# Patient Record
Sex: MALE | Race: WHITE | URBAN - METROPOLITAN AREA
[De-identification: names, ages, dates, MRNs, and addresses within clinical notes are randomized per-mention and may not be internally consistent; named-entity substitution may affect disease eponyms.]

---

## 2023-10-24 PROBLEM — C79.89: Status: ACTIVE | Noted: 2023-10-24

## 2023-10-24 PROBLEM — C80.1: Status: ACTIVE | Noted: 2023-10-24

## 2023-10-24 PROBLEM — E21.3 HYPERPARATHYROIDISM (HCC): Status: ACTIVE | Noted: 2023-10-24

## 2023-10-26 ENCOUNTER — DOCUMENTATION (OUTPATIENT)
Dept: HEMATOLOGY ONCOLOGY | Facility: CLINIC | Age: 68
End: 2023-10-26

## 2023-10-26 NOTE — PROGRESS NOTES
Late entry: In-basket message received from Dr. Shayne Tam to add patient to the head and neck [address] [place] [address] Loop on 11/13/2023. Chart reviewed and prep completed. done

## 2023-10-26 NOTE — PROGRESS NOTES
Chart reviewed in preparation of Multidisciplinary Head and Neck Tumor Conference presentation by  Dr. Cuca Appiah on 11/13/23. Patient presented with left cheek lesion noticed in 12/2022. He was evaluated by Oral Surgery and underwent biopsy on 2/2023 with pathology showing lichen planus. The lesion never fully healed so he underwent another biopsy at [facility] on 10/6/23 with pathology positive for squamous cell carcinoma.

## 2023-10-26 NOTE — PROGRESS NOTES
STEFFI Méndez  Patient called this morning and and decided to get a second opinion elsewhere. He can be taken off tumor board.

## 2023-11-07 ENCOUNTER — APPOINTMENT (OUTPATIENT)
Dept: CT IMAGING | Facility: CLINIC | Age: 68
End: 2023-11-07

## 2023-11-13 ENCOUNTER — LAB REQUISITION (OUTPATIENT)
Dept: LAB | Facility: HOSPITAL | Age: 68
End: 2023-11-13
Payer: MEDICARE

## 2023-11-13 DIAGNOSIS — C79.89 SECONDARY MALIGNANT NEOPLASM OF OTHER SPECIFIED SITES (HCC): ICD-10-CM

## 2023-11-13 PROCEDURE — 88321 CONSLTJ&REPRT SLD PREP ELSWR: CPT | Performed by: PATHOLOGY

## 2023-11-14 ENCOUNTER — APPOINTMENT (OUTPATIENT)
Dept: OTOLARYNGOLOGY | Facility: CLINIC | Age: 68
End: 2023-11-14
Payer: MEDICARE

## 2023-11-14 PROBLEM — Z00.00 ENCOUNTER FOR PREVENTIVE HEALTH EXAMINATION: Status: ACTIVE | Noted: 2023-11-14

## 2023-11-14 PROCEDURE — 99203 OFFICE O/P NEW LOW 30 MIN: CPT | Mod: 95

## 2023-11-28 ENCOUNTER — TRANSCRIPTION ENCOUNTER (OUTPATIENT)
Age: 68
End: 2023-11-28

## 2023-11-28 RX ORDER — IBUPROFEN 600 MG/1
600 TABLET, FILM COATED ORAL 3 TIMES DAILY
Qty: 30 | Refills: 0 | Status: ACTIVE | COMMUNITY
Start: 2023-11-28 | End: 1900-01-01

## 2023-12-03 ENCOUNTER — INPATIENT (INPATIENT)
Facility: HOSPITAL | Age: 68
LOS: 17 days | Discharge: HOME HEALTH | DRG: 11 | End: 2023-12-21
Attending: DENTIST | Admitting: DENTIST
Payer: MEDICARE

## 2023-12-03 VITALS
SYSTOLIC BLOOD PRESSURE: 146 MMHG | RESPIRATION RATE: 18 BRPM | OXYGEN SATURATION: 98 % | WEIGHT: 184.97 LBS | HEART RATE: 62 BPM | TEMPERATURE: 97 F | HEIGHT: 70 IN | DIASTOLIC BLOOD PRESSURE: 88 MMHG

## 2023-12-03 DIAGNOSIS — C06.0 MALIGNANT NEOPLASM OF CHEEK MUCOSA: ICD-10-CM

## 2023-12-03 DIAGNOSIS — D62 ACUTE POSTHEMORRHAGIC ANEMIA: ICD-10-CM

## 2023-12-03 DIAGNOSIS — E11.9 TYPE 2 DIABETES MELLITUS WITHOUT COMPLICATIONS: ICD-10-CM

## 2023-12-03 DIAGNOSIS — J69.0 PNEUMONITIS DUE TO INHALATION OF FOOD AND VOMIT: ICD-10-CM

## 2023-12-03 DIAGNOSIS — C77.0 SECONDARY AND UNSPECIFIED MALIGNANT NEOPLASM OF LYMPH NODES OF HEAD, FACE AND NECK: ICD-10-CM

## 2023-12-03 DIAGNOSIS — K94.23 GASTROSTOMY MALFUNCTION: ICD-10-CM

## 2023-12-03 DIAGNOSIS — Z87.891 PERSONAL HISTORY OF NICOTINE DEPENDENCE: ICD-10-CM

## 2023-12-03 DIAGNOSIS — Z95.5 PRESENCE OF CORONARY ANGIOPLASTY IMPLANT AND GRAFT: ICD-10-CM

## 2023-12-03 DIAGNOSIS — I25.2 OLD MYOCARDIAL INFARCTION: ICD-10-CM

## 2023-12-03 DIAGNOSIS — Z87.442 PERSONAL HISTORY OF URINARY CALCULI: ICD-10-CM

## 2023-12-03 DIAGNOSIS — E44.1 MILD PROTEIN-CALORIE MALNUTRITION: ICD-10-CM
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
## 2023-12-03 DIAGNOSIS — I25.10 ATHEROSCLEROTIC HEART DISEASE OF NATIVE CORONARY ARTERY WITHOUT ANGINA PECTORIS: ICD-10-CM
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
## 2023-12-03 DIAGNOSIS — J93.9 PNEUMOTHORAX, UNSPECIFIED: ICD-10-CM

## 2023-12-03 DIAGNOSIS — K42.9 UMBILICAL HERNIA WITHOUT OBSTRUCTION OR GANGRENE: ICD-10-CM

## 2023-12-03 DIAGNOSIS — R00.1 BRADYCARDIA, UNSPECIFIED: ICD-10-CM

## 2023-12-03 DIAGNOSIS — R50.81 FEVER PRESENTING WITH CONDITIONS CLASSIFIED ELSEWHERE: ICD-10-CM

## 2023-12-03 DIAGNOSIS — T85.698A OTHER MECHANICAL COMPLICATION OF OTHER SPECIFIED INTERNAL PROSTHETIC DEVICES, IMPLANTS AND GRAFTS, INITIAL ENCOUNTER: ICD-10-CM

## 2023-12-03 DIAGNOSIS — L40.9 PSORIASIS, UNSPECIFIED: ICD-10-CM

## 2023-12-03 LAB
ALBUMIN SERPL ELPH-MCNC: 3.9 G/DL — SIGNIFICANT CHANGE UP (ref 3.3–5)
ALBUMIN SERPL ELPH-MCNC: 3.9 G/DL — SIGNIFICANT CHANGE UP (ref 3.3–5)
ALP SERPL-CCNC: 41 U/L — SIGNIFICANT CHANGE UP (ref 40–120)
ALP SERPL-CCNC: 41 U/L — SIGNIFICANT CHANGE UP (ref 40–120)
ALT FLD-CCNC: 19 U/L — SIGNIFICANT CHANGE UP (ref 10–45)
ALT FLD-CCNC: 19 U/L — SIGNIFICANT CHANGE UP (ref 10–45)
ANION GAP SERPL CALC-SCNC: 10 MMOL/L — SIGNIFICANT CHANGE UP (ref 5–17)
ANION GAP SERPL CALC-SCNC: 10 MMOL/L — SIGNIFICANT CHANGE UP (ref 5–17)
APTT BLD: 28.1 SEC — SIGNIFICANT CHANGE UP (ref 24.5–35.6)
APTT BLD: 28.1 SEC — SIGNIFICANT CHANGE UP (ref 24.5–35.6)
AST SERPL-CCNC: 18 U/L — SIGNIFICANT CHANGE UP (ref 10–40)
AST SERPL-CCNC: 18 U/L — SIGNIFICANT CHANGE UP (ref 10–40)
BASOPHILS # BLD AUTO: 0.04 K/UL — SIGNIFICANT CHANGE UP (ref 0–0.2)
BASOPHILS # BLD AUTO: 0.04 K/UL — SIGNIFICANT CHANGE UP (ref 0–0.2)
BASOPHILS NFR BLD AUTO: 0.9 % — SIGNIFICANT CHANGE UP (ref 0–2)
BASOPHILS NFR BLD AUTO: 0.9 % — SIGNIFICANT CHANGE UP (ref 0–2)
BILIRUB SERPL-MCNC: 0.6 MG/DL — SIGNIFICANT CHANGE UP (ref 0.2–1.2)
BILIRUB SERPL-MCNC: 0.6 MG/DL — SIGNIFICANT CHANGE UP (ref 0.2–1.2)
BLD GP AB SCN SERPL QL: NEGATIVE — SIGNIFICANT CHANGE UP
BLD GP AB SCN SERPL QL: NEGATIVE — SIGNIFICANT CHANGE UP
BUN SERPL-MCNC: 19 MG/DL — SIGNIFICANT CHANGE UP (ref 7–23)
BUN SERPL-MCNC: 19 MG/DL — SIGNIFICANT CHANGE UP (ref 7–23)
CALCIUM SERPL-MCNC: 9.8 MG/DL — SIGNIFICANT CHANGE UP (ref 8.4–10.5)
CALCIUM SERPL-MCNC: 9.8 MG/DL — SIGNIFICANT CHANGE UP (ref 8.4–10.5)
CHLORIDE SERPL-SCNC: 104 MMOL/L — SIGNIFICANT CHANGE UP (ref 96–108)
CHLORIDE SERPL-SCNC: 104 MMOL/L — SIGNIFICANT CHANGE UP (ref 96–108)
CO2 SERPL-SCNC: 28 MMOL/L — SIGNIFICANT CHANGE UP (ref 22–31)
CO2 SERPL-SCNC: 28 MMOL/L — SIGNIFICANT CHANGE UP (ref 22–31)
CREAT SERPL-MCNC: 0.93 MG/DL — SIGNIFICANT CHANGE UP (ref 0.5–1.3)
CREAT SERPL-MCNC: 0.93 MG/DL — SIGNIFICANT CHANGE UP (ref 0.5–1.3)
EGFR: 90 ML/MIN/1.73M2 — SIGNIFICANT CHANGE UP
EGFR: 90 ML/MIN/1.73M2 — SIGNIFICANT CHANGE UP
EOSINOPHIL # BLD AUTO: 0.08 K/UL — SIGNIFICANT CHANGE UP (ref 0–0.5)
EOSINOPHIL # BLD AUTO: 0.08 K/UL — SIGNIFICANT CHANGE UP (ref 0–0.5)
EOSINOPHIL NFR BLD AUTO: 1.7 % — SIGNIFICANT CHANGE UP (ref 0–6)
EOSINOPHIL NFR BLD AUTO: 1.7 % — SIGNIFICANT CHANGE UP (ref 0–6)
GLUCOSE BLDC GLUCOMTR-MCNC: 157 MG/DL — HIGH (ref 70–99)
GLUCOSE BLDC GLUCOMTR-MCNC: 157 MG/DL — HIGH (ref 70–99)
GLUCOSE SERPL-MCNC: 102 MG/DL — HIGH (ref 70–99)
GLUCOSE SERPL-MCNC: 102 MG/DL — HIGH (ref 70–99)
HCT VFR BLD CALC: 47.1 % — SIGNIFICANT CHANGE UP (ref 39–50)
HCT VFR BLD CALC: 47.1 % — SIGNIFICANT CHANGE UP (ref 39–50)
HGB BLD-MCNC: 15.5 G/DL — SIGNIFICANT CHANGE UP (ref 13–17)
HGB BLD-MCNC: 15.5 G/DL — SIGNIFICANT CHANGE UP (ref 13–17)
IMM GRANULOCYTES NFR BLD AUTO: 0 % — SIGNIFICANT CHANGE UP (ref 0–0.9)
IMM GRANULOCYTES NFR BLD AUTO: 0 % — SIGNIFICANT CHANGE UP (ref 0–0.9)
INR BLD: 1.02 — SIGNIFICANT CHANGE UP (ref 0.85–1.18)
INR BLD: 1.02 — SIGNIFICANT CHANGE UP (ref 0.85–1.18)
LYMPHOCYTES # BLD AUTO: 1.6 K/UL — SIGNIFICANT CHANGE UP (ref 1–3.3)
LYMPHOCYTES # BLD AUTO: 1.6 K/UL — SIGNIFICANT CHANGE UP (ref 1–3.3)
LYMPHOCYTES # BLD AUTO: 34.3 % — SIGNIFICANT CHANGE UP (ref 13–44)
LYMPHOCYTES # BLD AUTO: 34.3 % — SIGNIFICANT CHANGE UP (ref 13–44)
MCHC RBC-ENTMCNC: 27.4 PG — SIGNIFICANT CHANGE UP (ref 27–34)
MCHC RBC-ENTMCNC: 27.4 PG — SIGNIFICANT CHANGE UP (ref 27–34)
MCHC RBC-ENTMCNC: 32.9 GM/DL — SIGNIFICANT CHANGE UP (ref 32–36)
MCHC RBC-ENTMCNC: 32.9 GM/DL — SIGNIFICANT CHANGE UP (ref 32–36)
MCV RBC AUTO: 83.2 FL — SIGNIFICANT CHANGE UP (ref 80–100)
MCV RBC AUTO: 83.2 FL — SIGNIFICANT CHANGE UP (ref 80–100)
MONOCYTES # BLD AUTO: 0.61 K/UL — SIGNIFICANT CHANGE UP (ref 0–0.9)
MONOCYTES # BLD AUTO: 0.61 K/UL — SIGNIFICANT CHANGE UP (ref 0–0.9)
MONOCYTES NFR BLD AUTO: 13.1 % — SIGNIFICANT CHANGE UP (ref 2–14)
MONOCYTES NFR BLD AUTO: 13.1 % — SIGNIFICANT CHANGE UP (ref 2–14)
NEUTROPHILS # BLD AUTO: 2.34 K/UL — SIGNIFICANT CHANGE UP (ref 1.8–7.4)
NEUTROPHILS # BLD AUTO: 2.34 K/UL — SIGNIFICANT CHANGE UP (ref 1.8–7.4)
NEUTROPHILS NFR BLD AUTO: 50 % — SIGNIFICANT CHANGE UP (ref 43–77)
NEUTROPHILS NFR BLD AUTO: 50 % — SIGNIFICANT CHANGE UP (ref 43–77)
NRBC # BLD: 0 /100 WBCS — SIGNIFICANT CHANGE UP (ref 0–0)
NRBC # BLD: 0 /100 WBCS — SIGNIFICANT CHANGE UP (ref 0–0)
PLATELET # BLD AUTO: 164 K/UL — SIGNIFICANT CHANGE UP (ref 150–400)
PLATELET # BLD AUTO: 164 K/UL — SIGNIFICANT CHANGE UP (ref 150–400)
POTASSIUM SERPL-MCNC: 4 MMOL/L — SIGNIFICANT CHANGE UP (ref 3.5–5.3)
POTASSIUM SERPL-MCNC: 4 MMOL/L — SIGNIFICANT CHANGE UP (ref 3.5–5.3)
POTASSIUM SERPL-SCNC: 4 MMOL/L — SIGNIFICANT CHANGE UP (ref 3.5–5.3)
POTASSIUM SERPL-SCNC: 4 MMOL/L — SIGNIFICANT CHANGE UP (ref 3.5–5.3)
PROT SERPL-MCNC: 6.9 G/DL — SIGNIFICANT CHANGE UP (ref 6–8.3)
PROT SERPL-MCNC: 6.9 G/DL — SIGNIFICANT CHANGE UP (ref 6–8.3)
PROTHROM AB SERPL-ACNC: 11.6 SEC — SIGNIFICANT CHANGE UP (ref 9.5–13)
PROTHROM AB SERPL-ACNC: 11.6 SEC — SIGNIFICANT CHANGE UP (ref 9.5–13)
RBC # BLD: 5.66 M/UL — SIGNIFICANT CHANGE UP (ref 4.2–5.8)
RBC # BLD: 5.66 M/UL — SIGNIFICANT CHANGE UP (ref 4.2–5.8)
RBC # FLD: 13.3 % — SIGNIFICANT CHANGE UP (ref 10.3–14.5)
RBC # FLD: 13.3 % — SIGNIFICANT CHANGE UP (ref 10.3–14.5)
RH IG SCN BLD-IMP: POSITIVE — SIGNIFICANT CHANGE UP
SODIUM SERPL-SCNC: 142 MMOL/L — SIGNIFICANT CHANGE UP (ref 135–145)
SODIUM SERPL-SCNC: 142 MMOL/L — SIGNIFICANT CHANGE UP (ref 135–145)
WBC # BLD: 4.67 K/UL — SIGNIFICANT CHANGE UP (ref 3.8–10.5)
WBC # BLD: 4.67 K/UL — SIGNIFICANT CHANGE UP (ref 3.8–10.5)
WBC # FLD AUTO: 4.67 K/UL — SIGNIFICANT CHANGE UP (ref 3.8–10.5)
WBC # FLD AUTO: 4.67 K/UL — SIGNIFICANT CHANGE UP (ref 3.8–10.5)

## 2023-12-03 PROCEDURE — 99222 1ST HOSP IP/OBS MODERATE 55: CPT

## 2023-12-03 PROCEDURE — 71045 X-RAY EXAM CHEST 1 VIEW: CPT | Mod: 26

## 2023-12-03 PROCEDURE — 99285 EMERGENCY DEPT VISIT HI MDM: CPT

## 2023-12-03 PROCEDURE — 93010 ELECTROCARDIOGRAM REPORT: CPT

## 2023-12-03 RX ORDER — DEXTROSE 50 % IN WATER 50 %
15 SYRINGE (ML) INTRAVENOUS ONCE
Refills: 0 | Status: DISCONTINUED | OUTPATIENT
Start: 2023-12-03 | End: 2023-12-07

## 2023-12-03 RX ORDER — OXYCODONE HYDROCHLORIDE 5 MG/1
5 TABLET ORAL EVERY 6 HOURS
Refills: 0 | Status: DISCONTINUED | OUTPATIENT
Start: 2023-12-03 | End: 2023-12-03

## 2023-12-03 RX ORDER — DEXTROSE 50 % IN WATER 50 %
25 SYRINGE (ML) INTRAVENOUS ONCE
Refills: 0 | Status: DISCONTINUED | OUTPATIENT
Start: 2023-12-03 | End: 2023-12-07

## 2023-12-03 RX ORDER — GLUCAGON INJECTION, SOLUTION 0.5 MG/.1ML
1 INJECTION, SOLUTION SUBCUTANEOUS ONCE
Refills: 0 | Status: DISCONTINUED | OUTPATIENT
Start: 2023-12-03 | End: 2023-12-07

## 2023-12-03 RX ORDER — ENOXAPARIN SODIUM 100 MG/ML
40 INJECTION SUBCUTANEOUS EVERY 24 HOURS
Refills: 0 | Status: DISCONTINUED | OUTPATIENT
Start: 2023-12-03 | End: 2023-12-07

## 2023-12-03 RX ORDER — ATORVASTATIN CALCIUM 80 MG/1
40 TABLET, FILM COATED ORAL AT BEDTIME
Refills: 0 | Status: DISCONTINUED | OUTPATIENT
Start: 2023-12-03 | End: 2023-12-07

## 2023-12-03 RX ORDER — SODIUM CHLORIDE 9 MG/ML
1000 INJECTION, SOLUTION INTRAVENOUS
Refills: 0 | Status: DISCONTINUED | OUTPATIENT
Start: 2023-12-03 | End: 2023-12-07

## 2023-12-03 RX ORDER — METFORMIN HYDROCHLORIDE 850 MG/1
1 TABLET ORAL
Refills: 0 | DISCHARGE

## 2023-12-03 RX ORDER — CLOPIDOGREL BISULFATE 75 MG/1
1 TABLET, FILM COATED ORAL
Refills: 0 | DISCHARGE

## 2023-12-03 RX ORDER — IBUPROFEN 200 MG
400 TABLET ORAL EVERY 6 HOURS
Refills: 0 | Status: DISCONTINUED | OUTPATIENT
Start: 2023-12-03 | End: 2023-12-07

## 2023-12-03 RX ORDER — ATORVASTATIN CALCIUM 80 MG/1
1 TABLET, FILM COATED ORAL
Refills: 0 | DISCHARGE

## 2023-12-03 RX ORDER — POLYETHYLENE GLYCOL 3350 17 G/17G
17 POWDER, FOR SOLUTION ORAL AT BEDTIME
Refills: 0 | Status: DISCONTINUED | OUTPATIENT
Start: 2023-12-03 | End: 2023-12-07

## 2023-12-03 RX ORDER — INSULIN LISPRO 100/ML
VIAL (ML) SUBCUTANEOUS AT BEDTIME
Refills: 0 | Status: DISCONTINUED | OUTPATIENT
Start: 2023-12-03 | End: 2023-12-03

## 2023-12-03 RX ORDER — TILDRAKIZUMAB-ASMN 100 MG/ML
100 INJECTION, SOLUTION SUBCUTANEOUS
Refills: 0 | DISCHARGE

## 2023-12-03 RX ORDER — INSULIN LISPRO 100/ML
VIAL (ML) SUBCUTANEOUS
Refills: 0 | Status: DISCONTINUED | OUTPATIENT
Start: 2023-12-03 | End: 2023-12-07

## 2023-12-03 RX ORDER — DEXTROSE 50 % IN WATER 50 %
12.5 SYRINGE (ML) INTRAVENOUS ONCE
Refills: 0 | Status: DISCONTINUED | OUTPATIENT
Start: 2023-12-03 | End: 2023-12-07

## 2023-12-03 RX ORDER — ACETAMINOPHEN 500 MG
1 TABLET ORAL
Refills: 0 | DISCHARGE

## 2023-12-03 RX ORDER — SENNA PLUS 8.6 MG/1
2 TABLET ORAL AT BEDTIME
Refills: 0 | Status: DISCONTINUED | OUTPATIENT
Start: 2023-12-03 | End: 2023-12-07

## 2023-12-03 RX ORDER — ASPIRIN/CALCIUM CARB/MAGNESIUM 324 MG
81 TABLET ORAL DAILY
Refills: 0 | Status: DISCONTINUED | OUTPATIENT
Start: 2023-12-03 | End: 2023-12-06

## 2023-12-03 RX ORDER — OXYCODONE HYDROCHLORIDE 5 MG/1
10 TABLET ORAL EVERY 6 HOURS
Refills: 0 | Status: DISCONTINUED | OUTPATIENT
Start: 2023-12-03 | End: 2023-12-03

## 2023-12-03 RX ORDER — OXYCODONE HYDROCHLORIDE 5 MG/1
2.5 TABLET ORAL EVERY 6 HOURS
Refills: 0 | Status: DISCONTINUED | OUTPATIENT
Start: 2023-12-03 | End: 2023-12-07

## 2023-12-03 RX ORDER — OXYCODONE HYDROCHLORIDE 5 MG/1
5 TABLET ORAL EVERY 6 HOURS
Refills: 0 | Status: DISCONTINUED | OUTPATIENT
Start: 2023-12-03 | End: 2023-12-07

## 2023-12-03 RX ORDER — EMPAGLIFLOZIN 10 MG/1
1 TABLET, FILM COATED ORAL
Refills: 0 | DISCHARGE

## 2023-12-03 RX ORDER — INFLUENZA VIRUS VACCINE 15; 15; 15; 15 UG/.5ML; UG/.5ML; UG/.5ML; UG/.5ML
0.7 SUSPENSION INTRAMUSCULAR ONCE
Refills: 0 | Status: DISCONTINUED | OUTPATIENT
Start: 2023-12-03 | End: 2023-12-21

## 2023-12-03 RX ORDER — ACETAMINOPHEN 500 MG
650 TABLET ORAL EVERY 6 HOURS
Refills: 0 | Status: DISCONTINUED | OUTPATIENT
Start: 2023-12-03 | End: 2023-12-04

## 2023-12-03 RX ORDER — NITROGLYCERIN 6.5 MG
1 CAPSULE, EXTENDED RELEASE ORAL
Refills: 0 | DISCHARGE

## 2023-12-03 RX ORDER — POTASSIUM CITRATE MONOHYDRATE 100 %
10 POWDER (GRAM) MISCELLANEOUS
Refills: 0 | DISCHARGE

## 2023-12-03 RX ORDER — IBUPROFEN 200 MG
1 TABLET ORAL
Refills: 0 | DISCHARGE

## 2023-12-03 RX ORDER — LIDOCAINE 4 G/100G
15 CREAM TOPICAL
Refills: 0 | Status: DISCONTINUED | OUTPATIENT
Start: 2023-12-03 | End: 2023-12-07

## 2023-12-03 RX ORDER — LIDOCAINE 4 G/100G
15 CREAM TOPICAL
Refills: 0 | DISCHARGE

## 2023-12-03 RX ORDER — DOCUSATE SODIUM 100 MG
1 CAPSULE ORAL
Refills: 0 | DISCHARGE

## 2023-12-03 RX ORDER — ASPIRIN/CALCIUM CARB/MAGNESIUM 324 MG
1 TABLET ORAL
Refills: 0 | DISCHARGE

## 2023-12-03 RX ADMIN — SENNA PLUS 2 TABLET(S): 8.6 TABLET ORAL at 21:48

## 2023-12-03 RX ADMIN — ENOXAPARIN SODIUM 40 MILLIGRAM(S): 100 INJECTION SUBCUTANEOUS at 18:46

## 2023-12-03 RX ADMIN — Medication 400 MILLIGRAM(S): at 21:48

## 2023-12-03 RX ADMIN — POLYETHYLENE GLYCOL 3350 17 GRAM(S): 17 POWDER, FOR SOLUTION ORAL at 21:48

## 2023-12-03 RX ADMIN — ATORVASTATIN CALCIUM 40 MILLIGRAM(S): 80 TABLET, FILM COATED ORAL at 21:47

## 2023-12-03 RX ADMIN — Medication 650 MILLIGRAM(S): at 18:46

## 2023-12-03 NOTE — ED ADULT NURSE NOTE - NSFALLHARMRISKINTERV_ED_ALL_ED
Communicate risk of Fall with Harm to all staff, patient, and family/Provide visual cue: red socks, yellow wristband, yellow gown, etc/Reinforce activity limits and safety measures with patient and family/Bed in lowest position, wheels locked, appropriate side rails in place/Call bell, personal items and telephone in reach/Instruct patient to call for assistance before getting out of bed/chair/stretcher/Non-slip footwear applied when patient is off stretcher/[place] to call system/Physically safe environment - no spills, clutter or unnecessary equipment/Purposeful Proactive Rounding/Room/bathroom lighting operational, light cord in reach Communicate risk of Fall with Harm to all staff, patient, and family/Provide visual cue: red socks, yellow wristband, yellow gown, etc/Reinforce activity limits and safety measures with patient and family/Bed in lowest position, wheels locked, appropriate side rails in place/Call bell, personal items and telephone in reach/Instruct patient to call for assistance before getting out of bed/chair/stretcher/Non-slip footwear applied when patient is off stretcher/[place] to call system/Physically safe environment - no spills, clutter or unnecessary equipment/Purposeful Proactive Rounding/Room/bathroom lighting operational, light cord in reach

## 2023-12-03 NOTE — ED ADULT NURSE NOTE - OBJECTIVE STATEMENT
68 year old male c/o mouth pain. Pt endorses having tumor in mouth with pain for a few months. Pt here today for pre op, surgery scheduled on Thursday. Pt states he has had a loss of appetite and has not been able to eat or drink. Hx of DM, mouth cancer, and HLD. Pt takes Plavix and aspirin. Pt endorses pain and decreased appetite. Pt denies chest pain, SOB, fevers, chills, constipation, diarrhea, nausea, vomiting, and blurry vision. Respirations spontaneous and unlabored. A&Ox4. Wife at bedside. m 68 year old male c/o mouth pain. Pt endorses having tumor in mouth with pain for a few months. Pt here today for pre op, surgery scheduled on Thursday. Pt states he has had a loss of appetite and has not been able to eat or drink. Hx of DM, mouth cancer, chronic kidney stones, HA x1 with stents, and HLD. Pt takes Plavix and aspirin. Pt endorses pain and decreased appetite. Pt denies chest pain, SOB, fevers, chills, constipation, diarrhea, nausea, vomiting, and blurry vision. Respirations spontaneous and unlabored. A&Ox4. Wife at bedside.

## 2023-12-03 NOTE — PROGRESS NOTE ADULT - SUBJECTIVE AND OBJECTIVE BOX
TSIRIGOTIS, PANAYIOTIS  68y  Male      Patient is a 68y old  Male who presents with a chief complaint of Oral Ca (03 Dec 2023 14:01)        PAST MEDICAL/SURGICAL HISTORY  PAST MEDICAL & SURGICAL HISTORY:      REVIEW OF SYSTEMS:  CONSTITUTIONAL: No fever, weight loss, or fatigue  EYES: No eye pain, visual disturbances, or discharge  ENMT:  No difficulty hearing, tinnitus, vertigo; No sinus or throat pain  NECK: No pain or stiffness  BREASTS: No pain, masses, or nipple discharge  RESPIRATORY: No cough, wheezing, chills or hemoptysis; No shortness of breath  CARDIOVASCULAR: No chest pain, palpitations, dizziness, or leg swelling  GASTROINTESTINAL: No abdominal or epigastric pain. No nausea, vomiting, or hematemesis; No diarrhea or constipation. No melena or hematochezia.  GENITOURINARY: No dysuria, frequency, hematuria, or incontinence  NEUROLOGICAL: No headaches, memory loss, loss of strength, numbness, or tremors  SKIN: No itching, burning, rashes, or lesions   LYMPH NODES: No enlarged glands  ENDOCRINE: No heat or cold intolerance; No hair loss  MUSCULOSKELETAL: No joint pain or swelling; No muscle, back, or extremity pain  PSYCHIATRIC: No depression, anxiety, mood swings, or difficulty sleeping  HEME/LYMPH: No easy bruising, or bleeding gums  ALLERY AND IMMUNOLOGIC: No hives or eczema    T(C): 36.9 (12-03-23 @ 15:33), Max: 36.9 (12-03-23 @ 15:33)  HR: 46 (12-03-23 @ 15:33) (46 - 62)  BP: 148/74 (12-03-23 @ 15:43) (146/88 - 164/68)  RR: 18 (12-03-23 @ 15:33) (18 - 18)  SpO2: 97% (12-03-23 @ 15:33) (97% - 98%)  Wt(kg): --Vital Signs Last 24 Hrs  T(C): 36.9 (03 Dec 2023 15:33), Max: 36.9 (03 Dec 2023 15:33)  T(F): 98.4 (03 Dec 2023 15:33), Max: 98.4 (03 Dec 2023 15:33)  HR: 46 (03 Dec 2023 15:33) (46 - 62)  BP: 148/74 (03 Dec 2023 15:43) (146/88 - 164/68)  BP(mean): --  RR: 18 (03 Dec 2023 15:33) (18 - 18)  SpO2: 97% (03 Dec 2023 15:33) (97% - 98%)    Parameters below as of 03 Dec 2023 15:33  Patient On (Oxygen Delivery Method): room air        PHYSICAL EXAM:  GENERAL: NAD, well-groomed, well-developed  HEAD:  Atraumatic, Normocephalic  EYES: EOMI, PERRLA, conjunctiva and sclera clear  ENMT: No tonsillar erythema, exudates, or enlargement; Moist mucous membranes, Good dentition, No lesions  NECK: Supple, No JVD, Normal thyroid  NERVOUS SYSTEM:  Alert & Oriented X3, Good concentration; Motor Strength 5/5 B/L upper and lower extremities; DTRs 2+ intact and symmetric  CHEST/LUNG: Clear to percussion bilaterally; No rales, rhonchi, wheezing, or rubs  HEART: Regular rate and rhythm; No murmurs, rubs, or gallops  ABDOMEN: Soft, Nontender, Nondistended; Bowel sounds present  EXTREMITIES:  2+ Peripheral Pulses, No clubbing, cyanosis, or edema  LYMPH: No lymphadenopathy noted  SKIN: No rashes or lesions    Consultant(s) Notes Reviewed:  [x ] YES  [ ] NO  Care Discussed with Consultants/Other Providers [ x] YES  [ ] NO    LABS:  CBC   12-03-23 @ 14:27  Hematcorit 47.1  Hemoglobin 15.5  Mean Cell Hemoglobin 27.4  Platelet Count-Automated 164  RBC Count 5.66  Red Cell Distrib Width 13.3  Wbc Count 4.67      BMP  12-03-23 @ 14:27  Anion Gap. Serum 10  Blood Urea Nitrogen,Serm 19  Calcium, Total Serum 9.8  Carbon Dioxide, Serum 28  Chloride, Serum 104  Creatinine, Serum 0.93  eGFR in  --  eGFR in Non Afican American --  Gloucose, serum 102  Potassium, Serum 4.0  Sodium, Serum 142                  CMP  12-03-23 @ 14:27  Vicky Aminotransferase(ALT/SGPT)19  Albumin, Serum 3.9  Alkaline Phosphatase, Serum 41  Anion Gap, Serum 10  Aspartate Aminotransferase (AST/SGOT)18  Bilirubin Total, Serum 0.6  Blood Urea Nitrogen, Serum 19  Calcium,Total Serum 9.8  Carbon Dioxide, Serum 28  Chloride, Serum 104  Creatinine, Serum 0.93  eGFR if  --  eGFR if Non African American --  Glucose, Serum 102  Potassium, Serum 4.0  Protein Total, Serum 6.9  Sodium, Serum 142                          PT/INR  PT/INR  12-03-23 @ 14:27  INR 1.02  Prothrombin Time Comment --  Prothrobin Time, [id number]      Amylase/Lipase            RADIOLOGY & ADDITIONAL TESTS:    Imaging Personally Reviewed:  [ ] YES  [ ] NO TSIRIGOTIS, PANAYIOTIS  68y  Male      Patient is a 68y old  Male who presents with a chief complaint of Oral Ca (03 Dec 2023 14:01)        PAST MEDICAL/SURGICAL HISTORY  PAST MEDICAL & SURGICAL HISTORY:      REVIEW OF SYSTEMS:  CONSTITUTIONAL: No fever, weight loss, or fatigue  EYES: No eye pain, visual disturbances, or discharge  ENMT:  No difficulty hearing, tinnitus, vertigo; No sinus or throat pain  NECK: No pain or stiffness  BREASTS: No pain, masses, or nipple discharge  RESPIRATORY: No cough, wheezing, chills or hemoptysis; No shortness of breath  CARDIOVASCULAR: No chest pain, palpitations, dizziness, or leg swelling  GASTROINTESTINAL: No abdominal or epigastric pain. No nausea, vomiting, or hematemesis; No diarrhea or constipation. No melena or hematochezia.  GENITOURINARY: No dysuria, frequency, hematuria, or incontinence  NEUROLOGICAL: No headaches, memory loss, loss of strength, numbness, or tremors  SKIN: No itching, burning, rashes, or lesions   LYMPH NODES: No enlarged glands  ENDOCRINE: No heat or cold intolerance; No hair loss  MUSCULOSKELETAL: No joint pain or swelling; No muscle, back, or extremity pain  PSYCHIATRIC: No depression, anxiety, mood swings, or difficulty sleeping  HEME/LYMPH: No easy bruising, or bleeding gums  ALLERY AND IMMUNOLOGIC: No hives or eczema    T(C): 36.9 (12-03-23 @ 15:33), Max: 36.9 (12-03-23 @ 15:33)  HR: 46 (12-03-23 @ 15:33) (46 - 62)  BP: 148/74 (12-03-23 @ 15:43) (146/88 - 164/68)  RR: 18 (12-03-23 @ 15:33) (18 - 18)  SpO2: 97% (12-03-23 @ 15:33) (97% - 98%)  Wt(kg): --Vital Signs Last 24 Hrs  T(C): 36.9 (03 Dec 2023 15:33), Max: 36.9 (03 Dec 2023 15:33)  T(F): 98.4 (03 Dec 2023 15:33), Max: 98.4 (03 Dec 2023 15:33)  HR: 46 (03 Dec 2023 15:33) (46 - 62)  BP: 148/74 (03 Dec 2023 15:43) (146/88 - 164/68)  BP(mean): --  RR: 18 (03 Dec 2023 15:33) (18 - 18)  SpO2: 97% (03 Dec 2023 15:33) (97% - 98%)    Parameters below as of 03 Dec 2023 15:33  Patient On (Oxygen Delivery Method): room air        PHYSICAL EXAM:  GENERAL: NAD, well-groomed, well-developed  HEAD:  Atraumatic, Normocephalic  EYES: EOMI, PERRLA, conjunctiva and sclera clear  ENMT: No tonsillar erythema, exudates, or enlargement; Moist mucous membranes, Good dentition, No lesions  NECK: Supple, No JVD, Normal thyroid  NERVOUS SYSTEM:  Alert & Oriented X3, Good concentration; Motor Strength 5/5 B/L upper and lower extremities; DTRs 2+ intact and symmetric  CHEST/LUNG: Clear to percussion bilaterally; No rales, rhonchi, wheezing, or rubs  HEART: Regular rate and rhythm; No murmurs, rubs, or gallops  ABDOMEN: Soft, Nontender, Nondistended; Bowel sounds present  EXTREMITIES:  2+ Peripheral Pulses, No clubbing, cyanosis, or edema  LYMPH: No lymphadenopathy noted  SKIN: No rashes or lesions    Consultant(s) Notes Reviewed:  [x ] YES  [ ] NO  Care Discussed with Consultants/Other Providers [ x] YES  [ ] NO    LABS:  CBC   12-03-23 @ 14:27  Hematcorit 47.1  Hemoglobin 15.5  Mean Cell Hemoglobin 27.4  Platelet Count-Automated 164  RBC Count 5.66  Red Cell Distrib Width 13.3  Wbc Count 4.67      BMP  12-03-23 @ 14:27  Anion Gap. Serum 10  Blood Urea Nitrogen,Serm 19  Calcium, Total Serum 9.8  Carbon Dioxide, Serum 28  Chloride, Serum 104  Creatinine, Serum 0.93  eGFR in  --  eGFR in Non Afican American --  Gloucose, serum 102  Potassium, Serum 4.0  Sodium, Serum 142                  CMP  12-03-23 @ 14:27  Vicky Aminotransferase(ALT/SGPT)19  Albumin, Serum 3.9  Alkaline Phosphatase, Serum 41  Anion Gap, Serum 10  Aspartate Aminotransferase (AST/SGOT)18  Bilirubin Total, Serum 0.6  Blood Urea Nitrogen, Serum 19  Calcium,Total Serum 9.8  Carbon Dioxide, Serum 28  Chloride, Serum 104  Creatinine, Serum 0.93  eGFR if  --  eGFR if Non African American --  Glucose, Serum 102  Potassium, Serum 4.0  Protein Total, Serum 6.9  Sodium, Serum 142                          PT/INR  PT/INR  12-03-23 @ 14:27  INR 1.02  Prothrombin Time Comment --  Prothrobin Time, [id number]      Amylase/Lipase            RADIOLOGY & ADDITIONAL TESTS:    Imaging Personally Reviewed:  [ ] YES  [ ] NO TSIRIGOTIS, PANAYIOTIS  68y  Male      Patient is a 68y old  Male who presents with a chief complaint of Oral Ca (03 Dec 2023 14:01)        PAST MEDICAL/SURGICAL HISTORY  PAST MEDICAL & SURGICAL HISTORY:      REVIEW OF SYSTEMS:  CONSTITUTIONAL: No fever, weight loss, or fatigue  EYES: No eye pain, visual disturbances, or discharge  ENMT:  No difficulty hearing, tinnitus, vertigo; No sinus or throat pain  NECK: No pain or stiffness  BREASTS: No pain, masses, or nipple discharge  RESPIRATORY: No cough, wheezing, chills or hemoptysis; No shortness of breath  CARDIOVASCULAR: No chest pain, palpitations, dizziness, or leg swelling  GASTROINTESTINAL: No abdominal or epigastric pain. No nausea, vomiting, or hematemesis; No diarrhea or constipation. No melena or hematochezia.  GENITOURINARY: No dysuria, frequency, hematuria, or incontinence  NEUROLOGICAL: No headaches, memory loss, loss of strength, numbness, or tremors  SKIN: No itching, burning, rashes, or lesions   LYMPH NODES: No enlarged glands  ENDOCRINE: No heat or cold intolerance; No hair loss  MUSCULOSKELETAL: No joint pain or swelling; No muscle, back, or extremity pain  PSYCHIATRIC: No depression, anxiety, mood swings, or difficulty sleeping  HEME/LYMPH: No easy bruising, or bleeding gums  ALLERY AND IMMUNOLOGIC: No hives or eczema    T(C): 36.9 (12-03-23 @ 15:33), Max: 36.9 (12-03-23 @ 15:33)  HR: 46 (12-03-23 @ 15:33) (46 - 62)  BP: 148/74 (12-03-23 @ 15:43) (146/88 - 164/68)  RR: 18 (12-03-23 @ 15:33) (18 - 18)  SpO2: 97% (12-03-23 @ 15:33) (97% - 98%)  Wt(kg): --Vital Signs Last 24 Hrs  T(C): 36.9 (03 Dec 2023 15:33), Max: 36.9 (03 Dec 2023 15:33)  T(F): 98.4 (03 Dec 2023 15:33), Max: 98.4 (03 Dec 2023 15:33)  HR: 46 (03 Dec 2023 15:33) (46 - 62)  BP: 148/74 (03 Dec 2023 15:43) (146/88 - 164/68)  BP(mean): --  RR: 18 (03 Dec 2023 15:33) (18 - 18)  SpO2: 97% (03 Dec 2023 15:33) (97% - 98%)    Parameters below as of 03 Dec 2023 15:33  Patient On (Oxygen Delivery Method): room air        PHYSICAL EXAM:  GENERAL: NAD, well-groomed, well-developed  HEAD:  Atraumatic, Normocephalic  EYES: EOMI, PERRLA, conjunctiva and sclera clear  ENMT: No tonsillar erythema, exudates, or enlargement; Moist mucous membranes. Poor dentition, left back molar darkened.   NECK: Supple, No JVD  NERVOUS SYSTEM:  Alert & Oriented X3  CHEST/LUNG: Clear to percussion bilaterally; No rales, rhonchi, wheezing, or rubs  HEART: Regular rate and rhythm; No murmurs, rubs, or gallops  ABDOMEN: Soft, Nontender, Nondistended; Bowel sounds present  EXTREMITIES:  2+ Peripheral Pulses, No clubbing, cyanosis, or edema    SKIN: No rashes or lesions    Consultant(s) Notes Reviewed:  [x ] YES  [ ] NO  Care Discussed with Consultants/Other Providers [ x] YES  [ ] NO    LABS:  CBC   12-03-23 @ 14:27  Hematcorit 47.1  Hemoglobin 15.5  Mean Cell Hemoglobin 27.4  Platelet Count-Automated 164  RBC Count 5.66  Red Cell Distrib Width 13.3  Wbc Count 4.67      BMP  12-03-23 @ 14:27  Anion Gap. Serum 10  Blood Urea Nitrogen,Serm 19  Calcium, Total Serum 9.8  Carbon Dioxide, Serum 28  Chloride, Serum 104  Creatinine, Serum 0.93  eGFR in  --  eGFR in Non Afican American --  Gloucose, serum 102  Potassium, Serum 4.0  Sodium, Serum 142                  CMP  12-03-23 @ 14:27  Vicky Aminotransferase(ALT/SGPT)19  Albumin, Serum 3.9  Alkaline Phosphatase, Serum 41  Anion Gap, Serum 10  Aspartate Aminotransferase (AST/SGOT)18  Bilirubin Total, Serum 0.6  Blood Urea Nitrogen, Serum 19  Calcium,Total Serum 9.8  Carbon Dioxide, Serum 28  Chloride, Serum 104  Creatinine, Serum 0.93  eGFR if  --  eGFR if Non African American --  Glucose, Serum 102  Potassium, Serum 4.0  Protein Total, Serum 6.9  Sodium, Serum 142                          PT/INR  PT/INR  12-03-23 @ 14:27  INR 1.02  Prothrombin Time Comment --  Prothrobin Time, [id number]      Amylase/Lipase            RADIOLOGY & ADDITIONAL TESTS:    Imaging Personally Reviewed:  [ ] YES  [ ] NO TSIRIGOTIS, PANAYIOTIS  68y  Male      Patient is a 68y old  Male who presents with a chief complaint of Oral Ca (03 Dec 2023 14:01)        PAST MEDICAL/SURGICAL HISTORY  PAST MEDICAL & SURGICAL HISTORY:      REVIEW OF SYSTEMS:  CONSTITUTIONAL: No fever, weight loss, or fatigue  EYES: No eye pain, visual disturbances, or discharge  ENMT:  No difficulty hearing, tinnitus, vertigo; No sinus or throat pain  NECK: No pain or stiffness  BREASTS: No pain, masses, or nipple discharge  RESPIRATORY: No cough, wheezing, chills or hemoptysis; No shortness of breath  CARDIOVASCULAR: No chest pain, palpitations, dizziness, or leg swelling  GASTROINTESTINAL: No abdominal or epigastric pain. No nausea, vomiting, or hematemesis; No diarrhea or constipation. No melena or hematochezia.  GENITOURINARY: No dysuria, frequency, hematuria, or incontinence  NEUROLOGICAL: No headaches, memory loss, loss of strength, numbness, or tremors  SKIN: No itching, burning, rashes, or lesions   LYMPH NODES: No enlarged glands  ENDOCRINE: No heat or cold intolerance; No hair loss  MUSCULOSKELETAL: No joint pain or swelling; No muscle, back, or extremity pain  PSYCHIATRIC: No depression, anxiety, mood swings, or difficulty sleeping  HEME/LYMPH: No easy bruising, or bleeding gums  ALLERY AND IMMUNOLOGIC: No hives or eczema    T(C): 36.9 (12-03-23 @ 15:33), Max: 36.9 (12-03-23 @ 15:33)  HR: 46 (12-03-23 @ 15:33) (46 - 62)  BP: 148/74 (12-03-23 @ 15:43) (146/88 - 164/68)  RR: 18 (12-03-23 @ 15:33) (18 - 18)  SpO2: 97% (12-03-23 @ 15:33) (97% - 98%)  Wt(kg): --Vital Signs Last 24 Hrs  T(C): 36.9 (03 Dec 2023 15:33), Max: 36.9 (03 Dec 2023 15:33)  T(F): 98.4 (03 Dec 2023 15:33), Max: 98.4 (03 Dec 2023 15:33)  HR: 46 (03 Dec 2023 15:33) (46 - 62)  BP: 148/74 (03 Dec 2023 15:43) (146/88 - 164/68)  BP(mean): --  RR: 18 (03 Dec 2023 15:33) (18 - 18)  SpO2: 97% (03 Dec 2023 15:33) (97% - 98%)    Parameters below as of 03 Dec 2023 15:33  Patient On (Oxygen Delivery Method): room air        PHYSICAL EXAM:  GENERAL: NAD, well-groomed, well-developed  HEAD:  Atraumatic, Normocephalic  EYES: EOMI, PERRLA, conjunctiva and sclera clear  ENMT: No tonsillar erythema, exudates, or enlargement; Moist mucous membranes. Poor dentition, left back molar darkened.   NECK: Supple, No JVD  NERVOUS SYSTEM:  Alert & Oriented X3  CHEST/LUNG: Clear to percussion bilaterally; No rales, rhonchi, wheezing, or rubs  HEART: Regular rate and rhythm; No murmurs, rubs, or gallops  ABDOMEN: Soft, Nontender, Nondistended; Bowel sounds present  EXTREMITIES:  2+ Peripheral Pulses, No clubbing, cyanosis, or edema    SKIN: No rashes or lesions    Consultant(s) Notes Reviewed:  [x ] YES  [ ] NO  Care Discussed with Consultants/Other Providers [ x] YES  [ ] NO    LABS:  CBC   12-03-23 @ 14:27  Hematcorit 47.1  Hemoglobin 15.5  Mean Cell Hemoglobin 27.4  Platelet Count-Automated 164  RBC Count 5.66  Red Cell Distrib Width 13.3  Wbc Count 4.67      BMP  12-03-23 @ 14:27  Anion Gap. Serum 10  Blood Urea Nitrogen,Serm 19  Calcium, Total Serum 9.8  Carbon Dioxide, Serum 28  Chloride, Serum 104  Creatinine, Serum 0.93  eGFR in  --  eGFR in Non Afican American --  Gloucose, serum 102  Potassium, Serum 4.0  Sodium, Serum 142                  CMP  12-03-23 @ 14:27  Vicky Aminotransferase(ALT/SGPT)19  Albumin, Serum 3.9  Alkaline Phosphatase, Serum 41  Anion Gap, Serum 10  Aspartate Aminotransferase (AST/SGOT)18  Bilirubin Total, Serum 0.6  Blood Urea Nitrogen, Serum 19  Calcium,Total Serum 9.8  Carbon Dioxide, Serum 28  Chloride, Serum 104  Creatinine, Serum 0.93  eGFR if  --  eGFR if Non African American --  Glucose, Serum 102  Potassium, Serum 4.0  Protein Total, Serum 6.9  Sodium, Serum 142                          PT/INR  PT/INR  12-03-23 @ 14:27  INR 1.02  Prothrombin Time Comment --  Prothrobin Time, [id number]      Amylase/Lipase            RADIOLOGY & ADDITIONAL TESTS:    Imaging Personally Reviewed:  [ ] YES  [ ] NO

## 2023-12-03 NOTE — ED PROVIDER NOTE - OBJECTIVE STATEMENT
68 year old male with history of DM, psoriasis, kidney stones, oral cancer dx'd 1 yr ago, presenting for planned surgery with Dr. Mendoza. Per patient and wife at bedside--has had chronic pain since diagnosis, unable to meaningfully tolerate PO, has lost 30 pounds. No fevers, chills, chest pain, sob, abdominal pain, n/v/d.

## 2023-12-03 NOTE — PATIENT PROFILE ADULT - CENTRAL VENOUS CATHETER/PICC LINE
Talked to ADELE Troncoso at [address] - [id number] and she states that pt has rt upper abd pain earlier today with swelling to rt abd and vomited tonight. MD notified. no

## 2023-12-03 NOTE — ED PROVIDER NOTE - EKG ADDITIONAL QUESTION - PERFORMED INDEPENDENT VISUALIZATION
months. Treatment of other medical problems in patients with chronic liver disease  There are no contraindications for the patient to take most medications that are necessary for treatment of other medical issues. Counseling for alcohol in patients with chronic liver disease  The patient was counseled regarding alcohol consumption and the effect of alcohol on chronic liver disease. The patient does not consume any significant amount of alcohol. Hypercholesterolemia   This is common in patients with PBC. Controlled clinical trials demonstrate that women with PBC do not have an increased risk of CAD depsite the elevated total cholesterol. The cholesterol is typically HDL and does not always require treatment. Statins are not contraindicated if felt to be clinically warranted. Vitamin malabsorption  Patients with PBC do not efficiently absorb fat soluble vitamins A,D,E, K. It has been recommended that the patient take multivitamins with excess fat soluble vitamins. Breast cancer screeing   Women with PBC have an increased risk of breast cancer and should undergo regular mammography screenings. Osteoporosis  The risk of osteoporosis is increased in patients with cirrhosis. DEXA bone density to assess for osteoporosis was last performed in 5/2022. The results demonstrate osteoporosis. The patient is on a bisphosphonate. Vaccinations   Vaccination for viral hepatitis A is recommended since the patient has no serologic evidence of previous exposure or vaccination with immunity. Vaccination for viral hepatitis B is not needed. The patient has serologic evidence of prior exposure or vaccination with immunity. Routine vaccinations against other bacterial and viral agents can be performed as indicated. Annual flu vaccination should be administered if indicated.     ALLERGIES  Allergies   Allergen Reactions    Sulfa Antibiotics Rash       MEDICATIONS  Current Outpatient Medications
Yes

## 2023-12-03 NOTE — H&P ADULT - NSHPREVIEWOFSYSTEMS_GEN_ALL_CORE
REVIEW OF SYSTEMS:    CONSTITUTIONAL: No weakness, fevers or chills  EYES/ENT: No visual changes;  No vertigo. Endorses left facial pain and stiffness  NECK: Endorses pain and stiffness  RESPIRATORY: No cough, wheezing, hemoptysis; No shortness of breath  CARDIOVASCULAR: No chest pain or palpitations  GASTROINTESTINAL: No abdominal or epigastric pain. No nausea, vomiting, or hematemesis; No diarrhea or constipation. No melena or hematochezia.  GENITOURINARY: No dysuria, frequency or hematuria  NEUROLOGICAL: No numbness or weakness  SKIN: No itching, rashes

## 2023-12-03 NOTE — ED ADULT TRIAGE NOTE - CHIEF COMPLAINT QUOTE
"I have a lot of pain in my mouth and I can't eat or drink. I'm taking pain medicine and it's not working."

## 2023-12-03 NOTE — H&P ADULT - HISTORY OF PRESENT ILLNESS
67 y/o M w PMH of CAD (x2 stents Mar 2023), HLD, T2DM, hx of kidney stones, psoriasis presents to [facility] for evaluation of oral mass. Reports Three month hx of jaw pain and loosening of mandibular molar tooth on the lower left. Pt has a 30 pack yr smoking hx, quit 1 yr ago. 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 Pt planned for composite mandibular resection, neck dissection and fibula free flap reconstruction. Presently, pt is endorsing worsening left sided facial pain uncontrolled with pain medication. Pt eating mostly soft, easy to chew foods, reports decreased PO intake. Endorses dysphagia and pain on mouth opening. Denies dyspnea, odynophagia. Remaining ROS negative.    PMH:  CAD (x2 stents Mar 2023), HLD, T2DM, hx of kidney stones, psoriasis  PSH: Cardiac stent placement x2 2023, uretal stent placement 2022  Meds: Plavix (last taken 12/1), ASA81, SL nitroglycerin (pt unsure when he last used, has been months per pt), Metformin, Jardiance (last taken 11/30), Lipitor, ibuprofen, tylenol, percocet 5mg, colace, lidocaine 2% viscous mouth rinse  All: NKDA  Soc: EtOH/Tob (-) 69 y/o M w PMH of CAD (x2 stents Mar 2023), HLD, T2DM, hx of kidney stones, psoriasis presents to [facility] for evaluation of oral mass. Reports Three month hx of jaw pain and loosening of mandibular molar tooth on the lower left. Pt has a 30 pack yr smoking hx, quit 1 yr ago. 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 Pt planned for composite mandibular resection, neck dissection and fibula free flap reconstruction. Presently, pt is endorsing worsening left sided facial pain uncontrolled with pain medication. Pt eating mostly soft, easy to chew foods, reports decreased PO intake. Endorses dysphagia and pain on mouth opening. Denies dyspnea, odynophagia. Remaining ROS negative.    PMH:  CAD (x2 stents Mar 2023), HLD, T2DM, hx of kidney stones, psoriasis  PSH: Cardiac stent placement x2 2023, uretal stent placement 2022  Meds: Plavix (last taken 12/1), ASA81, SL nitroglycerin (pt unsure when he last used, has been months per pt), Metformin, Jardiance (last taken 11/30), Lipitor, ibuprofen, tylenol, percocet 5mg, colace, lidocaine 2% viscous mouth rinse  All: NKDA  Soc: EtOH/Tob (-)

## 2023-12-03 NOTE — H&P ADULT - NSHPPHYSICALEXAM_GEN_ALL_CORE
VITALS:   T(C): 36.9 (12-03-23 @ 15:33), Max: 36.9 (12-03-23 @ 15:33)  HR: 46 (12-03-23 @ 15:33) (46 - 62)  BP: 148/74 (12-03-23 @ 15:43) (146/88 - 164/68)  RR: 18 (12-03-23 @ 15:33) (18 - 18)  SpO2: 97% (12-03-23 @ 15:33) (97% - 98%)    GENERAL: NAD, lying in bed comfortably  HEAD:  Atraumatic, Normocephalic  EYES: EOMI, PERRLA, conjunctiva and sclera clear  ENT:Left buccal mass extending to mandibular mucosa. Fixed. ulcerated intraoral mucosa  NECK: Left Level I fixed node  CHEST/LUNG: Clear to auscultation bilaterally; No rales, rhonchi, wheezing, or rubs. Unlabored respirations  HEART: Regular rate and rhythm; No murmurs, rubs, or gallops  ABDOMEN: BSx4; Soft, nontender, nondistended  EXTREMITIES:  2+ Peripheral Pulses, brisk capillary refill. No clubbing, cyanosis, or edema  NERVOUS SYSTEM:  A&Ox3, no focal deficits   SKIN: No rashes or lesions

## 2023-12-03 NOTE — H&P ADULT - ASSESSMENT
Please page FS group: [phone] or MS Teams Eitan Mercado     No imaging necessary  Please obtain full set of labs        Eitan Mercado DMD  Oral & Maxillofacial Surgery  Available on Teams   Please page FS group: [phone] or MS Teams Eitan Mercado     No imaging necessary  Please obtain full set of labs        Eitan Mercado DMD  Oral & Maxillofacial Surgery  Available on Teams   Please page OMFS group: [phone] or MS Teams Eitan Mercado     No imaging necessary  Please obtain full set of labs and EKG        Eitan Mercado DMD  Oral & Maxillofacial Surgery  Available on Teams   Please page OMFS group: [phone] or MS Teams Eitan Mercado     No imaging necessary  Please obtain full set of labs and EKG        Eitan Mercado DMD  Oral & Maxillofacial Surgery  Available on Teams 69 y/o M w PMH of CAD (x2 stents Mar 2023), HLD, T2DM, hx of kidney stones, psoriasis presents to 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 Pt planned for composite mandibular resection, neck dissection and fibula free flap reconstruction with Dr Mendoza/Dr Gandhi    Plan:  -Admit to Teddy Mendoza  -Medicine consult for pre-operative optimization  -Cardiology consult for pre-operative risk stratification and recommendations  -Soft, easy to chew diet  -Multimodal pain control  -Holding home Jardiance and Plavix  -Monitor POC gluc, ISS  -Bowel regimen  -LVX DVT ppx      Eitan Mercado DMD  Oral & Maxillofacial Surgery  Available on Teams 67 y/o M w PMH of CAD (x2 stents Mar 2023), HLD, T2DM, hx of kidney stones, psoriasis presents to [facility] for evaluation of biopsy confirmed 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 Pt planned for composite mandibular resection, neck dissection and fibula free flap reconstruction with Dr Mendoza/Dr Gandhi    Plan:  -Admit to Teddy Mendoza  -Medicine consult for pre-operative optimization  -Cardiology consult for pre-operative risk stratification and recommendations  -Soft, easy to chew diet  -Multimodal pain control  -Holding home Jardiance and Plavix  -Monitor POC gluc, ISS  -Bowel regimen  -LVX DVT ppx      Eitan Mercado DMD  Oral & Maxillofacial Surgery  Available on Teams

## 2023-12-03 NOTE — PATIENT PROFILE ADULT - FALL HARM RISK - UNIVERSAL INTERVENTIONS
Bed in lowest position, wheels locked, appropriate side rails in place/Call bell, personal items and telephone in reach/Instruct patient to call for assistance before getting out of bed or chair/Non-slip footwear when patient is out of bed/[place] to call system/Physically safe environment - no spills, clutter or unnecessary equipment/Purposeful Proactive Rounding/Room/bathroom lighting operational, light cord in reach Bed in lowest position, wheels locked, appropriate side rails in place/Call bell, personal items and telephone in reach/Instruct patient to call for assistance before getting out of bed or chair/Non-slip footwear when patient is out of bed/[place] to call system/Physically safe environment - no spills, clutter or unnecessary equipment/Purposeful Proactive Rounding/Room/bathroom lighting operational, light cord in reach

## 2023-12-03 NOTE — PROGRESS NOTE ADULT - ATTENDING COMMENTS
67 y/o M w PMH of CAD (x2 stents Mar 2023), HLD, T2DM, hx of kidney stones, psoriasis presents to [facility] for evaluation of oral mass and 3 months of jaw pain a/f OMFS mandibular resection and flap reconstruction on Thursday. Medicine consulted for pre-operative clearance    Plan  -Patient low-intermediate risk for intermediate risk surgery. Obtaining TTE and cardiology consult given ischemic history and recent stent implantation. C/w ASA perioperatively. Pt advised to hold plavix starting 12/1  -f/u CXR.  -will need collaterals re: pts psoriasis tx as pt undergoes psoriasis treatment every 3 weeks (last tx 2 wks ago)  -ISS for insulin mgmt 67 y/o M w PMH of CAD (x2 stents Mar 2023), HLD, T2DM, hx of kidney stones, psoriasis presents to [facility] for evaluation of oral mass and 3 months of jaw pain a/f OMFS mandibular resection and flap reconstruction on Thursday. Medicine consulted for pre-operative clearance    Plan  -Patient low-intermediate risk for intermediate risk surgery. Obtaining TTE and cardiology consult given ischemic history and recent stent implantation. C/w ASA perioperatively. Pt advised to hold plavix starting 12/1  -f/u CXR.  -will need collaterals re: pts psoriasis tx as pt undergoes psoriasis treatment every 3 weeks (last tx 2 wks ago)  -ISS for insulin mgmt 69 y/o M w PMH of ACS (x2 stents Mar 2023), HLD, T2DM, hx of kidney stones, psoriasis presents to [facility] for evaluation of oral mass and 3 months of jaw pain a/f OMFS mandibular resection and flap reconstruction on Thursday. Medicine consulted for pre-operative clearance    Plan  -Patient low-intermediate risk for intermediate risk surgery. Obtaining TTE and cardiology consult given ischemic history and recent stent implantation. C/w ASA perioperatively. Pt advised to hold plavix starting 12/1  -f/u CXR.  -will need collaterals re: pts psoriasis tx as pt undergoes psoriasis treatment every 3 weeks (last tx 2 wks ago)  -ISS for insulin mgmt 69 y/o M w PMH of ACS (x2 stents Mar 2023), HLD, T2DM, hx of kidney stones, psoriasis presents to [facility] for evaluation of oral mass and 3 months of jaw pain a/f OMFS mandibular resection and flap reconstruction on Thursday. Medicine consulted for pre-operative clearance    Plan  -Patient low-intermediate risk for intermediate risk surgery. Obtaining TTE and cardiology consult given ischemic history and recent stent implantation. C/w ASA perioperatively. Pt advised to hold plavix starting 12/1  -f/u CXR.  -will need collaterals re: pts psoriasis tx as pt undergoes psoriasis treatment every 3 weeks (last tx 2 wks ago)  -ISS for insulin mgmt

## 2023-12-03 NOTE — ED PROVIDER NOTE - PHYSICAL EXAMINATION
Gen - Nontoxic appearing, protecting airway, tolerating secretions, speaks full sentences; A+Ox3   HEENT - NCAT, EOMI, moist mucous membranes, +trismus  Neck - supple  Resp - CTAB, no increased WOB  CV -  RRR, no m/r/g  Abd - soft, NT, ND; no guarding or rebound  MSK - FROM of b/l UE and LE, no gross deformities  Extrem - no LE edema/erythema/tenderness  Neuro - no focal motor or sensation deficits  Skin - warm, well perfused

## 2023-12-03 NOTE — PROGRESS NOTE ADULT - ASSESSMENT
INCOMPLETE  69 y/o M w PMH of CAD (x2 stents Mar 2023), HLD, T2DM, hx of kidney stones, psoriasis presents to [facility] for evaluation of oral mass and 3 months of jaw pain a/f OMFS madibular resection and flap reconstruction on Thursday. Medicine consulted for pre-operative clearance    #Pre-operative clearance  Cardiac hx includes 2x stents placement in 2023. 30 pack year smoking hx, quit 1 year.   No adverse reactions to anesthesia in the past in self or first-degree relatives. Hx of prior surgeries (stents) without adverse rxn to anesthesia or difficult extubation.   - Pre-op labs (CBC, CMP, PT, PTT, INR, T/S) Reviewed.   - Please obtain TTE/cardiac clearance before surgery  - Please obtain CXR  - METS ~4 (ambulates independently without cane/walker, can walk up to 1 mile before stopping due to feeling tired, not CP/SOB)   - RCRI 2 points (Class I Risk) ~ 10.1 % for 30d risk of death, MI, or cardiac arrest.   - Noland score 0.1% for risk of MI or cardiac arrest, intraoperatively or up to 30d post-op.  - Patient deemed low risk for intermediate risk surgery   - AC Per primary team.   -c/w aspirin in the pperioperative period  - Stent placement in 2023 (recent?)- last took clopidigrel 21/1 c/w clopidogrel otherwise hold until surgery  - Metformin: Hold day before surgery  - Jardiance: Per fda guidelines hold 3 days before surgery     INCOMPLETE  67 y/o M w PMH of CAD (x2 stents Mar 2023), HLD, T2DM, hx of kidney stones, psoriasis presents to [facility] for evaluation of oral mass and 3 months of jaw pain a/f OMFS madibular resection and flap reconstruction on Thursday. Medicine consulted for pre-operative clearance    #Pre-operative clearance  Cardiac hx includes 2x stents placement in 2023. 30 pack year smoking hx, quit 1 year.   No adverse reactions to anesthesia in the past in self or first-degree relatives. Hx of prior surgeries (stents) without adverse rxn to anesthesia or difficult extubation.   - Pre-op labs (CBC, CMP, PT, PTT, INR, T/S) Reviewed.   - Please obtain TTE/cardiac clearance before surgery  - Please obtain CXR  - METS ~4 (ambulates independently without cane/walker, can walk up to 1 mile before stopping due to feeling tired, not CP/SOB)   - RCRI 2 points (Class I Risk) ~ 10.1 % for 30d risk of death, MI, or cardiac arrest.   - Noland score 0.1% for risk of MI or cardiac arrest, intraoperatively or up to 30d post-op.  - Patient deemed low risk for intermediate risk surgery   - AC Per primary team.   -c/w aspirin in the pperioperative period  - Stent placement in 2023 (recent?)- last took clopidigrel 21/1 c/w clopidogrel otherwise hold until surgery  - Metformin: Hold day before surgery  - Jardiance: Per fda guidelines hold 3 days before surgery     INCOMPLETE  67 y/o M w PMH of CAD (x2 stents Mar 2023), HLD, T2DM, hx of kidney stones, psoriasis presents to [facility] for evaluation of oral mass and 3 months of jaw pain a/f OMFS madibular resection and flap reconstruction on Thursday. Medicine consulted for pre-operative clearance    #Pre-operative clearance  Cardiac hx includes 2x stents placement in 2023. 30 pack year smoking hx, quit 1 year ago. Per His cardiologist Dr. Davis, he has stopped clopidigrel since 12/1.   No adverse reactions to anesthesia in the past in self or first-degree relatives. Hx of prior surgeries (stents) without adverse rxn to anesthesia or difficult extubation.   - Pre-op labs (CBC, CMP, PT, PTT, INR, T/S) Reviewed.   - Please obtain TTE/cardiac clearance before surgery  - Please obtain CXR  - EKG: Sinus, PVC.  - METS ~4 (ambulates independently without cane/walker, can run up to 2 mile before stopping due to feeling tired, not CP/SOB)   - RCRI 2 points (Class I Risk) ~ 10.1 % for 30d risk of death, MI, or cardiac arrest.   - Noland score 0.1% for risk of MI or cardiac arrest, intraoperatively or up to 30d post-op.  - Patient deemed intermediate risk for intermediate risk surgery   - AC Per primary team.   -c/w aspirin in the pperioperative period  - Stent placement in 3/2023- held since 12/1  - Metformin (last taken 12/2)- would hold 24 hours before surgery.   - Jardiance: Per fda guidelines hold 3 days before surgery     INCOMPLETE  67 y/o M w PMH of CAD (x2 stents Mar 2023), HLD, T2DM, hx of kidney stones, psoriasis presents to [facility] for evaluation of oral mass and 3 months of jaw pain a/f OMFS madibular resection and flap reconstruction on Thursday. Medicine consulted for pre-operative clearance    #Pre-operative clearance  Cardiac hx includes 2x stents placement in 2023. 30 pack year smoking hx, quit 1 year ago. Per His cardiologist Dr. Davis, he has stopped clopidigrel since 12/1.   No adverse reactions to anesthesia in the past in self or first-degree relatives. Hx of prior surgeries (stents) without adverse rxn to anesthesia or difficult extubation.   - Pre-op labs (CBC, CMP, PT, PTT, INR, T/S) Reviewed.   - Please obtain TTE/cardiac clearance before surgery  - Please obtain CXR  - EKG: Sinus, PVC.  - METS ~4 (ambulates independently without cane/walker, can run up to 2 mile before stopping due to feeling tired, not CP/SOB)   - RCRI 2 points (Class I Risk) ~ 10.1 % for 30d risk of death, MI, or cardiac arrest.   - Noland score 0.1% for risk of MI or cardiac arrest, intraoperatively or up to 30d post-op.  - Patient deemed intermediate risk for intermediate risk surgery   - AC Per primary team.   -c/w aspirin in the pperioperative period  - Stent placement in 3/2023- held since 12/1  - Metformin (last taken 12/2)- would hold 24 hours before surgery.   - Jardiance: Per fda guidelines hold 3 days before surgery     67 y/o M w PMH of CAD (x2 stents Mar 2023), HLD, T2DM, hx of kidney stones, psoriasis presents to [facility] for evaluation of oral mass and 3 months of jaw pain a/f OMFS mandibular resection and flap reconstruction on Thursday. Medicine consulted for pre-operative clearance    #Pre-operative clearance  Cardiac hx includes 2x stents placement in 2023. 30 pack year smoking hx, quit 1 year ago. Per His cardiologist Dr. Davis, he has stopped clopidogrel since 12/1.   No adverse reactions to anesthesia in the past in self or first-degree relatives. Hx of prior surgeries (stents) without adverse rxn to anesthesia or difficult extubation.   - Pre-op labs (CBC, CMP, PT, PTT, INR, T/S) Reviewed.   - Please obtain TTE/cardiac clearance before surgery  - Please obtain CXR  - EKG: Sinus, PVC.  - METS ~4 (ambulates independently without cane/walker, can run up to 2 mile before stopping due to feeling tired, not CP/SOB)   - RCRI 2 points (Class I Risk) ~ 10.1 % for 30d risk of death, MI, or cardiac arrest.   - Noland score 0.1% for risk of MI or cardiac arrest, intraoperatively or up to 30d post-op.  - Patient deemed low-intermediate risk for intermediate risk surgery   - AC Per primary team.   -c/w aspirin in the perioperative period  - Stent placement in 3/2023- plavix held since 12/1  - Metformin (last taken 12/2)- would hold 24 hours before surgery.   - Jardiance: Per fda guidelines hold 3 days before surgery     67 y/o M w PMH of CAD (x2 stents Mar 2023), HLD, T2DM, hx of kidney stones, psoriasis presents to [facility] for evaluation of oral mass and 3 months of jaw pain a/f OMFS mandibular resection and flap reconstruction on Thursday. Medicine consulted for pre-operative clearance    #Pre-operative clearance  Cardiac hx includes 2x stents placement in 2023. 30 pack year smoking hx, quit 1 year ago. Per His cardiologist Dr. Davis, he has stopped clopidogrel since 12/1.   No adverse reactions to anesthesia in the past in self or first-degree relatives. Hx of prior surgeries (stents) without adverse rxn to anesthesia or difficult extubation.   - Pre-op labs (CBC, CMP, PT, PTT, INR, T/S) Reviewed.   - Please obtain TTE/cardiac clearance before surgery  - Please obtain CXR  - EKG: Sinus, PVC.  - METS ~4 (ambulates independently without cane/walker, can run up to 2 mile before stopping due to feeling tired, not CP/SOB)   - RCRI 2 points (Class I Risk) ~ 10.1 % for 30d risk of death, MI, or cardiac arrest.   - Noland score 0.1% for risk of MI or cardiac arrest, intraoperatively or up to 30d post-op.  - Patient deemed low-intermediate risk for intermediate risk surgery   - AC Per primary team.   -c/w aspirin in the perioperative period  - Stent placement in 3/2023- plavix held since 12/1  - Metformin (last taken 12/2)- would hold 24 hours before surgery.   - Jardiance: Per fda guidelines hold 3 days before surgery     67 y/o M w PMH of CAD (x2 stents Mar 2023), HLD, T2DM, hx of kidney stones, psoriasis presents to [facility] for evaluation of oral mass and 3 months of jaw pain a/f OMFS mandibular resection and flap reconstruction on Thursday. Medicine consulted for pre-operative clearance    #Pre-operative clearance  Cardiac hx includes 2x stents placement in 2023. 30 pack year smoking hx, quit 1 year ago. Per His cardiologist Dr. Davis, he has stopped clopidogrel since 12/1.   No adverse reactions to anesthesia in the past in self or first-degree relatives. Hx of prior surgeries (stents, nephrostomy tubes) without adverse rxn to anesthesia or difficult extubation.   - Pre-op labs (CBC, CMP, PT, PTT, INR, T/S) Reviewed.   - Please obtain CXR  - EKG: Sinus Brant, PVC.  - METS ~4 (ambulates independently without cane/walker, can run up to 2 mile before stopping due to feeling tired, not CP/SOB)   - RCRI 2 points (Class I Risk) ~ 10.1 % for 30d risk of death, MI, or cardiac arrest.   - Noland score 0.1% for risk of MI or cardiac arrest, intraoperatively or up to 30d post-op.  - Patient deemed low-intermediate risk for intermediate risk surgery   - AC Per primary team.   - c/w aspirin in the perioperative period  - Stent placement in 3/2023- plavix held since 12/1  - Metformin (last taken 12/2), Jardiance last taken 12/2  - Patient is on a monthly injection for psoriasislast taken 2 weeks ago.        67 y/o M w PMH of CAD (x2 stents Mar 2023), HLD, T2DM, hx of kidney stones, psoriasis presents to [facility] for evaluation of oral mass and 3 months of jaw pain a/f OMFS mandibular resection and flap reconstruction on Thursday. Medicine consulted for pre-operative clearance    #Pre-operative clearance  Cardiac hx includes 2x stents placement in 2023. 30 pack year smoking hx, quit 1 year ago. Per His cardiologist Dr. Davis, he has stopped clopidogrel since 12/1.   No adverse reactions to anesthesia in the past in self or first-degree relatives. Hx of prior surgeries (stents, nephrostomy tubes) without adverse rxn to anesthesia or difficult extubation.   - Pre-op labs (CBC, CMP, PT, PTT, INR, T/S) Reviewed.   - Please obtain CXR  - EKG: Sinus Brant, PVC.  - METS ~4 (ambulates independently without cane/walker, can run up to 2 mile before stopping due to feeling tired, not CP/SOB)   - RCRI 2 points (Class I Risk) ~ 10.1 % for 30d risk of death, MI, or cardiac arrest.   - Noland score 0.1% for risk of MI or cardiac arrest, intraoperatively or up to 30d post-op.  - Patient deemed low-intermediate risk for intermediate risk surgery   - AC Per primary team.   - c/w aspirin in the perioperative period  - Stent placement in 3/2023- plavix held since 12/1  - Metformin (last taken 12/2), Jardiance last taken 12/2  - Patient is on a monthly injection for psoriasislast taken 2 weeks ago.        67 y/o M w PMH of ACS (x2 stents Mar 2023), HLD, T2DM, hx of kidney stones, psoriasis presents to [facility] for evaluation of oral mass and 3 months of jaw pain a/f OMFS mandibular resection and flap reconstruction on Thursday. Medicine consulted for pre-operative clearance    #Pre-operative clearance  Cardiac hx includes 2x stents placement in 2023. 30 pack year smoking hx, quit 1 year ago. Per His cardiologist Dr. Davis, he has stopped clopidogrel since 12/1.   No adverse reactions to anesthesia in the past in self or first-degree relatives. Hx of prior surgeries (stents, nephrostomy tubes) without adverse rxn to anesthesia or difficult extubation.   - Pre-op labs (CBC, CMP, PT, PTT, INR, T/S) Reviewed.   - Please obtain CXR  - EKG: Sinus Brant, PVC.  - METS ~4 (ambulates independently without cane/walker, can run up to 2 mile before stopping due to feeling tired, not CP/SOB)   - RCRI 2 points (Class I Risk) ~ 10.1 % for 30d risk of death, MI, or cardiac arrest.   - Noland score 0.1% for risk of MI or cardiac arrest, intraoperatively or up to 30d post-op.  - Patient deemed low-intermediate risk for intermediate risk surgery   - AC Per primary team.   - c/w aspirin in the perioperative period  - Stent placement in 3/2023- plavix held since 12/1  - Metformin (last taken 12/2), Jardiance last taken 12/2  - Patient is on a monthly injection for psoriasislast taken 2 weeks ago.        69 y/o M w PMH of ACS (x2 stents Mar 2023), HLD, T2DM, hx of kidney stones, psoriasis presents to [facility] for evaluation of oral mass and 3 months of jaw pain a/f OMFS mandibular resection and flap reconstruction on Thursday. Medicine consulted for pre-operative clearance    #Pre-operative clearance  Cardiac hx includes 2x stents placement in 2023. 30 pack year smoking hx, quit 1 year ago. Per His cardiologist Dr. Davis, he has stopped clopidogrel since 12/1.   No adverse reactions to anesthesia in the past in self or first-degree relatives. Hx of prior surgeries (stents, nephrostomy tubes) without adverse rxn to anesthesia or difficult extubation.   - Pre-op labs (CBC, CMP, PT, PTT, INR, T/S) Reviewed.   - Please obtain CXR  - EKG: Sinus Brant, PVC.  - METS ~4 (ambulates independently without cane/walker, can run up to 2 mile before stopping due to feeling tired, not CP/SOB)   - RCRI 2 points (Class I Risk) ~ 10.1 % for 30d risk of death, MI, or cardiac arrest.   - Noland score 0.1% for risk of MI or cardiac arrest, intraoperatively or up to 30d post-op.  - Patient deemed low-intermediate risk for intermediate risk surgery   - AC Per primary team.   - c/w aspirin in the perioperative period  - Stent placement in 3/2023- plavix held since 12/1  - Metformin (last taken 12/2), Jardiance last taken 12/2  - Patient is on a monthly injection for psoriasislast taken 2 weeks ago.

## 2023-12-03 NOTE — ED ADULT TRIAGE NOTE - HEART RATE (BEATS/MIN)
62 Attempted to contact patient to schedule procedure. No answer. Left voicemail with callback number.

## 2023-12-03 NOTE — ED PROVIDER NOTE - CLINICAL SUMMARY MEDICAL DECISION MAKING FREE TEXT BOX
68 year old male with history of DM, psoriasis, kidney stones, oral cancer dx'd 1 yr ago, presenting for planned surgery with Dr. Mendoza. Overall comfortable here with normal vitals. Exam unremarkable beyond known L oral SCC with associated trismus. Protecting his airway without issues. Seen by OMFS resident Dr. Mercado--requesting full pre-op labs and EKG, and admission to OMFS service under Dr. Mendoza.

## 2023-12-04 LAB
A1C WITH ESTIMATED AVERAGE GLUCOSE RESULT: 6.3 % — HIGH (ref 4–5.6)
A1C WITH ESTIMATED AVERAGE GLUCOSE RESULT: 6.3 % — HIGH (ref 4–5.6)
ANION GAP SERPL CALC-SCNC: 8 MMOL/L — SIGNIFICANT CHANGE UP (ref 5–17)
ANION GAP SERPL CALC-SCNC: 8 MMOL/L — SIGNIFICANT CHANGE UP (ref 5–17)
BUN SERPL-MCNC: 22 MG/DL — SIGNIFICANT CHANGE UP (ref 7–23)
BUN SERPL-MCNC: 22 MG/DL — SIGNIFICANT CHANGE UP (ref 7–23)
CALCIUM SERPL-MCNC: 9.2 MG/DL — SIGNIFICANT CHANGE UP (ref 8.4–10.5)
CALCIUM SERPL-MCNC: 9.2 MG/DL — SIGNIFICANT CHANGE UP (ref 8.4–10.5)
CHLORIDE SERPL-SCNC: 106 MMOL/L — SIGNIFICANT CHANGE UP (ref 96–108)
CHLORIDE SERPL-SCNC: 106 MMOL/L — SIGNIFICANT CHANGE UP (ref 96–108)
CO2 SERPL-SCNC: 26 MMOL/L — SIGNIFICANT CHANGE UP (ref 22–31)
CO2 SERPL-SCNC: 26 MMOL/L — SIGNIFICANT CHANGE UP (ref 22–31)
CREAT SERPL-MCNC: 0.98 MG/DL — SIGNIFICANT CHANGE UP (ref 0.5–1.3)
CREAT SERPL-MCNC: 0.98 MG/DL — SIGNIFICANT CHANGE UP (ref 0.5–1.3)
EGFR: 85 ML/MIN/1.73M2 — SIGNIFICANT CHANGE UP
EGFR: 85 ML/MIN/1.73M2 — SIGNIFICANT CHANGE UP
ESTIMATED AVERAGE GLUCOSE: 134 MG/DL — HIGH (ref 68–114)
ESTIMATED AVERAGE GLUCOSE: 134 MG/DL — HIGH (ref 68–114)
GLUCOSE BLDC GLUCOMTR-MCNC: 103 MG/DL — HIGH (ref 70–99)
GLUCOSE BLDC GLUCOMTR-MCNC: 103 MG/DL — HIGH (ref 70–99)
GLUCOSE BLDC GLUCOMTR-MCNC: 113 MG/DL — HIGH (ref 70–99)
GLUCOSE BLDC GLUCOMTR-MCNC: 113 MG/DL — HIGH (ref 70–99)
GLUCOSE BLDC GLUCOMTR-MCNC: 115 MG/DL — HIGH (ref 70–99)
GLUCOSE BLDC GLUCOMTR-MCNC: 115 MG/DL — HIGH (ref 70–99)
GLUCOSE BLDC GLUCOMTR-MCNC: 99 MG/DL — SIGNIFICANT CHANGE UP (ref 70–99)
GLUCOSE BLDC GLUCOMTR-MCNC: 99 MG/DL — SIGNIFICANT CHANGE UP (ref 70–99)
GLUCOSE SERPL-MCNC: 110 MG/DL — HIGH (ref 70–99)
GLUCOSE SERPL-MCNC: 110 MG/DL — HIGH (ref 70–99)
HCT VFR BLD CALC: 44 % — SIGNIFICANT CHANGE UP (ref 39–50)
HCT VFR BLD CALC: 44 % — SIGNIFICANT CHANGE UP (ref 39–50)
HCV AB S/CO SERPL IA: 0.04 S/CO — SIGNIFICANT CHANGE UP
HCV AB S/CO SERPL IA: 0.04 S/CO — SIGNIFICANT CHANGE UP
HCV AB SERPL-IMP: SIGNIFICANT CHANGE UP
HCV AB SERPL-IMP: SIGNIFICANT CHANGE UP
HGB BLD-MCNC: 14.4 G/DL — SIGNIFICANT CHANGE UP (ref 13–17)
HGB BLD-MCNC: 14.4 G/DL — SIGNIFICANT CHANGE UP (ref 13–17)
MAGNESIUM SERPL-MCNC: 2 MG/DL — SIGNIFICANT CHANGE UP (ref 1.6–2.6)
MAGNESIUM SERPL-MCNC: 2 MG/DL — SIGNIFICANT CHANGE UP (ref 1.6–2.6)
MCHC RBC-ENTMCNC: 27.7 PG — SIGNIFICANT CHANGE UP (ref 27–34)
MCHC RBC-ENTMCNC: 27.7 PG — SIGNIFICANT CHANGE UP (ref 27–34)
MCHC RBC-ENTMCNC: 32.7 GM/DL — SIGNIFICANT CHANGE UP (ref 32–36)
MCHC RBC-ENTMCNC: 32.7 GM/DL — SIGNIFICANT CHANGE UP (ref 32–36)
MCV RBC AUTO: 84.6 FL — SIGNIFICANT CHANGE UP (ref 80–100)
MCV RBC AUTO: 84.6 FL — SIGNIFICANT CHANGE UP (ref 80–100)
NRBC # BLD: 0 /100 WBCS — SIGNIFICANT CHANGE UP (ref 0–0)
NRBC # BLD: 0 /100 WBCS — SIGNIFICANT CHANGE UP (ref 0–0)
PHOSPHATE SERPL-MCNC: 4.3 MG/DL — SIGNIFICANT CHANGE UP (ref 2.5–4.5)
PHOSPHATE SERPL-MCNC: 4.3 MG/DL — SIGNIFICANT CHANGE UP (ref 2.5–4.5)
PLATELET # BLD AUTO: 160 K/UL — SIGNIFICANT CHANGE UP (ref 150–400)
PLATELET # BLD AUTO: 160 K/UL — SIGNIFICANT CHANGE UP (ref 150–400)
POTASSIUM SERPL-MCNC: 3.9 MMOL/L — SIGNIFICANT CHANGE UP (ref 3.5–5.3)
POTASSIUM SERPL-MCNC: 3.9 MMOL/L — SIGNIFICANT CHANGE UP (ref 3.5–5.3)
POTASSIUM SERPL-SCNC: 3.9 MMOL/L — SIGNIFICANT CHANGE UP (ref 3.5–5.3)
POTASSIUM SERPL-SCNC: 3.9 MMOL/L — SIGNIFICANT CHANGE UP (ref 3.5–5.3)
RBC # BLD: 5.2 M/UL — SIGNIFICANT CHANGE UP (ref 4.2–5.8)
RBC # BLD: 5.2 M/UL — SIGNIFICANT CHANGE UP (ref 4.2–5.8)
RBC # FLD: 13.2 % — SIGNIFICANT CHANGE UP (ref 10.3–14.5)
RBC # FLD: 13.2 % — SIGNIFICANT CHANGE UP (ref 10.3–14.5)
SODIUM SERPL-SCNC: 140 MMOL/L — SIGNIFICANT CHANGE UP (ref 135–145)
SODIUM SERPL-SCNC: 140 MMOL/L — SIGNIFICANT CHANGE UP (ref 135–145)
WBC # BLD: 5.69 K/UL — SIGNIFICANT CHANGE UP (ref 3.8–10.5)
WBC # BLD: 5.69 K/UL — SIGNIFICANT CHANGE UP (ref 3.8–10.5)
WBC # FLD AUTO: 5.69 K/UL — SIGNIFICANT CHANGE UP (ref 3.8–10.5)
WBC # FLD AUTO: 5.69 K/UL — SIGNIFICANT CHANGE UP (ref 3.8–10.5)

## 2023-12-04 PROCEDURE — 93306 TTE W/DOPPLER COMPLETE: CPT | Mod: 26

## 2023-12-04 PROCEDURE — 99221 1ST HOSP IP/OBS SF/LOW 40: CPT

## 2023-12-04 PROCEDURE — 99233 SBSQ HOSP IP/OBS HIGH 50: CPT | Mod: GC

## 2023-12-04 RX ORDER — HYDROMORPHONE HYDROCHLORIDE 2 MG/ML
0.5 INJECTION INTRAMUSCULAR; INTRAVENOUS; SUBCUTANEOUS EVERY 4 HOURS
Refills: 0 | Status: DISCONTINUED | OUTPATIENT
Start: 2023-12-04 | End: 2023-12-07

## 2023-12-04 RX ORDER — ACETAMINOPHEN 500 MG
1000 TABLET ORAL EVERY 6 HOURS
Refills: 0 | Status: COMPLETED | OUTPATIENT
Start: 2023-12-04 | End: 2023-12-05

## 2023-12-04 RX ADMIN — Medication 400 MILLIGRAM(S): at 23:52

## 2023-12-04 RX ADMIN — Medication 400 MILLIGRAM(S): at 22:08

## 2023-12-04 RX ADMIN — Medication 650 MILLIGRAM(S): at 05:33

## 2023-12-04 RX ADMIN — ATORVASTATIN CALCIUM 40 MILLIGRAM(S): 80 TABLET, FILM COATED ORAL at 22:08

## 2023-12-04 RX ADMIN — Medication 650 MILLIGRAM(S): at 12:16

## 2023-12-04 RX ADMIN — OXYCODONE HYDROCHLORIDE 5 MILLIGRAM(S): 5 TABLET ORAL at 13:41

## 2023-12-04 RX ADMIN — SENNA PLUS 2 TABLET(S): 8.6 TABLET ORAL at 22:08

## 2023-12-04 RX ADMIN — Medication 400 MILLIGRAM(S): at 18:05

## 2023-12-04 RX ADMIN — Medication 650 MILLIGRAM(S): at 00:18

## 2023-12-04 RX ADMIN — HYDROMORPHONE HYDROCHLORIDE 0.5 MILLIGRAM(S): 2 INJECTION INTRAMUSCULAR; INTRAVENOUS; SUBCUTANEOUS at 16:33

## 2023-12-04 RX ADMIN — HYDROMORPHONE HYDROCHLORIDE 0.5 MILLIGRAM(S): 2 INJECTION INTRAMUSCULAR; INTRAVENOUS; SUBCUTANEOUS at 16:17

## 2023-12-04 RX ADMIN — OXYCODONE HYDROCHLORIDE 2.5 MILLIGRAM(S): 5 TABLET ORAL at 05:33

## 2023-12-04 RX ADMIN — Medication 400 MILLIGRAM(S): at 16:17

## 2023-12-04 RX ADMIN — POLYETHYLENE GLYCOL 3350 17 GRAM(S): 17 POWDER, FOR SOLUTION ORAL at 22:08

## 2023-12-04 RX ADMIN — OXYCODONE HYDROCHLORIDE 5 MILLIGRAM(S): 5 TABLET ORAL at 12:41

## 2023-12-04 RX ADMIN — LIDOCAINE 15 MILLILITER(S): 4 CREAM TOPICAL at 22:10

## 2023-12-04 RX ADMIN — OXYCODONE HYDROCHLORIDE 2.5 MILLIGRAM(S): 5 TABLET ORAL at 06:33

## 2023-12-04 RX ADMIN — LIDOCAINE 15 MILLILITER(S): 4 CREAM TOPICAL at 05:37

## 2023-12-04 RX ADMIN — ENOXAPARIN SODIUM 40 MILLIGRAM(S): 100 INJECTION SUBCUTANEOUS at 18:05

## 2023-12-04 RX ADMIN — Medication 650 MILLIGRAM(S): at 06:33

## 2023-12-04 RX ADMIN — Medication 81 MILLIGRAM(S): at 12:16

## 2023-12-04 NOTE — CONSULT NOTE ADULT - ATTENDING COMMENTS
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 Cardiology consulted for perioperative risk stratification.    Review of Studies  - ECG 12/3/23: Sinus bradycardia   - TTE 12/4/23: LVIDD 4.09cm LVEF 65%. Mild LVH. Normal RV function, mildly dilated. No valvular disease. No pericardial effusion.    #Perioperative Risk Stratification   - Patient with known ASCVD s/p 2 Vessel PCI in March 2023.   - Patient reports undergoing a normal NST nearly a year ago. A few month later he endured substernal chest pain which prompted him to seek medical attention. He was ruled in for ACS and underwent 2 Vessel PCI.  - Since revascularization, he reports excellent exercise capacity, able to walk 2 miles, exercise and exert himself without limitations. ROS is negative for chest pain, palpitation or other anginal symptoms. Patient reports occasionally forgetting his Antiplatelet doses  - Echo reviewed showing mild LVH with Normal biventricular function without RWMA or valvular heart disease.  - At this time there are no active cardiac contraindication to proceed with surgical intervention.  - Patient is considered at intermediate risk for an intermediate risk procedure.  - No further CV testing is needed    #CAD s/p PCI (unclear location of PCI (possible stents in the LAD/LCx distribution noted on CT 11/2/23)  - Given that coronary intervention was ~9 months ago, can continue monotherapy with Aspirin 81mg perioperatively and plan to restart Plavix with load 300mg x1 than 75mg QD daily thereafter.  - At 12 months would maintain patient on Plavix 75 mg po daily monotherapy.   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 Cardiology consulted for perioperative risk stratification.    Review of Studies  - ECG 12/3/23: Sinus bradycardia   - TTE 12/4/23: LVIDD 4.09cm LVEF 65%. Mild LVH. Normal RV function, mildly dilated. No valvular disease. No pericardial effusion.    #Perioperative Risk Stratification   - Patient with known ASCVD s/p 2 Vessel PCI in March 2023.   - Patient reports undergoing a normal NST nearly a year ago. A few month later he endured substernal chest pain which prompted him to seek medical attention. He was ruled in for ACS and underwent 2 Vessel PCI.  - Since revascularization, he reports excellent exercise capacity, able to walk 2 miles, exercise and exert himself without limitations. ROS is negative for chest pain, palpitation or other anginal symptoms. Patient reports occasionally forgetting his Antiplatelet doses  - Echo reviewed showing mild LVH with Normal biventricular function without RWMA or valvular heart disease.  - At this time there are no active cardiac contraindication to proceed with surgical intervention.  - Patient is considered at intermediate risk for an intermediate risk procedure.  - No further CV testing is needed    #CAD s/p PCI (unclear location of PCI (possible stents in the LAD/LCx distribution noted on CT 11/2/23)  - Given that coronary intervention was ~9 months ago, can continue monotherapy with Aspirin 81mg perioperatively and plan to restart Plavix with load 300mg x1 than 75mg QD daily thereafter.  - At 12 months would maintain patient on Plavix 75 mg po daily monotherapy.   - Continue Aspirin 81mg QD in the meantime and Lipitor 40mg QD   - Please obtain collateral for stent placement at [facility]    Cardiology will continue to follow with you, please call with any questions

## 2023-12-04 NOTE — PROGRESS NOTE ADULT - SUBJECTIVE AND OBJECTIVE BOX
OVERNIGHT EVENTS: No overnight event.    SUBJECTIVE / INTERVAL HPI: Patient seen and examined at bedside. Just came from back echocardiogram. Pt reports feeling okay, some left mouth discomfort however not worsening. Is asking for regular diet as pt not happy with current soft and bite sized diet.     VITAL SIGNS:  Vital Signs Last 24 Hrs  T(C): 36.6 (04 Dec 2023 08:30), Max: 36.9 (03 Dec 2023 15:33)  T(F): 97.9 (04 Dec 2023 08:30), Max: 98.4 (03 Dec 2023 15:33)  HR: 46 (04 Dec 2023 08:30) (46 - 62)  BP: 130/65 (04 Dec 2023 08:30) (128/67 - 168/72)  BP(mean): --  RR: 17 (04 Dec 2023 08:30) (16 - 18)  SpO2: 97% (04 Dec 2023 08:30) (94% - 98%)    Parameters below as of 04 Dec 2023 08:30  Patient On (Oxygen Delivery Method): room air        PHYSICAL EXAM:    General: Well developed, well nourished, no acute distress  HEENT: left buccal mass. Intraoral ulceration visible in left mouth  Cardiovascular: +S1/S2, RRR, no murmurs, rubs, gallops  Respiratory: CTA B/L; no W/R/R  Gastrointestinal: soft, NT/ND; +BSx4  Extremities: WWP; no edema, clubbing or cyanosis  Neurological: AAOx3; no focal deficits    MEDICATIONS:  MEDICATIONS  (STANDING):  acetaminophen     Tablet .. 650 milliGRAM(s) Oral every 6 hours  aspirin enteric coated 81 milliGRAM(s) Oral daily  atorvastatin 40 milliGRAM(s) Oral at bedtime  dextrose 5%. 1000 milliLiter(s) (50 mL/Hr) IV Continuous <Continuous>  dextrose 5%. 1000 milliLiter(s) (100 mL/Hr) IV Continuous <Continuous>  dextrose 50% Injectable 25 Gram(s) IV Push once  dextrose 50% Injectable 12.5 Gram(s) IV Push once  dextrose 50% Injectable 25 Gram(s) IV Push once  enoxaparin Injectable 40 milliGRAM(s) SubCutaneous every 24 hours  glucagon  Injectable 1 milliGRAM(s) IntraMuscular once  ibuprofen  Tablet. 400 milliGRAM(s) Oral every 6 hours  influenza  Vaccine (HIGH DOSE) 0.7 milliLiter(s) IntraMuscular once  insulin lispro (ADMELOG) corrective regimen sliding scale   SubCutaneous Before meals and at bedtime  polyethylene glycol 3350 17 Gram(s) Oral at bedtime  senna 2 Tablet(s) Oral at bedtime    MEDICATIONS  (PRN):  dextrose Oral Gel 15 Gram(s) Oral once PRN Blood Glucose LESS THAN 70 milliGRAM(s)/deciliter  lidocaine 2% Viscous 15 milliLiter(s) Swish and Spit every 1 hour PRN for oral pain  oxyCODONE    IR 5 milliGRAM(s) Oral every 6 hours PRN Severe Pain (7 - 10)  oxyCODONE    IR 2.5 milliGRAM(s) Oral every 6 hours PRN Moderate Pain (4 - 6)      ALLERGIES:  Allergies    No Known Allergies    Intolerances        LABS:                        14.4   5.69  )-----------( 160      ( 04 Dec 2023 05:52 )             44.0     12-04    140  |  106  |  22  ----------------------------<  110<H>  3.9   |  26  |  0.98    Ca    9.2      04 Dec 2023 05:52  Phos  4.3     12-04  Mg     2.0     12-04    TPro  6.9  /  Alb  3.9  /  TBili  0.6  /  DBili  x   /  AST  18  /  ALT  19  /  AlkPhos  41  12-03    PT/INR - ( 03 Dec 2023 14:27 )   PT: 11.6 sec;   INR: 1.02          PTT - ( 03 Dec 2023 14:27 )  PTT:28.1 sec  Urinalysis Basic - ( 04 Dec 2023 05:52 )    Color: x / Appearance: x / SG: x / pH: x  Gluc: 110 mg/dL / Ketone: x  / Bili: x / Urobili: x   Blood: x / Protein: x / Nitrite: x   Leuk Esterase: x / RBC: x / WBC x   Sq Epi: x / Non Sq Epi: x / Bacteria: x      CAPILLARY BLOOD GLUCOSE      POCT Blood Glucose.: 99 mg/dL (04 Dec 2023 07:48)      RADIOLOGY & ADDITIONAL TESTS: Reviewed.

## 2023-12-04 NOTE — PROGRESS NOTE ADULT - ATTENDING COMMENTS
69 y/o M w PMH of former smoking, HLD, T2DM ( held Jardiance - Empaglifosin last dose 11/30, and metformin 12/2) , Kidney stones s/p ureteral stent, psoriasis on monthly injection from Dermatologist; hx CAD s/p PCI x2 stents ( one of them was the  maker per pt) in 3/2023 ( held Plavix last dose 12/1) presented to [facility] for evaluation of oral mass /non-healing oral ulceration and 3 months of jaw pain a/f OMFS mandibular resection and flap reconstruction on Thursday 12/7/23. Medicine consulted for pre-operative clearance    # Pre-operative clearance   # CAD s/p PCI x2 ( 3/2023)   - Cardiology consult   Cardiac hx includes 2x stents placement in 2023, cardiologist at [facility] Dr. Jann Márquez ( obtain collaterals [facility]/ for the anatomy)   - TTE ( 12/4/23): normal LV size and fxn, mild LVH, RV mildly dilated w/ normal systolic fxn.  - EKG ( 12/3); SB, PVCs, no ST/T changes ( reviewed)  -CXR: no infiltrate or effusion   - No adverse reactions to anesthesia in the past in self or first-degree relatives. Hx of prior surgeries (stents, nephrostomy tubes) without adverse rxn to anesthesia or difficult extubation.   - Pre-op labs (CBC, CMP, PT, PTT, INR, T/S) Reviewed.   - METS ~4 (ambulates independently without cane/walker, can run up to 2 mile before stopping due to feeling tired, not CP/SOB)   - RCRI 2 points (Class I Risk) ~ 10.1 % for 30d risk of death, MI, or cardiac arrest.   - Noland score 0.1% for risk of MI or cardiac arrest, intraoperatively or up to 30d post-op.  - Patient deemed low-intermediate risk for intermediate risk surgery   - AC Per primary team.   - c/w aspirin in the perioperative period  - Stent placement in 3/2023- plavix held since 12/1  - will f/u with his cardiologist Dr. Jann Márquez regarding location of stent placements/[facility] report  - Metformin (last taken 12/2), Jardiance last taken 12/2  - Patient is on a monthly injection for psoriasis last taken 2 weeks ago, will f/u with his dermatologist Dr. Po Richardson regarding his psoriasis regimen    # T2DM  - A1C 6.3%  - FS/NISS, goal -180   - change to carbohydrate consistent diet ( no need for soft/bite size)     Medicine will continue to follow, thank you. 69 y/o M w PMH of former smoking, HLD, T2DM ( held Jardiance - Empaglifosin last dose 11/30, and metformin 12/2) , Kidney stones s/p ureteral stent, psoriasis on monthly injection from Dermatologist; hx CAD s/p PCI x2 stents ( one of them was the  maker per pt) in 3/2023 ( held Plavix last dose 12/1) presented to [facility] for evaluation of oral mass /non-healing oral ulceration and 3 months of jaw pain a/f OMFS mandibular resection and flap reconstruction on Thursday 12/7/23. Medicine consulted for pre-operative clearance    # Pre-operative clearance   # CAD s/p PCI x2 ( 3/2023)   - Cardiology consult   Cardiac hx includes 2x stents placement in 2023, cardiologist at [facility] Dr. Jann Márquez ( obtain collaterals [facility]/ for the anatomy)   - TTE ( 12/4/23): normal LV size and fxn, mild LVH, RV mildly dilated w/ normal systolic fxn.  - EKG ( 12/3); SB, PVCs, no ST/T changes ( reviewed)  -CXR: no infiltrate or effusion   - No adverse reactions to anesthesia in the past in self or first-degree relatives. Hx of prior surgeries (stents, nephrostomy tubes) without adverse rxn to anesthesia or difficult extubation.   - Pre-op labs (CBC, CMP, PT, PTT, INR, T/S) Reviewed.   - METS ~4 (ambulates independently without cane/walker, can run up to 2 mile before stopping due to feeling tired, not CP/SOB)   - RCRI 2 points (Class I Risk) ~ 10.1 % for 30d risk of death, MI, or cardiac arrest.   - Noland score 0.1% for risk of MI or cardiac arrest, intraoperatively or up to 30d post-op.  - Patient deemed low-intermediate risk for intermediate risk surgery   - AC Per primary team.   - c/w aspirin in the perioperative period  - Stent placement in 3/2023- plavix held since 12/1  - will f/u with his cardiologist Dr. Jann Márquez regarding location of stent placements/[facility] report  - Metformin (last taken 12/2), Jardiance last taken 12/2  - Patient is on a monthly injection for psoriasis last taken 2 weeks ago, will f/u with his dermatologist Dr. Po Richardson regarding his psoriasis regimen    # T2DM  - A1C 6.3%  - FS/NISS, goal -180   - change to carbohydrate consistent diet ( no need for soft/bite size)     Medicine will continue to follow, thank you.

## 2023-12-04 NOTE — PROGRESS NOTE ADULT - SUBJECTIVE AND OBJECTIVE BOX
OTOLARYNGOLOGY (ENT) PROGRESS NOTE    PATIENT: SAUNDRA HOLMAN  MRN: [id number]  : 57  [id number]  DATE OF SERVICE:  23  			         ID:SAUNDRA HOLMAN is a  68yMale with PMH of CAD (x2 stents Mar 2023), HLD, T2DM, hx of kidney stones, psoriasis, buccal SCC presents for medical preoptimization for composite mandibular resection, neck dissection, and fibula free flap reconstruction scheduled for Thursday this week.    Subjective/ Interval: Patient seen and examined at bedside this morning. AF, hypertensive to 168 overnight. Medicine consulted yesterday, cardiology to see him today. TTE obtained today. He continues to report jaw pain and dysphagia.     ALLERGIES:  No Known Allergies      MEDICATIONS:  Antiinfectives:     IV fluids:  dextrose 5%. 1000 milliLiter(s) IV Continuous <Continuous>  dextrose 5%. 1000 milliLiter(s) IV Continuous <Continuous>    Hematologic/Anticoagulation:  aspirin enteric coated 81 milliGRAM(s) Oral daily  enoxaparin Injectable 40 milliGRAM(s) SubCutaneous every 24 hours    Pain medications/Neuro:  acetaminophen     Tablet .. 650 milliGRAM(s) Oral every 6 hours  HYDROmorphone  Injectable 0.5 milliGRAM(s) IV Push every 4 hours PRN  ibuprofen  Tablet. 400 milliGRAM(s) Oral every 6 hours  oxyCODONE    IR 5 milliGRAM(s) Oral every 6 hours PRN  oxyCODONE    IR 2.5 milliGRAM(s) Oral every 6 hours PRN    Endocrine Medications:   atorvastatin 40 milliGRAM(s) Oral at bedtime  dextrose 50% Injectable 25 Gram(s) IV Push once  dextrose 50% Injectable 25 Gram(s) IV Push once  dextrose 50% Injectable 12.5 Gram(s) IV Push once  dextrose Oral Gel 15 Gram(s) Oral once PRN  glucagon  Injectable 1 milliGRAM(s) IntraMuscular once  insulin lispro (ADMELOG) corrective regimen sliding scale   SubCutaneous Before meals and at bedtime    All other standing medications:   influenza  Vaccine (HIGH DOSE) 0.7 milliLiter(s) IntraMuscular once  polyethylene glycol 3350 17 Gram(s) Oral at bedtime  senna 2 Tablet(s) Oral at bedtime    All other PRN medications:  lidocaine 2% Viscous 15 milliLiter(s) Swish and Spit every 1 hour PRN    Vital Signs Last 24 Hrs  T(C): 36.4 (04 Dec 2023 12:), Max: 36.8 (03 Dec 2023 20:26)  T(F): 97.5 (04 Dec 2023 12:), Max: 98.3 (03 Dec 2023 20:26)  HR: 48 (04 Dec 2023 12:) (46 - 59)  BP: 138/77 (04 Dec 2023 12:) (128/67 - 168/72)  BP(mean): --  RR: 17 (04 Dec 2023 12:) (16 - 18)  SpO2: 97% (04 Dec 2023 12:) (94% - 97%)    Parameters below as of 04 Dec 2023 12:  Patient On (Oxygen Delivery Method): room air           @ 07:  -   @ 07:00  --------------------------------------------------------  IN:    Oral Fluid: 200 mL  Total IN: 200 mL    OUT:    Voided (mL): 325 mL  Total OUT: 325 mL    Total NET: -125 mL       @ 07:  -   @ 15:48  --------------------------------------------------------  IN:  Total IN: 0 mL    OUT:    Voided (mL): 350 mL  Total OUT: 350 mL    Total NET: -350 mL              GENERAL: NAD, lying in bed comfortably  HEAD:  Atraumatic, Normocephalic  EYES: EOMI, PERRLA, conjunctiva and sclera clear  ENT:Left buccal mass extending to mandibular mucosa. Fixed. ulcerated intraoral mucosa  NECK: Left Level I fixed node  CHEST/LUNG: No stridor, no increased work of breathing on room air  HEART: Regular rate and rhythm  ABDOMEN: BSx4; Soft, nontender, nondistended  EXTREMITIES:  2+ Peripheral Pulses, brisk capillary refill. No clubbing, cyanosis, or edema  NERVOUS SYSTEM:  A&Ox3, no focal deficits   SKIN: No rashes or lesions        LABS                       14.4   5.69  )-----------( 160      ( 04 Dec 2023 05:52 )             44.0    12-    140  |  106  |  22  ----------------------------<  110<H>  3.9   |  26  |  0.98    Ca    9.2      04 Dec 2023 05:52  Phos  4.3       Mg     2.0         TPro  6.9  /  Alb  3.9  /  TBili  0.6  /  DBili  x   /  AST  18  /  ALT  19  /  AlkPhos  41           Coagulation Studies-   PT/INR - ( 03 Dec 2023 14:27 )   PT: 11.6 sec;   INR: 1.02          PTT - ( 03 Dec 2023 14:27 )  PTT:28.1 sec  Urinalysis Basic - ( 04 Dec 2023 05:52 )    Color: x / Appearance: x / SG: x / pH: x  Gluc: 110 mg/dL / Ketone: x  / Bili: x / Urobili: x   Blood: x / Protein: x / Nitrite: x   Leuk Esterase: x / RBC: x / WBC x   Sq Epi: x / Non Sq Epi: x / Bacteria: x      Endocrine Panel-  Calcium: 9.2 mg/dL ( @ 05:52)                MICROBIOLOGY:        RADIOLOGY & ADDITIONAL STUDIES:    Assessment and Plan:  SAUNDRA HOLMAN is a  68yMale with PMH CAD (x2 stents Mar 2023), HLD, T2DM, hx of kidney stones, psoriasis w/ left buccal mucosa SCC presents for pre-optimization prior to composite mandibular resection, neck dissection and fibula free flap reconstruction.    Plan:  -Medicine consulted for pre-operative optimization, appreciate recommendations  -Cardiology consulted for pre-operative risk stratification and recommendations. TTE obtained  -Soft, easy to chew diet  -Multimodal pain control  -continue home Jardiance and Plavix  -Monitor POC gluc, ISS  -Bowel regimen  -LVX DVT ppx      [facility] at [phone] with any questions/concerns.    Candida Jimenez  23 @ 15:48 OTOLARYNGOLOGY (ENT) PROGRESS NOTE    PATIENT: SAUNDRA HOLMAN  MRN: [id number]  : 57  [id number]  DATE OF SERVICE:  23  			         ID:SAUNDRA HOLMAN is a  68yMale with PMH of CAD (x2 stents Mar 2023), HLD, T2DM, hx of kidney stones, psoriasis, buccal SCC presents for medical preoptimization for composite mandibular resection, neck dissection, and fibula free flap reconstruction scheduled for Thursday this week.    Subjective/ Interval: Patient seen and examined at bedside this morning. AF, hypertensive to 168 overnight. Medicine consulted yesterday, cardiology to see him today. TTE obtained today. He continues to report jaw pain and dysphagia.     ALLERGIES:  No Known Allergies      MEDICATIONS:  Antiinfectives:     IV fluids:  dextrose 5%. 1000 milliLiter(s) IV Continuous <Continuous>  dextrose 5%. 1000 milliLiter(s) IV Continuous <Continuous>    Hematologic/Anticoagulation:  aspirin enteric coated 81 milliGRAM(s) Oral daily  enoxaparin Injectable 40 milliGRAM(s) SubCutaneous every 24 hours    Pain medications/Neuro:  acetaminophen     Tablet .. 650 milliGRAM(s) Oral every 6 hours  HYDROmorphone  Injectable 0.5 milliGRAM(s) IV Push every 4 hours PRN  ibuprofen  Tablet. 400 milliGRAM(s) Oral every 6 hours  oxyCODONE    IR 5 milliGRAM(s) Oral every 6 hours PRN  oxyCODONE    IR 2.5 milliGRAM(s) Oral every 6 hours PRN    Endocrine Medications:   atorvastatin 40 milliGRAM(s) Oral at bedtime  dextrose 50% Injectable 25 Gram(s) IV Push once  dextrose 50% Injectable 25 Gram(s) IV Push once  dextrose 50% Injectable 12.5 Gram(s) IV Push once  dextrose Oral Gel 15 Gram(s) Oral once PRN  glucagon  Injectable 1 milliGRAM(s) IntraMuscular once  insulin lispro (ADMELOG) corrective regimen sliding scale   SubCutaneous Before meals and at bedtime    All other standing medications:   influenza  Vaccine (HIGH DOSE) 0.7 milliLiter(s) IntraMuscular once  polyethylene glycol 3350 17 Gram(s) Oral at bedtime  senna 2 Tablet(s) Oral at bedtime    All other PRN medications:  lidocaine 2% Viscous 15 milliLiter(s) Swish and Spit every 1 hour PRN    Vital Signs Last 24 Hrs  T(C): 36.4 (04 Dec 2023 12:), Max: 36.8 (03 Dec 2023 20:26)  T(F): 97.5 (04 Dec 2023 12:), Max: 98.3 (03 Dec 2023 20:26)  HR: 48 (04 Dec 2023 12:) (46 - 59)  BP: 138/77 (04 Dec 2023 12:) (128/67 - 168/72)  BP(mean): --  RR: 17 (04 Dec 2023 12:) (16 - 18)  SpO2: 97% (04 Dec 2023 12:) (94% - 97%)    Parameters below as of 04 Dec 2023 12:  Patient On (Oxygen Delivery Method): room air           @ 07:  -   @ 07:00  --------------------------------------------------------  IN:    Oral Fluid: 200 mL  Total IN: 200 mL    OUT:    Voided (mL): 325 mL  Total OUT: 325 mL    Total NET: -125 mL       @ 07:  -   @ 15:48  --------------------------------------------------------  IN:  Total IN: 0 mL    OUT:    Voided (mL): 350 mL  Total OUT: 350 mL    Total NET: -350 mL              GENERAL: NAD, lying in bed comfortably  HEAD:  Atraumatic, Normocephalic  EYES: EOMI, PERRLA, conjunctiva and sclera clear  ENT:Left buccal mass extending to mandibular mucosa. Fixed. ulcerated intraoral mucosa  NECK: Left Level I fixed node  CHEST/LUNG: No stridor, no increased work of breathing on room air  HEART: Regular rate and rhythm  ABDOMEN: BSx4; Soft, nontender, nondistended  EXTREMITIES:  2+ Peripheral Pulses, brisk capillary refill. No clubbing, cyanosis, or edema  NERVOUS SYSTEM:  A&Ox3, no focal deficits   SKIN: No rashes or lesions        LABS                       14.4   5.69  )-----------( 160      ( 04 Dec 2023 05:52 )             44.0    12-    140  |  106  |  22  ----------------------------<  110<H>  3.9   |  26  |  0.98    Ca    9.2      04 Dec 2023 05:52  Phos  4.3       Mg     2.0         TPro  6.9  /  Alb  3.9  /  TBili  0.6  /  DBili  x   /  AST  18  /  ALT  19  /  AlkPhos  41           Coagulation Studies-   PT/INR - ( 03 Dec 2023 14:27 )   PT: 11.6 sec;   INR: 1.02          PTT - ( 03 Dec 2023 14:27 )  PTT:28.1 sec  Urinalysis Basic - ( 04 Dec 2023 05:52 )    Color: x / Appearance: x / SG: x / pH: x  Gluc: 110 mg/dL / Ketone: x  / Bili: x / Urobili: x   Blood: x / Protein: x / Nitrite: x   Leuk Esterase: x / RBC: x / WBC x   Sq Epi: x / Non Sq Epi: x / Bacteria: x      Endocrine Panel-  Calcium: 9.2 mg/dL ( @ 05:52)                MICROBIOLOGY:        RADIOLOGY & ADDITIONAL STUDIES:    Assessment and Plan:  SAUNDRA HOLMAN is a  68yMale with PMH CAD (x2 stents Mar 2023), HLD, T2DM, hx of kidney stones, psoriasis w/ left buccal mucosa SCC presents for pre-optimization prior to composite mandibular resection, neck dissection and fibula free flap reconstruction.    Plan:  -Medicine consulted for pre-operative optimization, appreciate recommendations  -Cardiology consulted for pre-operative risk stratification and recommendations. TTE obtained  -Soft, easy to chew diet  -Multimodal pain control  -continue home Jardiance and Plavix  -Monitor POC gluc, ISS  -Bowel regimen  -LVX DVT ppx      [facility] at [phone] with any questions/concerns.    Candida Jimenez  23 @ 15:48

## 2023-12-04 NOTE — CONSULT NOTE ADULT - SUBJECTIVE AND OBJECTIVE BOX
Patient is a 68y old  Male who presents with a chief complaint of Oral Ca (04 Dec 2023 11:53)      HPI:  67 y/o M w PMH of CAD (x2 stents Mar 2023), HLD, T2DM, hx of kidney stones, psoriasis presents to [facility] for evaluation of oral mass. Reports Three month hx of jaw pain and loosening of mandibular molar tooth on the lower left. Pt has a 30 pack yr smoking hx, quit 1 yr ago. 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 Pt planned for composite mandibular resection, neck dissection and fibula free flap reconstruction. Presently, pt is endorsing worsening left sided facial pain uncontrolled with pain medication. Pt eating mostly soft, easy to chew foods, reports decreased PO intake. Endorses dysphagia and pain on mouth opening. Denies dyspnea, odynophagia. Remaining ROS negative.    PMH:  CAD (x2 stents Mar 2023), HLD, T2DM, hx of kidney stones, psoriasis  PSH: Cardiac stent placement x2 2023, uretal stent placement 2022  Meds: Plavix (last taken 12/1), ASA81, SL nitroglycerin (pt unsure when he last used, has been months per pt), Metformin, Jardiance (last taken 11/30), Lipitor, ibuprofen, tylenol, percocet 5mg, colace, lidocaine 2% viscous mouth rinse  All: NKDA  Soc: EtOH/Tob (-) (03 Dec 2023 14:01)      PAST MEDICAL & SURGICAL HISTORY:      HPI:                PREVIOUS DIAGNOSTIC TESTING:      ECHO  FINDINGS:    STRESS  FINDINGS:    CATHETERIZATION  FINDINGS:    MEDICATIONS  (STANDING):  acetaminophen     Tablet .. 650 milliGRAM(s) Oral every 6 hours  aspirin enteric coated 81 milliGRAM(s) Oral daily  atorvastatin 40 milliGRAM(s) Oral at bedtime  dextrose 5%. 1000 milliLiter(s) (50 mL/Hr) IV Continuous <Continuous>  dextrose 5%. 1000 milliLiter(s) (100 mL/Hr) IV Continuous <Continuous>  dextrose 50% Injectable 25 Gram(s) IV Push once  dextrose 50% Injectable 25 Gram(s) IV Push once  dextrose 50% Injectable 12.5 Gram(s) IV Push once  enoxaparin Injectable 40 milliGRAM(s) SubCutaneous every 24 hours  glucagon  Injectable 1 milliGRAM(s) IntraMuscular once  ibuprofen  Tablet. 400 milliGRAM(s) Oral every 6 hours  influenza  Vaccine (HIGH DOSE) 0.7 milliLiter(s) IntraMuscular once  insulin lispro (ADMELOG) corrective regimen sliding scale   SubCutaneous Before meals and at bedtime  polyethylene glycol 3350 17 Gram(s) Oral at bedtime  senna 2 Tablet(s) Oral at bedtime    MEDICATIONS  (PRN):  dextrose Oral Gel 15 Gram(s) Oral once PRN Blood Glucose LESS THAN 70 milliGRAM(s)/deciliter  lidocaine 2% Viscous 15 milliLiter(s) Swish and Spit every 1 hour PRN for oral pain  oxyCODONE    IR 2.5 milliGRAM(s) Oral every 6 hours PRN Moderate Pain (4 - 6)  oxyCODONE    IR 5 milliGRAM(s) Oral every 6 hours PRN Severe Pain (7 - 10)      FAMILY HISTORY:      SOCIAL HISTORY:    CIGARETTES:    ALCOHOL:    REVIEW OF SYSTEMS      General:	    Skin/Breast:  	  Ophthalmologic:  	  ENMT:	    Respiratory and Thorax:  	  Cardiovascular:	    Gastrointestinal:	    Genitourinary:	    Musculoskeletal:	    Neurological:	    Psychiatric:	    Hematology/Lymphatics:	    Endocrine:	    Allergic/Immunologic:	    Vital Signs Last 24 Hrs  T(C): 36.4 (04 Dec 2023 12:22), Max: 36.9 (03 Dec 2023 15:33)  T(F): 97.5 (04 Dec 2023 12:22), Max: 98.4 (03 Dec 2023 15:33)  HR: 48 (04 Dec 2023 12:22) (46 - 59)  BP: 138/77 (04 Dec 2023 12:22) (128/67 - 168/72)  BP(mean): --  RR: 17 (04 Dec 2023 12:22) (16 - 18)  SpO2: 97% (04 Dec 2023 12:22) (94% - 97%)    Parameters below as of 04 Dec 2023 12:22  Patient On (Oxygen Delivery Method): room air        PHYSICAL EXAM:      Constitutional:    Eyes:    ENMT:    Neck:    Breasts:    Back:    Respiratory:    Cardiovascular:    Gastrointestinal:    Genitourinary:    Rectal:    Extremities:    Vascular:    Neurological:    Skin:    Lymph Nodes:    Musculoskeletal:    Psychiatric:            INTERPRETATION OF TELEMETRY:    ECG:    I&O's Detail    03 Dec 2023 07:01  -  04 Dec 2023 07:00  --------------------------------------------------------  IN:    Oral Fluid: 200 mL  Total IN: 200 mL    OUT:    Voided (mL): 325 mL  Total OUT: 325 mL    Total NET: -125 mL      04 Dec 2023 07:01  -  04 Dec 2023 14:37  --------------------------------------------------------  IN:  Total IN: 0 mL    OUT:    Voided (mL): 350 mL  Total OUT: 350 mL    Total NET: -350 mL          LABS:                        14.4   5.69  )-----------( 160      ( 04 Dec 2023 05:52 )             44.0     12-04    140  |  106  |  22  ----------------------------<  110<H>  3.9   |  26  |  0.98    Ca    9.2      04 Dec 2023 05:52  Phos  4.3     12-04  Mg     2.0     12-04    TPro  6.9  /  Alb  3.9  /  TBili  0.6  /  DBili  x   /  AST  18  /  ALT  19  /  AlkPhos  41  12-03        PT/INR - ( 03 Dec 2023 14:27 )   PT: 11.6 sec;   INR: 1.02          PTT - ( 03 Dec 2023 14:27 )  PTT:28.1 sec  Urinalysis Basic - ( 04 Dec 2023 05:52 )    Color: x / Appearance: x / SG: x / pH: x  Gluc: 110 mg/dL / Ketone: x  / Bili: x / Urobili: x   Blood: x / Protein: x / Nitrite: x   Leuk Esterase: x / RBC: x / WBC x   Sq Epi: x / Non Sq Epi: x / Bacteria: x      I&O's Summary    03 Dec 2023 07:01  -  04 Dec 2023 07:00  --------------------------------------------------------  IN: 200 mL / OUT: 325 mL / NET: -125 mL    04 Dec 2023 07:01  -  04 Dec 2023 14:37  --------------------------------------------------------  IN: 0 mL / OUT: 350 mL / NET: -350 mL      BNP  RADIOLOGY & ADDITIONAL STUDIES: Patient is a 68y old  Male who presents with a chief complaint of Oral Ca (04 Dec 2023 11:53)      HPI:  67 y/o M w PMH of CAD (x2 stents Mar 2023), HLD, T2DM, hx of kidney stones, psoriasis presents to [facility] for evaluation of oral mass. Reports Three month hx of jaw pain and loosening of mandibular molar tooth on the lower left. Pt has a 30 pack yr smoking hx, quit 1 yr ago. 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 Pt planned for composite mandibular resection, neck dissection and fibula free flap reconstruction. Presently, pt is endorsing worsening left sided facial pain uncontrolled with pain medication. Pt eating mostly soft, easy to chew foods, reports decreased PO intake. Endorses dysphagia and pain on mouth opening. Denies dyspnea, odynophagia. Remaining ROS negative.    PMH:  CAD (x2 stents Mar 2023), HLD, T2DM, hx of kidney stones, psoriasis  PSH: Cardiac stent placement x2 2023, uretal stent placement 2022  Meds: Plavix (last taken 12/1), ASA81, SL nitroglycerin (pt unsure when he last used, has been months per pt), Metformin, Jardiance (last taken 11/30), Lipitor, ibuprofen, tylenol, percocet 5mg, colace, lidocaine 2% viscous mouth rinse  All: NKDA  Soc: EtOH/Tob (-) (03 Dec 2023 14:01)      PAST MEDICAL & SURGICAL HISTORY:      HPI:                PREVIOUS DIAGNOSTIC TESTING:      ECHO  FINDINGS:    STRESS  FINDINGS:    CATHETERIZATION  FINDINGS:    MEDICATIONS  (STANDING):  acetaminophen     Tablet .. 650 milliGRAM(s) Oral every 6 hours  aspirin enteric coated 81 milliGRAM(s) Oral daily  atorvastatin 40 milliGRAM(s) Oral at bedtime  dextrose 5%. 1000 milliLiter(s) (50 mL/Hr) IV Continuous <Continuous>  dextrose 5%. 1000 milliLiter(s) (100 mL/Hr) IV Continuous <Continuous>  dextrose 50% Injectable 25 Gram(s) IV Push once  dextrose 50% Injectable 25 Gram(s) IV Push once  dextrose 50% Injectable 12.5 Gram(s) IV Push once  enoxaparin Injectable 40 milliGRAM(s) SubCutaneous every 24 hours  glucagon  Injectable 1 milliGRAM(s) IntraMuscular once  ibuprofen  Tablet. 400 milliGRAM(s) Oral every 6 hours  influenza  Vaccine (HIGH DOSE) 0.7 milliLiter(s) IntraMuscular once  insulin lispro (ADMELOG) corrective regimen sliding scale   SubCutaneous Before meals and at bedtime  polyethylene glycol 3350 17 Gram(s) Oral at bedtime  senna 2 Tablet(s) Oral at bedtime    MEDICATIONS  (PRN):  dextrose Oral Gel 15 Gram(s) Oral once PRN Blood Glucose LESS THAN 70 milliGRAM(s)/deciliter  lidocaine 2% Viscous 15 milliLiter(s) Swish and Spit every 1 hour PRN for oral pain  oxyCODONE    IR 2.5 milliGRAM(s) Oral every 6 hours PRN Moderate Pain (4 - 6)  oxyCODONE    IR 5 milliGRAM(s) Oral every 6 hours PRN Severe Pain (7 - 10)      FAMILY HISTORY:      SOCIAL HISTORY:    CIGARETTES:    ALCOHOL:    REVIEW OF SYSTEMS      General:	    Skin/Breast:  	  Ophthalmologic:  	  ENMT:	    Respiratory and Thorax:  	  Cardiovascular:	    Gastrointestinal:	    Genitourinary:	    Musculoskeletal:	    Neurological:	    Psychiatric:	    Hematology/Lymphatics:	    Endocrine:	    Allergic/Immunologic:	    Vital Signs Last 24 Hrs  T(C): 36.4 (04 Dec 2023 12:22), Max: 36.9 (03 Dec 2023 15:33)  T(F): 97.5 (04 Dec 2023 12:22), Max: 98.4 (03 Dec 2023 15:33)  HR: 48 (04 Dec 2023 12:22) (46 - 59)  BP: 138/77 (04 Dec 2023 12:22) (128/67 - 168/72)  BP(mean): --  RR: 17 (04 Dec 2023 12:22) (16 - 18)  SpO2: 97% (04 Dec 2023 12:22) (94% - 97%)    Parameters below as of 04 Dec 2023 12:22  Patient On (Oxygen Delivery Method): room air        PHYSICAL EXAM:      Constitutional:    Eyes:    ENMT:    Neck:    Breasts:    Back:    Respiratory:    Cardiovascular:    Gastrointestinal:    Genitourinary:    Rectal:    Extremities:    Vascular:    Neurological:    Skin:    Lymph Nodes:    Musculoskeletal:    Psychiatric:            INTERPRETATION OF TELEMETRY:    ECG:    I&O's Detail    03 Dec 2023 07:01  -  04 Dec 2023 07:00  --------------------------------------------------------  IN:    Oral Fluid: 200 mL  Total IN: 200 mL    OUT:    Voided (mL): 325 mL  Total OUT: 325 mL    Total NET: -125 mL      04 Dec 2023 07:01  -  04 Dec 2023 14:37  --------------------------------------------------------  IN:  Total IN: 0 mL    OUT:    Voided (mL): 350 mL  Total OUT: 350 mL    Total NET: -350 mL          LABS:                        14.4   5.69  )-----------( 160      ( 04 Dec 2023 05:52 )             44.0     12-04    140  |  106  |  22  ----------------------------<  110<H>  3.9   |  26  |  0.98    Ca    9.2      04 Dec 2023 05:52  Phos  4.3     12-04  Mg     2.0     12-04    TPro  6.9  /  Alb  3.9  /  TBili  0.6  /  DBili  x   /  AST  18  /  ALT  19  /  AlkPhos  41  12-03        PT/INR - ( 03 Dec 2023 14:27 )   PT: 11.6 sec;   INR: 1.02          PTT - ( 03 Dec 2023 14:27 )  PTT:28.1 sec  Urinalysis Basic - ( 04 Dec 2023 05:52 )    Color: x / Appearance: x / SG: x / pH: x  Gluc: 110 mg/dL / Ketone: x  / Bili: x / Urobili: x   Blood: x / Protein: x / Nitrite: x   Leuk Esterase: x / RBC: x / WBC x   Sq Epi: x / Non Sq Epi: x / Bacteria: x      I&O's Summary    03 Dec 2023 07:01  -  04 Dec 2023 07:00  --------------------------------------------------------  IN: 200 mL / OUT: 325 mL / NET: -125 mL    04 Dec 2023 07:01  -  04 Dec 2023 14:37  --------------------------------------------------------  IN: 0 mL / OUT: 350 mL / NET: -350 mL      BNP  RADIOLOGY & ADDITIONAL STUDIES: 68M PMH CAD (x2 stents Mar 2023), HLD, T2DM, hx of kidney stones, psoriasis presents to [facility] for evaluation of oral mass. Reports Three month hx of jaw pain and loosening of mandibular molar tooth on the lower left. Pt has a 30 pack yr smoking hx, quit 1 yr ago. 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 Pt planned for composite mandibular resection, neck dissection and fibula free flap reconstruction. Presently, pt is endorsing worsening left sided facial pain uncontrolled with pain medication. Pt eating mostly soft, easy to chew foods, reports decreased PO intake. Endorses dysphagia and pain on mouth opening. Denies dyspnea, odynophagia. Remaining ROS negative.    PMH:  CAD (x2 stents Mar 2023), HLD, T2DM, hx of kidney stones, psoriasis  PSH: Cardiac stent placement x2 2023, uretal stent placement 2022  Meds: Plavix (last taken 12/1), ASA81, SL nitroglycerin (pt unsure when he last used, has been months per pt), Metformin, Jardiance (last taken 11/30), Lipitor, ibuprofen, tylenol, percocet 5mg, colace, lidocaine 2% viscous mouth rinse  All: NKDA  Soc: EtOH/Tob (-) (03 Dec 2023 14:01)      PAST MEDICAL & SURGICAL HISTORY:      HPI:                PREVIOUS DIAGNOSTIC TESTING:      ECHO  FINDINGS:    STRESS  FINDINGS:    CATHETERIZATION  FINDINGS:    MEDICATIONS  (STANDING):  acetaminophen     Tablet .. 650 milliGRAM(s) Oral every 6 hours  aspirin enteric coated 81 milliGRAM(s) Oral daily  atorvastatin 40 milliGRAM(s) Oral at bedtime  dextrose 5%. 1000 milliLiter(s) (50 mL/Hr) IV Continuous <Continuous>  dextrose 5%. 1000 milliLiter(s) (100 mL/Hr) IV Continuous <Continuous>  dextrose 50% Injectable 25 Gram(s) IV Push once  dextrose 50% Injectable 25 Gram(s) IV Push once  dextrose 50% Injectable 12.5 Gram(s) IV Push once  enoxaparin Injectable 40 milliGRAM(s) SubCutaneous every 24 hours  glucagon  Injectable 1 milliGRAM(s) IntraMuscular once  ibuprofen  Tablet. 400 milliGRAM(s) Oral every 6 hours  influenza  Vaccine (HIGH DOSE) 0.7 milliLiter(s) IntraMuscular once  insulin lispro (ADMELOG) corrective regimen sliding scale   SubCutaneous Before meals and at bedtime  polyethylene glycol 3350 17 Gram(s) Oral at bedtime  senna 2 Tablet(s) Oral at bedtime    MEDICATIONS  (PRN):  dextrose Oral Gel 15 Gram(s) Oral once PRN Blood Glucose LESS THAN 70 milliGRAM(s)/deciliter  lidocaine 2% Viscous 15 milliLiter(s) Swish and Spit every 1 hour PRN for oral pain  oxyCODONE    IR 2.5 milliGRAM(s) Oral every 6 hours PRN Moderate Pain (4 - 6)  oxyCODONE    IR 5 milliGRAM(s) Oral every 6 hours PRN Severe Pain (7 - 10)      FAMILY HISTORY:      SOCIAL HISTORY:    CIGARETTES:    ALCOHOL:    REVIEW OF SYSTEMS      General:	    Skin/Breast:  	  Ophthalmologic:  	  ENMT:	    Respiratory and Thorax:  	  Cardiovascular:	    Gastrointestinal:	    Genitourinary:	    Musculoskeletal:	    Neurological:	    Psychiatric:	    Hematology/Lymphatics:	    Endocrine:	    Allergic/Immunologic:	    Vital Signs Last 24 Hrs  T(C): 36.4 (04 Dec 2023 12:22), Max: 36.9 (03 Dec 2023 15:33)  T(F): 97.5 (04 Dec 2023 12:22), Max: 98.4 (03 Dec 2023 15:33)  HR: 48 (04 Dec 2023 12:22) (46 - 59)  BP: 138/77 (04 Dec 2023 12:22) (128/67 - 168/72)  BP(mean): --  RR: 17 (04 Dec 2023 12:22) (16 - 18)  SpO2: 97% (04 Dec 2023 12:22) (94% - 97%)    Parameters below as of 04 Dec 2023 12:22  Patient On (Oxygen Delivery Method): room air        PHYSICAL EXAM:      Constitutional:    Eyes:    ENMT:    Neck:    Breasts:    Back:    Respiratory:    Cardiovascular:    Gastrointestinal:    Genitourinary:    Rectal:    Extremities:    Vascular:    Neurological:    Skin:    Lymph Nodes:    Musculoskeletal:    Psychiatric:            INTERPRETATION OF TELEMETRY:    ECG:    I&O's Detail    03 Dec 2023 07:01  -  04 Dec 2023 07:00  --------------------------------------------------------  IN:    Oral Fluid: 200 mL  Total IN: 200 mL    OUT:    Voided (mL): 325 mL  Total OUT: 325 mL    Total NET: -125 mL      04 Dec 2023 07:01  -  04 Dec 2023 14:37  --------------------------------------------------------  IN:  Total IN: 0 mL    OUT:    Voided (mL): 350 mL  Total OUT: 350 mL    Total NET: -350 mL          LABS:                        14.4   5.69  )-----------( 160      ( 04 Dec 2023 05:52 )             44.0     12-04    140  |  106  |  22  ----------------------------<  110<H>  3.9   |  26  |  0.98    Ca    9.2      04 Dec 2023 05:52  Phos  4.3     12-04  Mg     2.0     12-04    TPro  6.9  /  Alb  3.9  /  TBili  0.6  /  DBili  x   /  AST  18  /  ALT  19  /  AlkPhos  41  12-03        PT/INR - ( 03 Dec 2023 14:27 )   PT: 11.6 sec;   INR: 1.02          PTT - ( 03 Dec 2023 14:27 )  PTT:28.1 sec  Urinalysis Basic - ( 04 Dec 2023 05:52 )    Color: x / Appearance: x / SG: x / pH: x  Gluc: 110 mg/dL / Ketone: x  / Bili: x / Urobili: x   Blood: x / Protein: x / Nitrite: x   Leuk Esterase: x / RBC: x / WBC x   Sq Epi: x / Non Sq Epi: x / Bacteria: x      I&O's Summary    03 Dec 2023 07:01  -  04 Dec 2023 07:00  --------------------------------------------------------  IN: 200 mL / OUT: 325 mL / NET: -125 mL    04 Dec 2023 07:01  -  04 Dec 2023 14:37  --------------------------------------------------------  IN: 0 mL / OUT: 350 mL / NET: -350 mL      BNP  RADIOLOGY & ADDITIONAL STUDIES: 68M PMH CAD (x2 stents Mar 2023), HLD, T2DM, hx of kidney stones, psoriasis presents to [facility] for evaluation of oral mass. Reports Three month hx of jaw pain and loosening of mandibular molar tooth on the lower left. Pt has a 30 pack yr smoking hx, quit 1 yr ago. 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 Pt planned for composite mandibular resection, neck dissection and fibula free flap reconstruction. Presently, pt is endorsing worsening left sided facial pain uncontrolled with pain medication. Pt eating mostly soft, easy to chew foods, reports decreased PO intake. Endorses dysphagia and pain on mouth opening. Denies dyspnea, odynophagia. Remaining ROS negative.    PMH:  CAD (x2 stents Mar 2023), HLD, T2DM, hx of kidney stones, psoriasis  PSH: Cardiac stent placement x2 2023, uretal stent placement 2022  Meds: Plavix (last taken 12/1), ASA81, SL nitroglycerin (pt unsure when he last used, has been months per pt), Metformin, Jardiance (last taken 11/30), Lipitor, ibuprofen, tylenol, percocet 5mg, colace, lidocaine 2% viscous mouth rinse  All: NKDA  Soc: EtOH/Tob (-) (03 Dec 2023 14:01)      PAST MEDICAL & SURGICAL HISTORY:      HPI:                PREVIOUS DIAGNOSTIC TESTING:      ECHO  FINDINGS:    STRESS  FINDINGS:    CATHETERIZATION  FINDINGS:    MEDICATIONS  (STANDING):  acetaminophen     Tablet .. 650 milliGRAM(s) Oral every 6 hours  aspirin enteric coated 81 milliGRAM(s) Oral daily  atorvastatin 40 milliGRAM(s) Oral at bedtime  dextrose 5%. 1000 milliLiter(s) (50 mL/Hr) IV Continuous <Continuous>  dextrose 5%. 1000 milliLiter(s) (100 mL/Hr) IV Continuous <Continuous>  dextrose 50% Injectable 25 Gram(s) IV Push once  dextrose 50% Injectable 25 Gram(s) IV Push once  dextrose 50% Injectable 12.5 Gram(s) IV Push once  enoxaparin Injectable 40 milliGRAM(s) SubCutaneous every 24 hours  glucagon  Injectable 1 milliGRAM(s) IntraMuscular once  ibuprofen  Tablet. 400 milliGRAM(s) Oral every 6 hours  influenza  Vaccine (HIGH DOSE) 0.7 milliLiter(s) IntraMuscular once  insulin lispro (ADMELOG) corrective regimen sliding scale   SubCutaneous Before meals and at bedtime  polyethylene glycol 3350 17 Gram(s) Oral at bedtime  senna 2 Tablet(s) Oral at bedtime    MEDICATIONS  (PRN):  dextrose Oral Gel 15 Gram(s) Oral once PRN Blood Glucose LESS THAN 70 milliGRAM(s)/deciliter  lidocaine 2% Viscous 15 milliLiter(s) Swish and Spit every 1 hour PRN for oral pain  oxyCODONE    IR 2.5 milliGRAM(s) Oral every 6 hours PRN Moderate Pain (4 - 6)  oxyCODONE    IR 5 milliGRAM(s) Oral every 6 hours PRN Severe Pain (7 - 10)      FAMILY HISTORY:      SOCIAL HISTORY:    CIGARETTES:    ALCOHOL:    REVIEW OF SYSTEMS      General:	    Skin/Breast:  	  Ophthalmologic:  	  ENMT:	    Respiratory and Thorax:  	  Cardiovascular:	    Gastrointestinal:	    Genitourinary:	    Musculoskeletal:	    Neurological:	    Psychiatric:	    Hematology/Lymphatics:	    Endocrine:	    Allergic/Immunologic:	    Vital Signs Last 24 Hrs  T(C): 36.4 (04 Dec 2023 12:22), Max: 36.9 (03 Dec 2023 15:33)  T(F): 97.5 (04 Dec 2023 12:22), Max: 98.4 (03 Dec 2023 15:33)  HR: 48 (04 Dec 2023 12:22) (46 - 59)  BP: 138/77 (04 Dec 2023 12:22) (128/67 - 168/72)  BP(mean): --  RR: 17 (04 Dec 2023 12:22) (16 - 18)  SpO2: 97% (04 Dec 2023 12:22) (94% - 97%)    Parameters below as of 04 Dec 2023 12:22  Patient On (Oxygen Delivery Method): room air        PHYSICAL EXAM:      Constitutional:    Eyes:    ENMT:    Neck:    Breasts:    Back:    Respiratory:    Cardiovascular:    Gastrointestinal:    Genitourinary:    Rectal:    Extremities:    Vascular:    Neurological:    Skin:    Lymph Nodes:    Musculoskeletal:    Psychiatric:            INTERPRETATION OF TELEMETRY:    ECG:    I&O's Detail    03 Dec 2023 07:01  -  04 Dec 2023 07:00  --------------------------------------------------------  IN:    Oral Fluid: 200 mL  Total IN: 200 mL    OUT:    Voided (mL): 325 mL  Total OUT: 325 mL    Total NET: -125 mL      04 Dec 2023 07:01  -  04 Dec 2023 14:37  --------------------------------------------------------  IN:  Total IN: 0 mL    OUT:    Voided (mL): 350 mL  Total OUT: 350 mL    Total NET: -350 mL          LABS:                        14.4   5.69  )-----------( 160      ( 04 Dec 2023 05:52 )             44.0     12-04    140  |  106  |  22  ----------------------------<  110<H>  3.9   |  26  |  0.98    Ca    9.2      04 Dec 2023 05:52  Phos  4.3     12-04  Mg     2.0     12-04    TPro  6.9  /  Alb  3.9  /  TBili  0.6  /  DBili  x   /  AST  18  /  ALT  19  /  AlkPhos  41  12-03        PT/INR - ( 03 Dec 2023 14:27 )   PT: 11.6 sec;   INR: 1.02          PTT - ( 03 Dec 2023 14:27 )  PTT:28.1 sec  Urinalysis Basic - ( 04 Dec 2023 05:52 )    Color: x / Appearance: x / SG: x / pH: x  Gluc: 110 mg/dL / Ketone: x  / Bili: x / Urobili: x   Blood: x / Protein: x / Nitrite: x   Leuk Esterase: x / RBC: x / WBC x   Sq Epi: x / Non Sq Epi: x / Bacteria: x      I&O's Summary    03 Dec 2023 07:01  -  04 Dec 2023 07:00  --------------------------------------------------------  IN: 200 mL / OUT: 325 mL / NET: -125 mL    04 Dec 2023 07:01  -  04 Dec 2023 14:37  --------------------------------------------------------  IN: 0 mL / OUT: 350 mL / NET: -350 mL      BNP  RADIOLOGY & ADDITIONAL STUDIES: 68M PMH CAD/NSTEMI s/p PCIx2 including the LAD (3/2023), HLD, T2DM, nephrolithiasis, psoriasis presented after 3 months of jaw pain and was found to have squamous cell carcinoma of the buccal mucosa now admitted for mandibulectomy with needle dissection, tracheostomy and fibular free flap reconstruction planned for 12/7/23. Cardiology consulted for perioperative risk stratification.    All: None  Meds: Plavix (last taken 12/1), ASA81, SL nitroglycerin (pt unsure when he last used, has been months per pt), Metformin, Jardiance (last taken 11/30), Lipitor, ibuprofen, tylenol, percocet 5mg, colace, lidocaine 2% viscous mouth rinse  PSH: Cardiac stent placement x2 2023, uretal stent placement 2022  SH: Former smoker, no illicit drug use.      MEDICATIONS  (STANDING):  acetaminophen     Tablet .. 650 milliGRAM(s) Oral every 6 hours  aspirin enteric coated 81 milliGRAM(s) Oral daily  atorvastatin 40 milliGRAM(s) Oral at bedtime  dextrose 5%. 1000 milliLiter(s) (50 mL/Hr) IV Continuous <Continuous>  dextrose 5%. 1000 milliLiter(s) (100 mL/Hr) IV Continuous <Continuous>  dextrose 50% Injectable 25 Gram(s) IV Push once  dextrose 50% Injectable 25 Gram(s) IV Push once  dextrose 50% Injectable 12.5 Gram(s) IV Push once  enoxaparin Injectable 40 milliGRAM(s) SubCutaneous every 24 hours  glucagon  Injectable 1 milliGRAM(s) IntraMuscular once  ibuprofen  Tablet. 400 milliGRAM(s) Oral every 6 hours  influenza  Vaccine (HIGH DOSE) 0.7 milliLiter(s) IntraMuscular once  insulin lispro (ADMELOG) corrective regimen sliding scale   SubCutaneous Before meals and at bedtime  polyethylene glycol 3350 17 Gram(s) Oral at bedtime  senna 2 Tablet(s) Oral at bedtime    MEDICATIONS  (PRN):  dextrose Oral Gel 15 Gram(s) Oral once PRN Blood Glucose LESS THAN 70 milliGRAM(s)/deciliter  lidocaine 2% Viscous 15 milliLiter(s) Swish and Spit every 1 hour PRN for oral pain  oxyCODONE    IR 2.5 milliGRAM(s) Oral every 6 hours PRN Moderate Pain (4 - 6)  oxyCODONE    IR 5 milliGRAM(s) Oral every 6 hours PRN Severe Pain (7 - 10)    Vital Signs Last 24 Hrs  T(C): 36.4 (04 Dec 2023 12:22), Max: 36.9 (03 Dec 2023 15:33)  T(F): 97.5 (04 Dec 2023 12:22), Max: 98.4 (03 Dec 2023 15:33)  HR: 48 (04 Dec 2023 12:22) (46 - 59)  BP: 138/77 (04 Dec 2023 12:22) (128/67 - 168/72)  BP(mean): --  RR: 17 (04 Dec 2023 12:22) (16 - 18)  SpO2: 97% (04 Dec 2023 12:22) (94% - 97%)    Parameters below as of 04 Dec 2023 12:22  Patient On (Oxygen Delivery Method): room air    PHYSICAL EXAM:  GENERAL: NAD, speaks in full sentences, no signs of respiratory distress  HEAD:  Atraumatic, Normocephalic  EYES: EOMI, PERRLA, conjunctiva and sclera clear  NECK: Swollen neck, No JVD  CHEST/LUNG: Clear to auscultation bilaterally; No wheeze; No crackles; No accessory muscles used  HEART: Regular rate and rhythm; No murmurs;   ABDOMEN: Soft, Nontender, Nondistended; Bowel sounds present; No guarding  EXTREMITIES:  2+ Peripheral Pulses, No cyanosis or edema  PSYCH: AAOx3  NEUROLOGY: non-focal  SKIN: No rashes or lesions    I&O's Detail    03 Dec 2023 07:01  -  04 Dec 2023 07:00  --------------------------------------------------------  IN:    Oral Fluid: 200 mL  Total IN: 200 mL    OUT:    Voided (mL): 325 mL  Total OUT: 325 mL    Total NET: -125 mL      04 Dec 2023 07:01  -  04 Dec 2023 14:37  --------------------------------------------------------  IN:  Total IN: 0 mL    OUT:    Voided (mL): 350 mL  Total OUT: 350 mL    Total NET: -350 mL          LABS:                        14.4   5.69  )-----------( 160      ( 04 Dec 2023 05:52 )             44.0     12-04    140  |  106  |  22  ----------------------------<  110<H>  3.9   |  26  |  0.98    Ca    9.2      04 Dec 2023 05:52  Phos  4.3     12-04  Mg     2.0     12-04    TPro  6.9  /  Alb  3.9  /  TBili  0.6  /  DBili  x   /  AST  18  /  ALT  19  /  AlkPhos  41  12-03        PT/INR - ( 03 Dec 2023 14:27 )   PT: 11.6 sec;   INR: 1.02          PTT - ( 03 Dec 2023 14:27 )  PTT:28.1 sec  Urinalysis Basic - ( 04 Dec 2023 05:52 )    Color: x / Appearance: x / SG: x / pH: x  Gluc: 110 mg/dL / Ketone: x  / Bili: x / Urobili: x   Blood: x / Protein: x / Nitrite: x   Leuk Esterase: x / RBC: x / WBC x   Sq Epi: x / Non Sq Epi: x / Bacteria: x      I&O's Summary    03 Dec 2023 07:01  -  04 Dec 2023 07:00  --------------------------------------------------------  IN: 200 mL / OUT: 325 mL / NET: -125 mL    04 Dec 2023 07:01  -  04 Dec 2023 14:37  --------------------------------------------------------  IN: 0 mL / OUT: 350 mL / NET: -350 mL      BNP  RADIOLOGY & ADDITIONAL STUDIES:

## 2023-12-04 NOTE — CONSULT NOTE ADULT - ASSESSMENT
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 Mild LVH. Normal RV function, mildly dilated. No valvular disease. No pericardial effusion.          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 Cardiology consulted for perioperative risk stratification.    Review of Studies    ECG 12/3/23: Sinus bradycardia     TTE 12/4/23: LVIDD 4.09cm LVEF 65%. Mild LVH. Normal RV function, mildly dilated. No valvular disease. No pericardial effusion.    #Perioperative Risk Stratification   No acute cardiopulmonary complaints at bedside evaluation, reports that in early in the year he was evaluated for epigastric/chest discomfort underwent a stress that was normal and subsequently went to the ED after being woken up from sleep with chest discomfort and was found to have NSTEMI underwent PCI presumably to the LAD. After his angiogram he reported a  marked improvement in his functional capacity, currently running 6-7 miles a day with no cardiopulmonary complaints, however he reports that he intermittently misses doses of his medications.  - METS >4 good functional capacity  - RCRI Class II  10.1 % 30-day risk of death, MI, or cardiac arrest  - Low - intermediate risk for intermediate risk procedure  - Normal biventricular function on echocardiogram, well compensated on exam  - No cardiac contraindication to proceed with surgical intervention.    #CAD s/p PCI (unclear location of PCI (possible stents in the LAD/LCx distribution noted on CT 11/2/23)  - Given that coronary intervention was ~9 months ago, can continue monotherapy with Aspirin 81mg perioperatively and plan to restart Plavix with load 300mg QD then 75mg QD postoperatively with plan to complete 12 months of DAPT s/p ACS  - continue Aspirin 81mg QD and Lipitor 40mg QD   - Please obtain collateral for stent placement at [facility]    Case discussed with cardiology consult attending Dr Castillo.          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 Cardiology consulted for perioperative risk stratification.    Review of Studies    ECG 12/3/23: Sinus bradycardia     TTE 12/4/23: LVIDD 4.09cm LVEF 65%. Mild LVH. Normal RV function, mildly dilated. No valvular disease. No pericardial effusion.    #Perioperative Risk Stratification   No acute cardiopulmonary complaints at bedside evaluation, reports that in early in the year he was evaluated for epigastric/chest discomfort underwent a stress that was normal and subsequently went to the ED after being woken up from sleep with chest discomfort and was found to have NSTEMI underwent PCI presumably to the LAD. After his angiogram he reported a  marked improvement in his functional capacity, currently running 6-7 miles a day with no cardiopulmonary complaints, however he reports that he intermittently misses doses of his medications.  - METS >4 good functional capacity  - RCRI Class II  10.1 % 30-day risk of death, MI, or cardiac arrest  - Low - intermediate risk for intermediate risk procedure  - Normal biventricular function on echocardiogram, well compensated on exam  - No cardiac contraindication to proceed with surgical intervention.    #CAD s/p PCI (unclear location of PCI (possible stents in the LAD/LCx distribution noted on CT 11/2/23)  - Given that coronary intervention was ~9 months ago, can continue monotherapy with Aspirin 81mg perioperatively and plan to restart Plavix with load 300mg QD then 75mg QD postoperatively with plan to complete 12 months of DAPT s/p ACS  - continue Aspirin 81mg QD and Lipitor 40mg QD   - Please obtain collateral for stent placement at [facility]    Case discussed with cardiology consult attending Dr Castillo.

## 2023-12-04 NOTE — PROGRESS NOTE ADULT - ASSESSMENT
67 y/o M w PMH of ACS (x2 stents Mar 2023), HLD, T2DM, hx of kidney stones, psoriasis presents to [facility] for evaluation of oral mass and 3 months of jaw pain a/f OMFS mandibular resection and flap reconstruction on Thursday. Medicine consulted for pre-operative clearance    #Pre-operative clearance  Cardiac hx includes 2x stents placement in 2023, cardiologist at [facility] Dr. Jann Márquez. 30 pack year smoking hx, quit 1 year ago. Has been off of clopidogrel since 12/1, metformin since 12/2, and off jardiance since 12/2. EKG sinus bradycardia w/ PVC.   No adverse reactions to anesthesia in the past in self or first-degree relatives. Hx of prior surgeries (stents, nephrostomy tubes) without adverse rxn to anesthesia or difficult extubation.   - Pre-op labs (CBC, CMP, PT, PTT, INR, T/S) Reviewed.   - METS ~4 (ambulates independently without cane/walker, can run up to 2 mile before stopping due to feeling tired, not CP/SOB)   - RCRI 2 points (Class I Risk) ~ 10.1 % for 30d risk of death, MI, or cardiac arrest.   - Noland score 0.1% for risk of MI or cardiac arrest, intraoperatively or up to 30d post-op.  - Patient deemed low-intermediate risk for intermediate risk surgery   - AC Per primary team.   - c/w aspirin in the perioperative period  - Stent placement in 3/2023- plavix held since 12/1  - will f/u with his cardiologist Dr. Jann Márquez regarding location of stent placements  - Metformin (last taken 12/2), Jardiance last taken 12/2  - Patient is on a monthly injection for psoriasis last taken 2 weeks ago, will f/u with his dermatologist Dr. Po Richardson regarding his psoriasis regimen  - pt would like to advance his diet to regular, unhappy with current bite and soft sized diet    Medicine will continue to follow. Patient seen, evaluated, and discussed with attending Dr. Montero. 69 y/o M w PMH of ACS (x2 stents Mar 2023), HLD, T2DM, hx of kidney stones, psoriasis presents to [facility] for evaluation of oral mass and 3 months of jaw pain a/f OMFS mandibular resection and flap reconstruction on Thursday. Medicine consulted for pre-operative clearance    #Pre-operative clearance  Cardiac hx includes 2x stents placement in 2023, cardiologist at [facility] Dr. Jann Márquez. 30 pack year smoking hx, quit 1 year ago. Has been off of clopidogrel since 12/1, metformin since 12/2, and off jardiance since 12/2. EKG sinus bradycardia w/ PVC.   No adverse reactions to anesthesia in the past in self or first-degree relatives. Hx of prior surgeries (stents, nephrostomy tubes) without adverse rxn to anesthesia or difficult extubation.   - Pre-op labs (CBC, CMP, PT, PTT, INR, T/S) Reviewed.   - METS ~4 (ambulates independently without cane/walker, can run up to 2 mile before stopping due to feeling tired, not CP/SOB)   - RCRI 2 points (Class I Risk) ~ 10.1 % for 30d risk of death, MI, or cardiac arrest.   - Noland score 0.1% for risk of MI or cardiac arrest, intraoperatively or up to 30d post-op.  - Patient deemed low-intermediate risk for intermediate risk surgery   - AC Per primary team.   - c/w aspirin in the perioperative period  - Stent placement in 3/2023- plavix held since 12/1  - will f/u with his cardiologist Dr. Jann Márquez regarding location of stent placements  - Metformin (last taken 12/2), Jardiance last taken 12/2  - Patient is on a monthly injection for psoriasis last taken 2 weeks ago, will f/u with his dermatologist Dr. Po Richardson regarding his psoriasis regimen  - pt would like to advance his diet to regular, unhappy with current bite and soft sized diet    Medicine will continue to follow. Patient seen, evaluated, and discussed with attending Dr. Montero. 67 y/o M w PMH of ACS (x2 stents Mar 2023), HLD, T2DM, hx of kidney stones, psoriasis presents to [facility] for evaluation of oral mass and 3 months of jaw pain a/f OMFS mandibular resection and flap reconstruction on Thursday. Medicine consulted for pre-operative clearance    #Pre-operative clearance  Cardiac hx includes 2x stents placement in 2023, cardiologist at [facility] Dr. Jann Márquez. 30 pack year smoking hx, quit 1 year ago. Has been off of clopidogrel since 12/1, metformin since 12/2, and off jardiance since 12/2. EKG sinus bradycardia w/ PVC. TTE 12/4/23 normal LV size and fxn, mild LVH, RV mildly dilated w/ normal systolic fxn.   No adverse reactions to anesthesia in the past in self or first-degree relatives. Hx of prior surgeries (stents, nephrostomy tubes) without adverse rxn to anesthesia or difficult extubation.   - Pre-op labs (CBC, CMP, PT, PTT, INR, T/S) Reviewed.   - METS ~4 (ambulates independently without cane/walker, can run up to 2 mile before stopping due to feeling tired, not CP/SOB)   - RCRI 2 points (Class I Risk) ~ 10.1 % for 30d risk of death, MI, or cardiac arrest.   - Noland score 0.1% for risk of MI or cardiac arrest, intraoperatively or up to 30d post-op.  - Patient deemed low-intermediate risk for intermediate risk surgery   - AC Per primary team.   - c/w aspirin in the perioperative period  - Stent placement in 3/2023- plavix held since 12/1  - will f/u with his cardiologist Dr. Jann Márquez regarding location of stent placements  - Metformin (last taken 12/2), Jardiance last taken 12/2  - Patient is on a monthly injection for psoriasis last taken 2 weeks ago, will f/u with his dermatologist Dr. Po Richardson regarding his psoriasis regimen  - pt would like to advance his diet to regular, unhappy with current bite and soft sized diet    Medicine will continue to follow. Patient seen, evaluated, and discussed with attending Dr. Montero. 69 y/o M w PMH of ACS (x2 stents Mar 2023), HLD, T2DM, hx of kidney stones, psoriasis presents to [facility] for evaluation of oral mass and 3 months of jaw pain a/f OMFS mandibular resection and flap reconstruction on Thursday. Medicine consulted for pre-operative clearance    #Pre-operative clearance  Cardiac hx includes 2x stents placement in 2023, cardiologist at [facility] Dr. Jann Márquez. 30 pack year smoking hx, quit 1 year ago. Has been off of clopidogrel since 12/1, metformin since 12/2, and off jardiance since 12/2. EKG sinus bradycardia w/ PVC. TTE 12/4/23 normal LV size and fxn, mild LVH, RV mildly dilated w/ normal systolic fxn.   No adverse reactions to anesthesia in the past in self or first-degree relatives. Hx of prior surgeries (stents, nephrostomy tubes) without adverse rxn to anesthesia or difficult extubation.   - Pre-op labs (CBC, CMP, PT, PTT, INR, T/S) Reviewed.   - METS ~4 (ambulates independently without cane/walker, can run up to 2 mile before stopping due to feeling tired, not CP/SOB)   - RCRI 2 points (Class I Risk) ~ 10.1 % for 30d risk of death, MI, or cardiac arrest.   - Noland score 0.1% for risk of MI or cardiac arrest, intraoperatively or up to 30d post-op.  - Patient deemed low-intermediate risk for intermediate risk surgery   - AC Per primary team.   - c/w aspirin in the perioperative period  - Stent placement in 3/2023- plavix held since 12/1  - will f/u with his cardiologist Dr. Jann Márquez regarding location of stent placements  - Metformin (last taken 12/2), Jardiance last taken 12/2  - Patient is on a monthly injection for psoriasis last taken 2 weeks ago, will f/u with his dermatologist Dr. Po Richardson regarding his psoriasis regimen  - pt would like to advance his diet to regular, unhappy with current bite and soft sized diet    Medicine will continue to follow. Patient seen, evaluated, and discussed with attending Dr. Montero. 69 y/o M w PMH of ACS (x2 stents Mar 2023), HLD, T2DM, hx of kidney stones, psoriasis presents to [facility] for evaluation of oral mass and 3 months of jaw pain a/f OMFS mandibular resection and flap reconstruction on Thursday. Medicine consulted for pre-operative clearance    #Pre-operative clearance  Cardiac hx includes 2x stents placement in 2023, cardiologist at [facility] Dr. Jann Márquez. 30 pack year smoking hx, quit 1 year ago. Has been off of clopidogrel since 12/1, metformin since 12/2, and off jardiance since 12/2. EKG sinus bradycardia w/ PVC. TTE 12/4/23 normal LV size and fxn, mild LVH, RV mildly dilated w/ normal systolic fxn.   No adverse reactions to anesthesia in the past in self or first-degree relatives. Hx of prior surgeries (stents, nephrostomy tubes) without adverse rxn to anesthesia or difficult extubation.   - Pre-op labs (CBC, CMP, PT, PTT, INR, T/S) Reviewed.   - METS ~4 (ambulates independently without cane/walker, can run up to 2 mile before stopping due to feeling tired, not CP/SOB)   - RCRI 2 points (Class I Risk) ~ 10.1 % for 30d risk of death, MI, or cardiac arrest.   - Noland score 0.1% for risk of MI or cardiac arrest, intraoperatively or up to 30d post-op.  - Patient deemed low-intermediate risk for intermediate risk surgery   - AC Per primary team.   - c/w aspirin in the perioperative period  - Stent placement in 3/2023- plavix held since 12/1  - will f/u with his cardiologist Dr. Jann Márquez regarding location of stent placements  - Metformin (last taken 12/2), Jardiance last taken 12/2  - recommend bowel regimen senna and miralax daily as patient receiving oxycodone for pain management  - Patient is on a monthly injection for psoriasis last taken 2 weeks ago, will f/u with his dermatologist Dr. Po Richardson regarding his psoriasis regimen  - pt would like to advance his diet to regular, unhappy with current bite and soft sized diet    Medicine will continue to follow. Patient seen, evaluated, and discussed with attending Dr. Montero. 67 y/o M w PMH of ACS (x2 stents Mar 2023), HLD, T2DM, hx of kidney stones, psoriasis presents to [facility] for evaluation of oral mass and 3 months of jaw pain a/f OMFS mandibular resection and flap reconstruction on Thursday. Medicine consulted for pre-operative clearance    #Pre-operative clearance  Cardiac hx includes 2x stents placement in 2023, cardiologist at [facility] Dr. Jann Márquez. 30 pack year smoking hx, quit 1 year ago. Has been off of clopidogrel since 12/1, metformin since 12/2, and off jardiance since 12/2. EKG sinus bradycardia w/ PVC. TTE 12/4/23 normal LV size and fxn, mild LVH, RV mildly dilated w/ normal systolic fxn.   No adverse reactions to anesthesia in the past in self or first-degree relatives. Hx of prior surgeries (stents, nephrostomy tubes) without adverse rxn to anesthesia or difficult extubation.   - Pre-op labs (CBC, CMP, PT, PTT, INR, T/S) Reviewed.   - METS ~4 (ambulates independently without cane/walker, can run up to 2 mile before stopping due to feeling tired, not CP/SOB)   - RCRI 2 points (Class I Risk) ~ 10.1 % for 30d risk of death, MI, or cardiac arrest.   - Noland score 0.1% for risk of MI or cardiac arrest, intraoperatively or up to 30d post-op.  - Patient deemed low-intermediate risk for intermediate risk surgery   - AC Per primary team.   - c/w aspirin in the perioperative period  - Stent placement in 3/2023- plavix held since 12/1  - will f/u with his cardiologist Dr. Jann Márquez regarding location of stent placements  - Metformin (last taken 12/2), Jardiance last taken 12/2  - recommend bowel regimen senna and miralax daily as patient receiving oxycodone for pain management  - Patient is on a monthly injection for psoriasis last taken 2 weeks ago, will f/u with his dermatologist Dr. Po Richardson regarding his psoriasis regimen  - pt would like to advance his diet to regular, unhappy with current bite and soft sized diet    Medicine will continue to follow. Patient seen, evaluated, and discussed with attending Dr. Montero.

## 2023-12-05 LAB
GLUCOSE BLDC GLUCOMTR-MCNC: 118 MG/DL — HIGH (ref 70–99)
GLUCOSE BLDC GLUCOMTR-MCNC: 118 MG/DL — HIGH (ref 70–99)
GLUCOSE BLDC GLUCOMTR-MCNC: 86 MG/DL — SIGNIFICANT CHANGE UP (ref 70–99)
GLUCOSE BLDC GLUCOMTR-MCNC: 86 MG/DL — SIGNIFICANT CHANGE UP (ref 70–99)
GLUCOSE BLDC GLUCOMTR-MCNC: 91 MG/DL — SIGNIFICANT CHANGE UP (ref 70–99)
GLUCOSE BLDC GLUCOMTR-MCNC: 91 MG/DL — SIGNIFICANT CHANGE UP (ref 70–99)
GLUCOSE BLDC GLUCOMTR-MCNC: 99 MG/DL — SIGNIFICANT CHANGE UP (ref 70–99)
GLUCOSE BLDC GLUCOMTR-MCNC: 99 MG/DL — SIGNIFICANT CHANGE UP (ref 70–99)

## 2023-12-05 PROCEDURE — 99233 SBSQ HOSP IP/OBS HIGH 50: CPT | Mod: GC

## 2023-12-05 RX ORDER — ACETAMINOPHEN 500 MG
1000 TABLET ORAL ONCE
Refills: 0 | Status: COMPLETED | OUTPATIENT
Start: 2023-12-05 | End: 2023-12-05

## 2023-12-05 RX ADMIN — Medication 400 MILLIGRAM(S): at 16:08

## 2023-12-05 RX ADMIN — OXYCODONE HYDROCHLORIDE 5 MILLIGRAM(S): 5 TABLET ORAL at 14:49

## 2023-12-05 RX ADMIN — Medication 400 MILLIGRAM(S): at 22:59

## 2023-12-05 RX ADMIN — Medication 400 MILLIGRAM(S): at 10:16

## 2023-12-05 RX ADMIN — ENOXAPARIN SODIUM 40 MILLIGRAM(S): 100 INJECTION SUBCUTANEOUS at 18:39

## 2023-12-05 RX ADMIN — SENNA PLUS 2 TABLET(S): 8.6 TABLET ORAL at 22:18

## 2023-12-05 RX ADMIN — Medication 400 MILLIGRAM(S): at 22:18

## 2023-12-05 RX ADMIN — POLYETHYLENE GLYCOL 3350 17 GRAM(S): 17 POWDER, FOR SOLUTION ORAL at 22:17

## 2023-12-05 RX ADMIN — Medication 1000 MILLIGRAM(S): at 23:50

## 2023-12-05 RX ADMIN — Medication 81 MILLIGRAM(S): at 12:41

## 2023-12-05 RX ADMIN — OXYCODONE HYDROCHLORIDE 5 MILLIGRAM(S): 5 TABLET ORAL at 14:47

## 2023-12-05 RX ADMIN — LIDOCAINE 15 MILLILITER(S): 4 CREAM TOPICAL at 10:34

## 2023-12-05 RX ADMIN — ATORVASTATIN CALCIUM 40 MILLIGRAM(S): 80 TABLET, FILM COATED ORAL at 22:17

## 2023-12-05 RX ADMIN — Medication 400 MILLIGRAM(S): at 12:41

## 2023-12-05 RX ADMIN — Medication 400 MILLIGRAM(S): at 06:38

## 2023-12-05 RX ADMIN — Medication 400 MILLIGRAM(S): at 02:21

## 2023-12-05 RX ADMIN — OXYCODONE HYDROCHLORIDE 5 MILLIGRAM(S): 5 TABLET ORAL at 22:58

## 2023-12-05 RX ADMIN — LIDOCAINE 15 MILLILITER(S): 4 CREAM TOPICAL at 14:52

## 2023-12-05 RX ADMIN — OXYCODONE HYDROCHLORIDE 5 MILLIGRAM(S): 5 TABLET ORAL at 23:50

## 2023-12-05 NOTE — PROGRESS NOTE ADULT - SUBJECTIVE AND OBJECTIVE BOX
OVERNIGHT EVENTS: No overnight event.    SUBJECTIVE / INTERVAL HPI: Patient seen and examined at bedside. Pt likes his regular diet. Reports having discomfort/pain at left buccal site and is waiting for his viscous lidocaine which helps with pain. Wife at bedside as well and believes her  is in pain. No other complaint.    VITAL SIGNS:  Vital Signs Last 24 Hrs  T(C): 36.8 (05 Dec 2023 08:50), Max: 36.8 (05 Dec 2023 08:50)  T(F): 98.2 (05 Dec 2023 08:50), Max: 98.2 (05 Dec 2023 08:50)  HR: 56 (05 Dec 2023 08:50) (46 - 62)  BP: 142/88 (05 Dec 2023 08:50) (142/88 - 171/81)  BP(mean): 107 (04 Dec 2023 21:05) (107 - 107)  RR: 16 (05 Dec 2023 08:50) (16 - 17)  SpO2: 95% (05 Dec 2023 08:50) (95% - 98%)    Parameters below as of 05 Dec 2023 08:50  Patient On (Oxygen Delivery Method): room air        PHYSICAL EXAM:    General: Well developed, well nourished, no acute distress  HEENT: left buccal mass. Intraoral ulceration visible in left mouth  Cardiovascular: +S1/S2, RRR, no murmurs, rubs, gallops  Respiratory: CTA B/L; no W/R/R  Gastrointestinal: soft, NT/ND; +BSx4  Extremities: WWP; no edema, clubbing or cyanosis  Neurological: AAOx3; no focal deficits    MEDICATIONS:  MEDICATIONS  (STANDING):  aspirin enteric coated 81 milliGRAM(s) Oral daily  atorvastatin 40 milliGRAM(s) Oral at bedtime  dextrose 5%. 1000 milliLiter(s) (50 mL/Hr) IV Continuous <Continuous>  dextrose 5%. 1000 milliLiter(s) (100 mL/Hr) IV Continuous <Continuous>  dextrose 50% Injectable 25 Gram(s) IV Push once  dextrose 50% Injectable 12.5 Gram(s) IV Push once  dextrose 50% Injectable 25 Gram(s) IV Push once  enoxaparin Injectable 40 milliGRAM(s) SubCutaneous every 24 hours  glucagon  Injectable 1 milliGRAM(s) IntraMuscular once  ibuprofen  Tablet. 400 milliGRAM(s) Oral every 6 hours  influenza  Vaccine (HIGH DOSE) 0.7 milliLiter(s) IntraMuscular once  insulin lispro (ADMELOG) corrective regimen sliding scale   SubCutaneous Before meals and at bedtime  polyethylene glycol 3350 17 Gram(s) Oral at bedtime  senna 2 Tablet(s) Oral at bedtime    MEDICATIONS  (PRN):  dextrose Oral Gel 15 Gram(s) Oral once PRN Blood Glucose LESS THAN 70 milliGRAM(s)/deciliter  HYDROmorphone  Injectable 0.5 milliGRAM(s) IV Push every 4 hours PRN breakthrough, Severe Pain (7 - 10)  lidocaine 2% Viscous 15 milliLiter(s) Swish and Spit every 1 hour PRN for oral pain  oxyCODONE    IR 2.5 milliGRAM(s) Oral every 6 hours PRN Moderate Pain (4 - 6)  oxyCODONE    IR 5 milliGRAM(s) Oral every 6 hours PRN Severe Pain (7 - 10)      ALLERGIES:  Allergies    No Known Allergies    Intolerances        LABS:                        14.4   5.69  )-----------( 160      ( 04 Dec 2023 05:52 )             44.0     12-04    140  |  106  |  22  ----------------------------<  110<H>  3.9   |  26  |  0.98    Ca    9.2      04 Dec 2023 05:52  Phos  4.3     12-04  Mg     2.0     12-04    TPro  6.9  /  Alb  3.9  /  TBili  0.6  /  DBili  x   /  AST  18  /  ALT  19  /  AlkPhos  41  12-03    PT/INR - ( 03 Dec 2023 14:27 )   PT: 11.6 sec;   INR: 1.02          PTT - ( 03 Dec 2023 14:27 )  PTT:28.1 sec  Urinalysis Basic - ( 04 Dec 2023 05:52 )    Color: x / Appearance: x / SG: x / pH: x  Gluc: 110 mg/dL / Ketone: x  / Bili: x / Urobili: x   Blood: x / Protein: x / Nitrite: x   Leuk Esterase: x / RBC: x / WBC x   Sq Epi: x / Non Sq Epi: x / Bacteria: x      CAPILLARY BLOOD GLUCOSE      POCT Blood Glucose.: 118 mg/dL (05 Dec 2023 11:17)      RADIOLOGY & ADDITIONAL TESTS: Reviewed.

## 2023-12-05 NOTE — PROGRESS NOTE ADULT - ATTENDING COMMENTS
69 y/o M w PMH of former smoking, HLD, T2DM ( held Jardiance - Empaglifosin last dose 11/30, and metformin 12/2) , Kidney stones s/p ureteral stent, psoriasis on monthly injection from Dermatologist; hx CAD s/p PCI x2 stents ( LAD and Ramus)  in 3/2023 ( held Plavix last dose 12/1) presented to [facility] for evaluation of oral mass /non-healing oral ulceration and 3 months of jaw pain a/f OMFS mandibular resection and flap reconstruction on Thursday 12/7/23. Medicine consulted for pre-operative clearance    # Pre-operative clearance   # CAD s/p PCI x2 ( 3/2023)   - Cardiology consult appreciated   - Cardiac hx of PCI to LAD and Ramus in 2023, cardiologist at [facility] Dr. Jann Márquez   - TTE ( 12/4/23): normal LV size and fxn, mild LVH, RV mildly dilated w/ normal systolic fxn.  - EKG ( 12/3); SB, PVCs, no ST/T changes ( reviewed)  -CXR: no infiltrate or effusion   - No adverse reactions to anesthesia in the past in self or first-degree relatives. Hx of prior surgeries (stents, nephrostomy tubes) without adverse rxn to anesthesia or difficult extubation.   - Pre-op labs (CBC, CMP, PT, PTT, INR, T/S) Reviewed.   - METS ~4 (ambulates independently without cane/walker, can run up to 2 mile before stopping due to feeling tired, not CP/SOB)   - RCRI 2 points (Class I Risk) ~ 10.1 % for 30d risk of death, MI, or cardiac arrest.   - Noland score 0.1% for risk of MI or cardiac arrest, intraoperatively or up to 30d post-op.  - Patient deemed low-intermediate risk for intermediate risk surgery   - AC Per primary team.   - c/w aspirin 81mg po daily in the perioperative period  - Stent placement in 3/2023- plavix held since 12/1 ( when cleared by OMFS, will reload Plavix 300mg po followed by 75mg daily) up to total 12 months post-PCI since 3/2023)    - Metformin (last taken 12/2), Jardiance last taken 12/2  - Patient is on a monthly injection for psoriasis last taken 2 weeks ago, will f/u with his dermatologist Dr. Po Richardson regarding his psoriasis regimen    # T2DM  - A1C 6.3%  - FS/NISS, goal -180   - change to carbohydrate consistent diet ( no need for soft/bite size)     # jaw pain   # left buccal mucosa SCC   - plan for OR on Thursday 12/7:  composite mandibular resection, neck dissection and fibula free flap reconstruction.  - pain control   lidocaine 2% Viscous 15 milliLiter(s) Swish and Spit every 1 hour PRN for oral pain  ibuprofen  Tablet. 400 milliGRAM(s) Oral every 6 hours  oxyCODONE    IR 2.5 milliGRAM(s) Oral every 6 hours PRN Moderate Pain (4 - 6)  oxyCODONE    IR 5 milliGRAM(s) Oral every 6 hours PRN Severe Pain (7 - 10)  HYDROmorphone  Injectable 0.5 milliGRAM(s) IV Push every 4 hours PRN breakthrough, Severe Pain (7 - 10)    # VTE ppx; Enoxaparin 40mg sq daily     Medicine will continue to follow, thank you. 69 y/o M w PMH of former smoking, HLD, T2DM ( held Jardiance - Empaglifosin last dose 11/30, and metformin 12/2) , Kidney stones s/p ureteral stent, psoriasis on monthly injection from Dermatologist; hx CAD s/p PCI x2 stents ( LAD and Ramus)  in 3/2023 ( held Plavix last dose 12/1) presented to [facility] for evaluation of oral mass /non-healing oral ulceration and 3 months of jaw pain a/f OMFS mandibular resection and flap reconstruction on Thursday 12/7/23. Medicine consulted for pre-operative clearance    # Pre-operative clearance   # CAD s/p PCI x2 ( 3/2023)   - Cardiology consult appreciated   - Cardiac hx of PCI to LAD and Ramus in 2023, cardiologist at [facility] Dr. Jann Márquez   - TTE ( 12/4/23): normal LV size and fxn, mild LVH, RV mildly dilated w/ normal systolic fxn.  - EKG ( 12/3); SB, PVCs, no ST/T changes ( reviewed)  -CXR: no infiltrate or effusion   - No adverse reactions to anesthesia in the past in self or first-degree relatives. Hx of prior surgeries (stents, nephrostomy tubes) without adverse rxn to anesthesia or difficult extubation.   - Pre-op labs (CBC, CMP, PT, PTT, INR, T/S) Reviewed.   - METS ~4 (ambulates independently without cane/walker, can run up to 2 mile before stopping due to feeling tired, not CP/SOB)   - RCRI 2 points (Class I Risk) ~ 10.1 % for 30d risk of death, MI, or cardiac arrest.   - Noland score 0.1% for risk of MI or cardiac arrest, intraoperatively or up to 30d post-op.  - Patient deemed low-intermediate risk for intermediate risk surgery   - AC Per primary team.   - c/w aspirin 81mg po daily in the perioperative period  - Stent placement in 3/2023- plavix held since 12/1 ( when cleared by OMFS, will reload Plavix 300mg po followed by 75mg daily) up to total 12 months post-PCI since 3/2023)    - Metformin (last taken 12/2), Jardiance last taken 12/2  - Patient is on a monthly injection for psoriasis last taken 2 weeks ago, will f/u with his dermatologist Dr. Po Richardson regarding his psoriasis regimen    # T2DM  - A1C 6.3%  - FS/NISS, goal -180   - change to carbohydrate consistent diet ( no need for soft/bite size)     # jaw pain   # left buccal mucosa SCC   - plan for OR on Thursday 12/7:  composite mandibular resection, neck dissection and fibula free flap reconstruction.  - pain control   lidocaine 2% Viscous 15 milliLiter(s) Swish and Spit every 1 hour PRN for oral pain  ibuprofen  Tablet. 400 milliGRAM(s) Oral every 6 hours  oxyCODONE    IR 2.5 milliGRAM(s) Oral every 6 hours PRN Moderate Pain (4 - 6)  oxyCODONE    IR 5 milliGRAM(s) Oral every 6 hours PRN Severe Pain (7 - 10)  HYDROmorphone  Injectable 0.5 milliGRAM(s) IV Push every 4 hours PRN breakthrough, Severe Pain (7 - 10)    # VTE ppx; Enoxaparin 40mg sq daily     Medicine will continue to follow, thank you.

## 2023-12-05 NOTE — PROGRESS NOTE ADULT - ASSESSMENT
69 y/o M w PMH of ACS (x2 stents Mar 2023), HLD, T2DM, hx of kidney stones, psoriasis presents to [facility] for evaluation of oral mass and 3 months of jaw pain a/f OMFS mandibular resection and flap reconstruction on Thursday. Medicine consulted for pre-operative clearance.    #Pre-operative clearance  Cardiac hx includes 2x stents placement in 2023 to LAD and ramus, cardiologist at [facility] Dr. Jann Márquez. 30 pack year smoking hx, quit 1 year ago. Has been off of clopidogrel since 12/1, metformin since 12/2, and off jardiance since 12/2. EKG sinus bradycardia w/ PVC. TTE 12/4/23 normal LV size and fxn, mild LVH, RV mildly dilated w/ normal systolic fxn.   No adverse reactions to anesthesia in the past in self or first-degree relatives. Hx of prior surgeries (stents, nephrostomy tubes) without adverse rxn to anesthesia or difficult extubation.   - Pre-op labs (CBC, CMP, PT, PTT, INR, T/S) Reviewed.   - METS ~4 (ambulates independently without cane/walker, can run up to 2 mile before stopping due to feeling tired, not CP/SOB)   - RCRI 2 points (Class I Risk) ~ 10.1 % for 30d risk of death, MI, or cardiac arrest.   - Noland score 0.1% for risk of MI or cardiac arrest, intraoperatively or up to 30d post-op.  - Patient deemed low-intermediate risk for intermediate risk surgery   - AC Per primary team.   - c/w aspirin in the perioperative period per card rec  - Stent placement in 3/2023- plavix held since 12/1  - Metformin (last taken 12/2), Jardiance last taken 12/2  - recommend bowel regimen senna and miralax daily as patient receiving oxycodone for pain management  - Patient is on a monthly injection for psoriasis last taken 2 weeks ago, will f/u with his dermatologist Dr. Po Richardson regarding his psoriasis regimen  - pt on regular diet which he enjoys    #DM2  A1c 6.3%, takes home metformin and jardiance. FS   - c/w current diet, FS checks and ISS. Goal -180 while inpatient    Medicine will continue to follow. Patient seen, evaluated, and discussed with attending Dr. Montero. 67 y/o M w PMH of ACS (x2 stents Mar 2023), HLD, T2DM, hx of kidney stones, psoriasis presents to [facility] for evaluation of oral mass and 3 months of jaw pain a/f OMFS mandibular resection and flap reconstruction on Thursday. Medicine consulted for pre-operative clearance.    #Pre-operative clearance  Cardiac hx includes 2x stents placement in 2023 to LAD and ramus, cardiologist at [facility] Dr. Jann Márquez. 30 pack year smoking hx, quit 1 year ago. Has been off of clopidogrel since 12/1, metformin since 12/2, and off jardiance since 12/2. EKG sinus bradycardia w/ PVC. TTE 12/4/23 normal LV size and fxn, mild LVH, RV mildly dilated w/ normal systolic fxn.   No adverse reactions to anesthesia in the past in self or first-degree relatives. Hx of prior surgeries (stents, nephrostomy tubes) without adverse rxn to anesthesia or difficult extubation.   - Pre-op labs (CBC, CMP, PT, PTT, INR, T/S) Reviewed.   - METS ~4 (ambulates independently without cane/walker, can run up to 2 mile before stopping due to feeling tired, not CP/SOB)   - RCRI 2 points (Class I Risk) ~ 10.1 % for 30d risk of death, MI, or cardiac arrest.   - Noland score 0.1% for risk of MI or cardiac arrest, intraoperatively or up to 30d post-op.  - Patient deemed low-intermediate risk for intermediate risk surgery   - AC Per primary team.   - c/w aspirin in the perioperative period per card rec  - Stent placement in 3/2023- plavix held since 12/1  - Metformin (last taken 12/2), Jardiance last taken 12/2  - recommend bowel regimen senna and miralax daily as patient receiving oxycodone for pain management  - Patient is on a monthly injection for psoriasis last taken 2 weeks ago, will f/u with his dermatologist Dr. Po Richardson regarding his psoriasis regimen  - pt on regular diet which he enjoys    #DM2  A1c 6.3%, takes home metformin and jardiance. FS   - c/w current diet, FS checks and ISS. Goal -180 while inpatient    Medicine will continue to follow. Patient seen, evaluated, and discussed with attending Dr. Montero.

## 2023-12-06 ENCOUNTER — TRANSCRIPTION ENCOUNTER (OUTPATIENT)
Age: 68
End: 2023-12-06

## 2023-12-06 LAB
ANION GAP SERPL CALC-SCNC: 10 MMOL/L — SIGNIFICANT CHANGE UP (ref 5–17)
ANION GAP SERPL CALC-SCNC: 10 MMOL/L — SIGNIFICANT CHANGE UP (ref 5–17)
APTT BLD: 29.4 SEC — SIGNIFICANT CHANGE UP (ref 24.5–35.6)
APTT BLD: 29.4 SEC — SIGNIFICANT CHANGE UP (ref 24.5–35.6)
BLD GP AB SCN SERPL QL: NEGATIVE — SIGNIFICANT CHANGE UP
BLD GP AB SCN SERPL QL: NEGATIVE — SIGNIFICANT CHANGE UP
BUN SERPL-MCNC: 17 MG/DL — SIGNIFICANT CHANGE UP (ref 7–23)
BUN SERPL-MCNC: 17 MG/DL — SIGNIFICANT CHANGE UP (ref 7–23)
CALCIUM SERPL-MCNC: 9.1 MG/DL — SIGNIFICANT CHANGE UP (ref 8.4–10.5)
CALCIUM SERPL-MCNC: 9.1 MG/DL — SIGNIFICANT CHANGE UP (ref 8.4–10.5)
CHLORIDE SERPL-SCNC: 104 MMOL/L — SIGNIFICANT CHANGE UP (ref 96–108)
CHLORIDE SERPL-SCNC: 104 MMOL/L — SIGNIFICANT CHANGE UP (ref 96–108)
CO2 SERPL-SCNC: 27 MMOL/L — SIGNIFICANT CHANGE UP (ref 22–31)
CO2 SERPL-SCNC: 27 MMOL/L — SIGNIFICANT CHANGE UP (ref 22–31)
CREAT SERPL-MCNC: 1.01 MG/DL — SIGNIFICANT CHANGE UP (ref 0.5–1.3)
CREAT SERPL-MCNC: 1.01 MG/DL — SIGNIFICANT CHANGE UP (ref 0.5–1.3)
EGFR: 82 ML/MIN/1.73M2 — SIGNIFICANT CHANGE UP
EGFR: 82 ML/MIN/1.73M2 — SIGNIFICANT CHANGE UP
GLUCOSE BLDC GLUCOMTR-MCNC: 101 MG/DL — HIGH (ref 70–99)
GLUCOSE BLDC GLUCOMTR-MCNC: 84 MG/DL — SIGNIFICANT CHANGE UP (ref 70–99)
GLUCOSE BLDC GLUCOMTR-MCNC: 84 MG/DL — SIGNIFICANT CHANGE UP (ref 70–99)
GLUCOSE SERPL-MCNC: 108 MG/DL — HIGH (ref 70–99)
GLUCOSE SERPL-MCNC: 108 MG/DL — HIGH (ref 70–99)
HCT VFR BLD CALC: 40.8 % — SIGNIFICANT CHANGE UP (ref 39–50)
HCT VFR BLD CALC: 40.8 % — SIGNIFICANT CHANGE UP (ref 39–50)
HGB BLD-MCNC: 13.7 G/DL — SIGNIFICANT CHANGE UP (ref 13–17)
HGB BLD-MCNC: 13.7 G/DL — SIGNIFICANT CHANGE UP (ref 13–17)
INR BLD: 0.97 — SIGNIFICANT CHANGE UP (ref 0.85–1.18)
INR BLD: 0.97 — SIGNIFICANT CHANGE UP (ref 0.85–1.18)
MAGNESIUM SERPL-MCNC: 1.8 MG/DL — SIGNIFICANT CHANGE UP (ref 1.6–2.6)
MAGNESIUM SERPL-MCNC: 1.8 MG/DL — SIGNIFICANT CHANGE UP (ref 1.6–2.6)
MCHC RBC-ENTMCNC: 27.9 PG — SIGNIFICANT CHANGE UP (ref 27–34)
MCHC RBC-ENTMCNC: 27.9 PG — SIGNIFICANT CHANGE UP (ref 27–34)
MCHC RBC-ENTMCNC: 33.6 GM/DL — SIGNIFICANT CHANGE UP (ref 32–36)
MCHC RBC-ENTMCNC: 33.6 GM/DL — SIGNIFICANT CHANGE UP (ref 32–36)
MCV RBC AUTO: 83.1 FL — SIGNIFICANT CHANGE UP (ref 80–100)
MCV RBC AUTO: 83.1 FL — SIGNIFICANT CHANGE UP (ref 80–100)
NRBC # BLD: 0 /100 WBCS — SIGNIFICANT CHANGE UP (ref 0–0)
NRBC # BLD: 0 /100 WBCS — SIGNIFICANT CHANGE UP (ref 0–0)
PHOSPHATE SERPL-MCNC: 3.7 MG/DL — SIGNIFICANT CHANGE UP (ref 2.5–4.5)
PHOSPHATE SERPL-MCNC: 3.7 MG/DL — SIGNIFICANT CHANGE UP (ref 2.5–4.5)
PLATELET # BLD AUTO: 147 K/UL — LOW (ref 150–400)
PLATELET # BLD AUTO: 147 K/UL — LOW (ref 150–400)
POTASSIUM SERPL-MCNC: 3.6 MMOL/L — SIGNIFICANT CHANGE UP (ref 3.5–5.3)
POTASSIUM SERPL-MCNC: 3.6 MMOL/L — SIGNIFICANT CHANGE UP (ref 3.5–5.3)
POTASSIUM SERPL-SCNC: 3.6 MMOL/L — SIGNIFICANT CHANGE UP (ref 3.5–5.3)
POTASSIUM SERPL-SCNC: 3.6 MMOL/L — SIGNIFICANT CHANGE UP (ref 3.5–5.3)
PROTHROM AB SERPL-ACNC: 11.1 SEC — SIGNIFICANT CHANGE UP (ref 9.5–13)
PROTHROM AB SERPL-ACNC: 11.1 SEC — SIGNIFICANT CHANGE UP (ref 9.5–13)
RBC # BLD: 4.91 M/UL — SIGNIFICANT CHANGE UP (ref 4.2–5.8)
RBC # BLD: 4.91 M/UL — SIGNIFICANT CHANGE UP (ref 4.2–5.8)
RBC # FLD: 13.4 % — SIGNIFICANT CHANGE UP (ref 10.3–14.5)
RBC # FLD: 13.4 % — SIGNIFICANT CHANGE UP (ref 10.3–14.5)
RH IG SCN BLD-IMP: POSITIVE — SIGNIFICANT CHANGE UP
RH IG SCN BLD-IMP: POSITIVE — SIGNIFICANT CHANGE UP
SODIUM SERPL-SCNC: 141 MMOL/L — SIGNIFICANT CHANGE UP (ref 135–145)
SODIUM SERPL-SCNC: 141 MMOL/L — SIGNIFICANT CHANGE UP (ref 135–145)
WBC # BLD: 5.4 K/UL — SIGNIFICANT CHANGE UP (ref 3.8–10.5)
WBC # BLD: 5.4 K/UL — SIGNIFICANT CHANGE UP (ref 3.8–10.5)
WBC # FLD AUTO: 5.4 K/UL — SIGNIFICANT CHANGE UP (ref 3.8–10.5)
WBC # FLD AUTO: 5.4 K/UL — SIGNIFICANT CHANGE UP (ref 3.8–10.5)

## 2023-12-06 PROCEDURE — 99233 SBSQ HOSP IP/OBS HIGH 50: CPT | Mod: GC

## 2023-12-06 RX ORDER — SODIUM CHLORIDE 9 MG/ML
1000 INJECTION, SOLUTION INTRAVENOUS
Refills: 0 | Status: DISCONTINUED | OUTPATIENT
Start: 2023-12-06 | End: 2023-12-07

## 2023-12-06 RX ORDER — ACETAMINOPHEN 500 MG
1000 TABLET ORAL EVERY 6 HOURS
Refills: 0 | Status: DISCONTINUED | OUTPATIENT
Start: 2023-12-06 | End: 2023-12-07

## 2023-12-06 RX ADMIN — Medication 400 MILLIGRAM(S): at 16:50

## 2023-12-06 RX ADMIN — LIDOCAINE 15 MILLILITER(S): 4 CREAM TOPICAL at 22:09

## 2023-12-06 RX ADMIN — Medication 400 MILLIGRAM(S): at 16:49

## 2023-12-06 RX ADMIN — HYDROMORPHONE HYDROCHLORIDE 0.5 MILLIGRAM(S): 2 INJECTION INTRAMUSCULAR; INTRAVENOUS; SUBCUTANEOUS at 14:45

## 2023-12-06 RX ADMIN — Medication 400 MILLIGRAM(S): at 23:34

## 2023-12-06 RX ADMIN — Medication 400 MILLIGRAM(S): at 22:09

## 2023-12-06 RX ADMIN — OXYCODONE HYDROCHLORIDE 5 MILLIGRAM(S): 5 TABLET ORAL at 12:38

## 2023-12-06 RX ADMIN — OXYCODONE HYDROCHLORIDE 5 MILLIGRAM(S): 5 TABLET ORAL at 22:45

## 2023-12-06 RX ADMIN — Medication 400 MILLIGRAM(S): at 09:30

## 2023-12-06 RX ADMIN — OXYCODONE HYDROCHLORIDE 5 MILLIGRAM(S): 5 TABLET ORAL at 22:08

## 2023-12-06 RX ADMIN — Medication 400 MILLIGRAM(S): at 03:46

## 2023-12-06 RX ADMIN — ATORVASTATIN CALCIUM 40 MILLIGRAM(S): 80 TABLET, FILM COATED ORAL at 22:08

## 2023-12-06 RX ADMIN — LIDOCAINE 15 MILLILITER(S): 4 CREAM TOPICAL at 12:38

## 2023-12-06 RX ADMIN — OXYCODONE HYDROCHLORIDE 5 MILLIGRAM(S): 5 TABLET ORAL at 13:38

## 2023-12-06 RX ADMIN — HYDROMORPHONE HYDROCHLORIDE 0.5 MILLIGRAM(S): 2 INJECTION INTRAMUSCULAR; INTRAVENOUS; SUBCUTANEOUS at 14:06

## 2023-12-06 NOTE — PROGRESS NOTE ADULT - ASSESSMENT
67 y/o M w PMH of ACS (x2 stents Mar 2023), HLD, T2DM, hx of kidney stones, psoriasis presents to [facility] for evaluation of oral mass and 3 months of jaw pain a/f OMFS mandibular resection and flap reconstruction on Thursday. Medicine consulted for pre-operative clearance.    #Pre-operative clearance  Cardiac hx includes 2x stents placement in 2023 to LAD and ramus, cardiologist at [facility] Dr. Jann Márquez. 30 pack year smoking hx, quit 1 year ago. Has been off of clopidogrel since 12/1, metformin since 12/2, and off jardiance since 12/2. EKG sinus bradycardia w/ PVC. TTE 12/4/23 normal LV size and fxn, mild LVH, RV mildly dilated w/ normal systolic fxn.   No adverse reactions to anesthesia in the past in self or first-degree relatives. Hx of prior surgeries (stents, nephrostomy tubes) without adverse rxn to anesthesia or difficult extubation.   - Pre-op labs (CBC, CMP, PT, PTT, INR, T/S) Reviewed.   - METS ~4 (ambulates independently without cane/walker, can run up to 2 mile before stopping due to feeling tired, not CP/SOB)   - RCRI 2 points (Class I Risk) ~ 10.1 % for 30d risk of death, MI, or cardiac arrest.   - Noland score 0.1% for risk of MI or cardiac arrest, intraoperatively or up to 30d post-op.  - Patient deemed low-intermediate risk for intermediate risk surgery   - AC Per primary team.   - aspirin per primary team, pt was informed he was stopping aspirin  - Stent placement in 3/2023- plavix held since 12/1  - Metformin (last taken 12/2), Jardiance last taken 12/2  - recommend bowel regimen senna and miralax daily as patient receiving oxycodone for pain management  - Patient is on a monthly injection for psoriasis last taken 2 weeks ago, will f/u with his dermatologist Dr. Po Richardson regarding his psoriasis regimen  - pt on regular diet which he enjoys  - NPO midnight    #DM2  A1c 6.3%, takes home metformin and jardiance. FS   - c/w current diet, FS checks and ISS. Goal -180 while inpatient    Medicine will continue to follow. Patient seen, evaluated, and discussed with attending Dr. Montero. 67 y/o M w PMH of ACS (x2 stents Mar 2023), HLD, T2DM, hx of kidney stones, psoriasis presents to [facility] for evaluation of oral mass and 3 months of jaw pain a/f OMFS mandibular resection and flap reconstruction on Thursday. Medicine consulted for pre-operative clearance.    #Pre-operative clearance  Cardiac hx includes 2x stents placement in 2023 to LAD and ramus, cardiologist at [facility] Dr. Jann Márquez. 30 pack year smoking hx, quit 1 year ago. Has been off of clopidogrel since 12/1, metformin since 12/2, and off jardiance since 12/2. EKG sinus bradycardia w/ PVC. TTE 12/4/23 normal LV size and fxn, mild LVH, RV mildly dilated w/ normal systolic fxn.   No adverse reactions to anesthesia in the past in self or first-degree relatives. Hx of prior surgeries (stents, nephrostomy tubes) without adverse rxn to anesthesia or difficult extubation.   - Pre-op labs (CBC, CMP, PT, PTT, INR, T/S) Reviewed.   - METS ~4 (ambulates independently without cane/walker, can run up to 2 mile before stopping due to feeling tired, not CP/SOB)   - RCRI 2 points (Class I Risk) ~ 10.1 % for 30d risk of death, MI, or cardiac arrest.   - Noland score 0.1% for risk of MI or cardiac arrest, intraoperatively or up to 30d post-op.  - Patient deemed low-intermediate risk for intermediate risk surgery   - AC Per primary team.   - aspirin per primary team, pt was informed he was stopping aspirin  - Stent placement in 3/2023- plavix held since 12/1  - Metformin (last taken 12/2), Jardiance last taken 12/2  - recommend bowel regimen senna and miralax daily as patient receiving oxycodone for pain management  - Patient is on a monthly injection for psoriasis last taken 2 weeks ago, will f/u with his dermatologist Dr. Po Richardson regarding his psoriasis regimen  - pt on regular diet which he enjoys  - NPO midnight    #DM2  A1c 6.3%, takes home metformin and jardiance. FS   - c/w current diet, FS checks and ISS. Goal -180 while inpatient    Medicine will continue to follow. Patient seen, evaluated, and discussed with attending Dr. Montero.

## 2023-12-06 NOTE — PROGRESS NOTE ADULT - ATTENDING COMMENTS
69 y/o M w PMH of former smoking, HLD, T2DM ( held Jardiance - Empaglifosin last dose 11/30, and metformin 12/2) , Kidney stones s/p ureteral stent, psoriasis on monthly injection from Dermatologist; hx CAD s/p PCI x2 stents ( LAD and Ramus)  in 3/2023 ( held Plavix last dose 12/1) presented to [facility] for evaluation of oral mass /non-healing oral ulceration and 3 months of jaw pain a/f OMFS mandibular resection and flap reconstruction on Thursday 12/7/23. Medicine consulted for pre-operative clearance    # Pre-operative clearance   # CAD s/p PCI x2 ( 3/2023)   - Cardiology consult appreciated   - Cardiac hx of PCI to LAD and Ramus in 2023, cardiologist at [facility] Dr. Jann Márquez   - TTE ( 12/4/23): normal LV size and fxn, mild LVH, RV mildly dilated w/ normal systolic fxn.  - EKG ( 12/3); SB, PVCs, no ST/T changes ( reviewed)  -CXR: no infiltrate or effusion   - No adverse reactions to anesthesia in the past in self or first-degree relatives. Hx of prior surgeries (stents, nephrostomy tubes) without adverse rxn to anesthesia or difficult extubation.   - Pre-op labs (CBC, CMP, PT, PTT, INR, T/S) Reviewed.   - METS ~4 (ambulates independently without cane/walker, can run up to 2 mile before stopping due to feeling tired, not CP/SOB)   - RCRI 2 points (Class I Risk) ~ 10.1 % for 30d risk of death, MI, or cardiac arrest.   - Noland score 0.1% for risk of MI or cardiac arrest, intraoperatively or up to 30d post-op.  - Patient deemed low-intermediate risk for intermediate risk surgery   - AC Per primary team.   - c/w aspirin 81mg po daily in the perioperative period ( received dose of ASA 81mg today 12/6)   - Stent placement in 3/2023- plavix held since 12/1 ( when cleared by OMFS, will reload Plavix 300mg po followed by 75mg daily) up to total 12 months post-PCI since 3/2023)    - Metformin (last taken 12/2), Jardiance last taken 12/2  - Patient is on a monthly injection for psoriasis last taken 2 weeks ago, will f/u with his dermatologist Dr. Po Richardson regarding his psoriasis regimen    # T2DM  - A1C 6.3%  - FS/NISS, goal -180   - change to carbohydrate consistent diet ( no need for soft/bite size)   - NPO except meds after MN for OR tomorrow am     # jaw pain   # left buccal mucosa SCC   - plan for OR on Thursday 12/7:  composite mandibular resection, neck dissection and fibula free flap reconstruction.  - pain control   lidocaine 2% Viscous 15 milliLiter(s) Swish and Spit every 1 hour PRN for oral pain  ibuprofen  Tablet. 400 milliGRAM(s) Oral every 6 hours  oxyCODONE    IR 2.5 milliGRAM(s) Oral every 6 hours PRN Moderate Pain (4 - 6)  oxyCODONE    IR 5 milliGRAM(s) Oral every 6 hours PRN Severe Pain (7 - 10)  HYDROmorphone  Injectable 0.5 milliGRAM(s) IV Push every 4 hours PRN breakthrough, Severe Pain (7 - 10)    # VTE ppx; Enoxaparin 40mg sq daily     Medicine will continue to follow, thank you. 69 y/o M w PMH of former smoking, HLD, T2DM ( held Jardiance - Empaglifosin last dose 11/30, and metformin 12/2) , Kidney stones s/p ureteral stent, psoriasis on monthly injection from Dermatologist; hx CAD s/p PCI x2 stents ( LAD and Ramus)  in 3/2023 ( held Plavix last dose 12/1) presented to [facility] for evaluation of oral mass /non-healing oral ulceration and 3 months of jaw pain a/f OMFS mandibular resection and flap reconstruction on Thursday 12/7/23. Medicine consulted for pre-operative clearance    # Pre-operative clearance   # CAD s/p PCI x2 ( 3/2023)   - Cardiology consult appreciated   - Cardiac hx of PCI to LAD and Ramus in 2023, cardiologist at [facility] Dr. Jann Márquez   - TTE ( 12/4/23): normal LV size and fxn, mild LVH, RV mildly dilated w/ normal systolic fxn.  - EKG ( 12/3); SB, PVCs, no ST/T changes ( reviewed)  -CXR: no infiltrate or effusion   - No adverse reactions to anesthesia in the past in self or first-degree relatives. Hx of prior surgeries (stents, nephrostomy tubes) without adverse rxn to anesthesia or difficult extubation.   - Pre-op labs (CBC, CMP, PT, PTT, INR, T/S) Reviewed.   - METS ~4 (ambulates independently without cane/walker, can run up to 2 mile before stopping due to feeling tired, not CP/SOB)   - RCRI 2 points (Class I Risk) ~ 10.1 % for 30d risk of death, MI, or cardiac arrest.   - Noland score 0.1% for risk of MI or cardiac arrest, intraoperatively or up to 30d post-op.  - Patient deemed low-intermediate risk for intermediate risk surgery   - AC Per primary team.   - c/w aspirin 81mg po daily in the perioperative period ( received dose of ASA 81mg today 12/6)   - Stent placement in 3/2023- plavix held since 12/1 ( when cleared by OMFS, will reload Plavix 300mg po followed by 75mg daily) up to total 12 months post-PCI since 3/2023)    - Metformin (last taken 12/2), Jardiance last taken 12/2  - Patient is on a monthly injection for psoriasis last taken 2 weeks ago, will f/u with his dermatologist Dr. Po Richardson regarding his psoriasis regimen    # T2DM  - A1C 6.3%  - FS/NISS, goal -180   - change to carbohydrate consistent diet ( no need for soft/bite size)   - NPO except meds after MN for OR tomorrow am     # jaw pain   # left buccal mucosa SCC   - plan for OR on Thursday 12/7:  composite mandibular resection, neck dissection and fibula free flap reconstruction.  - pain control   lidocaine 2% Viscous 15 milliLiter(s) Swish and Spit every 1 hour PRN for oral pain  ibuprofen  Tablet. 400 milliGRAM(s) Oral every 6 hours  oxyCODONE    IR 2.5 milliGRAM(s) Oral every 6 hours PRN Moderate Pain (4 - 6)  oxyCODONE    IR 5 milliGRAM(s) Oral every 6 hours PRN Severe Pain (7 - 10)  HYDROmorphone  Injectable 0.5 milliGRAM(s) IV Push every 4 hours PRN breakthrough, Severe Pain (7 - 10)    # VTE ppx; Enoxaparin 40mg sq daily     Medicine will continue to follow, thank you.

## 2023-12-06 NOTE — PROGRESS NOTE ADULT - ASSESSMENT
Assessment and Plan:  SAUNDRA HOLMAN is a  68yMale with PMH CAD (x2 stents Mar 2023), HLD, T2DM, hx of kidney stones, psoriasis w/ left buccal mucosa SCC presents for pre-optimization prior to composite mandibular resection, neck dissection and fibula free flap reconstruction.    Plan:  -Medicine consulted for pre-operative optimization, appreciate recommendations  -Cardiology consulted for pre-operative risk stratification and recommendations. TTE obtained  -Soft, easy to chew diet  -Multimodal pain control  -Monitor POC gluc, ISS  -Bowel regimen  -LVX DVT ppx  -NPO at midnight  -OR 12/7  -Hold [id number] today

## 2023-12-06 NOTE — PROGRESS NOTE ADULT - SUBJECTIVE AND OBJECTIVE BOX
OTOLARYNGOLOGY (ENT) PROGRESS NOTE    PATIENT: SAUNDRA HOLMAN  MRN: [id number]  : 57  [id number]  DATE OF SERVICE:  23  			         ID:SAUNDRA HOLMAN is a  68yMale with PMH of CAD (x2 stents Mar 2023), HLD, T2DM, hx of kidney stones, psoriasis, buccal SCC presents for medical preoptimization for composite mandibular resection, neck dissection, and fibula free flap reconstruction scheduled for Thursday this week.    Subjective/ Interval: Patient seen and examined at bedside this morning. AFVSS overnight. Medical workup complete. Patient tolerating regular diet at this time. No active complaints, ready for OR tomorrow.       ALLERGIES:  No Known Allergies      MEDICATIONS:  Antiinfectives:     IV fluids:  dextrose 5%. 1000 milliLiter(s) IV Continuous <Continuous>  dextrose 5%. 1000 milliLiter(s) IV Continuous <Continuous>    Hematologic/Anticoagulation:  enoxaparin Injectable 40 milliGRAM(s) SubCutaneous every 24 hours    Pain medications/Neuro:  HYDROmorphone  Injectable 0.5 milliGRAM(s) IV Push every 4 hours PRN  ibuprofen  Tablet. 400 milliGRAM(s) Oral every 6 hours  oxyCODONE    IR 5 milliGRAM(s) Oral every 6 hours PRN  oxyCODONE    IR 2.5 milliGRAM(s) Oral every 6 hours PRN    Endocrine Medications:   atorvastatin 40 milliGRAM(s) Oral at bedtime  dextrose 50% Injectable 25 Gram(s) IV Push once  dextrose 50% Injectable 12.5 Gram(s) IV Push once  dextrose 50% Injectable 25 Gram(s) IV Push once  dextrose Oral Gel 15 Gram(s) Oral once PRN  glucagon  Injectable 1 milliGRAM(s) IntraMuscular once  insulin lispro (ADMELOG) corrective regimen sliding scale   SubCutaneous Before meals and at bedtime    All other standing medications:   influenza  Vaccine (HIGH DOSE) 0.7 milliLiter(s) IntraMuscular once  polyethylene glycol 3350 17 Gram(s) Oral at bedtime  senna 2 Tablet(s) Oral at bedtime    All other PRN medications:  lidocaine 2% Viscous 15 milliLiter(s) Swish and Spit every 1 hour PRN    Vital Signs Last 24 Hrs  T(C): 36.8 (06 Dec 2023 04:59), Max: 36.9 (05 Dec 2023 13:31)  T(F): 98.2 (06 Dec 2023 04:59), Max: 98.4 (05 Dec 2023 13:31)  HR: 51 (06 Dec 2023 04:59) (48 - 56)  BP: 132/69 (06 Dec 2023 04:59) (122/65 - 146/72)  BP(mean): --  RR: 18 (06 Dec 2023 04:59) (16 - 18)  SpO2: 97% (06 Dec 2023 04:59) (95% - 98%)    Parameters below as of 06 Dec 2023 04:59  Patient On (Oxygen Delivery Method): room air           @ 07:01  -   @ 07:00  --------------------------------------------------------  IN:    Oral Fluid: 710 mL  Total IN: 710 mL    OUT:    Voided (mL): 850 mL  Total OUT: 850 mL    Total NET: -140 mL                  PHYSICAL EXAM:  GENERAL: NAD, lying in bed comfortably  HEAD:  Atraumatic, Normocephalic  EYES: EOMI, PERRLA, conjunctiva and sclera clear  ENT:Left buccal mass extending to mandibular mucosa. Fixed. ulcerated intraoral mucosa  NECK: Left Level I fixed node  CHEST/LUNG: No stridor, no increased work of breathing on room air  HEART: Regular rate and rhythm  ABDOMEN: BSx4; Soft, nontender, nondistended  EXTREMITIES:  2+ Peripheral Pulses, brisk capillary refill. No clubbing, cyanosis, or edema  NERVOUS SYSTEM:  A&Ox3, no focal deficits   SKIN: No rashes or lesions      LABS                       13.7   5.40  )-----------( 147      ( 06 Dec 2023 05:30 )             40.8    12-    141  |  104  |  17  ----------------------------<  108<H>  3.6   |  27  |  1.01    Ca    9.1      06 Dec 2023 05:30  Phos  3.7     12  Mg     1.8     12           Coagulation Studies-   PT/INR - ( 06 Dec 2023 05:30 )   PT: 11.1 sec;   INR: 0.97          PTT - ( 06 Dec 2023 05:30 )  PTT:29.4 sec  Urinalysis Basic - ( 06 Dec 2023 05:30 )    Color: x / Appearance: x / SG: x / pH: x  Gluc: 108 mg/dL / Ketone: x  / Bili: x / Urobili: x   Blood: x / Protein: x / Nitrite: x   Leuk Esterase: x / RBC: x / WBC x   Sq Epi: x / Non Sq Epi: x / Bacteria: x      Endocrine Panel-  Calcium: 9.1 mg/dL ( @ 05:30)                MICROBIOLOGY:        RADIOLOGY & ADDITIONAL STUDIES:     OTOLARYNGOLOGY (ENT) PROGRESS NOTE    PATIENT: SAUNDRA HOLMAN  MRN: [id number]  : 57  [id number]  DATE OF SERVICE:  23  			         ID:SAUNDRA HOLMAN is a  68yMale with PMH of CAD (x2 stents Mar 2023), HLD, T2DM, hx of kidney stones, psoriasis, buccal SCC presents for medical preoptimization for composite mandibular resection, neck dissection, and fibula free flap reconstruction scheduled for Thursday this week.    Subjective/ Interval: Patient seen and examined at bedside this morning. AFVSS overnight. Medical workup complete. Patient tolerating regular diet at this time. No active complaints, ready for OR tomorrow.       ALLERGIES:  No Known Allergies      MEDICATIONS:  Antiinfectives:     IV fluids:  dextrose 5%. 1000 milliLiter(s) IV Continuous <Continuous>  dextrose 5%. 1000 milliLiter(s) IV Continuous <Continuous>    Hematologic/Anticoagulation:  enoxaparin Injectable 40 milliGRAM(s) SubCutaneous every 24 hours    Pain medications/Neuro:  HYDROmorphone  Injectable 0.5 milliGRAM(s) IV Push every 4 hours PRN  ibuprofen  Tablet. 400 milliGRAM(s) Oral every 6 hours  oxyCODONE    IR 5 milliGRAM(s) Oral every 6 hours PRN  oxyCODONE    IR 2.5 milliGRAM(s) Oral every 6 hours PRN    Endocrine Medications:   atorvastatin 40 milliGRAM(s) Oral at bedtime  dextrose 50% Injectable 25 Gram(s) IV Push once  dextrose 50% Injectable 12.5 Gram(s) IV Push once  dextrose 50% Injectable 25 Gram(s) IV Push once  dextrose Oral Gel 15 Gram(s) Oral once PRN  glucagon  Injectable 1 milliGRAM(s) IntraMuscular once  insulin lispro (ADMELOG) corrective regimen sliding scale   SubCutaneous Before meals and at bedtime    All other standing medications:   influenza  Vaccine (HIGH DOSE) 0.7 milliLiter(s) IntraMuscular once  polyethylene glycol 3350 17 Gram(s) Oral at bedtime  senna 2 Tablet(s) Oral at bedtime    All other PRN medications:  lidocaine 2% Viscous 15 milliLiter(s) Swish and Spit every 1 hour PRN    Vital Signs Last 24 Hrs  T(C): 36.8 (06 Dec 2023 04:59), Max: 36.9 (05 Dec 2023 13:31)  T(F): 98.2 (06 Dec 2023 04:59), Max: 98.4 (05 Dec 2023 13:31)  HR: 51 (06 Dec 2023 04:59) (48 - 56)  BP: 132/69 (06 Dec 2023 04:59) (122/65 - 146/72)  BP(mean): --  RR: 18 (06 Dec 2023 04:59) (16 - 18)  SpO2: 97% (06 Dec 2023 04:59) (95% - 98%)    Parameters below as of 06 Dec 2023 04:59  Patient On (Oxygen Delivery Method): room air           @ 07:01  -   @ 07:00  --------------------------------------------------------  IN:    Oral Fluid: 710 mL  Total IN: 710 mL    OUT:    Voided (mL): 850 mL  Total OUT: 850 mL    Total NET: -140 mL                  PHYSICAL EXAM:  GENERAL: NAD, lying in bed comfortably  HEAD:  Atraumatic, Normocephalic  EYES: EOMI, PERRLA, conjunctiva and sclera clear  ENT:Left buccal mass extending to mandibular mucosa. Fixed. ulcerated intraoral mucosa  NECK: Left Level I fixed node  CHEST/LUNG: No stridor, no increased work of breathing on room air  HEART: Regular rate and rhythm  ABDOMEN: BSx4; Soft, nontender, nondistended  EXTREMITIES:  2+ Peripheral Pulses, brisk capillary refill. No clubbing, cyanosis, or edema  NERVOUS SYSTEM:  A&Ox3, no focal deficits   SKIN: No rashes or lesions      LABS                       13.7   5.40  )-----------( 147      ( 06 Dec 2023 05:30 )             40.8    12-    141  |  104  |  17  ----------------------------<  108<H>  3.6   |  27  |  1.01    Ca    9.1      06 Dec 2023 05:30  Phos  3.7     12  Mg     1.8     12           Coagulation Studies-   PT/INR - ( 06 Dec 2023 05:30 )   PT: 11.1 sec;   INR: 0.97          PTT - ( 06 Dec 2023 05:30 )  PTT:29.4 sec  Urinalysis Basic - ( 06 Dec 2023 05:30 )    Color: x / Appearance: x / SG: x / pH: x  Gluc: 108 mg/dL / Ketone: x  / Bili: x / Urobili: x   Blood: x / Protein: x / Nitrite: x   Leuk Esterase: x / RBC: x / WBC x   Sq Epi: x / Non Sq Epi: x / Bacteria: x      Endocrine Panel-  Calcium: 9.1 mg/dL ( @ 05:30)                MICROBIOLOGY:        RADIOLOGY & ADDITIONAL STUDIES:

## 2023-12-06 NOTE — PROGRESS NOTE ADULT - SUBJECTIVE AND OBJECTIVE BOX
OVERNIGHT EVENTS: No overnight event.    SUBJECTIVE / INTERVAL HPI: Patient seen and examined at bedside. Pt's wife at bedside. Pt reports being unhappy with delay of nurse getting him his pain medication. Is wondering if he would be receiving a tracheostomy from the procedure. Otherwise, no new complaint.    VITAL SIGNS:  Vital Signs Last 24 Hrs  T(C): 36.7 (06 Dec 2023 09:36), Max: 36.9 (05 Dec 2023 13:31)  T(F): 98.1 (06 Dec 2023 09:36), Max: 98.4 (05 Dec 2023 13:31)  HR: 57 (06 Dec 2023 09:36) (48 - 57)  BP: 143/78 (06 Dec 2023 09:36) (122/65 - 146/72)  BP(mean): --  RR: 18 (06 Dec 2023 09:36) (16 - 18)  SpO2: 97% (06 Dec 2023 09:36) (96% - 98%)    Parameters below as of 06 Dec 2023 09:36  Patient On (Oxygen Delivery Method): room air        PHYSICAL EXAM:    General: Well developed, well nourished, no acute distress  HEENT: left buccal mass. Intraoral ulceration visible in left mouth  Cardiovascular: +S1/S2, RRR, no murmurs, rubs, gallops  Respiratory: CTA B/L; no W/R/R  Gastrointestinal: soft, NT/ND; +BSx4  Extremities: WWP; no edema, clubbing or cyanosis  Neurological: AAOx3; no focal deficits    MEDICATIONS:  MEDICATIONS  (STANDING):  atorvastatin 40 milliGRAM(s) Oral at bedtime  dextrose 5%. 1000 milliLiter(s) (50 mL/Hr) IV Continuous <Continuous>  dextrose 5%. 1000 milliLiter(s) (100 mL/Hr) IV Continuous <Continuous>  dextrose 50% Injectable 25 Gram(s) IV Push once  dextrose 50% Injectable 25 Gram(s) IV Push once  dextrose 50% Injectable 12.5 Gram(s) IV Push once  enoxaparin Injectable 40 milliGRAM(s) SubCutaneous every 24 hours  glucagon  Injectable 1 milliGRAM(s) IntraMuscular once  ibuprofen  Tablet. 400 milliGRAM(s) Oral every 6 hours  influenza  Vaccine (HIGH DOSE) 0.7 milliLiter(s) IntraMuscular once  insulin lispro (ADMELOG) corrective regimen sliding scale   SubCutaneous Before meals and at bedtime  polyethylene glycol 3350 17 Gram(s) Oral at bedtime  senna 2 Tablet(s) Oral at bedtime    MEDICATIONS  (PRN):  dextrose Oral Gel 15 Gram(s) Oral once PRN Blood Glucose LESS THAN 70 milliGRAM(s)/deciliter  HYDROmorphone  Injectable 0.5 milliGRAM(s) IV Push every 4 hours PRN breakthrough, Severe Pain (7 - 10)  lidocaine 2% Viscous 15 milliLiter(s) Swish and Spit every 1 hour PRN for oral pain  oxyCODONE    IR 2.5 milliGRAM(s) Oral every 6 hours PRN Moderate Pain (4 - 6)  oxyCODONE    IR 5 milliGRAM(s) Oral every 6 hours PRN Severe Pain (7 - 10)      ALLERGIES:  Allergies    No Known Allergies    Intolerances        LABS:                        13.7   5.40  )-----------( 147      ( 06 Dec 2023 05:30 )             40.8     12-06    141  |  104  |  17  ----------------------------<  108<H>  3.6   |  27  |  1.01    Ca    9.1      06 Dec 2023 05:30  Phos  3.7     12-06  Mg     1.8     12-06      PT/INR - ( 06 Dec 2023 05:30 )   PT: 11.1 sec;   INR: 0.97          PTT - ( 06 Dec 2023 05:30 )  PTT:29.4 sec  Urinalysis Basic - ( 06 Dec 2023 05:30 )    Color: x / Appearance: x / SG: x / pH: x  Gluc: 108 mg/dL / Ketone: x  / Bili: x / Urobili: x   Blood: x / Protein: x / Nitrite: x   Leuk Esterase: x / RBC: x / WBC x   Sq Epi: x / Non Sq Epi: x / Bacteria: x      CAPILLARY BLOOD GLUCOSE      POCT Blood Glucose.: 101 mg/dL (06 Dec 2023 12:05)      RADIOLOGY & ADDITIONAL TESTS: Reviewed.

## 2023-12-07 ENCOUNTER — TRANSCRIPTION ENCOUNTER (OUTPATIENT)
Age: 68
End: 2023-12-07

## 2023-12-07 ENCOUNTER — APPOINTMENT (OUTPATIENT)
Dept: OTOLARYNGOLOGY | Facility: HOSPITAL | Age: 68
End: 2023-12-07

## 2023-12-07 ENCOUNTER — RESULT REVIEW (OUTPATIENT)
Age: 68
End: 2023-12-07

## 2023-12-07 LAB
ALBUMIN SERPL ELPH-MCNC: 3.8 G/DL — SIGNIFICANT CHANGE UP (ref 3.3–5)
ALBUMIN SERPL ELPH-MCNC: 3.8 G/DL — SIGNIFICANT CHANGE UP (ref 3.3–5)
ALP SERPL-CCNC: 27 U/L — LOW (ref 40–120)
ALP SERPL-CCNC: 27 U/L — LOW (ref 40–120)
ALT FLD-CCNC: 17 U/L — SIGNIFICANT CHANGE UP (ref 10–45)
ALT FLD-CCNC: 17 U/L — SIGNIFICANT CHANGE UP (ref 10–45)
ANION GAP SERPL CALC-SCNC: 11 MMOL/L — SIGNIFICANT CHANGE UP (ref 5–17)
ANION GAP SERPL CALC-SCNC: 11 MMOL/L — SIGNIFICANT CHANGE UP (ref 5–17)
ANION GAP SERPL CALC-SCNC: 13 MMOL/L — SIGNIFICANT CHANGE UP (ref 5–17)
ANION GAP SERPL CALC-SCNC: 13 MMOL/L — SIGNIFICANT CHANGE UP (ref 5–17)
APTT BLD: 24.8 SEC — SIGNIFICANT CHANGE UP (ref 24.5–35.6)
APTT BLD: 24.8 SEC — SIGNIFICANT CHANGE UP (ref 24.5–35.6)
APTT BLD: 29.4 SEC — SIGNIFICANT CHANGE UP (ref 24.5–35.6)
APTT BLD: 29.4 SEC — SIGNIFICANT CHANGE UP (ref 24.5–35.6)
AST SERPL-CCNC: 28 U/L — SIGNIFICANT CHANGE UP (ref 10–40)
AST SERPL-CCNC: 28 U/L — SIGNIFICANT CHANGE UP (ref 10–40)
BASE EXCESS BLDA CALC-SCNC: -0.8 MMOL/L — SIGNIFICANT CHANGE UP (ref -2–3)
BASE EXCESS BLDA CALC-SCNC: -0.8 MMOL/L — SIGNIFICANT CHANGE UP (ref -2–3)
BASE EXCESS BLDA CALC-SCNC: -2.1 MMOL/L — LOW (ref -2–3)
BASE EXCESS BLDA CALC-SCNC: -2.1 MMOL/L — LOW (ref -2–3)
BASOPHILS # BLD AUTO: 0.02 K/UL — SIGNIFICANT CHANGE UP (ref 0–0.2)
BASOPHILS # BLD AUTO: 0.02 K/UL — SIGNIFICANT CHANGE UP (ref 0–0.2)
BASOPHILS NFR BLD AUTO: 0.2 % — SIGNIFICANT CHANGE UP (ref 0–2)
BASOPHILS NFR BLD AUTO: 0.2 % — SIGNIFICANT CHANGE UP (ref 0–2)
BILIRUB SERPL-MCNC: 0.8 MG/DL — SIGNIFICANT CHANGE UP (ref 0.2–1.2)
BILIRUB SERPL-MCNC: 0.8 MG/DL — SIGNIFICANT CHANGE UP (ref 0.2–1.2)
BUN SERPL-MCNC: 10 MG/DL — SIGNIFICANT CHANGE UP (ref 7–23)
BUN SERPL-MCNC: 10 MG/DL — SIGNIFICANT CHANGE UP (ref 7–23)
BUN SERPL-MCNC: 15 MG/DL — SIGNIFICANT CHANGE UP (ref 7–23)
BUN SERPL-MCNC: 15 MG/DL — SIGNIFICANT CHANGE UP (ref 7–23)
CA-I BLDA-SCNC: 1.18 MMOL/L — SIGNIFICANT CHANGE UP (ref 1.15–1.33)
CA-I BLDA-SCNC: 1.18 MMOL/L — SIGNIFICANT CHANGE UP (ref 1.15–1.33)
CA-I BLDA-SCNC: 1.22 MMOL/L — SIGNIFICANT CHANGE UP (ref 1.15–1.33)
CA-I BLDA-SCNC: 1.22 MMOL/L — SIGNIFICANT CHANGE UP (ref 1.15–1.33)
CALCIUM SERPL-MCNC: 8.8 MG/DL — SIGNIFICANT CHANGE UP (ref 8.4–10.5)
CALCIUM SERPL-MCNC: 8.8 MG/DL — SIGNIFICANT CHANGE UP (ref 8.4–10.5)
CALCIUM SERPL-MCNC: 9.6 MG/DL — SIGNIFICANT CHANGE UP (ref 8.4–10.5)
CALCIUM SERPL-MCNC: 9.6 MG/DL — SIGNIFICANT CHANGE UP (ref 8.4–10.5)
CHLORIDE SERPL-SCNC: 105 MMOL/L — SIGNIFICANT CHANGE UP (ref 96–108)
CO2 BLDA-SCNC: 23 MMOL/L — SIGNIFICANT CHANGE UP (ref 19–24)
CO2 BLDA-SCNC: 23 MMOL/L — SIGNIFICANT CHANGE UP (ref 19–24)
CO2 BLDA-SCNC: 24 MMOL/L — SIGNIFICANT CHANGE UP (ref 19–24)
CO2 BLDA-SCNC: 24 MMOL/L — SIGNIFICANT CHANGE UP (ref 19–24)
CO2 SERPL-SCNC: 21 MMOL/L — LOW (ref 22–31)
CO2 SERPL-SCNC: 21 MMOL/L — LOW (ref 22–31)
CO2 SERPL-SCNC: 24 MMOL/L — SIGNIFICANT CHANGE UP (ref 22–31)
CO2 SERPL-SCNC: 24 MMOL/L — SIGNIFICANT CHANGE UP (ref 22–31)
COHGB MFR BLDA: 1.1 % — SIGNIFICANT CHANGE UP
COHGB MFR BLDA: 1.1 % — SIGNIFICANT CHANGE UP
COHGB MFR BLDA: 1.4 % — SIGNIFICANT CHANGE UP
COHGB MFR BLDA: 1.4 % — SIGNIFICANT CHANGE UP
CREAT SERPL-MCNC: 0.69 MG/DL — SIGNIFICANT CHANGE UP (ref 0.5–1.3)
CREAT SERPL-MCNC: 0.69 MG/DL — SIGNIFICANT CHANGE UP (ref 0.5–1.3)
CREAT SERPL-MCNC: 0.99 MG/DL — SIGNIFICANT CHANGE UP (ref 0.5–1.3)
CREAT SERPL-MCNC: 0.99 MG/DL — SIGNIFICANT CHANGE UP (ref 0.5–1.3)
EGFR: 101 ML/MIN/1.73M2 — SIGNIFICANT CHANGE UP
EGFR: 101 ML/MIN/1.73M2 — SIGNIFICANT CHANGE UP
EGFR: 83 ML/MIN/1.73M2 — SIGNIFICANT CHANGE UP
EGFR: 83 ML/MIN/1.73M2 — SIGNIFICANT CHANGE UP
EOSINOPHIL # BLD AUTO: 0 K/UL — SIGNIFICANT CHANGE UP (ref 0–0.5)
EOSINOPHIL # BLD AUTO: 0 K/UL — SIGNIFICANT CHANGE UP (ref 0–0.5)
EOSINOPHIL NFR BLD AUTO: 0 % — SIGNIFICANT CHANGE UP (ref 0–6)
EOSINOPHIL NFR BLD AUTO: 0 % — SIGNIFICANT CHANGE UP (ref 0–6)
GLUCOSE BLDA-MCNC: 153 MG/DL — HIGH (ref 70–99)
GLUCOSE BLDA-MCNC: 153 MG/DL — HIGH (ref 70–99)
GLUCOSE BLDA-MCNC: 164 MG/DL — HIGH (ref 70–99)
GLUCOSE BLDA-MCNC: 164 MG/DL — HIGH (ref 70–99)
GLUCOSE BLDC GLUCOMTR-MCNC: 97 MG/DL — SIGNIFICANT CHANGE UP (ref 70–99)
GLUCOSE BLDC GLUCOMTR-MCNC: 97 MG/DL — SIGNIFICANT CHANGE UP (ref 70–99)
GLUCOSE SERPL-MCNC: 111 MG/DL — HIGH (ref 70–99)
GLUCOSE SERPL-MCNC: 111 MG/DL — HIGH (ref 70–99)
GLUCOSE SERPL-MCNC: 176 MG/DL — HIGH (ref 70–99)
GLUCOSE SERPL-MCNC: 176 MG/DL — HIGH (ref 70–99)
HCO3 BLDA-SCNC: 22 MMOL/L — SIGNIFICANT CHANGE UP (ref 21–28)
HCO3 BLDA-SCNC: 22 MMOL/L — SIGNIFICANT CHANGE UP (ref 21–28)
HCO3 BLDA-SCNC: 23 MMOL/L — SIGNIFICANT CHANGE UP (ref 21–28)
HCO3 BLDA-SCNC: 23 MMOL/L — SIGNIFICANT CHANGE UP (ref 21–28)
HCT VFR BLD CALC: 31.4 % — LOW (ref 39–50)
HCT VFR BLD CALC: 31.4 % — LOW (ref 39–50)
HCT VFR BLD CALC: 45.1 % — SIGNIFICANT CHANGE UP (ref 39–50)
HCT VFR BLD CALC: 45.1 % — SIGNIFICANT CHANGE UP (ref 39–50)
HGB BLD-MCNC: 10.5 G/DL — LOW (ref 13–17)
HGB BLD-MCNC: 10.5 G/DL — LOW (ref 13–17)
HGB BLD-MCNC: 14.8 G/DL — SIGNIFICANT CHANGE UP (ref 13–17)
HGB BLD-MCNC: 14.8 G/DL — SIGNIFICANT CHANGE UP (ref 13–17)
HGB BLDA-MCNC: 10.9 G/DL — LOW (ref 12.6–17.4)
HGB BLDA-MCNC: 10.9 G/DL — LOW (ref 12.6–17.4)
HGB BLDA-MCNC: 12.4 G/DL — LOW (ref 12.6–17.4)
HGB BLDA-MCNC: 12.4 G/DL — LOW (ref 12.6–17.4)
IMM GRANULOCYTES NFR BLD AUTO: 0.3 % — SIGNIFICANT CHANGE UP (ref 0–0.9)
IMM GRANULOCYTES NFR BLD AUTO: 0.3 % — SIGNIFICANT CHANGE UP (ref 0–0.9)
INR BLD: 0.98 — SIGNIFICANT CHANGE UP (ref 0.85–1.18)
INR BLD: 0.98 — SIGNIFICANT CHANGE UP (ref 0.85–1.18)
INR BLD: 1.11 — SIGNIFICANT CHANGE UP (ref 0.85–1.18)
INR BLD: 1.11 — SIGNIFICANT CHANGE UP (ref 0.85–1.18)
LYMPHOCYTES # BLD AUTO: 0.81 K/UL — LOW (ref 1–3.3)
LYMPHOCYTES # BLD AUTO: 0.81 K/UL — LOW (ref 1–3.3)
LYMPHOCYTES # BLD AUTO: 7.8 % — LOW (ref 13–44)
LYMPHOCYTES # BLD AUTO: 7.8 % — LOW (ref 13–44)
MAGNESIUM SERPL-MCNC: 1.2 MG/DL — LOW (ref 1.6–2.6)
MAGNESIUM SERPL-MCNC: 1.2 MG/DL — LOW (ref 1.6–2.6)
MAGNESIUM SERPL-MCNC: 1.8 MG/DL — SIGNIFICANT CHANGE UP (ref 1.6–2.6)
MAGNESIUM SERPL-MCNC: 1.8 MG/DL — SIGNIFICANT CHANGE UP (ref 1.6–2.6)
MCHC RBC-ENTMCNC: 28 PG — SIGNIFICANT CHANGE UP (ref 27–34)
MCHC RBC-ENTMCNC: 28 PG — SIGNIFICANT CHANGE UP (ref 27–34)
MCHC RBC-ENTMCNC: 28.1 PG — SIGNIFICANT CHANGE UP (ref 27–34)
MCHC RBC-ENTMCNC: 28.1 PG — SIGNIFICANT CHANGE UP (ref 27–34)
MCHC RBC-ENTMCNC: 32.8 GM/DL — SIGNIFICANT CHANGE UP (ref 32–36)
MCHC RBC-ENTMCNC: 32.8 GM/DL — SIGNIFICANT CHANGE UP (ref 32–36)
MCHC RBC-ENTMCNC: 33.4 GM/DL — SIGNIFICANT CHANGE UP (ref 32–36)
MCHC RBC-ENTMCNC: 33.4 GM/DL — SIGNIFICANT CHANGE UP (ref 32–36)
MCV RBC AUTO: 84 FL — SIGNIFICANT CHANGE UP (ref 80–100)
MCV RBC AUTO: 84 FL — SIGNIFICANT CHANGE UP (ref 80–100)
MCV RBC AUTO: 85.4 FL — SIGNIFICANT CHANGE UP (ref 80–100)
MCV RBC AUTO: 85.4 FL — SIGNIFICANT CHANGE UP (ref 80–100)
METHGB MFR BLDA: 0.4 % — SIGNIFICANT CHANGE UP
METHGB MFR BLDA: 0.4 % — SIGNIFICANT CHANGE UP
METHGB MFR BLDA: 0.6 % — SIGNIFICANT CHANGE UP
METHGB MFR BLDA: 0.6 % — SIGNIFICANT CHANGE UP
MONOCYTES # BLD AUTO: 0.91 K/UL — HIGH (ref 0–0.9)
MONOCYTES # BLD AUTO: 0.91 K/UL — HIGH (ref 0–0.9)
MONOCYTES NFR BLD AUTO: 8.7 % — SIGNIFICANT CHANGE UP (ref 2–14)
MONOCYTES NFR BLD AUTO: 8.7 % — SIGNIFICANT CHANGE UP (ref 2–14)
NEUTROPHILS # BLD AUTO: 8.68 K/UL — HIGH (ref 1.8–7.4)
NEUTROPHILS # BLD AUTO: 8.68 K/UL — HIGH (ref 1.8–7.4)
NEUTROPHILS NFR BLD AUTO: 83 % — HIGH (ref 43–77)
NEUTROPHILS NFR BLD AUTO: 83 % — HIGH (ref 43–77)
NRBC # BLD: 0 /100 WBCS — SIGNIFICANT CHANGE UP (ref 0–0)
OXYHGB MFR BLDA: 97.6 % — HIGH (ref 90–95)
OXYHGB MFR BLDA: 97.6 % — HIGH (ref 90–95)
OXYHGB MFR BLDA: 98.3 % — HIGH (ref 90–95)
OXYHGB MFR BLDA: 98.3 % — HIGH (ref 90–95)
PCO2 BLDA: 35 MMHG — SIGNIFICANT CHANGE UP (ref 35–48)
PCO2 BLDA: 35 MMHG — SIGNIFICANT CHANGE UP (ref 35–48)
PCO2 BLDA: 36 MMHG — SIGNIFICANT CHANGE UP (ref 35–48)
PCO2 BLDA: 36 MMHG — SIGNIFICANT CHANGE UP (ref 35–48)
PH BLDA: 7.4 — SIGNIFICANT CHANGE UP (ref 7.35–7.45)
PH BLDA: 7.4 — SIGNIFICANT CHANGE UP (ref 7.35–7.45)
PH BLDA: 7.43 — SIGNIFICANT CHANGE UP (ref 7.35–7.45)
PH BLDA: 7.43 — SIGNIFICANT CHANGE UP (ref 7.35–7.45)
PHOSPHATE SERPL-MCNC: 3.5 MG/DL — SIGNIFICANT CHANGE UP (ref 2.5–4.5)
PHOSPHATE SERPL-MCNC: 3.5 MG/DL — SIGNIFICANT CHANGE UP (ref 2.5–4.5)
PHOSPHATE SERPL-MCNC: 3.9 MG/DL — SIGNIFICANT CHANGE UP (ref 2.5–4.5)
PHOSPHATE SERPL-MCNC: 3.9 MG/DL — SIGNIFICANT CHANGE UP (ref 2.5–4.5)
PLATELET # BLD AUTO: 135 K/UL — LOW (ref 150–400)
PLATELET # BLD AUTO: 135 K/UL — LOW (ref 150–400)
PLATELET # BLD AUTO: 171 K/UL — SIGNIFICANT CHANGE UP (ref 150–400)
PLATELET # BLD AUTO: 171 K/UL — SIGNIFICANT CHANGE UP (ref 150–400)
PO2 BLDA: 223 MMHG — HIGH (ref 83–108)
PO2 BLDA: 223 MMHG — HIGH (ref 83–108)
PO2 BLDA: 237 MMHG — HIGH (ref 83–108)
PO2 BLDA: 237 MMHG — HIGH (ref 83–108)
POTASSIUM BLDA-SCNC: 3.9 MMOL/L — SIGNIFICANT CHANGE UP (ref 3.5–5.1)
POTASSIUM BLDA-SCNC: 3.9 MMOL/L — SIGNIFICANT CHANGE UP (ref 3.5–5.1)
POTASSIUM BLDA-SCNC: 4 MMOL/L — SIGNIFICANT CHANGE UP (ref 3.5–5.1)
POTASSIUM BLDA-SCNC: 4 MMOL/L — SIGNIFICANT CHANGE UP (ref 3.5–5.1)
POTASSIUM SERPL-MCNC: 3.7 MMOL/L — SIGNIFICANT CHANGE UP (ref 3.5–5.3)
POTASSIUM SERPL-MCNC: 3.7 MMOL/L — SIGNIFICANT CHANGE UP (ref 3.5–5.3)
POTASSIUM SERPL-MCNC: 4 MMOL/L — SIGNIFICANT CHANGE UP (ref 3.5–5.3)
POTASSIUM SERPL-MCNC: 4 MMOL/L — SIGNIFICANT CHANGE UP (ref 3.5–5.3)
POTASSIUM SERPL-SCNC: 3.7 MMOL/L — SIGNIFICANT CHANGE UP (ref 3.5–5.3)
POTASSIUM SERPL-SCNC: 3.7 MMOL/L — SIGNIFICANT CHANGE UP (ref 3.5–5.3)
POTASSIUM SERPL-SCNC: 4 MMOL/L — SIGNIFICANT CHANGE UP (ref 3.5–5.3)
POTASSIUM SERPL-SCNC: 4 MMOL/L — SIGNIFICANT CHANGE UP (ref 3.5–5.3)
PROT SERPL-MCNC: 5.6 G/DL — LOW (ref 6–8.3)
PROT SERPL-MCNC: 5.6 G/DL — LOW (ref 6–8.3)
PROTHROM AB SERPL-ACNC: 11.2 SEC — SIGNIFICANT CHANGE UP (ref 9.5–13)
PROTHROM AB SERPL-ACNC: 11.2 SEC — SIGNIFICANT CHANGE UP (ref 9.5–13)
PROTHROM AB SERPL-ACNC: 12.6 SEC — SIGNIFICANT CHANGE UP (ref 9.5–13)
PROTHROM AB SERPL-ACNC: 12.6 SEC — SIGNIFICANT CHANGE UP (ref 9.5–13)
RBC # BLD: 3.74 M/UL — LOW (ref 4.2–5.8)
RBC # BLD: 3.74 M/UL — LOW (ref 4.2–5.8)
RBC # BLD: 5.28 M/UL — SIGNIFICANT CHANGE UP (ref 4.2–5.8)
RBC # BLD: 5.28 M/UL — SIGNIFICANT CHANGE UP (ref 4.2–5.8)
RBC # FLD: 13.4 % — SIGNIFICANT CHANGE UP (ref 10.3–14.5)
RBC # FLD: 13.4 % — SIGNIFICANT CHANGE UP (ref 10.3–14.5)
RBC # FLD: 13.7 % — SIGNIFICANT CHANGE UP (ref 10.3–14.5)
RBC # FLD: 13.7 % — SIGNIFICANT CHANGE UP (ref 10.3–14.5)
SAO2 % BLDA: 99.6 % — HIGH (ref 94–98)
SAO2 % BLDA: 99.6 % — HIGH (ref 94–98)
SAO2 % BLDA: 99.8 % — HIGH (ref 94–98)
SAO2 % BLDA: 99.8 % — HIGH (ref 94–98)
SODIUM BLDA-SCNC: 136 MMOL/L — SIGNIFICANT CHANGE UP (ref 136–145)
SODIUM BLDA-SCNC: 136 MMOL/L — SIGNIFICANT CHANGE UP (ref 136–145)
SODIUM BLDA-SCNC: 137 MMOL/L — SIGNIFICANT CHANGE UP (ref 136–145)
SODIUM BLDA-SCNC: 137 MMOL/L — SIGNIFICANT CHANGE UP (ref 136–145)
SODIUM SERPL-SCNC: 139 MMOL/L — SIGNIFICANT CHANGE UP (ref 135–145)
SODIUM SERPL-SCNC: 139 MMOL/L — SIGNIFICANT CHANGE UP (ref 135–145)
SODIUM SERPL-SCNC: 140 MMOL/L — SIGNIFICANT CHANGE UP (ref 135–145)
SODIUM SERPL-SCNC: 140 MMOL/L — SIGNIFICANT CHANGE UP (ref 135–145)
WBC # BLD: 10.45 K/UL — SIGNIFICANT CHANGE UP (ref 3.8–10.5)
WBC # BLD: 10.45 K/UL — SIGNIFICANT CHANGE UP (ref 3.8–10.5)
WBC # BLD: 5.21 K/UL — SIGNIFICANT CHANGE UP (ref 3.8–10.5)
WBC # BLD: 5.21 K/UL — SIGNIFICANT CHANGE UP (ref 3.8–10.5)
WBC # FLD AUTO: 10.45 K/UL — SIGNIFICANT CHANGE UP (ref 3.8–10.5)
WBC # FLD AUTO: 10.45 K/UL — SIGNIFICANT CHANGE UP (ref 3.8–10.5)
WBC # FLD AUTO: 5.21 K/UL — SIGNIFICANT CHANGE UP (ref 3.8–10.5)
WBC # FLD AUTO: 5.21 K/UL — SIGNIFICANT CHANGE UP (ref 3.8–10.5)

## 2023-12-07 PROCEDURE — 88305 TISSUE EXAM BY PATHOLOGIST: CPT | Mod: 26

## 2023-12-07 PROCEDURE — 88309 TISSUE EXAM BY PATHOLOGIST: CPT | Mod: 26

## 2023-12-07 PROCEDURE — 88331 PATH CONSLTJ SURG 1 BLK 1SPC: CPT | Mod: 26

## 2023-12-07 PROCEDURE — 71045 X-RAY EXAM CHEST 1 VIEW: CPT | Mod: 26

## 2023-12-07 PROCEDURE — 88311 DECALCIFY TISSUE: CPT | Mod: 26

## 2023-12-07 PROCEDURE — 88304 TISSUE EXAM BY PATHOLOGIST: CPT | Mod: 26

## 2023-12-07 PROCEDURE — 20969 BONE/SKIN GRAFT MICROVASC: CPT

## 2023-12-07 PROCEDURE — 15100 SPLT AGRFT T/A/L 1ST 100SQCM: CPT

## 2023-12-07 PROCEDURE — 88302 TISSUE EXAM BY PATHOLOGIST: CPT | Mod: 26

## 2023-12-07 PROCEDURE — 15101 SPLT AGRFT T/A/L EA ADDL 100: CPT

## 2023-12-07 RX ORDER — OXYCODONE HYDROCHLORIDE 5 MG/1
10 TABLET ORAL EVERY 4 HOURS
Refills: 0 | Status: DISCONTINUED | OUTPATIENT
Start: 2023-12-07 | End: 2023-12-08

## 2023-12-07 RX ORDER — SODIUM CHLORIDE 9 MG/ML
1000 INJECTION, SOLUTION INTRAVENOUS
Refills: 0 | Status: DISCONTINUED | OUTPATIENT
Start: 2023-12-07 | End: 2023-12-20

## 2023-12-07 RX ORDER — CHLORHEXIDINE GLUCONATE 213 G/1000ML
15 SOLUTION TOPICAL
Refills: 0 | Status: DISCONTINUED | OUTPATIENT
Start: 2023-12-08 | End: 2023-12-21

## 2023-12-07 RX ORDER — OXYCODONE HYDROCHLORIDE 5 MG/1
5 TABLET ORAL EVERY 4 HOURS
Refills: 0 | Status: DISCONTINUED | OUTPATIENT
Start: 2023-12-07 | End: 2023-12-08

## 2023-12-07 RX ORDER — GABAPENTIN 400 MG/1
600 CAPSULE ORAL DAILY
Refills: 0 | Status: DISCONTINUED | OUTPATIENT
Start: 2023-12-07 | End: 2023-12-13

## 2023-12-07 RX ORDER — MAGNESIUM SULFATE 500 MG/ML
4 VIAL (ML) INJECTION ONCE
Refills: 0 | Status: COMPLETED | OUTPATIENT
Start: 2023-12-07 | End: 2023-12-07

## 2023-12-07 RX ORDER — DEXTROSE 50 % IN WATER 50 %
25 SYRINGE (ML) INTRAVENOUS ONCE
Refills: 0 | Status: DISCONTINUED | OUTPATIENT
Start: 2023-12-07 | End: 2023-12-20

## 2023-12-07 RX ORDER — ACETAMINOPHEN 500 MG
1000 TABLET ORAL EVERY 6 HOURS
Refills: 0 | Status: COMPLETED | OUTPATIENT
Start: 2023-12-07 | End: 2023-12-08

## 2023-12-07 RX ORDER — SENNA PLUS 8.6 MG/1
2 TABLET ORAL AT BEDTIME
Refills: 0 | Status: DISCONTINUED | OUTPATIENT
Start: 2023-12-07 | End: 2023-12-13

## 2023-12-07 RX ORDER — POLYETHYLENE GLYCOL 3350 17 G/17G
17 POWDER, FOR SOLUTION ORAL DAILY
Refills: 0 | Status: DISCONTINUED | OUTPATIENT
Start: 2023-12-07 | End: 2023-12-13

## 2023-12-07 RX ORDER — PANTOPRAZOLE SODIUM 20 MG/1
40 TABLET, DELAYED RELEASE ORAL DAILY
Refills: 0 | Status: DISCONTINUED | OUTPATIENT
Start: 2023-12-07 | End: 2023-12-11

## 2023-12-07 RX ORDER — GLUCAGON INJECTION, SOLUTION 0.5 MG/.1ML
1 INJECTION, SOLUTION SUBCUTANEOUS ONCE
Refills: 0 | Status: DISCONTINUED | OUTPATIENT
Start: 2023-12-07 | End: 2023-12-20

## 2023-12-07 RX ORDER — DEXTROSE 50 % IN WATER 50 %
12.5 SYRINGE (ML) INTRAVENOUS ONCE
Refills: 0 | Status: DISCONTINUED | OUTPATIENT
Start: 2023-12-07 | End: 2023-12-20

## 2023-12-07 RX ORDER — DEXTROSE 50 % IN WATER 50 %
15 SYRINGE (ML) INTRAVENOUS ONCE
Refills: 0 | Status: DISCONTINUED | OUTPATIENT
Start: 2023-12-07 | End: 2023-12-20

## 2023-12-07 RX ORDER — ONDANSETRON 8 MG/1
4 TABLET, FILM COATED ORAL EVERY 6 HOURS
Refills: 0 | Status: DISCONTINUED | OUTPATIENT
Start: 2023-12-07 | End: 2023-12-21

## 2023-12-07 RX ORDER — CHLORHEXIDINE GLUCONATE 213 G/1000ML
1 SOLUTION TOPICAL
Refills: 0 | Status: DISCONTINUED | OUTPATIENT
Start: 2023-12-07 | End: 2023-12-10

## 2023-12-07 RX ADMIN — Medication 400 MILLIGRAM(S): at 23:50

## 2023-12-07 RX ADMIN — OXYCODONE HYDROCHLORIDE 5 MILLIGRAM(S): 5 TABLET ORAL at 04:42

## 2023-12-07 RX ADMIN — Medication 25 GRAM(S): at 21:59

## 2023-12-07 RX ADMIN — Medication 400 MILLIGRAM(S): at 04:42

## 2023-12-07 RX ADMIN — Medication 400 MILLIGRAM(S): at 05:34

## 2023-12-07 RX ADMIN — SENNA PLUS 2 TABLET(S): 8.6 TABLET ORAL at 21:58

## 2023-12-07 NOTE — PRE-OP CHECKLIST - PATIENT'S PERSONAL PROPERTY GIVEN TO
phone given to family member, backpack/clothing given to security/family member/security/safe phone & wallet given to family member, backpack/clothing given to security/family member/security/safe

## 2023-12-07 NOTE — CONSULT NOTE ADULT - ASSESSMENT
69 y/o M w PMHx of CAD (x2 stents Mar 2023), HLD, T2DM, nephrolithiasis (ureteral stent placement 2022), and psoriasis w/ three month hx of jaw pain and loosening of lower left mandibular molar tooth. Pt has a 30 pack yr smoking hx, quit 1 yr ago. 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 12/7 s/p mandibular resection, neck dissection, and fibula free flap reconstruction. Admitted to SICU for flap checks and hemodynamic monitoring.    Neuro: standing Tylenol, Gabapentin 600mg, PRN Dilaudid (switch to Oxy following confirmed NGT placement), Steroids??     HEENT: Flap checks q1h, No circumferential ties or collars, Head neutral, Peridex PO QID POD1, No asa needed.    CV: HD stable, Maintain MAP > 60, Avoid pressors but will add Low dose Levo gtt if needed. Hx of CAD 2 stents: Plavix, ASA. Hx of HLD: Lipitor    Pulm:   GI: NPO/IVF, DHT- CXR to confirm placement, start TFs POD1; PPI, Senna, Miralax  : Young, Strict I&Os overight and poss Dc young on POD#1     ID: x hours (s/p in OR)     Endo: mISS, f/u post op TSH, A1C    Heme: Active T&S, Hgb >8    ppx: SCDs, SQL on POD#1   Lines: PIV [place]  (12/7- )    Wounds: _ leg KRISTEN and wound vac, Bacitracin to wounds    PT/OT: POD2   Dispo: SICU 69 y/o M w PMHx of CAD (x2 stents Mar 2023), HLD, T2DM, nephrolithiasis (ureteral stent placement 2022), and psoriasis w/ three month hx of jaw pain and loosening of lower left mandibular molar tooth. Pt has a 30 pack yr smoking hx, quit 1 yr ago. 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 12/7 s/p mandibular resection, neck dissection, and fibula free flap reconstruction. Admitted to SICU for flap checks and hemodynamic monitoring.    Neuro: standing Tylenol, Gabapentin 600mg, PRN Dilaudid (switch to Oxy following confirmed NGT placement), Steroids??     HEENT: Flap checks q1h, No circumferential ties or collars, Head neutral, Peridex PO QID POD1, No asa needed.    CV: HD stable, Maintain MAP > 60, Avoid pressors but will add Low dose Levo gtt if needed. Hx of CAD 2 stents: Plavix, ASA. Hx of HLD: Lipitor    Pulm:   GI: NPO/IVF, DHT- CXR to confirm placement, start TFs POD1; PPI, Senna, Miralax  : Young, Strict I&Os overight and poss Dc young on POD#1     ID: x hours (s/p in OR)     Endo: mISS, f/u post op TSH, A1C    Heme: Active T&S, Hgb >8    ppx: SCDs, SQL on POD#1   Lines: PIV [place]  (12/7- )    Wounds: _ leg KRISTEN and wound vac, Bacitracin to wounds    PT/OT: POD2   Dispo: SICU 67 y/o M w PMHx of CAD (x2 stents Mar 2023), HLD, T2DM, nephrolithiasis (ureteral stent placement 2022), and psoriasis w/ three month hx of jaw pain and loosening of lower left mandibular molar tooth. Pt has a 30 pack yr smoking hx, quit 1 yr ago. 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 12/7 s/p mandibular resection, neck dissection, and fibula free flap reconstruction. Admitted to SICU for flap checks and hemodynamic monitoring.    Neuro: standing Tylenol, Gabapentin 600mg, PRN Oxy  HEENT: Flap checks q1h, No circumferential ties or collars, Head neutral, Peridex PO QID POD1, L and R neck KRISTEN   CV: HD stable, Maintain MAP > 60, Avoid pressors but will add Low dose Levo gtt if needed. Hx of CAD 2 stents: Plavix, ASA. Hx of HLD: Lipitor    Pulm: 7.5 Portex cuffed trach   GI: NPO/IVF, DHT- CXR to confirm placement, start TFs POD1; PPI, Senna, Miralax  : Young, Strict I&Os overight and poss Dc young on POD#1     ID: Unasyn x24 hours   Endo: mISS,   Heme: Active T&S, Hgb >8    ppx: SCDs, SQL on POD#1   Lines: PIV, L radial [place] (12/7- )    Wounds: L leg KRISTEN and wound vac, Bacitracin to wounds    PT/OT: POD2   Dispo: SICU 69 y/o M w PMHx of CAD (x2 stents Mar 2023), HLD, T2DM, nephrolithiasis (ureteral stent placement 2022), and psoriasis w/ three month hx of jaw pain and loosening of lower left mandibular molar tooth. Pt has a 30 pack yr smoking hx, quit 1 yr ago. 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 12/7 s/p mandibular resection, neck dissection, and fibula free flap reconstruction. Admitted to SICU for flap checks and hemodynamic monitoring.    Neuro: standing Tylenol, Gabapentin 600mg, PRN Oxy  HEENT: Flap checks q1h, No circumferential ties or collars, Head neutral, Peridex PO QID POD1, L and R neck KRISTEN   CV: HD stable, Maintain MAP > 60, Avoid pressors but will add Low dose Levo gtt if needed. Hx of CAD 2 stents: Plavix, ASA. Hx of HLD: Lipitor    Pulm: 7.5 Portex cuffed trach   GI: NPO/IVF, DHT- CXR to confirm placement, start TFs POD1; PPI, Senna, Miralax  : Young, Strict I&Os overight and poss Dc young on POD#1     ID: Unasyn x24 hours   Endo: mISS,   Heme: Active T&S, Hgb >8    ppx: SCDs, SQL on POD#1   Lines: PIV, L radial [place] (12/7- )    Wounds: L leg KRISTEN and wound vac, Bacitracin to wounds    PT/OT: POD2   Dispo: SICU

## 2023-12-07 NOTE — CONSULT NOTE ADULT - SUBJECTIVE AND OBJECTIVE BOX
HPI: 67 y/o M w PMHx of CAD (x2 stents Mar 2023), HLD, T2DM, nephrolithiasis (ureteral stent placement 2022), and psoriasis w/ three month hx of jaw pain and loosening of lower left mandibular molar tooth. Pt has a 30 pack yr smoking hx, quit 1 yr ago. 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 12/7 s/p mandibular resection, neck dissection, and fibula free flap reconstruction.       Subjective: ___    MEDICATIONS  (PRN):      I&O's Detail    06 Dec 2023 07:01  -  07 Dec 2023 07:00  --------------------------------------------------------  IN:    Lactated Ringers: 600 mL    Oral Fluid: 780 mL  Total IN: 1380 mL    OUT:    Voided (mL): 500 mL  Total OUT: 500 mL    Total NET: 880 mL          T(C): 36.7 (12-07-23 @ 04:48), Max: 36.7 (12-06-23 @ 09:36)  HR: 46 (12-07-23 @ 04:48) (46 - 60)  BP: 152/83 (12-07-23 @ 04:48) (125/57 - 152/83)  RR: 17 (12-07-23 @ 04:48) (17 - 18)  SpO2: 98% (12-07-23 @ 04:48) (96% - 98%)    GENERAL: NAD, Resting comfortably in bed  HEENT:   RESP:   CARD: Normal rate, no murmurs  GI: Soft, ND, NT, No guarding, No rebound tenderness  EXTREM: WWP,  NEURO: AAOx3, No focal motor or sensory deficits  PSYCH: Affect and characteristics of appearance, verbalizations, and behaviors are appropriate    LABS:                        14.8   5.21  )-----------( 171      ( 07 Dec 2023 05:30 )             45.1     12-07    140  |  105  |  15  ----------------------------<  111<H>  3.7   |  24  |  0.99    Ca    9.6      07 Dec 2023 05:30  Phos  3.5     12-07  Mg     1.8     12-07      PT/INR - ( 07 Dec 2023 05:30 )   PT: 11.2 sec;   INR: 0.98          PTT - ( 07 Dec 2023 05:30 )  PTT:29.4 sec  Urinalysis Basic - ( 07 Dec 2023 05:30 )    Color: x / Appearance: x / SG: x / pH: x  Gluc: 111 mg/dL / Ketone: x  / Bili: x / Urobili: x   Blood: x / Protein: x / Nitrite: x   Leuk Esterase: x / RBC: x / WBC x   Sq Epi: x / Non Sq Epi: x / Bacteria: x        RADIOLOGY & ADDITIONAL STUDIES:     HPI: 69 y/o M w PMHx of CAD (x2 stents Mar 2023), HLD, T2DM, nephrolithiasis (ureteral stent placement 2022), and psoriasis w/ three month hx of jaw pain and loosening of lower left mandibular molar tooth. Pt has a 30 pack yr smoking hx, quit 1 yr ago. 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 12/7 s/p mandibular resection, neck dissection, and fibula free flap reconstruction.       Subjective: ___    MEDICATIONS  (PRN):      I&O's Detail    06 Dec 2023 07:01  -  07 Dec 2023 07:00  --------------------------------------------------------  IN:    Lactated Ringers: 600 mL    Oral Fluid: 780 mL  Total IN: 1380 mL    OUT:    Voided (mL): 500 mL  Total OUT: 500 mL    Total NET: 880 mL          T(C): 36.7 (12-07-23 @ 04:48), Max: 36.7 (12-06-23 @ 09:36)  HR: 46 (12-07-23 @ 04:48) (46 - 60)  BP: 152/83 (12-07-23 @ 04:48) (125/57 - 152/83)  RR: 17 (12-07-23 @ 04:48) (17 - 18)  SpO2: 98% (12-07-23 @ 04:48) (96% - 98%)    GENERAL: NAD, Resting comfortably in bed  HEENT:   RESP:   CARD: Normal rate, no murmurs  GI: Soft, ND, NT, No guarding, No rebound tenderness  EXTREM: WWP,  NEURO: AAOx3, No focal motor or sensory deficits  PSYCH: Affect and characteristics of appearance, verbalizations, and behaviors are appropriate    LABS:                        14.8   5.21  )-----------( 171      ( 07 Dec 2023 05:30 )             45.1     12-07    140  |  105  |  15  ----------------------------<  111<H>  3.7   |  24  |  0.99    Ca    9.6      07 Dec 2023 05:30  Phos  3.5     12-07  Mg     1.8     12-07      PT/INR - ( 07 Dec 2023 05:30 )   PT: 11.2 sec;   INR: 0.98          PTT - ( 07 Dec 2023 05:30 )  PTT:29.4 sec  Urinalysis Basic - ( 07 Dec 2023 05:30 )    Color: x / Appearance: x / SG: x / pH: x  Gluc: 111 mg/dL / Ketone: x  / Bili: x / Urobili: x   Blood: x / Protein: x / Nitrite: x   Leuk Esterase: x / RBC: x / WBC x   Sq Epi: x / Non Sq Epi: x / Bacteria: x        RADIOLOGY & ADDITIONAL STUDIES:     HPI: 67 y/o M w PMHx of CAD (x2 stents Mar 2023), HLD, T2DM, nephrolithiasis (ureteral stent placement 2022), and psoriasis w/ three month hx of jaw pain and loosening of lower left mandibular molar tooth. Pt has a 30 pack yr smoking hx, quit 1 yr ago. 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 12/7 s/p L mandibular resection, L neck dissection, L fibula free flap reconstruction, dental implants, tracheostomy (7.5 cuffed portex).       Subjective: Transferred to SICU postop. Hemodynamically stable, trached, pain and nausea well controlled.     MEDICATIONS  (PRN):      I&O's Detail    06 Dec 2023 07:01  -  07 Dec 2023 07:00  --------------------------------------------------------  IN:    Lactated Ringers: 600 mL    Oral Fluid: 780 mL  Total IN: 1380 mL    OUT:    Voided (mL): 500 mL  Total OUT: 500 mL    Total NET: 880 mL          T(C): 36.7 (12-07-23 @ 04:48), Max: 36.7 (12-06-23 @ 09:36)  HR: 46 (12-07-23 @ 04:48) (46 - 60)  BP: 152/83 (12-07-23 @ 04:48) (125/57 - 152/83)  RR: 17 (12-07-23 @ 04:48) (17 - 18)  SpO2: 98% (12-07-23 @ 04:48) (96% - 98%)    GENERAL: NAD, Resting comfortably in bed  HEENT: Intraoral flap with good color, Doppler signal strong. Neck incision c/d/i, KRISTEN x2 ss  RESP: 7.5 portex cuffed trach, CTAB  CARD: Normal rate, no murmurs  GI: Soft, ND, NT, No guarding, No rebound tenderness  EXTREM: WWP, L leg incision c/d/i, L KRISTEN x1 ss, L wound vac draining appropriately, L thigh STSG with tegaderm c/d/i  NEURO: AAOx3, No focal motor or sensory deficits  PSYCH: Affect and characteristics of appearance and behaviors are appropriate    LABS:                        14.8   5.21  )-----------( 171      ( 07 Dec 2023 05:30 )             45.1     12-07    140  |  105  |  15  ----------------------------<  111<H>  3.7   |  24  |  0.99    Ca    9.6      07 Dec 2023 05:30  Phos  3.5     12-07  Mg     1.8     12-07      PT/INR - ( 07 Dec 2023 05:30 )   PT: 11.2 sec;   INR: 0.98          PTT - ( 07 Dec 2023 05:30 )  PTT:29.4 sec  Urinalysis Basic - ( 07 Dec 2023 05:30 )    Color: x / Appearance: x / SG: x / pH: x  Gluc: 111 mg/dL / Ketone: x  / Bili: x / Urobili: x   Blood: x / Protein: x / Nitrite: x   Leuk Esterase: x / RBC: x / WBC x   Sq Epi: x / Non Sq Epi: x / Bacteria: x        RADIOLOGY & ADDITIONAL STUDIES:     HPI: 69 y/o M w PMHx of CAD (x2 stents Mar 2023), HLD, T2DM, nephrolithiasis (ureteral stent placement 2022), and psoriasis w/ three month hx of jaw pain and loosening of lower left mandibular molar tooth. Pt has a 30 pack yr smoking hx, quit 1 yr ago. 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 12/7 s/p L mandibular resection, L neck dissection, L fibula free flap reconstruction, dental implants, tracheostomy (7.5 cuffed portex).       Subjective: Transferred to SICU postop. Hemodynamically stable, trached, pain and nausea well controlled.     MEDICATIONS  (PRN):      I&O's Detail    06 Dec 2023 07:01  -  07 Dec 2023 07:00  --------------------------------------------------------  IN:    Lactated Ringers: 600 mL    Oral Fluid: 780 mL  Total IN: 1380 mL    OUT:    Voided (mL): 500 mL  Total OUT: 500 mL    Total NET: 880 mL          T(C): 36.7 (12-07-23 @ 04:48), Max: 36.7 (12-06-23 @ 09:36)  HR: 46 (12-07-23 @ 04:48) (46 - 60)  BP: 152/83 (12-07-23 @ 04:48) (125/57 - 152/83)  RR: 17 (12-07-23 @ 04:48) (17 - 18)  SpO2: 98% (12-07-23 @ 04:48) (96% - 98%)    GENERAL: NAD, Resting comfortably in bed  HEENT: Intraoral flap with good color, Doppler signal strong. Neck incision c/d/i, KRISTEN x2 ss  RESP: 7.5 portex cuffed trach, CTAB  CARD: Normal rate, no murmurs  GI: Soft, ND, NT, No guarding, No rebound tenderness  EXTREM: WWP, L leg incision c/d/i, L KRISTEN x1 ss, L wound vac draining appropriately, L thigh STSG with tegaderm c/d/i  NEURO: AAOx3, No focal motor or sensory deficits  PSYCH: Affect and characteristics of appearance and behaviors are appropriate    LABS:                        14.8   5.21  )-----------( 171      ( 07 Dec 2023 05:30 )             45.1     12-07    140  |  105  |  15  ----------------------------<  111<H>  3.7   |  24  |  0.99    Ca    9.6      07 Dec 2023 05:30  Phos  3.5     12-07  Mg     1.8     12-07      PT/INR - ( 07 Dec 2023 05:30 )   PT: 11.2 sec;   INR: 0.98          PTT - ( 07 Dec 2023 05:30 )  PTT:29.4 sec  Urinalysis Basic - ( 07 Dec 2023 05:30 )    Color: x / Appearance: x / SG: x / pH: x  Gluc: 111 mg/dL / Ketone: x  / Bili: x / Urobili: x   Blood: x / Protein: x / Nitrite: x   Leuk Esterase: x / RBC: x / WBC x   Sq Epi: x / Non Sq Epi: x / Bacteria: x        RADIOLOGY & ADDITIONAL STUDIES:

## 2023-12-07 NOTE — PROGRESS NOTE ADULT - SUBJECTIVE AND OBJECTIVE BOX
OTOLARYNGOLOGY (ENT) PROGRESS NOTE    PATIENT: SAUNDRA HOLMAN  MRN: [id number]  : 57  [id number]  DATE OF SERVICE:  23  			      ID:SAUNDRA HOLMAN is a  68yMale with PMH of CAD (x2 stents Mar 2023), HLD, T2DM, hx of kidney stones, psoriasis, buccal SCC presents for medical preoptimization for composite mandibular resection, neck dissection, and fibula free flap reconstruction scheduled for Thursday this week.    Subjective/ Interval: Patient seen and examined at bedside this morning. AFVSS overnight. Medical workup complete. Patient tolerating regular diet at this time. No active complaints, ready for OR tomorrow.   : Patient seen and examined at bedside. AF overnight. NPO since midnight for procedure today. No active complaints, pain controlled with medication. ASA held yesterday.    ALLERGIES:  No Known Allergies      MEDICATIONS:  Antiinfectives:     IV fluids:  dextrose 5%. 1000 milliLiter(s) IV Continuous <Continuous>  dextrose 5%. 1000 milliLiter(s) IV Continuous <Continuous>  lactated ringers. 1000 milliLiter(s) IV Continuous <Continuous>    Hematologic/Anticoagulation:    Pain medications/Neuro:  acetaminophen   IVPB .. 1000 milliGRAM(s) IV Intermittent every 6 hours  HYDROmorphone  Injectable 0.5 milliGRAM(s) IV Push every 4 hours PRN  ibuprofen  Tablet. 400 milliGRAM(s) Oral every 6 hours  oxyCODONE    IR 2.5 milliGRAM(s) Oral every 6 hours PRN  oxyCODONE    IR 5 milliGRAM(s) Oral every 6 hours PRN    Endocrine Medications:   atorvastatin 40 milliGRAM(s) Oral at bedtime  dextrose 50% Injectable 25 Gram(s) IV Push once  dextrose 50% Injectable 12.5 Gram(s) IV Push once  dextrose 50% Injectable 25 Gram(s) IV Push once  dextrose Oral Gel 15 Gram(s) Oral once PRN  glucagon  Injectable 1 milliGRAM(s) IntraMuscular once  insulin lispro (ADMELOG) corrective regimen sliding scale   SubCutaneous Before meals and at bedtime    All other standing medications:   influenza  Vaccine (HIGH DOSE) 0.7 milliLiter(s) IntraMuscular once  polyethylene glycol 3350 17 Gram(s) Oral at bedtime  senna 2 Tablet(s) Oral at bedtime    All other PRN medications:  lidocaine 2% Viscous 15 milliLiter(s) Swish and Spit every 1 hour PRN    Vital Signs Last 24 Hrs  T(C): 36.7 (07 Dec 2023 04:48), Max: 36.7 (06 Dec 2023 09:36)  T(F): 98.1 (07 Dec 2023 04:48), Max: 98.1 (06 Dec 2023 09:36)  HR: 46 (07 Dec 2023 04:48) (46 - 60)  BP: 152/83 (07 Dec 2023 04:48) (125/57 - 152/83)  BP(mean): 106 (07 Dec 2023 04:48) (106 - 106)  RR: 17 (07 Dec 2023 04:48) (17 - 18)  SpO2: 98% (07 Dec 2023 04:48) (96% - 98%)    Parameters below as of 07 Dec 2023 04:48  Patient On (Oxygen Delivery Method): room air           @ 07:01  -   @ 07:00  --------------------------------------------------------  IN:    Lactated Ringers: 600 mL    Oral Fluid: 780 mL  Total IN: 1380 mL    OUT:    Voided (mL): 500 mL  Total OUT: 500 mL    Total NET: 880 mL                    PHYSICAL EXAM:  GENERAL: NAD, lying in bed comfortably  HEAD:  Atraumatic, Normocephalic  EYES: EOMI, PERRLA, conjunctiva and sclera clear  ENT:Left buccal mass extending to mandibular mucosa. Fixed. ulcerated intraoral mucosa  NECK: Left Level I fixed node  CHEST/LUNG: No stridor, no increased work of breathing on room air  HEART: Regular rate and rhythm  ABDOMEN: BSx4; Soft, nontender, nondistended  EXTREMITIES:  2+ Peripheral Pulses, brisk capillary refill. No clubbing, cyanosis, or edema  NERVOUS SYSTEM:  A&Ox3, no focal deficits   SKIN: No rashes or lesions         LABS                       13.7   5.40  )-----------( 147      ( 06 Dec 2023 05:30 )             40.8    12-06    141  |  104  |  17  ----------------------------<  108<H>  3.6   |  27  |  1.01    Ca    9.1      06 Dec 2023 05:30  Phos  3.7     12  Mg     1.8     12-           Coagulation Studies-   PT/INR - ( 06 Dec 2023 05:30 )   PT: 11.1 sec;   INR: 0.97          PTT - ( 06 Dec 2023 05:30 )  PTT:29.4 sec  Urinalysis Basic - ( 06 Dec 2023 05:30 )    Color: x / Appearance: x / SG: x / pH: x  Gluc: 108 mg/dL / Ketone: x  / Bili: x / Urobili: x   Blood: x / Protein: x / Nitrite: x   Leuk Esterase: x / RBC: x / WBC x   Sq Epi: x / Non Sq Epi: x / Bacteria: x      Endocrine Panel-                MICROBIOLOGY:        RADIOLOGY & ADDITIONAL STUDIES:    Assessment and Plan:  SAUNDRA HOLMAN is a  68yMale with PMH CAD (x2 stents Mar 2023), HLD, T2DM, hx of kidney stones, psoriasis w/ left buccal mucosa SCC presents for pre-optimization prior to composite mandibular resection, neck dissection and fibula free flap reconstruction.    Plan:  -Medicine consulted for pre-operative optimization, appreciate recommendations  -Cardiology consulted for pre-operative risk stratification and recommendations  -Soft, easy to chew diet  -Multimodal pain control  -Monitor POC gluc, ISS  -Bowel regimen  -LVX DVT ppx - held today  -NPO since midnight  -OR today  -ASA, plavix held      [facility] at [phone] with any questions/concerns.    Candida Jimenez  23 @ 07:06 OTOLARYNGOLOGY (ENT) PROGRESS NOTE    PATIENT: SAUNDRA HOLMAN  MRN: [id number]  : 57  [id number]  DATE OF SERVICE:  23  			      ID:SAUNDRA HOLMAN is a  68yMale with PMH of CAD (x2 stents Mar 2023), HLD, T2DM, hx of kidney stones, psoriasis, buccal SCC presents for medical preoptimization for composite mandibular resection, neck dissection, and fibula free flap reconstruction scheduled for Thursday this week.    Subjective/ Interval: Patient seen and examined at bedside this morning. AFVSS overnight. Medical workup complete. Patient tolerating regular diet at this time. No active complaints, ready for OR tomorrow.   : Patient seen and examined at bedside. AF overnight. NPO since midnight for procedure today. No active complaints, pain controlled with medication. ASA held yesterday.    ALLERGIES:  No Known Allergies      MEDICATIONS:  Antiinfectives:     IV fluids:  dextrose 5%. 1000 milliLiter(s) IV Continuous <Continuous>  dextrose 5%. 1000 milliLiter(s) IV Continuous <Continuous>  lactated ringers. 1000 milliLiter(s) IV Continuous <Continuous>    Hematologic/Anticoagulation:    Pain medications/Neuro:  acetaminophen   IVPB .. 1000 milliGRAM(s) IV Intermittent every 6 hours  HYDROmorphone  Injectable 0.5 milliGRAM(s) IV Push every 4 hours PRN  ibuprofen  Tablet. 400 milliGRAM(s) Oral every 6 hours  oxyCODONE    IR 2.5 milliGRAM(s) Oral every 6 hours PRN  oxyCODONE    IR 5 milliGRAM(s) Oral every 6 hours PRN    Endocrine Medications:   atorvastatin 40 milliGRAM(s) Oral at bedtime  dextrose 50% Injectable 25 Gram(s) IV Push once  dextrose 50% Injectable 12.5 Gram(s) IV Push once  dextrose 50% Injectable 25 Gram(s) IV Push once  dextrose Oral Gel 15 Gram(s) Oral once PRN  glucagon  Injectable 1 milliGRAM(s) IntraMuscular once  insulin lispro (ADMELOG) corrective regimen sliding scale   SubCutaneous Before meals and at bedtime    All other standing medications:   influenza  Vaccine (HIGH DOSE) 0.7 milliLiter(s) IntraMuscular once  polyethylene glycol 3350 17 Gram(s) Oral at bedtime  senna 2 Tablet(s) Oral at bedtime    All other PRN medications:  lidocaine 2% Viscous 15 milliLiter(s) Swish and Spit every 1 hour PRN    Vital Signs Last 24 Hrs  T(C): 36.7 (07 Dec 2023 04:48), Max: 36.7 (06 Dec 2023 09:36)  T(F): 98.1 (07 Dec 2023 04:48), Max: 98.1 (06 Dec 2023 09:36)  HR: 46 (07 Dec 2023 04:48) (46 - 60)  BP: 152/83 (07 Dec 2023 04:48) (125/57 - 152/83)  BP(mean): 106 (07 Dec 2023 04:48) (106 - 106)  RR: 17 (07 Dec 2023 04:48) (17 - 18)  SpO2: 98% (07 Dec 2023 04:48) (96% - 98%)    Parameters below as of 07 Dec 2023 04:48  Patient On (Oxygen Delivery Method): room air           @ 07:01  -   @ 07:00  --------------------------------------------------------  IN:    Lactated Ringers: 600 mL    Oral Fluid: 780 mL  Total IN: 1380 mL    OUT:    Voided (mL): 500 mL  Total OUT: 500 mL    Total NET: 880 mL                    PHYSICAL EXAM:  GENERAL: NAD, lying in bed comfortably  HEAD:  Atraumatic, Normocephalic  EYES: EOMI, PERRLA, conjunctiva and sclera clear  ENT:Left buccal mass extending to mandibular mucosa. Fixed. ulcerated intraoral mucosa  NECK: Left Level I fixed node  CHEST/LUNG: No stridor, no increased work of breathing on room air  HEART: Regular rate and rhythm  ABDOMEN: BSx4; Soft, nontender, nondistended  EXTREMITIES:  2+ Peripheral Pulses, brisk capillary refill. No clubbing, cyanosis, or edema  NERVOUS SYSTEM:  A&Ox3, no focal deficits   SKIN: No rashes or lesions         LABS                       13.7   5.40  )-----------( 147      ( 06 Dec 2023 05:30 )             40.8    12-06    141  |  104  |  17  ----------------------------<  108<H>  3.6   |  27  |  1.01    Ca    9.1      06 Dec 2023 05:30  Phos  3.7     12  Mg     1.8     12-           Coagulation Studies-   PT/INR - ( 06 Dec 2023 05:30 )   PT: 11.1 sec;   INR: 0.97          PTT - ( 06 Dec 2023 05:30 )  PTT:29.4 sec  Urinalysis Basic - ( 06 Dec 2023 05:30 )    Color: x / Appearance: x / SG: x / pH: x  Gluc: 108 mg/dL / Ketone: x  / Bili: x / Urobili: x   Blood: x / Protein: x / Nitrite: x   Leuk Esterase: x / RBC: x / WBC x   Sq Epi: x / Non Sq Epi: x / Bacteria: x      Endocrine Panel-                MICROBIOLOGY:        RADIOLOGY & ADDITIONAL STUDIES:    Assessment and Plan:  SAUNDRA HOLMAN is a  68yMale with PMH CAD (x2 stents Mar 2023), HLD, T2DM, hx of kidney stones, psoriasis w/ left buccal mucosa SCC presents for pre-optimization prior to composite mandibular resection, neck dissection and fibula free flap reconstruction.    Plan:  -Medicine consulted for pre-operative optimization, appreciate recommendations  -Cardiology consulted for pre-operative risk stratification and recommendations  -Soft, easy to chew diet  -Multimodal pain control  -Monitor POC gluc, ISS  -Bowel regimen  -LVX DVT ppx - held today  -NPO since midnight  -OR today  -ASA, plavix held      [facility] at [phone] with any questions/concerns.    Candida Jimenez  23 @ 07:06

## 2023-12-08 LAB
ANION GAP SERPL CALC-SCNC: 10 MMOL/L — SIGNIFICANT CHANGE UP (ref 5–17)
ANION GAP SERPL CALC-SCNC: 10 MMOL/L — SIGNIFICANT CHANGE UP (ref 5–17)
BUN SERPL-MCNC: 13 MG/DL — SIGNIFICANT CHANGE UP (ref 7–23)
BUN SERPL-MCNC: 13 MG/DL — SIGNIFICANT CHANGE UP (ref 7–23)
CALCIUM SERPL-MCNC: 8.3 MG/DL — LOW (ref 8.4–10.5)
CALCIUM SERPL-MCNC: 8.3 MG/DL — LOW (ref 8.4–10.5)
CHLORIDE SERPL-SCNC: 104 MMOL/L — SIGNIFICANT CHANGE UP (ref 96–108)
CHLORIDE SERPL-SCNC: 104 MMOL/L — SIGNIFICANT CHANGE UP (ref 96–108)
CO2 SERPL-SCNC: 25 MMOL/L — SIGNIFICANT CHANGE UP (ref 22–31)
CO2 SERPL-SCNC: 25 MMOL/L — SIGNIFICANT CHANGE UP (ref 22–31)
CREAT SERPL-MCNC: 0.8 MG/DL — SIGNIFICANT CHANGE UP (ref 0.5–1.3)
CREAT SERPL-MCNC: 0.8 MG/DL — SIGNIFICANT CHANGE UP (ref 0.5–1.3)
EGFR: 97 ML/MIN/1.73M2 — SIGNIFICANT CHANGE UP
EGFR: 97 ML/MIN/1.73M2 — SIGNIFICANT CHANGE UP
GLUCOSE BLDC GLUCOMTR-MCNC: 114 MG/DL — HIGH (ref 70–99)
GLUCOSE BLDC GLUCOMTR-MCNC: 114 MG/DL — HIGH (ref 70–99)
GLUCOSE SERPL-MCNC: 163 MG/DL — HIGH (ref 70–99)
GLUCOSE SERPL-MCNC: 163 MG/DL — HIGH (ref 70–99)
HCT VFR BLD CALC: 26.3 % — LOW (ref 39–50)
HCT VFR BLD CALC: 26.3 % — LOW (ref 39–50)
HGB BLD-MCNC: 9 G/DL — LOW (ref 13–17)
HGB BLD-MCNC: 9 G/DL — LOW (ref 13–17)
MAGNESIUM SERPL-MCNC: 2.2 MG/DL — SIGNIFICANT CHANGE UP (ref 1.6–2.6)
MAGNESIUM SERPL-MCNC: 2.2 MG/DL — SIGNIFICANT CHANGE UP (ref 1.6–2.6)
MCHC RBC-ENTMCNC: 28.3 PG — SIGNIFICANT CHANGE UP (ref 27–34)
MCHC RBC-ENTMCNC: 28.3 PG — SIGNIFICANT CHANGE UP (ref 27–34)
MCHC RBC-ENTMCNC: 34.2 GM/DL — SIGNIFICANT CHANGE UP (ref 32–36)
MCHC RBC-ENTMCNC: 34.2 GM/DL — SIGNIFICANT CHANGE UP (ref 32–36)
MCV RBC AUTO: 82.7 FL — SIGNIFICANT CHANGE UP (ref 80–100)
MCV RBC AUTO: 82.7 FL — SIGNIFICANT CHANGE UP (ref 80–100)
NRBC # BLD: 0 /100 WBCS — SIGNIFICANT CHANGE UP (ref 0–0)
NRBC # BLD: 0 /100 WBCS — SIGNIFICANT CHANGE UP (ref 0–0)
PHOSPHATE SERPL-MCNC: 2.4 MG/DL — LOW (ref 2.5–4.5)
PHOSPHATE SERPL-MCNC: 2.4 MG/DL — LOW (ref 2.5–4.5)
PLATELET # BLD AUTO: 136 K/UL — LOW (ref 150–400)
PLATELET # BLD AUTO: 136 K/UL — LOW (ref 150–400)
POTASSIUM SERPL-MCNC: 3.8 MMOL/L — SIGNIFICANT CHANGE UP (ref 3.5–5.3)
POTASSIUM SERPL-MCNC: 3.8 MMOL/L — SIGNIFICANT CHANGE UP (ref 3.5–5.3)
POTASSIUM SERPL-SCNC: 3.8 MMOL/L — SIGNIFICANT CHANGE UP (ref 3.5–5.3)
POTASSIUM SERPL-SCNC: 3.8 MMOL/L — SIGNIFICANT CHANGE UP (ref 3.5–5.3)
RBC # BLD: 3.18 M/UL — LOW (ref 4.2–5.8)
RBC # BLD: 3.18 M/UL — LOW (ref 4.2–5.8)
RBC # FLD: 13.3 % — SIGNIFICANT CHANGE UP (ref 10.3–14.5)
RBC # FLD: 13.3 % — SIGNIFICANT CHANGE UP (ref 10.3–14.5)
SODIUM SERPL-SCNC: 139 MMOL/L — SIGNIFICANT CHANGE UP (ref 135–145)
SODIUM SERPL-SCNC: 139 MMOL/L — SIGNIFICANT CHANGE UP (ref 135–145)
WBC # BLD: 7.93 K/UL — SIGNIFICANT CHANGE UP (ref 3.8–10.5)
WBC # BLD: 7.93 K/UL — SIGNIFICANT CHANGE UP (ref 3.8–10.5)
WBC # FLD AUTO: 7.93 K/UL — SIGNIFICANT CHANGE UP (ref 3.8–10.5)
WBC # FLD AUTO: 7.93 K/UL — SIGNIFICANT CHANGE UP (ref 3.8–10.5)

## 2023-12-08 PROCEDURE — 71045 X-RAY EXAM CHEST 1 VIEW: CPT | Mod: 26,76

## 2023-12-08 PROCEDURE — 99232 SBSQ HOSP IP/OBS MODERATE 35: CPT

## 2023-12-08 PROCEDURE — 71045 X-RAY EXAM CHEST 1 VIEW: CPT | Mod: 26,77

## 2023-12-08 PROCEDURE — 99233 SBSQ HOSP IP/OBS HIGH 50: CPT

## 2023-12-08 RX ORDER — SODIUM CHLORIDE 9 MG/ML
1000 INJECTION, SOLUTION INTRAVENOUS
Refills: 0 | Status: DISCONTINUED | OUTPATIENT
Start: 2023-12-08 | End: 2023-12-09

## 2023-12-08 RX ORDER — ASPIRIN/CALCIUM CARB/MAGNESIUM 324 MG
81 TABLET ORAL DAILY
Refills: 0 | Status: DISCONTINUED | OUTPATIENT
Start: 2023-12-08 | End: 2023-12-08

## 2023-12-08 RX ORDER — AMPICILLIN SODIUM AND SULBACTAM SODIUM 250; 125 MG/ML; MG/ML
3 INJECTION, POWDER, FOR SUSPENSION INTRAMUSCULAR; INTRAVENOUS EVERY 6 HOURS
Refills: 0 | Status: COMPLETED | OUTPATIENT
Start: 2023-12-08 | End: 2023-12-09

## 2023-12-08 RX ORDER — SODIUM CHLORIDE 9 MG/ML
500 INJECTION, SOLUTION INTRAVENOUS ONCE
Refills: 0 | Status: COMPLETED | OUTPATIENT
Start: 2023-12-08 | End: 2023-12-08

## 2023-12-08 RX ORDER — SODIUM CHLORIDE 9 MG/ML
3 INJECTION INTRAMUSCULAR; INTRAVENOUS; SUBCUTANEOUS EVERY 6 HOURS
Refills: 0 | Status: DISCONTINUED | OUTPATIENT
Start: 2023-12-08 | End: 2023-12-15

## 2023-12-08 RX ORDER — ENOXAPARIN SODIUM 100 MG/ML
40 INJECTION SUBCUTANEOUS EVERY 24 HOURS
Refills: 0 | Status: DISCONTINUED | OUTPATIENT
Start: 2023-12-08 | End: 2023-12-21

## 2023-12-08 RX ORDER — POTASSIUM PHOSPHATE, MONOBASIC POTASSIUM PHOSPHATE, DIBASIC 236; 224 MG/ML; MG/ML
30 INJECTION, SOLUTION INTRAVENOUS ONCE
Refills: 0 | Status: COMPLETED | OUTPATIENT
Start: 2023-12-08 | End: 2023-12-08

## 2023-12-08 RX ORDER — ATORVASTATIN CALCIUM 80 MG/1
40 TABLET, FILM COATED ORAL AT BEDTIME
Refills: 0 | Status: DISCONTINUED | OUTPATIENT
Start: 2023-12-08 | End: 2023-12-13

## 2023-12-08 RX ORDER — HYDROMORPHONE HYDROCHLORIDE 2 MG/ML
0.5 INJECTION INTRAMUSCULAR; INTRAVENOUS; SUBCUTANEOUS EVERY 4 HOURS
Refills: 0 | Status: DISCONTINUED | OUTPATIENT
Start: 2023-12-08 | End: 2023-12-09

## 2023-12-08 RX ORDER — ASPIRIN/CALCIUM CARB/MAGNESIUM 324 MG
300 TABLET ORAL DAILY
Refills: 0 | Status: DISCONTINUED | OUTPATIENT
Start: 2023-12-08 | End: 2023-12-09

## 2023-12-08 RX ORDER — ACETAMINOPHEN 500 MG
1000 TABLET ORAL EVERY 6 HOURS
Refills: 0 | Status: COMPLETED | OUTPATIENT
Start: 2023-12-09 | End: 2023-12-09

## 2023-12-08 RX ORDER — HYDROMORPHONE HYDROCHLORIDE 2 MG/ML
0.25 INJECTION INTRAMUSCULAR; INTRAVENOUS; SUBCUTANEOUS EVERY 4 HOURS
Refills: 0 | Status: DISCONTINUED | OUTPATIENT
Start: 2023-12-08 | End: 2023-12-09

## 2023-12-08 RX ADMIN — Medication 1000 MILLIGRAM(S): at 07:59

## 2023-12-08 RX ADMIN — Medication 400 MILLIGRAM(S): at 06:23

## 2023-12-08 RX ADMIN — Medication 400 MILLIGRAM(S): at 17:41

## 2023-12-08 RX ADMIN — Medication 400 MILLIGRAM(S): at 12:01

## 2023-12-08 RX ADMIN — HYDROMORPHONE HYDROCHLORIDE 0.5 MILLIGRAM(S): 2 INJECTION INTRAMUSCULAR; INTRAVENOUS; SUBCUTANEOUS at 22:06

## 2023-12-08 RX ADMIN — OXYCODONE HYDROCHLORIDE 10 MILLIGRAM(S): 5 TABLET ORAL at 04:07

## 2023-12-08 RX ADMIN — AMPICILLIN SODIUM AND SULBACTAM SODIUM 200 GRAM(S): 250; 125 INJECTION, POWDER, FOR SUSPENSION INTRAMUSCULAR; INTRAVENOUS at 12:27

## 2023-12-08 RX ADMIN — Medication 1000 MILLIGRAM(S): at 13:00

## 2023-12-08 RX ADMIN — HYDROMORPHONE HYDROCHLORIDE 0.5 MILLIGRAM(S): 2 INJECTION INTRAMUSCULAR; INTRAVENOUS; SUBCUTANEOUS at 18:41

## 2023-12-08 RX ADMIN — Medication 400 MILLIGRAM(S): at 23:10

## 2023-12-08 RX ADMIN — HYDROMORPHONE HYDROCHLORIDE 0.25 MILLIGRAM(S): 2 INJECTION INTRAMUSCULAR; INTRAVENOUS; SUBCUTANEOUS at 23:30

## 2023-12-08 RX ADMIN — CHLORHEXIDINE GLUCONATE 15 MILLILITER(S): 213 SOLUTION TOPICAL at 18:12

## 2023-12-08 RX ADMIN — ENOXAPARIN SODIUM 40 MILLIGRAM(S): 100 INJECTION SUBCUTANEOUS at 12:28

## 2023-12-08 RX ADMIN — POTASSIUM PHOSPHATE, MONOBASIC POTASSIUM PHOSPHATE, DIBASIC 83.33 MILLIMOLE(S): 236; 224 INJECTION, SOLUTION INTRAVENOUS at 09:07

## 2023-12-08 RX ADMIN — CHLORHEXIDINE GLUCONATE 1 APPLICATION(S): 213 SOLUTION TOPICAL at 07:59

## 2023-12-08 RX ADMIN — CHLORHEXIDINE GLUCONATE 15 MILLILITER(S): 213 SOLUTION TOPICAL at 06:25

## 2023-12-08 RX ADMIN — Medication 1000 MILLIGRAM(S): at 18:41

## 2023-12-08 RX ADMIN — AMPICILLIN SODIUM AND SULBACTAM SODIUM 200 GRAM(S): 250; 125 INJECTION, POWDER, FOR SUSPENSION INTRAMUSCULAR; INTRAVENOUS at 23:10

## 2023-12-08 RX ADMIN — SODIUM CHLORIDE 500 MILLILITER(S): 9 INJECTION, SOLUTION INTRAVENOUS at 02:04

## 2023-12-08 RX ADMIN — Medication 1000 MILLIGRAM(S): at 00:16

## 2023-12-08 RX ADMIN — Medication 300 MILLIGRAM(S): at 18:12

## 2023-12-08 RX ADMIN — SODIUM CHLORIDE 3 MILLILITER(S): 9 INJECTION INTRAMUSCULAR; INTRAVENOUS; SUBCUTANEOUS at 15:53

## 2023-12-08 RX ADMIN — OXYCODONE HYDROCHLORIDE 10 MILLIGRAM(S): 5 TABLET ORAL at 06:18

## 2023-12-08 RX ADMIN — AMPICILLIN SODIUM AND SULBACTAM SODIUM 200 GRAM(S): 250; 125 INJECTION, POWDER, FOR SUSPENSION INTRAMUSCULAR; INTRAVENOUS at 18:12

## 2023-12-08 RX ADMIN — HYDROMORPHONE HYDROCHLORIDE 0.5 MILLIGRAM(S): 2 INJECTION INTRAMUSCULAR; INTRAVENOUS; SUBCUTANEOUS at 21:31

## 2023-12-08 RX ADMIN — SODIUM CHLORIDE 3 MILLILITER(S): 9 INJECTION INTRAMUSCULAR; INTRAVENOUS; SUBCUTANEOUS at 23:36

## 2023-12-08 RX ADMIN — ONDANSETRON 4 MILLIGRAM(S): 8 TABLET, FILM COATED ORAL at 10:24

## 2023-12-08 RX ADMIN — HYDROMORPHONE HYDROCHLORIDE 0.5 MILLIGRAM(S): 2 INJECTION INTRAMUSCULAR; INTRAVENOUS; SUBCUTANEOUS at 17:41

## 2023-12-08 RX ADMIN — PANTOPRAZOLE SODIUM 40 MILLIGRAM(S): 20 TABLET, DELAYED RELEASE ORAL at 12:27

## 2023-12-08 RX ADMIN — AMPICILLIN SODIUM AND SULBACTAM SODIUM 200 GRAM(S): 250; 125 INJECTION, POWDER, FOR SUSPENSION INTRAMUSCULAR; INTRAVENOUS at 08:35

## 2023-12-08 RX ADMIN — Medication 1000 MILLIGRAM(S): at 23:26

## 2023-12-08 NOTE — PROGRESS NOTE ADULT - ATTENDING COMMENTS
Patient is a 69 yo M with PMH of CAD/NSTEMI s/p PCIx2 including the LAD (3/2023), HLD, T2DM, nephrolithiasis, psoriasis who presented after 3 months of jaw pain  found to have squamous cell carcinoma of the buccal mucosa with plan for elective mandibulectomy with needle dissection, tracheostomy and fibular free flap reconstruction planned on 12/7/23. Cardiology originally consulted for perioperative risk stratification.    Review of Studies  - ECG 12/3/23: Sinus bradycardia   - TTE 12/4/23: LVIDD 4.09cm LVEF 65%. Mild LVH. Normal RV function, mildly dilated. No valvular disease. No pericardial effusion.    #Post Operative CV Monitoring  - Patient with known ASCVD s/p 2 Vessel PCI in March 2023 ( LAD and Ramus) in setting of NSTEMI  - Echo this hospitalization reviewed showing mild LVH with Normal biventricular function without RWMA or valvular heart disease.  - Patient tolerated procedure well without complication. No chest discomfort, or other anginal symptoms  - Tele reviewed with patient noted to be tachycardic during febrile episode. Tachycardia has since resolved    #CAD s/p PCI (LAD, Ramus)  - Given that coronary intervention was ~9 months ago, can continue monotherapy with Aspirin 81mg resume PLavix soon as safe from surgical standpoint, preferably with loading dose of 300 mg if bleeding risk is low, if not can resume at 75 mg po daily.  - In March 2024, ASA 81 mg po daily can be discontinued and patient maintained on Plavix Monotherapy.   - Continue  Lipitor 40mg QD       Cardiology will continue to follow with you, please call with any questions .

## 2023-12-08 NOTE — DIETITIAN INITIAL EVALUATION ADULT - PERTINENT MEDS FT
MEDICATIONS  (STANDING):  acetaminophen   IVPB .. 1000 milliGRAM(s) IV Intermittent every 6 hours  ampicillin/sulbactam  IVPB 3 Gram(s) IV Intermittent every 6 hours  aspirin  chewable 81 milliGRAM(s) Oral daily  atorvastatin 40 milliGRAM(s) Oral at bedtime  chlorhexidine 0.12% Liquid 15 milliLiter(s) Oral Mucosa two times a day  chlorhexidine 4% Liquid 1 Application(s) Topical <User Schedule>  dextrose 5%. 1000 milliLiter(s) (50 mL/Hr) IV Continuous <Continuous>  dextrose 5%. 1000 milliLiter(s) (100 mL/Hr) IV Continuous <Continuous>  dextrose 50% Injectable 25 Gram(s) IV Push once  dextrose 50% Injectable 12.5 Gram(s) IV Push once  dextrose 50% Injectable 25 Gram(s) IV Push once  enoxaparin Injectable 40 milliGRAM(s) SubCutaneous every 24 hours  gabapentin 600 milliGRAM(s) Oral daily  glucagon  Injectable 1 milliGRAM(s) IntraMuscular once  influenza  Vaccine (HIGH DOSE) 0.7 milliLiter(s) IntraMuscular once  lactated ringers. 1000 milliLiter(s) (100 mL/Hr) IV Continuous <Continuous>  pantoprazole  Injectable 40 milliGRAM(s) IV Push daily  polyethylene glycol 3350 17 Gram(s) Oral daily  senna 2 Tablet(s) Oral at bedtime  sodium chloride 0.9% for Nebulization 3 milliLiter(s) Nebulizer every 6 hours    MEDICATIONS  (PRN):  dextrose Oral Gel 15 Gram(s) Oral once PRN Blood Glucose LESS THAN 70 milliGRAM(s)/deciliter  ondansetron Injectable 4 milliGRAM(s) IV Push every 6 hours PRN Nausea and/or Vomiting  oxyCODONE    IR 5 milliGRAM(s) Oral every 4 hours PRN Moderate Pain (4 - 6)  oxyCODONE    IR 10 milliGRAM(s) Oral every 4 hours PRN Severe Pain (7 - 10)

## 2023-12-08 NOTE — DIETITIAN INITIAL EVALUATION ADULT - ADD RECOMMEND
- Team requesting tube feed recommendations   - NG tube is placed, and tip placement is confirmed   - When medically feasible, recommend the following INTERMITTENT tube feed regimen via NG tube:     >> 1422 mL total volume (6 bottles daily) of Glucerna 1.5 (provides 2136 kcal, 118 g protein, 1080 mL free fluid)    >> Start at 10 mL/hr and increase by 20 mL/hr q6h to goal rate of 79 mL/hr; or as tolerated     >> At goal rate, it will take approximately 18 hours/day to infuse total volume    >> Flush with 50 mL water before and after tube feed administration (provides an additional 100 mL)     >> Additional free water boluses as per team     >> Hold feeds if increase in pressor requirements, MAPs consistently trending <60 mmHg, lactic acidosis presents, or GI intolerance is noted   - Monitor tube feed tolerance, GI distress, labs, weights   - Monitor electrolytes, adjust and replete PRN   - Of note, patient with history of hypophosphatemia; please continue to monitor and replete as needed   - Pain and bowel regimen as per team     - After tube feed has been tolerated at goal rate for a minimum of 24 hours, may transition to bolus feeds if desired; see recommendations below:     >> Bolus schedule per patient/caregiver preference. Sample bolus schedule below     >> Bolus 2 cans (474 mL) of Glucerna 1.5 TID @ 0900, 1500, 2100     >> Flush with 50 mL water before and after each bolus (provides an additional 300 mL)     >> The above provides 2136 kcal, 118 g protein, 1380 mL free fluid and meets all macro- and micronutrient needs     >> Additional free water boluses as per team

## 2023-12-08 NOTE — PROGRESS NOTE ADULT - SUBJECTIVE AND OBJECTIVE BOX
24 hour events: Mg 1.2 post-op, EKG sinus chinedu (50's), 4g Mg repleted. 500 cc bolus x 1 for MAP < 60. UOP 75/hr. MAPs 57 - 64 but SBP sustained > 110 - no additional intervention. Unasyn and IVF started, ASA restarted, SQL ordered. CXR with DHT at GE junction, will reposition.     SUBJECTIVE: Patient seen and examined at bedside this morning. Pain and nausea well controlled with medications. Doing well on trach collar, cuff remains inflated (bloody secretions).     ampicillin/sulbactam  IVPB 3 Gram(s) IV Intermittent every 6 hours  aspirin  chewable 81 milliGRAM(s) Oral daily  enoxaparin Injectable 40 milliGRAM(s) SubCutaneous every 24 hours    MEDICATIONS  (PRN):  dextrose Oral Gel 15 Gram(s) Oral once PRN Blood Glucose LESS THAN 70 milliGRAM(s)/deciliter  ondansetron Injectable 4 milliGRAM(s) IV Push every 6 hours PRN Nausea and/or Vomiting  oxyCODONE    IR 5 milliGRAM(s) Oral every 4 hours PRN Moderate Pain (4 - 6)  oxyCODONE    IR 10 milliGRAM(s) Oral every 4 hours PRN Severe Pain (7 - 10)      I&O's Detail    07 Dec 2023 07:01  -  08 Dec 2023 07:00  --------------------------------------------------------  IN:    IV PiggyBack: 100 mL    IV PiggyBack: 200 mL    Lactated Ringers: 100 mL    Lactated Ringers Bolus: 500 mL  Total IN: 900 mL    OUT:    Bulb (mL): 220 mL    Bulb (mL): 140 mL    Bulb (mL): 160 mL    Indwelling Catheter - Urethral (mL): 680 mL    VAC (Vacuum Assisted Closure) System (mL): 5 mL  Total OUT: 1205 mL    Total NET: -305 mL      08 Dec 2023 07:01  -  08 Dec 2023 11:29  --------------------------------------------------------  IN:    IV PiggyBack: 266.6 mL    Lactated Ringers: 300 mL  Total IN: 566.6 mL    OUT:    Bulb (mL): 40 mL    Bulb (mL): 10 mL    Bulb (mL): 70 mL    Indwelling Catheter - Urethral (mL): 190 mL    VAC (Vacuum Assisted Closure) System (mL): 0 mL  Total OUT: 310 mL    Total NET: 256.6 mL          T(C): 37.7 (12-08-23 @ 05:27), Max: 37.7 (12-08-23 @ 05:27)  HR: 63 (12-08-23 @ 10:00) (42 - 101)  BP: 142/65 (12-07-23 @ 21:55) (126/58 - 152/83)  RR: 26 (12-08-23 @ 10:00) (11 - 26)  SpO2: 100% (12-08-23 @ 10:00) (97% - 100%)    GENERAL: NAD, Resting comfortably in bed  HEENT: EOMI, PERRL, surgical incision around chin extending up through lip c/d/i, neck surgical incision w/ steristrip incision c/d/i, left FFF paddle pink, warm, well perfuse. L neck, R neck KRISTEN ss  RESP: trach in place w/ 7.5 cuffed portex, CTAB  CARD: Normal rate, Normal peripheral perfusion, No murmurs  GI: Soft, ND, NT, No guarding, No rebound tenderness  EXTREM: Wound vac left leg ss output, left leg KRISTEN ss, left thigh site of STSG with tegaderm   SKIN: No rashes, no lesions  NEURO: AAOx3, No focal motor or sensory deficits  PSYCH: Affect and characteristics of appearance and behaviors are appropriate    LABS:                        9.0    7.93  )-----------( 136      ( 08 Dec 2023 05:53 )             26.3     12-08    139  |  104  |  13  ----------------------------<  163<H>  3.8   |  25  |  0.80    Ca    8.3<L>      08 Dec 2023 05:53  Phos  2.4     12-08  Mg     2.2     12-08    TPro  5.6<L>  /  Alb  3.8  /  TBili  0.8  /  DBili  x   /  AST  28  /  ALT  17  /  AlkPhos  27<L>  12-07    PT/INR - ( 07 Dec 2023 20:04 )   PT: 12.6 sec;   INR: 1.11          PTT - ( 07 Dec 2023 20:04 )  PTT:24.8 sec  Urinalysis Basic - ( 08 Dec 2023 05:53 )    Color: x / Appearance: x / SG: x / pH: x  Gluc: 163 mg/dL / Ketone: x  / Bili: x / Urobili: x   Blood: x / Protein: x / Nitrite: x   Leuk Esterase: x / RBC: x / WBC x   Sq Epi: x / Non Sq Epi: x / Bacteria: x        RADIOLOGY & ADDITIONAL STUDIES:

## 2023-12-08 NOTE — PROGRESS NOTE ADULT - ASSESSMENT
Assessment and Plan:  SAUNDRA HOLMAN is a  68M w/ [id number] SCC of L buccal mucosa presents for pre optimization for surgery 12/7, now s/p hemimandibulectomy, left level 1, 2a, 3 neck neck dissection, L FFF, tracheostomy w/ 7.5 cuffed portex, dental implants, STSG 12/7.    PLAN:       POD 1:      Consult nutrition, start tube feeds and advance to continuous goal.     Lugo catheter removal and trial of void if extubated     RN Flap checks Q1 for 24 hours      OOB to chair      Steroids completed     Continue Ondansetron PRN nausea/vomiting , PO Senna once daily, Miralax 17g once daily,  Chlorhexidine swish and spit, Esomeprazole, Enoxaparin, Gabapentin, Acetaminophen     Continue incentive spirometry and SCD’s      Will order camboot today     [facility] at [phone] with any questions/concerns.    Ada Quinones PA-C  12-08-23 @ 08:35   Assessment and Plan:  SAUNDRA HOLMAN is a  68M w/ [id number] SCC of L buccal mucosa presents for pre optimization for surgery 12/7, now s/p hemimandibulectomy, left level 1, 2a, 3 neck neck dissection, L FFF, tracheostomy w/ 7.5 cuffed portex, dental implants, STSG 12/7.    PLAN:       POD 1:      Consult nutrition, start tube feeds and advance to continuous goal.     Lugo catheter removal and trial of void if extubated     RN Flap checks Q1 for 24 hours      OOB to chair      Steroids completed     Continue Ondansetron PRN nausea/vomiting , PO Senna once daily, Miralax 17g once daily,  Chlorhexidine swish and spit, Esomeprazole, Enoxaparin, Gabapentin, Acetaminophen     Continue incentive spirometry and SCD’s      Will order camboot today     [facility] at [phone] with any questions/concerns.    Ada Quinones PA-C  12-08-23 @ 08:35

## 2023-12-08 NOTE — PROGRESS NOTE ADULT - SUBJECTIVE AND OBJECTIVE BOX
OTOLARYNGOLOGY (ENT) PROGRESS NOTE    PATIENT: SAUNDRA HOLMAN  MRN: [id number]  : 57  [id number]  DATE OF SERVICE:  23  			         ID:SAUNDRA HOLMAN is a  69yo M w PMH of CAD (x2 stents Mar 2023), HLD, T2DM, hx of kidney stones, psoriasis presents to [facility] for evaluation of oral mass. Reports Three month hx of jaw pain and loosening of mandibular molar tooth on the lower left. Pt has a 30 pack yr smoking hx, quit 1 yr ago. 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 POD 1 composite mandibular resection, neck dissection and fibula free flap reconstruction.       Subjective/ Interval:   ; patient seen this morning, oozing from trach as expected ,  Plan to start aspirin today, cuff is up on tracheostomy tube, intraoral skin panel intact doppler signal strong,  plan to advance NGT   ALLERGIES:  No Known Allergies      MEDICATIONS:  Antiinfectives:   ampicillin/sulbactam  IVPB 3 Gram(s) IV Intermittent every 6 hours    IV fluids:  dextrose 5%. 1000 milliLiter(s) IV Continuous <Continuous>  dextrose 5%. 1000 milliLiter(s) IV Continuous <Continuous>  lactated ringers. 1000 milliLiter(s) IV Continuous <Continuous>  potassium phosphate IVPB 30 milliMole(s) IV Intermittent once    Hematologic/Anticoagulation:  aspirin enteric coated 81 milliGRAM(s) Oral daily  enoxaparin Injectable 40 milliGRAM(s) SubCutaneous every 24 hours    Pain medications/Neuro:  acetaminophen   IVPB .. 1000 milliGRAM(s) IV Intermittent every 6 hours  gabapentin 600 milliGRAM(s) Oral daily  ondansetron Injectable 4 milliGRAM(s) IV Push every 6 hours PRN  oxyCODONE    IR 5 milliGRAM(s) Oral every 4 hours PRN  oxyCODONE    IR 10 milliGRAM(s) Oral every 4 hours PRN    Endocrine Medications:   atorvastatin 40 milliGRAM(s) Oral at bedtime  dextrose 50% Injectable 25 Gram(s) IV Push once  dextrose 50% Injectable 12.5 Gram(s) IV Push once  dextrose 50% Injectable 25 Gram(s) IV Push once  dextrose Oral Gel 15 Gram(s) Oral once PRN  glucagon  Injectable 1 milliGRAM(s) IntraMuscular once    All other standing medications:   chlorhexidine 0.12% Liquid 15 milliLiter(s) Oral Mucosa two times a day  chlorhexidine 4% Liquid 1 Application(s) Topical <User Schedule>  influenza  Vaccine (HIGH DOSE) 0.7 milliLiter(s) IntraMuscular once  pantoprazole  Injectable 40 milliGRAM(s) IV Push daily  polyethylene glycol 3350 17 Gram(s) Oral daily  senna 2 Tablet(s) Oral at bedtime    All other PRN medications:    Vital Signs Last 24 Hrs  T(C): 37.7 (08 Dec 2023 05:27), Max: 37.7 (08 Dec 2023 05:27)  T(F): 99.8 (08 Dec 2023 05:27), Max: 99.8 (08 Dec 2023 05:27)  HR: 66 (08 Dec 2023 08:00) (42 - 101)  BP: 142/65 (07 Dec 2023 21:55) (126/58 - 152/83)  BP(mean): 93 (07 Dec 2023 21:55) (84 - 106)  RR: 13 (08 Dec 2023 08:00) (11 - 26)  SpO2: 99% (08 Dec 2023 08:00) (97% - 100%)    Parameters below as of 08 Dec 2023 08:00  Patient On (Oxygen Delivery Method): tracheostomy collar    O2 Concentration (%): 40       @ :  -   @ 07:00  --------------------------------------------------------  IN:    IV PiggyBack: 100 mL    IV PiggyBack: 100 mL    Lactated Ringers Bolus: 500 mL  Total IN: 700 mL    OUT:    Bulb (mL): 220 mL    Bulb (mL): 140 mL    Bulb (mL): 160 mL    Indwelling Catheter - Urethral (mL): 680 mL    VAC (Vacuum Assisted Closure) System (mL): 5 mL  Total OUT: 1205 mL    Total NET: -505 mL       @ 07:  -   @ 08:34  --------------------------------------------------------  IN:  Total IN: 0 mL    OUT:    Indwelling Catheter - Urethral (mL): 70 mL  Total OUT: 70 mL    Total NET: -70 mL          23 @ 07:01  -  23 @ 07:00  --------------------------------------------------------  IN:  Total IN: 0 mL    OUT:    Bulb (mL): 220 mL    Bulb (mL): 140 mL    Bulb (mL): 160 mL    VAC (Vacuum Assisted Closure) System (mL): 5 mL  Total OUT: 525 mL    Total NET: -525 mL              PHYSICAL EXAM:  Gen: AAOx3, NAD   Head: Surgical incision around chin extending up through lip  Eyes: EOMI, PERRL, visual acuity intact, no diplopia, supra/infra orbital rims intact, no subconjunctival heme, no telecanthus, no exophthalmos   Ears: Gross hearing intact,  Nose: No septal hematoma/asymmetry, no epistaxis bilaterally. no abrasions present, no lacerations.   Malar: No malar depression  Throat: No LAD, supple, neck surgical incision w/ steristrip dressing is hemostatic + left and right neck KRISTEN in place, left KRISTEN sanguinous output 220cc, right neck KRISTEN sanguinous output 120cc, trach in place w/ 7.5 cuffed portex  Oral: Left FFF paddle pink, warm, well perfused. IMF screws in place, class 3 elastics,   Exx: Wound vac left leg 5 cc serosanguinous output, left leg KRISTEN 160 cc  CT Maxillofacial          LABS                       9.0    7.93  )-----------( 136      ( 08 Dec 2023 05:53 )             26.3    12-    139  |  104  |  13  ----------------------------<  163<H>  3.8   |  25  |  0.80    Ca    8.3<L>      08 Dec 2023 05:53  Phos  2.4       Mg     2.2     12-08    TPro  5.6<L>  /  Alb  3.8  /  TBili  0.8  /  DBili  x   /  AST  28  /  ALT  17  /  AlkPhos  27<L>           Coagulation Studies-   PT/INR - ( 07 Dec 2023 20:04 )   PT: 12.6 sec;   INR: 1.11          PTT - ( 07 Dec 2023 20:04 )  PTT:24.8 sec  Urinalysis Basic - ( 08 Dec 2023 05:53 )    Color: x / Appearance: x / SG: x / pH: x  Gluc: 163 mg/dL / Ketone: x  / Bili: x / Urobili: x   Blood: x / Protein: x / Nitrite: x   Leuk Esterase: x / RBC: x / WBC x   Sq Epi: x / Non Sq Epi: x / Bacteria: x      Endocrine Panel-  Calcium: 8.3 mg/dL ( @ 05:53)  Calcium: 8.8 mg/dL ( @ 20:04)       OTOLARYNGOLOGY (ENT) PROGRESS NOTE    PATIENT: SAUNDRA HOLMAN  MRN: [id number]  : 57  [id number]  DATE OF SERVICE:  23  			         ID:SAUNDRA HOLMAN is a  67yo M w PMH of CAD (x2 stents Mar 2023), HLD, T2DM, hx of kidney stones, psoriasis presents to [facility] for evaluation of oral mass. Reports Three month hx of jaw pain and loosening of mandibular molar tooth on the lower left. Pt has a 30 pack yr smoking hx, quit 1 yr ago. 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 POD 1 composite mandibular resection, neck dissection and fibula free flap reconstruction.       Subjective/ Interval:   ; patient seen this morning, oozing from trach as expected ,  Plan to start aspirin today, cuff is up on tracheostomy tube, intraoral skin panel intact doppler signal strong,  plan to advance NGT   ALLERGIES:  No Known Allergies      MEDICATIONS:  Antiinfectives:   ampicillin/sulbactam  IVPB 3 Gram(s) IV Intermittent every 6 hours    IV fluids:  dextrose 5%. 1000 milliLiter(s) IV Continuous <Continuous>  dextrose 5%. 1000 milliLiter(s) IV Continuous <Continuous>  lactated ringers. 1000 milliLiter(s) IV Continuous <Continuous>  potassium phosphate IVPB 30 milliMole(s) IV Intermittent once    Hematologic/Anticoagulation:  aspirin enteric coated 81 milliGRAM(s) Oral daily  enoxaparin Injectable 40 milliGRAM(s) SubCutaneous every 24 hours    Pain medications/Neuro:  acetaminophen   IVPB .. 1000 milliGRAM(s) IV Intermittent every 6 hours  gabapentin 600 milliGRAM(s) Oral daily  ondansetron Injectable 4 milliGRAM(s) IV Push every 6 hours PRN  oxyCODONE    IR 5 milliGRAM(s) Oral every 4 hours PRN  oxyCODONE    IR 10 milliGRAM(s) Oral every 4 hours PRN    Endocrine Medications:   atorvastatin 40 milliGRAM(s) Oral at bedtime  dextrose 50% Injectable 25 Gram(s) IV Push once  dextrose 50% Injectable 12.5 Gram(s) IV Push once  dextrose 50% Injectable 25 Gram(s) IV Push once  dextrose Oral Gel 15 Gram(s) Oral once PRN  glucagon  Injectable 1 milliGRAM(s) IntraMuscular once    All other standing medications:   chlorhexidine 0.12% Liquid 15 milliLiter(s) Oral Mucosa two times a day  chlorhexidine 4% Liquid 1 Application(s) Topical <User Schedule>  influenza  Vaccine (HIGH DOSE) 0.7 milliLiter(s) IntraMuscular once  pantoprazole  Injectable 40 milliGRAM(s) IV Push daily  polyethylene glycol 3350 17 Gram(s) Oral daily  senna 2 Tablet(s) Oral at bedtime    All other PRN medications:    Vital Signs Last 24 Hrs  T(C): 37.7 (08 Dec 2023 05:27), Max: 37.7 (08 Dec 2023 05:27)  T(F): 99.8 (08 Dec 2023 05:27), Max: 99.8 (08 Dec 2023 05:27)  HR: 66 (08 Dec 2023 08:00) (42 - 101)  BP: 142/65 (07 Dec 2023 21:55) (126/58 - 152/83)  BP(mean): 93 (07 Dec 2023 21:55) (84 - 106)  RR: 13 (08 Dec 2023 08:00) (11 - 26)  SpO2: 99% (08 Dec 2023 08:00) (97% - 100%)    Parameters below as of 08 Dec 2023 08:00  Patient On (Oxygen Delivery Method): tracheostomy collar    O2 Concentration (%): 40       @ :  -   @ 07:00  --------------------------------------------------------  IN:    IV PiggyBack: 100 mL    IV PiggyBack: 100 mL    Lactated Ringers Bolus: 500 mL  Total IN: 700 mL    OUT:    Bulb (mL): 220 mL    Bulb (mL): 140 mL    Bulb (mL): 160 mL    Indwelling Catheter - Urethral (mL): 680 mL    VAC (Vacuum Assisted Closure) System (mL): 5 mL  Total OUT: 1205 mL    Total NET: -505 mL       @ 07:  -   @ 08:34  --------------------------------------------------------  IN:  Total IN: 0 mL    OUT:    Indwelling Catheter - Urethral (mL): 70 mL  Total OUT: 70 mL    Total NET: -70 mL          23 @ 07:01  -  23 @ 07:00  --------------------------------------------------------  IN:  Total IN: 0 mL    OUT:    Bulb (mL): 220 mL    Bulb (mL): 140 mL    Bulb (mL): 160 mL    VAC (Vacuum Assisted Closure) System (mL): 5 mL  Total OUT: 525 mL    Total NET: -525 mL              PHYSICAL EXAM:  Gen: AAOx3, NAD   Head: Surgical incision around chin extending up through lip  Eyes: EOMI, PERRL, visual acuity intact, no diplopia, supra/infra orbital rims intact, no subconjunctival heme, no telecanthus, no exophthalmos   Ears: Gross hearing intact,  Nose: No septal hematoma/asymmetry, no epistaxis bilaterally. no abrasions present, no lacerations.   Malar: No malar depression  Throat: No LAD, supple, neck surgical incision w/ steristrip dressing is hemostatic + left and right neck KRISTEN in place, left KRISTEN sanguinous output 220cc, right neck KRISTEN sanguinous output 120cc, trach in place w/ 7.5 cuffed portex  Oral: Left FFF paddle pink, warm, well perfused. IMF screws in place, class 3 elastics,   Exx: Wound vac left leg 5 cc serosanguinous output, left leg KRISTEN 160 cc  CT Maxillofacial          LABS                       9.0    7.93  )-----------( 136      ( 08 Dec 2023 05:53 )             26.3    12-    139  |  104  |  13  ----------------------------<  163<H>  3.8   |  25  |  0.80    Ca    8.3<L>      08 Dec 2023 05:53  Phos  2.4       Mg     2.2     12-08    TPro  5.6<L>  /  Alb  3.8  /  TBili  0.8  /  DBili  x   /  AST  28  /  ALT  17  /  AlkPhos  27<L>           Coagulation Studies-   PT/INR - ( 07 Dec 2023 20:04 )   PT: 12.6 sec;   INR: 1.11          PTT - ( 07 Dec 2023 20:04 )  PTT:24.8 sec  Urinalysis Basic - ( 08 Dec 2023 05:53 )    Color: x / Appearance: x / SG: x / pH: x  Gluc: 163 mg/dL / Ketone: x  / Bili: x / Urobili: x   Blood: x / Protein: x / Nitrite: x   Leuk Esterase: x / RBC: x / WBC x   Sq Epi: x / Non Sq Epi: x / Bacteria: x      Endocrine Panel-  Calcium: 8.3 mg/dL ( @ 05:53)  Calcium: 8.8 mg/dL ( @ 20:04)

## 2023-12-08 NOTE — PROGRESS NOTE ADULT - ASSESSMENT
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 Cardiology consulted for perioperative risk stratification now POD#1  left hemimandibulectomy with neck dissection and reconstruction with left fibular free flap and dental implants s/p tracheostomy with 7.5 cuffed portex.    Review of Studies    Telemetry: Sinus rhythm, one episode of sinus tachycardia while febrile.    ECG 12/3/23: Sinus bradycardia     TTE 12/4/23: LVIDD 4.09cm LVEF 65%. Mild LVH. Normal RV function, mildly dilated. No valvular disease. No pericardial effusion.    #Perioperative Risk Stratification/Postoperative Examination   No acute cardiopulmonary complaints at bedside evaluation.  s/p LAD/Ramus intermedius PCI in setting of NSTEMI (3/2023).   - METS >4 good functional capacity, running 6-7 miles prior to surgery.  - RCRI Class II  10.1 % 30-day risk of death, MI, or cardiac arrest  - Deemed low - intermediate risk for intermediate risk procedure  - Now POD#1 with no cardiopulmonary complaints    #CAD/NSTEMI s/p LAD/Ramus intermedius PCI DESx2 (3/2023).  - Please restart Plavix 75mg QD and continue dual antiplatelet therapy until March 2024   - continue Aspirin 81mg QD and Plavix 75mg QD  - continue Lipitor 40mg QD     Case discussed with cardiology consult attending, cardiology to sign off, please re-consult with any questions.   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 Cardiology consulted for perioperative risk stratification now POD#1  left hemimandibulectomy with neck dissection and reconstruction with left fibular free flap and dental implants s/p tracheostomy with 7.5 cuffed portex.    Review of Studies    Telemetry: Sinus rhythm, one episode of sinus tachycardia while febrile.    ECG 12/3/23: Sinus bradycardia     TTE 12/4/23: LVIDD 4.09cm LVEF 65%. Mild LVH. Normal RV function, mildly dilated. No valvular disease. No pericardial effusion.    #Perioperative Risk Stratification/Postoperative Examination   No acute cardiopulmonary complaints at bedside evaluation.  s/p LAD/Ramus intermedius PCI in setting of NSTEMI (3/2023).   - METS >4 good functional capacity, running 6-7 miles prior to surgery.  - RCRI Class II  10.1 % 30-day risk of death, MI, or cardiac arrest  - Deemed low - intermediate risk for intermediate risk procedure  - Now POD#1 with no cardiopulmonary complaints    #CAD/NSTEMI s/p LAD/Ramus intermedius PCI DESx2 (3/2023).  - Please restart Plavix 75mg QD with a load of Plavix [id number] as the first dose and then continue dual antiplatelet therapy until March 2024, If concern for bleeding can restart Plavix at 75mg QD.  - continue Aspirin 81mg QD and Plavix 75mg QD  - continue Lipitor 40mg QD     Case discussed with cardiology consult attending, cardiology to sign off, please re-consult with any questions.   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 Cardiology consulted for perioperative risk stratification now POD#1  left hemimandibulectomy with neck dissection and reconstruction with left fibular free flap and dental implants s/p tracheostomy with 7.5 cuffed portex.    Review of Studies    Telemetry: Sinus rhythm, one episode of sinus tachycardia while febrile.    ECG 12/3/23: Sinus bradycardia     TTE 12/4/23: LVIDD 4.09cm LVEF 65%. Mild LVH. Normal RV function, mildly dilated. No valvular disease. No pericardial effusion.    #Perioperative Risk Stratification/Postoperative Examination   No acute cardiopulmonary complaints at bedside evaluation.  s/p LAD/Ramus intermedius PCI in setting of NSTEMI (3/2023).   - METS >4 good functional capacity, running 6-7 miles prior to surgery.  - RCRI Class II  10.1 % 30-day risk of death, MI, or cardiac arrest  - Deemed low - intermediate risk for intermediate risk procedure  - Now POD#1 with no cardiopulmonary complaints    #CAD/NSTEMI s/p LAD/Ramus intermedius PCI DESx2 (3/2023).  - Please restart Plavix 75mg QD with a load of Plavix [id number] as the first dose and then continue dual antiplatelet therapy until March 2024, If concern for bleeding can restart Plavix at 75mg QD.  - continue Aspirin 81mg QD and Plavix 75mg QD  - continue Lipitor 40mg QD     Case discussed with cardiology consult attending, cardiology to sign off, please re-consult with any questions.

## 2023-12-08 NOTE — DIETITIAN INITIAL EVALUATION ADULT - PERTINENT LABORATORY DATA
12-08    139  |  104  |  13  ----------------------------<  163<H>  3.8   |  25  |  0.80    Ca    8.3<L>      08 Dec 2023 05:53  Phos  2.4     12-08  Mg     2.2     12-08    TPro  5.6<L>  /  Alb  3.8  /  TBili  0.8  /  DBili  x   /  AST  28  /  ALT  17  /  AlkPhos  27<L>  12-07  A1C with Estimated Average Glucose Result: 6.3 % (12-04-23 @ 05:52)

## 2023-12-08 NOTE — PROVIDER CONTACT NOTE (OTHER) - BACKGROUND
69 y/o male s/p L hemimandibulectomy with L neck dissection and 7.5 Portex trach placement on 12/7 67 y/o male s/p L hemimandibulectomy with L neck dissection and 7.5 Portex trach placement on 12/7 74 y/o female with PMH of HTN, gout, HLD and DM (type 2) presents for PST.  Patient reports abnormal mammogram in Sept 2021 resulting in additional work up including biopsy.  Results were neg for malignancy but was reported to have abnormal cells and recommended for surgery.  Denies breast pain, mass or nipple discharge.   Had pos COVID-19 pcr 4/26/2022 with no symptoms.  Feeling well today at PST.  Scheduled for left excisional biopsy with daniella localization with Dr Gallego on 05/13/2022.  DANIELLA to be placed today at 1 pm.

## 2023-12-08 NOTE — PROGRESS NOTE ADULT - ATTENDING COMMENTS
soft pressure o/n, given fluid  continue q1h flap checks  dc young  dc jac    decision making high complexity

## 2023-12-08 NOTE — DIETITIAN INITIAL EVALUATION ADULT - OTHER CALCULATIONS
HT (inches): 70       WT (pounds): 185 (12/7)       BMI (kg/m^2): 26.5       IBW (pounds): 166 +/- 10%       %IBW: 110.2 %  Ideal body weight (IBW) being used as Pt is critically ill and >=100% of ideal body weight. Fluids at team's discretion.

## 2023-12-08 NOTE — DIETITIAN INITIAL EVALUATION ADULT - OTHER INFO
69 y/o M w PMHx of CAD (x2 stents Mar 2023), HLD, T2DM, nephrolithiasis (ureteral stent placement 2022), and psoriasis w/ three-month hx of jaw pain and loosening of lower left mandibular molar tooth. Pt has a 30 pack yr smoking hx, quit 1 yr ago. 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 12/7 s/p L hemimandibulectomy, L level 1, 2a, 3 neck dissection, L fibula free flap reconstruction, STSG from L thigh, dental implants, and tracheostomy. Admitted to SICU for flap checks and hemodynamic monitoring. Discussed Pt with team during IDT rounds.     Chart, labs and meds reviewed. Tmax 37.7 C. MAPs  mm Hg. Labs significant for H/H 9.0/26.3L; phosphorus 2.4L; glucose 163H; POCT 84-157H; HgbA1c 6.3H - not on diabetic medications. Meds significant for ondansetron, pantoprazole, polyethylene glycol, senna. IV fluids: Lactated ringers 1000 mL @ 100 mL/hr. A&Ox4. Skin: Wong scale score 16; mild 2+ facial edema; localized facial swelling; intact except for surgical incisions Lt thigh, chin/neck midline, Lt lower leg wound vac. GI: abdomen rounded; last bowel movement documented 12/5; NG tube Dobhoff in Rt nostril with tip in stomach verified by x-ray for medication administration.     Met with patient on [address]. Patient’s wife Sara was present throughout the nutrition interview and participated in the nutrition interview. Patient reports a height of 70 inches and a UBW of 215 pounds. Reports weight loss of 30 pounds over the past 3 months in the setting of difficulty eating. This represents a reported significant weight loss of 14 %. Full nutrition focused physical exam conducted - No fat wasting or muscle loss noted. No previous weight data documented in chart to confirm weight loss. Patient reports strong desire to not regain lost weight. Denies any food allergies or intolerances. Denies any cultural/Holiness dietary preferences or restrictions. Takes vitamins D3 and B12 at home. Denies pain or discomfort, denies nausea currently. Patient provided with medical nutrition therapy (MNT) specific to his diagnosis.    69 y/o M w PMHx of CAD (x2 stents Mar 2023), HLD, T2DM, nephrolithiasis (ureteral stent placement 2022), and psoriasis w/ three-month hx of jaw pain and loosening of lower left mandibular molar tooth. Pt has a 30 pack yr smoking hx, quit 1 yr ago. 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 12/7 s/p L hemimandibulectomy, L level 1, 2a, 3 neck dissection, L fibula free flap reconstruction, STSG from L thigh, dental implants, and tracheostomy. Admitted to SICU for flap checks and hemodynamic monitoring. Discussed Pt with team during IDT rounds.     Chart, labs and meds reviewed. Tmax 37.7 C. MAPs  mm Hg. Labs significant for H/H 9.0/26.3L; phosphorus 2.4L; glucose 163H; POCT 84-157H; HgbA1c 6.3H - not on diabetic medications. Meds significant for ondansetron, pantoprazole, polyethylene glycol, senna. IV fluids: Lactated ringers 1000 mL @ 100 mL/hr. A&Ox4. Skin: Wong scale score 16; mild 2+ facial edema; localized facial swelling; intact except for surgical incisions Lt thigh, chin/neck midline, Lt lower leg wound vac. GI: abdomen rounded; last bowel movement documented 12/5; NG tube Dobhoff in Rt nostril with tip in stomach verified by x-ray for medication administration.     Met with patient on 8 [place]. Patient’s wife Sara was present throughout the nutrition interview and participated in the nutrition interview. Patient reports a height of 70 inches and a UBW of 215 pounds. Reports weight loss of 30 pounds over the past 3 months in the setting of difficulty eating. This represents a reported significant weight loss of 14 %. Full nutrition focused physical exam conducted - No fat wasting or muscle loss noted. No previous weight data documented in chart to confirm weight loss. Patient reports strong desire to not regain lost weight. Denies any food allergies or intolerances. Denies any cultural/Restorationism dietary preferences or restrictions. Takes vitamins D3 and B12 at home. Denies pain or discomfort, denies nausea currently. Patient provided with medical nutrition therapy (MNT) specific to his diagnosis.

## 2023-12-08 NOTE — PROGRESS NOTE ADULT - SUBJECTIVE AND OBJECTIVE BOX
Patient examined bedside resting comfortably , NAEON, AVSS, pain well controlled. Pt reports not sleeping and secretions. Pt scoped by ENT for secretions and large clot removed from trach. Doppler signal strong. Neck KRISTEN serosanguinous output, in class 3 elastics, IMF screws in place, neck is flat and soft, flap is warm and soft leg KRISTEN and vac inplace.     HPI:  69 y/o M w PMH of CAD (x2 stents Mar 2023), HLD, T2DM, hx of kidney stones, psoriasis presents to [facility] for evaluation of oral mass. Reports Three month hx of jaw pain and loosening of mandibular molar tooth on the lower left. Pt has a 30 pack yr smoking hx, quit 1 yr ago. 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 POD 1 composite mandibular resection, neck dissection and fibula free flap reconstruction.     PMH:  CAD (x2 stents Mar 2023), HLD, T2DM, hx of kidney stones, psoriasis  PSH: Cardiac stent placement x2 2023, uretal stent placement 2022  Meds: Plavix (last taken 12/1), ASA81, SL nitroglycerin (pt unsure when he last used, has been months per pt), Metformin, Jardiance (last taken 11/30), Lipitor, ibuprofen, tylenol, percocet 5mg, colace, lidocaine 2% viscous mouth rinse  All: NKDA  Soc: EtOH/Tob (-) (03 Dec 2023 14:01)      Labs:                         9.0    7.93  )-----------( 136      ( 08 Dec 2023 05:53 )             26.3     I&O's Detail    07 Dec 2023 07:01  -  08 Dec 2023 07:00  --------------------------------------------------------  IN:    IV PiggyBack: 100 mL    IV PiggyBack: 100 mL    Lactated Ringers Bolus: 500 mL  Total IN: 700 mL    OUT:    Bulb (mL): 220 mL    Bulb (mL): 140 mL    Bulb (mL): 160 mL    Indwelling Catheter - Urethral (mL): 620 mL    VAC (Vacuum Assisted Closure) System (mL): 5 mL  Total OUT: 1145 mL    Total NET: -445 mL        Vital Signs Last 24 Hrs  T(C): 37.7 (08 Dec 2023 05:27), Max: 37.7 (08 Dec 2023 05:27)  T(F): 99.8 (08 Dec 2023 05:27), Max: 99.8 (08 Dec 2023 05:27)  HR: 76 (08 Dec 2023 06:00) (42 - 101)  BP: 142/65 (07 Dec 2023 21:55) (126/58 - 152/83)  BP(mean): 93 (07 Dec 2023 21:55) (84 - 106)  RR: 21 (08 Dec 2023 06:00) (11 - 26)  SpO2: 99% (08 Dec 2023 06:00) (98% - 100%)    Parameters below as of 08 Dec 2023 06:00  Patient On (Oxygen Delivery Method): tracheostomy collar    O2 Concentration (%): 40    Physical Exam:   Gen: AAOx3, NAD   Head: Surgical incision around chin extending up through lip  Eyes: EOMI, PERRL, visual acuity intact, no diplopia, supra/infra orbital rims intact, no subconjunctival heme, no telecanthus, no exophthalmos   Ears: Gross hearing intact, No heme appreciated, Condylar head palpated bilaterally.   Nose: No septal hematoma/asymmetry, no epistaxis bilaterally. no abrasions present, no lacerations.   Malar: No malar depression  Throat: No LAD, supple, neck surgical incision w/ steristrip dressing is hemostatic + left and right neck KRISTEN in place, left KRISTEN sanguinous output 220cc, right neck KRISTEN sanguinous output 120cc, trach in place w/ 7.5 cuffed portex  Oral: Left FFF paddle pink, warm, well perfused. IMF screws in place, class 3 elastics,   Exx: Wound vac left leg 5 cc serosanguinous output, left leg KRISTEN 160 cc  CT Maxillofacial             Patient examined bedside resting comfortably , NAEON, AVSS, pain well controlled. Pt reports not sleeping and secretions. Pt scoped by ENT for secretions and large clot removed from trach. Doppler signal strong. Neck KRISTEN serosanguinous output, in class 3 elastics, IMF screws in place, neck is flat and soft, flap is warm and soft leg KRISTEN and vac inplace.     HPI:  67 y/o M w PMH of CAD (x2 stents Mar 2023), HLD, T2DM, hx of kidney stones, psoriasis presents to [facility] for evaluation of oral mass. Reports Three month hx of jaw pain and loosening of mandibular molar tooth on the lower left. Pt has a 30 pack yr smoking hx, quit 1 yr ago. 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 POD 1 composite mandibular resection, neck dissection and fibula free flap reconstruction.     PMH:  CAD (x2 stents Mar 2023), HLD, T2DM, hx of kidney stones, psoriasis  PSH: Cardiac stent placement x2 2023, uretal stent placement 2022  Meds: Plavix (last taken 12/1), ASA81, SL nitroglycerin (pt unsure when he last used, has been months per pt), Metformin, Jardiance (last taken 11/30), Lipitor, ibuprofen, tylenol, percocet 5mg, colace, lidocaine 2% viscous mouth rinse  All: NKDA  Soc: EtOH/Tob (-) (03 Dec 2023 14:01)      Labs:                         9.0    7.93  )-----------( 136      ( 08 Dec 2023 05:53 )             26.3     I&O's Detail    07 Dec 2023 07:01  -  08 Dec 2023 07:00  --------------------------------------------------------  IN:    IV PiggyBack: 100 mL    IV PiggyBack: 100 mL    Lactated Ringers Bolus: 500 mL  Total IN: 700 mL    OUT:    Bulb (mL): 220 mL    Bulb (mL): 140 mL    Bulb (mL): 160 mL    Indwelling Catheter - Urethral (mL): 620 mL    VAC (Vacuum Assisted Closure) System (mL): 5 mL  Total OUT: 1145 mL    Total NET: -445 mL        Vital Signs Last 24 Hrs  T(C): 37.7 (08 Dec 2023 05:27), Max: 37.7 (08 Dec 2023 05:27)  T(F): 99.8 (08 Dec 2023 05:27), Max: 99.8 (08 Dec 2023 05:27)  HR: 76 (08 Dec 2023 06:00) (42 - 101)  BP: 142/65 (07 Dec 2023 21:55) (126/58 - 152/83)  BP(mean): 93 (07 Dec 2023 21:55) (84 - 106)  RR: 21 (08 Dec 2023 06:00) (11 - 26)  SpO2: 99% (08 Dec 2023 06:00) (98% - 100%)    Parameters below as of 08 Dec 2023 06:00  Patient On (Oxygen Delivery Method): tracheostomy collar    O2 Concentration (%): 40    Physical Exam:   Gen: AAOx3, NAD   Head: Surgical incision around chin extending up through lip  Eyes: EOMI, PERRL, visual acuity intact, no diplopia, supra/infra orbital rims intact, no subconjunctival heme, no telecanthus, no exophthalmos   Ears: Gross hearing intact, No heme appreciated, Condylar head palpated bilaterally.   Nose: No septal hematoma/asymmetry, no epistaxis bilaterally. no abrasions present, no lacerations.   Malar: No malar depression  Throat: No LAD, supple, neck surgical incision w/ steristrip dressing is hemostatic + left and right neck KRISTEN in place, left KRISTEN sanguinous output 220cc, right neck KRISTEN sanguinous output 120cc, trach in place w/ 7.5 cuffed portex  Oral: Left FFF paddle pink, warm, well perfused. IMF screws in place, class 3 elastics,   Exx: Wound vac left leg 5 cc serosanguinous output, left leg KRISTEN 160 cc  CT Maxillofacial             Patient examined bedside resting comfortably , NAEON, AVSS, pain well controlled. Pt reports not sleeping and secretions. Pt scoped by ENT for secretions and large clot removed from trach. Doppler signal strong. Neck KRISTEN serosanguinous output, in class 3 elastics, IMF screws in place, neck is flat and soft, flap is warm and soft leg KRISTEN and vac inplace.     HPI:  69 y/o M w PMH of CAD (x2 stents Mar 2023), HLD, T2DM, hx of kidney stones, psoriasis presents to [facility] for evaluation of oral mass. Reports Three month hx of jaw pain and loosening of mandibular molar tooth on the lower left. Pt has a 30 pack yr smoking hx, quit 1 yr ago. 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 Stage 4 [id number] SCC. POD 1 composite mandibular resection, neck dissection and fibula free flap reconstruction.     PMH:  CAD (x2 stents Mar 2023), HLD, T2DM, hx of kidney stones, psoriasis  PSH: Cardiac stent placement x2 2023, uretal stent placement 2022  Meds: Plavix (last taken 12/1), ASA81, SL nitroglycerin (pt unsure when he last used, has been months per pt), Metformin, Jardiance (last taken 11/30), Lipitor, ibuprofen, tylenol, percocet 5mg, colace, lidocaine 2% viscous mouth rinse  All: NKDA  Soc: EtOH/Tob (-) (03 Dec 2023 14:01)      Labs:                         9.0    7.93  )-----------( 136      ( 08 Dec 2023 05:53 )             26.3     I&O's Detail    07 Dec 2023 07:01  -  08 Dec 2023 07:00  --------------------------------------------------------  IN:    IV PiggyBack: 100 mL    IV PiggyBack: 100 mL    Lactated Ringers Bolus: 500 mL  Total IN: 700 mL    OUT:    Bulb (mL): 220 mL    Bulb (mL): 140 mL    Bulb (mL): 160 mL    Indwelling Catheter - Urethral (mL): 620 mL    VAC (Vacuum Assisted Closure) System (mL): 5 mL  Total OUT: 1145 mL    Total NET: -445 mL        Vital Signs Last 24 Hrs  T(C): 37.7 (08 Dec 2023 05:27), Max: 37.7 (08 Dec 2023 05:27)  T(F): 99.8 (08 Dec 2023 05:27), Max: 99.8 (08 Dec 2023 05:27)  HR: 76 (08 Dec 2023 06:00) (42 - 101)  BP: 142/65 (07 Dec 2023 21:55) (126/58 - 152/83)  BP(mean): 93 (07 Dec 2023 21:55) (84 - 106)  RR: 21 (08 Dec 2023 06:00) (11 - 26)  SpO2: 99% (08 Dec 2023 06:00) (98% - 100%)    Parameters below as of 08 Dec 2023 06:00  Patient On (Oxygen Delivery Method): tracheostomy collar    O2 Concentration (%): 40    Physical Exam:   Gen: AAOx3, NAD       Head: Surgical incision around chin extending up through lip  Eyes: EOMI, PERRL, visual acuity intact, no diplopia, supra/infra orbital rims intact, no subconjunctival heme, no telecanthus, no exophthalmos   Ears: Gross hearing intact, No heme appreciated, Condylar head palpated bilaterally.   Nose: No septal hematoma/asymmetry, no epistaxis bilaterally. no abrasions present, no lacerations.   Malar: No malar depression  Throat: No LAD, supple, neck surgical incision w/ steristrip dressing is hemostatic + left and right neck KRISTEN in place, left KRISTEN sersanguinous output 220cc, right neck RKISTEN sersanguinous output 120cc, trach in place w/ 7.5 cuffed portex, neck soft and flap  Oral: Left FFF paddle pink, warm, well perfused and hemostatic; [employer] doppler signal strong. IMF screws in place, class 3 elastics,   Exx: Wound vac left leg 5 cc serosanguinous output, left leg KRISTEN 160 cc  CT Maxillofacial             Patient examined bedside resting comfortably , NAEON, AVSS, pain well controlled. Pt reports not sleeping and secretions. Pt scoped by ENT for secretions and large clot removed from trach. Doppler signal strong. Neck KRISTEN serosanguinous output, in class 3 elastics, IMF screws in place, neck is flat and soft, flap is warm and soft leg KRISTEN and vac inplace.     HPI:  67 y/o M w PMH of CAD (x2 stents Mar 2023), HLD, T2DM, hx of kidney stones, psoriasis presents to [facility] for evaluation of oral mass. Reports Three month hx of jaw pain and loosening of mandibular molar tooth on the lower left. Pt has a 30 pack yr smoking hx, quit 1 yr ago. 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 Stage 4 [id number] SCC. POD 1 composite mandibular resection, neck dissection and fibula free flap reconstruction.     PMH:  CAD (x2 stents Mar 2023), HLD, T2DM, hx of kidney stones, psoriasis  PSH: Cardiac stent placement x2 2023, uretal stent placement 2022  Meds: Plavix (last taken 12/1), ASA81, SL nitroglycerin (pt unsure when he last used, has been months per pt), Metformin, Jardiance (last taken 11/30), Lipitor, ibuprofen, tylenol, percocet 5mg, colace, lidocaine 2% viscous mouth rinse  All: NKDA  Soc: EtOH/Tob (-) (03 Dec 2023 14:01)      Labs:                         9.0    7.93  )-----------( 136      ( 08 Dec 2023 05:53 )             26.3     I&O's Detail    07 Dec 2023 07:01  -  08 Dec 2023 07:00  --------------------------------------------------------  IN:    IV PiggyBack: 100 mL    IV PiggyBack: 100 mL    Lactated Ringers Bolus: 500 mL  Total IN: 700 mL    OUT:    Bulb (mL): 220 mL    Bulb (mL): 140 mL    Bulb (mL): 160 mL    Indwelling Catheter - Urethral (mL): 620 mL    VAC (Vacuum Assisted Closure) System (mL): 5 mL  Total OUT: 1145 mL    Total NET: -445 mL        Vital Signs Last 24 Hrs  T(C): 37.7 (08 Dec 2023 05:27), Max: 37.7 (08 Dec 2023 05:27)  T(F): 99.8 (08 Dec 2023 05:27), Max: 99.8 (08 Dec 2023 05:27)  HR: 76 (08 Dec 2023 06:00) (42 - 101)  BP: 142/65 (07 Dec 2023 21:55) (126/58 - 152/83)  BP(mean): 93 (07 Dec 2023 21:55) (84 - 106)  RR: 21 (08 Dec 2023 06:00) (11 - 26)  SpO2: 99% (08 Dec 2023 06:00) (98% - 100%)    Parameters below as of 08 Dec 2023 06:00  Patient On (Oxygen Delivery Method): tracheostomy collar    O2 Concentration (%): 40    Physical Exam:   Gen: AAOx3, NAD       Head: Surgical incision around chin extending up through lip  Eyes: EOMI, PERRL, visual acuity intact, no diplopia, supra/infra orbital rims intact, no subconjunctival heme, no telecanthus, no exophthalmos   Ears: Gross hearing intact, No heme appreciated, Condylar head palpated bilaterally.   Nose: No septal hematoma/asymmetry, no epistaxis bilaterally. no abrasions present, no lacerations.   Malar: No malar depression  Throat: No LAD, supple, neck surgical incision w/ steristrip dressing is hemostatic + left and right neck KRISTEN in place, left KRISTEN sersanguinous output 220cc, right neck KRISTEN sersanguinous output 120cc, trach in place w/ 7.5 cuffed portex, neck soft and flap  Oral: Left FFF paddle pink, warm, well perfused and hemostatic; [employer] doppler signal strong. IMF screws in place, class 3 elastics,   Exx: Wound vac left leg 5 cc serosanguinous output, left leg KRISTEN 160 cc  CT Maxillofacial             Patient examined bedside resting comfortably , NAEON, AVSS, pain well controlled. Pt reports not sleeping and secretions. Pt scoped by ENT for secretions and large clot removed from trach. Doppler signal strong. Neck KRISTEN serosanguinous output, in class 3 elastics, IMF screws in place, neck is flat and soft, flap is warm and soft leg KRISTEN and vac inplace.     HPI:  67 y/o M w PMH of CAD (x2 stents Mar 2023), HLD, T2DM, hx of kidney stones, psoriasis presents to [facility] for evaluation of oral mass. Reports Three month hx of jaw pain and loosening of mandibular molar tooth on the lower left. Pt has a 30 pack yr smoking hx, quit 1 yr ago. 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 Stage 4 [id number] SCC. POD 1 composite mandibular resection, neck dissection and fibula free flap reconstruction.     PMH:  CAD (x2 stents Mar 2023), HLD, T2DM, hx of kidney stones, psoriasis  PSH: Cardiac stent placement x2 2023, uretal stent placement 2022  Meds: Plavix (last taken 12/1), ASA81, SL nitroglycerin (pt unsure when he last used, has been months per pt), Metformin, Jardiance (last taken 11/30), Lipitor, ibuprofen, tylenol, percocet 5mg, colace, lidocaine 2% viscous mouth rinse  All: NKDA  Soc: EtOH/Tob (-) (03 Dec 2023 14:01)      Labs:                         9.0    7.93  )-----------( 136      ( 08 Dec 2023 05:53 )             26.3     I&O's Detail    07 Dec 2023 07:01  -  08 Dec 2023 07:00  --------------------------------------------------------  IN:    IV PiggyBack: 100 mL    IV PiggyBack: 100 mL    Lactated Ringers Bolus: 500 mL  Total IN: 700 mL    OUT:    Bulb (mL): 220 mL    Bulb (mL): 140 mL    Bulb (mL): 160 mL    Indwelling Catheter - Urethral (mL): 620 mL    VAC (Vacuum Assisted Closure) System (mL): 5 mL  Total OUT: 1145 mL    Total NET: -445 mL        Vital Signs Last 24 Hrs  T(C): 37.7 (08 Dec 2023 05:27), Max: 37.7 (08 Dec 2023 05:27)  T(F): 99.8 (08 Dec 2023 05:27), Max: 99.8 (08 Dec 2023 05:27)  HR: 76 (08 Dec 2023 06:00) (42 - 101)  BP: 142/65 (07 Dec 2023 21:55) (126/58 - 152/83)  BP(mean): 93 (07 Dec 2023 21:55) (84 - 106)  RR: 21 (08 Dec 2023 06:00) (11 - 26)  SpO2: 99% (08 Dec 2023 06:00) (98% - 100%)    Parameters below as of 08 Dec 2023 06:00  Patient On (Oxygen Delivery Method): tracheostomy collar    O2 Concentration (%): 40    Physical Exam:   Gen: AAOx3, NAD       Head: Surgical incision around chin extending up through lip  Eyes: EOMI, PERRL, visual acuity intact, no diplopia, supra/infra orbital rims intact, no subconjunctival heme, no telecanthus, no exophthalmos   Ears: Gross hearing intact, No heme appreciated,   Nose: No septal hematoma/asymmetry, no epistaxis bilaterally. no abrasions present, no lacerations.   Malar: No malar depression  Throat: No LAD, supple, neck surgical incision w/ steristrip dressing is hemostatic + left and right neck KRISTEN in place, left KRISTEN sersanguinous output 220cc, right neck KRISTEN sersanguinous output 120cc, trach in place w/ 7.5 cuffed portex,  Oral: Left FFF paddle pink, warm, well perfused and hemostatic; [employer] doppler signal strong. IMF screws in place, class 3 elastics,   Exx: Wound vac left leg 5 cc serosanguinous output, left leg KRISTEN 160 cc            Patient examined bedside resting comfortably , NAEON, AVSS, pain well controlled. Pt reports not sleeping and secretions. Pt scoped by ENT for secretions and large clot removed from trach. Doppler signal strong. Neck KRISTEN serosanguinous output, in class 3 elastics, IMF screws in place, neck is flat and soft, flap is warm and soft leg KRISTEN and vac inplace.     HPI:  69 y/o M w PMH of CAD (x2 stents Mar 2023), HLD, T2DM, hx of kidney stones, psoriasis presents to [facility] for evaluation of oral mass. Reports Three month hx of jaw pain and loosening of mandibular molar tooth on the lower left. Pt has a 30 pack yr smoking hx, quit 1 yr ago. 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 Stage 4 [id number] SCC. POD 1 composite mandibular resection, neck dissection and fibula free flap reconstruction.     PMH:  CAD (x2 stents Mar 2023), HLD, T2DM, hx of kidney stones, psoriasis  PSH: Cardiac stent placement x2 2023, uretal stent placement 2022  Meds: Plavix (last taken 12/1), ASA81, SL nitroglycerin (pt unsure when he last used, has been months per pt), Metformin, Jardiance (last taken 11/30), Lipitor, ibuprofen, tylenol, percocet 5mg, colace, lidocaine 2% viscous mouth rinse  All: NKDA  Soc: EtOH/Tob (-) (03 Dec 2023 14:01)      Labs:                         9.0    7.93  )-----------( 136      ( 08 Dec 2023 05:53 )             26.3     I&O's Detail    07 Dec 2023 07:01  -  08 Dec 2023 07:00  --------------------------------------------------------  IN:    IV PiggyBack: 100 mL    IV PiggyBack: 100 mL    Lactated Ringers Bolus: 500 mL  Total IN: 700 mL    OUT:    Bulb (mL): 220 mL    Bulb (mL): 140 mL    Bulb (mL): 160 mL    Indwelling Catheter - Urethral (mL): 620 mL    VAC (Vacuum Assisted Closure) System (mL): 5 mL  Total OUT: 1145 mL    Total NET: -445 mL        Vital Signs Last 24 Hrs  T(C): 37.7 (08 Dec 2023 05:27), Max: 37.7 (08 Dec 2023 05:27)  T(F): 99.8 (08 Dec 2023 05:27), Max: 99.8 (08 Dec 2023 05:27)  HR: 76 (08 Dec 2023 06:00) (42 - 101)  BP: 142/65 (07 Dec 2023 21:55) (126/58 - 152/83)  BP(mean): 93 (07 Dec 2023 21:55) (84 - 106)  RR: 21 (08 Dec 2023 06:00) (11 - 26)  SpO2: 99% (08 Dec 2023 06:00) (98% - 100%)    Parameters below as of 08 Dec 2023 06:00  Patient On (Oxygen Delivery Method): tracheostomy collar    O2 Concentration (%): 40    Physical Exam:   Gen: AAOx3, NAD       Head: Surgical incision around chin extending up through lip  Eyes: EOMI, PERRL, visual acuity intact, no diplopia, supra/infra orbital rims intact, no subconjunctival heme, no telecanthus, no exophthalmos   Ears: Gross hearing intact, No heme appreciated,   Nose: No septal hematoma/asymmetry, no epistaxis bilaterally. no abrasions present, no lacerations.   Malar: No malar depression  Throat: No LAD, supple, neck surgical incision w/ steristrip dressing is hemostatic + left and right neck KRISTEN in place, left KRISTEN sersanguinous output 220cc, right neck KRISTEN sersanguinous output 120cc, trach in place w/ 7.5 cuffed portex,  Oral: Left FFF paddle pink, warm, well perfused and hemostatic; [employer] doppler signal strong. IMF screws in place, class 3 elastics,   Exx: Wound vac left leg 5 cc serosanguinous output, left leg KRISTEN 160 cc

## 2023-12-08 NOTE — PROGRESS NOTE ADULT - ASSESSMENT
68 M w/ pmhx of SCC of left buccal mucosa POD 1 s/p Left hemimandibulectomy with left level 1, 2a, 3 neck dissection. Reconstruction with left fibular free flap. Dental implants. Tracheostomy with 7.5 cuffed portex. STSG harvested from left thigh.  -Tarch cuff up per ENT  -ERAS post-op day 1 protocol  -Remove a-line  -D/c young  -TOV  -Start trickle feeds 68 M w/ pmhx of oral [id number] SCC of left buccal mucosa POD 1 s/p Left hemimandibulectomy with left level I-IV neck dissection. Reconstruction with left fibular free flap. Dental implants. Tracheostomy with 7.5 cuffed portex. STSG harvested from left thigh.  -Tarch cuff up per ENT  -ERAS post-op day 1 protocol  -Remove a-line  -D/c young  -TOV  -Start trickle feeds 68 M w/ pmhx of oral [id number] SCC of left buccal mucosa POD 1 s/p Left hemimandibulectomy with left level I-IV neck dissection. Reconstruction with left fibular free flap. Dental implants. Tracheostomy with 7.5 cuffed portex. STSG harvested from left thigh.  -Tarch cuff up per ENT  -ERAS post-op day 1 protocol  -Remove a-line  -D/c young  -TOV  -Start trickle feeds

## 2023-12-08 NOTE — DIETITIAN INITIAL EVALUATION ADULT - PERSON TAUGHT/METHOD
Pt and wife educated on medical nutrition therapy specific to the patient's diagnosis; they expressed understanding; all questions and concerns addressed to their satisfaction/patient instructed/spouse instructed

## 2023-12-08 NOTE — PROGRESS NOTE ADULT - ASSESSMENT
67 y/o M w PMHx of CAD (x2 stents Mar 2023), HLD, T2DM, nephrolithiasis (ureteral stent placement 2022), and psoriasis w/ three month hx of jaw pain and loosening of lower left mandibular molar tooth. Pt has a 30 pack yr smoking hx, quit 1 yr ago. 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 12/7 s/p L hemimandibulectomy, L level 1, 2a, 3 neck dissection, L fibula free flap reconstruction, STSG from L thigh, dental implants, and tracheostomy. Admitted to SICU for flap checks and hemodynamic monitoring.    Neuro: standing Tylenol, Gabapentin 600mg, Oxy prn  HEENT: Flap checks q1h, No circumferential ties or collars, Head neutral, KRISTEN L neck, KRISTEN R neck, Peridex PO QID POD1  CV: HD stable, Maintain MAP > 60, Avoid pressors but will add Low dose Levo gtt if needed. Hx of CAD 2 stents: ASA, holding Plavix, ASA. Restart Plavix (with loading dose) if drains low. Hx of HLD: Lipitor.   Pulm: s/p trach with 7.5 cuffed portex  GI: NPO/IVF, DHT needs to be advanced, start TFs POD1; PPI, Senna, Miralax  : d/c'ed hannah, awaiting TOV  ID: Unasyn x24 (through 12/9 AM)  Endo: mISS (hx T2DM)   Heme: Active T&S, Hgb >8    ppx: SCDs, SQL  Lines: PIV, L radial Lucia  (12/7-12/8)    Wounds: L leg KRISTEN and wound vac, bacitracin to wounds    PT/OT: POD2   Dispo: SICU

## 2023-12-08 NOTE — PROGRESS NOTE ADULT - SUBJECTIVE AND OBJECTIVE BOX
INTERVAL HPI/OVERNIGHT EVENTS:    SUBJECTIVE: Patient seen and examined at bedside.    OBJECTIVE:    VITAL SIGNS:  ICU Vital Signs Last 24 Hrs  T(C): 37.7 (08 Dec 2023 05:27), Max: 37.7 (08 Dec 2023 05:27)  T(F): 99.8 (08 Dec 2023 05:27), Max: 99.8 (08 Dec 2023 05:27)  HR: 93 (08 Dec 2023 11:00) (42 - 101)  BP: 142/65 (07 Dec 2023 21:55) (126/58 - 152/83)  BP(mean): 93 (07 Dec 2023 21:55) (84 - 106)  ABP: 150/58 (08 Dec 2023 11:00) (101/38 - 169/61)  ABP(mean): 87 (08 Dec 2023 11:00) (55 - 102)  RR: 22 (08 Dec 2023 11:00) (11 - 26)  SpO2: 100% (08 Dec 2023 11:00) (97% - 100%)    O2 Parameters below as of 08 Dec 2023 11:00  Patient On (Oxygen Delivery Method): tracheostomy collar    O2 Concentration (%): 40          12-07 @ 07:01  -  12-08 @ 07:00  --------------------------------------------------------  IN: 900 mL / OUT: 1205 mL / NET: -305 mL    12-08 @ 07:01  -  12-08 @ 12:37  --------------------------------------------------------  IN: 749.9 mL / OUT: 435 mL / NET: 314.9 mL      CAPILLARY BLOOD GLUCOSE      POCT Blood Glucose.: 97 mg/dL (07 Dec 2023 06:27)      PHYSICAL EXAM:    General: WDWN ; NAD  HEENT: NC/AT; PERRL, anicteric sclera  Neck: supple, no JVD  Respiratory: CTA B/L; no W/R/R  Cardiovascular: +S1/S2; RRR; no M/R/G  Gastrointestinal: soft, NT/ND; +BS x4  Extremities: WWP; 2+ peripheral pulses B/L; no LE edema  Skin: normal color and turgor; no rash  Neurological:     MEDICATIONS:  MEDICATIONS  (STANDING):  acetaminophen   IVPB .. 1000 milliGRAM(s) IV Intermittent every 6 hours  ampicillin/sulbactam  IVPB 3 Gram(s) IV Intermittent every 6 hours  aspirin  chewable 81 milliGRAM(s) Oral daily  atorvastatin 40 milliGRAM(s) Oral at bedtime  chlorhexidine 0.12% Liquid 15 milliLiter(s) Oral Mucosa two times a day  chlorhexidine 4% Liquid 1 Application(s) Topical <User Schedule>  dextrose 5%. 1000 milliLiter(s) (50 mL/Hr) IV Continuous <Continuous>  dextrose 5%. 1000 milliLiter(s) (100 mL/Hr) IV Continuous <Continuous>  dextrose 50% Injectable 25 Gram(s) IV Push once  dextrose 50% Injectable 25 Gram(s) IV Push once  dextrose 50% Injectable 12.5 Gram(s) IV Push once  enoxaparin Injectable 40 milliGRAM(s) SubCutaneous every 24 hours  gabapentin 600 milliGRAM(s) Oral daily  glucagon  Injectable 1 milliGRAM(s) IntraMuscular once  influenza  Vaccine (HIGH DOSE) 0.7 milliLiter(s) IntraMuscular once  lactated ringers. 1000 milliLiter(s) (100 mL/Hr) IV Continuous <Continuous>  pantoprazole  Injectable 40 milliGRAM(s) IV Push daily  polyethylene glycol 3350 17 Gram(s) Oral daily  senna 2 Tablet(s) Oral at bedtime  sodium chloride 0.9% for Nebulization 3 milliLiter(s) Nebulizer every 6 hours    MEDICATIONS  (PRN):  dextrose Oral Gel 15 Gram(s) Oral once PRN Blood Glucose LESS THAN 70 milliGRAM(s)/deciliter  ondansetron Injectable 4 milliGRAM(s) IV Push every 6 hours PRN Nausea and/or Vomiting  oxyCODONE    IR 10 milliGRAM(s) Oral every 4 hours PRN Severe Pain (7 - 10)  oxyCODONE    IR 5 milliGRAM(s) Oral every 4 hours PRN Moderate Pain (4 - 6)      ALLERGIES:  Allergies    No Known Allergies    Intolerances        LABS:                        9.0    7.93  )-----------( 136      ( 08 Dec 2023 05:53 )             26.3     12-08    139  |  104  |  13  ----------------------------<  163<H>  3.8   |  25  |  0.80    Ca    8.3<L>      08 Dec 2023 05:53  Phos  2.4     12-08  Mg     2.2     12-08    TPro  5.6<L>  /  Alb  3.8  /  TBili  0.8  /  DBili  x   /  AST  28  /  ALT  17  /  AlkPhos  27<L>  12-07    LIVER FUNCTIONS - ( 07 Dec 2023 20:04 )  Alb: 3.8 g/dL / Pro: 5.6 g/dL / ALK PHOS: 27 U/L / ALT: 17 U/L / AST: 28 U/L / GGT: x           PT/INR - ( 07 Dec 2023 20:04 )   PT: 12.6 sec;   INR: 1.11          PTT - ( 07 Dec 2023 20:04 )  PTT:24.8 sec  Urinalysis Basic - ( 08 Dec 2023 05:53 )    Color: x / Appearance: x / SG: x / pH: x  Gluc: 163 mg/dL / Ketone: x  / Bili: x / Urobili: x   Blood: x / Protein: x / Nitrite: x   Leuk Esterase: x / RBC: x / WBC x   Sq Epi: x / Non Sq Epi: x / Bacteria: x            RADIOLOGY & ADDITIONAL TESTS: Reviewed.   INTERVAL HPI/OVERNIGHT EVENTS:  Febrile T101 S/p left hemimandibulectomy with left level 1, 2a, 3 neck dissection. Reconstruction with left fibular free flap. Dental implants. Tracheostomy with 7.5 cuffed portex. STSG harvested from left thigh.    SUBJECTIVE: Patient seen and examined at bedside.    OBJECTIVE:    VITAL SIGNS:  ICU Vital Signs Last 24 Hrs  T(C): 37.7 (08 Dec 2023 05:27), Max: 37.7 (08 Dec 2023 05:27)  T(F): 99.8 (08 Dec 2023 05:27), Max: 99.8 (08 Dec 2023 05:27)  HR: 93 (08 Dec 2023 11:00) (42 - 101)  BP: 142/65 (07 Dec 2023 21:55) (126/58 - 152/83)  BP(mean): 93 (07 Dec 2023 21:55) (84 - 106)  ABP: 150/58 (08 Dec 2023 11:00) (101/38 - 169/61)  ABP(mean): 87 (08 Dec 2023 11:00) (55 - 102)  RR: 22 (08 Dec 2023 11:00) (11 - 26)  SpO2: 100% (08 Dec 2023 11:00) (97% - 100%)    O2 Parameters below as of 08 Dec 2023 11:00  Patient On (Oxygen Delivery Method): tracheostomy collar    O2 Concentration (%): 40          12-07 @ 07:01  -  12-08 @ 07:00  --------------------------------------------------------  IN: 900 mL / OUT: 1205 mL / NET: -305 mL    12-08 @ 07:01  -  12-08 @ 12:37  --------------------------------------------------------  IN: 749.9 mL / OUT: 435 mL / NET: 314.9 mL      CAPILLARY BLOOD GLUCOSE      POCT Blood Glucose.: 97 mg/dL (07 Dec 2023 06:27)      PHYSICAL EXAM:      HEENT Post operative changes  CHEST/LUNG: Clear to auscultation bilaterally; No wheeze; No crackles; No accessory muscles used  HEART: Regular rate and rhythm; No murmurs;   ABDOMEN: Soft, Nontender, Nondistended; Bowel sounds present; No guarding  EXTREMITIES:  2+ Peripheral Pulses, No cyanosis or edema  PSYCH: AAOx3  NEUROLOGY: non-focal  SKIN: No rashes or lesions      MEDICATIONS:  MEDICATIONS  (STANDING):  acetaminophen   IVPB .. 1000 milliGRAM(s) IV Intermittent every 6 hours  ampicillin/sulbactam  IVPB 3 Gram(s) IV Intermittent every 6 hours  aspirin  chewable 81 milliGRAM(s) Oral daily  atorvastatin 40 milliGRAM(s) Oral at bedtime  chlorhexidine 0.12% Liquid 15 milliLiter(s) Oral Mucosa two times a day  chlorhexidine 4% Liquid 1 Application(s) Topical <User Schedule>  dextrose 5%. 1000 milliLiter(s) (50 mL/Hr) IV Continuous <Continuous>  dextrose 5%. 1000 milliLiter(s) (100 mL/Hr) IV Continuous <Continuous>  dextrose 50% Injectable 25 Gram(s) IV Push once  dextrose 50% Injectable 25 Gram(s) IV Push once  dextrose 50% Injectable 12.5 Gram(s) IV Push once  enoxaparin Injectable 40 milliGRAM(s) SubCutaneous every 24 hours  gabapentin 600 milliGRAM(s) Oral daily  glucagon  Injectable 1 milliGRAM(s) IntraMuscular once  influenza  Vaccine (HIGH DOSE) 0.7 milliLiter(s) IntraMuscular once  lactated ringers. 1000 milliLiter(s) (100 mL/Hr) IV Continuous <Continuous>  pantoprazole  Injectable 40 milliGRAM(s) IV Push daily  polyethylene glycol 3350 17 Gram(s) Oral daily  senna 2 Tablet(s) Oral at bedtime  sodium chloride 0.9% for Nebulization 3 milliLiter(s) Nebulizer every 6 hours    MEDICATIONS  (PRN):  dextrose Oral Gel 15 Gram(s) Oral once PRN Blood Glucose LESS THAN 70 milliGRAM(s)/deciliter  ondansetron Injectable 4 milliGRAM(s) IV Push every 6 hours PRN Nausea and/or Vomiting  oxyCODONE    IR 10 milliGRAM(s) Oral every 4 hours PRN Severe Pain (7 - 10)  oxyCODONE    IR 5 milliGRAM(s) Oral every 4 hours PRN Moderate Pain (4 - 6)      ALLERGIES:  Allergies    No Known Allergies    Intolerances        LABS:                        9.0    7.93  )-----------( 136      ( 08 Dec 2023 05:53 )             26.3     12-08    139  |  104  |  13  ----------------------------<  163<H>  3.8   |  25  |  0.80    Ca    8.3<L>      08 Dec 2023 05:53  Phos  2.4     12-08  Mg     2.2     12-08    TPro  5.6<L>  /  Alb  3.8  /  TBili  0.8  /  DBili  x   /  AST  28  /  ALT  17  /  AlkPhos  27<L>  12-07    LIVER FUNCTIONS - ( 07 Dec 2023 20:04 )  Alb: 3.8 g/dL / Pro: 5.6 g/dL / ALK PHOS: 27 U/L / ALT: 17 U/L / AST: 28 U/L / GGT: x           PT/INR - ( 07 Dec 2023 20:04 )   PT: 12.6 sec;   INR: 1.11          PTT - ( 07 Dec 2023 20:04 )  PTT:24.8 sec  Urinalysis Basic - ( 08 Dec 2023 05:53 )    Color: x / Appearance: x / SG: x / pH: x  Gluc: 163 mg/dL / Ketone: x  / Bili: x / Urobili: x   Blood: x / Protein: x / Nitrite: x   Leuk Esterase: x / RBC: x / WBC x   Sq Epi: x / Non Sq Epi: x / Bacteria: x            RADIOLOGY & ADDITIONAL TESTS: Reviewed.

## 2023-12-09 LAB
ANION GAP SERPL CALC-SCNC: 6 MMOL/L — SIGNIFICANT CHANGE UP (ref 5–17)
APTT BLD: 29.3 SEC — SIGNIFICANT CHANGE UP (ref 24.5–35.6)
APTT BLD: 29.3 SEC — SIGNIFICANT CHANGE UP (ref 24.5–35.6)
BLD GP AB SCN SERPL QL: NEGATIVE — SIGNIFICANT CHANGE UP
BLD GP AB SCN SERPL QL: NEGATIVE — SIGNIFICANT CHANGE UP
BUN SERPL-MCNC: 14 MG/DL — SIGNIFICANT CHANGE UP (ref 7–23)
CALCIUM SERPL-MCNC: 8.2 MG/DL — LOW (ref 8.4–10.5)
CALCIUM SERPL-MCNC: 8.2 MG/DL — LOW (ref 8.4–10.5)
CALCIUM SERPL-MCNC: 8.3 MG/DL — LOW (ref 8.4–10.5)
CALCIUM SERPL-MCNC: 8.3 MG/DL — LOW (ref 8.4–10.5)
CHLORIDE SERPL-SCNC: 108 MMOL/L — SIGNIFICANT CHANGE UP (ref 96–108)
CHLORIDE SERPL-SCNC: 108 MMOL/L — SIGNIFICANT CHANGE UP (ref 96–108)
CHLORIDE SERPL-SCNC: 110 MMOL/L — HIGH (ref 96–108)
CHLORIDE SERPL-SCNC: 110 MMOL/L — HIGH (ref 96–108)
CO2 SERPL-SCNC: 25 MMOL/L — SIGNIFICANT CHANGE UP (ref 22–31)
CO2 SERPL-SCNC: 25 MMOL/L — SIGNIFICANT CHANGE UP (ref 22–31)
CO2 SERPL-SCNC: 26 MMOL/L — SIGNIFICANT CHANGE UP (ref 22–31)
CO2 SERPL-SCNC: 26 MMOL/L — SIGNIFICANT CHANGE UP (ref 22–31)
CREAT SERPL-MCNC: 0.71 MG/DL — SIGNIFICANT CHANGE UP (ref 0.5–1.3)
CREAT SERPL-MCNC: 0.71 MG/DL — SIGNIFICANT CHANGE UP (ref 0.5–1.3)
CREAT SERPL-MCNC: 0.77 MG/DL — SIGNIFICANT CHANGE UP (ref 0.5–1.3)
CREAT SERPL-MCNC: 0.77 MG/DL — SIGNIFICANT CHANGE UP (ref 0.5–1.3)
EGFR: 101 ML/MIN/1.73M2 — SIGNIFICANT CHANGE UP
EGFR: 101 ML/MIN/1.73M2 — SIGNIFICANT CHANGE UP
EGFR: 98 ML/MIN/1.73M2 — SIGNIFICANT CHANGE UP
EGFR: 98 ML/MIN/1.73M2 — SIGNIFICANT CHANGE UP
GLUCOSE SERPL-MCNC: 113 MG/DL — HIGH (ref 70–99)
GLUCOSE SERPL-MCNC: 113 MG/DL — HIGH (ref 70–99)
GLUCOSE SERPL-MCNC: 129 MG/DL — HIGH (ref 70–99)
GLUCOSE SERPL-MCNC: 129 MG/DL — HIGH (ref 70–99)
HCT VFR BLD CALC: 22.6 % — LOW (ref 39–50)
HCT VFR BLD CALC: 22.6 % — LOW (ref 39–50)
HCT VFR BLD CALC: 24.1 % — LOW (ref 39–50)
HCT VFR BLD CALC: 24.1 % — LOW (ref 39–50)
HGB BLD-MCNC: 7.6 G/DL — LOW (ref 13–17)
HGB BLD-MCNC: 7.6 G/DL — LOW (ref 13–17)
HGB BLD-MCNC: 8.1 G/DL — LOW (ref 13–17)
HGB BLD-MCNC: 8.1 G/DL — LOW (ref 13–17)
INR BLD: 1.09 — SIGNIFICANT CHANGE UP (ref 0.85–1.18)
INR BLD: 1.09 — SIGNIFICANT CHANGE UP (ref 0.85–1.18)
MAGNESIUM SERPL-MCNC: 1.9 MG/DL — SIGNIFICANT CHANGE UP (ref 1.6–2.6)
MAGNESIUM SERPL-MCNC: 1.9 MG/DL — SIGNIFICANT CHANGE UP (ref 1.6–2.6)
MCHC RBC-ENTMCNC: 28.3 PG — SIGNIFICANT CHANGE UP (ref 27–34)
MCHC RBC-ENTMCNC: 28.3 PG — SIGNIFICANT CHANGE UP (ref 27–34)
MCHC RBC-ENTMCNC: 28.4 PG — SIGNIFICANT CHANGE UP (ref 27–34)
MCHC RBC-ENTMCNC: 28.4 PG — SIGNIFICANT CHANGE UP (ref 27–34)
MCHC RBC-ENTMCNC: 33.6 GM/DL — SIGNIFICANT CHANGE UP (ref 32–36)
MCV RBC AUTO: 84.3 FL — SIGNIFICANT CHANGE UP (ref 80–100)
NRBC # BLD: 0 /100 WBCS — SIGNIFICANT CHANGE UP (ref 0–0)
PHOSPHATE SERPL-MCNC: 3.4 MG/DL — SIGNIFICANT CHANGE UP (ref 2.5–4.5)
PHOSPHATE SERPL-MCNC: 3.4 MG/DL — SIGNIFICANT CHANGE UP (ref 2.5–4.5)
PLATELET # BLD AUTO: 102 K/UL — LOW (ref 150–400)
PLATELET # BLD AUTO: 102 K/UL — LOW (ref 150–400)
PLATELET # BLD AUTO: 106 K/UL — LOW (ref 150–400)
PLATELET # BLD AUTO: 106 K/UL — LOW (ref 150–400)
POTASSIUM SERPL-MCNC: 3.7 MMOL/L — SIGNIFICANT CHANGE UP (ref 3.5–5.3)
POTASSIUM SERPL-MCNC: 3.7 MMOL/L — SIGNIFICANT CHANGE UP (ref 3.5–5.3)
POTASSIUM SERPL-MCNC: 3.8 MMOL/L — SIGNIFICANT CHANGE UP (ref 3.5–5.3)
POTASSIUM SERPL-MCNC: 3.8 MMOL/L — SIGNIFICANT CHANGE UP (ref 3.5–5.3)
POTASSIUM SERPL-SCNC: 3.7 MMOL/L — SIGNIFICANT CHANGE UP (ref 3.5–5.3)
POTASSIUM SERPL-SCNC: 3.7 MMOL/L — SIGNIFICANT CHANGE UP (ref 3.5–5.3)
POTASSIUM SERPL-SCNC: 3.8 MMOL/L — SIGNIFICANT CHANGE UP (ref 3.5–5.3)
POTASSIUM SERPL-SCNC: 3.8 MMOL/L — SIGNIFICANT CHANGE UP (ref 3.5–5.3)
PROTHROM AB SERPL-ACNC: 12.4 SEC — SIGNIFICANT CHANGE UP (ref 9.5–13)
PROTHROM AB SERPL-ACNC: 12.4 SEC — SIGNIFICANT CHANGE UP (ref 9.5–13)
RBC # BLD: 2.68 M/UL — LOW (ref 4.2–5.8)
RBC # BLD: 2.68 M/UL — LOW (ref 4.2–5.8)
RBC # BLD: 2.86 M/UL — LOW (ref 4.2–5.8)
RBC # BLD: 2.86 M/UL — LOW (ref 4.2–5.8)
RBC # FLD: 13.9 % — SIGNIFICANT CHANGE UP (ref 10.3–14.5)
RBC # FLD: 13.9 % — SIGNIFICANT CHANGE UP (ref 10.3–14.5)
RBC # FLD: 14 % — SIGNIFICANT CHANGE UP (ref 10.3–14.5)
RBC # FLD: 14 % — SIGNIFICANT CHANGE UP (ref 10.3–14.5)
RH IG SCN BLD-IMP: POSITIVE — SIGNIFICANT CHANGE UP
RH IG SCN BLD-IMP: POSITIVE — SIGNIFICANT CHANGE UP
SODIUM SERPL-SCNC: 140 MMOL/L — SIGNIFICANT CHANGE UP (ref 135–145)
SODIUM SERPL-SCNC: 140 MMOL/L — SIGNIFICANT CHANGE UP (ref 135–145)
SODIUM SERPL-SCNC: 141 MMOL/L — SIGNIFICANT CHANGE UP (ref 135–145)
SODIUM SERPL-SCNC: 141 MMOL/L — SIGNIFICANT CHANGE UP (ref 135–145)
WBC # BLD: 9 K/UL — SIGNIFICANT CHANGE UP (ref 3.8–10.5)
WBC # BLD: 9 K/UL — SIGNIFICANT CHANGE UP (ref 3.8–10.5)
WBC # BLD: 9.08 K/UL — SIGNIFICANT CHANGE UP (ref 3.8–10.5)
WBC # BLD: 9.08 K/UL — SIGNIFICANT CHANGE UP (ref 3.8–10.5)
WBC # FLD AUTO: 9 K/UL — SIGNIFICANT CHANGE UP (ref 3.8–10.5)
WBC # FLD AUTO: 9 K/UL — SIGNIFICANT CHANGE UP (ref 3.8–10.5)
WBC # FLD AUTO: 9.08 K/UL — SIGNIFICANT CHANGE UP (ref 3.8–10.5)
WBC # FLD AUTO: 9.08 K/UL — SIGNIFICANT CHANGE UP (ref 3.8–10.5)

## 2023-12-09 PROCEDURE — 71045 X-RAY EXAM CHEST 1 VIEW: CPT | Mod: 26

## 2023-12-09 PROCEDURE — 32551 INSERTION OF CHEST TUBE: CPT | Mod: GC,RT

## 2023-12-09 PROCEDURE — 99233 SBSQ HOSP IP/OBS HIGH 50: CPT | Mod: 25

## 2023-12-09 RX ORDER — OXYCODONE HYDROCHLORIDE 5 MG/1
5 TABLET ORAL EVERY 4 HOURS
Refills: 0 | Status: DISCONTINUED | OUTPATIENT
Start: 2023-12-09 | End: 2023-12-13

## 2023-12-09 RX ORDER — LIDOCAINE HCL 20 MG/ML
10 VIAL (ML) INJECTION ONCE
Refills: 0 | Status: COMPLETED | OUTPATIENT
Start: 2023-12-09 | End: 2023-12-09

## 2023-12-09 RX ORDER — HYDROMORPHONE HYDROCHLORIDE 2 MG/ML
0.5 INJECTION INTRAMUSCULAR; INTRAVENOUS; SUBCUTANEOUS EVERY 4 HOURS
Refills: 0 | Status: DISCONTINUED | OUTPATIENT
Start: 2023-12-09 | End: 2023-12-16

## 2023-12-09 RX ORDER — OXYCODONE HYDROCHLORIDE 5 MG/1
10 TABLET ORAL EVERY 6 HOURS
Refills: 0 | Status: DISCONTINUED | OUTPATIENT
Start: 2023-12-09 | End: 2023-12-13

## 2023-12-09 RX ORDER — TAMSULOSIN HYDROCHLORIDE 0.4 MG/1
0.4 CAPSULE ORAL EVERY 24 HOURS
Refills: 0 | Status: DISCONTINUED | OUTPATIENT
Start: 2023-12-09 | End: 2023-12-11

## 2023-12-09 RX ORDER — ASPIRIN/CALCIUM CARB/MAGNESIUM 324 MG
81 TABLET ORAL DAILY
Refills: 0 | Status: DISCONTINUED | OUTPATIENT
Start: 2023-12-10 | End: 2023-12-13

## 2023-12-09 RX ORDER — SODIUM CHLORIDE 9 MG/ML
1000 INJECTION, SOLUTION INTRAVENOUS
Refills: 0 | Status: DISCONTINUED | OUTPATIENT
Start: 2023-12-09 | End: 2023-12-11

## 2023-12-09 RX ADMIN — Medication 1000 MILLIGRAM(S): at 12:30

## 2023-12-09 RX ADMIN — HYDROMORPHONE HYDROCHLORIDE 0.25 MILLIGRAM(S): 2 INJECTION INTRAMUSCULAR; INTRAVENOUS; SUBCUTANEOUS at 09:45

## 2023-12-09 RX ADMIN — Medication 10 MILLILITER(S): at 15:30

## 2023-12-09 RX ADMIN — Medication 400 MILLIGRAM(S): at 06:11

## 2023-12-09 RX ADMIN — CHLORHEXIDINE GLUCONATE 15 MILLILITER(S): 213 SOLUTION TOPICAL at 17:01

## 2023-12-09 RX ADMIN — SENNA PLUS 2 TABLET(S): 8.6 TABLET ORAL at 21:15

## 2023-12-09 RX ADMIN — ATORVASTATIN CALCIUM 40 MILLIGRAM(S): 80 TABLET, FILM COATED ORAL at 21:15

## 2023-12-09 RX ADMIN — SODIUM CHLORIDE 3 MILLILITER(S): 9 INJECTION INTRAMUSCULAR; INTRAVENOUS; SUBCUTANEOUS at 22:22

## 2023-12-09 RX ADMIN — HYDROMORPHONE HYDROCHLORIDE 0.5 MILLIGRAM(S): 2 INJECTION INTRAMUSCULAR; INTRAVENOUS; SUBCUTANEOUS at 02:55

## 2023-12-09 RX ADMIN — HYDROMORPHONE HYDROCHLORIDE 0.5 MILLIGRAM(S): 2 INJECTION INTRAMUSCULAR; INTRAVENOUS; SUBCUTANEOUS at 15:29

## 2023-12-09 RX ADMIN — CHLORHEXIDINE GLUCONATE 1 APPLICATION(S): 213 SOLUTION TOPICAL at 06:13

## 2023-12-09 RX ADMIN — HYDROMORPHONE HYDROCHLORIDE 0.5 MILLIGRAM(S): 2 INJECTION INTRAMUSCULAR; INTRAVENOUS; SUBCUTANEOUS at 11:41

## 2023-12-09 RX ADMIN — Medication 400 MILLIGRAM(S): at 17:01

## 2023-12-09 RX ADMIN — Medication 1000 MILLIGRAM(S): at 07:06

## 2023-12-09 RX ADMIN — HYDROMORPHONE HYDROCHLORIDE 0.5 MILLIGRAM(S): 2 INJECTION INTRAMUSCULAR; INTRAVENOUS; SUBCUTANEOUS at 16:47

## 2023-12-09 RX ADMIN — TAMSULOSIN HYDROCHLORIDE 0.4 MILLIGRAM(S): 0.4 CAPSULE ORAL at 16:34

## 2023-12-09 RX ADMIN — Medication 400 MILLIGRAM(S): at 11:56

## 2023-12-09 RX ADMIN — CHLORHEXIDINE GLUCONATE 15 MILLILITER(S): 213 SOLUTION TOPICAL at 06:11

## 2023-12-09 RX ADMIN — SODIUM CHLORIDE 3 MILLILITER(S): 9 INJECTION INTRAMUSCULAR; INTRAVENOUS; SUBCUTANEOUS at 09:49

## 2023-12-09 RX ADMIN — ENOXAPARIN SODIUM 40 MILLIGRAM(S): 100 INJECTION SUBCUTANEOUS at 11:56

## 2023-12-09 RX ADMIN — HYDROMORPHONE HYDROCHLORIDE 0.25 MILLIGRAM(S): 2 INJECTION INTRAMUSCULAR; INTRAVENOUS; SUBCUTANEOUS at 03:00

## 2023-12-09 RX ADMIN — HYDROMORPHONE HYDROCHLORIDE 0.25 MILLIGRAM(S): 2 INJECTION INTRAMUSCULAR; INTRAVENOUS; SUBCUTANEOUS at 00:00

## 2023-12-09 RX ADMIN — Medication 300 MILLIGRAM(S): at 11:03

## 2023-12-09 RX ADMIN — PANTOPRAZOLE SODIUM 40 MILLIGRAM(S): 20 TABLET, DELAYED RELEASE ORAL at 11:56

## 2023-12-09 RX ADMIN — HYDROMORPHONE HYDROCHLORIDE 0.25 MILLIGRAM(S): 2 INJECTION INTRAMUSCULAR; INTRAVENOUS; SUBCUTANEOUS at 08:37

## 2023-12-09 RX ADMIN — HYDROMORPHONE HYDROCHLORIDE 0.5 MILLIGRAM(S): 2 INJECTION INTRAMUSCULAR; INTRAVENOUS; SUBCUTANEOUS at 06:11

## 2023-12-09 RX ADMIN — HYDROMORPHONE HYDROCHLORIDE 0.5 MILLIGRAM(S): 2 INJECTION INTRAMUSCULAR; INTRAVENOUS; SUBCUTANEOUS at 23:33

## 2023-12-09 RX ADMIN — HYDROMORPHONE HYDROCHLORIDE 0.5 MILLIGRAM(S): 2 INJECTION INTRAMUSCULAR; INTRAVENOUS; SUBCUTANEOUS at 12:41

## 2023-12-09 RX ADMIN — HYDROMORPHONE HYDROCHLORIDE 0.5 MILLIGRAM(S): 2 INJECTION INTRAMUSCULAR; INTRAVENOUS; SUBCUTANEOUS at 23:27

## 2023-12-09 RX ADMIN — HYDROMORPHONE HYDROCHLORIDE 0.5 MILLIGRAM(S): 2 INJECTION INTRAMUSCULAR; INTRAVENOUS; SUBCUTANEOUS at 07:07

## 2023-12-09 RX ADMIN — AMPICILLIN SODIUM AND SULBACTAM SODIUM 200 GRAM(S): 250; 125 INJECTION, POWDER, FOR SUSPENSION INTRAMUSCULAR; INTRAVENOUS at 06:11

## 2023-12-09 RX ADMIN — HYDROMORPHONE HYDROCHLORIDE 0.5 MILLIGRAM(S): 2 INJECTION INTRAMUSCULAR; INTRAVENOUS; SUBCUTANEOUS at 02:03

## 2023-12-09 RX ADMIN — HYDROMORPHONE HYDROCHLORIDE 0.25 MILLIGRAM(S): 2 INJECTION INTRAMUSCULAR; INTRAVENOUS; SUBCUTANEOUS at 03:08

## 2023-12-09 RX ADMIN — SODIUM CHLORIDE 3 MILLILITER(S): 9 INJECTION INTRAMUSCULAR; INTRAVENOUS; SUBCUTANEOUS at 03:26

## 2023-12-09 RX ADMIN — Medication 1000 MILLIGRAM(S): at 17:55

## 2023-12-09 NOTE — PROCEDURE NOTE - NSTOLERANCE_GEN_A_CORE
Returned patients call after discussing plan of care with Dr. Mary Kunz. Patient reports that his pain has decreased from a 3-4/10 to a 1-2/10. He reports that he will have PT at 0800 tomorrow.  He reports going for two walks today down the [place]
Patient tolerated procedure well.

## 2023-12-09 NOTE — CONSULT NOTE ADULT - SUBJECTIVE AND OBJECTIVE BOX
PULMONARY SERVICE INITIAL CONSULT NOTE    HPI:  67 y/o M w PMH of CAD (x2 stents Mar 2023), HLD, T2DM, hx of kidney stones, psoriasis presents to [facility] for evaluation of oral mass. Reports Three month hx of jaw pain and loosening of mandibular molar tooth on the lower left. Pt has a 30 pack yr smoking hx, quit 1 yr ago. 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 Pulmonary consulted for iatrogenic PTX after attempted NG tube placement ended up in the right lung. Patient with some shortness of breath but sating well on trach collar.     PMH:  CAD (x2 stents Mar 2023), HLD, T2DM, hx of kidney stones, psoriasis  PSH: Cardiac stent placement x2 2023, uretal stent placement 2022  Meds: Plavix (last taken 12/1), ASA81, SL nitroglycerin (pt unsure when he last used, has been months per pt), Metformin, Jardiance (last taken 11/30), Lipitor, ibuprofen, tylenol, percocet 5mg, colace, lidocaine 2% viscous mouth rinse  All: NKDA  Soc: EtOH/Tob (-) (03 Dec 2023 14:01)      REVIEW OF SYSTEMS:  Negative otherwise noted in above HPI    PAST MEDICAL & SURGICAL HISTORY:  Neoplasm of mandible      FAMILY HISTORY:      SOCIAL HISTORY:  Smoking Status: [ ] Current, [ ] Former, [ ] Never  Pack Years:    MEDICATIONS:  Pulmonary:  sodium chloride 0.9% for Nebulization 3 milliLiter(s) Nebulizer every 6 hours    Antimicrobials:    Anticoagulants:  enoxaparin Injectable 40 milliGRAM(s) SubCutaneous every 24 hours    Onc:    GI/:  pantoprazole  Injectable 40 milliGRAM(s) IV Push daily  polyethylene glycol 3350 17 Gram(s) Oral daily  senna 2 Tablet(s) Oral at bedtime  tamsulosin 0.4 milliGRAM(s) Oral every 24 hours    Endocrine:  atorvastatin 40 milliGRAM(s) Oral at bedtime  dextrose 50% Injectable 25 Gram(s) IV Push once  dextrose 50% Injectable 12.5 Gram(s) IV Push once  dextrose 50% Injectable 25 Gram(s) IV Push once  dextrose Oral Gel 15 Gram(s) Oral once PRN  glucagon  Injectable 1 milliGRAM(s) IntraMuscular once    Cardiac:    Other Medications:  chlorhexidine 0.12% Liquid 15 milliLiter(s) Oral Mucosa two times a day  chlorhexidine 4% Liquid 1 Application(s) Topical <User Schedule>  dextrose 5% + lactated ringers. 1000 milliLiter(s) IV Continuous <Continuous>  dextrose 5%. 1000 milliLiter(s) IV Continuous <Continuous>  dextrose 5%. 1000 milliLiter(s) IV Continuous <Continuous>  gabapentin 600 milliGRAM(s) Oral daily  HYDROmorphone  Injectable 0.5 milliGRAM(s) IV Push every 4 hours PRN  influenza  Vaccine (HIGH DOSE) 0.7 milliLiter(s) IntraMuscular once  ondansetron Injectable 4 milliGRAM(s) IV Push every 6 hours PRN  oxyCODONE    Solution 10 milliGRAM(s) Oral every 6 hours PRN  oxyCODONE    Solution 5 milliGRAM(s) Oral every 4 hours PRN      Allergies    No Known Allergies    Intolerances        Vital Signs Last 24 Hrs  T(C): 37.2 (09 Dec 2023 17:00), Max: 38.8 (08 Dec 2023 18:00)  T(F): 98.9 (09 Dec 2023 17:00), Max: 101.9 (08 Dec 2023 18:00)  HR: 52 (09 Dec 2023 17:00) (48 - 84)  BP: 125/85 (09 Dec 2023 17:00) (103/53 - 157/67)  BP(mean): 83 (09 Dec 2023 17:00) (73 - 109)  RR: 13 (09 Dec 2023 17:00) (12 - 20)  SpO2: 100% (09 Dec 2023 17:00) (98% - 100%)    Parameters below as of 09 Dec 2023 17:00  Patient On (Oxygen Delivery Method): tracheostomy collar    O2 Concentration (%): 40    12-08 @ 07:01  -  12-09 @ 07:00  --------------------------------------------------------  IN: 3299.8 mL / OUT: 2255 mL / NET: 1044.8 mL    12-09 @ 07:01  -  12-09 @ 17:48  --------------------------------------------------------  IN: 1460 mL / OUT: 689 mL / NET: 771 mL      PHYSICAL EXAM:  Constitutional: On trach collar, no acute distress  Head: NC/AT  EENT: anicteric sclera; oropharynx clear, MMM  Neck: supple, no JVD  Respiratory: Reduced breath sounds over the right upper lung fields, CTA otherwise bilaterally   Cardiovascular: +S1/S2, RRR  Gastrointestinal: soft, NT/ND  Extremities: WWP; no clubbing or cyanosis; no edema  Vascular: 2+ radial and pedal pulses  Neurological: alert, oriented    LABS:      CBC Full  -  ( 09 Dec 2023 06:26 )  WBC Count : 9.08 K/uL  RBC Count : 2.68 M/uL  Hemoglobin : 7.6 g/dL  Hematocrit : 22.6 %  Platelet Count - Automated : 106 K/uL  Mean Cell Volume : 84.3 fl  Mean Cell Hemoglobin : 28.4 pg  Mean Cell Hemoglobin Concentration : 33.6 gm/dL  Auto Neutrophil # : x  Auto Lymphocyte # : x  Auto Monocyte # : x  Auto Eosinophil # : x  Auto Basophil # : x  Auto Neutrophil % : x  Auto Lymphocyte % : x  Auto Monocyte % : x  Auto Eosinophil % : x  Auto Basophil % : x    12-09    140  |  108  |  14  ----------------------------<  113<H>  3.7   |  26  |  0.77    Ca    8.3<L>      09 Dec 2023 06:26  Phos  3.4     12-09  Mg     1.9     12-09    TPro  5.6<L>  /  Alb  3.8  /  TBili  0.8  /  DBili  x   /  AST  28  /  ALT  17  /  AlkPhos  27<L>  12-07    PT/INR - ( 07 Dec 2023 20:04 )   PT: 12.6 sec;   INR: 1.11          PTT - ( 07 Dec 2023 20:04 )  PTT:24.8 sec      Urinalysis Basic - ( 09 Dec 2023 06:26 )    Color: x / Appearance: x / SG: x / pH: x  Gluc: 113 mg/dL / Ketone: x  / Bili: x / Urobili: x   Blood: x / Protein: x / Nitrite: x   Leuk Esterase: x / RBC: x / WBC x   Sq Epi: x / Non Sq Epi: x / Bacteria: x    RADIOLOGY & ADDITIONAL STUDIES: Personally reviewed.    < from: Xray Chest 1 View- PORTABLE-Urgent (Xray Chest 1 View- PORTABLE-Urgent .) (12.09.23 @ 17:04) >    ******PRELIMINARY REPORT******      ******PRELIMINARY REPORT******         ACC: [id number] EXAM:  XR CHEST PORTABLE URGENT 1V   ORDERED BY: AARON URIAS     PROCEDURE DATE:  12/09/2023    ******PRELIMINARY REPORT******      ******PRELIMINARY REPORT******           INTERPRETATION:  XR CHEST URGENT dated 12/9/2023 5:04 PM    CLINICAL INFORMATION: R chest tube    COMPARISON: Chest radiograph earlier same day 12/9/2023    FINDINGS:  Interval placement of right pigtail catheter with significant improvement  in right pneumothorax.  Otherwise no significant interval change.    IMPRESSION:  Interval placement of right pigtail catheter with significant improvement   in right pneumothorax.        ******PRELIMINARY REPORT******      ******PRELIMINARY REPORT******       CARMEN SHIELDS MD; Resident Radiologist  This document is a PRELIMINARY interpretation and is pending final   attending approval. Dec  9 2023  5:25PM    < end of copied text >   PULMONARY SERVICE INITIAL CONSULT NOTE    HPI:  67 y/o M w PMH of CAD (x2 stents Mar 2023), HLD, T2DM, hx of kidney stones, psoriasis presents to [facility] for evaluation of oral mass. Reports Three month hx of jaw pain and loosening of mandibular molar tooth on the lower left. Pt has a 30 pack yr smoking hx, quit 1 yr ago. 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 Pulmonary consulted for iatrogenic PTX after attempted NG tube placement ended up in the right lung. Patient with some shortness of breath but sating well on trach collar.     PMH:  CAD (x2 stents Mar 2023), HLD, T2DM, hx of kidney stones, psoriasis  PSH: Cardiac stent placement x2 2023, uretal stent placement 2022  Meds: Plavix (last taken 12/1), ASA81, SL nitroglycerin (pt unsure when he last used, has been months per pt), Metformin, Jardiance (last taken 11/30), Lipitor, ibuprofen, tylenol, percocet 5mg, colace, lidocaine 2% viscous mouth rinse  All: NKDA  Soc: EtOH/Tob (-) (03 Dec 2023 14:01)      REVIEW OF SYSTEMS:  Negative otherwise noted in above HPI    PAST MEDICAL & SURGICAL HISTORY:  Neoplasm of mandible      FAMILY HISTORY:      SOCIAL HISTORY:  Smoking Status: [ ] Current, [ ] Former, [ ] Never  Pack Years:    MEDICATIONS:  Pulmonary:  sodium chloride 0.9% for Nebulization 3 milliLiter(s) Nebulizer every 6 hours    Antimicrobials:    Anticoagulants:  enoxaparin Injectable 40 milliGRAM(s) SubCutaneous every 24 hours    Onc:    GI/:  pantoprazole  Injectable 40 milliGRAM(s) IV Push daily  polyethylene glycol 3350 17 Gram(s) Oral daily  senna 2 Tablet(s) Oral at bedtime  tamsulosin 0.4 milliGRAM(s) Oral every 24 hours    Endocrine:  atorvastatin 40 milliGRAM(s) Oral at bedtime  dextrose 50% Injectable 25 Gram(s) IV Push once  dextrose 50% Injectable 12.5 Gram(s) IV Push once  dextrose 50% Injectable 25 Gram(s) IV Push once  dextrose Oral Gel 15 Gram(s) Oral once PRN  glucagon  Injectable 1 milliGRAM(s) IntraMuscular once    Cardiac:    Other Medications:  chlorhexidine 0.12% Liquid 15 milliLiter(s) Oral Mucosa two times a day  chlorhexidine 4% Liquid 1 Application(s) Topical <User Schedule>  dextrose 5% + lactated ringers. 1000 milliLiter(s) IV Continuous <Continuous>  dextrose 5%. 1000 milliLiter(s) IV Continuous <Continuous>  dextrose 5%. 1000 milliLiter(s) IV Continuous <Continuous>  gabapentin 600 milliGRAM(s) Oral daily  HYDROmorphone  Injectable 0.5 milliGRAM(s) IV Push every 4 hours PRN  influenza  Vaccine (HIGH DOSE) 0.7 milliLiter(s) IntraMuscular once  ondansetron Injectable 4 milliGRAM(s) IV Push every 6 hours PRN  oxyCODONE    Solution 10 milliGRAM(s) Oral every 6 hours PRN  oxyCODONE    Solution 5 milliGRAM(s) Oral every 4 hours PRN      Allergies    No Known Allergies    Intolerances        Vital Signs Last 24 Hrs  T(C): 37.2 (09 Dec 2023 17:00), Max: 38.8 (08 Dec 2023 18:00)  T(F): 98.9 (09 Dec 2023 17:00), Max: 101.9 (08 Dec 2023 18:00)  HR: 52 (09 Dec 2023 17:00) (48 - 84)  BP: 125/85 (09 Dec 2023 17:00) (103/53 - 157/67)  BP(mean): 83 (09 Dec 2023 17:00) (73 - 109)  RR: 13 (09 Dec 2023 17:00) (12 - 20)  SpO2: 100% (09 Dec 2023 17:00) (98% - 100%)    Parameters below as of 09 Dec 2023 17:00  Patient On (Oxygen Delivery Method): tracheostomy collar    O2 Concentration (%): 40    12-08 @ 07:01  -  12-09 @ 07:00  --------------------------------------------------------  IN: 3299.8 mL / OUT: 2255 mL / NET: 1044.8 mL    12-09 @ 07:01  -  12-09 @ 17:48  --------------------------------------------------------  IN: 1460 mL / OUT: 689 mL / NET: 771 mL      PHYSICAL EXAM:  Constitutional: On trach collar, no acute distress  Head: NC/AT  EENT: anicteric sclera; oropharynx clear, MMM  Neck: supple, no JVD  Respiratory: Reduced breath sounds over the right upper lung fields, CTA otherwise bilaterally   Cardiovascular: +S1/S2, RRR  Gastrointestinal: soft, NT/ND  Extremities: WWP; no clubbing or cyanosis; no edema  Vascular: 2+ radial and pedal pulses  Neurological: alert, oriented    LABS:      CBC Full  -  ( 09 Dec 2023 06:26 )  WBC Count : 9.08 K/uL  RBC Count : 2.68 M/uL  Hemoglobin : 7.6 g/dL  Hematocrit : 22.6 %  Platelet Count - Automated : 106 K/uL  Mean Cell Volume : 84.3 fl  Mean Cell Hemoglobin : 28.4 pg  Mean Cell Hemoglobin Concentration : 33.6 gm/dL  Auto Neutrophil # : x  Auto Lymphocyte # : x  Auto Monocyte # : x  Auto Eosinophil # : x  Auto Basophil # : x  Auto Neutrophil % : x  Auto Lymphocyte % : x  Auto Monocyte % : x  Auto Eosinophil % : x  Auto Basophil % : x    12-09    140  |  108  |  14  ----------------------------<  113<H>  3.7   |  26  |  0.77    Ca    8.3<L>      09 Dec 2023 06:26  Phos  3.4     12-09  Mg     1.9     12-09    TPro  5.6<L>  /  Alb  3.8  /  TBili  0.8  /  DBili  x   /  AST  28  /  ALT  17  /  AlkPhos  27<L>  12-07    PT/INR - ( 07 Dec 2023 20:04 )   PT: 12.6 sec;   INR: 1.11          PTT - ( 07 Dec 2023 20:04 )  PTT:24.8 sec      Urinalysis Basic - ( 09 Dec 2023 06:26 )    Color: x / Appearance: x / SG: x / pH: x  Gluc: 113 mg/dL / Ketone: x  / Bili: x / Urobili: x   Blood: x / Protein: x / Nitrite: x   Leuk Esterase: x / RBC: x / WBC x   Sq Epi: x / Non Sq Epi: x / Bacteria: x    RADIOLOGY & ADDITIONAL STUDIES: Personally reviewed.    < from: Xray Chest 1 View- PORTABLE-Urgent (Xray Chest 1 View- PORTABLE-Urgent .) (12.09.23 @ 17:04) >    ******PRELIMINARY REPORT******      ******PRELIMINARY REPORT******         ACC: [id number] EXAM:  XR CHEST PORTABLE URGENT 1V   ORDERED BY: AARON URIAS     PROCEDURE DATE:  12/09/2023    ******PRELIMINARY REPORT******      ******PRELIMINARY REPORT******           INTERPRETATION:  XR CHEST URGENT dated 12/9/2023 5:04 PM    CLINICAL INFORMATION: R chest tube    COMPARISON: Chest radiograph earlier same day 12/9/2023    FINDINGS:  Interval placement of right pigtail catheter with significant improvement  in right pneumothorax.  Otherwise no significant interval change.    IMPRESSION:  Interval placement of right pigtail catheter with significant improvement   in right pneumothorax.        ******PRELIMINARY REPORT******      ******PRELIMINARY REPORT******       CARMEN SHIELDS MD; Resident Radiologist  This document is a PRELIMINARY interpretation and is pending final   attending approval. Dec  9 2023  5:25PM    < end of copied text >

## 2023-12-09 NOTE — PROGRESS NOTE ADULT - SUBJECTIVE AND OBJECTIVE BOX
INTERVAL/OVERNIGHT EVENTS: Febrile overnight, but no work up since POD1. Was straight cath x1 at 7am.    SUBJECTIVE: This AM, doing well and converses by writing. Pain controlled w meds, but it makes him feel week. No N/V or abd pain. Passing flatus but no BM yet. Difficulty voiding.    Neurologic Medications  acetaminophen   IVPB .. 1000 milliGRAM(s) IV Intermittent every 6 hours  aspirin Suppository 300 milliGRAM(s) Rectal daily  gabapentin 600 milliGRAM(s) Oral daily  HYDROmorphone  Injectable 0.25 milliGRAM(s) IV Push every 4 hours PRN Moderate Pain (4 - 6)  HYDROmorphone  Injectable 0.5 milliGRAM(s) IV Push every 4 hours PRN Severe Pain (7 - 10)  ondansetron Injectable 4 milliGRAM(s) IV Push every 6 hours PRN Nausea and/or Vomiting    Respiratory Medications  sodium chloride 0.9% for Nebulization 3 milliLiter(s) Nebulizer every 6 hours    Gastrointestinal Medications  lactated ringers. 1000 milliLiter(s) IV Continuous <Continuous>  pantoprazole  Injectable 40 milliGRAM(s) IV Push daily  polyethylene glycol 3350 17 Gram(s) Oral daily  senna 2 Tablet(s) Oral at bedtime    Hematologic/Oncologic Medications  enoxaparin Injectable 40 milliGRAM(s) SubCutaneous every 24 hours  influenza  Vaccine (HIGH DOSE) 0.7 milliLiter(s) IntraMuscular once    Endocrine/Metabolic Medications  atorvastatin 40 milliGRAM(s) Oral at bedtime  dextrose Oral Gel 15 Gram(s) Oral once PRN Blood Glucose LESS THAN 70 milliGRAM(s)/deciliter  glucagon  Injectable 1 milliGRAM(s) IntraMuscular once    Topical/Other Medications  chlorhexidine 0.12% Liquid 15 milliLiter(s) Oral Mucosa two times a day  chlorhexidine 4% Liquid 1 Application(s) Topical <User Schedule>    MEDICATIONS  (PRN):  dextrose Oral Gel 15 Gram(s) Oral once PRN Blood Glucose LESS THAN 70 milliGRAM(s)/deciliter  HYDROmorphone  Injectable 0.5 milliGRAM(s) IV Push every 4 hours PRN Severe Pain (7 - 10)  HYDROmorphone  Injectable 0.25 milliGRAM(s) IV Push every 4 hours PRN Moderate Pain (4 - 6)  ondansetron Injectable 4 milliGRAM(s) IV Push every 6 hours PRN Nausea and/or Vomiting    I&O's Detail    08 Dec 2023 07:01  -  09 Dec 2023 07:00  --------------------------------------------------------  IN:    IV PiggyBack: 200 mL    IV PiggyBack: 799.8 mL    Lactated Ringers: 2300 mL  Total IN: 3299.8 mL    OUT:    Bulb (mL): 110 mL    Bulb (mL): 50 mL    Bulb (mL): 220 mL    Indwelling Catheter - Urethral (mL): 315 mL    Intermittent Catheterization - Urethral (mL): 1500 mL    VAC (Vacuum Assisted Closure) System (mL): 60 mL  Total OUT: 2255 mL    Total NET: 1044.8 mL    09 Dec 2023 07:01  -  09 Dec 2023 12:15  --------------------------------------------------------  IN:    IV PiggyBack: 100 mL    Lactated Ringers: 500 mL  Total IN: 600 mL    OUT:    Bulb (mL): 10 mL    Bulb (mL): 10 mL    Bulb (mL): 20 mL    VAC (Vacuum Assisted Closure) System (mL): 0 mL    Voided (mL): 30 mL  Total OUT: 70 mL    Total NET: 530 mL    Vital Signs Last 24 Hrs  T(C): 36.7 (09 Dec 2023 09:38), Max: 38.8 (08 Dec 2023 18:00)  T(F): 98 (09 Dec 2023 09:38), Max: 101.9 (08 Dec 2023 18:00)  HR: 61 (09 Dec 2023 12:00) (50 - 84)  BP: 118/59 (09 Dec 2023 12:00) (116/58 - 157/67)  BP(mean): 85 (09 Dec 2023 12:00) (78 - 109)  RR: 19 (09 Dec 2023 12:00) (12 - 26)  SpO2: 100% (09 Dec 2023 12:00) (98% - 100%)    Parameters below as of 09 Dec 2023 12:00  Patient On (Oxygen Delivery Method): tracheostomy collar    O2 Concentration (%): 40    GENERAL: NAD, resting comfortably in bed  HEENT: NCAT, MMM, incisions w dried blood, KRISTEN SS x2, DHT in place  C/V: Normal rate, normal peripheral perfusion, no murmurs  PULM: Nonlabored breathing, no respiratory distress, rhonchi b/l, improved w cough and suctioning.  ABD: Soft, ND, NT, no rebound tenderness, no guarding  EXTREM: WWP, no edema, SCDs in place, leg CDI  NEURO: No focal deficits    LABS:                        7.6    9.08  )-----------( 106      ( 09 Dec 2023 06:26 )             22.6     12-09    140  |  108  |  14  ----------------------------<  113<H>  3.7   |  26  |  0.77    Ca    8.3<L>      09 Dec 2023 06:26  Phos  3.4     12-09  Mg     1.9     12-09    TPro  5.6<L>  /  Alb  3.8  /  TBili  0.8  /  DBili  x   /  AST  28  /  ALT  17  /  AlkPhos  27<L>  12-07    PT/INR - ( 07 Dec 2023 20:04 )   PT: 12.6 sec;   INR: 1.11        PTT - ( 07 Dec 2023 20:04 )  PTT:24.8 sec  Urinalysis Basic - ( 09 Dec 2023 06:26 )    Color: x / Appearance: x / SG: x / pH: x  Gluc: 113 mg/dL / Ketone: x  / Bili: x / Urobili: x   Blood: x / Protein: x / Nitrite: x   Leuk Esterase: x / RBC: x / WBC x   Sq Epi: x / Non Sq Epi: x / Bacteria: x

## 2023-12-09 NOTE — CONSULT NOTE ADULT - ASSESSMENT
67 y/o M w PMH of CAD (x2 stents Mar 2023), HLD, T2DM, hx of kidney stones, psoriasis, and SCC of the head and neck s/p mandibular resection being managed by pulmonary for PTX on the right. Right chest tube placed without complications. Repeat CXR with significant improvement. If patient becomes hemodynamically unstable or suddenly hypoxic would recommend immediate evaluation of chest tube and it's site for malfunction/kinking. Will plan for clamping trial in AM.     Recommendations  - S/p chest tube  - Pain control per primary  - Please make sure chest tube does not get kinked  - Can keep to water seal    Case s/e/d with Dr. Buchanan

## 2023-12-09 NOTE — CONSULT NOTE ADULT - ATTENDING COMMENTS
Right sided pneumothorax due to NG tube placement. Chest tube placed at bedside with lung re-expansion. Plan as per above.

## 2023-12-09 NOTE — PROGRESS NOTE ADULT - ASSESSMENT
Assessment and Plan:  SAUNDRA HOLMAN is a  68M w/ [id number] SCC of L buccal mucosa presents for pre optimization for surgery 12/7, now s/p hemimandibulectomy, left level 1, 2a, 3 neck neck dissection, L FFF, tracheostomy w/ 7.5 cuffed portex, dental implants, STSG 12/7.    PLAN:       POD 2:      Replace NGT and confirm with CXR  Hold eliquis in setting of high drain output and HgB drop  Transfuse 2 units of pRBCs and post-transfusion CBC  OOBTC  Start tube feeds once NGT confirmed  Follow up TOV  RN Flap checks Q1 for 24 hours   Steroids completed   Continue Ondansetron PRN nausea/vomiting , PO Senna once daily, Miralax 17g once daily,  Chlorhexidine swish and spit, Esomeprazole, Enoxaparin, Gabapentin, Acetaminophen   Continue incentive spirometry and SCD’s       Assessment and Plan:  SAUNDRA HOLMAN is a  68M w/ [id number] SCC of L buccal mucosa presents for pre optimization for surgery 12/7, now s/p hemimandibulectomy, left level 1, 2a, 3 neck neck dissection, L FFF, tracheostomy w/ 7.5 cuffed portex, dental implants, STSG 12/7.    PLAN:       POD 2:      Replace NGT and confirm with CXR  Hold eliquis in setting of high drain output and HgB drop  Transfuse 2 units of pRBCs and post-transfusion CBC  OOBTC  Start tube feeds once NGT confirmed  Follow up TOV  RN Flap checks Q1 for 24 hours   Steroids completed   Continue Ondansetron PRN nausea/vomiting , PO Senna once daily, Miralax 17g once daily,  Chlorhexidine swish and spit, Esomeprazole, Enoxaparin, Gabapentin, Acetaminophen   Continue incentive spirometry and SCD’s

## 2023-12-09 NOTE — PROGRESS NOTE ADULT - ASSESSMENT
67 y/o M w PMHx of CAD (x2 stents Mar 2023), HLD, T2DM, nephrolithiasis (ureteral stent placement 2022), and psoriasis w/ three month hx of jaw pain and loosening of lower left mandibular molar tooth. Pt has a 30 pack yr smoking hx, quit 1 yr ago. 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 12/7 s/p L hemimandibulectomy, L level 1, 2a, 3 neck dissection, L fibula free flap reconstruction, STSG from L thigh, dental implants, and tracheostomy. Admitted to SICU for flap checks and hemodynamic monitoring.    Neuro: standing Tylenol, Gabapentin 600mg, Dilaudid prn  HEENT: Flap checks q2h until 9pm, No circumferential ties or collars, Head neutral, KRISTEN L neck, KRISTEN R neck, Peridex PO QID POD1  CV: HD stable, Maintain MAP > 60, Avoid pressors but will add Low dose Levo gtt if needed. Hx of CAD 2 stents: ASA, holding Plavix, ASA NM. Restart Plavix (with loading dose) if drains low. Hx of HLD: Lipitor.   Pulm: s/p trach with 7.5 cuffed portex, NaCl nebulizer q6  GI: NPO, D5LR at 80, DHT replaced, pending CXR; PPI IV, holding Senna, Miralax until DHT confirmed.  : d/c'max young, awaiting TOV  ID: [email or web address] x24 (through 12/9 AM)  Endo: mISS (hx T2DM)   Heme: Active T&S, Hgb >8. ABLA postoperative, Hg 7.6 in setting of drain outputs, give 2U pRBC per ENT.  ppx: SCDs, SQL  Lines: PIV // DC: L radial [place]  (12/7-12/8)    Wounds: L leg KRISTEN and wound vac, bacitracin to wounds    PT/OT: ordered, CAM boot in room, OOBTC today  Dispo: SDU Sunday AM 69 y/o M w PMHx of CAD (x2 stents Mar 2023), HLD, T2DM, nephrolithiasis (ureteral stent placement 2022), and psoriasis w/ three month hx of jaw pain and loosening of lower left mandibular molar tooth. Pt has a 30 pack yr smoking hx, quit 1 yr ago. 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 12/7 s/p L hemimandibulectomy, L level 1, 2a, 3 neck dissection, L fibula free flap reconstruction, STSG from L thigh, dental implants, and tracheostomy. Admitted to SICU for flap checks and hemodynamic monitoring.    Neuro: standing Tylenol, Gabapentin 600mg, Dilaudid prn  HEENT: Flap checks q2h until 9pm, No circumferential ties or collars, Head neutral, KRISTEN L neck, KRISTEN R neck, Peridex PO QID POD1  CV: HD stable, Maintain MAP > 60, Avoid pressors but will add Low dose Levo gtt if needed. Hx of CAD 2 stents: ASA, holding Plavix, ASA [place]. Restart Plavix (with loading dose) if drains low. Hx of HLD: Lipitor.   Pulm: s/p trach with 7.5 cuffed portex, NaCl nebulizer q6  GI: NPO, D5LR at 80, DHT replaced, pending CXR; PPI IV, holding Senna, Miralax until DHT confirmed.  : d/c'max young, awaiting TOV  ID: Unasyn x24 (through 12/9 AM)  Endo: mISS (hx T2DM)   Heme: Active T&S, Hgb >8. ABLA postoperative, Hg 7.6 in setting of drain outputs, give 2U pRBC per ENT.  ppx: SCDs, SQL  Lines: PIV // DC: L radial [place]  (12/7-12/8)    Wounds: L leg KRISTEN and wound vac, bacitracin to wounds    PT/OT: ordered, CAM boot in room, OOBTC today  Dispo: SDU Sunday AM

## 2023-12-09 NOTE — PROGRESS NOTE ADULT - SUBJECTIVE AND OBJECTIVE BOX
OTOLARYNGOLOGY (ENT) PROGRESS NOTE    PATIENT: SAUNDRA HOLMAN  MRN: [id number]  : 57  [id number]  DATE OF SERVICE:  23  			         ID:SAUNDRA HOLMAN is a  69yo M w PMH of CAD (x2 stents Mar 2023), HLD, T2DM, hx of kidney stones, psoriasis presents to [facility] for evaluation of oral mass. Reports Three month hx of jaw pain and loosening of mandibular molar tooth on the lower left. Pt has a 30 pack yr smoking hx, quit 1 yr ago. 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 POD 1 composite mandibular resection, neck dissection and fibula free flap reconstruction.       Subjective/ Interval:   ; patient seen this morning, oozing from trach as expected ,  Plan to start aspirin today, cuff is up on tracheostomy tube, intraoral skin panel intact doppler signal strong,  plan to advance NGT   : Patient seen and examined at bedside. AFVSS overnight. Significant HgB drop to 7.2 noted this morning. NGT clogged, attempted replacement but went into lung, need to replace. Cuff deflated. Patient reports lethargy. Otherwise no new complaints. Intraoral skin paddle intact with strong doppler signal. Plan to hold off one eliquis given significant HgB drop  ALLERGIES:    ALLERGIES:  No Known Allergies      MEDICATIONS:  Antiinfectives:     IV fluids:  dextrose 5%. 1000 milliLiter(s) IV Continuous <Continuous>  dextrose 5%. 1000 milliLiter(s) IV Continuous <Continuous>  lactated ringers. 1000 milliLiter(s) IV Continuous <Continuous>    Hematologic/Anticoagulation:  enoxaparin Injectable 40 milliGRAM(s) SubCutaneous every 24 hours    Pain medications/Neuro:  acetaminophen   IVPB .. 1000 milliGRAM(s) IV Intermittent every 6 hours  aspirin Suppository 300 milliGRAM(s) Rectal daily  gabapentin 600 milliGRAM(s) Oral daily  HYDROmorphone  Injectable 0.25 milliGRAM(s) IV Push every 4 hours PRN  HYDROmorphone  Injectable 0.5 milliGRAM(s) IV Push every 4 hours PRN  ondansetron Injectable 4 milliGRAM(s) IV Push every 6 hours PRN    Endocrine Medications:   atorvastatin 40 milliGRAM(s) Oral at bedtime  dextrose 50% Injectable 25 Gram(s) IV Push once  dextrose 50% Injectable 12.5 Gram(s) IV Push once  dextrose 50% Injectable 25 Gram(s) IV Push once  dextrose Oral Gel 15 Gram(s) Oral once PRN  glucagon  Injectable 1 milliGRAM(s) IntraMuscular once    All other standing medications:   chlorhexidine 0.12% Liquid 15 milliLiter(s) Oral Mucosa two times a day  chlorhexidine 4% Liquid 1 Application(s) Topical <User Schedule>  influenza  Vaccine (HIGH DOSE) 0.7 milliLiter(s) IntraMuscular once  pantoprazole  Injectable 40 milliGRAM(s) IV Push daily  polyethylene glycol 3350 17 Gram(s) Oral daily  senna 2 Tablet(s) Oral at bedtime  sodium chloride 0.9% for Nebulization 3 milliLiter(s) Nebulizer every 6 hours    All other PRN medications:    Vital Signs Last 24 Hrs  T(C): 36.7 (09 Dec 2023 09:38), Max: 38.8 (08 Dec 2023 18:00)  T(F): 98 (09 Dec 2023 09:38), Max: 101.9 (08 Dec 2023 18:00)  HR: 84 (09 Dec 2023 10:00) (50 - 84)  BP: 146/65 (09 Dec 2023 10:00) (116/58 - 157/67)  BP(mean): 93 (09 Dec 2023 10:00) (78 - 109)  RR: 16 (09 Dec 2023 10:00) (12 - 26)  SpO2: 99% (09 Dec 2023 10:00) (98% - 100%)    Parameters below as of 09 Dec 2023 10:00  Patient On (Oxygen Delivery Method): tracheostomy collar    O2 Concentration (%): 40       @ 07:01  -   @ 07:00  --------------------------------------------------------  IN:    IV PiggyBack: 200 mL    IV PiggyBack: 799.8 mL    Lactated Ringers: 2300 mL  Total IN: 3299.8 mL    OUT:    Bulb (mL): 110 mL    Bulb (mL): 50 mL    Bulb (mL): 220 mL    Indwelling Catheter - Urethral (mL): 315 mL    Intermittent Catheterization - Urethral (mL): 1500 mL    VAC (Vacuum Assisted Closure) System (mL): 60 mL  Total OUT: 2255 mL    Total NET: 1044.8 mL       @ 07:01  -   @ 11:17  --------------------------------------------------------  IN:    Lactated Ringers: 300 mL  Total IN: 300 mL    OUT:    Bulb (mL): 10 mL    Bulb (mL): 5 mL    Bulb (mL): 5 mL    VAC (Vacuum Assisted Closure) System (mL): 0 mL    Voided (mL): 30 mL  Total OUT: 50 mL    Total NET: 250 mL          23 @ 07:01  -  23 @ 07:00  --------------------------------------------------------  IN:  Total IN: 0 mL    OUT:    Bulb (mL): 110 mL    Bulb (mL): 50 mL    Bulb (mL): 220 mL    VAC (Vacuum Assisted Closure) System (mL): 60 mL  Total OUT: 440 mL    Total NET: -440 mL      23 @ 07:01  -  23 @ 11:17  --------------------------------------------------------  IN:  Total IN: 0 mL    OUT:    Bulb (mL): 10 mL    Bulb (mL): 5 mL    Bulb (mL): 5 mL    VAC (Vacuum Assisted Closure) System (mL): 0 mL  Total OUT: 20 mL    Total NET: -20 mL              PHYSICAL EXAM:  Gen: AAOx3, NAD   Head: Surgical incision around chin extending up through lip  Eyes: EOMI, PERRL, visual acuity intact, no diplopia, supra/infra orbital rims intact, no subconjunctival heme, no telecanthus, no exophthalmos   Ears: Gross hearing intact,  Nose: No septal hematoma/asymmetry, no epistaxis bilaterally. no abrasions present, no lacerations.   Malar: No malar depression  Throat: No LAD, supple, neck surgical incision w/ steristrip dressing is hemostatic + left and right neck KRISTEN in place, left KRISTEN sanguinous output , right neck KRISTEN sanguinous output , trach in place w/ 7.5 cuffed portex, cuff deflated  Oral: Left FFF paddle pink, warm, well perfused. IMF screws in place, class 3 elastics,   Exx: Wound vac left leg, left leg KRISTEN with sanguinous output         LABS                       7.6    9.08  )-----------( 106      ( 09 Dec 2023 06:26 )             22.6        140  |  108  |  14  ----------------------------<  113<H>  3.7   |  26  |  0.77    Ca    8.3<L>      09 Dec 2023 06:26  Phos  3.4       Mg     1.9         TPro  5.6<L>  /  Alb  3.8  /  TBili  0.8  /  DBili  x   /  AST  28  /  ALT  17  /  AlkPhos  27<L>           Coagulation Studies-   PT/INR - ( 07 Dec 2023 20:04 )   PT: 12.6 sec;   INR: 1.11          PTT - ( 07 Dec 2023 20:04 )  PTT:24.8 sec  Urinalysis Basic - ( 09 Dec 2023 06:26 )    Color: x / Appearance: x / SG: x / pH: x  Gluc: 113 mg/dL / Ketone: x  / Bili: x / Urobili: x   Blood: x / Protein: x / Nitrite: x   Leuk Esterase: x / RBC: x / WBC x   Sq Epi: x / Non Sq Epi: x / Bacteria: x      Endocrine Panel-  Calcium: 8.3 mg/dL ( @ 06:26)                MICROBIOLOGY:        RADIOLOGY & ADDITIONAL STUDIES:     OTOLARYNGOLOGY (ENT) PROGRESS NOTE    PATIENT: SAUNDRA HOLMAN  MRN: [id number]  : 57  [id number]  DATE OF SERVICE:  23  			         ID:SAUNDRA HOLMAN is a  69yo M w PMH of CAD (x2 stents Mar 2023), HLD, T2DM, hx of kidney stones, psoriasis presents to [facility] for evaluation of oral mass. Reports Three month hx of jaw pain and loosening of mandibular molar tooth on the lower left. Pt has a 30 pack yr smoking hx, quit 1 yr ago. 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 POD 1 composite mandibular resection, neck dissection and fibula free flap reconstruction.       Subjective/ Interval:   ; patient seen this morning, oozing from trach as expected ,  Plan to start aspirin today, cuff is up on tracheostomy tube, intraoral skin panel intact doppler signal strong,  plan to advance NGT   : Patient seen and examined at bedside. AFVSS overnight. Significant HgB drop to 7.2 noted this morning. NGT clogged, attempted replacement but went into lung, need to replace. Cuff deflated. Patient reports lethargy. Otherwise no new complaints. Intraoral skin paddle intact with strong doppler signal. Plan to hold off one eliquis given significant HgB drop  ALLERGIES:    ALLERGIES:  No Known Allergies      MEDICATIONS:  Antiinfectives:     IV fluids:  dextrose 5%. 1000 milliLiter(s) IV Continuous <Continuous>  dextrose 5%. 1000 milliLiter(s) IV Continuous <Continuous>  lactated ringers. 1000 milliLiter(s) IV Continuous <Continuous>    Hematologic/Anticoagulation:  enoxaparin Injectable 40 milliGRAM(s) SubCutaneous every 24 hours    Pain medications/Neuro:  acetaminophen   IVPB .. 1000 milliGRAM(s) IV Intermittent every 6 hours  aspirin Suppository 300 milliGRAM(s) Rectal daily  gabapentin 600 milliGRAM(s) Oral daily  HYDROmorphone  Injectable 0.25 milliGRAM(s) IV Push every 4 hours PRN  HYDROmorphone  Injectable 0.5 milliGRAM(s) IV Push every 4 hours PRN  ondansetron Injectable 4 milliGRAM(s) IV Push every 6 hours PRN    Endocrine Medications:   atorvastatin 40 milliGRAM(s) Oral at bedtime  dextrose 50% Injectable 25 Gram(s) IV Push once  dextrose 50% Injectable 12.5 Gram(s) IV Push once  dextrose 50% Injectable 25 Gram(s) IV Push once  dextrose Oral Gel 15 Gram(s) Oral once PRN  glucagon  Injectable 1 milliGRAM(s) IntraMuscular once    All other standing medications:   chlorhexidine 0.12% Liquid 15 milliLiter(s) Oral Mucosa two times a day  chlorhexidine 4% Liquid 1 Application(s) Topical <User Schedule>  influenza  Vaccine (HIGH DOSE) 0.7 milliLiter(s) IntraMuscular once  pantoprazole  Injectable 40 milliGRAM(s) IV Push daily  polyethylene glycol 3350 17 Gram(s) Oral daily  senna 2 Tablet(s) Oral at bedtime  sodium chloride 0.9% for Nebulization 3 milliLiter(s) Nebulizer every 6 hours    All other PRN medications:    Vital Signs Last 24 Hrs  T(C): 36.7 (09 Dec 2023 09:38), Max: 38.8 (08 Dec 2023 18:00)  T(F): 98 (09 Dec 2023 09:38), Max: 101.9 (08 Dec 2023 18:00)  HR: 84 (09 Dec 2023 10:00) (50 - 84)  BP: 146/65 (09 Dec 2023 10:00) (116/58 - 157/67)  BP(mean): 93 (09 Dec 2023 10:00) (78 - 109)  RR: 16 (09 Dec 2023 10:00) (12 - 26)  SpO2: 99% (09 Dec 2023 10:00) (98% - 100%)    Parameters below as of 09 Dec 2023 10:00  Patient On (Oxygen Delivery Method): tracheostomy collar    O2 Concentration (%): 40       @ 07:01  -   @ 07:00  --------------------------------------------------------  IN:    IV PiggyBack: 200 mL    IV PiggyBack: 799.8 mL    Lactated Ringers: 2300 mL  Total IN: 3299.8 mL    OUT:    Bulb (mL): 110 mL    Bulb (mL): 50 mL    Bulb (mL): 220 mL    Indwelling Catheter - Urethral (mL): 315 mL    Intermittent Catheterization - Urethral (mL): 1500 mL    VAC (Vacuum Assisted Closure) System (mL): 60 mL  Total OUT: 2255 mL    Total NET: 1044.8 mL       @ 07:01  -   @ 11:17  --------------------------------------------------------  IN:    Lactated Ringers: 300 mL  Total IN: 300 mL    OUT:    Bulb (mL): 10 mL    Bulb (mL): 5 mL    Bulb (mL): 5 mL    VAC (Vacuum Assisted Closure) System (mL): 0 mL    Voided (mL): 30 mL  Total OUT: 50 mL    Total NET: 250 mL          23 @ 07:01  -  23 @ 07:00  --------------------------------------------------------  IN:  Total IN: 0 mL    OUT:    Bulb (mL): 110 mL    Bulb (mL): 50 mL    Bulb (mL): 220 mL    VAC (Vacuum Assisted Closure) System (mL): 60 mL  Total OUT: 440 mL    Total NET: -440 mL      23 @ 07:01  -  23 @ 11:17  --------------------------------------------------------  IN:  Total IN: 0 mL    OUT:    Bulb (mL): 10 mL    Bulb (mL): 5 mL    Bulb (mL): 5 mL    VAC (Vacuum Assisted Closure) System (mL): 0 mL  Total OUT: 20 mL    Total NET: -20 mL              PHYSICAL EXAM:  Gen: AAOx3, NAD   Head: Surgical incision around chin extending up through lip  Eyes: EOMI, PERRL, visual acuity intact, no diplopia, supra/infra orbital rims intact, no subconjunctival heme, no telecanthus, no exophthalmos   Ears: Gross hearing intact,  Nose: No septal hematoma/asymmetry, no epistaxis bilaterally. no abrasions present, no lacerations.   Malar: No malar depression  Throat: No LAD, supple, neck surgical incision w/ steristrip dressing is hemostatic + left and right neck KRISTEN in place, left KRISTEN sanguinous output , right neck KRISTEN sanguinous output , trach in place w/ 7.5 cuffed portex, cuff deflated  Oral: Left FFF paddle pink, warm, well perfused. IMF screws in place, class 3 elastics,   Exx: Wound vac left leg, left leg KRISTEN with sanguinous output         LABS                       7.6    9.08  )-----------( 106      ( 09 Dec 2023 06:26 )             22.6        140  |  108  |  14  ----------------------------<  113<H>  3.7   |  26  |  0.77    Ca    8.3<L>      09 Dec 2023 06:26  Phos  3.4       Mg     1.9         TPro  5.6<L>  /  Alb  3.8  /  TBili  0.8  /  DBili  x   /  AST  28  /  ALT  17  /  AlkPhos  27<L>           Coagulation Studies-   PT/INR - ( 07 Dec 2023 20:04 )   PT: 12.6 sec;   INR: 1.11          PTT - ( 07 Dec 2023 20:04 )  PTT:24.8 sec  Urinalysis Basic - ( 09 Dec 2023 06:26 )    Color: x / Appearance: x / SG: x / pH: x  Gluc: 113 mg/dL / Ketone: x  / Bili: x / Urobili: x   Blood: x / Protein: x / Nitrite: x   Leuk Esterase: x / RBC: x / WBC x   Sq Epi: x / Non Sq Epi: x / Bacteria: x      Endocrine Panel-  Calcium: 8.3 mg/dL ( @ 06:26)                MICROBIOLOGY:        RADIOLOGY & ADDITIONAL STUDIES:

## 2023-12-09 NOTE — PROCEDURE NOTE - NSUS ED ADDITIONAL DETAIL1 FT
Absent lung sliding from the right 1st to 6th intercostal space at the midaxillary line where lung point was identified.

## 2023-12-09 NOTE — PROCEDURE NOTE - NSUSCPTCODES_ED_ALL
[id number] US Chest (PTX, Pleural Effussion/CHF vs COPD) [id number] US Chest (PTX, Pleural Effussion/CHF vs COPD)

## 2023-12-10 LAB
ANION GAP SERPL CALC-SCNC: 7 MMOL/L — SIGNIFICANT CHANGE UP (ref 5–17)
ANION GAP SERPL CALC-SCNC: 7 MMOL/L — SIGNIFICANT CHANGE UP (ref 5–17)
APTT BLD: 28.1 SEC — SIGNIFICANT CHANGE UP (ref 24.5–35.6)
APTT BLD: 28.1 SEC — SIGNIFICANT CHANGE UP (ref 24.5–35.6)
BUN SERPL-MCNC: 14 MG/DL — SIGNIFICANT CHANGE UP (ref 7–23)
BUN SERPL-MCNC: 14 MG/DL — SIGNIFICANT CHANGE UP (ref 7–23)
CALCIUM SERPL-MCNC: 8.3 MG/DL — LOW (ref 8.4–10.5)
CALCIUM SERPL-MCNC: 8.3 MG/DL — LOW (ref 8.4–10.5)
CHLORIDE SERPL-SCNC: 109 MMOL/L — HIGH (ref 96–108)
CHLORIDE SERPL-SCNC: 109 MMOL/L — HIGH (ref 96–108)
CO2 SERPL-SCNC: 25 MMOL/L — SIGNIFICANT CHANGE UP (ref 22–31)
CO2 SERPL-SCNC: 25 MMOL/L — SIGNIFICANT CHANGE UP (ref 22–31)
CREAT SERPL-MCNC: 0.67 MG/DL — SIGNIFICANT CHANGE UP (ref 0.5–1.3)
CREAT SERPL-MCNC: 0.67 MG/DL — SIGNIFICANT CHANGE UP (ref 0.5–1.3)
EGFR: 102 ML/MIN/1.73M2 — SIGNIFICANT CHANGE UP
EGFR: 102 ML/MIN/1.73M2 — SIGNIFICANT CHANGE UP
GLUCOSE SERPL-MCNC: 139 MG/DL — HIGH (ref 70–99)
GLUCOSE SERPL-MCNC: 139 MG/DL — HIGH (ref 70–99)
HCT VFR BLD CALC: 23.8 % — LOW (ref 39–50)
HCT VFR BLD CALC: 23.8 % — LOW (ref 39–50)
HGB BLD-MCNC: 7.9 G/DL — LOW (ref 13–17)
HGB BLD-MCNC: 7.9 G/DL — LOW (ref 13–17)
INR BLD: 1.06 — SIGNIFICANT CHANGE UP (ref 0.85–1.18)
INR BLD: 1.06 — SIGNIFICANT CHANGE UP (ref 0.85–1.18)
MAGNESIUM SERPL-MCNC: 1.8 MG/DL — SIGNIFICANT CHANGE UP (ref 1.6–2.6)
MAGNESIUM SERPL-MCNC: 1.8 MG/DL — SIGNIFICANT CHANGE UP (ref 1.6–2.6)
MCHC RBC-ENTMCNC: 28.1 PG — SIGNIFICANT CHANGE UP (ref 27–34)
MCHC RBC-ENTMCNC: 28.1 PG — SIGNIFICANT CHANGE UP (ref 27–34)
MCHC RBC-ENTMCNC: 33.2 GM/DL — SIGNIFICANT CHANGE UP (ref 32–36)
MCHC RBC-ENTMCNC: 33.2 GM/DL — SIGNIFICANT CHANGE UP (ref 32–36)
MCV RBC AUTO: 84.7 FL — SIGNIFICANT CHANGE UP (ref 80–100)
MCV RBC AUTO: 84.7 FL — SIGNIFICANT CHANGE UP (ref 80–100)
NRBC # BLD: 0 /100 WBCS — SIGNIFICANT CHANGE UP (ref 0–0)
NRBC # BLD: 0 /100 WBCS — SIGNIFICANT CHANGE UP (ref 0–0)
PHOSPHATE SERPL-MCNC: 2.6 MG/DL — SIGNIFICANT CHANGE UP (ref 2.5–4.5)
PHOSPHATE SERPL-MCNC: 2.6 MG/DL — SIGNIFICANT CHANGE UP (ref 2.5–4.5)
PLATELET # BLD AUTO: 113 K/UL — LOW (ref 150–400)
PLATELET # BLD AUTO: 113 K/UL — LOW (ref 150–400)
POTASSIUM SERPL-MCNC: 3.6 MMOL/L — SIGNIFICANT CHANGE UP (ref 3.5–5.3)
POTASSIUM SERPL-MCNC: 3.6 MMOL/L — SIGNIFICANT CHANGE UP (ref 3.5–5.3)
POTASSIUM SERPL-SCNC: 3.6 MMOL/L — SIGNIFICANT CHANGE UP (ref 3.5–5.3)
POTASSIUM SERPL-SCNC: 3.6 MMOL/L — SIGNIFICANT CHANGE UP (ref 3.5–5.3)
PROTHROM AB SERPL-ACNC: 12.1 SEC — SIGNIFICANT CHANGE UP (ref 9.5–13)
PROTHROM AB SERPL-ACNC: 12.1 SEC — SIGNIFICANT CHANGE UP (ref 9.5–13)
RBC # BLD: 2.81 M/UL — LOW (ref 4.2–5.8)
RBC # BLD: 2.81 M/UL — LOW (ref 4.2–5.8)
RBC # FLD: 14.5 % — SIGNIFICANT CHANGE UP (ref 10.3–14.5)
RBC # FLD: 14.5 % — SIGNIFICANT CHANGE UP (ref 10.3–14.5)
SODIUM SERPL-SCNC: 141 MMOL/L — SIGNIFICANT CHANGE UP (ref 135–145)
SODIUM SERPL-SCNC: 141 MMOL/L — SIGNIFICANT CHANGE UP (ref 135–145)
TROPONIN T, HIGH SENSITIVITY RESULT: 22 NG/L — SIGNIFICANT CHANGE UP (ref 0–51)
TROPONIN T, HIGH SENSITIVITY RESULT: 22 NG/L — SIGNIFICANT CHANGE UP (ref 0–51)
WBC # BLD: 8.53 K/UL — SIGNIFICANT CHANGE UP (ref 3.8–10.5)
WBC # BLD: 8.53 K/UL — SIGNIFICANT CHANGE UP (ref 3.8–10.5)
WBC # FLD AUTO: 8.53 K/UL — SIGNIFICANT CHANGE UP (ref 3.8–10.5)
WBC # FLD AUTO: 8.53 K/UL — SIGNIFICANT CHANGE UP (ref 3.8–10.5)

## 2023-12-10 PROCEDURE — 71045 X-RAY EXAM CHEST 1 VIEW: CPT | Mod: 26

## 2023-12-10 PROCEDURE — 99233 SBSQ HOSP IP/OBS HIGH 50: CPT | Mod: GC

## 2023-12-10 RX ORDER — CHLORHEXIDINE GLUCONATE 213 G/1000ML
1 SOLUTION TOPICAL DAILY
Refills: 0 | Status: DISCONTINUED | OUTPATIENT
Start: 2023-12-10 | End: 2023-12-18

## 2023-12-10 RX ORDER — POTASSIUM PHOSPHATE, MONOBASIC POTASSIUM PHOSPHATE, DIBASIC 236; 224 MG/ML; MG/ML
15 INJECTION, SOLUTION INTRAVENOUS ONCE
Refills: 0 | Status: COMPLETED | OUTPATIENT
Start: 2023-12-10 | End: 2023-12-12

## 2023-12-10 RX ORDER — POTASSIUM CHLORIDE 20 MEQ
20 PACKET (EA) ORAL ONCE
Refills: 0 | Status: COMPLETED | OUTPATIENT
Start: 2023-12-10 | End: 2023-12-10

## 2023-12-10 RX ORDER — MAGNESIUM SULFATE 500 MG/ML
1 VIAL (ML) INJECTION ONCE
Refills: 0 | Status: DISCONTINUED | OUTPATIENT
Start: 2023-12-10 | End: 2023-12-12

## 2023-12-10 RX ADMIN — CHLORHEXIDINE GLUCONATE 15 MILLILITER(S): 213 SOLUTION TOPICAL at 06:40

## 2023-12-10 RX ADMIN — HYDROMORPHONE HYDROCHLORIDE 0.5 MILLIGRAM(S): 2 INJECTION INTRAMUSCULAR; INTRAVENOUS; SUBCUTANEOUS at 07:45

## 2023-12-10 RX ADMIN — TAMSULOSIN HYDROCHLORIDE 0.4 MILLIGRAM(S): 0.4 CAPSULE ORAL at 15:10

## 2023-12-10 RX ADMIN — SENNA PLUS 2 TABLET(S): 8.6 TABLET ORAL at 21:05

## 2023-12-10 RX ADMIN — HYDROMORPHONE HYDROCHLORIDE 0.5 MILLIGRAM(S): 2 INJECTION INTRAMUSCULAR; INTRAVENOUS; SUBCUTANEOUS at 03:00

## 2023-12-10 RX ADMIN — HYDROMORPHONE HYDROCHLORIDE 0.5 MILLIGRAM(S): 2 INJECTION INTRAMUSCULAR; INTRAVENOUS; SUBCUTANEOUS at 11:35

## 2023-12-10 RX ADMIN — HYDROMORPHONE HYDROCHLORIDE 0.5 MILLIGRAM(S): 2 INJECTION INTRAMUSCULAR; INTRAVENOUS; SUBCUTANEOUS at 12:35

## 2023-12-10 RX ADMIN — ENOXAPARIN SODIUM 40 MILLIGRAM(S): 100 INJECTION SUBCUTANEOUS at 12:50

## 2023-12-10 RX ADMIN — Medication 20 MILLIEQUIVALENT(S): at 12:49

## 2023-12-10 RX ADMIN — CHLORHEXIDINE GLUCONATE 1 APPLICATION(S): 213 SOLUTION TOPICAL at 12:49

## 2023-12-10 RX ADMIN — OXYCODONE HYDROCHLORIDE 5 MILLIGRAM(S): 5 TABLET ORAL at 21:02

## 2023-12-10 RX ADMIN — ATORVASTATIN CALCIUM 40 MILLIGRAM(S): 80 TABLET, FILM COATED ORAL at 21:05

## 2023-12-10 RX ADMIN — HYDROMORPHONE HYDROCHLORIDE 0.5 MILLIGRAM(S): 2 INJECTION INTRAMUSCULAR; INTRAVENOUS; SUBCUTANEOUS at 07:00

## 2023-12-10 RX ADMIN — OXYCODONE HYDROCHLORIDE 10 MILLIGRAM(S): 5 TABLET ORAL at 02:50

## 2023-12-10 RX ADMIN — SODIUM CHLORIDE 3 MILLILITER(S): 9 INJECTION INTRAMUSCULAR; INTRAVENOUS; SUBCUTANEOUS at 22:15

## 2023-12-10 RX ADMIN — OXYCODONE HYDROCHLORIDE 10 MILLIGRAM(S): 5 TABLET ORAL at 03:06

## 2023-12-10 RX ADMIN — Medication 81 MILLIGRAM(S): at 12:49

## 2023-12-10 RX ADMIN — POLYETHYLENE GLYCOL 3350 17 GRAM(S): 17 POWDER, FOR SOLUTION ORAL at 12:49

## 2023-12-10 RX ADMIN — SODIUM CHLORIDE 3 MILLILITER(S): 9 INJECTION INTRAMUSCULAR; INTRAVENOUS; SUBCUTANEOUS at 05:00

## 2023-12-10 RX ADMIN — CHLORHEXIDINE GLUCONATE 15 MILLILITER(S): 213 SOLUTION TOPICAL at 17:23

## 2023-12-10 RX ADMIN — OXYCODONE HYDROCHLORIDE 5 MILLIGRAM(S): 5 TABLET ORAL at 21:05

## 2023-12-10 RX ADMIN — PANTOPRAZOLE SODIUM 40 MILLIGRAM(S): 20 TABLET, DELAYED RELEASE ORAL at 12:49

## 2023-12-10 RX ADMIN — HYDROMORPHONE HYDROCHLORIDE 0.5 MILLIGRAM(S): 2 INJECTION INTRAMUSCULAR; INTRAVENOUS; SUBCUTANEOUS at 21:04

## 2023-12-10 RX ADMIN — HYDROMORPHONE HYDROCHLORIDE 0.5 MILLIGRAM(S): 2 INJECTION INTRAMUSCULAR; INTRAVENOUS; SUBCUTANEOUS at 03:06

## 2023-12-10 RX ADMIN — CHLORHEXIDINE GLUCONATE 1 APPLICATION(S): 213 SOLUTION TOPICAL at 06:40

## 2023-12-10 RX ADMIN — GABAPENTIN 600 MILLIGRAM(S): 400 CAPSULE ORAL at 12:49

## 2023-12-10 RX ADMIN — HYDROMORPHONE HYDROCHLORIDE 0.5 MILLIGRAM(S): 2 INJECTION INTRAMUSCULAR; INTRAVENOUS; SUBCUTANEOUS at 21:05

## 2023-12-10 RX ADMIN — SODIUM CHLORIDE 3 MILLILITER(S): 9 INJECTION INTRAMUSCULAR; INTRAVENOUS; SUBCUTANEOUS at 16:00

## 2023-12-10 RX ADMIN — SODIUM CHLORIDE 3 MILLILITER(S): 9 INJECTION INTRAMUSCULAR; INTRAVENOUS; SUBCUTANEOUS at 12:51

## 2023-12-10 NOTE — CHART NOTE - NSCHARTNOTEFT_GEN_A_CORE
Chest tube placed on right on 12/9 for NG tube related PTX.  Post chest tube CXR showed resolution of PTX. AM CXR on 12/10 with lung up. Chest tube clamped x 4 hours; CXR repeated with lung still expanded. Chest tube removed. No complications.

## 2023-12-10 NOTE — PROGRESS NOTE ADULT - ASSESSMENT
69 y/o M w PMHx of CAD (x2 stents Mar 2023), HLD, T2DM, nephrolithiasis (ureteral stent placement 2022), and psoriasis w/ three month hx of jaw pain and loosening of lower left mandibular molar tooth. Pt has a 30 pack yr smoking hx, quit 1 yr ago. 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 12/7 s/p L hemimandibulectomy, L level 1, 2a, 3 neck dissection, L fibula free flap reconstruction, STSG from L thigh, dental implants, and tracheostomy. Admitted to SICU for flap checks and hemodynamic monitoring. Course c/b right-sided pneumo on 12/9, s/p R pigtail CT placement by pulm.     Neuro: standing Tylenol, Gabapentin 600mg, Dilaudid prn  HEENT: Flap checks q4h, No circumferential ties or collars, Head neutral, KRISTEN L neck, KRISTEN R neck, Peridex PO QID POD1  CV: HD stable, Maintain MAP > 60, Avoid pressors but will add Low dose Levo gtt if needed. Hx of CAD 2 stents: ASA, holding Plavix, ASA OR. Restart Plavix (with loading dose) if drains low. Hx of HLD: Lipitor.   Pulm: s/p trach with 7.5 cuffed portex, NaCl nebulizer q6, pneumothorax from DHT placement, s/p Chest tube, plan to d/c 12/10  GI: NPO, D5LR at 80, DHT replaced, pending CXR; PPI IV, holding Senna, Miralax until DHT confirmed.  : d/c'ed hannah, awaiting TOV 9pm, Flomax started.  ID: Unasyn x24 (through 12/9 AM)  Endo: mISS (hx T2DM)   Heme: Active T&S, Hgb >8. ABLA postoperative, Hg 7.6 in setting of drain outputs, give 2U pRBC per ENT.  ppx: SCDs, SQL  Lines: PIV // DC: L radial Lucia  (12/7-12/8)    Wounds: R chest tube. (12/9--), KRISTEN neck x2, L leg KRISTEN and wound vac, bacitracin to wounds    PT/OT: ordered, CAM boot in room, OOBTC today  Dispo: Possible SDU 12/10   69 y/o M w PMHx of CAD (x2 stents Mar 2023), HLD, T2DM, nephrolithiasis (ureteral stent placement 2022), and psoriasis w/ three month hx of jaw pain and loosening of lower left mandibular molar tooth. Pt has a 30 pack yr smoking hx, quit 1 yr ago. 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 12/7 s/p L hemimandibulectomy, L level 1, 2a, 3 neck dissection, L fibula free flap reconstruction, STSG from L thigh, dental implants, and tracheostomy. Admitted to SICU for flap checks and hemodynamic monitoring. Course c/b right-sided pneumo on 12/9, s/p R pigtail CT placement by pulm.     Neuro: standing Tylenol, Gabapentin 600mg, Dilaudid prn  HEENT: Flap checks q4h, No circumferential ties or collars, Head neutral, KRISTEN L neck, KRISTEN R neck, Peridex PO QID POD1  CV: HD stable, Maintain MAP > 60, Avoid pressors but will add Low dose Levo gtt if needed. Hx of CAD 2 stents: ASA, holding Plavix, ASA LA. Restart Plavix (with loading dose) if drains low. Hx of HLD: Lipitor.   Pulm: s/p trach with 7.5 cuffed portex, NaCl nebulizer q6, pneumothorax from DHT placement, s/p Chest tube, plan to d/c 12/10  GI: NPO, D5LR at 80, DHT replaced, pending CXR; PPI IV, holding Senna, Miralax until DHT confirmed.  : d/c'ed hannah, awaiting TOV 9pm, Flomax started.  ID: Unasyn x24 (through 12/9 AM)  Endo: mISS (hx T2DM)   Heme: Active T&S, Hgb >8. ABLA postoperative, Hg 7.6 in setting of drain outputs, give 2U pRBC per ENT.  ppx: SCDs, SQL  Lines: PIV // DC: L radial Lucia  (12/7-12/8)    Wounds: R chest tube. (12/9--), KRISTEN neck x2, L leg KRISTEN and wound vac, bacitracin to wounds    PT/OT: ordered, CAM boot in room, OOBTC today  Dispo: Possible SDU 12/10

## 2023-12-10 NOTE — CHART NOTE - NSCHARTNOTEFT_GEN_A_CORE
Cardiology was called regarding nighttime bradycardia to 37bpm and for AP recommendation in setting of recent complex procedure. Patient and tele monitor evaluated. Patient was sleeping when bradycardia occurred. On tele, current HR in 40s (sinus chinedu) with HR cathy 34 noted overnight (sinus chinedu). No signs of heart block.   Chart reviewed, patient developed iatrogenic PTX after NG tube placement on 12/9, requiring chest tube. He also had elevated drain output with drop in hgb (14.8 -> 7.9 over 3d) requiring 2u pRBC 12/9 PM.     Recommendation  -Etiology of bradycardia unclear. Hold all AV john blocking agents. Treat nausea as this may elevate vagal tone and worsen bradycardia.   -No treatment required for asymptomatic sinus bradycardia.   -In setting of PCI > 6m ago, plavix can continue to be held if necessary given recent/active bleeding. Resume per primary/SICU team, preferably with loading dose of 300 mg if bleeding risk is low, if not can resume at 75 mg PO daily.

## 2023-12-10 NOTE — PHYSICAL THERAPY INITIAL EVALUATION ADULT - GENERAL OBSERVATIONS, REHAB EVAL
Pt received seated out of bed in chair +tele +luz +L CAMboot donned +wound vac +TC (FiO2 40%) +NGT, cleared for PT by RN Isac, agreeable to PT Eval. Left semi-supine in bed with all lines intact, no acute distress, VSS RN present/aware.

## 2023-12-10 NOTE — PHYSICAL THERAPY INITIAL EVALUATION ADULT - DID THE PATIENT HAVE SURGERY?
s/p tracheostomy, mandibular resection, neck dissection, and fib free flap, harvested from left thigh/yes

## 2023-12-10 NOTE — PHYSICAL THERAPY INITIAL EVALUATION ADULT - ADDITIONAL COMMENTS
No prior use of Assistive Device. Pt reports he was very active PTA, was running multiple miles daily.

## 2023-12-10 NOTE — PROGRESS NOTE ADULT - SUBJECTIVE AND OBJECTIVE BOX
INTEVRAL/OVERNIGHT EVENTS: 12/9: Hg 7.6--give 2U pRBC, no chgs to ASA and LVX for now. DHT placed in AM, removed replaced, noon CXR DHT in good position but moderate pneumo from prior insertion, Pulm placed R sided chest tube. CXR: resolution of pneumo. Failed TOV, Scath at 3pm and Flomax started. ON: CBC 9pm: 8.3 (7.6) s/p 2 PRBC, EKG sinus chinedu 40s, asymptomatic - pleurvac with tidaling, no airleak     SUBJECTIVE: Patient seen and examined bedside. Pt reporting mild chest pain this morning, but otherwise feels well. Denies SOB palpitations, abdominal pain, or nausea.     aspirin  chewable 81 milliGRAM(s) Oral daily  enoxaparin Injectable 40 milliGRAM(s) SubCutaneous every 24 hours      Vital Signs Last 24 Hrs  T(C): 36.8 (10 Dec 2023 05:26), Max: 37.5 (10 Dec 2023 00:45)  T(F): 98.3 (10 Dec 2023 05:26), Max: 99.5 (10 Dec 2023 00:45)  HR: 61 (10 Dec 2023 10:00) (39 - 61)  BP: 112/59 (10 Dec 2023 10:00) (101/64 - 150/67)  BP(mean): 82 (10 Dec 2023 10:00) (73 - 99)  RR: 16 (10 Dec 2023 10:00) (12 - 23)  SpO2: 100% (10 Dec 2023 10:00) (98% - 100%)    Parameters below as of 10 Dec 2023 10:00  Patient On (Oxygen Delivery Method): tracheostomy collar  O2 Flow (L/min): 10  O2 Concentration (%): 40  I&O's Detail    09 Dec 2023 07:01  -  10 Dec 2023 07:00  --------------------------------------------------------  IN:    dextrose 5% + lactated ringers: 1520 mL    Enteral Tube Flush: 60 mL    Glucerna 1.5: 40 mL    IV PiggyBack: 200 mL    Lactated Ringers: 500 mL    PRBCs (Packed Red Blood Cells): 600 mL  Total IN: 2920 mL    OUT:    Bulb (mL): 26 mL    Bulb (mL): 18 mL    Bulb (mL): 40 mL    Intermittent Catheterization - Urethral (mL): 1400 mL    VAC (Vacuum Assisted Closure) System (mL): 0 mL    Voided (mL): 30 mL  Total OUT: 1514 mL    Total NET: 1406 mL      10 Dec 2023 07:01  -  10 Dec 2023 10:58  --------------------------------------------------------  IN:    dextrose 5% + lactated ringers: 80 mL  Total IN: 80 mL    OUT:  Total OUT: 0 mL    Total NET: 80 mL        GENERAL: NAD, resting comfortably in bed  HEENT: NCAT, MMM, incisions w dried blood, KRISTEN SS x2, DHT in place  C/V: Normal rate, normal peripheral perfusion, no murmurs  PULM: Nonlabored breathing, no respiratory distress, rhonchi b/l, improved w cough and suctioning.  ABD: Soft, ND, NT, no rebound tenderness, no guarding  EXTREM: WWP, no edema, SCDs in place, leg CDI  NEURO: No focal deficits        LABS:                        7.9    8.53  )-----------( 113      ( 10 Dec 2023 08:34 )             23.8     12-10    141  |  109<H>  |  14  ----------------------------<  139<H>  3.6   |  25  |  0.67    Ca    8.3<L>      10 Dec 2023 08:34  Phos  2.6     12-10  Mg     1.8     12-10      PT/INR - ( 10 Dec 2023 05:31 )   PT: 12.1 sec;   INR: 1.06          PTT - ( 10 Dec 2023 05:31 )  PTT:28.1 sec  Urinalysis Basic - ( 10 Dec 2023 08:34 )    Color: x / Appearance: x / SG: x / pH: x  Gluc: 139 mg/dL / Ketone: x  / Bili: x / Urobili: x   Blood: x / Protein: x / Nitrite: x   Leuk Esterase: x / RBC: x / WBC x   Sq Epi: x / Non Sq Epi: x / Bacteria: x        RADIOLOGY & ADDITIONAL STUDIES:

## 2023-12-10 NOTE — PROGRESS NOTE ADULT - ATTENDING COMMENTS
Post NG tube placement PTX s/p chest tube placement by pulmonary, clamp trial successful, chest tube removed this AM. Rest of plan as per above.

## 2023-12-10 NOTE — PHYSICAL THERAPY INITIAL EVALUATION ADULT - PERTINENT HX OF CURRENT PROBLEM, REHAB EVAL
69 y/o M w PMH of CAD (x2 stents Mar 2023), HLD, T2DM, hx of kidney stones, psoriasis presents to [facility] for evaluation of oral mass. Reports Three month hx of jaw pain and loosening of mandibular molar tooth on the lower left. Pt has a 30 pack yr smoking hx, quit 1 yr ago. 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 Pt planned for composite mandibular resection, neck dissection and fibula free flap reconstruction. Presently, pt is endorsing worsening left sided facial pain uncontrolled with pain medication. Pt eating mostly soft, easy to chew foods, reports decreased PO intake. Endorses dysphagia and pain on mouth opening. Denies dyspnea, odynophagia. Remaining ROS negative. 67 y/o M w PMH of CAD (x2 stents Mar 2023), HLD, T2DM, hx of kidney stones, psoriasis presents to [facility] for evaluation of oral mass. Reports Three month hx of jaw pain and loosening of mandibular molar tooth on the lower left. Pt has a 30 pack yr smoking hx, quit 1 yr ago. 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 Pt planned for composite mandibular resection, neck dissection and fibula free flap reconstruction. Presently, pt is endorsing worsening left sided facial pain uncontrolled with pain medication. Pt eating mostly soft, easy to chew foods, reports decreased PO intake. Endorses dysphagia and pain on mouth opening. Denies dyspnea, odynophagia. Remaining ROS negative.

## 2023-12-10 NOTE — PROGRESS NOTE ADULT - SUBJECTIVE AND OBJECTIVE BOX
OTOLARYNGOLOGY (ENT) PROGRESS NOTE    PATIENT: SAUNDRA HOLMAN  MRN: [id number]  : 57  [id number]  DATE OF SERVICE:  12-10-23  			         ID:SAUNDRA HOLMAN is a  67yo M w PMH of CAD (x2 stents Mar 2023), HLD, T2DM, hx of kidney stones, psoriasis presents to [facility] for evaluation of oral mass. Reports Three month hx of jaw pain and loosening of mandibular molar tooth on the lower left. Pt has a 30 pack yr smoking hx, quit 1 yr ago. 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 POD 1 composite mandibular resection, neck dissection and fibula free flap reconstruction.       Subjective/ Interval:   ; patient seen this morning, oozing from trach as expected ,  Plan to start aspirin today, cuff is up on tracheostomy tube, intraoral skin panel intact doppler signal strong,  plan to advance NGT   : Patient seen and examined at bedside. AFVSS overnight. Significant HgB drop to 7.2 noted this morning. NGT clogged, attempted replacement but went into lung, need to replace. Cuff deflated. Patient reports lethargy. Otherwise no new complaints. Intraoral skin paddle intact with strong doppler signal. Plan to hold off one eliquis given significant HgB drop  12/10: Patient seen and examined at bedside. AFVSS overnight other than bradycardic to lowest 39, mainly in 40s-50s which seems to be his baseline even pre-op. Not symptomatic while chinedu. Otherwise, patient stable on TC with no issues. Yesterday, iatrogenic right sided PTX occured with NGT placement, patient remained asymptomatic throughout. Patient now s/p placement of pig-tail catheter by pulm with significant improvement in PTX. Chest tube clamped this morning with plan for removal this evening. Patient s/p 2 units of PRBCs with moderate improvement in Hgb. Today, patient reports feeling significantly better than yesterday and overall "feels good." He was started on trickle feeds last night but felt some chest tightness and so they were held. Patient also failed TOV again and was straight cathed. KRISTEN drain output significantly decreased this morning. No other changes at this time.       ALLERGIES:  No Known Allergies      MEDICATIONS:  Antiinfectives:     IV fluids:  dextrose 5% + lactated ringers. 1000 milliLiter(s) IV Continuous <Continuous>  dextrose 5%. 1000 milliLiter(s) IV Continuous <Continuous>  dextrose 5%. 1000 milliLiter(s) IV Continuous <Continuous>    Hematologic/Anticoagulation:  aspirin  chewable 81 milliGRAM(s) Oral daily  enoxaparin Injectable 40 milliGRAM(s) SubCutaneous every 24 hours    Pain medications/Neuro:  gabapentin 600 milliGRAM(s) Oral daily  HYDROmorphone  Injectable 0.5 milliGRAM(s) IV Push every 4 hours PRN  ondansetron Injectable 4 milliGRAM(s) IV Push every 6 hours PRN  oxyCODONE    Solution 5 milliGRAM(s) Oral every 4 hours PRN  oxyCODONE    Solution 10 milliGRAM(s) Oral every 6 hours PRN    Endocrine Medications:   atorvastatin 40 milliGRAM(s) Oral at bedtime  dextrose 50% Injectable 25 Gram(s) IV Push once  dextrose 50% Injectable 25 Gram(s) IV Push once  dextrose 50% Injectable 12.5 Gram(s) IV Push once  dextrose Oral Gel 15 Gram(s) Oral once PRN  glucagon  Injectable 1 milliGRAM(s) IntraMuscular once    All other standing medications:   chlorhexidine 0.12% Liquid 15 milliLiter(s) Oral Mucosa two times a day  chlorhexidine 4% Liquid 1 Application(s) Topical <User Schedule>  influenza  Vaccine (HIGH DOSE) 0.7 milliLiter(s) IntraMuscular once  pantoprazole  Injectable 40 milliGRAM(s) IV Push daily  polyethylene glycol 3350 17 Gram(s) Oral daily  senna 2 Tablet(s) Oral at bedtime  sodium chloride 0.9% for Nebulization 3 milliLiter(s) Nebulizer every 6 hours  tamsulosin 0.4 milliGRAM(s) Oral every 24 hours    All other PRN medications:    Vital Signs Last 24 Hrs  T(C): 36.8 (10 Dec 2023 05:26), Max: 37.5 (10 Dec 2023 00:45)  T(F): 98.3 (10 Dec 2023 05:26), Max: 99.5 (10 Dec 2023 00:45)  HR: 46 (10 Dec 2023 08:00) (39 - 84)  BP: 101/64 (10 Dec 2023 08:00) (101/64 - 150/67)  BP(mean): 78 (10 Dec 2023 08:00) (73 - 99)  RR: 13 (10 Dec 2023 08:00) (12 - 23)  SpO2: 100% (10 Dec 2023 08:00) (98% - 100%)    Parameters below as of 10 Dec 2023 08:00  Patient On (Oxygen Delivery Method): tracheostomy collar  O2 Flow (L/min): 10  O2 Concentration (%): 40       @ 07:01  -  12-10 @ 07:00  --------------------------------------------------------  IN:    dextrose 5% + lactated ringers: 1520 mL    Enteral Tube Flush: 60 mL    Glucerna 1.5: 40 mL    IV PiggyBack: 200 mL    Lactated Ringers: 500 mL    PRBCs (Packed Red Blood Cells): 600 mL  Total IN: 2920 mL    OUT:    Bulb (mL): 26 mL    Bulb (mL): 18 mL    Bulb (mL): 40 mL    Intermittent Catheterization - Urethral (mL): 1400 mL    VAC (Vacuum Assisted Closure) System (mL): 0 mL    Voided (mL): 30 mL  Total OUT: 1514 mL    Total NET: 1406 mL      12-10 @ 07:  -  12-10 @ 09:08  --------------------------------------------------------  IN:    dextrose 5% + lactated ringers: 80 mL  Total IN: 80 mL    OUT:  Total OUT: 0 mL    Total NET: 80 mL          23 @ 07:01  -  12-10-23 @ 07:00  --------------------------------------------------------  IN:  Total IN: 0 mL    OUT:    Bulb (mL): 26 mL    Bulb (mL): 18 mL    Bulb (mL): 40 mL    VAC (Vacuum Assisted Closure) System (mL): 0 mL  Total OUT: 84 mL    Total NET: -84 mL              PHYSICAL EXAM:  Gen: AAOx3, NAD   Head: Surgical incision around chin extending up through lip  Eyes: EOMI, PERRL, visual acuity intact, no diplopia, supra/infra orbital rims intact, no subconjunctival heme, no telecanthus, no exophthalmos   Ears: Gross hearing intact,  Nose: No septal hematoma/asymmetry, no epistaxis bilaterally. no abrasions present, no lacerations.   Malar: No malar depression  Throat: No LAD, supple, neck surgical incision w/ steristrip dressing is hemostatic + left and right neck KRISTEN in place, left KRISTEN sanguinous output , right neck KRISTEN sanguinous output , trach in place w/ 7.5 cuffed portex, cuff deflated  Oral: Left FFF paddle pink, warm, well perfused. IMF screws in place, class 3 elastics,   Exx: Wound vac left leg, left leg KRISTEN with sanguinous output  Chest: chest tube in place, clamped, clear dressing with no leakage         LABS                       8.1    9.00  )-----------( 102      ( 09 Dec 2023 18:04 )             24.1        141  |  110<H>  |  14  ----------------------------<  129<H>  3.8   |  25  |  0.71    Ca    8.2<L>      09 Dec 2023 18:04  Phos  3.4       Mg     1.9                Coagulation Studies-   PT/INR - ( 10 Dec 2023 05:31 )   PT: 12.1 sec;   INR: 1.06          PTT - ( 10 Dec 2023 05:31 )  PTT:28.1 sec  Urinalysis Basic - ( 09 Dec 2023 18:04 )    Color: x / Appearance: x / SG: x / pH: x  Gluc: 129 mg/dL / Ketone: x  / Bili: x / Urobili: x   Blood: x / Protein: x / Nitrite: x   Leuk Esterase: x / RBC: x / WBC x   Sq Epi: x / Non Sq Epi: x / Bacteria: x      Endocrine Panel-  Calcium: 8.2 mg/dL ( @ 18:04)                MICROBIOLOGY:        RADIOLOGY & ADDITIONAL STUDIES:       OTOLARYNGOLOGY (ENT) PROGRESS NOTE    PATIENT: SAUNDRA HOLMAN  MRN: [id number]  : 56  [id number]  DATE OF SERVICE:  12-10-23  			         ID:SAUNDRA HOLMAN is a  67yo M w PMH of CAD (x2 stents Mar 2023), HLD, T2DM, hx of kidney stones, psoriasis presents to [facility] for evaluation of oral mass. Reports Three month hx of jaw pain and loosening of mandibular molar tooth on the lower left. Pt has a 30 pack yr smoking hx, quit 1 yr ago. 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 POD 1 composite mandibular resection, neck dissection and fibula free flap reconstruction.       Subjective/ Interval:   ; patient seen this morning, oozing from trach as expected ,  Plan to start aspirin today, cuff is up on tracheostomy tube, intraoral skin panel intact doppler signal strong,  plan to advance NGT   : Patient seen and examined at bedside. AFVSS overnight. Significant HgB drop to 7.2 noted this morning. NGT clogged, attempted replacement but went into lung, need to replace. Cuff deflated. Patient reports lethargy. Otherwise no new complaints. Intraoral skin paddle intact with strong doppler signal. Plan to hold off one eliquis given significant HgB drop  12/10: Patient seen and examined at bedside. AFVSS overnight other than bradycardic to lowest 39, mainly in 40s-50s which seems to be his baseline even pre-op. Not symptomatic while chinedu. Otherwise, patient stable on TC with no issues. Yesterday, iatrogenic right sided PTX occured with NGT placement, patient remained asymptomatic throughout. Patient now s/p placement of pig-tail catheter by pulm with significant improvement in PTX. Chest tube clamped this morning with plan for removal this evening. Patient s/p 2 units of PRBCs with moderate improvement in Hgb. Today, patient reports feeling significantly better than yesterday and overall "feels good." He was started on trickle feeds last night but felt some chest tightness and so they were held. Patient also failed TOV again and was straight cathed. KRISTEN drain output significantly decreased this morning. No other changes at this time.       ALLERGIES:  No Known Allergies      MEDICATIONS:  Antiinfectives:     IV fluids:  dextrose 5% + lactated ringers. 1000 milliLiter(s) IV Continuous <Continuous>  dextrose 5%. 1000 milliLiter(s) IV Continuous <Continuous>  dextrose 5%. 1000 milliLiter(s) IV Continuous <Continuous>    Hematologic/Anticoagulation:  aspirin  chewable 81 milliGRAM(s) Oral daily  enoxaparin Injectable 40 milliGRAM(s) SubCutaneous every 24 hours    Pain medications/Neuro:  gabapentin 600 milliGRAM(s) Oral daily  HYDROmorphone  Injectable 0.5 milliGRAM(s) IV Push every 4 hours PRN  ondansetron Injectable 4 milliGRAM(s) IV Push every 6 hours PRN  oxyCODONE    Solution 5 milliGRAM(s) Oral every 4 hours PRN  oxyCODONE    Solution 10 milliGRAM(s) Oral every 6 hours PRN    Endocrine Medications:   atorvastatin 40 milliGRAM(s) Oral at bedtime  dextrose 50% Injectable 25 Gram(s) IV Push once  dextrose 50% Injectable 25 Gram(s) IV Push once  dextrose 50% Injectable 12.5 Gram(s) IV Push once  dextrose Oral Gel 15 Gram(s) Oral once PRN  glucagon  Injectable 1 milliGRAM(s) IntraMuscular once    All other standing medications:   chlorhexidine 0.12% Liquid 15 milliLiter(s) Oral Mucosa two times a day  chlorhexidine 4% Liquid 1 Application(s) Topical <User Schedule>  influenza  Vaccine (HIGH DOSE) 0.7 milliLiter(s) IntraMuscular once  pantoprazole  Injectable 40 milliGRAM(s) IV Push daily  polyethylene glycol 3350 17 Gram(s) Oral daily  senna 2 Tablet(s) Oral at bedtime  sodium chloride 0.9% for Nebulization 3 milliLiter(s) Nebulizer every 6 hours  tamsulosin 0.4 milliGRAM(s) Oral every 24 hours    All other PRN medications:    Vital Signs Last 24 Hrs  T(C): 36.8 (10 Dec 2023 05:26), Max: 37.5 (10 Dec 2023 00:45)  T(F): 98.3 (10 Dec 2023 05:26), Max: 99.5 (10 Dec 2023 00:45)  HR: 46 (10 Dec 2023 08:00) (39 - 84)  BP: 101/64 (10 Dec 2023 08:00) (101/64 - 150/67)  BP(mean): 78 (10 Dec 2023 08:00) (73 - 99)  RR: 13 (10 Dec 2023 08:00) (12 - 23)  SpO2: 100% (10 Dec 2023 08:00) (98% - 100%)    Parameters below as of 10 Dec 2023 08:00  Patient On (Oxygen Delivery Method): tracheostomy collar  O2 Flow (L/min): 10  O2 Concentration (%): 40       @ 07:01  -  12-10 @ 07:00  --------------------------------------------------------  IN:    dextrose 5% + lactated ringers: 1520 mL    Enteral Tube Flush: 60 mL    Glucerna 1.5: 40 mL    IV PiggyBack: 200 mL    Lactated Ringers: 500 mL    PRBCs (Packed Red Blood Cells): 600 mL  Total IN: 2920 mL    OUT:    Bulb (mL): 26 mL    Bulb (mL): 18 mL    Bulb (mL): 40 mL    Intermittent Catheterization - Urethral (mL): 1400 mL    VAC (Vacuum Assisted Closure) System (mL): 0 mL    Voided (mL): 30 mL  Total OUT: 1514 mL    Total NET: 1406 mL      12-10 @ 07:  -  12-10 @ 09:08  --------------------------------------------------------  IN:    dextrose 5% + lactated ringers: 80 mL  Total IN: 80 mL    OUT:  Total OUT: 0 mL    Total NET: 80 mL          23 @ 07:01  -  12-10-23 @ 07:00  --------------------------------------------------------  IN:  Total IN: 0 mL    OUT:    Bulb (mL): 26 mL    Bulb (mL): 18 mL    Bulb (mL): 40 mL    VAC (Vacuum Assisted Closure) System (mL): 0 mL  Total OUT: 84 mL    Total NET: -84 mL              PHYSICAL EXAM:  Gen: AAOx3, NAD   Head: Surgical incision around chin extending up through lip  Eyes: EOMI, PERRL, visual acuity intact, no diplopia, supra/infra orbital rims intact, no subconjunctival heme, no telecanthus, no exophthalmos   Ears: Gross hearing intact,  Nose: No septal hematoma/asymmetry, no epistaxis bilaterally. no abrasions present, no lacerations.   Malar: No malar depression  Throat: No LAD, supple, neck surgical incision w/ steristrip dressing is hemostatic + left and right neck KRISTEN in place, left KRISTEN sanguinous output , right neck KRISTEN sanguinous output , trach in place w/ 7.5 cuffed portex, cuff deflated  Oral: Left FFF paddle pink, warm, well perfused. IMF screws in place, class 3 elastics,   Exx: Wound vac left leg, left leg KRISTEN with sanguinous output  Chest: chest tube in place, clamped, clear dressing with no leakage         LABS                       8.1    9.00  )-----------( 102      ( 09 Dec 2023 18:04 )             24.1        141  |  110<H>  |  14  ----------------------------<  129<H>  3.8   |  25  |  0.71    Ca    8.2<L>      09 Dec 2023 18:04  Phos  3.4       Mg     1.9                Coagulation Studies-   PT/INR - ( 10 Dec 2023 05:31 )   PT: 12.1 sec;   INR: 1.06          PTT - ( 10 Dec 2023 05:31 )  PTT:28.1 sec  Urinalysis Basic - ( 09 Dec 2023 18:04 )    Color: x / Appearance: x / SG: x / pH: x  Gluc: 129 mg/dL / Ketone: x  / Bili: x / Urobili: x   Blood: x / Protein: x / Nitrite: x   Leuk Esterase: x / RBC: x / WBC x   Sq Epi: x / Non Sq Epi: x / Bacteria: x      Endocrine Panel-  Calcium: 8.2 mg/dL ( @ 18:04)                MICROBIOLOGY:        RADIOLOGY & ADDITIONAL STUDIES:

## 2023-12-10 NOTE — PROGRESS NOTE ADULT - ASSESSMENT
Assessment and Plan:  SAUNDRA HOLMAN is a  68M w/ [id number] SCC of L buccal mucosa presents for pre optimization for surgery 12/7, now s/p hemimandibulectomy, left level 1, 2a, 3 neck neck dissection, L FFF, tracheostomy w/ 7.5 cuffed portex, dental implants, STSG 12/7.    PLAN:       POD 3:      Restart trickle feeds  Follow TOV, if fails, replace young  Cardiology consult for persistent bradycardia  Chest tube management as per pulm   Follow up morning CBC  OOBTC  RN Flap checks Q4 for 24 hours   Steroids completed   Continue Ondansetron PRN nausea/vomiting , PO Senna once daily, Miralax 17g once daily,  Chlorhexidine swish and spit, Esomeprazole, Enoxaparin, Gabapentin, Acetaminophen   Continue SCD’s       Assessment and Plan:  SAUNDRA HOLMAN is a  68M w/ [id number] SCC of L buccal mucosa presents for pre optimization for surgery 12/7, now s/p hemimandibulectomy, left level 1, 2a, 3 neck neck dissection, L FFF, tracheostomy w/ 7.5 cuffed portex, dental implants, STSG 12/7.    PLAN:       POD 3:      Restart trickle feeds  Follow TOV, if fails, replace young  Cardiology consult for persistent bradycardia  Chest tube management as per pulm   Follow up morning CBC  OOBTC  RN Flap checks Q4 for 24 hours   Steroids completed   Continue Ondansetron PRN nausea/vomiting , PO Senna once daily, Miralax 17g once daily,  Chlorhexidine swish and spit, Esomeprazole, Enoxaparin, Gabapentin, Acetaminophen   Continue SCD’s       Assessment and Plan:  SAUNDRA HOLMAN is a  68M w/ [id number] SCC of L buccal mucosa presents for pre optimization for surgery 12/7, now s/p hemimandibulectomy, left level 1, 2a, 3 neck neck dissection, L FFF, tracheostomy w/ 7.5 cuffed portex, dental implants, STSG 12/7.    PLAN:       POD 3:      Restart trickle feeds  Follow TOV, if fails, replace young  Cardiology consult for persistent bradycardia and to discuss restarting plavix   Chest tube management as per pulm   Follow up morning CBC  OOBTC  RN Flap checks Q4 for 72 hours   Steroids completed   Continue Ondansetron PRN nausea/vomiting , PO Senna once daily, Miralax 17g once daily,  Chlorhexidine swish and spit, Esomeprazole, Enoxaparin, Gabapentin, Acetaminophen   Continue SCD’s       Assessment and Plan:  SAUNDRA HOLMAN is a  68M w/ [id number] SCC of L buccal mucosa presents for pre optimization for surgery 12/7, now s/p hemimandibulectomy, left level 1, 2a, 3 neck neck dissection, L FFF, tracheostomy w/ 7.5 cuffed portex, dental implants, STSG 12/7.    PLAN:       POD 3:      Restart trickle feeds  Follow TOV, if fails, replace young  Cardiology consult for persistent bradycardia and to discuss restarting plavix   Chest tube management as per pulm   Follow up morning CBC  OOBTC  RN Flap checks Q4 for 72 hours   Steroids completed   Continue Ondansetron PRN nausea/vomiting , PO Senna once daily, Miralax 17g once daily,  Chlorhexidine swish and spit, Esomeprazole, Enoxaparin, Gabapentin, Acetaminophen   Continue SCD’s

## 2023-12-11 LAB
ANION GAP SERPL CALC-SCNC: 6 MMOL/L — SIGNIFICANT CHANGE UP (ref 5–17)
ANION GAP SERPL CALC-SCNC: 6 MMOL/L — SIGNIFICANT CHANGE UP (ref 5–17)
ANION GAP SERPL CALC-SCNC: 7 MMOL/L — SIGNIFICANT CHANGE UP (ref 5–17)
ANION GAP SERPL CALC-SCNC: 7 MMOL/L — SIGNIFICANT CHANGE UP (ref 5–17)
BUN SERPL-MCNC: 17 MG/DL — SIGNIFICANT CHANGE UP (ref 7–23)
BUN SERPL-MCNC: 17 MG/DL — SIGNIFICANT CHANGE UP (ref 7–23)
BUN SERPL-MCNC: 18 MG/DL — SIGNIFICANT CHANGE UP (ref 7–23)
BUN SERPL-MCNC: 18 MG/DL — SIGNIFICANT CHANGE UP (ref 7–23)
CALCIUM SERPL-MCNC: 8.2 MG/DL — LOW (ref 8.4–10.5)
CALCIUM SERPL-MCNC: 8.2 MG/DL — LOW (ref 8.4–10.5)
CALCIUM SERPL-MCNC: 8.3 MG/DL — LOW (ref 8.4–10.5)
CALCIUM SERPL-MCNC: 8.3 MG/DL — LOW (ref 8.4–10.5)
CHLORIDE SERPL-SCNC: 110 MMOL/L — HIGH (ref 96–108)
CHLORIDE SERPL-SCNC: 110 MMOL/L — HIGH (ref 96–108)
CHLORIDE SERPL-SCNC: 113 MMOL/L — HIGH (ref 96–108)
CHLORIDE SERPL-SCNC: 113 MMOL/L — HIGH (ref 96–108)
CO2 SERPL-SCNC: 26 MMOL/L — SIGNIFICANT CHANGE UP (ref 22–31)
CREAT SERPL-MCNC: 0.59 MG/DL — SIGNIFICANT CHANGE UP (ref 0.5–1.3)
CREAT SERPL-MCNC: 0.59 MG/DL — SIGNIFICANT CHANGE UP (ref 0.5–1.3)
CREAT SERPL-MCNC: 0.6 MG/DL — SIGNIFICANT CHANGE UP (ref 0.5–1.3)
CREAT SERPL-MCNC: 0.6 MG/DL — SIGNIFICANT CHANGE UP (ref 0.5–1.3)
EGFR: 106 ML/MIN/1.73M2 — SIGNIFICANT CHANGE UP
GLUCOSE SERPL-MCNC: 133 MG/DL — HIGH (ref 70–99)
GLUCOSE SERPL-MCNC: 133 MG/DL — HIGH (ref 70–99)
GLUCOSE SERPL-MCNC: 144 MG/DL — HIGH (ref 70–99)
GLUCOSE SERPL-MCNC: 144 MG/DL — HIGH (ref 70–99)
HCT VFR BLD CALC: 24.5 % — LOW (ref 39–50)
HCT VFR BLD CALC: 24.5 % — LOW (ref 39–50)
HCT VFR BLD CALC: 24.9 % — LOW (ref 39–50)
HCT VFR BLD CALC: 24.9 % — LOW (ref 39–50)
HGB BLD-MCNC: 7.9 G/DL — LOW (ref 13–17)
HGB BLD-MCNC: 7.9 G/DL — LOW (ref 13–17)
HGB BLD-MCNC: 8.1 G/DL — LOW (ref 13–17)
HGB BLD-MCNC: 8.1 G/DL — LOW (ref 13–17)
MAGNESIUM SERPL-MCNC: 1.8 MG/DL — SIGNIFICANT CHANGE UP (ref 1.6–2.6)
MAGNESIUM SERPL-MCNC: 1.8 MG/DL — SIGNIFICANT CHANGE UP (ref 1.6–2.6)
MAGNESIUM SERPL-MCNC: 2.1 MG/DL — SIGNIFICANT CHANGE UP (ref 1.6–2.6)
MAGNESIUM SERPL-MCNC: 2.1 MG/DL — SIGNIFICANT CHANGE UP (ref 1.6–2.6)
MCHC RBC-ENTMCNC: 27.7 PG — SIGNIFICANT CHANGE UP (ref 27–34)
MCHC RBC-ENTMCNC: 27.7 PG — SIGNIFICANT CHANGE UP (ref 27–34)
MCHC RBC-ENTMCNC: 27.8 PG — SIGNIFICANT CHANGE UP (ref 27–34)
MCHC RBC-ENTMCNC: 27.8 PG — SIGNIFICANT CHANGE UP (ref 27–34)
MCHC RBC-ENTMCNC: 32.2 GM/DL — SIGNIFICANT CHANGE UP (ref 32–36)
MCHC RBC-ENTMCNC: 32.2 GM/DL — SIGNIFICANT CHANGE UP (ref 32–36)
MCHC RBC-ENTMCNC: 32.5 GM/DL — SIGNIFICANT CHANGE UP (ref 32–36)
MCHC RBC-ENTMCNC: 32.5 GM/DL — SIGNIFICANT CHANGE UP (ref 32–36)
MCV RBC AUTO: 85.6 FL — SIGNIFICANT CHANGE UP (ref 80–100)
MCV RBC AUTO: 85.6 FL — SIGNIFICANT CHANGE UP (ref 80–100)
MCV RBC AUTO: 86 FL — SIGNIFICANT CHANGE UP (ref 80–100)
MCV RBC AUTO: 86 FL — SIGNIFICANT CHANGE UP (ref 80–100)
NRBC # BLD: 0 /100 WBCS — SIGNIFICANT CHANGE UP (ref 0–0)
PHOSPHATE SERPL-MCNC: 2.4 MG/DL — LOW (ref 2.5–4.5)
PHOSPHATE SERPL-MCNC: 2.4 MG/DL — LOW (ref 2.5–4.5)
PHOSPHATE SERPL-MCNC: 2.6 MG/DL — SIGNIFICANT CHANGE UP (ref 2.5–4.5)
PHOSPHATE SERPL-MCNC: 2.6 MG/DL — SIGNIFICANT CHANGE UP (ref 2.5–4.5)
PLATELET # BLD AUTO: 132 K/UL — LOW (ref 150–400)
PLATELET # BLD AUTO: 132 K/UL — LOW (ref 150–400)
PLATELET # BLD AUTO: 147 K/UL — LOW (ref 150–400)
PLATELET # BLD AUTO: 147 K/UL — LOW (ref 150–400)
POTASSIUM SERPL-MCNC: 3.8 MMOL/L — SIGNIFICANT CHANGE UP (ref 3.5–5.3)
POTASSIUM SERPL-MCNC: 3.8 MMOL/L — SIGNIFICANT CHANGE UP (ref 3.5–5.3)
POTASSIUM SERPL-MCNC: 4 MMOL/L — SIGNIFICANT CHANGE UP (ref 3.5–5.3)
POTASSIUM SERPL-MCNC: 4 MMOL/L — SIGNIFICANT CHANGE UP (ref 3.5–5.3)
POTASSIUM SERPL-SCNC: 3.8 MMOL/L — SIGNIFICANT CHANGE UP (ref 3.5–5.3)
POTASSIUM SERPL-SCNC: 3.8 MMOL/L — SIGNIFICANT CHANGE UP (ref 3.5–5.3)
POTASSIUM SERPL-SCNC: 4 MMOL/L — SIGNIFICANT CHANGE UP (ref 3.5–5.3)
POTASSIUM SERPL-SCNC: 4 MMOL/L — SIGNIFICANT CHANGE UP (ref 3.5–5.3)
RBC # BLD: 2.85 M/UL — LOW (ref 4.2–5.8)
RBC # BLD: 2.85 M/UL — LOW (ref 4.2–5.8)
RBC # BLD: 2.91 M/UL — LOW (ref 4.2–5.8)
RBC # BLD: 2.91 M/UL — LOW (ref 4.2–5.8)
RBC # FLD: 14.5 % — SIGNIFICANT CHANGE UP (ref 10.3–14.5)
RBC # FLD: 14.5 % — SIGNIFICANT CHANGE UP (ref 10.3–14.5)
RBC # FLD: 14.6 % — HIGH (ref 10.3–14.5)
RBC # FLD: 14.6 % — HIGH (ref 10.3–14.5)
SODIUM SERPL-SCNC: 142 MMOL/L — SIGNIFICANT CHANGE UP (ref 135–145)
SODIUM SERPL-SCNC: 142 MMOL/L — SIGNIFICANT CHANGE UP (ref 135–145)
SODIUM SERPL-SCNC: 146 MMOL/L — HIGH (ref 135–145)
SODIUM SERPL-SCNC: 146 MMOL/L — HIGH (ref 135–145)
WBC # BLD: 6.29 K/UL — SIGNIFICANT CHANGE UP (ref 3.8–10.5)
WBC # BLD: 6.29 K/UL — SIGNIFICANT CHANGE UP (ref 3.8–10.5)
WBC # BLD: 6.91 K/UL — SIGNIFICANT CHANGE UP (ref 3.8–10.5)
WBC # BLD: 6.91 K/UL — SIGNIFICANT CHANGE UP (ref 3.8–10.5)
WBC # FLD AUTO: 6.29 K/UL — SIGNIFICANT CHANGE UP (ref 3.8–10.5)
WBC # FLD AUTO: 6.29 K/UL — SIGNIFICANT CHANGE UP (ref 3.8–10.5)
WBC # FLD AUTO: 6.91 K/UL — SIGNIFICANT CHANGE UP (ref 3.8–10.5)
WBC # FLD AUTO: 6.91 K/UL — SIGNIFICANT CHANGE UP (ref 3.8–10.5)

## 2023-12-11 PROCEDURE — 99232 SBSQ HOSP IP/OBS MODERATE 35: CPT

## 2023-12-11 PROCEDURE — 71045 X-RAY EXAM CHEST 1 VIEW: CPT | Mod: 26

## 2023-12-11 RX ORDER — MULTIVIT WITH MIN/MFOLATE/K2 340-15/3 G
296 POWDER (GRAM) ORAL ONCE
Refills: 0 | Status: DISCONTINUED | OUTPATIENT
Start: 2023-12-11 | End: 2023-12-12

## 2023-12-11 RX ORDER — LANOLIN ALCOHOL/MO/W.PET/CERES
5 CREAM (GRAM) TOPICAL AT BEDTIME
Refills: 0 | Status: DISCONTINUED | OUTPATIENT
Start: 2023-12-11 | End: 2023-12-13

## 2023-12-11 RX ORDER — TAMSULOSIN HYDROCHLORIDE 0.4 MG/1
0.4 CAPSULE ORAL EVERY 24 HOURS
Refills: 0 | Status: DISCONTINUED | OUTPATIENT
Start: 2023-12-11 | End: 2023-12-13

## 2023-12-11 RX ORDER — MAGNESIUM SULFATE 500 MG/ML
2 VIAL (ML) INJECTION ONCE
Refills: 0 | Status: COMPLETED | OUTPATIENT
Start: 2023-12-11 | End: 2023-12-11

## 2023-12-11 RX ORDER — SODIUM,POTASSIUM PHOSPHATES 278-250MG
1 POWDER IN PACKET (EA) ORAL ONCE
Refills: 0 | Status: COMPLETED | OUTPATIENT
Start: 2023-12-11 | End: 2023-12-11

## 2023-12-11 RX ADMIN — POLYETHYLENE GLYCOL 3350 17 GRAM(S): 17 POWDER, FOR SOLUTION ORAL at 11:52

## 2023-12-11 RX ADMIN — HYDROMORPHONE HYDROCHLORIDE 0.5 MILLIGRAM(S): 2 INJECTION INTRAMUSCULAR; INTRAVENOUS; SUBCUTANEOUS at 22:15

## 2023-12-11 RX ADMIN — SENNA PLUS 2 TABLET(S): 8.6 TABLET ORAL at 20:47

## 2023-12-11 RX ADMIN — Medication 5 MILLIGRAM(S): at 20:48

## 2023-12-11 RX ADMIN — CHLORHEXIDINE GLUCONATE 15 MILLILITER(S): 213 SOLUTION TOPICAL at 17:05

## 2023-12-11 RX ADMIN — SODIUM CHLORIDE 3 MILLILITER(S): 9 INJECTION INTRAMUSCULAR; INTRAVENOUS; SUBCUTANEOUS at 04:44

## 2023-12-11 RX ADMIN — HYDROMORPHONE HYDROCHLORIDE 0.5 MILLIGRAM(S): 2 INJECTION INTRAMUSCULAR; INTRAVENOUS; SUBCUTANEOUS at 01:00

## 2023-12-11 RX ADMIN — PANTOPRAZOLE SODIUM 40 MILLIGRAM(S): 20 TABLET, DELAYED RELEASE ORAL at 11:51

## 2023-12-11 RX ADMIN — HYDROMORPHONE HYDROCHLORIDE 0.5 MILLIGRAM(S): 2 INJECTION INTRAMUSCULAR; INTRAVENOUS; SUBCUTANEOUS at 18:55

## 2023-12-11 RX ADMIN — HYDROMORPHONE HYDROCHLORIDE 0.5 MILLIGRAM(S): 2 INJECTION INTRAMUSCULAR; INTRAVENOUS; SUBCUTANEOUS at 17:05

## 2023-12-11 RX ADMIN — HYDROMORPHONE HYDROCHLORIDE 0.5 MILLIGRAM(S): 2 INJECTION INTRAMUSCULAR; INTRAVENOUS; SUBCUTANEOUS at 11:50

## 2023-12-11 RX ADMIN — Medication 1 PACKET(S): at 09:04

## 2023-12-11 RX ADMIN — GABAPENTIN 600 MILLIGRAM(S): 400 CAPSULE ORAL at 11:51

## 2023-12-11 RX ADMIN — CHLORHEXIDINE GLUCONATE 15 MILLILITER(S): 213 SOLUTION TOPICAL at 05:13

## 2023-12-11 RX ADMIN — ENOXAPARIN SODIUM 40 MILLIGRAM(S): 100 INJECTION SUBCUTANEOUS at 11:52

## 2023-12-11 RX ADMIN — HYDROMORPHONE HYDROCHLORIDE 0.5 MILLIGRAM(S): 2 INJECTION INTRAMUSCULAR; INTRAVENOUS; SUBCUTANEOUS at 05:14

## 2023-12-11 RX ADMIN — SODIUM CHLORIDE 3 MILLILITER(S): 9 INJECTION INTRAMUSCULAR; INTRAVENOUS; SUBCUTANEOUS at 09:03

## 2023-12-11 RX ADMIN — HYDROMORPHONE HYDROCHLORIDE 0.5 MILLIGRAM(S): 2 INJECTION INTRAMUSCULAR; INTRAVENOUS; SUBCUTANEOUS at 05:00

## 2023-12-11 RX ADMIN — Medication 25 GRAM(S): at 09:05

## 2023-12-11 RX ADMIN — HYDROMORPHONE HYDROCHLORIDE 0.5 MILLIGRAM(S): 2 INJECTION INTRAMUSCULAR; INTRAVENOUS; SUBCUTANEOUS at 21:45

## 2023-12-11 RX ADMIN — SODIUM CHLORIDE 3 MILLILITER(S): 9 INJECTION INTRAMUSCULAR; INTRAVENOUS; SUBCUTANEOUS at 21:49

## 2023-12-11 RX ADMIN — SODIUM CHLORIDE 3 MILLILITER(S): 9 INJECTION INTRAMUSCULAR; INTRAVENOUS; SUBCUTANEOUS at 15:34

## 2023-12-11 RX ADMIN — Medication 81 MILLIGRAM(S): at 11:51

## 2023-12-11 RX ADMIN — ATORVASTATIN CALCIUM 40 MILLIGRAM(S): 80 TABLET, FILM COATED ORAL at 20:47

## 2023-12-11 RX ADMIN — HYDROMORPHONE HYDROCHLORIDE 0.5 MILLIGRAM(S): 2 INJECTION INTRAMUSCULAR; INTRAVENOUS; SUBCUTANEOUS at 12:07

## 2023-12-11 RX ADMIN — ONDANSETRON 4 MILLIGRAM(S): 8 TABLET, FILM COATED ORAL at 21:52

## 2023-12-11 NOTE — SPEAKING VALVE EVALUATION - SLP PERTINENT HISTORY OF CURRENT PROBLEM
h/o 3 months of jaw pain and difficulty swallowing; confirmed SCCA of buccal mucosa; s/p composite mandibular resection, neck dissection and fibula free flap reconstruction on 12/7

## 2023-12-11 NOTE — SPEAKING VALVE EVALUATION - OBSERVATIONS
Facial and submental edema appreciated, greater on L. Pooled saliva observed in oral cavity. Pt with difficulty initiating a swallow. Pt was instructed to start doing effortful swallows to improve secretion management. Speech is marked by imprecision and reduced intelligibility in setting of post surgical limitations.

## 2023-12-11 NOTE — PROGRESS NOTE ADULT - ATTENDING COMMENTS
ptx s/p chest tube placement and removal, respiratory status stable  decreased frequency of flap checks  stable for transfer from SICU

## 2023-12-11 NOTE — PROGRESS NOTE ADULT - ASSESSMENT
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 Cardiology originally consulted for perioperative risk stratification.    Review of Studies    Telemetry 12/11/23: Sinus bradycardia with PVCs, HR 46-64bpm (Know history of 2% PVC burden on outpatient monitor)     ECG 12/10-12/11/23: Sinus Bradycardia, Frequent PVCS  ECG 12/3/23: Sinus bradycardia     TTE 12/4/23: LVIDD 4.09cm LVEF 65%. Mild LVH. Normal RV function, mildly dilated. No valvular disease. No pericardial effusion.    #Perioperative Risk Stratification/Postoperative Evaluation.  #CAD s/p PCI CUBA LAD, Ramus -  March 2023   No new cardiopulmonary complaints.  - Goal transfusion Hg <8 in setting of CAD.  - Please restart Plavix 75mg QD once safe from bleeding perspective  - continue Aspirin 81mg QD.  - continue  Lipitor 40mg QD    #Sinus bradycardia  Baseline HR in the 50s, avid marathon runner with excellent functional capacity and high resting vagal tone now s/p extensive surgery adjacent to carotid bulbs, possible exacerbating vagal tone. Asymptomatic.   - No acute intervention.    Case discussed with cardiology consult attending

## 2023-12-11 NOTE — PROGRESS NOTE ADULT - ATTENDING COMMENTS
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 Cardiology originally consulted for perioperative risk stratification.    Review of Studies  - ECG 12/10-12/11/23: Sinus Bradycardia, Frequent PVCS  - ECG 12/3/23: Sinus bradycardia   - TTE 12/4/23: LVIDD 4.09cm LVEF 65%. Mild LVH. Normal RV function, mildly dilated. No valvular disease. No pericardial effusion.    #CAD s/p PCI (LAD, Ramus)  - Patient with known ASCVD s/p 2 Vessel PCI in March 2023 ( LAD and Ramus) in setting of NSTEMI  - Echo this hospitalization reviewed showing mild LVH with Normal biventricular function without RWMA or valvular heart disease.  - Patient tolerated procedure well without complication. No chest discomfort, or other anginal symptoms. He has has 2 transfusions due to downtrending Hemoglobin  - Tele reviewed with patient noted to be bradycardic with HR ranging from 42-67 with average HR in the Mid 50's. Patient having frequent PVCs. He reports Frequent PVCs, with ambulatory ECG monitoring and reported PVC burden of 2 % with outpatient Cardiologist.  - Patient has good chronotropy with HR increasing to the 70's with movement. No Pauses or evidence of Block notable on Tele of ECG  - Given that coronary intervention was ~9 months ago, can continue monotherapy with Aspirin 81mg and resume PLavix soon as safe from surgical/Bleeding standpoint, preferably with loading dose of 300 mg if bleeding risk is low, if not can resume at 75 mg po daily.  - In March 2024, ASA 81 mg po daily can be discontinued and patient maintained on Plavix Monotherapy.   - Continue  Lipitor 40mg QD       Cardiology will continue to follow with you, please call with any questions .

## 2023-12-11 NOTE — PROGRESS NOTE ADULT - ASSESSMENT
69 y/o M w PMHx of CAD (x2 stents Mar 2023), HLD, T2DM, nephrolithiasis (ureteral stent placement 2022), and psoriasis w/ three month hx of jaw pain and loosening of lower left mandibular molar tooth. Pt has a 30 pack yr smoking hx, quit 1 yr ago. 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 12/7 s/p L hemimandibulectomy, L level 1, 2a, 3 neck dissection, L fibula free flap reconstruction, STSG from L thigh, dental implants, and tracheostomy. Admitted to SICU for flap checks and hemodynamic monitoring. Course c/b DHT     Neuro: standing Tylenol, Gabapentin 600mg, Dilaudid prn. Melatonin qHS  HEENT: SPSW consult 12/11. Flap checks q4h, No circumferential ties or collars, Head neutral, KRISTEN L neck, KRISTEN R neck, Peridex PO QID POD1  CV: HD stable, Maintain MAP > 60, Avoid pressors but will add Low dose Levo gtt if needed. Hx of CAD 2 stents: ASA, holding Plavix. Can consider restarting Plavix today given low drain output. Hx of HLD: Lipitor.   Pulm: s/p trach with 7.5 cuffed portex, NaCl nebulizer q6, pneumothorax from DHT placement, s/p chest tube, resolved (12/9-12/10).  GI: Tube feeds at goal, Senna, Miralax. HLIV  : S/p young passed TOV, flomax  ID: s/p Unasyn x24 (12/8-12/9)  Endo: mISS (hx T2DM)  Heme: Active T&S, Hgb >8. ABLA postoperative, Hg 7.6 in setting of drain outputs, s/p 2U pRBC per ENT.  ppx: SCDs, SQL  Lines: PIV // DC: L radial Lucia  (12/7-12/8)    Wounds: KRISTEN neck x2, L leg KRISTEN and wound vac, bacitracin to wounds.  PT/OT: PT daily, CAM boot in room, OOBTC   Dispo: step down today 67 y/o M w PMHx of CAD (x2 stents Mar 2023), HLD, T2DM, nephrolithiasis (ureteral stent placement 2022), and psoriasis w/ three month hx of jaw pain and loosening of lower left mandibular molar tooth. Pt has a 30 pack yr smoking hx, quit 1 yr ago. 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 12/7 s/p L hemimandibulectomy, L level 1, 2a, 3 neck dissection, L fibula free flap reconstruction, STSG from L thigh, dental implants, and tracheostomy. Admitted to SICU for flap checks and hemodynamic monitoring. Course c/b DHT     Neuro: standing Tylenol, Gabapentin 600mg, Dilaudid prn. Melatonin qHS  HEENT: SPSW consult 12/11. Flap checks q4h, No circumferential ties or collars, Head neutral, KRISTEN L neck, KRISTEN R neck, Peridex PO QID POD1  CV: HD stable, Maintain MAP > 60, Avoid pressors but will add Low dose Levo gtt if needed. Hx of CAD 2 stents: ASA, holding Plavix. Can consider restarting Plavix today given low drain output. Hx of HLD: Lipitor.   Pulm: s/p trach with 7.5 cuffed portex, NaCl nebulizer q6, pneumothorax from DHT placement, s/p chest tube, resolved (12/9-12/10).  GI: Tube feeds at goal, Senna, Miralax. HLIV  : S/p young passed TOV, flomax  ID: s/p Unasyn x24 (12/8-12/9)  Endo: mISS (hx T2DM)  Heme: Active T&S, Hgb >8. ABLA postoperative, Hg 7.6 in setting of drain outputs, s/p 2U pRBC per ENT.  ppx: SCDs, SQL  Lines: PIV // DC: L radial Lucia  (12/7-12/8)    Wounds: KRISTEN neck x2, L leg KRISTEN and wound vac, bacitracin to wounds.  PT/OT: PT daily, CAM boot in room, OOBTC   Dispo: step down today

## 2023-12-11 NOTE — PROGRESS NOTE ADULT - SUBJECTIVE AND OBJECTIVE BOX
OTOLARYNGOLOGY (ENT) PROGRESS NOTE    PATIENT: SAUNDRA HOLMAN  MRN: [id number]  : 57  [id number]  DATE OF SERVICE:  23  			         ID:SAUNDRA HOLMAN is a  69yo M w PMH of CAD (x2 stents Mar 2023), HLD, T2DM, hx of kidney stones, psoriasis presents to [facility] for evaluation of oral mass. Reports Three month hx of jaw pain and loosening of mandibular molar tooth on the lower left. Pt has a 30 pack yr smoking hx, quit 1 yr ago. 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 POD 1 composite mandibular resection, neck dissection and fibula free flap reconstruction.       Subjective/ Interval:   ; patient seen this morning, oozing from trach as expected ,  Plan to start aspirin today, cuff is up on tracheostomy tube, intraoral skin panel intact doppler signal strong,  plan to advance NGT   : Patient seen and examined at bedside. AFVSS overnight. Significant HgB drop to 7.2 noted this morning. NGT clogged, attempted replacement but went into lung, need to replace. Cuff deflated. Patient reports lethargy. Otherwise no new complaints. Intraoral skin paddle intact with strong doppler signal. Plan to hold off one eliquis given significant HgB drop  12/10: Patient seen and examined at bedside. AFVSS overnight other than bradycardic to lowest 39, mainly in 40s-50s which seems to be his baseline even pre-op. Not symptomatic while chinedu. Otherwise, patient stable on TC with no issues. Yesterday, iatrogenic right sided PTX occured with NGT placement, patient remained asymptomatic throughout. Patient now s/p placement of pig-tail catheter by pulm with significant improvement in PTX. Chest tube clamped this morning with plan for removal this evening. Patient s/p 2 units of PRBCs with moderate improvement in Hgb. Today, patient reports feeling significantly better than yesterday and overall "feels good." He was started on trickle feeds last night but felt some chest tightness and so they were held. Patient also failed TOV again and was straight cathed. KRISTEN drain output significantly decreased this morning. No other changes at this time.   : patient seen this morning PTX has resolved on CXR, intraoral flap intact, doppler strong, trach in place,  KRISTEN drains with minimal output       ALLERGIES:  No Known Allergies      MEDICATIONS:  Antiinfectives:     IV fluids:  dextrose 5% + lactated ringers. 1000 milliLiter(s) IV Continuous <Continuous>  dextrose 5%. 1000 milliLiter(s) IV Continuous <Continuous>  dextrose 5%. 1000 milliLiter(s) IV Continuous <Continuous>  magnesium sulfate  IVPB 1 Gram(s) IV Intermittent once  potassium phosphate IVPB 15 milliMole(s) IV Intermittent once    Hematologic/Anticoagulation:  aspirin  chewable 81 milliGRAM(s) Oral daily  enoxaparin Injectable 40 milliGRAM(s) SubCutaneous every 24 hours    Pain medications/Neuro:  gabapentin 600 milliGRAM(s) Oral daily  HYDROmorphone  Injectable 0.5 milliGRAM(s) IV Push every 4 hours PRN  ondansetron Injectable 4 milliGRAM(s) IV Push every 6 hours PRN  oxyCODONE    Solution 5 milliGRAM(s) Oral every 4 hours PRN  oxyCODONE    Solution 10 milliGRAM(s) Oral every 6 hours PRN    Endocrine Medications:   atorvastatin 40 milliGRAM(s) Oral at bedtime  dextrose 50% Injectable 25 Gram(s) IV Push once  dextrose 50% Injectable 25 Gram(s) IV Push once  dextrose 50% Injectable 12.5 Gram(s) IV Push once  dextrose Oral Gel 15 Gram(s) Oral once PRN  glucagon  Injectable 1 milliGRAM(s) IntraMuscular once    All other standing medications:   chlorhexidine 0.12% Liquid 15 milliLiter(s) Oral Mucosa two times a day  chlorhexidine 2% Cloths 1 Application(s) Topical daily  influenza  Vaccine (HIGH DOSE) 0.7 milliLiter(s) IntraMuscular once  pantoprazole  Injectable 40 milliGRAM(s) IV Push daily  polyethylene glycol 3350 17 Gram(s) Oral daily  senna 2 Tablet(s) Oral at bedtime  sodium chloride 0.9% for Nebulization 3 milliLiter(s) Nebulizer every 6 hours  tamsulosin 0.4 milliGRAM(s) Oral every 24 hours    All other PRN medications:    Vital Signs Last 24 Hrs  T(C): 36.4 (11 Dec 2023 05:24), Max: 37.4 (10 Dec 2023 22:54)  T(F): 97.5 (11 Dec 2023 05:24), Max: 99.3 (10 Dec 2023 22:54)  HR: 52 (11 Dec 2023 07:00) (46 - 63)  BP: 130/60 (11 Dec 2023 07:00) (101/64 - 136/63)  BP(mean): 87 (11 Dec 2023 07:00) (72 - 90)  RR: 15 (11 Dec 2023 07:) (10 - 35)  SpO2: 100% (11 Dec 2023 07:) (97% - 100%)    Parameters below as of 11 Dec 2023 08:00  Patient On (Oxygen Delivery Method): tracheostomy collar  O2 Flow (L/min): 10  O2 Concentration (%): 40      12-10 @ 07:  -   @ 07:00  --------------------------------------------------------  IN:    dextrose 5% + lactated ringers: 1680 mL    Enteral Tube Flush: 120 mL    Glucerna 1.5: 1187 mL  Total IN: 2987 mL    OUT:    Bulb (mL): 25 mL    Bulb (mL): 11 mL    Bulb (mL): 8 mL    VAC (Vacuum Assisted Closure) System (mL): 0 mL    Voided (mL): 500 mL  Total OUT: 544 mL    Total NET: 2443 mL       @ 07:  -   @ 07:45  --------------------------------------------------------  IN:    Glucerna 1.5: 79 mL  Total IN: 79 mL    OUT:    dextrose 5% + lactated ringers: 0 mL  Total OUT: 0 mL    Total NET: 79 mL          12-10-23 @ 07:01  -  23 @ 07:00  --------------------------------------------------------  IN:  Total IN: 0 mL    OUT:    Bulb (mL): 25 mL    Bulb (mL): 11 mL    Bulb (mL): 8 mL    VAC (Vacuum Assisted Closure) System (mL): 0 mL  Total OUT: 44 mL    Total NET: -44 mL              PHYSICAL EXAM:  Gen: AAOx3, NAD   Head: Surgical incision around chin extending up through lip  Eyes: EOMI, PERRL, visual acuity intact, no diplopia, supra/infra orbital rims intact, no subconjunctival heme, no telecanthus, no exophthalmos   Ears: Gross hearing intact,  Nose: No septal hematoma/asymmetry, no epistaxis bilaterally. no abrasions present, no lacerations.   Malar: No malar depression  Throat: No LAD, supple, neck surgical incision w/ steristrip dressing is hemostatic + left and right neck KRISTEN in place, left KRISTEN sanguinous output , right neck KRISTEN sanguinous output , trach in place w/ 7.5 cuffed portex, cuff deflated  Oral: Left FFF paddle pink, warm, well perfused. IMF screws in place, class 3 elastics,   Exx: Wound vac left leg, left leg KRISTEN with sanguinous output         LABS                       7.9    6.91  )-----------( 132      ( 11 Dec 2023 05:30 )             24.5    12-11    142  |  110<H>  |  17  ----------------------------<  144<H>  3.8   |  26  |  0.60    Ca    8.3<L>      11 Dec 2023 05:30  Phos  2.4     12-11  Mg     1.8     12-11           Coagulation Studies-   PT/INR - ( 10 Dec 2023 05:31 )   PT: 12.1 sec;   INR: 1.06          PTT - ( 10 Dec 2023 05:31 )  PTT:28.1 sec  Urinalysis Basic - ( 11 Dec 2023 05:30 )    Color: x / Appearance: x / SG: x / pH: x  Gluc: 144 mg/dL / Ketone: x  / Bili: x / Urobili: x   Blood: x / Protein: x / Nitrite: x   Leuk Esterase: x / RBC: x / WBC x   Sq Epi: x / Non Sq Epi: x / Bacteria: x      Endocrine Panel-  Calcium: 8.3 mg/dL (12-11 @ 05:30)  Calcium: 8.3 mg/dL (12-10 @ 08:34)         OTOLARYNGOLOGY (ENT) PROGRESS NOTE    PATIENT: SAUNDRA HOLMAN  MRN: [id number]  : 57  [id number]  DATE OF SERVICE:  23  			         ID:SAUNDRA HOLMAN is a  67yo M w PMH of CAD (x2 stents Mar 2023), HLD, T2DM, hx of kidney stones, psoriasis presents to [facility] for evaluation of oral mass. Reports Three month hx of jaw pain and loosening of mandibular molar tooth on the lower left. Pt has a 30 pack yr smoking hx, quit 1 yr ago. 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 POD 1 composite mandibular resection, neck dissection and fibula free flap reconstruction.       Subjective/ Interval:   ; patient seen this morning, oozing from trach as expected ,  Plan to start aspirin today, cuff is up on tracheostomy tube, intraoral skin panel intact doppler signal strong,  plan to advance NGT   : Patient seen and examined at bedside. AFVSS overnight. Significant HgB drop to 7.2 noted this morning. NGT clogged, attempted replacement but went into lung, need to replace. Cuff deflated. Patient reports lethargy. Otherwise no new complaints. Intraoral skin paddle intact with strong doppler signal. Plan to hold off one eliquis given significant HgB drop  12/10: Patient seen and examined at bedside. AFVSS overnight other than bradycardic to lowest 39, mainly in 40s-50s which seems to be his baseline even pre-op. Not symptomatic while chinedu. Otherwise, patient stable on TC with no issues. Yesterday, iatrogenic right sided PTX occured with NGT placement, patient remained asymptomatic throughout. Patient now s/p placement of pig-tail catheter by pulm with significant improvement in PTX. Chest tube clamped this morning with plan for removal this evening. Patient s/p 2 units of PRBCs with moderate improvement in Hgb. Today, patient reports feeling significantly better than yesterday and overall "feels good." He was started on trickle feeds last night but felt some chest tightness and so they were held. Patient also failed TOV again and was straight cathed. KRISTEN drain output significantly decreased this morning. No other changes at this time.   : patient seen this morning PTX has resolved on CXR, intraoral flap intact, doppler strong, trach in place,  KRISTEN drains with minimal output       ALLERGIES:  No Known Allergies      MEDICATIONS:  Antiinfectives:     IV fluids:  dextrose 5% + lactated ringers. 1000 milliLiter(s) IV Continuous <Continuous>  dextrose 5%. 1000 milliLiter(s) IV Continuous <Continuous>  dextrose 5%. 1000 milliLiter(s) IV Continuous <Continuous>  magnesium sulfate  IVPB 1 Gram(s) IV Intermittent once  potassium phosphate IVPB 15 milliMole(s) IV Intermittent once    Hematologic/Anticoagulation:  aspirin  chewable 81 milliGRAM(s) Oral daily  enoxaparin Injectable 40 milliGRAM(s) SubCutaneous every 24 hours    Pain medications/Neuro:  gabapentin 600 milliGRAM(s) Oral daily  HYDROmorphone  Injectable 0.5 milliGRAM(s) IV Push every 4 hours PRN  ondansetron Injectable 4 milliGRAM(s) IV Push every 6 hours PRN  oxyCODONE    Solution 5 milliGRAM(s) Oral every 4 hours PRN  oxyCODONE    Solution 10 milliGRAM(s) Oral every 6 hours PRN    Endocrine Medications:   atorvastatin 40 milliGRAM(s) Oral at bedtime  dextrose 50% Injectable 25 Gram(s) IV Push once  dextrose 50% Injectable 25 Gram(s) IV Push once  dextrose 50% Injectable 12.5 Gram(s) IV Push once  dextrose Oral Gel 15 Gram(s) Oral once PRN  glucagon  Injectable 1 milliGRAM(s) IntraMuscular once    All other standing medications:   chlorhexidine 0.12% Liquid 15 milliLiter(s) Oral Mucosa two times a day  chlorhexidine 2% Cloths 1 Application(s) Topical daily  influenza  Vaccine (HIGH DOSE) 0.7 milliLiter(s) IntraMuscular once  pantoprazole  Injectable 40 milliGRAM(s) IV Push daily  polyethylene glycol 3350 17 Gram(s) Oral daily  senna 2 Tablet(s) Oral at bedtime  sodium chloride 0.9% for Nebulization 3 milliLiter(s) Nebulizer every 6 hours  tamsulosin 0.4 milliGRAM(s) Oral every 24 hours    All other PRN medications:    Vital Signs Last 24 Hrs  T(C): 36.4 (11 Dec 2023 05:24), Max: 37.4 (10 Dec 2023 22:54)  T(F): 97.5 (11 Dec 2023 05:24), Max: 99.3 (10 Dec 2023 22:54)  HR: 52 (11 Dec 2023 07:00) (46 - 63)  BP: 130/60 (11 Dec 2023 07:00) (101/64 - 136/63)  BP(mean): 87 (11 Dec 2023 07:00) (72 - 90)  RR: 15 (11 Dec 2023 07:) (10 - 35)  SpO2: 100% (11 Dec 2023 07:) (97% - 100%)    Parameters below as of 11 Dec 2023 08:00  Patient On (Oxygen Delivery Method): tracheostomy collar  O2 Flow (L/min): 10  O2 Concentration (%): 40      12-10 @ 07:  -   @ 07:00  --------------------------------------------------------  IN:    dextrose 5% + lactated ringers: 1680 mL    Enteral Tube Flush: 120 mL    Glucerna 1.5: 1187 mL  Total IN: 2987 mL    OUT:    Bulb (mL): 25 mL    Bulb (mL): 11 mL    Bulb (mL): 8 mL    VAC (Vacuum Assisted Closure) System (mL): 0 mL    Voided (mL): 500 mL  Total OUT: 544 mL    Total NET: 2443 mL       @ 07:  -   @ 07:45  --------------------------------------------------------  IN:    Glucerna 1.5: 79 mL  Total IN: 79 mL    OUT:    dextrose 5% + lactated ringers: 0 mL  Total OUT: 0 mL    Total NET: 79 mL          12-10-23 @ 07:01  -  23 @ 07:00  --------------------------------------------------------  IN:  Total IN: 0 mL    OUT:    Bulb (mL): 25 mL    Bulb (mL): 11 mL    Bulb (mL): 8 mL    VAC (Vacuum Assisted Closure) System (mL): 0 mL  Total OUT: 44 mL    Total NET: -44 mL              PHYSICAL EXAM:  Gen: AAOx3, NAD   Head: Surgical incision around chin extending up through lip  Eyes: EOMI, PERRL, visual acuity intact, no diplopia, supra/infra orbital rims intact, no subconjunctival heme, no telecanthus, no exophthalmos   Ears: Gross hearing intact,  Nose: No septal hematoma/asymmetry, no epistaxis bilaterally. no abrasions present, no lacerations.   Malar: No malar depression  Throat: No LAD, supple, neck surgical incision w/ steristrip dressing is hemostatic + left and right neck KRISTEN in place, left KRISTEN sanguinous output , right neck KRISTEN sanguinous output , trach in place w/ 7.5 cuffed portex, cuff deflated  Oral: Left FFF paddle pink, warm, well perfused. IMF screws in place, class 3 elastics,   Exx: Wound vac left leg, left leg KRISTEN with sanguinous output         LABS                       7.9    6.91  )-----------( 132      ( 11 Dec 2023 05:30 )             24.5    12-11    142  |  110<H>  |  17  ----------------------------<  144<H>  3.8   |  26  |  0.60    Ca    8.3<L>      11 Dec 2023 05:30  Phos  2.4     12-11  Mg     1.8     12-11           Coagulation Studies-   PT/INR - ( 10 Dec 2023 05:31 )   PT: 12.1 sec;   INR: 1.06          PTT - ( 10 Dec 2023 05:31 )  PTT:28.1 sec  Urinalysis Basic - ( 11 Dec 2023 05:30 )    Color: x / Appearance: x / SG: x / pH: x  Gluc: 144 mg/dL / Ketone: x  / Bili: x / Urobili: x   Blood: x / Protein: x / Nitrite: x   Leuk Esterase: x / RBC: x / WBC x   Sq Epi: x / Non Sq Epi: x / Bacteria: x      Endocrine Panel-  Calcium: 8.3 mg/dL (12-11 @ 05:30)  Calcium: 8.3 mg/dL (12-10 @ 08:34)

## 2023-12-11 NOTE — PROGRESS NOTE ADULT - ASSESSMENT
Assessment and Plan:    SAUNDRA HOLMAN is a  68M w/ [id number] SCC of L buccal mucosa presents for pre optimization for surgery 12/7, now s/p hemimandibulectomy, left level 1, 2a, 3 neck neck dissection, L FFF, tracheostomy w/ 7.5 cuffed portex, dental implants, STSG 12/7.    PLAN:  advance tube feeds to goal   Follow TOV, if fails, replace young  Cardiology consult for persistent bradycardia and to discuss restarting plavix   OOBTC  RN Flap checks Q4 f until POD5-6  Steroids completed   Continue Ondansetron PRN nausea/vomiting , PO Senna once daily, Miralax 17g once daily,  Chlorhexidine swish and spit, Esomeprazole, Enoxaparin, Gabapentin, Acetaminophen   Continue SCD’s            Page ENT at [phone] with any questions/concerns.    Ada Quinones PA-C  12-11-23 @ 07:45     Assessment and Plan:    SAUNDRA HOLMAN is a  68M w/ [id number] SCC of L buccal mucosa presents for pre optimization for surgery 12/7, now s/p hemimandibulectomy, left level 1, 2a, 3 neck neck dissection, L FFF, tracheostomy w/ 7.5 cuffed portex, dental implants, STSG 12/7.    PLAN:  advance tube feeds to goal   Follow TOV, if fails, replace young  Cardiology consult for persistent bradycardia and to discuss restarting plavix   OOBTC  RN Flap checks Q4 f until POD5-6  Steroids completed   Continue Ondansetron PRN nausea/vomiting , PO Senna once daily, Miralax 17g once daily,  Chlorhexidine swish and spit, Esomeprazole, Enoxaparin, Gabapentin, Acetaminophen   Continue SCD’s            Page ENT at [phone] with any questions/concerns.    Ada Quinones PA-C  12-11-23 @ 07:45     Assessment and Plan:    SAUNDRA HOLMAN is a  68M w/ [id number] SCC of L buccal mucosa presents for pre optimization for surgery 12/7, now s/p hemimandibulectomy, left level 1, 2a, 3 neck neck dissection, L FFF, tracheostomy w/ 7.5 cuffed portex, dental implants, STSG 12/7.    PLAN:  - Right Kristen neck and left leg KRISTEN drain removed   advance tube feeds to goal   Follow TOV, if fails, replace young  Cardiology consult for persistent bradycardia and to discuss restarting plavix   OOBTC  RN Flap checks Q4 f until POD5-6  Steroids completed   Continue Ondansetron PRN nausea/vomiting , PO Senna once daily, Miralax 17g once daily,  Chlorhexidine swish and spit, Esomeprazole, Enoxaparin, Gabapentin, Acetaminophen   Continue SCD’s            Page ENT at [phone] with any questions/concerns.    Ada Quinones PA-C  12-11-23 @ 07:45     Assessment and Plan:    SAUNDRA HOLMAN is a  68M w/ [id number] SCC of L buccal mucosa presents for pre optimization for surgery 12/7, now s/p hemimandibulectomy, left level 1, 2a, 3 neck neck dissection, L FFF, tracheostomy w/ 7.5 cuffed portex, dental implants, STSG 12/7.    PLAN:  - Right Kristen neck and left leg KRISTEN drain removed   advance tube feeds to goal   Follow TOV, if fails, replace young  Cardiology consult for persistent bradycardia and to discuss restarting plavix   OOBTC  RN Flap checks Q4 f until POD5-6  Steroids completed   Continue Ondansetron PRN nausea/vomiting , PO Senna once daily, Miralax 17g once daily,  Chlorhexidine swish and spit, Esomeprazole, Enoxaparin, Gabapentin, Acetaminophen   Continue SCD’s            Page ENT at [phone] with any questions/concerns.    Ada Quinones PA-C  12-11-23 @ 07:45

## 2023-12-11 NOTE — PROGRESS NOTE ADULT - SUBJECTIVE AND OBJECTIVE BOX
INTERVAL HPI/OVERNIGHT EVENTS:    SUBJECTIVE: Patient seen and examined at bedside.    OBJECTIVE:    VITAL SIGNS:  ICU Vital Signs Last 24 Hrs  T(C): 36.4 (11 Dec 2023 05:24), Max: 37.4 (10 Dec 2023 22:54)  T(F): 97.5 (11 Dec 2023 05:24), Max: 99.3 (10 Dec 2023 22:54)  HR: 67 (11 Dec 2023 12:00) (46 - 67)  BP: 143/64 (11 Dec 2023 12:00) (103/51 - 159/68)  BP(mean): 92 (11 Dec 2023 12:00) (72 - 98)  ABP: --  ABP(mean): --  RR: 18 (11 Dec 2023 12:00) (10 - 35)  SpO2: 100% (11 Dec 2023 12:00) (97% - 100%)    O2 Parameters below as of 11 Dec 2023 12:00  Patient On (Oxygen Delivery Method): tracheostomy collar  O2 Flow (L/min): 1  O2 Concentration (%): 40          12-10 @ 07:01  -  12-11 @ 07:00  --------------------------------------------------------  IN: 2987 mL / OUT: 544 mL / NET: 2443 mL    12-11 @ 07:01  -  12-11 @ 13:18  --------------------------------------------------------  IN: 495 mL / OUT: 300 mL / NET: 195 mL      CAPILLARY BLOOD GLUCOSE          PHYSICAL EXAM:    General: WDWN ; NAD  HEENT: NC/AT; PERRL, anicteric sclera  Neck: supple, no JVD  Respiratory: CTA B/L; no W/R/R  Cardiovascular: +S1/S2; RRR; no M/R/G  Gastrointestinal: soft, NT/ND; +BS x4  Extremities: WWP; 2+ peripheral pulses B/L; no LE edema  Skin: normal color and turgor; no rash  Neurological:     MEDICATIONS:  MEDICATIONS  (STANDING):  aspirin  chewable 81 milliGRAM(s) Oral daily  atorvastatin 40 milliGRAM(s) Oral at bedtime  chlorhexidine 0.12% Liquid 15 milliLiter(s) Oral Mucosa two times a day  chlorhexidine 2% Cloths 1 Application(s) Topical daily  dextrose 5%. 1000 milliLiter(s) (50 mL/Hr) IV Continuous <Continuous>  dextrose 5%. 1000 milliLiter(s) (100 mL/Hr) IV Continuous <Continuous>  dextrose 50% Injectable 25 Gram(s) IV Push once  dextrose 50% Injectable 25 Gram(s) IV Push once  dextrose 50% Injectable 12.5 Gram(s) IV Push once  enoxaparin Injectable 40 milliGRAM(s) SubCutaneous every 24 hours  gabapentin 600 milliGRAM(s) Oral daily  glucagon  Injectable 1 milliGRAM(s) IntraMuscular once  influenza  Vaccine (HIGH DOSE) 0.7 milliLiter(s) IntraMuscular once  magnesium citrate Oral Solution 296 milliLiter(s) Oral once  magnesium sulfate  IVPB 1 Gram(s) IV Intermittent once  melatonin 5 milliGRAM(s) Oral at bedtime  pantoprazole  Injectable 40 milliGRAM(s) IV Push daily  polyethylene glycol 3350 17 Gram(s) Oral daily  potassium phosphate IVPB 15 milliMole(s) IV Intermittent once  senna 2 Tablet(s) Oral at bedtime  sodium chloride 0.9% for Nebulization 3 milliLiter(s) Nebulizer every 6 hours  tamsulosin 0.4 milliGRAM(s) Oral every 24 hours    MEDICATIONS  (PRN):  dextrose Oral Gel 15 Gram(s) Oral once PRN Blood Glucose LESS THAN 70 milliGRAM(s)/deciliter  HYDROmorphone  Injectable 0.5 milliGRAM(s) IV Push every 4 hours PRN Breakthrough  ondansetron Injectable 4 milliGRAM(s) IV Push every 6 hours PRN Nausea and/or Vomiting  oxyCODONE    Solution 10 milliGRAM(s) Oral every 6 hours PRN Severe Pain (7 - 10)  oxyCODONE    Solution 5 milliGRAM(s) Oral every 4 hours PRN Moderate Pain (4 - 6)      ALLERGIES:  Allergies    No Known Allergies    Intolerances        LABS:                        7.9    6.91  )-----------( 132      ( 11 Dec 2023 05:30 )             24.5     12-11    142  |  110<H>  |  17  ----------------------------<  144<H>  3.8   |  26  |  0.60    Ca    8.3<L>      11 Dec 2023 05:30  Phos  2.4     12-11  Mg     1.8     12-11        PT/INR - ( 10 Dec 2023 05:31 )   PT: 12.1 sec;   INR: 1.06          PTT - ( 10 Dec 2023 05:31 )  PTT:28.1 sec  Urinalysis Basic - ( 11 Dec 2023 05:30 )    Color: x / Appearance: x / SG: x / pH: x  Gluc: 144 mg/dL / Ketone: x  / Bili: x / Urobili: x   Blood: x / Protein: x / Nitrite: x   Leuk Esterase: x / RBC: x / WBC x   Sq Epi: x / Non Sq Epi: x / Bacteria: x            RADIOLOGY & ADDITIONAL TESTS: Reviewed.   INTERVAL HPI/OVERNIGHT EVENTS:    SUBJECTIVE: Patient seen and examined at bedside.    OBJECTIVE:    VITAL SIGNS:  ICU Vital Signs Last 24 Hrs  T(C): 36.4 (11 Dec 2023 05:24), Max: 37.4 (10 Dec 2023 22:54)  T(F): 97.5 (11 Dec 2023 05:24), Max: 99.3 (10 Dec 2023 22:54)  HR: 67 (11 Dec 2023 12:00) (46 - 67)  BP: 143/64 (11 Dec 2023 12:00) (103/51 - 159/68)  BP(mean): 92 (11 Dec 2023 12:00) (72 - 98)  RR: 18 (11 Dec 2023 12:00) (10 - 35)  SpO2: 100% (11 Dec 2023 12:00) (97% - 100%)    O2 Parameters below as of 11 Dec 2023 12:00  Patient On (Oxygen Delivery Method): tracheostomy collar  O2 Flow (L/min): 1  O2 Concentration (%): 40          12-10 @ 07:01 - 12-11 @ 07:00  --------------------------------------------------------  IN: 2987 mL / OUT: 544 mL / NET: 2443 mL    12-11 @ 07:01  -  12-11 @ 13:18  --------------------------------------------------------  IN: 495 mL / OUT: 300 mL / NET: 195 mL      CAPILLARY BLOOD GLUCOSE      PHYSICAL EXAM:    GENERAL: NAD, Resting comfortably in bed, awake, opens eyes spontaneously, writing to communicate   HEENT: Trach collar in situ, bilateral KRISTEN neck drains with scant bloody output   RESP: Nonlabored breathing, No respiratory distress  CARD: Normal rate, Normal peripheral perfusion  GI: Soft, ND, NT, No guarding, No rebound tenderness  EXTREM: Wound vac in situ of left leg, left leg KRISTEN with scant bloody output  SKIN: No rashes, no lesions  NEURO: Alert, awake, and communicating     MEDICATIONS:  MEDICATIONS  (STANDING):  aspirin  chewable 81 milliGRAM(s) Oral daily  atorvastatin 40 milliGRAM(s) Oral at bedtime  chlorhexidine 0.12% Liquid 15 milliLiter(s) Oral Mucosa two times a day  chlorhexidine 2% Cloths 1 Application(s) Topical daily  dextrose 5%. 1000 milliLiter(s) (50 mL/Hr) IV Continuous <Continuous>  dextrose 5%. 1000 milliLiter(s) (100 mL/Hr) IV Continuous <Continuous>  dextrose 50% Injectable 25 Gram(s) IV Push once  dextrose 50% Injectable 25 Gram(s) IV Push once  dextrose 50% Injectable 12.5 Gram(s) IV Push once  enoxaparin Injectable 40 milliGRAM(s) SubCutaneous every 24 hours  gabapentin 600 milliGRAM(s) Oral daily  glucagon  Injectable 1 milliGRAM(s) IntraMuscular once  influenza  Vaccine (HIGH DOSE) 0.7 milliLiter(s) IntraMuscular once  magnesium citrate Oral Solution 296 milliLiter(s) Oral once  magnesium sulfate  IVPB 1 Gram(s) IV Intermittent once  melatonin 5 milliGRAM(s) Oral at bedtime  pantoprazole  Injectable 40 milliGRAM(s) IV Push daily  polyethylene glycol 3350 17 Gram(s) Oral daily  potassium phosphate IVPB 15 milliMole(s) IV Intermittent once  senna 2 Tablet(s) Oral at bedtime  sodium chloride 0.9% for Nebulization 3 milliLiter(s) Nebulizer every 6 hours  tamsulosin 0.4 milliGRAM(s) Oral every 24 hours    MEDICATIONS  (PRN):  dextrose Oral Gel 15 Gram(s) Oral once PRN Blood Glucose LESS THAN 70 milliGRAM(s)/deciliter  HYDROmorphone  Injectable 0.5 milliGRAM(s) IV Push every 4 hours PRN Breakthrough  ondansetron Injectable 4 milliGRAM(s) IV Push every 6 hours PRN Nausea and/or Vomiting  oxyCODONE    Solution 10 milliGRAM(s) Oral every 6 hours PRN Severe Pain (7 - 10)  oxyCODONE    Solution 5 milliGRAM(s) Oral every 4 hours PRN Moderate Pain (4 - 6)      ALLERGIES:  Allergies    No Known Allergies    Intolerances        LABS:                        7.9    6.91  )-----------( 132      ( 11 Dec 2023 05:30 )             24.5     12-11    142  |  110<H>  |  17  ----------------------------<  144<H>  3.8   |  26  |  0.60    Ca    8.3<L>      11 Dec 2023 05:30  Phos  2.4     12-11  Mg     1.8     12-11        PT/INR - ( 10 Dec 2023 05:31 )   PT: 12.1 sec;   INR: 1.06          PTT - ( 10 Dec 2023 05:31 )  PTT:28.1 sec  Urinalysis Basic - ( 11 Dec 2023 05:30 )    Color: x / Appearance: x / SG: x / pH: x  Gluc: 144 mg/dL / Ketone: x  / Bili: x / Urobili: x   Blood: x / Protein: x / Nitrite: x   Leuk Esterase: x / RBC: x / WBC x   Sq Epi: x / Non Sq Epi: x / Bacteria: x            RADIOLOGY & ADDITIONAL TESTS: Reviewed.   INTERVAL HPI/OVERNIGHT EVENTS: Bradycardia to HR 50s.     SUBJECTIVE: Patient seen and examined at bedside. Non verbal, noted that outpatient PVC burden 2% as outpatient.     OBJECTIVE:    VITAL SIGNS:  ICU Vital Signs Last 24 Hrs  T(C): 36.4 (11 Dec 2023 05:24), Max: 37.4 (10 Dec 2023 22:54)  T(F): 97.5 (11 Dec 2023 05:24), Max: 99.3 (10 Dec 2023 22:54)  HR: 67 (11 Dec 2023 12:00) (46 - 67)  BP: 143/64 (11 Dec 2023 12:00) (103/51 - 159/68)  BP(mean): 92 (11 Dec 2023 12:00) (72 - 98)  RR: 18 (11 Dec 2023 12:00) (10 - 35)  SpO2: 100% (11 Dec 2023 12:00) (97% - 100%)    O2 Parameters below as of 11 Dec 2023 12:00  Patient On (Oxygen Delivery Method): tracheostomy collar  O2 Flow (L/min): 1  O2 Concentration (%): 40    12-10 @ 07:01  -  12-11 @ 07:00  --------------------------------------------------------  IN: 2987 mL / OUT: 544 mL / NET: 2443 mL    12-11 @ 07:01 - 12-11 @ 13:18  --------------------------------------------------------  IN: 495 mL / OUT: 300 mL / NET: 195 mL      CAPILLARY BLOOD GLUCOSE      PHYSICAL EXAM:    GENERAL: NAD, Resting comfortably in bed, awake, opens eyes spontaneously, writing to communicate   HEENT: Trach collar in situ, bilateral KRISTEN neck drains with scant bloody output   RESP: Nonlabored breathing, No respiratory distress  CARD: Normal rate, Normal peripheral perfusion  GI: Soft, ND, NT, No guarding, No rebound tenderness  EXTREM: Wound vac in situ of left leg, left leg KRISTEN with scant bloody output  SKIN: No rashes, no lesions  NEURO: Alert, awake, and communicating     MEDICATIONS:  MEDICATIONS  (STANDING):  aspirin  chewable 81 milliGRAM(s) Oral daily  atorvastatin 40 milliGRAM(s) Oral at bedtime  chlorhexidine 0.12% Liquid 15 milliLiter(s) Oral Mucosa two times a day  chlorhexidine 2% Cloths 1 Application(s) Topical daily  dextrose 5%. 1000 milliLiter(s) (50 mL/Hr) IV Continuous <Continuous>  dextrose 5%. 1000 milliLiter(s) (100 mL/Hr) IV Continuous <Continuous>  dextrose 50% Injectable 25 Gram(s) IV Push once  dextrose 50% Injectable 25 Gram(s) IV Push once  dextrose 50% Injectable 12.5 Gram(s) IV Push once  enoxaparin Injectable 40 milliGRAM(s) SubCutaneous every 24 hours  gabapentin 600 milliGRAM(s) Oral daily  glucagon  Injectable 1 milliGRAM(s) IntraMuscular once  influenza  Vaccine (HIGH DOSE) 0.7 milliLiter(s) IntraMuscular once  magnesium citrate Oral Solution 296 milliLiter(s) Oral once  magnesium sulfate  IVPB 1 Gram(s) IV Intermittent once  melatonin 5 milliGRAM(s) Oral at bedtime  pantoprazole  Injectable 40 milliGRAM(s) IV Push daily  polyethylene glycol 3350 17 Gram(s) Oral daily  potassium phosphate IVPB 15 milliMole(s) IV Intermittent once  senna 2 Tablet(s) Oral at bedtime  sodium chloride 0.9% for Nebulization 3 milliLiter(s) Nebulizer every 6 hours  tamsulosin 0.4 milliGRAM(s) Oral every 24 hours    MEDICATIONS  (PRN):  dextrose Oral Gel 15 Gram(s) Oral once PRN Blood Glucose LESS THAN 70 milliGRAM(s)/deciliter  HYDROmorphone  Injectable 0.5 milliGRAM(s) IV Push every 4 hours PRN Breakthrough  ondansetron Injectable 4 milliGRAM(s) IV Push every 6 hours PRN Nausea and/or Vomiting  oxyCODONE    Solution 10 milliGRAM(s) Oral every 6 hours PRN Severe Pain (7 - 10)  oxyCODONE    Solution 5 milliGRAM(s) Oral every 4 hours PRN Moderate Pain (4 - 6)      ALLERGIES:  Allergies    No Known Allergies    Intolerances        LABS:                        7.9    6.91  )-----------( 132      ( 11 Dec 2023 05:30 )             24.5     12-11    142  |  110<H>  |  17  ----------------------------<  144<H>  3.8   |  26  |  0.60    Ca    8.3<L>      11 Dec 2023 05:30  Phos  2.4     12-11  Mg     1.8     12-11        PT/INR - ( 10 Dec 2023 05:31 )   PT: 12.1 sec;   INR: 1.06          PTT - ( 10 Dec 2023 05:31 )  PTT:28.1 sec  Urinalysis Basic - ( 11 Dec 2023 05:30 )    Color: x / Appearance: x / SG: x / pH: x  Gluc: 144 mg/dL / Ketone: x  / Bili: x / Urobili: x   Blood: x / Protein: x / Nitrite: x   Leuk Esterase: x / RBC: x / WBC x   Sq Epi: x / Non Sq Epi: x / Bacteria: x            RADIOLOGY & ADDITIONAL TESTS: Reviewed.

## 2023-12-11 NOTE — SPEAKING VALVE EVALUATION - RECOMMENDATIONS
PMV during waking hours. Wife at the bedside was instructed on donning/doffing PMV and returned demonstration. This service will continue to follow Pt with full dysphagia evaluation.

## 2023-12-11 NOTE — PROGRESS NOTE ADULT - SUBJECTIVE AND OBJECTIVE BOX
24hr events:  - chest tube removed yesterday for pneumothorax, CXR this AM shows resolved pneumo  - cards consulted for bradycardia ON: no treatment required for asymptomatic sinus bradycardia.  - Drain output low, per cards can restart Plavix (hx of stent) with loading dose of 300 mg if bleeding risk is low, if not can resume at 75 mg PO daily  - tube feeds at goal ON  - Passed TOV     SUBJECTIVE: Patient seen and examined at bedside. He reports pain control is ok, requests something to help him sleep. Denies SOB, chest pain, trouble breathing.     aspirin  chewable 81 milliGRAM(s) Oral daily  enoxaparin Injectable 40 milliGRAM(s) SubCutaneous every 24 hours    MEDICATIONS  (PRN):  dextrose Oral Gel 15 Gram(s) Oral once PRN Blood Glucose LESS THAN 70 milliGRAM(s)/deciliter  HYDROmorphone  Injectable 0.5 milliGRAM(s) IV Push every 4 hours PRN Breakthrough  ondansetron Injectable 4 milliGRAM(s) IV Push every 6 hours PRN Nausea and/or Vomiting  oxyCODONE    Solution 10 milliGRAM(s) Oral every 6 hours PRN Severe Pain (7 - 10)  oxyCODONE    Solution 5 milliGRAM(s) Oral every 4 hours PRN Moderate Pain (4 - 6)      I&O's Detail    10 Dec 2023 07:01  -  11 Dec 2023 07:00  --------------------------------------------------------  IN:    dextrose 5% + lactated ringers: 1680 mL    Enteral Tube Flush: 120 mL    Glucerna 1.5: 1187 mL  Total IN: 2987 mL    OUT:    Bulb (mL): 25 mL    Bulb (mL): 11 mL    Bulb (mL): 8 mL    VAC (Vacuum Assisted Closure) System (mL): 0 mL    Voided (mL): 500 mL  Total OUT: 544 mL    Total NET: 2443 mL      11 Dec 2023 07:01  -  11 Dec 2023 13:33  --------------------------------------------------------  IN:    Enteral Tube Flush: 100 mL    Glucerna 1.5: 553 mL  Total IN: 653 mL    OUT:    dextrose 5% + lactated ringers: 0 mL    VAC (Vacuum Assisted Closure) System (mL): 25 mL    Voided (mL): 300 mL  Total OUT: 325 mL    Total NET: 328 mL          T(C): 36.4 (12-11-23 @ 05:24), Max: 37.4 (12-10-23 @ 22:54)  HR: 62 (12-11-23 @ 12:54) (46 - 67)  BP: 130/60 (12-11-23 @ 12:54) (103/51 - 159/68)  RR: 17 (12-11-23 @ 12:54) (10 - 35)  SpO2: 96% (12-11-23 @ 12:54) (96% - 100%)    GENERAL: NAD, Resting comfortably in bed, awake, opens eyes spontaneously, writing to communicate   HEENT: Trach collar in situ, bilateral KRISTEN neck drains with scant bloody output   RESP: Nonlabored breathing, No respiratory distress  CARD: Normal rate, Normal peripheral perfusion  GI: Soft, ND, NT, No guarding, No rebound tenderness  EXTREM: Wound vac in situ of left leg, left leg KRISTEN with scant bloody output  SKIN: No rashes, no lesions  NEURO: Alert, awake, and communicating     LABS:                        7.9    6.91  )-----------( 132      ( 11 Dec 2023 05:30 )             24.5     12-11    142  |  110<H>  |  17  ----------------------------<  144<H>  3.8   |  26  |  0.60    Ca    8.3<L>      11 Dec 2023 05:30  Phos  2.4     12-11  Mg     1.8     12-11      PT/INR - ( 10 Dec 2023 05:31 )   PT: 12.1 sec;   INR: 1.06          PTT - ( 10 Dec 2023 05:31 )  PTT:28.1 sec  Urinalysis Basic - ( 11 Dec 2023 05:30 )    Color: x / Appearance: x / SG: x / pH: x  Gluc: 144 mg/dL / Ketone: x  / Bili: x / Urobili: x   Blood: x / Protein: x / Nitrite: x   Leuk Esterase: x / RBC: x / WBC x   Sq Epi: x / Non Sq Epi: x / Bacteria: x        RADIOLOGY & ADDITIONAL STUDIES:  CXR this AM resolution of pneumothorax

## 2023-12-12 LAB
ANION GAP SERPL CALC-SCNC: 8 MMOL/L — SIGNIFICANT CHANGE UP (ref 5–17)
ANION GAP SERPL CALC-SCNC: 8 MMOL/L — SIGNIFICANT CHANGE UP (ref 5–17)
APPEARANCE UR: CLEAR — SIGNIFICANT CHANGE UP
APPEARANCE UR: CLEAR — SIGNIFICANT CHANGE UP
BILIRUB UR-MCNC: NEGATIVE — SIGNIFICANT CHANGE UP
BILIRUB UR-MCNC: NEGATIVE — SIGNIFICANT CHANGE UP
BUN SERPL-MCNC: 19 MG/DL — SIGNIFICANT CHANGE UP (ref 7–23)
BUN SERPL-MCNC: 19 MG/DL — SIGNIFICANT CHANGE UP (ref 7–23)
CALCIUM SERPL-MCNC: 7.8 MG/DL — LOW (ref 8.4–10.5)
CALCIUM SERPL-MCNC: 7.8 MG/DL — LOW (ref 8.4–10.5)
CHLORIDE SERPL-SCNC: 109 MMOL/L — HIGH (ref 96–108)
CHLORIDE SERPL-SCNC: 109 MMOL/L — HIGH (ref 96–108)
CO2 SERPL-SCNC: 25 MMOL/L — SIGNIFICANT CHANGE UP (ref 22–31)
CO2 SERPL-SCNC: 25 MMOL/L — SIGNIFICANT CHANGE UP (ref 22–31)
COLOR SPEC: YELLOW — SIGNIFICANT CHANGE UP
COLOR SPEC: YELLOW — SIGNIFICANT CHANGE UP
CREAT SERPL-MCNC: 0.57 MG/DL — SIGNIFICANT CHANGE UP (ref 0.5–1.3)
CREAT SERPL-MCNC: 0.57 MG/DL — SIGNIFICANT CHANGE UP (ref 0.5–1.3)
CULTURE RESULTS: ABNORMAL
CULTURE RESULTS: ABNORMAL
DIFF PNL FLD: NEGATIVE — SIGNIFICANT CHANGE UP
DIFF PNL FLD: NEGATIVE — SIGNIFICANT CHANGE UP
EGFR: 107 ML/MIN/1.73M2 — SIGNIFICANT CHANGE UP
EGFR: 107 ML/MIN/1.73M2 — SIGNIFICANT CHANGE UP
GLUCOSE BLDC GLUCOMTR-MCNC: 134 MG/DL — HIGH (ref 70–99)
GLUCOSE BLDC GLUCOMTR-MCNC: 134 MG/DL — HIGH (ref 70–99)
GLUCOSE SERPL-MCNC: 132 MG/DL — HIGH (ref 70–99)
GLUCOSE SERPL-MCNC: 132 MG/DL — HIGH (ref 70–99)
GLUCOSE UR QL: NEGATIVE MG/DL — SIGNIFICANT CHANGE UP
GLUCOSE UR QL: NEGATIVE MG/DL — SIGNIFICANT CHANGE UP
GRAM STN FLD: ABNORMAL
GRAM STN FLD: ABNORMAL
HCT VFR BLD CALC: 25.7 % — LOW (ref 39–50)
HCT VFR BLD CALC: 25.7 % — LOW (ref 39–50)
HCT VFR BLD CALC: 27.3 % — LOW (ref 39–50)
HCT VFR BLD CALC: 27.3 % — LOW (ref 39–50)
HGB BLD-MCNC: 8.2 G/DL — LOW (ref 13–17)
HGB BLD-MCNC: 8.2 G/DL — LOW (ref 13–17)
HGB BLD-MCNC: 8.8 G/DL — LOW (ref 13–17)
HGB BLD-MCNC: 8.8 G/DL — LOW (ref 13–17)
KETONES UR-MCNC: NEGATIVE MG/DL — SIGNIFICANT CHANGE UP
KETONES UR-MCNC: NEGATIVE MG/DL — SIGNIFICANT CHANGE UP
LEUKOCYTE ESTERASE UR-ACNC: NEGATIVE — SIGNIFICANT CHANGE UP
LEUKOCYTE ESTERASE UR-ACNC: NEGATIVE — SIGNIFICANT CHANGE UP
MAGNESIUM SERPL-MCNC: 2 MG/DL — SIGNIFICANT CHANGE UP (ref 1.6–2.6)
MAGNESIUM SERPL-MCNC: 2 MG/DL — SIGNIFICANT CHANGE UP (ref 1.6–2.6)
MCHC RBC-ENTMCNC: 27.4 PG — SIGNIFICANT CHANGE UP (ref 27–34)
MCHC RBC-ENTMCNC: 27.4 PG — SIGNIFICANT CHANGE UP (ref 27–34)
MCHC RBC-ENTMCNC: 28 PG — SIGNIFICANT CHANGE UP (ref 27–34)
MCHC RBC-ENTMCNC: 28 PG — SIGNIFICANT CHANGE UP (ref 27–34)
MCHC RBC-ENTMCNC: 31.9 GM/DL — LOW (ref 32–36)
MCHC RBC-ENTMCNC: 31.9 GM/DL — LOW (ref 32–36)
MCHC RBC-ENTMCNC: 32.2 GM/DL — SIGNIFICANT CHANGE UP (ref 32–36)
MCHC RBC-ENTMCNC: 32.2 GM/DL — SIGNIFICANT CHANGE UP (ref 32–36)
MCV RBC AUTO: 86 FL — SIGNIFICANT CHANGE UP (ref 80–100)
MCV RBC AUTO: 86 FL — SIGNIFICANT CHANGE UP (ref 80–100)
MCV RBC AUTO: 86.9 FL — SIGNIFICANT CHANGE UP (ref 80–100)
MCV RBC AUTO: 86.9 FL — SIGNIFICANT CHANGE UP (ref 80–100)
NITRITE UR-MCNC: NEGATIVE — SIGNIFICANT CHANGE UP
NITRITE UR-MCNC: NEGATIVE — SIGNIFICANT CHANGE UP
NRBC # BLD: 0 /100 WBCS — SIGNIFICANT CHANGE UP (ref 0–0)
PH UR: 6 — SIGNIFICANT CHANGE UP (ref 5–8)
PH UR: 6 — SIGNIFICANT CHANGE UP (ref 5–8)
PHOSPHATE SERPL-MCNC: 2.4 MG/DL — LOW (ref 2.5–4.5)
PHOSPHATE SERPL-MCNC: 2.4 MG/DL — LOW (ref 2.5–4.5)
PLATELET # BLD AUTO: 170 K/UL — SIGNIFICANT CHANGE UP (ref 150–400)
PLATELET # BLD AUTO: 170 K/UL — SIGNIFICANT CHANGE UP (ref 150–400)
PLATELET # BLD AUTO: 172 K/UL — SIGNIFICANT CHANGE UP (ref 150–400)
PLATELET # BLD AUTO: 172 K/UL — SIGNIFICANT CHANGE UP (ref 150–400)
POTASSIUM SERPL-MCNC: 3.9 MMOL/L — SIGNIFICANT CHANGE UP (ref 3.5–5.3)
POTASSIUM SERPL-MCNC: 3.9 MMOL/L — SIGNIFICANT CHANGE UP (ref 3.5–5.3)
POTASSIUM SERPL-SCNC: 3.9 MMOL/L — SIGNIFICANT CHANGE UP (ref 3.5–5.3)
POTASSIUM SERPL-SCNC: 3.9 MMOL/L — SIGNIFICANT CHANGE UP (ref 3.5–5.3)
PROT UR-MCNC: SIGNIFICANT CHANGE UP MG/DL
PROT UR-MCNC: SIGNIFICANT CHANGE UP MG/DL
RBC # BLD: 2.99 M/UL — LOW (ref 4.2–5.8)
RBC # BLD: 2.99 M/UL — LOW (ref 4.2–5.8)
RBC # BLD: 3.14 M/UL — LOW (ref 4.2–5.8)
RBC # BLD: 3.14 M/UL — LOW (ref 4.2–5.8)
RBC # FLD: 14 % — SIGNIFICANT CHANGE UP (ref 10.3–14.5)
RBC # FLD: 14 % — SIGNIFICANT CHANGE UP (ref 10.3–14.5)
RBC # FLD: 14.4 % — SIGNIFICANT CHANGE UP (ref 10.3–14.5)
RBC # FLD: 14.4 % — SIGNIFICANT CHANGE UP (ref 10.3–14.5)
SODIUM SERPL-SCNC: 142 MMOL/L — SIGNIFICANT CHANGE UP (ref 135–145)
SODIUM SERPL-SCNC: 142 MMOL/L — SIGNIFICANT CHANGE UP (ref 135–145)
SP GR SPEC: 1.02 — SIGNIFICANT CHANGE UP (ref 1–1.03)
SP GR SPEC: 1.02 — SIGNIFICANT CHANGE UP (ref 1–1.03)
SPECIMEN SOURCE: SIGNIFICANT CHANGE UP
SPECIMEN SOURCE: SIGNIFICANT CHANGE UP
UROBILINOGEN FLD QL: 1 MG/DL — SIGNIFICANT CHANGE UP (ref 0.2–1)
UROBILINOGEN FLD QL: 1 MG/DL — SIGNIFICANT CHANGE UP (ref 0.2–1)
WBC # BLD: 5.49 K/UL — SIGNIFICANT CHANGE UP (ref 3.8–10.5)
WBC # BLD: 5.49 K/UL — SIGNIFICANT CHANGE UP (ref 3.8–10.5)
WBC # BLD: 7.11 K/UL — SIGNIFICANT CHANGE UP (ref 3.8–10.5)
WBC # BLD: 7.11 K/UL — SIGNIFICANT CHANGE UP (ref 3.8–10.5)
WBC # FLD AUTO: 5.49 K/UL — SIGNIFICANT CHANGE UP (ref 3.8–10.5)
WBC # FLD AUTO: 5.49 K/UL — SIGNIFICANT CHANGE UP (ref 3.8–10.5)
WBC # FLD AUTO: 7.11 K/UL — SIGNIFICANT CHANGE UP (ref 3.8–10.5)
WBC # FLD AUTO: 7.11 K/UL — SIGNIFICANT CHANGE UP (ref 3.8–10.5)

## 2023-12-12 PROCEDURE — 71045 X-RAY EXAM CHEST 1 VIEW: CPT | Mod: 26,77

## 2023-12-12 PROCEDURE — 99232 SBSQ HOSP IP/OBS MODERATE 35: CPT

## 2023-12-12 PROCEDURE — 71045 X-RAY EXAM CHEST 1 VIEW: CPT | Mod: 26

## 2023-12-12 PROCEDURE — 99222 1ST HOSP IP/OBS MODERATE 55: CPT

## 2023-12-12 RX ORDER — PANTOPRAZOLE SODIUM 20 MG/1
40 TABLET, DELAYED RELEASE ORAL DAILY
Refills: 0 | Status: DISCONTINUED | OUTPATIENT
Start: 2023-12-12 | End: 2023-12-12

## 2023-12-12 RX ORDER — PANTOPRAZOLE SODIUM 20 MG/1
40 TABLET, DELAYED RELEASE ORAL DAILY
Refills: 0 | Status: DISCONTINUED | OUTPATIENT
Start: 2023-12-12 | End: 2023-12-21

## 2023-12-12 RX ORDER — SODIUM CHLORIDE 9 MG/ML
4 INJECTION INTRAMUSCULAR; INTRAVENOUS; SUBCUTANEOUS EVERY 6 HOURS
Refills: 0 | Status: DISCONTINUED | OUTPATIENT
Start: 2023-12-12 | End: 2023-12-21

## 2023-12-12 RX ORDER — ASPIRIN/CALCIUM CARB/MAGNESIUM 324 MG
300 TABLET ORAL ONCE
Refills: 0 | Status: COMPLETED | OUTPATIENT
Start: 2023-12-12 | End: 2023-12-12

## 2023-12-12 RX ORDER — PANTOPRAZOLE SODIUM 20 MG/1
40 TABLET, DELAYED RELEASE ORAL ONCE
Refills: 0 | Status: COMPLETED | OUTPATIENT
Start: 2023-12-12 | End: 2023-12-12

## 2023-12-12 RX ORDER — SODIUM CHLORIDE 9 MG/ML
1000 INJECTION, SOLUTION INTRAVENOUS
Refills: 0 | Status: DISCONTINUED | OUTPATIENT
Start: 2023-12-12 | End: 2023-12-13

## 2023-12-12 RX ORDER — ACETYLCYSTEINE 200 MG/ML
4 VIAL (ML) MISCELLANEOUS EVERY 6 HOURS
Refills: 0 | Status: DISCONTINUED | OUTPATIENT
Start: 2023-12-12 | End: 2023-12-21

## 2023-12-12 RX ORDER — ACETAMINOPHEN 500 MG
1000 TABLET ORAL ONCE
Refills: 0 | Status: COMPLETED | OUTPATIENT
Start: 2023-12-12 | End: 2023-12-12

## 2023-12-12 RX ADMIN — HYDROMORPHONE HYDROCHLORIDE 0.5 MILLIGRAM(S): 2 INJECTION INTRAMUSCULAR; INTRAVENOUS; SUBCUTANEOUS at 02:40

## 2023-12-12 RX ADMIN — SODIUM CHLORIDE 100 MILLILITER(S): 9 INJECTION, SOLUTION INTRAVENOUS at 10:35

## 2023-12-12 RX ADMIN — Medication 4 MILLILITER(S): at 17:49

## 2023-12-12 RX ADMIN — SODIUM CHLORIDE 3 MILLILITER(S): 9 INJECTION INTRAMUSCULAR; INTRAVENOUS; SUBCUTANEOUS at 23:36

## 2023-12-12 RX ADMIN — POTASSIUM PHOSPHATE, MONOBASIC POTASSIUM PHOSPHATE, DIBASIC 62.5 MILLIMOLE(S): 236; 224 INJECTION, SOLUTION INTRAVENOUS at 15:01

## 2023-12-12 RX ADMIN — CHLORHEXIDINE GLUCONATE 15 MILLILITER(S): 213 SOLUTION TOPICAL at 06:38

## 2023-12-12 RX ADMIN — Medication 1000 MILLIGRAM(S): at 18:41

## 2023-12-12 RX ADMIN — HYDROMORPHONE HYDROCHLORIDE 0.5 MILLIGRAM(S): 2 INJECTION INTRAMUSCULAR; INTRAVENOUS; SUBCUTANEOUS at 03:13

## 2023-12-12 RX ADMIN — PANTOPRAZOLE SODIUM 40 MILLIGRAM(S): 20 TABLET, DELAYED RELEASE ORAL at 11:55

## 2023-12-12 RX ADMIN — SODIUM CHLORIDE 3 MILLILITER(S): 9 INJECTION INTRAMUSCULAR; INTRAVENOUS; SUBCUTANEOUS at 10:32

## 2023-12-12 RX ADMIN — PANTOPRAZOLE SODIUM 40 MILLIGRAM(S): 20 TABLET, DELAYED RELEASE ORAL at 01:37

## 2023-12-12 RX ADMIN — SODIUM CHLORIDE 3 MILLILITER(S): 9 INJECTION INTRAMUSCULAR; INTRAVENOUS; SUBCUTANEOUS at 17:50

## 2023-12-12 RX ADMIN — Medication 400 MILLIGRAM(S): at 17:49

## 2023-12-12 RX ADMIN — SODIUM CHLORIDE 3 MILLILITER(S): 9 INJECTION INTRAMUSCULAR; INTRAVENOUS; SUBCUTANEOUS at 04:14

## 2023-12-12 RX ADMIN — Medication 4 MILLILITER(S): at 23:34

## 2023-12-12 RX ADMIN — CHLORHEXIDINE GLUCONATE 15 MILLILITER(S): 213 SOLUTION TOPICAL at 17:49

## 2023-12-12 RX ADMIN — SODIUM CHLORIDE 4 MILLILITER(S): 9 INJECTION INTRAMUSCULAR; INTRAVENOUS; SUBCUTANEOUS at 19:58

## 2023-12-12 RX ADMIN — Medication 300 MILLIGRAM(S): at 15:01

## 2023-12-12 RX ADMIN — CHLORHEXIDINE GLUCONATE 1 APPLICATION(S): 213 SOLUTION TOPICAL at 12:00

## 2023-12-12 RX ADMIN — ENOXAPARIN SODIUM 40 MILLIGRAM(S): 100 INJECTION SUBCUTANEOUS at 11:57

## 2023-12-12 NOTE — OCCUPATIONAL THERAPY INITIAL EVALUATION ADULT - LEVEL OF INDEPENDENCE: DRESS LOWER BODY, OT EVAL
donning LLE CAM Boot and R sock- states that his wife will do it for him at home/moderate assist (50% patients effort)

## 2023-12-12 NOTE — OCCUPATIONAL THERAPY INITIAL EVALUATION ADULT - MODIFIED CLINICAL TEST OF SENSORY INTEGRATION IN BALANCE TEST
Patient functionally ambulated in the hallway with RW and CGA. Patient noted with kyphotic posture, decreased weight shifting onto LLE and advancement of RLE, requiring min verbal/tactile cues throughout for proper positioning and safety.

## 2023-12-12 NOTE — CONSULT NOTE ADULT - ASSESSMENT
67 y/o M w PMH of CAD (x2 stents Mar 2023), HLD, T2DM, nephrolithisais (s/p ureteral stent placement and removal), psoriasis and no PSH of intraabdominal surgery, presented to [facility] (12/3/23) for evaluation of oral mass. Pt now POD4 s/p composite mandibular resection, neck dissection, fibula free flap reconstruction and tracheostomy. Primary team has had difficulty starting feeds w/ NGT due to difficult placement and pt will require long term nutritional supplementation as current plan for adjuvant radiation. Surgery consulted for placement of gastric feeding tube; endoscopic placement not possible due to jaw banding, pt will require laparoscopic approach for placement of gastric tube. Pt to be added on to OR today for placement. Cardiology currently following for recent stent placement 9mos prior, appreciate recommendations and preoperative risk stratification.     Add-on OR today for Laparoscopic G-tube placement w/ Dr. Raymond  Cardiology following; recs appreciated and preoperative risk stratification   Type and Screen x2, Coags  Hold plavix   maintain NPO status     Plans discussed w/ chief resident, attending, primary team and family at bedside.     Thank you for the consult, appreciate excellent care by primary team.     69 y/o M w PMH of CAD (x2 stents Mar 2023), HLD, T2DM, nephrolithisais (s/p ureteral stent placement and removal), psoriasis and no PSH of intraabdominal surgery, presented to [facility] (12/3/23) for evaluation of oral mass. Pt now POD4 s/p composite mandibular resection, neck dissection, fibula free flap reconstruction and tracheostomy. Primary team has had difficulty starting feeds w/ NGT due to difficult placement and pt will require long term nutritional supplementation as current plan for adjuvant radiation. Surgery consulted for placement of gastric feeding tube; endoscopic placement not possible due to jaw banding, pt will require laparoscopic approach for placement of gastric tube. Pt to be added on to OR today for placement. Cardiology currently following for recent stent placement 9mos prior, appreciate recommendations and preoperative risk stratification.     Add-on OR today for Laparoscopic G-tube placement w/ Dr. Raymond  Cardiology following; recs appreciated and preoperative risk stratification   Type and Screen x2, Coags  Hold plavix   maintain NPO status     Plans discussed w/ chief resident, attending, primary team and family at bedside.     Thank you for the consult, appreciate excellent care by primary team.

## 2023-12-12 NOTE — PROGRESS NOTE ADULT - ATTENDING COMMENTS
P  68M w/ [id number] SCC of L buccal mucosa presents for pre optimization for surgery 12/7, now s/p hemimandibulectomy, left level 1, 2a, 3 neck neck dissection, L FFF, tracheostomy w/ 7.5 cuffed portex, dental implants, STSG 12/7- plan for G tube placement today, med consult following for comangement.    pt seen and examined with Dr. Dean.  awake, alert, trach with trach cuff ( humidified oxygen )-wet cough  RRR  bs present soft,  Graft donar site on left thigh -clean  wound vac on left fibular area   no edema noted on lower ext.     would keep gus-nebs q 6 ( to help loosen up secretion  continue with trach suction   pt is medically stable for G tube placement. CAD, Anemia, DM management as outlined by Dr. Dean.   dw patient , family , will follow. P  68M w/ [id number] SCC of L buccal mucosa presents for pre optimization for surgery 12/7, now s/p hemimandibulectomy, left level 1, 2a, 3 neck neck dissection, L FFF, tracheostomy w/ 7.5 cuffed portex, dental implants, STSG 12/7- plan for G tube placement today, med consult following for comangement.    pt seen and examined with Dr. Dean.  awake, alert, trach with trach cuff ( humidified oxygen )-wet cough  RRR  bs present soft,  Graft donar site on left thigh -clean  wound vac on left fibular area   no edema noted on lower ext.     would keep gus-nebs q 6 ( to help loosen up secretion  continue with trach suction   pt is medically stable for G tube placement. CAD, Anemia, DM management as outlined by Dr. Dean.   dw patient , family , will follow.

## 2023-12-12 NOTE — OCCUPATIONAL THERAPY INITIAL EVALUATION ADULT - ADDITIONAL COMMENTS
Patient reports living with his wife in a private home with 3 RHEA. Patient was independent with all ADL's, IADL's and functional mobility with no AD prior to admission. Patient is R hand dominant and does not wear glasses. Patient has a walk in shower. Patient wife to be home with him 24/7 upon d/c.

## 2023-12-12 NOTE — PROGRESS NOTE ADULT - ASSESSMENT
PANAYIOTIS TSSAMGOTIS is a  68M w/ [id number] SCC of L buccal mucosa presents for pre optimization for surgery 12/7, now s/p hemimandibulectomy, left level 1, 2a, 3 neck neck dissection, L FFF, tracheostomy w/ 7.5 cuffed portex, dental implants, STSG 12/7.    PLAN:  - Right Kristen neck and left leg KRISTEN drain removed   Follow up morning CXR for NGT placement, if in place, restart feeds   OOBTC  RN Flap checks Q4 f until POD5-6  Steroids completed   Continue Ondansetron PRN nausea/vomiting , PO Senna once daily, Miralax 17g once daily,  Chlorhexidine swish and spit, Esomeprazole, Enoxaparin, Gabapentin, Acetaminophen   Continue SCD’s    Possible trach change today  Continue to work with SLP for trial of clears  PMV as tolerated         PANAYIOTIS TSSAMGOTIS is a  68M w/ [id number] SCC of L buccal mucosa presents for pre optimization for surgery 12/7, now s/p hemimandibulectomy, left level 1, 2a, 3 neck neck dissection, L FFF, tracheostomy w/ 7.5 cuffed portex, dental implants, STSG 12/7.    PLAN:  - Right Kristen neck and left leg KRISTEN drain removed   Follow up morning CXR for NGT placement, if in place, restart feeds   OOBTC  RN Flap checks Q4 f until POD5-6  Steroids completed   Continue Ondansetron PRN nausea/vomiting , PO Senna once daily, Miralax 17g once daily,  Chlorhexidine swish and spit, Esomeprazole, Enoxaparin, Gabapentin, Acetaminophen   Continue SCD’s    Possible trach change today  Continue to work with SLP for trial of clears  PMV as tolerated

## 2023-12-12 NOTE — OCCUPATIONAL THERAPY INITIAL EVALUATION ADULT - PERTINENT HX OF CURRENT PROBLEM, REHAB EVAL
67yo M w PMH of CAD (x2 stents Mar 2023), HLD, T2DM, hx of kidney stones, psoriasis presents to [facility] for evaluation of oral mass. Reports Three month hx of jaw pain and loosening of mandibular molar tooth on the lower left. Pt has a 30 pack yr smoking hx, quit 1 yr ago. 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 67yo M w PMH of CAD (x2 stents Mar 2023), HLD, T2DM, hx of kidney stones, psoriasis presents to [facility] for evaluation of oral mass. Reports Three month hx of jaw pain and loosening of mandibular molar tooth on the lower left. Pt has a 30 pack yr smoking hx, quit 1 yr ago. 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

## 2023-12-12 NOTE — PROVIDER CONTACT NOTE (OTHER) - BACKGROUND
pt s/p L hemimandibulectomy Left neck dissection; L fibular free flap; Dental implants; trach placement

## 2023-12-12 NOTE — OCCUPATIONAL THERAPY INITIAL EVALUATION ADULT - GENERAL OBSERVATIONS, REHAB EVAL
PT Dimas present. Patient wife present. Patient first cousin present. Patient received semisupine in bed +tele, +heplock IV, +TC 10 L 40%, +LLE wound vac, +R neck incision C/D/I, LLE incision C/D/I, NAD.

## 2023-12-12 NOTE — PRE-ANESTHESIA EVALUATION ADULT - NSANTHPMHFT_GEN_ALL_CORE
70yo M with hx of smoking, CAD(stent x2 March 2023, TTE 12/2023 normal LV size and function), left buccal SCCA of L mandible, POD#5 s/p left hemimandibulectomy, left neck dissection, left fibular autograft, tracheostomy placement. He returns to OR for open G-tube placement. 68yo M with hx of smoking, CAD(stent x2 March 2023, TTE 12/2023 normal LV size and function), left buccal SCCA of L mandible, POD#5 s/p left hemimandibulectomy, left neck dissection, left fibular autograft, tracheostomy placement. He returns to OR for open G-tube placement.

## 2023-12-12 NOTE — PROGRESS NOTE ADULT - ATTENDING COMMENTS
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 Cardiology originally consulted for perioperative risk stratification.    Review of Studies  - ECG 12/10-12/11/23: Sinus Bradycardia, Frequent PVCS  - ECG 12/3/23: Sinus bradycardia   - TTE 12/4/23: LVIDD 4.09cm LVEF 65%. Mild LVH. Normal RV function, mildly dilated. No valvular disease. No pericardial effusion.    #CAD s/p PCI (LAD, Ramus)  - Patient with known ASCVD s/p 2 Vessel PCI in March 2023 ( LAD and Ramus) in setting of NSTEMI  - Echo this hospitalization reviewed showing mild LVH with Normal biventricular function without RWMA or valvular heart disease.  - Patient tolerated procedure well without complication. No chest discomfort, or other anginal symptoms. He has had 2 transfusions due to downtrending Hemoglobin. Hg has since remained stable  - Tele reviewed. Bradycardia and ectopy have greatly improved.  - Given that coronary intervention was ~9 months ago, can continue monotherapy with Aspirin 81mg and resume PLavix soon as safe from surgical/Bleeding standpoint, preferably with loading dose of 300 mg if bleeding risk is low, if not can resume at 75 mg po daily.  - In March 2024, ASA 81 mg po daily can be discontinued and patient maintained on Plavix Monotherapy.   - Continue  Lipitor 40mg QD       Cardiology will continue to follow with you, please call with any questions . 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 Cardiology originally consulted for perioperative risk stratification.    Review of Studies  - ECG 12/10-12/11/23: Sinus Bradycardia, Frequent PVCS  - ECG 12/3/23: Sinus bradycardia   - TTE 12/4/23: LVIDD 4.09cm LVEF 65%. Mild LVH. Normal RV function, mildly dilated. No valvular disease. No pericardial effusion.    #CAD s/p PCI (LAD, Ramus)  - Patient with known ASCVD s/p 2 Vessel PCI in March 2023 ( LAD and Ramus) in setting of NSTEMI  - Echo this hospitalization reviewed showing mild LVH with Normal biventricular function without RWMA or valvular heart disease.  - Patient tolerated procedure well without complication. No chest discomfort, or other anginal symptoms. He has had 2 transfusions due to downtrending Hemoglobin. Hg has since remained stable  - Tele reviewed. Bradycardia and ectopy have greatly improved.  - Given that coronary intervention was ~9 months ago, can continue monotherapy with Aspirin 81mg and resume PLavix soon as safe from surgical/Bleeding standpoint, preferably with loading dose of 300 mg if bleeding risk is low, if not can resume at 75 mg po daily.  - In March 2024, ASA 81 mg po daily can be discontinued and patient maintained on Plavix Monotherapy.   - Continue  Lipitor 40mg QD       Cardiology will continue to follow with you, please call with any questions .

## 2023-12-12 NOTE — CONSULT NOTE ADULT - ATTENDING COMMENTS
Patient is a 68 year old gentleman with history of CAD s/p PCI with stents in May 2023, HLD, T2DM, nephrolithiasis, now admitted after mandibular resection, neck dissection, free flap, tracheostomy, being evaluated for gastrostomy tube placement for nutritional supplementation due to dysphagia and malnutrition related to recent surgery. Difficulty with enteric feeding tube placement and enteral feeds, will require long term supplementation. Plavix being held. At time of evaluation, afebrile, hemodynamically stable, abdomen soft, nontender, nondistended, no rebound or guarding, incarcerated fat containing umbilical hernia present. Plan for OR for laparoscopic gastrostomy tube placement. Of note patient also with symptomatic fat containing incarcerated umbilical hernia, wishes to have fixed concurrently. Late entry, date of service 12/12/23.

## 2023-12-12 NOTE — OCCUPATIONAL THERAPY INITIAL EVALUATION ADULT - DIAGNOSIS, OT EVAL
Patient POD #1 s/p composite mandibular resection, neck dissection and fibula free flap reconstruction, presents PWB of LLE in CAM boot, decreased overall strength, balance, postural control and activity tolerance impacting independence with functional activities and mobility.

## 2023-12-12 NOTE — PROGRESS NOTE ADULT - SUBJECTIVE AND OBJECTIVE BOX
OVERNIGHT EVENTS: GERI    SUBJECTIVE:  Patient seen and examined at bedside. States he feels well, pain is controlled. Denies shortness of breath, fevers, chills. Admits to increased secretion amount, but is able to suction himself. Otherwise working well with PT. Pending G tube placement today.    Vital Signs Last 12 Hrs  T(F): 99.5 (12-12-23 @ 14:11), Max: 99.6 (12-12-23 @ 05:14)  HR: 82 (12-12-23 @ 12:02) (62 - 94)  BP: 116/56 (12-12-23 @ 11:45) (116/56 - 136/71)  BP(mean): 73 (12-12-23 @ 11:45) (73 - 98)  RR: 18 (12-12-23 @ 12:02) (16 - 19)  SpO2: 98% (12-12-23 @ 12:02) (96% - 99%)  I&O's Summary    11 Dec 2023 07:01  -  12 Dec 2023 07:00  --------------------------------------------------------  IN: 1406 mL / OUT: 541 mL / NET: 865 mL    12 Dec 2023 07:01  -  12 Dec 2023 15:20  --------------------------------------------------------  IN: 400 mL / OUT: 215 mL / NET: 185 mL        PHYSICAL EXAM:  Constitutional: NAD, comfortable in bed, no acute respiratory or painful distress  HEENT: GREGOR, RYANM, mandibular flap c/d/i  Neck: Supple  Respiratory: CTA B/L. No w/r/r.   Cardiovascular: RRR, normal S1 and S2, no m/r/g.   Gastrointestinal: +BS, soft NTND  Extremities: wwp; no edema. +L fibular site non-tender with no drainage, +wound vac in place      LABS:                        8.8    5.49  )-----------( 170      ( 12 Dec 2023 05:30 )             27.3     12-12    142  |  109<H>  |  19  ----------------------------<  132<H>  3.9   |  25  |  0.57    Ca    7.8<L>      12 Dec 2023 05:30  Phos  2.4     12-12  Mg     2.0     12-12        Urinalysis Basic - ( 12 Dec 2023 05:30 )    Color: x / Appearance: x / SG: x / pH: x  Gluc: 132 mg/dL / Ketone: x  / Bili: x / Urobili: x   Blood: x / Protein: x / Nitrite: x   Leuk Esterase: x / RBC: x / WBC x   Sq Epi: x / Non Sq Epi: x / Bacteria: x          RADIOLOGY & ADDITIONAL TESTS:    MEDICATIONS  (STANDING):  acetylcysteine 20%  Inhalation 4 milliLiter(s) Inhalation every 6 hours  aspirin  chewable 81 milliGRAM(s) Oral daily  atorvastatin 40 milliGRAM(s) Oral at bedtime  chlorhexidine 0.12% Liquid 15 milliLiter(s) Oral Mucosa two times a day  chlorhexidine 2% Cloths 1 Application(s) Topical daily  dextrose 5%. 1000 milliLiter(s) (50 mL/Hr) IV Continuous <Continuous>  dextrose 5%. 1000 milliLiter(s) (100 mL/Hr) IV Continuous <Continuous>  dextrose 50% Injectable 25 Gram(s) IV Push once  dextrose 50% Injectable 12.5 Gram(s) IV Push once  dextrose 50% Injectable 25 Gram(s) IV Push once  enoxaparin Injectable 40 milliGRAM(s) SubCutaneous every 24 hours  gabapentin 600 milliGRAM(s) Oral daily  glucagon  Injectable 1 milliGRAM(s) IntraMuscular once  influenza  Vaccine (HIGH DOSE) 0.7 milliLiter(s) IntraMuscular once  lactated ringers. 1000 milliLiter(s) (100 mL/Hr) IV Continuous <Continuous>  melatonin 5 milliGRAM(s) Oral at bedtime  pantoprazole  Injectable 40 milliGRAM(s) IV Push daily  polyethylene glycol 3350 17 Gram(s) Oral daily  senna 2 Tablet(s) Oral at bedtime  sodium chloride 0.9% for Nebulization 3 milliLiter(s) Nebulizer every 6 hours  sodium chloride 3%  Inhalation 4 milliLiter(s) Inhalation every 6 hours  tamsulosin 0.4 milliGRAM(s) Oral every 24 hours    MEDICATIONS  (PRN):  dextrose Oral Gel 15 Gram(s) Oral once PRN Blood Glucose LESS THAN 70 milliGRAM(s)/deciliter  HYDROmorphone  Injectable 0.5 milliGRAM(s) IV Push every 4 hours PRN Breakthrough  ondansetron Injectable 4 milliGRAM(s) IV Push every 6 hours PRN Nausea and/or Vomiting  oxyCODONE    Solution 10 milliGRAM(s) Oral every 6 hours PRN Severe Pain (7 - 10)  oxyCODONE    Solution 5 milliGRAM(s) Oral every 4 hours PRN Moderate Pain (4 - 6)

## 2023-12-12 NOTE — PROGRESS NOTE ADULT - SUBJECTIVE AND OBJECTIVE BOX
INTERVAL HPI/OVERNIGHT EVENTS:    SUBJECTIVE: Patient seen and examined at bedside.    OBJECTIVE:    VITAL SIGNS:  ICU Vital Signs Last 24 Hrs  T(C): 36.4 (12 Dec 2023 09:16), Max: 37.6 (12 Dec 2023 05:14)  T(F): 97.5 (12 Dec 2023 09:16), Max: 99.6 (12 Dec 2023 05:14)  HR: 82 (12 Dec 2023 12:02) (62 - 94)  BP: 116/56 (12 Dec 2023 11:45) (116/56 - 139/65)  BP(mean): 73 (12 Dec 2023 11:45) (73 - 98)  ABP: --  ABP(mean): --  RR: 18 (12 Dec 2023 12:02) (16 - 19)  SpO2: 98% (12 Dec 2023 12:02) (96% - 100%)    O2 Parameters below as of 12 Dec 2023 12:02  Patient On (Oxygen Delivery Method): tracheostomy collar    O2 Concentration (%): 40          12-11 @ 07:01  -  12-12 @ 07:00  --------------------------------------------------------  IN: 1406 mL / OUT: 541 mL / NET: 865 mL    12-12 @ 07:01  -  12-12 @ 13:20  --------------------------------------------------------  IN: 400 mL / OUT: 215 mL / NET: 185 mL      CAPILLARY BLOOD GLUCOSE          PHYSICAL EXAM:    General: WDWN ; NAD  HEENT: NC/AT; PERRL, anicteric sclera  Neck: supple, no JVD  Respiratory: CTA B/L; no W/R/R  Cardiovascular: +S1/S2; RRR; no M/R/G  Gastrointestinal: soft, NT/ND; +BS x4  Extremities: WWP; 2+ peripheral pulses B/L; no LE edema  Skin: normal color and turgor; no rash  Neurological:     MEDICATIONS:  MEDICATIONS  (STANDING):  aspirin  chewable 81 milliGRAM(s) Oral daily  aspirin Suppository 300 milliGRAM(s) Rectal once  atorvastatin 40 milliGRAM(s) Oral at bedtime  chlorhexidine 0.12% Liquid 15 milliLiter(s) Oral Mucosa two times a day  chlorhexidine 2% Cloths 1 Application(s) Topical daily  dextrose 5%. 1000 milliLiter(s) (50 mL/Hr) IV Continuous <Continuous>  dextrose 5%. 1000 milliLiter(s) (100 mL/Hr) IV Continuous <Continuous>  dextrose 50% Injectable 25 Gram(s) IV Push once  dextrose 50% Injectable 25 Gram(s) IV Push once  dextrose 50% Injectable 12.5 Gram(s) IV Push once  enoxaparin Injectable 40 milliGRAM(s) SubCutaneous every 24 hours  gabapentin 600 milliGRAM(s) Oral daily  glucagon  Injectable 1 milliGRAM(s) IntraMuscular once  influenza  Vaccine (HIGH DOSE) 0.7 milliLiter(s) IntraMuscular once  lactated ringers. 1000 milliLiter(s) (100 mL/Hr) IV Continuous <Continuous>  melatonin 5 milliGRAM(s) Oral at bedtime  pantoprazole  Injectable 40 milliGRAM(s) IV Push daily  polyethylene glycol 3350 17 Gram(s) Oral daily  potassium phosphate IVPB 15 milliMole(s) IV Intermittent once  senna 2 Tablet(s) Oral at bedtime  sodium chloride 0.9% for Nebulization 3 milliLiter(s) Nebulizer every 6 hours  tamsulosin 0.4 milliGRAM(s) Oral every 24 hours    MEDICATIONS  (PRN):  dextrose Oral Gel 15 Gram(s) Oral once PRN Blood Glucose LESS THAN 70 milliGRAM(s)/deciliter  HYDROmorphone  Injectable 0.5 milliGRAM(s) IV Push every 4 hours PRN Breakthrough  ondansetron Injectable 4 milliGRAM(s) IV Push every 6 hours PRN Nausea and/or Vomiting  oxyCODONE    Solution 10 milliGRAM(s) Oral every 6 hours PRN Severe Pain (7 - 10)  oxyCODONE    Solution 5 milliGRAM(s) Oral every 4 hours PRN Moderate Pain (4 - 6)      ALLERGIES:  Allergies    No Known Allergies    Intolerances        LABS:                        8.8    5.49  )-----------( 170      ( 12 Dec 2023 05:30 )             27.3     12-12    142  |  109<H>  |  19  ----------------------------<  132<H>  3.9   |  25  |  0.57    Ca    7.8<L>      12 Dec 2023 05:30  Phos  2.4     12-12  Mg     2.0     12-12          Urinalysis Basic - ( 12 Dec 2023 05:30 )    Color: x / Appearance: x / SG: x / pH: x  Gluc: 132 mg/dL / Ketone: x  / Bili: x / Urobili: x   Blood: x / Protein: x / Nitrite: x   Leuk Esterase: x / RBC: x / WBC x   Sq Epi: x / Non Sq Epi: x / Bacteria: x            RADIOLOGY & ADDITIONAL TESTS: Reviewed.   INTERVAL HPI/OVERNIGHT EVENTS: Advanced to ice chips, plan for KRISTEN drain removal.    SUBJECTIVE: Patient seen and examined at bedside. No new cardiopulmonary complaints.    OBJECTIVE:    VITAL SIGNS:  ICU Vital Signs Last 24 Hrs  T(C): 36.4 (12 Dec 2023 09:16), Max: 37.6 (12 Dec 2023 05:14)  T(F): 97.5 (12 Dec 2023 09:16), Max: 99.6 (12 Dec 2023 05:14)  HR: 82 (12 Dec 2023 12:02) (62 - 94)  BP: 116/56 (12 Dec 2023 11:45) (116/56 - 139/65)  BP(mean): 73 (12 Dec 2023 11:45) (73 - 98)  RR: 18 (12 Dec 2023 12:02) (16 - 19)  SpO2: 98% (12 Dec 2023 12:02) (96% - 100%)    O2 Parameters below as of 12 Dec 2023 12:02  Patient On (Oxygen Delivery Method): tracheostomy collar    O2 Concentration (%): 40          12-11 @ 07:01  -  12-12 @ 07:00  --------------------------------------------------------  IN: 1406 mL / OUT: 541 mL / NET: 865 mL    12-12 @ 07:01  -  12-12 @ 13:20  --------------------------------------------------------  IN: 400 mL / OUT: 215 mL / NET: 185 mL      CAPILLARY BLOOD GLUCOSE          PHYSICAL EXAM:    Constitutional: NAD, comfortable in bed, no acute respiratory or painful distress  HEENT: SAMAN BUNDY, KRISTEN drain   Neck: Tracheostomy with passy valve  Respiratory: CTA B/L. No w/r/r.   Cardiovascular: RRR, normal S1 and S2, no m/r/g.   Gastrointestinal: +BS, soft NTND  Extremities: wwp; no edema. +L fibular site non-tender with no drainage    MEDICATIONS:  MEDICATIONS  (STANDING):  aspirin  chewable 81 milliGRAM(s) Oral daily  aspirin Suppository 300 milliGRAM(s) Rectal once  atorvastatin 40 milliGRAM(s) Oral at bedtime  chlorhexidine 0.12% Liquid 15 milliLiter(s) Oral Mucosa two times a day  chlorhexidine 2% Cloths 1 Application(s) Topical daily  dextrose 5%. 1000 milliLiter(s) (50 mL/Hr) IV Continuous <Continuous>  dextrose 5%. 1000 milliLiter(s) (100 mL/Hr) IV Continuous <Continuous>  dextrose 50% Injectable 25 Gram(s) IV Push once  dextrose 50% Injectable 25 Gram(s) IV Push once  dextrose 50% Injectable 12.5 Gram(s) IV Push once  enoxaparin Injectable 40 milliGRAM(s) SubCutaneous every 24 hours  gabapentin 600 milliGRAM(s) Oral daily  glucagon  Injectable 1 milliGRAM(s) IntraMuscular once  influenza  Vaccine (HIGH DOSE) 0.7 milliLiter(s) IntraMuscular once  lactated ringers. 1000 milliLiter(s) (100 mL/Hr) IV Continuous <Continuous>  melatonin 5 milliGRAM(s) Oral at bedtime  pantoprazole  Injectable 40 milliGRAM(s) IV Push daily  polyethylene glycol 3350 17 Gram(s) Oral daily  potassium phosphate IVPB 15 milliMole(s) IV Intermittent once  senna 2 Tablet(s) Oral at bedtime  sodium chloride 0.9% for Nebulization 3 milliLiter(s) Nebulizer every 6 hours  tamsulosin 0.4 milliGRAM(s) Oral every 24 hours    MEDICATIONS  (PRN):  dextrose Oral Gel 15 Gram(s) Oral once PRN Blood Glucose LESS THAN 70 milliGRAM(s)/deciliter  HYDROmorphone  Injectable 0.5 milliGRAM(s) IV Push every 4 hours PRN Breakthrough  ondansetron Injectable 4 milliGRAM(s) IV Push every 6 hours PRN Nausea and/or Vomiting  oxyCODONE    Solution 10 milliGRAM(s) Oral every 6 hours PRN Severe Pain (7 - 10)  oxyCODONE    Solution 5 milliGRAM(s) Oral every 4 hours PRN Moderate Pain (4 - 6)      ALLERGIES:  Allergies    No Known Allergies    Intolerances        LABS:                        8.8    5.49  )-----------( 170      ( 12 Dec 2023 05:30 )             27.3     12-12    142  |  109<H>  |  19  ----------------------------<  132<H>  3.9   |  25  |  0.57    Ca    7.8<L>      12 Dec 2023 05:30  Phos  2.4     12-12  Mg     2.0     12-12          Urinalysis Basic - ( 12 Dec 2023 05:30 )    Color: x / Appearance: x / SG: x / pH: x  Gluc: 132 mg/dL / Ketone: x  / Bili: x / Urobili: x   Blood: x / Protein: x / Nitrite: x   Leuk Esterase: x / RBC: x / WBC x   Sq Epi: x / Non Sq Epi: x / Bacteria: x            RADIOLOGY & ADDITIONAL TESTS: Reviewed.

## 2023-12-12 NOTE — CONSULT NOTE ADULT - TIME BILLING
evaluation and management of dysphagia, malnutrition, umbilical hernia, review of labs and imaging, discussion with consultant, discussion with patient, documentation
As above

## 2023-12-12 NOTE — CONSULT NOTE ADULT - SUBJECTIVE AND OBJECTIVE BOX
---SURGERY CONSULT---    HPI:  69 y/o M w PMH of CAD (x2 stents Mar 2023), HLD, T2DM, nephrolithisais (s/p ureteral stent placement and removal), psoriasis and no PSH of intraabdominal surgery, presented to [facility] (12/3/23) for evaluation of oral mass. Pt now POD4 s/p composite mandibular resection, neck dissection, fibula free flap reconstruction and tracheostomy. Primary team has had difficulty starting feeds w/ NGT due to difficult placement and pt will require long term nutritional supplementation as current plan for adjuvant radiation.     Surgery consulted for placement of gastric feeding tube.       PAST MEDICAL & SURGICAL HISTORY:  Neoplasm of mandible          MEDICATIONS  (STANDING):  aspirin  chewable 81 milliGRAM(s) Oral daily  atorvastatin 40 milliGRAM(s) Oral at bedtime  chlorhexidine 0.12% Liquid 15 milliLiter(s) Oral Mucosa two times a day  chlorhexidine 2% Cloths 1 Application(s) Topical daily  dextrose 5%. 1000 milliLiter(s) (50 mL/Hr) IV Continuous <Continuous>  dextrose 5%. 1000 milliLiter(s) (100 mL/Hr) IV Continuous <Continuous>  dextrose 50% Injectable 25 Gram(s) IV Push once  dextrose 50% Injectable 25 Gram(s) IV Push once  dextrose 50% Injectable 12.5 Gram(s) IV Push once  enoxaparin Injectable 40 milliGRAM(s) SubCutaneous every 24 hours  gabapentin 600 milliGRAM(s) Oral daily  glucagon  Injectable 1 milliGRAM(s) IntraMuscular once  influenza  Vaccine (HIGH DOSE) 0.7 milliLiter(s) IntraMuscular once  lactated ringers. 1000 milliLiter(s) (100 mL/Hr) IV Continuous <Continuous>  melatonin 5 milliGRAM(s) Oral at bedtime  pantoprazole  Injectable 40 milliGRAM(s) IV Push daily  polyethylene glycol 3350 17 Gram(s) Oral daily  potassium phosphate IVPB 15 milliMole(s) IV Intermittent once  senna 2 Tablet(s) Oral at bedtime  sodium chloride 0.9% for Nebulization 3 milliLiter(s) Nebulizer every 6 hours  tamsulosin 0.4 milliGRAM(s) Oral every 24 hours    MEDICATIONS  (PRN):  dextrose Oral Gel 15 Gram(s) Oral once PRN Blood Glucose LESS THAN 70 milliGRAM(s)/deciliter  HYDROmorphone  Injectable 0.5 milliGRAM(s) IV Push every 4 hours PRN Breakthrough  ondansetron Injectable 4 milliGRAM(s) IV Push every 6 hours PRN Nausea and/or Vomiting  oxyCODONE    Solution 10 milliGRAM(s) Oral every 6 hours PRN Severe Pain (7 - 10)  oxyCODONE    Solution 5 milliGRAM(s) Oral every 4 hours PRN Moderate Pain (4 - 6)      Allergies    No Known Allergies    Intolerances        SOCIAL HISTORY:    FAMILY HISTORY:          Physical Exam:  General: NAD, resting comfortably  HEENT: tracheostomy in place  Pulmonary: normal resp effort, CTA-B  Cardiovascular: NSR, no murmurs  Abdominal: soft, ND/NT, no abdominal incision scars   Extremities: WWP, normal strength, no clubbing/cyanosis/edema      Vital Signs Last 24 Hrs  T(C): 36.4 (12 Dec 2023 09:16), Max: 37.6 (12 Dec 2023 05:14)  T(F): 97.5 (12 Dec 2023 09:16), Max: 99.6 (12 Dec 2023 05:14)  HR: 80 (12 Dec 2023 08:30) (61 - 80)  BP: 136/71 (12 Dec 2023 08:30) (122/58 - 159/68)  BP(mean): 98 (12 Dec 2023 08:30) (82 - 98)  RR: 18 (12 Dec 2023 08:30) (14 - 18)  SpO2: 98% (12 Dec 2023 08:30) (96% - 100%)    Parameters below as of 12 Dec 2023 08:30  Patient On (Oxygen Delivery Method): tracheostomy collar  O2 Flow (L/min): 10  O2 Concentration (%): 40    I&O's Summary    11 Dec 2023 07:01  -  12 Dec 2023 07:00  --------------------------------------------------------  IN: 1406 mL / OUT: 541 mL / NET: 865 mL            LABS:                        8.8    5.49  )-----------( 170      ( 12 Dec 2023 05:30 )             27.3     12-12    142  |  109<H>  |  19  ----------------------------<  132<H>  3.9   |  25  |  0.57    Ca    7.8<L>      12 Dec 2023 05:30  Phos  2.4     12-12  Mg     2.0     12-12        Urinalysis Basic - ( 12 Dec 2023 05:30 )    Color: x / Appearance: x / SG: x / pH: x  Gluc: 132 mg/dL / Ketone: x  / Bili: x / Urobili: x   Blood: x / Protein: x / Nitrite: x   Leuk Esterase: x / RBC: x / WBC x   Sq Epi: x / Non Sq Epi: x / Bacteria: x      CAPILLARY BLOOD GLUCOSE            Cultures:      RADIOLOGY & ADDITIONAL STUDIES:      Plan:           ---SURGERY CONSULT---    HPI:  69 y/o M w PMH of CAD (x2 stents Mar 2023), HLD, T2DM, nephrolithisais (s/p ureteral stent placement and removal), psoriasis and no PSH of intraabdominal surgery, presented to [facility] (12/3/23) for evaluation of oral mass. Pt now POD4 s/p composite mandibular resection, neck dissection, fibula free flap reconstruction and tracheostomy. Primary team has had difficulty starting feeds w/ NGT due to difficult placement and pt will require long term nutritional supplementation as current plan for adjuvant radiation.     Surgery consulted for placement of gastric feeding tube.       PAST MEDICAL & SURGICAL HISTORY:  Neoplasm of mandible          MEDICATIONS  (STANDING):  aspirin  chewable 81 milliGRAM(s) Oral daily  atorvastatin 40 milliGRAM(s) Oral at bedtime  chlorhexidine 0.12% Liquid 15 milliLiter(s) Oral Mucosa two times a day  chlorhexidine 2% Cloths 1 Application(s) Topical daily  dextrose 5%. 1000 milliLiter(s) (50 mL/Hr) IV Continuous <Continuous>  dextrose 5%. 1000 milliLiter(s) (100 mL/Hr) IV Continuous <Continuous>  dextrose 50% Injectable 25 Gram(s) IV Push once  dextrose 50% Injectable 25 Gram(s) IV Push once  dextrose 50% Injectable 12.5 Gram(s) IV Push once  enoxaparin Injectable 40 milliGRAM(s) SubCutaneous every 24 hours  gabapentin 600 milliGRAM(s) Oral daily  glucagon  Injectable 1 milliGRAM(s) IntraMuscular once  influenza  Vaccine (HIGH DOSE) 0.7 milliLiter(s) IntraMuscular once  lactated ringers. 1000 milliLiter(s) (100 mL/Hr) IV Continuous <Continuous>  melatonin 5 milliGRAM(s) Oral at bedtime  pantoprazole  Injectable 40 milliGRAM(s) IV Push daily  polyethylene glycol 3350 17 Gram(s) Oral daily  potassium phosphate IVPB 15 milliMole(s) IV Intermittent once  senna 2 Tablet(s) Oral at bedtime  sodium chloride 0.9% for Nebulization 3 milliLiter(s) Nebulizer every 6 hours  tamsulosin 0.4 milliGRAM(s) Oral every 24 hours    MEDICATIONS  (PRN):  dextrose Oral Gel 15 Gram(s) Oral once PRN Blood Glucose LESS THAN 70 milliGRAM(s)/deciliter  HYDROmorphone  Injectable 0.5 milliGRAM(s) IV Push every 4 hours PRN Breakthrough  ondansetron Injectable 4 milliGRAM(s) IV Push every 6 hours PRN Nausea and/or Vomiting  oxyCODONE    Solution 10 milliGRAM(s) Oral every 6 hours PRN Severe Pain (7 - 10)  oxyCODONE    Solution 5 milliGRAM(s) Oral every 4 hours PRN Moderate Pain (4 - 6)      Allergies    No Known Allergies    Intolerances        SOCIAL HISTORY:    FAMILY HISTORY:          Physical Exam:  General: NAD, resting comfortably  HEENT: tracheostomy in place  Pulmonary: normal resp effort, CTA-B  Cardiovascular: NSR, no murmurs  Abdominal: soft, ND/NT, no abdominal incision scars   Extremities: WWP, normal strength, no clubbing/cyanosis/edema      Vital Signs Last 24 Hrs  T(C): 36.4 (12 Dec 2023 09:16), Max: 37.6 (12 Dec 2023 05:14)  T(F): 97.5 (12 Dec 2023 09:16), Max: 99.6 (12 Dec 2023 05:14)  HR: 80 (12 Dec 2023 08:30) (61 - 80)  BP: 136/71 (12 Dec 2023 08:30) (122/58 - 159/68)  BP(mean): 98 (12 Dec 2023 08:30) (82 - 98)  RR: 18 (12 Dec 2023 08:30) (14 - 18)  SpO2: 98% (12 Dec 2023 08:30) (96% - 100%)    Parameters below as of 12 Dec 2023 08:30  Patient On (Oxygen Delivery Method): tracheostomy collar  O2 Flow (L/min): 10  O2 Concentration (%): 40    I&O's Summary    11 Dec 2023 07:01  -  12 Dec 2023 07:00  --------------------------------------------------------  IN: 1406 mL / OUT: 541 mL / NET: 865 mL            LABS:                        8.8    5.49  )-----------( 170      ( 12 Dec 2023 05:30 )             27.3     12-12    142  |  109<H>  |  19  ----------------------------<  132<H>  3.9   |  25  |  0.57    Ca    7.8<L>      12 Dec 2023 05:30  Phos  2.4     12-12  Mg     2.0     12-12        Urinalysis Basic - ( 12 Dec 2023 05:30 )    Color: x / Appearance: x / SG: x / pH: x  Gluc: 132 mg/dL / Ketone: x  / Bili: x / Urobili: x   Blood: x / Protein: x / Nitrite: x   Leuk Esterase: x / RBC: x / WBC x   Sq Epi: x / Non Sq Epi: x / Bacteria: x      CAPILLARY BLOOD GLUCOSE            Cultures:      RADIOLOGY & ADDITIONAL STUDIES:      Plan:

## 2023-12-12 NOTE — PROGRESS NOTE ADULT - SUBJECTIVE AND OBJECTIVE BOX
OTOLARYNGOLOGY (ENT) PROGRESS NOTE    PATIENT: SAUNDRA HOLMAN  MRN: [id number]  : 57  [id number]  DATE OF SERVICE:  23  			         ID:SAUNDRA HOLMAN is a  69yo M w PMH of CAD (x2 stents Mar 2023), HLD, T2DM, hx of kidney stones, psoriasis presents to [facility] for evaluation of oral mass. Reports Three month hx of jaw pain and loosening of mandibular molar tooth on the lower left. Pt has a 30 pack yr smoking hx, quit 1 yr ago. 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 POD 1 composite mandibular resection, neck dissection and fibula free flap reconstruction.       Subjective/ Interval:   ; patient seen this morning, oozing from trach as expected ,  Plan to start aspirin today, cuff is up on tracheostomy tube, intraoral skin panel intact doppler signal strong,  plan to advance NGT   : Patient seen and examined at bedside. AFVSS overnight. Significant HgB drop to 7.2 noted this morning. NGT clogged, attempted replacement but went into lung, need to replace. Cuff deflated. Patient reports lethargy. Otherwise no new complaints. Intraoral skin paddle intact with strong doppler signal. Plan to hold off one eliquis given significant HgB drop  12/10: Patient seen and examined at bedside. AFVSS overnight other than bradycardic to lowest 39, mainly in 40s-50s which seems to be his baseline even pre-op. Not symptomatic while chinedu. Otherwise, patient stable on TC with no issues. Yesterday, iatrogenic right sided PTX occured with NGT placement, patient remained asymptomatic throughout. Patient now s/p placement of pig-tail catheter by pulm with significant improvement in PTX. Chest tube clamped this morning with plan for removal this evening. Patient s/p 2 units of PRBCs with moderate improvement in Hgb. Today, patient reports feeling significantly better than yesterday and overall "feels good." He was started on trickle feeds last night but felt some chest tightness and so they were held. Patient also failed TOV again and was straight cathed. KRISTEN drain output significantly decreased this morning. No other changes at this time.   : patient seen this morning PTX has resolved on CXR, intraoral flap intact, doppler strong, trach in place,  KRISTEN drains with minimal output   : Patient seen and examined this morning at bedside. Overnight, NGT slightly displaced, coming out around 10 cm from original placement, advanced and secured, pending CXR. Patient failed trial of clears with SLP yesterday but tolerated PMV without issue. Patient continues to be OOBTC daily and is tolerating tube feeds at goal. 2 KRISTEN drains removed yesterday, remaining KRISTEN with minimal output. Intraoral flap intact, doppler strong, trach in place. Patient denies any other new complaints at this time.     ALLERGIES:  No Known Allergies      MEDICATIONS:  Antiinfectives:     IV fluids:  dextrose 5%. 1000 milliLiter(s) IV Continuous <Continuous>  dextrose 5%. 1000 milliLiter(s) IV Continuous <Continuous>  magnesium sulfate  IVPB 1 Gram(s) IV Intermittent once  potassium phosphate IVPB 15 milliMole(s) IV Intermittent once    Hematologic/Anticoagulation:  aspirin  chewable 81 milliGRAM(s) Oral daily  enoxaparin Injectable 40 milliGRAM(s) SubCutaneous every 24 hours    Pain medications/Neuro:  gabapentin 600 milliGRAM(s) Oral daily  HYDROmorphone  Injectable 0.5 milliGRAM(s) IV Push every 4 hours PRN  melatonin 5 milliGRAM(s) Oral at bedtime  ondansetron Injectable 4 milliGRAM(s) IV Push every 6 hours PRN  oxyCODONE    Solution 10 milliGRAM(s) Oral every 6 hours PRN  oxyCODONE    Solution 5 milliGRAM(s) Oral every 4 hours PRN    Endocrine Medications:   atorvastatin 40 milliGRAM(s) Oral at bedtime  dextrose 50% Injectable 25 Gram(s) IV Push once  dextrose 50% Injectable 25 Gram(s) IV Push once  dextrose 50% Injectable 12.5 Gram(s) IV Push once  dextrose Oral Gel 15 Gram(s) Oral once PRN  glucagon  Injectable 1 milliGRAM(s) IntraMuscular once    All other standing medications:   chlorhexidine 0.12% Liquid 15 milliLiter(s) Oral Mucosa two times a day  chlorhexidine 2% Cloths 1 Application(s) Topical daily  influenza  Vaccine (HIGH DOSE) 0.7 milliLiter(s) IntraMuscular once  magnesium citrate Oral Solution 296 milliLiter(s) Oral once  polyethylene glycol 3350 17 Gram(s) Oral daily  senna 2 Tablet(s) Oral at bedtime  sodium chloride 0.9% for Nebulization 3 milliLiter(s) Nebulizer every 6 hours  tamsulosin 0.4 milliGRAM(s) Oral every 24 hours    All other PRN medications:    Vital Signs Last 24 Hrs  T(C): 37.6 (12 Dec 2023 05:14), Max: 37.6 (12 Dec 2023 05:14)  T(F): 99.6 (12 Dec 2023 05:14), Max: 99.6 (12 Dec 2023 05:14)  HR: 78 (12 Dec 2023 05:51) (50 - 78)  BP: 127/59 (12 Dec 2023 04:18) (122/58 - 159/68)  BP(mean): 85 (12 Dec 2023 04:18) (82 - 98)  RR: 16 (12 Dec 2023 05:51) (14 - 22)  SpO2: 96% (12 Dec 2023 05:51) (96% - 100%)    Parameters below as of 12 Dec 2023 05:51  Patient On (Oxygen Delivery Method): tracheostomy collar  O2 Flow (L/min): 10  O2 Concentration (%): 40       @ 07:  -   @ 07:00  --------------------------------------------------------  IN:    Enteral Tube Flush: 300 mL    Glucerna 1.5: 1106 mL  Total IN: 1406 mL    OUT:    Bulb (mL): 16 mL    dextrose 5% + lactated ringers: 0 mL    Oral Fluid: 0 mL    VAC (Vacuum Assisted Closure) System (mL): 25 mL    Voided (mL): 500 mL  Total OUT: 541 mL    Total NET: 865 mL          23 @ 07:  -  23 @ 07:00  --------------------------------------------------------  IN:  Total IN: 0 mL    OUT:    Bulb (mL): 16 mL    VAC (Vacuum Assisted Closure) System (mL): 25 mL  Total OUT: 41 mL    Total NET: -41 mL              PHYSICAL EXAM:  Gen: AAOx3, NAD   Head: Surgical incision around chin extending up through lip  Eyes: EOMI, PERRL, visual acuity intact, no diplopia, supra/infra orbital rims intact, no subconjunctival heme, no telecanthus, no exophthalmos   Ears: Gross hearing intact,  Nose: No septal hematoma/asymmetry, no epistaxis bilaterally. no abrasions present, no lacerations.   Malar: No malar depression  Throat: No LAD, supple, neck surgical incision w/ steristrip dressing is hemostatic + left neck KRISTEN in place, left KRISTEN sanguinous output , trach in place w/ 7.5 cuffed portex, cuff deflated  Oral: Left FFF paddle pink, warm, well perfused. IMF screws in place, class 3 elastics,   Exx: Wound vac left leg, left leg KRISTEN with sanguinous output              LABS                       8.8    5.49  )-----------( 170      ( 12 Dec 2023 05:30 )             27.3        142  |  109<H>  |  19  ----------------------------<  132<H>  3.9   |  25  |  0.57    Ca    7.8<L>      12 Dec 2023 05:30  Phos  2.4       Mg     2.0     12-12           Coagulation Studies-     Urinalysis Basic - ( 12 Dec 2023 05:30 )    Color: x / Appearance: x / SG: x / pH: x  Gluc: 132 mg/dL / Ketone: x  / Bili: x / Urobili: x   Blood: x / Protein: x / Nitrite: x   Leuk Esterase: x / RBC: x / WBC x   Sq Epi: x / Non Sq Epi: x / Bacteria: x      Endocrine Panel-  Calcium: 7.8 mg/dL ( @ 05:30)  Calcium: 8.2 mg/dL ( @ 12:52)                MICROBIOLOGY:        RADIOLOGY & ADDITIONAL STUDIES:     OTOLARYNGOLOGY (ENT) PROGRESS NOTE    PATIENT: SAUNDRA HOLMAN  MRN: [id number]  : 57  [id number]  DATE OF SERVICE:  23  			         ID:SAUNDRA HOLMAN is a  69yo M w PMH of CAD (x2 stents Mar 2023), HLD, T2DM, hx of kidney stones, psoriasis presents to [facility] for evaluation of oral mass. Reports Three month hx of jaw pain and loosening of mandibular molar tooth on the lower left. Pt has a 30 pack yr smoking hx, quit 1 yr ago. 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 POD 1 composite mandibular resection, neck dissection and fibula free flap reconstruction.       Subjective/ Interval:   ; patient seen this morning, oozing from trach as expected ,  Plan to start aspirin today, cuff is up on tracheostomy tube, intraoral skin panel intact doppler signal strong,  plan to advance NGT   : Patient seen and examined at bedside. AFVSS overnight. Significant HgB drop to 7.2 noted this morning. NGT clogged, attempted replacement but went into lung, need to replace. Cuff deflated. Patient reports lethargy. Otherwise no new complaints. Intraoral skin paddle intact with strong doppler signal. Plan to hold off one eliquis given significant HgB drop  12/10: Patient seen and examined at bedside. AFVSS overnight other than bradycardic to lowest 39, mainly in 40s-50s which seems to be his baseline even pre-op. Not symptomatic while chinedu. Otherwise, patient stable on TC with no issues. Yesterday, iatrogenic right sided PTX occured with NGT placement, patient remained asymptomatic throughout. Patient now s/p placement of pig-tail catheter by pulm with significant improvement in PTX. Chest tube clamped this morning with plan for removal this evening. Patient s/p 2 units of PRBCs with moderate improvement in Hgb. Today, patient reports feeling significantly better than yesterday and overall "feels good." He was started on trickle feeds last night but felt some chest tightness and so they were held. Patient also failed TOV again and was straight cathed. KRISTEN drain output significantly decreased this morning. No other changes at this time.   : patient seen this morning PTX has resolved on CXR, intraoral flap intact, doppler strong, trach in place,  KRISTEN drains with minimal output   : Patient seen and examined this morning at bedside. Overnight, NGT slightly displaced, coming out around 10 cm from original placement, advanced and secured, pending CXR. Patient failed trial of clears with SLP yesterday but tolerated PMV without issue. Patient continues to be OOBTC daily and is tolerating tube feeds at goal. 2 KRISTEN drains removed yesterday, remaining KRISTEN with minimal output. Intraoral flap intact, doppler strong, trach in place. Patient denies any other new complaints at this time.     ALLERGIES:  No Known Allergies      MEDICATIONS:  Antiinfectives:     IV fluids:  dextrose 5%. 1000 milliLiter(s) IV Continuous <Continuous>  dextrose 5%. 1000 milliLiter(s) IV Continuous <Continuous>  magnesium sulfate  IVPB 1 Gram(s) IV Intermittent once  potassium phosphate IVPB 15 milliMole(s) IV Intermittent once    Hematologic/Anticoagulation:  aspirin  chewable 81 milliGRAM(s) Oral daily  enoxaparin Injectable 40 milliGRAM(s) SubCutaneous every 24 hours    Pain medications/Neuro:  gabapentin 600 milliGRAM(s) Oral daily  HYDROmorphone  Injectable 0.5 milliGRAM(s) IV Push every 4 hours PRN  melatonin 5 milliGRAM(s) Oral at bedtime  ondansetron Injectable 4 milliGRAM(s) IV Push every 6 hours PRN  oxyCODONE    Solution 10 milliGRAM(s) Oral every 6 hours PRN  oxyCODONE    Solution 5 milliGRAM(s) Oral every 4 hours PRN    Endocrine Medications:   atorvastatin 40 milliGRAM(s) Oral at bedtime  dextrose 50% Injectable 25 Gram(s) IV Push once  dextrose 50% Injectable 25 Gram(s) IV Push once  dextrose 50% Injectable 12.5 Gram(s) IV Push once  dextrose Oral Gel 15 Gram(s) Oral once PRN  glucagon  Injectable 1 milliGRAM(s) IntraMuscular once    All other standing medications:   chlorhexidine 0.12% Liquid 15 milliLiter(s) Oral Mucosa two times a day  chlorhexidine 2% Cloths 1 Application(s) Topical daily  influenza  Vaccine (HIGH DOSE) 0.7 milliLiter(s) IntraMuscular once  magnesium citrate Oral Solution 296 milliLiter(s) Oral once  polyethylene glycol 3350 17 Gram(s) Oral daily  senna 2 Tablet(s) Oral at bedtime  sodium chloride 0.9% for Nebulization 3 milliLiter(s) Nebulizer every 6 hours  tamsulosin 0.4 milliGRAM(s) Oral every 24 hours    All other PRN medications:    Vital Signs Last 24 Hrs  T(C): 37.6 (12 Dec 2023 05:14), Max: 37.6 (12 Dec 2023 05:14)  T(F): 99.6 (12 Dec 2023 05:14), Max: 99.6 (12 Dec 2023 05:14)  HR: 78 (12 Dec 2023 05:51) (50 - 78)  BP: 127/59 (12 Dec 2023 04:18) (122/58 - 159/68)  BP(mean): 85 (12 Dec 2023 04:18) (82 - 98)  RR: 16 (12 Dec 2023 05:51) (14 - 22)  SpO2: 96% (12 Dec 2023 05:51) (96% - 100%)    Parameters below as of 12 Dec 2023 05:51  Patient On (Oxygen Delivery Method): tracheostomy collar  O2 Flow (L/min): 10  O2 Concentration (%): 40       @ 07:  -   @ 07:00  --------------------------------------------------------  IN:    Enteral Tube Flush: 300 mL    Glucerna 1.5: 1106 mL  Total IN: 1406 mL    OUT:    Bulb (mL): 16 mL    dextrose 5% + lactated ringers: 0 mL    Oral Fluid: 0 mL    VAC (Vacuum Assisted Closure) System (mL): 25 mL    Voided (mL): 500 mL  Total OUT: 541 mL    Total NET: 865 mL          23 @ 07:  -  23 @ 07:00  --------------------------------------------------------  IN:  Total IN: 0 mL    OUT:    Bulb (mL): 16 mL    VAC (Vacuum Assisted Closure) System (mL): 25 mL  Total OUT: 41 mL    Total NET: -41 mL              PHYSICAL EXAM:  Gen: AAOx3, NAD   Head: Surgical incision around chin extending up through lip  Eyes: EOMI, PERRL, visual acuity intact, no diplopia, supra/infra orbital rims intact, no subconjunctival heme, no telecanthus, no exophthalmos   Ears: Gross hearing intact,  Nose: No septal hematoma/asymmetry, no epistaxis bilaterally. no abrasions present, no lacerations.   Malar: No malar depression  Throat: No LAD, supple, neck surgical incision w/ steristrip dressing is hemostatic + left neck KRISTEN in place, left KRISTEN sanguinous output , trach in place w/ 7.5 cuffed portex, cuff deflated  Oral: Left FFF paddle pink, warm, well perfused. IMF screws in place, class 3 elastics,   Exx: Wound vac left leg, left leg KRISTEN with sanguinous output              LABS                       8.8    5.49  )-----------( 170      ( 12 Dec 2023 05:30 )             27.3        142  |  109<H>  |  19  ----------------------------<  132<H>  3.9   |  25  |  0.57    Ca    7.8<L>      12 Dec 2023 05:30  Phos  2.4       Mg     2.0     12-12           Coagulation Studies-     Urinalysis Basic - ( 12 Dec 2023 05:30 )    Color: x / Appearance: x / SG: x / pH: x  Gluc: 132 mg/dL / Ketone: x  / Bili: x / Urobili: x   Blood: x / Protein: x / Nitrite: x   Leuk Esterase: x / RBC: x / WBC x   Sq Epi: x / Non Sq Epi: x / Bacteria: x      Endocrine Panel-  Calcium: 7.8 mg/dL ( @ 05:30)  Calcium: 8.2 mg/dL ( @ 12:52)                MICROBIOLOGY:        RADIOLOGY & ADDITIONAL STUDIES:

## 2023-12-12 NOTE — PROGRESS NOTE ADULT - ASSESSMENT
67 y/o M w PMH of CAD (x2 stents Mar 2023), HLD, T2DM, hx of kidney stones, psoriasis presents to [facility] for evaluation of oral mass and 3 months of jaw pain a/f OMFS mandibular resection and flap reconstruction on Thursday. Medicine initially evaluated on 12/6 for pre-op assessment Now following for co-management.     #s/p mandibular resection and flap reconstruction  pt tolerated procedure well. KRISTEN drains removed by primary team  c/w q4h flap checks  Pt unable to tolerate PO intake at this time, pending G tube placement w/ surgery today  c/w S&S evaluation  recommend early mobilization, OOBTC, PT evaluation. recommend addition of PRN Duonebs to aid with airway clearance    #CAD  pt with hx of CAD s/p 2 stents in March 2023  Cardiology consulted; reccs appreciated  on lipitor and ASA. Per cardiology, given that intervention was approx 9 months ago, ok for monotherapy at this time    #anemia  Hgb stable at 8, previously was 13-14  no signs of active bleed  maintain active T&S  per cardiology recommendations, transfusion goal to Hgb >8    #DM2  A1c 6.3%, takes home metformin and jardiance. FS   - c/w current diet, FS checks and ISS. Goal -180 while inpatient    Medicine will continue to follow. Patient seen, evaluated, and discussed with attending Dr. Vogel 67 y/o M w PMH of CAD (x2 stents Mar 2023), HLD, T2DM, hx of kidney stones, psoriasis presents to [facility] for evaluation of oral mass and 3 months of jaw pain a/f OMFS mandibular resection and flap reconstruction on Thursday. Medicine initially evaluated on 12/6 for pre-op assessment Now following for co-management.     #s/p mandibular resection and flap reconstruction  pt tolerated procedure well. KRISTEN drains removed by primary team  c/w q4h flap checks  Pt unable to tolerate PO intake at this time, pending G tube placement w/ surgery today  c/w S&S evaluation  recommend early mobilization, OOBTC, PT evaluation. recommend addition of PRN Duonebs to aid with airway clearance    #CAD  pt with hx of CAD s/p 2 stents in March 2023  Cardiology consulted; reccs appreciated  on lipitor and ASA. Per cardiology, given that intervention was approx 9 months ago, ok for monotherapy at this time    #anemia  Hgb stable at 8, previously was 13-14  no signs of active bleed  maintain active T&S  per cardiology recommendations, transfusion goal to Hgb >8    #DM2  A1c 6.3%, takes home metformin and jardiance. FS   - c/w current diet, FS checks and ISS. Goal -180 while inpatient    Medicine will continue to follow. Patient seen, evaluated, and discussed with attending Dr. Vogel

## 2023-12-12 NOTE — PROGRESS NOTE ADULT - ASSESSMENT
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 Cardiology originally consulted for perioperative risk stratification.    Review of Studies    Telemetry 12/12/23: Sinus rhythm, HR 70s bpm with PVCs, infrequent episodes of bigeminy improved  Telemetry 12/11/23: Sinus bradycardia with PVCs, HR 46-64bpm (Know history of 2% PVC burden on outpatient monitor)     ECG 12/10-12/11/23: Sinus Bradycardia, Frequent PVCS  ECG 12/3/23: Sinus bradycardia     TTE 12/4/23: LVIDD 4.09cm LVEF 65%. Mild LVH. Normal RV function, mildly dilated. No valvular disease. No pericardial effusion.    #Perioperative Risk Stratification/Postoperative Evaluation.  #CAD s/p PCI CUBA LAD, Ramus -  March 2023   No new cardiopulmonary complaints. Pending PEG tube placement (12/12/23)  - Goal transfusion Hg <8 in setting of CAD.  - Please restart Plavix 75mg QD once safe from bleeding perspective  - continue Aspirin 81mg QD.  - continue  Lipitor 40mg QD    #Sinus bradycardia  Baseline HR in the 50s, avid marathon runner with excellent functional capacity and high resting vagal tone now s/p extensive surgery adjacent to carotid bulbs, possible exacerbating vagal tone. Asymptomatic.   - No acute intervention, now resolving    Case discussed with cardiology consult attending

## 2023-12-13 ENCOUNTER — TRANSCRIPTION ENCOUNTER (OUTPATIENT)
Age: 68
End: 2023-12-13

## 2023-12-13 LAB
ANION GAP SERPL CALC-SCNC: 5 MMOL/L — SIGNIFICANT CHANGE UP (ref 5–17)
ANION GAP SERPL CALC-SCNC: 5 MMOL/L — SIGNIFICANT CHANGE UP (ref 5–17)
BLD GP AB SCN SERPL QL: NEGATIVE — SIGNIFICANT CHANGE UP
BLD GP AB SCN SERPL QL: NEGATIVE — SIGNIFICANT CHANGE UP
BUN SERPL-MCNC: 19 MG/DL — SIGNIFICANT CHANGE UP (ref 7–23)
BUN SERPL-MCNC: 19 MG/DL — SIGNIFICANT CHANGE UP (ref 7–23)
CALCIUM SERPL-MCNC: 8 MG/DL — LOW (ref 8.4–10.5)
CALCIUM SERPL-MCNC: 8 MG/DL — LOW (ref 8.4–10.5)
CHLORIDE SERPL-SCNC: 109 MMOL/L — HIGH (ref 96–108)
CHLORIDE SERPL-SCNC: 109 MMOL/L — HIGH (ref 96–108)
CO2 SERPL-SCNC: 28 MMOL/L — SIGNIFICANT CHANGE UP (ref 22–31)
CO2 SERPL-SCNC: 28 MMOL/L — SIGNIFICANT CHANGE UP (ref 22–31)
CREAT SERPL-MCNC: 0.6 MG/DL — SIGNIFICANT CHANGE UP (ref 0.5–1.3)
CREAT SERPL-MCNC: 0.6 MG/DL — SIGNIFICANT CHANGE UP (ref 0.5–1.3)
EGFR: 106 ML/MIN/1.73M2 — SIGNIFICANT CHANGE UP
EGFR: 106 ML/MIN/1.73M2 — SIGNIFICANT CHANGE UP
FERRITIN SERPL-MCNC: 768 NG/ML — HIGH (ref 30–400)
FERRITIN SERPL-MCNC: 768 NG/ML — HIGH (ref 30–400)
FOLATE SERPL-MCNC: <2 NG/ML — LOW
FOLATE SERPL-MCNC: <2 NG/ML — LOW
GLUCOSE BLDC GLUCOMTR-MCNC: 102 MG/DL — HIGH (ref 70–99)
GLUCOSE BLDC GLUCOMTR-MCNC: 102 MG/DL — HIGH (ref 70–99)
GLUCOSE BLDC GLUCOMTR-MCNC: 103 MG/DL — HIGH (ref 70–99)
GLUCOSE BLDC GLUCOMTR-MCNC: 103 MG/DL — HIGH (ref 70–99)
GLUCOSE SERPL-MCNC: 106 MG/DL — HIGH (ref 70–99)
GLUCOSE SERPL-MCNC: 106 MG/DL — HIGH (ref 70–99)
GRAM STN FLD: ABNORMAL
GRAM STN FLD: ABNORMAL
HCT VFR BLD CALC: 24.2 % — LOW (ref 39–50)
HCT VFR BLD CALC: 24.2 % — LOW (ref 39–50)
HCT VFR BLD CALC: 25.8 % — LOW (ref 39–50)
HCT VFR BLD CALC: 25.8 % — LOW (ref 39–50)
HGB BLD-MCNC: 7.5 G/DL — LOW (ref 13–17)
HGB BLD-MCNC: 7.5 G/DL — LOW (ref 13–17)
HGB BLD-MCNC: 8.5 G/DL — LOW (ref 13–17)
HGB BLD-MCNC: 8.5 G/DL — LOW (ref 13–17)
IRON SATN MFR SERPL: 10 % — LOW (ref 16–55)
IRON SATN MFR SERPL: 10 % — LOW (ref 16–55)
IRON SATN MFR SERPL: 15 UG/DL — LOW (ref 45–165)
IRON SATN MFR SERPL: 15 UG/DL — LOW (ref 45–165)
MAGNESIUM SERPL-MCNC: 2 MG/DL — SIGNIFICANT CHANGE UP (ref 1.6–2.6)
MAGNESIUM SERPL-MCNC: 2 MG/DL — SIGNIFICANT CHANGE UP (ref 1.6–2.6)
MCHC RBC-ENTMCNC: 27.5 PG — SIGNIFICANT CHANGE UP (ref 27–34)
MCHC RBC-ENTMCNC: 27.5 PG — SIGNIFICANT CHANGE UP (ref 27–34)
MCHC RBC-ENTMCNC: 28.4 PG — SIGNIFICANT CHANGE UP (ref 27–34)
MCHC RBC-ENTMCNC: 28.4 PG — SIGNIFICANT CHANGE UP (ref 27–34)
MCHC RBC-ENTMCNC: 31 GM/DL — LOW (ref 32–36)
MCHC RBC-ENTMCNC: 31 GM/DL — LOW (ref 32–36)
MCHC RBC-ENTMCNC: 32.9 GM/DL — SIGNIFICANT CHANGE UP (ref 32–36)
MCHC RBC-ENTMCNC: 32.9 GM/DL — SIGNIFICANT CHANGE UP (ref 32–36)
MCV RBC AUTO: 86.3 FL — SIGNIFICANT CHANGE UP (ref 80–100)
MCV RBC AUTO: 86.3 FL — SIGNIFICANT CHANGE UP (ref 80–100)
MCV RBC AUTO: 88.6 FL — SIGNIFICANT CHANGE UP (ref 80–100)
MCV RBC AUTO: 88.6 FL — SIGNIFICANT CHANGE UP (ref 80–100)
NRBC # BLD: 0 /100 WBCS — SIGNIFICANT CHANGE UP (ref 0–0)
PHOSPHATE SERPL-MCNC: 3.1 MG/DL — SIGNIFICANT CHANGE UP (ref 2.5–4.5)
PHOSPHATE SERPL-MCNC: 3.1 MG/DL — SIGNIFICANT CHANGE UP (ref 2.5–4.5)
PLATELET # BLD AUTO: 169 K/UL — SIGNIFICANT CHANGE UP (ref 150–400)
PLATELET # BLD AUTO: 169 K/UL — SIGNIFICANT CHANGE UP (ref 150–400)
PLATELET # BLD AUTO: 181 K/UL — SIGNIFICANT CHANGE UP (ref 150–400)
PLATELET # BLD AUTO: 181 K/UL — SIGNIFICANT CHANGE UP (ref 150–400)
POTASSIUM SERPL-MCNC: 4.1 MMOL/L — SIGNIFICANT CHANGE UP (ref 3.5–5.3)
POTASSIUM SERPL-MCNC: 4.1 MMOL/L — SIGNIFICANT CHANGE UP (ref 3.5–5.3)
POTASSIUM SERPL-SCNC: 4.1 MMOL/L — SIGNIFICANT CHANGE UP (ref 3.5–5.3)
POTASSIUM SERPL-SCNC: 4.1 MMOL/L — SIGNIFICANT CHANGE UP (ref 3.5–5.3)
RBC # BLD: 2.73 M/UL — LOW (ref 4.2–5.8)
RBC # BLD: 2.73 M/UL — LOW (ref 4.2–5.8)
RBC # BLD: 2.99 M/UL — LOW (ref 4.2–5.8)
RBC # BLD: 2.99 M/UL — LOW (ref 4.2–5.8)
RBC # FLD: 14 % — SIGNIFICANT CHANGE UP (ref 10.3–14.5)
RBC # FLD: 14 % — SIGNIFICANT CHANGE UP (ref 10.3–14.5)
RBC # FLD: 14.2 % — SIGNIFICANT CHANGE UP (ref 10.3–14.5)
RBC # FLD: 14.2 % — SIGNIFICANT CHANGE UP (ref 10.3–14.5)
RH IG SCN BLD-IMP: POSITIVE — SIGNIFICANT CHANGE UP
RH IG SCN BLD-IMP: POSITIVE — SIGNIFICANT CHANGE UP
SODIUM SERPL-SCNC: 142 MMOL/L — SIGNIFICANT CHANGE UP (ref 135–145)
SODIUM SERPL-SCNC: 142 MMOL/L — SIGNIFICANT CHANGE UP (ref 135–145)
SPECIMEN SOURCE: SIGNIFICANT CHANGE UP
SPECIMEN SOURCE: SIGNIFICANT CHANGE UP
TIBC SERPL-MCNC: 147 UG/DL — LOW (ref 220–430)
TIBC SERPL-MCNC: 147 UG/DL — LOW (ref 220–430)
TRANSFERRIN SERPL-MCNC: 106 MG/DL — LOW (ref 200–360)
TRANSFERRIN SERPL-MCNC: 106 MG/DL — LOW (ref 200–360)
UIBC SERPL-MCNC: 132 UG/DL — SIGNIFICANT CHANGE UP (ref 110–370)
UIBC SERPL-MCNC: 132 UG/DL — SIGNIFICANT CHANGE UP (ref 110–370)
VIT B12 SERPL-MCNC: 1332 PG/ML — HIGH (ref 232–1245)
VIT B12 SERPL-MCNC: 1332 PG/ML — HIGH (ref 232–1245)
WBC # BLD: 6.58 K/UL — SIGNIFICANT CHANGE UP (ref 3.8–10.5)
WBC # BLD: 6.58 K/UL — SIGNIFICANT CHANGE UP (ref 3.8–10.5)
WBC # BLD: 6.98 K/UL — SIGNIFICANT CHANGE UP (ref 3.8–10.5)
WBC # BLD: 6.98 K/UL — SIGNIFICANT CHANGE UP (ref 3.8–10.5)
WBC # FLD AUTO: 6.58 K/UL — SIGNIFICANT CHANGE UP (ref 3.8–10.5)
WBC # FLD AUTO: 6.58 K/UL — SIGNIFICANT CHANGE UP (ref 3.8–10.5)
WBC # FLD AUTO: 6.98 K/UL — SIGNIFICANT CHANGE UP (ref 3.8–10.5)
WBC # FLD AUTO: 6.98 K/UL — SIGNIFICANT CHANGE UP (ref 3.8–10.5)

## 2023-12-13 PROCEDURE — 99233 SBSQ HOSP IP/OBS HIGH 50: CPT

## 2023-12-13 PROCEDURE — 99231 SBSQ HOSP IP/OBS SF/LOW 25: CPT

## 2023-12-13 PROCEDURE — 71045 X-RAY EXAM CHEST 1 VIEW: CPT | Mod: 26

## 2023-12-13 RX ORDER — VANCOMYCIN HCL 1 G
1250 VIAL (EA) INTRAVENOUS ONCE
Refills: 0 | Status: COMPLETED | OUTPATIENT
Start: 2023-12-13 | End: 2023-12-13

## 2023-12-13 RX ORDER — SODIUM CHLORIDE 9 MG/ML
1000 INJECTION, SOLUTION INTRAVENOUS
Refills: 0 | Status: DISCONTINUED | OUTPATIENT
Start: 2023-12-13 | End: 2023-12-13

## 2023-12-13 RX ORDER — PIPERACILLIN AND TAZOBACTAM 4; .5 G/20ML; G/20ML
4.5 INJECTION, POWDER, LYOPHILIZED, FOR SOLUTION INTRAVENOUS ONCE
Refills: 0 | Status: COMPLETED | OUTPATIENT
Start: 2023-12-13 | End: 2023-12-13

## 2023-12-13 RX ORDER — PIPERACILLIN AND TAZOBACTAM 4; .5 G/20ML; G/20ML
4.5 INJECTION, POWDER, LYOPHILIZED, FOR SOLUTION INTRAVENOUS EVERY 8 HOURS
Refills: 0 | Status: DISCONTINUED | OUTPATIENT
Start: 2023-12-13 | End: 2023-12-15

## 2023-12-13 RX ORDER — ASPIRIN/CALCIUM CARB/MAGNESIUM 324 MG
300 TABLET ORAL ONCE
Refills: 0 | Status: COMPLETED | OUTPATIENT
Start: 2023-12-13 | End: 2023-12-13

## 2023-12-13 RX ORDER — PIPERACILLIN AND TAZOBACTAM 4; .5 G/20ML; G/20ML
4.5 INJECTION, POWDER, LYOPHILIZED, FOR SOLUTION INTRAVENOUS ONCE
Refills: 0 | Status: DISCONTINUED | OUTPATIENT
Start: 2023-12-13 | End: 2023-12-13

## 2023-12-13 RX ORDER — ACETAMINOPHEN 500 MG
1000 TABLET ORAL ONCE
Refills: 0 | Status: COMPLETED | OUTPATIENT
Start: 2023-12-13 | End: 2023-12-13

## 2023-12-13 RX ORDER — SODIUM CHLORIDE 9 MG/ML
1000 INJECTION, SOLUTION INTRAVENOUS
Refills: 0 | Status: DISCONTINUED | OUTPATIENT
Start: 2023-12-13 | End: 2023-12-19

## 2023-12-13 RX ADMIN — SODIUM CHLORIDE 3 MILLILITER(S): 9 INJECTION INTRAMUSCULAR; INTRAVENOUS; SUBCUTANEOUS at 21:58

## 2023-12-13 RX ADMIN — SODIUM CHLORIDE 3 MILLILITER(S): 9 INJECTION INTRAMUSCULAR; INTRAVENOUS; SUBCUTANEOUS at 05:15

## 2023-12-13 RX ADMIN — Medication 4 MILLILITER(S): at 05:15

## 2023-12-13 RX ADMIN — SODIUM CHLORIDE 4 MILLILITER(S): 9 INJECTION INTRAMUSCULAR; INTRAVENOUS; SUBCUTANEOUS at 18:30

## 2023-12-13 RX ADMIN — CHLORHEXIDINE GLUCONATE 15 MILLILITER(S): 213 SOLUTION TOPICAL at 05:00

## 2023-12-13 RX ADMIN — HYDROMORPHONE HYDROCHLORIDE 0.5 MILLIGRAM(S): 2 INJECTION INTRAMUSCULAR; INTRAVENOUS; SUBCUTANEOUS at 21:10

## 2023-12-13 RX ADMIN — PIPERACILLIN AND TAZOBACTAM 25 GRAM(S): 4; .5 INJECTION, POWDER, LYOPHILIZED, FOR SOLUTION INTRAVENOUS at 19:30

## 2023-12-13 RX ADMIN — CHLORHEXIDINE GLUCONATE 15 MILLILITER(S): 213 SOLUTION TOPICAL at 18:30

## 2023-12-13 RX ADMIN — Medication 4 MILLILITER(S): at 12:36

## 2023-12-13 RX ADMIN — SODIUM CHLORIDE 3 MILLILITER(S): 9 INJECTION INTRAMUSCULAR; INTRAVENOUS; SUBCUTANEOUS at 15:11

## 2023-12-13 RX ADMIN — SODIUM CHLORIDE 4 MILLILITER(S): 9 INJECTION INTRAMUSCULAR; INTRAVENOUS; SUBCUTANEOUS at 12:35

## 2023-12-13 RX ADMIN — ENOXAPARIN SODIUM 40 MILLIGRAM(S): 100 INJECTION SUBCUTANEOUS at 12:36

## 2023-12-13 RX ADMIN — Medication 300 MILLIGRAM(S): at 12:36

## 2023-12-13 RX ADMIN — Medication 166.67 MILLIGRAM(S): at 18:30

## 2023-12-13 RX ADMIN — CHLORHEXIDINE GLUCONATE 1 APPLICATION(S): 213 SOLUTION TOPICAL at 12:21

## 2023-12-13 RX ADMIN — Medication 400 MILLIGRAM(S): at 14:05

## 2023-12-13 RX ADMIN — Medication 4 MILLILITER(S): at 18:30

## 2023-12-13 RX ADMIN — HYDROMORPHONE HYDROCHLORIDE 0.5 MILLIGRAM(S): 2 INJECTION INTRAMUSCULAR; INTRAVENOUS; SUBCUTANEOUS at 03:17

## 2023-12-13 RX ADMIN — SODIUM CHLORIDE 4 MILLILITER(S): 9 INJECTION INTRAMUSCULAR; INTRAVENOUS; SUBCUTANEOUS at 00:00

## 2023-12-13 RX ADMIN — SODIUM CHLORIDE 100 MILLILITER(S): 9 INJECTION, SOLUTION INTRAVENOUS at 12:35

## 2023-12-13 RX ADMIN — Medication 1000 MILLIGRAM(S): at 14:30

## 2023-12-13 RX ADMIN — HYDROMORPHONE HYDROCHLORIDE 0.5 MILLIGRAM(S): 2 INJECTION INTRAMUSCULAR; INTRAVENOUS; SUBCUTANEOUS at 20:43

## 2023-12-13 RX ADMIN — PANTOPRAZOLE SODIUM 40 MILLIGRAM(S): 20 TABLET, DELAYED RELEASE ORAL at 12:35

## 2023-12-13 RX ADMIN — PIPERACILLIN AND TAZOBACTAM 200 GRAM(S): 4; .5 INJECTION, POWDER, LYOPHILIZED, FOR SOLUTION INTRAVENOUS at 16:08

## 2023-12-13 RX ADMIN — SODIUM CHLORIDE 4 MILLILITER(S): 9 INJECTION INTRAMUSCULAR; INTRAVENOUS; SUBCUTANEOUS at 05:15

## 2023-12-13 RX ADMIN — HYDROMORPHONE HYDROCHLORIDE 0.5 MILLIGRAM(S): 2 INJECTION INTRAMUSCULAR; INTRAVENOUS; SUBCUTANEOUS at 03:36

## 2023-12-13 NOTE — PROGRESS NOTE ADULT - SUBJECTIVE AND OBJECTIVE BOX
SUBJECTIVE:  General surgery consulted for operative Gtube insertion (patient not adequate candidate for endoscopic approach due to jaw banding)  Patient with febrile episode 6pm 12/12-- fever work up sent -   Other wise stable with persistent episodes of bradycardia     MEDICATIONS  (STANDING):  acetylcysteine 20%  Inhalation 4 milliLiter(s) Inhalation every 6 hours  aspirin Suppository 300 milliGRAM(s) Rectal once  chlorhexidine 0.12% Liquid 15 milliLiter(s) Oral Mucosa two times a day  chlorhexidine 2% Cloths 1 Application(s) Topical daily  dextrose 5% 1000 milliLiter(s) (100 mL/Hr) IV Continuous <Continuous>  dextrose 5%. 1000 milliLiter(s) (50 mL/Hr) IV Continuous <Continuous>  dextrose 5%. 1000 milliLiter(s) (100 mL/Hr) IV Continuous <Continuous>  dextrose 50% Injectable 12.5 Gram(s) IV Push once  dextrose 50% Injectable 25 Gram(s) IV Push once  dextrose 50% Injectable 25 Gram(s) IV Push once  enoxaparin Injectable 40 milliGRAM(s) SubCutaneous every 24 hours  glucagon  Injectable 1 milliGRAM(s) IntraMuscular once  influenza  Vaccine (HIGH DOSE) 0.7 milliLiter(s) IntraMuscular once  pantoprazole  Injectable 40 milliGRAM(s) IV Push daily  sodium chloride 0.9% for Nebulization 3 milliLiter(s) Nebulizer every 6 hours  sodium chloride 3%  Inhalation 4 milliLiter(s) Inhalation every 6 hours    MEDICATIONS  (PRN):  acetaminophen   IVPB .. 1000 milliGRAM(s) IV Intermittent once PRN Mild Pain (1 - 3)  dextrose Oral Gel 15 Gram(s) Oral once PRN Blood Glucose LESS THAN 70 milliGRAM(s)/deciliter  HYDROmorphone  Injectable 0.5 milliGRAM(s) IV Push every 4 hours PRN Breakthrough  ondansetron Injectable 4 milliGRAM(s) IV Push every 6 hours PRN Nausea and/or Vomiting      Vital Signs Last 24 Hrs  T(C): 36.9 (13 Dec 2023 09:08), Max: 39.6 (12 Dec 2023 18:14)  T(F): 98.4 (13 Dec 2023 09:08), Max: 103.2 (12 Dec 2023 18:14)  HR: 53 (13 Dec 2023 08:30) (53 - 92)  BP: 102/54 (13 Dec 2023 08:30) (102/54 - 128/56)  BP(mean): 76 (13 Dec 2023 08:30) (73 - 87)  RR: 18 (13 Dec 2023 08:30) (17 - 18)  SpO2: 98% (13 Dec 2023 08:30) (98% - 100%)    Parameters below as of 13 Dec 2023 08:30  Patient On (Oxygen Delivery Method): tracheostomy collar  O2 Flow (L/min): 10  O2 Concentration (%): 40    I&O's Summary    12 Dec 2023 07:01  -  13 Dec 2023 07:00  --------------------------------------------------------  IN: 2300 mL / OUT: 1145 mL / NET: 1155 mL        LABS:                        7.5    6.58  )-----------( 169      ( 13 Dec 2023 05:30 )             24.2     12-13    142  |  109<H>  |  19  ----------------------------<  106<H>  4.1   |  28  |  0.60    Ca    8.0<L>      13 Dec 2023 05:30  Phos  3.1     12-13  Mg     2.0     12-13        Urinalysis Basic - ( 13 Dec 2023 05:30 )    Color: x / Appearance: x / SG: x / pH: x  Gluc: 106 mg/dL / Ketone: x  / Bili: x / Urobili: x   Blood: x / Protein: x / Nitrite: x   Leuk Esterase: x / RBC: x / WBC x   Sq Epi: x / Non Sq Epi: x / Bacteria: x      CAPILLARY BLOOD GLUCOSE      POCT Blood Glucose.: 103 mg/dL (13 Dec 2023 06:43)  POCT Blood Glucose.: 134 mg/dL (12 Dec 2023 21:53)        RADIOLOGY & ADDITIONAL STUDIES:

## 2023-12-13 NOTE — PROGRESS NOTE ADULT - ASSESSMENT
Assessment and Plan:  SAUNDRA HOLMAN is a  68M w/ [id number] SCC of L buccal mucosa presents for pre optimization for surgery 12/7, now s/p hemimandibulectomy, left level 1, 2a, 3 neck neck dissection, L FFF, tracheostomy w/ 7.5 cuffed portex, dental implants, STSG 12/7. Intermittent fevers 12/12 afternoon.       PLAN:  OOBTC   Plan to ambulate 3-4x a day   - Continue nebulized solution for thick trach secretions   Continue Ondansetron PRN nausea/vomiting , PO Senna once daily, Miralax 17g once daily,  Chlorhexidine swish and spit, Esomeprazole, Enoxaparin, Gabapentin, Acetaminophen   Continue SCD’s    Possible trach change after G tube --- G tube pending afebrile 24 hours   Cardiology_ plavix after g tube, transfuse if hgb <8   Continue to work with SLP for trial of clears  PMV as tolerated    Disposition:   [facility] at [phone] with any questions/concerns.    Ada Quinones PA-C  12-13-23 @ 08:28     Assessment and Plan:  SAUNDRA HOLMAN is a  68M w/ [id number] SCC of L buccal mucosa presents for pre optimization for surgery 12/7, now s/p hemimandibulectomy, left level 1, 2a, 3 neck neck dissection, L FFF, tracheostomy w/ 7.5 cuffed portex, dental implants, STSG 12/7. Intermittent fevers 12/12 afternoon.       PLAN:  OOBTC   Plan to ambulate 3-4x a day   - Continue nebulized solution for thick trach secretions   Continue Ondansetron PRN nausea/vomiting , PO Senna once daily, Miralax 17g once daily,  Chlorhexidine swish and spit, Esomeprazole, Enoxaparin, Gabapentin, Acetaminophen   Continue SCD’s    Possible trach change after G tube --- G tube pending afebrile 24 hours   Cardiology_ plavix after g tube, transfuse if hgb <8   Continue to work with SLP for trial of clears  PMV as tolerated    Disposition:   [facility] at [phone] with any questions/concerns.    Ada Quinones PA-C  12-13-23 @ 08:28

## 2023-12-13 NOTE — PROGRESS NOTE ADULT - ASSESSMENT
67 y/o M w PMH of CAD (x2 stents Mar 2023), HLD, T2DM, nephrolithisais (s/p ureteral stent placement and removal), psoriasis and no PSH of intraabdominal surgery, presented to [facility] (12/3/23) for evaluation of oral mass. Pt now POD4 s/p composite mandibular resection, neck dissection, fibula free flap reconstruction and tracheostomy. Primary team has had difficulty starting feeds w/ NGT due to difficult placement and pt will require long term nutritional supplementation as current plan for adjuvant radiation. Surgery consulted for placement of gastric feeding tube; endoscopic placement not possible due to jaw banding, pt will require laparoscopic approach for placement of gastric tube. Pt to be added on to OR today for placement. Cardiology currently following for recent stent placement 9mos prior, appreciate recommendations and preoperative risk stratification.     Laparoscopic G tube with Dr. Raymond once the patient remains afebrile for at least 24 hours and the fever work up is completed   Cardiology following; recs appreciated and preoperative risk stratification   Type and Screen x2, Coags  Hold plavix   maintain NPO status   If any questions please call- [phone], extension [phone]  Thank you for the consult, appreciate excellent care by primary team.  67 y/o M w PMH of CAD (x2 stents Mar 2023), HLD, T2DM, nephrolithisais (s/p ureteral stent placement and removal), psoriasis and no PSH of intraabdominal surgery, presented to [facility] (12/3/23) for evaluation of oral mass. Pt now POD4 s/p composite mandibular resection, neck dissection, fibula free flap reconstruction and tracheostomy. Primary team has had difficulty starting feeds w/ NGT due to difficult placement and pt will require long term nutritional supplementation as current plan for adjuvant radiation. Surgery consulted for placement of gastric feeding tube; endoscopic placement not possible due to jaw banding, pt will require laparoscopic approach for placement of gastric tube. Pt to be added on to OR today for placement. Cardiology currently following for recent stent placement 9mos prior, appreciate recommendations and preoperative risk stratification.     Laparoscopic G tube with Dr. Raymond once the patient remains afebrile for at least 24 hours and the fever work up is completed   Cardiology following; recs appreciated and preoperative risk stratification   Type and Screen x2, Coags  Hold plavix   maintain NPO status   If any questions please call- [phone], extension [phone]  Thank you for the consult, appreciate excellent care by primary team.

## 2023-12-13 NOTE — PROGRESS NOTE ADULT - ASSESSMENT
67 y/o M w PMH of CAD (x2 stents Mar 2023), HLD, T2DM, hx of kidney stones, psoriasis presents to [facility] for evaluation of oral mass and 3 months of jaw pain a/f OMFS mandibular resection and flap reconstruction on Thursday. Medicine initially evaluated on 12/6 for pre-op assessment Now following for co-management.     #SIRS  pt febrile on 12/12 to 103 rectally, with HR of 90s. No leukocytosis on AM labs today. Pt notes shortness of breath with increased sputum production. No urinary complaints or diarrhea. Wounds appear clean  BCx - NGTD (@ 12 hours)  initial sputum cx contaminated, repeat pending  CXR with no new infiltrate. UA negative for infection  Agree with monitoring off antibiotics at this time, as pt is otherwise HD stable with no further episodes of fever. Will f/u culture results  If pt continues to be febrile or becomes HD unstable, recommend broad spectrum antibiotics (Vanc and Zosyn) while pending culture results    #s/p mandibular resection and flap reconstruction  pt tolerated procedure well. KRISTEN drains removed by primary team  c/w q4h flap checks  Pt unable to tolerate PO intake at this time, pending G tube placement w/ surgery (once afebrile for 24 hours)  c/w S&S evaluation  recommend early mobilization, OOBTC, PT evaluation. recommend addition of PRN Duonebs to aid with airway clearance    #CAD  pt with hx of CAD s/p 2 stents in March 2023  Cardiology consulted; reccs appreciated  on lipitor and ASA. Per cardiology, given that intervention was approx 9 months ago, ok for monotherapy at this time. Ongoing discussion with cardiology and primary team when safe to resume plavix    #anemia  Hgb stable at 8, previously was 13-14  no signs of active bleed  maintain active T&S  per cardiology recommendations, transfusion goal to Hgb >8    #DM2  A1c 6.3%, takes home metformin and jardiance. FS   - c/w current diet, FS checks and ISS. Goal -180 while inpatient    Medicine will continue to follow. Patient seen, evaluated, and discussed with attending Dr. Vogel 67 y/o M w PMH of CAD (x2 stents Mar 2023), HLD, T2DM, hx of kidney stones, psoriasis presents to [facility] for evaluation of oral mass and 3 months of jaw pain a/f OMFS mandibular resection and flap reconstruction on Thursday. Medicine initially evaluated on 12/6 for pre-op assessment Now following for co-management.     #SIRS  pt febrile on 12/12 to 103 rectally, with HR of 90s. No leukocytosis on AM labs today. Pt notes shortness of breath with increased sputum production. No urinary complaints or diarrhea. Wounds appear clean  BCx - NGTD (@ 12 hours)  initial sputum cx contaminated, repeat pending  CXR with no new infiltrate. UA negative for infection  Agree with monitoring off antibiotics at this time, as pt is otherwise HD stable with no further episodes of fever. Will f/u culture results  If pt continues to be febrile or becomes HD unstable, recommend broad spectrum antibiotics (Vanc and Zosyn) while pending culture results    #s/p mandibular resection and flap reconstruction  pt tolerated procedure well. KRISTEN drains removed by primary team  c/w q4h flap checks  Pt unable to tolerate PO intake at this time, pending G tube placement w/ surgery (once afebrile for 24 hours)  c/w S&S evaluation  recommend early mobilization, OOBTC, PT evaluation. recommend addition of PRN Duonebs to aid with airway clearance    #CAD  pt with hx of CAD s/p 2 stents in March 2023  Cardiology consulted; reccs appreciated  on lipitor and ASA. Per cardiology, given that intervention was approx 9 months ago, ok for monotherapy at this time. Ongoing discussion with cardiology and primary team when safe to resume plavix    #anemia  Hgb stable at 8, previously was 13-14  no signs of active bleed  maintain active T&S  per cardiology recommendations, transfusion goal to Hgb >8    #DM2  A1c 6.3%, takes home metformin and jardiance. FS   - c/w current diet, FS checks and ISS. Goal -180 while inpatient    Medicine will continue to follow. Patient seen, evaluated, and discussed with attending Dr. Vogel 69 y/o M w PMH of CAD (x2 stents Mar 2023), HLD, T2DM, hx of kidney stones, psoriasis presents to [facility] for evaluation of oral mass and 3 months of jaw pain a/f OMFS mandibular resection and flap reconstruction on Thursday. Medicine initially evaluated on 12/6 for pre-op assessment Now following for co-management.     #SIRS  pt febrile on 12/12 to 103 rectally, with HR of 90s. No leukocytosis on AM labs today. Pt notes shortness of breath with increased sputum production. No urinary complaints or diarrhea. Wounds appear clean  BCx - NGTD (@ 12 hours)  initial sputum cx contaminated, repeat pending  CXR with no new infiltrate. UA negative for infection  pt spiked additional fever of 101 on 12/13 afternoon. Discussed with primary team, recommend initiation of antibiotics for coverage of presumed pneumonia (increasing sputum production with continued fevers): Zosyn 4.5mg IV q8h (start with loading dose) as well as Vancomycin 1250mg PO q12h x3 doses. Will obtain Vanc trough before the 4th dose  please obtain MRSA swab    #s/p mandibular resection and flap reconstruction  pt tolerated procedure well. KRISTEN drains removed by primary team  c/w q4h flap checks  Pt unable to tolerate PO intake at this time, pending G tube placement w/ surgery (once afebrile for 24 hours)  c/w S&S evaluation  recommend early mobilization, OOBTC, PT evaluation. recommend addition of PRN Duonebs to aid with airway clearance    #CAD  pt with hx of CAD s/p 2 stents in March 2023  Cardiology consulted; reccs appreciated  on lipitor and ASA. Per cardiology, given that intervention was approx 9 months ago, ok for monotherapy at this time. Ongoing discussion with cardiology and primary team when safe to resume plavix    #anemia  Hgb stable at 8, previously was 13-14  no signs of active bleed  maintain active T&S  per cardiology recommendations, transfusion goal to Hgb >8    #DM2  A1c 6.3%, takes home metformin and jardiance. FS   - c/w current diet, FS checks and ISS. Goal -180 while inpatient    Medicine will continue to follow. Patient seen, evaluated, and discussed with attending Dr. Vogel 69 y/o M w PMH of CAD (x2 stents Mar 2023), HLD, T2DM, hx of kidney stones, psoriasis presents to [facility] for evaluation of oral mass and 3 months of jaw pain a/f OMFS mandibular resection and flap reconstruction on Thursday. Medicine initially evaluated on 12/6 for pre-op assessment Now following for co-management.     #SIRS  pt febrile on 12/12 to 103 rectally, with HR of 90s. No leukocytosis on AM labs today. Pt notes shortness of breath with increased sputum production. No urinary complaints or diarrhea. Wounds appear clean  BCx - NGTD (@ 12 hours)  initial sputum cx contaminated, repeat pending  CXR with no new infiltrate. UA negative for infection  pt spiked additional fever of 101 on 12/13 afternoon. Discussed with primary team, recommend initiation of antibiotics for coverage of presumed pneumonia (increasing sputum production with continued fevers): Zosyn 4.5mg IV q8h (start with loading dose) as well as Vancomycin 1250mg PO q12h x3 doses. Will obtain Vanc trough before the 4th dose  please obtain MRSA swab    #s/p mandibular resection and flap reconstruction  pt tolerated procedure well. KRISTEN drains removed by primary team  c/w q4h flap checks  Pt unable to tolerate PO intake at this time, pending G tube placement w/ surgery (once afebrile for 24 hours)  c/w S&S evaluation  recommend early mobilization, OOBTC, PT evaluation. recommend addition of PRN Duonebs to aid with airway clearance    #CAD  pt with hx of CAD s/p 2 stents in March 2023  Cardiology consulted; reccs appreciated  on lipitor and ASA. Per cardiology, given that intervention was approx 9 months ago, ok for monotherapy at this time. Ongoing discussion with cardiology and primary team when safe to resume plavix    #anemia  Hgb stable at 8, previously was 13-14  no signs of active bleed  maintain active T&S  per cardiology recommendations, transfusion goal to Hgb >8    #DM2  A1c 6.3%, takes home metformin and jardiance. FS   - c/w current diet, FS checks and ISS. Goal -180 while inpatient    Medicine will continue to follow. Patient seen, evaluated, and discussed with attending Dr. Vogel

## 2023-12-13 NOTE — PROGRESS NOTE ADULT - SUBJECTIVE AND OBJECTIVE BOX
OVERNIGHT EVENTS: At 6pm, pt febrile to 103 rectally. CXR completed, bcx and sputum cx obtained and UA. Pt given Tylenol with improvement in fever.    SUBJECTIVE:  Patient seen and examined at bedside. Reports shortness of breath this morning with increased sputum production. Denies further episodes of fevers or chills. No chest pain, abdominal pain, n/v/d. Reports L cheek pain rated 4/10.    Vital Signs Last 12 Hrs  T(F): 98.4 (12-13-23 @ 09:08), Max: 98.4 (12-13-23 @ 09:08)  HR: 52 (12-13-23 @ 10:27) (52 - 54)  BP: 102/54 (12-13-23 @ 08:30) (102/54 - 103/51)  BP(mean): 76 (12-13-23 @ 08:30) (74 - 76)  RR: 18 (12-13-23 @ 10:27) (17 - 18)  SpO2: 96% (12-13-23 @ 10:27) (96% - 100%)  I&O's Summary    12 Dec 2023 07:01  -  13 Dec 2023 07:00  --------------------------------------------------------  IN: 2300 mL / OUT: 1145 mL / NET: 1155 mL        PHYSICAL EXAM:  Constitutional: NAD, comfortable in chair. no respiratory distress  HEENT: PERRLA, MMM, mandibular flap c/d/i  +trach, with clear colored sputum  Neck: Supple  Respiratory: CTA B/L. No w/r/r.   Cardiovascular: RRR, normal S1 and S2, no m/r/g.   Gastrointestinal: +BS, soft NTND  Extremities: wwp; no edema. +L fibular site non-tender with no drainage, +wound vac in place      LABS:                        7.5    6.58  )-----------( 169      ( 13 Dec 2023 05:30 )             24.2     12-13    142  |  109<H>  |  19  ----------------------------<  106<H>  4.1   |  28  |  0.60    Ca    8.0<L>      13 Dec 2023 05:30  Phos  3.1     12-13  Mg     2.0     12-13        Urinalysis Basic - ( 13 Dec 2023 05:30 )    Color: x / Appearance: x / SG: x / pH: x  Gluc: 106 mg/dL / Ketone: x  / Bili: x / Urobili: x   Blood: x / Protein: x / Nitrite: x   Leuk Esterase: x / RBC: x / WBC x   Sq Epi: x / Non Sq Epi: x / Bacteria: x          RADIOLOGY & ADDITIONAL TESTS:    MEDICATIONS  (STANDING):  acetylcysteine 20%  Inhalation 4 milliLiter(s) Inhalation every 6 hours  aspirin Suppository 300 milliGRAM(s) Rectal once  chlorhexidine 0.12% Liquid 15 milliLiter(s) Oral Mucosa two times a day  chlorhexidine 2% Cloths 1 Application(s) Topical daily  dextrose 5% 1000 milliLiter(s) (100 mL/Hr) IV Continuous <Continuous>  dextrose 5%. 1000 milliLiter(s) (50 mL/Hr) IV Continuous <Continuous>  dextrose 5%. 1000 milliLiter(s) (100 mL/Hr) IV Continuous <Continuous>  dextrose 50% Injectable 25 Gram(s) IV Push once  dextrose 50% Injectable 25 Gram(s) IV Push once  dextrose 50% Injectable 12.5 Gram(s) IV Push once  enoxaparin Injectable 40 milliGRAM(s) SubCutaneous every 24 hours  glucagon  Injectable 1 milliGRAM(s) IntraMuscular once  influenza  Vaccine (HIGH DOSE) 0.7 milliLiter(s) IntraMuscular once  pantoprazole  Injectable 40 milliGRAM(s) IV Push daily  sodium chloride 0.9% for Nebulization 3 milliLiter(s) Nebulizer every 6 hours  sodium chloride 3%  Inhalation 4 milliLiter(s) Inhalation every 6 hours    MEDICATIONS  (PRN):  acetaminophen   IVPB .. 1000 milliGRAM(s) IV Intermittent once PRN Mild Pain (1 - 3)  dextrose Oral Gel 15 Gram(s) Oral once PRN Blood Glucose LESS THAN 70 milliGRAM(s)/deciliter  HYDROmorphone  Injectable 0.5 milliGRAM(s) IV Push every 4 hours PRN Breakthrough  ondansetron Injectable 4 milliGRAM(s) IV Push every 6 hours PRN Nausea and/or Vomiting

## 2023-12-13 NOTE — PROGRESS NOTE ADULT - ATTENDING COMMENTS
Patient is a 68 year old gentleman with history of CAD s/p PCI with stents in May 2023, HLD, T2DM, nephrolithiasis, now admitted after mandibular resection, neck dissection, free flap, tracheostomy, being evaluated for gastrostomy tube placement for nutritional supplementation due to dysphagia and malnutrition related to recent surgery. Difficulty with enteric feeding tube placement and enteral feeds, will require long term supplementation. Plavix being held. At time of evaluation, afebrile, hemodynamically stable, abdomen soft, nontender, nondistended, no rebound or guarding, incarcerated fat containing umbilical hernia present.     Febrile prior to OR, case delayed. Awaiting fever workup, will need to be afebrile for 24 hours prior to gastrostomy tube placement. Late entry, date of service 12/13/23.

## 2023-12-13 NOTE — PROGRESS NOTE ADULT - ATTENDING COMMENTS
P  68M w/ [id number] SCC of L buccal mucosa presents for pre optimization for surgery 12/7, now s/p hemimandibulectomy, left level 1, 2a, 3 neck neck dissection, L FFF, tracheostomy w/ 7.5 cuffed portex, dental implants, STSG 12/7- plan for G tube placement postpone due to fever 12/12- blood cx drawn, respiratory culture likely contamination -reported thick sputum from trach after nebs    pt seen and examined with Dr. Dean.  seen sitting on bedside chair, ambulated.  awake, alert, trach with trach cuff ( humidified oxygen )-wet cough  RRR  bs present soft,  Graft donar site on left thigh -clean  wound vac on left fibular area   no edema noted on lower ext.     fever- unclear source yet, reported thick secretion  blood cx- drawn.   chest x ray -no clear sign to suggest pneumonia.  fever delaying the G tube placement   - would empirically treat with broad-spectrum antibiotics with Vancomycin and Zosyn ( dosing as above )   fu MRSA swab.  - to repeat respiratory culture from trach.   - no urinary symptoms.  - oral care as per ENT.   would keep gus-nebs q 6 ( to help loosen up secretion)  continue with trach suction     - Anemia work up noted, iron saturation is 10 , ferrintin elevated likely from inflammatory process.  currently with fever, would hold off on iv iron therapy, can consider when source identified or fever resolved.  - DM - FS with ISS, caution for Hypoglycemia.  dw Dr. Dean,     Dr. Mitchell Montero covering 12/14- 12/20/23 P  68M w/ [id number] SCC of L buccal mucosa presents for pre optimization for surgery 12/7, now s/p hemimandibulectomy, left level 1, 2a, 3 neck neck dissection, L FFF, tracheostomy w/ 7.5 cuffed portex, dental implants, STSG 12/7- plan for G tube placement postpone due to fever 12/12- blood cx drawn, respiratory culture likely contamination -reported thick sputum from trach after nebs    pt seen and examined with Dr. Dean.  seen sitting on bedside chair, ambulated.  awake, alert, trach with trach cuff ( humidified oxygen )-wet cough  RRR  bs present soft,  Graft donar site on left thigh -clean  wound vac on left fibular area   no edema noted on lower ext.     fever- unclear source yet, reported thick secretion  blood cx- drawn.   chest x ray -no clear sign to suggest pneumonia.  fever delaying the G tube placement   - would empirically treat with broad-spectrum antibiotics with Vancomycin and Zosyn ( dosing as above )   fu MRSA swab.  - to repeat respiratory culture from trach.   - no urinary symptoms.  - oral care as per ENT.   would keep gus-nebs q 6 ( to help loosen up secretion)  continue with trach suction     - Anemia work up noted, iron saturation is 10 , ferrintin elevated likely from inflammatory process.  currently with fever, would hold off on iv iron therapy, can consider when source identified or fever resolved.  - DM - FS with ISS, caution for Hypoglycemia.  dw Dr. Dean,     Dr. Mitchell Montero covering 12/14- 12/20/23

## 2023-12-13 NOTE — PROGRESS NOTE ADULT - SUBJECTIVE AND OBJECTIVE BOX
OTOLARYNGOLOGY (ENT) PROGRESS NOTE    PATIENT: SAUNDRA HOLMAN  MRN: [id number]  : 57  [id number]  DATE OF SERVICE:  23  			           ID:SAUNDRA HOLMAN is a  67yo M w PMH of CAD (x2 stents Mar 2023), HLD, T2DM, hx of kidney stones, psoriasis presents to [facility] for evaluation of oral mass. Reports Three month hx of jaw pain and loosening of mandibular molar tooth on the lower left. Pt has a 30 pack yr smoking hx, quit 1 yr ago. 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 POD 1 composite mandibular resection, neck dissection and fibula free flap reconstruction.       Subjective/ Interval:   ; patient seen this morning, oozing from trach as expected ,  Plan to start aspirin today, cuff is up on tracheostomy tube, intraoral skin panel intact doppler signal strong,  plan to advance NGT   : Patient seen and examined at bedside. AFVSS overnight. Significant HgB drop to 7.2 noted this morning. NGT clogged, attempted replacement but went into lung, need to replace. Cuff deflated. Patient reports lethargy. Otherwise no new complaints. Intraoral skin paddle intact with strong doppler signal. Plan to hold off one eliquis given significant HgB drop  12/10: Patient seen and examined at bedside. AFVSS overnight other than bradycardic to lowest 39, mainly in 40s-50s which seems to be his baseline even pre-op. Not symptomatic while chinedu. Otherwise, patient stable on TC with no issues. Yesterday, iatrogenic right sided PTX occured with NGT placement, patient remained asymptomatic throughout. Patient now s/p placement of pig-tail catheter by pulm with significant improvement in PTX. Chest tube clamped this morning with plan for removal this evening. Patient s/p 2 units of PRBCs with moderate improvement in Hgb. Today, patient reports feeling significantly better than yesterday and overall "feels good." He was started on trickle feeds last night but felt some chest tightness and so they were held. Patient also failed TOV again and was straight cathed. KRISTEN drain output significantly decreased this morning. No other changes at this time.   : patient seen this morning PTX has resolved on CXR, intraoral flap intact, doppler strong, trach in place,  KRISTEN drains with minimal output   : Patient seen and examined this morning at bedside. Overnight, NGT slightly displaced, coming out around 10 cm from original placement, advanced and secured, pending CXR. Patient failed trial of clears with SLP yesterday but tolerated PMV without issue. Patient continues to be OOBTC daily and is tolerating tube feeds at goal. 2 KRISTEN drains removed yesterday, remaining KRISTEN with minimal output. Intraoral flap intact, doppler strong, trach in place. Patient denies any other new complaints at this time.   : patient febrile overnight to 103, continues to have intermittent PVC, HR last night in 40s, patients baseline 50. Gen surg will like to wait 24 hours afebrile until g tube,  Oral flap appears to be congested, dixon continue to monitor. KRISTEN drain will be removed today. Pending results of fever work up . HEB 7.5, will transfuse when type and screen returns     ALLERGIES:  No Known Allergies      MEDICATIONS:  Antiinfectives:     IV fluids:  dextrose 5%. 1000 milliLiter(s) IV Continuous <Continuous>  dextrose 5%. 1000 milliLiter(s) IV Continuous <Continuous>  lactated ringers. 1000 milliLiter(s) IV Continuous <Continuous>    Hematologic/Anticoagulation:  enoxaparin Injectable 40 milliGRAM(s) SubCutaneous every 24 hours    Pain medications/Neuro:  acetaminophen   IVPB .. 1000 milliGRAM(s) IV Intermittent once PRN  aspirin Suppository 300 milliGRAM(s) Rectal once  HYDROmorphone  Injectable 0.5 milliGRAM(s) IV Push every 4 hours PRN  ondansetron Injectable 4 milliGRAM(s) IV Push every 6 hours PRN    Endocrine Medications:   dextrose 50% Injectable 25 Gram(s) IV Push once  dextrose 50% Injectable 25 Gram(s) IV Push once  dextrose 50% Injectable 12.5 Gram(s) IV Push once  dextrose Oral Gel 15 Gram(s) Oral once PRN  glucagon  Injectable 1 milliGRAM(s) IntraMuscular once    All other standing medications:   acetylcysteine 20%  Inhalation 4 milliLiter(s) Inhalation every 6 hours  chlorhexidine 0.12% Liquid 15 milliLiter(s) Oral Mucosa two times a day  chlorhexidine 2% Cloths 1 Application(s) Topical daily  influenza  Vaccine (HIGH DOSE) 0.7 milliLiter(s) IntraMuscular once  pantoprazole  Injectable 40 milliGRAM(s) IV Push daily  sodium chloride 0.9% for Nebulization 3 milliLiter(s) Nebulizer every 6 hours  sodium chloride 3%  Inhalation 4 milliLiter(s) Inhalation every 6 hours    All other PRN medications:    Vital Signs Last 24 Hrs  T(C): 36.4 (13 Dec 2023 05:00), Max: 39.6 (12 Dec 2023 18:14)  T(F): 97.5 (13 Dec 2023 05:00), Max: 103.2 (12 Dec 2023 18:14)  HR: 54 (13 Dec 2023 04:20) (54 - 94)  BP: 103/51 (13 Dec 2023 04:20) (103/51 - 136/71)  BP(mean): 74 (13 Dec 2023 04:20) (73 - 98)  RR: 17 (13 Dec 2023 04:20) (17 - 19)  SpO2: 100% (13 Dec 2023 04:20) (98% - 100%)    Parameters below as of 13 Dec 2023 04:20  Patient On (Oxygen Delivery Method): tracheostomy collar  O2 Flow (L/min): 10  O2 Concentration (%): 40       @ 07:01  -   @ 07:00  --------------------------------------------------------  IN:    Lactated Ringers: 2300 mL  Total IN: 2300 mL    OUT:    Bulb (mL): 20 mL    VAC (Vacuum Assisted Closure) System (mL): 25 mL    Voided (mL): 1100 mL  Total OUT: 1145 mL    Total NET: 1155 mL          23 @ 07:01  -  23 @ 07:00  --------------------------------------------------------  IN:  Total IN: 0 mL    OUT:    Bulb (mL): 20 mL    VAC (Vacuum Assisted Closure) System (mL): 25 mL  Total OUT: 45 mL    Total NET: -45 mL              PHYSICAL EXAM:  Gen: AAOx3, NAD   Head: Surgical incision around chin extending up through lip  Eyes: EOMI, PERRL, visual acuity intact, no diplopia, supra/infra orbital rims intact, no subconjunctival heme, no telecanthus, no exophthalmos   Ears: Gross hearing intact,  Nose: No septal hematoma/asymmetry, no epistaxis bilaterally. no abrasions present, no lacerations.   Malar: No malar depression  Throat: No LAD, supple, neck surgical incision w/ steristrip dressing is hemostatic , trach in place w/ 7.5 cuffed portex, cuff deflated  Oral: Left FFF paddle dusk colored with brisk bleed, will continue to monitor,. IMF screws in place, class 3 elastics,   Exx: Wound vac left leg, left leg KRISTEN with sanguinous output      LABS                       7.5    6.58  )-----------( 169      ( 13 Dec 2023 05:30 )             24.2        142  |  109<H>  |  19  ----------------------------<  106<H>  4.1   |  28  |  0.60    Ca    8.0<L>      13 Dec 2023 05:30  Phos  3.1       Mg     2.0                Coagulation Studies-     Urinalysis Basic - ( 13 Dec 2023 05:30 )    Color: x / Appearance: x / SG: x / pH: x  Gluc: 106 mg/dL / Ketone: x  / Bili: x / Urobili: x   Blood: x / Protein: x / Nitrite: x   Leuk Esterase: x / RBC: x / WBC x   Sq Epi: x / Non Sq Epi: x / Bacteria: x      Endocrine Panel-  Calcium: 8.0 mg/dL ( @ 05:30)                MICROBIOLOGY:  Culture - Sputum (collected 23 @ 20:48)  Source: .Sputum  Gram Stain (23 @ 22:45):    Moderate epithelial cells    Moderate WBC's    Rare Gram Positive Rods    Few Gram Negative Rods    Few Gram Positive Cocci in Pairs and Chains  Final Report (23 @ 22:45):    Sputum specimen rejected.  Microscopic examination indicates    oropharyngeal contamination.  Please repeat.      Culture Results:   Sputum specimen rejected.  Microscopic examination indicates  oropharyngeal contamination.  Please repeat. (23 @ 20:48)      Culture - Sputum (collected 23 @ 20:48)  Source: .Sputum  Gram Stain (23 @ 22:45):    Moderate epithelial cells    Moderate WBC's    Rare Gram Positive Rods    Few Gram Negative Rods    Few Gram Positive Cocci in Pairs and Chains  Final Report (23 @ 22:45):    Sputum specimen rejected.  Microscopic examination indicates    oropharyngeal contamination.  Please repeat.   OTOLARYNGOLOGY (ENT) PROGRESS NOTE    PATIENT: SAUNDRA HOLMAN  MRN: [id number]  : 57  [id number]  DATE OF SERVICE:  23  			           ID:SAUNDRA HOLMAN is a  67yo M w PMH of CAD (x2 stents Mar 2023), HLD, T2DM, hx of kidney stones, psoriasis presents to [facility] for evaluation of oral mass. Reports Three month hx of jaw pain and loosening of mandibular molar tooth on the lower left. Pt has a 30 pack yr smoking hx, quit 1 yr ago. 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 POD 1 composite mandibular resection, neck dissection and fibula free flap reconstruction.       Subjective/ Interval:   ; patient seen this morning, oozing from trach as expected ,  Plan to start aspirin today, cuff is up on tracheostomy tube, intraoral skin panel intact doppler signal strong,  plan to advance NGT   : Patient seen and examined at bedside. AFVSS overnight. Significant HgB drop to 7.2 noted this morning. NGT clogged, attempted replacement but went into lung, need to replace. Cuff deflated. Patient reports lethargy. Otherwise no new complaints. Intraoral skin paddle intact with strong doppler signal. Plan to hold off one eliquis given significant HgB drop  12/10: Patient seen and examined at bedside. AFVSS overnight other than bradycardic to lowest 39, mainly in 40s-50s which seems to be his baseline even pre-op. Not symptomatic while chinedu. Otherwise, patient stable on TC with no issues. Yesterday, iatrogenic right sided PTX occured with NGT placement, patient remained asymptomatic throughout. Patient now s/p placement of pig-tail catheter by pulm with significant improvement in PTX. Chest tube clamped this morning with plan for removal this evening. Patient s/p 2 units of PRBCs with moderate improvement in Hgb. Today, patient reports feeling significantly better than yesterday and overall "feels good." He was started on trickle feeds last night but felt some chest tightness and so they were held. Patient also failed TOV again and was straight cathed. KRISTEN drain output significantly decreased this morning. No other changes at this time.   : patient seen this morning PTX has resolved on CXR, intraoral flap intact, doppler strong, trach in place,  KRISTEN drains with minimal output   : Patient seen and examined this morning at bedside. Overnight, NGT slightly displaced, coming out around 10 cm from original placement, advanced and secured, pending CXR. Patient failed trial of clears with SLP yesterday but tolerated PMV without issue. Patient continues to be OOBTC daily and is tolerating tube feeds at goal. 2 KRISTEN drains removed yesterday, remaining KRISTEN with minimal output. Intraoral flap intact, doppler strong, trach in place. Patient denies any other new complaints at this time.   : patient febrile overnight to 103, continues to have intermittent PVC, HR last night in 40s, patients baseline 50. Gen surg will like to wait 24 hours afebrile until g tube,  Oral flap appears to be congested, dixon continue to monitor. KRISTEN drain will be removed today. Pending results of fever work up . HEB 7.5, will transfuse when type and screen returns     ALLERGIES:  No Known Allergies      MEDICATIONS:  Antiinfectives:     IV fluids:  dextrose 5%. 1000 milliLiter(s) IV Continuous <Continuous>  dextrose 5%. 1000 milliLiter(s) IV Continuous <Continuous>  lactated ringers. 1000 milliLiter(s) IV Continuous <Continuous>    Hematologic/Anticoagulation:  enoxaparin Injectable 40 milliGRAM(s) SubCutaneous every 24 hours    Pain medications/Neuro:  acetaminophen   IVPB .. 1000 milliGRAM(s) IV Intermittent once PRN  aspirin Suppository 300 milliGRAM(s) Rectal once  HYDROmorphone  Injectable 0.5 milliGRAM(s) IV Push every 4 hours PRN  ondansetron Injectable 4 milliGRAM(s) IV Push every 6 hours PRN    Endocrine Medications:   dextrose 50% Injectable 25 Gram(s) IV Push once  dextrose 50% Injectable 25 Gram(s) IV Push once  dextrose 50% Injectable 12.5 Gram(s) IV Push once  dextrose Oral Gel 15 Gram(s) Oral once PRN  glucagon  Injectable 1 milliGRAM(s) IntraMuscular once    All other standing medications:   acetylcysteine 20%  Inhalation 4 milliLiter(s) Inhalation every 6 hours  chlorhexidine 0.12% Liquid 15 milliLiter(s) Oral Mucosa two times a day  chlorhexidine 2% Cloths 1 Application(s) Topical daily  influenza  Vaccine (HIGH DOSE) 0.7 milliLiter(s) IntraMuscular once  pantoprazole  Injectable 40 milliGRAM(s) IV Push daily  sodium chloride 0.9% for Nebulization 3 milliLiter(s) Nebulizer every 6 hours  sodium chloride 3%  Inhalation 4 milliLiter(s) Inhalation every 6 hours    All other PRN medications:    Vital Signs Last 24 Hrs  T(C): 36.4 (13 Dec 2023 05:00), Max: 39.6 (12 Dec 2023 18:14)  T(F): 97.5 (13 Dec 2023 05:00), Max: 103.2 (12 Dec 2023 18:14)  HR: 54 (13 Dec 2023 04:20) (54 - 94)  BP: 103/51 (13 Dec 2023 04:20) (103/51 - 136/71)  BP(mean): 74 (13 Dec 2023 04:20) (73 - 98)  RR: 17 (13 Dec 2023 04:20) (17 - 19)  SpO2: 100% (13 Dec 2023 04:20) (98% - 100%)    Parameters below as of 13 Dec 2023 04:20  Patient On (Oxygen Delivery Method): tracheostomy collar  O2 Flow (L/min): 10  O2 Concentration (%): 40       @ 07:01  -   @ 07:00  --------------------------------------------------------  IN:    Lactated Ringers: 2300 mL  Total IN: 2300 mL    OUT:    Bulb (mL): 20 mL    VAC (Vacuum Assisted Closure) System (mL): 25 mL    Voided (mL): 1100 mL  Total OUT: 1145 mL    Total NET: 1155 mL          23 @ 07:01  -  23 @ 07:00  --------------------------------------------------------  IN:  Total IN: 0 mL    OUT:    Bulb (mL): 20 mL    VAC (Vacuum Assisted Closure) System (mL): 25 mL  Total OUT: 45 mL    Total NET: -45 mL              PHYSICAL EXAM:  Gen: AAOx3, NAD   Head: Surgical incision around chin extending up through lip  Eyes: EOMI, PERRL, visual acuity intact, no diplopia, supra/infra orbital rims intact, no subconjunctival heme, no telecanthus, no exophthalmos   Ears: Gross hearing intact,  Nose: No septal hematoma/asymmetry, no epistaxis bilaterally. no abrasions present, no lacerations.   Malar: No malar depression  Throat: No LAD, supple, neck surgical incision w/ steristrip dressing is hemostatic , trach in place w/ 7.5 cuffed portex, cuff deflated  Oral: Left FFF paddle dusk colored with brisk bleed, will continue to monitor,. IMF screws in place, class 3 elastics,   Exx: Wound vac left leg, left leg KRISTEN with sanguinous output      LABS                       7.5    6.58  )-----------( 169      ( 13 Dec 2023 05:30 )             24.2        142  |  109<H>  |  19  ----------------------------<  106<H>  4.1   |  28  |  0.60    Ca    8.0<L>      13 Dec 2023 05:30  Phos  3.1       Mg     2.0                Coagulation Studies-     Urinalysis Basic - ( 13 Dec 2023 05:30 )    Color: x / Appearance: x / SG: x / pH: x  Gluc: 106 mg/dL / Ketone: x  / Bili: x / Urobili: x   Blood: x / Protein: x / Nitrite: x   Leuk Esterase: x / RBC: x / WBC x   Sq Epi: x / Non Sq Epi: x / Bacteria: x      Endocrine Panel-  Calcium: 8.0 mg/dL ( @ 05:30)                MICROBIOLOGY:  Culture - Sputum (collected 23 @ 20:48)  Source: .Sputum  Gram Stain (23 @ 22:45):    Moderate epithelial cells    Moderate WBC's    Rare Gram Positive Rods    Few Gram Negative Rods    Few Gram Positive Cocci in Pairs and Chains  Final Report (23 @ 22:45):    Sputum specimen rejected.  Microscopic examination indicates    oropharyngeal contamination.  Please repeat.      Culture Results:   Sputum specimen rejected.  Microscopic examination indicates  oropharyngeal contamination.  Please repeat. (23 @ 20:48)      Culture - Sputum (collected 23 @ 20:48)  Source: .Sputum  Gram Stain (23 @ 22:45):    Moderate epithelial cells    Moderate WBC's    Rare Gram Positive Rods    Few Gram Negative Rods    Few Gram Positive Cocci in Pairs and Chains  Final Report (23 @ 22:45):    Sputum specimen rejected.  Microscopic examination indicates    oropharyngeal contamination.  Please repeat.

## 2023-12-14 LAB
ANION GAP SERPL CALC-SCNC: 8 MMOL/L — SIGNIFICANT CHANGE UP (ref 5–17)
ANION GAP SERPL CALC-SCNC: 8 MMOL/L — SIGNIFICANT CHANGE UP (ref 5–17)
BUN SERPL-MCNC: 17 MG/DL — SIGNIFICANT CHANGE UP (ref 7–23)
BUN SERPL-MCNC: 17 MG/DL — SIGNIFICANT CHANGE UP (ref 7–23)
CALCIUM SERPL-MCNC: 8.6 MG/DL — SIGNIFICANT CHANGE UP (ref 8.4–10.5)
CALCIUM SERPL-MCNC: 8.6 MG/DL — SIGNIFICANT CHANGE UP (ref 8.4–10.5)
CHLORIDE SERPL-SCNC: 106 MMOL/L — SIGNIFICANT CHANGE UP (ref 96–108)
CHLORIDE SERPL-SCNC: 106 MMOL/L — SIGNIFICANT CHANGE UP (ref 96–108)
CO2 SERPL-SCNC: 27 MMOL/L — SIGNIFICANT CHANGE UP (ref 22–31)
CO2 SERPL-SCNC: 27 MMOL/L — SIGNIFICANT CHANGE UP (ref 22–31)
CREAT SERPL-MCNC: 0.69 MG/DL — SIGNIFICANT CHANGE UP (ref 0.5–1.3)
CREAT SERPL-MCNC: 0.69 MG/DL — SIGNIFICANT CHANGE UP (ref 0.5–1.3)
EGFR: 101 ML/MIN/1.73M2 — SIGNIFICANT CHANGE UP
EGFR: 101 ML/MIN/1.73M2 — SIGNIFICANT CHANGE UP
GLUCOSE SERPL-MCNC: 118 MG/DL — HIGH (ref 70–99)
GLUCOSE SERPL-MCNC: 118 MG/DL — HIGH (ref 70–99)
HCT VFR BLD CALC: 25.6 % — LOW (ref 39–50)
HCT VFR BLD CALC: 25.6 % — LOW (ref 39–50)
HGB BLD-MCNC: 8.4 G/DL — LOW (ref 13–17)
HGB BLD-MCNC: 8.4 G/DL — LOW (ref 13–17)
MAGNESIUM SERPL-MCNC: 1.7 MG/DL — SIGNIFICANT CHANGE UP (ref 1.6–2.6)
MAGNESIUM SERPL-MCNC: 1.7 MG/DL — SIGNIFICANT CHANGE UP (ref 1.6–2.6)
MCHC RBC-ENTMCNC: 28.4 PG — SIGNIFICANT CHANGE UP (ref 27–34)
MCHC RBC-ENTMCNC: 28.4 PG — SIGNIFICANT CHANGE UP (ref 27–34)
MCHC RBC-ENTMCNC: 32.8 GM/DL — SIGNIFICANT CHANGE UP (ref 32–36)
MCHC RBC-ENTMCNC: 32.8 GM/DL — SIGNIFICANT CHANGE UP (ref 32–36)
MCV RBC AUTO: 86.5 FL — SIGNIFICANT CHANGE UP (ref 80–100)
MCV RBC AUTO: 86.5 FL — SIGNIFICANT CHANGE UP (ref 80–100)
MRSA PCR RESULT.: NEGATIVE — SIGNIFICANT CHANGE UP
MRSA PCR RESULT.: NEGATIVE — SIGNIFICANT CHANGE UP
NRBC # BLD: 0 /100 WBCS — SIGNIFICANT CHANGE UP (ref 0–0)
NRBC # BLD: 0 /100 WBCS — SIGNIFICANT CHANGE UP (ref 0–0)
PHOSPHATE SERPL-MCNC: 3.4 MG/DL — SIGNIFICANT CHANGE UP (ref 2.5–4.5)
PHOSPHATE SERPL-MCNC: 3.4 MG/DL — SIGNIFICANT CHANGE UP (ref 2.5–4.5)
PLATELET # BLD AUTO: 191 K/UL — SIGNIFICANT CHANGE UP (ref 150–400)
PLATELET # BLD AUTO: 191 K/UL — SIGNIFICANT CHANGE UP (ref 150–400)
POTASSIUM SERPL-MCNC: 3.9 MMOL/L — SIGNIFICANT CHANGE UP (ref 3.5–5.3)
POTASSIUM SERPL-MCNC: 3.9 MMOL/L — SIGNIFICANT CHANGE UP (ref 3.5–5.3)
POTASSIUM SERPL-SCNC: 3.9 MMOL/L — SIGNIFICANT CHANGE UP (ref 3.5–5.3)
POTASSIUM SERPL-SCNC: 3.9 MMOL/L — SIGNIFICANT CHANGE UP (ref 3.5–5.3)
RBC # BLD: 2.96 M/UL — LOW (ref 4.2–5.8)
RBC # BLD: 2.96 M/UL — LOW (ref 4.2–5.8)
RBC # FLD: 14 % — SIGNIFICANT CHANGE UP (ref 10.3–14.5)
RBC # FLD: 14 % — SIGNIFICANT CHANGE UP (ref 10.3–14.5)
S AUREUS DNA NOSE QL NAA+PROBE: NEGATIVE — SIGNIFICANT CHANGE UP
S AUREUS DNA NOSE QL NAA+PROBE: NEGATIVE — SIGNIFICANT CHANGE UP
SODIUM SERPL-SCNC: 141 MMOL/L — SIGNIFICANT CHANGE UP (ref 135–145)
SODIUM SERPL-SCNC: 141 MMOL/L — SIGNIFICANT CHANGE UP (ref 135–145)
WBC # BLD: 6.91 K/UL — SIGNIFICANT CHANGE UP (ref 3.8–10.5)
WBC # BLD: 6.91 K/UL — SIGNIFICANT CHANGE UP (ref 3.8–10.5)
WBC # FLD AUTO: 6.91 K/UL — SIGNIFICANT CHANGE UP (ref 3.8–10.5)
WBC # FLD AUTO: 6.91 K/UL — SIGNIFICANT CHANGE UP (ref 3.8–10.5)

## 2023-12-14 PROCEDURE — 99233 SBSQ HOSP IP/OBS HIGH 50: CPT | Mod: GC

## 2023-12-14 PROCEDURE — 99231 SBSQ HOSP IP/OBS SF/LOW 25: CPT

## 2023-12-14 RX ORDER — MAGNESIUM SULFATE 500 MG/ML
1 VIAL (ML) INJECTION ONCE
Refills: 0 | Status: COMPLETED | OUTPATIENT
Start: 2023-12-14 | End: 2023-12-14

## 2023-12-14 RX ORDER — IPRATROPIUM/ALBUTEROL SULFATE 18-103MCG
3 AEROSOL WITH ADAPTER (GRAM) INHALATION EVERY 6 HOURS
Refills: 0 | Status: DISCONTINUED | OUTPATIENT
Start: 2023-12-14 | End: 2023-12-21

## 2023-12-14 RX ORDER — ACETAMINOPHEN 500 MG
1000 TABLET ORAL ONCE
Refills: 0 | Status: COMPLETED | OUTPATIENT
Start: 2023-12-14 | End: 2023-12-15

## 2023-12-14 RX ORDER — VANCOMYCIN HCL 1 G
1250 VIAL (EA) INTRAVENOUS EVERY 12 HOURS
Refills: 0 | Status: DISCONTINUED | OUTPATIENT
Start: 2023-12-14 | End: 2023-12-14

## 2023-12-14 RX ORDER — VANCOMYCIN HCL 1 G
1250 VIAL (EA) INTRAVENOUS EVERY 12 HOURS
Refills: 0 | Status: DISCONTINUED | OUTPATIENT
Start: 2023-12-14 | End: 2023-12-15

## 2023-12-14 RX ADMIN — HYDROMORPHONE HYDROCHLORIDE 0.5 MILLIGRAM(S): 2 INJECTION INTRAMUSCULAR; INTRAVENOUS; SUBCUTANEOUS at 21:58

## 2023-12-14 RX ADMIN — PIPERACILLIN AND TAZOBACTAM 25 GRAM(S): 4; .5 INJECTION, POWDER, LYOPHILIZED, FOR SOLUTION INTRAVENOUS at 06:06

## 2023-12-14 RX ADMIN — SODIUM CHLORIDE 4 MILLILITER(S): 9 INJECTION INTRAMUSCULAR; INTRAVENOUS; SUBCUTANEOUS at 18:15

## 2023-12-14 RX ADMIN — CHLORHEXIDINE GLUCONATE 15 MILLILITER(S): 213 SOLUTION TOPICAL at 18:14

## 2023-12-14 RX ADMIN — SODIUM CHLORIDE 4 MILLILITER(S): 9 INJECTION INTRAMUSCULAR; INTRAVENOUS; SUBCUTANEOUS at 11:30

## 2023-12-14 RX ADMIN — Medication 100 GRAM(S): at 08:52

## 2023-12-14 RX ADMIN — HYDROMORPHONE HYDROCHLORIDE 0.5 MILLIGRAM(S): 2 INJECTION INTRAMUSCULAR; INTRAVENOUS; SUBCUTANEOUS at 06:26

## 2023-12-14 RX ADMIN — PIPERACILLIN AND TAZOBACTAM 25 GRAM(S): 4; .5 INJECTION, POWDER, LYOPHILIZED, FOR SOLUTION INTRAVENOUS at 13:02

## 2023-12-14 RX ADMIN — SODIUM CHLORIDE 3 MILLILITER(S): 9 INJECTION INTRAMUSCULAR; INTRAVENOUS; SUBCUTANEOUS at 18:15

## 2023-12-14 RX ADMIN — SODIUM CHLORIDE 3 MILLILITER(S): 9 INJECTION INTRAMUSCULAR; INTRAVENOUS; SUBCUTANEOUS at 02:08

## 2023-12-14 RX ADMIN — HYDROMORPHONE HYDROCHLORIDE 0.5 MILLIGRAM(S): 2 INJECTION INTRAMUSCULAR; INTRAVENOUS; SUBCUTANEOUS at 13:42

## 2023-12-14 RX ADMIN — Medication 3 MILLILITER(S): at 18:17

## 2023-12-14 RX ADMIN — HYDROMORPHONE HYDROCHLORIDE 0.5 MILLIGRAM(S): 2 INJECTION INTRAMUSCULAR; INTRAVENOUS; SUBCUTANEOUS at 01:31

## 2023-12-14 RX ADMIN — Medication 4 MILLILITER(S): at 18:14

## 2023-12-14 RX ADMIN — HYDROMORPHONE HYDROCHLORIDE 0.5 MILLIGRAM(S): 2 INJECTION INTRAMUSCULAR; INTRAVENOUS; SUBCUTANEOUS at 22:40

## 2023-12-14 RX ADMIN — CHLORHEXIDINE GLUCONATE 15 MILLILITER(S): 213 SOLUTION TOPICAL at 06:06

## 2023-12-14 RX ADMIN — SODIUM CHLORIDE 4 MILLILITER(S): 9 INJECTION INTRAMUSCULAR; INTRAVENOUS; SUBCUTANEOUS at 06:06

## 2023-12-14 RX ADMIN — Medication 4 MILLILITER(S): at 00:31

## 2023-12-14 RX ADMIN — SODIUM CHLORIDE 4 MILLILITER(S): 9 INJECTION INTRAMUSCULAR; INTRAVENOUS; SUBCUTANEOUS at 00:31

## 2023-12-14 RX ADMIN — PANTOPRAZOLE SODIUM 40 MILLIGRAM(S): 20 TABLET, DELAYED RELEASE ORAL at 11:31

## 2023-12-14 RX ADMIN — HYDROMORPHONE HYDROCHLORIDE 0.5 MILLIGRAM(S): 2 INJECTION INTRAMUSCULAR; INTRAVENOUS; SUBCUTANEOUS at 13:02

## 2023-12-14 RX ADMIN — Medication 4 MILLILITER(S): at 06:06

## 2023-12-14 RX ADMIN — HYDROMORPHONE HYDROCHLORIDE 0.5 MILLIGRAM(S): 2 INJECTION INTRAMUSCULAR; INTRAVENOUS; SUBCUTANEOUS at 06:15

## 2023-12-14 RX ADMIN — SODIUM CHLORIDE 4 MILLILITER(S): 9 INJECTION INTRAMUSCULAR; INTRAVENOUS; SUBCUTANEOUS at 23:54

## 2023-12-14 RX ADMIN — Medication 4 MILLILITER(S): at 23:54

## 2023-12-14 RX ADMIN — Medication 3 MILLILITER(S): at 11:30

## 2023-12-14 RX ADMIN — ENOXAPARIN SODIUM 40 MILLIGRAM(S): 100 INJECTION SUBCUTANEOUS at 11:31

## 2023-12-14 RX ADMIN — Medication 166.67 MILLIGRAM(S): at 15:09

## 2023-12-14 RX ADMIN — Medication 3 MILLILITER(S): at 23:55

## 2023-12-14 RX ADMIN — HYDROMORPHONE HYDROCHLORIDE 0.5 MILLIGRAM(S): 2 INJECTION INTRAMUSCULAR; INTRAVENOUS; SUBCUTANEOUS at 00:43

## 2023-12-14 RX ADMIN — PIPERACILLIN AND TAZOBACTAM 25 GRAM(S): 4; .5 INJECTION, POWDER, LYOPHILIZED, FOR SOLUTION INTRAVENOUS at 21:58

## 2023-12-14 RX ADMIN — SODIUM CHLORIDE 3 MILLILITER(S): 9 INJECTION INTRAMUSCULAR; INTRAVENOUS; SUBCUTANEOUS at 11:30

## 2023-12-14 RX ADMIN — Medication 4 MILLILITER(S): at 11:31

## 2023-12-14 NOTE — PROGRESS NOTE ADULT - ASSESSMENT
Assessment and Plan:    SAUNDRA HOLMAN is a  68M w/ [id number] SCC of L buccal mucosa presents for pre optimization for surgery 12/7, now s/p hemimandibulectomy, left level 1, 2a, 3 neck neck dissection, L FFF, tracheostomy w/ 7.5 cuffed portex, dental implants, STSG 12/7. Intermittent fevers 12/12 afternoon.       PLAN  - NPO tonight, plan for g tube 12/15    Monitor for any signs of oral bleeding   OOBTC   Plan to ambulate 3-4x a day   - Continue nebulized solution for thick trach secretions   Continue Ondansetron PRN nausea/vomiting , PO Senna once daily, Miralax 17g once daily,  Chlorhexidine swish and spit, Esomeprazole, Enoxaparin, Gabapentin, Acetaminophen   Continue SCD’s    Possible trach change after G tube --- G tube pending afebrile 24 hours   Cardiology_ plavix after g tube, transfuse if hgb <8   Continue to work with SLP for trial of clears  PMV as tolerated      [facility] at [phone] with any questions/concerns.    Ada Quinones PA-C  12-14-23 @ 14:28   Assessment and Plan:    SAUNDRA HOLMAN is a  68M w/ [id number] SCC of L buccal mucosa presents for pre optimization for surgery 12/7, now s/p hemimandibulectomy, left level 1, 2a, 3 neck neck dissection, L FFF, tracheostomy w/ 7.5 cuffed portex, dental implants, STSG 12/7. Intermittent fevers 12/12 afternoon.       PLAN  - NPO tonight, plan for g tube 12/15    Monitor for any signs of oral bleeding   OOBTC   Plan to ambulate 3-4x a day   - Continue nebulized solution for thick trach secretions   Continue Ondansetron PRN nausea/vomiting , PO Senna once daily, Miralax 17g once daily,  Chlorhexidine swish and spit, Esomeprazole, Enoxaparin, Gabapentin, Acetaminophen   Continue SCD’s    Possible trach change after G tube --- G tube pending afebrile 24 hours   Cardiology_ plavix after g tube, transfuse if hgb <8   Continue to work with SLP for trial of clears  PMV as tolerated      [facility] at [phone] with any questions/concerns.    Ada Quinones PA-C  12-14-23 @ 14:28

## 2023-12-14 NOTE — PROGRESS NOTE ADULT - ASSESSMENT
69 y/o M w PMH of CAD (x2 stents Mar 2023), HLD, T2DM, hx of kidney stones, psoriasis presents to [facility] for evaluation of oral mass and 3 months of jaw pain a/f OMFS mandibular resection and flap reconstruction on Thursday. Medicine initially evaluated on 12/6 for pre-op assessment Now following for co-management.     #SIRS, suspected to be 2/2 to pneumonia  pt febrile on 12/12 to 103 rectally, with HR of 90s. No leukocytosis on AM labs today. Pt notes shortness of breath with increased sputum production. No urinary complaints or diarrhea. Wounds appear clean  BCx - NGTD (@ 12 hours)  initial sputum cx contaminated, repeat 12/13 pending  CXR with no new infiltrate. UA negative for infection  At this time, recommend continuing empiric treatment of pneumonia: c/w Zosyn 4.5mg IV q8h. Patient received 1x dose of Vancomycin 1250mg at 6PM at 12/13. Discussed with primary team to order for an additional 2 doses with plan for Vanc trough to be obtained prior to the 4th dose   please obtain MRSA swab    #s/p mandibular resection and flap reconstruction  pt tolerated procedure well. KRISTEN drains removed by primary team  c/w q4h flap checks  Pt unable to tolerate PO intake at this time, pending G tube placement w/ surgery (once afebrile for 24 hours)  c/w S&S evaluation  recommend early mobilization, OOBTC, PT evaluation. recommend addition of PRN Duonebs to aid with airway clearance    #CAD  pt with hx of CAD s/p 2 stents in March 2023  Cardiology consulted; reccs appreciated  on lipitor and ASA. Per cardiology, given that intervention was approx 9 months ago, ok for monotherapy at this time. Ongoing discussion with cardiology and primary team when safe to resume plavix    #anemia  Hgb stable at 8, previously was 13-14  no signs of active bleed  per iron studies, consistent with WADE. However given concern for active infection, will hold off on IV iron at this time  maintain active T&S  per cardiology recommendations, transfusion goal to Hgb >8    #DM2  A1c 6.3%, takes home metformin and jardiance. FS   - c/w current diet, FS checks and ISS. Goal -180 while inpatient    Medicine will continue to follow. Patient seen, evaluated, and discussed with attending Dr. Motnero 69 y/o M w PMH of CAD (x2 stents Mar 2023), HLD, T2DM, hx of kidney stones, psoriasis presents to [facility] for evaluation of oral mass and 3 months of jaw pain a/f OMFS mandibular resection and flap reconstruction on Thursday. Medicine initially evaluated on 12/6 for pre-op assessment Now following for co-management.     #SIRS, suspected to be 2/2 to pneumonia  pt febrile on 12/12 to 103 rectally, with HR of 90s. No leukocytosis on AM labs today. Pt notes shortness of breath with increased sputum production. No urinary complaints or diarrhea. Wounds appear clean  BCx - NGTD (@ 12 hours)  initial sputum cx contaminated, repeat 12/13 pending  CXR with no new infiltrate. UA negative for infection  At this time, recommend continuing empiric treatment of pneumonia: c/w Zosyn 4.5mg IV q8h. Patient received 1x dose of Vancomycin 1250mg at 6PM at 12/13. Discussed with primary team to order for an additional 2 doses with plan for Vanc trough to be obtained prior to the 4th dose   please obtain MRSA swab    #s/p mandibular resection and flap reconstruction  pt tolerated procedure well. KRISTEN drains removed by primary team  c/w q4h flap checks  Pt unable to tolerate PO intake at this time, pending G tube placement w/ surgery (once afebrile for 24 hours)  c/w S&S evaluation  recommend early mobilization, OOBTC, PT evaluation. recommend addition of PRN Duonebs to aid with airway clearance    #CAD  pt with hx of CAD s/p 2 stents in March 2023  Cardiology consulted; reccs appreciated  on lipitor and ASA. Per cardiology, given that intervention was approx 9 months ago, ok for monotherapy at this time. Ongoing discussion with cardiology and primary team when safe to resume plavix    #anemia  Hgb stable at 8, previously was 13-14  no signs of active bleed  per iron studies, consistent with WADE. However given concern for active infection, will hold off on IV iron at this time  maintain active T&S  per cardiology recommendations, transfusion goal to Hgb >8    #DM2  A1c 6.3%, takes home metformin and jardiance. FS   - c/w current diet, FS checks and ISS. Goal -180 while inpatient    Medicine will continue to follow. Patient seen, evaluated, and discussed with attending Dr. Montero

## 2023-12-14 NOTE — PROVIDER CONTACT NOTE (OTHER) - RECOMMENDATIONS
If HR ok please change parameter to include acceptable HR
Order for Tylenol IV, and Blood cultures
Delay blood transfusion
Currently awaiting additional orders, will notify provider for change in patient's condition
ALFONSO Oglesby came to see pt.

## 2023-12-14 NOTE — PROVIDER CONTACT NOTE (OTHER) - DATE AND TIME:
08-Dec-2023 18:30
13-Dec-2023 13:51
14-Dec-2023 22:00
13-Dec-2023 09:00
12-Dec-2023 17:50
13-Dec-2023 05:02
12-Dec-2023 01:25
09-Dec-2023 14:00
14-Dec-2023 11:16

## 2023-12-14 NOTE — PROVIDER CONTACT NOTE (OTHER) - ACTION/TREATMENT ORDERED:
Alexis Caballero still give blood transfusion. Do not delay. Give tylenol IV.
As per MD they will assess pt during rounds for now feed have been held; MD will order stat Pantoprazole.
Tylenol ordered, Bld cultures, sputum culture, urine sample and blood work sent to the lab as per Zen RUBIO. Will continue to monitor.
no interventions for now, will continue monitor.
Will notify provider for change in patient's condition
plan of care on going no further intervention per ALFONSO Oglesby
Dr. Mendelson stated there was not much to do besides the trach cuff to be inflated with 5cc of air (done by respiratory) and to keep applying pressure to the oral incision site
Per [facility]ology evaluated patient and HR is acceptable for PT, will change parameter
When pt becomes more awake HR increases to 50s. No interventions at present per PA. Will continue to monitor.

## 2023-12-14 NOTE — PROGRESS NOTE ADULT - ASSESSMENT
69 y/o M w PMH of CAD (x2 stents Mar 2023), HLD, T2DM, nephrolithisais (s/p ureteral stent placement and removal), psoriasis and no PSH of intraabdominal surgery, presented to [facility] (12/3/23) for evaluation of oral mass. Pt now POD4 s/p composite mandibular resection, neck dissection, fibula free flap reconstruction and tracheostomy. Primary team has had difficulty starting feeds w/ NGT due to difficult placement and pt will require long term nutritional supplementation as current plan for adjuvant radiation. Surgery consulted for placement of gastric feeding tube; endoscopic placement not possible due to jaw banding, pt will require laparoscopic approach for placement of gastric tube. Pt to be added on to OR today for placement. Cardiology currently following for recent stent placement 9mos prior, appreciate recommendations and preoperative risk stratification.     Laparoscopic G tube with Dr. Raymond once the patient remains afebrile for at least 24 hours and the fever work up is completed (Possible 12/15)  Cardiology following; recs appreciated and preoperative risk stratification   Type and Screen x2, Coags  Hold plavix   maintain NPO status   If any questions please call- [phone], extension [phone]  Thank you for the consult, appreciate excellent care by primary team.  67 y/o M w PMH of CAD (x2 stents Mar 2023), HLD, T2DM, nephrolithisais (s/p ureteral stent placement and removal), psoriasis and no PSH of intraabdominal surgery, presented to [facility] (12/3/23) for evaluation of oral mass. Pt now POD4 s/p composite mandibular resection, neck dissection, fibula free flap reconstruction and tracheostomy. Primary team has had difficulty starting feeds w/ NGT due to difficult placement and pt will require long term nutritional supplementation as current plan for adjuvant radiation. Surgery consulted for placement of gastric feeding tube; endoscopic placement not possible due to jaw banding, pt will require laparoscopic approach for placement of gastric tube. Pt to be added on to OR today for placement. Cardiology currently following for recent stent placement 9mos prior, appreciate recommendations and preoperative risk stratification.     Laparoscopic G tube with Dr. Raymond once the patient remains afebrile for at least 24 hours and the fever work up is completed (Possible 12/15)  Cardiology following; recs appreciated and preoperative risk stratification   Type and Screen x2, Coags  Hold plavix   maintain NPO status   If any questions please call- [phone], extension [phone]  Thank you for the consult, appreciate excellent care by primary team.

## 2023-12-14 NOTE — PROGRESS NOTE ADULT - ATTENDING COMMENTS
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 Dean.  seen sitting on bedside chair, ambulated.  awake, alert, trach with trach cuff ( humidified oxygen )  RRR  bs present soft,  Graft donar site on left thigh -clean  wound vac on left fibular area   no edema noted on lower ext.     fever- unclear source yet, reported thick secretion  blood cx- drawn.   chest x ray -no clear sign to suggest pneumonia.  fever delaying the G tube placement   - would empirically treat with broad-spectrum antibiotics with Vancomycin and Zosyn ( dosing as above )   fu MRSA swab.  - to repeat respiratory culture from trach.   - no urinary symptoms.  - oral care as per ENT.   would keep gus-nebs q 6 ( to help loosen up secretion)  continue with trach suction     - Anemia work up noted, iron saturation is 10 , ferritin elevated likely from inflammatory process.  currently with fever, would hold off on iv iron therapy, can consider when source identified or fever resolved.  - DM - FS with ISS, caution for Hypoglycemia.  dw Dr. Dean, 68M w/ [id number] SCC of L buccal mucosa presents for pre optimization for surgery 12/7, now s/p hemimandibulectomy, left level 1, 2a, 3 neck neck dissection, L FFF, tracheostomy w/ 7.5 cuffed portex, dental implants, STSG 12/7- plan for G tube placement postponed due to fever 12/12- blood cx drawn, respiratory culture likely contamination -reported thick sputum from trach after nebs    pt seen and examined with Dr. Dean.  seen sitting on bedside chair, ambulated.  awake, alert, trach with trach cuff ( humidified oxygen )  RRR  bs present soft,  Graft donar site on left thigh -clean  wound vac on left fibular area   no edema noted on lower ext.     fever- unclear source yet, reported thick secretion  blood cx- drawn.   chest x ray -no clear sign to suggest pneumonia.  fever delaying the G tube placement   - would empirically treat with broad-spectrum antibiotics with Vancomycin and Zosyn ( dosing as above )   fu MRSA swab.  - to repeat respiratory culture from trach.   - no urinary symptoms.  - oral care as per ENT.   would keep gus-nebs q 6 ( to help loosen up secretion)  continue with trach suction     - Anemia work up noted, iron saturation is 10 , ferritin elevated likely from inflammatory process.  currently with fever, would hold off on iv iron therapy, can consider when source identified or fever resolved.  - DM - FS with ISS, caution for Hypoglycemia.  dw Dr. Dean,

## 2023-12-14 NOTE — PROVIDER CONTACT NOTE (OTHER) - REASON
Bradycardia
Pt has AIVR/ couplets PVCs HR down to 40
Patient is febrile
Pt bleeding from mouth and bloody tracheal secretions
pt NGT no longer sutured
Pt sinus chinedu to 42 with PVC's
Pt elevated Temp and about to have blood transfusion
Elevated temp
pt was spiting blood from mouth

## 2023-12-14 NOTE — CHART NOTE - NSCHARTNOTEFT_GEN_A_CORE
Admitting Diagnosis:   Patient is a 68y old  Male who presents with a chief complaint of Oral Ca (14 Dec 2023 08:30)      PAST MEDICAL & SURGICAL HISTORY:  Neoplasm of mandible      CURRENT NUTRITION ORDER:   - NPO    - NKFA   - D5% + Sodium Chloride 0.9% 1000 mL @ 100 mL/hr (provides 170 kcal per 1000 mL)     PO INTAKE:  % [  ]       50-75% [  ]       25-50% [  ]       <25% [  ]       N/A [ xx ]     GI ISSUES:  denies any nausea/vomiting; last bowel movement documented 12/5 - team notified; NG tube removed; pt will require long term nutritional supplementation as current plan for adjuvant radiation - gastric tube placement via laparoscopic approach tentatively scheduled for 12/14    PAIN:  complaints of severe pain at neck/face (8/10)     SKIN INTEGRITY: Wong scale score 18; generalized facial swelling; no edema documented; Intact except for surgical incisions Lt thigh, chin/midline neck, Lt lower leg; skin flap Lt cheek         LABS:  12-14    141  |  106  |  17  ----------------------------<  118<H>  3.9   |  27  |  0.69    Ca    8.6      14 Dec 2023 05:30  Phos  3.4     12-14  Mg     1.7     12-14      CAPILLARY BLOOD GLUCOSE  POCT Blood Glucose.: 102 mg/dL (13 Dec 2023 11:36)      MEDICATIONS  (STANDING):  acetylcysteine 20%  Inhalation 4 milliLiter(s) Inhalation every 6 hours  albuterol/ipratropium for Nebulization 3 milliLiter(s) Nebulizer every 6 hours  chlorhexidine 0.12% Liquid 15 milliLiter(s) Oral Mucosa two times a day  chlorhexidine 2% Cloths 1 Application(s) Topical daily  dextrose 5% + sodium chloride 0.45% 1000 milliLiter(s) (100 mL/Hr) IV Continuous <Continuous>  dextrose 5%. 1000 milliLiter(s) (50 mL/Hr) IV Continuous <Continuous>  dextrose 5%. 1000 milliLiter(s) (100 mL/Hr) IV Continuous <Continuous>  dextrose 50% Injectable 25 Gram(s) IV Push once  dextrose 50% Injectable 25 Gram(s) IV Push once  dextrose 50% Injectable 12.5 Gram(s) IV Push once  enoxaparin Injectable 40 milliGRAM(s) SubCutaneous every 24 hours  glucagon  Injectable 1 milliGRAM(s) IntraMuscular once  influenza  Vaccine (HIGH DOSE) 0.7 milliLiter(s) IntraMuscular once  pantoprazole  Injectable 40 milliGRAM(s) IV Push daily  piperacillin/tazobactam IVPB.. 4.5 Gram(s) IV Intermittent every 8 hours  sodium chloride 0.9% for Nebulization 3 milliLiter(s) Nebulizer every 6 hours  sodium chloride 3%  Inhalation 4 milliLiter(s) Inhalation every 6 hours  vancomycin  IVPB 1250 milliGRAM(s) IV Intermittent every 12 hours    MEDICATIONS  (PRN):  acetaminophen   IVPB .. 1000 milliGRAM(s) IV Intermittent once PRN Temp greater or equal to 38C (100.4F), Mild Pain (1 - 3)  dextrose Oral Gel 15 Gram(s) Oral once PRN Blood Glucose LESS THAN 70 milliGRAM(s)/deciliter  HYDROmorphone  Injectable 0.5 milliGRAM(s) IV Push every 4 hours PRN Breakthrough  ondansetron Injectable 4 milliGRAM(s) IV Push every 6 hours PRN Nausea and/or Vomiting        ANTHROPOMETRICS:   Height (inches):  70   Weight (pounds):  185 (12/12)   BMI (kg/m^2):  26.5   IBW (pounds):  166 +/- 10%   %IBW:  110.2 %     WEIGHT CHANGE: patient weight stable throughout admission    ESTIMATED NUTRIENT NEEDS:   Calories:  (22-28 kcal/kg) 3808-9636 kcal   Protein:  (1.1-1.4 g/kg)  g   Fluids:  1 mL/kcal or at team’s discretion   Current body weight used to calculate energy needs due to pt's current body weight within % ideal body weight. Needs adjusted for age and current clinical status.       SUBJECTIVE:   69 y/o M w PMHx of CAD (x2 stents Mar 2023), HLD, T2DM, nephrolithiasis (ureteral stent placement 2022), and psoriasis w/ three-month hx of jaw pain and loosening of lower left mandibular molar tooth. Pt has a 30 pack yr smoking hx, quit 1 yr ago. 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 12/7 s/p L hemimandibulectomy, L level 1, 2a, 3 neck dissection, L fibula free flap reconstruction, STSG from L thigh, dental implants, and tracheostomy. Admitted to SICU for flap checks and hemodynamic monitoring. Course c/b DHT.     Chart, meds, and labs reviewed. Tmax 38.2 C. MAPs 75-94 mm Hg. Meds significant for ondansetron. Labs significant for H/H 8.4/25.6L;  glucose 118H: POCT 102-134H. Met with patient on 8 [facility] on 12/13. Patient was resting comfortably in the chair. His wife Sara, son Beltran, and daughter-in-law Elena were present throughout the nutrition interview/education. Patient with tracheostomy. Patient failed dysphagia screen for advancement to clears on 12/12.     PREVIOUS NUTRITION DIAGNOSIS: Inadequate Energy Intake related to decreased ability to consume sufficient energy as evidenced by NPO status, meeting 0% of estimated needs    Active [ xx ]       Resolved [  ]     RECOMMENDATIONS:   - When medically feasible, recommend the following tube feed regimen via G-tube:     >> 1422 mL total volume (6 bottles daily) of Glucerna (provides 2136 kcal, 118 g protein, 1080 mL free fluid)    >> Start at 10 mL/hr and increase by 20 mL/hr q6h to goal rate of 79 mL/hr; or as tolerated     >> At goal rate, it will take approximately 18 hours/day to infuse total volume    >> Flush with 50 mL water before and after tube feed administration (provides an additional 100 mL)     >> Additional free water boluses as per team     >> Hold feeds if increase in pressor requirements, MAPs consistently trending <60 mmHg, lactic acidosis presents, or GI intolerance is noted   - Monitor tube feed tolerance, GI distress, labs, weights   - Monitor electrolytes, adjust and replete PRN   - Pain and bowel regimen as per team     - After tube feed has been tolerated at goal rate for a minimum of 24 hours, may transition to bolus feeds if desired; see recommendations below:     >> Bolus schedule per patient/caregiver preference. Sample bolus schedule below     >> Bolus 2 cans (474 mL) of Glucerna 1.5 TID @ 0900, 1500, 2100     >> Flush with 50 mL water before and after each bolus (provides an additional 300 mL)     >> The above provides 2136 kcal, 118 g protein, 1380 mL free fluid and meets all macro- and micronutrient needs     >> Additional free water boluses as per team  - The above recommendations were communicated with the team     EDUCATION:  Pt and family educated on feeding via G-tube via pump and bolus methods; they expressed understanding; all questions and concerns addressed to their satisfaction     RISK LEVEL:  High [ xx ]       Moderate [  ]       Low [  ]     INOCENCIA Kong, MS, RD, CDN [id number] Admitting Diagnosis:   Patient is a 68y old  Male who presents with a chief complaint of Oral Ca (14 Dec 2023 08:30)      PAST MEDICAL & SURGICAL HISTORY:  Neoplasm of mandible      CURRENT NUTRITION ORDER:   - NPO    - NKFA   - D5% + Sodium Chloride 0.9% 1000 mL @ 100 mL/hr (provides 170 kcal per 1000 mL)     PO INTAKE:  % [  ]       50-75% [  ]       25-50% [  ]       <25% [  ]       N/A [ xx ]     GI ISSUES:  denies any nausea/vomiting; last bowel movement documented 12/5 - team notified; NG tube removed; pt will require long term nutritional supplementation as current plan for adjuvant radiation - gastric tube placement via laparoscopic approach tentatively scheduled for 12/14    PAIN:  complaints of severe pain at neck/face (8/10)     SKIN INTEGRITY: Wong scale score 18; generalized facial swelling; no edema documented; Intact except for surgical incisions Lt thigh, chin/midline neck, Lt lower leg; skin flap Lt cheek         LABS:  12-14    141  |  106  |  17  ----------------------------<  118<H>  3.9   |  27  |  0.69    Ca    8.6      14 Dec 2023 05:30  Phos  3.4     12-14  Mg     1.7     12-14      CAPILLARY BLOOD GLUCOSE  POCT Blood Glucose.: 102 mg/dL (13 Dec 2023 11:36)      MEDICATIONS  (STANDING):  acetylcysteine 20%  Inhalation 4 milliLiter(s) Inhalation every 6 hours  albuterol/ipratropium for Nebulization 3 milliLiter(s) Nebulizer every 6 hours  chlorhexidine 0.12% Liquid 15 milliLiter(s) Oral Mucosa two times a day  chlorhexidine 2% Cloths 1 Application(s) Topical daily  dextrose 5% + sodium chloride 0.45% 1000 milliLiter(s) (100 mL/Hr) IV Continuous <Continuous>  dextrose 5%. 1000 milliLiter(s) (50 mL/Hr) IV Continuous <Continuous>  dextrose 5%. 1000 milliLiter(s) (100 mL/Hr) IV Continuous <Continuous>  dextrose 50% Injectable 25 Gram(s) IV Push once  dextrose 50% Injectable 25 Gram(s) IV Push once  dextrose 50% Injectable 12.5 Gram(s) IV Push once  enoxaparin Injectable 40 milliGRAM(s) SubCutaneous every 24 hours  glucagon  Injectable 1 milliGRAM(s) IntraMuscular once  influenza  Vaccine (HIGH DOSE) 0.7 milliLiter(s) IntraMuscular once  pantoprazole  Injectable 40 milliGRAM(s) IV Push daily  piperacillin/tazobactam IVPB.. 4.5 Gram(s) IV Intermittent every 8 hours  sodium chloride 0.9% for Nebulization 3 milliLiter(s) Nebulizer every 6 hours  sodium chloride 3%  Inhalation 4 milliLiter(s) Inhalation every 6 hours  vancomycin  IVPB 1250 milliGRAM(s) IV Intermittent every 12 hours    MEDICATIONS  (PRN):  acetaminophen   IVPB .. 1000 milliGRAM(s) IV Intermittent once PRN Temp greater or equal to 38C (100.4F), Mild Pain (1 - 3)  dextrose Oral Gel 15 Gram(s) Oral once PRN Blood Glucose LESS THAN 70 milliGRAM(s)/deciliter  HYDROmorphone  Injectable 0.5 milliGRAM(s) IV Push every 4 hours PRN Breakthrough  ondansetron Injectable 4 milliGRAM(s) IV Push every 6 hours PRN Nausea and/or Vomiting        ANTHROPOMETRICS:   Height (inches):  70   Weight (pounds):  185 (12/12)   BMI (kg/m^2):  26.5   IBW (pounds):  166 +/- 10%   %IBW:  110.2 %     WEIGHT CHANGE: patient weight stable throughout admission    ESTIMATED NUTRIENT NEEDS:   Calories:  (22-28 kcal/kg) 9200-1476 kcal   Protein:  (1.1-1.4 g/kg)  g   Fluids:  1 mL/kcal or at team’s discretion   Current body weight used to calculate energy needs due to pt's current body weight within % ideal body weight. Needs adjusted for age and current clinical status.       SUBJECTIVE:   67 y/o M w PMHx of CAD (x2 stents Mar 2023), HLD, T2DM, nephrolithiasis (ureteral stent placement 2022), and psoriasis w/ three-month hx of jaw pain and loosening of lower left mandibular molar tooth. Pt has a 30 pack yr smoking hx, quit 1 yr ago. 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 12/7 s/p L hemimandibulectomy, L level 1, 2a, 3 neck dissection, L fibula free flap reconstruction, STSG from L thigh, dental implants, and tracheostomy. Admitted to SICU for flap checks and hemodynamic monitoring. Course c/b DHT.     Chart, meds, and labs reviewed. Tmax 38.2 C. MAPs 75-94 mm Hg. Meds significant for ondansetron. Labs significant for H/H 8.4/25.6L;  glucose 118H: POCT 102-134H. Met with patient on 8 [facility] on 12/13. Patient was resting comfortably in the chair. His wife Sara, son Beltran, and daughter-in-law Elena were present throughout the nutrition interview/education. Patient with tracheostomy. Patient failed dysphagia screen for advancement to clears on 12/12.     PREVIOUS NUTRITION DIAGNOSIS: Inadequate Energy Intake related to decreased ability to consume sufficient energy as evidenced by NPO status, meeting 0% of estimated needs    Active [ xx ]       Resolved [  ]     RECOMMENDATIONS:   - When medically feasible, recommend the following tube feed regimen via G-tube:     >> 1422 mL total volume (6 bottles daily) of Glucerna (provides 2136 kcal, 118 g protein, 1080 mL free fluid)    >> Start at 10 mL/hr and increase by 20 mL/hr q6h to goal rate of 79 mL/hr; or as tolerated     >> At goal rate, it will take approximately 18 hours/day to infuse total volume    >> Flush with 50 mL water before and after tube feed administration (provides an additional 100 mL)     >> Additional free water boluses as per team     >> Hold feeds if increase in pressor requirements, MAPs consistently trending <60 mmHg, lactic acidosis presents, or GI intolerance is noted   - Monitor tube feed tolerance, GI distress, labs, weights   - Monitor electrolytes, adjust and replete PRN   - Pain and bowel regimen as per team     - After tube feed has been tolerated at goal rate for a minimum of 24 hours, may transition to bolus feeds if desired; see recommendations below:     >> Bolus schedule per patient/caregiver preference. Sample bolus schedule below     >> Bolus 2 cans (474 mL) of Glucerna 1.5 TID @ 0900, 1500, 2100     >> Flush with 50 mL water before and after each bolus (provides an additional 300 mL)     >> The above provides 2136 kcal, 118 g protein, 1380 mL free fluid and meets all macro- and micronutrient needs     >> Additional free water boluses as per team  - The above recommendations were communicated with the team     EDUCATION:  Pt and family educated on feeding via G-tube via pump and bolus methods; they expressed understanding; all questions and concerns addressed to their satisfaction     RISK LEVEL:  High [ xx ]       Moderate [  ]       Low [  ]     INOCENCIA Kong, MS, RD, CDN [id number]

## 2023-12-14 NOTE — PROVIDER CONTACT NOTE (OTHER) - ASSESSMENT
/64, HR 54, SpO2 100% on 10L 40% trach collar  Pt bleeding from oral incision between lower lip and gums where a screw was placed, pt coughing up bloody secretions from tracheostomy, gurgling sound heard in pt's throat
Neuro exam at baseline. HR 48 /53 RR 17 O2Sat 98% on trach collar 40% FiO2.
pt was spiting blood from mouth , that spills out of the mouth with movement
temp 103.2F rectally.
Patient's temp taken rectally was 101.9, /60 (87), HR 64, SPO2 99, RR 12 on trach collar, patient recently given 1g standing Tylenol IV and PRN Dilaudid 0.5mg for severe pain, patient currently denies pain and not in acute distress, ice packs in place, MD Ortiz made aware
pt complaining  about burning sensation; states he has been feeling like this since 12/11 last night;   NGT was taped to center of nose;

## 2023-12-14 NOTE — PROVIDER CONTACT NOTE (OTHER) - NAME OF MD/NP/PA/DO NOTIFIED:
ALFONSO Oglesby
Zen RUBIO
Bimal Palacios
MD Zen Deleon
Ada (ENT)
ALFONSO Mccauley
Sp Ortiz MD
Ada CRUZ) ENT
Dr. Mendelson ENT

## 2023-12-14 NOTE — PROGRESS NOTE ADULT - SUBJECTIVE AND OBJECTIVE BOX
SUBJECTIVE:  No acute events overnight, patient has remained afebrile, hemodynamically stable  [facility]      MEDICATIONS  (STANDING):  acetylcysteine 20%  Inhalation 4 milliLiter(s) Inhalation every 6 hours  albuterol/ipratropium for Nebulization 3 milliLiter(s) Nebulizer every 6 hours  chlorhexidine 0.12% Liquid 15 milliLiter(s) Oral Mucosa two times a day  chlorhexidine 2% Cloths 1 Application(s) Topical daily  dextrose 5% + sodium chloride 0.45% 1000 milliLiter(s) (100 mL/Hr) IV Continuous <Continuous>  dextrose 5%. 1000 milliLiter(s) (50 mL/Hr) IV Continuous <Continuous>  dextrose 5%. 1000 milliLiter(s) (100 mL/Hr) IV Continuous <Continuous>  dextrose 50% Injectable 12.5 Gram(s) IV Push once  dextrose 50% Injectable 25 Gram(s) IV Push once  dextrose 50% Injectable 25 Gram(s) IV Push once  enoxaparin Injectable 40 milliGRAM(s) SubCutaneous every 24 hours  glucagon  Injectable 1 milliGRAM(s) IntraMuscular once  influenza  Vaccine (HIGH DOSE) 0.7 milliLiter(s) IntraMuscular once  magnesium sulfate  IVPB 1 Gram(s) IV Intermittent once  pantoprazole  Injectable 40 milliGRAM(s) IV Push daily  piperacillin/tazobactam IVPB.. 4.5 Gram(s) IV Intermittent every 8 hours  sodium chloride 0.9% for Nebulization 3 milliLiter(s) Nebulizer every 6 hours  sodium chloride 3%  Inhalation 4 milliLiter(s) Inhalation every 6 hours  vancomycin  IVPB 1250 milliGRAM(s) IV Intermittent every 12 hours    MEDICATIONS  (PRN):  acetaminophen   IVPB .. 1000 milliGRAM(s) IV Intermittent once PRN Temp greater or equal to 38C (100.4F), Mild Pain (1 - 3)  dextrose Oral Gel 15 Gram(s) Oral once PRN Blood Glucose LESS THAN 70 milliGRAM(s)/deciliter  HYDROmorphone  Injectable 0.5 milliGRAM(s) IV Push every 4 hours PRN Breakthrough  ondansetron Injectable 4 milliGRAM(s) IV Push every 6 hours PRN Nausea and/or Vomiting      Vital Signs Last 24 Hrs  T(C): 36.7 (14 Dec 2023 04:49), Max: 38.2 (13 Dec 2023 13:47)  T(F): 98 (14 Dec 2023 04:49), Max: 100.7 (13 Dec 2023 13:47)  HR: 42 (14 Dec 2023 04:00) (40 - 66)  BP: 144/65 (14 Dec 2023 04:00) (107/53 - 144/65)  BP(mean): 94 (14 Dec 2023 04:00) (75 - 94)  RR: 18 (14 Dec 2023 04:00) (16 - 19)  SpO2: 95% (14 Dec 2023 04:00) (95% - 100%)    Parameters below as of 14 Dec 2023 04:00  Patient On (Oxygen Delivery Method): tracheostomy collar  O2 Flow (L/min): 10  O2 Concentration (%): 40    Physical Exam:  General: NAD, resting comfortably in bed  Pulmonary: Nonlabored breathing, no respiratory distress  Cardiovascular: NSR  Abdominal: soft, NT/ND  Extremities: WWP, normal strength  Neuro: A/O x 3, CNs II-XII grossly intact, no focal deficits    I&O's Summary    13 Dec 2023 07:01  -  14 Dec 2023 07:00  --------------------------------------------------------  IN: 2200 mL / OUT: 985 mL / NET: 1215 mL        LABS:                        8.4    6.91  )-----------( 191      ( 14 Dec 2023 05:30 )             25.6     12-14    141  |  106  |  17  ----------------------------<  118<H>  3.9   |  27  |  0.69    Ca    8.6      14 Dec 2023 05:30  Phos  3.4     12-14  Mg     1.7     12-14        Urinalysis Basic - ( 14 Dec 2023 05:30 )    Color: x / Appearance: x / SG: x / pH: x  Gluc: 118 mg/dL / Ketone: x  / Bili: x / Urobili: x   Blood: x / Protein: x / Nitrite: x   Leuk Esterase: x / RBC: x / WBC x   Sq Epi: x / Non Sq Epi: x / Bacteria: x      CAPILLARY BLOOD GLUCOSE      POCT Blood Glucose.: 102 mg/dL (13 Dec 2023 11:36)        RADIOLOGY & ADDITIONAL STUDIES:   SUBJECTIVE:  No acute events overnight, patient has remained afebrile, hemodynamically stable  [facility]      MEDICATIONS  (STANDING):  acetylcysteine 20%  Inhalation 4 milliLiter(s) Inhalation every 6 hours  albuterol/ipratropium for Nebulization 3 milliLiter(s) Nebulizer every 6 hours  chlorhexidine 0.12% Liquid 15 milliLiter(s) Oral Mucosa two times a day  chlorhexidine 2% Cloths 1 Application(s) Topical daily  dextrose 5% + sodium chloride 0.45% 1000 milliLiter(s) (100 mL/Hr) IV Continuous <Continuous>  dextrose 5%. 1000 milliLiter(s) (50 mL/Hr) IV Continuous <Continuous>  dextrose 5%. 1000 milliLiter(s) (100 mL/Hr) IV Continuous <Continuous>  dextrose 50% Injectable 12.5 Gram(s) IV Push once  dextrose 50% Injectable 25 Gram(s) IV Push once  dextrose 50% Injectable 25 Gram(s) IV Push once  enoxaparin Injectable 40 milliGRAM(s) SubCutaneous every 24 hours  glucagon  Injectable 1 milliGRAM(s) IntraMuscular once  influenza  Vaccine (HIGH DOSE) 0.7 milliLiter(s) IntraMuscular once  magnesium sulfate  IVPB 1 Gram(s) IV Intermittent once  pantoprazole  Injectable 40 milliGRAM(s) IV Push daily  piperacillin/tazobactam IVPB.. 4.5 Gram(s) IV Intermittent every 8 hours  sodium chloride 0.9% for Nebulization 3 milliLiter(s) Nebulizer every 6 hours  sodium chloride 3%  Inhalation 4 milliLiter(s) Inhalation every 6 hours  vancomycin  IVPB 1250 milliGRAM(s) IV Intermittent every 12 hours    MEDICATIONS  (PRN):  acetaminophen   IVPB .. 1000 milliGRAM(s) IV Intermittent once PRN Temp greater or equal to 38C (100.4F), Mild Pain (1 - 3)  dextrose Oral Gel 15 Gram(s) Oral once PRN Blood Glucose LESS THAN 70 milliGRAM(s)/deciliter  HYDROmorphone  Injectable 0.5 milliGRAM(s) IV Push every 4 hours PRN Breakthrough  ondansetron Injectable 4 milliGRAM(s) IV Push every 6 hours PRN Nausea and/or Vomiting      Vital Signs Last 24 Hrs  T(C): 36.7 (14 Dec 2023 04:49), Max: 38.2 (13 Dec 2023 13:47)  T(F): 98 (14 Dec 2023 04:49), Max: 100.7 (13 Dec 2023 13:47)  HR: 42 (14 Dec 2023 04:00) (40 - 66)  BP: 144/65 (14 Dec 2023 04:00) (107/53 - 144/65)  BP(mean): 94 (14 Dec 2023 04:00) (75 - 94)  RR: 18 (14 Dec 2023 04:00) (16 - 19)  SpO2: 95% (14 Dec 2023 04:00) (95% - 100%)    Parameters below as of 14 Dec 2023 04:00  Patient On (Oxygen Delivery Method): tracheostomy collar  O2 Flow (L/min): 10  O2 Concentration (%): 40    Physical Exam:  General: NAD, resting comfortably in bed  Pulmonary: Nonlabored breathing, no respiratory distress  Cardiovascular: NSR  Abdominal: soft, NT/ND  Extremities: WWP, normal strength  Neuro: A/O x 3, CNs II-XII grossly intact, no focal deficits    I&O's Summary    13 Dec 2023 07:01  -  14 Dec 2023 07:00  --------------------------------------------------------  IN: 2200 mL / OUT: 985 mL / NET: 1215 mL        LABS:                        8.4    6.91  )-----------( 191      ( 14 Dec 2023 05:30 )             25.6     12-14    141  |  106  |  17  ----------------------------<  118<H>  3.9   |  27  |  0.69    Ca    8.6      14 Dec 2023 05:30  Phos  3.4     12-14  Mg     1.7     12-14        Urinalysis Basic - ( 14 Dec 2023 05:30 )    Color: x / Appearance: x / SG: x / pH: x  Gluc: 118 mg/dL / Ketone: x  / Bili: x / Urobili: x   Blood: x / Protein: x / Nitrite: x   Leuk Esterase: x / RBC: x / WBC x   Sq Epi: x / Non Sq Epi: x / Bacteria: x      CAPILLARY BLOOD GLUCOSE      POCT Blood Glucose.: 102 mg/dL (13 Dec 2023 11:36)        RADIOLOGY & ADDITIONAL STUDIES:

## 2023-12-14 NOTE — PROGRESS NOTE ADULT - ATTENDING COMMENTS
Patient is a 68 year old gentleman with history of CAD s/p PCI with stents in May 2023, HLD, T2DM, nephrolithiasis, now admitted after mandibular resection, neck dissection, free flap, tracheostomy, being evaluated for gastrostomy tube placement for nutritional supplementation due to dysphagia and malnutrition related to recent surgery. Difficulty with enteric feeding tube placement and enteral feeds, will require long term supplementation. Plavix being held. At time of evaluation, afebrile, hemodynamically stable, abdomen soft, nontender, nondistended, no rebound or guarding, incarcerated fat containing umbilical hernia present.     Fever workup unremarkable thus far. If remains afebrile, anticipate OR for laparoscopic gastrostomy tube placement and open umbilical hernia repair. Late entry, date of service 12/14/23.

## 2023-12-14 NOTE — PROGRESS NOTE ADULT - SUBJECTIVE AND OBJECTIVE BOX
OVERNIGHT EVENTS: No further episodes of fever    SUBJECTIVE:  Patient seen and examined at bedside. Reports he was able to walk this morning. States he has been having a small amount of oozing from his lip. No chest pain, shortness of breath ,abdominal pain.    Vital Signs Last 12 Hrs  T(F): 98.2 (12-14-23 @ 10:12), Max: 98.2 (12-14-23 @ 10:12)  HR: 48 (12-14-23 @ 11:39) (42 - 52)  BP: 122/56 (12-14-23 @ 11:39) (122/56 - 144/65)  BP(mean): 81 (12-14-23 @ 11:39) (81 - 94)  RR: 18 (12-14-23 @ 11:39) (18 - 18)  SpO2: 100% (12-14-23 @ 11:39) (95% - 100%)  I&O's Summary    13 Dec 2023 07:01  -  14 Dec 2023 07:00  --------------------------------------------------------  IN: 2200 mL / OUT: 985 mL / NET: 1215 mL    14 Dec 2023 07:01  -  14 Dec 2023 13:21  --------------------------------------------------------  IN: 500 mL / OUT: 200 mL / NET: 300 mL        PHYSICAL EXAM:  Constitutional: NAD, comfortable in chair. no respiratory distress, on RA  HEENT: PERRLA, MMM, mandibular flap c/d/i. +inner lip with small amount of bright red blood  +trach, with clear colored sputum  Neck: Supple  Respiratory: CTA B/L. No w/r/r.   Cardiovascular: RRR, normal S1 and S2, no m/r/g.   Gastrointestinal: +BS, soft NTND  Extremities: wwp; no edema. +L fibular site non-tender with no drainage        LABS:                        8.4    6.91  )-----------( 191      ( 14 Dec 2023 05:30 )             25.6     12-14    141  |  106  |  17  ----------------------------<  118<H>  3.9   |  27  |  0.69    Ca    8.6      14 Dec 2023 05:30  Phos  3.4     12-14  Mg     1.7     12-14        Urinalysis Basic - ( 14 Dec 2023 05:30 )    Color: x / Appearance: x / SG: x / pH: x  Gluc: 118 mg/dL / Ketone: x  / Bili: x / Urobili: x   Blood: x / Protein: x / Nitrite: x   Leuk Esterase: x / RBC: x / WBC x   Sq Epi: x / Non Sq Epi: x / Bacteria: x          RADIOLOGY & ADDITIONAL TESTS:    MEDICATIONS  (STANDING):  acetylcysteine 20%  Inhalation 4 milliLiter(s) Inhalation every 6 hours  albuterol/ipratropium for Nebulization 3 milliLiter(s) Nebulizer every 6 hours  chlorhexidine 0.12% Liquid 15 milliLiter(s) Oral Mucosa two times a day  chlorhexidine 2% Cloths 1 Application(s) Topical daily  dextrose 5% + sodium chloride 0.45% 1000 milliLiter(s) (100 mL/Hr) IV Continuous <Continuous>  dextrose 5%. 1000 milliLiter(s) (50 mL/Hr) IV Continuous <Continuous>  dextrose 5%. 1000 milliLiter(s) (100 mL/Hr) IV Continuous <Continuous>  dextrose 50% Injectable 25 Gram(s) IV Push once  dextrose 50% Injectable 25 Gram(s) IV Push once  dextrose 50% Injectable 12.5 Gram(s) IV Push once  enoxaparin Injectable 40 milliGRAM(s) SubCutaneous every 24 hours  glucagon  Injectable 1 milliGRAM(s) IntraMuscular once  influenza  Vaccine (HIGH DOSE) 0.7 milliLiter(s) IntraMuscular once  pantoprazole  Injectable 40 milliGRAM(s) IV Push daily  piperacillin/tazobactam IVPB.. 4.5 Gram(s) IV Intermittent every 8 hours  sodium chloride 0.9% for Nebulization 3 milliLiter(s) Nebulizer every 6 hours  sodium chloride 3%  Inhalation 4 milliLiter(s) Inhalation every 6 hours  vancomycin  IVPB 1250 milliGRAM(s) IV Intermittent every 12 hours    MEDICATIONS  (PRN):  acetaminophen   IVPB .. 1000 milliGRAM(s) IV Intermittent once PRN Temp greater or equal to 38C (100.4F), Mild Pain (1 - 3)  dextrose Oral Gel 15 Gram(s) Oral once PRN Blood Glucose LESS THAN 70 milliGRAM(s)/deciliter  HYDROmorphone  Injectable 0.5 milliGRAM(s) IV Push every 4 hours PRN Breakthrough  ondansetron Injectable 4 milliGRAM(s) IV Push every 6 hours PRN Nausea and/or Vomiting   normal...

## 2023-12-14 NOTE — PROGRESS NOTE ADULT - SUBJECTIVE AND OBJECTIVE BOX
OTOLARYNGOLOGY (ENT) PROGRESS NOTE    PATIENT: SAUNDRA HOLMAN  MRN: [id number]  : 57  [id number]  DATE OF SERVICE:  23  			           ID:SAUNDRA HOLMAN is a  69yo M w PMH of CAD (x2 stents Mar 2023), HLD, T2DM, hx of kidney stones, psoriasis presents to [facility] for evaluation of oral mass. Reports Three month hx of jaw pain and loosening of mandibular molar tooth on the lower left. Pt has a 30 pack yr smoking hx, quit 1 yr ago. 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 POD 1 composite mandibular resection, neck dissection and fibula free flap reconstruction.       Subjective/ Interval:   ; patient seen this morning, oozing from trach as expected ,  Plan to start aspirin today, cuff is up on tracheostomy tube, intraoral skin panel intact doppler signal strong,  plan to advance NGT   : Patient seen and examined at bedside. AFVSS overnight. Significant HgB drop to 7.2 noted this morning. NGT clogged, attempted replacement but went into lung, need to replace. Cuff deflated. Patient reports lethargy. Otherwise no new complaints. Intraoral skin paddle intact with strong doppler signal. Plan to hold off one eliquis given significant HgB drop  12/10: Patient seen and examined at bedside. AFVSS overnight other than bradycardic to lowest 39, mainly in 40s-50s which seems to be his baseline even pre-op. Not symptomatic while chinedu. Otherwise, patient stable on TC with no issues. Yesterday, iatrogenic right sided PTX occured with NGT placement, patient remained asymptomatic throughout. Patient now s/p placement of pig-tail catheter by pulm with significant improvement in PTX. Chest tube clamped this morning with plan for removal this evening. Patient s/p 2 units of PRBCs with moderate improvement in Hgb. Today, patient reports feeling significantly better than yesterday and overall "feels good." He was started on trickle feeds last night but felt some chest tightness and so they were held. Patient also failed TOV again and was straight cathed. KRISTEN drain output significantly decreased this morning. No other changes at this time.   : patient seen this morning PTX has resolved on CXR, intraoral flap intact, doppler strong, trach in place,  KRISTEN drains with minimal output   : Patient seen and examined this morning at bedside. Overnight, NGT slightly displaced, coming out around 10 cm from original placement, advanced and secured, pending CXR. Patient failed trial of clears with SLP yesterday but tolerated PMV without issue. Patient continues to be OOBTC daily and is tolerating tube feeds at goal. 2 KRISTEN drains removed yesterday, remaining KRISTEN with minimal output. Intraoral flap intact, doppler strong, trach in place. Patient denies any other new complaints at this time.   : patient febrile overnight to 103, continues to have intermittent PVC, HR last night in 40s, patients baseline 50. Gen surg will like to wait 24 hours afebrile until g tube,  Oral flap appears to be congested, dixon continue to monitor. KRISTEN drain will be removed today. Pending results of fever work up . HEB 7.5, will transfuse when type and screen returns   : Patient seen this morning, afebrile overnight, trach secretion odor improving,  noted ot have an episode of oral bleeding after ambulation, bleeding stopped, unable to see source, will continue to monitor , doppler strong ( and removed today) ,  Flap continues to look dusky       ALLERGIES:  No Known Allergies      MEDICATIONS:  Antiinfectives:   piperacillin/tazobactam IVPB.. 4.5 Gram(s) IV Intermittent every 8 hours  vancomycin  IVPB 1250 milliGRAM(s) IV Intermittent every 12 hours    IV fluids:  dextrose 5% + sodium chloride 0.45% 1000 milliLiter(s) IV Continuous <Continuous>  dextrose 5%. 1000 milliLiter(s) IV Continuous <Continuous>  dextrose 5%. 1000 milliLiter(s) IV Continuous <Continuous>    Hematologic/Anticoagulation:  enoxaparin Injectable 40 milliGRAM(s) SubCutaneous every 24 hours    Pain medications/Neuro:  acetaminophen   IVPB .. 1000 milliGRAM(s) IV Intermittent once PRN  HYDROmorphone  Injectable 0.5 milliGRAM(s) IV Push every 4 hours PRN  ondansetron Injectable 4 milliGRAM(s) IV Push every 6 hours PRN    Endocrine Medications:   dextrose 50% Injectable 25 Gram(s) IV Push once  dextrose 50% Injectable 12.5 Gram(s) IV Push once  dextrose 50% Injectable 25 Gram(s) IV Push once  dextrose Oral Gel 15 Gram(s) Oral once PRN  glucagon  Injectable 1 milliGRAM(s) IntraMuscular once    All other standing medications:   acetylcysteine 20%  Inhalation 4 milliLiter(s) Inhalation every 6 hours  albuterol/ipratropium for Nebulization 3 milliLiter(s) Nebulizer every 6 hours  chlorhexidine 0.12% Liquid 15 milliLiter(s) Oral Mucosa two times a day  chlorhexidine 2% Cloths 1 Application(s) Topical daily  influenza  Vaccine (HIGH DOSE) 0.7 milliLiter(s) IntraMuscular once  pantoprazole  Injectable 40 milliGRAM(s) IV Push daily  sodium chloride 0.9% for Nebulization 3 milliLiter(s) Nebulizer every 6 hours  sodium chloride 3%  Inhalation 4 milliLiter(s) Inhalation every 6 hours    All other PRN medications:    Vital Signs Last 24 Hrs  T(C): 36.8 (14 Dec 2023 14:00), Max: 36.8 (14 Dec 2023 10:12)  T(F): 98.3 (14 Dec 2023 14:00), Max: 98.3 (14 Dec 2023 14:00)  HR: 48 (14 Dec 2023 11:39) (40 - 66)  BP: 122/56 (14 Dec 2023 11:39) (107/53 - 144/65)  BP(mean): 81 (14 Dec 2023 11:39) (76 - 94)  RR: 18 (14 Dec 2023 11:39) (16 - 19)  SpO2: 100% (14 Dec 2023 11:39) (95% - 100%)    Parameters below as of 14 Dec 2023 11:39  Patient On (Oxygen Delivery Method): tracheostomy collar  O2 Flow (L/min): 10  O2 Concentration (%): 40      -13 @ 07:01  -  12-14 @ 07:00  --------------------------------------------------------  IN:    dextrose 5% + sodium chloride 0.45% w/ Additives: 1500 mL    dextrose 5% w/ Additives: 500 mL    Lactated Ringers: 200 mL  Total IN: 2200 mL    OUT:    Bulb (mL): 10 mL    Oral Fluid: 0 mL    VAC (Vacuum Assisted Closure) System (mL): 0 mL    Voided (mL): 975 mL  Total OUT: 985 mL    Total NET: 1215 mL       @ 07:01  -   @ 14:28  --------------------------------------------------------  IN:    dextrose 5% + sodium chloride 0.45% w/ Additives: 500 mL  Total IN: 500 mL    OUT:    Voided (mL): 200 mL  Total OUT: 200 mL    Total NET: 300 mL          23 @ 07:01  -  23 @ 07:00  --------------------------------------------------------  IN:  Total IN: 0 mL    OUT:    Bulb (mL): 10 mL    VAC (Vacuum Assisted Closure) System (mL): 0 mL  Total OUT: 10 mL    Total NET: -10 mL              PHYSICAL EXAM:  Gen: AAOx3, NAD   Head: Surgical incision around chin extending up through lip  Eyes: EOMI, PERRL, visual acuity intact, no diplopia, supra/infra orbital rims intact, no subconjunctival heme, no telecanthus, no exophthalmos   Ears: Gross hearing intact,  Nose: No septal hematoma/asymmetry, no epistaxis bilaterally. no abrasions present, no lacerations.   Throat: No LAD, supple, neck surgical incision w/ steristrip dressing is hemostatic , trach in place w/ 7.5 cuffed portex, cuff deflated,    Oral: Left FFF paddle dusk colored with brisk bleed ( stable) ,  will continue to monitor,. IMF screws in place, potential ooze around left mandibular screw, class 3 elastics,   Exx: Wound vac left leg, left leg KRISTEN with sanguinous output         LABS                       8.4    6.91  )-----------( 191      ( 14 Dec 2023 05:30 )             25.6        141  |  106  |  17  ----------------------------<  118<H>  3.9   |  27  |  0.69    Ca    8.6      14 Dec 2023 05:30  Phos  3.4       Mg     1.7                Coagulation Studies-     Urinalysis Basic - ( 14 Dec 2023 05:30 )    Color: x / Appearance: x / SG: x / pH: x  Gluc: 118 mg/dL / Ketone: x  / Bili: x / Urobili: x   Blood: x / Protein: x / Nitrite: x   Leuk Esterase: x / RBC: x / WBC x   Sq Epi: x / Non Sq Epi: x / Bacteria: x      Endocrine Panel-  Calcium: 8.6 mg/dL ( @ 05:30)                MICROBIOLOGY:  Culture - Sputum (collected 23 @ 07:27)  Source: Trach Asp Tracheal Aspirate  Gram Stain (23 @ 21:31):    Rare epithelial cells    Moderate WBC's    Rare Gram Positive Rods    Few Gram Negative Rods    Moderate Gram positive cocci in pairs, chains and clusters  Preliminary Report (23 @ 12:13):    Moderate Escherichia coli    Susceptibility to follow.    Moderate Enterobacter cloacae complex    Susceptibility to follow.    Accompanied by normal respiratory brenden    Culture - Sputum (collected 23 @ 20:48)  Source: .Sputum  Gram Stain (23 @ 22:45):    Moderate epithelial cells    Moderate WBC's    Rare Gram Positive Rods    Few Gram Negative Rods    Few Gram Positive Cocci in Pairs and Chains  Final Report (23 @ 22:45):    Sputum specimen rejected.  Microscopic examination indicates    oropharyngeal contamination.  Please repeat.      Culture Results:   Moderate Escherichia coli  Susceptibility to follow.  Moderate Enterobacter cloacae complex  Susceptibility to follow.  Accompanied by normal respiratory brenden (23 @ 07:27)  Culture Results:   Sputum specimen rejected.  Microscopic examination indicates  oropharyngeal contamination.  Please repeat. (23 @ 20:48)  Culture Results:   No growth at 1 day. (23 @ 20:10)  Culture Results:   No growth at 1 day. (23 @ 20:05)      Culture - Sputum (collected 23 @ 07:27)  Source: Trach Asp Tracheal Aspirate  Gram Stain (23 @ 21:31):    Rare epithelial cells    Moderate WBC's    Rare Gram Positive Rods    Few Gram Negative Rods    Moderate Gram positive cocci in pairs, chains and clusters  Preliminary Report (23 @ 12:13):    Moderate Escherichia coli    Susceptibility to follow.    Moderate Enterobacter cloacae complex    Susceptibility to follow.    Accompanied by normal respiratory brenden    Culture - Sputum (collected 23 @ 20:48)  Source: .Sputum  Gram Stain (23 @ 22:45):    Moderate epithelial cells    Moderate WBC's    Rare Gram Positive Rods    Few Gram Negative Rods    Few Gram Positive Cocci in Pairs and Chains  Final Report (23 @ 22:45):    Sputum specimen rejected.  Microscopic examination indicates    oropharyngeal contamination.  Please repeat.    Culture - Blood (collected 23 @ 20:10)  Source: .Blood Blood  Preliminary Report (23 @ 22:00):    No growth at 1 day.    Culture - Blood (collected 23 @ 20:05)  Source: .Blood Blood  Preliminary Report (23 @ 22:00):    No growth at 1 day.           OTOLARYNGOLOGY (ENT) PROGRESS NOTE    PATIENT: SAUNDRA HOLMAN  MRN: [id number]  : 57  [id number]  DATE OF SERVICE:  23  			           ID:SAUNDRA HOLMAN is a  69yo M w PMH of CAD (x2 stents Mar 2023), HLD, T2DM, hx of kidney stones, psoriasis presents to [facility] for evaluation of oral mass. Reports Three month hx of jaw pain and loosening of mandibular molar tooth on the lower left. Pt has a 30 pack yr smoking hx, quit 1 yr ago. 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 POD 1 composite mandibular resection, neck dissection and fibula free flap reconstruction.       Subjective/ Interval:   ; patient seen this morning, oozing from trach as expected ,  Plan to start aspirin today, cuff is up on tracheostomy tube, intraoral skin panel intact doppler signal strong,  plan to advance NGT   : Patient seen and examined at bedside. AFVSS overnight. Significant HgB drop to 7.2 noted this morning. NGT clogged, attempted replacement but went into lung, need to replace. Cuff deflated. Patient reports lethargy. Otherwise no new complaints. Intraoral skin paddle intact with strong doppler signal. Plan to hold off one eliquis given significant HgB drop  12/10: Patient seen and examined at bedside. AFVSS overnight other than bradycardic to lowest 39, mainly in 40s-50s which seems to be his baseline even pre-op. Not symptomatic while chinedu. Otherwise, patient stable on TC with no issues. Yesterday, iatrogenic right sided PTX occured with NGT placement, patient remained asymptomatic throughout. Patient now s/p placement of pig-tail catheter by pulm with significant improvement in PTX. Chest tube clamped this morning with plan for removal this evening. Patient s/p 2 units of PRBCs with moderate improvement in Hgb. Today, patient reports feeling significantly better than yesterday and overall "feels good." He was started on trickle feeds last night but felt some chest tightness and so they were held. Patient also failed TOV again and was straight cathed. KRISTEN drain output significantly decreased this morning. No other changes at this time.   : patient seen this morning PTX has resolved on CXR, intraoral flap intact, doppler strong, trach in place,  KRISTEN drains with minimal output   : Patient seen and examined this morning at bedside. Overnight, NGT slightly displaced, coming out around 10 cm from original placement, advanced and secured, pending CXR. Patient failed trial of clears with SLP yesterday but tolerated PMV without issue. Patient continues to be OOBTC daily and is tolerating tube feeds at goal. 2 KRISTEN drains removed yesterday, remaining KRISTEN with minimal output. Intraoral flap intact, doppler strong, trach in place. Patient denies any other new complaints at this time.   : patient febrile overnight to 103, continues to have intermittent PVC, HR last night in 40s, patients baseline 50. Gen surg will like to wait 24 hours afebrile until g tube,  Oral flap appears to be congested, dixon continue to monitor. KRISTEN drain will be removed today. Pending results of fever work up . HEB 7.5, will transfuse when type and screen returns   : Patient seen this morning, afebrile overnight, trach secretion odor improving,  noted ot have an episode of oral bleeding after ambulation, bleeding stopped, unable to see source, will continue to monitor , doppler strong ( and removed today) ,  Flap continues to look dusky       ALLERGIES:  No Known Allergies      MEDICATIONS:  Antiinfectives:   piperacillin/tazobactam IVPB.. 4.5 Gram(s) IV Intermittent every 8 hours  vancomycin  IVPB 1250 milliGRAM(s) IV Intermittent every 12 hours    IV fluids:  dextrose 5% + sodium chloride 0.45% 1000 milliLiter(s) IV Continuous <Continuous>  dextrose 5%. 1000 milliLiter(s) IV Continuous <Continuous>  dextrose 5%. 1000 milliLiter(s) IV Continuous <Continuous>    Hematologic/Anticoagulation:  enoxaparin Injectable 40 milliGRAM(s) SubCutaneous every 24 hours    Pain medications/Neuro:  acetaminophen   IVPB .. 1000 milliGRAM(s) IV Intermittent once PRN  HYDROmorphone  Injectable 0.5 milliGRAM(s) IV Push every 4 hours PRN  ondansetron Injectable 4 milliGRAM(s) IV Push every 6 hours PRN    Endocrine Medications:   dextrose 50% Injectable 25 Gram(s) IV Push once  dextrose 50% Injectable 12.5 Gram(s) IV Push once  dextrose 50% Injectable 25 Gram(s) IV Push once  dextrose Oral Gel 15 Gram(s) Oral once PRN  glucagon  Injectable 1 milliGRAM(s) IntraMuscular once    All other standing medications:   acetylcysteine 20%  Inhalation 4 milliLiter(s) Inhalation every 6 hours  albuterol/ipratropium for Nebulization 3 milliLiter(s) Nebulizer every 6 hours  chlorhexidine 0.12% Liquid 15 milliLiter(s) Oral Mucosa two times a day  chlorhexidine 2% Cloths 1 Application(s) Topical daily  influenza  Vaccine (HIGH DOSE) 0.7 milliLiter(s) IntraMuscular once  pantoprazole  Injectable 40 milliGRAM(s) IV Push daily  sodium chloride 0.9% for Nebulization 3 milliLiter(s) Nebulizer every 6 hours  sodium chloride 3%  Inhalation 4 milliLiter(s) Inhalation every 6 hours    All other PRN medications:    Vital Signs Last 24 Hrs  T(C): 36.8 (14 Dec 2023 14:00), Max: 36.8 (14 Dec 2023 10:12)  T(F): 98.3 (14 Dec 2023 14:00), Max: 98.3 (14 Dec 2023 14:00)  HR: 48 (14 Dec 2023 11:39) (40 - 66)  BP: 122/56 (14 Dec 2023 11:39) (107/53 - 144/65)  BP(mean): 81 (14 Dec 2023 11:39) (76 - 94)  RR: 18 (14 Dec 2023 11:39) (16 - 19)  SpO2: 100% (14 Dec 2023 11:39) (95% - 100%)    Parameters below as of 14 Dec 2023 11:39  Patient On (Oxygen Delivery Method): tracheostomy collar  O2 Flow (L/min): 10  O2 Concentration (%): 40      -13 @ 07:01  -  12-14 @ 07:00  --------------------------------------------------------  IN:    dextrose 5% + sodium chloride 0.45% w/ Additives: 1500 mL    dextrose 5% w/ Additives: 500 mL    Lactated Ringers: 200 mL  Total IN: 2200 mL    OUT:    Bulb (mL): 10 mL    Oral Fluid: 0 mL    VAC (Vacuum Assisted Closure) System (mL): 0 mL    Voided (mL): 975 mL  Total OUT: 985 mL    Total NET: 1215 mL       @ 07:01  -   @ 14:28  --------------------------------------------------------  IN:    dextrose 5% + sodium chloride 0.45% w/ Additives: 500 mL  Total IN: 500 mL    OUT:    Voided (mL): 200 mL  Total OUT: 200 mL    Total NET: 300 mL          23 @ 07:01  -  23 @ 07:00  --------------------------------------------------------  IN:  Total IN: 0 mL    OUT:    Bulb (mL): 10 mL    VAC (Vacuum Assisted Closure) System (mL): 0 mL  Total OUT: 10 mL    Total NET: -10 mL              PHYSICAL EXAM:  Gen: AAOx3, NAD   Head: Surgical incision around chin extending up through lip  Eyes: EOMI, PERRL, visual acuity intact, no diplopia, supra/infra orbital rims intact, no subconjunctival heme, no telecanthus, no exophthalmos   Ears: Gross hearing intact,  Nose: No septal hematoma/asymmetry, no epistaxis bilaterally. no abrasions present, no lacerations.   Throat: No LAD, supple, neck surgical incision w/ steristrip dressing is hemostatic , trach in place w/ 7.5 cuffed portex, cuff deflated,    Oral: Left FFF paddle dusk colored with brisk bleed ( stable) ,  will continue to monitor,. IMF screws in place, potential ooze around left mandibular screw, class 3 elastics,   Exx: Wound vac left leg, left leg KRISTEN with sanguinous output         LABS                       8.4    6.91  )-----------( 191      ( 14 Dec 2023 05:30 )             25.6        141  |  106  |  17  ----------------------------<  118<H>  3.9   |  27  |  0.69    Ca    8.6      14 Dec 2023 05:30  Phos  3.4       Mg     1.7                Coagulation Studies-     Urinalysis Basic - ( 14 Dec 2023 05:30 )    Color: x / Appearance: x / SG: x / pH: x  Gluc: 118 mg/dL / Ketone: x  / Bili: x / Urobili: x   Blood: x / Protein: x / Nitrite: x   Leuk Esterase: x / RBC: x / WBC x   Sq Epi: x / Non Sq Epi: x / Bacteria: x      Endocrine Panel-  Calcium: 8.6 mg/dL ( @ 05:30)                MICROBIOLOGY:  Culture - Sputum (collected 23 @ 07:27)  Source: Trach Asp Tracheal Aspirate  Gram Stain (23 @ 21:31):    Rare epithelial cells    Moderate WBC's    Rare Gram Positive Rods    Few Gram Negative Rods    Moderate Gram positive cocci in pairs, chains and clusters  Preliminary Report (23 @ 12:13):    Moderate Escherichia coli    Susceptibility to follow.    Moderate Enterobacter cloacae complex    Susceptibility to follow.    Accompanied by normal respiratory brenden    Culture - Sputum (collected 23 @ 20:48)  Source: .Sputum  Gram Stain (23 @ 22:45):    Moderate epithelial cells    Moderate WBC's    Rare Gram Positive Rods    Few Gram Negative Rods    Few Gram Positive Cocci in Pairs and Chains  Final Report (23 @ 22:45):    Sputum specimen rejected.  Microscopic examination indicates    oropharyngeal contamination.  Please repeat.      Culture Results:   Moderate Escherichia coli  Susceptibility to follow.  Moderate Enterobacter cloacae complex  Susceptibility to follow.  Accompanied by normal respiratory brenden (23 @ 07:27)  Culture Results:   Sputum specimen rejected.  Microscopic examination indicates  oropharyngeal contamination.  Please repeat. (23 @ 20:48)  Culture Results:   No growth at 1 day. (23 @ 20:10)  Culture Results:   No growth at 1 day. (23 @ 20:05)      Culture - Sputum (collected 23 @ 07:27)  Source: Trach Asp Tracheal Aspirate  Gram Stain (23 @ 21:31):    Rare epithelial cells    Moderate WBC's    Rare Gram Positive Rods    Few Gram Negative Rods    Moderate Gram positive cocci in pairs, chains and clusters  Preliminary Report (23 @ 12:13):    Moderate Escherichia coli    Susceptibility to follow.    Moderate Enterobacter cloacae complex    Susceptibility to follow.    Accompanied by normal respiratory brenden    Culture - Sputum (collected 23 @ 20:48)  Source: .Sputum  Gram Stain (23 @ 22:45):    Moderate epithelial cells    Moderate WBC's    Rare Gram Positive Rods    Few Gram Negative Rods    Few Gram Positive Cocci in Pairs and Chains  Final Report (23 @ 22:45):    Sputum specimen rejected.  Microscopic examination indicates    oropharyngeal contamination.  Please repeat.    Culture - Blood (collected 23 @ 20:10)  Source: .Blood Blood  Preliminary Report (23 @ 22:00):    No growth at 1 day.    Culture - Blood (collected 23 @ 20:05)  Source: .Blood Blood  Preliminary Report (23 @ 22:00):    No growth at 1 day.

## 2023-12-14 NOTE — PROVIDER CONTACT NOTE (OTHER) - SITUATION
Pt sinus chinedu to 42 with PVC's
Pt bleeding BRB from oral incision site and coughing up bloody secretions from trach
pt was spiting blood from mouth , that spills out of the mouth with movement
sustained bradycardia noted on monitor with HR to 40s, with PVCs
Pt elevated Temperature 100.7 Oral 100.5 rectal and about to have blood transfusion. Should we hold off on blood transfusion?
Pt has AIVR/ couplets PVCs HR down to 40, Pt denies chest pain and SOB, Pt asymptomatic, /51, MD Bimal Palacios made aware, as per MD the is not new, Pt had that before and cardiologist involved.
Patient is febrile
pt walked to bathroom and after coming back to bed RN connected NGT;pulse ox; wound vac; etc; while being connected pt wanted to reposition himself and NGT suture was no intact;
Elevated temp

## 2023-12-15 ENCOUNTER — TRANSCRIPTION ENCOUNTER (OUTPATIENT)
Age: 68
End: 2023-12-15

## 2023-12-15 LAB
-  AMPICILLIN/SULBACTAM: SIGNIFICANT CHANGE UP
-  AMPICILLIN: SIGNIFICANT CHANGE UP
-  CEFAZOLIN: SIGNIFICANT CHANGE UP
-  CEFEPIME: SIGNIFICANT CHANGE UP
-  CEFEPIME: SIGNIFICANT CHANGE UP
-  CEFTRIAXONE: SIGNIFICANT CHANGE UP
-  CIPROFLOXACIN: SIGNIFICANT CHANGE UP
-  ERTAPENEM: SIGNIFICANT CHANGE UP
-  GENTAMICIN: SIGNIFICANT CHANGE UP
-  PIPERACILLIN/TAZOBACTAM: SIGNIFICANT CHANGE UP
-  TOBRAMYCIN: SIGNIFICANT CHANGE UP
-  TRIMETHOPRIM/SULFAMETHOXAZOLE: SIGNIFICANT CHANGE UP
ANION GAP SERPL CALC-SCNC: 9 MMOL/L — SIGNIFICANT CHANGE UP (ref 5–17)
ANION GAP SERPL CALC-SCNC: 9 MMOL/L — SIGNIFICANT CHANGE UP (ref 5–17)
APTT BLD: 27.7 SEC — SIGNIFICANT CHANGE UP (ref 24.5–35.6)
APTT BLD: 27.7 SEC — SIGNIFICANT CHANGE UP (ref 24.5–35.6)
BUN SERPL-MCNC: 10 MG/DL — SIGNIFICANT CHANGE UP (ref 7–23)
BUN SERPL-MCNC: 10 MG/DL — SIGNIFICANT CHANGE UP (ref 7–23)
CALCIUM SERPL-MCNC: 8.5 MG/DL — SIGNIFICANT CHANGE UP (ref 8.4–10.5)
CALCIUM SERPL-MCNC: 8.5 MG/DL — SIGNIFICANT CHANGE UP (ref 8.4–10.5)
CHLORIDE SERPL-SCNC: 102 MMOL/L — SIGNIFICANT CHANGE UP (ref 96–108)
CHLORIDE SERPL-SCNC: 102 MMOL/L — SIGNIFICANT CHANGE UP (ref 96–108)
CO2 SERPL-SCNC: 26 MMOL/L — SIGNIFICANT CHANGE UP (ref 22–31)
CO2 SERPL-SCNC: 26 MMOL/L — SIGNIFICANT CHANGE UP (ref 22–31)
CREAT SERPL-MCNC: 0.68 MG/DL — SIGNIFICANT CHANGE UP (ref 0.5–1.3)
CREAT SERPL-MCNC: 0.68 MG/DL — SIGNIFICANT CHANGE UP (ref 0.5–1.3)
CULTURE RESULTS: ABNORMAL
CULTURE RESULTS: ABNORMAL
EGFR: 102 ML/MIN/1.73M2 — SIGNIFICANT CHANGE UP
EGFR: 102 ML/MIN/1.73M2 — SIGNIFICANT CHANGE UP
GLUCOSE SERPL-MCNC: 133 MG/DL — HIGH (ref 70–99)
GLUCOSE SERPL-MCNC: 133 MG/DL — HIGH (ref 70–99)
HCT VFR BLD CALC: 25.7 % — LOW (ref 39–50)
HCT VFR BLD CALC: 25.7 % — LOW (ref 39–50)
HGB BLD-MCNC: 8.4 G/DL — LOW (ref 13–17)
HGB BLD-MCNC: 8.4 G/DL — LOW (ref 13–17)
INR BLD: 1.1 — SIGNIFICANT CHANGE UP (ref 0.85–1.18)
INR BLD: 1.1 — SIGNIFICANT CHANGE UP (ref 0.85–1.18)
MAGNESIUM SERPL-MCNC: 1.8 MG/DL — SIGNIFICANT CHANGE UP (ref 1.6–2.6)
MAGNESIUM SERPL-MCNC: 1.8 MG/DL — SIGNIFICANT CHANGE UP (ref 1.6–2.6)
MCHC RBC-ENTMCNC: 27.5 PG — SIGNIFICANT CHANGE UP (ref 27–34)
MCHC RBC-ENTMCNC: 27.5 PG — SIGNIFICANT CHANGE UP (ref 27–34)
MCHC RBC-ENTMCNC: 32.7 GM/DL — SIGNIFICANT CHANGE UP (ref 32–36)
MCHC RBC-ENTMCNC: 32.7 GM/DL — SIGNIFICANT CHANGE UP (ref 32–36)
MCV RBC AUTO: 84.3 FL — SIGNIFICANT CHANGE UP (ref 80–100)
MCV RBC AUTO: 84.3 FL — SIGNIFICANT CHANGE UP (ref 80–100)
METHOD TYPE: SIGNIFICANT CHANGE UP
NRBC # BLD: 0 /100 WBCS — SIGNIFICANT CHANGE UP (ref 0–0)
NRBC # BLD: 0 /100 WBCS — SIGNIFICANT CHANGE UP (ref 0–0)
ORGANISM # SPEC MICROSCOPIC CNT: ABNORMAL
ORGANISM # SPEC MICROSCOPIC CNT: SIGNIFICANT CHANGE UP
ORGANISM # SPEC MICROSCOPIC CNT: SIGNIFICANT CHANGE UP
PHOSPHATE SERPL-MCNC: 3.7 MG/DL — SIGNIFICANT CHANGE UP (ref 2.5–4.5)
PHOSPHATE SERPL-MCNC: 3.7 MG/DL — SIGNIFICANT CHANGE UP (ref 2.5–4.5)
PLATELET # BLD AUTO: 219 K/UL — SIGNIFICANT CHANGE UP (ref 150–400)
PLATELET # BLD AUTO: 219 K/UL — SIGNIFICANT CHANGE UP (ref 150–400)
POTASSIUM SERPL-MCNC: 3.6 MMOL/L — SIGNIFICANT CHANGE UP (ref 3.5–5.3)
POTASSIUM SERPL-MCNC: 3.6 MMOL/L — SIGNIFICANT CHANGE UP (ref 3.5–5.3)
POTASSIUM SERPL-SCNC: 3.6 MMOL/L — SIGNIFICANT CHANGE UP (ref 3.5–5.3)
POTASSIUM SERPL-SCNC: 3.6 MMOL/L — SIGNIFICANT CHANGE UP (ref 3.5–5.3)
PROTHROM AB SERPL-ACNC: 12.5 SEC — SIGNIFICANT CHANGE UP (ref 9.5–13)
PROTHROM AB SERPL-ACNC: 12.5 SEC — SIGNIFICANT CHANGE UP (ref 9.5–13)
RBC # BLD: 3.05 M/UL — LOW (ref 4.2–5.8)
RBC # BLD: 3.05 M/UL — LOW (ref 4.2–5.8)
RBC # FLD: 13.6 % — SIGNIFICANT CHANGE UP (ref 10.3–14.5)
RBC # FLD: 13.6 % — SIGNIFICANT CHANGE UP (ref 10.3–14.5)
SODIUM SERPL-SCNC: 137 MMOL/L — SIGNIFICANT CHANGE UP (ref 135–145)
SODIUM SERPL-SCNC: 137 MMOL/L — SIGNIFICANT CHANGE UP (ref 135–145)
SPECIMEN SOURCE: SIGNIFICANT CHANGE UP
SPECIMEN SOURCE: SIGNIFICANT CHANGE UP
WBC # BLD: 6 K/UL — SIGNIFICANT CHANGE UP (ref 3.8–10.5)
WBC # BLD: 6 K/UL — SIGNIFICANT CHANGE UP (ref 3.8–10.5)
WBC # FLD AUTO: 6 K/UL — SIGNIFICANT CHANGE UP (ref 3.8–10.5)
WBC # FLD AUTO: 6 K/UL — SIGNIFICANT CHANGE UP (ref 3.8–10.5)

## 2023-12-15 PROCEDURE — 99233 SBSQ HOSP IP/OBS HIGH 50: CPT | Mod: GC

## 2023-12-15 PROCEDURE — 99231 SBSQ HOSP IP/OBS SF/LOW 25: CPT | Mod: 57

## 2023-12-15 RX ORDER — CEFTRIAXONE 500 MG/1
2000 INJECTION, POWDER, FOR SOLUTION INTRAMUSCULAR; INTRAVENOUS EVERY 24 HOURS
Refills: 0 | Status: DISCONTINUED | OUTPATIENT
Start: 2023-12-15 | End: 2023-12-19

## 2023-12-15 RX ORDER — ASPIRIN/CALCIUM CARB/MAGNESIUM 324 MG
300 TABLET ORAL DAILY
Refills: 0 | Status: DISCONTINUED | OUTPATIENT
Start: 2023-12-15 | End: 2023-12-17

## 2023-12-15 RX ADMIN — SODIUM CHLORIDE 4 MILLILITER(S): 9 INJECTION INTRAMUSCULAR; INTRAVENOUS; SUBCUTANEOUS at 05:57

## 2023-12-15 RX ADMIN — CHLORHEXIDINE GLUCONATE 15 MILLILITER(S): 213 SOLUTION TOPICAL at 05:58

## 2023-12-15 RX ADMIN — SODIUM CHLORIDE 100 MILLILITER(S): 9 INJECTION, SOLUTION INTRAVENOUS at 16:37

## 2023-12-15 RX ADMIN — Medication 300 MILLIGRAM(S): at 12:04

## 2023-12-15 RX ADMIN — SODIUM CHLORIDE 4 MILLILITER(S): 9 INJECTION INTRAMUSCULAR; INTRAVENOUS; SUBCUTANEOUS at 17:20

## 2023-12-15 RX ADMIN — HYDROMORPHONE HYDROCHLORIDE 0.5 MILLIGRAM(S): 2 INJECTION INTRAMUSCULAR; INTRAVENOUS; SUBCUTANEOUS at 01:58

## 2023-12-15 RX ADMIN — Medication 3 MILLILITER(S): at 17:20

## 2023-12-15 RX ADMIN — PIPERACILLIN AND TAZOBACTAM 25 GRAM(S): 4; .5 INJECTION, POWDER, LYOPHILIZED, FOR SOLUTION INTRAVENOUS at 05:58

## 2023-12-15 RX ADMIN — Medication 4 MILLILITER(S): at 05:57

## 2023-12-15 RX ADMIN — SODIUM CHLORIDE 3 MILLILITER(S): 9 INJECTION INTRAMUSCULAR; INTRAVENOUS; SUBCUTANEOUS at 12:04

## 2023-12-15 RX ADMIN — Medication 166.67 MILLIGRAM(S): at 05:58

## 2023-12-15 RX ADMIN — CHLORHEXIDINE GLUCONATE 15 MILLILITER(S): 213 SOLUTION TOPICAL at 17:19

## 2023-12-15 RX ADMIN — Medication 1000 MILLIGRAM(S): at 15:44

## 2023-12-15 RX ADMIN — Medication 3 MILLILITER(S): at 05:58

## 2023-12-15 RX ADMIN — Medication 400 MILLIGRAM(S): at 15:18

## 2023-12-15 RX ADMIN — CEFTRIAXONE 100 MILLIGRAM(S): 500 INJECTION, POWDER, FOR SOLUTION INTRAMUSCULAR; INTRAVENOUS at 17:28

## 2023-12-15 RX ADMIN — PANTOPRAZOLE SODIUM 40 MILLIGRAM(S): 20 TABLET, DELAYED RELEASE ORAL at 12:04

## 2023-12-15 RX ADMIN — HYDROMORPHONE HYDROCHLORIDE 0.5 MILLIGRAM(S): 2 INJECTION INTRAMUSCULAR; INTRAVENOUS; SUBCUTANEOUS at 22:41

## 2023-12-15 RX ADMIN — Medication 3 MILLILITER(S): at 12:03

## 2023-12-15 RX ADMIN — HYDROMORPHONE HYDROCHLORIDE 0.5 MILLIGRAM(S): 2 INJECTION INTRAMUSCULAR; INTRAVENOUS; SUBCUTANEOUS at 21:51

## 2023-12-15 RX ADMIN — CHLORHEXIDINE GLUCONATE 1 APPLICATION(S): 213 SOLUTION TOPICAL at 07:00

## 2023-12-15 RX ADMIN — HYDROMORPHONE HYDROCHLORIDE 0.5 MILLIGRAM(S): 2 INJECTION INTRAMUSCULAR; INTRAVENOUS; SUBCUTANEOUS at 02:28

## 2023-12-15 RX ADMIN — Medication 4 MILLILITER(S): at 17:19

## 2023-12-15 NOTE — PROGRESS NOTE ADULT - ASSESSMENT
67 y/o M w PMH of CAD (x2 stents Mar 2023), HLD, T2DM, nephrolithisais (s/p ureteral stent placement and removal), psoriasis and no PSH of intraabdominal surgery, presented to [facility] (12/3/23) for evaluation of oral mass. Pt now POD4 s/p composite mandibular resection, neck dissection, fibula free flap reconstruction and tracheostomy. Primary team has had difficulty starting feeds w/ NGT due to difficult placement and pt will require long term nutritional supplementation as current plan for adjuvant radiation. Surgery consulted for placement of gastric feeding tube; endoscopic placement not possible due to jaw banding, pt will require laparoscopic approach for placement of gastric tube. Pt to be added on to OR today for placement. Cardiology currently following for recent stent placement 9mos prior, appreciate recommendations and preoperative risk stratification.     Laparoscopic G tube with Dr. Raymond, 12/15  Cardiology following; recs appreciated and preoperative risk stratification   Hold plavix   maintain NPO status   If any questions please call- [phone], extension [phone]  Thank you for the consult, appreciate excellent care by primary team.  69 y/o M w PMH of CAD (x2 stents Mar 2023), HLD, T2DM, nephrolithisais (s/p ureteral stent placement and removal), psoriasis and no PSH of intraabdominal surgery, presented to [facility] (12/3/23) for evaluation of oral mass. Pt now POD4 s/p composite mandibular resection, neck dissection, fibula free flap reconstruction and tracheostomy. Primary team has had difficulty starting feeds w/ NGT due to difficult placement and pt will require long term nutritional supplementation as current plan for adjuvant radiation. Surgery consulted for placement of gastric feeding tube; endoscopic placement not possible due to jaw banding, pt will require laparoscopic approach for placement of gastric tube. Pt to be added on to OR today for placement. Cardiology currently following for recent stent placement 9mos prior, appreciate recommendations and preoperative risk stratification.     Laparoscopic G tube with Dr. Raymond, 12/15  Cardiology following; recs appreciated and preoperative risk stratification   Hold plavix   maintain NPO status   If any questions please call- [phone], extension [phone]  Thank you for the consult, appreciate excellent care by primary team.

## 2023-12-15 NOTE — PROGRESS NOTE ADULT - ASSESSMENT
69 y/o M w PMH of CAD (x2 stents Mar 2023), HLD, T2DM, hx of kidney stones, psoriasis presents to [facility] for evaluation of oral mass and 3 months of jaw pain a/f OMFS mandibular resection and flap reconstruction on Thursday. Medicine initially evaluated on 12/6 for pre-op assessment Now following for co-management.     #SIRS, suspected to be 2/2 to pneumonia  pt febrile on 12/12 to 103 rectally, with HR of 90s. No leukocytosis on AM labs today. Pt notes shortness of breath with increased sputum production. No urinary complaints or diarrhea. Wounds appear clean  BCx - NGTD   CXR with no new infiltrate. UA negative for infection  initial sputum cx contaminated, repeat completed on 12/13 shows E coli and Enterobacter cloacea  MRSA swab negative - can d/c Vancomycin  Sensitivities reviewed - can de-escalate antibiotics from Zosyn to CTX 2g IV q24h (Today is day 3 of 5)    #s/p mandibular resection and flap reconstruction  pt tolerated procedure well. KRISTEN drains removed by primary team  c/w q4h flap checks  Pt unable to tolerate PO intake at this time, pending G tube placement w/ surgery today, 12/15  c/w S&S evaluation  recommend early mobilization, OOBTC, PT evaluation. recommend addition of PRN Duonebs to aid with airway clearance    #CAD  pt with hx of CAD s/p 2 stents in March 2023  Cardiology consulted; reccs appreciated  on lipitor and ASA. Per cardiology, given that intervention was approx 9 months ago, ok for monotherapy at this time. Ongoing discussion with cardiology and primary team when safe to resume plavix    #anemia  Hgb stable at 8, previously was 13-14  no signs of active bleed  per iron studies, consistent with WADE. However given concern for active infection, will hold off on IV iron at this time  maintain active T&S  per cardiology recommendations, transfusion goal to Hgb >8    #DM2  A1c 6.3%, takes home metformin and jardiance. FS   - c/w current diet, FS checks and ISS. Goal -180 while inpatient    Medicine will continue to follow. Patient seen, evaluated, and discussed with attending Dr. Montero 67 y/o M w PMH of CAD (x2 stents Mar 2023), HLD, T2DM, hx of kidney stones, psoriasis presents to [facility] for evaluation of oral mass and 3 months of jaw pain a/f OMFS mandibular resection and flap reconstruction on Thursday. Medicine initially evaluated on 12/6 for pre-op assessment Now following for co-management.     #SIRS, suspected to be 2/2 to pneumonia  pt febrile on 12/12 to 103 rectally, with HR of 90s. No leukocytosis on AM labs today. Pt notes shortness of breath with increased sputum production. No urinary complaints or diarrhea. Wounds appear clean  BCx - NGTD   CXR with no new infiltrate. UA negative for infection  initial sputum cx contaminated, repeat completed on 12/13 shows E coli and Enterobacter cloacea  MRSA swab negative - can d/c Vancomycin  Sensitivities reviewed - can de-escalate antibiotics from Zosyn to CTX 2g IV q24h (Today is day 3 of 5)    #s/p mandibular resection and flap reconstruction  pt tolerated procedure well. KRISTEN drains removed by primary team  c/w q4h flap checks  Pt unable to tolerate PO intake at this time, pending G tube placement w/ surgery today, 12/15  c/w S&S evaluation  recommend early mobilization, OOBTC, PT evaluation. recommend addition of PRN Duonebs to aid with airway clearance    #CAD  pt with hx of CAD s/p 2 stents in March 2023  Cardiology consulted; reccs appreciated  on lipitor and ASA. Per cardiology, given that intervention was approx 9 months ago, ok for monotherapy at this time. Ongoing discussion with cardiology and primary team when safe to resume plavix    #anemia  Hgb stable at 8, previously was 13-14  no signs of active bleed  per iron studies, consistent with WADE. However given concern for active infection, will hold off on IV iron at this time  maintain active T&S  per cardiology recommendations, transfusion goal to Hgb >8    #DM2  A1c 6.3%, takes home metformin and jardiance. FS   - c/w current diet, FS checks and ISS. Goal -180 while inpatient    Medicine will continue to follow. Patient seen, evaluated, and discussed with attending Dr. Montero

## 2023-12-15 NOTE — PROGRESS NOTE ADULT - ATTENDING COMMENTS
68M w/ [id number] SCC of L buccal mucosa presents for pre optimization for surgery 12/7, now s/p hemimandibulectomy, left level 1, 2a, 3 neck neck dissection, L FFF, tracheostomy w/ 7.5 cuffed portex, dental implants, STSG 12/7- plan for G tube placement postponed due to fever 12/12- blood cx drawn, respiratory culture likely contamination -reported thick sputum from trach after nebs    pt seen and examined with Dr. Dean.  seen sitting on bedside chair, wife at bedside.   awake, alert, trach with trach cuff ( humidified oxygen )  RRR  bs present soft,  Graft donar site on left thigh -clean  wound vac on left fibular area   LLE edema noted on lower ext.     fever- unclear source yet, reported thick secretion  blood cx- drawn.   chest x ray -no clear sign to suggest pneumonia, gram negative   fever delaying the G tube placement   - MRSA swab negative   - Sputum Cx" e.coli and enterobacter cloacae  - d/c Vanco and de-escalated Zosyn to Ceftriaxone 2gm iv q24hr total 5 days course , day 3 of 5   - oral care as per ENT.   - would keep duo-nebs q 6 ( to help loosen up secretion)  - continue with trach suction   - active T&S, monitor H/H  - CAD/PCI x2 ( 3/2023)  c/w ASA and Lipitor, to d/w Cardiology re: resuming plavix for DAPT total 1 year   - DM - FS with ISS, caution for Hypoglycemia.  dw Dr. Dean, 68M w/ [id number] SCC of L buccal mucosa presents for pre optimization for surgery 12/7, now s/p hemimandibulectomy, left level 1, 2a, 3 neck neck dissection, L FFF, tracheostomy w/ 7.5 cuffed portex, dental implants, STSG 12/7- plan for G tube placement postponed due to fever 12/12- blood cx drawn, respiratory culture likely contamination -reported thick sputum from trach after nebs    pt seen and examined with Dr. Dean.  seen sitting on bedside chair, wife at bedside.   awake, alert, trach with trach cuff ( humidified oxygen )  RRR  bs present soft,  Graft donar site on left thigh -clean  wound vac on left fibular area   LLE edema noted on lower ext.     fever- unclear source yet, reported thick secretion  blood cx- drawn.   chest x ray -no clear sign to suggest pneumonia, gram negative   fever delaying the G tube placement   - MRSA swab negative   - Sputum Cx" e.coli and enterobacter cloacae  - d/c Vanco and de-escalated Zosyn to Ceftriaxone 2gm iv q24hr total 5 days course , day 3 of 5   - oral care as per ENT.   - would keep duo-nebs q 6 ( to help loosen up secretion)  - continue with trach suction   - active T&S, monitor H/H  - CAD/PCI x2 ( 3/2023)  c/w ASA and Lipitor, to d/w Cardiology re: resuming plavix for DAPT total 1 year   - DM - FS with ISS, caution for Hypoglycemia.  dw Dr. Dean, 68M w/ [id number] SCC of L buccal mucosa presents for pre optimization for surgery 12/7, now s/p hemimandibulectomy, left level 1, 2a, 3 neck neck dissection, L FFF, tracheostomy w/ 7.5 cuffed portex, dental implants, STSG 12/7- plan for G tube placement postponed due to fever 12/12- blood cx drawn, respiratory culture likely contamination -reported thick sputum from trach after nebs    pt seen and examined with Dr. Dean.  seen sitting on bedside chair, wife at bedside.   awake, alert, trach with trach cuff ( humidified oxygen )  RRR  bs present soft,  Graft donar site on left thigh -clean  wound vac on left fibular area   LLE edema noted on lower ext.     fever- unclear source yet, reported thick secretion  blood cx- drawn.   chest x ray -no clear sign to suggest pneumonia, gram negative   fever delaying the G tube placement   - MRSA swab negative   - Sputum Cx" e.coli and enterobacter cloacae  - d/c Vanco and de-escalated Zosyn to Ceftriaxone 2gm iv q24hr total 5 days course , day 3 of 5   - oral care as per ENT.   - would keep duo-nebs q 6 ( to help loosen up secretion)  - continue with trach suction   - active T&S, monitor H/H  - CAD/PCI x2 ( 3/2023)  c/w ASA and Lipitor, to d/w Cardiology re: resuming plavix for DAPT total 1 year   - DM - FS with ISS, caution for Hypoglycemia.  - LLE edema- Venous doppler ordered   dw Dr. Dean, 68M w/ [id number] SCC of L buccal mucosa presents for pre optimization for surgery 12/7, now s/p hemimandibulectomy, left level 1, 2a, 3 neck neck dissection, L FFF, tracheostomy w/ 7.5 cuffed portex, dental implants, STSG 12/7- plan for G tube placement postponed due to fever 12/12- blood cx drawn, respiratory culture likely contamination -reported thick sputum from trach after nebs    pt seen and examined with Dr. Dean.  seen sitting on bedside chair, wife at bedside.   awake, alert, trach with trach cuff ( humidified oxygen )  RRR  bs present soft,  Graft donar site on left thigh -clean  wound vac on left fibular area   LLE edema noted on lower ext.     fever- unclear source yet, reported thick secretion  blood cx- drawn.   chest x ray -no clear sign to suggest pneumonia, gram negative   fever delaying the G tube placement   - MRSA swab negative   - Sputum Cx" e.coli and enterobacter cloacae  - d/c Vanco and de-escalated Zosyn to Ceftriaxone 2gm iv q24hr total 5 days course , day 3 of 5   - oral care as per ENT.   - would keep duo-nebs q 6 ( to help loosen up secretion)  - continue with trach suction   - active T&S, monitor H/H  - CAD/PCI x2 ( 3/2023)  c/w ASA and Lipitor, to d/w Cardiology re: resuming plavix for DAPT total 1 year   - DM - FS with ISS, caution for Hypoglycemia.  - LLE edema- Venous doppler ordered   dw Dr. Dean,

## 2023-12-15 NOTE — PROGRESS NOTE ADULT - ATTENDING COMMENTS
Patient is a 68 year old gentleman with history of CAD s/p PCI with stents in May 2023, HLD, T2DM, nephrolithiasis, now admitted after mandibular resection, neck dissection, free flap, tracheostomy, being evaluated for gastrostomy tube placement for nutritional supplementation due to dysphagia and malnutrition related to recent surgery. Difficulty with enteric feeding tube placement and enteral feeds, will require long term supplementation. Plavix being held. At time of evaluation, afebrile, hemodynamically stable, abdomen soft, nontender, nondistended, no rebound or guarding, incarcerated fat containing umbilical hernia present.     Afebrile x 24 hours. Plan for OR tomorrow for laparoscopic gastrostomy tube placement and umbilical hernia repair.  Late entry, date of service 12/15/23.

## 2023-12-15 NOTE — PROGRESS NOTE ADULT - SUBJECTIVE AND OBJECTIVE BOX
OTOLARYNGOLOGY (ENT) PROGRESS NOTE    PATIENT: SAUNDRA HOLMAN  MRN: [id number]  : 57  [id number]  DATE OF SERVICE:  12-15-23  			           ID:SAUNDRA HOLMAN is a  69yo M w PMH of CAD (x2 stents Mar 2023), HLD, T2DM, hx of kidney stones, psoriasis presents to [facility] for evaluation of oral mass. Reports Three month hx of jaw pain and loosening of mandibular molar tooth on the lower left. Pt has a 30 pack yr smoking hx, quit 1 yr ago. 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 POD 1 composite mandibular resection, neck dissection and fibula free flap reconstruction.       Subjective/ Interval:   ; patient seen this morning, oozing from trach as expected ,  Plan to start aspirin today, cuff is up on tracheostomy tube, intraoral skin panel intact doppler signal strong,  plan to advance NGT   : Patient seen and examined at bedside. AFVSS overnight. Significant HgB drop to 7.2 noted this morning. NGT clogged, attempted replacement but went into lung, need to replace. Cuff deflated. Patient reports lethargy. Otherwise no new complaints. Intraoral skin paddle intact with strong doppler signal. Plan to hold off one eliquis given significant HgB drop  12/10: Patient seen and examined at bedside. AFVSS overnight other than bradycardic to lowest 39, mainly in 40s-50s which seems to be his baseline even pre-op. Not symptomatic while chinedu. Otherwise, patient stable on TC with no issues. Yesterday, iatrogenic right sided PTX occured with NGT placement, patient remained asymptomatic throughout. Patient now s/p placement of pig-tail catheter by pulm with significant improvement in PTX. Chest tube clamped this morning with plan for removal this evening. Patient s/p 2 units of PRBCs with moderate improvement in Hgb. Today, patient reports feeling significantly better than yesterday and overall "feels good." He was started on trickle feeds last night but felt some chest tightness and so they were held. Patient also failed TOV again and was straight cathed. KRISTEN drain output significantly decreased this morning. No other changes at this time.   : patient seen this morning PTX has resolved on CXR, intraoral flap intact, doppler strong, trach in place,  KRISTEN drains with minimal output   : Patient seen and examined this morning at bedside. Overnight, NGT slightly displaced, coming out around 10 cm from original placement, advanced and secured, pending CXR. Patient failed trial of clears with SLP yesterday but tolerated PMV without issue. Patient continues to be OOBTC daily and is tolerating tube feeds at goal. 2 KRISTEN drains removed yesterday, remaining KRISTEN with minimal output. Intraoral flap intact, doppler strong, trach in place. Patient denies any other new complaints at this time.   : patient febrile overnight to 103, continues to have intermittent PVC, HR last night in 40s, patients baseline 50. Gen surg will like to wait 24 hours afebrile until g tube,  Oral flap appears to be congested, dixon continue to monitor. KRISTEN drain will be removed today. Pending results of fever work up . HEB 7.5, will transfuse when type and screen returns   : Patient seen this morning, afebrile overnight, trach secretion odor improving,  noted ot have an episode of oral bleeding after ambulation, bleeding stopped, unable to see source, will continue to monitor , doppler strong ( and removed today) ,  Flap continues to look dusky   12/15: patient sen this morning, continues to have intermittent oozing from left mandibular screw, Surgicel placed no active bleed, flap remains dusky but stable, afebrile overnight     ALLERGIES:  No Known Allergies      MEDICATIONS:  Antiinfectives:   piperacillin/tazobactam IVPB.. 4.5 Gram(s) IV Intermittent every 8 hours  vancomycin  IVPB 1250 milliGRAM(s) IV Intermittent every 12 hours    IV fluids:  dextrose 5% + sodium chloride 0.45% 1000 milliLiter(s) IV Continuous <Continuous>  dextrose 5%. 1000 milliLiter(s) IV Continuous <Continuous>  dextrose 5%. 1000 milliLiter(s) IV Continuous <Continuous>    Hematologic/Anticoagulation:  enoxaparin Injectable 40 milliGRAM(s) SubCutaneous every 24 hours    Pain medications/Neuro:  acetaminophen   IVPB .. 1000 milliGRAM(s) IV Intermittent once PRN  aspirin Suppository 300 milliGRAM(s) Rectal daily  HYDROmorphone  Injectable 0.5 milliGRAM(s) IV Push every 4 hours PRN  ondansetron Injectable 4 milliGRAM(s) IV Push every 6 hours PRN    Endocrine Medications:   dextrose 50% Injectable 25 Gram(s) IV Push once  dextrose 50% Injectable 12.5 Gram(s) IV Push once  dextrose 50% Injectable 25 Gram(s) IV Push once  dextrose Oral Gel 15 Gram(s) Oral once PRN  glucagon  Injectable 1 milliGRAM(s) IntraMuscular once    All other standing medications:   acetylcysteine 20%  Inhalation 4 milliLiter(s) Inhalation every 6 hours  albuterol/ipratropium for Nebulization 3 milliLiter(s) Nebulizer every 6 hours  chlorhexidine 0.12% Liquid 15 milliLiter(s) Oral Mucosa two times a day  chlorhexidine 2% Cloths 1 Application(s) Topical daily  influenza  Vaccine (HIGH DOSE) 0.7 milliLiter(s) IntraMuscular once  pantoprazole  Injectable 40 milliGRAM(s) IV Push daily  sodium chloride 0.9% for Nebulization 3 milliLiter(s) Nebulizer every 6 hours  sodium chloride 3%  Inhalation 4 milliLiter(s) Inhalation every 6 hours    All other PRN medications:    Vital Signs Last 24 Hrs  T(C): 36.9 (15 Dec 2023 05:00), Max: 37.3 (14 Dec 2023 18:11)  T(F): 98.5 (15 Dec 2023 05:00), Max: 99.1 (14 Dec 2023 18:11)  HR: 56 (15 Dec 2023 04:15) (46 - 66)  BP: 136/65 (15 Dec 2023 04:15) (122/56 - 136/65)  BP(mean): 93 (15 Dec 2023 04:15) (81 - 93)  RR: 18 (15 Dec 2023 04:15) (17 - 18)  SpO2: 98% (15 Dec 2023 04:15) (98% - 100%)    Parameters below as of 15 Dec 2023 04:15  Patient On (Oxygen Delivery Method): tracheostomy collar  O2 Flow (L/min): 10  O2 Concentration (%): 40      -14 @ 07:01  -  12-15 @ 07:00  --------------------------------------------------------  IN:    dextrose 5% + sodium chloride 0.45% w/ Additives: 2300 mL  Total IN: 2300 mL    OUT:    VAC (Vacuum Assisted Closure) System (mL): 50 mL    Voided (mL): 950 mL  Total OUT: 1000 mL    Total NET: 1300 mL          23 @ 07:01  -  12-15-23 @ 07:00  --------------------------------------------------------  IN:  Total IN: 0 mL    OUT:    VAC (Vacuum Assisted Closure) System (mL): 50 mL  Total OUT: 50 mL    Total NET: -50 mL              PHYSICAL EXAM:  Gen: AAOx3, NAD   Head: Surgical incision around chin extending up through lip  Eyes: EOMI, PERRL, visual acuity intact, no diplopia, supra/infra orbital rims intact, no subconjunctival heme,   Ears: Gross hearing intact,  Nose: No septal hematoma/asymmetry, no epistaxis bilaterally. no abrasions present, no lacerations.   Throat: No LAD, supple, neck surgical incision w/ steristrip dressing is hemostatic , trach in place w/ 7.5 cuffed portex, cuff deflated,    Oral: Left FFF paddle dusk colored with brisk bleed ( stable) ,  will continue to monitor,. IMF screws in place intermittent ooze around left mandibular screw, class 3 elastics,   Exx: Wound vac left leg, left leg KRISTEN with sanguinous outpu    LABS                       8.4    6.00  )-----------( 219      ( 15 Dec 2023 06:02 )             25.7    12-15    137  |  102  |  10  ----------------------------<  133<H>  3.6   |  26  |  0.68    Ca    8.5      15 Dec 2023 06:02  Phos  3.7     12-15  Mg     1.8     12-15           Coagulation Studies-   PT/INR - ( 15 Dec 2023 06:02 )   PT: 12.5 sec;   INR: 1.10          PTT - ( 15 Dec 2023 06:02 )  PTT:27.7 sec  Urinalysis Basic - ( 15 Dec 2023 06:02 )    Color: x / Appearance: x / SG: x / pH: x  Gluc: 133 mg/dL / Ketone: x  / Bili: x / Urobili: x   Blood: x / Protein: x / Nitrite: x   Leuk Esterase: x / RBC: x / WBC x   Sq Epi: x / Non Sq Epi: x / Bacteria: x      Endocrine Panel-  Calcium: 8.5 mg/dL (12-15 @ 06:02)                MICROBIOLOGY:  Culture - Sputum (collected 23 @ 07:27)  Source: Trach Asp Tracheal Aspirate  Gram Stain (23 @ 21:31):    Rare epithelial cells    Moderate WBC's    Rare Gram Positive Rods    Few Gram Negative Rods    Moderate Gram positive cocci in pairs, chains and clusters  Preliminary Report (23 @ 12:13):    Moderate Escherichia coli    Susceptibility to follow.    Moderate Enterobacter cloacae complex    Susceptibility to follow.    Accompanied by normal respiratory brenden    Culture - Sputum (collected 23 @ 20:48)  Source: .Sputum  Gram Stain (23 @ 22:45):    Moderate epithelial cells    Moderate WBC's    Rare Gram Positive Rods    Few Gram Negative Rods    Few Gram Positive Cocci in Pairs and Chains  Final Report (23 @ 22:45):    Sputum specimen rejected.  Microscopic examination indicates    oropharyngeal contamination.  Please repeat.      Culture Results:   Moderate Escherichia coli  Susceptibility to follow.  Moderate Enterobacter cloacae complex  Susceptibility to follow.  Accompanied by normal respiratory brenden (23 @ 07:27)  Culture Results:   Sputum specimen rejected.  Microscopic examination indicates  oropharyngeal contamination.  Please repeat. (23 @ 20:48)  Culture Results:   No growth at 2 days. (23 @ 20:10)  Culture Results:   No growth at 2 days. (23 @ 20:05)      Culture - Sputum (collected 23 @ 07:27)  Source: Trach Asp Tracheal Aspirate  Gram Stain (23 @ 21:31):    Rare epithelial cells    Moderate WBC's    Rare Gram Positive Rods    Few Gram Negative Rods    Moderate Gram positive cocci in pairs, chains and clusters  Preliminary Report (23 @ 12:13):    Moderate Escherichia coli    Susceptibility to follow.    Moderate Enterobacter cloacae complex    Susceptibility to follow.    Accompanied by normal respiratory brenden    Culture - Sputum (collected 23 @ 20:48)  Source: .Sputum  Gram Stain (23 @ 22:45):    Moderate epithelial cells    Moderate WBC's    Rare Gram Positive Rods    Few Gram Negative Rods    Few Gram Positive Cocci in Pairs and Chains  Final Report (23 @ 22:45):    Sputum specimen rejected.  Microscopic examination indicates    oropharyngeal contamination.  Please repeat.    Culture - Blood (collected 23 @ 20:10)  Source: .Blood Blood  Preliminary Report (23 @ 22:01):    No growth at 2 days.    Culture - Blood (collected 23 @ 20:05)  Source: .Blood Blood  Preliminary Report (23 @ 22:01):    No growth at 2 days.     OTOLARYNGOLOGY (ENT) PROGRESS NOTE    PATIENT: SAUNDRA HOLMAN  MRN: [id number]  : 57  [id number]  DATE OF SERVICE:  12-15-23  			           ID:SAUNDRA HOLMAN is a  69yo M w PMH of CAD (x2 stents Mar 2023), HLD, T2DM, hx of kidney stones, psoriasis presents to [facility] for evaluation of oral mass. Reports Three month hx of jaw pain and loosening of mandibular molar tooth on the lower left. Pt has a 30 pack yr smoking hx, quit 1 yr ago. 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 POD 1 composite mandibular resection, neck dissection and fibula free flap reconstruction.       Subjective/ Interval:   ; patient seen this morning, oozing from trach as expected ,  Plan to start aspirin today, cuff is up on tracheostomy tube, intraoral skin panel intact doppler signal strong,  plan to advance NGT   : Patient seen and examined at bedside. AFVSS overnight. Significant HgB drop to 7.2 noted this morning. NGT clogged, attempted replacement but went into lung, need to replace. Cuff deflated. Patient reports lethargy. Otherwise no new complaints. Intraoral skin paddle intact with strong doppler signal. Plan to hold off one eliquis given significant HgB drop  12/10: Patient seen and examined at bedside. AFVSS overnight other than bradycardic to lowest 39, mainly in 40s-50s which seems to be his baseline even pre-op. Not symptomatic while chinedu. Otherwise, patient stable on TC with no issues. Yesterday, iatrogenic right sided PTX occured with NGT placement, patient remained asymptomatic throughout. Patient now s/p placement of pig-tail catheter by pulm with significant improvement in PTX. Chest tube clamped this morning with plan for removal this evening. Patient s/p 2 units of PRBCs with moderate improvement in Hgb. Today, patient reports feeling significantly better than yesterday and overall "feels good." He was started on trickle feeds last night but felt some chest tightness and so they were held. Patient also failed TOV again and was straight cathed. KRISTEN drain output significantly decreased this morning. No other changes at this time.   : patient seen this morning PTX has resolved on CXR, intraoral flap intact, doppler strong, trach in place,  KRISTEN drains with minimal output   : Patient seen and examined this morning at bedside. Overnight, NGT slightly displaced, coming out around 10 cm from original placement, advanced and secured, pending CXR. Patient failed trial of clears with SLP yesterday but tolerated PMV without issue. Patient continues to be OOBTC daily and is tolerating tube feeds at goal. 2 KRISTEN drains removed yesterday, remaining KRISTEN with minimal output. Intraoral flap intact, doppler strong, trach in place. Patient denies any other new complaints at this time.   : patient febrile overnight to 103, continues to have intermittent PVC, HR last night in 40s, patients baseline 50. Gen surg will like to wait 24 hours afebrile until g tube,  Oral flap appears to be congested, dixon continue to monitor. KRISTEN drain will be removed today. Pending results of fever work up . HEB 7.5, will transfuse when type and screen returns   : Patient seen this morning, afebrile overnight, trach secretion odor improving,  noted ot have an episode of oral bleeding after ambulation, bleeding stopped, unable to see source, will continue to monitor , doppler strong ( and removed today) ,  Flap continues to look dusky   12/15: patient sen this morning, continues to have intermittent oozing from left mandibular screw, Surgicel placed no active bleed, flap remains dusky but stable, afebrile overnight     ALLERGIES:  No Known Allergies      MEDICATIONS:  Antiinfectives:   piperacillin/tazobactam IVPB.. 4.5 Gram(s) IV Intermittent every 8 hours  vancomycin  IVPB 1250 milliGRAM(s) IV Intermittent every 12 hours    IV fluids:  dextrose 5% + sodium chloride 0.45% 1000 milliLiter(s) IV Continuous <Continuous>  dextrose 5%. 1000 milliLiter(s) IV Continuous <Continuous>  dextrose 5%. 1000 milliLiter(s) IV Continuous <Continuous>    Hematologic/Anticoagulation:  enoxaparin Injectable 40 milliGRAM(s) SubCutaneous every 24 hours    Pain medications/Neuro:  acetaminophen   IVPB .. 1000 milliGRAM(s) IV Intermittent once PRN  aspirin Suppository 300 milliGRAM(s) Rectal daily  HYDROmorphone  Injectable 0.5 milliGRAM(s) IV Push every 4 hours PRN  ondansetron Injectable 4 milliGRAM(s) IV Push every 6 hours PRN    Endocrine Medications:   dextrose 50% Injectable 25 Gram(s) IV Push once  dextrose 50% Injectable 12.5 Gram(s) IV Push once  dextrose 50% Injectable 25 Gram(s) IV Push once  dextrose Oral Gel 15 Gram(s) Oral once PRN  glucagon  Injectable 1 milliGRAM(s) IntraMuscular once    All other standing medications:   acetylcysteine 20%  Inhalation 4 milliLiter(s) Inhalation every 6 hours  albuterol/ipratropium for Nebulization 3 milliLiter(s) Nebulizer every 6 hours  chlorhexidine 0.12% Liquid 15 milliLiter(s) Oral Mucosa two times a day  chlorhexidine 2% Cloths 1 Application(s) Topical daily  influenza  Vaccine (HIGH DOSE) 0.7 milliLiter(s) IntraMuscular once  pantoprazole  Injectable 40 milliGRAM(s) IV Push daily  sodium chloride 0.9% for Nebulization 3 milliLiter(s) Nebulizer every 6 hours  sodium chloride 3%  Inhalation 4 milliLiter(s) Inhalation every 6 hours    All other PRN medications:    Vital Signs Last 24 Hrs  T(C): 36.9 (15 Dec 2023 05:00), Max: 37.3 (14 Dec 2023 18:11)  T(F): 98.5 (15 Dec 2023 05:00), Max: 99.1 (14 Dec 2023 18:11)  HR: 56 (15 Dec 2023 04:15) (46 - 66)  BP: 136/65 (15 Dec 2023 04:15) (122/56 - 136/65)  BP(mean): 93 (15 Dec 2023 04:15) (81 - 93)  RR: 18 (15 Dec 2023 04:15) (17 - 18)  SpO2: 98% (15 Dec 2023 04:15) (98% - 100%)    Parameters below as of 15 Dec 2023 04:15  Patient On (Oxygen Delivery Method): tracheostomy collar  O2 Flow (L/min): 10  O2 Concentration (%): 40      -14 @ 07:01  -  12-15 @ 07:00  --------------------------------------------------------  IN:    dextrose 5% + sodium chloride 0.45% w/ Additives: 2300 mL  Total IN: 2300 mL    OUT:    VAC (Vacuum Assisted Closure) System (mL): 50 mL    Voided (mL): 950 mL  Total OUT: 1000 mL    Total NET: 1300 mL          23 @ 07:01  -  12-15-23 @ 07:00  --------------------------------------------------------  IN:  Total IN: 0 mL    OUT:    VAC (Vacuum Assisted Closure) System (mL): 50 mL  Total OUT: 50 mL    Total NET: -50 mL              PHYSICAL EXAM:  Gen: AAOx3, NAD   Head: Surgical incision around chin extending up through lip  Eyes: EOMI, PERRL, visual acuity intact, no diplopia, supra/infra orbital rims intact, no subconjunctival heme,   Ears: Gross hearing intact,  Nose: No septal hematoma/asymmetry, no epistaxis bilaterally. no abrasions present, no lacerations.   Throat: No LAD, supple, neck surgical incision w/ steristrip dressing is hemostatic , trach in place w/ 7.5 cuffed portex, cuff deflated,    Oral: Left FFF paddle dusk colored with brisk bleed ( stable) ,  will continue to monitor,. IMF screws in place intermittent ooze around left mandibular screw, class 3 elastics,   Exx: Wound vac left leg, left leg KRISTEN with sanguinous outpu    LABS                       8.4    6.00  )-----------( 219      ( 15 Dec 2023 06:02 )             25.7    12-15    137  |  102  |  10  ----------------------------<  133<H>  3.6   |  26  |  0.68    Ca    8.5      15 Dec 2023 06:02  Phos  3.7     12-15  Mg     1.8     12-15           Coagulation Studies-   PT/INR - ( 15 Dec 2023 06:02 )   PT: 12.5 sec;   INR: 1.10          PTT - ( 15 Dec 2023 06:02 )  PTT:27.7 sec  Urinalysis Basic - ( 15 Dec 2023 06:02 )    Color: x / Appearance: x / SG: x / pH: x  Gluc: 133 mg/dL / Ketone: x  / Bili: x / Urobili: x   Blood: x / Protein: x / Nitrite: x   Leuk Esterase: x / RBC: x / WBC x   Sq Epi: x / Non Sq Epi: x / Bacteria: x      Endocrine Panel-  Calcium: 8.5 mg/dL (12-15 @ 06:02)                MICROBIOLOGY:  Culture - Sputum (collected 23 @ 07:27)  Source: Trach Asp Tracheal Aspirate  Gram Stain (23 @ 21:31):    Rare epithelial cells    Moderate WBC's    Rare Gram Positive Rods    Few Gram Negative Rods    Moderate Gram positive cocci in pairs, chains and clusters  Preliminary Report (23 @ 12:13):    Moderate Escherichia coli    Susceptibility to follow.    Moderate Enterobacter cloacae complex    Susceptibility to follow.    Accompanied by normal respiratory brenden    Culture - Sputum (collected 23 @ 20:48)  Source: .Sputum  Gram Stain (23 @ 22:45):    Moderate epithelial cells    Moderate WBC's    Rare Gram Positive Rods    Few Gram Negative Rods    Few Gram Positive Cocci in Pairs and Chains  Final Report (23 @ 22:45):    Sputum specimen rejected.  Microscopic examination indicates    oropharyngeal contamination.  Please repeat.      Culture Results:   Moderate Escherichia coli  Susceptibility to follow.  Moderate Enterobacter cloacae complex  Susceptibility to follow.  Accompanied by normal respiratory brenden (23 @ 07:27)  Culture Results:   Sputum specimen rejected.  Microscopic examination indicates  oropharyngeal contamination.  Please repeat. (23 @ 20:48)  Culture Results:   No growth at 2 days. (23 @ 20:10)  Culture Results:   No growth at 2 days. (23 @ 20:05)      Culture - Sputum (collected 23 @ 07:27)  Source: Trach Asp Tracheal Aspirate  Gram Stain (23 @ 21:31):    Rare epithelial cells    Moderate WBC's    Rare Gram Positive Rods    Few Gram Negative Rods    Moderate Gram positive cocci in pairs, chains and clusters  Preliminary Report (23 @ 12:13):    Moderate Escherichia coli    Susceptibility to follow.    Moderate Enterobacter cloacae complex    Susceptibility to follow.    Accompanied by normal respiratory brenden    Culture - Sputum (collected 23 @ 20:48)  Source: .Sputum  Gram Stain (23 @ 22:45):    Moderate epithelial cells    Moderate WBC's    Rare Gram Positive Rods    Few Gram Negative Rods    Few Gram Positive Cocci in Pairs and Chains  Final Report (23 @ 22:45):    Sputum specimen rejected.  Microscopic examination indicates    oropharyngeal contamination.  Please repeat.    Culture - Blood (collected 23 @ 20:10)  Source: .Blood Blood  Preliminary Report (23 @ 22:01):    No growth at 2 days.    Culture - Blood (collected 23 @ 20:05)  Source: .Blood Blood  Preliminary Report (23 @ 22:01):    No growth at 2 days.

## 2023-12-15 NOTE — PROGRESS NOTE ADULT - SUBJECTIVE AND OBJECTIVE BOX
OVERNIGHT EVENTS: GERI    SUBJECTIVE:  Patient seen and examined at bedside. Reports he is feeling well. Continued to have small amount of blood-tinged sputum. Cough has improved. No chest pain, fevers, chills, abdominal pain. Walked down the halls this morning.    Vital Signs Last 12 Hrs  T(F): 98.4 (12-15-23 @ 09:10), Max: 98.5 (12-15-23 @ 05:00)  HR: 62 (12-15-23 @ 11:58) (50 - 62)  BP: 121/60 (12-15-23 @ 11:58) (121/60 - 137/62)  BP(mean): 86 (12-15-23 @ 11:35) (86 - 93)  RR: 18 (12-15-23 @ 11:58) (16 - 18)  SpO2: 99% (12-15-23 @ 11:58) (98% - 100%)  I&O's Summary    14 Dec 2023 07:01  -  15 Dec 2023 07:00  --------------------------------------------------------  IN: 2300 mL / OUT: 1000 mL / NET: 1300 mL    15 Dec 2023 07:01  -  15 Dec 2023 14:07  --------------------------------------------------------  IN: 0 mL / OUT: 0 mL / NET: 0 mL        PHYSICAL EXAM:  Constitutional: NAD, comfortable in chair. no respiratory distress, on RA  HEENT: PERRLA, MMM, mandibular flap c/d/i. +inner lip with small amount of bright red blood  +trach, with clear colored sputum  Neck: Supple  Respiratory: CTA B/L. No w/r/r.   Cardiovascular: RRR, normal S1 and S2, no m/r/g.   Gastrointestinal: +BS, soft NTND  Extremities: wwp; LLE in boot. RLE with no edema        LABS:                        8.4    6.00  )-----------( 219      ( 15 Dec 2023 06:02 )             25.7     12-15    137  |  102  |  10  ----------------------------<  133<H>  3.6   |  26  |  0.68    Ca    8.5      15 Dec 2023 06:02  Phos  3.7     12-15  Mg     1.8     12-15      PT/INR - ( 15 Dec 2023 06:02 )   PT: 12.5 sec;   INR: 1.10          PTT - ( 15 Dec 2023 06:02 )  PTT:27.7 sec  Urinalysis Basic - ( 15 Dec 2023 06:02 )    Color: x / Appearance: x / SG: x / pH: x  Gluc: 133 mg/dL / Ketone: x  / Bili: x / Urobili: x   Blood: x / Protein: x / Nitrite: x   Leuk Esterase: x / RBC: x / WBC x   Sq Epi: x / Non Sq Epi: x / Bacteria: x          RADIOLOGY & ADDITIONAL TESTS:    MEDICATIONS  (STANDING):  acetylcysteine 20%  Inhalation 4 milliLiter(s) Inhalation every 6 hours  albuterol/ipratropium for Nebulization 3 milliLiter(s) Nebulizer every 6 hours  aspirin Suppository 300 milliGRAM(s) Rectal daily  chlorhexidine 0.12% Liquid 15 milliLiter(s) Oral Mucosa two times a day  chlorhexidine 2% Cloths 1 Application(s) Topical daily  dextrose 5% + sodium chloride 0.45% 1000 milliLiter(s) (100 mL/Hr) IV Continuous <Continuous>  dextrose 5%. 1000 milliLiter(s) (50 mL/Hr) IV Continuous <Continuous>  dextrose 5%. 1000 milliLiter(s) (100 mL/Hr) IV Continuous <Continuous>  dextrose 50% Injectable 12.5 Gram(s) IV Push once  dextrose 50% Injectable 25 Gram(s) IV Push once  dextrose 50% Injectable 25 Gram(s) IV Push once  enoxaparin Injectable 40 milliGRAM(s) SubCutaneous every 24 hours  glucagon  Injectable 1 milliGRAM(s) IntraMuscular once  influenza  Vaccine (HIGH DOSE) 0.7 milliLiter(s) IntraMuscular once  pantoprazole  Injectable 40 milliGRAM(s) IV Push daily  piperacillin/tazobactam IVPB.. 4.5 Gram(s) IV Intermittent every 8 hours  sodium chloride 0.9% for Nebulization 3 milliLiter(s) Nebulizer every 6 hours  sodium chloride 3%  Inhalation 4 milliLiter(s) Inhalation every 6 hours    MEDICATIONS  (PRN):  acetaminophen   IVPB .. 1000 milliGRAM(s) IV Intermittent once PRN Temp greater or equal to 38C (100.4F), Mild Pain (1 - 3)  dextrose Oral Gel 15 Gram(s) Oral once PRN Blood Glucose LESS THAN 70 milliGRAM(s)/deciliter  HYDROmorphone  Injectable 0.5 milliGRAM(s) IV Push every 4 hours PRN Breakthrough  ondansetron Injectable 4 milliGRAM(s) IV Push every 6 hours PRN Nausea and/or Vomiting

## 2023-12-15 NOTE — PROGRESS NOTE ADULT - ASSESSMENT
Assessment and Plan:    SAUNDRA HOLMAN is a  68M w/ [id number] SCC of L buccal mucosa presents for pre optimization for surgery 12/7, now s/p hemimandibulectomy, left level 1, 2a, 3 neck neck dissection, L FFF, tracheostomy w/ 7.5 cuffed portex, dental implants, STSG 12/7. Intermittent fevers 12/12 afternoon.       PLAN  -Plan for g tube today    Monitor for any signs of oral bleeding   OOBTC   Plan to ambulate 3-4x a day   - Continue nebulized solution for thick trach secretions   Continue Ondansetron PRN nausea/vomiting , PO Senna once daily, Miralax 17g once daily,  Chlorhexidine swish and spit, Esomeprazole, Enoxaparin, Gabapentin, Acetaminophen   Continue SCD’s    trach change after G tube --- G tube pending afebrile 24 hours   Cardiology_ plavix after g tube, transfuse if hgb <8   Continue to work with SLP for trial of clears  PMV as tolerated    [facility] at [phone] with any questions/concerns.    Ada Quinones PA-C  12-15-23 @ 08:22     Assessment and Plan:    SAUNDRA HOLMAN is a  68M w/ [id number] SCC of L buccal mucosa presents for pre optimization for surgery 12/7, now s/p hemimandibulectomy, left level 1, 2a, 3 neck neck dissection, L FFF, tracheostomy w/ 7.5 cuffed portex, dental implants, STSG 12/7. Intermittent fevers 12/12 afternoon.       PLAN  -Plan for g tube today    Monitor for any signs of oral bleeding   OOBTC   Plan to ambulate 3-4x a day   - Continue nebulized solution for thick trach secretions   Continue Ondansetron PRN nausea/vomiting , PO Senna once daily, Miralax 17g once daily,  Chlorhexidine swish and spit, Esomeprazole, Enoxaparin, Gabapentin, Acetaminophen   Continue SCD’s    trach change after G tube --- G tube pending afebrile 24 hours   Cardiology_ plavix after g tube, transfuse if hgb <8   Continue to work with SLP for trial of clears  PMV as tolerated    [facility] at [phone] with any questions/concerns.    Ada Quinones PA-C  12-15-23 @ 08:22

## 2023-12-16 ENCOUNTER — TRANSCRIPTION ENCOUNTER (OUTPATIENT)
Age: 68
End: 2023-12-16

## 2023-12-16 ENCOUNTER — RESULT REVIEW (OUTPATIENT)
Age: 68
End: 2023-12-16

## 2023-12-16 LAB
ANION GAP SERPL CALC-SCNC: 9 MMOL/L — SIGNIFICANT CHANGE UP (ref 5–17)
ANION GAP SERPL CALC-SCNC: 9 MMOL/L — SIGNIFICANT CHANGE UP (ref 5–17)
BUN SERPL-MCNC: 9 MG/DL — SIGNIFICANT CHANGE UP (ref 7–23)
BUN SERPL-MCNC: 9 MG/DL — SIGNIFICANT CHANGE UP (ref 7–23)
CALCIUM SERPL-MCNC: 8.5 MG/DL — SIGNIFICANT CHANGE UP (ref 8.4–10.5)
CALCIUM SERPL-MCNC: 8.5 MG/DL — SIGNIFICANT CHANGE UP (ref 8.4–10.5)
CHLORIDE SERPL-SCNC: 104 MMOL/L — SIGNIFICANT CHANGE UP (ref 96–108)
CHLORIDE SERPL-SCNC: 104 MMOL/L — SIGNIFICANT CHANGE UP (ref 96–108)
CO2 SERPL-SCNC: 26 MMOL/L — SIGNIFICANT CHANGE UP (ref 22–31)
CO2 SERPL-SCNC: 26 MMOL/L — SIGNIFICANT CHANGE UP (ref 22–31)
CREAT SERPL-MCNC: 0.65 MG/DL — SIGNIFICANT CHANGE UP (ref 0.5–1.3)
CREAT SERPL-MCNC: 0.65 MG/DL — SIGNIFICANT CHANGE UP (ref 0.5–1.3)
EGFR: 103 ML/MIN/1.73M2 — SIGNIFICANT CHANGE UP
EGFR: 103 ML/MIN/1.73M2 — SIGNIFICANT CHANGE UP
GLUCOSE BLDC GLUCOMTR-MCNC: 137 MG/DL — HIGH (ref 70–99)
GLUCOSE BLDC GLUCOMTR-MCNC: 137 MG/DL — HIGH (ref 70–99)
GLUCOSE BLDC GLUCOMTR-MCNC: 175 MG/DL — HIGH (ref 70–99)
GLUCOSE BLDC GLUCOMTR-MCNC: 175 MG/DL — HIGH (ref 70–99)
GLUCOSE BLDC GLUCOMTR-MCNC: 183 MG/DL — HIGH (ref 70–99)
GLUCOSE BLDC GLUCOMTR-MCNC: 183 MG/DL — HIGH (ref 70–99)
GLUCOSE SERPL-MCNC: 121 MG/DL — HIGH (ref 70–99)
GLUCOSE SERPL-MCNC: 121 MG/DL — HIGH (ref 70–99)
HCT VFR BLD CALC: 25.9 % — LOW (ref 39–50)
HCT VFR BLD CALC: 25.9 % — LOW (ref 39–50)
HGB BLD-MCNC: 8.3 G/DL — LOW (ref 13–17)
HGB BLD-MCNC: 8.3 G/DL — LOW (ref 13–17)
MAGNESIUM SERPL-MCNC: 1.8 MG/DL — SIGNIFICANT CHANGE UP (ref 1.6–2.6)
MAGNESIUM SERPL-MCNC: 1.8 MG/DL — SIGNIFICANT CHANGE UP (ref 1.6–2.6)
MCHC RBC-ENTMCNC: 27.5 PG — SIGNIFICANT CHANGE UP (ref 27–34)
MCHC RBC-ENTMCNC: 27.5 PG — SIGNIFICANT CHANGE UP (ref 27–34)
MCHC RBC-ENTMCNC: 32 GM/DL — SIGNIFICANT CHANGE UP (ref 32–36)
MCHC RBC-ENTMCNC: 32 GM/DL — SIGNIFICANT CHANGE UP (ref 32–36)
MCV RBC AUTO: 85.8 FL — SIGNIFICANT CHANGE UP (ref 80–100)
MCV RBC AUTO: 85.8 FL — SIGNIFICANT CHANGE UP (ref 80–100)
NRBC # BLD: 0 /100 WBCS — SIGNIFICANT CHANGE UP (ref 0–0)
NRBC # BLD: 0 /100 WBCS — SIGNIFICANT CHANGE UP (ref 0–0)
PHOSPHATE SERPL-MCNC: 3.6 MG/DL — SIGNIFICANT CHANGE UP (ref 2.5–4.5)
PHOSPHATE SERPL-MCNC: 3.6 MG/DL — SIGNIFICANT CHANGE UP (ref 2.5–4.5)
PLATELET # BLD AUTO: 250 K/UL — SIGNIFICANT CHANGE UP (ref 150–400)
PLATELET # BLD AUTO: 250 K/UL — SIGNIFICANT CHANGE UP (ref 150–400)
POTASSIUM SERPL-MCNC: 3.5 MMOL/L — SIGNIFICANT CHANGE UP (ref 3.5–5.3)
POTASSIUM SERPL-MCNC: 3.5 MMOL/L — SIGNIFICANT CHANGE UP (ref 3.5–5.3)
POTASSIUM SERPL-SCNC: 3.5 MMOL/L — SIGNIFICANT CHANGE UP (ref 3.5–5.3)
POTASSIUM SERPL-SCNC: 3.5 MMOL/L — SIGNIFICANT CHANGE UP (ref 3.5–5.3)
RBC # BLD: 3.02 M/UL — LOW (ref 4.2–5.8)
RBC # BLD: 3.02 M/UL — LOW (ref 4.2–5.8)
RBC # FLD: 13.3 % — SIGNIFICANT CHANGE UP (ref 10.3–14.5)
RBC # FLD: 13.3 % — SIGNIFICANT CHANGE UP (ref 10.3–14.5)
SODIUM SERPL-SCNC: 139 MMOL/L — SIGNIFICANT CHANGE UP (ref 135–145)
SODIUM SERPL-SCNC: 139 MMOL/L — SIGNIFICANT CHANGE UP (ref 135–145)
WBC # BLD: 6.23 K/UL — SIGNIFICANT CHANGE UP (ref 3.8–10.5)
WBC # BLD: 6.23 K/UL — SIGNIFICANT CHANGE UP (ref 3.8–10.5)
WBC # FLD AUTO: 6.23 K/UL — SIGNIFICANT CHANGE UP (ref 3.8–10.5)
WBC # FLD AUTO: 6.23 K/UL — SIGNIFICANT CHANGE UP (ref 3.8–10.5)

## 2023-12-16 PROCEDURE — 49591 RPR AA HRN 1ST < 3 CM RDC: CPT | Mod: 59

## 2023-12-16 PROCEDURE — 99232 SBSQ HOSP IP/OBS MODERATE 35: CPT

## 2023-12-16 PROCEDURE — 88302 TISSUE EXAM BY PATHOLOGIST: CPT | Mod: 26

## 2023-12-16 DEVICE — IMPLANTABLE DEVICE: Type: IMPLANTABLE DEVICE | Status: FUNCTIONAL

## 2023-12-16 RX ORDER — ACETAMINOPHEN 500 MG
1000 TABLET ORAL ONCE
Refills: 0 | Status: COMPLETED | OUTPATIENT
Start: 2023-12-16 | End: 2023-12-16

## 2023-12-16 RX ORDER — HYDROMORPHONE HYDROCHLORIDE 2 MG/ML
1 INJECTION INTRAMUSCULAR; INTRAVENOUS; SUBCUTANEOUS EVERY 4 HOURS
Refills: 0 | Status: DISCONTINUED | OUTPATIENT
Start: 2023-12-16 | End: 2023-12-17

## 2023-12-16 RX ORDER — ACETAMINOPHEN 500 MG
1000 TABLET ORAL ONCE
Refills: 0 | Status: COMPLETED | OUTPATIENT
Start: 2023-12-17 | End: 2023-12-17

## 2023-12-16 RX ORDER — HYDROMORPHONE HYDROCHLORIDE 2 MG/ML
0.5 INJECTION INTRAMUSCULAR; INTRAVENOUS; SUBCUTANEOUS EVERY 4 HOURS
Refills: 0 | Status: DISCONTINUED | OUTPATIENT
Start: 2023-12-16 | End: 2023-12-17

## 2023-12-16 RX ADMIN — CHLORHEXIDINE GLUCONATE 15 MILLILITER(S): 213 SOLUTION TOPICAL at 07:04

## 2023-12-16 RX ADMIN — Medication 3 MILLILITER(S): at 06:39

## 2023-12-16 RX ADMIN — PANTOPRAZOLE SODIUM 40 MILLIGRAM(S): 20 TABLET, DELAYED RELEASE ORAL at 13:08

## 2023-12-16 RX ADMIN — Medication 1000 MILLIGRAM(S): at 19:52

## 2023-12-16 RX ADMIN — Medication 3 MILLILITER(S): at 13:06

## 2023-12-16 RX ADMIN — SODIUM CHLORIDE 4 MILLILITER(S): 9 INJECTION INTRAMUSCULAR; INTRAVENOUS; SUBCUTANEOUS at 00:21

## 2023-12-16 RX ADMIN — Medication 400 MILLIGRAM(S): at 18:52

## 2023-12-16 RX ADMIN — Medication 3 MILLILITER(S): at 17:06

## 2023-12-16 RX ADMIN — SODIUM CHLORIDE 4 MILLILITER(S): 9 INJECTION INTRAMUSCULAR; INTRAVENOUS; SUBCUTANEOUS at 06:37

## 2023-12-16 RX ADMIN — SODIUM CHLORIDE 4 MILLILITER(S): 9 INJECTION INTRAMUSCULAR; INTRAVENOUS; SUBCUTANEOUS at 17:05

## 2023-12-16 RX ADMIN — Medication 400 MILLIGRAM(S): at 00:19

## 2023-12-16 RX ADMIN — SODIUM CHLORIDE 4 MILLILITER(S): 9 INJECTION INTRAMUSCULAR; INTRAVENOUS; SUBCUTANEOUS at 23:22

## 2023-12-16 RX ADMIN — CEFTRIAXONE 100 MILLIGRAM(S): 500 INJECTION, POWDER, FOR SOLUTION INTRAMUSCULAR; INTRAVENOUS at 16:30

## 2023-12-16 RX ADMIN — Medication 3 MILLILITER(S): at 00:21

## 2023-12-16 RX ADMIN — Medication 3 MILLILITER(S): at 23:22

## 2023-12-16 RX ADMIN — Medication 4 MILLILITER(S): at 00:21

## 2023-12-16 RX ADMIN — Medication 1000 MILLIGRAM(S): at 01:13

## 2023-12-16 RX ADMIN — Medication 1000 MILLIGRAM(S): at 14:37

## 2023-12-16 RX ADMIN — SODIUM CHLORIDE 4 MILLILITER(S): 9 INJECTION INTRAMUSCULAR; INTRAVENOUS; SUBCUTANEOUS at 14:35

## 2023-12-16 RX ADMIN — Medication 300 MILLIGRAM(S): at 14:36

## 2023-12-16 RX ADMIN — SODIUM CHLORIDE 100 MILLILITER(S): 9 INJECTION, SOLUTION INTRAVENOUS at 23:38

## 2023-12-16 RX ADMIN — Medication 4 MILLILITER(S): at 17:06

## 2023-12-16 RX ADMIN — HYDROMORPHONE HYDROCHLORIDE 0.5 MILLIGRAM(S): 2 INJECTION INTRAMUSCULAR; INTRAVENOUS; SUBCUTANEOUS at 23:20

## 2023-12-16 RX ADMIN — Medication 400 MILLIGRAM(S): at 13:37

## 2023-12-16 RX ADMIN — Medication 4 MILLILITER(S): at 23:21

## 2023-12-16 RX ADMIN — Medication 4 MILLILITER(S): at 06:37

## 2023-12-16 RX ADMIN — Medication 4 MILLILITER(S): at 14:35

## 2023-12-16 RX ADMIN — SODIUM CHLORIDE 100 MILLILITER(S): 9 INJECTION, SOLUTION INTRAVENOUS at 06:39

## 2023-12-16 RX ADMIN — ENOXAPARIN SODIUM 40 MILLIGRAM(S): 100 INJECTION SUBCUTANEOUS at 13:20

## 2023-12-16 RX ADMIN — CHLORHEXIDINE GLUCONATE 15 MILLILITER(S): 213 SOLUTION TOPICAL at 17:07

## 2023-12-16 NOTE — PRE-ANESTHESIA EVALUATION ADULT - LAST ECHOCARDIOGRAM
reviewed 12/04/2023 normal LV size and function EF55%, mild RV dilation with normal systolic function, no significant valvular diseases

## 2023-12-16 NOTE — PROGRESS NOTE ADULT - ATTENDING COMMENTS
68M w/ [id number] SCC of L buccal mucosa presents for pre optimization for surgery 12/7, now s/p hemimandibulectomy, left level 1, 2a, 3 neck neck dissection, L FFF, tracheostomy w/ 7.5 cuffed portex, dental implants, STSG 12/7- plan for G tube placement postponed due to fever 12/12- blood cx drawn, respiratory culture likely contamination -reported thick sputum from trach after nebs    dysphagia_ s/p lap PEG   fever- unclear source yet, reported thick secretion  blood cx- drawn.   chest x ray -no clear sign to suggest pneumonia, gram negative   fever delaying the G tube placement   - MRSA swab negative   - Sputum Cx" e.coli and enterobacter cloacae  - d/c Vanco and de-escalated Zosyn to Ceftriaxone 2gm iv q24hr total 5 days course , day 4 of 5   - oral care as per ENT.   - would keep duo-nebs q 6 ( to help loosen up secretion)  - continue with trach suction   - active T&S, monitor H/H  - CAD/PCI x2 ( 3/2023)  c/w ASA and Lipitor, to d/w Cardiology re: resuming plavix for DAPT total 1 year   - DM - FS with ISS, caution for Hypoglycemia.  - LLE edema- Venous doppler ordered   dw Dr. Dean, 68M w/ [id number] SCC of L buccal mucosa presents for pre optimization for surgery 12/7, now s/p hemimandibulectomy, left level 1, 2a, 3 neck neck dissection, L FFF, tracheostomy w/ 7.5 cuffed portex, dental implants, STSG 12/7- plan for G tube placement postponed due to fever 12/12- blood cx drawn, respiratory culture likely contamination -reported thick sputum from trach after nebs    dysphagia_ s/p lap PEG   fever- unclear source yet, reported thick secretion  blood cx- drawn.   chest x ray -no clear sign to suggest pneumonia, gram negative   fever delaying the G tube placement   - MRSA swab negative   - Sputum Cx" e.coli and enterobacter cloacae  - d/c Vanco and de-escalated Zosyn to Ceftriaxone 2gm iv q24hr total 5 days course , day 4 of 5   - oral care as per ENT.   - would keep duo-nebs q 6 ( to help loosen up secretion)  - continue with trach suction   - active T&S, monitor H/H  - CAD/PCI x2 ( 3/2023)  c/w ASA and Lipitor, to d/w Cardiology re: resuming plavix for DAPT total 1 year   - DM - FS with ISS, caution for Hypoglycemia.  - LLE edema- Venous doppler ordered   dw Dr. Dean,

## 2023-12-16 NOTE — PROGRESS NOTE ADULT - ASSESSMENT
Assessment and Plan:    SAUNDRA HOLMAN is a  68M w/ [id number] SCC of L buccal mucosa presents for pre optimization for surgery 12/7, now s/p hemimandibulectomy, left level 1, 2a, 3 neck neck dissection, L FFF, tracheostomy w/ 7.5 cuffed portex, dental implants, STSG 12/7. Intermittent fevers 12/12 afternoon.       PLAN  -Plan for g tube today    Monitor for any signs of oral bleeding   OOBTC   Plan to ambulate 3-4x a day   - Continue nebulized solution for thick trach secretions   Continue Ondansetron PRN nausea/vomiting , PO Senna once daily, Miralax 17g once daily,  Chlorhexidine swish and spit, Esomeprazole, Enoxaparin, Gabapentin, Acetaminophen   Continue SCD’s    Cardiology - plavix after g tube, transfuse if hgb <8   Continue to work with SLP for trial of clears  PMV as tolerated    [facility] at [phone] with any questions/concerns.         Assessment and Plan:    SAUNDRA HOLMAN is a  68M w/ [id number] SCC of L buccal mucosa presents for pre optimization for surgery 12/7, now s/p hemimandibulectomy, left level 1, 2a, 3 neck neck dissection, L FFF, tracheostomy w/ 7.5 cuffed portex, dental implants, STSG 12/7. Intermittent fevers 12/12 afternoon.       PLAN  -Plan for g tube today    Monitor for any signs of oral bleeding   OOBTC   Plan to ambulate 3-4x a day   - Continue nebulized solution for thick trach secretions   Continue Ondansetron PRN nausea/vomiting , PO Senna once daily, Miralax 17g once daily,  Chlorhexidine swish and spit, Esomeprazole, Enoxaparin, Gabapentin, Acetaminophen   Continue SCD’s    Cardiology - plavix after g tube, transfuse if hgb <8   Continue to work with SLP for trial of clears  PMV as tolerated    [facility] at [phone] with any questions/concerns.

## 2023-12-16 NOTE — BRIEF OPERATIVE NOTE - NSICDXBRIEFPREOP_GEN_ALL_CORE_FT
PRE-OP DIAGNOSIS:  Dysphagia 16-Dec-2023 12:14:57  Jayde Lopez  
PRE-OP DIAGNOSIS:  Buccal mucosa squamous cell carcinoma 07-Dec-2023 21:12:04  Candida Jimenez

## 2023-12-16 NOTE — BRIEF OPERATIVE NOTE - OPERATION/FINDINGS
Veress needle entry performed at [facility] and abdominal cavity insufflated. 10 mm RLQ port placed with Optiview entry. Under direct visualization, 10 mm port placed through umbilical hernia and 5 mm RUQ port placed. Stomach was identified and pulled up to abdominal wall to assess optimal G-tube position. Novafil was used to purse string site of desired enterotomy. Enterotomy was made suing monopolar diathermy. LUQ incision was made and G tube was inserted into abdominal cavity and into stomach. Balloon was insufflated with 10 cc saline. Gastric content noted in G tube. Purse string sutures tied around to reinforce G-tube in position. 2-0 silk sutures were used to parachute stomach wall around G-tube enterotomy to the abdominal wall. Saline was used to flush G-tube, no resistance noted. Fascia of 10 mm port sites closed with 0-Vicryl endoclose, completing umbilical hernia repair. Umbilical hernia sac sent as sample. Abdomen desufflated and port sites closed Veress needle entry performed at [facility] and abdominal cavity insufflated. 10 mm RLQ port placed with Optiview entry. Under direct visualization, 10 mm port placed through umbilical hernia and 5 mm RUQ port placed. Stomach was identified and pulled up to abdominal wall to assess optimal G-tube position. Novafil was used to purse string site of desired enterotomy. Enterotomy was made suing monopolar diathermy. LUQ incision was made and G tube was inserted into abdominal cavity and into stomach. Balloon was insufflated with 10 cc saline. Gastric content noted in G tube. Purse string sutures tied around to reinforce G-tube in position. 2-0 silk sutures were used to parachute stomach wall around G-tube enterotomy to the abdominal wall. Saline was used to flush G-tube, no resistance noted. Fascia of 10 mm port sites closed with 0-Vicryl endoclose, completing umbilical hernia repair. Umbilical hernia sac sent as sample. Abdomen desufflated and port sites closed Veress needle entry performed at [facility] and abdominal cavity insufflated. 10 mm RLQ port placed with Optiview entry. Under direct visualization, 10 mm port placed through umbilical hernia and 5 mm RUQ port placed. Stomach was identified and pulled up to abdominal wall to assess optimal G-tube position. Novafil was used to purse string site of desired enterotomy. Enterotomy was made suing monopolar diathermy. LUQ incision was made and 22 Fr (Avanos) G tube was inserted into abdominal cavity and into stomach. Balloon was insufflated with 10 cc saline. Gastric content noted in G tube. Purse string sutures tied around to reinforce G-tube in position. 2-0 silk sutures were used to parachute stomach wall around G-tube enterotomy to the abdominal wall. Saline was used to flush G-tube, no resistance noted. Fascia of 10 mm port sites closed with 0-Vicryl endoclose, completing umbilical hernia repair. Umbilical hernia sac sent as sample. Abdomen desufflated and port sites closed Veress needle entry performed at [facility] and abdominal cavity insufflated. 10 mm RLQ port placed with Optiview entry. Under direct visualization, 10 mm port placed through umbilical hernia and 5 mm RUQ port placed. Stomach was identified and pulled up to abdominal wall to assess optimal G-tube position. Novafil was used to purse string site of desired enterotomy. Enterotomy was made suing monopolar diathermy. LUQ incision was made and 22 Fr (Avanos) G tube was inserted into abdominal cavity and into stomach. Balloon was insufflated with 10 cc saline. Gastric content noted in G tube. Purse string sutures tied around to reinforce G-tube in position. 2-0 silk sutures were used to parachute stomach wall around G-tube enterotomy to the abdominal wall. Saline was used to flush G-tube, no resistance noted. Fascia of 10 mm port sites closed with 0-Vicryl endoclose, completing umbilical hernia repair. Umbilical hernia sac sent as sample. Abdomen desufflated and port sites closed Veress needle entry performed at [facility] and abdominal cavity insufflated. 10 mm RLQ port placed with Optiview entry. Under direct visualization, 10 mm port placed through umbilical hernia and 5 mm RUQ port placed. Stomach was identified and pulled up to abdominal wall to assess optimal G-tube position. Novafil was used to purse string site of desired enterotomy. Enterotomy was made suing monopolar diathermy. LUQ incision was made and 22 Fr (Avanos) G tube was inserted into abdominal cavity and into stomach. Balloon was insufflated with 10 cc saline. Gastric content noted in G tube. Purse string sutures tied around to reinforce G-tube in position. 2-0 silk sutures were used to parachute stomach wall around G-tube enterotomy to the abdominal wall. Saline was used to flush G-tube, no resistance noted. Fascia of 10 mm port sites closed with 0-Vicryl endoclose, completing umbilical hernia repair. Umbilical hernia sac sent as sample. Abdomen desufflated and port sites closed     Plan:   - Maintain G-tube to gravity for 24 hrs (until 11:30 12/17) - no feeds or meds   - Okay to resume Plavix Monday 12/18 Veress needle entry performed at [facility] and abdominal cavity insufflated. 10 mm RLQ port placed with Optiview entry. Under direct visualization, 10 mm port placed through umbilical hernia and 5 mm RUQ port placed. Stomach was identified and pulled up to abdominal wall to assess optimal G-tube position. Novafil was used to purse string site of desired enterotomy. Enterotomy was made suing monopolar diathermy. LUQ incision was made and 22 Fr (Avanos) G tube was inserted into abdominal cavity and into stomach. Balloon was insufflated with 10 cc saline. Gastric content noted in G tube. Purse string sutures tied around to reinforce G-tube in position. 2-0 silk sutures were used to parachute stomach wall around G-tube enterotomy to the abdominal wall. Saline was used to flush G-tube, no resistance noted. Fascia of 10 mm port sites closed with 0-Vicryl endoclose, completing umbilical hernia repair. Umbilical hernia sac sent as sample. Abdomen desufflated and port sites closed     Plan:   - Maintain G-tube to gravity for 24 hrs (until 11:30 12/17) - no feeds or meds   - Okay to resume Plavix Monday 12/18 Veress needle entry performed at [facility] and abdominal cavity insufflated. 10 mm RLQ port placed with Optiview entry. Under direct visualization, 10 mm port placed and 5 mm RUQ port placed. Stomach was identified and pulled up to abdominal wall to assess optimal G-tube position. Novafil was used to purse string site of desired enterotomy. Enterotomy was made suing monopolar diathermy. LUQ incision was made and 22 Fr (Avanos) G tube was inserted into abdominal cavity and into stomach. Balloon was insufflated with 10 cc saline. Gastric content noted in G tube. Purse string sutures tied around to reinforce G-tube in position. 2-0 silk sutures were used to parachute stomach wall around G-tube enterotomy to the abdominal wall. Saline was used to flush G-tube, no resistance noted. Fascia of 10 mm port sites closed with 0-Vicryl endoclose, open umbilical hernia repair performed. Umbilical hernia sac sent as sample. Abdomen desufflated and port sites closed     Plan:   - Maintain G-tube to gravity for 24 hrs (until 11:30 12/17) - no feeds or meds   - Okay to resume Plavix Monday 12/18 Veress needle entry performed at [facility] and abdominal cavity insufflated. 10 mm RLQ port placed with Optiview entry. Under direct visualization, 10 mm port placed and 5 mm RUQ port placed. Stomach was identified and pulled up to abdominal wall to assess optimal G-tube position. Novafil was used to purse string site of desired enterotomy. Enterotomy was made suing monopolar diathermy. LUQ incision was made and 22 Fr (Avanos) G tube was inserted into abdominal cavity and into stomach. Balloon was insufflated with 10 cc saline. Gastric content noted in G tube. Purse string sutures tied around to reinforce G-tube in position. 2-0 silk sutures were used to parachute stomach wall around G-tube enterotomy to the abdominal wall. Saline was used to flush G-tube, no resistance noted. Fascia of 10 mm port sites closed with 0-Vicryl endoclose, open umbilical hernia repair performed. Umbilical hernia sac sent as sample. Abdomen desufflated and port sites closed     Plan:   - Maintain G-tube to gravity for 24 hrs (until 11:30 12/17) - no feeds or meds   - Okay to resume Plavix Monday 12/18

## 2023-12-16 NOTE — PROGRESS NOTE ADULT - ASSESSMENT
67 y/o M w PMH of CAD (x2 stents Mar 2023), HLD, T2DM, hx of kidney stones, psoriasis presents to [facility] for evaluation of oral mass and 3 months of jaw pain a/f OMFS mandibular resection and flap reconstruction on Thursday. Medicine initially evaluated on 12/6 for pre-op assessment Now following for co-management.     #SIRS, suspected to be 2/2 to pneumonia  pt febrile on 12/12 to 103 rectally, with HR of 90s. No leukocytosis on AM labs today. Pt notes shortness of breath with increased sputum production. No urinary complaints or diarrhea. Wounds appear clean  BCx - NGTD   CXR with no new infiltrate. UA negative for infection  initial sputum cx contaminated, repeat completed on 12/13 shows E coli and Enterobacter cloacea  MRSA swab negative - can d/c Vancomycin  Sensitivities reviewed - can de-escalate antibiotics from Zosyn to CTX 2g IV q24h (Today is day 4 of 5)    #s/p mandibular resection and flap reconstruction  pt tolerated procedure well. KRISTEN drains removed by primary team  c/w q4h flap checks  Pt unable to tolerate PO intake at this time, s/p G tube placement on 12/16  c/w S&S evaluation  recommend early mobilization, OOBTC, PT evaluation. recommend addition of PRN Duonebs to aid with airway clearance    #LLE edema  pt with LLE non pitting edema  pending LLE doppler to r/o DVT    #CAD  pt with hx of CAD s/p 2 stents in March 2023  Cardiology consulted; reccs appreciated  on lipitor and ASA. Per cardiology, given that intervention was approx 9 months ago, ok for monotherapy at this time. Ongoing discussion with cardiology and primary team when safe to resume plavix    #anemia  Hgb stable at 8, previously was 13-14  no signs of active bleed  per iron studies, consistent with WADE. However given concern for active infection, will hold off on IV iron at this time  maintain active T&S  per cardiology recommendations, transfusion goal to Hgb >8    #DM2  A1c 6.3%, takes home metformin and jardiance. FS   - c/w current diet, FS checks and ISS. Goal -180 while inpatient    Medicine will continue to follow. Patient seen, evaluated, and discussed with attending Dr. Montero 69 y/o M w PMH of CAD (x2 stents Mar 2023), HLD, T2DM, hx of kidney stones, psoriasis presents to [facility] for evaluation of oral mass and 3 months of jaw pain a/f OMFS mandibular resection and flap reconstruction on Thursday. Medicine initially evaluated on 12/6 for pre-op assessment Now following for co-management.     #SIRS, suspected to be 2/2 to pneumonia  pt febrile on 12/12 to 103 rectally, with HR of 90s. No leukocytosis on AM labs today. Pt notes shortness of breath with increased sputum production. No urinary complaints or diarrhea. Wounds appear clean  BCx - NGTD   CXR with no new infiltrate. UA negative for infection  initial sputum cx contaminated, repeat completed on 12/13 shows E coli and Enterobacter cloacea  MRSA swab negative - can d/c Vancomycin  Sensitivities reviewed - can de-escalate antibiotics from Zosyn to CTX 2g IV q24h (Today is day 4 of 5)    #s/p mandibular resection and flap reconstruction  pt tolerated procedure well. KRISTEN drains removed by primary team  c/w q4h flap checks  Pt unable to tolerate PO intake at this time, s/p G tube placement on 12/16  c/w S&S evaluation  recommend early mobilization, OOBTC, PT evaluation. recommend addition of PRN Duonebs to aid with airway clearance    #LLE edema  pt with LLE non pitting edema  pending LLE doppler to r/o DVT    #CAD  pt with hx of CAD s/p 2 stents in March 2023  Cardiology consulted; reccs appreciated  on lipitor and ASA. Per cardiology, given that intervention was approx 9 months ago, ok for monotherapy at this time. Ongoing discussion with cardiology and primary team when safe to resume plavix    #anemia  Hgb stable at 8, previously was 13-14  no signs of active bleed  per iron studies, consistent with WADE. However given concern for active infection, will hold off on IV iron at this time  maintain active T&S  per cardiology recommendations, transfusion goal to Hgb >8    #DM2  A1c 6.3%, takes home metformin and jardiance. FS   - c/w current diet, FS checks and ISS. Goal -180 while inpatient    Medicine will continue to follow. Patient seen, evaluated, and discussed with attending Dr. Montero

## 2023-12-16 NOTE — PRE-ANESTHESIA EVALUATION ADULT - NSANTHOSAYNRD_GEN_A_CORE
No. DIRK screening performed.  STOP BANG Legend: 0-2 = LOW Risk; 3-4 = INTERMEDIATE Risk; 5-8 = HIGH Risk

## 2023-12-16 NOTE — PROGRESS NOTE ADULT - SUBJECTIVE AND OBJECTIVE BOX
OVERNIGHT EVENTS: GERI    SUBJECTIVE:  Patient seen and examined at bedside. Reports pain at site of G tube placement. Otherwise improvement in cough and sputum production    Vital Signs Last 12 Hrs  T(F): 98.7 (12-16-23 @ 13:40), Max: 98.7 (12-16-23 @ 13:40)  HR: 63 (12-16-23 @ 15:32) (50 - 81)  BP: 131/57 (12-16-23 @ 15:32) (107/54 - 140/63)  BP(mean): 79 (12-16-23 @ 15:32) (75 - 92)  RR: 20 (12-16-23 @ 15:32) (12 - 26)  SpO2: 98% (12-16-23 @ 15:32) (97% - 100%)  I&O's Summary    15 Dec 2023 07:01  -  16 Dec 2023 07:00  --------------------------------------------------------  IN: 2500 mL / OUT: 1035 mL / NET: 1465 mL    16 Dec 2023 07:01  -  16 Dec 2023 16:32  --------------------------------------------------------  IN: 1500 mL / OUT: 1195 mL / NET: 305 mL        PHYSICAL EXAM:  Constitutional: NAD, comfortable in chair. no respiratory distress, on RA  HEENT: PERRLA, MMM, mandibular flap c/d/i  +trach, with clear colored sputum  Neck: Supple  Respiratory: CTA B/L. No w/r/r.   Cardiovascular: RRR, normal S1 and S2, no m/r/g.   Gastrointestinal: +BS, soft NTND  Extremities: wwp; LLE with nonpitting edema. RLE with no edema        LABS:                        8.3    6.23  )-----------( 250      ( 16 Dec 2023 05:28 )             25.9     12-16    139  |  104  |  9   ----------------------------<  121<H>  3.5   |  26  |  0.65    Ca    8.5      16 Dec 2023 05:28  Phos  3.6     12-16  Mg     1.8     12-16      PT/INR - ( 15 Dec 2023 06:02 )   PT: 12.5 sec;   INR: 1.10          PTT - ( 15 Dec 2023 06:02 )  PTT:27.7 sec  Urinalysis Basic - ( 16 Dec 2023 05:28 )    Color: x / Appearance: x / SG: x / pH: x  Gluc: 121 mg/dL / Ketone: x  / Bili: x / Urobili: x   Blood: x / Protein: x / Nitrite: x   Leuk Esterase: x / RBC: x / WBC x   Sq Epi: x / Non Sq Epi: x / Bacteria: x          RADIOLOGY & ADDITIONAL TESTS:    MEDICATIONS  (STANDING):  acetaminophen   IVPB .. 1000 milliGRAM(s) IV Intermittent once  acetylcysteine 20%  Inhalation 4 milliLiter(s) Inhalation every 6 hours  albuterol/ipratropium for Nebulization 3 milliLiter(s) Nebulizer every 6 hours  aspirin Suppository 300 milliGRAM(s) Rectal daily  cefTRIAXone   IVPB 2000 milliGRAM(s) IV Intermittent every 24 hours  chlorhexidine 0.12% Liquid 15 milliLiter(s) Oral Mucosa two times a day  chlorhexidine 2% Cloths 1 Application(s) Topical daily  dextrose 5% + sodium chloride 0.45% 1000 milliLiter(s) (100 mL/Hr) IV Continuous <Continuous>  dextrose 5%. 1000 milliLiter(s) (50 mL/Hr) IV Continuous <Continuous>  dextrose 5%. 1000 milliLiter(s) (100 mL/Hr) IV Continuous <Continuous>  dextrose 50% Injectable 25 Gram(s) IV Push once  dextrose 50% Injectable 12.5 Gram(s) IV Push once  dextrose 50% Injectable 25 Gram(s) IV Push once  enoxaparin Injectable 40 milliGRAM(s) SubCutaneous every 24 hours  glucagon  Injectable 1 milliGRAM(s) IntraMuscular once  influenza  Vaccine (HIGH DOSE) 0.7 milliLiter(s) IntraMuscular once  pantoprazole  Injectable 40 milliGRAM(s) IV Push daily  sodium chloride 3%  Inhalation 4 milliLiter(s) Inhalation every 6 hours    MEDICATIONS  (PRN):  dextrose Oral Gel 15 Gram(s) Oral once PRN Blood Glucose LESS THAN 70 milliGRAM(s)/deciliter  HYDROmorphone  Injectable 0.5 milliGRAM(s) IV Push every 4 hours PRN Moderate Pain (4 - 6)  HYDROmorphone  Injectable 1 milliGRAM(s) IV Push every 4 hours PRN Severe Pain (7 - 10)  ondansetron Injectable 4 milliGRAM(s) IV Push every 6 hours PRN Nausea and/or Vomiting

## 2023-12-16 NOTE — PRE-ANESTHESIA EVALUATION ADULT - NSANTHPEFT_GEN_ALL_CORE
severe trismus
Awake, alert, sitting up in bed, tach collar in place with humidification, in no distress  Left sided facial swelling notable, packing in the mouth  mild brown secretion noted at the trach site, otherwise no bleeding, no erythema  RRR  extremities warm without edema  PIV x2

## 2023-12-16 NOTE — PROGRESS NOTE ADULT - SUBJECTIVE AND OBJECTIVE BOX
Team 1 Surgery Post-Op Note, PCN:       Procedure:  Laparoscopic placement of gastrostomy tube    Surgeon: Dr. Raymond    Subjective: Patient was evaluated at bedside. Patient reports mild pain at incisions but otherwise has no complaints.       Vital Signs Last 24 Hrs  T(C): 37.1 (16 Dec 2023 13:40), Max: 37.1 (16 Dec 2023 13:40)  T(F): 98.7 (16 Dec 2023 13:40), Max: 98.7 (16 Dec 2023 13:40)  HR: 63 (16 Dec 2023 15:32) (50 - 81)  BP: 131/57 (16 Dec 2023 15:32) (107/54 - 158/65)  BP(mean): 79 (16 Dec 2023 15:32) (75 - 93)  RR: 20 (16 Dec 2023 15:32) (12 - 26)  SpO2: 98% (16 Dec 2023 15:32) (97% - 100%)    Parameters below as of 16 Dec 2023 15:32  Patient On (Oxygen Delivery Method): tracheostomy collar  O2 Flow (L/min): 10  O2 Concentration (%): 40    Physical Exam:  General: NAD, resting comfortably in bed  Pulmonary: Nonlabored breathing, no respiratory distress  Cardiovascular: NSR  Abdominal: soft, NT/ND; PEG tube at 3cm at skin; incisions c/d/i with dermabond  Extremities: WWP, normal strength  Neuro: A/O x 3, CNs II-XII grossly intact, no focal deficits, normal sensation  Pulses: palpable distal pulses      LABS:                        8.3    6.23  )-----------( 250      ( 16 Dec 2023 05:28 )             25.9     12-16    139  |  104  |  9   ----------------------------<  121<H>  3.5   |  26  |  0.65    Ca    8.5      16 Dec 2023 05:28  Phos  3.6     12-16  Mg     1.8     12-16      PT/INR - ( 15 Dec 2023 06:02 )   PT: 12.5 sec;   INR: 1.10          PTT - ( 15 Dec 2023 06:02 )  PTT:27.7 sec  CAPILLARY BLOOD GLUCOSE      POCT Blood Glucose.: 175 mg/dL (16 Dec 2023 17:13)  POCT Blood Glucose.: 137 mg/dL (16 Dec 2023 13:34)    Urinalysis Basic - ( 16 Dec 2023 05:28 )    Color: x / Appearance: x / SG: x / pH: x  Gluc: 121 mg/dL / Ketone: x  / Bili: x / Urobili: x   Blood: x / Protein: x / Nitrite: x   Leuk Esterase: x / RBC: x / WBC x   Sq Epi: x / Non Sq Epi: x / Bacteria: x              Radiology and Additional Studies:    Assessment:68y Male s/p above procedure    Plan:  Nothing via tube for 24hrs  Rest of care per primary team  Team 4C will continue to follow

## 2023-12-16 NOTE — PRE-ANESTHESIA EVALUATION ADULT - NSANTHPMHFT_GEN_ALL_CORE
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

## 2023-12-16 NOTE — PROGRESS NOTE ADULT - SUBJECTIVE AND OBJECTIVE BOX
OTOLARYNGOLOGY (ENT) PROGRESS NOTE    PATIENT: SAUNDRA HOLMAN  MRN: [id number]  : 57  [id number]  DATE OF SERVICE:  12-15-23  			           ID:SAUNDRA HOLMAN is a  69yo M w PMH of CAD (x2 stents Mar 2023), HLD, T2DM, hx of kidney stones, psoriasis presents to [facility] for evaluation of oral mass. Reports Three month hx of jaw pain and loosening of mandibular molar tooth on the lower left. Pt has a 30 pack yr smoking hx, quit 1 yr ago. 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 POD 1 composite mandibular resection, neck dissection and fibula free flap reconstruction.       Subjective/ Interval:   ; patient seen this morning, oozing from trach as expected ,  Plan to start aspirin today, cuff is up on tracheostomy tube, intraoral skin panel intact doppler signal strong,  plan to advance NGT   : Patient seen and examined at bedside. AFVSS overnight. Significant HgB drop to 7.2 noted this morning. NGT clogged, attempted replacement but went into lung, need to replace. Cuff deflated. Patient reports lethargy. Otherwise no new complaints. Intraoral skin paddle intact with strong doppler signal. Plan to hold off one eliquis given significant HgB drop  12/10: Patient seen and examined at bedside. AFVSS overnight other than bradycardic to lowest 39, mainly in 40s-50s which seems to be his baseline even pre-op. Not symptomatic while chinedu. Otherwise, patient stable on TC with no issues. Yesterday, iatrogenic right sided PTX occured with NGT placement, patient remained asymptomatic throughout. Patient now s/p placement of pig-tail catheter by pulm with significant improvement in PTX. Chest tube clamped this morning with plan for removal this evening. Patient s/p 2 units of PRBCs with moderate improvement in Hgb. Today, patient reports feeling significantly better than yesterday and overall "feels good." He was started on trickle feeds last night but felt some chest tightness and so they were held. Patient also failed TOV again and was straight cathed. KRISTEN drain output significantly decreased this morning. No other changes at this time.   : patient seen this morning PTX has resolved on CXR, intraoral flap intact, doppler strong, trach in place,  KRISTEN drains with minimal output   : Patient seen and examined this morning at bedside. Overnight, NGT slightly displaced, coming out around 10 cm from original placement, advanced and secured, pending CXR. Patient failed trial of clears with SLP yesterday but tolerated PMV without issue. Patient continues to be OOBTC daily and is tolerating tube feeds at goal. 2 KRISTEN drains removed yesterday, remaining KRISTEN with minimal output. Intraoral flap intact, doppler strong, trach in place. Patient denies any other new complaints at this time.   : patient febrile overnight to 103, continues to have intermittent PVC, HR last night in 40s, patients baseline 50. Gen surg will like to wait 24 hours afebrile until g tube,  Oral flap appears to be congested, dixon continue to monitor. KRISTEN drain will be removed today. Pending results of fever work up . HEB 7.5, will transfuse when type and screen returns   : Patient seen this morning, afebrile overnight, trach secretion odor improving,  noted ot have an episode of oral bleeding after ambulation, bleeding stopped, unable to see source, will continue to monitor , doppler strong ( and removed today) ,  Flap continues to look dusky   12/15: patient sen this morning, continues to have intermittent oozing from left mandibular screw, Surgicel placed no active bleed, flap remains dusky but stable, afebrile overnight  : NAEON. AVSS. Patient in OR for PEG this morning. Will re-evaluate s/p PEG    ALLERGIES:  No Known Allergies      MEDICATIONS:  Antiinfectives:   piperacillin/tazobactam IVPB.. 4.5 Gram(s) IV Intermittent every 8 hours  vancomycin  IVPB 1250 milliGRAM(s) IV Intermittent every 12 hours    IV fluids:  dextrose 5% + sodium chloride 0.45% 1000 milliLiter(s) IV Continuous <Continuous>  dextrose 5%. 1000 milliLiter(s) IV Continuous <Continuous>  dextrose 5%. 1000 milliLiter(s) IV Continuous <Continuous>    Hematologic/Anticoagulation:  enoxaparin Injectable 40 milliGRAM(s) SubCutaneous every 24 hours    Pain medications/Neuro:  acetaminophen   IVPB .. 1000 milliGRAM(s) IV Intermittent once PRN  aspirin Suppository 300 milliGRAM(s) Rectal daily  HYDROmorphone  Injectable 0.5 milliGRAM(s) IV Push every 4 hours PRN  ondansetron Injectable 4 milliGRAM(s) IV Push every 6 hours PRN    Endocrine Medications:   dextrose 50% Injectable 25 Gram(s) IV Push once  dextrose 50% Injectable 12.5 Gram(s) IV Push once  dextrose 50% Injectable 25 Gram(s) IV Push once  dextrose Oral Gel 15 Gram(s) Oral once PRN  glucagon  Injectable 1 milliGRAM(s) IntraMuscular once    All other standing medications:   acetylcysteine 20%  Inhalation 4 milliLiter(s) Inhalation every 6 hours  albuterol/ipratropium for Nebulization 3 milliLiter(s) Nebulizer every 6 hours  chlorhexidine 0.12% Liquid 15 milliLiter(s) Oral Mucosa two times a day  chlorhexidine 2% Cloths 1 Application(s) Topical daily  influenza  Vaccine (HIGH DOSE) 0.7 milliLiter(s) IntraMuscular once  pantoprazole  Injectable 40 milliGRAM(s) IV Push daily  sodium chloride 0.9% for Nebulization 3 milliLiter(s) Nebulizer every 6 hours  sodium chloride 3%  Inhalation 4 milliLiter(s) Inhalation every 6 hours    All other PRN medications:    ICU Vital Signs Last 24 Hrs  T(C): 36.8 (16 Dec 2023 08:01), Max: 36.9 (15 Dec 2023 17:39)  T(F): 98.2 (16 Dec 2023 08:01), Max: 98.4 (15 Dec 2023 17:39)  HR: 64 (16 Dec 2023 08:05) (50 - 64)  BP: 140/63 (16 Dec 2023 08:05) (119/66 - 158/65)  BP(mean): 91 (16 Dec 2023 08:05) (83 - 93)  ABP: --  ABP(mean): --  RR: 18 (16 Dec 2023 08:05) (18 - 18)  SpO2: 97% (16 Dec 2023 08:05) (94% - 100%)    O2 Parameters below as of 16 Dec 2023 08:05  Patient On (Oxygen Delivery Method): tracheostomy collar  O2 Flow (L/min): 10  O2 Concentration (%): 40      I&O's Detail    15 Dec 2023 07:01  -  16 Dec 2023 07:00  --------------------------------------------------------  IN:    dextrose 5% + sodium chloride 0.45% w/ Additives: 2400 mL    IV PiggyBack: 100 mL  Total IN: 2500 mL    OUT:    VAC (Vacuum Assisted Closure) System (mL): 5 mL    Voided (mL): 1030 mL  Total OUT: 1035 mL    Total NET: 1465 mL      16 Dec 2023 07:01  -  16 Dec 2023 10:31  --------------------------------------------------------  IN:    dextrose 5% + sodium chloride 0.45% w/ Additives: 100 mL  Total IN: 100 mL    OUT:    Voided (mL): 350 mL  Total OUT: 350 mL    Total NET: -250 mL        LABS                                  8.3    6.23  )-----------( 250      ( 16 Dec 2023 05:28 )             25.9   12-    139  |  104  |  9   ----------------------------<  121<H>  3.5   |  26  |  0.65    Ca    8.5      16 Dec 2023 05:28  Phos  3.6     12-16  Mg     1.8             Coagulation Studies-   PT/INR - ( 15 Dec 2023 06:02 )   PT: 12.5 sec;   INR: 1.10          PTT - ( 15 Dec 2023 06:02 )  PTT:27.7 sec  Urinalysis Basic - ( 15 Dec 2023 06:02 )    Color: x / Appearance: x / SG: x / pH: x  Gluc: 133 mg/dL / Ketone: x  / Bili: x / Urobili: x   Blood: x / Protein: x / Nitrite: x   Leuk Esterase: x / RBC: x / WBC x   Sq Epi: x / Non Sq Epi: x / Bacteria: x      Endocrine Panel-  Calcium: 8.5 mg/dL (12-15 @ 06:02)                MICROBIOLOGY:  Culture - Sputum (collected 23 @ 07:27)  Source: Trach Asp Tracheal Aspirate  Gram Stain (23 @ 21:31):    Rare epithelial cells    Moderate WBC's    Rare Gram Positive Rods    Few Gram Negative Rods    Moderate Gram positive cocci in pairs, chains and clusters  Preliminary Report (23 @ 12:13):    Moderate Escherichia coli    Susceptibility to follow.    Moderate Enterobacter cloacae complex    Susceptibility to follow.    Accompanied by normal respiratory brenden    Culture - Sputum (collected 23 @ 20:48)  Source: .Sputum  Gram Stain (23 @ 22:45):    Moderate epithelial cells    Moderate WBC's    Rare Gram Positive Rods    Few Gram Negative Rods    Few Gram Positive Cocci in Pairs and Chains  Final Report (23 @ 22:45):    Sputum specimen rejected.  Microscopic examination indicates    oropharyngeal contamination.  Please repeat.      Culture Results:   Moderate Escherichia coli  Susceptibility to follow.  Moderate Enterobacter cloacae complex  Susceptibility to follow.  Accompanied by normal respiratory brenden (23 @ 07:27)  Culture Results:   Sputum specimen rejected.  Microscopic examination indicates  oropharyngeal contamination.  Please repeat. (23 @ 20:48)  Culture Results:   No growth at 2 days. (23 @ 20:10)  Culture Results:   No growth at 2 days. (23 @ 20:05)      Culture - Sputum (collected 23 @ 07:27)  Source: Trach Asp Tracheal Aspirate  Gram Stain (23 @ 21:31):    Rare epithelial cells    Moderate WBC's    Rare Gram Positive Rods    Few Gram Negative Rods    Moderate Gram positive cocci in pairs, chains and clusters  Preliminary Report (23 @ 12:13):    Moderate Escherichia coli    Susceptibility to follow.    Moderate Enterobacter cloacae complex    Susceptibility to follow.    Accompanied by normal respiratory brenden    Culture - Sputum (collected 23 @ 20:48)  Source: .Sputum  Gram Stain (23 @ 22:45):    Moderate epithelial cells    Moderate WBC's    Rare Gram Positive Rods    Few Gram Negative Rods    Few Gram Positive Cocci in Pairs and Chains  Final Report (23 @ 22:45):    Sputum specimen rejected.  Microscopic examination indicates    oropharyngeal contamination.  Please repeat.    Culture - Blood (collected 23 @ 20:10)  Source: .Blood Blood  Preliminary Report (23 @ 22:01):    No growth at 2 days.    Culture - Blood (collected 23 @ 20:05)  Source: .Blood Blood  Preliminary Report (23 @ 22:01):    No growth at 2 days.     OTOLARYNGOLOGY (ENT) PROGRESS NOTE    PATIENT: SAUNDRA HOLMAN  MRN: [id number]  : 57  [id number]  DATE OF SERVICE:  12-15-23  			           ID:SAUDNRA HOLMAN is a  67yo M w PMH of CAD (x2 stents Mar 2023), HLD, T2DM, hx of kidney stones, psoriasis presents to [facility] for evaluation of oral mass. Reports Three month hx of jaw pain and loosening of mandibular molar tooth on the lower left. Pt has a 30 pack yr smoking hx, quit 1 yr ago. 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 POD 1 composite mandibular resection, neck dissection and fibula free flap reconstruction.       Subjective/ Interval:   ; patient seen this morning, oozing from trach as expected ,  Plan to start aspirin today, cuff is up on tracheostomy tube, intraoral skin panel intact doppler signal strong,  plan to advance NGT   : Patient seen and examined at bedside. AFVSS overnight. Significant HgB drop to 7.2 noted this morning. NGT clogged, attempted replacement but went into lung, need to replace. Cuff deflated. Patient reports lethargy. Otherwise no new complaints. Intraoral skin paddle intact with strong doppler signal. Plan to hold off one eliquis given significant HgB drop  12/10: Patient seen and examined at bedside. AFVSS overnight other than bradycardic to lowest 39, mainly in 40s-50s which seems to be his baseline even pre-op. Not symptomatic while chinedu. Otherwise, patient stable on TC with no issues. Yesterday, iatrogenic right sided PTX occured with NGT placement, patient remained asymptomatic throughout. Patient now s/p placement of pig-tail catheter by pulm with significant improvement in PTX. Chest tube clamped this morning with plan for removal this evening. Patient s/p 2 units of PRBCs with moderate improvement in Hgb. Today, patient reports feeling significantly better than yesterday and overall "feels good." He was started on trickle feeds last night but felt some chest tightness and so they were held. Patient also failed TOV again and was straight cathed. KRISTEN drain output significantly decreased this morning. No other changes at this time.   : patient seen this morning PTX has resolved on CXR, intraoral flap intact, doppler strong, trach in place,  KRISTEN drains with minimal output   : Patient seen and examined this morning at bedside. Overnight, NGT slightly displaced, coming out around 10 cm from original placement, advanced and secured, pending CXR. Patient failed trial of clears with SLP yesterday but tolerated PMV without issue. Patient continues to be OOBTC daily and is tolerating tube feeds at goal. 2 KRISTEN drains removed yesterday, remaining KRISTEN with minimal output. Intraoral flap intact, doppler strong, trach in place. Patient denies any other new complaints at this time.   : patient febrile overnight to 103, continues to have intermittent PVC, HR last night in 40s, patients baseline 50. Gen surg will like to wait 24 hours afebrile until g tube,  Oral flap appears to be congested, dixon continue to monitor. KRISTEN drain will be removed today. Pending results of fever work up . HEB 7.5, will transfuse when type and screen returns   : Patient seen this morning, afebrile overnight, trach secretion odor improving,  noted ot have an episode of oral bleeding after ambulation, bleeding stopped, unable to see source, will continue to monitor , doppler strong ( and removed today) ,  Flap continues to look dusky   12/15: patient sen this morning, continues to have intermittent oozing from left mandibular screw, Surgicel placed no active bleed, flap remains dusky but stable, afebrile overnight  : NAEON. AVSS. Patient in OR for PEG this morning. Will re-evaluate s/p PEG    ALLERGIES:  No Known Allergies      MEDICATIONS:  Antiinfectives:   piperacillin/tazobactam IVPB.. 4.5 Gram(s) IV Intermittent every 8 hours  vancomycin  IVPB 1250 milliGRAM(s) IV Intermittent every 12 hours    IV fluids:  dextrose 5% + sodium chloride 0.45% 1000 milliLiter(s) IV Continuous <Continuous>  dextrose 5%. 1000 milliLiter(s) IV Continuous <Continuous>  dextrose 5%. 1000 milliLiter(s) IV Continuous <Continuous>    Hematologic/Anticoagulation:  enoxaparin Injectable 40 milliGRAM(s) SubCutaneous every 24 hours    Pain medications/Neuro:  acetaminophen   IVPB .. 1000 milliGRAM(s) IV Intermittent once PRN  aspirin Suppository 300 milliGRAM(s) Rectal daily  HYDROmorphone  Injectable 0.5 milliGRAM(s) IV Push every 4 hours PRN  ondansetron Injectable 4 milliGRAM(s) IV Push every 6 hours PRN    Endocrine Medications:   dextrose 50% Injectable 25 Gram(s) IV Push once  dextrose 50% Injectable 12.5 Gram(s) IV Push once  dextrose 50% Injectable 25 Gram(s) IV Push once  dextrose Oral Gel 15 Gram(s) Oral once PRN  glucagon  Injectable 1 milliGRAM(s) IntraMuscular once    All other standing medications:   acetylcysteine 20%  Inhalation 4 milliLiter(s) Inhalation every 6 hours  albuterol/ipratropium for Nebulization 3 milliLiter(s) Nebulizer every 6 hours  chlorhexidine 0.12% Liquid 15 milliLiter(s) Oral Mucosa two times a day  chlorhexidine 2% Cloths 1 Application(s) Topical daily  influenza  Vaccine (HIGH DOSE) 0.7 milliLiter(s) IntraMuscular once  pantoprazole  Injectable 40 milliGRAM(s) IV Push daily  sodium chloride 0.9% for Nebulization 3 milliLiter(s) Nebulizer every 6 hours  sodium chloride 3%  Inhalation 4 milliLiter(s) Inhalation every 6 hours    All other PRN medications:    ICU Vital Signs Last 24 Hrs  T(C): 36.8 (16 Dec 2023 08:01), Max: 36.9 (15 Dec 2023 17:39)  T(F): 98.2 (16 Dec 2023 08:01), Max: 98.4 (15 Dec 2023 17:39)  HR: 64 (16 Dec 2023 08:05) (50 - 64)  BP: 140/63 (16 Dec 2023 08:05) (119/66 - 158/65)  BP(mean): 91 (16 Dec 2023 08:05) (83 - 93)  ABP: --  ABP(mean): --  RR: 18 (16 Dec 2023 08:05) (18 - 18)  SpO2: 97% (16 Dec 2023 08:05) (94% - 100%)    O2 Parameters below as of 16 Dec 2023 08:05  Patient On (Oxygen Delivery Method): tracheostomy collar  O2 Flow (L/min): 10  O2 Concentration (%): 40      I&O's Detail    15 Dec 2023 07:01  -  16 Dec 2023 07:00  --------------------------------------------------------  IN:    dextrose 5% + sodium chloride 0.45% w/ Additives: 2400 mL    IV PiggyBack: 100 mL  Total IN: 2500 mL    OUT:    VAC (Vacuum Assisted Closure) System (mL): 5 mL    Voided (mL): 1030 mL  Total OUT: 1035 mL    Total NET: 1465 mL      16 Dec 2023 07:01  -  16 Dec 2023 10:31  --------------------------------------------------------  IN:    dextrose 5% + sodium chloride 0.45% w/ Additives: 100 mL  Total IN: 100 mL    OUT:    Voided (mL): 350 mL  Total OUT: 350 mL    Total NET: -250 mL        LABS                                  8.3    6.23  )-----------( 250      ( 16 Dec 2023 05:28 )             25.9   12-    139  |  104  |  9   ----------------------------<  121<H>  3.5   |  26  |  0.65    Ca    8.5      16 Dec 2023 05:28  Phos  3.6     12-16  Mg     1.8             Coagulation Studies-   PT/INR - ( 15 Dec 2023 06:02 )   PT: 12.5 sec;   INR: 1.10          PTT - ( 15 Dec 2023 06:02 )  PTT:27.7 sec  Urinalysis Basic - ( 15 Dec 2023 06:02 )    Color: x / Appearance: x / SG: x / pH: x  Gluc: 133 mg/dL / Ketone: x  / Bili: x / Urobili: x   Blood: x / Protein: x / Nitrite: x   Leuk Esterase: x / RBC: x / WBC x   Sq Epi: x / Non Sq Epi: x / Bacteria: x      Endocrine Panel-  Calcium: 8.5 mg/dL (12-15 @ 06:02)                MICROBIOLOGY:  Culture - Sputum (collected 23 @ 07:27)  Source: Trach Asp Tracheal Aspirate  Gram Stain (23 @ 21:31):    Rare epithelial cells    Moderate WBC's    Rare Gram Positive Rods    Few Gram Negative Rods    Moderate Gram positive cocci in pairs, chains and clusters  Preliminary Report (23 @ 12:13):    Moderate Escherichia coli    Susceptibility to follow.    Moderate Enterobacter cloacae complex    Susceptibility to follow.    Accompanied by normal respiratory brenden    Culture - Sputum (collected 23 @ 20:48)  Source: .Sputum  Gram Stain (23 @ 22:45):    Moderate epithelial cells    Moderate WBC's    Rare Gram Positive Rods    Few Gram Negative Rods    Few Gram Positive Cocci in Pairs and Chains  Final Report (23 @ 22:45):    Sputum specimen rejected.  Microscopic examination indicates    oropharyngeal contamination.  Please repeat.      Culture Results:   Moderate Escherichia coli  Susceptibility to follow.  Moderate Enterobacter cloacae complex  Susceptibility to follow.  Accompanied by normal respiratory brenden (23 @ 07:27)  Culture Results:   Sputum specimen rejected.  Microscopic examination indicates  oropharyngeal contamination.  Please repeat. (23 @ 20:48)  Culture Results:   No growth at 2 days. (23 @ 20:10)  Culture Results:   No growth at 2 days. (23 @ 20:05)      Culture - Sputum (collected 23 @ 07:27)  Source: Trach Asp Tracheal Aspirate  Gram Stain (23 @ 21:31):    Rare epithelial cells    Moderate WBC's    Rare Gram Positive Rods    Few Gram Negative Rods    Moderate Gram positive cocci in pairs, chains and clusters  Preliminary Report (23 @ 12:13):    Moderate Escherichia coli    Susceptibility to follow.    Moderate Enterobacter cloacae complex    Susceptibility to follow.    Accompanied by normal respiratory brenden    Culture - Sputum (collected 23 @ 20:48)  Source: .Sputum  Gram Stain (23 @ 22:45):    Moderate epithelial cells    Moderate WBC's    Rare Gram Positive Rods    Few Gram Negative Rods    Few Gram Positive Cocci in Pairs and Chains  Final Report (23 @ 22:45):    Sputum specimen rejected.  Microscopic examination indicates    oropharyngeal contamination.  Please repeat.    Culture - Blood (collected 23 @ 20:10)  Source: .Blood Blood  Preliminary Report (23 @ 22:01):    No growth at 2 days.    Culture - Blood (collected 23 @ 20:05)  Source: .Blood Blood  Preliminary Report (23 @ 22:01):    No growth at 2 days.

## 2023-12-16 NOTE — BRIEF OPERATIVE NOTE - NSICDXBRIEFPROCEDURE_GEN_ALL_CORE_FT
PROCEDURES:  Laparoscopic placement of gastrostomy tube 16-Dec-2023 12:14:44  Jayde Lopez  Repair, hernia, umbilical, with lipectomy 16-Dec-2023 12:26:57  Jayde Lopez  
PROCEDURES:  Free flap, fibula 07-Dec-2023 21:11:11  Candida Jimenez  Planned tracheostomy 07-Dec-2023 21:11:37  Candida Jimenez  Mandibulectomy 07-Dec-2023 21:11:45  Candida Jimenez

## 2023-12-16 NOTE — BRIEF OPERATIVE NOTE - NSICDXBRIEFPOSTOP_GEN_ALL_CORE_FT
POST-OP DIAGNOSIS:  Dysphagia 16-Dec-2023 12:15:06  Jayde Lopez  
POST-OP DIAGNOSIS:  Buccal mucosa squamous cell carcinoma 07-Dec-2023 21:12:08  Candida Jimenez

## 2023-12-17 LAB
ANION GAP SERPL CALC-SCNC: 7 MMOL/L — SIGNIFICANT CHANGE UP (ref 5–17)
ANION GAP SERPL CALC-SCNC: 7 MMOL/L — SIGNIFICANT CHANGE UP (ref 5–17)
BUN SERPL-MCNC: 12 MG/DL — SIGNIFICANT CHANGE UP (ref 7–23)
BUN SERPL-MCNC: 12 MG/DL — SIGNIFICANT CHANGE UP (ref 7–23)
CALCIUM SERPL-MCNC: 8.8 MG/DL — SIGNIFICANT CHANGE UP (ref 8.4–10.5)
CALCIUM SERPL-MCNC: 8.8 MG/DL — SIGNIFICANT CHANGE UP (ref 8.4–10.5)
CHLORIDE SERPL-SCNC: 102 MMOL/L — SIGNIFICANT CHANGE UP (ref 96–108)
CHLORIDE SERPL-SCNC: 102 MMOL/L — SIGNIFICANT CHANGE UP (ref 96–108)
CO2 SERPL-SCNC: 27 MMOL/L — SIGNIFICANT CHANGE UP (ref 22–31)
CO2 SERPL-SCNC: 27 MMOL/L — SIGNIFICANT CHANGE UP (ref 22–31)
CREAT SERPL-MCNC: 0.73 MG/DL — SIGNIFICANT CHANGE UP (ref 0.5–1.3)
CREAT SERPL-MCNC: 0.73 MG/DL — SIGNIFICANT CHANGE UP (ref 0.5–1.3)
CULTURE RESULTS: SIGNIFICANT CHANGE UP
EGFR: 100 ML/MIN/1.73M2 — SIGNIFICANT CHANGE UP
EGFR: 100 ML/MIN/1.73M2 — SIGNIFICANT CHANGE UP
GLUCOSE BLDC GLUCOMTR-MCNC: 125 MG/DL — HIGH (ref 70–99)
GLUCOSE BLDC GLUCOMTR-MCNC: 125 MG/DL — HIGH (ref 70–99)
GLUCOSE SERPL-MCNC: 121 MG/DL — HIGH (ref 70–99)
GLUCOSE SERPL-MCNC: 121 MG/DL — HIGH (ref 70–99)
HCT VFR BLD CALC: 26.7 % — LOW (ref 39–50)
HCT VFR BLD CALC: 26.7 % — LOW (ref 39–50)
HGB BLD-MCNC: 8.4 G/DL — LOW (ref 13–17)
HGB BLD-MCNC: 8.4 G/DL — LOW (ref 13–17)
MAGNESIUM SERPL-MCNC: 1.7 MG/DL — SIGNIFICANT CHANGE UP (ref 1.6–2.6)
MAGNESIUM SERPL-MCNC: 1.7 MG/DL — SIGNIFICANT CHANGE UP (ref 1.6–2.6)
MCHC RBC-ENTMCNC: 27.8 PG — SIGNIFICANT CHANGE UP (ref 27–34)
MCHC RBC-ENTMCNC: 27.8 PG — SIGNIFICANT CHANGE UP (ref 27–34)
MCHC RBC-ENTMCNC: 31.5 GM/DL — LOW (ref 32–36)
MCHC RBC-ENTMCNC: 31.5 GM/DL — LOW (ref 32–36)
MCV RBC AUTO: 88.4 FL — SIGNIFICANT CHANGE UP (ref 80–100)
MCV RBC AUTO: 88.4 FL — SIGNIFICANT CHANGE UP (ref 80–100)
NRBC # BLD: 0 /100 WBCS — SIGNIFICANT CHANGE UP (ref 0–0)
NRBC # BLD: 0 /100 WBCS — SIGNIFICANT CHANGE UP (ref 0–0)
PHOSPHATE SERPL-MCNC: 2.3 MG/DL — LOW (ref 2.5–4.5)
PHOSPHATE SERPL-MCNC: 2.3 MG/DL — LOW (ref 2.5–4.5)
PLATELET # BLD AUTO: 298 K/UL — SIGNIFICANT CHANGE UP (ref 150–400)
PLATELET # BLD AUTO: 298 K/UL — SIGNIFICANT CHANGE UP (ref 150–400)
POTASSIUM SERPL-MCNC: 3.8 MMOL/L — SIGNIFICANT CHANGE UP (ref 3.5–5.3)
POTASSIUM SERPL-MCNC: 3.8 MMOL/L — SIGNIFICANT CHANGE UP (ref 3.5–5.3)
POTASSIUM SERPL-SCNC: 3.8 MMOL/L — SIGNIFICANT CHANGE UP (ref 3.5–5.3)
POTASSIUM SERPL-SCNC: 3.8 MMOL/L — SIGNIFICANT CHANGE UP (ref 3.5–5.3)
RBC # BLD: 3.02 M/UL — LOW (ref 4.2–5.8)
RBC # BLD: 3.02 M/UL — LOW (ref 4.2–5.8)
RBC # FLD: 13.3 % — SIGNIFICANT CHANGE UP (ref 10.3–14.5)
RBC # FLD: 13.3 % — SIGNIFICANT CHANGE UP (ref 10.3–14.5)
SODIUM SERPL-SCNC: 136 MMOL/L — SIGNIFICANT CHANGE UP (ref 135–145)
SODIUM SERPL-SCNC: 136 MMOL/L — SIGNIFICANT CHANGE UP (ref 135–145)
SPECIMEN SOURCE: SIGNIFICANT CHANGE UP
WBC # BLD: 8 K/UL — SIGNIFICANT CHANGE UP (ref 3.8–10.5)
WBC # BLD: 8 K/UL — SIGNIFICANT CHANGE UP (ref 3.8–10.5)
WBC # FLD AUTO: 8 K/UL — SIGNIFICANT CHANGE UP (ref 3.8–10.5)
WBC # FLD AUTO: 8 K/UL — SIGNIFICANT CHANGE UP (ref 3.8–10.5)

## 2023-12-17 PROCEDURE — 99233 SBSQ HOSP IP/OBS HIGH 50: CPT

## 2023-12-17 PROCEDURE — 93971 EXTREMITY STUDY: CPT | Mod: 26,LT

## 2023-12-17 RX ORDER — OXYCODONE HYDROCHLORIDE 5 MG/1
10 TABLET ORAL EVERY 4 HOURS
Refills: 0 | Status: DISCONTINUED | OUTPATIENT
Start: 2023-12-17 | End: 2023-12-21

## 2023-12-17 RX ORDER — OXYCODONE HYDROCHLORIDE 5 MG/1
5 TABLET ORAL EVERY 4 HOURS
Refills: 0 | Status: DISCONTINUED | OUTPATIENT
Start: 2023-12-17 | End: 2023-12-21

## 2023-12-17 RX ORDER — POTASSIUM PHOSPHATE, MONOBASIC POTASSIUM PHOSPHATE, DIBASIC 236; 224 MG/ML; MG/ML
15 INJECTION, SOLUTION INTRAVENOUS ONCE
Refills: 0 | Status: COMPLETED | OUTPATIENT
Start: 2023-12-17 | End: 2023-12-17

## 2023-12-17 RX ORDER — ACETAMINOPHEN 500 MG
975 TABLET ORAL EVERY 6 HOURS
Refills: 0 | Status: DISCONTINUED | OUTPATIENT
Start: 2023-12-17 | End: 2023-12-21

## 2023-12-17 RX ORDER — ASPIRIN/CALCIUM CARB/MAGNESIUM 324 MG
81 TABLET ORAL DAILY
Refills: 0 | Status: DISCONTINUED | OUTPATIENT
Start: 2023-12-17 | End: 2023-12-21

## 2023-12-17 RX ADMIN — CHLORHEXIDINE GLUCONATE 15 MILLILITER(S): 213 SOLUTION TOPICAL at 05:20

## 2023-12-17 RX ADMIN — OXYCODONE HYDROCHLORIDE 10 MILLIGRAM(S): 5 TABLET ORAL at 21:50

## 2023-12-17 RX ADMIN — SODIUM CHLORIDE 4 MILLILITER(S): 9 INJECTION INTRAMUSCULAR; INTRAVENOUS; SUBCUTANEOUS at 17:25

## 2023-12-17 RX ADMIN — HYDROMORPHONE HYDROCHLORIDE 0.5 MILLIGRAM(S): 2 INJECTION INTRAMUSCULAR; INTRAVENOUS; SUBCUTANEOUS at 12:32

## 2023-12-17 RX ADMIN — Medication 975 MILLIGRAM(S): at 19:03

## 2023-12-17 RX ADMIN — SODIUM CHLORIDE 4 MILLILITER(S): 9 INJECTION INTRAMUSCULAR; INTRAVENOUS; SUBCUTANEOUS at 11:15

## 2023-12-17 RX ADMIN — ENOXAPARIN SODIUM 40 MILLIGRAM(S): 100 INJECTION SUBCUTANEOUS at 11:38

## 2023-12-17 RX ADMIN — Medication 400 MILLIGRAM(S): at 01:21

## 2023-12-17 RX ADMIN — PANTOPRAZOLE SODIUM 40 MILLIGRAM(S): 20 TABLET, DELAYED RELEASE ORAL at 11:10

## 2023-12-17 RX ADMIN — Medication 4 MILLILITER(S): at 06:10

## 2023-12-17 RX ADMIN — CHLORHEXIDINE GLUCONATE 1 APPLICATION(S): 213 SOLUTION TOPICAL at 11:15

## 2023-12-17 RX ADMIN — Medication 1000 MILLIGRAM(S): at 06:38

## 2023-12-17 RX ADMIN — Medication 4 MILLILITER(S): at 17:25

## 2023-12-17 RX ADMIN — Medication 300 MILLIGRAM(S): at 11:15

## 2023-12-17 RX ADMIN — Medication 400 MILLIGRAM(S): at 06:35

## 2023-12-17 RX ADMIN — Medication 1000 MILLIGRAM(S): at 03:14

## 2023-12-17 RX ADMIN — OXYCODONE HYDROCHLORIDE 10 MILLIGRAM(S): 5 TABLET ORAL at 23:00

## 2023-12-17 RX ADMIN — Medication 3 MILLILITER(S): at 17:25

## 2023-12-17 RX ADMIN — Medication 3 MILLILITER(S): at 11:24

## 2023-12-17 RX ADMIN — Medication 975 MILLIGRAM(S): at 23:42

## 2023-12-17 RX ADMIN — HYDROMORPHONE HYDROCHLORIDE 0.5 MILLIGRAM(S): 2 INJECTION INTRAMUSCULAR; INTRAVENOUS; SUBCUTANEOUS at 03:14

## 2023-12-17 RX ADMIN — SODIUM CHLORIDE 4 MILLILITER(S): 9 INJECTION INTRAMUSCULAR; INTRAVENOUS; SUBCUTANEOUS at 06:10

## 2023-12-17 RX ADMIN — CHLORHEXIDINE GLUCONATE 15 MILLILITER(S): 213 SOLUTION TOPICAL at 17:26

## 2023-12-17 RX ADMIN — POTASSIUM PHOSPHATE, MONOBASIC POTASSIUM PHOSPHATE, DIBASIC 62.5 MILLIMOLE(S): 236; 224 INJECTION, SOLUTION INTRAVENOUS at 11:12

## 2023-12-17 RX ADMIN — Medication 3 MILLILITER(S): at 06:10

## 2023-12-17 RX ADMIN — Medication 975 MILLIGRAM(S): at 17:22

## 2023-12-17 RX ADMIN — HYDROMORPHONE HYDROCHLORIDE 0.5 MILLIGRAM(S): 2 INJECTION INTRAMUSCULAR; INTRAVENOUS; SUBCUTANEOUS at 04:04

## 2023-12-17 RX ADMIN — Medication 4 MILLILITER(S): at 11:13

## 2023-12-17 RX ADMIN — HYDROMORPHONE HYDROCHLORIDE 0.5 MILLIGRAM(S): 2 INJECTION INTRAMUSCULAR; INTRAVENOUS; SUBCUTANEOUS at 05:20

## 2023-12-17 RX ADMIN — CEFTRIAXONE 100 MILLIGRAM(S): 500 INJECTION, POWDER, FOR SOLUTION INTRAMUSCULAR; INTRAVENOUS at 17:24

## 2023-12-17 RX ADMIN — HYDROMORPHONE HYDROCHLORIDE 0.5 MILLIGRAM(S): 2 INJECTION INTRAMUSCULAR; INTRAVENOUS; SUBCUTANEOUS at 11:09

## 2023-12-17 NOTE — PROGRESS NOTE ADULT - ASSESSMENT
68M w/ [id number] SCC of L buccal mucosa presents for pre optimization for surgery 12/7, now s/p hemimandibulectomy, left level 1, 2a, 3 neck neck dissection, L FFF, tracheostomy w/ 7.5 cuffed portex, dental implants, STSG 12/7- plan for G tube placement postponed due to fever 12/12- blood cx drawn, respiratory culture likely contamination -reported thick sputum from trach after nebs    - dysphagia: s/p laparoscopic PEG placement ( 12/16), TF per ENT   - Aspiration PNA: Sputum Cx" e.coli and enterobacter cloacae, iv abx descalated to Ceftriaxone 2gm iv q24hr total 5 days course , day 5 of 5   - oral care as per ENT.   - would keep duo-nebs q 6 ( to help loosen up secretion)  - continue with trach suction   - active T&S, monitor H/H  - CAD/PCI x2 ( 3/2023)  c/w ASA and Lipitor, to d/w Cardiology re: resuming plavix for DAPT total 1 year   - DM - FS with ISS, caution for Hypoglycemia.  - LLE edema- Venous doppler ordered      68M w/ [id number] SCC of L buccal mucosa presents for pre optimization for surgery 12/7, now s/p hemimandibulectomy, left level 1, 2a, 3 neck neck dissection, L FFF, tracheostomy w/ 7.5 cuffed portex, dental implants, STSG 12/7- plan for G tube placement postponed due to fever 12/12- blood cx drawn, respiratory culture likely contamination -reported thick sputum from trach after nebs    - dysphagia: s/p laparoscopic PEG placement ( 12/16), TF per ENT   - Aspiration PNA: Sputum Cx" e.coli and enterobacter cloacae, iv abx descalated to Ceftriaxone 2gm iv q24hr total 5 days course , day 5 of 5   - oral care as per ENT.   - would keep duo-nebs q 6 ( to help loosen up secretion)  - continue with trach suction   - active T&S, monitor H/H  - CAD/PCI x2 ( 3/2023)  c/w ASA and Lipitor, to d/w Cardiology re: resuming plavix for DAPT total 1 year   - DM - FS with ISS, caution for Hypoglycemia.  - LLE edema- Venous doppler ordered

## 2023-12-17 NOTE — PROGRESS NOTE ADULT - SUBJECTIVE AND OBJECTIVE BOX
Patient is a 68y old  Male who presents with a chief complaint of Oral Ca (17 Dec 2023 09:24)    INTERVAL EVENTS:NAEON    SUBJECTIVE:  Patient was seen and examined at bedside. Minimal pain at PEG site in good spirits, tolerating TC. wife at bedside     Review of systems: No fever, chills, dizziness, HA, Changes in vision, CP, dyspnea, nausea or vomiting, dysuria, changes in bowel movements, LE edema. Rest of 12 point Review of systems negative unless otherwise documented elsewhere in note.     Diet, NPO:   Except Medications (12-17-23 @ 12:50) [Active]      MEDICATIONS:  MEDICATIONS  (STANDING):  acetaminophen   Oral Liquid .. 975 milliGRAM(s) Oral every 6 hours  acetylcysteine 20%  Inhalation 4 milliLiter(s) Inhalation every 6 hours  albuterol/ipratropium for Nebulization 3 milliLiter(s) Nebulizer every 6 hours  aspirin  chewable 81 milliGRAM(s) Oral daily  cefTRIAXone   IVPB 2000 milliGRAM(s) IV Intermittent every 24 hours  chlorhexidine 0.12% Liquid 15 milliLiter(s) Oral Mucosa two times a day  chlorhexidine 2% Cloths 1 Application(s) Topical daily  dextrose 5% + sodium chloride 0.45% 1000 milliLiter(s) (100 mL/Hr) IV Continuous <Continuous>  dextrose 5%. 1000 milliLiter(s) (50 mL/Hr) IV Continuous <Continuous>  dextrose 5%. 1000 milliLiter(s) (100 mL/Hr) IV Continuous <Continuous>  dextrose 50% Injectable 25 Gram(s) IV Push once  dextrose 50% Injectable 12.5 Gram(s) IV Push once  dextrose 50% Injectable 25 Gram(s) IV Push once  enoxaparin Injectable 40 milliGRAM(s) SubCutaneous every 24 hours  glucagon  Injectable 1 milliGRAM(s) IntraMuscular once  influenza  Vaccine (HIGH DOSE) 0.7 milliLiter(s) IntraMuscular once  pantoprazole  Injectable 40 milliGRAM(s) IV Push daily  sodium chloride 3%  Inhalation 4 milliLiter(s) Inhalation every 6 hours    MEDICATIONS  (PRN):  dextrose Oral Gel 15 Gram(s) Oral once PRN Blood Glucose LESS THAN 70 milliGRAM(s)/deciliter  ondansetron Injectable 4 milliGRAM(s) IV Push every 6 hours PRN Nausea and/or Vomiting  oxyCODONE    Solution 5 milliGRAM(s) Oral every 4 hours PRN Moderate Pain (4 - 6)  oxyCODONE    Solution 10 milliGRAM(s) Oral every 4 hours PRN Severe Pain (7 - 10)      Allergies    No Known Allergies    Intolerances        OBJECTIVE:  Vital Signs Last 24 Hrs  T(C): 36.8 (17 Dec 2023 10:00), Max: 37 (16 Dec 2023 21:00)  T(F): 98.3 (17 Dec 2023 10:00), Max: 98.6 (16 Dec 2023 21:00)  HR: 52 (17 Dec 2023 11:18) (44 - 63)  BP: 121/60 (17 Dec 2023 11:18) (119/57 - 153/65)  BP(mean): 86 (17 Dec 2023 11:18) (79 - 94)  RR: 18 (17 Dec 2023 11:18) (16 - 22)  SpO2: 98% (17 Dec 2023 11:18) (96% - 100%)    Parameters below as of 17 Dec 2023 11:18  Patient On (Oxygen Delivery Method): tracheostomy collar  O2 Flow (L/min): 10  O2 Concentration (%): 40  I&O's Summary    16 Dec 2023 07:01  -  17 Dec 2023 07:00  --------------------------------------------------------  IN: 1700 mL / OUT: 1720 mL / NET: -20 mL    17 Dec 2023 07:01  -  17 Dec 2023 13:53  --------------------------------------------------------  IN: 787.5 mL / OUT: 810 mL / NET: -22.5 mL        PHYSICAL EXAM:  Gen: Reclining in bed at time of exam, appears stated age  HEENT: NCAT, MMM, clear OP  Neck: supple, trachea at midline, TC in place   CV: RRR, +S1/S2  Pulm: adequate respiratory effort, no increase in work of breathing  Abd: soft, NTND, PEG in place   Skin: warm and dry,   Ext: WWP, no LE edema  Neuro: AOx3, no gross focal neurological deficits  Psych: affect and behavior appropriate, pleasant at time of interview      LABS:                        8.4    8.00  )-----------( 298      ( 17 Dec 2023 06:51 )             26.7     12-17    136  |  102  |  12  ----------------------------<  121<H>  3.8   |  27  |  0.73    Ca    8.8      17 Dec 2023 06:51  Phos  2.3     12-17  Mg     1.7     12-17          CAPILLARY BLOOD GLUCOSE      POCT Blood Glucose.: 125 mg/dL (17 Dec 2023 12:19)  POCT Blood Glucose.: 183 mg/dL (16 Dec 2023 21:13)  POCT Blood Glucose.: 175 mg/dL (16 Dec 2023 17:13)    Urinalysis Basic - ( 17 Dec 2023 06:51 )    Color: x / Appearance: x / SG: x / pH: x  Gluc: 121 mg/dL / Ketone: x  / Bili: x / Urobili: x   Blood: x / Protein: x / Nitrite: x   Leuk Esterase: x / RBC: x / WBC x   Sq Epi: x / Non Sq Epi: x / Bacteria: x        MICRODATA:      RADIOLOGY/OTHER STUDIES:

## 2023-12-17 NOTE — PROGRESS NOTE ADULT - SUBJECTIVE AND OBJECTIVE BOX
SUBJECTIVE: Patient seen and examined bedside. GERI since procedure yesterday. Endorses good pain control. Denies nausea/vomiting. Denies flatus and bowel movements. No F/C.     cefTRIAXone   IVPB 2000 milliGRAM(s) IV Intermittent every 24 hours  enoxaparin Injectable 40 milliGRAM(s) SubCutaneous every 24 hours    MEDICATIONS  (PRN):  dextrose Oral Gel 15 Gram(s) Oral once PRN Blood Glucose LESS THAN 70 milliGRAM(s)/deciliter  HYDROmorphone  Injectable 1 milliGRAM(s) IV Push every 4 hours PRN Severe Pain (7 - 10)  HYDROmorphone  Injectable 0.5 milliGRAM(s) IV Push every 4 hours PRN Moderate Pain (4 - 6)  ondansetron Injectable 4 milliGRAM(s) IV Push every 6 hours PRN Nausea and/or Vomiting      I&O's Detail    16 Dec 2023 07:01  -  17 Dec 2023 07:00  --------------------------------------------------------  IN:    dextrose 5% + sodium chloride 0.45% w/ Additives: 600 mL    IV PiggyBack: 100 mL    Other (mL): 1000 mL  Total IN: 1700 mL    OUT:    Blood Loss (mL): 5 mL    Gastrostomy Tube (mL): 25 mL    Intermittent Catheterization - Urethral (mL): 800 mL    Post-Void Residual per Intermittent Catheterization (mL): 425 mL    VAC (Vacuum Assisted Closure) System (mL): 15 mL    Voided (mL): 450 mL  Total OUT: 1720 mL    Total NET: -20 mL      17 Dec 2023 07:01  -  17 Dec 2023 09:21  --------------------------------------------------------  IN:    dextrose 5% + sodium chloride 0.45% w/ Additives: 200 mL  Total IN: 200 mL    OUT:    Gastrostomy Tube (mL): 100 mL    VAC (Vacuum Assisted Closure) System (mL): 0 mL    Voided (mL): 0 mL  Total OUT: 100 mL    Total NET: 100 mL          Vital Signs Last 24 Hrs  T(C): 36.4 (17 Dec 2023 05:06), Max: 37.1 (16 Dec 2023 13:40)  T(F): 97.6 (17 Dec 2023 05:06), Max: 98.7 (16 Dec 2023 13:40)  HR: 52 (17 Dec 2023 08:19) (50 - 81)  BP: 131/60 (17 Dec 2023 08:19) (107/54 - 153/65)  BP(mean): 87 (17 Dec 2023 08:19) (75 - 94)  RR: 18 (17 Dec 2023 08:19) (12 - 26)  SpO2: 100% (17 Dec 2023 08:19) (97% - 100%)    Parameters below as of 17 Dec 2023 08:19  Patient On (Oxygen Delivery Method): tracheostomy collar  O2 Flow (L/min): 10  O2 Concentration (%): 40    General: NAD, resting comfortably in bed  C/V: NSR  Pulm: Nonlabored breathing, no respiratory distress  Abd: soft, NT/ND, PEG tube in place with 3cm marker at the level of the skin. Port sites c/d/i. Small area of soft, non tender redness at the site of the umbilical port site.  Extrem: WWP, no edema, SCDs in place    LABS:                        8.4    8.00  )-----------( 298      ( 17 Dec 2023 06:51 )             26.7     12-17    136  |  102  |  12  ----------------------------<  121<H>  3.8   |  27  |  0.73    Ca    8.8      17 Dec 2023 06:51  Phos  2.3     12-17  Mg     1.7     12-17        Urinalysis Basic - ( 17 Dec 2023 06:51 )    Color: x / Appearance: x / SG: x / pH: x  Gluc: 121 mg/dL / Ketone: x  / Bili: x / Urobili: x   Blood: x / Protein: x / Nitrite: x   Leuk Esterase: x / RBC: x / WBC x   Sq Epi: x / Non Sq Epi: x / Bacteria: x        RADIOLOGY & ADDITIONAL STUDIES:

## 2023-12-17 NOTE — PROGRESS NOTE ADULT - SUBJECTIVE AND OBJECTIVE BOX
OTOLARYNGOLOGY (ENT) PROGRESS NOTE    PATIENT: SAUNDRA HOLMAN  MRN: [id number]  : 57  [id number]  DATE OF SERVICE:  12-15-23  			           ID:SAUNDRA HOLMAN is a  69yo M w PMH of CAD (x2 stents Mar 2023), HLD, T2DM, hx of kidney stones, psoriasis presents to [facility] for evaluation of oral mass. Reports Three month hx of jaw pain and loosening of mandibular molar tooth on the lower left. Pt has a 30 pack yr smoking hx, quit 1 yr ago. 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 POD 1 composite mandibular resection, neck dissection and fibula free flap reconstruction.       Subjective/ Interval:   ; patient seen this morning, oozing from trach as expected ,  Plan to start aspirin today, cuff is up on tracheostomy tube, intraoral skin panel intact doppler signal strong,  plan to advance NGT   : Patient seen and examined at bedside. AFVSS overnight. Significant HgB drop to 7.2 noted this morning. NGT clogged, attempted replacement but went into lung, need to replace. Cuff deflated. Patient reports lethargy. Otherwise no new complaints. Intraoral skin paddle intact with strong doppler signal. Plan to hold off one eliquis given significant HgB drop  12/10: Patient seen and examined at bedside. AFVSS overnight other than bradycardic to lowest 39, mainly in 40s-50s which seems to be his baseline even pre-op. Not symptomatic while chinedu. Otherwise, patient stable on TC with no issues. Yesterday, iatrogenic right sided PTX occured with NGT placement, patient remained asymptomatic throughout. Patient now s/p placement of pig-tail catheter by pulm with significant improvement in PTX. Chest tube clamped this morning with plan for removal this evening. Patient s/p 2 units of PRBCs with moderate improvement in Hgb. Today, patient reports feeling significantly better than yesterday and overall "feels good." He was started on trickle feeds last night but felt some chest tightness and so they were held. Patient also failed TOV again and was straight cathed. KRISTEN drain output significantly decreased this morning. No other changes at this time.   : patient seen this morning PTX has resolved on CXR, intraoral flap intact, doppler strong, trach in place,  KRISTEN drains with minimal output   : Patient seen and examined this morning at bedside. Overnight, NGT slightly displaced, coming out around 10 cm from original placement, advanced and secured, pending CXR. Patient failed trial of clears with SLP yesterday but tolerated PMV without issue. Patient continues to be OOBTC daily and is tolerating tube feeds at goal. 2 KRISTEN drains removed yesterday, remaining KRISTEN with minimal output. Intraoral flap intact, doppler strong, trach in place. Patient denies any other new complaints at this time.   : patient febrile overnight to 103, continues to have intermittent PVC, HR last night in 40s, patients baseline 50. Gen surg will like to wait 24 hours afebrile until g tube,  Oral flap appears to be congested, dixon continue to monitor. KRISTEN drain will be removed today. Pending results of fever work up . HEB 7.5, will transfuse when type and screen returns   : Patient seen this morning, afebrile overnight, trach secretion odor improving,  noted ot have an episode of oral bleeding after ambulation, bleeding stopped, unable to see source, will continue to monitor , doppler strong ( and removed today) ,  Flap continues to look dusky   12/15: patient sen this morning, continues to have intermittent oozing from left mandibular screw, Surgicel placed no active bleed, flap remains dusky but stable, afebrile overnight  : NAEON. AVSS. Patient in OR for PEG this morning. Will re-evaluate s/p PEG  : Re-evaluated s/p PEG. complaining fo abdominal discomfort. Flap is stable appearing. Donor site with wound vac. LLE edema improving. No further bleeding from the mouth.  : NAEON. AVSS> Examined at bedside. Trach changed to 6 uncuffed and confirmed on scope, secured with trach ties.     ALLERGIES:  No Known Allergies    MEDICATIONS  (STANDING):  acetylcysteine 20%  Inhalation 4 milliLiter(s) Inhalation every 6 hours  albuterol/ipratropium for Nebulization 3 milliLiter(s) Nebulizer every 6 hours  aspirin Suppository 300 milliGRAM(s) Rectal daily  cefTRIAXone   IVPB 2000 milliGRAM(s) IV Intermittent every 24 hours  chlorhexidine 0.12% Liquid 15 milliLiter(s) Oral Mucosa two times a day  chlorhexidine 2% Cloths 1 Application(s) Topical daily  dextrose 5% + sodium chloride 0.45% 1000 milliLiter(s) (100 mL/Hr) IV Continuous <Continuous>  dextrose 5%. 1000 milliLiter(s) (50 mL/Hr) IV Continuous <Continuous>  dextrose 5%. 1000 milliLiter(s) (100 mL/Hr) IV Continuous <Continuous>  dextrose 50% Injectable 12.5 Gram(s) IV Push once  dextrose 50% Injectable 25 Gram(s) IV Push once  dextrose 50% Injectable 25 Gram(s) IV Push once  enoxaparin Injectable 40 milliGRAM(s) SubCutaneous every 24 hours  glucagon  Injectable 1 milliGRAM(s) IntraMuscular once  influenza  Vaccine (HIGH DOSE) 0.7 milliLiter(s) IntraMuscular once  pantoprazole  Injectable 40 milliGRAM(s) IV Push daily  potassium phosphate IVPB 15 milliMole(s) IV Intermittent once  sodium chloride 3%  Inhalation 4 milliLiter(s) Inhalation every 6 hours    MEDICATIONS  (PRN):  dextrose Oral Gel 15 Gram(s) Oral once PRN Blood Glucose LESS THAN 70 milliGRAM(s)/deciliter  HYDROmorphone  Injectable 1 milliGRAM(s) IV Push every 4 hours PRN Severe Pain (7 - 10)  HYDROmorphone  Injectable 0.5 milliGRAM(s) IV Push every 4 hours PRN Moderate Pain (4 - 6)  ondansetron Injectable 4 milliGRAM(s) IV Push every 6 hours PRN Nausea and/or Vomiting    I&O's Detail    16 Dec 2023 07:01  -  17 Dec 2023 07:00  --------------------------------------------------------  IN:    dextrose 5% + sodium chloride 0.45% w/ Additives: 600 mL    IV PiggyBack: 100 mL    Other (mL): 1000 mL  Total IN: 1700 mL    OUT:    Blood Loss (mL): 5 mL    Gastrostomy Tube (mL): 25 mL    Intermittent Catheterization - Urethral (mL): 800 mL    Post-Void Residual per Intermittent Catheterization (mL): 425 mL    VAC (Vacuum Assisted Closure) System (mL): 15 mL    Voided (mL): 450 mL  Total OUT: 1720 mL    Total NET: -20 mL      17 Dec 2023 07:01  -  17 Dec 2023 09:27  --------------------------------------------------------  IN:    dextrose 5% + sodium chloride 0.45% w/ Additives: 200 mL  Total IN: 200 mL    OUT:    Gastrostomy Tube (mL): 100 mL    VAC (Vacuum Assisted Closure) System (mL): 0 mL    Voided (mL): 0 mL  Total OUT: 100 mL    Total NET: 100 mL      PHYSICAL EXAM:  Gen: AAOx3, NAD   Head: Surgical incision around chin extending up through lip  Eyes: EOMI, PERRL, visual acuity intact, no diplopia, supra/infra orbital rims intact, no subconjunctival heme,   Ears: Gross hearing intact,  Nose: No septal hematoma/asymmetry, no epistaxis bilaterally. no abrasions present, no lacerations.   Throat: No LAD, supple, neck surgical incision w/ steristrip dressing is hemostatic , trach in place w/ 6 uncuffed portex  Oral: Left FFF paddle dusk colored,  will continue to monitor,. IMF screws in place intermittent ooze around left mandibular screw,   Exx: Wound vac left leg, thigh donor site dressingchanged        MICROBIOLOGY:  Culture - Sputum (collected 23 @ 07:27)  Source: Trach Asp Tracheal Aspirate  Gram Stain (23 @ 21:31):    Rare epithelial cells    Moderate WBC's    Rare Gram Positive Rods    Few Gram Negative Rods    Moderate Gram positive cocci in pairs, chains and clusters  Preliminary Report (23 @ 12:13):    Moderate Escherichia coli    Susceptibility to follow.    Moderate Enterobacter cloacae complex    Susceptibility to follow.    Accompanied by normal respiratory brenden    Culture - Sputum (collected 23 @ 20:48)  Source: .Sputum  Gram Stain (23 @ 22:45):    Moderate epithelial cells    Moderate WBC's    Rare Gram Positive Rods    Few Gram Negative Rods    Few Gram Positive Cocci in Pairs and Chains  Final Report (23 @ 22:45):    Sputum specimen rejected.  Microscopic examination indicates    oropharyngeal contamination.  Please repeat.      Culture Results:   Moderate Escherichia coli  Susceptibility to follow.  Moderate Enterobacter cloacae complex  Susceptibility to follow.  Accompanied by normal respiratory brenden (23 @ 07:27)  Culture Results:   Sputum specimen rejected.  Microscopic examination indicates  oropharyngeal contamination.  Please repeat. (23 @ 20:48)  Culture Results:   No growth at 2 days. (23 @ 20:10)  Culture Results:   No growth at 2 days. (23 @ 20:05)      Culture - Sputum (collected 23 @ 07:27)  Source: Trach Asp Tracheal Aspirate  Gram Stain (23 @ 21:31):    Rare epithelial cells    Moderate WBC's    Rare Gram Positive Rods    Few Gram Negative Rods    Moderate Gram positive cocci in pairs, chains and clusters  Preliminary Report (23 @ 12:13):    Moderate Escherichia coli    Susceptibility to follow.    Moderate Enterobacter cloacae complex    Susceptibility to follow.    Accompanied by normal respiratory brenden    Culture - Sputum (collected 23 @ 20:48)  Source: .Sputum  Gram Stain (23 @ 22:45):    Moderate epithelial cells    Moderate WBC's    Rare Gram Positive Rods    Few Gram Negative Rods    Few Gram Positive Cocci in Pairs and Chains  Final Report (23 @ 22:45):    Sputum specimen rejected.  Microscopic examination indicates    oropharyngeal contamination.  Please repeat.    Culture - Blood (collected 23 @ 20:10)  Source: .Blood Blood  Preliminary Report (23 @ 22:01):    No growth at 2 days.    Culture - Blood (collected 23 @ 20:05)  Source: .Blood Blood  Preliminary Report (23 @ 22:01):    No growth at 2 days.     OTOLARYNGOLOGY (ENT) PROGRESS NOTE    PATIENT: SAUNDRA HOLMAN  MRN: [id number]  : 57  [id number]  DATE OF SERVICE:  12-15-23  			           ID:SAUNDRA HOLMAN is a  67yo M w PMH of CAD (x2 stents Mar 2023), HLD, T2DM, hx of kidney stones, psoriasis presents to [facility] for evaluation of oral mass. Reports Three month hx of jaw pain and loosening of mandibular molar tooth on the lower left. Pt has a 30 pack yr smoking hx, quit 1 yr ago. 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 POD 1 composite mandibular resection, neck dissection and fibula free flap reconstruction.       Subjective/ Interval:   ; patient seen this morning, oozing from trach as expected ,  Plan to start aspirin today, cuff is up on tracheostomy tube, intraoral skin panel intact doppler signal strong,  plan to advance NGT   : Patient seen and examined at bedside. AFVSS overnight. Significant HgB drop to 7.2 noted this morning. NGT clogged, attempted replacement but went into lung, need to replace. Cuff deflated. Patient reports lethargy. Otherwise no new complaints. Intraoral skin paddle intact with strong doppler signal. Plan to hold off one eliquis given significant HgB drop  12/10: Patient seen and examined at bedside. AFVSS overnight other than bradycardic to lowest 39, mainly in 40s-50s which seems to be his baseline even pre-op. Not symptomatic while chinedu. Otherwise, patient stable on TC with no issues. Yesterday, iatrogenic right sided PTX occured with NGT placement, patient remained asymptomatic throughout. Patient now s/p placement of pig-tail catheter by pulm with significant improvement in PTX. Chest tube clamped this morning with plan for removal this evening. Patient s/p 2 units of PRBCs with moderate improvement in Hgb. Today, patient reports feeling significantly better than yesterday and overall "feels good." He was started on trickle feeds last night but felt some chest tightness and so they were held. Patient also failed TOV again and was straight cathed. KRISTEN drain output significantly decreased this morning. No other changes at this time.   : patient seen this morning PTX has resolved on CXR, intraoral flap intact, doppler strong, trach in place,  KRISTEN drains with minimal output   : Patient seen and examined this morning at bedside. Overnight, NGT slightly displaced, coming out around 10 cm from original placement, advanced and secured, pending CXR. Patient failed trial of clears with SLP yesterday but tolerated PMV without issue. Patient continues to be OOBTC daily and is tolerating tube feeds at goal. 2 KRISTEN drains removed yesterday, remaining KRISTEN with minimal output. Intraoral flap intact, doppler strong, trach in place. Patient denies any other new complaints at this time.   : patient febrile overnight to 103, continues to have intermittent PVC, HR last night in 40s, patients baseline 50. Gen surg will like to wait 24 hours afebrile until g tube,  Oral flap appears to be congested, dixon continue to monitor. KRISTEN drain will be removed today. Pending results of fever work up . HEB 7.5, will transfuse when type and screen returns   : Patient seen this morning, afebrile overnight, trach secretion odor improving,  noted ot have an episode of oral bleeding after ambulation, bleeding stopped, unable to see source, will continue to monitor , doppler strong ( and removed today) ,  Flap continues to look dusky   12/15: patient sen this morning, continues to have intermittent oozing from left mandibular screw, Surgicel placed no active bleed, flap remains dusky but stable, afebrile overnight  : NAEON. AVSS. Patient in OR for PEG this morning. Will re-evaluate s/p PEG  : Re-evaluated s/p PEG. complaining fo abdominal discomfort. Flap is stable appearing. Donor site with wound vac. LLE edema improving. No further bleeding from the mouth.  : NAEON. AVSS> Examined at bedside. Trach changed to 6 uncuffed and confirmed on scope, secured with trach ties.     ALLERGIES:  No Known Allergies    MEDICATIONS  (STANDING):  acetylcysteine 20%  Inhalation 4 milliLiter(s) Inhalation every 6 hours  albuterol/ipratropium for Nebulization 3 milliLiter(s) Nebulizer every 6 hours  aspirin Suppository 300 milliGRAM(s) Rectal daily  cefTRIAXone   IVPB 2000 milliGRAM(s) IV Intermittent every 24 hours  chlorhexidine 0.12% Liquid 15 milliLiter(s) Oral Mucosa two times a day  chlorhexidine 2% Cloths 1 Application(s) Topical daily  dextrose 5% + sodium chloride 0.45% 1000 milliLiter(s) (100 mL/Hr) IV Continuous <Continuous>  dextrose 5%. 1000 milliLiter(s) (50 mL/Hr) IV Continuous <Continuous>  dextrose 5%. 1000 milliLiter(s) (100 mL/Hr) IV Continuous <Continuous>  dextrose 50% Injectable 12.5 Gram(s) IV Push once  dextrose 50% Injectable 25 Gram(s) IV Push once  dextrose 50% Injectable 25 Gram(s) IV Push once  enoxaparin Injectable 40 milliGRAM(s) SubCutaneous every 24 hours  glucagon  Injectable 1 milliGRAM(s) IntraMuscular once  influenza  Vaccine (HIGH DOSE) 0.7 milliLiter(s) IntraMuscular once  pantoprazole  Injectable 40 milliGRAM(s) IV Push daily  potassium phosphate IVPB 15 milliMole(s) IV Intermittent once  sodium chloride 3%  Inhalation 4 milliLiter(s) Inhalation every 6 hours    MEDICATIONS  (PRN):  dextrose Oral Gel 15 Gram(s) Oral once PRN Blood Glucose LESS THAN 70 milliGRAM(s)/deciliter  HYDROmorphone  Injectable 1 milliGRAM(s) IV Push every 4 hours PRN Severe Pain (7 - 10)  HYDROmorphone  Injectable 0.5 milliGRAM(s) IV Push every 4 hours PRN Moderate Pain (4 - 6)  ondansetron Injectable 4 milliGRAM(s) IV Push every 6 hours PRN Nausea and/or Vomiting    I&O's Detail    16 Dec 2023 07:01  -  17 Dec 2023 07:00  --------------------------------------------------------  IN:    dextrose 5% + sodium chloride 0.45% w/ Additives: 600 mL    IV PiggyBack: 100 mL    Other (mL): 1000 mL  Total IN: 1700 mL    OUT:    Blood Loss (mL): 5 mL    Gastrostomy Tube (mL): 25 mL    Intermittent Catheterization - Urethral (mL): 800 mL    Post-Void Residual per Intermittent Catheterization (mL): 425 mL    VAC (Vacuum Assisted Closure) System (mL): 15 mL    Voided (mL): 450 mL  Total OUT: 1720 mL    Total NET: -20 mL      17 Dec 2023 07:01  -  17 Dec 2023 09:27  --------------------------------------------------------  IN:    dextrose 5% + sodium chloride 0.45% w/ Additives: 200 mL  Total IN: 200 mL    OUT:    Gastrostomy Tube (mL): 100 mL    VAC (Vacuum Assisted Closure) System (mL): 0 mL    Voided (mL): 0 mL  Total OUT: 100 mL    Total NET: 100 mL      PHYSICAL EXAM:  Gen: AAOx3, NAD   Head: Surgical incision around chin extending up through lip  Eyes: EOMI, PERRL, visual acuity intact, no diplopia, supra/infra orbital rims intact, no subconjunctival heme,   Ears: Gross hearing intact,  Nose: No septal hematoma/asymmetry, no epistaxis bilaterally. no abrasions present, no lacerations.   Throat: No LAD, supple, neck surgical incision w/ steristrip dressing is hemostatic , trach in place w/ 6 uncuffed portex  Oral: Left FFF paddle dusk colored,  will continue to monitor,. IMF screws in place intermittent ooze around left mandibular screw,   Exx: Wound vac left leg, thigh donor site dressingchanged        MICROBIOLOGY:  Culture - Sputum (collected 23 @ 07:27)  Source: Trach Asp Tracheal Aspirate  Gram Stain (23 @ 21:31):    Rare epithelial cells    Moderate WBC's    Rare Gram Positive Rods    Few Gram Negative Rods    Moderate Gram positive cocci in pairs, chains and clusters  Preliminary Report (23 @ 12:13):    Moderate Escherichia coli    Susceptibility to follow.    Moderate Enterobacter cloacae complex    Susceptibility to follow.    Accompanied by normal respiratory brenden    Culture - Sputum (collected 23 @ 20:48)  Source: .Sputum  Gram Stain (23 @ 22:45):    Moderate epithelial cells    Moderate WBC's    Rare Gram Positive Rods    Few Gram Negative Rods    Few Gram Positive Cocci in Pairs and Chains  Final Report (23 @ 22:45):    Sputum specimen rejected.  Microscopic examination indicates    oropharyngeal contamination.  Please repeat.      Culture Results:   Moderate Escherichia coli  Susceptibility to follow.  Moderate Enterobacter cloacae complex  Susceptibility to follow.  Accompanied by normal respiratory brenden (23 @ 07:27)  Culture Results:   Sputum specimen rejected.  Microscopic examination indicates  oropharyngeal contamination.  Please repeat. (23 @ 20:48)  Culture Results:   No growth at 2 days. (23 @ 20:10)  Culture Results:   No growth at 2 days. (23 @ 20:05)      Culture - Sputum (collected 23 @ 07:27)  Source: Trach Asp Tracheal Aspirate  Gram Stain (23 @ 21:31):    Rare epithelial cells    Moderate WBC's    Rare Gram Positive Rods    Few Gram Negative Rods    Moderate Gram positive cocci in pairs, chains and clusters  Preliminary Report (23 @ 12:13):    Moderate Escherichia coli    Susceptibility to follow.    Moderate Enterobacter cloacae complex    Susceptibility to follow.    Accompanied by normal respiratory brenden    Culture - Sputum (collected 23 @ 20:48)  Source: .Sputum  Gram Stain (23 @ 22:45):    Moderate epithelial cells    Moderate WBC's    Rare Gram Positive Rods    Few Gram Negative Rods    Few Gram Positive Cocci in Pairs and Chains  Final Report (23 @ 22:45):    Sputum specimen rejected.  Microscopic examination indicates    oropharyngeal contamination.  Please repeat.    Culture - Blood (collected 23 @ 20:10)  Source: .Blood Blood  Preliminary Report (23 @ 22:01):    No growth at 2 days.    Culture - Blood (collected 23 @ 20:05)  Source: .Blood Blood  Preliminary Report (23 @ 22:01):    No growth at 2 days.     OTOLARYNGOLOGY (ENT) PROGRESS NOTE    PATIENT: SAUNDRA HOLMAN  MRN: [id number]  : 57  [id number]  DATE OF SERVICE:  12-15-23  			           ID:SAUNDRA HOLMAN is a  69yo M w PMH of CAD (x2 stents Mar 2023), HLD, T2DM, hx of kidney stones, psoriasis presents to [facility] for evaluation of oral mass. Reports Three month hx of jaw pain and loosening of mandibular molar tooth on the lower left. Pt has a 30 pack yr smoking hx, quit 1 yr ago. 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 POD 1 composite mandibular resection, neck dissection and fibula free flap reconstruction.       Subjective/ Interval:   ; patient seen this morning, oozing from trach as expected ,  Plan to start aspirin today, cuff is up on tracheostomy tube, intraoral skin panel intact doppler signal strong,  plan to advance NGT   : Patient seen and examined at bedside. AFVSS overnight. Significant HgB drop to 7.2 noted this morning. NGT clogged, attempted replacement but went into lung, need to replace. Cuff deflated. Patient reports lethargy. Otherwise no new complaints. Intraoral skin paddle intact with strong doppler signal. Plan to hold off one eliquis given significant HgB drop  12/10: Patient seen and examined at bedside. AFVSS overnight other than bradycardic to lowest 39, mainly in 40s-50s which seems to be his baseline even pre-op. Not symptomatic while chinedu. Otherwise, patient stable on TC with no issues. Yesterday, iatrogenic right sided PTX occured with NGT placement, patient remained asymptomatic throughout. Patient now s/p placement of pig-tail catheter by pulm with significant improvement in PTX. Chest tube clamped this morning with plan for removal this evening. Patient s/p 2 units of PRBCs with moderate improvement in Hgb. Today, patient reports feeling significantly better than yesterday and overall "feels good." He was started on trickle feeds last night but felt some chest tightness and so they were held. Patient also failed TOV again and was straight cathed. KRISTEN drain output significantly decreased this morning. No other changes at this time.   : patient seen this morning PTX has resolved on CXR, intraoral flap intact, doppler strong, trach in place,  KRISTEN drains with minimal output   : Patient seen and examined this morning at bedside. Overnight, NGT slightly displaced, coming out around 10 cm from original placement, advanced and secured, pending CXR. Patient failed trial of clears with SLP yesterday but tolerated PMV without issue. Patient continues to be OOBTC daily and is tolerating tube feeds at goal. 2 KRISTEN drains removed yesterday, remaining KRISTEN with minimal output. Intraoral flap intact, doppler strong, trach in place. Patient denies any other new complaints at this time.   : patient febrile overnight to 103, continues to have intermittent PVC, HR last night in 40s, patients baseline 50. Gen surg will like to wait 24 hours afebrile until g tube,  Oral flap appears to be congested, dixon continue to monitor. KRISTEN drain will be removed today. Pending results of fever work up . HEB 7.5, will transfuse when type and screen returns   : Patient seen this morning, afebrile overnight, trach secretion odor improving,  noted ot have an episode of oral bleeding after ambulation, bleeding stopped, unable to see source, will continue to monitor , doppler strong ( and removed today) ,  Flap continues to look dusky   12/15: patient sen this morning, continues to have intermittent oozing from left mandibular screw, Surgicel placed no active bleed, flap remains dusky but stable, afebrile overnight  : NAEON. AVSS. Patient in OR for PEG this morning. Will re-evaluate s/p PEG  : Re-evaluated s/p PEG. complaining fo abdominal discomfort. Flap is stable appearing. Donor site with wound vac. LLE edema improving. No further bleeding from the mouth.  : NAEON. AVSS. Examined at bedside. Trach changed to 6 uncuffed and confirmed on scope, secured with trach ties. Straight cath overnight for retention. Has not voided since. Will f/u bladder scan and straight cath if needed    ALLERGIES:  No Known Allergies    MEDICATIONS  (STANDING):  acetylcysteine 20%  Inhalation 4 milliLiter(s) Inhalation every 6 hours  albuterol/ipratropium for Nebulization 3 milliLiter(s) Nebulizer every 6 hours  aspirin Suppository 300 milliGRAM(s) Rectal daily  cefTRIAXone   IVPB 2000 milliGRAM(s) IV Intermittent every 24 hours  chlorhexidine 0.12% Liquid 15 milliLiter(s) Oral Mucosa two times a day  chlorhexidine 2% Cloths 1 Application(s) Topical daily  dextrose 5% + sodium chloride 0.45% 1000 milliLiter(s) (100 mL/Hr) IV Continuous <Continuous>  dextrose 5%. 1000 milliLiter(s) (50 mL/Hr) IV Continuous <Continuous>  dextrose 5%. 1000 milliLiter(s) (100 mL/Hr) IV Continuous <Continuous>  dextrose 50% Injectable 12.5 Gram(s) IV Push once  dextrose 50% Injectable 25 Gram(s) IV Push once  dextrose 50% Injectable 25 Gram(s) IV Push once  enoxaparin Injectable 40 milliGRAM(s) SubCutaneous every 24 hours  glucagon  Injectable 1 milliGRAM(s) IntraMuscular once  influenza  Vaccine (HIGH DOSE) 0.7 milliLiter(s) IntraMuscular once  pantoprazole  Injectable 40 milliGRAM(s) IV Push daily  potassium phosphate IVPB 15 milliMole(s) IV Intermittent once  sodium chloride 3%  Inhalation 4 milliLiter(s) Inhalation every 6 hours    MEDICATIONS  (PRN):  dextrose Oral Gel 15 Gram(s) Oral once PRN Blood Glucose LESS THAN 70 milliGRAM(s)/deciliter  HYDROmorphone  Injectable 1 milliGRAM(s) IV Push every 4 hours PRN Severe Pain (7 - 10)  HYDROmorphone  Injectable 0.5 milliGRAM(s) IV Push every 4 hours PRN Moderate Pain (4 - 6)  ondansetron Injectable 4 milliGRAM(s) IV Push every 6 hours PRN Nausea and/or Vomiting    I&O's Detail    16 Dec 2023 07:01  -  17 Dec 2023 07:00  --------------------------------------------------------  IN:    dextrose 5% + sodium chloride 0.45% w/ Additives: 600 mL    IV PiggyBack: 100 mL    Other (mL): 1000 mL  Total IN: 1700 mL    OUT:    Blood Loss (mL): 5 mL    Gastrostomy Tube (mL): 25 mL    Intermittent Catheterization - Urethral (mL): 800 mL    Post-Void Residual per Intermittent Catheterization (mL): 425 mL    VAC (Vacuum Assisted Closure) System (mL): 15 mL    Voided (mL): 450 mL  Total OUT: 1720 mL    Total NET: -20 mL      17 Dec 2023 07:01  -  17 Dec 2023 09:27  --------------------------------------------------------  IN:    dextrose 5% + sodium chloride 0.45% w/ Additives: 200 mL  Total IN: 200 mL    OUT:    Gastrostomy Tube (mL): 100 mL    VAC (Vacuum Assisted Closure) System (mL): 0 mL    Voided (mL): 0 mL  Total OUT: 100 mL    Total NET: 100 mL      PHYSICAL EXAM:  Gen: AAOx3, NAD   Head: Surgical incision around chin extending up through lip  Eyes: EOMI, PERRL, visual acuity intact, no diplopia, supra/infra orbital rims intact, no subconjunctival heme,   Ears: Gross hearing intact,  Nose: No septal hematoma/asymmetry, no epistaxis bilaterally. no abrasions present, no lacerations.   Throat: No LAD, supple, neck surgical incision w/ steristrip dressing is hemostatic , trach in place w/ 6 uncuffed portex  Oral: Left FFF paddle dusk colored,  will continue to monitor,. IMF screws in place intermittent ooze around left mandibular screw,   Exx: Wound vac left leg, thigh donor site dressingchanged        MICROBIOLOGY:  Culture - Sputum (collected 23 @ 07:27)  Source: Trach Asp Tracheal Aspirate  Gram Stain (23 @ 21:31):    Rare epithelial cells    Moderate WBC's    Rare Gram Positive Rods    Few Gram Negative Rods    Moderate Gram positive cocci in pairs, chains and clusters  Preliminary Report (23 @ 12:13):    Moderate Escherichia coli    Susceptibility to follow.    Moderate Enterobacter cloacae complex    Susceptibility to follow.    Accompanied by normal respiratory brenden    Culture - Sputum (collected 23 @ 20:48)  Source: .Sputum  Gram Stain (23 @ 22:45):    Moderate epithelial cells    Moderate WBC's    Rare Gram Positive Rods    Few Gram Negative Rods    Few Gram Positive Cocci in Pairs and Chains  Final Report (23 @ 22:45):    Sputum specimen rejected.  Microscopic examination indicates    oropharyngeal contamination.  Please repeat.      Culture Results:   Moderate Escherichia coli  Susceptibility to follow.  Moderate Enterobacter cloacae complex  Susceptibility to follow.  Accompanied by normal respiratory brenden (23 @ 07:27)  Culture Results:   Sputum specimen rejected.  Microscopic examination indicates  oropharyngeal contamination.  Please repeat. (23 @ 20:48)  Culture Results:   No growth at 2 days. (23 @ 20:10)  Culture Results:   No growth at 2 days. (23 @ 20:05)      Culture - Sputum (collected 23 @ 07:27)  Source: Trach Asp Tracheal Aspirate  Gram Stain (23 @ 21:31):    Rare epithelial cells    Moderate WBC's    Rare Gram Positive Rods    Few Gram Negative Rods    Moderate Gram positive cocci in pairs, chains and clusters  Preliminary Report (23 @ 12:13):    Moderate Escherichia coli    Susceptibility to follow.    Moderate Enterobacter cloacae complex    Susceptibility to follow.    Accompanied by normal respiratory brenden    Culture - Sputum (collected 23 @ 20:48)  Source: .Sputum  Gram Stain (23 @ 22:45):    Moderate epithelial cells    Moderate WBC's    Rare Gram Positive Rods    Few Gram Negative Rods    Few Gram Positive Cocci in Pairs and Chains  Final Report (23 @ 22:45):    Sputum specimen rejected.  Microscopic examination indicates    oropharyngeal contamination.  Please repeat.    Culture - Blood (collected 23 @ 20:10)  Source: .Blood Blood  Preliminary Report (23 @ 22:01):    No growth at 2 days.    Culture - Blood (collected 23 @ 20:05)  Source: .Blood Blood  Preliminary Report (23 @ 22:01):    No growth at 2 days.     OTOLARYNGOLOGY (ENT) PROGRESS NOTE    PATIENT: SAUNDRA HOLMAN  MRN: [id number]  : 57  [id number]  DATE OF SERVICE:  12-15-23  			           ID:SAUNDRA HOLMAN is a  67yo M w PMH of CAD (x2 stents Mar 2023), HLD, T2DM, hx of kidney stones, psoriasis presents to [facility] for evaluation of oral mass. Reports Three month hx of jaw pain and loosening of mandibular molar tooth on the lower left. Pt has a 30 pack yr smoking hx, quit 1 yr ago. 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 POD 1 composite mandibular resection, neck dissection and fibula free flap reconstruction.       Subjective/ Interval:   ; patient seen this morning, oozing from trach as expected ,  Plan to start aspirin today, cuff is up on tracheostomy tube, intraoral skin panel intact doppler signal strong,  plan to advance NGT   : Patient seen and examined at bedside. AFVSS overnight. Significant HgB drop to 7.2 noted this morning. NGT clogged, attempted replacement but went into lung, need to replace. Cuff deflated. Patient reports lethargy. Otherwise no new complaints. Intraoral skin paddle intact with strong doppler signal. Plan to hold off one eliquis given significant HgB drop  12/10: Patient seen and examined at bedside. AFVSS overnight other than bradycardic to lowest 39, mainly in 40s-50s which seems to be his baseline even pre-op. Not symptomatic while chinedu. Otherwise, patient stable on TC with no issues. Yesterday, iatrogenic right sided PTX occured with NGT placement, patient remained asymptomatic throughout. Patient now s/p placement of pig-tail catheter by pulm with significant improvement in PTX. Chest tube clamped this morning with plan for removal this evening. Patient s/p 2 units of PRBCs with moderate improvement in Hgb. Today, patient reports feeling significantly better than yesterday and overall "feels good." He was started on trickle feeds last night but felt some chest tightness and so they were held. Patient also failed TOV again and was straight cathed. KRISTEN drain output significantly decreased this morning. No other changes at this time.   : patient seen this morning PTX has resolved on CXR, intraoral flap intact, doppler strong, trach in place,  KRISTEN drains with minimal output   : Patient seen and examined this morning at bedside. Overnight, NGT slightly displaced, coming out around 10 cm from original placement, advanced and secured, pending CXR. Patient failed trial of clears with SLP yesterday but tolerated PMV without issue. Patient continues to be OOBTC daily and is tolerating tube feeds at goal. 2 KRISTEN drains removed yesterday, remaining KRISTEN with minimal output. Intraoral flap intact, doppler strong, trach in place. Patient denies any other new complaints at this time.   : patient febrile overnight to 103, continues to have intermittent PVC, HR last night in 40s, patients baseline 50. Gen surg will like to wait 24 hours afebrile until g tube,  Oral flap appears to be congested, dixon continue to monitor. KRISTEN drain will be removed today. Pending results of fever work up . HEB 7.5, will transfuse when type and screen returns   : Patient seen this morning, afebrile overnight, trach secretion odor improving,  noted ot have an episode of oral bleeding after ambulation, bleeding stopped, unable to see source, will continue to monitor , doppler strong ( and removed today) ,  Flap continues to look dusky   12/15: patient sen this morning, continues to have intermittent oozing from left mandibular screw, Surgicel placed no active bleed, flap remains dusky but stable, afebrile overnight  : NAEON. AVSS. Patient in OR for PEG this morning. Will re-evaluate s/p PEG  : Re-evaluated s/p PEG. complaining fo abdominal discomfort. Flap is stable appearing. Donor site with wound vac. LLE edema improving. No further bleeding from the mouth.  : NAEON. AVSS. Examined at bedside. Trach changed to 6 uncuffed and confirmed on scope, secured with trach ties. Straight cath overnight for retention. Has not voided since. Will f/u bladder scan and straight cath if needed    ALLERGIES:  No Known Allergies    MEDICATIONS  (STANDING):  acetylcysteine 20%  Inhalation 4 milliLiter(s) Inhalation every 6 hours  albuterol/ipratropium for Nebulization 3 milliLiter(s) Nebulizer every 6 hours  aspirin Suppository 300 milliGRAM(s) Rectal daily  cefTRIAXone   IVPB 2000 milliGRAM(s) IV Intermittent every 24 hours  chlorhexidine 0.12% Liquid 15 milliLiter(s) Oral Mucosa two times a day  chlorhexidine 2% Cloths 1 Application(s) Topical daily  dextrose 5% + sodium chloride 0.45% 1000 milliLiter(s) (100 mL/Hr) IV Continuous <Continuous>  dextrose 5%. 1000 milliLiter(s) (50 mL/Hr) IV Continuous <Continuous>  dextrose 5%. 1000 milliLiter(s) (100 mL/Hr) IV Continuous <Continuous>  dextrose 50% Injectable 12.5 Gram(s) IV Push once  dextrose 50% Injectable 25 Gram(s) IV Push once  dextrose 50% Injectable 25 Gram(s) IV Push once  enoxaparin Injectable 40 milliGRAM(s) SubCutaneous every 24 hours  glucagon  Injectable 1 milliGRAM(s) IntraMuscular once  influenza  Vaccine (HIGH DOSE) 0.7 milliLiter(s) IntraMuscular once  pantoprazole  Injectable 40 milliGRAM(s) IV Push daily  potassium phosphate IVPB 15 milliMole(s) IV Intermittent once  sodium chloride 3%  Inhalation 4 milliLiter(s) Inhalation every 6 hours    MEDICATIONS  (PRN):  dextrose Oral Gel 15 Gram(s) Oral once PRN Blood Glucose LESS THAN 70 milliGRAM(s)/deciliter  HYDROmorphone  Injectable 1 milliGRAM(s) IV Push every 4 hours PRN Severe Pain (7 - 10)  HYDROmorphone  Injectable 0.5 milliGRAM(s) IV Push every 4 hours PRN Moderate Pain (4 - 6)  ondansetron Injectable 4 milliGRAM(s) IV Push every 6 hours PRN Nausea and/or Vomiting    I&O's Detail    16 Dec 2023 07:01  -  17 Dec 2023 07:00  --------------------------------------------------------  IN:    dextrose 5% + sodium chloride 0.45% w/ Additives: 600 mL    IV PiggyBack: 100 mL    Other (mL): 1000 mL  Total IN: 1700 mL    OUT:    Blood Loss (mL): 5 mL    Gastrostomy Tube (mL): 25 mL    Intermittent Catheterization - Urethral (mL): 800 mL    Post-Void Residual per Intermittent Catheterization (mL): 425 mL    VAC (Vacuum Assisted Closure) System (mL): 15 mL    Voided (mL): 450 mL  Total OUT: 1720 mL    Total NET: -20 mL      17 Dec 2023 07:01  -  17 Dec 2023 09:27  --------------------------------------------------------  IN:    dextrose 5% + sodium chloride 0.45% w/ Additives: 200 mL  Total IN: 200 mL    OUT:    Gastrostomy Tube (mL): 100 mL    VAC (Vacuum Assisted Closure) System (mL): 0 mL    Voided (mL): 0 mL  Total OUT: 100 mL    Total NET: 100 mL      PHYSICAL EXAM:  Gen: AAOx3, NAD   Head: Surgical incision around chin extending up through lip  Eyes: EOMI, PERRL, visual acuity intact, no diplopia, supra/infra orbital rims intact, no subconjunctival heme,   Ears: Gross hearing intact,  Nose: No septal hematoma/asymmetry, no epistaxis bilaterally. no abrasions present, no lacerations.   Throat: No LAD, supple, neck surgical incision w/ steristrip dressing is hemostatic , trach in place w/ 6 uncuffed portex  Oral: Left FFF paddle dusk colored,  will continue to monitor,. IMF screws in place intermittent ooze around left mandibular screw,   Exx: Wound vac left leg, thigh donor site dressingchanged        MICROBIOLOGY:  Culture - Sputum (collected 23 @ 07:27)  Source: Trach Asp Tracheal Aspirate  Gram Stain (23 @ 21:31):    Rare epithelial cells    Moderate WBC's    Rare Gram Positive Rods    Few Gram Negative Rods    Moderate Gram positive cocci in pairs, chains and clusters  Preliminary Report (23 @ 12:13):    Moderate Escherichia coli    Susceptibility to follow.    Moderate Enterobacter cloacae complex    Susceptibility to follow.    Accompanied by normal respiratory brenden    Culture - Sputum (collected 23 @ 20:48)  Source: .Sputum  Gram Stain (23 @ 22:45):    Moderate epithelial cells    Moderate WBC's    Rare Gram Positive Rods    Few Gram Negative Rods    Few Gram Positive Cocci in Pairs and Chains  Final Report (23 @ 22:45):    Sputum specimen rejected.  Microscopic examination indicates    oropharyngeal contamination.  Please repeat.      Culture Results:   Moderate Escherichia coli  Susceptibility to follow.  Moderate Enterobacter cloacae complex  Susceptibility to follow.  Accompanied by normal respiratory brenden (23 @ 07:27)  Culture Results:   Sputum specimen rejected.  Microscopic examination indicates  oropharyngeal contamination.  Please repeat. (23 @ 20:48)  Culture Results:   No growth at 2 days. (23 @ 20:10)  Culture Results:   No growth at 2 days. (23 @ 20:05)      Culture - Sputum (collected 23 @ 07:27)  Source: Trach Asp Tracheal Aspirate  Gram Stain (23 @ 21:31):    Rare epithelial cells    Moderate WBC's    Rare Gram Positive Rods    Few Gram Negative Rods    Moderate Gram positive cocci in pairs, chains and clusters  Preliminary Report (23 @ 12:13):    Moderate Escherichia coli    Susceptibility to follow.    Moderate Enterobacter cloacae complex    Susceptibility to follow.    Accompanied by normal respiratory brenden    Culture - Sputum (collected 23 @ 20:48)  Source: .Sputum  Gram Stain (23 @ 22:45):    Moderate epithelial cells    Moderate WBC's    Rare Gram Positive Rods    Few Gram Negative Rods    Few Gram Positive Cocci in Pairs and Chains  Final Report (23 @ 22:45):    Sputum specimen rejected.  Microscopic examination indicates    oropharyngeal contamination.  Please repeat.    Culture - Blood (collected 23 @ 20:10)  Source: .Blood Blood  Preliminary Report (23 @ 22:01):    No growth at 2 days.    Culture - Blood (collected 23 @ 20:05)  Source: .Blood Blood  Preliminary Report (23 @ 22:01):    No growth at 2 days.

## 2023-12-17 NOTE — PROGRESS NOTE ADULT - ASSESSMENT
69 y/o M w PMHx of CAD (x2 stents Mar 2023), HLD, T2DM, nephrolithiasis (ureteral stent placement 2022), and psoriasis w/ three month hx of jaw pain and loosening of lower left mandibular molar tooth. Pt has a 30 pack yr smoking hx, quit 1 yr ago. 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 12/7 s/p L hemimandibulectomy, L level 1, 2a, 3 neck dissection, L fibula free flap reconstruction, STSG from L thigh, dental implants, and tracheostomy. Admitted to SICU for flap checks and hemodynamic monitoring. OR 12/16 for PEG tube placement, patient tolerated procedure well. Tube in place, securely sutured, benign abdominal exam.     - okay to use PEG tube for medications this afternoon  - may begin trickle feeds this evening if patient tolerates medications without issue  - team 4c will continue to follow

## 2023-12-17 NOTE — PROGRESS NOTE ADULT - ASSESSMENT
Assessment and Plan:    SAUNDRA HOLMAN is a  68M w/ [id number] SCC of L buccal mucosa presents for pre optimization for surgery 12/7, now s/p hemimandibulectomy, left level 1, 2a, 3 neck neck dissection, L FFF, tracheostomy w/ 7.5 cuffed portex, dental implants, STSG 12/7. Intermittent fevers 12/12 afternoon.       PLAN  -tube feeds pending gen surg clearance   Monitor for any signs of oral bleeding   OOBTC   Plan to ambulate 3-4x a day   - Continue nebulized solution for thick trach secretions   Continue Ondansetron PRN nausea/vomiting , PO Senna once daily, Miralax 17g once daily,  Chlorhexidine swish and spit, Esomeprazole, Enoxaparin, Gabapentin, Acetaminophen   Continue SCD’s    Cardiology - plavix after g tube, transfuse if hgb <8 - to resume Monday 12/17 per gen surg   Continue to work with SLP for trial of clears  PMV as tolerated    Page ENT at [phone] with any questions/concerns.         Assessment and Plan:    SAUNDRA HOLMAN is a  68M w/ [id number] SCC of L buccal mucosa presents for pre optimization for surgery 12/7, now s/p hemimandibulectomy, left level 1, 2a, 3 neck neck dissection, L FFF, tracheostomy w/ 7.5 cuffed portex, dental implants, STSG 12/7. Intermittent fevers 12/12 afternoon.       PLAN  -tube feeds pending gen surg clearance   Monitor for any signs of oral bleeding   OOBTC   Plan to ambulate 3-4x a day   - Continue nebulized solution for thick trach secretions   Continue Ondansetron PRN nausea/vomiting , PO Senna once daily, Miralax 17g once daily,  Chlorhexidine swish and spit, Esomeprazole, Enoxaparin, Gabapentin, Acetaminophen   Continue SCD’s    Cardiology - plavix after g tube, transfuse if hgb <8 - to resume Monday 12/17 per gen surg   Continue to work with SLP for trial of clears  PMV as tolerated    Page ENT at [phone] with any questions/concerns.         Assessment and Plan:    SAUNDRA HOLMAN is a  68M w/ [id number] SCC of L buccal mucosa presents for pre optimization for surgery 12/7, now s/p hemimandibulectomy, left level 1, 2a, 3 neck neck dissection, L FFF, tracheostomy w/ 7.5 cuffed portex, dental implants, STSG 12/7. Intermittent fevers 12/12 afternoon.       PLAN  tube feeds pending gen surg clearance  Monitor for any signs of oral bleeding   OOBTC  Plan to ambulate 3-4x a day   Continue nebulized solution for thick trach secretions   Continue Ondansetron PRN nausea/vomiting , PO Senna once daily, Miralax 17g once daily,  Chlorhexidine swish and spit, Esomeprazole, Enoxaparin, Gabapentin, Acetaminophen   Continue SCD’s    Cardiology - plavix after g tube, transfuse if hgb <8 - to resume Monday 12/17 per gen surg   Continue to work with SLP for trial of clears  PMV as tolerated    Page ENT at [phone] with any questions/concerns.         Assessment and Plan:    SAUNDRA HOLMAN is a  68M w/ [id number] SCC of L buccal mucosa presents for pre optimization for surgery 12/7, now s/p hemimandibulectomy, left level 1, 2a, 3 neck neck dissection, L FFF, tracheostomy w/ 7.5 cuffed portex, dental implants, STSG 12/7. Intermittent fevers 12/12 afternoon.       PLAN  tube feeds pending gen surg clearance  Monitor for any signs of oral bleeding   OOBTC  Plan to ambulate 3-4x a day   Continue nebulized solution for thick trach secretions   Continue Ondansetron PRN nausea/vomiting , PO Senna once daily, Miralax 17g once daily,  Chlorhexidine swish and spit, Esomeprazole, Enoxaparin, Gabapentin, Acetaminophen   Continue SCD’s    Cardiology - plavix after g tube, transfuse if hgb <8 - to resume Monday 12/17 per gen surg   Continue to work with SLP for trial of clears  PMV as tolerated    Page ENT at [phone] with any questions/concerns.

## 2023-12-18 LAB
ANION GAP SERPL CALC-SCNC: 8 MMOL/L — SIGNIFICANT CHANGE UP (ref 5–17)
ANION GAP SERPL CALC-SCNC: 8 MMOL/L — SIGNIFICANT CHANGE UP (ref 5–17)
BUN SERPL-MCNC: 9 MG/DL — SIGNIFICANT CHANGE UP (ref 7–23)
BUN SERPL-MCNC: 9 MG/DL — SIGNIFICANT CHANGE UP (ref 7–23)
CALCIUM SERPL-MCNC: 8.4 MG/DL — SIGNIFICANT CHANGE UP (ref 8.4–10.5)
CALCIUM SERPL-MCNC: 8.4 MG/DL — SIGNIFICANT CHANGE UP (ref 8.4–10.5)
CHLORIDE SERPL-SCNC: 105 MMOL/L — SIGNIFICANT CHANGE UP (ref 96–108)
CHLORIDE SERPL-SCNC: 105 MMOL/L — SIGNIFICANT CHANGE UP (ref 96–108)
CO2 SERPL-SCNC: 26 MMOL/L — SIGNIFICANT CHANGE UP (ref 22–31)
CO2 SERPL-SCNC: 26 MMOL/L — SIGNIFICANT CHANGE UP (ref 22–31)
CREAT SERPL-MCNC: 0.67 MG/DL — SIGNIFICANT CHANGE UP (ref 0.5–1.3)
CREAT SERPL-MCNC: 0.67 MG/DL — SIGNIFICANT CHANGE UP (ref 0.5–1.3)
EGFR: 102 ML/MIN/1.73M2 — SIGNIFICANT CHANGE UP
EGFR: 102 ML/MIN/1.73M2 — SIGNIFICANT CHANGE UP
GLUCOSE SERPL-MCNC: 122 MG/DL — HIGH (ref 70–99)
GLUCOSE SERPL-MCNC: 122 MG/DL — HIGH (ref 70–99)
HCT VFR BLD CALC: 26.3 % — LOW (ref 39–50)
HCT VFR BLD CALC: 26.3 % — LOW (ref 39–50)
HGB BLD-MCNC: 8.2 G/DL — LOW (ref 13–17)
HGB BLD-MCNC: 8.2 G/DL — LOW (ref 13–17)
MAGNESIUM SERPL-MCNC: 1.6 MG/DL — SIGNIFICANT CHANGE UP (ref 1.6–2.6)
MAGNESIUM SERPL-MCNC: 1.6 MG/DL — SIGNIFICANT CHANGE UP (ref 1.6–2.6)
MCHC RBC-ENTMCNC: 27.3 PG — SIGNIFICANT CHANGE UP (ref 27–34)
MCHC RBC-ENTMCNC: 27.3 PG — SIGNIFICANT CHANGE UP (ref 27–34)
MCHC RBC-ENTMCNC: 31.2 GM/DL — LOW (ref 32–36)
MCHC RBC-ENTMCNC: 31.2 GM/DL — LOW (ref 32–36)
MCV RBC AUTO: 87.7 FL — SIGNIFICANT CHANGE UP (ref 80–100)
MCV RBC AUTO: 87.7 FL — SIGNIFICANT CHANGE UP (ref 80–100)
NRBC # BLD: 0 /100 WBCS — SIGNIFICANT CHANGE UP (ref 0–0)
NRBC # BLD: 0 /100 WBCS — SIGNIFICANT CHANGE UP (ref 0–0)
PHOSPHATE SERPL-MCNC: 2.7 MG/DL — SIGNIFICANT CHANGE UP (ref 2.5–4.5)
PHOSPHATE SERPL-MCNC: 2.7 MG/DL — SIGNIFICANT CHANGE UP (ref 2.5–4.5)
PLATELET # BLD AUTO: 331 K/UL — SIGNIFICANT CHANGE UP (ref 150–400)
PLATELET # BLD AUTO: 331 K/UL — SIGNIFICANT CHANGE UP (ref 150–400)
POTASSIUM SERPL-MCNC: 3.7 MMOL/L — SIGNIFICANT CHANGE UP (ref 3.5–5.3)
POTASSIUM SERPL-MCNC: 3.7 MMOL/L — SIGNIFICANT CHANGE UP (ref 3.5–5.3)
POTASSIUM SERPL-SCNC: 3.7 MMOL/L — SIGNIFICANT CHANGE UP (ref 3.5–5.3)
POTASSIUM SERPL-SCNC: 3.7 MMOL/L — SIGNIFICANT CHANGE UP (ref 3.5–5.3)
RBC # BLD: 3 M/UL — LOW (ref 4.2–5.8)
RBC # BLD: 3 M/UL — LOW (ref 4.2–5.8)
RBC # FLD: 13.5 % — SIGNIFICANT CHANGE UP (ref 10.3–14.5)
RBC # FLD: 13.5 % — SIGNIFICANT CHANGE UP (ref 10.3–14.5)
SODIUM SERPL-SCNC: 139 MMOL/L — SIGNIFICANT CHANGE UP (ref 135–145)
SODIUM SERPL-SCNC: 139 MMOL/L — SIGNIFICANT CHANGE UP (ref 135–145)
WBC # BLD: 7.66 K/UL — SIGNIFICANT CHANGE UP (ref 3.8–10.5)
WBC # BLD: 7.66 K/UL — SIGNIFICANT CHANGE UP (ref 3.8–10.5)
WBC # FLD AUTO: 7.66 K/UL — SIGNIFICANT CHANGE UP (ref 3.8–10.5)
WBC # FLD AUTO: 7.66 K/UL — SIGNIFICANT CHANGE UP (ref 3.8–10.5)

## 2023-12-18 PROCEDURE — 99233 SBSQ HOSP IP/OBS HIGH 50: CPT | Mod: GC

## 2023-12-18 PROCEDURE — 99232 SBSQ HOSP IP/OBS MODERATE 35: CPT

## 2023-12-18 RX ORDER — LANOLIN ALCOHOL/MO/W.PET/CERES
5 CREAM (GRAM) TOPICAL AT BEDTIME
Refills: 0 | Status: DISCONTINUED | OUTPATIENT
Start: 2023-12-18 | End: 2023-12-21

## 2023-12-18 RX ORDER — CLOPIDOGREL BISULFATE 75 MG/1
75 TABLET, FILM COATED ORAL DAILY
Refills: 0 | Status: DISCONTINUED | OUTPATIENT
Start: 2023-12-18 | End: 2023-12-18

## 2023-12-18 RX ORDER — CLOPIDOGREL BISULFATE 75 MG/1
75 TABLET, FILM COATED ORAL DAILY
Refills: 0 | Status: DISCONTINUED | OUTPATIENT
Start: 2023-12-18 | End: 2023-12-21

## 2023-12-18 RX ORDER — LIDOCAINE 4 G/100G
1 CREAM TOPICAL ONCE
Refills: 0 | Status: COMPLETED | OUTPATIENT
Start: 2023-12-18 | End: 2023-12-18

## 2023-12-18 RX ADMIN — Medication 3 MILLILITER(S): at 17:17

## 2023-12-18 RX ADMIN — PANTOPRAZOLE SODIUM 40 MILLIGRAM(S): 20 TABLET, DELAYED RELEASE ORAL at 11:43

## 2023-12-18 RX ADMIN — OXYCODONE HYDROCHLORIDE 10 MILLIGRAM(S): 5 TABLET ORAL at 21:31

## 2023-12-18 RX ADMIN — OXYCODONE HYDROCHLORIDE 5 MILLIGRAM(S): 5 TABLET ORAL at 15:09

## 2023-12-18 RX ADMIN — CHLORHEXIDINE GLUCONATE 15 MILLILITER(S): 213 SOLUTION TOPICAL at 17:16

## 2023-12-18 RX ADMIN — SODIUM CHLORIDE 4 MILLILITER(S): 9 INJECTION INTRAMUSCULAR; INTRAVENOUS; SUBCUTANEOUS at 12:20

## 2023-12-18 RX ADMIN — Medication 975 MILLIGRAM(S): at 23:22

## 2023-12-18 RX ADMIN — OXYCODONE HYDROCHLORIDE 10 MILLIGRAM(S): 5 TABLET ORAL at 22:32

## 2023-12-18 RX ADMIN — OXYCODONE HYDROCHLORIDE 5 MILLIGRAM(S): 5 TABLET ORAL at 15:50

## 2023-12-18 RX ADMIN — Medication 4 MILLILITER(S): at 23:22

## 2023-12-18 RX ADMIN — SODIUM CHLORIDE 4 MILLILITER(S): 9 INJECTION INTRAMUSCULAR; INTRAVENOUS; SUBCUTANEOUS at 05:18

## 2023-12-18 RX ADMIN — SODIUM CHLORIDE 100 MILLILITER(S): 9 INJECTION, SOLUTION INTRAVENOUS at 19:18

## 2023-12-18 RX ADMIN — Medication 975 MILLIGRAM(S): at 12:42

## 2023-12-18 RX ADMIN — OXYCODONE HYDROCHLORIDE 10 MILLIGRAM(S): 5 TABLET ORAL at 11:43

## 2023-12-18 RX ADMIN — LIDOCAINE 1 PATCH: 4 CREAM TOPICAL at 18:04

## 2023-12-18 RX ADMIN — ENOXAPARIN SODIUM 40 MILLIGRAM(S): 100 INJECTION SUBCUTANEOUS at 11:43

## 2023-12-18 RX ADMIN — Medication 3 MILLILITER(S): at 23:21

## 2023-12-18 RX ADMIN — Medication 4 MILLILITER(S): at 12:20

## 2023-12-18 RX ADMIN — SODIUM CHLORIDE 4 MILLILITER(S): 9 INJECTION INTRAMUSCULAR; INTRAVENOUS; SUBCUTANEOUS at 17:16

## 2023-12-18 RX ADMIN — Medication 975 MILLIGRAM(S): at 00:00

## 2023-12-18 RX ADMIN — Medication 3 MILLILITER(S): at 05:18

## 2023-12-18 RX ADMIN — Medication 975 MILLIGRAM(S): at 06:00

## 2023-12-18 RX ADMIN — Medication 975 MILLIGRAM(S): at 05:17

## 2023-12-18 RX ADMIN — CHLORHEXIDINE GLUCONATE 1 APPLICATION(S): 213 SOLUTION TOPICAL at 11:56

## 2023-12-18 RX ADMIN — Medication 4 MILLILITER(S): at 17:16

## 2023-12-18 RX ADMIN — Medication 975 MILLIGRAM(S): at 17:17

## 2023-12-18 RX ADMIN — Medication 81 MILLIGRAM(S): at 11:55

## 2023-12-18 RX ADMIN — Medication 3 MILLILITER(S): at 11:55

## 2023-12-18 RX ADMIN — CLOPIDOGREL BISULFATE 75 MILLIGRAM(S): 75 TABLET, FILM COATED ORAL at 11:58

## 2023-12-18 RX ADMIN — Medication 975 MILLIGRAM(S): at 17:54

## 2023-12-18 RX ADMIN — CHLORHEXIDINE GLUCONATE 15 MILLILITER(S): 213 SOLUTION TOPICAL at 05:17

## 2023-12-18 RX ADMIN — Medication 975 MILLIGRAM(S): at 11:42

## 2023-12-18 RX ADMIN — LIDOCAINE 1 PATCH: 4 CREAM TOPICAL at 17:16

## 2023-12-18 RX ADMIN — SODIUM CHLORIDE 100 MILLILITER(S): 9 INJECTION, SOLUTION INTRAVENOUS at 08:42

## 2023-12-18 RX ADMIN — SODIUM CHLORIDE 4 MILLILITER(S): 9 INJECTION INTRAMUSCULAR; INTRAVENOUS; SUBCUTANEOUS at 23:22

## 2023-12-18 RX ADMIN — OXYCODONE HYDROCHLORIDE 10 MILLIGRAM(S): 5 TABLET ORAL at 12:40

## 2023-12-18 RX ADMIN — CEFTRIAXONE 100 MILLIGRAM(S): 500 INJECTION, POWDER, FOR SOLUTION INTRAMUSCULAR; INTRAVENOUS at 17:18

## 2023-12-18 RX ADMIN — Medication 4 MILLILITER(S): at 05:18

## 2023-12-18 NOTE — PROGRESS NOTE ADULT - ASSESSMENT
67 y/o M w PMHx of CAD (x2 stents Mar 2023), HLD, T2DM, nephrolithiasis (ureteral stent placement 2022), and psoriasis w/ three month hx of jaw pain and loosening of lower left mandibular molar tooth. Pt has a 30 pack yr smoking hx, quit 1 yr ago. 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 12/7 s/p L hemimandibulectomy, L level 1, 2a, 3 neck dissection, L fibula free flap reconstruction, STSG from L thigh, dental implants, and tracheostomy. Admitted to SICU for flap checks and hemodynamic monitoring. OR 12/16 for PEG tube placement, patient tolerated procedure well. Tube in place, securely sutured, benign abdominal exam.     -Continue tube feeds as tolerated, advance to goal as tolerated   - Please call if any further questions or concerns   - team 4c will continue to follow

## 2023-12-18 NOTE — PROGRESS NOTE ADULT - SUBJECTIVE AND OBJECTIVE BOX
SUBJECTIVE:  GERI since procedure on Saturday.  Endorses minor heartburn, some bloating with TF at 30cc   Endorses good pain control. Denies nausea/vomiting. Denies flatus and bowel movements. No F/C.     MEDICATIONS  (STANDING):  acetaminophen   Oral Liquid .. 975 milliGRAM(s) Oral every 6 hours  acetylcysteine 20%  Inhalation 4 milliLiter(s) Inhalation every 6 hours  albuterol/ipratropium for Nebulization 3 milliLiter(s) Nebulizer every 6 hours  aspirin  chewable 81 milliGRAM(s) Oral daily  cefTRIAXone   IVPB 2000 milliGRAM(s) IV Intermittent every 24 hours  chlorhexidine 0.12% Liquid 15 milliLiter(s) Oral Mucosa two times a day  clopidogrel Tablet 75 milliGRAM(s) Enteral Tube daily  dextrose 5% + sodium chloride 0.45% 1000 milliLiter(s) (100 mL/Hr) IV Continuous <Continuous>  dextrose 5%. 1000 milliLiter(s) (50 mL/Hr) IV Continuous <Continuous>  dextrose 5%. 1000 milliLiter(s) (100 mL/Hr) IV Continuous <Continuous>  dextrose 50% Injectable 25 Gram(s) IV Push once  dextrose 50% Injectable 12.5 Gram(s) IV Push once  dextrose 50% Injectable 25 Gram(s) IV Push once  enoxaparin Injectable 40 milliGRAM(s) SubCutaneous every 24 hours  glucagon  Injectable 1 milliGRAM(s) IntraMuscular once  influenza  Vaccine (HIGH DOSE) 0.7 milliLiter(s) IntraMuscular once  pantoprazole  Injectable 40 milliGRAM(s) IV Push daily  sodium chloride 3%  Inhalation 4 milliLiter(s) Inhalation every 6 hours    MEDICATIONS  (PRN):  dextrose Oral Gel 15 Gram(s) Oral once PRN Blood Glucose LESS THAN 70 milliGRAM(s)/deciliter  melatonin Liquid 5 milliGRAM(s) Oral at bedtime PRN Insomnia  ondansetron Injectable 4 milliGRAM(s) IV Push every 6 hours PRN Nausea and/or Vomiting  oxyCODONE    Solution 5 milliGRAM(s) Oral every 4 hours PRN Moderate Pain (4 - 6)  oxyCODONE    Solution 10 milliGRAM(s) Oral every 4 hours PRN Severe Pain (7 - 10)      Vital Signs Last 24 Hrs  T(C): 36.8 (18 Dec 2023 17:00), Max: 37.1 (17 Dec 2023 22:00)  T(F): 98.3 (18 Dec 2023 17:00), Max: 98.8 (17 Dec 2023 22:00)  HR: 56 (18 Dec 2023 15:37) (51 - 64)  BP: 117/57 (18 Dec 2023 15:37) (108/53 - 124/61)  BP(mean): 76 (18 Dec 2023 11:48) (76 - 88)  RR: 17 (18 Dec 2023 15:37) (17 - 19)  SpO2: 96% (18 Dec 2023 15:37) (96% - 98%)    Parameters below as of 18 Dec 2023 15:37  Patient On (Oxygen Delivery Method): room air        Physical Exam:  General: NAD, resting comfortably in bed  C/V: NSR  Pulm: Nonlabored breathing, no respiratory distress  Abd: soft, NT/ND, PEG tube in place with 3cm marker at the level of the skin. Port sites c/d/i. Small area of soft, non tender redness at the site of the umbilical port site.  Extrem: WWP, no edema, SCDs in place    I&O's Summary    17 Dec 2023 07:01  -  18 Dec 2023 07:00  --------------------------------------------------------  IN: 2925 mL / OUT: 2055 mL / NET: 870 mL    18 Dec 2023 07:01  -  18 Dec 2023 17:58  --------------------------------------------------------  IN: 920 mL / OUT: 250 mL / NET: 670 mL        LABS:                        8.2    7.66  )-----------( 331      ( 18 Dec 2023 05:30 )             26.3     12-18    139  |  105  |  9   ----------------------------<  122<H>  3.7   |  26  |  0.67    Ca    8.4      18 Dec 2023 05:30  Phos  2.7     12-18  Mg     1.6     12-18        Urinalysis Basic - ( 18 Dec 2023 05:30 )    Color: x / Appearance: x / SG: x / pH: x  Gluc: 122 mg/dL / Ketone: x  / Bili: x / Urobili: x   Blood: x / Protein: x / Nitrite: x   Leuk Esterase: x / RBC: x / WBC x   Sq Epi: x / Non Sq Epi: x / Bacteria: x      CAPILLARY BLOOD GLUCOSE            RADIOLOGY & ADDITIONAL STUDIES:

## 2023-12-18 NOTE — CHART NOTE - NSCHARTNOTEFT_GEN_A_CORE
Admitting Diagnosis:   Patient is a 68y old  Male who presents with a chief complaint of Oral Ca (18 Dec 2023 10:48)      PAST MEDICAL & SURGICAL HISTORY:  Neoplasm of mandible          Current Nutrition Order: Diet, NPO with Tube Feed:   Diet, NPO with Tube Feed:   Tube Feeding Modality: Gastrostomy  Glucerna 1.5 Marcus (GLUCERNA1.5)  Total Volume for 24 Hours (mL): 1440  Total Number of Cans: 6  Continuous  Starting Tube Feed Rate {mL per Hour}: 10  Increase Tube Feed Rate by (mL): 10     Every 4 hours  Until Goal Tube Feed Rate (mL per Hour): 60  Tube Feed Duration (in Hours): 24  Tube Feed Start Time: 17:00  Bolus   Total Volume per Flush (mL): 150   Frequency: Every 6 Hours  Banatrol TF     Qty per Day:  2 (12-18-23 @ 11:55) [Active]    >>Current EN regimen provides 1440 ml of total volume, 2160 kcal, 119 g pro, 1093 ml of free water. Meets 25.7 kcal/kg, 1.4g pro/kg based on ABW 84kg     PO Intake: Good (%) [   ]  Fair (50-75%) [   ] Poor (<25%) [   ] - NPO w/ TF     GI Issues: No issues with vomiting, constipation reported at time of visit. States that he has been experiencing a little nausea, and loose stools. Pt is not currently on a bowel regimen. Zofran PRN.     Pain: no indication of pain at time of visit.     Skin Integrity: No Pressure Injuries per flow sheets. Wong Score; 18   Edema: 2+ generalized, face per flow sheets.     Labs:   12-18    139  |  105  |  9   ----------------------------<  122<H>  3.7   |  26  |  0.67    Ca    8.4      18 Dec 2023 05:30  Phos  2.7     12-18  Mg     1.6     12-18      CAPILLARY BLOOD GLUCOSE          Medications:  MEDICATIONS  (STANDING):  acetaminophen   Oral Liquid .. 975 milliGRAM(s) Oral every 6 hours  acetylcysteine 20%  Inhalation 4 milliLiter(s) Inhalation every 6 hours  albuterol/ipratropium for Nebulization 3 milliLiter(s) Nebulizer every 6 hours  aspirin  chewable 81 milliGRAM(s) Oral daily  cefTRIAXone   IVPB 2000 milliGRAM(s) IV Intermittent every 24 hours  chlorhexidine 0.12% Liquid 15 milliLiter(s) Oral Mucosa two times a day  chlorhexidine 2% Cloths 1 Application(s) Topical daily  clopidogrel Tablet 75 milliGRAM(s) Enteral Tube daily  dextrose 5% + sodium chloride 0.45% 1000 milliLiter(s) (100 mL/Hr) IV Continuous <Continuous>  dextrose 5%. 1000 milliLiter(s) (50 mL/Hr) IV Continuous <Continuous>  dextrose 5%. 1000 milliLiter(s) (100 mL/Hr) IV Continuous <Continuous>  dextrose 50% Injectable 12.5 Gram(s) IV Push once  dextrose 50% Injectable 25 Gram(s) IV Push once  dextrose 50% Injectable 25 Gram(s) IV Push once  enoxaparin Injectable 40 milliGRAM(s) SubCutaneous every 24 hours  glucagon  Injectable 1 milliGRAM(s) IntraMuscular once  influenza  Vaccine (HIGH DOSE) 0.7 milliLiter(s) IntraMuscular once  pantoprazole  Injectable 40 milliGRAM(s) IV Push daily  sodium chloride 3%  Inhalation 4 milliLiter(s) Inhalation every 6 hours    MEDICATIONS  (PRN):  dextrose Oral Gel 15 Gram(s) Oral once PRN Blood Glucose LESS THAN 70 milliGRAM(s)/deciliter  ondansetron Injectable 4 milliGRAM(s) IV Push every 6 hours PRN Nausea and/or Vomiting  oxyCODONE    Solution 5 milliGRAM(s) Oral every 4 hours PRN Moderate Pain (4 - 6)  oxyCODONE    Solution 10 milliGRAM(s) Oral every 4 hours PRN Severe Pain (7 - 10)      Weight:  12/16 184 pounds (dosing)   No updated weights in charts, RD to continue to monitor weight trends.     Estimated energy needs:   Calories:  (22-28 kcal/kg) 9728-1868 kcal   Protein:  (1.1-1.4 g/kg)  g   Fluids:  1 mL/kcal or at team’s discretion    Current body weight (84kg) used to calculate energy needs due to pt's current body weight within % ideal body weight. Needs adjusted for age and current clinical status.     Subjective:   "69 y/o M w PMH of CAD (x2 stents Mar 2023), HLD, T2DM, hx of kidney stones, psoriasis presents to [facility] for evaluation of oral mass and 3 months of jaw pain a/f OMFS mandibular resection and flap reconstruction on Thursday"    Visited pt at bedside on 8LA. At time of visit, TF running at 10ml/hr not yet at goal. s/p g-tube 12/16. Currently ordered for dextrose 5% + .45% NaCl IV. Nutritionally Pertinent Labs 12/18 Gluc: 122     Previous Nutrition Diagnosis: Inadequate Energy Intake related to decreased ability to consume sufficient energy as evidenced by NPO status, meeting 0% of estimated needs    Active [   ]  Resolved [X]    If resolved, new PES: Increased Nutrient Needs (protein, calorie) RT increased physiological demands for nutrients AEB s/p mandibular resection    Goal: Pt to meet >75% of estimated nutrition needs daily per course of hospitalization.     Recommendations:  1. Recommend Vital 1.5 @GOAL 58ml/hr x 24 hours + LPS 1x /day. Provides total volume 1392 ml, 2088 kcal, 109g pro, 1063 ml of free water. Meets 24.8kcal/kg, 1.3g pro/kg based on ABW 84kg. Fluids deferred to medical team.   >>>>Add Banatrol 2x/day   2. Monitor TF tolerance, weight trends, labs, and skin integrity and bowel movement regularity.   3. RD remains available upon request and will follow-up per protocol.     Education: Education n/a at this time.     Risk Level: High [X] Moderate [   ] Low [   ] Admitting Diagnosis:   Patient is a 68y old  Male who presents with a chief complaint of Oral Ca (18 Dec 2023 10:48)      PAST MEDICAL & SURGICAL HISTORY:  Neoplasm of mandible          Current Nutrition Order: Diet, NPO with Tube Feed:   Diet, NPO with Tube Feed:   Tube Feeding Modality: Gastrostomy  Glucerna 1.5 Marcus (GLUCERNA1.5)  Total Volume for 24 Hours (mL): 1440  Total Number of Cans: 6  Continuous  Starting Tube Feed Rate {mL per Hour}: 10  Increase Tube Feed Rate by (mL): 10     Every 4 hours  Until Goal Tube Feed Rate (mL per Hour): 60  Tube Feed Duration (in Hours): 24  Tube Feed Start Time: 17:00  Bolus   Total Volume per Flush (mL): 150   Frequency: Every 6 Hours  Banatrol TF     Qty per Day:  2 (12-18-23 @ 11:55) [Active]    >>Current EN regimen provides 1440 ml of total volume, 2160 kcal, 119 g pro, 1093 ml of free water. Meets 25.7 kcal/kg, 1.4g pro/kg based on ABW 84kg     PO Intake: Good (%) [   ]  Fair (50-75%) [   ] Poor (<25%) [   ] - NPO w/ TF     GI Issues: No issues with vomiting, constipation reported at time of visit. States that he has been experiencing a little nausea, and loose stools. Pt is not currently on a bowel regimen. Zofran PRN.     Pain: no indication of pain at time of visit.     Skin Integrity: No Pressure Injuries per flow sheets. Wong Score; 18   Edema: 2+ generalized, face per flow sheets.     Labs:   12-18    139  |  105  |  9   ----------------------------<  122<H>  3.7   |  26  |  0.67    Ca    8.4      18 Dec 2023 05:30  Phos  2.7     12-18  Mg     1.6     12-18      CAPILLARY BLOOD GLUCOSE          Medications:  MEDICATIONS  (STANDING):  acetaminophen   Oral Liquid .. 975 milliGRAM(s) Oral every 6 hours  acetylcysteine 20%  Inhalation 4 milliLiter(s) Inhalation every 6 hours  albuterol/ipratropium for Nebulization 3 milliLiter(s) Nebulizer every 6 hours  aspirin  chewable 81 milliGRAM(s) Oral daily  cefTRIAXone   IVPB 2000 milliGRAM(s) IV Intermittent every 24 hours  chlorhexidine 0.12% Liquid 15 milliLiter(s) Oral Mucosa two times a day  chlorhexidine 2% Cloths 1 Application(s) Topical daily  clopidogrel Tablet 75 milliGRAM(s) Enteral Tube daily  dextrose 5% + sodium chloride 0.45% 1000 milliLiter(s) (100 mL/Hr) IV Continuous <Continuous>  dextrose 5%. 1000 milliLiter(s) (50 mL/Hr) IV Continuous <Continuous>  dextrose 5%. 1000 milliLiter(s) (100 mL/Hr) IV Continuous <Continuous>  dextrose 50% Injectable 12.5 Gram(s) IV Push once  dextrose 50% Injectable 25 Gram(s) IV Push once  dextrose 50% Injectable 25 Gram(s) IV Push once  enoxaparin Injectable 40 milliGRAM(s) SubCutaneous every 24 hours  glucagon  Injectable 1 milliGRAM(s) IntraMuscular once  influenza  Vaccine (HIGH DOSE) 0.7 milliLiter(s) IntraMuscular once  pantoprazole  Injectable 40 milliGRAM(s) IV Push daily  sodium chloride 3%  Inhalation 4 milliLiter(s) Inhalation every 6 hours    MEDICATIONS  (PRN):  dextrose Oral Gel 15 Gram(s) Oral once PRN Blood Glucose LESS THAN 70 milliGRAM(s)/deciliter  ondansetron Injectable 4 milliGRAM(s) IV Push every 6 hours PRN Nausea and/or Vomiting  oxyCODONE    Solution 5 milliGRAM(s) Oral every 4 hours PRN Moderate Pain (4 - 6)  oxyCODONE    Solution 10 milliGRAM(s) Oral every 4 hours PRN Severe Pain (7 - 10)      Weight:  12/16 184 pounds (dosing)   No updated weights in charts, RD to continue to monitor weight trends.     Estimated energy needs:   Calories:  (22-28 kcal/kg) 7446-4385 kcal   Protein:  (1.1-1.4 g/kg)  g   Fluids:  1 mL/kcal or at team’s discretion    Current body weight (84kg) used to calculate energy needs due to pt's current body weight within % ideal body weight. Needs adjusted for age and current clinical status.     Subjective:   "67 y/o M w PMH of CAD (x2 stents Mar 2023), HLD, T2DM, hx of kidney stones, psoriasis presents to [facility] for evaluation of oral mass and 3 months of jaw pain a/f OMFS mandibular resection and flap reconstruction on Thursday"    Visited pt at bedside on 8LA. At time of visit, TF running at 10ml/hr not yet at goal. s/p g-tube 12/16. Currently ordered for dextrose 5% + .45% NaCl IV. Nutritionally Pertinent Labs 12/18 Gluc: 122     Previous Nutrition Diagnosis: Inadequate Energy Intake related to decreased ability to consume sufficient energy as evidenced by NPO status, meeting 0% of estimated needs    Active [   ]  Resolved [X]    If resolved, new PES: Increased Nutrient Needs (protein, calorie) RT increased physiological demands for nutrients AEB s/p mandibular resection    Goal: Pt to meet >75% of estimated nutrition needs daily per course of hospitalization.     Recommendations:  1. Recommend Vital 1.5 @GOAL 58ml/hr x 24 hours + LPS 1x /day. Provides total volume 1392 ml, 2088 kcal, 109g pro, 1063 ml of free water. Meets 24.8kcal/kg, 1.3g pro/kg based on ABW 84kg. Fluids deferred to medical team.   >>>>Add Banatrol 2x/day   2. Monitor TF tolerance, weight trends, labs, and skin integrity and bowel movement regularity.   3. RD remains available upon request and will follow-up per protocol.     Education: Education n/a at this time.     Risk Level: High [X] Moderate [   ] Low [   ]

## 2023-12-18 NOTE — PROGRESS NOTE ADULT - TIME BILLING
As above
evaluation and management of dysphagia, malnutrition, umbilical hernia, review of labs and imaging, discussion with consultant, discussion with patient, documentation.
evaluation and management of dysphagia, malnutrition, umbilical hernia, review of labs and imaging, discussion with consultant, discussion with patient, documentation.
As above
evaluation and management of dysphagia, malnutrition, umbilical hernia, review of labs and imaging, discussion with consultant, discussion with patient, documentation.

## 2023-12-18 NOTE — PROGRESS NOTE ADULT - ASSESSMENT
69 y/o M w PMH of CAD (x2 stents Mar 2023), HLD, T2DM, hx of kidney stones, psoriasis presents to [facility] for evaluation of oral mass and 3 months of jaw pain a/f OMFS mandibular resection and flap reconstruction on Thursday. Medicine initially evaluated on 12/6 for pre-op assessment Now following for co-management.     #SIRS, suspected to be 2/2 to pneumonia - RESOLVED  Initially febrile with tachycardia and leukocytosis  sputum cx showed E coli and Enterobacter cloacea, sensitive to CTX  initially on CTX then de-escalated and completed 5 day course of antibiotics    #s/p mandibular resection and flap reconstruction  pt tolerated procedure well. KRISTEN drains removed by primary team  c/w q4h flap checks   s/p G tube placement on 12/16 - tolerating trickle feeds  c/w S&S evaluation  recommend early mobilization, OOBTC, PT evaluation. recommend addition of PRN Duonebs to aid with airway clearance    #LLE edema  pt with LLE non pitting edema  pending LLE doppler to r/o DVT    #CAD  pt with hx of CAD s/p 2 stents in March 2023  Cardiology consulted; reccs appreciated  on lipitor and ASA. Per cardiology, given that intervention was approx 9 months ago, ok for monotherapy at this time. Ongoing discussion with cardiology and primary team when safe to resume plavix    #anemia  Hgb stable at 8, previously was 13-14  no signs of active bleed  per iron studies, consistent with WADE. However given concern for active infection, will hold off on IV iron at this time  maintain active T&S  per cardiology recommendations, transfusion goal to Hgb >8    #DM2  A1c 6.3%, takes home metformin and jardiance. FS   - c/w current diet, FS checks and ISS. Goal -180 while inpatient    Medicine will continue to follow. Patient seen, evaluated, and discussed with attending Dr. Montero 67 y/o M w PMH of CAD (x2 stents Mar 2023), HLD, T2DM, hx of kidney stones, psoriasis presents to [facility] for evaluation of oral mass and 3 months of jaw pain a/f OMFS mandibular resection and flap reconstruction on Thursday. Medicine initially evaluated on 12/6 for pre-op assessment Now following for co-management.     #SIRS, suspected to be 2/2 to pneumonia - RESOLVED  Initially febrile with tachycardia and leukocytosis  sputum cx showed E coli and Enterobacter cloacea, sensitive to CTX  initially on CTX then de-escalated and completed 5 day course of antibiotics    #s/p mandibular resection and flap reconstruction  pt tolerated procedure well. KRISTEN drains removed by primary team  c/w q4h flap checks   s/p G tube placement on 12/16 - tolerating trickle feeds  c/w S&S evaluation  recommend early mobilization, OOBTC, PT evaluation. recommend addition of PRN Duonebs to aid with airway clearance    #LLE edema  pt with LLE non pitting edema  pending LLE doppler to r/o DVT    #CAD  pt with hx of CAD s/p 2 stents in March 2023  Cardiology consulted; reccs appreciated  on lipitor and ASA. Per cardiology, given that intervention was approx 9 months ago, ok for monotherapy at this time. Ongoing discussion with cardiology and primary team when safe to resume plavix    #anemia  Hgb stable at 8, previously was 13-14  no signs of active bleed  per iron studies, consistent with WADE. However given concern for active infection, will hold off on IV iron at this time  maintain active T&S  per cardiology recommendations, transfusion goal to Hgb >8    #DM2  A1c 6.3%, takes home metformin and jardiance. FS   - c/w current diet, FS checks and ISS. Goal -180 while inpatient    Medicine will continue to follow. Patient seen, evaluated, and discussed with attending Dr. Montero

## 2023-12-18 NOTE — PROGRESS NOTE ADULT - SUBJECTIVE AND OBJECTIVE BOX
INTERVAL EVENTS:  -TFs started  -Plavix ordered    PAST MEDICAL & SURGICAL HISTORY:  Neoplasm of mandible        MEDICATIONS  (STANDING):  acetaminophen   Oral Liquid .. 975 milliGRAM(s) Oral every 6 hours  acetylcysteine 20%  Inhalation 4 milliLiter(s) Inhalation every 6 hours  albuterol/ipratropium for Nebulization 3 milliLiter(s) Nebulizer every 6 hours  aspirin  chewable 81 milliGRAM(s) Oral daily  cefTRIAXone   IVPB 2000 milliGRAM(s) IV Intermittent every 24 hours  chlorhexidine 0.12% Liquid 15 milliLiter(s) Oral Mucosa two times a day  chlorhexidine 2% Cloths 1 Application(s) Topical daily  clopidogrel Tablet 75 milliGRAM(s) Enteral Tube daily  dextrose 5% + sodium chloride 0.45% 1000 milliLiter(s) (100 mL/Hr) IV Continuous <Continuous>  dextrose 5%. 1000 milliLiter(s) (50 mL/Hr) IV Continuous <Continuous>  dextrose 5%. 1000 milliLiter(s) (100 mL/Hr) IV Continuous <Continuous>  dextrose 50% Injectable 25 Gram(s) IV Push once  dextrose 50% Injectable 25 Gram(s) IV Push once  dextrose 50% Injectable 12.5 Gram(s) IV Push once  enoxaparin Injectable 40 milliGRAM(s) SubCutaneous every 24 hours  glucagon  Injectable 1 milliGRAM(s) IntraMuscular once  influenza  Vaccine (HIGH DOSE) 0.7 milliLiter(s) IntraMuscular once  lidocaine   4% Patch 1 Patch Transdermal once  pantoprazole  Injectable 40 milliGRAM(s) IV Push daily  sodium chloride 3%  Inhalation 4 milliLiter(s) Inhalation every 6 hours    MEDICATIONS  (PRN):  dextrose Oral Gel 15 Gram(s) Oral once PRN Blood Glucose LESS THAN 70 milliGRAM(s)/deciliter  ondansetron Injectable 4 milliGRAM(s) IV Push every 6 hours PRN Nausea and/or Vomiting  oxyCODONE    Solution 5 milliGRAM(s) Oral every 4 hours PRN Moderate Pain (4 - 6)  oxyCODONE    Solution 10 milliGRAM(s) Oral every 4 hours PRN Severe Pain (7 - 10)    T(F): 97.7 (12-18-23 @ 14:00), Max: 98.8 (12-17-23 @ 22:00)  HR: 51 (12-18-23 @ 11:48) (51 - 64)  BP: 108/53 (12-18-23 @ 11:48) (108/53 - 126/61)  BP(mean): 76 (12-18-23 @ 11:48) (76 - 88)  ABP: --  ABP(mean): --  RR: 18 (12-18-23 @ 11:48) (18 - 19)  SpO2: 98% (12-18-23 @ 08:44) (96% - 98%)    I/O Detail 24H    17 Dec 2023 07:01  -  18 Dec 2023 07:00  --------------------------------------------------------  IN:    dextrose 5% + sodium chloride 0.45% w/ Additives: 2400 mL    Glucerna 1.5: 275 mL    IV PiggyBack: 250 mL  Total IN: 2925 mL    OUT:    Gastrostomy Tube (mL): 160 mL    Intermittent Catheterization - Urethral (mL): 650 mL    VAC (Vacuum Assisted Closure) System (mL): 20 mL    Voided (mL): 1225 mL  Total OUT: 2055 mL    Total NET: 870 mL      18 Dec 2023 07:01  -  18 Dec 2023 15:20  --------------------------------------------------------  IN:    dextrose 5% + sodium chloride 0.45% w/ Additives: 800 mL    Glucerna 1.5: 80 mL  Total IN: 880 mL    OUT:    Voided (mL): 250 mL  Total OUT: 250 mL    Total NET: 630 mL          PHYSICAL EXAM:  Constitutional: NAD, comfortable in chair. no respiratory distress, on RA  HEENT: PERRLA, MMM, mandibular flap c/d/i  +capped trach  Neck: Supple  Respiratory: CTA B/L. No w/r/r.   Cardiovascular: RRR, normal S1 and S2, no m/r/g.   Gastrointestinal: +BS, soft NTND, +PEG  Extremities: wwp      LABS:  CBC 12-18-23 @ 05:30                        8.2    7.66  )-----------( 331                   26.3       Hgb trend: 8.2 <-- , 8.4 <-- , 8.3 <--   WBC trend: 7.66 <-- , 8.00 <-- , 6.23 <--       CMP 12-18-23 @ 05:30    139  |  105  |  9   ----------------------------<  122<H>  3.7   |  26  |  0.67    Ca    8.4      12-18-23 @ 05:30  Phos  2.7     12-18  Mg     1.6     12-18        Serum Cr trend: 0.67 <-- , 0.73 <-- , 0.65 <--         Cardiac Markers           STUDIES:

## 2023-12-18 NOTE — PROGRESS NOTE ADULT - SUBJECTIVE AND OBJECTIVE BOX
OVERNIGHT EVENTS: GERI    SUBJECTIVE:  Patient seen and examined at bedside. Reports feeling well, states he continues to have thick sputum production but it is improving. Less bloody sputum. No chest pain,palpitations, abdominal pain, leg pain. Has been walking down the halls.     Vital Signs Last 12 Hrs  T(F): 97.5 (12-18-23 @ 09:00), Max: 98.4 (12-18-23 @ 04:54)  HR: 64 (12-18-23 @ 08:44) (59 - 64)  BP: 121/55 (12-18-23 @ 08:44) (111/56 - 124/61)  BP(mean): 88 (12-18-23 @ 04:15) (81 - 88)  RR: 19 (12-18-23 @ 08:44) (18 - 19)  SpO2: 98% (12-18-23 @ 08:44) (96% - 98%)  I&O's Summary    17 Dec 2023 07:01  -  18 Dec 2023 07:00  --------------------------------------------------------  IN: 2925 mL / OUT: 2055 mL / NET: 870 mL    18 Dec 2023 07:01  -  18 Dec 2023 11:41  --------------------------------------------------------  IN: 420 mL / OUT: 250 mL / NET: 170 mL        PHYSICAL EXAM:  Constitutional: NAD, comfortable in chair. no respiratory distress, on RA  HEENT: PERRLA, MMM, mandibular flap c/d/i  +capped trach  Neck: Supple  Respiratory: CTA B/L. No w/r/r.   Cardiovascular: RRR, normal S1 and S2, no m/r/g.   Gastrointestinal: +BS, soft NTND, +PEG  Extremities: wwp        LABS:                        8.2    7.66  )-----------( 331      ( 18 Dec 2023 05:30 )             26.3     12-18    139  |  105  |  9   ----------------------------<  122<H>  3.7   |  26  |  0.67    Ca    8.4      18 Dec 2023 05:30  Phos  2.7     12-18  Mg     1.6     12-18        Urinalysis Basic - ( 18 Dec 2023 05:30 )    Color: x / Appearance: x / SG: x / pH: x  Gluc: 122 mg/dL / Ketone: x  / Bili: x / Urobili: x   Blood: x / Protein: x / Nitrite: x   Leuk Esterase: x / RBC: x / WBC x   Sq Epi: x / Non Sq Epi: x / Bacteria: x          RADIOLOGY & ADDITIONAL TESTS:    MEDICATIONS  (STANDING):  acetaminophen   Oral Liquid .. 975 milliGRAM(s) Oral every 6 hours  acetylcysteine 20%  Inhalation 4 milliLiter(s) Inhalation every 6 hours  albuterol/ipratropium for Nebulization 3 milliLiter(s) Nebulizer every 6 hours  aspirin  chewable 81 milliGRAM(s) Oral daily  cefTRIAXone   IVPB 2000 milliGRAM(s) IV Intermittent every 24 hours  chlorhexidine 0.12% Liquid 15 milliLiter(s) Oral Mucosa two times a day  chlorhexidine 2% Cloths 1 Application(s) Topical daily  clopidogrel Tablet 75 milliGRAM(s) Oral daily  dextrose 5% + sodium chloride 0.45% 1000 milliLiter(s) (100 mL/Hr) IV Continuous <Continuous>  dextrose 5%. 1000 milliLiter(s) (50 mL/Hr) IV Continuous <Continuous>  dextrose 5%. 1000 milliLiter(s) (100 mL/Hr) IV Continuous <Continuous>  dextrose 50% Injectable 25 Gram(s) IV Push once  dextrose 50% Injectable 25 Gram(s) IV Push once  dextrose 50% Injectable 12.5 Gram(s) IV Push once  enoxaparin Injectable 40 milliGRAM(s) SubCutaneous every 24 hours  glucagon  Injectable 1 milliGRAM(s) IntraMuscular once  influenza  Vaccine (HIGH DOSE) 0.7 milliLiter(s) IntraMuscular once  pantoprazole  Injectable 40 milliGRAM(s) IV Push daily  sodium chloride 3%  Inhalation 4 milliLiter(s) Inhalation every 6 hours    MEDICATIONS  (PRN):  dextrose Oral Gel 15 Gram(s) Oral once PRN Blood Glucose LESS THAN 70 milliGRAM(s)/deciliter  ondansetron Injectable 4 milliGRAM(s) IV Push every 6 hours PRN Nausea and/or Vomiting  oxyCODONE    Solution 5 milliGRAM(s) Oral every 4 hours PRN Moderate Pain (4 - 6)  oxyCODONE    Solution 10 milliGRAM(s) Oral every 4 hours PRN Severe Pain (7 - 10)

## 2023-12-18 NOTE — PROGRESS NOTE ADULT - ATTENDING COMMENTS
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 Dr. Dean, in good spirits, Trach capped. Tolerating trickle feeds     - dysphagia: s/p laparoscopic PEG placement ( 12/16), tolerating trickle feeds @ 10ml/hr   - Aspiration PNA: Sputum Cx" e.coli and enterobacter cloacae, completed total 5 days course of Ceftriaxone  (12/17)   - oral care as per ENT.   - would keep duo-nebs q 6 ( to help loosen up secretion)  - continue with trach suction   - active T&S, monitor H/H  - CAD/PCI x2 ( 3/2023)  c/w ASA and Lipitor, to d/w Cardiology re: resuming plavix for DAPT total 1 year   - DM - FS with ISS, caution for Hypoglycemia.  - LLE edema- Venous doppler negative 68M w/ [id number] SCC of L buccal mucosa presents for pre optimization for surgery 12/7, now s/p hemimandibulectomy, left level 1, 2a, 3 neck neck dissection, L FFF, tracheostomy w/ 7.5 cuffed portex, dental implants, STSG 12/7- plan for G tube placement postponed due to fever 12/12- blood cx drawn, respiratory culture likely contamination -reported thick sputum from trach after nebs    CC: Pt seen w. Dr. Dean, in good spirits, Trach capped. Tolerating trickle feeds     - dysphagia: s/p laparoscopic PEG placement ( 12/16), tolerating trickle feeds @ 10ml/hr   - Aspiration PNA: Sputum Cx" e.coli and enterobacter cloacae, completed total 5 days course of Ceftriaxone  (12/17)   - oral care as per ENT.   - would keep duo-nebs q 6 ( to help loosen up secretion)  - continue with trach suction   - active T&S, monitor H/H  - CAD/PCI x2 ( 3/2023)  c/w ASA and Lipitor, to d/w Cardiology re: resuming plavix for DAPT total 1 year   - DM - FS with ISS, caution for Hypoglycemia.  - LLE edema- Venous doppler negative

## 2023-12-18 NOTE — PROGRESS NOTE ADULT - ASSESSMENT
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 Cardiology originally consulted for perioperative risk stratification.    Review of Studies    Telemetry 12/12/23: Sinus rhythm, HR 70s bpm with PVCs, infrequent episodes of bigeminy improved  Telemetry 12/11/23: Sinus bradycardia with PVCs, HR 46-64bpm (Know history of 2% PVC burden on outpatient monitor)     ECG 12/10-12/11/23: Sinus Bradycardia, Frequent PVCS  ECG 12/3/23: Sinus bradycardia     TTE 12/4/23: LVIDD 4.09cm LVEF 65%. Mild LVH. Normal RV function, mildly dilated. No valvular disease. No pericardial effusion.    #Perioperative Risk Stratification/Postoperative Evaluation.  #CAD s/p PCI CUBA LAD, Ramus -  March 2023   No new cardiopulmonary complaints. Pending PEG tube placement (12/12/23)  - Goal transfusion Hg <8 in setting of CAD.  - continue Aspirin 81mg and plavix 75mg QD.  - continue Lipitor 40mg QD    #Sinus bradycardia  Baseline HR in the 50s, avid marathon runner with excellent functional capacity and high resting vagal tone now s/p extensive surgery adjacent to carotid bulbs, possible exacerbating vagal tone. Asymptomatic.   - No acute intervention, now resolving    Outpatient follow-up with his cardiologist in [place]    Case discussed with cardiology consult attending

## 2023-12-18 NOTE — PROGRESS NOTE ADULT - SUBJECTIVE AND OBJECTIVE BOX
OTOLARYNGOLOGY (ENT) PROGRESS NOTE    PATIENT: SAUNDRA HOLMAN  MRN: [id number]  : 57  [id number]  DATE OF SERVICE:  23  			         ID:SAUNDRA HOLMAN is a  67yo M w PMH of CAD (x2 stents Mar 2023), HLD, T2DM, hx of kidney stones, psoriasis presents to [facility] for evaluation of oral mass. Reports Three month hx of jaw pain and loosening of mandibular molar tooth on the lower left. Pt has a 30 pack yr smoking hx, quit 1 yr ago. 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 POD 1 composite mandibular resection, neck dissection and fibula free flap reconstruction.       Subjective/ Interval:   ; patient seen this morning, oozing from trach as expected ,  Plan to start aspirin today, cuff is up on tracheostomy tube, intraoral skin panel intact doppler signal strong,  plan to advance NGT   : Patient seen and examined at bedside. AFVSS overnight. Significant HgB drop to 7.2 noted this morning. NGT clogged, attempted replacement but went into lung, need to replace. Cuff deflated. Patient reports lethargy. Otherwise no new complaints. Intraoral skin paddle intact with strong doppler signal. Plan to hold off one eliquis given significant HgB drop  12/10: Patient seen and examined at bedside. AFVSS overnight other than bradycardic to lowest 39, mainly in 40s-50s which seems to be his baseline even pre-op. Not symptomatic while chinedu. Otherwise, patient stable on TC with no issues. Yesterday, iatrogenic right sided PTX occured with NGT placement, patient remained asymptomatic throughout. Patient now s/p placement of pig-tail catheter by pulm with significant improvement in PTX. Chest tube clamped this morning with plan for removal this evening. Patient s/p 2 units of PRBCs with moderate improvement in Hgb. Today, patient reports feeling significantly better than yesterday and overall "feels good." He was started on trickle feeds last night but felt some chest tightness and so they were held. Patient also failed TOV again and was straight cathed. KRISTEN drain output significantly decreased this morning. No other changes at this time.   : patient seen this morning PTX has resolved on CXR, intraoral flap intact, doppler strong, trach in place,  KRISTEN drains with minimal output   : Patient seen and examined this morning at bedside. Overnight, NGT slightly displaced, coming out around 10 cm from original placement, advanced and secured, pending CXR. Patient failed trial of clears with SLP yesterday but tolerated PMV without issue. Patient continues to be OOBTC daily and is tolerating tube feeds at goal. 2 KRISTEN drains removed yesterday, remaining KRISTEN with minimal output. Intraoral flap intact, doppler strong, trach in place. Patient denies any other new complaints at this time.   : patient febrile overnight to 103, continues to have intermittent PVC, HR last night in 40s, patients baseline 50. Gen surg will like to wait 24 hours afebrile until g tube,  Oral flap appears to be congested, dixon continue to monitor. KRISTEN drain will be removed today. Pending results of fever work up . HEB 7.5, will transfuse when type and screen returns   : Patient seen this morning, afebrile overnight, trach secretion odor improving,  noted ot have an episode of oral bleeding after ambulation, bleeding stopped, unable to see source, will continue to monitor , doppler strong ( and removed today) ,  Flap continues to look dusky   12/15: patient sen this morning, continues to have intermittent oozing from left mandibular screw, Surgicel placed no active bleed, flap remains dusky but stable, afebrile overnight  : NAEON. AVSS. Patient in OR for PEG this morning. Will re-evaluate s/p PEG  : Re-evaluated s/p PEG. complaining fo abdominal discomfort. Flap is stable appearing. Donor site with wound vac. LLE edema improving. No further bleeding from the mouth.  : NAEON. AVSS. Examined at bedside. Trach changed to 6 uncuffed and confirmed on scope, secured with trach ties. Straight cath overnight for retention. Has not voided since. Will f/u bladder scan and straight cath if needed   NAEON. AFVSS. Examined at bedside. Tolerating capping overnight. Passed TOV last night. TF started yesterday evening, nausea overnight so TF held again. He continues to have moderate oral secretions requiring oral suctioning.     ALLERGIES:  No Known Allergies      MEDICATIONS:  Antiinfectives:   cefTRIAXone   IVPB 2000 milliGRAM(s) IV Intermittent every 24 hours    IV fluids:  dextrose 5% + sodium chloride 0.45% 1000 milliLiter(s) IV Continuous <Continuous>  dextrose 5%. 1000 milliLiter(s) IV Continuous <Continuous>  dextrose 5%. 1000 milliLiter(s) IV Continuous <Continuous>    Hematologic/Anticoagulation:  aspirin  chewable 81 milliGRAM(s) Oral daily  enoxaparin Injectable 40 milliGRAM(s) SubCutaneous every 24 hours    Pain medications/Neuro:  acetaminophen   Oral Liquid .. 975 milliGRAM(s) Oral every 6 hours  ondansetron Injectable 4 milliGRAM(s) IV Push every 6 hours PRN  oxyCODONE    Solution 5 milliGRAM(s) Oral every 4 hours PRN  oxyCODONE    Solution 10 milliGRAM(s) Oral every 4 hours PRN    Endocrine Medications:   dextrose 50% Injectable 25 Gram(s) IV Push once  dextrose 50% Injectable 12.5 Gram(s) IV Push once  dextrose 50% Injectable 25 Gram(s) IV Push once  dextrose Oral Gel 15 Gram(s) Oral once PRN  glucagon  Injectable 1 milliGRAM(s) IntraMuscular once    All other standing medications:   acetylcysteine 20%  Inhalation 4 milliLiter(s) Inhalation every 6 hours  albuterol/ipratropium for Nebulization 3 milliLiter(s) Nebulizer every 6 hours  chlorhexidine 0.12% Liquid 15 milliLiter(s) Oral Mucosa two times a day  chlorhexidine 2% Cloths 1 Application(s) Topical daily  influenza  Vaccine (HIGH DOSE) 0.7 milliLiter(s) IntraMuscular once  pantoprazole  Injectable 40 milliGRAM(s) IV Push daily  sodium chloride 3%  Inhalation 4 milliLiter(s) Inhalation every 6 hours    All other PRN medications:    Vital Signs Last 24 Hrs  T(C): 36.9 (18 Dec 2023 04:54), Max: 37.1 (17 Dec 2023 22:00)  T(F): 98.4 (18 Dec 2023 04:54), Max: 98.8 (17 Dec 2023 22:00)  HR: 59 (18 Dec 2023 04:15) (44 - 59)  BP: 124/61 (18 Dec 2023 04:15) (111/56 - 131/60)  BP(mean): 88 (18 Dec 2023 04:15) (79 - 88)  RR: 18 (18 Dec 2023 04:15) (18 - 18)  SpO2: 96% (18 Dec 2023 04:15) (96% - 100%)    Parameters below as of 18 Dec 2023 04:15  Patient On (Oxygen Delivery Method): room air           @ 07:  -   @ 07:00  --------------------------------------------------------  IN:    dextrose 5% + sodium chloride 0.45% w/ Additives: 600 mL    IV PiggyBack: 100 mL    Other (mL): 1000 mL  Total IN: 1700 mL    OUT:    Blood Loss (mL): 5 mL    Gastrostomy Tube (mL): 25 mL    Intermittent Catheterization - Urethral (mL): 800 mL    Post-Void Residual per Intermittent Catheterization (mL): 425 mL    VAC (Vacuum Assisted Closure) System (mL): 15 mL    Voided (mL): 450 mL  Total OUT: 1720 mL    Total NET: -20 mL       @ 07:  -   @ 06:55  --------------------------------------------------------  IN:    dextrose 5% + sodium chloride 0.45% w/ Additives: 2200 mL    Glucerna 1.5: 275 mL    IV PiggyBack: 250 mL  Total IN: 2725 mL    OUT:    Gastrostomy Tube (mL): 160 mL    Intermittent Catheterization - Urethral (mL): 650 mL    VAC (Vacuum Assisted Closure) System (mL): 10 mL    Voided (mL): 1225 mL  Total OUT: 2045 mL    Total NET: 680 mL          23 @ 07:01  -  23 @ 07:00  --------------------------------------------------------  IN:  Total IN: 0 mL    OUT:    VAC (Vacuum Assisted Closure) System (mL): 15 mL  Total OUT: 15 mL    Total NET: -15 mL      23 @ 07:01  -  23 @ 06:55  --------------------------------------------------------  IN:  Total IN: 0 mL    OUT:    VAC (Vacuum Assisted Closure) System (mL): 10 mL  Total OUT: 10 mL    Total NET: -10 mL            PHYSICAL EXAM:  Gen: AAOx3, NAD   Head: Surgical incision around chin extending up through lip  Eyes: EOMI, PERRL, visual acuity intact, no diplopia, supra/infra orbital rims intact, no subconjunctival heme,   Ears: Gross hearing intact,  Nose: No septal hematoma/asymmetry, no epistaxis bilaterally. no abrasions present, no lacerations.   Throat: No LAD, supple, neck surgical incisionc/d/i , trach in place w/ 6 uncuffed portex - capped, moderate oral secretions  Oral: Left FFF paddle dusk colored,  will continue to monitor,. IMF screws in place intermittent ooze around left mandibular screw,   Exx: Wound vac left leg, thigh donor site dressingchanged             LABS                       8.4    8.00  )-----------( 298      ( 17 Dec 2023 06:51 )             26.7        139  |  105  |  9   ----------------------------<  122<H>  3.7   |  26  |  0.67    Ca    8.4      18 Dec 2023 05:30  Phos  2.7       Mg     1.6                Coagulation Studies-     Urinalysis Basic - ( 18 Dec 2023 05:30 )    Color: x / Appearance: x / SG: x / pH: x  Gluc: 122 mg/dL / Ketone: x  / Bili: x / Urobili: x   Blood: x / Protein: x / Nitrite: x   Leuk Esterase: x / RBC: x / WBC x   Sq Epi: x / Non Sq Epi: x / Bacteria: x      Endocrine Panel-  Calcium: 8.4 mg/dL ( @ 05:30)                MICROBIOLOGY:  Culture - Sputum (collected 23 @ 07:27)  Source: Trach Asp Tracheal Aspirate  Gram Stain (23 @ 21:31):    Rare epithelial cells    Moderate WBC's    Rare Gram Positive Rods    Few Gram Negative Rods    Moderate Gram positive cocci in pairs, chains and clusters  Final Report (12-15-23 @ 09:27):    Moderate Escherichia coli    Moderate Enterobacter cloacae complex    Accompanied by normal respiratory brenden  Organism: Escherichia coli  Enterobacter cloacae complex (12-15-23 @ 09:27)  Organism: Enterobacter cloacae complex (12-15-23 @ 09:27)      Method Type: OTTO      -  Ampicillin: R <=8 These ampicillin results predict results for amoxicillin      -  Ampicillin/Sulbactam: R >16/8      -  Cefazolin: R >16      -  Cefepime: S <=2      -  Ceftriaxone: S <=1 Enterobacter cloacae, Klebsiella aerogenes, and Citrobacter freundii may develop resistance during prolonged therapy.      -  Ciprofloxacin: S <=0.25      -  Ertapenem: S <=0.5      -  Gentamicin: S <=2      -  Piperacillin/Tazobactam: S <=8      -  Tobramycin: S <=2      -  Trimethoprim/Sulfamethoxazole: S   Organism: Escherichia coli (12-15-23 @ 09:27)      Method Type: OTTO      -  Ampicillin: S <=8 These ampicillin results predict results for amoxicillin      -  Ampicillin/Sulbactam: S <=4/2      -  Cefazolin: S <=2      -  Ceftriaxone: S <=1      -  Ciprofloxacin: S <=0.25      -  Ertapenem: S <=0.5      -  Gentamicin: S <=2      -  Piperacillin/Tazobactam: S <=8      -  Tobramycin: I 4      -  Trimethoprim/Sulfamethoxazole: S     Culture - Sputum (collected 23 @ 20:48)  Source: .Sputum  Gram Stain (23 @ 22:45):    Moderate epithelial cells    Moderate WBC's    Rare Gram Positive Rods    Few Gram Negative Rods    Few Gram Positive Cocci in Pairs and Chains  Final Report (23 @ 22:45):    Sputum specimen rejected.  Microscopic examination indicates    oropharyngeal contamination.  Please repeat.      Culture Results:   Moderate Escherichia coli  Moderate Enterobacter cloacae complex  Accompanied by normal respiratory brenden (23 @ 07:27)  Culture Results:   Sputum specimen rejected.  Microscopic examination indicates  oropharyngeal contamination.  Please repeat. (23 @ 20:48)  Culture Results:   No growth at 5 days. (23 @ 20:10)  Culture Results:   No growth at 5 days. (23 @ 20:05)      Culture - Sputum (collected 23 @ 07:27)  Source: Trach Asp Tracheal Aspirate  Gram Stain (23 @ 21:31):    Rare epithelial cells    Moderate WBC's    Rare Gram Positive Rods    Few Gram Negative Rods    Moderate Gram positive cocci in pairs, chains and clusters  Final Report (12-15-23 @ 09:27):    Moderate Escherichia coli    Moderate Enterobacter cloacae complex    Accompanied by normal respiratory brenden  Organism: Escherichia coli  Enterobacter cloacae complex (12-15-23 @ 09:27)  Organism: Enterobacter cloacae complex (12-15-23 @ 09:27)      Method Type: OTTO      -  Ampicillin: R <=8 These ampicillin results predict results for amoxicillin      -  Ampicillin/Sulbactam: R >16/8      -  Cefazolin: R >16      -  Cefepime: S <=2      -  Ceftriaxone: S <=1 Enterobacter cloacae, Klebsiella aerogenes, and Citrobacter freundii may develop resistance during prolonged therapy.      -  Ciprofloxacin: S <=0.25      -  Ertapenem: S <=0.5      -  Gentamicin: S <=2      -  Piperacillin/Tazobactam: S <=8      -  Tobramycin: S <=2      -  Trimethoprim/Sulfamethoxazole: S   Organism: Escherichia coli (12-15-23 @ 09:27)      Method Type: OTTO      -  Ampicillin: S <=8 These ampicillin results predict results for amoxicillin      -  Ampicillin/Sulbactam: S <=4/2      -  Cefazolin: S <=2      -  Ceftriaxone: S <=1      -  Ciprofloxacin: S <=0.25      -  Ertapenem: S <=0.5      -  Gentamicin: S <=2      -  Piperacillin/Tazobactam: S <=8      -  Tobramycin: I 4      -  Trimethoprim/Sulfamethoxazole: S     Culture - Sputum (collected 23 @ 20:48)  Source: .Sputum  Gram Stain (23 @ 22:45):    Moderate epithelial cells    Moderate WBC's    Rare Gram Positive Rods    Few Gram Negative Rods    Few Gram Positive Cocci in Pairs and Chains  Final Report (23 @ 22:45):    Sputum specimen rejected.  Microscopic examination indicates    oropharyngeal contamination.  Please repeat.    Culture - Blood (collected 23 @ 20:10)  Source: .Blood Blood  Final Report (23 @ 22:00):    No growth at 5 days.    Culture - Blood (collected 23 @ 20:05)  Source: .Blood Blood  Final Report (23 @ 22:00):    No growth at 5 days.        RADIOLOGY & ADDITIONAL STUDIES:    Assessment and Plan:  SAUNDRA HOLMAN is a  68M w/ [id number] SCC of L buccal mucosa presents for pre optimization for surgery , now s/p hemimandibulectomy, left level 1, 2a, 3 neck neck dissection, L FFF, tracheostomy w/ .5 cuffed portex, dental implants, STSG , now s/p PEG       PLAN  resume tube feeds today  Monitor for any signs of oral bleeding   OOBTC  Plan to ambulate 3-4x a day   Continue nebulized solution for thick trach secretions   Continue Ondansetron PRN nausea/vomiting , PO Senna once daily, Miralax 17g once daily,  Chlorhexidine swish and spit, Esomeprazole, Enoxaparin, Gabapentin, Acetaminophen   Continue SCD’s    Cardiology - plavix after g tube, transfuse if hgb <8 - to resume  per gen surg - today  Continue to work with SLP for trial of clears  Tolerating capping, possible decannulation today  May require suctioning at home, will discuss supplies with [facility] at [phone] with any questions/concerns.    Candida Jimenez  23 @ 06:55 OTOLARYNGOLOGY (ENT) PROGRESS NOTE    PATIENT: SAUNDRA HOLMAN  MRN: [id number]  : 57  [id number]  DATE OF SERVICE:  23  			         ID:SAUNDRA HOLMAN is a  69yo M w PMH of CAD (x2 stents Mar 2023), HLD, T2DM, hx of kidney stones, psoriasis presents to [facility] for evaluation of oral mass. Reports Three month hx of jaw pain and loosening of mandibular molar tooth on the lower left. Pt has a 30 pack yr smoking hx, quit 1 yr ago. 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 POD 1 composite mandibular resection, neck dissection and fibula free flap reconstruction.       Subjective/ Interval:   ; patient seen this morning, oozing from trach as expected ,  Plan to start aspirin today, cuff is up on tracheostomy tube, intraoral skin panel intact doppler signal strong,  plan to advance NGT   : Patient seen and examined at bedside. AFVSS overnight. Significant HgB drop to 7.2 noted this morning. NGT clogged, attempted replacement but went into lung, need to replace. Cuff deflated. Patient reports lethargy. Otherwise no new complaints. Intraoral skin paddle intact with strong doppler signal. Plan to hold off one eliquis given significant HgB drop  12/10: Patient seen and examined at bedside. AFVSS overnight other than bradycardic to lowest 39, mainly in 40s-50s which seems to be his baseline even pre-op. Not symptomatic while chinedu. Otherwise, patient stable on TC with no issues. Yesterday, iatrogenic right sided PTX occured with NGT placement, patient remained asymptomatic throughout. Patient now s/p placement of pig-tail catheter by pulm with significant improvement in PTX. Chest tube clamped this morning with plan for removal this evening. Patient s/p 2 units of PRBCs with moderate improvement in Hgb. Today, patient reports feeling significantly better than yesterday and overall "feels good." He was started on trickle feeds last night but felt some chest tightness and so they were held. Patient also failed TOV again and was straight cathed. KRISTEN drain output significantly decreased this morning. No other changes at this time.   : patient seen this morning PTX has resolved on CXR, intraoral flap intact, doppler strong, trach in place,  KRISTEN drains with minimal output   : Patient seen and examined this morning at bedside. Overnight, NGT slightly displaced, coming out around 10 cm from original placement, advanced and secured, pending CXR. Patient failed trial of clears with SLP yesterday but tolerated PMV without issue. Patient continues to be OOBTC daily and is tolerating tube feeds at goal. 2 KRISTEN drains removed yesterday, remaining KRISTEN with minimal output. Intraoral flap intact, doppler strong, trach in place. Patient denies any other new complaints at this time.   : patient febrile overnight to 103, continues to have intermittent PVC, HR last night in 40s, patients baseline 50. Gen surg will like to wait 24 hours afebrile until g tube,  Oral flap appears to be congested, dixon continue to monitor. KRISTEN drain will be removed today. Pending results of fever work up . HEB 7.5, will transfuse when type and screen returns   : Patient seen this morning, afebrile overnight, trach secretion odor improving,  noted ot have an episode of oral bleeding after ambulation, bleeding stopped, unable to see source, will continue to monitor , doppler strong ( and removed today) ,  Flap continues to look dusky   12/15: patient sen this morning, continues to have intermittent oozing from left mandibular screw, Surgicel placed no active bleed, flap remains dusky but stable, afebrile overnight  : NAEON. AVSS. Patient in OR for PEG this morning. Will re-evaluate s/p PEG  : Re-evaluated s/p PEG. complaining fo abdominal discomfort. Flap is stable appearing. Donor site with wound vac. LLE edema improving. No further bleeding from the mouth.  : NAEON. AVSS. Examined at bedside. Trach changed to 6 uncuffed and confirmed on scope, secured with trach ties. Straight cath overnight for retention. Has not voided since. Will f/u bladder scan and straight cath if needed   NAEON. AFVSS. Examined at bedside. Tolerating capping overnight. Passed TOV last night. TF started yesterday evening, nausea overnight so TF held again. He continues to have moderate oral secretions requiring oral suctioning.     ALLERGIES:  No Known Allergies      MEDICATIONS:  Antiinfectives:   cefTRIAXone   IVPB 2000 milliGRAM(s) IV Intermittent every 24 hours    IV fluids:  dextrose 5% + sodium chloride 0.45% 1000 milliLiter(s) IV Continuous <Continuous>  dextrose 5%. 1000 milliLiter(s) IV Continuous <Continuous>  dextrose 5%. 1000 milliLiter(s) IV Continuous <Continuous>    Hematologic/Anticoagulation:  aspirin  chewable 81 milliGRAM(s) Oral daily  enoxaparin Injectable 40 milliGRAM(s) SubCutaneous every 24 hours    Pain medications/Neuro:  acetaminophen   Oral Liquid .. 975 milliGRAM(s) Oral every 6 hours  ondansetron Injectable 4 milliGRAM(s) IV Push every 6 hours PRN  oxyCODONE    Solution 5 milliGRAM(s) Oral every 4 hours PRN  oxyCODONE    Solution 10 milliGRAM(s) Oral every 4 hours PRN    Endocrine Medications:   dextrose 50% Injectable 25 Gram(s) IV Push once  dextrose 50% Injectable 12.5 Gram(s) IV Push once  dextrose 50% Injectable 25 Gram(s) IV Push once  dextrose Oral Gel 15 Gram(s) Oral once PRN  glucagon  Injectable 1 milliGRAM(s) IntraMuscular once    All other standing medications:   acetylcysteine 20%  Inhalation 4 milliLiter(s) Inhalation every 6 hours  albuterol/ipratropium for Nebulization 3 milliLiter(s) Nebulizer every 6 hours  chlorhexidine 0.12% Liquid 15 milliLiter(s) Oral Mucosa two times a day  chlorhexidine 2% Cloths 1 Application(s) Topical daily  influenza  Vaccine (HIGH DOSE) 0.7 milliLiter(s) IntraMuscular once  pantoprazole  Injectable 40 milliGRAM(s) IV Push daily  sodium chloride 3%  Inhalation 4 milliLiter(s) Inhalation every 6 hours    All other PRN medications:    Vital Signs Last 24 Hrs  T(C): 36.9 (18 Dec 2023 04:54), Max: 37.1 (17 Dec 2023 22:00)  T(F): 98.4 (18 Dec 2023 04:54), Max: 98.8 (17 Dec 2023 22:00)  HR: 59 (18 Dec 2023 04:15) (44 - 59)  BP: 124/61 (18 Dec 2023 04:15) (111/56 - 131/60)  BP(mean): 88 (18 Dec 2023 04:15) (79 - 88)  RR: 18 (18 Dec 2023 04:15) (18 - 18)  SpO2: 96% (18 Dec 2023 04:15) (96% - 100%)    Parameters below as of 18 Dec 2023 04:15  Patient On (Oxygen Delivery Method): room air           @ 07:  -   @ 07:00  --------------------------------------------------------  IN:    dextrose 5% + sodium chloride 0.45% w/ Additives: 600 mL    IV PiggyBack: 100 mL    Other (mL): 1000 mL  Total IN: 1700 mL    OUT:    Blood Loss (mL): 5 mL    Gastrostomy Tube (mL): 25 mL    Intermittent Catheterization - Urethral (mL): 800 mL    Post-Void Residual per Intermittent Catheterization (mL): 425 mL    VAC (Vacuum Assisted Closure) System (mL): 15 mL    Voided (mL): 450 mL  Total OUT: 1720 mL    Total NET: -20 mL       @ 07:  -   @ 06:55  --------------------------------------------------------  IN:    dextrose 5% + sodium chloride 0.45% w/ Additives: 2200 mL    Glucerna 1.5: 275 mL    IV PiggyBack: 250 mL  Total IN: 2725 mL    OUT:    Gastrostomy Tube (mL): 160 mL    Intermittent Catheterization - Urethral (mL): 650 mL    VAC (Vacuum Assisted Closure) System (mL): 10 mL    Voided (mL): 1225 mL  Total OUT: 2045 mL    Total NET: 680 mL          23 @ 07:01  -  23 @ 07:00  --------------------------------------------------------  IN:  Total IN: 0 mL    OUT:    VAC (Vacuum Assisted Closure) System (mL): 15 mL  Total OUT: 15 mL    Total NET: -15 mL      23 @ 07:01  -  23 @ 06:55  --------------------------------------------------------  IN:  Total IN: 0 mL    OUT:    VAC (Vacuum Assisted Closure) System (mL): 10 mL  Total OUT: 10 mL    Total NET: -10 mL            PHYSICAL EXAM:  Gen: AAOx3, NAD   Head: Surgical incision around chin extending up through lip  Eyes: EOMI, PERRL, visual acuity intact, no diplopia, supra/infra orbital rims intact, no subconjunctival heme,   Ears: Gross hearing intact,  Nose: No septal hematoma/asymmetry, no epistaxis bilaterally. no abrasions present, no lacerations.   Throat: No LAD, supple, neck surgical incisionc/d/i , trach in place w/ 6 uncuffed portex - capped, moderate oral secretions  Oral: Left FFF paddle dusk colored,  will continue to monitor,. IMF screws in place intermittent ooze around left mandibular screw,   Exx: Wound vac left leg, thigh donor site dressingchanged             LABS                       8.4    8.00  )-----------( 298      ( 17 Dec 2023 06:51 )             26.7        139  |  105  |  9   ----------------------------<  122<H>  3.7   |  26  |  0.67    Ca    8.4      18 Dec 2023 05:30  Phos  2.7       Mg     1.6                Coagulation Studies-     Urinalysis Basic - ( 18 Dec 2023 05:30 )    Color: x / Appearance: x / SG: x / pH: x  Gluc: 122 mg/dL / Ketone: x  / Bili: x / Urobili: x   Blood: x / Protein: x / Nitrite: x   Leuk Esterase: x / RBC: x / WBC x   Sq Epi: x / Non Sq Epi: x / Bacteria: x      Endocrine Panel-  Calcium: 8.4 mg/dL ( @ 05:30)                MICROBIOLOGY:  Culture - Sputum (collected 23 @ 07:27)  Source: Trach Asp Tracheal Aspirate  Gram Stain (23 @ 21:31):    Rare epithelial cells    Moderate WBC's    Rare Gram Positive Rods    Few Gram Negative Rods    Moderate Gram positive cocci in pairs, chains and clusters  Final Report (12-15-23 @ 09:27):    Moderate Escherichia coli    Moderate Enterobacter cloacae complex    Accompanied by normal respiratory brenden  Organism: Escherichia coli  Enterobacter cloacae complex (12-15-23 @ 09:27)  Organism: Enterobacter cloacae complex (12-15-23 @ 09:27)      Method Type: OTTO      -  Ampicillin: R <=8 These ampicillin results predict results for amoxicillin      -  Ampicillin/Sulbactam: R >16/8      -  Cefazolin: R >16      -  Cefepime: S <=2      -  Ceftriaxone: S <=1 Enterobacter cloacae, Klebsiella aerogenes, and Citrobacter freundii may develop resistance during prolonged therapy.      -  Ciprofloxacin: S <=0.25      -  Ertapenem: S <=0.5      -  Gentamicin: S <=2      -  Piperacillin/Tazobactam: S <=8      -  Tobramycin: S <=2      -  Trimethoprim/Sulfamethoxazole: S   Organism: Escherichia coli (12-15-23 @ 09:27)      Method Type: OTTO      -  Ampicillin: S <=8 These ampicillin results predict results for amoxicillin      -  Ampicillin/Sulbactam: S <=4/2      -  Cefazolin: S <=2      -  Ceftriaxone: S <=1      -  Ciprofloxacin: S <=0.25      -  Ertapenem: S <=0.5      -  Gentamicin: S <=2      -  Piperacillin/Tazobactam: S <=8      -  Tobramycin: I 4      -  Trimethoprim/Sulfamethoxazole: S     Culture - Sputum (collected 23 @ 20:48)  Source: .Sputum  Gram Stain (23 @ 22:45):    Moderate epithelial cells    Moderate WBC's    Rare Gram Positive Rods    Few Gram Negative Rods    Few Gram Positive Cocci in Pairs and Chains  Final Report (23 @ 22:45):    Sputum specimen rejected.  Microscopic examination indicates    oropharyngeal contamination.  Please repeat.      Culture Results:   Moderate Escherichia coli  Moderate Enterobacter cloacae complex  Accompanied by normal respiratory brenden (23 @ 07:27)  Culture Results:   Sputum specimen rejected.  Microscopic examination indicates  oropharyngeal contamination.  Please repeat. (23 @ 20:48)  Culture Results:   No growth at 5 days. (23 @ 20:10)  Culture Results:   No growth at 5 days. (23 @ 20:05)      Culture - Sputum (collected 23 @ 07:27)  Source: Trach Asp Tracheal Aspirate  Gram Stain (23 @ 21:31):    Rare epithelial cells    Moderate WBC's    Rare Gram Positive Rods    Few Gram Negative Rods    Moderate Gram positive cocci in pairs, chains and clusters  Final Report (12-15-23 @ 09:27):    Moderate Escherichia coli    Moderate Enterobacter cloacae complex    Accompanied by normal respiratory brenden  Organism: Escherichia coli  Enterobacter cloacae complex (12-15-23 @ 09:27)  Organism: Enterobacter cloacae complex (12-15-23 @ 09:27)      Method Type: OTTO      -  Ampicillin: R <=8 These ampicillin results predict results for amoxicillin      -  Ampicillin/Sulbactam: R >16/8      -  Cefazolin: R >16      -  Cefepime: S <=2      -  Ceftriaxone: S <=1 Enterobacter cloacae, Klebsiella aerogenes, and Citrobacter freundii may develop resistance during prolonged therapy.      -  Ciprofloxacin: S <=0.25      -  Ertapenem: S <=0.5      -  Gentamicin: S <=2      -  Piperacillin/Tazobactam: S <=8      -  Tobramycin: S <=2      -  Trimethoprim/Sulfamethoxazole: S   Organism: Escherichia coli (12-15-23 @ 09:27)      Method Type: OTTO      -  Ampicillin: S <=8 These ampicillin results predict results for amoxicillin      -  Ampicillin/Sulbactam: S <=4/2      -  Cefazolin: S <=2      -  Ceftriaxone: S <=1      -  Ciprofloxacin: S <=0.25      -  Ertapenem: S <=0.5      -  Gentamicin: S <=2      -  Piperacillin/Tazobactam: S <=8      -  Tobramycin: I 4      -  Trimethoprim/Sulfamethoxazole: S     Culture - Sputum (collected 23 @ 20:48)  Source: .Sputum  Gram Stain (23 @ 22:45):    Moderate epithelial cells    Moderate WBC's    Rare Gram Positive Rods    Few Gram Negative Rods    Few Gram Positive Cocci in Pairs and Chains  Final Report (23 @ 22:45):    Sputum specimen rejected.  Microscopic examination indicates    oropharyngeal contamination.  Please repeat.    Culture - Blood (collected 23 @ 20:10)  Source: .Blood Blood  Final Report (23 @ 22:00):    No growth at 5 days.    Culture - Blood (collected 23 @ 20:05)  Source: .Blood Blood  Final Report (23 @ 22:00):    No growth at 5 days.        RADIOLOGY & ADDITIONAL STUDIES:    Assessment and Plan:  SAUNDRA HOLMAN is a  68M w/ [id number] SCC of L buccal mucosa presents for pre optimization for surgery , now s/p hemimandibulectomy, left level 1, 2a, 3 neck neck dissection, L FFF, tracheostomy w/ .5 cuffed portex, dental implants, STSG , now s/p PEG       PLAN  resume tube feeds today  Monitor for any signs of oral bleeding   OOBTC  Plan to ambulate 3-4x a day   Continue nebulized solution for thick trach secretions   Continue Ondansetron PRN nausea/vomiting , PO Senna once daily, Miralax 17g once daily,  Chlorhexidine swish and spit, Esomeprazole, Enoxaparin, Gabapentin, Acetaminophen   Continue SCD’s    Cardiology - plavix after g tube, transfuse if hgb <8 - to resume  per gen surg - today  Continue to work with SLP for trial of clears  Tolerating capping, possible decannulation today  May require suctioning at home, will discuss supplies with [facility] at [phone] with any questions/concerns.    Candida Jimenez  23 @ 06:55 OTOLARYNGOLOGY (ENT) PROGRESS NOTE    PATIENT: SAUNDRA HOLMAN  MRN: [id number]  : 57  [id number]  DATE OF SERVICE:  23  			         ID:SAUNDRA HOLMAN is a  67yo M w PMH of CAD (x2 stents Mar 2023), HLD, T2DM, hx of kidney stones, psoriasis presents to [facility] for evaluation of oral mass. Reports Three month hx of jaw pain and loosening of mandibular molar tooth on the lower left. Pt has a 30 pack yr smoking hx, quit 1 yr ago. 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 POD 1 composite mandibular resection, neck dissection and fibula free flap reconstruction.       Subjective/ Interval:   ; patient seen this morning, oozing from trach as expected ,  Plan to start aspirin today, cuff is up on tracheostomy tube, intraoral skin panel intact doppler signal strong,  plan to advance NGT   : Patient seen and examined at bedside. AFVSS overnight. Significant HgB drop to 7.2 noted this morning. NGT clogged, attempted replacement but went into lung, need to replace. Cuff deflated. Patient reports lethargy. Otherwise no new complaints. Intraoral skin paddle intact with strong doppler signal. Plan to hold off one eliquis given significant HgB drop  12/10: Patient seen and examined at bedside. AFVSS overnight other than bradycardic to lowest 39, mainly in 40s-50s which seems to be his baseline even pre-op. Not symptomatic while chinedu. Otherwise, patient stable on TC with no issues. Yesterday, iatrogenic right sided PTX occured with NGT placement, patient remained asymptomatic throughout. Patient now s/p placement of pig-tail catheter by pulm with significant improvement in PTX. Chest tube clamped this morning with plan for removal this evening. Patient s/p 2 units of PRBCs with moderate improvement in Hgb. Today, patient reports feeling significantly better than yesterday and overall "feels good." He was started on trickle feeds last night but felt some chest tightness and so they were held. Patient also failed TOV again and was straight cathed. KRISTEN drain output significantly decreased this morning. No other changes at this time.   : patient seen this morning PTX has resolved on CXR, intraoral flap intact, doppler strong, trach in place,  KRISTEN drains with minimal output   : Patient seen and examined this morning at bedside. Overnight, NGT slightly displaced, coming out around 10 cm from original placement, advanced and secured, pending CXR. Patient failed trial of clears with SLP yesterday but tolerated PMV without issue. Patient continues to be OOBTC daily and is tolerating tube feeds at goal. 2 KRISTEN drains removed yesterday, remaining KRISTEN with minimal output. Intraoral flap intact, doppler strong, trach in place. Patient denies any other new complaints at this time.   : patient febrile overnight to 103, continues to have intermittent PVC, HR last night in 40s, patients baseline 50. Gen surg will like to wait 24 hours afebrile until g tube,  Oral flap appears to be congested, dixon continue to monitor. KRISTEN drain will be removed today. Pending results of fever work up . HEB 7.5, will transfuse when type and screen returns   : Patient seen this morning, afebrile overnight, trach secretion odor improving,  noted ot have an episode of oral bleeding after ambulation, bleeding stopped, unable to see source, will continue to monitor , doppler strong ( and removed today) ,  Flap continues to look dusky   12/15: patient sen this morning, continues to have intermittent oozing from left mandibular screw, Surgicel placed no active bleed, flap remains dusky but stable, afebrile overnight  : NAEON. AVSS. Patient in OR for PEG this morning. Will re-evaluate s/p PEG  : Re-evaluated s/p PEG. complaining fo abdominal discomfort. Flap is stable appearing. Donor site with wound vac. LLE edema improving. No further bleeding from the mouth.  : NAEON. AVSS. Examined at bedside. Trach changed to 6 uncuffed and confirmed on scope, secured with trach ties. Straight cath overnight for retention. Has not voided since. Will f/u bladder scan and straight cath if needed   NAEON. AFVSS. Examined at bedside. Tolerating capping overnight. Passed TOV last night. TF started yesterday evening, nausea overnight so TF held again. He continues to have moderate oral secretions requiring oral suctioning.     ALLERGIES:  No Known Allergies      MEDICATIONS:  Antiinfectives:   cefTRIAXone   IVPB 2000 milliGRAM(s) IV Intermittent every 24 hours    IV fluids:  dextrose 5% + sodium chloride 0.45% 1000 milliLiter(s) IV Continuous <Continuous>  dextrose 5%. 1000 milliLiter(s) IV Continuous <Continuous>  dextrose 5%. 1000 milliLiter(s) IV Continuous <Continuous>    Hematologic/Anticoagulation:  aspirin  chewable 81 milliGRAM(s) Oral daily  enoxaparin Injectable 40 milliGRAM(s) SubCutaneous every 24 hours    Pain medications/Neuro:  acetaminophen   Oral Liquid .. 975 milliGRAM(s) Oral every 6 hours  ondansetron Injectable 4 milliGRAM(s) IV Push every 6 hours PRN  oxyCODONE    Solution 5 milliGRAM(s) Oral every 4 hours PRN  oxyCODONE    Solution 10 milliGRAM(s) Oral every 4 hours PRN    Endocrine Medications:   dextrose 50% Injectable 25 Gram(s) IV Push once  dextrose 50% Injectable 12.5 Gram(s) IV Push once  dextrose 50% Injectable 25 Gram(s) IV Push once  dextrose Oral Gel 15 Gram(s) Oral once PRN  glucagon  Injectable 1 milliGRAM(s) IntraMuscular once    All other standing medications:   acetylcysteine 20%  Inhalation 4 milliLiter(s) Inhalation every 6 hours  albuterol/ipratropium for Nebulization 3 milliLiter(s) Nebulizer every 6 hours  chlorhexidine 0.12% Liquid 15 milliLiter(s) Oral Mucosa two times a day  chlorhexidine 2% Cloths 1 Application(s) Topical daily  influenza  Vaccine (HIGH DOSE) 0.7 milliLiter(s) IntraMuscular once  pantoprazole  Injectable 40 milliGRAM(s) IV Push daily  sodium chloride 3%  Inhalation 4 milliLiter(s) Inhalation every 6 hours    All other PRN medications:    Vital Signs Last 24 Hrs  T(C): 36.9 (18 Dec 2023 04:54), Max: 37.1 (17 Dec 2023 22:00)  T(F): 98.4 (18 Dec 2023 04:54), Max: 98.8 (17 Dec 2023 22:00)  HR: 59 (18 Dec 2023 04:15) (44 - 59)  BP: 124/61 (18 Dec 2023 04:15) (111/56 - 131/60)  BP(mean): 88 (18 Dec 2023 04:15) (79 - 88)  RR: 18 (18 Dec 2023 04:15) (18 - 18)  SpO2: 96% (18 Dec 2023 04:15) (96% - 100%)    Parameters below as of 18 Dec 2023 04:15  Patient On (Oxygen Delivery Method): room air           @ 07:  -   @ 07:00  --------------------------------------------------------  IN:    dextrose 5% + sodium chloride 0.45% w/ Additives: 600 mL    IV PiggyBack: 100 mL    Other (mL): 1000 mL  Total IN: 1700 mL    OUT:    Blood Loss (mL): 5 mL    Gastrostomy Tube (mL): 25 mL    Intermittent Catheterization - Urethral (mL): 800 mL    Post-Void Residual per Intermittent Catheterization (mL): 425 mL    VAC (Vacuum Assisted Closure) System (mL): 15 mL    Voided (mL): 450 mL  Total OUT: 1720 mL    Total NET: -20 mL       @ 07:  -   @ 06:55  --------------------------------------------------------  IN:    dextrose 5% + sodium chloride 0.45% w/ Additives: 2200 mL    Glucerna 1.5: 275 mL    IV PiggyBack: 250 mL  Total IN: 2725 mL    OUT:    Gastrostomy Tube (mL): 160 mL    Intermittent Catheterization - Urethral (mL): 650 mL    VAC (Vacuum Assisted Closure) System (mL): 10 mL    Voided (mL): 1225 mL  Total OUT: 2045 mL    Total NET: 680 mL          23 @ 07:01  -  23 @ 07:00  --------------------------------------------------------  IN:  Total IN: 0 mL    OUT:    VAC (Vacuum Assisted Closure) System (mL): 15 mL  Total OUT: 15 mL    Total NET: -15 mL      23 @ 07:01  -  23 @ 06:55  --------------------------------------------------------  IN:  Total IN: 0 mL    OUT:    VAC (Vacuum Assisted Closure) System (mL): 10 mL  Total OUT: 10 mL    Total NET: -10 mL            PHYSICAL EXAM:  Gen: AAOx3, NAD   Head: Surgical incision around chin extending up through lip  Eyes: EOMI, PERRL, visual acuity intact, no diplopia, supra/infra orbital rims intact, no subconjunctival heme,   Ears: Gross hearing intact,  Nose: No septal hematoma/asymmetry, no epistaxis bilaterally. no abrasions present, no lacerations.   Throat: No LAD, supple, neck surgical incisionc/d/i , trach in place w/ 6 uncuffed portex - capped, moderate oral secretions  Oral: Left FFF paddle dusk colored,  will continue to monitor,. IMF screws in place intermittent ooze around left mandibular screw,   Exx: Wound vac left leg, thigh donor site dressingchanged             LABS                       8.4    8.00  )-----------( 298      ( 17 Dec 2023 06:51 )             26.7        139  |  105  |  9   ----------------------------<  122<H>  3.7   |  26  |  0.67    Ca    8.4      18 Dec 2023 05:30  Phos  2.7       Mg     1.6                Coagulation Studies-     Urinalysis Basic - ( 18 Dec 2023 05:30 )    Color: x / Appearance: x / SG: x / pH: x  Gluc: 122 mg/dL / Ketone: x  / Bili: x / Urobili: x   Blood: x / Protein: x / Nitrite: x   Leuk Esterase: x / RBC: x / WBC x   Sq Epi: x / Non Sq Epi: x / Bacteria: x      Endocrine Panel-  Calcium: 8.4 mg/dL ( @ 05:30)                MICROBIOLOGY:  Culture - Sputum (collected 23 @ 07:27)  Source: Trach Asp Tracheal Aspirate  Gram Stain (23 @ 21:31):    Rare epithelial cells    Moderate WBC's    Rare Gram Positive Rods    Few Gram Negative Rods    Moderate Gram positive cocci in pairs, chains and clusters  Final Report (12-15-23 @ 09:27):    Moderate Escherichia coli    Moderate Enterobacter cloacae complex    Accompanied by normal respiratory brenden  Organism: Escherichia coli  Enterobacter cloacae complex (12-15-23 @ 09:27)  Organism: Enterobacter cloacae complex (12-15-23 @ 09:27)      Method Type: OTTO      -  Ampicillin: R <=8 These ampicillin results predict results for amoxicillin      -  Ampicillin/Sulbactam: R >16/8      -  Cefazolin: R >16      -  Cefepime: S <=2      -  Ceftriaxone: S <=1 Enterobacter cloacae, Klebsiella aerogenes, and Citrobacter freundii may develop resistance during prolonged therapy.      -  Ciprofloxacin: S <=0.25      -  Ertapenem: S <=0.5      -  Gentamicin: S <=2      -  Piperacillin/Tazobactam: S <=8      -  Tobramycin: S <=2      -  Trimethoprim/Sulfamethoxazole: S   Organism: Escherichia coli (12-15-23 @ 09:27)      Method Type: OTTO      -  Ampicillin: S <=8 These ampicillin results predict results for amoxicillin      -  Ampicillin/Sulbactam: S <=4/2      -  Cefazolin: S <=2      -  Ceftriaxone: S <=1      -  Ciprofloxacin: S <=0.25      -  Ertapenem: S <=0.5      -  Gentamicin: S <=2      -  Piperacillin/Tazobactam: S <=8      -  Tobramycin: I 4      -  Trimethoprim/Sulfamethoxazole: S     Culture - Sputum (collected 23 @ 20:48)  Source: .Sputum  Gram Stain (23 @ 22:45):    Moderate epithelial cells    Moderate WBC's    Rare Gram Positive Rods    Few Gram Negative Rods    Few Gram Positive Cocci in Pairs and Chains  Final Report (23 @ 22:45):    Sputum specimen rejected.  Microscopic examination indicates    oropharyngeal contamination.  Please repeat.      Culture Results:   Moderate Escherichia coli  Moderate Enterobacter cloacae complex  Accompanied by normal respiratory brenden (23 @ 07:27)  Culture Results:   Sputum specimen rejected.  Microscopic examination indicates  oropharyngeal contamination.  Please repeat. (23 @ 20:48)  Culture Results:   No growth at 5 days. (23 @ 20:10)  Culture Results:   No growth at 5 days. (23 @ 20:05)      Culture - Sputum (collected 23 @ 07:27)  Source: Trach Asp Tracheal Aspirate  Gram Stain (23 @ 21:31):    Rare epithelial cells    Moderate WBC's    Rare Gram Positive Rods    Few Gram Negative Rods    Moderate Gram positive cocci in pairs, chains and clusters  Final Report (12-15-23 @ 09:27):    Moderate Escherichia coli    Moderate Enterobacter cloacae complex    Accompanied by normal respiratory brenden  Organism: Escherichia coli  Enterobacter cloacae complex (12-15-23 @ 09:27)  Organism: Enterobacter cloacae complex (12-15-23 @ 09:27)      Method Type: OTTO      -  Ampicillin: R <=8 These ampicillin results predict results for amoxicillin      -  Ampicillin/Sulbactam: R >16/8      -  Cefazolin: R >16      -  Cefepime: S <=2      -  Ceftriaxone: S <=1 Enterobacter cloacae, Klebsiella aerogenes, and Citrobacter freundii may develop resistance during prolonged therapy.      -  Ciprofloxacin: S <=0.25      -  Ertapenem: S <=0.5      -  Gentamicin: S <=2      -  Piperacillin/Tazobactam: S <=8      -  Tobramycin: S <=2      -  Trimethoprim/Sulfamethoxazole: S   Organism: Escherichia coli (12-15-23 @ 09:27)      Method Type: OTTO      -  Ampicillin: S <=8 These ampicillin results predict results for amoxicillin      -  Ampicillin/Sulbactam: S <=4/2      -  Cefazolin: S <=2      -  Ceftriaxone: S <=1      -  Ciprofloxacin: S <=0.25      -  Ertapenem: S <=0.5      -  Gentamicin: S <=2      -  Piperacillin/Tazobactam: S <=8      -  Tobramycin: I 4      -  Trimethoprim/Sulfamethoxazole: S     Culture - Sputum (collected 23 @ 20:48)  Source: .Sputum  Gram Stain (23 @ 22:45):    Moderate epithelial cells    Moderate WBC's    Rare Gram Positive Rods    Few Gram Negative Rods    Few Gram Positive Cocci in Pairs and Chains  Final Report (23 @ 22:45):    Sputum specimen rejected.  Microscopic examination indicates    oropharyngeal contamination.  Please repeat.    Culture - Blood (collected 23 @ 20:10)  Source: .Blood Blood  Final Report (23 @ 22:00):    No growth at 5 days.    Culture - Blood (collected 23 @ 20:05)  Source: .Blood Blood  Final Report (23 @ 22:00):    No growth at 5 days.        RADIOLOGY & ADDITIONAL STUDIES:    Assessment and Plan:  SAUNDRA HOLMAN is a  68M w/ [id number] SCC of L buccal mucosa presents for pre optimization for surgery , now s/p hemimandibulectomy, left level 1, 2a, 3 neck neck dissection, L FFF, tracheostomy w/ .5 cuffed portex, dental implants, STSG , now s/p PEG       PLAN  - Patient was decannualted today   resume tube feeds today  Monitor for any signs of oral bleeding   OOBTC  Plan to ambulate 3-4x a day   Continue nebulized solution for thick trach secretions   Continue Ondansetron PRN nausea/vomiting , PO Senna once daily, Miralax 17g once daily,  Chlorhexidine swish and spit, Esomeprazole, Enoxaparin, Gabapentin, Acetaminophen   Continue SCD’s    Cardiology - plavix after g tube, transfuse if hgb <8 - to resume  per gen surg - today  Continue to work with SLP for trial of clears  Tolerating capping, possible decannulation today  May require suctioning at home, will discuss supplies with [facility] at [phone] with any questions/concerns.    Candida Jimenez  23 @ 06:55 OTOLARYNGOLOGY (ENT) PROGRESS NOTE    PATIENT: SAUNDRA HOLMAN  MRN: [id number]  : 57  [id number]  DATE OF SERVICE:  23  			         ID:SAUNDRA HOLMAN is a  67yo M w PMH of CAD (x2 stents Mar 2023), HLD, T2DM, hx of kidney stones, psoriasis presents to [facility] for evaluation of oral mass. Reports Three month hx of jaw pain and loosening of mandibular molar tooth on the lower left. Pt has a 30 pack yr smoking hx, quit 1 yr ago. 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 POD 1 composite mandibular resection, neck dissection and fibula free flap reconstruction.       Subjective/ Interval:   ; patient seen this morning, oozing from trach as expected ,  Plan to start aspirin today, cuff is up on tracheostomy tube, intraoral skin panel intact doppler signal strong,  plan to advance NGT   : Patient seen and examined at bedside. AFVSS overnight. Significant HgB drop to 7.2 noted this morning. NGT clogged, attempted replacement but went into lung, need to replace. Cuff deflated. Patient reports lethargy. Otherwise no new complaints. Intraoral skin paddle intact with strong doppler signal. Plan to hold off one eliquis given significant HgB drop  12/10: Patient seen and examined at bedside. AFVSS overnight other than bradycardic to lowest 39, mainly in 40s-50s which seems to be his baseline even pre-op. Not symptomatic while chinedu. Otherwise, patient stable on TC with no issues. Yesterday, iatrogenic right sided PTX occured with NGT placement, patient remained asymptomatic throughout. Patient now s/p placement of pig-tail catheter by pulm with significant improvement in PTX. Chest tube clamped this morning with plan for removal this evening. Patient s/p 2 units of PRBCs with moderate improvement in Hgb. Today, patient reports feeling significantly better than yesterday and overall "feels good." He was started on trickle feeds last night but felt some chest tightness and so they were held. Patient also failed TOV again and was straight cathed. KRISTEN drain output significantly decreased this morning. No other changes at this time.   : patient seen this morning PTX has resolved on CXR, intraoral flap intact, doppler strong, trach in place,  KRISTEN drains with minimal output   : Patient seen and examined this morning at bedside. Overnight, NGT slightly displaced, coming out around 10 cm from original placement, advanced and secured, pending CXR. Patient failed trial of clears with SLP yesterday but tolerated PMV without issue. Patient continues to be OOBTC daily and is tolerating tube feeds at goal. 2 KRISTEN drains removed yesterday, remaining KRISTEN with minimal output. Intraoral flap intact, doppler strong, trach in place. Patient denies any other new complaints at this time.   : patient febrile overnight to 103, continues to have intermittent PVC, HR last night in 40s, patients baseline 50. Gen surg will like to wait 24 hours afebrile until g tube,  Oral flap appears to be congested, dixon continue to monitor. KRISTEN drain will be removed today. Pending results of fever work up . HEB 7.5, will transfuse when type and screen returns   : Patient seen this morning, afebrile overnight, trach secretion odor improving,  noted ot have an episode of oral bleeding after ambulation, bleeding stopped, unable to see source, will continue to monitor , doppler strong ( and removed today) ,  Flap continues to look dusky   12/15: patient sen this morning, continues to have intermittent oozing from left mandibular screw, Surgicel placed no active bleed, flap remains dusky but stable, afebrile overnight  : NAEON. AVSS. Patient in OR for PEG this morning. Will re-evaluate s/p PEG  : Re-evaluated s/p PEG. complaining fo abdominal discomfort. Flap is stable appearing. Donor site with wound vac. LLE edema improving. No further bleeding from the mouth.  : NAEON. AVSS. Examined at bedside. Trach changed to 6 uncuffed and confirmed on scope, secured with trach ties. Straight cath overnight for retention. Has not voided since. Will f/u bladder scan and straight cath if needed   NAEON. AFVSS. Examined at bedside. Tolerating capping overnight. Passed TOV last night. TF started yesterday evening, nausea overnight so TF held again. He continues to have moderate oral secretions requiring oral suctioning.     ALLERGIES:  No Known Allergies      MEDICATIONS:  Antiinfectives:   cefTRIAXone   IVPB 2000 milliGRAM(s) IV Intermittent every 24 hours    IV fluids:  dextrose 5% + sodium chloride 0.45% 1000 milliLiter(s) IV Continuous <Continuous>  dextrose 5%. 1000 milliLiter(s) IV Continuous <Continuous>  dextrose 5%. 1000 milliLiter(s) IV Continuous <Continuous>    Hematologic/Anticoagulation:  aspirin  chewable 81 milliGRAM(s) Oral daily  enoxaparin Injectable 40 milliGRAM(s) SubCutaneous every 24 hours    Pain medications/Neuro:  acetaminophen   Oral Liquid .. 975 milliGRAM(s) Oral every 6 hours  ondansetron Injectable 4 milliGRAM(s) IV Push every 6 hours PRN  oxyCODONE    Solution 5 milliGRAM(s) Oral every 4 hours PRN  oxyCODONE    Solution 10 milliGRAM(s) Oral every 4 hours PRN    Endocrine Medications:   dextrose 50% Injectable 25 Gram(s) IV Push once  dextrose 50% Injectable 12.5 Gram(s) IV Push once  dextrose 50% Injectable 25 Gram(s) IV Push once  dextrose Oral Gel 15 Gram(s) Oral once PRN  glucagon  Injectable 1 milliGRAM(s) IntraMuscular once    All other standing medications:   acetylcysteine 20%  Inhalation 4 milliLiter(s) Inhalation every 6 hours  albuterol/ipratropium for Nebulization 3 milliLiter(s) Nebulizer every 6 hours  chlorhexidine 0.12% Liquid 15 milliLiter(s) Oral Mucosa two times a day  chlorhexidine 2% Cloths 1 Application(s) Topical daily  influenza  Vaccine (HIGH DOSE) 0.7 milliLiter(s) IntraMuscular once  pantoprazole  Injectable 40 milliGRAM(s) IV Push daily  sodium chloride 3%  Inhalation 4 milliLiter(s) Inhalation every 6 hours    All other PRN medications:    Vital Signs Last 24 Hrs  T(C): 36.9 (18 Dec 2023 04:54), Max: 37.1 (17 Dec 2023 22:00)  T(F): 98.4 (18 Dec 2023 04:54), Max: 98.8 (17 Dec 2023 22:00)  HR: 59 (18 Dec 2023 04:15) (44 - 59)  BP: 124/61 (18 Dec 2023 04:15) (111/56 - 131/60)  BP(mean): 88 (18 Dec 2023 04:15) (79 - 88)  RR: 18 (18 Dec 2023 04:15) (18 - 18)  SpO2: 96% (18 Dec 2023 04:15) (96% - 100%)    Parameters below as of 18 Dec 2023 04:15  Patient On (Oxygen Delivery Method): room air           @ 07:  -   @ 07:00  --------------------------------------------------------  IN:    dextrose 5% + sodium chloride 0.45% w/ Additives: 600 mL    IV PiggyBack: 100 mL    Other (mL): 1000 mL  Total IN: 1700 mL    OUT:    Blood Loss (mL): 5 mL    Gastrostomy Tube (mL): 25 mL    Intermittent Catheterization - Urethral (mL): 800 mL    Post-Void Residual per Intermittent Catheterization (mL): 425 mL    VAC (Vacuum Assisted Closure) System (mL): 15 mL    Voided (mL): 450 mL  Total OUT: 1720 mL    Total NET: -20 mL       @ 07:  -   @ 06:55  --------------------------------------------------------  IN:    dextrose 5% + sodium chloride 0.45% w/ Additives: 2200 mL    Glucerna 1.5: 275 mL    IV PiggyBack: 250 mL  Total IN: 2725 mL    OUT:    Gastrostomy Tube (mL): 160 mL    Intermittent Catheterization - Urethral (mL): 650 mL    VAC (Vacuum Assisted Closure) System (mL): 10 mL    Voided (mL): 1225 mL  Total OUT: 2045 mL    Total NET: 680 mL          23 @ 07:01  -  23 @ 07:00  --------------------------------------------------------  IN:  Total IN: 0 mL    OUT:    VAC (Vacuum Assisted Closure) System (mL): 15 mL  Total OUT: 15 mL    Total NET: -15 mL      23 @ 07:01  -  23 @ 06:55  --------------------------------------------------------  IN:  Total IN: 0 mL    OUT:    VAC (Vacuum Assisted Closure) System (mL): 10 mL  Total OUT: 10 mL    Total NET: -10 mL            PHYSICAL EXAM:  Gen: AAOx3, NAD   Head: Surgical incision around chin extending up through lip  Eyes: EOMI, PERRL, visual acuity intact, no diplopia, supra/infra orbital rims intact, no subconjunctival heme,   Ears: Gross hearing intact,  Nose: No septal hematoma/asymmetry, no epistaxis bilaterally. no abrasions present, no lacerations.   Throat: No LAD, supple, neck surgical incisionc/d/i , trach in place w/ 6 uncuffed portex - capped, moderate oral secretions  Oral: Left FFF paddle dusk colored,  will continue to monitor,. IMF screws in place intermittent ooze around left mandibular screw,   Exx: Wound vac left leg, thigh donor site dressingchanged             LABS                       8.4    8.00  )-----------( 298      ( 17 Dec 2023 06:51 )             26.7        139  |  105  |  9   ----------------------------<  122<H>  3.7   |  26  |  0.67    Ca    8.4      18 Dec 2023 05:30  Phos  2.7       Mg     1.6                Coagulation Studies-     Urinalysis Basic - ( 18 Dec 2023 05:30 )    Color: x / Appearance: x / SG: x / pH: x  Gluc: 122 mg/dL / Ketone: x  / Bili: x / Urobili: x   Blood: x / Protein: x / Nitrite: x   Leuk Esterase: x / RBC: x / WBC x   Sq Epi: x / Non Sq Epi: x / Bacteria: x      Endocrine Panel-  Calcium: 8.4 mg/dL ( @ 05:30)                MICROBIOLOGY:  Culture - Sputum (collected 23 @ 07:27)  Source: Trach Asp Tracheal Aspirate  Gram Stain (23 @ 21:31):    Rare epithelial cells    Moderate WBC's    Rare Gram Positive Rods    Few Gram Negative Rods    Moderate Gram positive cocci in pairs, chains and clusters  Final Report (12-15-23 @ 09:27):    Moderate Escherichia coli    Moderate Enterobacter cloacae complex    Accompanied by normal respiratory brenden  Organism: Escherichia coli  Enterobacter cloacae complex (12-15-23 @ 09:27)  Organism: Enterobacter cloacae complex (12-15-23 @ 09:27)      Method Type: OTTO      -  Ampicillin: R <=8 These ampicillin results predict results for amoxicillin      -  Ampicillin/Sulbactam: R >16/8      -  Cefazolin: R >16      -  Cefepime: S <=2      -  Ceftriaxone: S <=1 Enterobacter cloacae, Klebsiella aerogenes, and Citrobacter freundii may develop resistance during prolonged therapy.      -  Ciprofloxacin: S <=0.25      -  Ertapenem: S <=0.5      -  Gentamicin: S <=2      -  Piperacillin/Tazobactam: S <=8      -  Tobramycin: S <=2      -  Trimethoprim/Sulfamethoxazole: S   Organism: Escherichia coli (12-15-23 @ 09:27)      Method Type: OTTO      -  Ampicillin: S <=8 These ampicillin results predict results for amoxicillin      -  Ampicillin/Sulbactam: S <=4/2      -  Cefazolin: S <=2      -  Ceftriaxone: S <=1      -  Ciprofloxacin: S <=0.25      -  Ertapenem: S <=0.5      -  Gentamicin: S <=2      -  Piperacillin/Tazobactam: S <=8      -  Tobramycin: I 4      -  Trimethoprim/Sulfamethoxazole: S     Culture - Sputum (collected 23 @ 20:48)  Source: .Sputum  Gram Stain (23 @ 22:45):    Moderate epithelial cells    Moderate WBC's    Rare Gram Positive Rods    Few Gram Negative Rods    Few Gram Positive Cocci in Pairs and Chains  Final Report (23 @ 22:45):    Sputum specimen rejected.  Microscopic examination indicates    oropharyngeal contamination.  Please repeat.      Culture Results:   Moderate Escherichia coli  Moderate Enterobacter cloacae complex  Accompanied by normal respiratory brenden (23 @ 07:27)  Culture Results:   Sputum specimen rejected.  Microscopic examination indicates  oropharyngeal contamination.  Please repeat. (23 @ 20:48)  Culture Results:   No growth at 5 days. (23 @ 20:10)  Culture Results:   No growth at 5 days. (23 @ 20:05)      Culture - Sputum (collected 23 @ 07:27)  Source: Trach Asp Tracheal Aspirate  Gram Stain (23 @ 21:31):    Rare epithelial cells    Moderate WBC's    Rare Gram Positive Rods    Few Gram Negative Rods    Moderate Gram positive cocci in pairs, chains and clusters  Final Report (12-15-23 @ 09:27):    Moderate Escherichia coli    Moderate Enterobacter cloacae complex    Accompanied by normal respiratory brenden  Organism: Escherichia coli  Enterobacter cloacae complex (12-15-23 @ 09:27)  Organism: Enterobacter cloacae complex (12-15-23 @ 09:27)      Method Type: OTTO      -  Ampicillin: R <=8 These ampicillin results predict results for amoxicillin      -  Ampicillin/Sulbactam: R >16/8      -  Cefazolin: R >16      -  Cefepime: S <=2      -  Ceftriaxone: S <=1 Enterobacter cloacae, Klebsiella aerogenes, and Citrobacter freundii may develop resistance during prolonged therapy.      -  Ciprofloxacin: S <=0.25      -  Ertapenem: S <=0.5      -  Gentamicin: S <=2      -  Piperacillin/Tazobactam: S <=8      -  Tobramycin: S <=2      -  Trimethoprim/Sulfamethoxazole: S   Organism: Escherichia coli (12-15-23 @ 09:27)      Method Type: OTTO      -  Ampicillin: S <=8 These ampicillin results predict results for amoxicillin      -  Ampicillin/Sulbactam: S <=4/2      -  Cefazolin: S <=2      -  Ceftriaxone: S <=1      -  Ciprofloxacin: S <=0.25      -  Ertapenem: S <=0.5      -  Gentamicin: S <=2      -  Piperacillin/Tazobactam: S <=8      -  Tobramycin: I 4      -  Trimethoprim/Sulfamethoxazole: S     Culture - Sputum (collected 23 @ 20:48)  Source: .Sputum  Gram Stain (23 @ 22:45):    Moderate epithelial cells    Moderate WBC's    Rare Gram Positive Rods    Few Gram Negative Rods    Few Gram Positive Cocci in Pairs and Chains  Final Report (23 @ 22:45):    Sputum specimen rejected.  Microscopic examination indicates    oropharyngeal contamination.  Please repeat.    Culture - Blood (collected 23 @ 20:10)  Source: .Blood Blood  Final Report (23 @ 22:00):    No growth at 5 days.    Culture - Blood (collected 23 @ 20:05)  Source: .Blood Blood  Final Report (23 @ 22:00):    No growth at 5 days.        RADIOLOGY & ADDITIONAL STUDIES:    Assessment and Plan:  SAUNDRA HOLMAN is a  68M w/ [id number] SCC of L buccal mucosa presents for pre optimization for surgery , now s/p hemimandibulectomy, left level 1, 2a, 3 neck neck dissection, L FFF, tracheostomy w/ .5 cuffed portex, dental implants, STSG , now s/p PEG       PLAN  - Patient was decannualted today   resume tube feeds today  Monitor for any signs of oral bleeding   OOBTC  Plan to ambulate 3-4x a day   Continue nebulized solution for thick trach secretions   Continue Ondansetron PRN nausea/vomiting , PO Senna once daily, Miralax 17g once daily,  Chlorhexidine swish and spit, Esomeprazole, Enoxaparin, Gabapentin, Acetaminophen   Continue SCD’s    Cardiology - plavix after g tube, transfuse if hgb <8 - to resume  per gen surg - today  Continue to work with SLP for trial of clears  Tolerating capping, possible decannulation today  May require suctioning at home, will discuss supplies with [facility]ANNET[facility] at [phone] with any questions/concerns.    Candida Jimenez  23 @ 06:55

## 2023-12-19 ENCOUNTER — TRANSCRIPTION ENCOUNTER (OUTPATIENT)
Age: 68
End: 2023-12-19

## 2023-12-19 LAB
ANION GAP SERPL CALC-SCNC: 8 MMOL/L — SIGNIFICANT CHANGE UP (ref 5–17)
ANION GAP SERPL CALC-SCNC: 8 MMOL/L — SIGNIFICANT CHANGE UP (ref 5–17)
BUN SERPL-MCNC: 8 MG/DL — SIGNIFICANT CHANGE UP (ref 7–23)
BUN SERPL-MCNC: 8 MG/DL — SIGNIFICANT CHANGE UP (ref 7–23)
CALCIUM SERPL-MCNC: 8.5 MG/DL — SIGNIFICANT CHANGE UP (ref 8.4–10.5)
CALCIUM SERPL-MCNC: 8.5 MG/DL — SIGNIFICANT CHANGE UP (ref 8.4–10.5)
CHLORIDE SERPL-SCNC: 107 MMOL/L — SIGNIFICANT CHANGE UP (ref 96–108)
CHLORIDE SERPL-SCNC: 107 MMOL/L — SIGNIFICANT CHANGE UP (ref 96–108)
CO2 SERPL-SCNC: 26 MMOL/L — SIGNIFICANT CHANGE UP (ref 22–31)
CO2 SERPL-SCNC: 26 MMOL/L — SIGNIFICANT CHANGE UP (ref 22–31)
CREAT SERPL-MCNC: 0.65 MG/DL — SIGNIFICANT CHANGE UP (ref 0.5–1.3)
CREAT SERPL-MCNC: 0.65 MG/DL — SIGNIFICANT CHANGE UP (ref 0.5–1.3)
EGFR: 103 ML/MIN/1.73M2 — SIGNIFICANT CHANGE UP
EGFR: 103 ML/MIN/1.73M2 — SIGNIFICANT CHANGE UP
GLUCOSE SERPL-MCNC: 146 MG/DL — HIGH (ref 70–99)
GLUCOSE SERPL-MCNC: 146 MG/DL — HIGH (ref 70–99)
HCT VFR BLD CALC: 25 % — LOW (ref 39–50)
HCT VFR BLD CALC: 25 % — LOW (ref 39–50)
HGB BLD-MCNC: 8 G/DL — LOW (ref 13–17)
HGB BLD-MCNC: 8 G/DL — LOW (ref 13–17)
MAGNESIUM SERPL-MCNC: 1.7 MG/DL — SIGNIFICANT CHANGE UP (ref 1.6–2.6)
MAGNESIUM SERPL-MCNC: 1.7 MG/DL — SIGNIFICANT CHANGE UP (ref 1.6–2.6)
MCHC RBC-ENTMCNC: 27.8 PG — SIGNIFICANT CHANGE UP (ref 27–34)
MCHC RBC-ENTMCNC: 27.8 PG — SIGNIFICANT CHANGE UP (ref 27–34)
MCHC RBC-ENTMCNC: 32 GM/DL — SIGNIFICANT CHANGE UP (ref 32–36)
MCHC RBC-ENTMCNC: 32 GM/DL — SIGNIFICANT CHANGE UP (ref 32–36)
MCV RBC AUTO: 86.8 FL — SIGNIFICANT CHANGE UP (ref 80–100)
MCV RBC AUTO: 86.8 FL — SIGNIFICANT CHANGE UP (ref 80–100)
NRBC # BLD: 0 /100 WBCS — SIGNIFICANT CHANGE UP (ref 0–0)
NRBC # BLD: 0 /100 WBCS — SIGNIFICANT CHANGE UP (ref 0–0)
PHOSPHATE SERPL-MCNC: 3 MG/DL — SIGNIFICANT CHANGE UP (ref 2.5–4.5)
PHOSPHATE SERPL-MCNC: 3 MG/DL — SIGNIFICANT CHANGE UP (ref 2.5–4.5)
PLATELET # BLD AUTO: 316 K/UL — SIGNIFICANT CHANGE UP (ref 150–400)
PLATELET # BLD AUTO: 316 K/UL — SIGNIFICANT CHANGE UP (ref 150–400)
POTASSIUM SERPL-MCNC: 3.9 MMOL/L — SIGNIFICANT CHANGE UP (ref 3.5–5.3)
POTASSIUM SERPL-MCNC: 3.9 MMOL/L — SIGNIFICANT CHANGE UP (ref 3.5–5.3)
POTASSIUM SERPL-SCNC: 3.9 MMOL/L — SIGNIFICANT CHANGE UP (ref 3.5–5.3)
POTASSIUM SERPL-SCNC: 3.9 MMOL/L — SIGNIFICANT CHANGE UP (ref 3.5–5.3)
RBC # BLD: 2.88 M/UL — LOW (ref 4.2–5.8)
RBC # BLD: 2.88 M/UL — LOW (ref 4.2–5.8)
RBC # FLD: 13.8 % — SIGNIFICANT CHANGE UP (ref 10.3–14.5)
RBC # FLD: 13.8 % — SIGNIFICANT CHANGE UP (ref 10.3–14.5)
SODIUM SERPL-SCNC: 141 MMOL/L — SIGNIFICANT CHANGE UP (ref 135–145)
SODIUM SERPL-SCNC: 141 MMOL/L — SIGNIFICANT CHANGE UP (ref 135–145)
WBC # BLD: 7.46 K/UL — SIGNIFICANT CHANGE UP (ref 3.8–10.5)
WBC # BLD: 7.46 K/UL — SIGNIFICANT CHANGE UP (ref 3.8–10.5)
WBC # FLD AUTO: 7.46 K/UL — SIGNIFICANT CHANGE UP (ref 3.8–10.5)
WBC # FLD AUTO: 7.46 K/UL — SIGNIFICANT CHANGE UP (ref 3.8–10.5)

## 2023-12-19 PROCEDURE — 74230 X-RAY XM SWLNG FUNCJ C+: CPT | Mod: 26

## 2023-12-19 PROCEDURE — 99233 SBSQ HOSP IP/OBS HIGH 50: CPT | Mod: GC

## 2023-12-19 RX ORDER — IRON SUCROSE 20 MG/ML
500 INJECTION, SOLUTION INTRAVENOUS EVERY 24 HOURS
Refills: 0 | Status: COMPLETED | OUTPATIENT
Start: 2023-12-19 | End: 2023-12-20

## 2023-12-19 RX ORDER — CARBAMIDE PEROXIDE 81.86 MG/ML
1 SOLUTION/ DROPS AURICULAR (OTIC)
Refills: 0 | Status: DISCONTINUED | OUTPATIENT
Start: 2023-12-19 | End: 2023-12-21

## 2023-12-19 RX ADMIN — Medication 975 MILLIGRAM(S): at 00:00

## 2023-12-19 RX ADMIN — SODIUM CHLORIDE 4 MILLILITER(S): 9 INJECTION INTRAMUSCULAR; INTRAVENOUS; SUBCUTANEOUS at 05:30

## 2023-12-19 RX ADMIN — Medication 4 MILLILITER(S): at 12:21

## 2023-12-19 RX ADMIN — Medication 975 MILLIGRAM(S): at 18:45

## 2023-12-19 RX ADMIN — ENOXAPARIN SODIUM 40 MILLIGRAM(S): 100 INJECTION SUBCUTANEOUS at 12:22

## 2023-12-19 RX ADMIN — OXYCODONE HYDROCHLORIDE 10 MILLIGRAM(S): 5 TABLET ORAL at 03:37

## 2023-12-19 RX ADMIN — Medication 3 MILLILITER(S): at 12:22

## 2023-12-19 RX ADMIN — Medication 975 MILLIGRAM(S): at 23:23

## 2023-12-19 RX ADMIN — SODIUM CHLORIDE 4 MILLILITER(S): 9 INJECTION INTRAMUSCULAR; INTRAVENOUS; SUBCUTANEOUS at 23:22

## 2023-12-19 RX ADMIN — LIDOCAINE 1 PATCH: 4 CREAM TOPICAL at 06:29

## 2023-12-19 RX ADMIN — Medication 975 MILLIGRAM(S): at 17:47

## 2023-12-19 RX ADMIN — Medication 4 MILLILITER(S): at 23:22

## 2023-12-19 RX ADMIN — Medication 3 MILLILITER(S): at 23:22

## 2023-12-19 RX ADMIN — OXYCODONE HYDROCHLORIDE 10 MILLIGRAM(S): 5 TABLET ORAL at 10:30

## 2023-12-19 RX ADMIN — CHLORHEXIDINE GLUCONATE 15 MILLILITER(S): 213 SOLUTION TOPICAL at 17:49

## 2023-12-19 RX ADMIN — CHLORHEXIDINE GLUCONATE 15 MILLILITER(S): 213 SOLUTION TOPICAL at 05:31

## 2023-12-19 RX ADMIN — Medication 4 MILLILITER(S): at 05:30

## 2023-12-19 RX ADMIN — SODIUM CHLORIDE 4 MILLILITER(S): 9 INJECTION INTRAMUSCULAR; INTRAVENOUS; SUBCUTANEOUS at 12:22

## 2023-12-19 RX ADMIN — IRON SUCROSE 68.75 MILLIGRAM(S): 20 INJECTION, SOLUTION INTRAVENOUS at 16:24

## 2023-12-19 RX ADMIN — OXYCODONE HYDROCHLORIDE 10 MILLIGRAM(S): 5 TABLET ORAL at 23:45

## 2023-12-19 RX ADMIN — OXYCODONE HYDROCHLORIDE 10 MILLIGRAM(S): 5 TABLET ORAL at 09:50

## 2023-12-19 RX ADMIN — SODIUM CHLORIDE 4 MILLILITER(S): 9 INJECTION INTRAMUSCULAR; INTRAVENOUS; SUBCUTANEOUS at 17:49

## 2023-12-19 RX ADMIN — Medication 3 MILLILITER(S): at 05:30

## 2023-12-19 RX ADMIN — Medication 975 MILLIGRAM(S): at 13:06

## 2023-12-19 RX ADMIN — Medication 975 MILLIGRAM(S): at 06:29

## 2023-12-19 RX ADMIN — PANTOPRAZOLE SODIUM 40 MILLIGRAM(S): 20 TABLET, DELAYED RELEASE ORAL at 12:21

## 2023-12-19 RX ADMIN — Medication 81 MILLIGRAM(S): at 12:21

## 2023-12-19 RX ADMIN — Medication 975 MILLIGRAM(S): at 12:22

## 2023-12-19 RX ADMIN — OXYCODONE HYDROCHLORIDE 10 MILLIGRAM(S): 5 TABLET ORAL at 04:07

## 2023-12-19 RX ADMIN — Medication 975 MILLIGRAM(S): at 05:30

## 2023-12-19 RX ADMIN — Medication 3 MILLILITER(S): at 17:48

## 2023-12-19 RX ADMIN — OXYCODONE HYDROCHLORIDE 10 MILLIGRAM(S): 5 TABLET ORAL at 21:30

## 2023-12-19 RX ADMIN — CLOPIDOGREL BISULFATE 75 MILLIGRAM(S): 75 TABLET, FILM COATED ORAL at 12:22

## 2023-12-19 RX ADMIN — Medication 4 MILLILITER(S): at 17:49

## 2023-12-19 NOTE — PROGRESS NOTE ADULT - SUBJECTIVE AND OBJECTIVE BOX
OVERNIGHT EVENTS: GERI    SUBJECTIVE:  Patient seen and examined at bedside. Reports feeling well, improving sputum production. Denies fevers, chills, shortness of breath, chest pain. States he feels mild abdominal distention since increasing tube feeds.    Vital Signs Last 12 Hrs  T(F): 98.6 (12-19-23 @ 09:00), Max: 98.6 (12-19-23 @ 09:00)  HR: 62 (12-19-23 @ 10:30) (62 - 90)  BP: 122/60 (12-19-23 @ 10:30) (113/54 - 131/61)  BP(mean): 86 (12-19-23 @ 10:30) (78 - 88)  RR: 18 (12-19-23 @ 10:30) (16 - 18)  SpO2: 96% (12-19-23 @ 10:30) (96% - 99%)  I&O's Summary    18 Dec 2023 07:01  -  19 Dec 2023 07:00  --------------------------------------------------------  IN: 2820 mL / OUT: 850 mL / NET: 1970 mL    19 Dec 2023 07:01  -  19 Dec 2023 12:16  --------------------------------------------------------  IN: 470 mL / OUT: 0 mL / NET: 470 mL        PHYSICAL EXAM:  Constitutional: NAD, comfortable in chair. no respiratory distress, on RA  HEENT: PERRLA, MMM, mandibular flap c/d/i, decannulated with dressing c/d/i  Neck: Supple  Respiratory: CTA B/L. No w/r/r.   Cardiovascular: RRR, normal S1 and S2, no m/r/g.   Gastrointestinal: +BS, soft NTND, +PEG  Extremities: wwp          LABS:                        8.0    7.46  )-----------( 316      ( 19 Dec 2023 05:30 )             25.0     12-19    141  |  107  |  8   ----------------------------<  146<H>  3.9   |  26  |  0.65    Ca    8.5      19 Dec 2023 05:30  Phos  3.0     12-19  Mg     1.7     12-19        Urinalysis Basic - ( 19 Dec 2023 05:30 )    Color: x / Appearance: x / SG: x / pH: x  Gluc: 146 mg/dL / Ketone: x  / Bili: x / Urobili: x   Blood: x / Protein: x / Nitrite: x   Leuk Esterase: x / RBC: x / WBC x   Sq Epi: x / Non Sq Epi: x / Bacteria: x          RADIOLOGY & ADDITIONAL TESTS:    MEDICATIONS  (STANDING):  acetaminophen   Oral Liquid .. 975 milliGRAM(s) Oral every 6 hours  acetylcysteine 20%  Inhalation 4 milliLiter(s) Inhalation every 6 hours  albuterol/ipratropium for Nebulization 3 milliLiter(s) Nebulizer every 6 hours  aspirin  chewable 81 milliGRAM(s) Oral daily  chlorhexidine 0.12% Liquid 15 milliLiter(s) Oral Mucosa two times a day  clopidogrel Tablet 75 milliGRAM(s) Enteral Tube daily  dextrose 5% + sodium chloride 0.45% 1000 milliLiter(s) (100 mL/Hr) IV Continuous <Continuous>  dextrose 5%. 1000 milliLiter(s) (50 mL/Hr) IV Continuous <Continuous>  dextrose 5%. 1000 milliLiter(s) (100 mL/Hr) IV Continuous <Continuous>  dextrose 50% Injectable 25 Gram(s) IV Push once  dextrose 50% Injectable 25 Gram(s) IV Push once  dextrose 50% Injectable 12.5 Gram(s) IV Push once  enoxaparin Injectable 40 milliGRAM(s) SubCutaneous every 24 hours  glucagon  Injectable 1 milliGRAM(s) IntraMuscular once  influenza  Vaccine (HIGH DOSE) 0.7 milliLiter(s) IntraMuscular once  pantoprazole  Injectable 40 milliGRAM(s) IV Push daily  sodium chloride 3%  Inhalation 4 milliLiter(s) Inhalation every 6 hours    MEDICATIONS  (PRN):  dextrose Oral Gel 15 Gram(s) Oral once PRN Blood Glucose LESS THAN 70 milliGRAM(s)/deciliter  melatonin Liquid 5 milliGRAM(s) Oral at bedtime PRN Insomnia  ondansetron Injectable 4 milliGRAM(s) IV Push every 6 hours PRN Nausea and/or Vomiting  oxyCODONE    Solution 5 milliGRAM(s) Oral every 4 hours PRN Moderate Pain (4 - 6)  oxyCODONE    Solution 10 milliGRAM(s) Oral every 4 hours PRN Severe Pain (7 - 10)

## 2023-12-19 NOTE — SWALLOW VFSS/MBS ASSESSMENT ADULT - PHARYNGEAL PHASE COMMENTS
Initiation of the pharyngeal swallow imaged at the level of the pyriform sinuses with thin liquids. Chronic penetration imaged with thin liquids and varied before and during the swallow. Delayed pharyngeal swallow/laryngeal vestibule closure resulted in intermittent penetration before the swallow with thin liquids. Reduced hyolaryngeal complex with varying absent-complete epiglottic inversion and delayed and incomplete (narrow column) laryngeal vestibule closure resulted in chronic deep penetration to the level of the VC with subsequent trace flash aspiration with thin liquids. Subsequent swallows (3-4) cleared material to above the level of the vocal folds (PAS 3). Implementation of an intentional bolus hold to with open cup sips (large bolus size, single sips) to compensate for reduced bolus control/delay resulted in increased discoordination and sensate aspiration (>trace, not gross). Spontaneous weak cough response noted. Patient cued to consume sips the way he would- consecutive sips consumed. No aspiration visualized and in fact, transient shallow penetration was primarily prominent. Transient shallow penetration imaged during the swallow with mildly thick liquids. Reduced BOT retraction (narrow column) with varying epiglottic inversion and reduced pharyngeal stripping resulted in mild (10-49%) vallecular residue to pudding and mild pyriform sinus residual with liquids (though deemed <10%). Liquid wash reduced pharyngeal residue to minimal amounts. No penetration/aspiration imaged with pudding.     A/P deferred due to mobility difficulty Initiation of the pharyngeal swallow imaged at the level of the pyriform sinuses with thin liquids. Chronic penetration imaged with thin liquids and varied before and during the swallow. Delayed pharyngeal swallow/laryngeal vestibule closure resulted in intermittent penetration before the swallow with thin liquids. Reduced hyolaryngeal complex with varying absent-complete epiglottic inversion and delayed and incomplete (narrow column) laryngeal vestibule closure resulted in chronic deep penetration to the level of the VC during the swallow/subsequent swallows with subsequent trace flash aspiration with thin liquids. Multiple swallows (3-4) eventually cleared material to above the level of the vocal folds (PAS 3). Implementation of an intentional bolus hold to with open cup sips (large bolus size, single sips) to compensate for reduced bolus control/delay resulted in increased discoordination and sensate aspiration (>trace, not gross). Spontaneous weak cough response noted. Patient cued to consume sips the way he would- consecutive sips consumed. No aspiration visualized and in fact, transient shallow penetration was primarily prominent. Transient shallow penetration imaged during the swallow with mildly thick liquids. Reduced BOT retraction (narrow column) with varying epiglottic inversion and reduced pharyngeal stripping resulted in mild (10-49%) vallecular residue to pudding and mild pyriform sinus residual with liquids (though deemed <10%). Liquid wash reduced pharyngeal residue to minimal amounts. No penetration/aspiration imaged with pudding.     A/P deferred due to mobility difficulty. Oblique position obtained. Initiation of the pharyngeal swallow imaged at the level of the pyriform sinuses with thin liquids. Chronic penetration imaged with thin liquids and varied before and during the swallow. Delayed pharyngeal swallow/laryngeal vestibule closure resulted in intermittent penetration before the swallow with thin liquids. Reduced hyolaryngeal complex with varying absent-complete epiglottic inversion and delayed and incomplete (narrow column) laryngeal vestibule closure resulted in chronic deep penetration to the level of the VC during the swallow with subsequent trace flash aspiration with thin liquids. Multiple swallows (3-4) eventually cleared material to above the level of the vocal folds (PAS 3). Implementation of an intentional bolus hold with open cup sips (large bolus size, single sips) to compensate for reduced bolus control/delay resulted in increased discoordination and sensate aspiration (>trace, not gross). Spontaneous weak cough response noted. Patient cued to consume sips the way he would- consecutive sips consumed. No aspiration visualized and in fact, transient shallow penetration was primarily prominent. Transient shallow penetration imaged during the swallow with mildly thick liquids. Reduced BOT retraction (narrow column) with varying epiglottic inversion and reduced pharyngeal stripping resulted in mild (10-49%) vallecular residue to pudding and mild pyriform sinus residual with liquids (though deemed <10%). Liquid wash reduced pharyngeal residue to minimal amounts. No penetration/aspiration imaged with pudding.     A/P deferred due to mobility difficulty. Oblique position obtained.

## 2023-12-19 NOTE — DISCHARGE NOTE PROVIDER - HOSPITAL COURSE
OTOLARYNGOLOGY (ENT) DISCHARGE SUMMARY    PATIENT: SAUNDRA HOLMAN               : 57  MRN: [id number]  ADMISSION DATE: 23  Discharge Date: 23 @ 11:49  Attending Physician: Teddy Mendoza    Admission Diagnosis:  ORAL CANCER        Status post:Free flap, fibula    Planned tracheostomy    Mandibulectomy    Laparoscopic placement of gastrostomy tube    Repair, hernia, umbilical, with lipectomy         Chronic Conditions:  Neoplasm of mandible          			         ID:SAUNDRA HOLMAN is a  67yo M w PMH of CAD (x2 stents Mar 2023), HLD, T2DM, hx of kidney stones, psoriasis presents to [facility] for evaluation of oral mass. Reports Three month hx of jaw pain and loosening of mandibular molar tooth on the lower left. Pt has a 30 pack yr smoking hx, quit 1 yr ago. 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 POD 1 composite mandibular resection, neck dissection and fibula free flap reconstruction.       Subjective/ Interval:   ; patient seen this morning, oozing from trach as expected ,  Plan to start aspirin today, cuff is up on tracheostomy tube, intraoral skin panel intact doppler signal strong,  plan to advance NGT   : Patient seen and examined at bedside. AFVSS overnight. Significant HgB drop to 7.2 noted this morning. NGT clogged, attempted replacement but went into lung, need to replace. Cuff deflated. Patient reports lethargy. Otherwise no new complaints. Intraoral skin paddle intact with strong doppler signal. Plan to hold off one eliquis given significant HgB drop  12/10: Patient seen and examined at bedside. AFVSS overnight other than bradycardic to lowest 39, mainly in 40s-50s which seems to be his baseline even pre-op. Not symptomatic while chinedu. Otherwise, patient stable on TC with no issues. Yesterday, iatrogenic right sided PTX occured with NGT placement, patient remained asymptomatic throughout. Patient now s/p placement of pig-tail catheter by pulm with significant improvement in PTX. Chest tube clamped this morning with plan for removal this evening. Patient s/p 2 units of PRBCs with moderate improvement in Hgb. Today, patient reports feeling significantly better than yesterday and overall "feels good." He was started on trickle feeds last night but felt some chest tightness and so they were held. Patient also failed TOV again and was straight cathed. KRISTEN drain output significantly decreased this morning. No other changes at this time.   : patient seen this morning PTX has resolved on CXR, intraoral flap intact, doppler strong, trach in place,  KRISTEN drains with minimal output   : Patient seen and examined this morning at bedside. Overnight, NGT slightly displaced, coming out around 10 cm from original placement, advanced and secured, pending CXR. Patient failed trial of clears with SLP yesterday but tolerated PMV without issue. Patient continues to be OOBTC daily and is tolerating tube feeds at goal. 2 KRISTEN drains removed yesterday, remaining KRISTEN with minimal output. Intraoral flap intact, doppler strong, trach in place. Patient denies any other new complaints at this time.   : patient febrile overnight to 103, continues to have intermittent PVC, HR last night in 40s, patients baseline 50. Gen surg will like to wait 24 hours afebrile until g tube,  Oral flap appears to be congested, dixon continue to monitor. KRISTEN drain will be removed today. Pending results of fever work up . HEB 7.5, will transfuse when type and screen returns   : Patient seen this morning, afebrile overnight, trach secretion odor improving,  noted ot have an episode of oral bleeding after ambulation, bleeding stopped, unable to see source, will continue to monitor , doppler strong ( and removed today) ,  Flap continues to look dusky   12/15: patient sen this morning, continues to have intermittent oozing from left mandibular screw, Surgicel placed no active bleed, flap remains dusky but stable, afebrile overnight  : NAEON. AVSS. Patient in OR for PEG this morning. Will re-evaluate s/p PEG  : Re-evaluated s/p PEG. complaining fo abdominal discomfort. Flap is stable appearing. Donor site with wound vac. LLE edema improving. No further bleeding from the mouth.  : NAEON. AVSS. Examined at bedside. Trach changed to 6 uncuffed and confirmed on scope, secured with trach ties. Straight cath overnight for retention. Has not voided since. Will f/u bladder scan and straight cath if needed  : NAEON. AFVSS. Examined at bedside. Tolerating capping overnight. Passed TOV last night. TF started yesterday evening, nausea overnight so TF held again. He continues to have moderate oral secretions requiring oral suctioning.   : NAEON. AFVSS. Decannulated yesterday, respirating comfortably on room air. TF transitioned to Vital 1.5 yesterday evening, tolerating without abdominal pain. He continues to have moderate oral secretions.  MBS done today- cleared for puree and thin liquids along wiht tube feeds         Discharge Condition: Stable  OTOLARYNGOLOGY (ENT) DISCHARGE SUMMARY    PATIENT: SAUNDRA HOLMAN               : 57  MRN: [id number]  ADMISSION DATE: 23  Discharge Date: 23 @ 11:49  Attending Physician: Teddy Mendoza    Admission Diagnosis:  ORAL CANCER        Status post:Free flap, fibula    Planned tracheostomy    Mandibulectomy    Laparoscopic placement of gastrostomy tube    Repair, hernia, umbilical, with lipectomy         Chronic Conditions:  Neoplasm of mandible          			         ID:SAUNDRA HOLMAN is a  67yo M w PMH of CAD (x2 stents Mar 2023), HLD, T2DM, hx of kidney stones, psoriasis presents to [facility] for evaluation of oral mass. Reports Three month hx of jaw pain and loosening of mandibular molar tooth on the lower left. Pt has a 30 pack yr smoking hx, quit 1 yr ago. 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 POD 1 composite mandibular resection, neck dissection and fibula free flap reconstruction.       Subjective/ Interval:   ; patient seen this morning, oozing from trach as expected ,  Plan to start aspirin today, cuff is up on tracheostomy tube, intraoral skin panel intact doppler signal strong,  plan to advance NGT   : Patient seen and examined at bedside. AFVSS overnight. Significant HgB drop to 7.2 noted this morning. NGT clogged, attempted replacement but went into lung, need to replace. Cuff deflated. Patient reports lethargy. Otherwise no new complaints. Intraoral skin paddle intact with strong doppler signal. Plan to hold off one eliquis given significant HgB drop  12/10: Patient seen and examined at bedside. AFVSS overnight other than bradycardic to lowest 39, mainly in 40s-50s which seems to be his baseline even pre-op. Not symptomatic while chinedu. Otherwise, patient stable on TC with no issues. Yesterday, iatrogenic right sided PTX occured with NGT placement, patient remained asymptomatic throughout. Patient now s/p placement of pig-tail catheter by pulm with significant improvement in PTX. Chest tube clamped this morning with plan for removal this evening. Patient s/p 2 units of PRBCs with moderate improvement in Hgb. Today, patient reports feeling significantly better than yesterday and overall "feels good." He was started on trickle feeds last night but felt some chest tightness and so they were held. Patient also failed TOV again and was straight cathed. KRISTEN drain output significantly decreased this morning. No other changes at this time.   : patient seen this morning PTX has resolved on CXR, intraoral flap intact, doppler strong, trach in place,  KRISTEN drains with minimal output   : Patient seen and examined this morning at bedside. Overnight, NGT slightly displaced, coming out around 10 cm from original placement, advanced and secured, pending CXR. Patient failed trial of clears with SLP yesterday but tolerated PMV without issue. Patient continues to be OOBTC daily and is tolerating tube feeds at goal. 2 KRISTEN drains removed yesterday, remaining KRISTEN with minimal output. Intraoral flap intact, doppler strong, trach in place. Patient denies any other new complaints at this time.   : patient febrile overnight to 103, continues to have intermittent PVC, HR last night in 40s, patients baseline 50. Gen surg will like to wait 24 hours afebrile until g tube,  Oral flap appears to be congested, dixon continue to monitor. KRISTEN drain will be removed today. Pending results of fever work up . HEB 7.5, will transfuse when type and screen returns   : Patient seen this morning, afebrile overnight, trach secretion odor improving,  noted ot have an episode of oral bleeding after ambulation, bleeding stopped, unable to see source, will continue to monitor , doppler strong ( and removed today) ,  Flap continues to look dusky   12/15: patient sen this morning, continues to have intermittent oozing from left mandibular screw, Surgicel placed no active bleed, flap remains dusky but stable, afebrile overnight  : NAEON. AVSS. Patient in OR for PEG this morning. Will re-evaluate s/p PEG  : Re-evaluated s/p PEG. complaining fo abdominal discomfort. Flap is stable appearing. Donor site with wound vac. LLE edema improving. No further bleeding from the mouth.  : NAEON. AVSS. Examined at bedside. Trach changed to 6 uncuffed and confirmed on scope, secured with trach ties. Straight cath overnight for retention. Has not voided since. Will f/u bladder scan and straight cath if needed  : NAEON. AFVSS. Examined at bedside. Tolerating capping overnight. Passed TOV last night. TF started yesterday evening, nausea overnight so TF held again. He continues to have moderate oral secretions requiring oral suctioning.   : NAEON. AFVSS. Decannulated yesterday, respirating comfortably on room air. TF transitioned to Vital 1.5 yesterday evening, tolerating without abdominal pain. He continues to have moderate oral secretions.  MBS done today- cleared for puree and thin liquids along wiht tube feeds         Discharge Condition: Stable  OTOLARYNGOLOGY (ENT) DISCHARGE SUMMARY    PATIENT: SAUNDRA HOLMAN               : 57  MRN: [id number]  ADMISSION DATE: 23  Discharge Date: 23 @ 11:49  Attending Physician: Teddy Mendoza    Admission Diagnosis:  ORAL CANCER        Status post: Free flap, fibula    Planned tracheostomy    Mandibulectomy    Laparoscopic placement of gastrostomy tube    Repair, hernia, umbilical, with lipectomy         Chronic Conditions:  Neoplasm of mandible          			         ID:SAUNDRA HOLMAN is a  67yo M w PMH of CAD (x2 stents Mar 2023), HLD, T2DM, hx of kidney stones, psoriasis presents to [facility] for evaluation of oral mass. Reports Three month hx of jaw pain and loosening of mandibular molar tooth on the lower left. Pt has a 30 pack yr smoking hx, quit 1 yr ago. 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 POD 1 composite mandibular resection, neck dissection and fibula free flap reconstruction.       Subjective/ Interval:   ; patient seen this morning, oozing from trach as expected ,  Plan to start aspirin today, cuff is up on tracheostomy tube, intraoral skin panel intact doppler signal strong,  plan to advance NGT   : Patient seen and examined at bedside. AFVSS overnight. Significant HgB drop to 7.2 noted this morning. NGT clogged, attempted replacement but went into lung, need to replace. Cuff deflated. Patient reports lethargy. Otherwise no new complaints. Intraoral skin paddle intact with strong doppler signal. Plan to hold off one eliquis given significant HgB drop  12/10: Patient seen and examined at bedside. AFVSS overnight other than bradycardic to lowest 39, mainly in 40s-50s which seems to be his baseline even pre-op. Not symptomatic while chinedu. Otherwise, patient stable on TC with no issues. Yesterday, iatrogenic right sided PTX occured with NGT placement, patient remained asymptomatic throughout. Patient now s/p placement of pig-tail catheter by pulm with significant improvement in PTX. Chest tube clamped this morning with plan for removal this evening. Patient s/p 2 units of PRBCs with moderate improvement in Hgb. Today, patient reports feeling significantly better than yesterday and overall "feels good." He was started on trickle feeds last night but felt some chest tightness and so they were held. Patient also failed TOV again and was straight cathed. KRISTEN drain output significantly decreased this morning. No other changes at this time.   : patient seen this morning PTX has resolved on CXR, intraoral flap intact, doppler strong, trach in place,  KRISTEN drains with minimal output   : Patient seen and examined this morning at bedside. Overnight, NGT slightly displaced, coming out around 10 cm from original placement, advanced and secured, pending CXR. Patient failed trial of clears with SLP yesterday but tolerated PMV without issue. Patient continues to be OOBTC daily and is tolerating tube feeds at goal. 2 KRISTEN drains removed yesterday, remaining KRISTEN with minimal output. Intraoral flap intact, doppler strong, trach in place. Patient denies any other new complaints at this time.   : patient febrile overnight to 103, continues to have intermittent PVC, HR last night in 40s, patients baseline 50. Gen surg will like to wait 24 hours afebrile until g tube,  Oral flap appears to be congested, dixon continue to monitor. KRISTEN drain will be removed today. Pending results of fever work up . HEB 7.5, will transfuse when type and screen returns   : Patient seen this morning, afebrile overnight, trach secretion odor improving,  noted ot have an episode of oral bleeding after ambulation, bleeding stopped, unable to see source, will continue to monitor , doppler strong ( and removed today) ,  Flap continues to look dusky   12/15: patient sen this morning, continues to have intermittent oozing from left mandibular screw, Surgicel placed no active bleed, flap remains dusky but stable, afebrile overnight  : NAEON. AVSS. Patient in OR for PEG this morning. Will re-evaluate s/p PEG  : Re-evaluated s/p PEG. complaining fo abdominal discomfort. Flap is stable appearing. Donor site with wound vac. LLE edema improving. No further bleeding from the mouth.  : NAEON. AVSS. Examined at bedside. Trach changed to 6 uncuffed and confirmed on scope, secured with trach ties. Straight cath overnight for retention. Has not voided since. Will f/u bladder scan and straight cath if needed  : NAEON. AFVSS. Examined at bedside. Tolerating capping overnight. Passed TOV last night. TF started yesterday evening, nausea overnight so TF held again. He continues to have moderate oral secretions requiring oral suctioning.   : NAEON. AFVSS. Decannulated yesterday, respirating comfortably on room air. TF transitioned to Vital 1.5 yesterday evening, tolerating without abdominal pain. He continues to have moderate oral secretions.  MBS done today- cleared for puree and thin liquids along wiht tube feeds         Discharge Condition: Stable  OTOLARYNGOLOGY (ENT) DISCHARGE SUMMARY    PATIENT: SAUNDRA HOLMAN               : 57  MRN: [id number]  ADMISSION DATE: 23  Discharge Date: 23 @ 11:49  Attending Physician: Teddy Mendoza    Admission Diagnosis:  ORAL CANCER        Status post: Free flap, fibula    Planned tracheostomy    Mandibulectomy    Laparoscopic placement of gastrostomy tube    Repair, hernia, umbilical, with lipectomy         Chronic Conditions:  Neoplasm of mandible          			         ID:SAUNDRA HOLMAN is a  67yo M w PMH of CAD (x2 stents Mar 2023), HLD, T2DM, hx of kidney stones, psoriasis presents to [facility] for evaluation of oral mass. Reports Three month hx of jaw pain and loosening of mandibular molar tooth on the lower left. Pt has a 30 pack yr smoking hx, quit 1 yr ago. 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 POD 1 composite mandibular resection, neck dissection and fibula free flap reconstruction.       Subjective/ Interval:   ; patient seen this morning, oozing from trach as expected ,  Plan to start aspirin today, cuff is up on tracheostomy tube, intraoral skin panel intact doppler signal strong,  plan to advance NGT   : Patient seen and examined at bedside. AFVSS overnight. Significant HgB drop to 7.2 noted this morning. NGT clogged, attempted replacement but went into lung, need to replace. Cuff deflated. Patient reports lethargy. Otherwise no new complaints. Intraoral skin paddle intact with strong doppler signal. Plan to hold off one eliquis given significant HgB drop  12/10: Patient seen and examined at bedside. AFVSS overnight other than bradycardic to lowest 39, mainly in 40s-50s which seems to be his baseline even pre-op. Not symptomatic while chinedu. Otherwise, patient stable on TC with no issues. Yesterday, iatrogenic right sided PTX occured with NGT placement, patient remained asymptomatic throughout. Patient now s/p placement of pig-tail catheter by pulm with significant improvement in PTX. Chest tube clamped this morning with plan for removal this evening. Patient s/p 2 units of PRBCs with moderate improvement in Hgb. Today, patient reports feeling significantly better than yesterday and overall "feels good." He was started on trickle feeds last night but felt some chest tightness and so they were held. Patient also failed TOV again and was straight cathed. KRISTEN drain output significantly decreased this morning. No other changes at this time.   : patient seen this morning PTX has resolved on CXR, intraoral flap intact, doppler strong, trach in place,  KRISTEN drains with minimal output   : Patient seen and examined this morning at bedside. Overnight, NGT slightly displaced, coming out around 10 cm from original placement, advanced and secured, pending CXR. Patient failed trial of clears with SLP yesterday but tolerated PMV without issue. Patient continues to be OOBTC daily and is tolerating tube feeds at goal. 2 KRISTEN drains removed yesterday, remaining KRISTEN with minimal output. Intraoral flap intact, doppler strong, trach in place. Patient denies any other new complaints at this time.   : patient febrile overnight to 103, continues to have intermittent PVC, HR last night in 40s, patients baseline 50. Gen surg will like to wait 24 hours afebrile until g tube,  Oral flap appears to be congested, dixon continue to monitor. KRISTEN drain will be removed today. Pending results of fever work up . HEB 7.5, will transfuse when type and screen returns   : Patient seen this morning, afebrile overnight, trach secretion odor improving,  noted ot have an episode of oral bleeding after ambulation, bleeding stopped, unable to see source, will continue to monitor , doppler strong ( and removed today) ,  Flap continues to look dusky   12/15: patient sen this morning, continues to have intermittent oozing from left mandibular screw, Surgicel placed no active bleed, flap remains dusky but stable, afebrile overnight  : NAEON. AVSS. Patient in OR for PEG this morning. Will re-evaluate s/p PEG  : Re-evaluated s/p PEG. complaining fo abdominal discomfort. Flap is stable appearing. Donor site with wound vac. LLE edema improving. No further bleeding from the mouth.  : NAEON. AVSS. Examined at bedside. Trach changed to 6 uncuffed and confirmed on scope, secured with trach ties. Straight cath overnight for retention. Has not voided since. Will f/u bladder scan and straight cath if needed  : NAEON. AFVSS. Examined at bedside. Tolerating capping overnight. Passed TOV last night. TF started yesterday evening, nausea overnight so TF held again. He continues to have moderate oral secretions requiring oral suctioning.   : NAEON. AFVSS. Decannulated yesterday, respirating comfortably on room air. TF transitioned to Vital 1.5 yesterday evening, tolerating without abdominal pain. He continues to have moderate oral secretions.  MBS done today- cleared for puree and thin liquids along wiht tube feeds         Discharge Condition: Stable  OTOLARYNGOLOGY (ENT) DISCHARGE SUMMARY    PATIENT: SAUNDRA HOLMAN               : 57  MRN: [id number]  ADMISSION DATE: 23  Discharge Date: 23 @ 11:49  Attending Physician: Teddy Mendoza    Admission Diagnosis:  ORAL CANCER        Status post: Free flap, fibula    Planned tracheostomy    Mandibulectomy    Laparoscopic placement of gastrostomy tube    Repair, hernia, umbilical, with lipectomy         Chronic Conditions:  Neoplasm of mandible          			         ID:SAUNDRA HOLMAN is a  67yo M w PMH of CAD (x2 stents Mar 2023), HLD, T2DM, hx of kidney stones, psoriasis presents to [facility] for evaluation of oral mass. Reports Three month hx of jaw pain and loosening of mandibular molar tooth on the lower left. Pt has a 30 pack yr smoking hx, quit 1 yr ago. 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 POD 1 composite mandibular resection, neck dissection and fibula free flap reconstruction.       Subjective/ Interval:   ; patient seen this morning, oozing from trach as expected ,  Plan to start aspirin today, cuff is up on tracheostomy tube, intraoral skin panel intact doppler signal strong,  plan to advance NGT   : Patient seen and examined at bedside. AFVSS overnight. Significant HgB drop to 7.2 noted this morning. NGT clogged, attempted replacement but went into lung, need to replace. Cuff deflated. Patient reports lethargy. Otherwise no new complaints. Intraoral skin paddle intact with strong doppler signal. Plan to hold off one eliquis given significant HgB drop  12/10: Patient seen and examined at bedside. AFVSS overnight other than bradycardic to lowest 39, mainly in 40s-50s which seems to be his baseline even pre-op. Not symptomatic while chinedu. Otherwise, patient stable on TC with no issues. Yesterday, iatrogenic right sided PTX occured with NGT placement, patient remained asymptomatic throughout. Patient now s/p placement of pig-tail catheter by pulm with significant improvement in PTX. Chest tube clamped this morning with plan for removal this evening. Patient s/p 2 units of PRBCs with moderate improvement in Hgb. Today, patient reports feeling significantly better than yesterday and overall "feels good." He was started on trickle feeds last night but felt some chest tightness and so they were held. Patient also failed TOV again and was straight cathed. KRISTEN drain output significantly decreased this morning. No other changes at this time.   : patient seen this morning PTX has resolved on CXR, intraoral flap intact, doppler strong, trach in place,  KRISTEN drains with minimal output   : Patient seen and examined this morning at bedside. Overnight, NGT slightly displaced, coming out around 10 cm from original placement, advanced and secured, pending CXR. Patient failed trial of clears with SLP yesterday but tolerated PMV without issue. Patient continues to be OOBTC daily and is tolerating tube feeds at goal. 2 KRISTEN drains removed yesterday, remaining KRISTEN with minimal output. Intraoral flap intact, doppler strong, trach in place. Patient denies any other new complaints at this time.   : patient febrile overnight to 103, continues to have intermittent PVC, HR last night in 40s, patients baseline 50. Gen surg will like to wait 24 hours afebrile until g tube,  Oral flap appears to be congested, dixon continue to monitor. KRISTEN drain will be removed today. Pending results of fever work up . HEB 7.5, will transfuse when type and screen returns   : Patient seen this morning, afebrile overnight, trach secretion odor improving,  noted ot have an episode of oral bleeding after ambulation, bleeding stopped, unable to see source, will continue to monitor , doppler strong ( and removed today) ,  Flap continues to look dusky   12/15: patient sen this morning, continues to have intermittent oozing from left mandibular screw, Surgicel placed no active bleed, flap remains dusky but stable, afebrile overnight  : NAEON. AVSS. Patient in OR for PEG this morning. Will re-evaluate s/p PEG  : Re-evaluated s/p PEG. complaining fo abdominal discomfort. Flap is stable appearing. Donor site with wound vac. LLE edema improving. No further bleeding from the mouth.  : NAEON. AVSS. Examined at bedside. Trach changed to 6 uncuffed and confirmed on scope, secured with trach ties. Straight cath overnight for retention. Has not voided since. Will f/u bladder scan and straight cath if needed  : NAEON. AFVSS. Examined at bedside. Tolerating capping overnight. Passed TOV last night. TF started yesterday evening, nausea overnight so TF held again. He continues to have moderate oral secretions requiring oral suctioning.   : NAEON. AFVSS. Decannulated yesterday, respirating comfortably on room air. TF transitioned to Vital 1.5 yesterday evening, tolerating without abdominal pain. He continues to have moderate oral secretions.  MBS done today- cleared for puree and thin liquids along wiht tube feeds   : NAEON. AFVSS. Transitioned to bolus TF, tolerating bolus TF. MBS performed yesterday, patient cleared for pureed with thin liquids. He continues to have intermittent oozing from left mandibular screw, no active bleeding.   : NAOLGAON. AFVSS. Tolerating puree and TFs. He continues to have intermittent oozing from left mandibular screw, no active bleeding. Wound vac removed this morning and wife/patient taught to change dressing.         Discharge Condition: Stable  OTOLARYNGOLOGY (ENT) DISCHARGE SUMMARY    PATIENT: SAUNDRA HOLMAN               : 57  MRN: [id number]  ADMISSION DATE: 23  Discharge Date: 23 @ 11:49  Attending Physician: Teddy Mendoza    Admission Diagnosis:  ORAL CANCER        Status post: Free flap, fibula    Planned tracheostomy    Mandibulectomy    Laparoscopic placement of gastrostomy tube    Repair, hernia, umbilical, with lipectomy         Chronic Conditions:  Neoplasm of mandible          			         ID:SAUNDRA HOLMAN is a  67yo M w PMH of CAD (x2 stents Mar 2023), HLD, T2DM, hx of kidney stones, psoriasis presents to [facility] for evaluation of oral mass. Reports Three month hx of jaw pain and loosening of mandibular molar tooth on the lower left. Pt has a 30 pack yr smoking hx, quit 1 yr ago. 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 POD 1 composite mandibular resection, neck dissection and fibula free flap reconstruction.       Subjective/ Interval:   ; patient seen this morning, oozing from trach as expected ,  Plan to start aspirin today, cuff is up on tracheostomy tube, intraoral skin panel intact doppler signal strong,  plan to advance NGT   : Patient seen and examined at bedside. AFVSS overnight. Significant HgB drop to 7.2 noted this morning. NGT clogged, attempted replacement but went into lung, need to replace. Cuff deflated. Patient reports lethargy. Otherwise no new complaints. Intraoral skin paddle intact with strong doppler signal. Plan to hold off one eliquis given significant HgB drop  12/10: Patient seen and examined at bedside. AFVSS overnight other than bradycardic to lowest 39, mainly in 40s-50s which seems to be his baseline even pre-op. Not symptomatic while chinedu. Otherwise, patient stable on TC with no issues. Yesterday, iatrogenic right sided PTX occured with NGT placement, patient remained asymptomatic throughout. Patient now s/p placement of pig-tail catheter by pulm with significant improvement in PTX. Chest tube clamped this morning with plan for removal this evening. Patient s/p 2 units of PRBCs with moderate improvement in Hgb. Today, patient reports feeling significantly better than yesterday and overall "feels good." He was started on trickle feeds last night but felt some chest tightness and so they were held. Patient also failed TOV again and was straight cathed. KRISTEN drain output significantly decreased this morning. No other changes at this time.   : patient seen this morning PTX has resolved on CXR, intraoral flap intact, doppler strong, trach in place,  KRISTEN drains with minimal output   : Patient seen and examined this morning at bedside. Overnight, NGT slightly displaced, coming out around 10 cm from original placement, advanced and secured, pending CXR. Patient failed trial of clears with SLP yesterday but tolerated PMV without issue. Patient continues to be OOBTC daily and is tolerating tube feeds at goal. 2 KRISTEN drains removed yesterday, remaining KRISTEN with minimal output. Intraoral flap intact, doppler strong, trach in place. Patient denies any other new complaints at this time.   : patient febrile overnight to 103, continues to have intermittent PVC, HR last night in 40s, patients baseline 50. Gen surg will like to wait 24 hours afebrile until g tube,  Oral flap appears to be congested, dixon continue to monitor. KRISTEN drain will be removed today. Pending results of fever work up . HEB 7.5, will transfuse when type and screen returns   : Patient seen this morning, afebrile overnight, trach secretion odor improving,  noted ot have an episode of oral bleeding after ambulation, bleeding stopped, unable to see source, will continue to monitor , doppler strong ( and removed today) ,  Flap continues to look dusky   12/15: patient sen this morning, continues to have intermittent oozing from left mandibular screw, Surgicel placed no active bleed, flap remains dusky but stable, afebrile overnight  : NAEON. AVSS. Patient in OR for PEG this morning. Will re-evaluate s/p PEG  : Re-evaluated s/p PEG. complaining fo abdominal discomfort. Flap is stable appearing. Donor site with wound vac. LLE edema improving. No further bleeding from the mouth.  : NAEON. AVSS. Examined at bedside. Trach changed to 6 uncuffed and confirmed on scope, secured with trach ties. Straight cath overnight for retention. Has not voided since. Will f/u bladder scan and straight cath if needed  : NAEON. AFVSS. Examined at bedside. Tolerating capping overnight. Passed TOV last night. TF started yesterday evening, nausea overnight so TF held again. He continues to have moderate oral secretions requiring oral suctioning.   : NAEON. AFVSS. Decannulated yesterday, respirating comfortably on room air. TF transitioned to Vital 1.5 yesterday evening, tolerating without abdominal pain. He continues to have moderate oral secretions.  MBS done today- cleared for puree and thin liquids along wiht tube feeds   : NAEON. AFVSS. Transitioned to bolus TF, tolerating bolus TF. MBS performed yesterday, patient cleared for pureed with thin liquids. He continues to have intermittent oozing from left mandibular screw, no active bleeding.   : NAOLGAON. AFVSS. Tolerating puree and TFs. He continues to have intermittent oozing from left mandibular screw, no active bleeding. Wound vac removed this morning and wife/patient taught to change dressing.         Discharge Condition: Stable

## 2023-12-19 NOTE — SWALLOW VFSS/MBS ASSESSMENT ADULT - SLP GENERAL OBSERVATIONS
Patient was seen fully awake and alert, sitting fully upright in chair, on room air. Pt followed simple directives and communicated wants/needs.

## 2023-12-19 NOTE — DISCHARGE NOTE NURSING/CASE MANAGEMENT/SOCIAL WORK - NSDCPEFALRISK_GEN_ALL_CORE
For information on Fall & Injury Prevention, visit: [email or web address] OR  [email or web address] OR  https://www.cdc.gov/steadi/patient.html For information on Fall & Injury Prevention, visit: [email or web address] OR  [email or web address] OR  https://www.cdc.gov/steadi/patient.html

## 2023-12-19 NOTE — DISCHARGE NOTE PROVIDER - NSDCQMSTROKE_NEU_ALL_CORE
[Normal Sclera/Conjunctiva] : normal sclera/conjunctiva [Normal Outer Ear/Nose] : the outer ears and nose were normal in appearance [Normal TMs] : both tympanic membranes were normal [Normal Oropharynx] : the oropharynx was normal [Supple] : supple [No Lymphadenopathy] : no lymphadenopathy [Regular Rhythm] : with a regular rhythm [Normal S1, S2] : normal S1 and S2 [Normal] : soft, non-tender, non-distended, no masses palpated, no HSM and normal bowel sounds [de-identified] : Tachycardic No

## 2023-12-19 NOTE — DISCHARGE NOTE NURSING/CASE MANAGEMENT/SOCIAL WORK - NSSCNAMETXT_GEN_ALL_CORE
[facility] [employer] [facility]

## 2023-12-19 NOTE — SWALLOW VFSS/MBS ASSESSMENT ADULT - CONSISTENCIES ADMINISTERED
thin liquids (tsp x 1, 5mL x 2, 10mL x 2, open cup x 4), pudding x 2 **(it should be noted, thin liquid-spoon labeled with intentional bolus hold was an open cup sip)

## 2023-12-19 NOTE — PROGRESS NOTE ADULT - ASSESSMENT
69 y/o M w PMHx of CAD (x2 stents Mar 2023), HLD, T2DM, nephrolithiasis (ureteral stent placement 2022), and psoriasis w/ three month hx of jaw pain and loosening of lower left mandibular molar tooth. Pt has a 30 pack yr smoking hx, quit 1 yr ago. 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 12/7 s/p L hemimandibulectomy, L level 1, 2a, 3 neck dissection, L fibula free flap reconstruction, STSG from L thigh, dental implants, and tracheostomy. Admitted to SICU for flap checks and hemodynamic monitoring. OR 12/16 for PEG tube placement, patient tolerated procedure well. Tube in place, securely sutured, benign abdominal exam.     -Continue tube feeds as tolerated, advance to goal as tolerated   - Please call if any further questions or concerns   - team 4c will sign off     67 y/o M w PMHx of CAD (x2 stents Mar 2023), HLD, T2DM, nephrolithiasis (ureteral stent placement 2022), and psoriasis w/ three month hx of jaw pain and loosening of lower left mandibular molar tooth. Pt has a 30 pack yr smoking hx, quit 1 yr ago. 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 12/7 s/p L hemimandibulectomy, L level 1, 2a, 3 neck dissection, L fibula free flap reconstruction, STSG from L thigh, dental implants, and tracheostomy. Admitted to SICU for flap checks and hemodynamic monitoring. OR 12/16 for PEG tube placement, patient tolerated procedure well. Tube in place, securely sutured, benign abdominal exam.     -Continue tube feeds as tolerated, advance to goal as tolerated   - Please call if any further questions or concerns   - team 4c will sign off

## 2023-12-19 NOTE — PROGRESS NOTE ADULT - ATTENDING COMMENTS
68M w/ [id number] SCC of L buccal mucosa presents for pre optimization for surgery 12/7, now s/p hemimandibulectomy, left level 1, 2a, 3 neck neck dissection, L FFF, tracheostomy w/ 7.5 cuffed portex, dental implants, STSG 12/7- plan for G tube placement postponed due to fever 12/12- blood cx drawn, respiratory culture likely contamination -reported thick sputum from trach after nebs    CC: Pt seen w. Dr. Dean, in good spirits, decanulated. Tolerating TF @ 60ml/hr however felt a little bloated     - dysphagia: s/p laparoscopic PEG placement ( 12/16), tolerating tube feeds @ 60ml/hr , SLP f/u appreciated, s/p VFSS/MBS and rec: puree and thin liquid as toleated   - Aspiration PNA: Sputum Cx" e.coli and enterobacter cloacae, completed total 5 days course of Ceftriaxone  (12/17)   - oral care as per ENT.   - would keep duo-nebs q 6 ( to help loosen up secretion)  - active T&S, monitor H/H 8.0  - CAD/PCI x2 ( 3/2023)  c/w ASA and Lipitor, to d/w Cardiology re: resuming plavix for DAPT total 1 year then ASA alone thereafter   - DM - FS with ISS, caution for Hypoglycemia.  - LLE edema- Venous doppler negative .  -VTE ppx: Enoxaparin 40mg sq daily 68M w/ [id number] SCC of L buccal mucosa presents for pre optimization for surgery 12/7, now s/p hemimandibulectomy, left level 1, 2a, 3 neck neck dissection, L FFF, tracheostomy w/ 7.5 cuffed portex, dental implants, STSG 12/7- plan for G tube placement postponed due to fever 12/12- blood cx drawn, respiratory culture likely contamination -reported thick sputum from trach after nebs    CC: Pt seen w. Dr. Dean, in good spirits, decanulated. Tolerating TF @ 60ml/hr however felt a little bloated     - dysphagia: s/p laparoscopic PEG placement ( 12/16), tolerating tube feeds @ 60ml/hr , SLP f/u appreciated, s/p VFSS/MBS and rec: puree and thin liquid as toleated   - Aspiration PNA: Sputum Cx" e.coli and enterobacter cloacae, completed total 5 days course of Ceftriaxone  (12/17)   - oral care as per ENT.   - would keep duo-nebs q 6 ( to help loosen up secretion)  - active T&S, monitor H/H 8.0  - CAD/PCI x2 ( 3/2023)  c/w ASA and Lipitor, to d/w Cardiology re: resuming plavix for DAPT total 1 year then ASA alone thereafter   - DM - FS with ISS, caution for Hypoglycemia.  - LLE edema- Venous doppler negative .  -VTE ppx: Enoxaparin 40mg sq daily

## 2023-12-19 NOTE — SWALLOW VFSS/MBS ASSESSMENT ADULT - RECOMMENDED CONSISTENCY
-Purees and Thin liquids as tolerated  -Supplemental alternate means of nutrition/hydration as needed

## 2023-12-19 NOTE — SWALLOW VFSS/MBS ASSESSMENT ADULT - DIAGNOSTIC IMPRESSIONS
Moderate oral phase impairment marked by reduced bolus cohesion with thin liquids, prolonged bolus manipulation with pudding, disorganized a/p transport, and moderate oral residual. Liquid wash partially cleared oral residual. Moderate pharyngeal dysphagia with impact to efficiency and safety of the swallow*** Moderate oral phase impairment marked by prolonged and reduced bolus cohesion, disorganized a/p transport, and mild to moderate oral residual with increased viscosity. Liquid wash/subsequent swallows nearly cleared oral residual. Moderate pharyngeal dysphagia with impact to efficiency and safety of the swallow. Delayed pharyngeal swallow and incomplete laryngeal vestibule closure resulted chronic penetration before and during the swallow with subsequent trace flash aspiration. Multiple swallows, in part due to piece meal deglutition, eventually cleared penetrated/aspirated material to trace amounts above the level of the VF. Reduced bolus drive resulted primarily in mild vallecular retention (10-49%) with pudding, most effectively reduced with liquid wash. No penetration/aspiration imaged with pudding.    Swallow function c/w site of lesion s/p resection/reconstruction. Anticipate ongoing improvement with post-operative recovery and dysphagia intervention to maximize functional outcomes. Patient is at risk for decline in function associated with negative effects of radiotherapy. Continued follow up by speech pathology recommended. Moderate oral phase impairment marked by prolonged and reduced bolus cohesion, disorganized a/p transport, and mild to moderate oral residual with increased viscosity. Liquid wash/subsequent swallows nearly cleared oral residual. Moderate pharyngeal dysphagia with impact to efficiency and safety of the swallow. Delayed pharyngeal swallow and incomplete laryngeal vestibule closure resulted chronic penetration before and during the swallow with subsequent trace flash aspiration. Multiple swallows, in part due to piece meal deglutition, eventually cleared penetrated/aspirated material to trace amounts above the level of the VF. Similar swallow presentation with single and consecutive sips with thin liquids. Reduced bolus drive resulted primarily in mild vallecular retention (10-49%) with pudding, most effectively reduced with liquid wash. No penetration/aspiration imaged with pudding.    Swallow function c/w site of lesion s/p resection/reconstruction. Anticipate ongoing improvement with post-operative recovery and dysphagia intervention to maximize functional outcomes. Patient is at risk for decline in function associated with negative effects of radiotherapy. Continued follow up by speech pathology recommended.    DIGEST Grade 2 (S2, E1)-Dynamic Imaging Grade of Swallowing Toxicity (DIGEST) V2

## 2023-12-19 NOTE — DISCHARGE NOTE PROVIDER - NSDCFUADDINST_GEN_ALL_CORE_FT
[place] wound care and skin graft donor site           Showering: you can take wrapping off for showers, let the water go over the affected area on the leg, do not submerge in bath. Do not scrub the area. Pat dry with a clean towel before re-wrapping.           Skin graft donor site on thigh:     Please apply Vaseline or Aquaphor on area and cover with telfa           Leg Wrapping:      Xeroform : This is typically yellow material that can be found in a silver packaging. Please place over the affected area on the leg. Try not to let it sit on the healthy skin ( area that was not operated on) for a long period of time because it can irritate the skin.      Telfa:  this is the non-adherent pad. Please place over the xeroform      Ace wrap or Kerlix wrap : Please wrap the leg in top of the area where you applied the xeroform/telfa dressing      Please ambulate with cam boot as directed      Please continue the dressing changes daily until Dr. Gandhi tells you to stop       Trach stoma dressing : please Change with gauze and tape daily and as needed        ENT Discharge Instructions  - please make an appt to follow up with SLP   ENT follow up appointment:  - please call the office to confirm appointment:     *Please call your doctor or nurse practitioner if you have increased pain, swelling, redness, or drainage from the incision site.  *You may shower, and wash surgical incisions with a mild soap and warm water. Gently pat the area dry.  *If you have steri-strips, they will fall off on their own. Please remove any remaining strips 7-10 days after surgery.    Activity:  - fatigue is common after surgery, rest if you feel tired   -Please get plenty of rest, continue to ambulate several times per day, and drink adequate amounts of fluids.   -Avoid lifting weights greater than 5-10 lbs until you follow-up with your surgeon, who will instruct you further regarding activity restrictions.  -Avoid driving or operating heavy machinery while taking pain medications.  - Walking is recommended, ambulate as tolerated      Pain Expectations:  - pain after surgery is expected  - please take pain meds as prescribed     Medications: Please resume all regular home medications unless specifically advised not to take a particular medication. Also, please take any new medications as prescribed.     Warning Signs:  Please call your doctor or nurse practitioner if you experience the following:  *You experience new chest pain, pressure, squeezing or tightness.  *New or worsening cough, shortness of breath, or wheeze.  *If you are vomiting and cannot keep down fluids or your medications.  *You are getting dehydrated due to continued vomiting, diarrhea, or other reasons. Signs of dehydration include dry mouth, rapid heartbeat, or feeling dizzy or faint when standing.  *Your pain is not improving within 8-12 hours or is not gone within 24 hours.  *You have shaking chills, or fever greater than 101.5 degrees Fahrenheit or 38 degrees Celsius.  *Any change in your symptoms, or any new symptoms that concern you.     PLEASE CALL THE OFFICE WITH ANY QUESTIONS OR CONCERNS:    Showering: you can take wrapping off for showers, let the water go over the affected area on the leg, do not submerge in bath. Do not scrub the area. Pat dry with a clean towel before re-wrapping.       [place] wound care and skin graft donor site      Skin graft donor site on thigh:     Please apply Vaseline or Aquaphor on area and cover with telfa           Leg Wrapping:      Xeroform : This is typically yellow material that can be found in a silver packaging. Please place over the affected area on the leg. Try not to let it sit on the healthy skin ( area that was not operated on) for a long period of time because it can irritate the skin.      Telfa:  this is the non-adherent pad. Please place over the xeroform      Ace wrap or Kerlix wrap : Please wrap the leg in top of the area where you applied the xeroform/telfa dressing      Please ambulate with cam boot as directed      Please continue the dressing changes daily until Dr. Gandhi tells you to stop       Trach stoma dressing : please Change with gauze and tape daily and as needed        ENT Discharge Instructions  - please make an appt to follow up with SLP   ENT follow up appointment:  - please call the office to confirm appointment:     *Please call your doctor or nurse practitioner if you have increased pain, swelling, redness, or drainage from the incision site.  *You may shower, and wash surgical incisions with a mild soap and warm water. Gently pat the area dry.  *If you have steri-strips, they will fall off on their own. Please remove any remaining strips 7-10 days after surgery.    Activity:  - fatigue is common after surgery, rest if you feel tired   -Please get plenty of rest, continue to ambulate several times per day, and drink adequate amounts of fluids.   -Avoid lifting weights greater than 5-10 lbs until you follow-up with your surgeon, who will instruct you further regarding activity restrictions.  -Avoid driving or operating heavy machinery while taking pain medications.  - Walking is recommended, ambulate as tolerated      Pain Expectations:  - pain after surgery is expected  - please take pain meds as prescribed     Medications: Please resume all regular home medications unless specifically advised not to take a particular medication. Also, please take any new medications as prescribed.     Warning Signs:  Please call your doctor or nurse practitioner if you experience the following:  *You experience new chest pain, pressure, squeezing or tightness.  *New or worsening cough, shortness of breath, or wheeze.  *If you are vomiting and cannot keep down fluids or your medications.  *You are getting dehydrated due to continued vomiting, diarrhea, or other reasons. Signs of dehydration include dry mouth, rapid heartbeat, or feeling dizzy or faint when standing.  *Your pain is not improving within 8-12 hours or is not gone within 24 hours.  *You have shaking chills, or fever greater than 101.5 degrees Fahrenheit or 38 degrees Celsius.  *Any change in your symptoms, or any new symptoms that concern you.     PLEASE CALL THE OFFICE WITH ANY QUESTIONS OR CONCERNS:    Showering: you can take wrapping off for showers, let the water go over the affected area on the leg, do not submerge in bath. Do not scrub the area. Pat dry with a clean towel before re-wrapping.      Wound care instructions:    Skin graft donor site on thigh:   Please apply Vaseline or Aquaphor on area and cover with telfa           Leg Wrapping:    Xeroform : This is typically yellow material that can be found in a silver packaging. Please place over the affected area on the leg. Try not to let it sit on the healthy skin ( area that was not operated on) for a long period of time because it can irritate the skin.    Telfa:  this is the non-adherent pad. Please place over the xeroform    Ace wrap or Kerlix wrap : Please wrap the leg in top of the area where you applied the xeroform/telfa dressing      Please ambulate with cam boot as directed      Please continue the dressing changes daily until Dr. Gandhi tells you to stop      Trach stoma dressing : please Change with gauze and tape daily and more as needed.       ENT Discharge Instructions  - please make an appt to follow up with SLP   ENT follow up appointment:  - please call the office to confirm appointment:     *Please call your doctor or nurse practitioner if you have increased pain, swelling, redness, or drainage from the incision site.  *You may shower, and wash surgical incisions with a mild soap and warm water. Gently pat the area dry.  *If you have steri-strips, they will fall off on their own. Please remove any remaining strips 7-10 days after surgery.    Activity:  - fatigue is common after surgery, rest if you feel tired   -Please get plenty of rest, continue to ambulate several times per day, and drink adequate amounts of fluids.   -Avoid lifting weights greater than 5-10 lbs until you follow-up with your surgeon, who will instruct you further regarding activity restrictions.  -Avoid driving or operating heavy machinery while taking pain medications.  - Walking is recommended, ambulate as tolerated      Pain Expectations:  - pain after surgery is expected  - please take pain meds as prescribed     Medications: Please resume all regular home medications unless specifically advised not to take a particular medication. Also, please take any new medications as prescribed.     Warning Signs:  Please call your doctor or nurse practitioner if you experience the following:  *You experience new chest pain, pressure, squeezing or tightness.  *New or worsening cough, shortness of breath, or wheeze.  *If you are vomiting and cannot keep down fluids or your medications.  *You are getting dehydrated due to continued vomiting, diarrhea, or other reasons. Signs of dehydration include dry mouth, rapid heartbeat, or feeling dizzy or faint when standing.  *Your pain is not improving within 8-12 hours or is not gone within 24 hours.  *You have shaking chills, or fever greater than 101.5 degrees Fahrenheit or 38 degrees Celsius.  *Any change in your symptoms, or any new symptoms that concern you.     PLEASE CALL THE OFFICE WITH ANY QUESTIONS OR CONCERNS:    Showering: you can take wrapping off for showers, let the water go over the affected area on the leg, do not submerge in bath. Do not scrub the area. Pat dry with a clean towel before re-wrapping.      Wound care instructions:    Skin graft donor site on thigh:   Please apply Vaseline or Aquaphor on area and cover with telfa           Leg Wrapping:    Xeroform : This is typically yellow material that can be found in a silver packaging. Please place over the affected area on the leg. Try not to let it sit on the healthy skin ( area that was not operated on) for a long period of time because it can irritate the skin.    Telfa:  this is the non-adherent pad. Please place over the xeroform    Ace wrap or Kerlix wrap : Please wrap the leg in top of the area where you applied the xeroform/telfa dressing      Please ambulate with cam boot as directed      Please continue the dressing changes daily until Dr. Gandhi tells you to stop      Trach stoma dressing : please Change with gauze and tape daily and more as needed.    G-tube care instructions:  - if providing meds through the tube, please ensure that they are crushed, followed by flushing the tube.  - please keep skin area around the tube clean, bumper secured with drain sponge to cover entry site  - May bathe as usual – use mild soap and warm water to clean g-tube site and dry thoroughly.  - Check for leaking after activities and call your care team if any leaking is found.    Flush your G-tube with water:    Before and after any tube feeding listed above  Before and after any medications (all medication should not be visible in the tube or extension after flushing)  At least once every 24 hours    Follow up in Dr. Raymond's clinic in 4 weeks.         ENT Discharge Instructions  - please make an appt to follow up with SLP   ENT follow up appointment:  - please call the office to confirm appointment - please follow up in 2 weeks    *Please call your doctor or nurse practitioner if you have increased pain, swelling, redness, or drainage from the incision site.  *You may shower, and wash surgical incisions with a mild soap and warm water. Gently pat the area dry.  *If you have steri-strips, they will fall off on their own. Please remove any remaining strips 7-10 days after surgery.    Activity:  - fatigue is common after surgery, rest if you feel tired   -Please get plenty of rest, continue to ambulate several times per day, and drink adequate amounts of fluids.   -Avoid lifting weights greater than 5-10 lbs until you follow-up with your surgeon, who will instruct you further regarding activity restrictions.  -Avoid driving or operating heavy machinery while taking pain medications.  - Walking is recommended, ambulate as tolerated       Pain Expectations:  - pain after surgery is expected  - please take pain meds as prescribed     Medications: Please resume all regular home medications unless specifically advised not to take a particular medication. Also, please take any new medications as prescribed. Please complete 1 week course of amoxicillin. Please continue to use peridex until you follow up with Dr. Gandhi. Banatrol or Benefiber can be purchased over the counter, please use 2 times a day with tube feeds.    Warning Signs:  Please call your doctor or nurse practitioner if you experience the following:  *You experience new chest pain, pressure, squeezing or tightness.  *New or worsening cough, shortness of breath, or wheeze.  *If you are vomiting and cannot keep down fluids or your medications.  *You are getting dehydrated due to continued vomiting, diarrhea, or other reasons. Signs of dehydration include dry mouth, rapid heartbeat, or feeling dizzy or faint when standing.  *Your pain is not improving within 8-12 hours or is not gone within 24 hours.  *You have shaking chills, or fever greater than 101.5 degrees Fahrenheit or 38 degrees Celsius.  *Any change in your symptoms, or any new symptoms that concern you.     PLEASE CALL THE OFFICE WITH ANY QUESTIONS OR CONCERNS:    Showering: you can take wrapping off for showers, let the water go over the affected area on the leg, do not submerge in bath. Do not scrub the area. Pat dry with a clean towel before re-wrapping.      Wound care instructions:    Skin graft donor site on thigh:   Please apply Vaseline or Aquaphor on area and cover with telfa           Leg Wrapping:    Adaptic touch - this covers the skin graft first, replace every 4 days or more often if it falls off  Xeroform : This is typically yellow material that can be found in a silver packaging. Please place over the affected area on the leg. Try not to let it sit on the healthy skin ( area that was not operated on) for a long period of time because it can irritate the skin.    Telfa:  this is the non-adherent pad. Please place over the xeroform    Ace wrap or Kerlix wrap : Please wrap the leg in top of the area where you applied the xeroform/telfa dressing      Please ambulate with cam boot as directed      Please continue the dressing changes daily until Dr. Gandhi tells you to stop      Trach stoma dressing : please Change with gauze and tape daily and more as needed.    G-tube care instructions:  - if providing meds through the tube, please ensure that they are crushed, followed by flushing the tube.  - please keep skin area around the tube clean, bumper secured with drain sponge to cover entry site  - May bathe as usual – use mild soap and warm water to clean g-tube site and dry thoroughly.  - Check for leaking after activities and call your care team if any leaking is found.    Flush your G-tube with water:    Before and after any tube feeding listed above  Before and after any medications (all medication should not be visible in the tube or extension after flushing)  At least once every 24 hours    Follow up in Dr. Raymond's clinic in 4 weeks.

## 2023-12-19 NOTE — CHART NOTE - NSCHARTNOTEFT_GEN_A_CORE
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

## 2023-12-19 NOTE — SWALLOW VFSS/MBS ASSESSMENT ADULT - ADDITIONAL INFORMATION
Anatomical observations: submental edema, hardware in oral cavity s/p mandibulectomy, and edematous arytenoids and laryngeal vestibule

## 2023-12-19 NOTE — DISCHARGE NOTE NURSING/CASE MANAGEMENT/SOCIAL WORK - PATIENT PORTAL LINK FT
You can access the FollowMyHealth Patient Portal offered by [facility] by registering at the following website: [email or web address]. By joining [employer]’s FollowMyHealth portal, you will also be able to view your health information using other applications (apps) compatible with our system. You can access the FollowMyHealth Patient Portal offered by [facility] by registering at the following website: [email or web address]. By joining [email or web address] FollowMyHealth portal, you will also be able to view your health information using other applications (apps) compatible with our system.

## 2023-12-19 NOTE — DISCHARGE NOTE PROVIDER - NSDCMRMEDTOKEN_GEN_ALL_CORE_FT
acetaminophen 650 mg oral tablet: 1 tab(s) orally every 6 hours as needed for  pain  aspirin 81 mg oral tablet: 1 tab(s) orally once a day (in the morning)  Colace 100 mg oral capsule: 1 cap(s) orally once a day as needed for  constipation  ibuprofen 400 mg oral tablet: 1 tab(s) orally every 6 hours as needed for  pain  Ilumya 100 mg/mL subcutaneous solution: 100 milligram(s) subcutaneously every 3 months  Jardiance 10 mg oral tablet: 1 tab(s) orally once a day (in the morning)  lidocaine 2% mucous membrane solution: Apply topically to affected area every 2 hours as needed for  pain  Lipitor 40 mg oral tablet: 1 tab(s) orally once a day (in the evening)  metFORMIN 1000 mg oral tablet: 1 tab(s) orally once a day (in the morning)  metFORMIN 500 mg oral tablet: 1 tab(s) orally once a day (in the evening)  nitroglycerin 0.4 mg sublingual tablet: 1 tab(s) sublingually every 5 minutes as needed for  chest pain  oxycodone-acetaminophen 5 mg-325 mg oral tablet: 1 tab(s) orally every 6 hours as needed for  severe pain  Plavix 75 mg oral tablet: 1 tab(s) orally once a day (at bedtime)  potassium citrate compounding powder: 10 milliequivalent(s) compounding 3 times a day   acetaminophen 650 mg oral tablet: 1 tab(s) orally every 6 hours as needed for  pain  aspirin 81 mg oral tablet: 1 tab(s) orally once a day (in the morning)  carbamide peroxide 6.5% otic solution: 1 drop(s) to each affected ear 2 times a day  chlorhexidine 0.12% mucous membrane liquid: 15 milliliter(s) mucous membrane 2 times a day  Colace 100 mg oral capsule: 1 cap(s) orally once a day as needed for  constipation  ibuprofen 400 mg oral tablet: 1 tab(s) orally every 6 hours as needed for  pain  Ilumya 100 mg/mL subcutaneous solution: 100 milligram(s) subcutaneously every 3 months  Jardiance 10 mg oral tablet: 1 tab(s) orally once a day (in the morning)  lidocaine 2% mucous membrane solution: Apply topically to affected area every 2 hours as needed for  pain  Lipitor 40 mg oral tablet: 1 tab(s) orally once a day (in the evening)  metFORMIN 1000 mg oral tablet: 1 tab(s) orally once a day (in the morning)  metFORMIN 500 mg oral tablet: 1 tab(s) orally once a day (in the evening)  nitroglycerin 0.4 mg sublingual tablet: 1 tab(s) sublingually every 5 minutes as needed for  chest pain  oxyCODONE 5 mg/5 mL oral solution: 5 milliliter(s) orally every 6 hours as needed for  severe pain MDD: 20  oxycodone-acetaminophen 5 mg-325 mg oral tablet: 1 tab(s) orally every 6 hours as needed for  severe pain  Plavix 75 mg oral tablet: 1 tab(s) orally once a day (at bedtime)  potassium citrate compounding powder: 10 milliequivalent(s) compounding 3 times a day   acetaminophen 650 mg oral tablet: 1 tab(s) orally every 6 hours as needed for  pain  amoxicillin 400 mg/5 mL oral liquid: 6 milliliter(s) orally 2 times a day  aspirin 81 mg oral tablet: 1 tab(s) orally once a day (in the morning)  carbamide peroxide 6.5% otic solution: 1 drop(s) to each affected ear 2 times a day  chlorhexidine 0.12% mucous membrane liquid: 15 milliliter(s) mucous membrane 2 times a day  Colace 100 mg oral capsule: 1 cap(s) orally once a day as needed for  constipation  ibuprofen 400 mg oral tablet: 1 tab(s) orally every 6 hours as needed for  pain  Ilumya 100 mg/mL subcutaneous solution: 100 milligram(s) subcutaneously every 3 months  Jardiance 10 mg oral tablet: 1 tab(s) orally once a day (in the morning)  lidocaine 2% mucous membrane solution: Apply topically to affected area every 2 hours as needed for  pain  Lipitor 40 mg oral tablet: 1 tab(s) orally once a day (in the evening)  metFORMIN 1000 mg oral tablet: 1 tab(s) orally once a day (in the morning)  metFORMIN 500 mg oral tablet: 1 tab(s) orally once a day (in the evening)  nitroglycerin 0.4 mg sublingual tablet: 1 tab(s) sublingually every 5 minutes as needed for  chest pain  oxyCODONE 5 mg/5 mL oral solution: 5 milliliter(s) orally every 6 hours as needed for  severe pain MDD: 20  Plavix 75 mg oral tablet: 1 tab(s) orally once a day (at bedtime)  potassium citrate compounding powder: 10 milliequivalent(s) compounding 3 times a day

## 2023-12-19 NOTE — PROGRESS NOTE ADULT - ASSESSMENT
69 y/o M w PMH of CAD (x2 stents Mar 2023), HLD, T2DM, hx of kidney stones, psoriasis presents to [facility] for evaluation of oral mass and 3 months of jaw pain a/f OMFS mandibular resection and flap reconstruction on Thursday. Medicine initially evaluated on 12/6 for pre-op assessment Now following for co-management.    #anemia  Hgb stable at 8, previously was 13-14  no signs of active bleed  per iron studies, consistent with WADE - recommend addition of IV iron 500mg PO qd for 2 days  maintain active T&S  per cardiology recommendations, transfusion goal to Hgb >8     #SIRS, suspected to be 2/2 to pneumonia - RESOLVED  Initially febrile with tachycardia and leukocytosis  sputum cx showed E coli and Enterobacter cloacea, sensitive to CTX  initially on CTX then de-escalated and completed 5 day course of antibiotics    #s/p mandibular resection and flap reconstruction  pt tolerated procedure well. KRISTEN drains removed by primary team  s/p G tube placement on 12/16 - tolerating feeds, now up to 60cc/hr  c/w S&S evaluation  recommend early mobilization, OOBTC, PT evaluation. recommend addition of PRN Duonebs to aid with airway clearance    #LLE edema  pt with LLE non pitting edema  LLE negative for DVT  recommend raising leg to help with edema    #CAD  pt with hx of CAD s/p 2 stents in March 2023  Cardiology consulted; reccs appreciated  on lipitor and ASA, per cardiology, initiated on plavix again    #DM2  A1c 6.3%, takes home metformin and jardiance. FS   - c/w current diet, FS checks and ISS. Goal -180 while inpatient    Medicine will continue to follow. Patient seen, evaluated, and discussed with attending Dr. Montero 67 y/o M w PMH of CAD (x2 stents Mar 2023), HLD, T2DM, hx of kidney stones, psoriasis presents to [facility] for evaluation of oral mass and 3 months of jaw pain a/f OMFS mandibular resection and flap reconstruction on Thursday. Medicine initially evaluated on 12/6 for pre-op assessment Now following for co-management.    #anemia  Hgb stable at 8, previously was 13-14  no signs of active bleed  per iron studies, consistent with WADE - recommend addition of IV iron 500mg PO qd for 2 days  maintain active T&S  per cardiology recommendations, transfusion goal to Hgb >8     #SIRS, suspected to be 2/2 to pneumonia - RESOLVED  Initially febrile with tachycardia and leukocytosis  sputum cx showed E coli and Enterobacter cloacea, sensitive to CTX  initially on CTX then de-escalated and completed 5 day course of antibiotics    #s/p mandibular resection and flap reconstruction  pt tolerated procedure well. KRISTEN drains removed by primary team  s/p G tube placement on 12/16 - tolerating feeds, now up to 60cc/hr  c/w S&S evaluation  recommend early mobilization, OOBTC, PT evaluation. recommend addition of PRN Duonebs to aid with airway clearance    #LLE edema  pt with LLE non pitting edema  LLE negative for DVT  recommend raising leg to help with edema    #CAD  pt with hx of CAD s/p 2 stents in March 2023  Cardiology consulted; reccs appreciated  on lipitor and ASA, per cardiology, initiated on plavix again    #DM2  A1c 6.3%, takes home metformin and jardiance. FS   - c/w current diet, FS checks and ISS. Goal -180 while inpatient    Medicine will continue to follow. Patient seen, evaluated, and discussed with attending Dr. Montero

## 2023-12-19 NOTE — DISCHARGE NOTE PROVIDER - INSTRUCTIONS
Vital 1.5, 2 cans for breakfast, 2 cans for lunch, 2 cans for dinner.,  Please fo 50 cc free water flush before and after each feed  Vital 1.5, 2 cans for breakfast, 2 cans for lunch, 2 cans for dinner.,  Please fo 50 cc free water flush before and after each feed  Along with puree diet with thin liquid  Vital 1.5, 2 cans for breakfast, 2 cans for lunch, 2 cans for dinner.,  Please fo 50 cc free water flush before and after each feed  Along with puree diet with thin liquid. Please use banatrol or benefiber twice a day in tube feeds.

## 2023-12-19 NOTE — SWALLOW VFSS/MBS ASSESSMENT ADULT - ADDITIONAL RECOMMENDATIONS
LT: Patient will safely tolerate the least restrictive diet without overt s/s of penetration/aspiration or changes in pulmonary status.  ST: 1) Patient will complete effortful swallows, oseas exercise, and Mendelsohn maneuver (2-3 sets of 10/daily) with minimal cues to improve efficiency of the swallow. Prophylactic swallow exercises in consideration of adjuvant RT.   Emphasis placed on diet advancement as tolerated per post-operative restrictions as dictated by HNS   Emphasis placed on outpatient speech pathology for dysphagia management, particularly throughout RT to assist in rehabilitation of the swallow function  Emphasis placed on oral intake and diet advancement for rehabilitation and preservation of swallow function for most optimal functional outcomes long term LT: Patient will safely tolerate the least restrictive diet without overt s/s of penetration/aspiration or changes in pulmonary status.  ST: 1) Patient will complete effortful swallows, oseas exercise, and Mendelsohn maneuver (2-3 sets of 10/daily) with minimal cues to improve efficiency of the swallow. Continued exercise regimen recommended throughout RT to maximize functional outcomes.   **Emphasis placed on diet advancement as tolerated per post-operative restrictions as dictated by HNS   **Emphasis placed on outpatient speech pathology for dysphagia management, particularly throughout RT to assist in rehabilitation of the swallow function  **Emphasis placed on oral intake and diet advancement for rehabilitation and preservation of swallow function for most optimal functional outcomes long term.    3) Recommend repeat MBSS in 4-6 weeks to obtain a new baseline swallow function prior to RT

## 2023-12-19 NOTE — SWALLOW VFSS/MBS ASSESSMENT ADULT - RECOMMENDED FEEDING/EATING TECHNIQUES
crush medication (when feasible)/maintain upright posture during/after eating for 30 mins/no straws/oral hygiene/position upright (90 degrees)

## 2023-12-19 NOTE — SWALLOW VFSS/MBS ASSESSMENT ADULT - ORAL PHASE COMMENTS
Reduced labial seal with left sided anterior loss with thin liquids, prolonged bolus manipulation with pudding, reduced bolus cohesion with thin liquids with premature loss to the hypopharynx, delayed and disorganized a/p transport with piece meal deglutition imaged with thin liquids, with mild to moderate oral residual most notable with pudding. Oral residual nearly cleared with liquid wash and subsequent swallows.

## 2023-12-19 NOTE — PROGRESS NOTE ADULT - SUBJECTIVE AND OBJECTIVE BOX
SUBJECTIVE:  Patient advanced to TF goal during hours of the morning, tolerated. Has been having consistent bowel function   Adequate uop   Decannulated yesterday, breathing spontaneously, on RA, saturating appropriately  MEDICATIONS  (STANDING):  acetaminophen   Oral Liquid .. 975 milliGRAM(s) Oral every 6 hours  acetylcysteine 20%  Inhalation 4 milliLiter(s) Inhalation every 6 hours  albuterol/ipratropium for Nebulization 3 milliLiter(s) Nebulizer every 6 hours  aspirin  chewable 81 milliGRAM(s) Oral daily  carbamide peroxide Otic Solution 1 Drop(s) Right Ear two times a day  chlorhexidine 0.12% Liquid 15 milliLiter(s) Oral Mucosa two times a day  clopidogrel Tablet 75 milliGRAM(s) Enteral Tube daily  dextrose 5%. 1000 milliLiter(s) (50 mL/Hr) IV Continuous <Continuous>  dextrose 5%. 1000 milliLiter(s) (100 mL/Hr) IV Continuous <Continuous>  dextrose 50% Injectable 25 Gram(s) IV Push once  dextrose 50% Injectable 25 Gram(s) IV Push once  dextrose 50% Injectable 12.5 Gram(s) IV Push once  enoxaparin Injectable 40 milliGRAM(s) SubCutaneous every 24 hours  glucagon  Injectable 1 milliGRAM(s) IntraMuscular once  influenza  Vaccine (HIGH DOSE) 0.7 milliLiter(s) IntraMuscular once  iron sucrose IVPB 500 milliGRAM(s) IV Intermittent every 24 hours  pantoprazole  Injectable 40 milliGRAM(s) IV Push daily  sodium chloride 3%  Inhalation 4 milliLiter(s) Inhalation every 6 hours    MEDICATIONS  (PRN):  dextrose Oral Gel 15 Gram(s) Oral once PRN Blood Glucose LESS THAN 70 milliGRAM(s)/deciliter  melatonin Liquid 5 milliGRAM(s) Oral at bedtime PRN Insomnia  ondansetron Injectable 4 milliGRAM(s) IV Push every 6 hours PRN Nausea and/or Vomiting  oxyCODONE    Solution 5 milliGRAM(s) Oral every 4 hours PRN Moderate Pain (4 - 6)  oxyCODONE    Solution 10 milliGRAM(s) Oral every 4 hours PRN Severe Pain (7 - 10)      Vital Signs Last 24 Hrs  T(C): 36.8 (19 Dec 2023 14:00), Max: 37.4 (18 Dec 2023 22:00)  T(F): 98.2 (19 Dec 2023 14:00), Max: 99.3 (18 Dec 2023 22:00)  HR: 74 (19 Dec 2023 16:26) (62 - 90)  BP: 134/57 (19 Dec 2023 16:26) (113/54 - 153/68)  BP(mean): 82 (19 Dec 2023 16:26) (78 - 98)  RR: 18 (19 Dec 2023 16:26) (16 - 18)  SpO2: 96% (19 Dec 2023 16:26) (96% - 100%)    Parameters below as of 19 Dec 2023 16:26  Patient On (Oxygen Delivery Method): room air    PHYSICAL EXAM:  Gen: AAOx3, NAD   Head: Surgical incision around chin extending up through lip, c/d/i  Eyes: EOMI, PERRL, visual acuity intact, no diplopia, supra/infra orbital rims intact, no subconjunctival heme,   Ears: Gross hearing intact  Nose: No septal hematoma/asymmetry, no epistaxis bilaterally. no abrasions present, no lacerations.   Throat: No LAD, supple, neck surgical incisionc/d/i , stoma healing well, dressing changed on rounds this morning, moderate oral secretions  Oral: Left FFF paddle dusk colored - skin sloughing off, will continue to monitor,. IMF screws in place intermittent ooze around left mandibular screw,   Exx: Wound vac left leg, thigh donor site telfa dressing changed  Abdomen: G tube in place with bumper @3cm, no signs of bleeding or infection     I&O's Summary    18 Dec 2023 07:01  -  19 Dec 2023 07:00  --------------------------------------------------------  IN: 2820 mL / OUT: 850 mL / NET: 1970 mL    19 Dec 2023 07:01  -  19 Dec 2023 17:43  --------------------------------------------------------  IN: 944 mL / OUT: 400 mL / NET: 544 mL        LABS:                        8.0    7.46  )-----------( 316      ( 19 Dec 2023 05:30 )             25.0     12-19    141  |  107  |  8   ----------------------------<  146<H>  3.9   |  26  |  0.65    Ca    8.5      19 Dec 2023 05:30  Phos  3.0     12-19  Mg     1.7     12-19        Urinalysis Basic - ( 19 Dec 2023 05:30 )    Color: x / Appearance: x / SG: x / pH: x  Gluc: 146 mg/dL / Ketone: x  / Bili: x / Urobili: x   Blood: x / Protein: x / Nitrite: x   Leuk Esterase: x / RBC: x / WBC x   Sq Epi: x / Non Sq Epi: x / Bacteria: x      CAPILLARY BLOOD GLUCOSE            RADIOLOGY & ADDITIONAL STUDIES:

## 2023-12-19 NOTE — SWALLOW VFSS/MBS ASSESSMENT ADULT - SLP PRECAUTIONS/LIMITATIONS: VISION
"-- DO NOT REPLY / DO NOT REPLY ALL --  -- Message is from the [facility]--      Patient is requesting a medication refill - medication is on active list    Was Medication Pended? Yes. Rx Name and Dose:    losartan (COZAAR) 100 MG tablet  Duration: 90 days    Pharmacy  [facility] [address] [place] [address], [facility]. Patient confirmed the above pharmacy as correct? Yes    Caller Information       Type Contact Phone    02/25/2020 09:24 PM Phone (Incoming) Paola Stafford (Self) [phone] (M)          Alternative phone number: none     Turnaround time given to caller: ""This message will be sent to [facility] Provider's name]. The clinical team will fulfill your request as soon as they review your message when the office opens tomorrow. \""  " within functional limits

## 2023-12-19 NOTE — PROGRESS NOTE ADULT - SUBJECTIVE AND OBJECTIVE BOX
OTOLARYNGOLOGY (ENT) PROGRESS NOTE    PATIENT: SAUNDRA HOLMAN  MRN: [id number]  : 57  [id number]  DATE OF SERVICE:  23  			           			         ID:SAUNDRA HOLMAN is a  67yo M w PMH of CAD (x2 stents Mar 2023), HLD, T2DM, hx of kidney stones, psoriasis presents to [facility] for evaluation of oral mass. Reports Three month hx of jaw pain and loosening of mandibular molar tooth on the lower left. Pt has a 30 pack yr smoking hx, quit 1 yr ago. 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 POD 1 composite mandibular resection, neck dissection and fibula free flap reconstruction.       Subjective/ Interval:   ; patient seen this morning, oozing from trach as expected ,  Plan to start aspirin today, cuff is up on tracheostomy tube, intraoral skin panel intact doppler signal strong,  plan to advance NGT   : Patient seen and examined at bedside. AFVSS overnight. Significant HgB drop to 7.2 noted this morning. NGT clogged, attempted replacement but went into lung, need to replace. Cuff deflated. Patient reports lethargy. Otherwise no new complaints. Intraoral skin paddle intact with strong doppler signal. Plan to hold off one eliquis given significant HgB drop  12/10: Patient seen and examined at bedside. AFVSS overnight other than bradycardic to lowest 39, mainly in 40s-50s which seems to be his baseline even pre-op. Not symptomatic while chinedu. Otherwise, patient stable on TC with no issues. Yesterday, iatrogenic right sided PTX occured with NGT placement, patient remained asymptomatic throughout. Patient now s/p placement of pig-tail catheter by pulm with significant improvement in PTX. Chest tube clamped this morning with plan for removal this evening. Patient s/p 2 units of PRBCs with moderate improvement in Hgb. Today, patient reports feeling significantly better than yesterday and overall "feels good." He was started on trickle feeds last night but felt some chest tightness and so they were held. Patient also failed TOV again and was straight cathed. KRISTEN drain output significantly decreased this morning. No other changes at this time.   : patient seen this morning PTX has resolved on CXR, intraoral flap intact, doppler strong, trach in place,  KRISTEN drains with minimal output   : Patient seen and examined this morning at bedside. Overnight, NGT slightly displaced, coming out around 10 cm from original placement, advanced and secured, pending CXR. Patient failed trial of clears with SLP yesterday but tolerated PMV without issue. Patient continues to be OOBTC daily and is tolerating tube feeds at goal. 2 KRISTEN drains removed yesterday, remaining KRISTEN with minimal output. Intraoral flap intact, doppler strong, trach in place. Patient denies any other new complaints at this time.   : patient febrile overnight to 103, continues to have intermittent PVC, HR last night in 40s, patients baseline 50. Gen surg will like to wait 24 hours afebrile until g tube,  Oral flap appears to be congested, dixon continue to monitor. KRISTEN drain will be removed today. Pending results of fever work up . HEB 7.5, will transfuse when type and screen returns   : Patient seen this morning, afebrile overnight, trach secretion odor improving,  noted ot have an episode of oral bleeding after ambulation, bleeding stopped, unable to see source, will continue to monitor , doppler strong ( and removed today) ,  Flap continues to look dusky   12/15: patient sen this morning, continues to have intermittent oozing from left mandibular screw, Surgicel placed no active bleed, flap remains dusky but stable, afebrile overnight  : NAEON. AVSS. Patient in OR for PEG this morning. Will re-evaluate s/p PEG  : Re-evaluated s/p PEG. complaining fo abdominal discomfort. Flap is stable appearing. Donor site with wound vac. LLE edema improving. No further bleeding from the mouth.  : NAEON. AVSS. Examined at bedside. Trach changed to 6 uncuffed and confirmed on scope, secured with trach ties. Straight cath overnight for retention. Has not voided since. Will f/u bladder scan and straight cath if needed  : NAEON. AFVSS. Examined at bedside. Tolerating capping overnight. Passed TOV last night. TF started yesterday evening, nausea overnight so TF held again. He continues to have moderate oral secretions requiring oral suctioning.   : NAEON. AFVSS. Decannulated yesterday, respirating comfortably on room air. TF transitioned to Vital 1.5 yesterday evening, tolerating without abdominal pain. He continues to have moderate oral secretions.     ALLERGIES:  No Known Allergies      MEDICATIONS:  Antiinfectives:   cefTRIAXone   IVPB 2000 milliGRAM(s) IV Intermittent every 24 hours    IV fluids:  dextrose 5% + sodium chloride 0.45% 1000 milliLiter(s) IV Continuous <Continuous>  dextrose 5%. 1000 milliLiter(s) IV Continuous <Continuous>  dextrose 5%. 1000 milliLiter(s) IV Continuous <Continuous>    Hematologic/Anticoagulation:  aspirin  chewable 81 milliGRAM(s) Oral daily  clopidogrel Tablet 75 milliGRAM(s) Enteral Tube daily  enoxaparin Injectable 40 milliGRAM(s) SubCutaneous every 24 hours    Pain medications/Neuro:  acetaminophen   Oral Liquid .. 975 milliGRAM(s) Oral every 6 hours  melatonin Liquid 5 milliGRAM(s) Oral at bedtime PRN  ondansetron Injectable 4 milliGRAM(s) IV Push every 6 hours PRN  oxyCODONE    Solution 5 milliGRAM(s) Oral every 4 hours PRN  oxyCODONE    Solution 10 milliGRAM(s) Oral every 4 hours PRN    Endocrine Medications:   dextrose 50% Injectable 25 Gram(s) IV Push once  dextrose 50% Injectable 25 Gram(s) IV Push once  dextrose 50% Injectable 12.5 Gram(s) IV Push once  dextrose Oral Gel 15 Gram(s) Oral once PRN  glucagon  Injectable 1 milliGRAM(s) IntraMuscular once    All other standing medications:   acetylcysteine 20%  Inhalation 4 milliLiter(s) Inhalation every 6 hours  albuterol/ipratropium for Nebulization 3 milliLiter(s) Nebulizer every 6 hours  chlorhexidine 0.12% Liquid 15 milliLiter(s) Oral Mucosa two times a day  influenza  Vaccine (HIGH DOSE) 0.7 milliLiter(s) IntraMuscular once  pantoprazole  Injectable 40 milliGRAM(s) IV Push daily  sodium chloride 3%  Inhalation 4 milliLiter(s) Inhalation every 6 hours    All other PRN medications:    Vital Signs Last 24 Hrs  T(C): 36.9 (19 Dec 2023 05:00), Max: 37.4 (18 Dec 2023 22:00)  T(F): 98.4 (19 Dec 2023 05:00), Max: 99.3 (18 Dec 2023 22:00)  HR: 90 (19 Dec 2023 03:35) (51 - 90)  BP: 131/61 (19 Dec 2023 03:35) (108/53 - 146/67)  BP(mean): 88 (19 Dec 2023 03:35) (76 - 97)  RR: 16 (19 Dec 2023 03:35) (16 - 19)  SpO2: 99% (19 Dec 2023 03:35) (96% - 99%)    Parameters below as of 19 Dec 2023 03:35  Patient On (Oxygen Delivery Method): room air           @ 07:01  -   @ 07:00  --------------------------------------------------------  IN:    dextrose 5% + sodium chloride 0.45% w/ Additives: 2200 mL    Glucerna 1.5: 570 mL    IV PiggyBack: 50 mL  Total IN: 2820 mL    OUT:    VAC (Vacuum Assisted Closure) System (mL): 0 mL    Voided (mL): 850 mL  Total OUT: 850 mL    Total NET: 1970 mL          23 @ 07:01  -  23 @ 07:00  --------------------------------------------------------  IN:  Total IN: 0 mL    OUT:    VAC (Vacuum Assisted Closure) System (mL): 0 mL  Total OUT: 0 mL    Total NET: 0 mL            PHYSICAL EXAM:  Gen: AAOx3, NAD   Head: Surgical incision around chin extending up through lip, c/d/i  Eyes: EOMI, PERRL, visual acuity intact, no diplopia, supra/infra orbital rims intact, no subconjunctival heme,   Ears: Gross hearing intact  Nose: No septal hematoma/asymmetry, no epistaxis bilaterally. no abrasions present, no lacerations.   Throat: No LAD, supple, neck surgical incisionc/d/i , stoma healing well, dressing changed on rounds this morning, moderate oral secretions  Oral: Left FFF paddle dusk colored - skin sloughing off, will continue to monitor,. IMF screws in place intermittent ooze around left mandibular screw,   Exx: Wound vac left leg, thigh donor site telfa dressing changed             LABS                       8.0    7.46  )-----------( 316      ( 19 Dec 2023 05:30 )             25.0    12-18    139  |  105  |  9   ----------------------------<  122<H>  3.7   |  26  |  0.67    Ca    8.4      18 Dec 2023 05:30  Phos  2.7       Mg     1.6                Coagulation Studies-     Urinalysis Basic - ( 18 Dec 2023 05:30 )    Color: x / Appearance: x / SG: x / pH: x  Gluc: 122 mg/dL / Ketone: x  / Bili: x / Urobili: x   Blood: x / Protein: x / Nitrite: x   Leuk Esterase: x / RBC: x / WBC x   Sq Epi: x / Non Sq Epi: x / Bacteria: x      Endocrine Panel-                MICROBIOLOGY:  Culture - Sputum (collected 23 @ 07:27)  Source: Trach Asp Tracheal Aspirate  Gram Stain (23 @ 21:31):    Rare epithelial cells    Moderate WBC's    Rare Gram Positive Rods    Few Gram Negative Rods    Moderate Gram positive cocci in pairs, chains and clusters  Final Report (12-15-23 @ 09:27):    Moderate Escherichia coli    Moderate Enterobacter cloacae complex    Accompanied by normal respiratory brenden  Organism: Escherichia coli  Enterobacter cloacae complex (12-15-23 @ 09:27)  Organism: Enterobacter cloacae complex (12-15-23 @ 09:27)      Method Type: OTTO      -  Ampicillin: R <=8 These ampicillin results predict results for amoxicillin      -  Ampicillin/Sulbactam: R >16/8      -  Cefazolin: R >16      -  Cefepime: S <=2      -  Ceftriaxone: S <=1 Enterobacter cloacae, Klebsiella aerogenes, and Citrobacter freundii may develop resistance during prolonged therapy.      -  Ciprofloxacin: S <=0.25      -  Ertapenem: S <=0.5      -  Gentamicin: S <=2      -  Piperacillin/Tazobactam: S <=8      -  Tobramycin: S <=2      -  Trimethoprim/Sulfamethoxazole: S   Organism: Escherichia coli (12-15-23 @ 09:27)      Method Type: OTTO      -  Ampicillin: S <=8 These ampicillin results predict results for amoxicillin      -  Ampicillin/Sulbactam: S <=4/2      -  Cefazolin: S <=2      -  Ceftriaxone: S <=1      -  Ciprofloxacin: S <=0.25      -  Ertapenem: S <=0.5      -  Gentamicin: S <=2      -  Piperacillin/Tazobactam: S <=8      -  Tobramycin: I 4      -  Trimethoprim/Sulfamethoxazole: S     Culture - Sputum (collected 23 @ 20:48)  Source: .Sputum  Gram Stain (23 @ 22:45):    Moderate epithelial cells    Moderate WBC's    Rare Gram Positive Rods    Few Gram Negative Rods    Few Gram Positive Cocci in Pairs and Chains  Final Report (23 @ 22:45):    Sputum specimen rejected.  Microscopic examination indicates    oropharyngeal contamination.  Please repeat.      Culture Results:   Moderate Escherichia coli  Moderate Enterobacter cloacae complex  Accompanied by normal respiratory brenden (23 @ 07:27)  Culture Results:   Sputum specimen rejected.  Microscopic examination indicates  oropharyngeal contamination.  Please repeat. (23 @ 20:48)  Culture Results:   No growth at 5 days. (23 @ 20:10)  Culture Results:   No growth at 5 days. (23 @ 20:05)      Culture - Sputum (collected 23 @ 07:27)  Source: Trach Asp Tracheal Aspirate  Gram Stain (23 @ 21:31):    Rare epithelial cells    Moderate WBC's    Rare Gram Positive Rods    Few Gram Negative Rods    Moderate Gram positive cocci in pairs, chains and clusters  Final Report (12-15-23 @ 09:27):    Moderate Escherichia coli    Moderate Enterobacter cloacae complex    Accompanied by normal respiratory brenden  Organism: Escherichia coli  Enterobacter cloacae complex (12-15-23 @ 09:27)  Organism: Enterobacter cloacae complex (12-15-23 @ 09:27)      Method Type: OTTO      -  Ampicillin: R <=8 These ampicillin results predict results for amoxicillin      -  Ampicillin/Sulbactam: R >16/8      -  Cefazolin: R >16      -  Cefepime: S <=2      -  Ceftriaxone: S <=1 Enterobacter cloacae, Klebsiella aerogenes, and Citrobacter freundii may develop resistance during prolonged therapy.      -  Ciprofloxacin: S <=0.25      -  Ertapenem: S <=0.5      -  Gentamicin: S <=2      -  Piperacillin/Tazobactam: S <=8      -  Tobramycin: S <=2      -  Trimethoprim/Sulfamethoxazole: S   Organism: Escherichia coli (12-15-23 @ 09:27)      Method Type: OTTO      -  Ampicillin: S <=8 These ampicillin results predict results for amoxicillin      -  Ampicillin/Sulbactam: S <=4/2      -  Cefazolin: S <=2      -  Ceftriaxone: S <=1      -  Ciprofloxacin: S <=0.25      -  Ertapenem: S <=0.5      -  Gentamicin: S <=2      -  Piperacillin/Tazobactam: S <=8      -  Tobramycin: I 4      -  Trimethoprim/Sulfamethoxazole: S     Culture - Sputum (collected 23 @ 20:48)  Source: .Sputum  Gram Stain (23 @ 22:45):    Moderate epithelial cells    Moderate WBC's    Rare Gram Positive Rods    Few Gram Negative Rods    Few Gram Positive Cocci in Pairs and Chains  Final Report (23 @ 22:45):    Sputum specimen rejected.  Microscopic examination indicates    oropharyngeal contamination.  Please repeat.    Culture - Blood (collected 23 @ 20:10)  Source: .Blood Blood  Final Report (23 @ 22:00):    No growth at 5 days.    Culture - Blood (collected 23 @ 20:05)  Source: .Blood Blood  Final Report (23 @ 22:00):    No growth at 5 days.        RADIOLOGY & ADDITIONAL STUDIES:      Assessment and Plan:  SAUNDRA TSSAMGOTIS is a  68M w/ [id number] SCC of L buccal mucosa presents for pre optimization for surgery , now s/p hemimandibulectomy, left level 1, 2a, 3 neck neck dissection, L FFF, tracheostomy w/ 7.5 cuffed portex, dental implants, STSG , now s/p PEG       PLAN  continue daily stoma dressing changes  advance TF to goal this morning, transition to bolus TF once at goal  Monitor for any signs of oral bleeding   Plan to ambulate 3-4x a day   Continue nebulized solution for thick trach secretions   Continue Ondansetron PRN nausea/vomiting , PO Senna once daily, Miralax 17g once daily,  Chlorhexidine swish and spit, Esomeprazole, Enoxaparin, Gabapentin, Acetaminophen   Continue SCD’s    Cardiology - continue plavix, ASA, lovenox (will discuss if all 3 needed w/ cards/medicine)  Continue to work with SLP for trial of clears  May require suctioning at home, will discuss supplies with [facility] at [phone] with any questions/concerns.    Candida Jimenez  23 @ 07:17 OTOLARYNGOLOGY (ENT) PROGRESS NOTE    PATIENT: SAUNDRA HOLMAN  MRN: [id number]  : 57  [id number]  DATE OF SERVICE:  23  			           			         ID:SAUNDRA HOLMAN is a  67yo M w PMH of CAD (x2 stents Mar 2023), HLD, T2DM, hx of kidney stones, psoriasis presents to [facility] for evaluation of oral mass. Reports Three month hx of jaw pain and loosening of mandibular molar tooth on the lower left. Pt has a 30 pack yr smoking hx, quit 1 yr ago. 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 POD 1 composite mandibular resection, neck dissection and fibula free flap reconstruction.       Subjective/ Interval:   ; patient seen this morning, oozing from trach as expected ,  Plan to start aspirin today, cuff is up on tracheostomy tube, intraoral skin panel intact doppler signal strong,  plan to advance NGT   : Patient seen and examined at bedside. AFVSS overnight. Significant HgB drop to 7.2 noted this morning. NGT clogged, attempted replacement but went into lung, need to replace. Cuff deflated. Patient reports lethargy. Otherwise no new complaints. Intraoral skin paddle intact with strong doppler signal. Plan to hold off one eliquis given significant HgB drop  12/10: Patient seen and examined at bedside. AFVSS overnight other than bradycardic to lowest 39, mainly in 40s-50s which seems to be his baseline even pre-op. Not symptomatic while chinedu. Otherwise, patient stable on TC with no issues. Yesterday, iatrogenic right sided PTX occured with NGT placement, patient remained asymptomatic throughout. Patient now s/p placement of pig-tail catheter by pulm with significant improvement in PTX. Chest tube clamped this morning with plan for removal this evening. Patient s/p 2 units of PRBCs with moderate improvement in Hgb. Today, patient reports feeling significantly better than yesterday and overall "feels good." He was started on trickle feeds last night but felt some chest tightness and so they were held. Patient also failed TOV again and was straight cathed. KRISTEN drain output significantly decreased this morning. No other changes at this time.   : patient seen this morning PTX has resolved on CXR, intraoral flap intact, doppler strong, trach in place,  KRISTEN drains with minimal output   : Patient seen and examined this morning at bedside. Overnight, NGT slightly displaced, coming out around 10 cm from original placement, advanced and secured, pending CXR. Patient failed trial of clears with SLP yesterday but tolerated PMV without issue. Patient continues to be OOBTC daily and is tolerating tube feeds at goal. 2 KRISTEN drains removed yesterday, remaining KRISTEN with minimal output. Intraoral flap intact, doppler strong, trach in place. Patient denies any other new complaints at this time.   : patient febrile overnight to 103, continues to have intermittent PVC, HR last night in 40s, patients baseline 50. Gen surg will like to wait 24 hours afebrile until g tube,  Oral flap appears to be congested, dixon continue to monitor. KRISTEN drain will be removed today. Pending results of fever work up . HEB 7.5, will transfuse when type and screen returns   : Patient seen this morning, afebrile overnight, trach secretion odor improving,  noted ot have an episode of oral bleeding after ambulation, bleeding stopped, unable to see source, will continue to monitor , doppler strong ( and removed today) ,  Flap continues to look dusky   12/15: patient sen this morning, continues to have intermittent oozing from left mandibular screw, Surgicel placed no active bleed, flap remains dusky but stable, afebrile overnight  : NAEON. AVSS. Patient in OR for PEG this morning. Will re-evaluate s/p PEG  : Re-evaluated s/p PEG. complaining fo abdominal discomfort. Flap is stable appearing. Donor site with wound vac. LLE edema improving. No further bleeding from the mouth.  : NAEON. AVSS. Examined at bedside. Trach changed to 6 uncuffed and confirmed on scope, secured with trach ties. Straight cath overnight for retention. Has not voided since. Will f/u bladder scan and straight cath if needed  : NAEON. AFVSS. Examined at bedside. Tolerating capping overnight. Passed TOV last night. TF started yesterday evening, nausea overnight so TF held again. He continues to have moderate oral secretions requiring oral suctioning.   : NAEON. AFVSS. Decannulated yesterday, respirating comfortably on room air. TF transitioned to Vital 1.5 yesterday evening, tolerating without abdominal pain. He continues to have moderate oral secretions.     ALLERGIES:  No Known Allergies      MEDICATIONS:  Antiinfectives:   cefTRIAXone   IVPB 2000 milliGRAM(s) IV Intermittent every 24 hours    IV fluids:  dextrose 5% + sodium chloride 0.45% 1000 milliLiter(s) IV Continuous <Continuous>  dextrose 5%. 1000 milliLiter(s) IV Continuous <Continuous>  dextrose 5%. 1000 milliLiter(s) IV Continuous <Continuous>    Hematologic/Anticoagulation:  aspirin  chewable 81 milliGRAM(s) Oral daily  clopidogrel Tablet 75 milliGRAM(s) Enteral Tube daily  enoxaparin Injectable 40 milliGRAM(s) SubCutaneous every 24 hours    Pain medications/Neuro:  acetaminophen   Oral Liquid .. 975 milliGRAM(s) Oral every 6 hours  melatonin Liquid 5 milliGRAM(s) Oral at bedtime PRN  ondansetron Injectable 4 milliGRAM(s) IV Push every 6 hours PRN  oxyCODONE    Solution 5 milliGRAM(s) Oral every 4 hours PRN  oxyCODONE    Solution 10 milliGRAM(s) Oral every 4 hours PRN    Endocrine Medications:   dextrose 50% Injectable 25 Gram(s) IV Push once  dextrose 50% Injectable 25 Gram(s) IV Push once  dextrose 50% Injectable 12.5 Gram(s) IV Push once  dextrose Oral Gel 15 Gram(s) Oral once PRN  glucagon  Injectable 1 milliGRAM(s) IntraMuscular once    All other standing medications:   acetylcysteine 20%  Inhalation 4 milliLiter(s) Inhalation every 6 hours  albuterol/ipratropium for Nebulization 3 milliLiter(s) Nebulizer every 6 hours  chlorhexidine 0.12% Liquid 15 milliLiter(s) Oral Mucosa two times a day  influenza  Vaccine (HIGH DOSE) 0.7 milliLiter(s) IntraMuscular once  pantoprazole  Injectable 40 milliGRAM(s) IV Push daily  sodium chloride 3%  Inhalation 4 milliLiter(s) Inhalation every 6 hours    All other PRN medications:    Vital Signs Last 24 Hrs  T(C): 36.9 (19 Dec 2023 05:00), Max: 37.4 (18 Dec 2023 22:00)  T(F): 98.4 (19 Dec 2023 05:00), Max: 99.3 (18 Dec 2023 22:00)  HR: 90 (19 Dec 2023 03:35) (51 - 90)  BP: 131/61 (19 Dec 2023 03:35) (108/53 - 146/67)  BP(mean): 88 (19 Dec 2023 03:35) (76 - 97)  RR: 16 (19 Dec 2023 03:35) (16 - 19)  SpO2: 99% (19 Dec 2023 03:35) (96% - 99%)    Parameters below as of 19 Dec 2023 03:35  Patient On (Oxygen Delivery Method): room air           @ 07:01  -   @ 07:00  --------------------------------------------------------  IN:    dextrose 5% + sodium chloride 0.45% w/ Additives: 2200 mL    Glucerna 1.5: 570 mL    IV PiggyBack: 50 mL  Total IN: 2820 mL    OUT:    VAC (Vacuum Assisted Closure) System (mL): 0 mL    Voided (mL): 850 mL  Total OUT: 850 mL    Total NET: 1970 mL          23 @ 07:01  -  23 @ 07:00  --------------------------------------------------------  IN:  Total IN: 0 mL    OUT:    VAC (Vacuum Assisted Closure) System (mL): 0 mL  Total OUT: 0 mL    Total NET: 0 mL            PHYSICAL EXAM:  Gen: AAOx3, NAD   Head: Surgical incision around chin extending up through lip, c/d/i  Eyes: EOMI, PERRL, visual acuity intact, no diplopia, supra/infra orbital rims intact, no subconjunctival heme,   Ears: Gross hearing intact  Nose: No septal hematoma/asymmetry, no epistaxis bilaterally. no abrasions present, no lacerations.   Throat: No LAD, supple, neck surgical incisionc/d/i , stoma healing well, dressing changed on rounds this morning, moderate oral secretions  Oral: Left FFF paddle dusk colored - skin sloughing off, will continue to monitor,. IMF screws in place intermittent ooze around left mandibular screw,   Exx: Wound vac left leg, thigh donor site telfa dressing changed             LABS                       8.0    7.46  )-----------( 316      ( 19 Dec 2023 05:30 )             25.0    12-18    139  |  105  |  9   ----------------------------<  122<H>  3.7   |  26  |  0.67    Ca    8.4      18 Dec 2023 05:30  Phos  2.7       Mg     1.6                Coagulation Studies-     Urinalysis Basic - ( 18 Dec 2023 05:30 )    Color: x / Appearance: x / SG: x / pH: x  Gluc: 122 mg/dL / Ketone: x  / Bili: x / Urobili: x   Blood: x / Protein: x / Nitrite: x   Leuk Esterase: x / RBC: x / WBC x   Sq Epi: x / Non Sq Epi: x / Bacteria: x      Endocrine Panel-                MICROBIOLOGY:  Culture - Sputum (collected 23 @ 07:27)  Source: Trach Asp Tracheal Aspirate  Gram Stain (23 @ 21:31):    Rare epithelial cells    Moderate WBC's    Rare Gram Positive Rods    Few Gram Negative Rods    Moderate Gram positive cocci in pairs, chains and clusters  Final Report (12-15-23 @ 09:27):    Moderate Escherichia coli    Moderate Enterobacter cloacae complex    Accompanied by normal respiratory brenden  Organism: Escherichia coli  Enterobacter cloacae complex (12-15-23 @ 09:27)  Organism: Enterobacter cloacae complex (12-15-23 @ 09:27)      Method Type: OTTO      -  Ampicillin: R <=8 These ampicillin results predict results for amoxicillin      -  Ampicillin/Sulbactam: R >16/8      -  Cefazolin: R >16      -  Cefepime: S <=2      -  Ceftriaxone: S <=1 Enterobacter cloacae, Klebsiella aerogenes, and Citrobacter freundii may develop resistance during prolonged therapy.      -  Ciprofloxacin: S <=0.25      -  Ertapenem: S <=0.5      -  Gentamicin: S <=2      -  Piperacillin/Tazobactam: S <=8      -  Tobramycin: S <=2      -  Trimethoprim/Sulfamethoxazole: S   Organism: Escherichia coli (12-15-23 @ 09:27)      Method Type: OTTO      -  Ampicillin: S <=8 These ampicillin results predict results for amoxicillin      -  Ampicillin/Sulbactam: S <=4/2      -  Cefazolin: S <=2      -  Ceftriaxone: S <=1      -  Ciprofloxacin: S <=0.25      -  Ertapenem: S <=0.5      -  Gentamicin: S <=2      -  Piperacillin/Tazobactam: S <=8      -  Tobramycin: I 4      -  Trimethoprim/Sulfamethoxazole: S     Culture - Sputum (collected 23 @ 20:48)  Source: .Sputum  Gram Stain (23 @ 22:45):    Moderate epithelial cells    Moderate WBC's    Rare Gram Positive Rods    Few Gram Negative Rods    Few Gram Positive Cocci in Pairs and Chains  Final Report (23 @ 22:45):    Sputum specimen rejected.  Microscopic examination indicates    oropharyngeal contamination.  Please repeat.      Culture Results:   Moderate Escherichia coli  Moderate Enterobacter cloacae complex  Accompanied by normal respiratory brenden (23 @ 07:27)  Culture Results:   Sputum specimen rejected.  Microscopic examination indicates  oropharyngeal contamination.  Please repeat. (23 @ 20:48)  Culture Results:   No growth at 5 days. (23 @ 20:10)  Culture Results:   No growth at 5 days. (23 @ 20:05)      Culture - Sputum (collected 23 @ 07:27)  Source: Trach Asp Tracheal Aspirate  Gram Stain (23 @ 21:31):    Rare epithelial cells    Moderate WBC's    Rare Gram Positive Rods    Few Gram Negative Rods    Moderate Gram positive cocci in pairs, chains and clusters  Final Report (12-15-23 @ 09:27):    Moderate Escherichia coli    Moderate Enterobacter cloacae complex    Accompanied by normal respiratory brenden  Organism: Escherichia coli  Enterobacter cloacae complex (12-15-23 @ 09:27)  Organism: Enterobacter cloacae complex (12-15-23 @ 09:27)      Method Type: OTTO      -  Ampicillin: R <=8 These ampicillin results predict results for amoxicillin      -  Ampicillin/Sulbactam: R >16/8      -  Cefazolin: R >16      -  Cefepime: S <=2      -  Ceftriaxone: S <=1 Enterobacter cloacae, Klebsiella aerogenes, and Citrobacter freundii may develop resistance during prolonged therapy.      -  Ciprofloxacin: S <=0.25      -  Ertapenem: S <=0.5      -  Gentamicin: S <=2      -  Piperacillin/Tazobactam: S <=8      -  Tobramycin: S <=2      -  Trimethoprim/Sulfamethoxazole: S   Organism: Escherichia coli (12-15-23 @ 09:27)      Method Type: OTTO      -  Ampicillin: S <=8 These ampicillin results predict results for amoxicillin      -  Ampicillin/Sulbactam: S <=4/2      -  Cefazolin: S <=2      -  Ceftriaxone: S <=1      -  Ciprofloxacin: S <=0.25      -  Ertapenem: S <=0.5      -  Gentamicin: S <=2      -  Piperacillin/Tazobactam: S <=8      -  Tobramycin: I 4      -  Trimethoprim/Sulfamethoxazole: S     Culture - Sputum (collected 23 @ 20:48)  Source: .Sputum  Gram Stain (23 @ 22:45):    Moderate epithelial cells    Moderate WBC's    Rare Gram Positive Rods    Few Gram Negative Rods    Few Gram Positive Cocci in Pairs and Chains  Final Report (23 @ 22:45):    Sputum specimen rejected.  Microscopic examination indicates    oropharyngeal contamination.  Please repeat.    Culture - Blood (collected 23 @ 20:10)  Source: .Blood Blood  Final Report (23 @ 22:00):    No growth at 5 days.    Culture - Blood (collected 23 @ 20:05)  Source: .Blood Blood  Final Report (23 @ 22:00):    No growth at 5 days.        RADIOLOGY & ADDITIONAL STUDIES:      Assessment and Plan:  SAUNDRA TSSAMGOTIS is a  68M w/ [id number] SCC of L buccal mucosa presents for pre optimization for surgery , now s/p hemimandibulectomy, left level 1, 2a, 3 neck neck dissection, L FFF, tracheostomy w/ 7.5 cuffed portex, dental implants, STSG , now s/p PEG       PLAN  continue daily stoma dressing changes  advance TF to goal this morning, transition to bolus TF once at goal  Monitor for any signs of oral bleeding   Plan to ambulate 3-4x a day   Continue nebulized solution for thick trach secretions   Continue Ondansetron PRN nausea/vomiting , PO Senna once daily, Miralax 17g once daily,  Chlorhexidine swish and spit, Esomeprazole, Enoxaparin, Gabapentin, Acetaminophen   Continue SCD’s    Cardiology - continue plavix, ASA, lovenox (will discuss if all 3 needed w/ cards/medicine)  Continue to work with SLP for trial of clears  May require suctioning at home, will discuss supplies with [facility] at [phone] with any questions/concerns.    Candida Jimenez  23 @ 07:17

## 2023-12-19 NOTE — SWALLOW VFSS/MBS ASSESSMENT ADULT - ROSENBEK'S PENETRATION ASPIRATION SCALE
7- thin liquids with a strategy; primarily PAS 5-6 with remaining trials of thin liquids (trace material, ejected to PAS 3)/(7) contrast passes glottis, visible subglottic residue remains despite patient’s response (aspiration)

## 2023-12-19 NOTE — DISCHARGE NOTE PROVIDER - CARE PROVIDER_API CALL
Freddy Gandhi.  Otolaryngology  [address] [place] [address] - Dept [place] [address]  Phone: [phone]  Fax: [phone]  Follow Up Time:    Freddy Gandhi.  Otolaryngology  [address] [place] [address] - [place] [address]  Phone: [phone]  Fax: [phone]  Follow Up Time:

## 2023-12-19 NOTE — SWALLOW VFSS/MBS ASSESSMENT ADULT - SLP PERTINENT HISTORY OF CURRENT PROBLEM
PMHx of CAD (x2 stents Mar 2023), HLD, T2DM, hx of kidney stones, psoriasis, newly found [id number] SCC of L buccal mucosa who presented to [facility] on 12/03/23 for pre optimization for surgery, now s/p hemimandibulectomy, left level 1, 2a, 3 neck neck dissection, L FFF, tracheostomy w/ 7.5 cuffed portex, dental implants, STSG 12/7, now s/p PEG 12/16 and decannulation on 12/18/23. PMHx of CAD (x2 stents Mar 2023), HLD, T2DM, hx of kidney stones, psoriasis, newly found [id number] SCC of L buccal mucosa who presented to [facility] on 12/03/23 for pre optimization for surgery, now s/p hemimandibulectomy, left level 1, 2a, 3 neck neck dissection, L FFF, tracheostomy w/ 7.5 cuffed portex, dental implants, STSG 12/7, now s/p PEG 12/16 and decannulation on 12/18/23.

## 2023-12-20 LAB
ANION GAP SERPL CALC-SCNC: 9 MMOL/L — SIGNIFICANT CHANGE UP (ref 5–17)
ANION GAP SERPL CALC-SCNC: 9 MMOL/L — SIGNIFICANT CHANGE UP (ref 5–17)
BUN SERPL-MCNC: 8 MG/DL — SIGNIFICANT CHANGE UP (ref 7–23)
BUN SERPL-MCNC: 8 MG/DL — SIGNIFICANT CHANGE UP (ref 7–23)
CALCIUM SERPL-MCNC: 8.7 MG/DL — SIGNIFICANT CHANGE UP (ref 8.4–10.5)
CALCIUM SERPL-MCNC: 8.7 MG/DL — SIGNIFICANT CHANGE UP (ref 8.4–10.5)
CHLORIDE SERPL-SCNC: 107 MMOL/L — SIGNIFICANT CHANGE UP (ref 96–108)
CHLORIDE SERPL-SCNC: 107 MMOL/L — SIGNIFICANT CHANGE UP (ref 96–108)
CO2 SERPL-SCNC: 26 MMOL/L — SIGNIFICANT CHANGE UP (ref 22–31)
CO2 SERPL-SCNC: 26 MMOL/L — SIGNIFICANT CHANGE UP (ref 22–31)
CREAT SERPL-MCNC: 0.65 MG/DL — SIGNIFICANT CHANGE UP (ref 0.5–1.3)
CREAT SERPL-MCNC: 0.65 MG/DL — SIGNIFICANT CHANGE UP (ref 0.5–1.3)
EGFR: 103 ML/MIN/1.73M2 — SIGNIFICANT CHANGE UP
EGFR: 103 ML/MIN/1.73M2 — SIGNIFICANT CHANGE UP
GLUCOSE BLDC GLUCOMTR-MCNC: 130 MG/DL — HIGH (ref 70–99)
GLUCOSE BLDC GLUCOMTR-MCNC: 130 MG/DL — HIGH (ref 70–99)
GLUCOSE BLDC GLUCOMTR-MCNC: 165 MG/DL — HIGH (ref 70–99)
GLUCOSE BLDC GLUCOMTR-MCNC: 165 MG/DL — HIGH (ref 70–99)
GLUCOSE BLDC GLUCOMTR-MCNC: 204 MG/DL — HIGH (ref 70–99)
GLUCOSE BLDC GLUCOMTR-MCNC: 204 MG/DL — HIGH (ref 70–99)
GLUCOSE SERPL-MCNC: 121 MG/DL — HIGH (ref 70–99)
GLUCOSE SERPL-MCNC: 121 MG/DL — HIGH (ref 70–99)
HCT VFR BLD CALC: 25.4 % — LOW (ref 39–50)
HCT VFR BLD CALC: 25.4 % — LOW (ref 39–50)
HGB BLD-MCNC: 8.1 G/DL — LOW (ref 13–17)
HGB BLD-MCNC: 8.1 G/DL — LOW (ref 13–17)
MAGNESIUM SERPL-MCNC: 1.8 MG/DL — SIGNIFICANT CHANGE UP (ref 1.6–2.6)
MAGNESIUM SERPL-MCNC: 1.8 MG/DL — SIGNIFICANT CHANGE UP (ref 1.6–2.6)
MCHC RBC-ENTMCNC: 28 PG — SIGNIFICANT CHANGE UP (ref 27–34)
MCHC RBC-ENTMCNC: 28 PG — SIGNIFICANT CHANGE UP (ref 27–34)
MCHC RBC-ENTMCNC: 31.9 GM/DL — LOW (ref 32–36)
MCHC RBC-ENTMCNC: 31.9 GM/DL — LOW (ref 32–36)
MCV RBC AUTO: 87.9 FL — SIGNIFICANT CHANGE UP (ref 80–100)
MCV RBC AUTO: 87.9 FL — SIGNIFICANT CHANGE UP (ref 80–100)
NRBC # BLD: 0 /100 WBCS — SIGNIFICANT CHANGE UP (ref 0–0)
NRBC # BLD: 0 /100 WBCS — SIGNIFICANT CHANGE UP (ref 0–0)
PHOSPHATE SERPL-MCNC: 3 MG/DL — SIGNIFICANT CHANGE UP (ref 2.5–4.5)
PHOSPHATE SERPL-MCNC: 3 MG/DL — SIGNIFICANT CHANGE UP (ref 2.5–4.5)
PLATELET # BLD AUTO: 335 K/UL — SIGNIFICANT CHANGE UP (ref 150–400)
PLATELET # BLD AUTO: 335 K/UL — SIGNIFICANT CHANGE UP (ref 150–400)
POTASSIUM SERPL-MCNC: 4 MMOL/L — SIGNIFICANT CHANGE UP (ref 3.5–5.3)
POTASSIUM SERPL-MCNC: 4 MMOL/L — SIGNIFICANT CHANGE UP (ref 3.5–5.3)
POTASSIUM SERPL-SCNC: 4 MMOL/L — SIGNIFICANT CHANGE UP (ref 3.5–5.3)
POTASSIUM SERPL-SCNC: 4 MMOL/L — SIGNIFICANT CHANGE UP (ref 3.5–5.3)
RBC # BLD: 2.89 M/UL — LOW (ref 4.2–5.8)
RBC # BLD: 2.89 M/UL — LOW (ref 4.2–5.8)
RBC # FLD: 13.8 % — SIGNIFICANT CHANGE UP (ref 10.3–14.5)
RBC # FLD: 13.8 % — SIGNIFICANT CHANGE UP (ref 10.3–14.5)
SODIUM SERPL-SCNC: 142 MMOL/L — SIGNIFICANT CHANGE UP (ref 135–145)
SODIUM SERPL-SCNC: 142 MMOL/L — SIGNIFICANT CHANGE UP (ref 135–145)
WBC # BLD: 7.3 K/UL — SIGNIFICANT CHANGE UP (ref 3.8–10.5)
WBC # BLD: 7.3 K/UL — SIGNIFICANT CHANGE UP (ref 3.8–10.5)
WBC # FLD AUTO: 7.3 K/UL — SIGNIFICANT CHANGE UP (ref 3.8–10.5)
WBC # FLD AUTO: 7.3 K/UL — SIGNIFICANT CHANGE UP (ref 3.8–10.5)

## 2023-12-20 PROCEDURE — 99233 SBSQ HOSP IP/OBS HIGH 50: CPT | Mod: GC

## 2023-12-20 RX ORDER — DEXTROSE 50 % IN WATER 50 %
15 SYRINGE (ML) INTRAVENOUS ONCE
Refills: 0 | Status: DISCONTINUED | OUTPATIENT
Start: 2023-12-20 | End: 2023-12-21

## 2023-12-20 RX ORDER — INSULIN LISPRO 100/ML
VIAL (ML) SUBCUTANEOUS
Refills: 0 | Status: DISCONTINUED | OUTPATIENT
Start: 2023-12-20 | End: 2023-12-21

## 2023-12-20 RX ORDER — INSULIN LISPRO 100/ML
VIAL (ML) SUBCUTANEOUS AT BEDTIME
Refills: 0 | Status: DISCONTINUED | OUTPATIENT
Start: 2023-12-20 | End: 2023-12-21

## 2023-12-20 RX ORDER — OXYCODONE HYDROCHLORIDE 5 MG/1
5 TABLET ORAL
Qty: 100 | Refills: 0
Start: 2023-12-20 | End: 2023-12-24

## 2023-12-20 RX ORDER — SODIUM CHLORIDE 9 MG/ML
1000 INJECTION, SOLUTION INTRAVENOUS
Refills: 0 | Status: DISCONTINUED | OUTPATIENT
Start: 2023-12-20 | End: 2023-12-21

## 2023-12-20 RX ORDER — CARBAMIDE PEROXIDE 81.86 MG/ML
1 SOLUTION/ DROPS AURICULAR (OTIC)
Qty: 15 | Refills: 0
Start: 2023-12-20 | End: 2023-12-26

## 2023-12-20 RX ORDER — DEXTROSE 50 % IN WATER 50 %
25 SYRINGE (ML) INTRAVENOUS ONCE
Refills: 0 | Status: DISCONTINUED | OUTPATIENT
Start: 2023-12-20 | End: 2023-12-21

## 2023-12-20 RX ORDER — DEXTROSE 50 % IN WATER 50 %
12.5 SYRINGE (ML) INTRAVENOUS ONCE
Refills: 0 | Status: DISCONTINUED | OUTPATIENT
Start: 2023-12-20 | End: 2023-12-21

## 2023-12-20 RX ORDER — CHLORHEXIDINE GLUCONATE 213 G/1000ML
15 SOLUTION TOPICAL
Qty: 420 | Refills: 0
Start: 2023-12-20 | End: 2024-01-02

## 2023-12-20 RX ORDER — GLUCAGON INJECTION, SOLUTION 0.5 MG/.1ML
1 INJECTION, SOLUTION SUBCUTANEOUS ONCE
Refills: 0 | Status: DISCONTINUED | OUTPATIENT
Start: 2023-12-20 | End: 2023-12-21

## 2023-12-20 RX ADMIN — OXYCODONE HYDROCHLORIDE 10 MILLIGRAM(S): 5 TABLET ORAL at 22:24

## 2023-12-20 RX ADMIN — Medication 3 MILLILITER(S): at 23:56

## 2023-12-20 RX ADMIN — PANTOPRAZOLE SODIUM 40 MILLIGRAM(S): 20 TABLET, DELAYED RELEASE ORAL at 12:12

## 2023-12-20 RX ADMIN — IRON SUCROSE 68.75 MILLIGRAM(S): 20 INJECTION, SOLUTION INTRAVENOUS at 16:27

## 2023-12-20 RX ADMIN — Medication 975 MILLIGRAM(S): at 23:56

## 2023-12-20 RX ADMIN — Medication 975 MILLIGRAM(S): at 18:41

## 2023-12-20 RX ADMIN — SODIUM CHLORIDE 4 MILLILITER(S): 9 INJECTION INTRAMUSCULAR; INTRAVENOUS; SUBCUTANEOUS at 05:16

## 2023-12-20 RX ADMIN — SODIUM CHLORIDE 4 MILLILITER(S): 9 INJECTION INTRAMUSCULAR; INTRAVENOUS; SUBCUTANEOUS at 23:57

## 2023-12-20 RX ADMIN — Medication 3 MILLILITER(S): at 12:18

## 2023-12-20 RX ADMIN — Medication 4 MILLILITER(S): at 12:18

## 2023-12-20 RX ADMIN — Medication 81 MILLIGRAM(S): at 12:12

## 2023-12-20 RX ADMIN — ENOXAPARIN SODIUM 40 MILLIGRAM(S): 100 INJECTION SUBCUTANEOUS at 12:12

## 2023-12-20 RX ADMIN — Medication 3 MILLILITER(S): at 18:27

## 2023-12-20 RX ADMIN — Medication 975 MILLIGRAM(S): at 13:56

## 2023-12-20 RX ADMIN — Medication 975 MILLIGRAM(S): at 05:49

## 2023-12-20 RX ADMIN — Medication 4 MILLILITER(S): at 18:27

## 2023-12-20 RX ADMIN — Medication 2: at 12:11

## 2023-12-20 RX ADMIN — OXYCODONE HYDROCHLORIDE 10 MILLIGRAM(S): 5 TABLET ORAL at 23:24

## 2023-12-20 RX ADMIN — CHLORHEXIDINE GLUCONATE 15 MILLILITER(S): 213 SOLUTION TOPICAL at 18:27

## 2023-12-20 RX ADMIN — Medication 975 MILLIGRAM(S): at 05:16

## 2023-12-20 RX ADMIN — CARBAMIDE PEROXIDE 1 DROP(S): 81.86 SOLUTION/ DROPS AURICULAR (OTIC) at 18:27

## 2023-12-20 RX ADMIN — Medication 975 MILLIGRAM(S): at 12:13

## 2023-12-20 RX ADMIN — CHLORHEXIDINE GLUCONATE 15 MILLILITER(S): 213 SOLUTION TOPICAL at 05:15

## 2023-12-20 RX ADMIN — SODIUM CHLORIDE 4 MILLILITER(S): 9 INJECTION INTRAMUSCULAR; INTRAVENOUS; SUBCUTANEOUS at 12:18

## 2023-12-20 RX ADMIN — Medication 4 MILLILITER(S): at 05:15

## 2023-12-20 RX ADMIN — SODIUM CHLORIDE 4 MILLILITER(S): 9 INJECTION INTRAMUSCULAR; INTRAVENOUS; SUBCUTANEOUS at 18:27

## 2023-12-20 RX ADMIN — Medication 975 MILLIGRAM(S): at 19:54

## 2023-12-20 RX ADMIN — Medication 975 MILLIGRAM(S): at 00:29

## 2023-12-20 RX ADMIN — Medication 3 MILLILITER(S): at 05:15

## 2023-12-20 RX ADMIN — CLOPIDOGREL BISULFATE 75 MILLIGRAM(S): 75 TABLET, FILM COATED ORAL at 12:11

## 2023-12-20 RX ADMIN — CARBAMIDE PEROXIDE 1 DROP(S): 81.86 SOLUTION/ DROPS AURICULAR (OTIC) at 05:15

## 2023-12-20 NOTE — PROGRESS NOTE ADULT - ASSESSMENT
67 y/o M w PMH of CAD (x2 stents Mar 2023), HLD, T2DM, hx of kidney stones, psoriasis presents to [facility] for evaluation of oral mass and 3 months of jaw pain a/f OMFS mandibular resection and flap reconstruction on Thursday. Medicine initially evaluated on 12/6 for pre-op assessment Now following for co-management.    #anemia  Hgb stable at 8, previously was 13-14  no signs of active bleed  per iron studies, consistent with WADE - today is day 2 of 2 of IV iron 500mg qD  maintain active T&S  per cardiology recommendations, transfusion goal to Hgb >8     #SIRS, suspected to be 2/2 to pneumonia - RESOLVED  Initially febrile with tachycardia and leukocytosis  sputum cx showed E coli and Enterobacter cloacea, sensitive to CTX  initially on CTX then de-escalated and completed 5 day course of antibiotics    #s/p mandibular resection and flap reconstruction  pt tolerated procedure well. KRISTEN drains removed by primary team  s/p G tube placement on 12/16 - tolerating feeds, however does note few episodes of diarrhea. Recommend further discussion with nutrition services  c/w S&S evaluation  recommend early mobilization, OOBTC, PT evaluation    #LLE edema  pt with LLE non pitting edema  LLE negative for DVT  recommend raising leg to help with edema    #CAD  pt with hx of CAD s/p 2 stents in March 2023  Cardiology consulted; reccs appreciated  on lipitor 40 and ASA and plavix    #DM2  A1c 6.3%, takes home metformin and jardiance. FS   - c/w current diet, FS checks and ISS. Goal -180 while inpatient    Medicine will continue to follow. Patient seen, evaluated, and discussed with attending Dr. Montero 67 y/o M w PMH of CAD (x2 stents Mar 2023), HLD, T2DM, hx of kidney stones, psoriasis presents to [facility] for evaluation of oral mass and 3 months of jaw pain a/f OMFS mandibular resection and flap reconstruction on Thursday. Medicine initially evaluated on 12/6 for pre-op assessment Now following for co-management.    #anemia  Hgb stable at 8, previously was 13-14  no signs of active bleed  per iron studies, consistent with WADE - today is day 2 of 2 of IV iron 500mg qD  maintain active T&S  per cardiology recommendations, transfusion goal to Hgb >8     #SIRS, suspected to be 2/2 to pneumonia - RESOLVED  Initially febrile with tachycardia and leukocytosis  sputum cx showed E coli and Enterobacter cloacea, sensitive to CTX  initially on CTX then de-escalated and completed 5 day course of antibiotics    #s/p mandibular resection and flap reconstruction  pt tolerated procedure well. KRISTEN drains removed by primary team  s/p G tube placement on 12/16 - tolerating feeds, however does note few episodes of diarrhea. Recommend further discussion with nutrition services  c/w S&S evaluation  recommend early mobilization, OOBTC, PT evaluation    #LLE edema  pt with LLE non pitting edema  LLE negative for DVT  recommend raising leg to help with edema    #CAD  pt with hx of CAD s/p 2 stents in March 2023  Cardiology consulted; reccs appreciated  on lipitor 40 and ASA and plavix    #DM2  A1c 6.3%, takes home metformin and jardiance. FS   - c/w current diet, FS checks and ISS. Goal -180 while inpatient    Medicine will continue to follow. Patient seen, evaluated, and discussed with attending Dr. Montero

## 2023-12-20 NOTE — PROGRESS NOTE ADULT - SUBJECTIVE AND OBJECTIVE BOX
OTOLARYNGOLOGY (ENT) PROGRESS NOTE    PATIENT: SAUNDRA HOLMAN  MRN: [id number]  : 57  [id number]  DATE OF SERVICE:  23  			         ID:SAUNDRA HOLMAN is a  69yo M w PMH of CAD (x2 stents Mar 2023), HLD, T2DM, hx of kidney stones, psoriasis presents to [facility] for evaluation of oral mass. Reports Three month hx of jaw pain and loosening of mandibular molar tooth on the lower left. Pt has a 30 pack yr smoking hx, quit 1 yr ago. 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 POD 1 composite mandibular resection, neck dissection and fibula free flap reconstruction.       Subjective/ Interval:   ; patient seen this morning, oozing from trach as expected ,  Plan to start aspirin today, cuff is up on tracheostomy tube, intraoral skin panel intact doppler signal strong,  plan to advance NGT   : Patient seen and examined at bedside. AFVSS overnight. Significant HgB drop to 7.2 noted this morning. NGT clogged, attempted replacement but went into lung, need to replace. Cuff deflated. Patient reports lethargy. Otherwise no new complaints. Intraoral skin paddle intact with strong doppler signal. Plan to hold off one eliquis given significant HgB drop  12/10: Patient seen and examined at bedside. AFVSS overnight other than bradycardic to lowest 39, mainly in 40s-50s which seems to be his baseline even pre-op. Not symptomatic while chinedu. Otherwise, patient stable on TC with no issues. Yesterday, iatrogenic right sided PTX occured with NGT placement, patient remained asymptomatic throughout. Patient now s/p placement of pig-tail catheter by pulm with significant improvement in PTX. Chest tube clamped this morning with plan for removal this evening. Patient s/p 2 units of PRBCs with moderate improvement in Hgb. Today, patient reports feeling significantly better than yesterday and overall "feels good." He was started on trickle feeds last night but felt some chest tightness and so they were held. Patient also failed TOV again and was straight cathed. KRISTEN drain output significantly decreased this morning. No other changes at this time.   : patient seen this morning PTX has resolved on CXR, intraoral flap intact, doppler strong, trach in place,  KRISTEN drains with minimal output   : Patient seen and examined this morning at bedside. Overnight, NGT slightly displaced, coming out around 10 cm from original placement, advanced and secured, pending CXR. Patient failed trial of clears with SLP yesterday but tolerated PMV without issue. Patient continues to be OOBTC daily and is tolerating tube feeds at goal. 2 KRISTEN drains removed yesterday, remaining KRISTEN with minimal output. Intraoral flap intact, doppler strong, trach in place. Patient denies any other new complaints at this time.   : patient febrile overnight to 103, continues to have intermittent PVC, HR last night in 40s, patients baseline 50. Gen surg will like to wait 24 hours afebrile until g tube,  Oral flap appears to be congested, dixon continue to monitor. KRISTEN drain will be removed today. Pending results of fever work up . HEB 7.5, will transfuse when type and screen returns   : Patient seen this morning, afebrile overnight, trach secretion odor improving,  noted ot have an episode of oral bleeding after ambulation, bleeding stopped, unable to see source, will continue to monitor , doppler strong ( and removed today) ,  Flap continues to look dusky   12/15: patient sen this morning, continues to have intermittent oozing from left mandibular screw, Surgicel placed no active bleed, flap remains dusky but stable, afebrile overnight  : NAEON. AVSS. Patient in OR for PEG this morning. Will re-evaluate s/p PEG  : Re-evaluated s/p PEG. complaining fo abdominal discomfort. Flap is stable appearing. Donor site with wound vac. LLE edema improving. No further bleeding from the mouth.  : NAEON. AVSS. Examined at bedside. Trach changed to 6 uncuffed and confirmed on scope, secured with trach ties. Straight cath overnight for retention. Has not voided since. Will f/u bladder scan and straight cath if needed  : NAEON. AFVSS. Examined at bedside. Tolerating capping overnight. Passed TOV last night. TF started yesterday evening, nausea overnight so TF held again. He continues to have moderate oral secretions requiring oral suctioning.   : NAEON. AFVSS. Decannulated yesterday, respirating comfortably on room air. TF transitioned to Vital 1.5 yesterday evening, tolerating without abdominal pain. He continues to have moderate oral secretions.   : NAEON. AFVSS. Transitioned to bolus TF, tolerating bolus TF. MBS performed yesterday, patient cleared for pureed with thin liquids. He continues to have intermittent oozing from left mandibular screw, no active bleeding.     ALLERGIES:  No Known Allergies      MEDICATIONS:  Antiinfectives:     IV fluids:  dextrose 5%. 1000 milliLiter(s) IV Continuous <Continuous>  dextrose 5%. 1000 milliLiter(s) IV Continuous <Continuous>  iron sucrose IVPB 500 milliGRAM(s) IV Intermittent every 24 hours    Hematologic/Anticoagulation:  aspirin  chewable 81 milliGRAM(s) Oral daily  clopidogrel Tablet 75 milliGRAM(s) Enteral Tube daily  enoxaparin Injectable 40 milliGRAM(s) SubCutaneous every 24 hours    Pain medications/Neuro:  acetaminophen   Oral Liquid .. 975 milliGRAM(s) Oral every 6 hours  melatonin Liquid 5 milliGRAM(s) Oral at bedtime PRN  ondansetron Injectable 4 milliGRAM(s) IV Push every 6 hours PRN  oxyCODONE    Solution 5 milliGRAM(s) Oral every 4 hours PRN  oxyCODONE    Solution 10 milliGRAM(s) Oral every 4 hours PRN    Endocrine Medications:   dextrose 50% Injectable 25 Gram(s) IV Push once  dextrose 50% Injectable 25 Gram(s) IV Push once  dextrose 50% Injectable 12.5 Gram(s) IV Push once  dextrose Oral Gel 15 Gram(s) Oral once PRN  glucagon  Injectable 1 milliGRAM(s) IntraMuscular once  insulin lispro (ADMELOG) corrective regimen sliding scale   SubCutaneous at bedtime  insulin lispro (ADMELOG) corrective regimen sliding scale   SubCutaneous three times a day before meals    All other standing medications:   acetylcysteine 20%  Inhalation 4 milliLiter(s) Inhalation every 6 hours  albuterol/ipratropium for Nebulization 3 milliLiter(s) Nebulizer every 6 hours  carbamide peroxide Otic Solution 1 Drop(s) Right Ear two times a day  chlorhexidine 0.12% Liquid 15 milliLiter(s) Oral Mucosa two times a day  influenza  Vaccine (HIGH DOSE) 0.7 milliLiter(s) IntraMuscular once  pantoprazole  Injectable 40 milliGRAM(s) IV Push daily  sodium chloride 3%  Inhalation 4 milliLiter(s) Inhalation every 6 hours    All other PRN medications:    Vital Signs Last 24 Hrs  T(C): 36.8 (20 Dec 2023 04:54), Max: 37.1 (19 Dec 2023 21:00)  T(F): 98.3 (20 Dec 2023 04:54), Max: 98.7 (19 Dec 2023 21:00)  HR: 72 (20 Dec 2023 04:19) (62 - 80)  BP: 128/57 (20 Dec 2023 04:19) (113/54 - 153/68)  BP(mean): 82 (20 Dec 2023 04:19) (78 - 98)  RR: 18 (20 Dec 2023 04:19) (17 - 18)  SpO2: 96% (20 Dec 2023 04:19) (94% - 100%)    Parameters below as of 20 Dec 2023 04:19  Patient On (Oxygen Delivery Method): room air           @ 07:  -   @ 07:00  --------------------------------------------------------  IN:    dextrose 5% + sodium chloride 0.45% w/ Additives: 300 mL    Vital1.5: 170 mL    Vital1.5: 874 mL  Total IN: 1344 mL    OUT:    VAC (Vacuum Assisted Closure) System (mL): 0 mL    Voided (mL): 650 mL  Total OUT: 650 mL    Total NET: 694 mL          23 @ 07:01  -  23 @ 07:00  --------------------------------------------------------  IN:  Total IN: 0 mL    OUT:    VAC (Vacuum Assisted Closure) System (mL): 0 mL  Total OUT: 0 mL    Total NET: 0 mL                  PHYSICAL EXAM:  Gen: AAOx3, NAD   Head: Surgical incision around chin extending up through lip, c/d/i  Eyes: EOMI, PERRL, visual acuity intact, no diplopia, supra/infra orbital rims intact, no subconjunctival heme,   Ears: Gross hearing intact  Nose: No septal hematoma/asymmetry, no epistaxis bilaterally. no abrasions present, no lacerations.   Throat: No LAD, supple, neck surgical incisionc/d/i , stoma healing well, dressing changed this morning, moderate oral secretions  Oral: Left FFF paddle dusk colored - skin sloughing off, will continue to monitor,. IMF screws in place intermittent ooze around left mandibular screw,   Exx: Wound vac left leg, thigh donor site telfa dressing changed           LABS                       8.0    7.46  )-----------( 316      ( 19 Dec 2023 05:30 )             25.0        141  |  107  |  8   ----------------------------<  146<H>  3.9   |  26  |  0.65    Ca    8.5      19 Dec 2023 05:30  Phos  3.0       Mg     1.7                Coagulation Studies-     Urinalysis Basic - ( 19 Dec 2023 05:30 )    Color: x / Appearance: x / SG: x / pH: x  Gluc: 146 mg/dL / Ketone: x  / Bili: x / Urobili: x   Blood: x / Protein: x / Nitrite: x   Leuk Esterase: x / RBC: x / WBC x   Sq Epi: x / Non Sq Epi: x / Bacteria: x      Endocrine Panel-                MICROBIOLOGY:  Culture Results:   Moderate Escherichia coli  Moderate Enterobacter cloacae complex  Accompanied by normal respiratory brenden (23 @ 07:27)  Culture Results:   Sputum specimen rejected.  Microscopic examination indicates  oropharyngeal contamination.  Please repeat. (23 @ 20:48)  Culture Results:   No growth at 5 days. (23 @ 20:10)  Culture Results:   No growth at 5 days. (23 @ 20:05)        RADIOLOGY & ADDITIONAL STUDIES:    Assessment and Plan:  SAUNDRA HOLMAN is a  68M w/ [id number] SCC of L buccal mucosa presents for pre optimization for surgery , now s/p hemimandibulectomy, left level 1, 2a, 3 neck neck dissection, L FFF, tracheostomy .5 cuffed portex, dental implants, STSG , now s/p PEG       PLAN  continue daily stoma dressing changes  continue bolus TF, pureed diet as tolerated  Monitor for any signs of oral bleeding   Plan to ambulate 3-4x a day   Continue Ondansetron PRN nausea/vomiting , PO Senna once daily, Miralax 17g once daily,  Chlorhexidine swish and spit, Esomeprazole, Enoxaparin, Gabapentin, Acetaminophen   Continue SCD’s    Continue plavix, ASA, lovenox   Discharge planning - pending approval for home care nursing         [facility] at [phone] with any questions/concerns.    Candida Jimenez  23 @ 07:29 OTOLARYNGOLOGY (ENT) PROGRESS NOTE    PATIENT: SAUNDRA HOLMAN  MRN: [id number]  : 57  [id number]  DATE OF SERVICE:  23  			         ID:SAUNDRA HOLMAN is a  69yo M w PMH of CAD (x2 stents Mar 2023), HLD, T2DM, hx of kidney stones, psoriasis presents to [facility] for evaluation of oral mass. Reports Three month hx of jaw pain and loosening of mandibular molar tooth on the lower left. Pt has a 30 pack yr smoking hx, quit 1 yr ago. 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 POD 1 composite mandibular resection, neck dissection and fibula free flap reconstruction.       Subjective/ Interval:   ; patient seen this morning, oozing from trach as expected ,  Plan to start aspirin today, cuff is up on tracheostomy tube, intraoral skin panel intact doppler signal strong,  plan to advance NGT   : Patient seen and examined at bedside. AFVSS overnight. Significant HgB drop to 7.2 noted this morning. NGT clogged, attempted replacement but went into lung, need to replace. Cuff deflated. Patient reports lethargy. Otherwise no new complaints. Intraoral skin paddle intact with strong doppler signal. Plan to hold off one eliquis given significant HgB drop  12/10: Patient seen and examined at bedside. AFVSS overnight other than bradycardic to lowest 39, mainly in 40s-50s which seems to be his baseline even pre-op. Not symptomatic while chinedu. Otherwise, patient stable on TC with no issues. Yesterday, iatrogenic right sided PTX occured with NGT placement, patient remained asymptomatic throughout. Patient now s/p placement of pig-tail catheter by pulm with significant improvement in PTX. Chest tube clamped this morning with plan for removal this evening. Patient s/p 2 units of PRBCs with moderate improvement in Hgb. Today, patient reports feeling significantly better than yesterday and overall "feels good." He was started on trickle feeds last night but felt some chest tightness and so they were held. Patient also failed TOV again and was straight cathed. KRISTEN drain output significantly decreased this morning. No other changes at this time.   : patient seen this morning PTX has resolved on CXR, intraoral flap intact, doppler strong, trach in place,  KRISTEN drains with minimal output   : Patient seen and examined this morning at bedside. Overnight, NGT slightly displaced, coming out around 10 cm from original placement, advanced and secured, pending CXR. Patient failed trial of clears with SLP yesterday but tolerated PMV without issue. Patient continues to be OOBTC daily and is tolerating tube feeds at goal. 2 KRISTEN drains removed yesterday, remaining KRISTEN with minimal output. Intraoral flap intact, doppler strong, trach in place. Patient denies any other new complaints at this time.   : patient febrile overnight to 103, continues to have intermittent PVC, HR last night in 40s, patients baseline 50. Gen surg will like to wait 24 hours afebrile until g tube,  Oral flap appears to be congested, dixon continue to monitor. KRISTEN drain will be removed today. Pending results of fever work up . HEB 7.5, will transfuse when type and screen returns   : Patient seen this morning, afebrile overnight, trach secretion odor improving,  noted ot have an episode of oral bleeding after ambulation, bleeding stopped, unable to see source, will continue to monitor , doppler strong ( and removed today) ,  Flap continues to look dusky   12/15: patient sen this morning, continues to have intermittent oozing from left mandibular screw, Surgicel placed no active bleed, flap remains dusky but stable, afebrile overnight  : NAEON. AVSS. Patient in OR for PEG this morning. Will re-evaluate s/p PEG  : Re-evaluated s/p PEG. complaining fo abdominal discomfort. Flap is stable appearing. Donor site with wound vac. LLE edema improving. No further bleeding from the mouth.  : NAEON. AVSS. Examined at bedside. Trach changed to 6 uncuffed and confirmed on scope, secured with trach ties. Straight cath overnight for retention. Has not voided since. Will f/u bladder scan and straight cath if needed  : NAEON. AFVSS. Examined at bedside. Tolerating capping overnight. Passed TOV last night. TF started yesterday evening, nausea overnight so TF held again. He continues to have moderate oral secretions requiring oral suctioning.   : NAEON. AFVSS. Decannulated yesterday, respirating comfortably on room air. TF transitioned to Vital 1.5 yesterday evening, tolerating without abdominal pain. He continues to have moderate oral secretions.   : NAEON. AFVSS. Transitioned to bolus TF, tolerating bolus TF. MBS performed yesterday, patient cleared for pureed with thin liquids. He continues to have intermittent oozing from left mandibular screw, no active bleeding.     ALLERGIES:  No Known Allergies      MEDICATIONS:  Antiinfectives:     IV fluids:  dextrose 5%. 1000 milliLiter(s) IV Continuous <Continuous>  dextrose 5%. 1000 milliLiter(s) IV Continuous <Continuous>  iron sucrose IVPB 500 milliGRAM(s) IV Intermittent every 24 hours    Hematologic/Anticoagulation:  aspirin  chewable 81 milliGRAM(s) Oral daily  clopidogrel Tablet 75 milliGRAM(s) Enteral Tube daily  enoxaparin Injectable 40 milliGRAM(s) SubCutaneous every 24 hours    Pain medications/Neuro:  acetaminophen   Oral Liquid .. 975 milliGRAM(s) Oral every 6 hours  melatonin Liquid 5 milliGRAM(s) Oral at bedtime PRN  ondansetron Injectable 4 milliGRAM(s) IV Push every 6 hours PRN  oxyCODONE    Solution 5 milliGRAM(s) Oral every 4 hours PRN  oxyCODONE    Solution 10 milliGRAM(s) Oral every 4 hours PRN    Endocrine Medications:   dextrose 50% Injectable 25 Gram(s) IV Push once  dextrose 50% Injectable 25 Gram(s) IV Push once  dextrose 50% Injectable 12.5 Gram(s) IV Push once  dextrose Oral Gel 15 Gram(s) Oral once PRN  glucagon  Injectable 1 milliGRAM(s) IntraMuscular once  insulin lispro (ADMELOG) corrective regimen sliding scale   SubCutaneous at bedtime  insulin lispro (ADMELOG) corrective regimen sliding scale   SubCutaneous three times a day before meals    All other standing medications:   acetylcysteine 20%  Inhalation 4 milliLiter(s) Inhalation every 6 hours  albuterol/ipratropium for Nebulization 3 milliLiter(s) Nebulizer every 6 hours  carbamide peroxide Otic Solution 1 Drop(s) Right Ear two times a day  chlorhexidine 0.12% Liquid 15 milliLiter(s) Oral Mucosa two times a day  influenza  Vaccine (HIGH DOSE) 0.7 milliLiter(s) IntraMuscular once  pantoprazole  Injectable 40 milliGRAM(s) IV Push daily  sodium chloride 3%  Inhalation 4 milliLiter(s) Inhalation every 6 hours    All other PRN medications:    Vital Signs Last 24 Hrs  T(C): 36.8 (20 Dec 2023 04:54), Max: 37.1 (19 Dec 2023 21:00)  T(F): 98.3 (20 Dec 2023 04:54), Max: 98.7 (19 Dec 2023 21:00)  HR: 72 (20 Dec 2023 04:19) (62 - 80)  BP: 128/57 (20 Dec 2023 04:19) (113/54 - 153/68)  BP(mean): 82 (20 Dec 2023 04:19) (78 - 98)  RR: 18 (20 Dec 2023 04:19) (17 - 18)  SpO2: 96% (20 Dec 2023 04:19) (94% - 100%)    Parameters below as of 20 Dec 2023 04:19  Patient On (Oxygen Delivery Method): room air           @ 07:  -   @ 07:00  --------------------------------------------------------  IN:    dextrose 5% + sodium chloride 0.45% w/ Additives: 300 mL    Vital1.5: 170 mL    Vital1.5: 874 mL  Total IN: 1344 mL    OUT:    VAC (Vacuum Assisted Closure) System (mL): 0 mL    Voided (mL): 650 mL  Total OUT: 650 mL    Total NET: 694 mL          23 @ 07:01  -  23 @ 07:00  --------------------------------------------------------  IN:  Total IN: 0 mL    OUT:    VAC (Vacuum Assisted Closure) System (mL): 0 mL  Total OUT: 0 mL    Total NET: 0 mL                  PHYSICAL EXAM:  Gen: AAOx3, NAD   Head: Surgical incision around chin extending up through lip, c/d/i  Eyes: EOMI, PERRL, visual acuity intact, no diplopia, supra/infra orbital rims intact, no subconjunctival heme,   Ears: Gross hearing intact  Nose: No septal hematoma/asymmetry, no epistaxis bilaterally. no abrasions present, no lacerations.   Throat: No LAD, supple, neck surgical incisionc/d/i , stoma healing well, dressing changed this morning, moderate oral secretions  Oral: Left FFF paddle dusk colored - skin sloughing off, will continue to monitor,. IMF screws in place intermittent ooze around left mandibular screw,   Exx: Wound vac left leg, thigh donor site telfa dressing changed           LABS                       8.0    7.46  )-----------( 316      ( 19 Dec 2023 05:30 )             25.0        141  |  107  |  8   ----------------------------<  146<H>  3.9   |  26  |  0.65    Ca    8.5      19 Dec 2023 05:30  Phos  3.0       Mg     1.7                Coagulation Studies-     Urinalysis Basic - ( 19 Dec 2023 05:30 )    Color: x / Appearance: x / SG: x / pH: x  Gluc: 146 mg/dL / Ketone: x  / Bili: x / Urobili: x   Blood: x / Protein: x / Nitrite: x   Leuk Esterase: x / RBC: x / WBC x   Sq Epi: x / Non Sq Epi: x / Bacteria: x      Endocrine Panel-                MICROBIOLOGY:  Culture Results:   Moderate Escherichia coli  Moderate Enterobacter cloacae complex  Accompanied by normal respiratory brenden (23 @ 07:27)  Culture Results:   Sputum specimen rejected.  Microscopic examination indicates  oropharyngeal contamination.  Please repeat. (23 @ 20:48)  Culture Results:   No growth at 5 days. (23 @ 20:10)  Culture Results:   No growth at 5 days. (23 @ 20:05)        RADIOLOGY & ADDITIONAL STUDIES:    Assessment and Plan:  SAUNDRA HOLMAN is a  68M w/ [id number] SCC of L buccal mucosa presents for pre optimization for surgery , now s/p hemimandibulectomy, left level 1, 2a, 3 neck neck dissection, L FFF, tracheostomy .5 cuffed portex, dental implants, STSG , now s/p PEG       PLAN  continue daily stoma dressing changes  continue bolus TF, pureed diet as tolerated  Monitor for any signs of oral bleeding   Plan to ambulate 3-4x a day   Continue Ondansetron PRN nausea/vomiting , PO Senna once daily, Miralax 17g once daily,  Chlorhexidine swish and spit, Esomeprazole, Enoxaparin, Gabapentin, Acetaminophen   Continue SCD’s    Continue plavix, ASA, lovenox   Discharge planning - pending approval for home care nursing         [facility] at [phone] with any questions/concerns.    Candida Jimenez  23 @ 07:29

## 2023-12-20 NOTE — CHART NOTE - NSCHARTNOTEFT_GEN_A_CORE
Patient s/p Laparoscopic G tube placement on 12/16    Discharge recs     - may proceed with feeds as tolerated at home, please flush tube with saline after every use.  - if providing meds through the tube, please ensure that they are crushed, followed by flushing the tube.  - please keep skin area around the tube clean, bumper secured with drain sponge to cover entry site  - May bathe as usual – use mild soap and warm water to clean g-tube site and dry thoroughly.  - Check for leaking after activities and call your care team if any leaking is found.  -   Flush your G-tube with water:    Before and after any tube feeding  Before and after any medications (all medication should not be visible in the tube or extension after flushing)  At least once every 24 hours    Follow up in Dr. Raymond's clinic in 4 weeks.

## 2023-12-20 NOTE — PROGRESS NOTE ADULT - SUBJECTIVE AND OBJECTIVE BOX
OVERNIGHT EVENTS: GERI    SUBJECTIVE:  Patient seen and examined at bedside. Feels well, states he is still having some mild amount of lip bleeding. Also notes with his feeds, he has been having 2-3 episodes of diarrhea daily. No chest pain, shortness of breath, n/v, lower extremity edema.     Vital Signs Last 12 Hrs  T(F): 98.2 (12-20-23 @ 08:55), Max: 98.3 (12-20-23 @ 04:54)  HR: 64 (12-20-23 @ 08:23) (64 - 72)  BP: 121/60 (12-20-23 @ 08:23) (121/60 - 128/57)  BP(mean): 87 (12-20-23 @ 08:23) (82 - 87)  RR: 18 (12-20-23 @ 04:19) (18 - 18)  SpO2: 99% (12-20-23 @ 08:23) (96% - 99%)  I&O's Summary    19 Dec 2023 07:01  -  20 Dec 2023 07:00  --------------------------------------------------------  IN: 1344 mL / OUT: 650 mL / NET: 694 mL    20 Dec 2023 07:01  -  20 Dec 2023 12:49  --------------------------------------------------------  IN: 574 mL / OUT: 0 mL / NET: 574 mL        PHYSICAL EXAM:  Constitutional: NAD, comfortable in chair. no respiratory distress, on RA  HEENT: GREGOR, MMM, mandibular flap c/d/i, decannulated with dressing c/d/i. blood saturated gauze from inner lip  Neck: Supple  Respiratory: CTA B/L. No w/r/r.   Cardiovascular: RRR, normal S1 and S2, no m/r/g.   Gastrointestinal: +BS, soft NTND, +PEG  Extremities: [facility][1]          LABS:                        8.1    7.30  )-----------( 335      ( 20 Dec 2023 05:30 )             25.4     12-20    142  |  107  |  8   ----------------------------<  121<H>  4.0   |  26  |  0.65    Ca    8.7      20 Dec 2023 05:30  Phos  3.0     12-20  Mg     1.8     12-20        Urinalysis Basic - ( 20 Dec 2023 05:30 )    Color: x / Appearance: x / SG: x / pH: x  Gluc: 121 mg/dL / Ketone: x  / Bili: x / Urobili: x   Blood: x / Protein: x / Nitrite: x   Leuk Esterase: x / RBC: x / WBC x   Sq Epi: x / Non Sq Epi: x / Bacteria: x          RADIOLOGY & ADDITIONAL TESTS:    MEDICATIONS  (STANDING):  acetaminophen   Oral Liquid .. 975 milliGRAM(s) Oral every 6 hours  acetylcysteine 20%  Inhalation 4 milliLiter(s) Inhalation every 6 hours  albuterol/ipratropium for Nebulization 3 milliLiter(s) Nebulizer every 6 hours  aspirin  chewable 81 milliGRAM(s) Oral daily  carbamide peroxide Otic Solution 1 Drop(s) Right Ear two times a day  chlorhexidine 0.12% Liquid 15 milliLiter(s) Oral Mucosa two times a day  clopidogrel Tablet 75 milliGRAM(s) Enteral Tube daily  dextrose 5%. 1000 milliLiter(s) (50 mL/Hr) IV Continuous <Continuous>  dextrose 5%. 1000 milliLiter(s) (100 mL/Hr) IV Continuous <Continuous>  dextrose 50% Injectable 25 Gram(s) IV Push once  dextrose 50% Injectable 25 Gram(s) IV Push once  dextrose 50% Injectable 12.5 Gram(s) IV Push once  enoxaparin Injectable 40 milliGRAM(s) SubCutaneous every 24 hours  glucagon  Injectable 1 milliGRAM(s) IntraMuscular once  influenza  Vaccine (HIGH DOSE) 0.7 milliLiter(s) IntraMuscular once  insulin lispro (ADMELOG) corrective regimen sliding scale   SubCutaneous at bedtime  insulin lispro (ADMELOG) corrective regimen sliding scale   SubCutaneous three times a day before meals  iron sucrose IVPB 500 milliGRAM(s) IV Intermittent every 24 hours  pantoprazole  Injectable 40 milliGRAM(s) IV Push daily  sodium chloride 3%  Inhalation 4 milliLiter(s) Inhalation every 6 hours    MEDICATIONS  (PRN):  dextrose Oral Gel 15 Gram(s) Oral once PRN Blood Glucose LESS THAN 70 milliGRAM(s)/deciliter  melatonin Liquid 5 milliGRAM(s) Oral at bedtime PRN Insomnia  ondansetron Injectable 4 milliGRAM(s) IV Push every 6 hours PRN Nausea and/or Vomiting  oxyCODONE    Solution 5 milliGRAM(s) Oral every 4 hours PRN Moderate Pain (4 - 6)  oxyCODONE    Solution 10 milliGRAM(s) Oral every 4 hours PRN Severe Pain (7 - 10)     OVERNIGHT EVENTS: GERI    SUBJECTIVE:  Patient seen and examined at bedside. Feels well, states he is still having some mild amount of lip bleeding. Also notes with his feeds, he has been having 2-3 episodes of diarrhea daily. No chest pain, shortness of breath, n/v, lower extremity edema.     Vital Signs Last 12 Hrs  T(F): 98.2 (12-20-23 @ 08:55), Max: 98.3 (12-20-23 @ 04:54)  HR: 64 (12-20-23 @ 08:23) (64 - 72)  BP: 121/60 (12-20-23 @ 08:23) (121/60 - 128/57)  BP(mean): 87 (12-20-23 @ 08:23) (82 - 87)  RR: 18 (12-20-23 @ 04:19) (18 - 18)  SpO2: 99% (12-20-23 @ 08:23) (96% - 99%)  I&O's Summary    19 Dec 2023 07:01  -  20 Dec 2023 07:00  --------------------------------------------------------  IN: 1344 mL / OUT: 650 mL / NET: 694 mL    20 Dec 2023 07:01  -  20 Dec 2023 12:49  --------------------------------------------------------  IN: 574 mL / OUT: 0 mL / NET: 574 mL        PHYSICAL EXAM:  Constitutional: NAD, comfortable in chair. no respiratory distress, on RA  HEENT: GREGOR, MMM, mandibular flap c/d/i, decannulated with dressing c/d/i. blood saturated gauze from inner lip  Neck: Supple  Respiratory: CTA B/L. No w/r/r.   Cardiovascular: RRR, normal S1 and S2, no m/r/g.   Gastrointestinal: +BS, soft NTND, +PEG  Extremities: [facility]          LABS:                        8.1    7.30  )-----------( 335      ( 20 Dec 2023 05:30 )             25.4     12-20    142  |  107  |  8   ----------------------------<  121<H>  4.0   |  26  |  0.65    Ca    8.7      20 Dec 2023 05:30  Phos  3.0     12-20  Mg     1.8     12-20        Urinalysis Basic - ( 20 Dec 2023 05:30 )    Color: x / Appearance: x / SG: x / pH: x  Gluc: 121 mg/dL / Ketone: x  / Bili: x / Urobili: x   Blood: x / Protein: x / Nitrite: x   Leuk Esterase: x / RBC: x / WBC x   Sq Epi: x / Non Sq Epi: x / Bacteria: x          RADIOLOGY & ADDITIONAL TESTS:    MEDICATIONS  (STANDING):  acetaminophen   Oral Liquid .. 975 milliGRAM(s) Oral every 6 hours  acetylcysteine 20%  Inhalation 4 milliLiter(s) Inhalation every 6 hours  albuterol/ipratropium for Nebulization 3 milliLiter(s) Nebulizer every 6 hours  aspirin  chewable 81 milliGRAM(s) Oral daily  carbamide peroxide Otic Solution 1 Drop(s) Right Ear two times a day  chlorhexidine 0.12% Liquid 15 milliLiter(s) Oral Mucosa two times a day  clopidogrel Tablet 75 milliGRAM(s) Enteral Tube daily  dextrose 5%. 1000 milliLiter(s) (50 mL/Hr) IV Continuous <Continuous>  dextrose 5%. 1000 milliLiter(s) (100 mL/Hr) IV Continuous <Continuous>  dextrose 50% Injectable 25 Gram(s) IV Push once  dextrose 50% Injectable 25 Gram(s) IV Push once  dextrose 50% Injectable 12.5 Gram(s) IV Push once  enoxaparin Injectable 40 milliGRAM(s) SubCutaneous every 24 hours  glucagon  Injectable 1 milliGRAM(s) IntraMuscular once  influenza  Vaccine (HIGH DOSE) 0.7 milliLiter(s) IntraMuscular once  insulin lispro (ADMELOG) corrective regimen sliding scale   SubCutaneous at bedtime  insulin lispro (ADMELOG) corrective regimen sliding scale   SubCutaneous three times a day before meals  iron sucrose IVPB 500 milliGRAM(s) IV Intermittent every 24 hours  pantoprazole  Injectable 40 milliGRAM(s) IV Push daily  sodium chloride 3%  Inhalation 4 milliLiter(s) Inhalation every 6 hours    MEDICATIONS  (PRN):  dextrose Oral Gel 15 Gram(s) Oral once PRN Blood Glucose LESS THAN 70 milliGRAM(s)/deciliter  melatonin Liquid 5 milliGRAM(s) Oral at bedtime PRN Insomnia  ondansetron Injectable 4 milliGRAM(s) IV Push every 6 hours PRN Nausea and/or Vomiting  oxyCODONE    Solution 5 milliGRAM(s) Oral every 4 hours PRN Moderate Pain (4 - 6)  oxyCODONE    Solution 10 milliGRAM(s) Oral every 4 hours PRN Severe Pain (7 - 10)

## 2023-12-20 NOTE — CHART NOTE - NSCHARTNOTEFT_GEN_A_CORE
Patient requires vital 1.5 for tube feeds due to nausea/vomiting and inability to tolerate Gluceran 1.5.

## 2023-12-20 NOTE — PROGRESS NOTE ADULT - ATTENDING COMMENTS
68M w/ [id number] SCC of L buccal mucosa presents for pre optimization for surgery 12/7, now s/p hemimandibulectomy, left level 1, 2a, 3 neck neck dissection, L FFF, tracheostomy w/ 7.5 cuffed portex, dental implants, STSG 12/7- plan for G tube placement postponed due to fever 12/12- blood cx drawn, respiratory culture likely contamination -reported thick sputum from trach after nebs    CC: Pt seen w. Dr. Dean, in good spirits, having frequent BMs per wife at bedside. on TF with different formula- Vital 1.5 @ 60ml/hr . SLP eval appreciated and rec: puree with thin as tolerated     - dysphagia: s/p laparoscopic PEG placement ( 12/16), tolerating tube feeds @ 60ml/hr , SLP f/u appreciated, s/p VFSS/MBS and rec: puree and thin liquid as tolerated   - TF on Vital 1.5   - Aspiration PNA: Sputum Cx" e.coli and enterobacter cloacae, completed total 5 days course of Ceftriaxone  (12/17)   - oral care as per ENT.   - would keep duo-nebs q 6 ( to help loosen up secretion)  - active T&S, monitor H/H 8.0  - CAD/PCI x2 ( 3/2023)  c/w ASA and Lipitor, to d/w Cardiology re: resuming plavix for DAPT total 1 year then ASA alone thereafter   - DM - FS with ISS, caution for Hypoglycemia.  - LLE edema- Venous doppler negative .  -VTE ppx: Enoxaparin 40mg sq daily .    Disposition; D/c plan noted     Med consult team continues to follow pt with you. Thank you. 68M w/ [id number] SCC of L buccal mucosa presents for pre optimization for surgery 12/7, now s/p hemimandibulectomy, left level 1, 2a, 3 neck neck dissection, L FFF, tracheostomy w/ 7.5 cuffed portex, dental implants, STSG 12/7- plan for G tube placement postponed due to fever 12/12- blood cx drawn, respiratory culture likely contamination -reported thick sputum from trach after nebs    CC: Pt seen w. Dr. Dean, in good spirits, having frequent BMs per wife at bedside. on TF with different formula- Vital 1.5 @ 60ml/hr . SLP eval appreciated and rec: puree with thin as tolerated     - dysphagia: s/p laparoscopic PEG placement ( 12/16), tolerating tube feeds @ 60ml/hr , SLP f/u appreciated, s/p VFSS/MBS and rec: puree and thin liquid as tolerated   - TF on Vital 1.5   - Aspiration PNA: Sputum Cx" e.coli and enterobacter cloacae, completed total 5 days course of Ceftriaxone  (12/17)   - oral care as per ENT.   - would keep duo-nebs q 6 ( to help loosen up secretion)  - active T&S, monitor H/H 8.0  - CAD/PCI x2 ( 3/2023)  c/w ASA and Lipitor, to d/w Cardiology re: resuming plavix for DAPT total 1 year then ASA alone thereafter   - DM - FS with ISS, caution for Hypoglycemia.  - LLE edema- Venous doppler negative .  -VTE ppx: Enoxaparin 40mg sq daily .    Disposition; D/c plan noted     Med consult team continues to follow pt with you. Thank you.

## 2023-12-21 VITALS
RESPIRATION RATE: 16 BRPM | TEMPERATURE: 99 F | DIASTOLIC BLOOD PRESSURE: 72 MMHG | OXYGEN SATURATION: 96 % | SYSTOLIC BLOOD PRESSURE: 125 MMHG | HEART RATE: 82 BPM

## 2023-12-21 LAB
ANION GAP SERPL CALC-SCNC: 7 MMOL/L — SIGNIFICANT CHANGE UP (ref 5–17)
ANION GAP SERPL CALC-SCNC: 7 MMOL/L — SIGNIFICANT CHANGE UP (ref 5–17)
BUN SERPL-MCNC: 11 MG/DL — SIGNIFICANT CHANGE UP (ref 7–23)
BUN SERPL-MCNC: 11 MG/DL — SIGNIFICANT CHANGE UP (ref 7–23)
CALCIUM SERPL-MCNC: 8.7 MG/DL — SIGNIFICANT CHANGE UP (ref 8.4–10.5)
CALCIUM SERPL-MCNC: 8.7 MG/DL — SIGNIFICANT CHANGE UP (ref 8.4–10.5)
CHLORIDE SERPL-SCNC: 105 MMOL/L — SIGNIFICANT CHANGE UP (ref 96–108)
CHLORIDE SERPL-SCNC: 105 MMOL/L — SIGNIFICANT CHANGE UP (ref 96–108)
CO2 SERPL-SCNC: 27 MMOL/L — SIGNIFICANT CHANGE UP (ref 22–31)
CO2 SERPL-SCNC: 27 MMOL/L — SIGNIFICANT CHANGE UP (ref 22–31)
CREAT SERPL-MCNC: 0.68 MG/DL — SIGNIFICANT CHANGE UP (ref 0.5–1.3)
CREAT SERPL-MCNC: 0.68 MG/DL — SIGNIFICANT CHANGE UP (ref 0.5–1.3)
EGFR: 102 ML/MIN/1.73M2 — SIGNIFICANT CHANGE UP
EGFR: 102 ML/MIN/1.73M2 — SIGNIFICANT CHANGE UP
GLUCOSE BLDC GLUCOMTR-MCNC: 172 MG/DL — HIGH (ref 70–99)
GLUCOSE BLDC GLUCOMTR-MCNC: 172 MG/DL — HIGH (ref 70–99)
GLUCOSE SERPL-MCNC: 123 MG/DL — HIGH (ref 70–99)
GLUCOSE SERPL-MCNC: 123 MG/DL — HIGH (ref 70–99)
HCT VFR BLD CALC: 25 % — LOW (ref 39–50)
HCT VFR BLD CALC: 25 % — LOW (ref 39–50)
HGB BLD-MCNC: 7.8 G/DL — LOW (ref 13–17)
HGB BLD-MCNC: 7.8 G/DL — LOW (ref 13–17)
MAGNESIUM SERPL-MCNC: 1.8 MG/DL — SIGNIFICANT CHANGE UP (ref 1.6–2.6)
MAGNESIUM SERPL-MCNC: 1.8 MG/DL — SIGNIFICANT CHANGE UP (ref 1.6–2.6)
MCHC RBC-ENTMCNC: 27.5 PG — SIGNIFICANT CHANGE UP (ref 27–34)
MCHC RBC-ENTMCNC: 27.5 PG — SIGNIFICANT CHANGE UP (ref 27–34)
MCHC RBC-ENTMCNC: 31.2 GM/DL — LOW (ref 32–36)
MCHC RBC-ENTMCNC: 31.2 GM/DL — LOW (ref 32–36)
MCV RBC AUTO: 88 FL — SIGNIFICANT CHANGE UP (ref 80–100)
MCV RBC AUTO: 88 FL — SIGNIFICANT CHANGE UP (ref 80–100)
NRBC # BLD: 0 /100 WBCS — SIGNIFICANT CHANGE UP (ref 0–0)
NRBC # BLD: 0 /100 WBCS — SIGNIFICANT CHANGE UP (ref 0–0)
PHOSPHATE SERPL-MCNC: 2.8 MG/DL — SIGNIFICANT CHANGE UP (ref 2.5–4.5)
PHOSPHATE SERPL-MCNC: 2.8 MG/DL — SIGNIFICANT CHANGE UP (ref 2.5–4.5)
PLATELET # BLD AUTO: 326 K/UL — SIGNIFICANT CHANGE UP (ref 150–400)
PLATELET # BLD AUTO: 326 K/UL — SIGNIFICANT CHANGE UP (ref 150–400)
POTASSIUM SERPL-MCNC: 3.9 MMOL/L — SIGNIFICANT CHANGE UP (ref 3.5–5.3)
POTASSIUM SERPL-MCNC: 3.9 MMOL/L — SIGNIFICANT CHANGE UP (ref 3.5–5.3)
POTASSIUM SERPL-SCNC: 3.9 MMOL/L — SIGNIFICANT CHANGE UP (ref 3.5–5.3)
POTASSIUM SERPL-SCNC: 3.9 MMOL/L — SIGNIFICANT CHANGE UP (ref 3.5–5.3)
RBC # BLD: 2.84 M/UL — LOW (ref 4.2–5.8)
RBC # BLD: 2.84 M/UL — LOW (ref 4.2–5.8)
RBC # FLD: 14.1 % — SIGNIFICANT CHANGE UP (ref 10.3–14.5)
RBC # FLD: 14.1 % — SIGNIFICANT CHANGE UP (ref 10.3–14.5)
SODIUM SERPL-SCNC: 139 MMOL/L — SIGNIFICANT CHANGE UP (ref 135–145)
SODIUM SERPL-SCNC: 139 MMOL/L — SIGNIFICANT CHANGE UP (ref 135–145)
WBC # BLD: 6.86 K/UL — SIGNIFICANT CHANGE UP (ref 3.8–10.5)
WBC # BLD: 6.86 K/UL — SIGNIFICANT CHANGE UP (ref 3.8–10.5)
WBC # FLD AUTO: 6.86 K/UL — SIGNIFICANT CHANGE UP (ref 3.8–10.5)
WBC # FLD AUTO: 6.86 K/UL — SIGNIFICANT CHANGE UP (ref 3.8–10.5)

## 2023-12-21 PROCEDURE — 86923 COMPATIBILITY TEST ELECTRIC: CPT

## 2023-12-21 PROCEDURE — 96374 THER/PROPH/DIAG INJ IV PUSH: CPT

## 2023-12-21 PROCEDURE — P9016: CPT

## 2023-12-21 PROCEDURE — L8699: CPT

## 2023-12-21 PROCEDURE — 81003 URINALYSIS AUTO W/O SCOPE: CPT

## 2023-12-21 PROCEDURE — 99285 EMERGENCY DEPT VISIT HI MDM: CPT

## 2023-12-21 PROCEDURE — 92526 ORAL FUNCTION THERAPY: CPT

## 2023-12-21 PROCEDURE — 97161 PT EVAL LOW COMPLEX 20 MIN: CPT

## 2023-12-21 PROCEDURE — 86901 BLOOD TYPING SEROLOGIC RH(D): CPT

## 2023-12-21 PROCEDURE — 36415 COLL VENOUS BLD VENIPUNCTURE: CPT

## 2023-12-21 PROCEDURE — 88309 TISSUE EXAM BY PATHOLOGIST: CPT

## 2023-12-21 PROCEDURE — 99232 SBSQ HOSP IP/OBS MODERATE 35: CPT

## 2023-12-21 PROCEDURE — 83540 ASSAY OF IRON: CPT

## 2023-12-21 PROCEDURE — 86850 RBC ANTIBODY SCREEN: CPT

## 2023-12-21 PROCEDURE — 87186 SC STD MICRODIL/AGAR DIL: CPT

## 2023-12-21 PROCEDURE — 93971 EXTREMITY STUDY: CPT

## 2023-12-21 PROCEDURE — 85027 COMPLETE CBC AUTOMATED: CPT

## 2023-12-21 PROCEDURE — 84466 ASSAY OF TRANSFERRIN: CPT

## 2023-12-21 PROCEDURE — 36430 TRANSFUSION BLD/BLD COMPNT: CPT

## 2023-12-21 PROCEDURE — 80053 COMPREHEN METABOLIC PANEL: CPT

## 2023-12-21 PROCEDURE — 82330 ASSAY OF CALCIUM: CPT

## 2023-12-21 PROCEDURE — 80048 BASIC METABOLIC PNL TOTAL CA: CPT

## 2023-12-21 PROCEDURE — 88311 DECALCIFY TISSUE: CPT

## 2023-12-21 PROCEDURE — 87070 CULTURE OTHR SPECIMN AEROBIC: CPT

## 2023-12-21 PROCEDURE — C1713: CPT

## 2023-12-21 PROCEDURE — 92522 EVALUATE SPEECH PRODUCTION: CPT

## 2023-12-21 PROCEDURE — 84295 ASSAY OF SERUM SODIUM: CPT

## 2023-12-21 PROCEDURE — 84484 ASSAY OF TROPONIN QUANT: CPT

## 2023-12-21 PROCEDURE — 85018 HEMOGLOBIN: CPT

## 2023-12-21 PROCEDURE — 85610 PROTHROMBIN TIME: CPT

## 2023-12-21 PROCEDURE — C1889: CPT

## 2023-12-21 PROCEDURE — 85730 THROMBOPLASTIN TIME PARTIAL: CPT

## 2023-12-21 PROCEDURE — 87040 BLOOD CULTURE FOR BACTERIA: CPT

## 2023-12-21 PROCEDURE — 82962 GLUCOSE BLOOD TEST: CPT

## 2023-12-21 PROCEDURE — 82607 VITAMIN B-12: CPT

## 2023-12-21 PROCEDURE — 82947 ASSAY GLUCOSE BLOOD QUANT: CPT

## 2023-12-21 PROCEDURE — 87641 MR-STAPH DNA AMP PROBE: CPT

## 2023-12-21 PROCEDURE — 92611 MOTION FLUOROSCOPY/SWALLOW: CPT

## 2023-12-21 PROCEDURE — 88302 TISSUE EXAM BY PATHOLOGIST: CPT

## 2023-12-21 PROCEDURE — 86803 HEPATITIS C AB TEST: CPT

## 2023-12-21 PROCEDURE — 84100 ASSAY OF PHOSPHORUS: CPT

## 2023-12-21 PROCEDURE — 82728 ASSAY OF FERRITIN: CPT

## 2023-12-21 PROCEDURE — 87640 STAPH A DNA AMP PROBE: CPT

## 2023-12-21 PROCEDURE — 88331 PATH CONSLTJ SURG 1 BLK 1SPC: CPT

## 2023-12-21 PROCEDURE — 97535 SELF CARE MNGMENT TRAINING: CPT

## 2023-12-21 PROCEDURE — 83735 ASSAY OF MAGNESIUM: CPT

## 2023-12-21 PROCEDURE — P9040: CPT

## 2023-12-21 PROCEDURE — C8929: CPT

## 2023-12-21 PROCEDURE — 83036 HEMOGLOBIN GLYCOSYLATED A1C: CPT

## 2023-12-21 PROCEDURE — 97116 GAIT TRAINING THERAPY: CPT

## 2023-12-21 PROCEDURE — 84132 ASSAY OF SERUM POTASSIUM: CPT

## 2023-12-21 PROCEDURE — 88305 TISSUE EXAM BY PATHOLOGIST: CPT

## 2023-12-21 PROCEDURE — 83550 IRON BINDING TEST: CPT

## 2023-12-21 PROCEDURE — 93005 ELECTROCARDIOGRAM TRACING: CPT

## 2023-12-21 PROCEDURE — 94640 AIRWAY INHALATION TREATMENT: CPT

## 2023-12-21 PROCEDURE — 82746 ASSAY OF FOLIC ACID SERUM: CPT

## 2023-12-21 PROCEDURE — 74230 X-RAY XM SWLNG FUNCJ C+: CPT

## 2023-12-21 PROCEDURE — 85025 COMPLETE CBC W/AUTO DIFF WBC: CPT

## 2023-12-21 PROCEDURE — 71045 X-RAY EXAM CHEST 1 VIEW: CPT

## 2023-12-21 PROCEDURE — 86900 BLOOD TYPING SEROLOGIC ABO: CPT

## 2023-12-21 PROCEDURE — 88304 TISSUE EXAM BY PATHOLOGIST: CPT

## 2023-12-21 RX ORDER — AMOXICILLIN 250 MG/5ML
6 SUSPENSION, RECONSTITUTED, ORAL (ML) ORAL
Qty: 1 | Refills: 0
Start: 2023-12-21 | End: 2023-12-27

## 2023-12-21 RX ORDER — OXYCODONE AND ACETAMINOPHEN 5; 325 MG/1; MG/1
1 TABLET ORAL
Refills: 0 | DISCHARGE

## 2023-12-21 RX ORDER — ATORVASTATIN CALCIUM 80 MG/1
40 TABLET, FILM COATED ORAL AT BEDTIME
Refills: 0 | Status: DISCONTINUED | OUTPATIENT
Start: 2023-12-21 | End: 2023-12-21

## 2023-12-21 RX ADMIN — SODIUM CHLORIDE 4 MILLILITER(S): 9 INJECTION INTRAMUSCULAR; INTRAVENOUS; SUBCUTANEOUS at 06:19

## 2023-12-21 RX ADMIN — SODIUM CHLORIDE 4 MILLILITER(S): 9 INJECTION INTRAMUSCULAR; INTRAVENOUS; SUBCUTANEOUS at 11:31

## 2023-12-21 RX ADMIN — Medication 3 MILLILITER(S): at 11:32

## 2023-12-21 RX ADMIN — CHLORHEXIDINE GLUCONATE 15 MILLILITER(S): 213 SOLUTION TOPICAL at 06:20

## 2023-12-21 RX ADMIN — Medication 975 MILLIGRAM(S): at 11:32

## 2023-12-21 RX ADMIN — CARBAMIDE PEROXIDE 1 DROP(S): 81.86 SOLUTION/ DROPS AURICULAR (OTIC) at 12:14

## 2023-12-21 RX ADMIN — Medication 4 MILLILITER(S): at 07:02

## 2023-12-21 RX ADMIN — Medication 975 MILLIGRAM(S): at 06:20

## 2023-12-21 RX ADMIN — OXYCODONE HYDROCHLORIDE 10 MILLIGRAM(S): 5 TABLET ORAL at 02:36

## 2023-12-21 RX ADMIN — CLOPIDOGREL BISULFATE 75 MILLIGRAM(S): 75 TABLET, FILM COATED ORAL at 11:31

## 2023-12-21 RX ADMIN — PANTOPRAZOLE SODIUM 40 MILLIGRAM(S): 20 TABLET, DELAYED RELEASE ORAL at 11:31

## 2023-12-21 RX ADMIN — Medication 81 MILLIGRAM(S): at 11:32

## 2023-12-21 RX ADMIN — Medication 3 MILLILITER(S): at 06:19

## 2023-12-21 RX ADMIN — Medication 4 MILLILITER(S): at 01:33

## 2023-12-21 RX ADMIN — Medication 2: at 07:03

## 2023-12-21 NOTE — PROGRESS NOTE ADULT - PROVIDER SPECIALTY LIST ADULT
Cardiology
ENT
Internal Medicine
SICU
Surgery
Surgery
ENT
Internal Medicine
Internal Medicine
Cardiology
ENT
Internal Medicine
OMFS
Surgery
Surgery
Cardiology
ENT
ENT
Hospitalist
Surgery
Surgery
Cardiology
ENT
Internal Medicine
Internal Medicine
ENT
SICU
SICU
ENT
SICU
Surgery

## 2023-12-21 NOTE — PROGRESS NOTE ADULT - ATTENDING SUPERVISION STATEMENT
Fellow
Resident
Fellow
Resident
Fellow
Resident
Fellow
Resident
Resident

## 2023-12-21 NOTE — PROGRESS NOTE ADULT - SUBJECTIVE AND OBJECTIVE BOX
OTOLARYNGOLOGY (ENT) PROGRESS NOTE    PATIENT: SAUNDRA HOLMAN  MRN: [id number]  : 57  [id number]  DATE OF SERVICE:  23  			         ID:SAUNDRA HOLMAN is a  67yo M w PMH of CAD (x2 stents Mar 2023), HLD, T2DM, hx of kidney stones, psoriasis presents to [facility] for evaluation of oral mass. Reports Three month hx of jaw pain and loosening of mandibular molar tooth on the lower left. Pt has a 30 pack yr smoking hx, quit 1 yr ago. 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 POD 1 composite mandibular resection, neck dissection and fibula free flap reconstruction.       Subjective/ Interval:   ; patient seen this morning, oozing from trach as expected ,  Plan to start aspirin today, cuff is up on tracheostomy tube, intraoral skin panel intact doppler signal strong,  plan to advance NGT   : Patient seen and examined at bedside. AFVSS overnight. Significant HgB drop to 7.2 noted this morning. NGT clogged, attempted replacement but went into lung, need to replace. Cuff deflated. Patient reports lethargy. Otherwise no new complaints. Intraoral skin paddle intact with strong doppler signal. Plan to hold off one eliquis given significant HgB drop  12/10: Patient seen and examined at bedside. AFVSS overnight other than bradycardic to lowest 39, mainly in 40s-50s which seems to be his baseline even pre-op. Not symptomatic while chinedu. Otherwise, patient stable on TC with no issues. Yesterday, iatrogenic right sided PTX occured with NGT placement, patient remained asymptomatic throughout. Patient now s/p placement of pig-tail catheter by pulm with significant improvement in PTX. Chest tube clamped this morning with plan for removal this evening. Patient s/p 2 units of PRBCs with moderate improvement in Hgb. Today, patient reports feeling significantly better than yesterday and overall "feels good." He was started on trickle feeds last night but felt some chest tightness and so they were held. Patient also failed TOV again and was straight cathed. KRISTEN drain output significantly decreased this morning. No other changes at this time.   : patient seen this morning PTX has resolved on CXR, intraoral flap intact, doppler strong, trach in place,  KRISTEN drains with minimal output   : Patient seen and examined this morning at bedside. Overnight, NGT slightly displaced, coming out around 10 cm from original placement, advanced and secured, pending CXR. Patient failed trial of clears with SLP yesterday but tolerated PMV without issue. Patient continues to be OOBTC daily and is tolerating tube feeds at goal. 2 KRISTEN drains removed yesterday, remaining KRISTEN with minimal output. Intraoral flap intact, doppler strong, trach in place. Patient denies any other new complaints at this time.   : patient febrile overnight to 103, continues to have intermittent PVC, HR last night in 40s, patients baseline 50. Gen surg will like to wait 24 hours afebrile until g tube,  Oral flap appears to be congested, dixon continue to monitor. KRISTEN drain will be removed today. Pending results of fever work up . HEB 7.5, will transfuse when type and screen returns   : Patient seen this morning, afebrile overnight, trach secretion odor improving,  noted ot have an episode of oral bleeding after ambulation, bleeding stopped, unable to see source, will continue to monitor , doppler strong ( and removed today) ,  Flap continues to look dusky   12/15: patient sen this morning, continues to have intermittent oozing from left mandibular screw, Surgicel placed no active bleed, flap remains dusky but stable, afebrile overnight  : NAEON. AVSS. Patient in OR for PEG this morning. Will re-evaluate s/p PEG  : Re-evaluated s/p PEG. complaining fo abdominal discomfort. Flap is stable appearing. Donor site with wound vac. LLE edema improving. No further bleeding from the mouth.  : NAEON. AVSS. Examined at bedside. Trach changed to 6 uncuffed and confirmed on scope, secured with trach ties. Straight cath overnight for retention. Has not voided since. Will f/u bladder scan and straight cath if needed  : NAEON. AFVSS. Examined at bedside. Tolerating capping overnight. Passed TOV last night. TF started yesterday evening, nausea overnight so TF held again. He continues to have moderate oral secretions requiring oral suctioning.   : NAEON. AFVSS. Decannulated yesterday, respirating comfortably on room air. TF transitioned to Vital 1.5 yesterday evening, tolerating without abdominal pain. He continues to have moderate oral secretions.   : NAEON. AFVSS. Transitioned to bolus TF, tolerating bolus TF. MBS performed yesterday, patient cleared for pureed with thin liquids. He continues to have intermittent oozing from left mandibular screw, no active bleeding.   : NAEON. AFVSS. Tolerating puree and TFs. He continues to have intermittent oozing from left mandibular screw, no active bleeding.     ALLERGIES:  No Known Allergies      MEDICATIONS:  Antiinfectives:     IV fluids:  dextrose 5%. 1000 milliLiter(s) IV Continuous <Continuous>  dextrose 5%. 1000 milliLiter(s) IV Continuous <Continuous>    Hematologic/Anticoagulation:  aspirin  chewable 81 milliGRAM(s) Oral daily  clopidogrel Tablet 75 milliGRAM(s) Enteral Tube daily  enoxaparin Injectable 40 milliGRAM(s) SubCutaneous every 24 hours    Pain medications/Neuro:  acetaminophen   Oral Liquid .. 975 milliGRAM(s) Oral every 6 hours  melatonin Liquid 5 milliGRAM(s) Oral at bedtime PRN  ondansetron Injectable 4 milliGRAM(s) IV Push every 6 hours PRN  oxyCODONE    Solution 5 milliGRAM(s) Oral every 4 hours PRN  oxyCODONE    Solution 10 milliGRAM(s) Oral every 4 hours PRN    Endocrine Medications:   atorvastatin 40 milliGRAM(s) Oral at bedtime  dextrose 50% Injectable 25 Gram(s) IV Push once  dextrose 50% Injectable 25 Gram(s) IV Push once  dextrose 50% Injectable 12.5 Gram(s) IV Push once  dextrose Oral Gel 15 Gram(s) Oral once PRN  glucagon  Injectable 1 milliGRAM(s) IntraMuscular once  insulin lispro (ADMELOG) corrective regimen sliding scale   SubCutaneous three times a day before meals  insulin lispro (ADMELOG) corrective regimen sliding scale   SubCutaneous at bedtime    All other standing medications:   acetylcysteine 20%  Inhalation 4 milliLiter(s) Inhalation every 6 hours  albuterol/ipratropium for Nebulization 3 milliLiter(s) Nebulizer every 6 hours  carbamide peroxide Otic Solution 1 Drop(s) Right Ear two times a day  chlorhexidine 0.12% Liquid 15 milliLiter(s) Oral Mucosa two times a day  influenza  Vaccine (HIGH DOSE) 0.7 milliLiter(s) IntraMuscular once  pantoprazole  Injectable 40 milliGRAM(s) IV Push daily  sodium chloride 3%  Inhalation 4 milliLiter(s) Inhalation every 6 hours    All other PRN medications:    Vital Signs Last 24 Hrs  T(C): 36.8 (21 Dec 2023 04:23), Max: 37.4 (20 Dec 2023 18:09)  T(F): 98.2 (21 Dec 2023 04:23), Max: 99.4 (20 Dec 2023 18:09)  HR: 68 (21 Dec 2023 04:23) (64 - 88)  BP: 105/64 (21 Dec 2023 04:23) (105/64 - 130/60)  BP(mean): 83 (20 Dec 2023 20:17) (81 - 87)  RR: 17 (21 Dec 2023 04:23) (17 - 18)  SpO2: 97% (21 Dec 2023 04:23) (95% - 99%)    Parameters below as of 21 Dec 2023 04:23  Patient On (Oxygen Delivery Method): room air           @ :  -   @ 07:00  --------------------------------------------------------  IN:    Enteral Tube Flush: 300 mL    IV PiggyBack: 276 mL    Oral Fluid: 120 mL    Vital1.5: 1422 mL  Total IN: 2118 mL    OUT:    VAC (Vacuum Assisted Closure) System (mL): 50 mL    Voided (mL): 0 mL  Total OUT: 50 mL    Total NET:  mL          23 @ 07:  -  23 @ 07:00  --------------------------------------------------------  IN:  Total IN: 0 mL    OUT:    VAC (Vacuum Assisted Closure) System (mL): 50 mL  Total OUT: 50 mL    Total NET: -50 mL              PHYSICAL EXAM:  Gen: AAOx3, NAD   Head: Surgical incision around chin extending up through lip, c/d/i  Eyes: EOMI, PERRL, visual acuity intact, no diplopia, supra/infra orbital rims intact, no subconjunctival heme  Ears: Gross hearing intact  Nose: No septal hematoma/asymmetry, no epistaxis bilaterally. no abrasions present, no lacerations.   Throat: No LAD, supple, neck surgical incisionc/d/i , stoma healing well, dressing changed this morning, moderate oral secretions  Oral: Left FFF paddle dusk colored - skin sloughing off, small area of dehiscence posteriorly, will continue to monitor,. IMF screws in place intermittent ooze around left mandibular screw,   Exx: Wound vac left leg, thigh donor site telfa dressing changed             LABS                       8.1    7.30  )-----------( 335      ( 20 Dec 2023 05:30 )             25.4    12-    142  |  107  |  8   ----------------------------<  121<H>  4.0   |  26  |  0.65    Ca    8.7      20 Dec 2023 05:30  Phos  3.0     12-  Mg     1.8     12-           Coagulation Studies-     Urinalysis Basic - ( 20 Dec 2023 05:30 )    Color: x / Appearance: x / SG: x / pH: x  Gluc: 121 mg/dL / Ketone: x  / Bili: x / Urobili: x   Blood: x / Protein: x / Nitrite: x   Leuk Esterase: x / RBC: x / WBC x   Sq Epi: x / Non Sq Epi: x / Bacteria: x      Endocrine Panel-                MICROBIOLOGY:  Culture Results:   Moderate Escherichia coli  Moderate Enterobacter cloacae complex  Accompanied by normal respiratory brenden (23 @ 07:27)  Culture Results:   Sputum specimen rejected.  Microscopic examination indicates  oropharyngeal contamination.  Please repeat. (23 @ 20:48)  Culture Results:   No growth at 5 days. (23 @ 20:10)  Culture Results:   No growth at 5 days. (23 @ 20:05)        RADIOLOGY & ADDITIONAL STUDIES:    Assessment and Plan:  PANAYIOTIS TSIRIGOTIS is a  68M w/ [id number] SCC of L buccal mucosa presents for pre optimization for surgery , now s/p hemimandibulectomy, left level 1, 2a, 3 neck neck dissection, L FFF, tracheostomy w/ 7.5 cuffed portex, dental implants, STSG , now s/p PEG       PLAN  continue daily stoma dressing changes  continue bolus TF, pureed diet as tolerated  Monitor for any signs of oral bleeding   Plan to ambulate 3-4x a day   Continue Ondansetron PRN nausea/vomiting , PO Senna once daily, Miralax 17g once daily,  Chlorhexidine swish and spit, Esomeprazole, Enoxaparin, Gabapentin, Acetaminophen   Continue SCD’s    Continue to monitor dehiscence  Continue plavix, ASA, lovenox   Discharge later this morning      [facility] at [phone] with any questions/concerns.    Candida Jimenez  23 @ 07:26 OTOLARYNGOLOGY (ENT) PROGRESS NOTE    PATIENT: SAUNDRA HOLMAN  MRN: [id number]  : 56  [id number]  DATE OF SERVICE:  23  			         ID:SAUNDRA HOLMAN is a  67yo M w PMH of CAD (x2 stents Mar 2023), HLD, T2DM, hx of kidney stones, psoriasis presents to [facility] for evaluation of oral mass. Reports Three month hx of jaw pain and loosening of mandibular molar tooth on the lower left. Pt has a 30 pack yr smoking hx, quit 1 yr ago. 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 POD 1 composite mandibular resection, neck dissection and fibula free flap reconstruction.       Subjective/ Interval:   ; patient seen this morning, oozing from trach as expected ,  Plan to start aspirin today, cuff is up on tracheostomy tube, intraoral skin panel intact doppler signal strong,  plan to advance NGT   : Patient seen and examined at bedside. AFVSS overnight. Significant HgB drop to 7.2 noted this morning. NGT clogged, attempted replacement but went into lung, need to replace. Cuff deflated. Patient reports lethargy. Otherwise no new complaints. Intraoral skin paddle intact with strong doppler signal. Plan to hold off one eliquis given significant HgB drop  12/10: Patient seen and examined at bedside. AFVSS overnight other than bradycardic to lowest 39, mainly in 40s-50s which seems to be his baseline even pre-op. Not symptomatic while chinedu. Otherwise, patient stable on TC with no issues. Yesterday, iatrogenic right sided PTX occured with NGT placement, patient remained asymptomatic throughout. Patient now s/p placement of pig-tail catheter by pulm with significant improvement in PTX. Chest tube clamped this morning with plan for removal this evening. Patient s/p 2 units of PRBCs with moderate improvement in Hgb. Today, patient reports feeling significantly better than yesterday and overall "feels good." He was started on trickle feeds last night but felt some chest tightness and so they were held. Patient also failed TOV again and was straight cathed. KRISTEN drain output significantly decreased this morning. No other changes at this time.   : patient seen this morning PTX has resolved on CXR, intraoral flap intact, doppler strong, trach in place,  KRISTEN drains with minimal output   : Patient seen and examined this morning at bedside. Overnight, NGT slightly displaced, coming out around 10 cm from original placement, advanced and secured, pending CXR. Patient failed trial of clears with SLP yesterday but tolerated PMV without issue. Patient continues to be OOBTC daily and is tolerating tube feeds at goal. 2 KRISTEN drains removed yesterday, remaining KRISTEN with minimal output. Intraoral flap intact, doppler strong, trach in place. Patient denies any other new complaints at this time.   : patient febrile overnight to 103, continues to have intermittent PVC, HR last night in 40s, patients baseline 50. Gen surg will like to wait 24 hours afebrile until g tube,  Oral flap appears to be congested, dixon continue to monitor. KRISTEN drain will be removed today. Pending results of fever work up . HEB 7.5, will transfuse when type and screen returns   : Patient seen this morning, afebrile overnight, trach secretion odor improving,  noted ot have an episode of oral bleeding after ambulation, bleeding stopped, unable to see source, will continue to monitor , doppler strong ( and removed today) ,  Flap continues to look dusky   12/15: patient sen this morning, continues to have intermittent oozing from left mandibular screw, Surgicel placed no active bleed, flap remains dusky but stable, afebrile overnight  : NAEON. AVSS. Patient in OR for PEG this morning. Will re-evaluate s/p PEG  : Re-evaluated s/p PEG. complaining fo abdominal discomfort. Flap is stable appearing. Donor site with wound vac. LLE edema improving. No further bleeding from the mouth.  : NAEON. AVSS. Examined at bedside. Trach changed to 6 uncuffed and confirmed on scope, secured with trach ties. Straight cath overnight for retention. Has not voided since. Will f/u bladder scan and straight cath if needed  : NAEON. AFVSS. Examined at bedside. Tolerating capping overnight. Passed TOV last night. TF started yesterday evening, nausea overnight so TF held again. He continues to have moderate oral secretions requiring oral suctioning.   : NAEON. AFVSS. Decannulated yesterday, respirating comfortably on room air. TF transitioned to Vital 1.5 yesterday evening, tolerating without abdominal pain. He continues to have moderate oral secretions.   : NAEON. AFVSS. Transitioned to bolus TF, tolerating bolus TF. MBS performed yesterday, patient cleared for pureed with thin liquids. He continues to have intermittent oozing from left mandibular screw, no active bleeding.   : NAEON. AFVSS. Tolerating puree and TFs. He continues to have intermittent oozing from left mandibular screw, no active bleeding.     ALLERGIES:  No Known Allergies      MEDICATIONS:  Antiinfectives:     IV fluids:  dextrose 5%. 1000 milliLiter(s) IV Continuous <Continuous>  dextrose 5%. 1000 milliLiter(s) IV Continuous <Continuous>    Hematologic/Anticoagulation:  aspirin  chewable 81 milliGRAM(s) Oral daily  clopidogrel Tablet 75 milliGRAM(s) Enteral Tube daily  enoxaparin Injectable 40 milliGRAM(s) SubCutaneous every 24 hours    Pain medications/Neuro:  acetaminophen   Oral Liquid .. 975 milliGRAM(s) Oral every 6 hours  melatonin Liquid 5 milliGRAM(s) Oral at bedtime PRN  ondansetron Injectable 4 milliGRAM(s) IV Push every 6 hours PRN  oxyCODONE    Solution 5 milliGRAM(s) Oral every 4 hours PRN  oxyCODONE    Solution 10 milliGRAM(s) Oral every 4 hours PRN    Endocrine Medications:   atorvastatin 40 milliGRAM(s) Oral at bedtime  dextrose 50% Injectable 25 Gram(s) IV Push once  dextrose 50% Injectable 25 Gram(s) IV Push once  dextrose 50% Injectable 12.5 Gram(s) IV Push once  dextrose Oral Gel 15 Gram(s) Oral once PRN  glucagon  Injectable 1 milliGRAM(s) IntraMuscular once  insulin lispro (ADMELOG) corrective regimen sliding scale   SubCutaneous three times a day before meals  insulin lispro (ADMELOG) corrective regimen sliding scale   SubCutaneous at bedtime    All other standing medications:   acetylcysteine 20%  Inhalation 4 milliLiter(s) Inhalation every 6 hours  albuterol/ipratropium for Nebulization 3 milliLiter(s) Nebulizer every 6 hours  carbamide peroxide Otic Solution 1 Drop(s) Right Ear two times a day  chlorhexidine 0.12% Liquid 15 milliLiter(s) Oral Mucosa two times a day  influenza  Vaccine (HIGH DOSE) 0.7 milliLiter(s) IntraMuscular once  pantoprazole  Injectable 40 milliGRAM(s) IV Push daily  sodium chloride 3%  Inhalation 4 milliLiter(s) Inhalation every 6 hours    All other PRN medications:    Vital Signs Last 24 Hrs  T(C): 36.8 (21 Dec 2023 04:23), Max: 37.4 (20 Dec 2023 18:09)  T(F): 98.2 (21 Dec 2023 04:23), Max: 99.4 (20 Dec 2023 18:09)  HR: 68 (21 Dec 2023 04:23) (64 - 88)  BP: 105/64 (21 Dec 2023 04:23) (105/64 - 130/60)  BP(mean): 83 (20 Dec 2023 20:17) (81 - 87)  RR: 17 (21 Dec 2023 04:23) (17 - 18)  SpO2: 97% (21 Dec 2023 04:23) (95% - 99%)    Parameters below as of 21 Dec 2023 04:23  Patient On (Oxygen Delivery Method): room air           @ :  -   @ 07:00  --------------------------------------------------------  IN:    Enteral Tube Flush: 300 mL    IV PiggyBack: 276 mL    Oral Fluid: 120 mL    Vital1.5: 1422 mL  Total IN: 2118 mL    OUT:    VAC (Vacuum Assisted Closure) System (mL): 50 mL    Voided (mL): 0 mL  Total OUT: 50 mL    Total NET:  mL          23 @ 07:  -  23 @ 07:00  --------------------------------------------------------  IN:  Total IN: 0 mL    OUT:    VAC (Vacuum Assisted Closure) System (mL): 50 mL  Total OUT: 50 mL    Total NET: -50 mL              PHYSICAL EXAM:  Gen: AAOx3, NAD   Head: Surgical incision around chin extending up through lip, c/d/i  Eyes: EOMI, PERRL, visual acuity intact, no diplopia, supra/infra orbital rims intact, no subconjunctival heme  Ears: Gross hearing intact  Nose: No septal hematoma/asymmetry, no epistaxis bilaterally. no abrasions present, no lacerations.   Throat: No LAD, supple, neck surgical incisionc/d/i , stoma healing well, dressing changed this morning, moderate oral secretions  Oral: Left FFF paddle dusk colored - skin sloughing off, small area of dehiscence posteriorly, will continue to monitor,. IMF screws in place intermittent ooze around left mandibular screw,   Exx: Wound vac left leg, thigh donor site telfa dressing changed             LABS                       8.1    7.30  )-----------( 335      ( 20 Dec 2023 05:30 )             25.4    12-    142  |  107  |  8   ----------------------------<  121<H>  4.0   |  26  |  0.65    Ca    8.7      20 Dec 2023 05:30  Phos  3.0     12-  Mg     1.8     12-           Coagulation Studies-     Urinalysis Basic - ( 20 Dec 2023 05:30 )    Color: x / Appearance: x / SG: x / pH: x  Gluc: 121 mg/dL / Ketone: x  / Bili: x / Urobili: x   Blood: x / Protein: x / Nitrite: x   Leuk Esterase: x / RBC: x / WBC x   Sq Epi: x / Non Sq Epi: x / Bacteria: x      Endocrine Panel-                MICROBIOLOGY:  Culture Results:   Moderate Escherichia coli  Moderate Enterobacter cloacae complex  Accompanied by normal respiratory brenden (23 @ 07:27)  Culture Results:   Sputum specimen rejected.  Microscopic examination indicates  oropharyngeal contamination.  Please repeat. (23 @ 20:48)  Culture Results:   No growth at 5 days. (23 @ 20:10)  Culture Results:   No growth at 5 days. (23 @ 20:05)        RADIOLOGY & ADDITIONAL STUDIES:    Assessment and Plan:  PANAYIOTIS TSIRIGOTIS is a  68M w/ [id number] SCC of L buccal mucosa presents for pre optimization for surgery , now s/p hemimandibulectomy, left level 1, 2a, 3 neck neck dissection, L FFF, tracheostomy w/ 7.5 cuffed portex, dental implants, STSG , now s/p PEG       PLAN  continue daily stoma dressing changes  continue bolus TF, pureed diet as tolerated  Monitor for any signs of oral bleeding   Plan to ambulate 3-4x a day   Continue Ondansetron PRN nausea/vomiting , PO Senna once daily, Miralax 17g once daily,  Chlorhexidine swish and spit, Esomeprazole, Enoxaparin, Gabapentin, Acetaminophen   Continue SCD’s    Continue to monitor dehiscence  Continue plavix, ASA, lovenox   Discharge later this morning      [facility] at [phone] with any questions/concerns.    Candida Jimenez  23 @ 07:26

## 2023-12-21 NOTE — PROGRESS NOTE ADULT - ASSESSMENT
69 y/o M w PMH of CAD (x2 stents Mar 2023), HLD, T2DM, hx of kidney stones, psoriasis presents to [facility] for evaluation of oral mass and 3 months of jaw pain a/f OMFS mandibular resection and flap reconstruction on Thursday. Medicine initially evaluated on 12/6 for pre-op assessment; Now following for co-management.    #anemia  Hgb stable at 8, previously was 13-14  no signs of active bleed  per iron studies, consistent with WADE. completed total of 1g of IV iron  maintain active T&S  per cardiology recommendations, transfusion goal to Hgb >8     #pneumonia - RESOLVED  Initially febrile with tachycardia and leukocytosis  sputum cx showed E coli and Enterobacter cloacea, sensitive to CTX  initially on CTX then de-escalated and completed 5 day course of antibiotics    #s/p mandibular resection and flap reconstruction  s/p G tube placement on 12/16 - tolerating feeds, however does note few episodes of diarrhea. Recommend further discussion with nutrition services  c/w S&S evaluation  recommend early mobilization, OOBTC, PT evaluation    #LLE edema  pt with LLE non pitting edema  LLE negative for DVT  recommend raising leg to help with edema    #CAD  pt with hx of CAD s/p 2 stents in March 2023  Cardiology consulted; reccs appreciated  on lipitor 40 and ASA and plavix    #DM2  A1c 6.3%, takes home metformin and jardiance. FS   - c/w current diet, FS checks and ISS. Goal -180 while inpatient    Medicine will continue to follow. Patient seen, evaluated, and discussed with attending Dr. Vogel 67 y/o M w PMH of CAD (x2 stents Mar 2023), HLD, T2DM, hx of kidney stones, psoriasis presents to [facility] for evaluation of oral mass and 3 months of jaw pain a/f OMFS mandibular resection and flap reconstruction on Thursday. Medicine initially evaluated on 12/6 for pre-op assessment; Now following for co-management.    #anemia  Hgb stable at 8, previously was 13-14  no signs of active bleed  per iron studies, consistent with WADE. completed total of 1g of IV iron  maintain active T&S  per cardiology recommendations, transfusion goal to Hgb >8     #pneumonia - RESOLVED  Initially febrile with tachycardia and leukocytosis  sputum cx showed E coli and Enterobacter cloacea, sensitive to CTX  initially on CTX then de-escalated and completed 5 day course of antibiotics    #s/p mandibular resection and flap reconstruction  s/p G tube placement on 12/16 - tolerating feeds, however does note few episodes of diarrhea. Recommend further discussion with nutrition services  c/w S&S evaluation  recommend early mobilization, OOBTC, PT evaluation    #LLE edema  pt with LLE non pitting edema  LLE negative for DVT  recommend raising leg to help with edema    #CAD  pt with hx of CAD s/p 2 stents in March 2023  Cardiology consulted; reccs appreciated  on lipitor 40 and ASA and plavix    #DM2  A1c 6.3%, takes home metformin and jardiance. FS   - c/w current diet, FS checks and ISS. Goal -180 while inpatient    Medicine will continue to follow. Patient seen, evaluated, and discussed with attending Dr. Vogel

## 2023-12-21 NOTE — PROGRESS NOTE ADULT - SUBJECTIVE AND OBJECTIVE BOX
OVERNIGHT EVENTS: GERI    SUBJECTIVE:  Patient seen and examined at bedside. Reports feeling well, ready for discharge. Still has slight lip oozing. No abdominal pain, shortness of breath or chest pain. Reports he continues to have 2-3 episodes of diarrhea    Vital Signs Last 12 Hrs  T(F): 98.6 (12-21-23 @ 08:45), Max: 98.6 (12-21-23 @ 08:45)  HR: 82 (12-21-23 @ 08:45) (68 - 82)  BP: 125/72 (12-21-23 @ 08:45) (105/64 - 125/72)  BP(mean): --  RR: 16 (12-21-23 @ 08:45) (16 - 17)  SpO2: 96% (12-21-23 @ 08:45) (96% - 97%)  I&O's Summary    20 Dec 2023 07:01  -  21 Dec 2023 07:00  --------------------------------------------------------  IN: 2118 mL / OUT: 50 mL / NET: 2068 mL    21 Dec 2023 07:01  -  21 Dec 2023 13:18  --------------------------------------------------------  IN: 400 mL / OUT: 150 mL / NET: 250 mL        PHYSICAL EXAM:  Constitutional: NAD, comfortable in chair. no respiratory distress, on RA  HEENT: PERLIVAN, MMM, mandibular flap c/d/i, decannulated with dressing c/d/i. blood saturated gauze from inner lip  Neck: Supple  Respiratory: CTA B/L. No w/r/r.   Cardiovascular: RRR, normal S1 and S2, no m/r/g.   Gastrointestinal: +BS, soft NTND, +PEG  Extremities: wwp        LABS:                        7.8    6.86  )-----------( 326      ( 21 Dec 2023 07:18 )             25.0     12-21    139  |  105  |  11  ----------------------------<  123<H>  3.9   |  27  |  0.68    Ca    8.7      21 Dec 2023 07:18  Phos  2.8     12-21  Mg     1.8     12-21        Urinalysis Basic - ( 21 Dec 2023 07:18 )    Color: x / Appearance: x / SG: x / pH: x  Gluc: 123 mg/dL / Ketone: x  / Bili: x / Urobili: x   Blood: x / Protein: x / Nitrite: x   Leuk Esterase: x / RBC: x / WBC x   Sq Epi: x / Non Sq Epi: x / Bacteria: x          RADIOLOGY & ADDITIONAL TESTS:    MEDICATIONS  (STANDING):  acetaminophen   Oral Liquid .. 975 milliGRAM(s) Oral every 6 hours  acetylcysteine 20%  Inhalation 4 milliLiter(s) Inhalation every 6 hours  albuterol/ipratropium for Nebulization 3 milliLiter(s) Nebulizer every 6 hours  aspirin  chewable 81 milliGRAM(s) Oral daily  atorvastatin 40 milliGRAM(s) Oral at bedtime  carbamide peroxide Otic Solution 1 Drop(s) Right Ear two times a day  chlorhexidine 0.12% Liquid 15 milliLiter(s) Oral Mucosa two times a day  clopidogrel Tablet 75 milliGRAM(s) Enteral Tube daily  dextrose 5%. 1000 milliLiter(s) (100 mL/Hr) IV Continuous <Continuous>  dextrose 5%. 1000 milliLiter(s) (50 mL/Hr) IV Continuous <Continuous>  dextrose 50% Injectable 25 Gram(s) IV Push once  dextrose 50% Injectable 12.5 Gram(s) IV Push once  dextrose 50% Injectable 25 Gram(s) IV Push once  enoxaparin Injectable 40 milliGRAM(s) SubCutaneous every 24 hours  glucagon  Injectable 1 milliGRAM(s) IntraMuscular once  influenza  Vaccine (HIGH DOSE) 0.7 milliLiter(s) IntraMuscular once  insulin lispro (ADMELOG) corrective regimen sliding scale   SubCutaneous three times a day before meals  insulin lispro (ADMELOG) corrective regimen sliding scale   SubCutaneous at bedtime  pantoprazole  Injectable 40 milliGRAM(s) IV Push daily  sodium chloride 3%  Inhalation 4 milliLiter(s) Inhalation every 6 hours    MEDICATIONS  (PRN):  dextrose Oral Gel 15 Gram(s) Oral once PRN Blood Glucose LESS THAN 70 milliGRAM(s)/deciliter  melatonin Liquid 5 milliGRAM(s) Oral at bedtime PRN Insomnia  ondansetron Injectable 4 milliGRAM(s) IV Push every 6 hours PRN Nausea and/or Vomiting  oxyCODONE    Solution 5 milliGRAM(s) Oral every 4 hours PRN Moderate Pain (4 - 6)  oxyCODONE    Solution 10 milliGRAM(s) Oral every 4 hours PRN Severe Pain (7 - 10)

## 2023-12-21 NOTE — PROGRESS NOTE ADULT - REASON FOR ADMISSION
Oral Ca
[facility] Surgery
Oral Ca

## 2023-12-21 NOTE — PROGRESS NOTE ADULT - ATTENDING COMMENTS
68M w/ [id number] SCC of L buccal mucosa presents for pre optimization for surgery 12/7, now s/p hemimandibulectomy, left level 1, 2a, 3 neck neck dissection, L FFF, tracheostomy w/ 7.5 cuffed portex, dental implants, STSG 12/7- plan for G tube placement postponed due to fever 12/12- blood cx drawn, respiratory culture likely contamination -reported thick sputum from trach after nebs    CC: Pt seen w. Dr. Dean, ambulatory to bathroom with boot on, still with loose motion but reported less than before.     - dysphagia: s/p laparoscopic PEG placement ( 12/16), tolerating tube feeds now on bolus feeding, SLP f/u appreciated, s/p VFSS/MBS and rec: puree and thin liquid as tolerated   - TF on Vital 1.5   - Aspiration PNA: Sputum Cx" e.coli and enterobacter cloacae, completed total 5 days course of Ceftriaxone  (12/17)   - oral care as per ENT.   - would keep duo-nebs q 6 ( to help loosen up secretion)  - active T&S, monitor H/H 8. low iron was replaced   - CAD/PCI x2 ( 3/2023)  c/w ASA and Lipitor, to d/w Cardiology re: resuming plavix for DAPT total 1 year then ASA alone thereafter   - DM - FS with ISS, caution for Hypoglycemia.  - LLE edema- Venous doppler negative .  -VTE ppx: Enoxaparin 40mg sq daily .    Disposition; D/c plan noted -plan for home today.   dee Dean, wife 68M w/ [id number] SCC of L buccal mucosa presents for pre optimization for surgery 12/7, now s/p hemimandibulectomy, left level 1, 2a, 3 neck neck dissection, L FFF, tracheostomy w/ 7.5 cuffed portex, dental implants, STSG 12/7- plan for G tube placement postponed due to fever 12/12- blood cx drawn, respiratory culture likely contamination -reported thick sputum from trach after nebs    CC: Pt seen w. Dr. Dean, ambulatory to bathroom with boot on, still with loose motion but reported less than before.     - dysphagia: s/p laparoscopic PEG placement ( 12/16), tolerating tube feeds now on bolus feeding, SLP f/u appreciated, s/p VFSS/MBS and rec: puree and thin liquid as tolerated   - TF on Vital 1.5   - Aspiration PNA: Sputum Cx" e.coli and enterobacter cloacae, completed total 5 days course of Ceftriaxone  (12/17)   - oral care as per ENT.   - would keep duo-nebs q 6 ( to help loosen up secretion)  - active T&S, monitor H/H 8. low iron was replaced   - CAD/PCI x2 ( 3/2023)  c/w ASA and Lipitor, to d/w Cardiology re: resuming plavix for DAPT total 1 year then ASA alone thereafter   - DM - FS with ISS, caution for Hypoglycemia.  - LLE edema- Venous doppler negative .  -VTE ppx: Enoxaparin 40mg sq daily .    Disposition; D/c plan noted -plan for home today.   dee Dean, wife

## 2023-12-22 PROBLEM — D49.2 NEOPLASM OF UNSPECIFIED BEHAVIOR OF BONE, SOFT TISSUE, AND SKIN: Chronic | Status: ACTIVE | Noted: 2023-12-06

## 2023-12-22 LAB
SURGICAL PATHOLOGY STUDY: SIGNIFICANT CHANGE UP
SURGICAL PATHOLOGY STUDY: SIGNIFICANT CHANGE UP

## 2023-12-26 ENCOUNTER — TRANSCRIPTION ENCOUNTER (OUTPATIENT)
Age: 68
End: 2023-12-26

## 2023-12-26 ENCOUNTER — APPOINTMENT (OUTPATIENT)
Dept: OTOLARYNGOLOGY | Facility: CLINIC | Age: 68
End: 2023-12-26
Payer: MEDICARE

## 2023-12-26 VITALS
OXYGEN SATURATION: 98 % | HEART RATE: 90 BPM | TEMPERATURE: 98 F | SYSTOLIC BLOOD PRESSURE: 101 MMHG | DIASTOLIC BLOOD PRESSURE: 65 MMHG

## 2023-12-26 DIAGNOSIS — H61.22 IMPACTED CERUMEN, LEFT EAR: ICD-10-CM

## 2023-12-26 PROCEDURE — 99024 POSTOP FOLLOW-UP VISIT: CPT

## 2023-12-26 PROCEDURE — 69210 REMOVE IMPACTED EAR WAX UNI: CPT | Mod: 58,LT

## 2023-12-26 RX ORDER — AMOXICILLIN AND CLAVULANATE POTASSIUM 600; 42.9 MG/5ML; MG/5ML
600-42.9 FOR SUSPENSION ORAL
Qty: 1 | Refills: 0 | Status: ACTIVE | COMMUNITY
Start: 2023-12-26 | End: 1900-01-01

## 2023-12-27 ENCOUNTER — INPATIENT (INPATIENT)
Facility: HOSPITAL | Age: 68
LOS: 23 days | Discharge: HOME HEALTH | DRG: 857 | End: 2024-01-20
Attending: OTOLARYNGOLOGY | Admitting: DENTIST
Payer: MEDICARE

## 2023-12-27 ENCOUNTER — TRANSCRIPTION ENCOUNTER (OUTPATIENT)
Age: 68
End: 2023-12-27

## 2023-12-27 VITALS
SYSTOLIC BLOOD PRESSURE: 124 MMHG | OXYGEN SATURATION: 99 % | TEMPERATURE: 98 F | DIASTOLIC BLOOD PRESSURE: 59 MMHG | RESPIRATION RATE: 18 BRPM | HEART RATE: 82 BPM

## 2023-12-27 LAB
ALBUMIN SERPL ELPH-MCNC: 2.8 G/DL — LOW (ref 3.3–5)
ALBUMIN SERPL ELPH-MCNC: 2.8 G/DL — LOW (ref 3.3–5)
ALP SERPL-CCNC: 35 U/L — LOW (ref 40–120)
ALP SERPL-CCNC: 35 U/L — LOW (ref 40–120)
ALT FLD-CCNC: 49 U/L — HIGH (ref 10–45)
ALT FLD-CCNC: 49 U/L — HIGH (ref 10–45)
ANION GAP SERPL CALC-SCNC: 9 MMOL/L — SIGNIFICANT CHANGE UP (ref 5–17)
ANION GAP SERPL CALC-SCNC: 9 MMOL/L — SIGNIFICANT CHANGE UP (ref 5–17)
APTT BLD: 25.4 SEC — SIGNIFICANT CHANGE UP (ref 24.5–35.6)
APTT BLD: 25.4 SEC — SIGNIFICANT CHANGE UP (ref 24.5–35.6)
AST SERPL-CCNC: 22 U/L — SIGNIFICANT CHANGE UP (ref 10–40)
AST SERPL-CCNC: 22 U/L — SIGNIFICANT CHANGE UP (ref 10–40)
BASE EXCESS BLDA CALC-SCNC: 0.1 MMOL/L — SIGNIFICANT CHANGE UP (ref -2–3)
BASE EXCESS BLDA CALC-SCNC: 0.1 MMOL/L — SIGNIFICANT CHANGE UP (ref -2–3)
BILIRUB SERPL-MCNC: 0.6 MG/DL — SIGNIFICANT CHANGE UP (ref 0.2–1.2)
BILIRUB SERPL-MCNC: 0.6 MG/DL — SIGNIFICANT CHANGE UP (ref 0.2–1.2)
BLD GP AB SCN SERPL QL: NEGATIVE — SIGNIFICANT CHANGE UP
BLD GP AB SCN SERPL QL: NEGATIVE — SIGNIFICANT CHANGE UP
BUN SERPL-MCNC: 48 MG/DL — HIGH (ref 7–23)
BUN SERPL-MCNC: 48 MG/DL — HIGH (ref 7–23)
CA-I BLDA-SCNC: 1.18 MMOL/L — SIGNIFICANT CHANGE UP (ref 1.15–1.33)
CA-I BLDA-SCNC: 1.18 MMOL/L — SIGNIFICANT CHANGE UP (ref 1.15–1.33)
CALCIUM SERPL-MCNC: 9 MG/DL — SIGNIFICANT CHANGE UP (ref 8.4–10.5)
CALCIUM SERPL-MCNC: 9 MG/DL — SIGNIFICANT CHANGE UP (ref 8.4–10.5)
CHLORIDE SERPL-SCNC: 105 MMOL/L — SIGNIFICANT CHANGE UP (ref 96–108)
CHLORIDE SERPL-SCNC: 105 MMOL/L — SIGNIFICANT CHANGE UP (ref 96–108)
CO2 BLDA-SCNC: 26 MMOL/L — HIGH (ref 19–24)
CO2 BLDA-SCNC: 26 MMOL/L — HIGH (ref 19–24)
CO2 SERPL-SCNC: 27 MMOL/L — SIGNIFICANT CHANGE UP (ref 22–31)
CO2 SERPL-SCNC: 27 MMOL/L — SIGNIFICANT CHANGE UP (ref 22–31)
COHGB MFR BLDA: 2 % — SIGNIFICANT CHANGE UP
COHGB MFR BLDA: 2 % — SIGNIFICANT CHANGE UP
CREAT SERPL-MCNC: 0.71 MG/DL — SIGNIFICANT CHANGE UP (ref 0.5–1.3)
CREAT SERPL-MCNC: 0.71 MG/DL — SIGNIFICANT CHANGE UP (ref 0.5–1.3)
EGFR: 101 ML/MIN/1.73M2 — SIGNIFICANT CHANGE UP
EGFR: 101 ML/MIN/1.73M2 — SIGNIFICANT CHANGE UP
GLUCOSE BLDA-MCNC: 127 MG/DL — HIGH (ref 70–99)
GLUCOSE BLDA-MCNC: 127 MG/DL — HIGH (ref 70–99)
GLUCOSE BLDC GLUCOMTR-MCNC: 142 MG/DL — HIGH (ref 70–99)
GLUCOSE BLDC GLUCOMTR-MCNC: 142 MG/DL — HIGH (ref 70–99)
GLUCOSE SERPL-MCNC: 118 MG/DL — HIGH (ref 70–99)
GLUCOSE SERPL-MCNC: 118 MG/DL — HIGH (ref 70–99)
GRAM STN FLD: ABNORMAL
GRAM STN FLD: ABNORMAL
HCO3 BLDA-SCNC: 25 MMOL/L — SIGNIFICANT CHANGE UP (ref 21–28)
HCO3 BLDA-SCNC: 25 MMOL/L — SIGNIFICANT CHANGE UP (ref 21–28)
HCT VFR BLD CALC: 21.5 % — LOW (ref 39–50)
HCT VFR BLD CALC: 21.5 % — LOW (ref 39–50)
HGB BLD-MCNC: 7.3 G/DL — LOW (ref 13–17)
HGB BLD-MCNC: 7.3 G/DL — LOW (ref 13–17)
HGB BLDA-MCNC: 8.5 G/DL — LOW (ref 12.6–17.4)
HGB BLDA-MCNC: 8.5 G/DL — LOW (ref 12.6–17.4)
INR BLD: 1.13 — SIGNIFICANT CHANGE UP (ref 0.85–1.18)
INR BLD: 1.13 — SIGNIFICANT CHANGE UP (ref 0.85–1.18)
MCHC RBC-ENTMCNC: 28.7 PG — SIGNIFICANT CHANGE UP (ref 27–34)
MCHC RBC-ENTMCNC: 28.7 PG — SIGNIFICANT CHANGE UP (ref 27–34)
MCHC RBC-ENTMCNC: 34 GM/DL — SIGNIFICANT CHANGE UP (ref 32–36)
MCHC RBC-ENTMCNC: 34 GM/DL — SIGNIFICANT CHANGE UP (ref 32–36)
MCV RBC AUTO: 84.6 FL — SIGNIFICANT CHANGE UP (ref 80–100)
MCV RBC AUTO: 84.6 FL — SIGNIFICANT CHANGE UP (ref 80–100)
METHGB MFR BLDA: 0.9 % — SIGNIFICANT CHANGE UP
METHGB MFR BLDA: 0.9 % — SIGNIFICANT CHANGE UP
NRBC # BLD: 0 /100 WBCS — SIGNIFICANT CHANGE UP (ref 0–0)
NRBC # BLD: 0 /100 WBCS — SIGNIFICANT CHANGE UP (ref 0–0)
OXYHGB MFR BLDA: 96.9 % — HIGH (ref 90–95)
OXYHGB MFR BLDA: 96.9 % — HIGH (ref 90–95)
PCO2 BLDA: 39 MMHG — SIGNIFICANT CHANGE UP (ref 35–48)
PCO2 BLDA: 39 MMHG — SIGNIFICANT CHANGE UP (ref 35–48)
PH BLDA: 7.41 — SIGNIFICANT CHANGE UP (ref 7.35–7.45)
PH BLDA: 7.41 — SIGNIFICANT CHANGE UP (ref 7.35–7.45)
PLATELET # BLD AUTO: 222 K/UL — SIGNIFICANT CHANGE UP (ref 150–400)
PLATELET # BLD AUTO: 222 K/UL — SIGNIFICANT CHANGE UP (ref 150–400)
PO2 BLDA: 373 MMHG — HIGH (ref 83–108)
PO2 BLDA: 373 MMHG — HIGH (ref 83–108)
POTASSIUM BLDA-SCNC: 4.5 MMOL/L — SIGNIFICANT CHANGE UP (ref 3.5–5.1)
POTASSIUM BLDA-SCNC: 4.5 MMOL/L — SIGNIFICANT CHANGE UP (ref 3.5–5.1)
POTASSIUM SERPL-MCNC: 4.2 MMOL/L — SIGNIFICANT CHANGE UP (ref 3.5–5.3)
POTASSIUM SERPL-MCNC: 4.2 MMOL/L — SIGNIFICANT CHANGE UP (ref 3.5–5.3)
POTASSIUM SERPL-SCNC: 4.2 MMOL/L — SIGNIFICANT CHANGE UP (ref 3.5–5.3)
POTASSIUM SERPL-SCNC: 4.2 MMOL/L — SIGNIFICANT CHANGE UP (ref 3.5–5.3)
PROT SERPL-MCNC: 5.7 G/DL — LOW (ref 6–8.3)
PROT SERPL-MCNC: 5.7 G/DL — LOW (ref 6–8.3)
PROTHROM AB SERPL-ACNC: 12.8 SEC — SIGNIFICANT CHANGE UP (ref 9.5–13)
PROTHROM AB SERPL-ACNC: 12.8 SEC — SIGNIFICANT CHANGE UP (ref 9.5–13)
RBC # BLD: 2.54 M/UL — LOW (ref 4.2–5.8)
RBC # BLD: 2.54 M/UL — LOW (ref 4.2–5.8)
RBC # FLD: 14.1 % — SIGNIFICANT CHANGE UP (ref 10.3–14.5)
RBC # FLD: 14.1 % — SIGNIFICANT CHANGE UP (ref 10.3–14.5)
RH IG SCN BLD-IMP: POSITIVE — SIGNIFICANT CHANGE UP
RH IG SCN BLD-IMP: POSITIVE — SIGNIFICANT CHANGE UP
SAO2 % BLDA: 99.8 % — HIGH (ref 94–98)
SAO2 % BLDA: 99.8 % — HIGH (ref 94–98)
SODIUM BLDA-SCNC: 137 MMOL/L — SIGNIFICANT CHANGE UP (ref 136–145)
SODIUM BLDA-SCNC: 137 MMOL/L — SIGNIFICANT CHANGE UP (ref 136–145)
SODIUM SERPL-SCNC: 141 MMOL/L — SIGNIFICANT CHANGE UP (ref 135–145)
SODIUM SERPL-SCNC: 141 MMOL/L — SIGNIFICANT CHANGE UP (ref 135–145)
SPECIMEN SOURCE: SIGNIFICANT CHANGE UP
SPECIMEN SOURCE: SIGNIFICANT CHANGE UP
WBC # BLD: 6.31 K/UL — SIGNIFICANT CHANGE UP (ref 3.8–10.5)
WBC # BLD: 6.31 K/UL — SIGNIFICANT CHANGE UP (ref 3.8–10.5)
WBC # FLD AUTO: 6.31 K/UL — SIGNIFICANT CHANGE UP (ref 3.8–10.5)
WBC # FLD AUTO: 6.31 K/UL — SIGNIFICANT CHANGE UP (ref 3.8–10.5)

## 2023-12-27 PROCEDURE — 11043 DBRDMT MUSC&/FSCA 1ST 20/<: CPT | Mod: 78

## 2023-12-27 PROCEDURE — 10061 I&D ABSCESS COMP/MULTIPLE: CPT | Mod: 78

## 2023-12-27 DEVICE — TRACH CUFF BLUSELECT 7.5MM: Type: IMPLANTABLE DEVICE | Status: FUNCTIONAL

## 2023-12-27 RX ORDER — ATORVASTATIN CALCIUM 80 MG/1
40 TABLET, FILM COATED ORAL AT BEDTIME
Refills: 0 | Status: DISCONTINUED | OUTPATIENT
Start: 2023-12-27 | End: 2023-12-27

## 2023-12-27 RX ORDER — ATORVASTATIN CALCIUM 80 MG/1
40 TABLET, FILM COATED ORAL AT BEDTIME
Refills: 0 | Status: DISCONTINUED | OUTPATIENT
Start: 2023-12-27 | End: 2024-01-11

## 2023-12-27 RX ORDER — PANTOPRAZOLE SODIUM 20 MG/1
40 TABLET, DELAYED RELEASE ORAL DAILY
Refills: 0 | Status: DISCONTINUED | OUTPATIENT
Start: 2023-12-27 | End: 2023-12-28

## 2023-12-27 RX ORDER — SODIUM CHLORIDE 9 MG/ML
1000 INJECTION, SOLUTION INTRAVENOUS
Refills: 0 | Status: DISCONTINUED | OUTPATIENT
Start: 2023-12-27 | End: 2024-01-11

## 2023-12-27 RX ORDER — METRONIDAZOLE 500 MG
500 TABLET ORAL EVERY 8 HOURS
Refills: 0 | Status: DISCONTINUED | OUTPATIENT
Start: 2023-12-27 | End: 2023-12-29

## 2023-12-27 RX ORDER — OXYCODONE HYDROCHLORIDE 5 MG/1
5 TABLET ORAL EVERY 4 HOURS
Refills: 0 | Status: DISCONTINUED | OUTPATIENT
Start: 2023-12-27 | End: 2024-01-02

## 2023-12-27 RX ORDER — DEXTROSE 50 % IN WATER 50 %
15 SYRINGE (ML) INTRAVENOUS ONCE
Refills: 0 | Status: DISCONTINUED | OUTPATIENT
Start: 2023-12-27 | End: 2024-01-11

## 2023-12-27 RX ORDER — AMPICILLIN SODIUM AND SULBACTAM SODIUM 250; 125 MG/ML; MG/ML
3 INJECTION, POWDER, FOR SUSPENSION INTRAMUSCULAR; INTRAVENOUS EVERY 6 HOURS
Refills: 0 | Status: DISCONTINUED | OUTPATIENT
Start: 2023-12-27 | End: 2023-12-29

## 2023-12-27 RX ORDER — DEXTROSE 50 % IN WATER 50 %
25 SYRINGE (ML) INTRAVENOUS ONCE
Refills: 0 | Status: DISCONTINUED | OUTPATIENT
Start: 2023-12-27 | End: 2024-01-11

## 2023-12-27 RX ORDER — LIDOCAINE 4 G/100G
10 CREAM TOPICAL EVERY 4 HOURS
Refills: 0 | Status: DISCONTINUED | OUTPATIENT
Start: 2023-12-27 | End: 2024-01-02

## 2023-12-27 RX ORDER — INSULIN LISPRO 100/ML
VIAL (ML) SUBCUTANEOUS
Refills: 0 | Status: DISCONTINUED | OUTPATIENT
Start: 2023-12-27 | End: 2024-01-11

## 2023-12-27 RX ORDER — ACETAMINOPHEN 500 MG
650 TABLET ORAL EVERY 6 HOURS
Refills: 0 | Status: DISCONTINUED | OUTPATIENT
Start: 2023-12-27 | End: 2024-01-01

## 2023-12-27 RX ORDER — DEXTROSE 50 % IN WATER 50 %
12.5 SYRINGE (ML) INTRAVENOUS ONCE
Refills: 0 | Status: DISCONTINUED | OUTPATIENT
Start: 2023-12-27 | End: 2024-01-11

## 2023-12-27 RX ORDER — GLUCAGON INJECTION, SOLUTION 0.5 MG/.1ML
1 INJECTION, SOLUTION SUBCUTANEOUS ONCE
Refills: 0 | Status: DISCONTINUED | OUTPATIENT
Start: 2023-12-27 | End: 2024-01-11

## 2023-12-27 RX ORDER — ONDANSETRON 8 MG/1
4 TABLET, FILM COATED ORAL EVERY 8 HOURS
Refills: 0 | Status: DISCONTINUED | OUTPATIENT
Start: 2023-12-27 | End: 2024-01-02

## 2023-12-27 RX ORDER — OXYCODONE HYDROCHLORIDE 5 MG/1
10 TABLET ORAL EVERY 4 HOURS
Refills: 0 | Status: DISCONTINUED | OUTPATIENT
Start: 2023-12-27 | End: 2024-01-02

## 2023-12-27 RX ORDER — SODIUM CHLORIDE 9 MG/ML
1000 INJECTION, SOLUTION INTRAVENOUS
Refills: 0 | Status: DISCONTINUED | OUTPATIENT
Start: 2023-12-27 | End: 2023-12-28

## 2023-12-27 RX ORDER — IBUPROFEN 200 MG
400 TABLET ORAL EVERY 6 HOURS
Refills: 0 | Status: DISCONTINUED | OUTPATIENT
Start: 2023-12-27 | End: 2023-12-27

## 2023-12-27 RX ORDER — CHLORHEXIDINE GLUCONATE 213 G/1000ML
15 SOLUTION TOPICAL
Refills: 0 | Status: DISCONTINUED | OUTPATIENT
Start: 2023-12-27 | End: 2024-01-02

## 2023-12-27 RX ORDER — ACETAMINOPHEN 500 MG
650 TABLET ORAL EVERY 6 HOURS
Refills: 0 | Status: DISCONTINUED | OUTPATIENT
Start: 2023-12-27 | End: 2023-12-27

## 2023-12-27 RX ORDER — OXYCODONE HYDROCHLORIDE 5 MG/1
5 TABLET ORAL EVERY 6 HOURS
Refills: 0 | Status: DISCONTINUED | OUTPATIENT
Start: 2023-12-27 | End: 2023-12-27

## 2023-12-27 RX ORDER — ASPIRIN/CALCIUM CARB/MAGNESIUM 324 MG
81 TABLET ORAL DAILY
Refills: 0 | Status: DISCONTINUED | OUTPATIENT
Start: 2023-12-27 | End: 2024-01-02

## 2023-12-27 RX ORDER — HYDROMORPHONE HYDROCHLORIDE 2 MG/ML
1 INJECTION INTRAMUSCULAR; INTRAVENOUS; SUBCUTANEOUS EVERY 6 HOURS
Refills: 0 | Status: DISCONTINUED | OUTPATIENT
Start: 2023-12-27 | End: 2024-01-02

## 2023-12-27 RX ORDER — INSULIN LISPRO 100/ML
VIAL (ML) SUBCUTANEOUS AT BEDTIME
Refills: 0 | Status: DISCONTINUED | OUTPATIENT
Start: 2023-12-27 | End: 2024-01-11

## 2023-12-27 RX ADMIN — SODIUM CHLORIDE 75 MILLILITER(S): 9 INJECTION, SOLUTION INTRAVENOUS at 23:07

## 2023-12-27 NOTE — H&P ADULT - HISTORY OF PRESENT ILLNESS
SAUNDRA HOLMAN is a 67yo M w PMH of CAD (x2 stents Mar 2023), HLD, T2DM, hx of kidney stones, psoriasis who presented with an oral mass and underwent L hemimandibulectomy, SND L level 1-3, recon with L FFF, STSG, and placement of dental implants on 12/7. He had a trach which was subsequently decannulated. He returns 12/27 with a likely wound infection and will go to the OR for debridement and re-exploration.   SAUNDRA HOLMAN is a 69yo M w PMH of CAD (x2 stents Mar 2023), HLD, T2DM, hx of kidney stones, psoriasis who presented with an oral mass and underwent L hemimandibulectomy, SND L level 1-3, recon with L FFF, STSG, and placement of dental implants on 12/7. He had a trach which was subsequently decannulated. He returns 12/27 with a likely wound infection and will go to the OR for debridement and re-exploration.   SAUNDRA HOLMAN is a 67yo M w PMH of CAD (x2 stents Mar 2023), HLD, T2DM, hx of kidney stones, psoriasis who presented with an oral mass and underwent L hemimandibulectomy, SND L level 1-3, recon with L FFF, STSG, and placement of dental implants on 12/7. He had a trach which was subsequently decannulated. He returns 12/27 with surgical site infection now s/p OR for debridement and exploration. Trach replaced through prior stoma for pulmonary toilet.

## 2023-12-27 NOTE — INPATIENT CERTIFICATION FOR MEDICARE PATIENTS - RISKS OF ADVERSE EVENTS
Concern for cardiopulmonary deterioration/Concern for neurologic deterioration/Concern for worsening infectious process/Concern for delay in diagnosis and treatment/Concern for renal deterioration
Concern for cardiopulmonary deterioration/Concern for neurologic deterioration/Concern for worsening infectious process/Concern for delay in diagnosis and treatment

## 2023-12-27 NOTE — H&P ADULT - ASSESSMENT
SAUNDRA HOLMAN is a 67yo M w PMH of CAD (x2 stents Mar 2023), HLD, T2DM, hx of kidney stones, psoriasis who presented with an oral mass and underwent L hemimandibulectomy, SND L level 1-3, recon with L FFF, STSG, and placement of dental implants on 12/7. He had a trach which was subsequently decannulated. He returns 12/27 with a likely wound infection and will go to the OR for debridement and re-exploration.    Plan:  - OR 12/27 SAUNDRA HOLMAN is a 67yo M w PMH of CAD (x2 stents Mar 2023), HLD, T2DM, hx of kidney stones, psoriasis who presented with an oral mass and underwent L hemimandibulectomy, SND L level 1-3, recon with L FFF, STSG, and placement of dental implants on 12/7. He had a trach which was subsequently decannulated. Now s/p OR for debridement and exploration. Trach replaced through prior stoma for pulmonary toilet. 2 units given intra-op      Plan:  - Maintain penrose for now  - Peridex swab sticks  - Packing change POD1  - Unasyn/flagyl  - ID consultation in morning; follow-up wound culture  - Continue home asa  - Hold home plavix  - Continue g tube feeds  - Start HSQ  - Maintain 7.5 portex for pulm toilet  - ISS  - Pain control  - Home meds SAUNDRA HOLMAN is a 69yo M w PMH of CAD (x2 stents Mar 2023), HLD, T2DM, hx of kidney stones, psoriasis who presented with an oral mass and underwent L hemimandibulectomy, SND L level 1-3, recon with L FFF, STSG, and placement of dental implants on 12/7. He had a trach which was subsequently decannulated. Now s/p OR for debridement and exploration. Trach replaced through prior stoma for pulmonary toilet. 2 units given intra-op      Plan:  - Maintain penrose for now  - Peridex swab sticks  - Packing change POD1  - Unasyn/flagyl  - ID consultation in morning; follow-up wound culture  - Continue home asa  - Hold home plavix  - Continue g tube feeds  - Start HSQ  - Maintain 7.5 portex for pulm toilet  - ISS  - Pain control  - Home meds SAUNDRA HOLMAN is a 67yo M w PMH of CAD (x2 stents Mar 2023), HLD, T2DM, hx of kidney stones, psoriasis who presented with an oral mass and underwent L hemimandibulectomy, SND L level 1-3, recon with L FFF, STSG, and placement of dental implants on 12/7. He had a trach which was subsequently decannulated. Now s/p OR for debridement and exploration. Trach replaced through prior stoma for pulmonary toilet. 2 units given intra-op      Plan:  - Maintain penrose for now  - GI consultation for blood in stool  - Peridex swab sticks  - Packing change POD1  - Unasyn/flagyl  - ID consultation in morning; follow-up wound culture  - Continue home asa  - Hold home plavix  - Continue g tube feeds  - Start HSQ  - Maintain 7.5 portex for pulm toilet  - ISS  - Pain control  - Home meds

## 2023-12-28 LAB
ANION GAP SERPL CALC-SCNC: 8 MMOL/L — SIGNIFICANT CHANGE UP (ref 5–17)
ANION GAP SERPL CALC-SCNC: 8 MMOL/L — SIGNIFICANT CHANGE UP (ref 5–17)
BUN SERPL-MCNC: 37 MG/DL — HIGH (ref 7–23)
BUN SERPL-MCNC: 37 MG/DL — HIGH (ref 7–23)
CALCIUM SERPL-MCNC: 8.2 MG/DL — LOW (ref 8.4–10.5)
CALCIUM SERPL-MCNC: 8.2 MG/DL — LOW (ref 8.4–10.5)
CHLORIDE SERPL-SCNC: 106 MMOL/L — SIGNIFICANT CHANGE UP (ref 96–108)
CHLORIDE SERPL-SCNC: 106 MMOL/L — SIGNIFICANT CHANGE UP (ref 96–108)
CO2 SERPL-SCNC: 25 MMOL/L — SIGNIFICANT CHANGE UP (ref 22–31)
CO2 SERPL-SCNC: 25 MMOL/L — SIGNIFICANT CHANGE UP (ref 22–31)
CREAT SERPL-MCNC: 0.79 MG/DL — SIGNIFICANT CHANGE UP (ref 0.5–1.3)
CREAT SERPL-MCNC: 0.79 MG/DL — SIGNIFICANT CHANGE UP (ref 0.5–1.3)
EGFR: 97 ML/MIN/1.73M2 — SIGNIFICANT CHANGE UP
EGFR: 97 ML/MIN/1.73M2 — SIGNIFICANT CHANGE UP
GLUCOSE BLDC GLUCOMTR-MCNC: 125 MG/DL — HIGH (ref 70–99)
GLUCOSE BLDC GLUCOMTR-MCNC: 125 MG/DL — HIGH (ref 70–99)
GLUCOSE BLDC GLUCOMTR-MCNC: 126 MG/DL — HIGH (ref 70–99)
GLUCOSE BLDC GLUCOMTR-MCNC: 126 MG/DL — HIGH (ref 70–99)
GLUCOSE BLDC GLUCOMTR-MCNC: 155 MG/DL — HIGH (ref 70–99)
GLUCOSE BLDC GLUCOMTR-MCNC: 155 MG/DL — HIGH (ref 70–99)
GLUCOSE BLDC GLUCOMTR-MCNC: 182 MG/DL — HIGH (ref 70–99)
GLUCOSE BLDC GLUCOMTR-MCNC: 182 MG/DL — HIGH (ref 70–99)
GLUCOSE SERPL-MCNC: 162 MG/DL — HIGH (ref 70–99)
GLUCOSE SERPL-MCNC: 162 MG/DL — HIGH (ref 70–99)
HCT VFR BLD CALC: 27.6 % — LOW (ref 39–50)
HCT VFR BLD CALC: 27.6 % — LOW (ref 39–50)
HGB BLD-MCNC: 9.2 G/DL — LOW (ref 13–17)
HGB BLD-MCNC: 9.2 G/DL — LOW (ref 13–17)
MAGNESIUM SERPL-MCNC: 1.8 MG/DL — SIGNIFICANT CHANGE UP (ref 1.6–2.6)
MAGNESIUM SERPL-MCNC: 1.8 MG/DL — SIGNIFICANT CHANGE UP (ref 1.6–2.6)
MCHC RBC-ENTMCNC: 28.2 PG — SIGNIFICANT CHANGE UP (ref 27–34)
MCHC RBC-ENTMCNC: 28.2 PG — SIGNIFICANT CHANGE UP (ref 27–34)
MCHC RBC-ENTMCNC: 33.3 GM/DL — SIGNIFICANT CHANGE UP (ref 32–36)
MCHC RBC-ENTMCNC: 33.3 GM/DL — SIGNIFICANT CHANGE UP (ref 32–36)
MCV RBC AUTO: 84.7 FL — SIGNIFICANT CHANGE UP (ref 80–100)
MCV RBC AUTO: 84.7 FL — SIGNIFICANT CHANGE UP (ref 80–100)
NRBC # BLD: 0 /100 WBCS — SIGNIFICANT CHANGE UP (ref 0–0)
NRBC # BLD: 0 /100 WBCS — SIGNIFICANT CHANGE UP (ref 0–0)
PHOSPHATE SERPL-MCNC: 4 MG/DL — SIGNIFICANT CHANGE UP (ref 2.5–4.5)
PHOSPHATE SERPL-MCNC: 4 MG/DL — SIGNIFICANT CHANGE UP (ref 2.5–4.5)
PLATELET # BLD AUTO: 195 K/UL — SIGNIFICANT CHANGE UP (ref 150–400)
PLATELET # BLD AUTO: 195 K/UL — SIGNIFICANT CHANGE UP (ref 150–400)
POTASSIUM SERPL-MCNC: 4.3 MMOL/L — SIGNIFICANT CHANGE UP (ref 3.5–5.3)
POTASSIUM SERPL-MCNC: 4.3 MMOL/L — SIGNIFICANT CHANGE UP (ref 3.5–5.3)
POTASSIUM SERPL-SCNC: 4.3 MMOL/L — SIGNIFICANT CHANGE UP (ref 3.5–5.3)
POTASSIUM SERPL-SCNC: 4.3 MMOL/L — SIGNIFICANT CHANGE UP (ref 3.5–5.3)
PREALB SERPL-MCNC: 13 MG/DL — LOW (ref 20–40)
PREALB SERPL-MCNC: 13 MG/DL — LOW (ref 20–40)
RBC # BLD: 3.26 M/UL — LOW (ref 4.2–5.8)
RBC # BLD: 3.26 M/UL — LOW (ref 4.2–5.8)
RBC # FLD: 14.4 % — SIGNIFICANT CHANGE UP (ref 10.3–14.5)
RBC # FLD: 14.4 % — SIGNIFICANT CHANGE UP (ref 10.3–14.5)
SODIUM SERPL-SCNC: 139 MMOL/L — SIGNIFICANT CHANGE UP (ref 135–145)
SODIUM SERPL-SCNC: 139 MMOL/L — SIGNIFICANT CHANGE UP (ref 135–145)
WBC # BLD: 6.42 K/UL — SIGNIFICANT CHANGE UP (ref 3.8–10.5)
WBC # BLD: 6.42 K/UL — SIGNIFICANT CHANGE UP (ref 3.8–10.5)
WBC # FLD AUTO: 6.42 K/UL — SIGNIFICANT CHANGE UP (ref 3.8–10.5)
WBC # FLD AUTO: 6.42 K/UL — SIGNIFICANT CHANGE UP (ref 3.8–10.5)

## 2023-12-28 PROCEDURE — 99222 1ST HOSP IP/OBS MODERATE 55: CPT

## 2023-12-28 PROCEDURE — 99222 1ST HOSP IP/OBS MODERATE 55: CPT | Mod: GC

## 2023-12-28 RX ORDER — PANTOPRAZOLE SODIUM 20 MG/1
40 TABLET, DELAYED RELEASE ORAL
Refills: 0 | Status: DISCONTINUED | OUTPATIENT
Start: 2023-12-28 | End: 2024-01-02

## 2023-12-28 RX ORDER — INFLUENZA VIRUS VACCINE 15; 15; 15; 15 UG/.5ML; UG/.5ML; UG/.5ML; UG/.5ML
0.7 SUSPENSION INTRAMUSCULAR ONCE
Refills: 0 | Status: DISCONTINUED | OUTPATIENT
Start: 2023-12-28 | End: 2024-01-20

## 2023-12-28 RX ORDER — SODIUM CHLORIDE 9 MG/ML
1000 INJECTION, SOLUTION INTRAVENOUS
Refills: 0 | Status: DISCONTINUED | OUTPATIENT
Start: 2023-12-28 | End: 2023-12-29

## 2023-12-28 RX ADMIN — OXYCODONE HYDROCHLORIDE 10 MILLIGRAM(S): 5 TABLET ORAL at 09:28

## 2023-12-28 RX ADMIN — OXYCODONE HYDROCHLORIDE 10 MILLIGRAM(S): 5 TABLET ORAL at 16:36

## 2023-12-28 RX ADMIN — Medication 1: at 17:13

## 2023-12-28 RX ADMIN — OXYCODONE HYDROCHLORIDE 10 MILLIGRAM(S): 5 TABLET ORAL at 10:30

## 2023-12-28 RX ADMIN — SODIUM CHLORIDE 110 MILLILITER(S): 9 INJECTION, SOLUTION INTRAVENOUS at 13:00

## 2023-12-28 RX ADMIN — Medication 1: at 06:22

## 2023-12-28 RX ADMIN — ATORVASTATIN CALCIUM 40 MILLIGRAM(S): 80 TABLET, FILM COATED ORAL at 21:54

## 2023-12-28 RX ADMIN — HYDROMORPHONE HYDROCHLORIDE 1 MILLIGRAM(S): 2 INJECTION INTRAMUSCULAR; INTRAVENOUS; SUBCUTANEOUS at 01:01

## 2023-12-28 RX ADMIN — AMPICILLIN SODIUM AND SULBACTAM SODIUM 200 GRAM(S): 250; 125 INJECTION, POWDER, FOR SUSPENSION INTRAMUSCULAR; INTRAVENOUS at 17:13

## 2023-12-28 RX ADMIN — PANTOPRAZOLE SODIUM 40 MILLIGRAM(S): 20 TABLET, DELAYED RELEASE ORAL at 21:44

## 2023-12-28 RX ADMIN — AMPICILLIN SODIUM AND SULBACTAM SODIUM 200 GRAM(S): 250; 125 INJECTION, POWDER, FOR SUSPENSION INTRAMUSCULAR; INTRAVENOUS at 03:17

## 2023-12-28 RX ADMIN — CHLORHEXIDINE GLUCONATE 15 MILLILITER(S): 213 SOLUTION TOPICAL at 17:14

## 2023-12-28 RX ADMIN — Medication 100 MILLIGRAM(S): at 17:57

## 2023-12-28 RX ADMIN — AMPICILLIN SODIUM AND SULBACTAM SODIUM 200 GRAM(S): 250; 125 INJECTION, POWDER, FOR SUSPENSION INTRAMUSCULAR; INTRAVENOUS at 23:31

## 2023-12-28 RX ADMIN — CHLORHEXIDINE GLUCONATE 15 MILLILITER(S): 213 SOLUTION TOPICAL at 06:24

## 2023-12-28 RX ADMIN — AMPICILLIN SODIUM AND SULBACTAM SODIUM 200 GRAM(S): 250; 125 INJECTION, POWDER, FOR SUSPENSION INTRAMUSCULAR; INTRAVENOUS at 07:21

## 2023-12-28 RX ADMIN — SODIUM CHLORIDE 120 MILLILITER(S): 9 INJECTION, SOLUTION INTRAVENOUS at 15:30

## 2023-12-28 RX ADMIN — Medication 100 MILLIGRAM(S): at 03:17

## 2023-12-28 RX ADMIN — OXYCODONE HYDROCHLORIDE 10 MILLIGRAM(S): 5 TABLET ORAL at 15:36

## 2023-12-28 RX ADMIN — OXYCODONE HYDROCHLORIDE 10 MILLIGRAM(S): 5 TABLET ORAL at 23:44

## 2023-12-28 RX ADMIN — OXYCODONE HYDROCHLORIDE 10 MILLIGRAM(S): 5 TABLET ORAL at 22:44

## 2023-12-28 RX ADMIN — Medication 100 MILLIGRAM(S): at 10:22

## 2023-12-28 RX ADMIN — Medication 81 MILLIGRAM(S): at 11:36

## 2023-12-28 RX ADMIN — PANTOPRAZOLE SODIUM 40 MILLIGRAM(S): 20 TABLET, DELAYED RELEASE ORAL at 11:36

## 2023-12-28 RX ADMIN — AMPICILLIN SODIUM AND SULBACTAM SODIUM 200 GRAM(S): 250; 125 INJECTION, POWDER, FOR SUSPENSION INTRAMUSCULAR; INTRAVENOUS at 11:36

## 2023-12-28 RX ADMIN — HYDROMORPHONE HYDROCHLORIDE 1 MILLIGRAM(S): 2 INJECTION INTRAMUSCULAR; INTRAVENOUS; SUBCUTANEOUS at 03:37

## 2023-12-28 RX ADMIN — OXYCODONE HYDROCHLORIDE 5 MILLIGRAM(S): 5 TABLET ORAL at 19:38

## 2023-12-28 NOTE — CONSULT NOTE ADULT - ASSESSMENT
68M PMH of CAD (x2 stents Mar 2023), HLD, T2DM, hx of kidney stones, psoriasis who presented with an oral mass and underwent L hemimandibulectomy, SND L level 1-3, recon with L FFF, STSG, and placement of dental implants on 12/7, recent surgically placed G tube (12/16), now returning to [facility] 12/27 with surgical site infection, now s/p OR for debridement and exploration. GI consulted for melena and for consideration of endoscopic evaluation.     Baseline Hgb prior to L hemimandibulectomy in 13-14 range. Post surgery, Hgb dropped to 10.5, followed by 9, now in the 7-9 range.  Received 1 u pRBC overnight, with appropriate response from 7.3 to 9.2 on AM labs. Last noted to have dark stools in the OR during debridement on 12/27. Also noted to have dark output from G tube with medication administration.     ddx wide at this time, includes ulceration/oozing near site of G tube placement while on DAPT therapy vs PUD vs ingestion of blood from recent surgical intervention in oropharynx.    #Melena  #Anemia   -Protonix 40 mg IVP BID   -For now, would defer on endoscopic evaluation at this time as limited ability to perform procedure iso trismus; Repeat Hgb this evening; if with acute drop in the setting of ongoing episodes of melena, would discuss role for surgical evaluation  -Closely monitor H/H  -Maintain Active T&S  -Transfusion goal as per primary team    [place] [facility] [place]     Thank you for allowing us to participate in the care of this patient.  GI will sign off. Please call back with any questions or concerns.     Sheryl Taylor DO  Gastroenterology Fellow  Pager: [phone]   68M PMH of CAD (x2 stents Mar 2023), HLD, T2DM, hx of kidney stones, psoriasis who presented with an oral mass and underwent L hemimandibulectomy, SND L level 1-3, recon with L FFF, STSG, and placement of dental implants on 12/7, recent surgically placed G tube (12/16), now returning to [facility] 12/27 with surgical site infection, now s/p OR for debridement and exploration. GI consulted for melena and for consideration of endoscopic evaluation.     Baseline Hgb prior to L hemimandibulectomy in 13-14 range. Post surgery, Hgb dropped to 10.5, followed by 9, now in the 7-9 range.  Received 1 u pRBC overnight, with appropriate response from 7.3 to 9.2 on AM labs. Last noted to have dark stools in the OR during debridement on 12/27. Also noted to have dark output from G tube with medication administration.     ddx wide at this time, includes ulceration/oozing near site of G tube placement while on DAPT therapy vs PUD vs ingestion of blood from recent surgical intervention in oropharynx.    #Melena  #Anemia   -Protonix 40 mg IVP BID   -For now, would defer on endoscopic evaluation at this time as limited ability to perform procedure iso trismus; Repeat Hgb this evening; if with acute drop in the setting of ongoing episodes of melena, would discuss role for surgical evaluation  -Closely monitor H/H  -Maintain Active T&S  -Transfusion goal as per primary team    Case discussed with [facility] [place] attending and primary team.     Thank you for allowing us to participate in the care of this patient.  GI will sign off. Please call back with any questions or concerns.     Sheryl Taylor DO  Gastroenterology Fellow  Pager: [phone]

## 2023-12-28 NOTE — CONSULT NOTE ADULT - SUBJECTIVE AND OBJECTIVE BOX
CONSULT NOTE - INTERNAL MEDICINE    SAUNDRA HOLMAN  [id number]  68y  Male    Past medical Hx of CAD (x 2 stents Mar 2023), T2DM, hx of kidney stones, psoriasis who presented with an oral mass and underwent L hemimandibulectomy, SND L level 1-3, recon with L FFF, STSG, and placement of dental implants on 12/7. Patient had a trach which was subsequently decannulated. He returns 12/27 with surgical site infection now s/p OR for debridement and exploration. Trach replaced through prior stoma for pulmonary toilet. Patient also noted to have dark stools and drop in HGB. Medicine consulted for co-management.     PAST MEDICAL/SURGICAL HISTORY  PAST MEDICAL & SURGICAL HISTORY:  Neoplasm of mandible    T(C): 36.3 (12-28-23 @ 13:42), Max: 36.7 (12-27-23 @ 22:56)  HR: 64 (12-28-23 @ 15:29) (58 - 90)  BP: 109/52 (12-28-23 @ 15:29) (98/50 - 138/64)  RR: 19 (12-28-23 @ 15:29) (11 - 22)  SpO2: 99% (12-28-23 @ 15:29) (97% - 100%)  Wt(kg): --Vital Signs Last 24 Hrs  T(C): 36.3 (28 Dec 2023 13:42), Max: 36.7 (27 Dec 2023 22:56)  T(F): 97.3 (28 Dec 2023 13:42), Max: 98.1 (27 Dec 2023 22:56)  HR: 64 (28 Dec 2023 15:29) (58 - 90)  BP: 109/52 (28 Dec 2023 15:29) (98/50 - 138/64)  BP(mean): 75 (28 Dec 2023 15:29) (69 - 92)  RR: 19 (28 Dec 2023 15:29) (11 - 22)  SpO2: 99% (28 Dec 2023 15:29) (97% - 100%)    Parameters below as of 28 Dec 2023 15:29  Patient On (Oxygen Delivery Method): tracheostomy collar  O2 Flow (L/min): 10  O2 Concentration (%): 40    PHYSICAL EXAM:  GENERAL: NAD, well-groomed, well-developed  HEENT: Remnant left FFF paddle oozing, packing in place in buccal cavity, Neck: 7.5 portex trach with cuff deflated, penrose draining ss fluid, skin loosely reapproximated with silk suture, kerlex dressing in place minimally saturated  NERVOUS SYSTEM: Alert & Oriented X3, Good concentration; Motor Strength 5/5 B/L upper and lower extremities; DTRs 2+ intact and symmetric  CHEST/LUNG: Clear to percussion bilaterally; No rales, rhonchi, wheezing, or rubs  HEART: Regular rate and rhythm; No murmurs, rubs, or gallops  ABDOMEN: Soft, Nontender, Nondistended; Bowel sounds present, PEG tube c/d/i  EXTREMITIES: 2+ Peripheral Pulses, No clubbing, cyanosis, or edema, L fibular site non-tender with no drainage  LYMPH: No lymphadenopathy noted  SKIN: No rashes or lesions    Consultant(s) Notes Reviewed:  [x ] YES  [ ] NO  Care Discussed with Consultants/Other Providers [ x] YES  [ ] NO    LABS:                        9.2    6.42  )-----------( 195      ( 28 Dec 2023 05:30 )             27.6     12-28    139  |  106  |  37<H>  ----------------------------<  162<H>  4.3   |  25  |  0.79    Ca    8.2<L>      28 Dec 2023 05:30  Phos  4.0     12-28  Mg     1.8     12-28    TPro  5.7<L>  /  Alb  2.8<L>  /  TBili  0.6  /  DBili  x   /  AST  22  /  ALT  49<H>  /  AlkPhos  35<L>  12-27    PT/INR - ( 27 Dec 2023 17:37 )   PT: 12.8 sec;   INR: 1.13          PTT - ( 27 Dec 2023 17:37 )  PTT:25.4 sec  Urinalysis Basic - ( 28 Dec 2023 05:30 )    Color: x / Appearance: x / SG: x / pH: x  Gluc: 162 mg/dL / Ketone: x  / Bili: x / Urobili: x   Blood: x / Protein: x / Nitrite: x   Leuk Esterase: x / RBC: x / WBC x   Sq Epi: x / Non Sq Epi: x / Bacteria: x      LIVER FUNCTIONS - ( 27 Dec 2023 17:37 )  Alb: 2.8 g/dL / Pro: 5.7 g/dL / ALK PHOS: 35 U/L / ALT: 49 U/L / AST: 22 U/L / GGT: x                 RADIOLOGY & ADDITIONAL TESTS:    Imaging Personally Reviewed:  [ ] YES  [ ] NO CONSULT NOTE - INTERNAL MEDICINE    SAUNDRA HOLMAN  [id number]  68y  Male    Past medical Hx of CAD (x 2 stents Mar 2023), T2DM, hx of kidney stones, psoriasis who presented with an oral mass and underwent L hemimandibulectomy, SND L level 1-3, recon with L FFF, STSG, and placement of dental implants on 12/7. Patient had a trach which was subsequently decannulated. He returns 12/27 with surgical site infection now s/p OR for debridement and exploration. Trach replaced through prior stoma for pulmonary toilet. Patient also noted to have dark stools and drop in HGB. Medicine consulted for co-management.     PAST MEDICAL/SURGICAL HISTORY  PAST MEDICAL & SURGICAL HISTORY:  Neoplasm of mandible    T(C): 36.3 (12-28-23 @ 13:42), Max: 36.7 (12-27-23 @ 22:56)  HR: 64 (12-28-23 @ 15:29) (58 - 90)  BP: 109/52 (12-28-23 @ 15:29) (98/50 - 138/64)  RR: 19 (12-28-23 @ 15:29) (11 - 22)  SpO2: 99% (12-28-23 @ 15:29) (97% - 100%)  Wt(kg): --Vital Signs Last 24 Hrs  T(C): 36.3 (28 Dec 2023 13:42), Max: 36.7 (27 Dec 2023 22:56)  T(F): 97.3 (28 Dec 2023 13:42), Max: 98.1 (27 Dec 2023 22:56)  HR: 64 (28 Dec 2023 15:29) (58 - 90)  BP: 109/52 (28 Dec 2023 15:29) (98/50 - 138/64)  BP(mean): 75 (28 Dec 2023 15:29) (69 - 92)  RR: 19 (28 Dec 2023 15:29) (11 - 22)  SpO2: 99% (28 Dec 2023 15:29) (97% - 100%)    Parameters below as of 28 Dec 2023 15:29  Patient On (Oxygen Delivery Method): tracheostomy collar  O2 Flow (L/min): 10  O2 Concentration (%): 40    PHYSICAL EXAM:  GENERAL: NAD, well-groomed, well-developed  HEENT: Remnant left FFF paddle oozing, packing in place in buccal cavity, Neck: 7.5 portex trach with cuff deflated, penrose draining ss fluid, skin loosely reapproximated with silk suture, kerlex dressing in place minimally saturated  NERVOUS SYSTEM: Alert & Oriented X3, Good concentration; Motor Strength 5/5 B/L upper and lower extremities; DTRs 2+ intact and symmetric  CHEST/LUNG: Clear to percussion bilaterally; No rales, rhonchi, wheezing, or rubs  HEART: Regular rate and rhythm; No murmurs, rubs, or gallops  ABDOMEN: Soft, Nontender, Nondistended; Bowel sounds present, PEG tube c/d/i  EXTREMITIES: 2+ Peripheral Pulses, No clubbing, cyanosis, or edema, L fibular site non-tender with no drainage  LYMPH: No lymphadenopathy noted  SKIN: No rashes or lesions    Consultant(s) Notes Reviewed:  [x ] YES  [ ] NO  Care Discussed with Consultants/Other Providers [ x] YES  [ ] NO    LABS:                        9.2    6.42  )-----------( 195      ( 28 Dec 2023 05:30 )             27.6     12-28    139  |  106  |  37<H>  ----------------------------<  162<H>  4.3   |  25  |  0.79    Ca    8.2<L>      28 Dec 2023 05:30  Phos  4.0     12-28  Mg     1.8     12-28    TPro  5.7<L>  /  Alb  2.8<L>  /  TBili  0.6  /  DBili  x   /  AST  22  /  ALT  49<H>  /  AlkPhos  35<L>  12-27    PT/INR - ( 27 Dec 2023 17:37 )   PT: 12.8 sec;   INR: 1.13          PTT - ( 27 Dec 2023 17:37 )  PTT:25.4 sec  Urinalysis Basic - ( 28 Dec 2023 05:30 )    Color: x / Appearance: x / SG: x / pH: x  Gluc: 162 mg/dL / Ketone: x  / Bili: x / Urobili: x   Blood: x / Protein: x / Nitrite: x   Leuk Esterase: x / RBC: x / WBC x   Sq Epi: x / Non Sq Epi: x / Bacteria: x      LIVER FUNCTIONS - ( 27 Dec 2023 17:37 )  Alb: 2.8 g/dL / Pro: 5.7 g/dL / ALK PHOS: 35 U/L / ALT: 49 U/L / AST: 22 U/L / GGT: x                 RADIOLOGY & ADDITIONAL TESTS:    Imaging Personally Reviewed:  [ ] YES  [ ] NO

## 2023-12-28 NOTE — DIETITIAN INITIAL EVALUATION ADULT - PERTINENT LABORATORY DATA
12-28    139  |  106  |  37<H>  ----------------------------<  162<H>  4.3   |  25  |  0.79    Ca    8.2<L>      28 Dec 2023 05:30  Phos  4.0     12-28  Mg     1.8     12-28    TPro  5.7<L>  /  Alb  2.8<L>  /  TBili  0.6  /  DBili  x   /  AST  22  /  ALT  49<H>  /  AlkPhos  35<L>  12-27  POCT Blood Glucose.: 155 mg/dL (12-28-23 @ 05:47)  A1C with Estimated Average Glucose Result: 6.3 % (12-04-23 @ 05:52)

## 2023-12-28 NOTE — DIETITIAN INITIAL EVALUATION ADULT - NS FNS DIET ORDER
NPO with Tube Feeds via gastrostomy   Total volume 1422 mL Vital 1.5 - bolus 474 ml TID (provides 2133 kcal, 96 g protein, 1085 mL free fluid)  Banatrol Plus (40 kcals each) BID  LPS (100 kcal, 15 g protein per packet) 1x/day

## 2023-12-28 NOTE — PROGRESS NOTE ADULT - ASSESSMENT
Assessment and Plan:  SAUNDRA HOLMAN is a   67yo M w PMH of CAD (x2 stents Mar 2023), HLD, T2DM, hx of kidney stones, psoriasis who presented with an oral mass and underwent L hemimandibulectomy, SND L level 1-3, recon with L FFF, STSG, and placement of dental implants on 12/7. He had a trach which was subsequently decannulated. Now s/p OR for debridement and exploration. Trach replaced through prior stoma for pulmonary toilet. 2 units given intra-op    Plan:  - GI consult for melena   - ID consult-  recommend blood cultures   - medicine consult-   Peridex swab sticks  - Packing change POD1  - Unasyn/flagyl  - ID consultation in morning; follow-up wound culture  - Continue home asa  - Hold home plavix  - Continue g tube feeds  - Start HSQ  - Maintain 7.5 portex for pulm toilet  - ISS  - Pain control  - Home meds      [facility] at [phone] with any questions/concerns.    Ada Quinones PA-C  12-28-23 @ 08:49       Assessment and Plan:  SAUNDRA HOLMAN is a   69yo M w PMH of CAD (x2 stents Mar 2023), HLD, T2DM, hx of kidney stones, psoriasis who presented with an oral mass and underwent L hemimandibulectomy, SND L level 1-3, recon with L FFF, STSG, and placement of dental implants on 12/7. He had a trach which was subsequently decannulated. Now s/p OR for debridement and exploration. Trach replaced through prior stoma for pulmonary toilet. 2 units given intra-op    Plan:  - GI consult for melena   - ID consult-  recommend blood cultures   - medicine consult-   Peridex swab sticks  - Packing change POD1  - Unasyn/flagyl  - ID consultation in morning; follow-up wound culture  - Continue home asa  - Hold home plavix  - Continue g tube feeds  - Start HSQ  - Maintain 7.5 portex for pulm toilet  - ISS  - Pain control  - Home meds      [facility] at [phone] with any questions/concerns.    Ada Quinones PA-C  12-28-23 @ 08:49

## 2023-12-28 NOTE — CONSULT NOTE ADULT - ATTENDING COMMENTS
Past medical Hx of CAD (x 2 stents Mar 2023), T2DM, hx of kidney stones, psoriasis who presented with an oral mass and underwent L hemimandibulectomy, SND L level 1-3, recon with L FFF, STSG, and placement of dental implants on 12/7. Patient had a trach which was subsequently decannulated. He returns 12/27 with surgical site infection - continue to have bloody sputum and become fowl smelling -no fever, RTOR 12/27- for debridement and exploration. 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 Patient also noted to have dark stools and drop in HGB- wife reported black stool for the last 3 days and same thing coming out from peg tube 12/28 , now peg feeding held -. Medicine consulted for co-management.     pt known to me from recent admission.   wife present, currently pain well controlled.   seen and examined with Dr. Zafar Mauro.   awake, alert, -dressing/packing in left buccal area, RRR, moving good air, trach in place.  peg in place, =bs present, soft.   wound in left lower ext healing ( use a boot during ambulation)  neuro -non focal.    labs/imaging reviewed.    - post op day # 1- fu wound cx, currently on unasyn and flagyl ( no leucocytosis )-ID consulted.  - respiratory status stable on trach   - CAD sp PCI ho to LAD and Ramus in march 2023 as per cards is in setting of NSTEMI - prefer DAPT, at least monotherapy with ASA if plavix need to be held for procedure, currently on ASA , to restart plavix when safe.  - Anemia - acute blood loss anemia, given transfusion, was given iv iron 1 gram, to give folic acid, rule out PUD/ swallowed blood from oral bleeding, would continue protonix suggest to give bid.  - on IVF -lung exam clear, no sign of fluid overload.  Thank you for allowing medicine to participate in the care, will follow, dee GI, wife, and Dr. Zafar Mauro who communicate to ENT team.

## 2023-12-28 NOTE — CONSULT NOTE ADULT - SUBJECTIVE AND OBJECTIVE BOX
HPI:  SAUNDRA HOLMAN is a 67yo M w PMH of CAD (x2 stents Mar 2023), HLD, T2DM, hx of kidney stones, psoriasis who presented with an oral mass and underwent L hemimandibulectomy, SND L level 1-3, recon with L FFF, STSG, and placement of dental implants on 12/7. He had a trach which was subsequently decannulated. He returns 12/27 with surgical site infection now s/p OR for debridement and exploration. Trach replaced through prior stoma for pulmonary toilet.   (27 Dec 2023 17:31)    GI consulted for melena and for consideration of endoscopic evaluation.     Allergies    No Known Allergies    Intolerances      MEDICATIONS:  MEDICATIONS  (STANDING):  ampicillin/sulbactam  IVPB 3 Gram(s) IV Intermittent every 6 hours  aspirin  chewable 81 milliGRAM(s) Enteral Tube daily  atorvastatin 40 milliGRAM(s) Oral at bedtime  chlorhexidine 0.12% Liquid 15 milliLiter(s) Oral Mucosa two times a day  dextrose 5% + sodium chloride 0.45%. 1000 milliLiter(s) (110 mL/Hr) IV Continuous <Continuous>  dextrose 5%. 1000 milliLiter(s) (100 mL/Hr) IV Continuous <Continuous>  dextrose 5%. 1000 milliLiter(s) (50 mL/Hr) IV Continuous <Continuous>  dextrose 50% Injectable 25 Gram(s) IV Push once  dextrose 50% Injectable 25 Gram(s) IV Push once  dextrose 50% Injectable 12.5 Gram(s) IV Push once  glucagon  Injectable 1 milliGRAM(s) IntraMuscular once  influenza  Vaccine (HIGH DOSE) 0.7 milliLiter(s) IntraMuscular once  insulin lispro (ADMELOG) corrective regimen sliding scale   SubCutaneous at bedtime  insulin lispro (ADMELOG) corrective regimen sliding scale   SubCutaneous three times a day before meals  metroNIDAZOLE  IVPB 500 milliGRAM(s) IV Intermittent every 8 hours  pantoprazole  Injectable 40 milliGRAM(s) IV Push two times a day    MEDICATIONS  (PRN):  acetaminophen     Tablet .. 650 milliGRAM(s) Oral every 6 hours PRN Mild Pain (1 - 3)  dextrose Oral Gel 15 Gram(s) Oral once PRN Blood Glucose LESS THAN 70 milliGRAM(s)/deciliter  HYDROmorphone  Injectable 1 milliGRAM(s) IV Push every 6 hours PRN Severe Pain (7 - 10)  lidocaine 2% Viscous 10 milliLiter(s) Swish and Spit every 4 hours PRN Mouth Care  ondansetron Injectable 4 milliGRAM(s) IV Push every 8 hours PRN Nausea  oxyCODONE    Solution 10 milliGRAM(s) Oral every 4 hours PRN Severe Pain (7 - 10)  oxyCODONE    Solution 5 milliGRAM(s) Oral every 4 hours PRN Moderate Pain (4 - 6)    PAST MEDICAL & SURGICAL HISTORY:  Neoplasm of mandible        FAMILY HISTORY:    SOCIAL HISTORY:  [place]: [ ] Current, [ ] Former, [ ] Never; Pack Years:  Alcohol:  Illicit Drugs:    REVIEW OF SYSTEMS:  Constitutional: No fever, chills, weight loss, or fatigue  ENMT:  No visual changes; No difficulty hearing, tinnitus, vertigo; No sinus or throat pain  Neck: No pain or stiffness  Respiratory: No cough, wheezing, or hemoptysis; No shortness of breath  Cardiovascular: No chest pain, palpitations, dizziness, orthopnea, PND, or leg swelling  Gastrointestinal: No abdominal or epigastric pain. No  nausea, vomiting, or hematemesis. No diarrhea, constipation, or steatorrhea. No melena or hematochezia  Genitourinary: No dysuria, urinary frequency/hesitancy, or hematuria  Skin: No itching, burning, rashes or lesions   Musculoskeletal: No joint pain or swelling; No muscle, back or extremity pain    Vital Signs Last 24 Hrs  T(C): 36.3 (28 Dec 2023 13:42), Max: 36.7 (27 Dec 2023 22:56)  T(F): 97.3 (28 Dec 2023 13:42), Max: 98.1 (27 Dec 2023 22:56)  HR: 58 (28 Dec 2023 11:57) (58 - 90)  BP: 98/50 (28 Dec 2023 11:57) (98/50 - 138/64)  BP(mean): 69 (28 Dec 2023 11:57) (69 - 92)  RR: 20 (28 Dec 2023 11:57) (11 - 22)  SpO2: 100% (28 Dec 2023 11:57) (97% - 100%)    Parameters below as of 28 Dec 2023 11:57  Patient On (Oxygen Delivery Method): tracheostomy collar  O2 Flow (L/min): 10  O2 Concentration (%): 40    12-27 @ 07:01  -  12-28 @ 07:00  --------------------------------------------------------  IN: 300 mL / OUT: 550 mL / NET: -250 mL    12-28 @ 07:01  -  12-28 @ 14:35  --------------------------------------------------------  IN: 975 mL / OUT: 225 mL / NET: 750 mL        PHYSICAL EXAM:    General: Well developed; well nourished; in no acute distress  HEENT: MMM, conjunctiva and sclera clear  Gastrointestinal: Soft, non-tender non-distended; Normal bowel sounds; No rebound or guarding  Extremities: Normal range of motion, No clubbing, cyanosis or edema  Neurological: Alert and oriented x3  Skin: Warm and dry. No obvious rash    LABS:                        9.2    6.42  )-----------( 195      ( 28 Dec 2023 05:30 )             27.6     12-28    139  |  106  |  37<H>  ----------------------------<  162<H>  4.3   |  25  |  0.79    Ca    8.2<L>      28 Dec 2023 05:30  Phos  4.0     12-28  Mg     1.8     12-28    TPro  5.7<L>  /  Alb  2.8<L>  /  TBili  0.6  /  DBili  x   /  AST  22  /  ALT  49<H>  /  AlkPhos  35<L>  12-27      Culture Results:   Culture in progress (12-27 @ 20:46)    RADIOLOGY & ADDITIONAL STUDIES:    HPI:  SAUNDRA HOLMAN is a 69yo M w PMH of CAD (x2 stents Mar 2023), HLD, T2DM, hx of kidney stones, psoriasis who presented with an oral mass and underwent L hemimandibulectomy, SND L level 1-3, recon with L FFF, STSG, and placement of dental implants on 12/7. He had a trach which was subsequently decannulated. He returns 12/27 with surgical site infection now s/p OR for debridement and exploration. Trach replaced through prior stoma for pulmonary toilet.   (27 Dec 2023 17:31)    GI consulted for melena and for consideration of endoscopic evaluation.     Allergies    No Known Allergies    Intolerances      MEDICATIONS:  MEDICATIONS  (STANDING):  ampicillin/sulbactam  IVPB 3 Gram(s) IV Intermittent every 6 hours  aspirin  chewable 81 milliGRAM(s) Enteral Tube daily  atorvastatin 40 milliGRAM(s) Oral at bedtime  chlorhexidine 0.12% Liquid 15 milliLiter(s) Oral Mucosa two times a day  dextrose 5% + sodium chloride 0.45%. 1000 milliLiter(s) (110 mL/Hr) IV Continuous <Continuous>  dextrose 5%. 1000 milliLiter(s) (100 mL/Hr) IV Continuous <Continuous>  dextrose 5%. 1000 milliLiter(s) (50 mL/Hr) IV Continuous <Continuous>  dextrose 50% Injectable 25 Gram(s) IV Push once  dextrose 50% Injectable 25 Gram(s) IV Push once  dextrose 50% Injectable 12.5 Gram(s) IV Push once  glucagon  Injectable 1 milliGRAM(s) IntraMuscular once  influenza  Vaccine (HIGH DOSE) 0.7 milliLiter(s) IntraMuscular once  insulin lispro (ADMELOG) corrective regimen sliding scale   SubCutaneous at bedtime  insulin lispro (ADMELOG) corrective regimen sliding scale   SubCutaneous three times a day before meals  metroNIDAZOLE  IVPB 500 milliGRAM(s) IV Intermittent every 8 hours  pantoprazole  Injectable 40 milliGRAM(s) IV Push two times a day    MEDICATIONS  (PRN):  acetaminophen     Tablet .. 650 milliGRAM(s) Oral every 6 hours PRN Mild Pain (1 - 3)  dextrose Oral Gel 15 Gram(s) Oral once PRN Blood Glucose LESS THAN 70 milliGRAM(s)/deciliter  HYDROmorphone  Injectable 1 milliGRAM(s) IV Push every 6 hours PRN Severe Pain (7 - 10)  lidocaine 2% Viscous 10 milliLiter(s) Swish and Spit every 4 hours PRN Mouth Care  ondansetron Injectable 4 milliGRAM(s) IV Push every 8 hours PRN Nausea  oxyCODONE    Solution 10 milliGRAM(s) Oral every 4 hours PRN Severe Pain (7 - 10)  oxyCODONE    Solution 5 milliGRAM(s) Oral every 4 hours PRN Moderate Pain (4 - 6)    PAST MEDICAL & SURGICAL HISTORY:  Neoplasm of mandible        FAMILY HISTORY:    SOCIAL HISTORY:  [place]: [ ] Current, [ ] Former, [ ] Never; Pack Years:  Alcohol:  Illicit Drugs:    REVIEW OF SYSTEMS:  Constitutional: No fever, chills, weight loss, or fatigue  ENMT:  No visual changes; No difficulty hearing, tinnitus, vertigo; No sinus or throat pain  Neck: No pain or stiffness  Respiratory: No cough, wheezing, or hemoptysis; No shortness of breath  Cardiovascular: No chest pain, palpitations, dizziness, orthopnea, PND, or leg swelling  Gastrointestinal: No abdominal or epigastric pain. No  nausea, vomiting, or hematemesis. No diarrhea, constipation, or steatorrhea. No melena or hematochezia  Genitourinary: No dysuria, urinary frequency/hesitancy, or hematuria  Skin: No itching, burning, rashes or lesions   Musculoskeletal: No joint pain or swelling; No muscle, back or extremity pain    Vital Signs Last 24 Hrs  T(C): 36.3 (28 Dec 2023 13:42), Max: 36.7 (27 Dec 2023 22:56)  T(F): 97.3 (28 Dec 2023 13:42), Max: 98.1 (27 Dec 2023 22:56)  HR: 58 (28 Dec 2023 11:57) (58 - 90)  BP: 98/50 (28 Dec 2023 11:57) (98/50 - 138/64)  BP(mean): 69 (28 Dec 2023 11:57) (69 - 92)  RR: 20 (28 Dec 2023 11:57) (11 - 22)  SpO2: 100% (28 Dec 2023 11:57) (97% - 100%)    Parameters below as of 28 Dec 2023 11:57  Patient On (Oxygen Delivery Method): tracheostomy collar  O2 Flow (L/min): 10  O2 Concentration (%): 40    12-27 @ 07:01  -  12-28 @ 07:00  --------------------------------------------------------  IN: 300 mL / OUT: 550 mL / NET: -250 mL    12-28 @ 07:01  -  12-28 @ 14:35  --------------------------------------------------------  IN: 975 mL / OUT: 225 mL / NET: 750 mL        PHYSICAL EXAM:    General: Well developed; well nourished; in no acute distress  HEENT: MMM, conjunctiva and sclera clear  Gastrointestinal: Soft, non-tender non-distended; Normal bowel sounds; No rebound or guarding  Extremities: Normal range of motion, No clubbing, cyanosis or edema  Neurological: Alert and oriented x3  Skin: Warm and dry. No obvious rash    LABS:                        9.2    6.42  )-----------( 195      ( 28 Dec 2023 05:30 )             27.6     12-28    139  |  106  |  37<H>  ----------------------------<  162<H>  4.3   |  25  |  0.79    Ca    8.2<L>      28 Dec 2023 05:30  Phos  4.0     12-28  Mg     1.8     12-28    TPro  5.7<L>  /  Alb  2.8<L>  /  TBili  0.6  /  DBili  x   /  AST  22  /  ALT  49<H>  /  AlkPhos  35<L>  12-27      Culture Results:   Culture in progress (12-27 @ 20:46)    RADIOLOGY & ADDITIONAL STUDIES:    HPI:  SAUNDRA HOLMAN is a 69yo M w PMH of CAD (x2 stents Mar 2023), HLD, T2DM, hx of kidney stones, psoriasis who presented with an oral mass and underwent L hemimandibulectomy, SND L level 1-3, recon with L FFF, STSG, and placement of dental implants on 12/7. He had a trach which was subsequently decannulated. He returns 12/27 with surgical site infection now s/p OR for debridement and exploration. Trach replaced through prior stoma for pulmonary toilet.   (27 Dec 2023 17:31)    GI consulted for melena and for consideration of endoscopic evaluation.     Allergies    No Known Allergies    Intolerances      MEDICATIONS:  MEDICATIONS  (STANDING):  ampicillin/sulbactam  IVPB 3 Gram(s) IV Intermittent every 6 hours  aspirin  chewable 81 milliGRAM(s) Enteral Tube daily  atorvastatin 40 milliGRAM(s) Oral at bedtime  chlorhexidine 0.12% Liquid 15 milliLiter(s) Oral Mucosa two times a day  dextrose 5% + sodium chloride 0.45%. 1000 milliLiter(s) (110 mL/Hr) IV Continuous <Continuous>  dextrose 5%. 1000 milliLiter(s) (100 mL/Hr) IV Continuous <Continuous>  dextrose 5%. 1000 milliLiter(s) (50 mL/Hr) IV Continuous <Continuous>  dextrose 50% Injectable 25 Gram(s) IV Push once  dextrose 50% Injectable 25 Gram(s) IV Push once  dextrose 50% Injectable 12.5 Gram(s) IV Push once  glucagon  Injectable 1 milliGRAM(s) IntraMuscular once  influenza  Vaccine (HIGH DOSE) 0.7 milliLiter(s) IntraMuscular once  insulin lispro (ADMELOG) corrective regimen sliding scale   SubCutaneous at bedtime  insulin lispro (ADMELOG) corrective regimen sliding scale   SubCutaneous three times a day before meals  metroNIDAZOLE  IVPB 500 milliGRAM(s) IV Intermittent every 8 hours  pantoprazole  Injectable 40 milliGRAM(s) IV Push two times a day    MEDICATIONS  (PRN):  acetaminophen     Tablet .. 650 milliGRAM(s) Oral every 6 hours PRN Mild Pain (1 - 3)  dextrose Oral Gel 15 Gram(s) Oral once PRN Blood Glucose LESS THAN 70 milliGRAM(s)/deciliter  HYDROmorphone  Injectable 1 milliGRAM(s) IV Push every 6 hours PRN Severe Pain (7 - 10)  lidocaine 2% Viscous 10 milliLiter(s) Swish and Spit every 4 hours PRN Mouth Care  ondansetron Injectable 4 milliGRAM(s) IV Push every 8 hours PRN Nausea  oxyCODONE    Solution 10 milliGRAM(s) Oral every 4 hours PRN Severe Pain (7 - 10)  oxyCODONE    Solution 5 milliGRAM(s) Oral every 4 hours PRN Moderate Pain (4 - 6)    PAST MEDICAL & SURGICAL HISTORY:  Neoplasm of mandible        FAMILY HISTORY:    SOCIAL HISTORY:  [place]: [ ] Current, [ ] Former, [ ] Never; Pack Years:  Alcohol:  Illicit Drugs:    REVIEW OF SYSTEMS:  Constitutional: No fever, chills, weight loss, or fatigue  ENMT:  No visual changes; No difficulty hearing, tinnitus, vertigo; No sinus or throat pain  Neck: No pain or stiffness  Respiratory: No cough, wheezing, or hemoptysis; No shortness of breath  Cardiovascular: No chest pain, palpitations, dizziness, orthopnea, PND, or leg swelling  Gastrointestinal: No abdominal or epigastric pain. No  nausea, vomiting, or hematemesis. No diarrhea, constipation, or steatorrhea. No melena or hematochezia  Genitourinary: No dysuria, urinary frequency/hesitancy, or hematuria  Skin: No itching, burning, rashes or lesions   Musculoskeletal: No joint pain or swelling; No muscle, back or extremity pain    Vital Signs Last 24 Hrs  T(C): 36.3 (28 Dec 2023 13:42), Max: 36.7 (27 Dec 2023 22:56)  T(F): 97.3 (28 Dec 2023 13:42), Max: 98.1 (27 Dec 2023 22:56)  HR: 58 (28 Dec 2023 11:57) (58 - 90)  BP: 98/50 (28 Dec 2023 11:57) (98/50 - 138/64)  BP(mean): 69 (28 Dec 2023 11:57) (69 - 92)  RR: 20 (28 Dec 2023 11:57) (11 - 22)  SpO2: 100% (28 Dec 2023 11:57) (97% - 100%)    Parameters below as of 28 Dec 2023 11:57  Patient On (Oxygen Delivery Method): tracheostomy collar  O2 Flow (L/min): 10  O2 Concentration (%): 40    12-27 @ 07:01  -  12-28 @ 07:00  --------------------------------------------------------  IN: 300 mL / OUT: 550 mL / NET: -250 mL    12-28 @ 07:01  -  12-28 @ 14:35  --------------------------------------------------------  IN: 975 mL / OUT: 225 mL / NET: 750 mL        PHYSICAL EXAM:    General: male, sitting up in bed; wife at bedside; in no acute distress  HEENT: MMM, conjunctiva and sclera clear; e/o recent debridement; dark blood noted in oropharynx; limited ability to open mouth  Gastrointestinal: Soft, non-tender non-distended; Normal bowel sounds; No rebound or guarding; G tube in place; tubing clear   Extremities: Normal range of motion, No clubbing, cyanosis or edema  Neurological: Alert and oriented x3  Skin: Warm and dry. No obvious rash    LABS:                        9.2    6.42  )-----------( 195      ( 28 Dec 2023 05:30 )             27.6     12-28    139  |  106  |  37<H>  ----------------------------<  162<H>  4.3   |  25  |  0.79    Ca    8.2<L>      28 Dec 2023 05:30  Phos  4.0     12-28  Mg     1.8     12-28    TPro  5.7<L>  /  Alb  2.8<L>  /  TBili  0.6  /  DBili  x   /  AST  22  /  ALT  49<H>  /  AlkPhos  35<L>  12-27      Culture Results:   Culture in progress (12-27 @ 20:46)    RADIOLOGY & ADDITIONAL STUDIES:

## 2023-12-28 NOTE — PATIENT PROFILE ADULT - FALL HARM RISK - UNIVERSAL INTERVENTIONS
Bed in lowest position, wheels locked, appropriate side rails in place/Call bell, personal items and telephone in reach/Instruct patient to call for assistance before getting out of bed or chair/Non-slip footwear when patient is out of bed/[place] to call system/Physically safe environment - no spills, clutter or unnecessary equipment/Purposeful Proactive Rounding/Room/bathroom lighting operational, light cord in reach Bed in lowest position, wheels locked, appropriate side rails in place/Call bell, personal items and telephone in reach/Instruct patient to call for assistance before getting out of bed or chair/Non-slip footwear when patient is out of bed/[place] to call system/Physically safe environment - no spills, clutter or unnecessary equipment/Purposeful Proactive Rounding/Room/bathroom lighting operational, light cord in reach

## 2023-12-28 NOTE — PATIENT PROFILE ADULT - FUNCTIONAL ASSESSMENT - BASIC MOBILITY ASSESSMENT TYPE
Called patient to schedule a screening mammogram PATIENTPHONEMESSAGE: PATIENT WILL CALL BACK -     Additional information Admission

## 2023-12-28 NOTE — DIETITIAN INITIAL EVALUATION ADULT - OTHER CALCULATIONS
Pt presents with R back pain radiating down R leg. Pt states he was seen here yesterday, prescribed prednisone and no relief in sx.    HT (inches): 70       WT (pounds): 185.0 (12/19)       BMI (kg/m^2): 26.5       IBW (pounds): 166 +/- 10%       %IBW: 111.4 %  Most recent documented weight used to calculate energy needs due to pt's current body weight within % ideal body weight per [facility] Standards of Nutrition Care. Needs adjusted for age and current clinical status. Fluids at team's discretion.   HT (inches): 70       WT (pounds): 185.0 (12/19)       BMI (kg/m^2): 26.5       IBW (pounds): 166 +/- 10%       %IBW: 111.4 %  Most recent documented weight used to calculate energy needs due to pt's current body weight within % ideal body weight per [facility] Standards of Nutrition Care. Needs adjusted for age and current clinical status. Fluids at team's discretion.

## 2023-12-28 NOTE — DIETITIAN INITIAL EVALUATION ADULT - PERTINENT MEDS FT
MEDICATIONS  (STANDING):  ampicillin/sulbactam  IVPB 3 Gram(s) IV Intermittent every 6 hours  aspirin  chewable 81 milliGRAM(s) Enteral Tube daily  atorvastatin 40 milliGRAM(s) Oral at bedtime  chlorhexidine 0.12% Liquid 15 milliLiter(s) Oral Mucosa two times a day  dextrose 5% + sodium chloride 0.45%. 1000 milliLiter(s) (75 mL/Hr) IV Continuous <Continuous>  dextrose 5%. 1000 milliLiter(s) (100 mL/Hr) IV Continuous <Continuous>  dextrose 5%. 1000 milliLiter(s) (50 mL/Hr) IV Continuous <Continuous>  dextrose 50% Injectable 25 Gram(s) IV Push once  dextrose 50% Injectable 25 Gram(s) IV Push once  dextrose 50% Injectable 12.5 Gram(s) IV Push once  glucagon  Injectable 1 milliGRAM(s) IntraMuscular once  influenza  Vaccine (HIGH DOSE) 0.7 milliLiter(s) IntraMuscular once  insulin lispro (ADMELOG) corrective regimen sliding scale   SubCutaneous three times a day before meals  insulin lispro (ADMELOG) corrective regimen sliding scale   SubCutaneous at bedtime  metroNIDAZOLE  IVPB 500 milliGRAM(s) IV Intermittent every 8 hours  pantoprazole  Injectable 40 milliGRAM(s) IV Push daily    MEDICATIONS  (PRN):  acetaminophen     Tablet .. 650 milliGRAM(s) Oral every 6 hours PRN Mild Pain (1 - 3)  dextrose Oral Gel 15 Gram(s) Oral once PRN Blood Glucose LESS THAN 70 milliGRAM(s)/deciliter  HYDROmorphone  Injectable 1 milliGRAM(s) IV Push every 6 hours PRN Severe Pain (7 - 10)  lidocaine 2% Viscous 10 milliLiter(s) Swish and Spit every 4 hours PRN Mouth Care  ondansetron Injectable 4 milliGRAM(s) IV Push every 8 hours PRN Nausea  oxyCODONE    Solution 10 milliGRAM(s) Oral every 4 hours PRN Severe Pain (7 - 10)  oxyCODONE    Solution 5 milliGRAM(s) Oral every 4 hours PRN Moderate Pain (4 - 6)

## 2023-12-28 NOTE — DIETITIAN INITIAL EVALUATION ADULT - ADD RECOMMEND
RECOMMENDATIONS:   - When medically feasible, recommend the following tube feed regimen via gastrostomy:     >> 1422 mL total volume (6 bottles daily) of Vital 1.5 starting at 0800 (provides 2133 kcal, 96 g protein, 1086 mL free fluid)    >> Start at 10 mL/hr and increase by 10 mL/hr q4h to goal rate of 120 mL/hr; or as tolerated     >> At goal rate, it will take approximately 12 hours/day to infuse total volume    >> Flush tube feeds with 50 mL water before and after administration (provides an additional 100 mL)     - When team and patient ready to transition to bolus, recommend the following:     >> Bolus schedule per patient/caregiver preference. Sample bolus schedule below     >> Bolus 1.5 can (~356 mL) of Vital 1.5 4x/day @ 0900, 1300, 1700, 2100     >> Flush with 50 mL water before and after each bolus (provides an additional 400 mL)     - Additional free water boluses as per team   - Hold feeds if increase in pressor requirements, MAPs consistently trending <60 mmHg, lactic acidosis presents, or GI intolerance is noted   - Monitor tube feed tolerance, GI distress, labs, weights   - Monitor electrolytes, adjust and replete PRN   - Pain and bowel regimen as per team   - Team notified of the above recommendations

## 2023-12-28 NOTE — CONSULT NOTE ADULT - SUBJECTIVE AND OBJECTIVE BOX
Consultation Requested by:    Patient is a 68y old  Male who presents with a chief complaint of Surgical site infection (28 Dec 2023 15:40)    HPI:  SAUNDRA HOLMAN is a 69yo M w PMH of CAD (x2 stents Mar 2023), HLD, T2DM, hx of kidney stones, psoriasis who presented with an oral mass and underwent L hemimandibulectomy, SND L level 1-3, recon with L FFF, STSG, and placement of dental implants on 12/7. He had a trach which was subsequently decannulated. He returns 12/27 with surgical site infection now s/p OR for debridement and exploration. Trach replaced through prior stoma for pulmonary toilet.   (27 Dec 2023 17:31)    Interval History:  Patient seen and examined at bedside with wife bedside. Patient's wife provides history with patient providing history intermittently by writing down. Otherwise reports since discharge intermittent discharge from surgical wound and rancid odor. Occasionally mucus/blood clot would expel from nose. Had dark tarry stool as well. Otherwise had f/u 12/26 where patient was seen in office and discharged w oral Abx, which wife reports was Augmentin. Continuously feeling worse and after ED visit in [place] as well as imaging at ED and Iv Abx called ENT, Dr. Gandhi who recommended patient should return to ED. Subsequently patient had initial wash-out.  Patient currently reports improvement in surgical site discharge, mild oozing blood from trach site. No other complaints currently. Denies fever, chills, c/p, palpitations, cough, SOB, abdominal pain, nausea, vomiting, diarrhea, dysuria, hematuria, LE swelling, or rash.      REVIEW OF SYSTEMS  All review of systems negative, except for those marked:  General:		[] Abnormal:  	[] Night Sweats		[] Fever		[] Weight Loss  Pulmonary/Cough:	[] Abnormal:  Cardiac/Chest Pain:	[] Abnormal:  Gastrointestinal:   	[] Abnormal:  Eyes:			        [] Abnormal:  ENT:		         	[] Abnormal:  Dysuria:		                [] Abnormal:  Musculoskeletal	:	[] Abnormal:  Endocrine:		        [] Abnormal:  Lymph Nodes:		[] Abnormal:  Headache:		        [] Abnormal:  Skin:			        [] Abnormal:  Allergy/Immune:       	[] Abnormal:  Psychiatric:		        [] Abnormal:  [] All other review of systems negative  [] Unable to obtain (explain):    Recent Ill Contacts:	[] No	[] Yes:  Recent Travel History:	[] No	[] Yes:  Recent Animal/Insect Exposure/Tick Bites:	[] No	[] Yes:    Allergies    No Known Allergies    Intolerances      Antimicrobials:  ampicillin/sulbactam  IVPB 3 Gram(s) IV Intermittent every 6 hours  metroNIDAZOLE  IVPB 500 milliGRAM(s) IV Intermittent every 8 hours      Other Medications:  acetaminophen     Tablet .. 650 milliGRAM(s) Oral every 6 hours PRN  aspirin  chewable 81 milliGRAM(s) Enteral Tube daily  atorvastatin 40 milliGRAM(s) Oral at bedtime  chlorhexidine 0.12% Liquid 15 milliLiter(s) Oral Mucosa two times a day  dextrose 5% + sodium chloride 0.45%. 1000 milliLiter(s) IV Continuous <Continuous>  dextrose 5%. 1000 milliLiter(s) IV Continuous <Continuous>  dextrose 5%. 1000 milliLiter(s) IV Continuous <Continuous>  dextrose 50% Injectable 25 Gram(s) IV Push once  dextrose 50% Injectable 25 Gram(s) IV Push once  dextrose 50% Injectable 12.5 Gram(s) IV Push once  dextrose Oral Gel 15 Gram(s) Oral once PRN  glucagon  Injectable 1 milliGRAM(s) IntraMuscular once  HYDROmorphone  Injectable 1 milliGRAM(s) IV Push every 6 hours PRN  influenza  Vaccine (HIGH DOSE) 0.7 milliLiter(s) IntraMuscular once  insulin lispro (ADMELOG) corrective regimen sliding scale   SubCutaneous at bedtime  insulin lispro (ADMELOG) corrective regimen sliding scale   SubCutaneous three times a day before meals  lidocaine 2% Viscous 10 milliLiter(s) Swish and Spit every 4 hours PRN  ondansetron Injectable 4 milliGRAM(s) IV Push every 8 hours PRN  oxyCODONE    Solution 10 milliGRAM(s) Oral every 4 hours PRN  oxyCODONE    Solution 5 milliGRAM(s) Oral every 4 hours PRN  pantoprazole  Injectable 40 milliGRAM(s) IV Push two times a day      FAMILY HISTORY:    PAST MEDICAL & SURGICAL HISTORY:  Neoplasm of mandible        SOCIAL HISTORY:  [place], has livestock such as sheep in property but does not interact w them  restaurant and diner owner  Recent travel to [facility], no other  50+ pack year history as well as cigars  No EtOH use  Recreational CBD use  Sexually active w spouse      IMMUNIZATIONS  [] Up to Date		[] Not Up to Date:  Recent Immunizations:	[] No	[] Yes:    Daily     Daily   Head Circumference:  Vital Signs Last 24 Hrs  T(C): 36.3 (28 Dec 2023 13:42), Max: 36.7 (27 Dec 2023 22:56)  T(F): 97.3 (28 Dec 2023 13:42), Max: 98.1 (27 Dec 2023 22:56)  HR: 64 (28 Dec 2023 15:29) (52 - 90)  BP: 109/52 (28 Dec 2023 15:29) (98/50 - 138/64)  BP(mean): 75 (28 Dec 2023 15:29) (69 - 92)  RR: 19 (28 Dec 2023 15:29) (11 - 22)  SpO2: 99% (28 Dec 2023 15:29) (97% - 100%)    Parameters below as of 28 Dec 2023 15:29  Patient On (Oxygen Delivery Method): tracheostomy collar  O2 Flow (L/min): 10  O2 Concentration (%): 40    PHYSICAL EXAM  Vital Signs Last 12 Hrs  T(F): 97.3 (12-28-23 @ 13:42), Max: 97.5 (12-28-23 @ 08:59)  HR: 64 (12-28-23 @ 15:29) (52 - 73)  BP: 109/52 (12-28-23 @ 15:29) (98/50 - 109/52)  BP(mean): 75 (12-28-23 @ 15:29) (69 - 75)  RR: 19 (12-28-23 @ 15:29) (19 - 20)  SpO2: 99% (12-28-23 @ 15:29) (97% - 100%)    GENERAL: NAD, well-groomed, well-developed  HEENT: Remnant left FFF paddle oozing, packing in place in buccal cavity, Neck: 7.5 portex trach with cuff deflated, penrose draining ss fluid, skin loosely reapproximated with silk suture, kerlex dressing in place minimally saturated  NERVOUS SYSTEM: Alert & Oriented X3, Good concentration; Motor Strength 5/5 B/L upper and lower extremities; DTRs 2+ intact and symmetric  CHEST/LUNG: Clear to percussion bilaterally; No rales, rhonchi, wheezing, or rubs  HEART: Regular rate and rhythm; No murmurs, rubs, or gallops  ABDOMEN: Soft, Nontender, Nondistended; Bowel sounds present, PEG tube c/d/i  EXTREMITIES: 2+ Peripheral Pulses, No clubbing, cyanosis, or edema, L fibular site non-tender with no drainage  LYMPH: No lymphadenopathy noted  SKIN: No rashes or lesions        Lab Results:                        9.2    6.42  )-----------( 195      ( 28 Dec 2023 05:30 )             27.6     12-28    139  |  106  |  37<H>  ----------------------------<  162<H>  4.3   |  25  |  0.79    Ca    8.2<L>      28 Dec 2023 05:30  Phos  4.0     12-28  Mg     1.8     12-28    TPro  5.7<L>  /  Alb  2.8<L>  /  TBili  0.6  /  DBili  x   /  AST  22  /  ALT  49<H>  /  AlkPhos  35<L>  12-27    LIVER FUNCTIONS - ( 27 Dec 2023 17:37 )  Alb: 2.8 g/dL / Pro: 5.7 g/dL / ALK PHOS: 35 U/L / ALT: 49 U/L / AST: 22 U/L / GGT: x           PT/INR - ( 27 Dec 2023 17:37 )   PT: 12.8 sec;   INR: 1.13          PTT - ( 27 Dec 2023 17:37 )  PTT:25.4 sec  Urinalysis Basic - ( 28 Dec 2023 05:30 )    Color: x / Appearance: x / SG: x / pH: x  Gluc: 162 mg/dL / Ketone: x  / Bili: x / Urobili: x   Blood: x / Protein: x / Nitrite: x   Leuk Esterase: x / RBC: x / WBC x   Sq Epi: x / Non Sq Epi: x / Bacteria: x        MICROBIOLOGY  [] Pathology slides reviewed and/or discussed with pathologist  [] Microbiology findings discussed with microbiologist or slides reviewed  [] Images erviewed with radiologist  [] Case discussed with an attending physician in addition to the patient's primary physician  [] Records, reports from outside [facility] reviewed    [] Patient requires continued monitoring for:  [] Total time spent by attending physician: __ minutes, excluding procedure time. Consultation Requested by:    Patient is a 68y old  Male who presents with a chief complaint of Surgical site infection (28 Dec 2023 15:40)    HPI:  SAUNDRA HOLMAN is a 67yo M w PMH of CAD (x2 stents Mar 2023), HLD, T2DM, hx of kidney stones, psoriasis who presented with an oral mass and underwent L hemimandibulectomy, SND L level 1-3, recon with L FFF, STSG, and placement of dental implants on 12/7. He had a trach which was subsequently decannulated. He returns 12/27 with surgical site infection now s/p OR for debridement and exploration. Trach replaced through prior stoma for pulmonary toilet.   (27 Dec 2023 17:31)    Interval History:  Patient seen and examined at bedside with wife bedside. Patient's wife provides history with patient providing history intermittently by writing down. Otherwise reports since discharge intermittent discharge from surgical wound and rancid odor. Occasionally mucus/blood clot would expel from nose. Had dark tarry stool as well. Otherwise had f/u 12/26 where patient was seen in office and discharged w oral Abx, which wife reports was Augmentin. Continuously feeling worse and after ED visit in [place] as well as imaging at ED and Iv Abx called ENT, Dr. Gandhi who recommended patient should return to ED. Subsequently patient had initial wash-out.  Patient currently reports improvement in surgical site discharge, mild oozing blood from trach site. No other complaints currently. Denies fever, chills, c/p, palpitations, cough, SOB, abdominal pain, nausea, vomiting, diarrhea, dysuria, hematuria, LE swelling, or rash.      REVIEW OF SYSTEMS  All review of systems negative, except for those marked:  General:		[] Abnormal:  	[] Night Sweats		[] Fever		[] Weight Loss  Pulmonary/Cough:	[] Abnormal:  Cardiac/Chest Pain:	[] Abnormal:  Gastrointestinal:   	[] Abnormal:  Eyes:			        [] Abnormal:  ENT:		         	[] Abnormal:  Dysuria:		                [] Abnormal:  Musculoskeletal	:	[] Abnormal:  Endocrine:		        [] Abnormal:  Lymph Nodes:		[] Abnormal:  Headache:		        [] Abnormal:  Skin:			        [] Abnormal:  Allergy/Immune:       	[] Abnormal:  Psychiatric:		        [] Abnormal:  [] All other review of systems negative  [] Unable to obtain (explain):    Recent Ill Contacts:	[] No	[] Yes:  Recent Travel History:	[] No	[] Yes:  Recent Animal/Insect Exposure/Tick Bites:	[] No	[] Yes:    Allergies    No Known Allergies    Intolerances      Antimicrobials:  ampicillin/sulbactam  IVPB 3 Gram(s) IV Intermittent every 6 hours  metroNIDAZOLE  IVPB 500 milliGRAM(s) IV Intermittent every 8 hours      Other Medications:  acetaminophen     Tablet .. 650 milliGRAM(s) Oral every 6 hours PRN  aspirin  chewable 81 milliGRAM(s) Enteral Tube daily  atorvastatin 40 milliGRAM(s) Oral at bedtime  chlorhexidine 0.12% Liquid 15 milliLiter(s) Oral Mucosa two times a day  dextrose 5% + sodium chloride 0.45%. 1000 milliLiter(s) IV Continuous <Continuous>  dextrose 5%. 1000 milliLiter(s) IV Continuous <Continuous>  dextrose 5%. 1000 milliLiter(s) IV Continuous <Continuous>  dextrose 50% Injectable 25 Gram(s) IV Push once  dextrose 50% Injectable 25 Gram(s) IV Push once  dextrose 50% Injectable 12.5 Gram(s) IV Push once  dextrose Oral Gel 15 Gram(s) Oral once PRN  glucagon  Injectable 1 milliGRAM(s) IntraMuscular once  HYDROmorphone  Injectable 1 milliGRAM(s) IV Push every 6 hours PRN  influenza  Vaccine (HIGH DOSE) 0.7 milliLiter(s) IntraMuscular once  insulin lispro (ADMELOG) corrective regimen sliding scale   SubCutaneous at bedtime  insulin lispro (ADMELOG) corrective regimen sliding scale   SubCutaneous three times a day before meals  lidocaine 2% Viscous 10 milliLiter(s) Swish and Spit every 4 hours PRN  ondansetron Injectable 4 milliGRAM(s) IV Push every 8 hours PRN  oxyCODONE    Solution 10 milliGRAM(s) Oral every 4 hours PRN  oxyCODONE    Solution 5 milliGRAM(s) Oral every 4 hours PRN  pantoprazole  Injectable 40 milliGRAM(s) IV Push two times a day      FAMILY HISTORY:    PAST MEDICAL & SURGICAL HISTORY:  Neoplasm of mandible        SOCIAL HISTORY:  [place], has livestock such as sheep in property but does not interact w them  restaurant and diner owner  Recent travel to [facility], no other  50+ pack year history as well as cigars  No EtOH use  Recreational CBD use  Sexually active w spouse      IMMUNIZATIONS  [] Up to Date		[] Not Up to Date:  Recent Immunizations:	[] No	[] Yes:    Daily     Daily   Head Circumference:  Vital Signs Last 24 Hrs  T(C): 36.3 (28 Dec 2023 13:42), Max: 36.7 (27 Dec 2023 22:56)  T(F): 97.3 (28 Dec 2023 13:42), Max: 98.1 (27 Dec 2023 22:56)  HR: 64 (28 Dec 2023 15:29) (52 - 90)  BP: 109/52 (28 Dec 2023 15:29) (98/50 - 138/64)  BP(mean): 75 (28 Dec 2023 15:29) (69 - 92)  RR: 19 (28 Dec 2023 15:29) (11 - 22)  SpO2: 99% (28 Dec 2023 15:29) (97% - 100%)    Parameters below as of 28 Dec 2023 15:29  Patient On (Oxygen Delivery Method): tracheostomy collar  O2 Flow (L/min): 10  O2 Concentration (%): 40    PHYSICAL EXAM  Vital Signs Last 12 Hrs  T(F): 97.3 (12-28-23 @ 13:42), Max: 97.5 (12-28-23 @ 08:59)  HR: 64 (12-28-23 @ 15:29) (52 - 73)  BP: 109/52 (12-28-23 @ 15:29) (98/50 - 109/52)  BP(mean): 75 (12-28-23 @ 15:29) (69 - 75)  RR: 19 (12-28-23 @ 15:29) (19 - 20)  SpO2: 99% (12-28-23 @ 15:29) (97% - 100%)    GENERAL: NAD, well-groomed, well-developed  HEENT: Remnant left FFF paddle oozing, packing in place in buccal cavity, Neck: 7.5 portex trach with cuff deflated, penrose draining ss fluid, skin loosely reapproximated with silk suture, kerlex dressing in place minimally saturated  NERVOUS SYSTEM: Alert & Oriented X3, Good concentration; Motor Strength 5/5 B/L upper and lower extremities; DTRs 2+ intact and symmetric  CHEST/LUNG: Clear to percussion bilaterally; No rales, rhonchi, wheezing, or rubs  HEART: Regular rate and rhythm; No murmurs, rubs, or gallops  ABDOMEN: Soft, Nontender, Nondistended; Bowel sounds present, PEG tube c/d/i  EXTREMITIES: 2+ Peripheral Pulses, No clubbing, cyanosis, or edema, L fibular site non-tender with no drainage  LYMPH: No lymphadenopathy noted  SKIN: No rashes or lesions        Lab Results:                        9.2    6.42  )-----------( 195      ( 28 Dec 2023 05:30 )             27.6     12-28    139  |  106  |  37<H>  ----------------------------<  162<H>  4.3   |  25  |  0.79    Ca    8.2<L>      28 Dec 2023 05:30  Phos  4.0     12-28  Mg     1.8     12-28    TPro  5.7<L>  /  Alb  2.8<L>  /  TBili  0.6  /  DBili  x   /  AST  22  /  ALT  49<H>  /  AlkPhos  35<L>  12-27    LIVER FUNCTIONS - ( 27 Dec 2023 17:37 )  Alb: 2.8 g/dL / Pro: 5.7 g/dL / ALK PHOS: 35 U/L / ALT: 49 U/L / AST: 22 U/L / GGT: x           PT/INR - ( 27 Dec 2023 17:37 )   PT: 12.8 sec;   INR: 1.13          PTT - ( 27 Dec 2023 17:37 )  PTT:25.4 sec  Urinalysis Basic - ( 28 Dec 2023 05:30 )    Color: x / Appearance: x / SG: x / pH: x  Gluc: 162 mg/dL / Ketone: x  / Bili: x / Urobili: x   Blood: x / Protein: x / Nitrite: x   Leuk Esterase: x / RBC: x / WBC x   Sq Epi: x / Non Sq Epi: x / Bacteria: x        MICROBIOLOGY  [] Pathology slides reviewed and/or discussed with pathologist  [] Microbiology findings discussed with microbiologist or slides reviewed  [] Images erviewed with radiologist  [] Case discussed with an attending physician in addition to the patient's primary physician  [] Records, reports from outside [facility] reviewed    [] Patient requires continued monitoring for:  [] Total time spent by attending physician: __ minutes, excluding procedure time.

## 2023-12-28 NOTE — CONSULT NOTE ADULT - ATTENDING COMMENTS
69 yo M w CAD s/p PCI in 3/2023, DM2, renal calculi, psoriasis and SCC of L buccal mucosa s/p L hemimandibulectomy, reconstruction w soft tissue graft and dental implants. Trach decannulated. P/w surgical site infection s/p wash-out.  Wound cx gram stain with GPCs in pairs, chains and clusters, GPRs and GNRs.  - DC Unasyn and flagyl  Prior trach culture has grown E. coli and E. cloacae R- Amp/Sulb hence DC Unasyn  - Start IV Vancomycin 1.25 gm Q12H  - Check trough prior to 4th dose  - Start IV Pip-tazo 4.5gm Q8H  Will follow  Thank you for the consult. 67 yo M w CAD s/p PCI in 3/2023, DM2, renal calculi, psoriasis and SCC of L buccal mucosa s/p L hemimandibulectomy, reconstruction w soft tissue graft and dental implants. Trach decannulated. P/w surgical site infection s/p wash-out.  Wound cx gram stain with GPCs in pairs, chains and clusters, GPRs and GNRs.  - DC Unasyn and flagyl  Prior trach culture has grown E. coli and E. cloacae R- Amp/Sulb hence DC Unasyn  - Start IV Vancomycin 1.25 gm Q12H  - Check trough prior to 4th dose  - Start IV Pip-tazo 4.5gm Q8H  Will follow  Thank you for the consult.

## 2023-12-28 NOTE — CONSULT NOTE ADULT - ASSESSMENT
67 yo M w PMhx of CAD (x 2 stents Mar 2023), Type 2 DM, hx of kidney stones, psoriasis who presented with an oral mass 2/2 to [id number] SCC of L buccal mucosa during previous admission (12/3-21) and underwent L hemimandibulectomy, SND L level 1-3, recon with L FFF, STSG, and placement of dental implants on 12/7. Patient had a trach which was subsequently decannulated. Patient now returns with surgical site infection s/p wash-out.    #SSTI of surgical wound  - Patient w likely polymicrobial wound culture, w GPC in pairs, chain, GP rods, GN rods, concern of gram negative coverage also  - would recommend d/c unasyn and flagyl  - Recommend vancomycin 15mcg/kg, trough before fourth dose  - Recommend zosyn pseudomonal dosing  - Recommend BCx    ID will follow    RECS NOT FINAL UNTIL ATTENDING ATTESTATION 67 yo M w PMhx of CAD (x 2 stents Mar 2023), Type 2 DM, hx of kidney stones, psoriasis who presented with an oral mass 2/2 to [id number] SCC of L buccal mucosa during previous admission (12/3-21) and underwent L hemimandibulectomy, SND L level 1-3, recon with L FFF, STSG, and placement of dental implants on 12/7. Patient had a trach which was subsequently decannulated. Patient now returns with surgical site infection s/p wash-out.    #SSTI of surgical wound  - Patient w likely polymicrobial wound culture, w GPC in pairs, chain, GP rods, GN rods, concern of gram negative coverage also  - would recommend d/c unasyn and flagyl  - Recommend vancomycin 15mcg/kg, trough before fourth dose  - Recommend zosyn pseudomonal dosing  - Recommend BCx    ID will follow    RECS NOT FINAL UNTIL ATTENDING ATTESTATION

## 2023-12-28 NOTE — PATIENT PROFILE ADULT - HOME ACCESSIBILITY CONCERNS
SUBJECTIVE:   Kacy Valdez is a 51 year old female who presents to clinic today for the following health issues:      Musculoskeletal problem/pain      Duration: 1 year     Description  Location: both knees     Intensity:  moderate, severe    Accompanying signs and symptoms: none    History  Previous similar problem: YES-   Previous evaluation:  none    Precipitating or alleviating factors:  Trauma or overuse: YES- possibly overuse   Aggravating factors include: running, almost everything is starting to cause pain    Therapies tried and outcome: ice and Ibuprofen no relief  Right started and now left getting just as worse  Taping/ ice and rest not helping   Hurt at rest and activity   Pain in retropatellar region  No XR/MRI/PT  No NSAIDS  Likes to run and did 1/2 [place] in October  Has rested it from running and not help  Stretches and Yoga   No swelling / lots of crepitus           Problem list and histories reviewed & adjusted, as indicated.  Additional history: as documented    Labs reviewed in EPIC    Reviewed and updated as needed this visit by clinical staff       Reviewed and updated as needed this visit by Provider         ROS:  CONSTITUTIONAL: NEGATIVE for fever, chills, change in weight  CV: NEGATIVE for chest pain, palpitations or peripheral edema    OBJECTIVE:                                                    /72 (Patient Position: Sitting)   Pulse 64   Wt 54.4 kg (120 lb)   SpO2 98%   BMI 20.92 kg/m    Body mass index is 20.92 kg/m .   GENERAL: healthy, alert, well nourished, well hydrated, no distress  MS: extremities- Bilateral knees no gross deformities noted, no edema, retropatellar crepitus and easily sublux patella on each side. Quad muscles little weaker         ASSESSMENT/PLAN:                                                        ICD-10-CM    1. Chondromalacia of patella, left M22.42 PHYSICAL THERAPY REFERRAL   2. Chondromalacia of patella, right M22.41 PHYSICAL THERAPY REFERRAL    3. Subluxation of left patella, initial encounter S83.002A PHYSICAL THERAPY REFERRAL   4. Subluxation of right patella, initial encounter S83.001A PHYSICAL THERAPY REFERRAL     Discussed. Will add Alleve 2 tabs BID for 2-4 week.  Start PT.  Discussed in length conservative measures of OTC medications for pain, Ice/Heat, elevation and the concept of R.I.C.E.. Continue behavioral changes and proper body mechanics to allow for healing. Follow up as directed.   Symptomatic treatment was discussed along when patient should call and/or come back into the clinic or go to ER/Urgent care. All questions answered.     See Patient Instructions    Carlos Hart MD  [facility]   none

## 2023-12-28 NOTE — PROGRESS NOTE ADULT - SUBJECTIVE AND OBJECTIVE BOX
OTOLARYNGOLOGY (ENT) PROGRESS NOTE    PATIENT: SAUNDRA HOLMAN  MRN: [id number]  : 57  [id number]  DATE OF SERVICE:  23  			         ID:SAUNDRA HOLMAN is a  69yo M w PMH of CAD (x2 stents Mar 2023), HLD, T2DM, hx of kidney stones, psoriasis who presented with an oral mass and underwent L hemimandibulectomy, SND L level 1-3, recon with L FFF, STSG, and placement of dental implants on . He had a trach which was subsequently decannulated. He returns  with surgical site infection now s/p OR for debridement and exploration. Trach replaced through prior stoma for pulmonary toilet. Patient also noted to have dark stools and  drop in HGB .       Subjective/ Interval:   ; Patient seen this morning, oral pack to be changed, penrose dressing changed.  intraoral flap with partal skin necrosis, 7.5 portex in place wit cuff deflated   ALLERGIES:  No Known Allergies      MEDICATIONS:  Antiinfectives:   ampicillin/sulbactam  IVPB 3 Gram(s) IV Intermittent every 6 hours  metroNIDAZOLE  IVPB 500 milliGRAM(s) IV Intermittent every 8 hours    IV fluids:  dextrose 5% + sodium chloride 0.45%. 1000 milliLiter(s) IV Continuous <Continuous>  dextrose 5%. 1000 milliLiter(s) IV Continuous <Continuous>  dextrose 5%. 1000 milliLiter(s) IV Continuous <Continuous>    Hematologic/Anticoagulation:  aspirin  chewable 81 milliGRAM(s) Enteral Tube daily    Pain medications/Neuro:  acetaminophen     Tablet .. 650 milliGRAM(s) Oral every 6 hours PRN  HYDROmorphone  Injectable 1 milliGRAM(s) IV Push every 6 hours PRN  ondansetron Injectable 4 milliGRAM(s) IV Push every 8 hours PRN  oxyCODONE    Solution 10 milliGRAM(s) Oral every 4 hours PRN  oxyCODONE    Solution 5 milliGRAM(s) Oral every 4 hours PRN    Endocrine Medications:   atorvastatin 40 milliGRAM(s) Oral at bedtime  dextrose 50% Injectable 25 Gram(s) IV Push once  dextrose 50% Injectable 25 Gram(s) IV Push once  dextrose 50% Injectable 12.5 Gram(s) IV Push once  dextrose Oral Gel 15 Gram(s) Oral once PRN  glucagon  Injectable 1 milliGRAM(s) IntraMuscular once  insulin lispro (ADMELOG) corrective regimen sliding scale   SubCutaneous at bedtime  insulin lispro (ADMELOG) corrective regimen sliding scale   SubCutaneous three times a day before meals    All other standing medications:   chlorhexidine 0.12% Liquid 15 milliLiter(s) Oral Mucosa two times a day  pantoprazole  Injectable 40 milliGRAM(s) IV Push daily    All other PRN medications:  lidocaine 2% Viscous 10 milliLiter(s) Swish and Spit every 4 hours PRN    Vital Signs Last 24 Hrs  T(C): 36.7 (28 Dec 2023 04:58), Max: 36.7 (27 Dec 2023 22:56)  T(F): 98.1 (28 Dec 2023 04:58), Max: 98.1 (27 Dec 2023 22:56)  HR: 68 (28 Dec 2023 08:00) (64 - 90)  BP: 101/51 (28 Dec 2023 08:00) (101/51 - 138/64)  BP(mean): 74 (28 Dec 2023 08:00) (74 - 92)  RR: 20 (28 Dec 2023 08:00) (11 - 22)  SpO2: 99% (28 Dec 2023 08:00) (97% - 100%)    Parameters below as of 28 Dec 2023 08:00  Patient On (Oxygen Delivery Method): tracheostomy collar  O2 Flow (L/min): 10  O2 Concentration (%): 40       @ :  -   @ 07:00  --------------------------------------------------------  IN:    PRBCs (Packed Red Blood Cells): 300 mL  Total IN: 300 mL    OUT:    Voided (mL): 550 mL  Total OUT: 550 mL    Total NET: -250 mL       @ 07:  -   @ 08:49  --------------------------------------------------------  IN:  Total IN: 0 mL    OUT:    Voided (mL): 225 mL  Total OUT: 225 mL    Total NET: -225 mL            PHYSICAL EXAM:  GEN: appears stated age, NAD  NEURO: alert & oriented x   HEENT: Remnant left FFF paddle oozing, packing in place in buccal cavity,   Neck: 7.5 portex trach with cuff deflated, penrose draining ss fluid, skin loosely reapproximated with silk suture, krelex dressing in place minimally saturated  CVS: regular rate and rhythm  Pulm: normal respiratory excursions, not tachypneic, no labored breathing  Abd: non-distended  Ext: moving all four extremities, no peripheral edema noted       LABS                       9.2    6.42  )-----------( 195      ( 28 Dec 2023 05:30 )             27.6        139  |  106  |  37<H>  ----------------------------<  162<H>  4.3   |  25  |  0.79    Ca    8.2<L>      28 Dec 2023 05:30  Phos  4.0       Mg     1.8         TPro  5.7<L>  /  Alb  2.8<L>  /  TBili  0.6  /  DBili  x   /  AST  22  /  ALT  49<H>  /  AlkPhos  35<L>           Coagulation Studies-   PT/INR - ( 27 Dec 2023 17:37 )   PT: 12.8 sec;   INR: 1.13          PTT - ( 27 Dec 2023 17:37 )  PTT:25.4 sec  Urinalysis Basic - ( 28 Dec 2023 05:30 )    Color: x / Appearance: x / SG: x / pH: x  Gluc: 162 mg/dL / Ketone: x  / Bili: x / Urobili: x   Blood: x / Protein: x / Nitrite: x   Leuk Esterase: x / RBC: x / WBC x   Sq Epi: x / Non Sq Epi: x / Bacteria: x      Endocrine Panel-  Calcium: 8.2 mg/dL ( @ 05:30)  Calcium: 9.0 mg/dL ( @ 17:37)                MICROBIOLOGY:  Culture - Surgical Swab (collected 23 @ 20:46)  Source: .Surgical Swab 1. Left Oral Cavity  Gram Stain (23 @ 21:53):    Numerous Gram positive cocci in pairs, chains and clusters    Moderate Gram Positive Rods    Moderate Gram Negative Rods    Numerous White blood cells          Culture - Surgical Swab (collected 23 @ 20:46)  Source: .Surgical Swab 1. Left Oral Cavity  Gram Stain (23 @ 21:53):    Numerous Gram positive cocci in pairs, chains and clusters    Moderate Gram Positive Rods    Moderate Gram Negative Rods    Numerous White blood cells   OTOLARYNGOLOGY (ENT) PROGRESS NOTE    PATIENT: SAUNDRA HOLMAN  MRN: [id number]  : 57  [id number]  DATE OF SERVICE:  23  			         ID:SAUNDRA HOLMAN is a  69yo M w PMH of CAD (x2 stents Mar 2023), HLD, T2DM, hx of kidney stones, psoriasis who presented with an oral mass and underwent L hemimandibulectomy, SND L level 1-3, recon with L FFF, STSG, and placement of dental implants on . He had a trach which was subsequently decannulated. He returns  with surgical site infection now s/p OR for debridement and exploration. Trach replaced through prior stoma for pulmonary toilet. Patient also noted to have dark stools and  drop in HGB .       Subjective/ Interval:   ; Patient seen this morning, oral pack to be changed, penrose dressing changed.  intraoral flap with partal skin necrosis, 7.5 portex in place wit cuff deflated   ALLERGIES:  No Known Allergies      MEDICATIONS:  Antiinfectives:   ampicillin/sulbactam  IVPB 3 Gram(s) IV Intermittent every 6 hours  metroNIDAZOLE  IVPB 500 milliGRAM(s) IV Intermittent every 8 hours    IV fluids:  dextrose 5% + sodium chloride 0.45%. 1000 milliLiter(s) IV Continuous <Continuous>  dextrose 5%. 1000 milliLiter(s) IV Continuous <Continuous>  dextrose 5%. 1000 milliLiter(s) IV Continuous <Continuous>    Hematologic/Anticoagulation:  aspirin  chewable 81 milliGRAM(s) Enteral Tube daily    Pain medications/Neuro:  acetaminophen     Tablet .. 650 milliGRAM(s) Oral every 6 hours PRN  HYDROmorphone  Injectable 1 milliGRAM(s) IV Push every 6 hours PRN  ondansetron Injectable 4 milliGRAM(s) IV Push every 8 hours PRN  oxyCODONE    Solution 10 milliGRAM(s) Oral every 4 hours PRN  oxyCODONE    Solution 5 milliGRAM(s) Oral every 4 hours PRN    Endocrine Medications:   atorvastatin 40 milliGRAM(s) Oral at bedtime  dextrose 50% Injectable 25 Gram(s) IV Push once  dextrose 50% Injectable 25 Gram(s) IV Push once  dextrose 50% Injectable 12.5 Gram(s) IV Push once  dextrose Oral Gel 15 Gram(s) Oral once PRN  glucagon  Injectable 1 milliGRAM(s) IntraMuscular once  insulin lispro (ADMELOG) corrective regimen sliding scale   SubCutaneous at bedtime  insulin lispro (ADMELOG) corrective regimen sliding scale   SubCutaneous three times a day before meals    All other standing medications:   chlorhexidine 0.12% Liquid 15 milliLiter(s) Oral Mucosa two times a day  pantoprazole  Injectable 40 milliGRAM(s) IV Push daily    All other PRN medications:  lidocaine 2% Viscous 10 milliLiter(s) Swish and Spit every 4 hours PRN    Vital Signs Last 24 Hrs  T(C): 36.7 (28 Dec 2023 04:58), Max: 36.7 (27 Dec 2023 22:56)  T(F): 98.1 (28 Dec 2023 04:58), Max: 98.1 (27 Dec 2023 22:56)  HR: 68 (28 Dec 2023 08:00) (64 - 90)  BP: 101/51 (28 Dec 2023 08:00) (101/51 - 138/64)  BP(mean): 74 (28 Dec 2023 08:00) (74 - 92)  RR: 20 (28 Dec 2023 08:00) (11 - 22)  SpO2: 99% (28 Dec 2023 08:00) (97% - 100%)    Parameters below as of 28 Dec 2023 08:00  Patient On (Oxygen Delivery Method): tracheostomy collar  O2 Flow (L/min): 10  O2 Concentration (%): 40       @ :  -   @ 07:00  --------------------------------------------------------  IN:    PRBCs (Packed Red Blood Cells): 300 mL  Total IN: 300 mL    OUT:    Voided (mL): 550 mL  Total OUT: 550 mL    Total NET: -250 mL       @ 07:  -   @ 08:49  --------------------------------------------------------  IN:  Total IN: 0 mL    OUT:    Voided (mL): 225 mL  Total OUT: 225 mL    Total NET: -225 mL            PHYSICAL EXAM:  GEN: appears stated age, NAD  NEURO: alert & oriented x   HEENT: Remnant left FFF paddle oozing, packing in place in buccal cavity,   Neck: 7.5 portex trach with cuff deflated, penrose draining ss fluid, skin loosely reapproximated with silk suture, krelex dressing in place minimally saturated  CVS: regular rate and rhythm  Pulm: normal respiratory excursions, not tachypneic, no labored breathing  Abd: non-distended  Ext: moving all four extremities, no peripheral edema noted       LABS                       9.2    6.42  )-----------( 195      ( 28 Dec 2023 05:30 )             27.6        139  |  106  |  37<H>  ----------------------------<  162<H>  4.3   |  25  |  0.79    Ca    8.2<L>      28 Dec 2023 05:30  Phos  4.0       Mg     1.8         TPro  5.7<L>  /  Alb  2.8<L>  /  TBili  0.6  /  DBili  x   /  AST  22  /  ALT  49<H>  /  AlkPhos  35<L>           Coagulation Studies-   PT/INR - ( 27 Dec 2023 17:37 )   PT: 12.8 sec;   INR: 1.13          PTT - ( 27 Dec 2023 17:37 )  PTT:25.4 sec  Urinalysis Basic - ( 28 Dec 2023 05:30 )    Color: x / Appearance: x / SG: x / pH: x  Gluc: 162 mg/dL / Ketone: x  / Bili: x / Urobili: x   Blood: x / Protein: x / Nitrite: x   Leuk Esterase: x / RBC: x / WBC x   Sq Epi: x / Non Sq Epi: x / Bacteria: x      Endocrine Panel-  Calcium: 8.2 mg/dL ( @ 05:30)  Calcium: 9.0 mg/dL ( @ 17:37)                MICROBIOLOGY:  Culture - Surgical Swab (collected 23 @ 20:46)  Source: .Surgical Swab 1. Left Oral Cavity  Gram Stain (23 @ 21:53):    Numerous Gram positive cocci in pairs, chains and clusters    Moderate Gram Positive Rods    Moderate Gram Negative Rods    Numerous White blood cells          Culture - Surgical Swab (collected 23 @ 20:46)  Source: .Surgical Swab 1. Left Oral Cavity  Gram Stain (23 @ 21:53):    Numerous Gram positive cocci in pairs, chains and clusters    Moderate Gram Positive Rods    Moderate Gram Negative Rods    Numerous White blood cells

## 2023-12-28 NOTE — CONSULT NOTE ADULT - ASSESSMENT
Past medical Hx of CAD (x 2 stents Mar 2023), Type 2 DM, hx of kidney stones, psoriasis who presented with an oral mass during previous admission (12/3-21) and underwent L hemimandibulectomy, SND L level 1-3, recon with L FFF, STSG, and placement of dental implants on 12/7. Patient had a trach which was subsequently decannulated. Patient now returns with surgical site infection. Medicine consulted for co-management.    #Surgical site infection  Patient POD 1. Patient s/p OR for debridement and exploration. Trach replaced through prior stoma for pulmonary toilet.   - c/w Unasyn/Flagyl  - f/u ID recommendations for antibiotic management  - c/w Pain regimen: Tylenol 650 mg PO q6, Oxy IR 5 mg Q4 moderate, Oxy IR 10 mg Q4 severe, Dilaudid 1 mg IV Q6 for breakthrough   - May hold off on bowel regimen for now as patient with loose stool  - f/u surgical site Cx  - Restart DVT PPX, as soon as appropriate per primary team  - Rest of care per primary team    #Anemia  Hb on admission 7.3. Now stable Hb 9.2 s/p karlo-operative transfusion. Patient with previous iron studies consistent with WADE. DD includes surgical site bleed VS GI bleed.   - maintain active T&S  - transfusion goal to Hgb >8   - Recommend reticulocyte count  - Recommend folic acid 1g PO QD for folate deficiency  - f/u GI recommendations  - keep NPO, continue maintenance IVF  - Protonix 40 mg IV BID    #CAD  Patient with hx of CAD s/p 2 stents in March 2023.   - c/w ASA 81 mg PO QD  - c/w Atorvastatin 40 mg PO QD  - Restart Plavix, as soon as appropriate per primary team    #DM type 2  Home medication: Metformin 1g PO QD and 500 mg PO QD and Jardiance 10 mg PO QD.  - c/w insulin sliding scale    Medicine will continue to follow. Patient seen, evaluated, and discussed with attending Dr. Vogel    NOTE AND RECOMMENDATIONS NOT FINALIZED UNTIL REVIEWED AND SIGNED BY INTERNAL MEDICINE ATTENDING.

## 2023-12-28 NOTE — DIETITIAN INITIAL EVALUATION ADULT - OTHER INFO
Recommendation Preamble: The following recommendations were made during the visit: Detail Level: Zone Recommendations (Free Text): Eucerin Advanced Repair cream to legs twice daily, immediately after shower and before bed.\\nPatient does not tolerate compression socks well, recommended keeping legs elevated when possible. Patient is a  67yo M w PMH of CAD (x2 stents Mar 2023), HLD, T2DM, hx of kidney stones, psoriasis who presented with an oral mass and underwent L hemimandibulectomy, SND L level 1-3, recon with L FFF, STSG, and placement of dental implants on 12/7. He had a trach which was subsequently decannulated. He returns 12/27 with surgical site infection now s/p OR for debridement and exploration. Trach replaced through prior stoma for pulmonary toilet. Patient also noted to have dark stools and  drop in HGB .    Chart, meds, and labs reviewed. Tmax 36.7 C. MAPs 74-92 mm Hg. Meds significant for insulin sliding scale, pantoprazole (protonix). Labs significant for POCT 130-204H, HgbA1c 6.3H; BUN 37H; glucose 162H; . IV fluids: D5% + Sodium Chloride 0.45% 1000 mL @ 75 mL/hr. Denies pain or discomfort. Skin: 2+ mild edema facial; surgical incision; flap site; graft site; no pressure injuries documented. GI: +black tarry stool per wife; last bowel movement documented 12/27; +nausea, -vomiting; gastrostomy tube    Met with patient on 8 Lachman - patient known to this provider from previous admission. Patient with desire not to regain previously lost weight. Nutrition interview conducted with wife Sara. At home, patient with 4 cases of Vital 1.5; no pump. Wife has been providing bolus gravity feed of 1 or 1.5 cans at a time, as higher amounts tend to cause ?reflux/discomfort. May be best to continue home tube feed formula; will trial running tube feed over 12 hours during waking hours to allow patient to lay flat during sleep.

## 2023-12-29 LAB
ANION GAP SERPL CALC-SCNC: 8 MMOL/L — SIGNIFICANT CHANGE UP (ref 5–17)
ANION GAP SERPL CALC-SCNC: 8 MMOL/L — SIGNIFICANT CHANGE UP (ref 5–17)
BUN SERPL-MCNC: 21 MG/DL — SIGNIFICANT CHANGE UP (ref 7–23)
BUN SERPL-MCNC: 21 MG/DL — SIGNIFICANT CHANGE UP (ref 7–23)
CALCIUM SERPL-MCNC: 8.4 MG/DL — SIGNIFICANT CHANGE UP (ref 8.4–10.5)
CALCIUM SERPL-MCNC: 8.4 MG/DL — SIGNIFICANT CHANGE UP (ref 8.4–10.5)
CHLORIDE SERPL-SCNC: 105 MMOL/L — SIGNIFICANT CHANGE UP (ref 96–108)
CHLORIDE SERPL-SCNC: 105 MMOL/L — SIGNIFICANT CHANGE UP (ref 96–108)
CO2 SERPL-SCNC: 27 MMOL/L — SIGNIFICANT CHANGE UP (ref 22–31)
CO2 SERPL-SCNC: 27 MMOL/L — SIGNIFICANT CHANGE UP (ref 22–31)
CREAT SERPL-MCNC: 0.66 MG/DL — SIGNIFICANT CHANGE UP (ref 0.5–1.3)
CREAT SERPL-MCNC: 0.66 MG/DL — SIGNIFICANT CHANGE UP (ref 0.5–1.3)
EGFR: 103 ML/MIN/1.73M2 — SIGNIFICANT CHANGE UP
EGFR: 103 ML/MIN/1.73M2 — SIGNIFICANT CHANGE UP
GLUCOSE BLDC GLUCOMTR-MCNC: 116 MG/DL — HIGH (ref 70–99)
GLUCOSE BLDC GLUCOMTR-MCNC: 116 MG/DL — HIGH (ref 70–99)
GLUCOSE BLDC GLUCOMTR-MCNC: 120 MG/DL — HIGH (ref 70–99)
GLUCOSE BLDC GLUCOMTR-MCNC: 120 MG/DL — HIGH (ref 70–99)
GLUCOSE BLDC GLUCOMTR-MCNC: 121 MG/DL — HIGH (ref 70–99)
GLUCOSE BLDC GLUCOMTR-MCNC: 121 MG/DL — HIGH (ref 70–99)
GLUCOSE BLDC GLUCOMTR-MCNC: 142 MG/DL — HIGH (ref 70–99)
GLUCOSE BLDC GLUCOMTR-MCNC: 142 MG/DL — HIGH (ref 70–99)
GLUCOSE SERPL-MCNC: 133 MG/DL — HIGH (ref 70–99)
GLUCOSE SERPL-MCNC: 133 MG/DL — HIGH (ref 70–99)
HCT VFR BLD CALC: 23.5 % — LOW (ref 39–50)
HCT VFR BLD CALC: 23.5 % — LOW (ref 39–50)
HCT VFR BLD CALC: 29.6 % — LOW (ref 39–50)
HCT VFR BLD CALC: 29.6 % — LOW (ref 39–50)
HGB BLD-MCNC: 10.1 G/DL — LOW (ref 13–17)
HGB BLD-MCNC: 10.1 G/DL — LOW (ref 13–17)
HGB BLD-MCNC: 7.6 G/DL — LOW (ref 13–17)
HGB BLD-MCNC: 7.6 G/DL — LOW (ref 13–17)
MAGNESIUM SERPL-MCNC: 1.9 MG/DL — SIGNIFICANT CHANGE UP (ref 1.6–2.6)
MAGNESIUM SERPL-MCNC: 1.9 MG/DL — SIGNIFICANT CHANGE UP (ref 1.6–2.6)
MCHC RBC-ENTMCNC: 28 PG — SIGNIFICANT CHANGE UP (ref 27–34)
MCHC RBC-ENTMCNC: 28 PG — SIGNIFICANT CHANGE UP (ref 27–34)
MCHC RBC-ENTMCNC: 29.6 PG — SIGNIFICANT CHANGE UP (ref 27–34)
MCHC RBC-ENTMCNC: 29.6 PG — SIGNIFICANT CHANGE UP (ref 27–34)
MCHC RBC-ENTMCNC: 32.3 GM/DL — SIGNIFICANT CHANGE UP (ref 32–36)
MCHC RBC-ENTMCNC: 32.3 GM/DL — SIGNIFICANT CHANGE UP (ref 32–36)
MCHC RBC-ENTMCNC: 34.1 GM/DL — SIGNIFICANT CHANGE UP (ref 32–36)
MCHC RBC-ENTMCNC: 34.1 GM/DL — SIGNIFICANT CHANGE UP (ref 32–36)
MCV RBC AUTO: 86.7 FL — SIGNIFICANT CHANGE UP (ref 80–100)
MCV RBC AUTO: 86.7 FL — SIGNIFICANT CHANGE UP (ref 80–100)
MCV RBC AUTO: 86.8 FL — SIGNIFICANT CHANGE UP (ref 80–100)
MCV RBC AUTO: 86.8 FL — SIGNIFICANT CHANGE UP (ref 80–100)
NRBC # BLD: 0 /100 WBCS — SIGNIFICANT CHANGE UP (ref 0–0)
PHOSPHATE SERPL-MCNC: 2.6 MG/DL — SIGNIFICANT CHANGE UP (ref 2.5–4.5)
PHOSPHATE SERPL-MCNC: 2.6 MG/DL — SIGNIFICANT CHANGE UP (ref 2.5–4.5)
PLATELET # BLD AUTO: 180 K/UL — SIGNIFICANT CHANGE UP (ref 150–400)
PLATELET # BLD AUTO: 180 K/UL — SIGNIFICANT CHANGE UP (ref 150–400)
PLATELET # BLD AUTO: 191 K/UL — SIGNIFICANT CHANGE UP (ref 150–400)
PLATELET # BLD AUTO: 191 K/UL — SIGNIFICANT CHANGE UP (ref 150–400)
POTASSIUM SERPL-MCNC: 3.7 MMOL/L — SIGNIFICANT CHANGE UP (ref 3.5–5.3)
POTASSIUM SERPL-MCNC: 3.7 MMOL/L — SIGNIFICANT CHANGE UP (ref 3.5–5.3)
POTASSIUM SERPL-SCNC: 3.7 MMOL/L — SIGNIFICANT CHANGE UP (ref 3.5–5.3)
POTASSIUM SERPL-SCNC: 3.7 MMOL/L — SIGNIFICANT CHANGE UP (ref 3.5–5.3)
RBC # BLD: 2.71 M/UL — LOW (ref 4.2–5.8)
RBC # BLD: 2.71 M/UL — LOW (ref 4.2–5.8)
RBC # BLD: 3.13 M/UL — LOW (ref 4.2–5.8)
RBC # BLD: 3.13 M/UL — LOW (ref 4.2–5.8)
RBC # BLD: 3.41 M/UL — LOW (ref 4.2–5.8)
RBC # BLD: 3.41 M/UL — LOW (ref 4.2–5.8)
RBC # FLD: 14.4 % — SIGNIFICANT CHANGE UP (ref 10.3–14.5)
RBC # FLD: 14.4 % — SIGNIFICANT CHANGE UP (ref 10.3–14.5)
RBC # FLD: 14.7 % — HIGH (ref 10.3–14.5)
RBC # FLD: 14.7 % — HIGH (ref 10.3–14.5)
RETICS #: 88.6 K/UL — SIGNIFICANT CHANGE UP (ref 25–125)
RETICS #: 88.6 K/UL — SIGNIFICANT CHANGE UP (ref 25–125)
RETICS/RBC NFR: 2.8 % — HIGH (ref 0.5–2.5)
RETICS/RBC NFR: 2.8 % — HIGH (ref 0.5–2.5)
SODIUM SERPL-SCNC: 140 MMOL/L — SIGNIFICANT CHANGE UP (ref 135–145)
SODIUM SERPL-SCNC: 140 MMOL/L — SIGNIFICANT CHANGE UP (ref 135–145)
WBC # BLD: 5.9 K/UL — SIGNIFICANT CHANGE UP (ref 3.8–10.5)
WBC # BLD: 5.9 K/UL — SIGNIFICANT CHANGE UP (ref 3.8–10.5)
WBC # BLD: 6.81 K/UL — SIGNIFICANT CHANGE UP (ref 3.8–10.5)
WBC # BLD: 6.81 K/UL — SIGNIFICANT CHANGE UP (ref 3.8–10.5)
WBC # FLD AUTO: 5.9 K/UL — SIGNIFICANT CHANGE UP (ref 3.8–10.5)
WBC # FLD AUTO: 5.9 K/UL — SIGNIFICANT CHANGE UP (ref 3.8–10.5)
WBC # FLD AUTO: 6.81 K/UL — SIGNIFICANT CHANGE UP (ref 3.8–10.5)
WBC # FLD AUTO: 6.81 K/UL — SIGNIFICANT CHANGE UP (ref 3.8–10.5)

## 2023-12-29 PROCEDURE — 99232 SBSQ HOSP IP/OBS MODERATE 35: CPT

## 2023-12-29 PROCEDURE — 99232 SBSQ HOSP IP/OBS MODERATE 35: CPT | Mod: GC

## 2023-12-29 RX ORDER — PIPERACILLIN AND TAZOBACTAM 4; .5 G/20ML; G/20ML
4.5 INJECTION, POWDER, LYOPHILIZED, FOR SOLUTION INTRAVENOUS ONCE
Refills: 0 | Status: DISCONTINUED | OUTPATIENT
Start: 2023-12-29 | End: 2023-12-29

## 2023-12-29 RX ORDER — FOLIC ACID 0.8 MG
1 TABLET ORAL DAILY
Refills: 0 | Status: DISCONTINUED | OUTPATIENT
Start: 2023-12-29 | End: 2024-01-11

## 2023-12-29 RX ORDER — PIPERACILLIN AND TAZOBACTAM 4; .5 G/20ML; G/20ML
4.5 INJECTION, POWDER, LYOPHILIZED, FOR SOLUTION INTRAVENOUS ONCE
Refills: 0 | Status: COMPLETED | OUTPATIENT
Start: 2023-12-30 | End: 2023-12-30

## 2023-12-29 RX ORDER — SODIUM CHLORIDE 9 MG/ML
1000 INJECTION, SOLUTION INTRAVENOUS
Refills: 0 | Status: DISCONTINUED | OUTPATIENT
Start: 2023-12-30 | End: 2023-12-31

## 2023-12-29 RX ORDER — VANCOMYCIN HCL 1 G
1250 VIAL (EA) INTRAVENOUS EVERY 12 HOURS
Refills: 0 | Status: DISCONTINUED | OUTPATIENT
Start: 2023-12-29 | End: 2023-12-31

## 2023-12-29 RX ORDER — SODIUM HYPOCHLORITE 0.125 %
1 SOLUTION, NON-ORAL MISCELLANEOUS
Refills: 0 | Status: DISCONTINUED | OUTPATIENT
Start: 2023-12-29 | End: 2023-12-31

## 2023-12-29 RX ORDER — SODIUM HYPOCHLORITE 0.125 %
1 SOLUTION, NON-ORAL MISCELLANEOUS
Refills: 0 | Status: DISCONTINUED | OUTPATIENT
Start: 2023-12-29 | End: 2024-01-02

## 2023-12-29 RX ORDER — PIPERACILLIN AND TAZOBACTAM 4; .5 G/20ML; G/20ML
4.5 INJECTION, POWDER, LYOPHILIZED, FOR SOLUTION INTRAVENOUS ONCE
Refills: 0 | Status: COMPLETED | OUTPATIENT
Start: 2023-12-29 | End: 2023-12-29

## 2023-12-29 RX ORDER — HYDROMORPHONE HYDROCHLORIDE 2 MG/ML
0.5 INJECTION INTRAMUSCULAR; INTRAVENOUS; SUBCUTANEOUS ONCE
Refills: 0 | Status: DISCONTINUED | OUTPATIENT
Start: 2023-12-29 | End: 2023-12-29

## 2023-12-29 RX ORDER — PIPERACILLIN AND TAZOBACTAM 4; .5 G/20ML; G/20ML
4.5 INJECTION, POWDER, LYOPHILIZED, FOR SOLUTION INTRAVENOUS EVERY 8 HOURS
Refills: 0 | Status: DISCONTINUED | OUTPATIENT
Start: 2023-12-30 | End: 2024-01-01

## 2023-12-29 RX ADMIN — Medication 166.67 MILLIGRAM(S): at 13:14

## 2023-12-29 RX ADMIN — HYDROMORPHONE HYDROCHLORIDE 0.5 MILLIGRAM(S): 2 INJECTION INTRAMUSCULAR; INTRAVENOUS; SUBCUTANEOUS at 18:30

## 2023-12-29 RX ADMIN — Medication 81 MILLIGRAM(S): at 11:35

## 2023-12-29 RX ADMIN — Medication 1 APPLICATION(S): at 19:03

## 2023-12-29 RX ADMIN — PANTOPRAZOLE SODIUM 40 MILLIGRAM(S): 20 TABLET, DELAYED RELEASE ORAL at 11:35

## 2023-12-29 RX ADMIN — CHLORHEXIDINE GLUCONATE 15 MILLILITER(S): 213 SOLUTION TOPICAL at 05:53

## 2023-12-29 RX ADMIN — Medication 1 MILLIGRAM(S): at 11:35

## 2023-12-29 RX ADMIN — PIPERACILLIN AND TAZOBACTAM 200 GRAM(S): 4; .5 INJECTION, POWDER, LYOPHILIZED, FOR SOLUTION INTRAVENOUS at 10:36

## 2023-12-29 RX ADMIN — ATORVASTATIN CALCIUM 40 MILLIGRAM(S): 80 TABLET, FILM COATED ORAL at 21:50

## 2023-12-29 RX ADMIN — OXYCODONE HYDROCHLORIDE 10 MILLIGRAM(S): 5 TABLET ORAL at 12:04

## 2023-12-29 RX ADMIN — OXYCODONE HYDROCHLORIDE 10 MILLIGRAM(S): 5 TABLET ORAL at 22:45

## 2023-12-29 RX ADMIN — PANTOPRAZOLE SODIUM 40 MILLIGRAM(S): 20 TABLET, DELAYED RELEASE ORAL at 21:49

## 2023-12-29 RX ADMIN — OXYCODONE HYDROCHLORIDE 5 MILLIGRAM(S): 5 TABLET ORAL at 05:55

## 2023-12-29 RX ADMIN — OXYCODONE HYDROCHLORIDE 10 MILLIGRAM(S): 5 TABLET ORAL at 11:35

## 2023-12-29 RX ADMIN — Medication 650 MILLIGRAM(S): at 02:25

## 2023-12-29 RX ADMIN — CHLORHEXIDINE GLUCONATE 15 MILLILITER(S): 213 SOLUTION TOPICAL at 17:46

## 2023-12-29 RX ADMIN — Medication 100 MILLIGRAM(S): at 01:32

## 2023-12-29 RX ADMIN — AMPICILLIN SODIUM AND SULBACTAM SODIUM 200 GRAM(S): 250; 125 INJECTION, POWDER, FOR SUSPENSION INTRAMUSCULAR; INTRAVENOUS at 05:53

## 2023-12-29 RX ADMIN — HYDROMORPHONE HYDROCHLORIDE 0.5 MILLIGRAM(S): 2 INJECTION INTRAMUSCULAR; INTRAVENOUS; SUBCUTANEOUS at 17:46

## 2023-12-29 RX ADMIN — OXYCODONE HYDROCHLORIDE 5 MILLIGRAM(S): 5 TABLET ORAL at 06:45

## 2023-12-29 RX ADMIN — OXYCODONE HYDROCHLORIDE 10 MILLIGRAM(S): 5 TABLET ORAL at 21:49

## 2023-12-29 RX ADMIN — Medication 650 MILLIGRAM(S): at 03:25

## 2023-12-29 NOTE — PROGRESS NOTE ADULT - SUBJECTIVE AND OBJECTIVE BOX
Consultation Requested by:    Patient is a 68y old  Male who presents with a chief complaint of Surgical site infection (28 Dec 2023 15:40)    HPI:  SAUNDRA HOLMAN is a 67yo M w PMH of CAD (x2 stents Mar 2023), HLD, T2DM, hx of kidney stones, psoriasis who presented with an oral mass and underwent L hemimandibulectomy, SND L level 1-3, recon with L FFF, STSG, and placement of dental implants on 12/7. He had a trach which was subsequently decannulated. He returns 12/27 with surgical site infection now s/p OR for debridement and exploration. Trach replaced through prior stoma for pulmonary toilet.   (27 Dec 2023 17:31)    Interval History:  Patient seen and examined at bedside with wife bedside. Patient feeling relatively better. Reports that discharge has decreased in intensity, and also reports that no bowel movements. No plans for further wash-out as per pt and family. No new complaints. Denies fever, chills, c/p, palpitations, cough, SOB, abdominal pain, nausea, vomiting, diarrhea, dysuria, hematuria, LE swelling, or rash.      Allergies    No Known Allergies    Intolerances      Antimicrobials:  ampicillin/sulbactam  IVPB 3 Gram(s) IV Intermittent every 6 hours  metroNIDAZOLE  IVPB 500 milliGRAM(s) IV Intermittent every 8 hours      Other Medications:  acetaminophen     Tablet .. 650 milliGRAM(s) Oral every 6 hours PRN  aspirin  chewable 81 milliGRAM(s) Enteral Tube daily  atorvastatin 40 milliGRAM(s) Oral at bedtime  chlorhexidine 0.12% Liquid 15 milliLiter(s) Oral Mucosa two times a day  dextrose 5% + sodium chloride 0.45%. 1000 milliLiter(s) IV Continuous <Continuous>  dextrose 5%. 1000 milliLiter(s) IV Continuous <Continuous>  dextrose 5%. 1000 milliLiter(s) IV Continuous <Continuous>  dextrose 50% Injectable 25 Gram(s) IV Push once  dextrose 50% Injectable 25 Gram(s) IV Push once  dextrose 50% Injectable 12.5 Gram(s) IV Push once  dextrose Oral Gel 15 Gram(s) Oral once PRN  glucagon  Injectable 1 milliGRAM(s) IntraMuscular once  HYDROmorphone  Injectable 1 milliGRAM(s) IV Push every 6 hours PRN  influenza  Vaccine (HIGH DOSE) 0.7 milliLiter(s) IntraMuscular once  insulin lispro (ADMELOG) corrective regimen sliding scale   SubCutaneous at bedtime  insulin lispro (ADMELOG) corrective regimen sliding scale   SubCutaneous three times a day before meals  lidocaine 2% Viscous 10 milliLiter(s) Swish and Spit every 4 hours PRN  ondansetron Injectable 4 milliGRAM(s) IV Push every 8 hours PRN  oxyCODONE    Solution 10 milliGRAM(s) Oral every 4 hours PRN  oxyCODONE    Solution 5 milliGRAM(s) Oral every 4 hours PRN  pantoprazole  Injectable 40 milliGRAM(s) IV Push two times a day      FAMILY HISTORY:    PAST MEDICAL & SURGICAL HISTORY:  Neoplasm of mandible        SOCIAL HISTORY:  [place], has livestock such as sheep in property but does not interact w them  restaurant and diner owner  Recent travel to [facility], no other  50+ pack year history as well as cigars  No EtOH use  Recreational CBD use  Sexually active w spouse      Daily     Daily   Head Circumference:  Vital Signs Last 24 Hrs  T(C): 36.3 (28 Dec 2023 13:42), Max: 36.7 (27 Dec 2023 22:56)  T(F): 97.3 (28 Dec 2023 13:42), Max: 98.1 (27 Dec 2023 22:56)  HR: 64 (28 Dec 2023 15:29) (52 - 90)  BP: 109/52 (28 Dec 2023 15:29) (98/50 - 138/64)  BP(mean): 75 (28 Dec 2023 15:29) (69 - 92)  RR: 19 (28 Dec 2023 15:29) (11 - 22)  SpO2: 99% (28 Dec 2023 15:29) (97% - 100%)    Parameters below as of 28 Dec 2023 15:29  Patient On (Oxygen Delivery Method): tracheostomy collar  O2 Flow (L/min): 10  O2 Concentration (%): 40    PHYSICAL EXAM  GENERAL: NAD, well-groomed, well-developed  HEENT: Remnant left FFF paddle oozing, packing in place in buccal cavity, penrose draining ss fluid, skin loosely reapproximated with silk suture  NERVOUS SYSTEM: Alert & Oriented X3, Good concentration; Motor Strength 5/5 B/L upper and lower extremities; DTRs 2+ intact and symmetric  CHEST/LUNG: CTA B/L, No w/r/r  HEART: Regular rate and rhythm; No murmurs, rubs, or gallops  ABDOMEN: Soft, Nontender, Nondistended; Bowel sounds present, PEG tube c/d/i  EXTREMITIES: wwp. dressing over L thigh (prev graft site)        LABS:                         7.6    5.90  )-----------( 180      ( 29 Dec 2023 06:06 )             23.5     12-29    140  |  105  |  21  ----------------------------<  133<H>  3.7   |  27  |  0.66    Ca    8.4      29 Dec 2023 06:06  Phos  2.6     12-29  Mg     1.9     12-29    TPro  5.7<L>  /  Alb  2.8<L>  /  TBili  0.6  /  DBili  x   /  AST  22  /  ALT  49<H>  /  AlkPhos  35<L>  12-27    PT/INR - ( 27 Dec 2023 17:37 )   PT: 12.8 sec;   INR: 1.13          PTT - ( 27 Dec 2023 17:37 )  PTT:25.4 sec  Urinalysis Basic - ( 29 Dec 2023 06:06 )    Color: x / Appearance: x / SG: x / pH: x  Gluc: 133 mg/dL / Ketone: x  / Bili: x / Urobili: x   Blood: x / Protein: x / Nitrite: x   Leuk Esterase: x / RBC: x / WBC x   Sq Epi: x / Non Sq Epi: x / Bacteria: x                RADIOLOGY, EKG & ADDITIONAL TESTS Consultation Requested by:    Patient is a 68y old  Male who presents with a chief complaint of Surgical site infection (28 Dec 2023 15:40)    HPI:  SAUNDRA HOLMAN is a 69yo M w PMH of CAD (x2 stents Mar 2023), HLD, T2DM, hx of kidney stones, psoriasis who presented with an oral mass and underwent L hemimandibulectomy, SND L level 1-3, recon with L FFF, STSG, and placement of dental implants on 12/7. He had a trach which was subsequently decannulated. He returns 12/27 with surgical site infection now s/p OR for debridement and exploration. Trach replaced through prior stoma for pulmonary toilet.   (27 Dec 2023 17:31)    Interval History:  Patient seen and examined at bedside with wife bedside. Patient feeling relatively better. Reports that discharge has decreased in intensity, and also reports that no bowel movements. No plans for further wash-out as per pt and family. No new complaints. Denies fever, chills, c/p, palpitations, cough, SOB, abdominal pain, nausea, vomiting, diarrhea, dysuria, hematuria, LE swelling, or rash.      Allergies    No Known Allergies    Intolerances      Antimicrobials:  ampicillin/sulbactam  IVPB 3 Gram(s) IV Intermittent every 6 hours  metroNIDAZOLE  IVPB 500 milliGRAM(s) IV Intermittent every 8 hours      Other Medications:  acetaminophen     Tablet .. 650 milliGRAM(s) Oral every 6 hours PRN  aspirin  chewable 81 milliGRAM(s) Enteral Tube daily  atorvastatin 40 milliGRAM(s) Oral at bedtime  chlorhexidine 0.12% Liquid 15 milliLiter(s) Oral Mucosa two times a day  dextrose 5% + sodium chloride 0.45%. 1000 milliLiter(s) IV Continuous <Continuous>  dextrose 5%. 1000 milliLiter(s) IV Continuous <Continuous>  dextrose 5%. 1000 milliLiter(s) IV Continuous <Continuous>  dextrose 50% Injectable 25 Gram(s) IV Push once  dextrose 50% Injectable 25 Gram(s) IV Push once  dextrose 50% Injectable 12.5 Gram(s) IV Push once  dextrose Oral Gel 15 Gram(s) Oral once PRN  glucagon  Injectable 1 milliGRAM(s) IntraMuscular once  HYDROmorphone  Injectable 1 milliGRAM(s) IV Push every 6 hours PRN  influenza  Vaccine (HIGH DOSE) 0.7 milliLiter(s) IntraMuscular once  insulin lispro (ADMELOG) corrective regimen sliding scale   SubCutaneous at bedtime  insulin lispro (ADMELOG) corrective regimen sliding scale   SubCutaneous three times a day before meals  lidocaine 2% Viscous 10 milliLiter(s) Swish and Spit every 4 hours PRN  ondansetron Injectable 4 milliGRAM(s) IV Push every 8 hours PRN  oxyCODONE    Solution 10 milliGRAM(s) Oral every 4 hours PRN  oxyCODONE    Solution 5 milliGRAM(s) Oral every 4 hours PRN  pantoprazole  Injectable 40 milliGRAM(s) IV Push two times a day      FAMILY HISTORY:    PAST MEDICAL & SURGICAL HISTORY:  Neoplasm of mandible        SOCIAL HISTORY:  [place], has livestock such as sheep in property but does not interact w them  restaurant and diner owner  Recent travel to [facility], no other  50+ pack year history as well as cigars  No EtOH use  Recreational CBD use  Sexually active w spouse      Daily     Daily   Head Circumference:  Vital Signs Last 24 Hrs  T(C): 36.3 (28 Dec 2023 13:42), Max: 36.7 (27 Dec 2023 22:56)  T(F): 97.3 (28 Dec 2023 13:42), Max: 98.1 (27 Dec 2023 22:56)  HR: 64 (28 Dec 2023 15:29) (52 - 90)  BP: 109/52 (28 Dec 2023 15:29) (98/50 - 138/64)  BP(mean): 75 (28 Dec 2023 15:29) (69 - 92)  RR: 19 (28 Dec 2023 15:29) (11 - 22)  SpO2: 99% (28 Dec 2023 15:29) (97% - 100%)    Parameters below as of 28 Dec 2023 15:29  Patient On (Oxygen Delivery Method): tracheostomy collar  O2 Flow (L/min): 10  O2 Concentration (%): 40    PHYSICAL EXAM  GENERAL: NAD, well-groomed, well-developed  HEENT: Remnant left FFF paddle oozing, packing in place in buccal cavity, penrose draining ss fluid, skin loosely reapproximated with silk suture  NERVOUS SYSTEM: Alert & Oriented X3, Good concentration; Motor Strength 5/5 B/L upper and lower extremities; DTRs 2+ intact and symmetric  CHEST/LUNG: CTA B/L, No w/r/r  HEART: Regular rate and rhythm; No murmurs, rubs, or gallops  ABDOMEN: Soft, Nontender, Nondistended; Bowel sounds present, PEG tube c/d/i  EXTREMITIES: wwp. dressing over L thigh (prev graft site)        LABS:                         7.6    5.90  )-----------( 180      ( 29 Dec 2023 06:06 )             23.5     12-29    140  |  105  |  21  ----------------------------<  133<H>  3.7   |  27  |  0.66    Ca    8.4      29 Dec 2023 06:06  Phos  2.6     12-29  Mg     1.9     12-29    TPro  5.7<L>  /  Alb  2.8<L>  /  TBili  0.6  /  DBili  x   /  AST  22  /  ALT  49<H>  /  AlkPhos  35<L>  12-27    PT/INR - ( 27 Dec 2023 17:37 )   PT: 12.8 sec;   INR: 1.13          PTT - ( 27 Dec 2023 17:37 )  PTT:25.4 sec  Urinalysis Basic - ( 29 Dec 2023 06:06 )    Color: x / Appearance: x / SG: x / pH: x  Gluc: 133 mg/dL / Ketone: x  / Bili: x / Urobili: x   Blood: x / Protein: x / Nitrite: x   Leuk Esterase: x / RBC: x / WBC x   Sq Epi: x / Non Sq Epi: x / Bacteria: x                RADIOLOGY, EKG & ADDITIONAL TESTS

## 2023-12-29 NOTE — PROGRESS NOTE ADULT - ATTENDING COMMENTS
67 yo M w CAD s/p PCI in 3/2023, DM2, renal calculi, psoriasis and SCC of L buccal mucosa s/p L hemimandibulectomy, reconstruction w soft tissue graft and dental implants. Trach decannulated. P/w surgical site infection s/p wash-out.  Wound cx gram stain with GPCs in pairs, chains and clusters, GPRs and GNRs.  12/27 Wound cx w/:  Moderate Escherichia coli   Moderate Enterobacter cloacae complex   Few Enterococcus faecalis   Few Streptococcus mitis/oralis group   Few Staphylococcus epidermidis   Few Streptococcus constellatus   Few Stenotrophomonas maltophilia   Mixed Anaerobic Joy including   Few Prevotella species (most closely resembles Prevotella barnaie)   Few Prevotella denticola   12/28 Bcx ngtd.   Noted no further surgical plan for washout.  Awaiting organism sensitivities.  Continue IV Vancomycin and piptazo pending sensis.

## 2023-12-29 NOTE — PROGRESS NOTE ADULT - ASSESSMENT
Assessment and Plan:  SAUNDRA HOLMAN is a   67yo M w PMH of CAD (x2 stents Mar 2023), HLD, T2DM, hx of kidney stones, psoriasis who presented with an oral mass and underwent L hemimandibulectomy, SND L level 1-3, recon with L FFF, STSG, and placement of dental implants on 12/7. He had a trach which was subsequently decannulated. Now s/p OR for debridement and exploration. Trach replaced through prior stoma for pulmonary toilet. 2 units given intra-op    Plan:  - Transfuse 2 units of pRBCs  - Wash out wound with 1/4 strength dakins  - Pack wound with full strength dakins soaked packing BID  - Switch to vanc/zosyn  - DC unasyn, flagyl  - Follow up reticulocyte count  - Appreciate GI recs, IM recs, and ID recs  - Restart tube feeds  - Continue home asa  - Hold home plavix  - c/w HSQ  - Maintain 7.5 portex for pulm toilet  - ISS  - Pain control  - Home meds

## 2023-12-29 NOTE — PROGRESS NOTE ADULT - ASSESSMENT
Past medical Hx of CAD (x 2 stents Mar 2023), Type 2 DM, hx of kidney stones, psoriasis who presented with an oral mass during previous admission (12/3-21) and underwent L hemimandibulectomy, SND L level 1-3, recon with L FFF, STSG, and placement of dental implants on 12/7. Patient had a trach which was subsequently decannulated. Patient now returns with surgical site infection. Medicine consulted for co-management.    # Surgical site infection  Patient POD 2 s/p OR for debridement and exploration. Trach replaced through prior stoma for pulmonary toilet.   - Continue Vancomycin and Zosyn and followup ID recommendations for antibiotic management  - Pain regimen: Tylenol 650 mg PO q6, Oxy IR 5 mg Q4 moderate, Oxy IR 10 mg Q4 severe, Dilaudid 1 mg IV Q6 for breakthrough   - Followup surgical site Cx  - Restart DVT PPX, as soon as appropriate per primary team    # Anemia  Hb on admission 7.3. Now stable Hb 9.2 s/p karlo-operative transfusion. Patient with previous iron studies consistent with WADE. DD includes surgical site bleed VS GI bleed.   - maintain active T&S, transfusion goal to Hgb >8   - Recommend reticulocyte count  - Recommend folic acid 1g PO QD for folate deficiency  - f/u GI recommendations  - Protonix 40 mg IV BID    #CAD  Patient with hx of CAD s/p 2 stents in March 2023.   - c/w ASA 81 mg PO QD, Atorvastatin 40 mg PO QD  - Restart Plavix, as soon as appropriate per primary team    #DM type 2  Home medication: Metformin 1g PO QD and 500 mg PO QD and Jardiance 10 mg PO QD.  - c/w insulin sliding scale    Medicine will continue to follow. Patient seen, evaluated, and discussed with attending Dr. Vogel    NOTE AND RECOMMENDATIONS NOT FINALIZED UNTIL REVIEWED AND SIGNED BY INTERNAL MEDICINE ATTENDING.

## 2023-12-29 NOTE — PROGRESS NOTE ADULT - SUBJECTIVE AND OBJECTIVE BOX
INTERVAL HPI/OVERNIGHT EVENTS:  Patient was seen and examined at bedside. As per nurse and patient, no o/n events, patient resting comfortably. Reports some jaw pain, but otherwise no acute complaints.  He has not been out of bed.  Reports he is having some productive cough.  No further episodes of melena, feeds restarted.      VITAL SIGNS:  T(F): 97.9 (12-29-23 @ 13:28)  HR: 46 (12-29-23 @ 12:24)  BP: 103/54 (12-29-23 @ 12:24)  RR: 18 (12-29-23 @ 11:58)  SpO2: 100% (12-29-23 @ 12:24)  Wt(kg): --    PHYSICAL EXAM:  Constitutional: sitting comfortably in bed, no acute distress   HEENT: sclera non-icteric, mouth with lesion on right side, packed with gauze, neck also wrapped with gauze, trach collar in place  Respiratory: Good air entry b/l, diffuse rhonchi, without accessory muscle use and no intercostal retractions  Cardiovascular: RRR, normal S1S2, no M/R/G  Gastrointestinal: soft, NTND, no masses palpable, BS normal  Extremities: Warm, well perfused, no edema, no clubbing  Skin: Normal temperature, warm, dry    MEDICATIONS  (STANDING):  aspirin  chewable 81 milliGRAM(s) Enteral Tube daily  atorvastatin 40 milliGRAM(s) Oral at bedtime  chlorhexidine 0.12% Liquid 15 milliLiter(s) Oral Mucosa two times a day  Dakins Solution - 1/4 Strength 1 Application(s) Topical two times a day  Dakins Solution - Full Strength 1 Application(s) Topical two times a day  dextrose 5% + sodium chloride 0.45%. 1000 milliLiter(s) (120 mL/Hr) IV Continuous <Continuous>  dextrose 5%. 1000 milliLiter(s) (50 mL/Hr) IV Continuous <Continuous>  dextrose 5%. 1000 milliLiter(s) (100 mL/Hr) IV Continuous <Continuous>  dextrose 50% Injectable 25 Gram(s) IV Push once  dextrose 50% Injectable 25 Gram(s) IV Push once  dextrose 50% Injectable 12.5 Gram(s) IV Push once  folic acid 1 milliGRAM(s) Enteral Tube daily  glucagon  Injectable 1 milliGRAM(s) IntraMuscular once  influenza  Vaccine (HIGH DOSE) 0.7 milliLiter(s) IntraMuscular once  insulin lispro (ADMELOG) corrective regimen sliding scale   SubCutaneous at bedtime  insulin lispro (ADMELOG) corrective regimen sliding scale   SubCutaneous three times a day before meals  pantoprazole  Injectable 40 milliGRAM(s) IV Push two times a day  vancomycin  IVPB 1250 milliGRAM(s) IV Intermittent every 12 hours    MEDICATIONS  (PRN):  acetaminophen     Tablet .. 650 milliGRAM(s) Oral every 6 hours PRN Mild Pain (1 - 3)  dextrose Oral Gel 15 Gram(s) Oral once PRN Blood Glucose LESS THAN 70 milliGRAM(s)/deciliter  HYDROmorphone  Injectable 1 milliGRAM(s) IV Push every 6 hours PRN Severe Pain (7 - 10)  lidocaine 2% Viscous 10 milliLiter(s) Swish and Spit every 4 hours PRN Mouth Care  ondansetron Injectable 4 milliGRAM(s) IV Push every 8 hours PRN Nausea  oxyCODONE    Solution 10 milliGRAM(s) Oral every 4 hours PRN Severe Pain (7 - 10)  oxyCODONE    Solution 5 milliGRAM(s) Oral every 4 hours PRN Moderate Pain (4 - 6)      Allergies    No Known Allergies    Intolerances        LABS:                        7.6    5.90  )-----------( 180      ( 29 Dec 2023 06:06 )             23.5     12-29    140  |  105  |  21  ----------------------------<  133<H>  3.7   |  27  |  0.66    Ca    8.4      29 Dec 2023 06:06  Phos  2.6     12-29  Mg     1.9     12-29    TPro  5.7<L>  /  Alb  2.8<L>  /  TBili  0.6  /  DBili  x   /  AST  22  /  ALT  49<H>  /  AlkPhos  35<L>  12-27    PT/INR - ( 27 Dec 2023 17:37 )   PT: 12.8 sec;   INR: 1.13          PTT - ( 27 Dec 2023 17:37 )  PTT:25.4 sec  Urinalysis Basic - ( 29 Dec 2023 06:06 )    Color: x / Appearance: x / SG: x / pH: x  Gluc: 133 mg/dL / Ketone: x  / Bili: x / Urobili: x   Blood: x / Protein: x / Nitrite: x   Leuk Esterase: x / RBC: x / WBC x   Sq Epi: x / Non Sq Epi: x / Bacteria: x          RADIOLOGY & ADDITIONAL TESTS:  Reviewed INTERVAL HPI/OVERNIGHT EVENTS:  Patient was seen and examined at bedside. As per nurse and patient, no o/n events, patient resting comfortably. Reports some jaw pain, but otherwise no acute complaints.  He has not been out of bed.  Reports he is having some productive cough.  No further episodes of melena, feeds restarted.      VITAL SIGNS:  T(F): 97.9 (12-29-23 @ 13:28)  HR: 46 (12-29-23 @ 12:24)  BP: 103/54 (12-29-23 @ 12:24)  RR: 18 (12-29-23 @ 11:58)  SpO2: 100% (12-29-23 @ 12:24)  Wt(kg): --    PHYSICAL EXAM:  Constitutional: sitting comfortably in bed, no acute distress   HEENT: sclera non-icteric, mouth with lesion on right side, packed with gauze, neck also wrapped with gauze, trach collar in place  Respiratory: Good air entry b/l, clear to auscultation bilaterally, without accessory muscle use and no intercostal retractions  Cardiovascular: RRR, normal S1S2, no M/R/G  Gastrointestinal: soft, NTND, no masses palpable, BS normal  Extremities: Warm, well perfused, no edema, no clubbing  Skin: Normal temperature, warm, dry    MEDICATIONS  (STANDING):  aspirin  chewable 81 milliGRAM(s) Enteral Tube daily  atorvastatin 40 milliGRAM(s) Oral at bedtime  chlorhexidine 0.12% Liquid 15 milliLiter(s) Oral Mucosa two times a day  Dakins Solution - 1/4 Strength 1 Application(s) Topical two times a day  Dakins Solution - Full Strength 1 Application(s) Topical two times a day  dextrose 5% + sodium chloride 0.45%. 1000 milliLiter(s) (120 mL/Hr) IV Continuous <Continuous>  dextrose 5%. 1000 milliLiter(s) (50 mL/Hr) IV Continuous <Continuous>  dextrose 5%. 1000 milliLiter(s) (100 mL/Hr) IV Continuous <Continuous>  dextrose 50% Injectable 25 Gram(s) IV Push once  dextrose 50% Injectable 25 Gram(s) IV Push once  dextrose 50% Injectable 12.5 Gram(s) IV Push once  folic acid 1 milliGRAM(s) Enteral Tube daily  glucagon  Injectable 1 milliGRAM(s) IntraMuscular once  influenza  Vaccine (HIGH DOSE) 0.7 milliLiter(s) IntraMuscular once  insulin lispro (ADMELOG) corrective regimen sliding scale   SubCutaneous at bedtime  insulin lispro (ADMELOG) corrective regimen sliding scale   SubCutaneous three times a day before meals  pantoprazole  Injectable 40 milliGRAM(s) IV Push two times a day  vancomycin  IVPB 1250 milliGRAM(s) IV Intermittent every 12 hours    MEDICATIONS  (PRN):  acetaminophen     Tablet .. 650 milliGRAM(s) Oral every 6 hours PRN Mild Pain (1 - 3)  dextrose Oral Gel 15 Gram(s) Oral once PRN Blood Glucose LESS THAN 70 milliGRAM(s)/deciliter  HYDROmorphone  Injectable 1 milliGRAM(s) IV Push every 6 hours PRN Severe Pain (7 - 10)  lidocaine 2% Viscous 10 milliLiter(s) Swish and Spit every 4 hours PRN Mouth Care  ondansetron Injectable 4 milliGRAM(s) IV Push every 8 hours PRN Nausea  oxyCODONE    Solution 10 milliGRAM(s) Oral every 4 hours PRN Severe Pain (7 - 10)  oxyCODONE    Solution 5 milliGRAM(s) Oral every 4 hours PRN Moderate Pain (4 - 6)      Allergies    No Known Allergies    Intolerances        LABS:                        7.6    5.90  )-----------( 180      ( 29 Dec 2023 06:06 )             23.5     12-29    140  |  105  |  21  ----------------------------<  133<H>  3.7   |  27  |  0.66    Ca    8.4      29 Dec 2023 06:06  Phos  2.6     12-29  Mg     1.9     12-29    TPro  5.7<L>  /  Alb  2.8<L>  /  TBili  0.6  /  DBili  x   /  AST  22  /  ALT  49<H>  /  AlkPhos  35<L>  12-27    PT/INR - ( 27 Dec 2023 17:37 )   PT: 12.8 sec;   INR: 1.13          PTT - ( 27 Dec 2023 17:37 )  PTT:25.4 sec  Urinalysis Basic - ( 29 Dec 2023 06:06 )    Color: x / Appearance: x / SG: x / pH: x  Gluc: 133 mg/dL / Ketone: x  / Bili: x / Urobili: x   Blood: x / Protein: x / Nitrite: x   Leuk Esterase: x / RBC: x / WBC x   Sq Epi: x / Non Sq Epi: x / Bacteria: x          RADIOLOGY & ADDITIONAL TESTS:  Reviewed

## 2023-12-29 NOTE — PROGRESS NOTE ADULT - ATTENDING COMMENTS
Past medical Hx of CAD (x 2 stents Mar 2023), T2DM, hx of kidney stones, psoriasis who presented with an oral mass and underwent L hemimandibulectomy, SND L level 1-3, recon with L FFF, STSG, and placement of dental implants on 12/7. Patient had a trach which was subsequently decannulated. He returns 12/27 with surgical site infection - continue to have bloody sputum and become fowl smelling -no fever, RTOR 12/27- for debridement and exploration. 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 Patient also noted to have dark stools and drop in HGB- wife reported black stool for the last 3 days and same thing coming out from peg tube 12/28 , now peg feeding held -. Medicine consulted for co-management.     pt seen with Dr. Chang.  as per wife, no more black stool reported since admission, currently getting blood transfusion, feeding started at 10 cc per hour.  wound dressing changed this am by ENT team.  on trach collar with thick secretion   antibiotics -now on vanc/zosyn  as per ID ( unasyn/flagyl dc )  awake, alert , communicate by writing.   dried blood seen in oral cavity  RRR , moving good air with some secretion ( encouraged suction and to cough up )    labs/imaging reviewed.    - post op day # 1- fu wound cx, currently on unasyn and flagyl ( no leucocytosis )-ID consulted.  - respiratory status stable on trach   - CAD sp PCI ho to LAD and Ramus in march 2023 as per cards is in setting of NSTEMI - prefer DAPT, at least monotherapy with ASA if plavix need to be held for procedure, currently on ASA , to restart plavix when safe.  - Anemia - acute blood loss anemia, given transfusion, was given iv iron 1 gram, to give folic acid, rule out PUD/ swallowed blood from oral bleeding, would continue protonix suggest to give bid.  - on IVF -lung exam clear, no sign of fluid overload.  Thank you for allowing medicine to participate in the care, will follow, dee GI, wife, and Dr. Zafar Mauro who communicate to ENT team. Past medical Hx of CAD (x 2 stents Mar 2023), T2DM, hx of kidney stones, psoriasis who presented with an oral mass and underwent L hemimandibulectomy, SND L level 1-3, recon with L FFF, STSG, and placement of dental implants on 12/7. Patient had a trach which was subsequently decannulated. He returns 12/27 with surgical site infection - continue to have bloody sputum and become fowl smelling -no fever, RTOR 12/27- for debridement and exploration. 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 Patient also noted to have dark stools and drop in HGB- wife reported black stool for the last 3 days and same thing coming out from peg tube 12/28 , now peg feeding held -. Medicine consulted for co-management.     pt seen with Dr. Gutiérrez.  as per wife, no more black stool reported since admission, currently getting blood transfusion, feeding started at 10 cc per hour.  wound dressing changed this am by ENT team.  on trach collar with thick secretion   antibiotics -now on vanc/zosyn  as per ID ( unasyn/flagyl dc )  awake, alert , communicate by writing.   dried blood seen in oral cavity  RRR , moving good air with some secretion ( encouraged suction and to cough up )    labs/imaging reviewed.    - post op day # 2- sp wound packing changed by ENT this morning,   - getting blood transufsion = goal to transfuse Hb > 8.0   - fu wound cx,   - ID evaluation appreciated, note reviewed, on Vanc/Zosyn for broader coverage.   unasyn/flagyl dc.    - CAD sp PCI ho to LAD and Ramus in march 2023 as per cards is in setting of NSTEMI - prefer DAPT, at least monotherapy with ASA if plavix need to be held for procedure, currently on ASA , to restart plavix when safe.    - Anemia - acute blood loss anemia, given transfusion, was given iv iron 1 gram, to give folic acid, rule out PUD/ swallowed blood from oral bleeding, would continue protonix suggest to give bid.  tube feeding restarted today, no black stool since admission  transfuse prn goal to keep hb >8.0     - trach - frequent suction and oral care.   oob to chair as tolerated.     - DM - FS with ISS, would hold all ora-hypoglycemic agent  - pain management - oxycodone , would need stool softener to ensure daily BM.   Thank you for allowing medicine to participate in the care, will follow, dee SANDERS, wife, and Dr. Gutiérrez.

## 2023-12-29 NOTE — PROGRESS NOTE ADULT - ASSESSMENT
69 yo M w PMhx of CAD (x 2 stents Mar 2023), Type 2 DM, hx of kidney stones, psoriasis who presented with an oral mass 2/2 to [id number] SCC of L buccal mucosa during previous admission (12/3-21) and underwent L hemimandibulectomy, SND L level 1-3, recon with L FFF, STSG, and placement of dental implants on 12/7. Patient had a trach which was subsequently decannulated. Patient now returns with surgical site infection s/p wash-out.    #SSTI of surgical wound  - Patient w likely polymicrobial wound culture, w GPC in pairs, chain, GP rods, GN rods, concern of gram negative coverage also, speciation noted E.cloacae, S.mitis, E.coli, S.epidermis  - C/w zosyn pseudomonal dosing    - Will follow BCx    ID will follow    RECS NOT FINAL UNTIL ATTENDING ATTESTATION 67 yo M w PMhx of CAD (x 2 stents Mar 2023), Type 2 DM, hx of kidney stones, psoriasis who presented with an oral mass 2/2 to [id number] SCC of L buccal mucosa during previous admission (12/3-21) and underwent L hemimandibulectomy, SND L level 1-3, recon with L FFF, STSG, and placement of dental implants on 12/7. Patient had a trach which was subsequently decannulated. Patient now returns with surgical site infection s/p wash-out.    #SSTI of surgical wound  - Patient w likely polymicrobial wound culture, w GPC in pairs, chain, GP rods, GN rods, concern of gram negative coverage also, speciation noted E.cloacae, S.mitis, E.coli, S.epidermis  - C/w zosyn pseudomonal dosing    - Will follow BCx    ID will follow    RECS NOT FINAL UNTIL ATTENDING ATTESTATION 69 yo M w PMhx of CAD (x 2 stents Mar 2023), Type 2 DM, hx of kidney stones, psoriasis who presented with an oral mass 2/2 to [id number] SCC of L buccal mucosa during previous admission (12/3-21) and underwent L hemimandibulectomy, SND L level 1-3, recon with L FFF, STSG, and placement of dental implants on 12/7. Patient had a trach which was subsequently decannulated. Patient now returns with surgical site infection s/p wash-out.    #SSTI of surgical wound  - Patient w likely polymicrobial wound culture, w GPC in pairs, chain, GP rods, GN rods, concern of gram negative coverage also, speciation noted E.cloacae, S.mitis, E.coli, S.epidermis  - Noted no plan for further washout  - C/w zosyn pseudomonal dosing  - C/w vancomycin dosing, trough before fourth dose  - Will need S to cultures  - Will follow BCx    ID will follow    RECS NOT FINAL UNTIL ATTENDING ATTESTATION 67 yo M w PMhx of CAD (x 2 stents Mar 2023), Type 2 DM, hx of kidney stones, psoriasis who presented with an oral mass 2/2 to [id number] SCC of L buccal mucosa during previous admission (12/3-21) and underwent L hemimandibulectomy, SND L level 1-3, recon with L FFF, STSG, and placement of dental implants on 12/7. Patient had a trach which was subsequently decannulated. Patient now returns with surgical site infection s/p wash-out.    #SSTI of surgical wound  - Patient w likely polymicrobial wound culture, w GPC in pairs, chain, GP rods, GN rods, concern of gram negative coverage also, speciation noted E.cloacae, S.mitis, E.coli, S.epidermis  - Noted no plan for further washout  - C/w zosyn pseudomonal dosing  - C/w vancomycin dosing, trough before fourth dose  - Will need S to cultures  - Will follow BCx    ID will follow    RECS NOT FINAL UNTIL ATTENDING ATTESTATION

## 2023-12-29 NOTE — PROGRESS NOTE ADULT - SUBJECTIVE AND OBJECTIVE BOX
OTOLARYNGOLOGY (ENT) PROGRESS NOTE    PATIENT: SAUNDRA HOLMAN  MRN: [id number]  : [birth year]  [id number]  DATE OF SERVICE:  23  			         ID:SAUNDRA HOLMAN is a  67yo M w PMH of CAD (x2 stents Mar 2023), HLD, T2DM, hx of kidney stones, psoriasis who presented with an oral mass and underwent L hemimandibulectomy, SND L level 1-3, recon with L FFF, STSG, and placement of dental implants on . He had a trach which was subsequently decannulated. He returns  with surgical site infection now s/p OR for debridement and exploration. Trach replaced through prior stoma for pulmonary toilet. Patient also noted to have dark stools and  drop in HGB .       Subjective/ Interval:   ; Patient seen this morning, oral pack to be changed, penrose dressing changed.  intraoral flap with partal skin necrosis, 7.5 portex in place wit cuff deflated   : Patient seen and examined at bedside. NAEO. Patient remains afebrile and HD stable. HgB noted to drop to 7.6 from 9.2 but no additional episodes of melena. Penrose draining purulent material. Intra-oral infection/flap stable. Packing changed at bedside. No other complaints or changes at this time. ID recommended Vanc/zosyn and DC unasyn and flagyl, and IM recommended reticulocyte studies and adding folate supplements. No other changes at this time.   ALLERGIES:  No Known Allergies      MEDICATIONS:  Antiinfectives:   vancomycin  IVPB 1250 milliGRAM(s) IV Intermittent every 12 hours    IV fluids:  dextrose 5% + sodium chloride 0.45%. 1000 milliLiter(s) IV Continuous <Continuous>  dextrose 5%. 1000 milliLiter(s) IV Continuous <Continuous>  dextrose 5%. 1000 milliLiter(s) IV Continuous <Continuous>  folic acid 1 milliGRAM(s) Enteral Tube daily    Hematologic/Anticoagulation:  aspirin  chewable 81 milliGRAM(s) Enteral Tube daily    Pain medications/Neuro:  acetaminophen     Tablet .. 650 milliGRAM(s) Oral every 6 hours PRN  HYDROmorphone  Injectable 1 milliGRAM(s) IV Push every 6 hours PRN  ondansetron Injectable 4 milliGRAM(s) IV Push every 8 hours PRN  oxyCODONE    Solution 5 milliGRAM(s) Oral every 4 hours PRN  oxyCODONE    Solution 10 milliGRAM(s) Oral every 4 hours PRN    Endocrine Medications:   atorvastatin 40 milliGRAM(s) Oral at bedtime  dextrose 50% Injectable 25 Gram(s) IV Push once  dextrose 50% Injectable 25 Gram(s) IV Push once  dextrose 50% Injectable 12.5 Gram(s) IV Push once  dextrose Oral Gel 15 Gram(s) Oral once PRN  glucagon  Injectable 1 milliGRAM(s) IntraMuscular once  insulin lispro (ADMELOG) corrective regimen sliding scale   SubCutaneous at bedtime  insulin lispro (ADMELOG) corrective regimen sliding scale   SubCutaneous three times a day before meals    All other standing medications:   chlorhexidine 0.12% Liquid 15 milliLiter(s) Oral Mucosa two times a day  Dakins Solution - 1/4 Strength 1 Application(s) Topical two times a day  Dakins Solution - Full Strength 1 Application(s) Topical two times a day  influenza  Vaccine (HIGH DOSE) 0.7 milliLiter(s) IntraMuscular once  pantoprazole  Injectable 40 milliGRAM(s) IV Push two times a day    All other PRN medications:  lidocaine 2% Viscous 10 milliLiter(s) Swish and Spit every 4 hours PRN    Vital Signs Last 24 Hrs  T(C): 36.6 (29 Dec 2023 04:21), Max: 36.6 (28 Dec 2023 17:45)  T(F): 97.8 (29 Dec 2023 04:21), Max: 97.9 (28 Dec 2023 17:45)  HR: 46 (29 Dec 2023 05:00) (46 - 82)  BP: 101/54 (29 Dec 2023 05:00) (98/50 - 109/52)  BP(mean): 75 (29 Dec 2023 05:00) (69 - 77)  RR: 18 (29 Dec 2023 01:00) (18 - 20)  SpO2: 100% (29 Dec 2023 05:00) (99% - 100%)    Parameters below as of 29 Dec 2023 05:00  Patient On (Oxygen Delivery Method): tracheostomy collar  O2 Flow (L/min): 10  O2 Concentration (%): 40      12- @ 07:01  -  - @ 07:00  --------------------------------------------------------  IN:    dextrose 5% + sodium chloride 0.45%: 375 mL    dextrose 5% + sodium chloride 0.45%: 2020 mL    Enteral Tube Flush: 360 mL    IV PiggyBack: 600 mL  Total IN: 3355 mL    OUT:    Voided (mL): 925 mL  Total OUT: 925 mL    Total NET: 2430 mL                  PHYSICAL EXAM:  GEN: appears stated age, NAD  NEURO: alert & oriented x   HEENT: Remnant left FFF paddle oozing, packing in place in buccal cavity,   Neck: 7.5 portex trach with cuff deflated, penrose draining purulent fluid, skin loosely reapproximated with silk suture, kerlex dressing in place minimally saturated  CVS: regular rate and rhythm  Pulm: normal respiratory excursions, not tachypneic, no labored breathing  Abd: non-distended  Ext: moving all four extremities, no peripheral edema noted            LABS                       7.6    5.90  )-----------( 180      ( 29 Dec 2023 06:06 )             23.5        140  |  105  |  21  ----------------------------<  133<H>  3.7   |  27  |  0.66    Ca    8.4      29 Dec 2023 06:06  Phos  2.6       Mg     1.9         TPro  5.7<L>  /  Alb  2.8<L>  /  TBili  0.6  /  DBili  x   /  AST  22  /  ALT  49<H>  /  AlkPhos  35<L>           Coagulation Studies-   PT/INR - ( 27 Dec 2023 17:37 )   PT: 12.8 sec;   INR: 1.13          PTT - ( 27 Dec 2023 17:37 )  PTT:25.4 sec  Urinalysis Basic - ( 29 Dec 2023 06:06 )    Color: x / Appearance: x / SG: x / pH: x  Gluc: 133 mg/dL / Ketone: x  / Bili: x / Urobili: x   Blood: x / Protein: x / Nitrite: x   Leuk Esterase: x / RBC: x / WBC x   Sq Epi: x / Non Sq Epi: x / Bacteria: x      Endocrine Panel-  Calcium: 8.4 mg/dL ( @ 06:06)                MICROBIOLOGY:  Culture - Surgical Swab (collected 23 @ 20:46)  Source: .Surgical Swab 1. Left Oral Cavity  Gram Stain (23 @ 21:53):    Numerous Gram positive cocci in pairs, chains and clusters    Moderate Gram Positive Rods    Moderate Gram Negative Rods    Numerous White blood cells  Preliminary Report (23 @ 10:22):    Culture in progress      Culture Results:   No growth at 12 hours (23 @ 10:08)  Culture Results:   Culture in progress (23 @ 20:46)      Culture - Blood (collected 23 @ 10:08)  Source: .Blood Blood-Peripheral  Preliminary Report (23 @ 23:00):    No growth at 12 hours    Culture - Surgical Swab (collected 23 @ 20:46)  Source: .Surgical Swab 1. Left Oral Cavity  Gram Stain (23 @ 21:53):    Numerous Gram positive cocci in pairs, chains and clusters    Moderate Gram Positive Rods    Moderate Gram Negative Rods    Numerous White blood cells  Preliminary Report (23 @ 10:22):    Culture in progress        RADIOLOGY & ADDITIONAL STUDIES:     OTOLARYNGOLOGY (ENT) PROGRESS NOTE    PATIENT: SAUNDRA HOLMAN  MRN: [id number]  : 57  [id number]  DATE OF SERVICE:  23  			         ID:SAUNDRA HOLMAN is a  67yo M w PMH of CAD (x2 stents Mar 2023), HLD, T2DM, hx of kidney stones, psoriasis who presented with an oral mass and underwent L hemimandibulectomy, SND L level 1-3, recon with L FFF, STSG, and placement of dental implants on . He had a trach which was subsequently decannulated. He returns  with surgical site infection now s/p OR for debridement and exploration. Trach replaced through prior stoma for pulmonary toilet. Patient also noted to have dark stools and  drop in HGB .       Subjective/ Interval:   ; Patient seen this morning, oral pack to be changed, penrose dressing changed.  intraoral flap with partal skin necrosis, 7.5 portex in place wit cuff deflated   : Patient seen and examined at bedside. NAEO. Patient remains afebrile and HD stable. HgB noted to drop to 7.6 from 9.2 but no additional episodes of melena. Penrose draining purulent material. Intra-oral infection/flap stable. Packing changed at bedside. No other complaints or changes at this time. ID recommended Vanc/zosyn and DC unasyn and flagyl, and IM recommended reticulocyte studies and adding folate supplements. No other changes at this time.   ALLERGIES:  No Known Allergies      MEDICATIONS:  Antiinfectives:   vancomycin  IVPB 1250 milliGRAM(s) IV Intermittent every 12 hours    IV fluids:  dextrose 5% + sodium chloride 0.45%. 1000 milliLiter(s) IV Continuous <Continuous>  dextrose 5%. 1000 milliLiter(s) IV Continuous <Continuous>  dextrose 5%. 1000 milliLiter(s) IV Continuous <Continuous>  folic acid 1 milliGRAM(s) Enteral Tube daily    Hematologic/Anticoagulation:  aspirin  chewable 81 milliGRAM(s) Enteral Tube daily    Pain medications/Neuro:  acetaminophen     Tablet .. 650 milliGRAM(s) Oral every 6 hours PRN  HYDROmorphone  Injectable 1 milliGRAM(s) IV Push every 6 hours PRN  ondansetron Injectable 4 milliGRAM(s) IV Push every 8 hours PRN  oxyCODONE    Solution 5 milliGRAM(s) Oral every 4 hours PRN  oxyCODONE    Solution 10 milliGRAM(s) Oral every 4 hours PRN    Endocrine Medications:   atorvastatin 40 milliGRAM(s) Oral at bedtime  dextrose 50% Injectable 25 Gram(s) IV Push once  dextrose 50% Injectable 25 Gram(s) IV Push once  dextrose 50% Injectable 12.5 Gram(s) IV Push once  dextrose Oral Gel 15 Gram(s) Oral once PRN  glucagon  Injectable 1 milliGRAM(s) IntraMuscular once  insulin lispro (ADMELOG) corrective regimen sliding scale   SubCutaneous at bedtime  insulin lispro (ADMELOG) corrective regimen sliding scale   SubCutaneous three times a day before meals    All other standing medications:   chlorhexidine 0.12% Liquid 15 milliLiter(s) Oral Mucosa two times a day  Dakins Solution - 1/4 Strength 1 Application(s) Topical two times a day  Dakins Solution - Full Strength 1 Application(s) Topical two times a day  influenza  Vaccine (HIGH DOSE) 0.7 milliLiter(s) IntraMuscular once  pantoprazole  Injectable 40 milliGRAM(s) IV Push two times a day    All other PRN medications:  lidocaine 2% Viscous 10 milliLiter(s) Swish and Spit every 4 hours PRN    Vital Signs Last 24 Hrs  T(C): 36.6 (29 Dec 2023 04:21), Max: 36.6 (28 Dec 2023 17:45)  T(F): 97.8 (29 Dec 2023 04:21), Max: 97.9 (28 Dec 2023 17:45)  HR: 46 (29 Dec 2023 05:00) (46 - 82)  BP: 101/54 (29 Dec 2023 05:00) (98/50 - 109/52)  BP(mean): 75 (29 Dec 2023 05:00) (69 - 77)  RR: 18 (29 Dec 2023 01:00) (18 - 20)  SpO2: 100% (29 Dec 2023 05:00) (99% - 100%)    Parameters below as of 29 Dec 2023 05:00  Patient On (Oxygen Delivery Method): tracheostomy collar  O2 Flow (L/min): 10  O2 Concentration (%): 40      12- @ 07:01  -  - @ 07:00  --------------------------------------------------------  IN:    dextrose 5% + sodium chloride 0.45%: 375 mL    dextrose 5% + sodium chloride 0.45%: 2020 mL    Enteral Tube Flush: 360 mL    IV PiggyBack: 600 mL  Total IN: 3355 mL    OUT:    Voided (mL): 925 mL  Total OUT: 925 mL    Total NET: 2430 mL                  PHYSICAL EXAM:  GEN: appears stated age, NAD  NEURO: alert & oriented x   HEENT: Remnant left FFF paddle oozing, packing in place in buccal cavity,   Neck: 7.5 portex trach with cuff deflated, penrose draining purulent fluid, skin loosely reapproximated with silk suture, kerlex dressing in place minimally saturated  CVS: regular rate and rhythm  Pulm: normal respiratory excursions, not tachypneic, no labored breathing  Abd: non-distended  Ext: moving all four extremities, no peripheral edema noted            LABS                       7.6    5.90  )-----------( 180      ( 29 Dec 2023 06:06 )             23.5        140  |  105  |  21  ----------------------------<  133<H>  3.7   |  27  |  0.66    Ca    8.4      29 Dec 2023 06:06  Phos  2.6       Mg     1.9         TPro  5.7<L>  /  Alb  2.8<L>  /  TBili  0.6  /  DBili  x   /  AST  22  /  ALT  49<H>  /  AlkPhos  35<L>           Coagulation Studies-   PT/INR - ( 27 Dec 2023 17:37 )   PT: 12.8 sec;   INR: 1.13          PTT - ( 27 Dec 2023 17:37 )  PTT:25.4 sec  Urinalysis Basic - ( 29 Dec 2023 06:06 )    Color: x / Appearance: x / SG: x / pH: x  Gluc: 133 mg/dL / Ketone: x  / Bili: x / Urobili: x   Blood: x / Protein: x / Nitrite: x   Leuk Esterase: x / RBC: x / WBC x   Sq Epi: x / Non Sq Epi: x / Bacteria: x      Endocrine Panel-  Calcium: 8.4 mg/dL ( @ 06:06)                MICROBIOLOGY:  Culture - Surgical Swab (collected 23 @ 20:46)  Source: .Surgical Swab 1. Left Oral Cavity  Gram Stain (23 @ 21:53):    Numerous Gram positive cocci in pairs, chains and clusters    Moderate Gram Positive Rods    Moderate Gram Negative Rods    Numerous White blood cells  Preliminary Report (23 @ 10:22):    Culture in progress      Culture Results:   No growth at 12 hours (23 @ 10:08)  Culture Results:   Culture in progress (23 @ 20:46)      Culture - Blood (collected 23 @ 10:08)  Source: .Blood Blood-Peripheral  Preliminary Report (23 @ 23:00):    No growth at 12 hours    Culture - Surgical Swab (collected 23 @ 20:46)  Source: .Surgical Swab 1. Left Oral Cavity  Gram Stain (23 @ 21:53):    Numerous Gram positive cocci in pairs, chains and clusters    Moderate Gram Positive Rods    Moderate Gram Negative Rods    Numerous White blood cells  Preliminary Report (23 @ 10:22):    Culture in progress        RADIOLOGY & ADDITIONAL STUDIES:

## 2023-12-30 LAB
-  AMPICILLIN: SIGNIFICANT CHANGE UP
-  AMPICILLIN: SIGNIFICANT CHANGE UP
-  VANCOMYCIN: SIGNIFICANT CHANGE UP
-  VANCOMYCIN: SIGNIFICANT CHANGE UP
ANION GAP SERPL CALC-SCNC: 7 MMOL/L — SIGNIFICANT CHANGE UP (ref 5–17)
ANION GAP SERPL CALC-SCNC: 7 MMOL/L — SIGNIFICANT CHANGE UP (ref 5–17)
BUN SERPL-MCNC: 9 MG/DL — SIGNIFICANT CHANGE UP (ref 7–23)
BUN SERPL-MCNC: 9 MG/DL — SIGNIFICANT CHANGE UP (ref 7–23)
CALCIUM SERPL-MCNC: 8.3 MG/DL — LOW (ref 8.4–10.5)
CALCIUM SERPL-MCNC: 8.3 MG/DL — LOW (ref 8.4–10.5)
CHLORIDE SERPL-SCNC: 104 MMOL/L — SIGNIFICANT CHANGE UP (ref 96–108)
CHLORIDE SERPL-SCNC: 104 MMOL/L — SIGNIFICANT CHANGE UP (ref 96–108)
CO2 SERPL-SCNC: 26 MMOL/L — SIGNIFICANT CHANGE UP (ref 22–31)
CO2 SERPL-SCNC: 26 MMOL/L — SIGNIFICANT CHANGE UP (ref 22–31)
CREAT SERPL-MCNC: 0.62 MG/DL — SIGNIFICANT CHANGE UP (ref 0.5–1.3)
CREAT SERPL-MCNC: 0.62 MG/DL — SIGNIFICANT CHANGE UP (ref 0.5–1.3)
CULTURE RESULTS: ABNORMAL
CULTURE RESULTS: ABNORMAL
EGFR: 105 ML/MIN/1.73M2 — SIGNIFICANT CHANGE UP
EGFR: 105 ML/MIN/1.73M2 — SIGNIFICANT CHANGE UP
GLUCOSE BLDC GLUCOMTR-MCNC: 128 MG/DL — HIGH (ref 70–99)
GLUCOSE BLDC GLUCOMTR-MCNC: 128 MG/DL — HIGH (ref 70–99)
GLUCOSE BLDC GLUCOMTR-MCNC: 149 MG/DL — HIGH (ref 70–99)
GLUCOSE BLDC GLUCOMTR-MCNC: 149 MG/DL — HIGH (ref 70–99)
GLUCOSE BLDC GLUCOMTR-MCNC: 171 MG/DL — HIGH (ref 70–99)
GLUCOSE BLDC GLUCOMTR-MCNC: 171 MG/DL — HIGH (ref 70–99)
GLUCOSE BLDC GLUCOMTR-MCNC: 173 MG/DL — HIGH (ref 70–99)
GLUCOSE BLDC GLUCOMTR-MCNC: 173 MG/DL — HIGH (ref 70–99)
GLUCOSE SERPL-MCNC: 141 MG/DL — HIGH (ref 70–99)
GLUCOSE SERPL-MCNC: 141 MG/DL — HIGH (ref 70–99)
HCT VFR BLD CALC: 29.6 % — LOW (ref 39–50)
HCT VFR BLD CALC: 29.6 % — LOW (ref 39–50)
HGB BLD-MCNC: 9.8 G/DL — LOW (ref 13–17)
HGB BLD-MCNC: 9.8 G/DL — LOW (ref 13–17)
MAGNESIUM SERPL-MCNC: 1.7 MG/DL — SIGNIFICANT CHANGE UP (ref 1.6–2.6)
MAGNESIUM SERPL-MCNC: 1.7 MG/DL — SIGNIFICANT CHANGE UP (ref 1.6–2.6)
MCHC RBC-ENTMCNC: 29.1 PG — SIGNIFICANT CHANGE UP (ref 27–34)
MCHC RBC-ENTMCNC: 29.1 PG — SIGNIFICANT CHANGE UP (ref 27–34)
MCHC RBC-ENTMCNC: 33.1 GM/DL — SIGNIFICANT CHANGE UP (ref 32–36)
MCHC RBC-ENTMCNC: 33.1 GM/DL — SIGNIFICANT CHANGE UP (ref 32–36)
MCV RBC AUTO: 87.8 FL — SIGNIFICANT CHANGE UP (ref 80–100)
MCV RBC AUTO: 87.8 FL — SIGNIFICANT CHANGE UP (ref 80–100)
METHOD TYPE: SIGNIFICANT CHANGE UP
METHOD TYPE: SIGNIFICANT CHANGE UP
NRBC # BLD: 0 /100 WBCS — SIGNIFICANT CHANGE UP (ref 0–0)
NRBC # BLD: 0 /100 WBCS — SIGNIFICANT CHANGE UP (ref 0–0)
ORGANISM # SPEC MICROSCOPIC CNT: ABNORMAL
ORGANISM # SPEC MICROSCOPIC CNT: ABNORMAL
ORGANISM # SPEC MICROSCOPIC CNT: SIGNIFICANT CHANGE UP
ORGANISM # SPEC MICROSCOPIC CNT: SIGNIFICANT CHANGE UP
PHOSPHATE SERPL-MCNC: 2.8 MG/DL — SIGNIFICANT CHANGE UP (ref 2.5–4.5)
PHOSPHATE SERPL-MCNC: 2.8 MG/DL — SIGNIFICANT CHANGE UP (ref 2.5–4.5)
PLATELET # BLD AUTO: 196 K/UL — SIGNIFICANT CHANGE UP (ref 150–400)
PLATELET # BLD AUTO: 196 K/UL — SIGNIFICANT CHANGE UP (ref 150–400)
POTASSIUM SERPL-MCNC: 3.7 MMOL/L — SIGNIFICANT CHANGE UP (ref 3.5–5.3)
POTASSIUM SERPL-MCNC: 3.7 MMOL/L — SIGNIFICANT CHANGE UP (ref 3.5–5.3)
POTASSIUM SERPL-SCNC: 3.7 MMOL/L — SIGNIFICANT CHANGE UP (ref 3.5–5.3)
POTASSIUM SERPL-SCNC: 3.7 MMOL/L — SIGNIFICANT CHANGE UP (ref 3.5–5.3)
RBC # BLD: 3.37 M/UL — LOW (ref 4.2–5.8)
RBC # BLD: 3.37 M/UL — LOW (ref 4.2–5.8)
RBC # FLD: 14.4 % — SIGNIFICANT CHANGE UP (ref 10.3–14.5)
RBC # FLD: 14.4 % — SIGNIFICANT CHANGE UP (ref 10.3–14.5)
SODIUM SERPL-SCNC: 137 MMOL/L — SIGNIFICANT CHANGE UP (ref 135–145)
SODIUM SERPL-SCNC: 137 MMOL/L — SIGNIFICANT CHANGE UP (ref 135–145)
SPECIMEN SOURCE: SIGNIFICANT CHANGE UP
SPECIMEN SOURCE: SIGNIFICANT CHANGE UP
WBC # BLD: 5.57 K/UL — SIGNIFICANT CHANGE UP (ref 3.8–10.5)
WBC # BLD: 5.57 K/UL — SIGNIFICANT CHANGE UP (ref 3.8–10.5)
WBC # FLD AUTO: 5.57 K/UL — SIGNIFICANT CHANGE UP (ref 3.8–10.5)
WBC # FLD AUTO: 5.57 K/UL — SIGNIFICANT CHANGE UP (ref 3.8–10.5)

## 2023-12-30 PROCEDURE — 99233 SBSQ HOSP IP/OBS HIGH 50: CPT

## 2023-12-30 RX ORDER — HYDROMORPHONE HYDROCHLORIDE 2 MG/ML
0.5 INJECTION INTRAMUSCULAR; INTRAVENOUS; SUBCUTANEOUS ONCE
Refills: 0 | Status: DISCONTINUED | OUTPATIENT
Start: 2023-12-30 | End: 2023-12-30

## 2023-12-30 RX ADMIN — OXYCODONE HYDROCHLORIDE 5 MILLIGRAM(S): 5 TABLET ORAL at 21:05

## 2023-12-30 RX ADMIN — PANTOPRAZOLE SODIUM 40 MILLIGRAM(S): 20 TABLET, DELAYED RELEASE ORAL at 21:05

## 2023-12-30 RX ADMIN — Medication 1 APPLICATION(S): at 07:11

## 2023-12-30 RX ADMIN — Medication 1: at 07:50

## 2023-12-30 RX ADMIN — PIPERACILLIN AND TAZOBACTAM 25 GRAM(S): 4; .5 INJECTION, POWDER, LYOPHILIZED, FOR SOLUTION INTRAVENOUS at 11:54

## 2023-12-30 RX ADMIN — Medication 1 APPLICATION(S): at 17:54

## 2023-12-30 RX ADMIN — OXYCODONE HYDROCHLORIDE 10 MILLIGRAM(S): 5 TABLET ORAL at 02:50

## 2023-12-30 RX ADMIN — Medication 166.67 MILLIGRAM(S): at 06:16

## 2023-12-30 RX ADMIN — OXYCODONE HYDROCHLORIDE 10 MILLIGRAM(S): 5 TABLET ORAL at 01:55

## 2023-12-30 RX ADMIN — Medication 166.67 MILLIGRAM(S): at 18:02

## 2023-12-30 RX ADMIN — ATORVASTATIN CALCIUM 40 MILLIGRAM(S): 80 TABLET, FILM COATED ORAL at 21:04

## 2023-12-30 RX ADMIN — OXYCODONE HYDROCHLORIDE 10 MILLIGRAM(S): 5 TABLET ORAL at 06:15

## 2023-12-30 RX ADMIN — HYDROMORPHONE HYDROCHLORIDE 0.5 MILLIGRAM(S): 2 INJECTION INTRAMUSCULAR; INTRAVENOUS; SUBCUTANEOUS at 17:05

## 2023-12-30 RX ADMIN — SODIUM CHLORIDE 120 MILLILITER(S): 9 INJECTION, SOLUTION INTRAVENOUS at 00:11

## 2023-12-30 RX ADMIN — CHLORHEXIDINE GLUCONATE 15 MILLILITER(S): 213 SOLUTION TOPICAL at 18:02

## 2023-12-30 RX ADMIN — Medication 1 MILLIGRAM(S): at 11:54

## 2023-12-30 RX ADMIN — SODIUM CHLORIDE 120 MILLILITER(S): 9 INJECTION, SOLUTION INTRAVENOUS at 06:17

## 2023-12-30 RX ADMIN — Medication 1 APPLICATION(S): at 06:16

## 2023-12-30 RX ADMIN — PIPERACILLIN AND TAZOBACTAM 25 GRAM(S): 4; .5 INJECTION, POWDER, LYOPHILIZED, FOR SOLUTION INTRAVENOUS at 22:03

## 2023-12-30 RX ADMIN — CHLORHEXIDINE GLUCONATE 15 MILLILITER(S): 213 SOLUTION TOPICAL at 06:16

## 2023-12-30 RX ADMIN — Medication 81 MILLIGRAM(S): at 11:54

## 2023-12-30 RX ADMIN — PANTOPRAZOLE SODIUM 40 MILLIGRAM(S): 20 TABLET, DELAYED RELEASE ORAL at 11:53

## 2023-12-30 RX ADMIN — OXYCODONE HYDROCHLORIDE 10 MILLIGRAM(S): 5 TABLET ORAL at 06:45

## 2023-12-30 RX ADMIN — HYDROMORPHONE HYDROCHLORIDE 0.5 MILLIGRAM(S): 2 INJECTION INTRAMUSCULAR; INTRAVENOUS; SUBCUTANEOUS at 16:47

## 2023-12-30 RX ADMIN — HYDROMORPHONE HYDROCHLORIDE 1 MILLIGRAM(S): 2 INJECTION INTRAMUSCULAR; INTRAVENOUS; SUBCUTANEOUS at 09:00

## 2023-12-30 RX ADMIN — HYDROMORPHONE HYDROCHLORIDE 1 MILLIGRAM(S): 2 INJECTION INTRAMUSCULAR; INTRAVENOUS; SUBCUTANEOUS at 08:09

## 2023-12-30 RX ADMIN — SODIUM CHLORIDE 120 MILLILITER(S): 9 INJECTION, SOLUTION INTRAVENOUS at 19:31

## 2023-12-30 NOTE — PROGRESS NOTE ADULT - SUBJECTIVE AND OBJECTIVE BOX
O/N Events: none    Subjective/ROS: Patient seen and examined at bedside. no acute complaints, patient making thumbs up sign when asked if feeling better    Denies Fever/Chills, HA, CP, SOB, n/v, changes in bowel/urinary habits.  12pt ROS otherwise negative.    VITALS  Vital Signs Last 24 Hrs  T(C): 36.7 (30 Dec 2023 13:30), Max: 36.7 (30 Dec 2023 13:30)  T(F): 98 (30 Dec 2023 13:30), Max: 98 (30 Dec 2023 13:30)  HR: 46 (30 Dec 2023 12:00) (46 - 67)  BP: 116/53 (30 Dec 2023 12:00) (114/56 - 132/62)  BP(mean): 77 (30 Dec 2023 12:00) (73 - 89)  RR: 17 (30 Dec 2023 12:00) (16 - 17)  SpO2: 98% (30 Dec 2023 12:00) (95% - 100%)    Parameters below as of 30 Dec 2023 12:00  Patient On (Oxygen Delivery Method): tracheostomy collar  O2 Flow (L/min): 10  O2 Concentration (%): 40    CAPILLARY BLOOD GLUCOSE      POCT Blood Glucose.: 128 mg/dL (30 Dec 2023 11:51)  POCT Blood Glucose.: 171 mg/dL (30 Dec 2023 07:40)  POCT Blood Glucose.: 120 mg/dL (29 Dec 2023 22:12)  POCT Blood Glucose.: 121 mg/dL (29 Dec 2023 16:04)      PHYSICAL EXAM  General: NAD  Head: oral packing in place  Neck: Supple; no JVD, trach collar in place  Respiratory: mild rhonchi   Cardiovascular: Regular rhythm/rate; S1/S2+, no murmurs, rubs gallops   Gastrointestinal: Soft; NTND; bowel sounds normal and present  Extremities: ace wrap over LLE      MEDICATIONS  (STANDING):  aspirin  chewable 81 milliGRAM(s) Enteral Tube daily  atorvastatin 40 milliGRAM(s) Oral at bedtime  chlorhexidine 0.12% Liquid 15 milliLiter(s) Oral Mucosa two times a day  Dakins Solution - 1/4 Strength 1 Application(s) Topical two times a day  Dakins Solution - Full Strength 1 Application(s) Topical two times a day  dextrose 5% + sodium chloride 0.45%. 1000 milliLiter(s) (120 mL/Hr) IV Continuous <Continuous>  dextrose 5%. 1000 milliLiter(s) (100 mL/Hr) IV Continuous <Continuous>  dextrose 5%. 1000 milliLiter(s) (50 mL/Hr) IV Continuous <Continuous>  dextrose 50% Injectable 25 Gram(s) IV Push once  dextrose 50% Injectable 25 Gram(s) IV Push once  dextrose 50% Injectable 12.5 Gram(s) IV Push once  folic acid 1 milliGRAM(s) Enteral Tube daily  glucagon  Injectable 1 milliGRAM(s) IntraMuscular once  influenza  Vaccine (HIGH DOSE) 0.7 milliLiter(s) IntraMuscular once  insulin lispro (ADMELOG) corrective regimen sliding scale   SubCutaneous at bedtime  insulin lispro (ADMELOG) corrective regimen sliding scale   SubCutaneous three times a day before meals  pantoprazole  Injectable 40 milliGRAM(s) IV Push two times a day  piperacillin/tazobactam IVPB.. 4.5 Gram(s) IV Intermittent every 8 hours  vancomycin  IVPB 1250 milliGRAM(s) IV Intermittent every 12 hours    MEDICATIONS  (PRN):  acetaminophen     Tablet .. 650 milliGRAM(s) Oral every 6 hours PRN Mild Pain (1 - 3)  dextrose Oral Gel 15 Gram(s) Oral once PRN Blood Glucose LESS THAN 70 milliGRAM(s)/deciliter  HYDROmorphone  Injectable 1 milliGRAM(s) IV Push every 6 hours PRN Severe Pain (7 - 10)  lidocaine 2% Viscous 10 milliLiter(s) Swish and Spit every 4 hours PRN Mouth Care  ondansetron Injectable 4 milliGRAM(s) IV Push every 8 hours PRN Nausea  oxyCODONE    Solution 10 milliGRAM(s) Oral every 4 hours PRN Severe Pain (7 - 10)  oxyCODONE    Solution 5 milliGRAM(s) Oral every 4 hours PRN Moderate Pain (4 - 6)      No Known Allergies      LABS                        9.8    5.57  )-----------( 196      ( 30 Dec 2023 05:30 )             29.6     12-30    137  |  104  |  9   ----------------------------<  141<H>  3.7   |  26  |  0.62    Ca    8.3<L>      30 Dec 2023 05:30  Phos  2.8     12-30  Mg     1.7     12-30        Urinalysis Basic - ( 30 Dec 2023 05:30 )    Color: x / Appearance: x / SG: x / pH: x  Gluc: 141 mg/dL / Ketone: x  / Bili: x / Urobili: x   Blood: x / Protein: x / Nitrite: x   Leuk Esterase: x / RBC: x / WBC x   Sq Epi: x / Non Sq Epi: x / Bacteria: x              IMAGING/EKG/ETC

## 2023-12-30 NOTE — PROGRESS NOTE ADULT - ASSESSMENT
Assessment and Plan:  SAUNDRA HOLMAN is a   69yo M w PMH of CAD (x2 stents Mar 2023), HLD, T2DM, hx of kidney stones, psoriasis who presented with an oral mass and underwent L hemimandibulectomy, SND L level 1-3, recon with L FFF, STSG, and placement of dental implants on 12/7. He had a trach which was subsequently decannulated. Now s/p OR for debridement and exploration. Trach replaced through prior stoma for pulmonary toilet.    Plan:  - Hgb goal > 9.0  - Irrigate wound with 1/4 strength Dakins  - Pack wound with full strength Dakins soaked packing BID  - C/w Vanc / Zosyn  - Follow up reticulocyte count  - Appreciate GI recs, IM recs, and ID recs  - Restart tube feeds likely today if no OR plan  - Continue home asa  - Hold home plavix  - c/w HSQ  - Maintain 7.5 portex for pulm toilet  - ISS  - Pain control  - Home meds

## 2023-12-30 NOTE — PROGRESS NOTE ADULT - SUBJECTIVE AND OBJECTIVE BOX
OTOLARYNGOLOGY (ENT) PROGRESS NOTE    PATIENT: SAUNDRA HOLMAN  MRN: [id number]  : 57  [id number]  DATE OF SERVICE:  23  	  ID:SAUNDRA HOLMAN is a  67yo M w PMH of CAD (x2 stents Mar 2023), HLD, T2DM, hx of kidney stones, psoriasis who presented with an oral mass and underwent L hemimandibulectomy, SND L level 1-3, recon with L FFF, STSG, and placement of dental implants on . He had a trach which was subsequently decannulated. He returns  with surgical site infection now s/p OR for debridement and exploration. Trach replaced through prior stoma for pulmonary toilet. Patient also noted to have dark stools and  drop in HGB .       Subjective/ Interval:   ; Patient seen this morning, oral pack to be changed, penrose dressing changed.  intraoral flap with partal skin necrosis, 7.5 portex in place wit cuff deflated   : Patient seen and examined at bedside. NAEO. Patient remains afebrile and HD stable. HgB noted to drop to 7.6 from 9.2 but no additional episodes of melena. Penrose draining purulent material. Intra-oral infection/flap stable. Packing changed at bedside. No other complaints or changes at this time. ID recommended Vanc/zosyn and DC unasyn and flagyl, and IM recommended reticulocyte studies and adding folate supplements. No other changes at this time.   : AFVSS. No acute events overnight. Patient seen and examined at bedside. C/w vanc/Zosyn per ID recs, pending sensitivities. Growing staph epi, E coli, enterobacter from wound cx on . S/p 2U PRBC yday w appropriate response in Hgb. Transfusion goal > 9.0.    PHYSICAL EXAM:  GEN: appears stated age, NAD  NEURO: alert & oriented x /  HEENT: Remnant left FFF paddle oozing, dakins soaked packing in place in buccal cavity,   Neck: 7.5 portex trach with cuff deflated, penrose draining purulent fluid, skin loosely reapproximated with silk suture, kerlex dressing in place minimally saturated  CVS: regular rate and rhythm  Pulm: normal respiratory excursions, not tachypneic, no labored breathing  Abd: non-distended  Ext: moving all four extremities, no peripheral edema noted     LABS                       9.8    5.57  )-----------( 196      ( 30 Dec 2023 05:30 )             29.6        137  |  104  |  9   ----------------------------<  141<H>  3.7   |  26  |  0.62    Ca    8.3<L>      30 Dec 2023 05:30  Phos  2.8       Mg     1.7                Coagulation Studies-     Urinalysis Basic - ( 30 Dec 2023 05:30 )    Color: x / Appearance: x / SG: x / pH: x  Gluc: 141 mg/dL / Ketone: x  / Bili: x / Urobili: x   Blood: x / Protein: x / Nitrite: x   Leuk Esterase: x / RBC: x / WBC x   Sq Epi: x / Non Sq Epi: x / Bacteria: x      Endocrine Panel-  Calcium: 8.3 mg/dL ( @ 05:30)                MICROBIOLOGY:  Culture - Surgical Swab (collected 23 @ 20:46)  Source: .Surgical Swab 1. Left Oral Cavity  Gram Stain (23 @ 21:53):    Numerous Gram positive cocci in pairs, chains and clusters    Moderate Gram Positive Rods    Moderate Gram Negative Rods    Numerous White blood cells  Preliminary Report (23 @ 13:54):    Moderate Escherichia coli    Moderate Enterobacter cloacae complex    Few Enterococcus faecalis    Few Streptococcus mitis/oralis group    Few Staphylococcus epidermidis    Few Streptococcus constellatus    Few Stenotrophomonas maltophilia    Mixed Anaerobic Joy including    Few Prevotella species (most closely resembles Prevotella barnaie)    Few Prevotella denticola    Culture grew 3 or more types of organisms which indicate collection    contamination; consider recollection only if clinically indicated.      Culture Results:   No growth at 1 day. (23 @ 10:08)  Culture Results:   Moderate Escherichia coli  Moderate Enterobacter cloacae complex  Few Enterococcus faecalis  Few Streptococcus mitis/oralis group  Few Staphylococcus epidermidis  Few Streptococcus constellatus  Few Stenotrophomonas maltophilia  Mixed Anaerobic Joy including  Few Prevotella species (most closely resembles Prevotella barnaie)  Few Prevotella denticola  Culture grew 3 or more types of organisms which indicate collection  contamination; consider recollection only if clinically indicated. (23 @ 20:46)      Culture - Blood (collected 23 @ 10:08)  Source: .Blood Blood-Peripheral  Preliminary Report (23 @ 11:01):    No growth at 1 day.    Culture - Surgical Swab (collected 23 @ 20:46)  Source: .Surgical Swab 1. Left Oral Cavity  Gram Stain (23 @ 21:53):    Numerous Gram positive cocci in pairs, chains and clusters    Moderate Gram Positive Rods    Moderate Gram Negative Rods    Numerous White blood cells  Preliminary Report (23 @ 13:54):    Moderate Escherichia coli    Moderate Enterobacter cloacae complex    Few Enterococcus faecalis    Few Streptococcus mitis/oralis group    Few Staphylococcus epidermidis    Few Streptococcus constellatus    Few Stenotrophomonas maltophilia    Mixed Anaerobic Joy including    Few Prevotella species (most closely resembles Prevotella jacki)    Few Prevotella denticola    Culture grew 3 or more types of organisms which indicate collection    contamination; consider recollection only if clinically indicated.             OTOLARYNGOLOGY (ENT) PROGRESS NOTE    PATIENT: SAUNDRA HOLMAN  MRN: [id number]  : 57  [id number]  DATE OF SERVICE:  23  	  ID:SAUNDRA HOLMAN is a  67yo M w PMH of CAD (x2 stents Mar 2023), HLD, T2DM, hx of kidney stones, psoriasis who presented with an oral mass and underwent L hemimandibulectomy, SND L level 1-3, recon with L FFF, STSG, and placement of dental implants on . He had a trach which was subsequently decannulated. He returns  with surgical site infection now s/p OR for debridement and exploration. Trach replaced through prior stoma for pulmonary toilet. Patient also noted to have dark stools and  drop in HGB .       Subjective/ Interval:   ; Patient seen this morning, oral pack to be changed, penrose dressing changed.  intraoral flap with partal skin necrosis, 7.5 portex in place wit cuff deflated   : Patient seen and examined at bedside. NAEO. Patient remains afebrile and HD stable. HgB noted to drop to 7.6 from 9.2 but no additional episodes of melena. Penrose draining purulent material. Intra-oral infection/flap stable. Packing changed at bedside. No other complaints or changes at this time. ID recommended Vanc/zosyn and DC unasyn and flagyl, and IM recommended reticulocyte studies and adding folate supplements. No other changes at this time.   : AFVSS. No acute events overnight. Patient seen and examined at bedside. C/w vanc/Zosyn per ID recs, pending sensitivities. Growing staph epi, E coli, enterobacter from wound cx on . S/p 2U PRBC yday w appropriate response in Hgb. Transfusion goal > 9.0.    PHYSICAL EXAM:  GEN: appears stated age, NAD  NEURO: alert & oriented x /  HEENT: Remnant left FFF paddle oozing, dakins soaked packing in place in buccal cavity,   Neck: 7.5 portex trach with cuff deflated, penrose draining purulent fluid, skin loosely reapproximated with silk suture, kerlex dressing in place minimally saturated  CVS: regular rate and rhythm  Pulm: normal respiratory excursions, not tachypneic, no labored breathing  Abd: non-distended  Ext: moving all four extremities, no peripheral edema noted     LABS                       9.8    5.57  )-----------( 196      ( 30 Dec 2023 05:30 )             29.6        137  |  104  |  9   ----------------------------<  141<H>  3.7   |  26  |  0.62    Ca    8.3<L>      30 Dec 2023 05:30  Phos  2.8       Mg     1.7                Coagulation Studies-     Urinalysis Basic - ( 30 Dec 2023 05:30 )    Color: x / Appearance: x / SG: x / pH: x  Gluc: 141 mg/dL / Ketone: x  / Bili: x / Urobili: x   Blood: x / Protein: x / Nitrite: x   Leuk Esterase: x / RBC: x / WBC x   Sq Epi: x / Non Sq Epi: x / Bacteria: x      Endocrine Panel-  Calcium: 8.3 mg/dL ( @ 05:30)                MICROBIOLOGY:  Culture - Surgical Swab (collected 23 @ 20:46)  Source: .Surgical Swab 1. Left Oral Cavity  Gram Stain (23 @ 21:53):    Numerous Gram positive cocci in pairs, chains and clusters    Moderate Gram Positive Rods    Moderate Gram Negative Rods    Numerous White blood cells  Preliminary Report (23 @ 13:54):    Moderate Escherichia coli    Moderate Enterobacter cloacae complex    Few Enterococcus faecalis    Few Streptococcus mitis/oralis group    Few Staphylococcus epidermidis    Few Streptococcus constellatus    Few Stenotrophomonas maltophilia    Mixed Anaerobic Joy including    Few Prevotella species (most closely resembles Prevotella barnaie)    Few Prevotella denticola    Culture grew 3 or more types of organisms which indicate collection    contamination; consider recollection only if clinically indicated.      Culture Results:   No growth at 1 day. (23 @ 10:08)  Culture Results:   Moderate Escherichia coli  Moderate Enterobacter cloacae complex  Few Enterococcus faecalis  Few Streptococcus mitis/oralis group  Few Staphylococcus epidermidis  Few Streptococcus constellatus  Few Stenotrophomonas maltophilia  Mixed Anaerobic Joy including  Few Prevotella species (most closely resembles Prevotella barnaie)  Few Prevotella denticola  Culture grew 3 or more types of organisms which indicate collection  contamination; consider recollection only if clinically indicated. (23 @ 20:46)      Culture - Blood (collected 23 @ 10:08)  Source: .Blood Blood-Peripheral  Preliminary Report (23 @ 11:01):    No growth at 1 day.    Culture - Surgical Swab (collected 23 @ 20:46)  Source: .Surgical Swab 1. Left Oral Cavity  Gram Stain (23 @ 21:53):    Numerous Gram positive cocci in pairs, chains and clusters    Moderate Gram Positive Rods    Moderate Gram Negative Rods    Numerous White blood cells  Preliminary Report (23 @ 13:54):    Moderate Escherichia coli    Moderate Enterobacter cloacae complex    Few Enterococcus faecalis    Few Streptococcus mitis/oralis group    Few Staphylococcus epidermidis    Few Streptococcus constellatus    Few Stenotrophomonas maltophilia    Mixed Anaerobic Joy including    Few Prevotella species (most closely resembles Prevotella jacki)    Few Prevotella denticola    Culture grew 3 or more types of organisms which indicate collection    contamination; consider recollection only if clinically indicated.             Statement Selected

## 2023-12-30 NOTE — PROGRESS NOTE ADULT - ATTENDING COMMENTS
Past medical Hx of CAD (x 2 stents Mar 2023), T2DM, hx of kidney stones, psoriasis who presented with an oral mass and underwent L hemimandibulectomy, SND L level 1-3, recon with L FFF, STSG, and placement of dental implants on 12/7. Patient had a trach which was subsequently decannulated. He returns 12/27 with surgical site infection - continue to have bloody sputum and become fowl smelling -no fever, RTOR 12/27- for debridement and exploration. 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 Patient also noted to have dark stools and drop in HGB- wife reported black stool for the last 3 days and same thing coming out from peg tube 12/28 , now peg feeding held -. Medicine consulted for co-management.     pt seen with Dr. Oviedo.  as per wife, no more black stool, no BM   ENT team changed the dressing at bedside, no plan to return to OR  clinically appear better.   trach collar in place  RRR, moving good air.  G tube in place  no edema noted on lower ext  moving all limbs.     labs/imaging reviewed.    - post op day # 3- sp wound packing changed by ENT this morning,   - getting blood transufsion = goal to transfuse Hb > 8.0 - Hb remain above 9= no further BM-  - fu wound cx,   - ID evaluation appreciated, note reviewed, on Vanc/Zosyn for broader coverage.   unasyn/flagyl dc.    - CAD sp PCI ho to LAD and Ramus in march 2023 as per cards is in setting of NSTEMI - prefer DAPT, at least monotherapy with ASA if plavix need to be held for procedure, currently on ASA , to restart plavix when safe.    - Anemia - acute blood loss anemia, given transfusion, was given iv iron 1 gram, to give folic acid, rule out PUD/ swallowed blood from oral bleeding, would continue protonix suggest to give bid.  tube feeding restarted today, no black stool since admission  transfuse prn goal to keep hb >8.0     - trach - frequent suction and oral care.   oob to chair as tolerated.     - DM - FS with ISS, would hold all ora-hypoglycemic agent  - pain management - oxycodone , would need stool softener to ensure daily BM.     Dw ENT - to restart diet -    Thank you for allowing medicine to participate in the care, will follow, dee STARKS, wife, and Dr. Oviedo.

## 2023-12-31 LAB
ANION GAP SERPL CALC-SCNC: 6 MMOL/L — SIGNIFICANT CHANGE UP (ref 5–17)
ANION GAP SERPL CALC-SCNC: 6 MMOL/L — SIGNIFICANT CHANGE UP (ref 5–17)
BUN SERPL-MCNC: 8 MG/DL — SIGNIFICANT CHANGE UP (ref 7–23)
BUN SERPL-MCNC: 8 MG/DL — SIGNIFICANT CHANGE UP (ref 7–23)
CALCIUM SERPL-MCNC: 8.4 MG/DL — SIGNIFICANT CHANGE UP (ref 8.4–10.5)
CALCIUM SERPL-MCNC: 8.4 MG/DL — SIGNIFICANT CHANGE UP (ref 8.4–10.5)
CHLORIDE SERPL-SCNC: 104 MMOL/L — SIGNIFICANT CHANGE UP (ref 96–108)
CHLORIDE SERPL-SCNC: 104 MMOL/L — SIGNIFICANT CHANGE UP (ref 96–108)
CO2 SERPL-SCNC: 26 MMOL/L — SIGNIFICANT CHANGE UP (ref 22–31)
CO2 SERPL-SCNC: 26 MMOL/L — SIGNIFICANT CHANGE UP (ref 22–31)
CREAT SERPL-MCNC: 0.65 MG/DL — SIGNIFICANT CHANGE UP (ref 0.5–1.3)
CREAT SERPL-MCNC: 0.65 MG/DL — SIGNIFICANT CHANGE UP (ref 0.5–1.3)
CULTURE RESULTS: ABNORMAL
CULTURE RESULTS: ABNORMAL
EGFR: 103 ML/MIN/1.73M2 — SIGNIFICANT CHANGE UP
EGFR: 103 ML/MIN/1.73M2 — SIGNIFICANT CHANGE UP
GLUCOSE BLDC GLUCOMTR-MCNC: 115 MG/DL — HIGH (ref 70–99)
GLUCOSE BLDC GLUCOMTR-MCNC: 115 MG/DL — HIGH (ref 70–99)
GLUCOSE BLDC GLUCOMTR-MCNC: 165 MG/DL — HIGH (ref 70–99)
GLUCOSE BLDC GLUCOMTR-MCNC: 165 MG/DL — HIGH (ref 70–99)
GLUCOSE SERPL-MCNC: 177 MG/DL — HIGH (ref 70–99)
GLUCOSE SERPL-MCNC: 177 MG/DL — HIGH (ref 70–99)
HCT VFR BLD CALC: 32.6 % — LOW (ref 39–50)
HCT VFR BLD CALC: 32.6 % — LOW (ref 39–50)
HGB BLD-MCNC: 10.6 G/DL — LOW (ref 13–17)
HGB BLD-MCNC: 10.6 G/DL — LOW (ref 13–17)
MAGNESIUM SERPL-MCNC: 1.8 MG/DL — SIGNIFICANT CHANGE UP (ref 1.6–2.6)
MAGNESIUM SERPL-MCNC: 1.8 MG/DL — SIGNIFICANT CHANGE UP (ref 1.6–2.6)
MCHC RBC-ENTMCNC: 28.6 PG — SIGNIFICANT CHANGE UP (ref 27–34)
MCHC RBC-ENTMCNC: 28.6 PG — SIGNIFICANT CHANGE UP (ref 27–34)
MCHC RBC-ENTMCNC: 32.5 GM/DL — SIGNIFICANT CHANGE UP (ref 32–36)
MCHC RBC-ENTMCNC: 32.5 GM/DL — SIGNIFICANT CHANGE UP (ref 32–36)
MCV RBC AUTO: 87.9 FL — SIGNIFICANT CHANGE UP (ref 80–100)
MCV RBC AUTO: 87.9 FL — SIGNIFICANT CHANGE UP (ref 80–100)
NRBC # BLD: 0 /100 WBCS — SIGNIFICANT CHANGE UP (ref 0–0)
NRBC # BLD: 0 /100 WBCS — SIGNIFICANT CHANGE UP (ref 0–0)
OB PNL STL: POSITIVE
OB PNL STL: POSITIVE
PHOSPHATE SERPL-MCNC: 2.5 MG/DL — SIGNIFICANT CHANGE UP (ref 2.5–4.5)
PHOSPHATE SERPL-MCNC: 2.5 MG/DL — SIGNIFICANT CHANGE UP (ref 2.5–4.5)
PLATELET # BLD AUTO: 225 K/UL — SIGNIFICANT CHANGE UP (ref 150–400)
PLATELET # BLD AUTO: 225 K/UL — SIGNIFICANT CHANGE UP (ref 150–400)
POTASSIUM SERPL-MCNC: 3.9 MMOL/L — SIGNIFICANT CHANGE UP (ref 3.5–5.3)
POTASSIUM SERPL-MCNC: 3.9 MMOL/L — SIGNIFICANT CHANGE UP (ref 3.5–5.3)
POTASSIUM SERPL-SCNC: 3.9 MMOL/L — SIGNIFICANT CHANGE UP (ref 3.5–5.3)
POTASSIUM SERPL-SCNC: 3.9 MMOL/L — SIGNIFICANT CHANGE UP (ref 3.5–5.3)
RBC # BLD: 3.71 M/UL — LOW (ref 4.2–5.8)
RBC # BLD: 3.71 M/UL — LOW (ref 4.2–5.8)
RBC # FLD: 14.2 % — SIGNIFICANT CHANGE UP (ref 10.3–14.5)
RBC # FLD: 14.2 % — SIGNIFICANT CHANGE UP (ref 10.3–14.5)
SODIUM SERPL-SCNC: 136 MMOL/L — SIGNIFICANT CHANGE UP (ref 135–145)
SODIUM SERPL-SCNC: 136 MMOL/L — SIGNIFICANT CHANGE UP (ref 135–145)
VANCOMYCIN TROUGH SERPL-MCNC: 10.1 UG/ML — SIGNIFICANT CHANGE UP (ref 10–20)
VANCOMYCIN TROUGH SERPL-MCNC: 10.1 UG/ML — SIGNIFICANT CHANGE UP (ref 10–20)
VANCOMYCIN TROUGH SERPL-MCNC: 10.5 UG/ML — SIGNIFICANT CHANGE UP (ref 10–20)
VANCOMYCIN TROUGH SERPL-MCNC: 10.5 UG/ML — SIGNIFICANT CHANGE UP (ref 10–20)
WBC # BLD: 5.53 K/UL — SIGNIFICANT CHANGE UP (ref 3.8–10.5)
WBC # BLD: 5.53 K/UL — SIGNIFICANT CHANGE UP (ref 3.8–10.5)
WBC # FLD AUTO: 5.53 K/UL — SIGNIFICANT CHANGE UP (ref 3.8–10.5)
WBC # FLD AUTO: 5.53 K/UL — SIGNIFICANT CHANGE UP (ref 3.8–10.5)

## 2023-12-31 PROCEDURE — 99232 SBSQ HOSP IP/OBS MODERATE 35: CPT

## 2023-12-31 RX ORDER — MUPIROCIN 20 MG/G
1 OINTMENT TOPICAL ONCE
Refills: 0 | Status: COMPLETED | OUTPATIENT
Start: 2023-12-31 | End: 2023-12-31

## 2023-12-31 RX ORDER — BACITRACIN ZINC 500 UNIT/G
1 OINTMENT IN PACKET (EA) TOPICAL ONCE
Refills: 0 | Status: COMPLETED | OUTPATIENT
Start: 2023-12-31 | End: 2023-12-31

## 2023-12-31 RX ORDER — SENNA PLUS 8.6 MG/1
15 TABLET ORAL AT BEDTIME
Refills: 0 | Status: DISCONTINUED | OUTPATIENT
Start: 2023-12-31 | End: 2023-12-31

## 2023-12-31 RX ORDER — OXYMETAZOLINE HYDROCHLORIDE 0.5 MG/ML
2 SPRAY NASAL ONCE
Refills: 0 | Status: COMPLETED | OUTPATIENT
Start: 2023-12-31 | End: 2023-12-31

## 2023-12-31 RX ORDER — VANCOMYCIN HCL 1 G
1500 VIAL (EA) INTRAVENOUS EVERY 12 HOURS
Refills: 0 | Status: DISCONTINUED | OUTPATIENT
Start: 2023-12-31 | End: 2024-01-01

## 2023-12-31 RX ORDER — LIDOCAINE 4 G/100G
1 CREAM TOPICAL ONCE
Refills: 0 | Status: COMPLETED | OUTPATIENT
Start: 2023-12-31 | End: 2023-12-31

## 2023-12-31 RX ORDER — SODIUM HYPOCHLORITE 0.125 %
1 SOLUTION, NON-ORAL MISCELLANEOUS
Refills: 0 | Status: DISCONTINUED | OUTPATIENT
Start: 2023-12-31 | End: 2024-01-01

## 2023-12-31 RX ORDER — BACITRACIN ZINC 500 UNIT/G
1 OINTMENT IN PACKET (EA) TOPICAL
Refills: 0 | Status: DISCONTINUED | OUTPATIENT
Start: 2023-12-31 | End: 2024-01-01

## 2023-12-31 RX ORDER — SODIUM CHLORIDE 0.65 %
1 AEROSOL, SPRAY (ML) NASAL ONCE
Refills: 0 | Status: DISCONTINUED | OUTPATIENT
Start: 2023-12-31 | End: 2023-12-31

## 2023-12-31 RX ORDER — POLYETHYLENE GLYCOL 3350 17 G/17G
17 POWDER, FOR SOLUTION ORAL
Refills: 0 | Status: DISCONTINUED | OUTPATIENT
Start: 2023-12-31 | End: 2023-12-31

## 2023-12-31 RX ORDER — OFLOXACIN OTIC SOLUTION 3 MG/ML
5 SOLUTION/ DROPS AURICULAR (OTIC) ONCE
Refills: 0 | Status: COMPLETED | OUTPATIENT
Start: 2023-12-31 | End: 2023-12-31

## 2023-12-31 RX ADMIN — Medication 650 MILLIGRAM(S): at 01:28

## 2023-12-31 RX ADMIN — OXYCODONE HYDROCHLORIDE 5 MILLIGRAM(S): 5 TABLET ORAL at 21:58

## 2023-12-31 RX ADMIN — CHLORHEXIDINE GLUCONATE 15 MILLILITER(S): 213 SOLUTION TOPICAL at 18:12

## 2023-12-31 RX ADMIN — Medication 1 APPLICATION(S): at 19:02

## 2023-12-31 RX ADMIN — Medication 1: at 12:00

## 2023-12-31 RX ADMIN — OXYCODONE HYDROCHLORIDE 5 MILLIGRAM(S): 5 TABLET ORAL at 22:00

## 2023-12-31 RX ADMIN — Medication 1 APPLICATION(S): at 15:25

## 2023-12-31 RX ADMIN — LIDOCAINE 1 APPLICATION(S): 4 CREAM TOPICAL at 15:22

## 2023-12-31 RX ADMIN — ATORVASTATIN CALCIUM 40 MILLIGRAM(S): 80 TABLET, FILM COATED ORAL at 21:58

## 2023-12-31 RX ADMIN — OXYMETAZOLINE HYDROCHLORIDE 2 SPRAY(S): 0.5 SPRAY NASAL at 15:23

## 2023-12-31 RX ADMIN — HYDROMORPHONE HYDROCHLORIDE 1 MILLIGRAM(S): 2 INJECTION INTRAMUSCULAR; INTRAVENOUS; SUBCUTANEOUS at 10:00

## 2023-12-31 RX ADMIN — Medication 166.67 MILLIGRAM(S): at 08:39

## 2023-12-31 RX ADMIN — PIPERACILLIN AND TAZOBACTAM 25 GRAM(S): 4; .5 INJECTION, POWDER, LYOPHILIZED, FOR SOLUTION INTRAVENOUS at 12:35

## 2023-12-31 RX ADMIN — MUPIROCIN 1 APPLICATION(S): 20 OINTMENT TOPICAL at 16:07

## 2023-12-31 RX ADMIN — HYDROMORPHONE HYDROCHLORIDE 1 MILLIGRAM(S): 2 INJECTION INTRAMUSCULAR; INTRAVENOUS; SUBCUTANEOUS at 09:02

## 2023-12-31 RX ADMIN — Medication 650 MILLIGRAM(S): at 00:28

## 2023-12-31 RX ADMIN — PIPERACILLIN AND TAZOBACTAM 25 GRAM(S): 4; .5 INJECTION, POWDER, LYOPHILIZED, FOR SOLUTION INTRAVENOUS at 07:29

## 2023-12-31 RX ADMIN — HYDROMORPHONE HYDROCHLORIDE 1 MILLIGRAM(S): 2 INJECTION INTRAMUSCULAR; INTRAVENOUS; SUBCUTANEOUS at 18:07

## 2023-12-31 RX ADMIN — OFLOXACIN OTIC SOLUTION 5 DROP(S): 3 SOLUTION/ DROPS AURICULAR (OTIC) at 15:23

## 2023-12-31 RX ADMIN — HYDROMORPHONE HYDROCHLORIDE 1 MILLIGRAM(S): 2 INJECTION INTRAMUSCULAR; INTRAVENOUS; SUBCUTANEOUS at 16:48

## 2023-12-31 RX ADMIN — Medication 1 APPLICATION(S): at 18:14

## 2023-12-31 RX ADMIN — Medication 1 APPLICATION(S): at 22:02

## 2023-12-31 RX ADMIN — CHLORHEXIDINE GLUCONATE 15 MILLILITER(S): 213 SOLUTION TOPICAL at 07:31

## 2023-12-31 RX ADMIN — Medication 1 MILLIGRAM(S): at 12:35

## 2023-12-31 RX ADMIN — Medication 81 MILLIGRAM(S): at 12:35

## 2023-12-31 RX ADMIN — PIPERACILLIN AND TAZOBACTAM 25 GRAM(S): 4; .5 INJECTION, POWDER, LYOPHILIZED, FOR SOLUTION INTRAVENOUS at 19:18

## 2023-12-31 RX ADMIN — PANTOPRAZOLE SODIUM 40 MILLIGRAM(S): 20 TABLET, DELAYED RELEASE ORAL at 22:00

## 2023-12-31 RX ADMIN — Medication 300 MILLIGRAM(S): at 19:01

## 2023-12-31 RX ADMIN — Medication 1 APPLICATION(S): at 13:03

## 2023-12-31 RX ADMIN — PANTOPRAZOLE SODIUM 40 MILLIGRAM(S): 20 TABLET, DELAYED RELEASE ORAL at 09:01

## 2023-12-31 NOTE — PROGRESS NOTE ADULT - SUBJECTIVE AND OBJECTIVE BOX
INFECTIOUS DISEASES CONSULT FOLLOW-UP NOTE    INTERVAL HPI/OVERNIGHT EVENTS:  Afebrile, vitals normal, WBC wnl  No new symptoms, tolerating abx    ROS:   Constitutional, eyes, ENT, cardiovascular, respiratory, gastrointestinal, genitourinary, integumentary, neurological, psychiatric and heme/lymph are otherwise negative other than noted above       ANTIBIOTICS/RELEVANT:    MEDICATIONS  (STANDING):  aspirin  chewable 81 milliGRAM(s) Enteral Tube daily  atorvastatin 40 milliGRAM(s) Oral at bedtime  bacitracin   Ointment 1 Application(s) Topical two times a day  chlorhexidine 0.12% Liquid 15 milliLiter(s) Oral Mucosa two times a day  Dakins Solution - 1/4 Strength 1 Application(s) Topical two times a day  Dakins Solution - Full Strength 1 Application(s) Topical two times a day  dextrose 5%. 1000 milliLiter(s) (100 mL/Hr) IV Continuous <Continuous>  dextrose 5%. 1000 milliLiter(s) (50 mL/Hr) IV Continuous <Continuous>  dextrose 50% Injectable 25 Gram(s) IV Push once  dextrose 50% Injectable 25 Gram(s) IV Push once  dextrose 50% Injectable 12.5 Gram(s) IV Push once  folic acid 1 milliGRAM(s) Enteral Tube daily  glucagon  Injectable 1 milliGRAM(s) IntraMuscular once  influenza  Vaccine (HIGH DOSE) 0.7 milliLiter(s) IntraMuscular once  insulin lispro (ADMELOG) corrective regimen sliding scale   SubCutaneous three times a day before meals  insulin lispro (ADMELOG) corrective regimen sliding scale   SubCutaneous at bedtime  pantoprazole  Injectable 40 milliGRAM(s) IV Push two times a day  piperacillin/tazobactam IVPB.. 4.5 Gram(s) IV Intermittent every 8 hours  vancomycin  IVPB 1500 milliGRAM(s) IV Intermittent every 12 hours    MEDICATIONS  (PRN):  acetaminophen     Tablet .. 650 milliGRAM(s) Oral every 6 hours PRN Mild Pain (1 - 3)  dextrose Oral Gel 15 Gram(s) Oral once PRN Blood Glucose LESS THAN 70 milliGRAM(s)/deciliter  HYDROmorphone  Injectable 1 milliGRAM(s) IV Push every 6 hours PRN Severe Pain (7 - 10)  lidocaine 2% Viscous 10 milliLiter(s) Swish and Spit every 4 hours PRN Mouth Care  ondansetron Injectable 4 milliGRAM(s) IV Push every 8 hours PRN Nausea  oxyCODONE    Solution 10 milliGRAM(s) Oral every 4 hours PRN Severe Pain (7 - 10)  oxyCODONE    Solution 5 milliGRAM(s) Oral every 4 hours PRN Moderate Pain (4 - 6)        Vital Signs Last 24 Hrs  T(C): 36.7 (31 Dec 2023 17:58), Max: 37.4 (30 Dec 2023 22:00)  T(F): 98.1 (31 Dec 2023 17:58), Max: 99.4 (30 Dec 2023 22:00)  HR: 66 (31 Dec 2023 15:41) (50 - 80)  BP: 94/50 (31 Dec 2023 15:41) (94/50 - 150/70)  BP(mean): 66 (31 Dec 2023 15:41) (66 - 101)  RR: 18 (31 Dec 2023 15:41) (17 - 20)  SpO2: 95% (31 Dec 2023 15:41) (95% - 99%)    Parameters below as of 31 Dec 2023 15:41  Patient On (Oxygen Delivery Method): tracheostomy collar  O2 Flow (L/min): 10  O2 Concentration (%): 40    12-30-23 @ 07:01  -  12-31-23 @ 07:00  --------------------------------------------------------  IN: 3320 mL / OUT: 1975 mL / NET: 1345 mL    12-31-23 @ 07:01  -  12-31-23 @ 19:29  --------------------------------------------------------  IN: 1540 mL / OUT: 650 mL / NET: 890 mL      PHYSICAL EXAM:  Constitutional: alert, NAD  Eyes: the sclera and conjunctiva were normal.   ENT: neck dressing c/d/i. Buccal packing  Neck: the appearance of the neck was normal and the neck was supple. Penrose w/ cloudy fluid  Pulmonary: no respiratory distress and lungs were clear to auscultation bilaterally.   Heart: heart rate was normal and rhythm regular, normal S1 and S2  Vascular:. there was no peripheral edema  Abdomen: normal bowel sounds, soft, non-tender  MSK: L foot in boot. RUE PIV superficial thrombophlebitis        LABS:                        10.6   5.53  )-----------( 225      ( 31 Dec 2023 05:19 )             32.6     12-31    136  |  104  |  8   ----------------------------<  177<H>  3.9   |  26  |  0.65    Ca    8.4      31 Dec 2023 05:19  Phos  2.5     12-31  Mg     1.8     12-31        Urinalysis Basic - ( 31 Dec 2023 05:19 )    Color: x / Appearance: x / SG: x / pH: x  Gluc: 177 mg/dL / Ketone: x  / Bili: x / Urobili: x   Blood: x / Protein: x / Nitrite: x   Leuk Esterase: x / RBC: x / WBC x   Sq Epi: x / Non Sq Epi: x / Bacteria: x        MICROBIOLOGY:  Reviewed    RADIOLOGY & ADDITIONAL STUDIES:  Reviewed INFECTIOUS DISEASES CONSULT FOLLOW-UP NOTE    INTERVAL HPI/OVERNIGHT EVENTS:  Afebrile, vitals normal, WBC wnl  No new symptoms, tolerating abx    ROS:   Constitutional, eyes, ENT, cardiovascular, respiratory, gastrointestinal, genitourinary, integumentary, neurological, psychiatric and heme/lymph are otherwise negative other than noted above       ANTIBIOTICS/RELEVANT:    MEDICATIONS  (STANDING):  aspirin  chewable 81 milliGRAM(s) Enteral Tube daily  atorvastatin 40 milliGRAM(s) Oral at bedtime  bacitracin   Ointment 1 Application(s) Topical two times a day  chlorhexidine 0.12% Liquid 15 milliLiter(s) Oral Mucosa two times a day  Dakins Solution - 1/4 Strength 1 Application(s) Topical two times a day  Dakins Solution - Full Strength 1 Application(s) Topical two times a day  dextrose 5%. 1000 milliLiter(s) (100 mL/Hr) IV Continuous <Continuous>  dextrose 5%. 1000 milliLiter(s) (50 mL/Hr) IV Continuous <Continuous>  dextrose 50% Injectable 25 Gram(s) IV Push once  dextrose 50% Injectable 25 Gram(s) IV Push once  dextrose 50% Injectable 12.5 Gram(s) IV Push once  folic acid 1 milliGRAM(s) Enteral Tube daily  glucagon  Injectable 1 milliGRAM(s) IntraMuscular once  influenza  Vaccine (HIGH DOSE) 0.7 milliLiter(s) IntraMuscular once  insulin lispro (ADMELOG) corrective regimen sliding scale   SubCutaneous three times a day before meals  insulin lispro (ADMELOG) corrective regimen sliding scale   SubCutaneous at bedtime  pantoprazole  Injectable 40 milliGRAM(s) IV Push two times a day  piperacillin/tazobactam IVPB.. 4.5 Gram(s) IV Intermittent every 8 hours  vancomycin  IVPB 1500 milliGRAM(s) IV Intermittent every 12 hours    MEDICATIONS  (PRN):  acetaminophen     Tablet .. 650 milliGRAM(s) Oral every 6 hours PRN Mild Pain (1 - 3)  dextrose Oral Gel 15 Gram(s) Oral once PRN Blood Glucose LESS THAN 70 milliGRAM(s)/deciliter  HYDROmorphone  Injectable 1 milliGRAM(s) IV Push every 6 hours PRN Severe Pain (7 - 10)  lidocaine 2% Viscous 10 milliLiter(s) Swish and Spit every 4 hours PRN Mouth Care  ondansetron Injectable 4 milliGRAM(s) IV Push every 8 hours PRN Nausea  oxyCODONE    Solution 10 milliGRAM(s) Oral every 4 hours PRN Severe Pain (7 - 10)  oxyCODONE    Solution 5 milliGRAM(s) Oral every 4 hours PRN Moderate Pain (4 - 6)        Vital Signs Last 24 Hrs  T(C): 36.7 (31 Dec 2023 17:58), Max: 37.4 (30 Dec 2023 22:00)  T(F): 98.1 (31 Dec 2023 17:58), Max: 99.4 (30 Dec 2023 22:00)  HR: 66 (31 Dec 2023 15:41) (50 - 80)  BP: 94/50 (31 Dec 2023 15:41) (94/50 - 150/70)  BP(mean): 66 (31 Dec 2023 15:41) (66 - 101)  RR: 18 (31 Dec 2023 15:41) (17 - 20)  SpO2: 95% (31 Dec 2023 15:41) (95% - 99%)    Parameters below as of 31 Dec 2023 15:41  Patient On (Oxygen Delivery Method): tracheostomy collar  O2 Flow (L/min): 10  O2 Concentration (%): 40    12-30-23 @ 07:01  -  12-31-23 @ 07:00  --------------------------------------------------------  IN: 3320 mL / OUT: 1975 mL / NET: 1345 mL    12-31-23 @ 07:01  -  12-31-23 @ 19:29  --------------------------------------------------------  IN: 1540 mL / OUT: 650 mL / NET: 890 mL      PHYSICAL EXAM:  Constitutional: alert, NAD  Eyes: the sclera and conjunctiva were normal.   ENT: neck dressing c/d/i. Buccal packing  Neck: the appearance of the neck was normal and the neck was supple. Penrose w/ cloudy fluid  Pulmonary: no respiratory distress and lungs were clear to auscultation bilaterally.   Heart: heart rate was normal and rhythm regular, normal S1 and S2  Vascular:. there was no peripheral edema  Abdomen: normal bowel sounds, soft, non-tender  MSK: L foot in boot. RUE PIV superficial thrombophlebitis w/ palpable cord and mild overlying erythema        LABS:                        10.6   5.53  )-----------( 225      ( 31 Dec 2023 05:19 )             32.6     12-31    136  |  104  |  8   ----------------------------<  177<H>  3.9   |  26  |  0.65    Ca    8.4      31 Dec 2023 05:19  Phos  2.5     12-31  Mg     1.8     12-31        Urinalysis Basic - ( 31 Dec 2023 05:19 )    Color: x / Appearance: x / SG: x / pH: x  Gluc: 177 mg/dL / Ketone: x  / Bili: x / Urobili: x   Blood: x / Protein: x / Nitrite: x   Leuk Esterase: x / RBC: x / WBC x   Sq Epi: x / Non Sq Epi: x / Bacteria: x        MICROBIOLOGY:  Reviewed    RADIOLOGY & ADDITIONAL STUDIES:  Reviewed No significant past surgical history

## 2023-12-31 NOTE — PROGRESS NOTE ADULT - ASSESSMENT
68M w/ CAD s/p PCI, DM2, renal calculi, psoriasis and SCC of L buccal mucosa s/p L hemimandibulectomy, reconstruction w/ fibular free flap and STSG from L thigh with dental implants. Admitted 12/27 with SSI, s/p DONNA 12/27 with finding of skin flap necrosis s/p debridement. OR cultures with E.coli, ECC, E.faecalis S.mitis/oralis, S.epi, S.constellatus, S.maltophilia, mixed anaerobes. Requested sensis for GNRs from micro today.    Recommendations:  - Increase vancomycin to 1.5g IV q12h with trough before 3rd dose   - Continue zosyn 4.5g IV q8h EI  - Recommend removal of L antecubital PIV and apply warm compress for superficial thrombophlebitis    ID team 2 will follow over the holiday weekend. ID team 1 will resume care on 1/2. 68M w/ CAD s/p PCI, DM2, renal calculi, psoriasis and SCC of L buccal mucosa s/p L hemimandibulectomy, reconstruction w/ fibular free flap and STSG from L thigh with dental implants. Admitted 12/27 with SSI, s/p DONNA 12/27 with finding of skin flap necrosis s/p debridement. OR cultures with E.coli, ECC, E.faecalis S.mitis/oralis, S.epi, S.constellatus, S.maltophilia, mixed anaerobes. Requested sensis for GNRs from micro today.    Recommendations:  - Increase vancomycin to 1.5g IV q12h with trough before 3rd dose   - Continue zosyn 4.5g IV q8h EI  - Recommend removal of R antecubital PIV and apply warm compress for superficial thrombophlebitis    ID team 2 will follow over the holiday weekend. ID team 1 will resume care on 1/2.

## 2023-12-31 NOTE — PROGRESS NOTE ADULT - SUBJECTIVE AND OBJECTIVE BOX
OTOLARYNGOLOGY (ENT) PROGRESS NOTE    ID: SAUNDRA HOLMAN is a  67yo M w PMH of CAD (x2 stents Mar 2023), HLD, T2DM, hx of kidney stones, psoriasis who presented with an oral mass and underwent L hemimandibulectomy, SND L level 1-3, recon with L FFF, STSG, and placement of dental implants on 12/7. He had a trach which was subsequently decannulated. He returns 12/27 with surgical site infection now s/p OR for debridement and exploration. Trach replaced through prior stoma for pulmonary toilet.     Subjective/ Interval:   12/28; Patient seen this morning, oral pack to be changed, penrose dressing changed. Intraoral flap with partal skin necrosis, 7.5 portex in place with cuff deflated   12/29: Patient seen and examined at bedside. NAEO. Patient remains afebrile and HD stable. HgB noted to drop to 7.6 from 9.2 but no additional episodes of melena. Penrose draining purulent material. Intra-oral infection/flap stable. Packing changed at bedside. No other complaints or changes at this time. ID recommended Vanc/zosyn and DC unasyn and flagyl, and IM recommended reticulocyte studies and adding folate supplements. No other changes at this time.   12/30: AFVSS. No acute events overnight. Patient seen and examined at bedside. C/w Vanc/Zosyn per ID recs, pending sensitivities. Growing staph epi, E coli, enterobacter from wound cx on 12/27. S/p 2U PRBC yday w appropriate response in Hgb. Transfusion goal > 9.0.  12/31: AFVSS. No acute events overnight. Patient seen and examined at bedside. C/w Vanc/Zosyn per ID recs, pending sens. Hgb stable this morning.    PHYSICAL EXAM:  GEN: appears stated age, NAD  NEURO: alert & oriented x 4/4  HEENT: Remnant left FFF paddle oozing, Dakins soaked packing in place in buccal cavity,   Neck: 7.5 Portex trach with cuff deflated, Penrose draining purulent fluid, skin loosely reapproximated with silk suture, Kerlix dressing in place minimally saturated  CVS: regular rate and rhythm  Pulm: normal respiratory excursions, not tachypneic, no labored breathing  Abd: non-distended  Ext: moving all four extremities, no peripheral edema noted      LABS                       10.6   5.53  )-----------( 225      ( 31 Dec 2023 05:19 )             32.6    12-31    136  |  104  |  8   ----------------------------<  177<H>  3.9   |  26  |  0.65    Ca    8.4      31 Dec 2023 05:19  Phos  2.5     12-31  Mg     1.8     12-31           Coagulation Studies-     Urinalysis Basic - ( 31 Dec 2023 05:19 )    Color: x / Appearance: x / SG: x / pH: x  Gluc: 177 mg/dL / Ketone: x  / Bili: x / Urobili: x   Blood: x / Protein: x / Nitrite: x   Leuk Esterase: x / RBC: x / WBC x   Sq Epi: x / Non Sq Epi: x / Bacteria: x      Endocrine Panel-  Calcium: 8.4 mg/dL (12-31 @ 05:19)                MICROBIOLOGY:  Culture - Surgical Swab (collected 12-27-23 @ 20:46)  Source: .Surgical Swab 1. Left Oral Cavity  Gram Stain (12-27-23 @ 21:53):    Numerous Gram positive cocci in pairs, chains and clusters    Moderate Gram Positive Rods    Moderate Gram Negative Rods    Numerous White blood cells  Final Report (12-30-23 @ 15:21):    Moderate Escherichia coli    Moderate Enterobacter cloacae complex    Few Enterococcus faecalis    Few Streptococcus mitis/oralis group    Few Staphylococcus epidermidis    Few Streptococcus constellatus    Few Stenotrophomonas maltophilia    Mixed Anaerobic Joy including    Few Prevotella species (most closely resembles Prevotella barnaie)    Few Prevotella denticola    Culture grew 3 or more types of organisms which indicate collection    contamination; consider recollection only if clinically indicated.  Organism: Enterococcus faecalis (12-30-23 @ 15:21)  Organism: Enterococcus faecalis (12-30-23 @ 15:21)      -  Vancomycin: S 4      -  Ampicillin: S <=2 Predicts results to ampicillin/sulbactam, amoxacillin-clavulanate and  piperacillin-tazobactam.      Method Type: OTTO      Culture Results:   No growth at 2 days. (12-28-23 @ 10:08)  Culture Results:   Moderate Escherichia coli  Moderate Enterobacter cloacae complex  Few Enterococcus faecalis  Few Streptococcus mitis/oralis group  Few Staphylococcus epidermidis  Few Streptococcus constellatus  Few Stenotrophomonas maltophilia  Mixed Anaerobic Joy including  Few Prevotella species (most closely resembles Prevotella barnaie)  Few Prevotella denticola  Culture grew 3 or more types of organisms which indicate collection  contamination; consider recollection only if clinically indicated. (12-27-23 @ 20:46)      Culture - Blood (collected 12-28-23 @ 10:08)  Source: .Blood Blood-Peripheral  Preliminary Report (12-30-23 @ 11:00):    No growth at 2 days.    Culture - Surgical Swab (collected 12-27-23 @ 20:46)  Source: .Surgical Swab 1. Left Oral Cavity  Gram Stain (12-27-23 @ 21:53):    Numerous Gram positive cocci in pairs, chains and clusters    Moderate Gram Positive Rods    Moderate Gram Negative Rods    Numerous White blood cells  Final Report (12-30-23 @ 15:21):    Moderate Escherichia coli    Moderate Enterobacter cloacae complex    Few Enterococcus faecalis    Few Streptococcus mitis/oralis group    Few Staphylococcus epidermidis    Few Streptococcus constellatus    Few Stenotrophomonas maltophilia    Mixed Anaerobic Joy including    Few Prevotella species (most closely resembles Prevotella barnaie)    Few Prevotella denticola    Culture grew 3 or more types of organisms which indicate collection    contamination; consider recollection only if clinically indicated.  Organism: Enterococcus faecalis (12-30-23 @ 15:21)  Organism: Enterococcus faecalis (12-30-23 @ 15:21)      -  Vancomycin: S 4      -  Ampicillin: S <=2 Predicts results to ampicillin/sulbactam, amoxacillin-clavulanate and  piperacillin-tazobactam.      Method Type: OTTO

## 2023-12-31 NOTE — PROGRESS NOTE ADULT - ASSESSMENT
Assessment and Plan:  SAUNDRA HOLMAN is a 69yo M w PMH of CAD (x2 stents Mar 2023), HLD, T2DM, hx of kidney stones, psoriasis who presented with an oral mass and underwent L hemimandibulectomy, SND L level 1-3, recon with L FFF, STSG, and placement of dental implants on 12/7. He had a trach which was subsequently decannulated. Now s/p OR for debridement and exploration. Trach replaced through prior stoma for pulmonary toilet.    Plan:  - Hgb goal > 9.0  - Irrigate wound with 1/4 strength Dakins  - Pack wound with full strength Dakins soaked packing BID  - C/w Vanc / Zosyn  - Follow up reticulocyte count  - Appreciate GI recs, IM recs, and ID recs  - Restart tube feeds likely today if no OR plan  - Continue home asa  - Hold home plavix  - c/w HSQ  - Maintain 7.5 portex for pulm toilet  - ISS  - Pain control  - Home meds  - Bowel regimen

## 2023-12-31 NOTE — PROGRESS NOTE ADULT - SUBJECTIVE AND OBJECTIVE BOX
SAUNDRA PINEDAGOTIS , [id number],  [address] [address]    Time of encounter :    CC :    T(C): 36.8 (12-31-23 @ 04:40), Max: 37.4 (12-30-23 @ 22:00)  HR: 68 (12-31-23 @ 08:56) (46 - 68)  BP: 112/55 (12-31-23 @ 08:56) (110/55 - 150/70)  RR: 20 (12-31-23 @ 08:56) (17 - 20)  SpO2: 99% (12-31-23 @ 08:56) (96% - 99%)    acetaminophen     Tablet .. 650 milliGRAM(s) Oral every 6 hours PRN  aspirin  chewable 81 milliGRAM(s) Enteral Tube daily  atorvastatin 40 milliGRAM(s) Oral at bedtime  chlorhexidine 0.12% Liquid 15 milliLiter(s) Oral Mucosa two times a day  Dakins Solution - 1/4 Strength 1 Application(s) Topical two times a day  Dakins Solution - Full Strength 1 Application(s) Topical two times a day  dextrose 5%. 1000 milliLiter(s) IV Continuous <Continuous>  dextrose 5%. 1000 milliLiter(s) IV Continuous <Continuous>  dextrose 50% Injectable 12.5 Gram(s) IV Push once  dextrose 50% Injectable 25 Gram(s) IV Push once  dextrose 50% Injectable 25 Gram(s) IV Push once  dextrose Oral Gel 15 Gram(s) Oral once PRN  folic acid 1 milliGRAM(s) Enteral Tube daily  glucagon  Injectable 1 milliGRAM(s) IntraMuscular once  HYDROmorphone  Injectable 1 milliGRAM(s) IV Push every 6 hours PRN  influenza  Vaccine (HIGH DOSE) 0.7 milliLiter(s) IntraMuscular once  insulin lispro (ADMELOG) corrective regimen sliding scale   SubCutaneous at bedtime  insulin lispro (ADMELOG) corrective regimen sliding scale   SubCutaneous three times a day before meals  lidocaine 2% Viscous 10 milliLiter(s) Swish and Spit every 4 hours PRN  ondansetron Injectable 4 milliGRAM(s) IV Push every 8 hours PRN  oxyCODONE    Solution 10 milliGRAM(s) Oral every 4 hours PRN  oxyCODONE    Solution 5 milliGRAM(s) Oral every 4 hours PRN  pantoprazole  Injectable 40 milliGRAM(s) IV Push two times a day  piperacillin/tazobactam IVPB.. 4.5 Gram(s) IV Intermittent every 8 hours  polyethylene glycol 3350 17 Gram(s) Oral two times a day  senna Syrup 15 milliLiter(s) Oral at bedtime  vancomycin  IVPB 1250 milliGRAM(s) IV Intermittent every 12 hours      Physical Exam :    General exam :  well built, not in distress, saturating well on room air.  Eyes : EOMI, PERRL   CVS : RRR no murmur, JVP not elevated  Lungs : good air entry bilaterally   Abdomen : BS present, soft, not tender, not distended.  Extremities: no edema, no tenderness.  Neuro : AAO x 3 non focal.  Skin : warm and dry.   :  no young.      CBC Full  -  ( 31 Dec 2023 05:19 )  WBC Count : 5.53 K/uL  RBC Count : 3.71 M/uL  Hemoglobin : 10.6 g/dL  Hematocrit : 32.6 %  Platelet Count - Automated : 225 K/uL  Mean Cell Volume : 87.9 fl  Mean Cell Hemoglobin : 28.6 pg  Mean Cell Hemoglobin Concentration : 32.5 gm/dL  Auto Neutrophil # : x  Auto Lymphocyte # : x  Auto Monocyte # : x  Auto Eosinophil # : x  Auto Basophil # : x  Auto Neutrophil % : x  Auto Lymphocyte % : x  Auto Monocyte % : x  Auto Eosinophil % : x  Auto Basophil % : x        12-31    136  |  104  |  8   ----------------------------<  177<H>  3.9   |  26  |  0.65    Ca    8.4      31 Dec 2023 05:19  Phos  2.5     12-31  Mg     1.8     12-31      Daily     Daily   CAPILLARY BLOOD GLUCOSE      POCT Blood Glucose.: 173 mg/dL (30 Dec 2023 22:31)      Culture - Blood (collected 28 Dec 2023 10:08)  Source: .Blood Blood-Peripheral  Preliminary Report (30 Dec 2023 11:00):    No growth at 2 days.      Urinalysis Basic - ( 31 Dec 2023 05:19 )    Color: x / Appearance: x / SG: x / pH: x  Gluc: 177 mg/dL / Ketone: x  / Bili: x / Urobili: x   Blood: x / Protein: x / Nitrite: x   Leuk Esterase: x / RBC: x / WBC x   Sq Epi: x / Non Sq Epi: x / Bacteria: x                   SAUNDRA PINEDAGOTIS , [id number],  [address] [address]    Time of encounter :    CC :    T(C): 36.8 (12-31-23 @ 04:40), Max: 37.4 (12-30-23 @ 22:00)  HR: 68 (12-31-23 @ 08:56) (46 - 68)  BP: 112/55 (12-31-23 @ 08:56) (110/55 - 150/70)  RR: 20 (12-31-23 @ 08:56) (17 - 20)  SpO2: 99% (12-31-23 @ 08:56) (96% - 99%)    acetaminophen     Tablet .. 650 milliGRAM(s) Oral every 6 hours PRN  aspirin  chewable 81 milliGRAM(s) Enteral Tube daily  atorvastatin 40 milliGRAM(s) Oral at bedtime  chlorhexidine 0.12% Liquid 15 milliLiter(s) Oral Mucosa two times a day  Dakins Solution - 1/4 Strength 1 Application(s) Topical two times a day  Dakins Solution - Full Strength 1 Application(s) Topical two times a day  dextrose 5%. 1000 milliLiter(s) IV Continuous <Continuous>  dextrose 5%. 1000 milliLiter(s) IV Continuous <Continuous>  dextrose 50% Injectable 12.5 Gram(s) IV Push once  dextrose 50% Injectable 25 Gram(s) IV Push once  dextrose 50% Injectable 25 Gram(s) IV Push once  dextrose Oral Gel 15 Gram(s) Oral once PRN  folic acid 1 milliGRAM(s) Enteral Tube daily  glucagon  Injectable 1 milliGRAM(s) IntraMuscular once  HYDROmorphone  Injectable 1 milliGRAM(s) IV Push every 6 hours PRN  influenza  Vaccine (HIGH DOSE) 0.7 milliLiter(s) IntraMuscular once  insulin lispro (ADMELOG) corrective regimen sliding scale   SubCutaneous at bedtime  insulin lispro (ADMELOG) corrective regimen sliding scale   SubCutaneous three times a day before meals  lidocaine 2% Viscous 10 milliLiter(s) Swish and Spit every 4 hours PRN  ondansetron Injectable 4 milliGRAM(s) IV Push every 8 hours PRN  oxyCODONE    Solution 10 milliGRAM(s) Oral every 4 hours PRN  oxyCODONE    Solution 5 milliGRAM(s) Oral every 4 hours PRN  pantoprazole  Injectable 40 milliGRAM(s) IV Push two times a day  piperacillin/tazobactam IVPB.. 4.5 Gram(s) IV Intermittent every 8 hours  polyethylene glycol 3350 17 Gram(s) Oral two times a day  senna Syrup 15 milliLiter(s) Oral at bedtime  vancomycin  IVPB 1250 milliGRAM(s) IV Intermittent every 12 hours      Physical Exam :    General exam :  well built, not in distress, saturating well on room air.  Eyes : EOMI, PERRL   CVS : RRR no murmur, JVP not elevated  Lungs : good air entry bilaterally   Abdomen : BS present, soft, not tender, not distended.  Extremities: no edema, no tenderness.  Neuro : AAO x 3 non focal.  Skin : warm and dry.   :  no young.      CBC Full  -  ( 31 Dec 2023 05:19 )  WBC Count : 5.53 K/uL  RBC Count : 3.71 M/uL  Hemoglobin : 10.6 g/dL  Hematocrit : 32.6 %  Platelet Count - Automated : 225 K/uL  Mean Cell Volume : 87.9 fl  Mean Cell Hemoglobin : 28.6 pg  Mean Cell Hemoglobin Concentration : 32.5 gm/dL  Auto Neutrophil # : x  Auto Lymphocyte # : x  Auto Monocyte # : x  Auto Eosinophil # : x  Auto Basophil # : x  Auto Neutrophil % : x  Auto Lymphocyte % : x  Auto Monocyte % : x  Auto Eosinophil % : x  Auto Basophil % : x        12-31    136  |  104  |  8   ----------------------------<  177<H>  3.9   |  26  |  0.65    Ca    8.4      31 Dec 2023 05:19  Phos  2.5     12-31  Mg     1.8     12-31      Daily     Daily   CAPILLARY BLOOD GLUCOSE      POCT Blood Glucose.: 173 mg/dL (30 Dec 2023 22:31)      Culture - Blood (collected 28 Dec 2023 10:08)  Source: .Blood Blood-Peripheral  Preliminary Report (30 Dec 2023 11:00):    No growth at 2 days.      Urinalysis Basic - ( 31 Dec 2023 05:19 )    Color: x / Appearance: x / SG: x / pH: x  Gluc: 177 mg/dL / Ketone: x  / Bili: x / Urobili: x   Blood: x / Protein: x / Nitrite: x   Leuk Esterase: x / RBC: x / WBC x   Sq Epi: x / Non Sq Epi: x / Bacteria: x

## 2024-01-01 ENCOUNTER — TRANSCRIPTION ENCOUNTER (OUTPATIENT)
Age: 69
End: 2024-01-01

## 2024-01-01 LAB
-  AMPICILLIN/SULBACTAM: SIGNIFICANT CHANGE UP
-  AMPICILLIN/SULBACTAM: SIGNIFICANT CHANGE UP
-  AMPICILLIN: SIGNIFICANT CHANGE UP
-  AMPICILLIN: SIGNIFICANT CHANGE UP
-  CEFAZOLIN: SIGNIFICANT CHANGE UP
-  CEFAZOLIN: SIGNIFICANT CHANGE UP
-  CEFEPIME: SIGNIFICANT CHANGE UP
-  CEFEPIME: SIGNIFICANT CHANGE UP
-  CEFOXITIN: SIGNIFICANT CHANGE UP
-  CEFOXITIN: SIGNIFICANT CHANGE UP
-  CEFTRIAXONE: SIGNIFICANT CHANGE UP
-  CIPROFLOXACIN: SIGNIFICANT CHANGE UP
-  CIPROFLOXACIN: SIGNIFICANT CHANGE UP
-  ERTAPENEM: SIGNIFICANT CHANGE UP
-  ERTAPENEM: SIGNIFICANT CHANGE UP
-  GENTAMICIN: SIGNIFICANT CHANGE UP
-  GENTAMICIN: SIGNIFICANT CHANGE UP
-  LEVOFLOXACIN: SIGNIFICANT CHANGE UP
-  LEVOFLOXACIN: SIGNIFICANT CHANGE UP
-  MEROPENEM: SIGNIFICANT CHANGE UP
-  MEROPENEM: SIGNIFICANT CHANGE UP
-  MINOCYCLINE: SIGNIFICANT CHANGE UP
-  MINOCYCLINE: SIGNIFICANT CHANGE UP
-  PENICILLIN: SIGNIFICANT CHANGE UP
-  PENICILLIN: SIGNIFICANT CHANGE UP
-  PIPERACILLIN/TAZOBACTAM: SIGNIFICANT CHANGE UP
-  PIPERACILLIN/TAZOBACTAM: SIGNIFICANT CHANGE UP
-  TOBRAMYCIN: SIGNIFICANT CHANGE UP
-  TOBRAMYCIN: SIGNIFICANT CHANGE UP
-  TRIMETHOPRIM/SULFAMETHOXAZOLE: SIGNIFICANT CHANGE UP
ANION GAP SERPL CALC-SCNC: 10 MMOL/L — SIGNIFICANT CHANGE UP (ref 5–17)
ANION GAP SERPL CALC-SCNC: 10 MMOL/L — SIGNIFICANT CHANGE UP (ref 5–17)
BUN SERPL-MCNC: 10 MG/DL — SIGNIFICANT CHANGE UP (ref 7–23)
BUN SERPL-MCNC: 10 MG/DL — SIGNIFICANT CHANGE UP (ref 7–23)
CALCIUM SERPL-MCNC: 8.7 MG/DL — SIGNIFICANT CHANGE UP (ref 8.4–10.5)
CALCIUM SERPL-MCNC: 8.7 MG/DL — SIGNIFICANT CHANGE UP (ref 8.4–10.5)
CHLORIDE SERPL-SCNC: 99 MMOL/L — SIGNIFICANT CHANGE UP (ref 96–108)
CHLORIDE SERPL-SCNC: 99 MMOL/L — SIGNIFICANT CHANGE UP (ref 96–108)
CO2 SERPL-SCNC: 26 MMOL/L — SIGNIFICANT CHANGE UP (ref 22–31)
CO2 SERPL-SCNC: 26 MMOL/L — SIGNIFICANT CHANGE UP (ref 22–31)
CREAT SERPL-MCNC: 0.61 MG/DL — SIGNIFICANT CHANGE UP (ref 0.5–1.3)
CREAT SERPL-MCNC: 0.61 MG/DL — SIGNIFICANT CHANGE UP (ref 0.5–1.3)
CULTURE RESULTS: ABNORMAL
CULTURE RESULTS: ABNORMAL
EGFR: 105 ML/MIN/1.73M2 — SIGNIFICANT CHANGE UP
EGFR: 105 ML/MIN/1.73M2 — SIGNIFICANT CHANGE UP
GLUCOSE BLDC GLUCOMTR-MCNC: 103 MG/DL — HIGH (ref 70–99)
GLUCOSE BLDC GLUCOMTR-MCNC: 103 MG/DL — HIGH (ref 70–99)
GLUCOSE BLDC GLUCOMTR-MCNC: 106 MG/DL — HIGH (ref 70–99)
GLUCOSE BLDC GLUCOMTR-MCNC: 106 MG/DL — HIGH (ref 70–99)
GLUCOSE BLDC GLUCOMTR-MCNC: 184 MG/DL — HIGH (ref 70–99)
GLUCOSE BLDC GLUCOMTR-MCNC: 184 MG/DL — HIGH (ref 70–99)
GLUCOSE BLDC GLUCOMTR-MCNC: 207 MG/DL — HIGH (ref 70–99)
GLUCOSE BLDC GLUCOMTR-MCNC: 207 MG/DL — HIGH (ref 70–99)
GLUCOSE SERPL-MCNC: 145 MG/DL — HIGH (ref 70–99)
GLUCOSE SERPL-MCNC: 145 MG/DL — HIGH (ref 70–99)
HCT VFR BLD CALC: 32.9 % — LOW (ref 39–50)
HCT VFR BLD CALC: 32.9 % — LOW (ref 39–50)
HGB BLD-MCNC: 10.7 G/DL — LOW (ref 13–17)
HGB BLD-MCNC: 10.7 G/DL — LOW (ref 13–17)
MAGNESIUM SERPL-MCNC: 1.8 MG/DL — SIGNIFICANT CHANGE UP (ref 1.6–2.6)
MAGNESIUM SERPL-MCNC: 1.8 MG/DL — SIGNIFICANT CHANGE UP (ref 1.6–2.6)
MCHC RBC-ENTMCNC: 29 PG — SIGNIFICANT CHANGE UP (ref 27–34)
MCHC RBC-ENTMCNC: 29 PG — SIGNIFICANT CHANGE UP (ref 27–34)
MCHC RBC-ENTMCNC: 32.5 GM/DL — SIGNIFICANT CHANGE UP (ref 32–36)
MCHC RBC-ENTMCNC: 32.5 GM/DL — SIGNIFICANT CHANGE UP (ref 32–36)
MCV RBC AUTO: 89.2 FL — SIGNIFICANT CHANGE UP (ref 80–100)
MCV RBC AUTO: 89.2 FL — SIGNIFICANT CHANGE UP (ref 80–100)
METHOD TYPE: SIGNIFICANT CHANGE UP
NRBC # BLD: 0 /100 WBCS — SIGNIFICANT CHANGE UP (ref 0–0)
NRBC # BLD: 0 /100 WBCS — SIGNIFICANT CHANGE UP (ref 0–0)
ORGANISM # SPEC MICROSCOPIC CNT: ABNORMAL
ORGANISM # SPEC MICROSCOPIC CNT: SIGNIFICANT CHANGE UP
PHOSPHATE SERPL-MCNC: 2.3 MG/DL — LOW (ref 2.5–4.5)
PHOSPHATE SERPL-MCNC: 2.3 MG/DL — LOW (ref 2.5–4.5)
PLATELET # BLD AUTO: 255 K/UL — SIGNIFICANT CHANGE UP (ref 150–400)
PLATELET # BLD AUTO: 255 K/UL — SIGNIFICANT CHANGE UP (ref 150–400)
POTASSIUM SERPL-MCNC: 4 MMOL/L — SIGNIFICANT CHANGE UP (ref 3.5–5.3)
POTASSIUM SERPL-MCNC: 4 MMOL/L — SIGNIFICANT CHANGE UP (ref 3.5–5.3)
POTASSIUM SERPL-SCNC: 4 MMOL/L — SIGNIFICANT CHANGE UP (ref 3.5–5.3)
POTASSIUM SERPL-SCNC: 4 MMOL/L — SIGNIFICANT CHANGE UP (ref 3.5–5.3)
RBC # BLD: 3.69 M/UL — LOW (ref 4.2–5.8)
RBC # BLD: 3.69 M/UL — LOW (ref 4.2–5.8)
RBC # FLD: 14.4 % — SIGNIFICANT CHANGE UP (ref 10.3–14.5)
RBC # FLD: 14.4 % — SIGNIFICANT CHANGE UP (ref 10.3–14.5)
SODIUM SERPL-SCNC: 135 MMOL/L — SIGNIFICANT CHANGE UP (ref 135–145)
SODIUM SERPL-SCNC: 135 MMOL/L — SIGNIFICANT CHANGE UP (ref 135–145)
WBC # BLD: 7 K/UL — SIGNIFICANT CHANGE UP (ref 3.8–10.5)
WBC # BLD: 7 K/UL — SIGNIFICANT CHANGE UP (ref 3.8–10.5)
WBC # FLD AUTO: 7 K/UL — SIGNIFICANT CHANGE UP (ref 3.8–10.5)
WBC # FLD AUTO: 7 K/UL — SIGNIFICANT CHANGE UP (ref 3.8–10.5)

## 2024-01-01 PROCEDURE — 99233 SBSQ HOSP IP/OBS HIGH 50: CPT

## 2024-01-01 PROCEDURE — 99232 SBSQ HOSP IP/OBS MODERATE 35: CPT

## 2024-01-01 RX ORDER — ACETAMINOPHEN 500 MG
1000 TABLET ORAL EVERY 6 HOURS
Refills: 0 | Status: DISCONTINUED | OUTPATIENT
Start: 2024-01-01 | End: 2024-01-02

## 2024-01-01 RX ORDER — SODIUM HYPOCHLORITE 0.125 %
1 SOLUTION, NON-ORAL MISCELLANEOUS
Refills: 0 | Status: DISCONTINUED | OUTPATIENT
Start: 2024-01-01 | End: 2024-01-02

## 2024-01-01 RX ORDER — IMIPENEM AND CILASTATIN 250; 250 MG/100ML; MG/100ML
1000 INJECTION, POWDER, FOR SOLUTION INTRAVENOUS EVERY 8 HOURS
Refills: 0 | Status: DISCONTINUED | OUTPATIENT
Start: 2024-01-01 | End: 2024-01-09

## 2024-01-01 RX ADMIN — Medication 1 APPLICATION(S): at 06:48

## 2024-01-01 RX ADMIN — OXYCODONE HYDROCHLORIDE 10 MILLIGRAM(S): 5 TABLET ORAL at 08:08

## 2024-01-01 RX ADMIN — Medication 1 MILLIGRAM(S): at 11:43

## 2024-01-01 RX ADMIN — HYDROMORPHONE HYDROCHLORIDE 1 MILLIGRAM(S): 2 INJECTION INTRAMUSCULAR; INTRAVENOUS; SUBCUTANEOUS at 07:26

## 2024-01-01 RX ADMIN — Medication 81 MILLIGRAM(S): at 11:43

## 2024-01-01 RX ADMIN — PIPERACILLIN AND TAZOBACTAM 25 GRAM(S): 4; .5 INJECTION, POWDER, LYOPHILIZED, FOR SOLUTION INTRAVENOUS at 03:41

## 2024-01-01 RX ADMIN — Medication 62.5 MILLIMOLE(S): at 09:44

## 2024-01-01 RX ADMIN — PIPERACILLIN AND TAZOBACTAM 25 GRAM(S): 4; .5 INJECTION, POWDER, LYOPHILIZED, FOR SOLUTION INTRAVENOUS at 11:43

## 2024-01-01 RX ADMIN — PANTOPRAZOLE SODIUM 40 MILLIGRAM(S): 20 TABLET, DELAYED RELEASE ORAL at 21:46

## 2024-01-01 RX ADMIN — Medication 1 APPLICATION(S): at 06:57

## 2024-01-01 RX ADMIN — OXYCODONE HYDROCHLORIDE 5 MILLIGRAM(S): 5 TABLET ORAL at 22:35

## 2024-01-01 RX ADMIN — CHLORHEXIDINE GLUCONATE 15 MILLILITER(S): 213 SOLUTION TOPICAL at 06:48

## 2024-01-01 RX ADMIN — OXYCODONE HYDROCHLORIDE 10 MILLIGRAM(S): 5 TABLET ORAL at 07:47

## 2024-01-01 RX ADMIN — HYDROMORPHONE HYDROCHLORIDE 1 MILLIGRAM(S): 2 INJECTION INTRAMUSCULAR; INTRAVENOUS; SUBCUTANEOUS at 17:31

## 2024-01-01 RX ADMIN — Medication 1 APPLICATION(S): at 18:02

## 2024-01-01 RX ADMIN — HYDROMORPHONE HYDROCHLORIDE 1 MILLIGRAM(S): 2 INJECTION INTRAMUSCULAR; INTRAVENOUS; SUBCUTANEOUS at 08:08

## 2024-01-01 RX ADMIN — Medication 300 MILLIGRAM(S): at 07:47

## 2024-01-01 RX ADMIN — OXYCODONE HYDROCHLORIDE 5 MILLIGRAM(S): 5 TABLET ORAL at 22:08

## 2024-01-01 RX ADMIN — ATORVASTATIN CALCIUM 40 MILLIGRAM(S): 80 TABLET, FILM COATED ORAL at 21:45

## 2024-01-01 RX ADMIN — Medication 2: at 06:46

## 2024-01-01 RX ADMIN — HYDROMORPHONE HYDROCHLORIDE 1 MILLIGRAM(S): 2 INJECTION INTRAMUSCULAR; INTRAVENOUS; SUBCUTANEOUS at 18:02

## 2024-01-01 RX ADMIN — IMIPENEM AND CILASTATIN 250 MILLIGRAM(S): 250; 250 INJECTION, POWDER, FOR SOLUTION INTRAVENOUS at 18:22

## 2024-01-01 RX ADMIN — CHLORHEXIDINE GLUCONATE 15 MILLILITER(S): 213 SOLUTION TOPICAL at 17:30

## 2024-01-01 RX ADMIN — PANTOPRAZOLE SODIUM 40 MILLIGRAM(S): 20 TABLET, DELAYED RELEASE ORAL at 09:44

## 2024-01-01 NOTE — PROGRESS NOTE ADULT - SUBJECTIVE AND OBJECTIVE BOX
Patient is a 68y old  Male who presents with a chief complaint of Surgical site infection (28 Dec 2023 15:40)    INTERVAL EVENTS: NAEON     SUBJECTIVE:  Patient was seen and examined at bedside. Wife and RN Lyle at bedside. 2 IV infiltrated and removed yesterday w. evidence of  R forearm thrombophlebitis but improving per RN and ID Dr. Patrick. Tolerating TF with Vital 1.5. Pt resting comfortably on TC.      Review of systems: No fever, chills, dizziness, HA, Changes in vision, CP, dyspnea, nausea or vomiting, dysuria, changes in bowel movements, LE edema. Rest of 12 point Review of systems negative unless otherwise documented elsewhere in note.     Diet, NPO with Tube Feed:   Tube Feeding Modality: Gastrostomy  Vital 1.5 Marcus (VITAL1.5)  Total Volume for 24 Hours (mL): 2880  Total Number of Cans: 12  Continuous  Starting Tube Feed Rate mL per Hour: 10  Increase Tube Feed Rate by (mL): 10     Every 4 hours  Until Goal Tube Feed Rate (mL per Hour): 120  Tube Feed Duration (in Hours): 24  Tube Feed Start Time: 08:00  Free Water Flush  Bolus   Total Volume per Flush (mL): 50   Frequency: Every 12 Hours   Total Daily Volume of Flush (mL): 100    Start Time: 08:00 (12-29-23 @ 07:41) [Active]      MEDICATIONS:  MEDICATIONS  (STANDING):  aspirin  chewable 81 milliGRAM(s) Enteral Tube daily  atorvastatin 40 milliGRAM(s) Oral at bedtime  chlorhexidine 0.12% Liquid 15 milliLiter(s) Oral Mucosa two times a day  Dakins Solution - 1/4 Strength 1 Application(s) Topical two times a day  Dakins Solution - Full Strength 1 Application(s) Topical two times a day  dextrose 5%. 1000 milliLiter(s) (100 mL/Hr) IV Continuous <Continuous>  dextrose 5%. 1000 milliLiter(s) (50 mL/Hr) IV Continuous <Continuous>  dextrose 50% Injectable 25 Gram(s) IV Push once  dextrose 50% Injectable 25 Gram(s) IV Push once  dextrose 50% Injectable 12.5 Gram(s) IV Push once  folic acid 1 milliGRAM(s) Enteral Tube daily  glucagon  Injectable 1 milliGRAM(s) IntraMuscular once  influenza  Vaccine (HIGH DOSE) 0.7 milliLiter(s) IntraMuscular once  insulin lispro (ADMELOG) corrective regimen sliding scale   SubCutaneous three times a day before meals  insulin lispro (ADMELOG) corrective regimen sliding scale   SubCutaneous at bedtime  pantoprazole  Injectable 40 milliGRAM(s) IV Push two times a day  piperacillin/tazobactam IVPB.. 4.5 Gram(s) IV Intermittent every 8 hours    MEDICATIONS  (PRN):  acetaminophen     Tablet .. 650 milliGRAM(s) Oral every 6 hours PRN Mild Pain (1 - 3)  dextrose Oral Gel 15 Gram(s) Oral once PRN Blood Glucose LESS THAN 70 milliGRAM(s)/deciliter  HYDROmorphone  Injectable 1 milliGRAM(s) IV Push every 6 hours PRN Severe Pain (7 - 10)  lidocaine 2% Viscous 10 milliLiter(s) Swish and Spit every 4 hours PRN Mouth Care  ondansetron Injectable 4 milliGRAM(s) IV Push every 8 hours PRN Nausea  oxyCODONE    Solution 10 milliGRAM(s) Oral every 4 hours PRN Severe Pain (7 - 10)  oxyCODONE    Solution 5 milliGRAM(s) Oral every 4 hours PRN Moderate Pain (4 - 6)      Allergies    No Known Allergies    Intolerances        OBJECTIVE:  Vital Signs Last 24 Hrs  T(C): 36.7 (01 Jan 2024 14:00), Max: 36.9 (31 Dec 2023 21:45)  T(F): 98.1 (01 Jan 2024 14:00), Max: 98.5 (31 Dec 2023 21:45)  HR: 64 (01 Jan 2024 15:04) (54 - 74)  BP: 112/55 (01 Jan 2024 15:04) (94/50 - 136/62)  BP(mean): 77 (01 Jan 2024 15:04) (66 - 89)  RR: 20 (01 Jan 2024 15:04) (17 - 20)  SpO2: 100% (01 Jan 2024 15:04) (96% - 100%)    Parameters below as of 01 Jan 2024 15:04  Patient On (Oxygen Delivery Method): tracheostomy collar  O2 Flow (L/min): 10  O2 Concentration (%): 40  I&O's Summary    31 Dec 2023 07:01  -  01 Jan 2024 07:00  --------------------------------------------------------  IN: 4330 mL / OUT: 1600 mL / NET: 2730 mL    01 Jan 2024 07:01  -  01 Jan 2024 16:28  --------------------------------------------------------  IN: 1430 mL / OUT: 300 mL / NET: 1130 mL        PHYSICAL EXAM:  Gen: Reclining in bed at time of exam, appears stated age  HEENT: NCAT, MMM, clear OP  Neck: TC in place, dressing in place   CV: RRR, +S1/S2  Pulm: adequate respiratory effort, no increase in work of breathing  Abd: soft, NTND  Skin: warm and dry,   Ext: R forearm firm and slightly increased warmth but no erythema or lymphangitis appreciated   Neuro: AOx3, no gross focal neurological deficits  Psych: affect and behavior appropriate, pleasant at time of interview      LABS:                        10.7   7.00  )-----------( 255      ( 01 Jan 2024 05:30 )             32.9     01-01    135  |  99  |  10  ----------------------------<  145<H>  4.0   |  26  |  0.61    Ca    8.7      01 Jan 2024 05:30  Phos  2.3     01-01  Mg     1.8     01-01          CAPILLARY BLOOD GLUCOSE      POCT Blood Glucose.: 106 mg/dL (01 Jan 2024 11:22)  POCT Blood Glucose.: 207 mg/dL (01 Jan 2024 06:31)  POCT Blood Glucose.: 115 mg/dL (31 Dec 2023 17:35)    Urinalysis Basic - ( 01 Jan 2024 05:30 )    Color: x / Appearance: x / SG: x / pH: x  Gluc: 145 mg/dL / Ketone: x  / Bili: x / Urobili: x   Blood: x / Protein: x / Nitrite: x   Leuk Esterase: x / RBC: x / WBC x   Sq Epi: x / Non Sq Epi: x / Bacteria: x        MICRODATA:      RADIOLOGY/OTHER STUDIES:

## 2024-01-01 NOTE — PROGRESS NOTE ADULT - SUBJECTIVE AND OBJECTIVE BOX
INFECTIOUS DISEASES CONSULT FOLLOW-UP NOTE    INTERVAL HPI/OVERNIGHT EVENTS:  Afebrile, WBC normal, tolerating abx, no new complaints    ROS:   Constitutional, eyes, ENT, cardiovascular, respiratory, gastrointestinal, genitourinary, integumentary, neurological, psychiatric and heme/lymph are otherwise negative other than noted above       ANTIBIOTICS/RELEVANT:    MEDICATIONS  (STANDING):  aspirin  chewable 81 milliGRAM(s) Enteral Tube daily  atorvastatin 40 milliGRAM(s) Oral at bedtime  chlorhexidine 0.12% Liquid 15 milliLiter(s) Oral Mucosa two times a day  Dakins Solution - 1/4 Strength 1 Application(s) Topical two times a day  Dakins Solution - Full Strength 1 Application(s) Topical two times a day  dextrose 5%. 1000 milliLiter(s) (50 mL/Hr) IV Continuous <Continuous>  dextrose 5%. 1000 milliLiter(s) (100 mL/Hr) IV Continuous <Continuous>  dextrose 50% Injectable 12.5 Gram(s) IV Push once  dextrose 50% Injectable 25 Gram(s) IV Push once  dextrose 50% Injectable 25 Gram(s) IV Push once  folic acid 1 milliGRAM(s) Enteral Tube daily  glucagon  Injectable 1 milliGRAM(s) IntraMuscular once  imipenem/cilastatin  IVPB 1000 milliGRAM(s) IV Intermittent every 8 hours  influenza  Vaccine (HIGH DOSE) 0.7 milliLiter(s) IntraMuscular once  insulin lispro (ADMELOG) corrective regimen sliding scale   SubCutaneous at bedtime  insulin lispro (ADMELOG) corrective regimen sliding scale   SubCutaneous three times a day before meals  pantoprazole  Injectable 40 milliGRAM(s) IV Push two times a day    MEDICATIONS  (PRN):  acetaminophen   Oral Liquid .. 1000 milliGRAM(s) Oral every 6 hours PRN Mild Pain (1 - 3)  dextrose Oral Gel 15 Gram(s) Oral once PRN Blood Glucose LESS THAN 70 milliGRAM(s)/deciliter  HYDROmorphone  Injectable 1 milliGRAM(s) IV Push every 6 hours PRN Severe Pain (7 - 10)  lidocaine 2% Viscous 10 milliLiter(s) Swish and Spit every 4 hours PRN Mouth Care  ondansetron Injectable 4 milliGRAM(s) IV Push every 8 hours PRN Nausea  oxyCODONE    Solution 10 milliGRAM(s) Oral every 4 hours PRN Severe Pain (7 - 10)  oxyCODONE    Solution 5 milliGRAM(s) Oral every 4 hours PRN Moderate Pain (4 - 6)        Vital Signs Last 24 Hrs  T(C): 36.9 (01 Jan 2024 17:50), Max: 36.9 (31 Dec 2023 21:45)  T(F): 98.5 (01 Jan 2024 17:50), Max: 98.5 (31 Dec 2023 21:45)  HR: 64 (01 Jan 2024 20:17) (56 - 74)  BP: 111/54 (01 Jan 2024 20:17) (94/50 - 136/62)  BP(mean): 78 (01 Jan 2024 20:17) (66 - 89)  RR: 18 (01 Jan 2024 20:17) (17 - 20)  SpO2: 98% (01 Jan 2024 20:17) (96% - 100%)    Parameters below as of 01 Jan 2024 20:17  Patient On (Oxygen Delivery Method): tracheostomy collar  O2 Flow (L/min): 10  O2 Concentration (%): 40    12-31-23 @ 07:01 - 01-01-24 @ 07:00  --------------------------------------------------------  IN: 4330 mL / OUT: 1600 mL / NET: 2730 mL    01-01-24 @ 07:01  - 01-01-24 @ 20:40  --------------------------------------------------------  IN: 2160 mL / OUT: 575 mL / NET: 1585 mL      PHYSICAL EXAM:  Constitutional: alert, NAD  Eyes: the sclera and conjunctiva were normal.   ENT: neck dressing c/d/i. Buccal packing  Neck: the appearance of the neck was normal and the neck was supple. Penrose w/ cloudy fluid  Pulmonary: no respiratory distress and lungs were clear to auscultation bilaterally.   Heart: heart rate was normal and rhythm regular, normal S1 and S2  Vascular:. there was no peripheral edema  Abdomen: normal bowel sounds, soft, non-tender  MSK: L foot in boot. Resolved RUE superficial thrombophlebitis, PIV removed      LABS:                        10.7   7.00  )-----------( 255      ( 01 Jan 2024 05:30 )             32.9     01-01    135  |  99  |  10  ----------------------------<  145<H>  4.0   |  26  |  0.61    Ca    8.7      01 Jan 2024 05:30  Phos  2.3     01-01  Mg     1.8     01-01        Urinalysis Basic - ( 01 Jan 2024 05:30 )    Color: x / Appearance: x / SG: x / pH: x  Gluc: 145 mg/dL / Ketone: x  / Bili: x / Urobili: x   Blood: x / Protein: x / Nitrite: x   Leuk Esterase: x / RBC: x / WBC x   Sq Epi: x / Non Sq Epi: x / Bacteria: x        MICROBIOLOGY:  Reviewed    RADIOLOGY & ADDITIONAL STUDIES:  Reviewed

## 2024-01-01 NOTE — PROGRESS NOTE ADULT - SUBJECTIVE AND OBJECTIVE BOX
OTOLARYNGOLOGY (ENT) PROGRESS NOTE    PATIENT: SAUNDRA HOLMAN  MRN: [id number]  : 57  [id number]  DATE OF SERVICE:  24  	  ID: SAUNDRA HOLMAN is a  69yo M w PMH of CAD (x2 stents Mar 2023), HLD, T2DM, hx of kidney stones, psoriasis who presented with an oral mass and underwent L hemimandibulectomy, SND L level 1-3, recon with L FFF, STSG, and placement of dental implants on . He had a trach which was subsequently decannulated. He returns  with surgical site infection now s/p OR for debridement and exploration. Trach replaced through prior stoma for pulmonary toilet.     Subjective/ Interval:   ; Patient seen this morning, oral pack to be changed, penrose dressing changed. Intraoral flap with partal skin necrosis, 7.5 portex in place with cuff deflated   : Patient seen and examined at bedside. NAEO. Patient remains afebrile and HD stable. HgB noted to drop to 7.6 from 9.2 but no additional episodes of melena. Penrose draining purulent material. Intra-oral infection/flap stable. Packing changed at bedside. No other complaints or changes at this time. ID recommended Vanc/zosyn and DC unasyn and flagyl, and IM recommended reticulocyte studies and adding folate supplements. No other changes at this time.   : AFVSS. No acute events overnight. Patient seen and examined at bedside. C/w Vanc/Zosyn per ID recs, pending sensitivities. Growing staph epi, E coli, enterobacter from wound cx on . S/p 2U PRBC yday w appropriate response in Hgb. Transfusion goal > 9.0.  : AFVSS. No acute events overnight. Patient seen and examined at bedside. C/w Vanc/Zosyn per ID recs, pending sens. Hgb stable this morning.  : AFVSS. No acute events overnight. Patient seen and examined at bedside. C/w Vanc/Zosyn, e faecalis sens to vanc/zosyn. Had 3 dark BM's yesterday that were stool guiac positive.    PHYSICAL EXAM:  GEN: appears stated age, NAD  NEURO: alert & oriented x /  HEENT: Remnant left FFF paddle oozing, Dakins soaked packing in place in buccal cavity,   Neck: 7.5 Portex trach with cuff deflated, Penrose draining purulent fluid, skin loosely reapproximated with silk suture, Kerlix dressing in place minimally saturated  CVS: regular rate and rhythm  Pulm: normal respiratory excursions, not tachypneic, no labored breathing  Abd: non-distended  Ext: moving all four extremities, no peripheral edema noted     LABS                       10.7   7.00  )-----------( 255      ( 2024 05:30 )             32.9        135  |  99  |  10  ----------------------------<  145<H>  4.0   |  26  |  0.61    Ca    8.7      2024 05:30  Phos  2.3       Mg     1.8                Coagulation Studies-     Urinalysis Basic - ( 2024 05:30 )    Color: x / Appearance: x / SG: x / pH: x  Gluc: 145 mg/dL / Ketone: x  / Bili: x / Urobili: x   Blood: x / Protein: x / Nitrite: x   Leuk Esterase: x / RBC: x / WBC x   Sq Epi: x / Non Sq Epi: x / Bacteria: x      Endocrine Panel-  Calcium: 8.7 mg/dL ( @ 05:30)                MICROBIOLOGY:  Culture - Surgical Swab (collected 23 @ 20:46)  Source: .Surgical Swab 1. Left Oral Cavity  Gram Stain (23 @ 21:53):    Numerous Gram positive cocci in pairs, chains and clusters    Moderate Gram Positive Rods    Moderate Gram Negative Rods    Numerous White blood cells  Final Report (23 @ 11:58):    Moderate Escherichia coli Susceptibility to follow.    Moderate Enterobacter cloacae complex Susceptibility to follow.    Few Enterococcus faecalis    Few Streptococcus mitis/oralis group    Few Staphylococcus epidermidis    Few Streptococcus constellatus    Few Stenotrophomonas maltophilia Susceptibility to follow.    Mixed Anaerobic Joy including    Few Prevotella species (most closely resembles Prevotella barnaie)    Few Prevotella denticola    Culture grew 3 or more types of organisms which indicate collection    contamination; consider recollection only if clinically indicated.  Organism: Enterococcus faecalis (23 @ 15:21)  Organism: Enterococcus faecalis (23 @ 15:21)      Method Type: OTTO      -  Ampicillin: S <=2 Predicts results to ampicillin/sulbactam, amoxacillin-clavulanate and  piperacillin-tazobactam.      -  Vancomycin: S 4      Culture Results:   No growth at 3 days. (23 @ 10:08)  Culture Results:   Moderate Escherichia coli Susceptibility to follow.  Moderate Enterobacter cloacae complex Susceptibility to follow.  Few Enterococcus faecalis  Few Streptococcus mitis/oralis group  Few Staphylococcus epidermidis  Few Streptococcus constellatus  Few Stenotrophomonas maltophilia Susceptibility to follow.  Mixed Anaerobic Joy including  Few Prevotella species (most closely resembles Prevotella barnaie)  Few Prevotella denticola  Culture grew 3 or more types of organisms which indicate collection  contamination; consider recollection only if clinically indicated. (23 @ 20:46)      Culture - Blood (collected 23 @ 10:08)  Source: .Blood Blood-Peripheral  Preliminary Report (23 @ 11:00):    No growth at 3 days.    Culture - Surgical Swab (collected 23 @ 20:46)  Source: .Surgical Swab 1. Left Oral Cavity  Gram Stain (23 @ 21:53):    Numerous Gram positive cocci in pairs, chains and clusters    Moderate Gram Positive Rods    Moderate Gram Negative Rods    Numerous White blood cells  Final Report (23 @ 11:58):    Moderate Escherichia coli Susceptibility to follow.    Moderate Enterobacter cloacae complex Susceptibility to follow.    Few Enterococcus faecalis    Few Streptococcus mitis/oralis group    Few Staphylococcus epidermidis    Few Streptococcus constellatus    Few Stenotrophomonas maltophilia Susceptibility to follow.    Mixed Anaerobic Joy including    Few Prevotella species (most closely resembles Prevotella barnaie)    Few Prevotella denticola    Culture grew 3 or more types of organisms which indicate collection    contamination; consider recollection only if clinically indicated.  Organism: Enterococcus faecalis (23 @ 15:21)  Organism: Enterococcus faecalis (23 @ 15:21)      Method Type: OTTO      -  Ampicillin: S <=2 Predicts results to ampicillin/sulbactam, amoxacillin-clavulanate and  piperacillin-tazobactam.      -  Vancomycin: S 4             OTOLARYNGOLOGY (ENT) PROGRESS NOTE    PATIENT: SAUNDRA HOLMAN  MRN: [id number]  : 57  [id number]  DATE OF SERVICE:  24  	  ID: SAUNDRA HOLMAN is a  67yo M w PMH of CAD (x2 stents Mar 2023), HLD, T2DM, hx of kidney stones, psoriasis who presented with an oral mass and underwent L hemimandibulectomy, SND L level 1-3, recon with L FFF, STSG, and placement of dental implants on . He had a trach which was subsequently decannulated. He returns  with surgical site infection now s/p OR for debridement and exploration. Trach replaced through prior stoma for pulmonary toilet.     Subjective/ Interval:   ; Patient seen this morning, oral pack to be changed, penrose dressing changed. Intraoral flap with partal skin necrosis, 7.5 portex in place with cuff deflated   : Patient seen and examined at bedside. NAEO. Patient remains afebrile and HD stable. HgB noted to drop to 7.6 from 9.2 but no additional episodes of melena. Penrose draining purulent material. Intra-oral infection/flap stable. Packing changed at bedside. No other complaints or changes at this time. ID recommended Vanc/zosyn and DC unasyn and flagyl, and IM recommended reticulocyte studies and adding folate supplements. No other changes at this time.   : AFVSS. No acute events overnight. Patient seen and examined at bedside. C/w Vanc/Zosyn per ID recs, pending sensitivities. Growing staph epi, E coli, enterobacter from wound cx on . S/p 2U PRBC yday w appropriate response in Hgb. Transfusion goal > 9.0.  : AFVSS. No acute events overnight. Patient seen and examined at bedside. C/w Vanc/Zosyn per ID recs, pending sens. Hgb stable this morning.  : AFVSS. No acute events overnight. Patient seen and examined at bedside. C/w Vanc/Zosyn, e faecalis sens to vanc/zosyn. Had 3 dark BM's yesterday that were stool guiac positive.    PHYSICAL EXAM:  GEN: appears stated age, NAD  NEURO: alert & oriented x /  HEENT: Remnant left FFF paddle oozing, Dakins soaked packing in place in buccal cavity,   Neck: 7.5 Portex trach with cuff deflated, Penrose draining purulent fluid, skin loosely reapproximated with silk suture, Kerlix dressing in place minimally saturated  CVS: regular rate and rhythm  Pulm: normal respiratory excursions, not tachypneic, no labored breathing  Abd: non-distended  Ext: moving all four extremities, no peripheral edema noted     LABS                       10.7   7.00  )-----------( 255      ( 2024 05:30 )             32.9        135  |  99  |  10  ----------------------------<  145<H>  4.0   |  26  |  0.61    Ca    8.7      2024 05:30  Phos  2.3       Mg     1.8                Coagulation Studies-     Urinalysis Basic - ( 2024 05:30 )    Color: x / Appearance: x / SG: x / pH: x  Gluc: 145 mg/dL / Ketone: x  / Bili: x / Urobili: x   Blood: x / Protein: x / Nitrite: x   Leuk Esterase: x / RBC: x / WBC x   Sq Epi: x / Non Sq Epi: x / Bacteria: x      Endocrine Panel-  Calcium: 8.7 mg/dL ( @ 05:30)                MICROBIOLOGY:  Culture - Surgical Swab (collected 23 @ 20:46)  Source: .Surgical Swab 1. Left Oral Cavity  Gram Stain (23 @ 21:53):    Numerous Gram positive cocci in pairs, chains and clusters    Moderate Gram Positive Rods    Moderate Gram Negative Rods    Numerous White blood cells  Final Report (23 @ 11:58):    Moderate Escherichia coli Susceptibility to follow.    Moderate Enterobacter cloacae complex Susceptibility to follow.    Few Enterococcus faecalis    Few Streptococcus mitis/oralis group    Few Staphylococcus epidermidis    Few Streptococcus constellatus    Few Stenotrophomonas maltophilia Susceptibility to follow.    Mixed Anaerobic [place] including    Few Prevotella species (most closely resembles Prevotella barnaie)    Few Prevotella denticola    Culture grew 3 or more types of organisms which indicate collection    contamination; consider recollection only if clinically indicated.  Organism: Enterococcus faecalis (23 @ 15:21)  Organism: Enterococcus faecalis (23 @ 15:21)      Method Type: OTTO      -  Ampicillin: S <=2 Predicts results to ampicillin/sulbactam, amoxacillin-clavulanate and  piperacillin-tazobactam.      -  Vancomycin: S 4      Culture Results:   No growth at 3 days. (23 @ 10:08)  Culture Results:   Moderate Escherichia coli Susceptibility to follow.  Moderate Enterobacter cloacae complex Susceptibility to follow.  Few Enterococcus faecalis  Few Streptococcus mitis/oralis group  Few Staphylococcus epidermidis  Few Streptococcus constellatus  Few Stenotrophomonas maltophilia Susceptibility to follow.  Mixed Anaerobic Joy including  Few Prevotella species (most closely resembles Prevotella barnaie)  Few Prevotella denticola  Culture grew 3 or more types of organisms which indicate collection  contamination; consider recollection only if clinically indicated. (23 @ 20:46)      Culture - Blood (collected 23 @ 10:08)  Source: .Blood Blood-Peripheral  Preliminary Report (23 @ 11:00):    No growth at 3 days.    Culture - Surgical Swab (collected 23 @ 20:46)  Source: .Surgical Swab 1. Left Oral Cavity  Gram Stain (23 @ 21:53):    Numerous Gram positive cocci in pairs, chains and clusters    Moderate Gram Positive Rods    Moderate Gram Negative Rods    Numerous White blood cells  Final Report (23 @ 11:58):    Moderate Escherichia coli Susceptibility to follow.    Moderate Enterobacter cloacae complex Susceptibility to follow.    Few Enterococcus faecalis    Few Streptococcus mitis/oralis group    Few Staphylococcus epidermidis    Few Streptococcus constellatus    Few Stenotrophomonas maltophilia Susceptibility to follow.    Mixed Anaerobic Joy including    Few Prevotella species (most closely resembles Prevotella barnaie)    Few Prevotella denticola    Culture grew 3 or more types of organisms which indicate collection    contamination; consider recollection only if clinically indicated.  Organism: Enterococcus faecalis (23 @ 15:21)  Organism: Enterococcus faecalis (23 @ 15:21)      Method Type: OTTO      -  Ampicillin: S <=2 Predicts results to ampicillin/sulbactam, amoxacillin-clavulanate and  piperacillin-tazobactam.      -  Vancomycin: S 4             Detail Level: Generalized Detail Level: Simple

## 2024-01-01 NOTE — PROGRESS NOTE ADULT - ASSESSMENT
Assessment and Plan:  SAUNDRA HOLMAN is a 69yo M w PMH of CAD (x2 stents Mar 2023), HLD, T2DM, hx of kidney stones, psoriasis who presented with an oral mass and underwent L hemimandibulectomy, SND L level 1-3, recon with L FFF, STSG, and placement of dental implants on 12/7. He had a trach which was subsequently decannulated. Now s/p OR for debridement and exploration. Trach replaced through prior stoma for pulmonary toilet.    Plan:  - Hgb goal > 9.0  - Irrigate wound with 1/4 strength Dakins  - Pack wound with full strength Dakins soaked packing BID  - C/w Vanc / Zosyn, fu sens  - Follow up reticulocyte count  - Appreciate GI recs, IM recs, and ID recs  - C/w TF  - Continue home asa  - Hold home plavix  - c/w HSQ  - Maintain 7.5 portex for pulm toilet  - ISS  - Pain control  - Home meds  - Bowel regimen

## 2024-01-01 NOTE — PROGRESS NOTE ADULT - ASSESSMENT
68M w/ CAD s/p PCI, DM2, renal calculi, psoriasis and SCC of L buccal mucosa s/p L hemimandibulectomy, reconstruction w/ fibular free flap and STSG from L thigh with dental implants. Admitted 12/27 with SSI, s/p DONNA 12/27 with finding of skin flap necrosis s/p debridement. OR cultures with E.coli, ECC, E.faecalis S.mitis/oralis, S.epi, S.constellatus, S.maltophilia, mixed anaerobes.    Recommendations:  - Stop vancomycin and zosyn  - Start imipenem 1g IV q8h    ID team 2 will follow over the holiday weekend. ID team 1 will resume care on 1/2.

## 2024-01-02 ENCOUNTER — RESULT REVIEW (OUTPATIENT)
Age: 69
End: 2024-01-02

## 2024-01-02 ENCOUNTER — TRANSCRIPTION ENCOUNTER (OUTPATIENT)
Age: 69
End: 2024-01-02

## 2024-01-02 ENCOUNTER — APPOINTMENT (OUTPATIENT)
Dept: OTOLARYNGOLOGY | Facility: CLINIC | Age: 69
End: 2024-01-02

## 2024-01-02 LAB
-  AMPICILLIN/SULBACTAM: SIGNIFICANT CHANGE UP
-  AMPICILLIN/SULBACTAM: SIGNIFICANT CHANGE UP
-  AMPICILLIN: SIGNIFICANT CHANGE UP
-  AMPICILLIN: SIGNIFICANT CHANGE UP
-  CEFAZOLIN: SIGNIFICANT CHANGE UP
-  CEFAZOLIN: SIGNIFICANT CHANGE UP
-  CEFTRIAXONE: SIGNIFICANT CHANGE UP
-  CEFTRIAXONE: SIGNIFICANT CHANGE UP
-  CIPROFLOXACIN: SIGNIFICANT CHANGE UP
-  CIPROFLOXACIN: SIGNIFICANT CHANGE UP
-  ERTAPENEM: SIGNIFICANT CHANGE UP
-  ERTAPENEM: SIGNIFICANT CHANGE UP
-  GENTAMICIN: SIGNIFICANT CHANGE UP
-  GENTAMICIN: SIGNIFICANT CHANGE UP
-  PIPERACILLIN/TAZOBACTAM: SIGNIFICANT CHANGE UP
-  PIPERACILLIN/TAZOBACTAM: SIGNIFICANT CHANGE UP
-  TOBRAMYCIN: SIGNIFICANT CHANGE UP
-  TOBRAMYCIN: SIGNIFICANT CHANGE UP
-  TRIMETHOPRIM/SULFAMETHOXAZOLE: SIGNIFICANT CHANGE UP
-  TRIMETHOPRIM/SULFAMETHOXAZOLE: SIGNIFICANT CHANGE UP
ANION GAP SERPL CALC-SCNC: 8 MMOL/L — SIGNIFICANT CHANGE UP (ref 5–17)
ANION GAP SERPL CALC-SCNC: 8 MMOL/L — SIGNIFICANT CHANGE UP (ref 5–17)
APTT BLD: 28 SEC — SIGNIFICANT CHANGE UP (ref 24.5–35.6)
APTT BLD: 28 SEC — SIGNIFICANT CHANGE UP (ref 24.5–35.6)
BLD GP AB SCN SERPL QL: NEGATIVE — SIGNIFICANT CHANGE UP
BLD GP AB SCN SERPL QL: NEGATIVE — SIGNIFICANT CHANGE UP
BUN SERPL-MCNC: 13 MG/DL — SIGNIFICANT CHANGE UP (ref 7–23)
BUN SERPL-MCNC: 13 MG/DL — SIGNIFICANT CHANGE UP (ref 7–23)
CALCIUM SERPL-MCNC: 8.4 MG/DL — SIGNIFICANT CHANGE UP (ref 8.4–10.5)
CALCIUM SERPL-MCNC: 8.4 MG/DL — SIGNIFICANT CHANGE UP (ref 8.4–10.5)
CHLORIDE SERPL-SCNC: 102 MMOL/L — SIGNIFICANT CHANGE UP (ref 96–108)
CHLORIDE SERPL-SCNC: 102 MMOL/L — SIGNIFICANT CHANGE UP (ref 96–108)
CO2 SERPL-SCNC: 27 MMOL/L — SIGNIFICANT CHANGE UP (ref 22–31)
CO2 SERPL-SCNC: 27 MMOL/L — SIGNIFICANT CHANGE UP (ref 22–31)
CREAT SERPL-MCNC: 0.78 MG/DL — SIGNIFICANT CHANGE UP (ref 0.5–1.3)
CREAT SERPL-MCNC: 0.78 MG/DL — SIGNIFICANT CHANGE UP (ref 0.5–1.3)
CULTURE RESULTS: ABNORMAL
CULTURE RESULTS: ABNORMAL
CULTURE RESULTS: SIGNIFICANT CHANGE UP
CULTURE RESULTS: SIGNIFICANT CHANGE UP
EGFR: 98 ML/MIN/1.73M2 — SIGNIFICANT CHANGE UP
EGFR: 98 ML/MIN/1.73M2 — SIGNIFICANT CHANGE UP
GLUCOSE BLDC GLUCOMTR-MCNC: 114 MG/DL — HIGH (ref 70–99)
GLUCOSE BLDC GLUCOMTR-MCNC: 114 MG/DL — HIGH (ref 70–99)
GLUCOSE BLDC GLUCOMTR-MCNC: 118 MG/DL — HIGH (ref 70–99)
GLUCOSE BLDC GLUCOMTR-MCNC: 118 MG/DL — HIGH (ref 70–99)
GLUCOSE BLDC GLUCOMTR-MCNC: 153 MG/DL — HIGH (ref 70–99)
GLUCOSE BLDC GLUCOMTR-MCNC: 153 MG/DL — HIGH (ref 70–99)
GLUCOSE BLDC GLUCOMTR-MCNC: 171 MG/DL — HIGH (ref 70–99)
GLUCOSE BLDC GLUCOMTR-MCNC: 171 MG/DL — HIGH (ref 70–99)
GLUCOSE SERPL-MCNC: 190 MG/DL — HIGH (ref 70–99)
GLUCOSE SERPL-MCNC: 190 MG/DL — HIGH (ref 70–99)
GRAM STN FLD: ABNORMAL
HCT VFR BLD CALC: 32.4 % — LOW (ref 39–50)
HCT VFR BLD CALC: 32.4 % — LOW (ref 39–50)
HGB BLD-MCNC: 10.4 G/DL — LOW (ref 13–17)
HGB BLD-MCNC: 10.4 G/DL — LOW (ref 13–17)
INR BLD: 1.03 — SIGNIFICANT CHANGE UP (ref 0.85–1.18)
INR BLD: 1.03 — SIGNIFICANT CHANGE UP (ref 0.85–1.18)
MAGNESIUM SERPL-MCNC: 1.7 MG/DL — SIGNIFICANT CHANGE UP (ref 1.6–2.6)
MAGNESIUM SERPL-MCNC: 1.7 MG/DL — SIGNIFICANT CHANGE UP (ref 1.6–2.6)
MCHC RBC-ENTMCNC: 28.6 PG — SIGNIFICANT CHANGE UP (ref 27–34)
MCHC RBC-ENTMCNC: 28.6 PG — SIGNIFICANT CHANGE UP (ref 27–34)
MCHC RBC-ENTMCNC: 32.1 GM/DL — SIGNIFICANT CHANGE UP (ref 32–36)
MCHC RBC-ENTMCNC: 32.1 GM/DL — SIGNIFICANT CHANGE UP (ref 32–36)
MCV RBC AUTO: 89 FL — SIGNIFICANT CHANGE UP (ref 80–100)
MCV RBC AUTO: 89 FL — SIGNIFICANT CHANGE UP (ref 80–100)
METHOD TYPE: SIGNIFICANT CHANGE UP
METHOD TYPE: SIGNIFICANT CHANGE UP
NRBC # BLD: 0 /100 WBCS — SIGNIFICANT CHANGE UP (ref 0–0)
NRBC # BLD: 0 /100 WBCS — SIGNIFICANT CHANGE UP (ref 0–0)
ORGANISM # SPEC MICROSCOPIC CNT: ABNORMAL
ORGANISM # SPEC MICROSCOPIC CNT: ABNORMAL
ORGANISM # SPEC MICROSCOPIC CNT: SIGNIFICANT CHANGE UP
ORGANISM # SPEC MICROSCOPIC CNT: SIGNIFICANT CHANGE UP
PHOSPHATE SERPL-MCNC: 2 MG/DL — LOW (ref 2.5–4.5)
PHOSPHATE SERPL-MCNC: 2 MG/DL — LOW (ref 2.5–4.5)
PLATELET # BLD AUTO: 268 K/UL — SIGNIFICANT CHANGE UP (ref 150–400)
PLATELET # BLD AUTO: 268 K/UL — SIGNIFICANT CHANGE UP (ref 150–400)
POTASSIUM SERPL-MCNC: 4.2 MMOL/L — SIGNIFICANT CHANGE UP (ref 3.5–5.3)
POTASSIUM SERPL-MCNC: 4.2 MMOL/L — SIGNIFICANT CHANGE UP (ref 3.5–5.3)
POTASSIUM SERPL-SCNC: 4.2 MMOL/L — SIGNIFICANT CHANGE UP (ref 3.5–5.3)
POTASSIUM SERPL-SCNC: 4.2 MMOL/L — SIGNIFICANT CHANGE UP (ref 3.5–5.3)
PROTHROM AB SERPL-ACNC: 11.7 SEC — SIGNIFICANT CHANGE UP (ref 9.5–13)
PROTHROM AB SERPL-ACNC: 11.7 SEC — SIGNIFICANT CHANGE UP (ref 9.5–13)
RBC # BLD: 3.64 M/UL — LOW (ref 4.2–5.8)
RBC # BLD: 3.64 M/UL — LOW (ref 4.2–5.8)
RBC # FLD: 14.4 % — SIGNIFICANT CHANGE UP (ref 10.3–14.5)
RBC # FLD: 14.4 % — SIGNIFICANT CHANGE UP (ref 10.3–14.5)
RH IG SCN BLD-IMP: POSITIVE — SIGNIFICANT CHANGE UP
RH IG SCN BLD-IMP: POSITIVE — SIGNIFICANT CHANGE UP
SODIUM SERPL-SCNC: 137 MMOL/L — SIGNIFICANT CHANGE UP (ref 135–145)
SODIUM SERPL-SCNC: 137 MMOL/L — SIGNIFICANT CHANGE UP (ref 135–145)
SPECIMEN SOURCE: SIGNIFICANT CHANGE UP
SURGICAL PATHOLOGY STUDY: SIGNIFICANT CHANGE UP
SURGICAL PATHOLOGY STUDY: SIGNIFICANT CHANGE UP
WBC # BLD: 7.27 K/UL — SIGNIFICANT CHANGE UP (ref 3.8–10.5)
WBC # BLD: 7.27 K/UL — SIGNIFICANT CHANGE UP (ref 3.8–10.5)
WBC # FLD AUTO: 7.27 K/UL — SIGNIFICANT CHANGE UP (ref 3.8–10.5)
WBC # FLD AUTO: 7.27 K/UL — SIGNIFICANT CHANGE UP (ref 3.8–10.5)

## 2024-01-02 PROCEDURE — 99232 SBSQ HOSP IP/OBS MODERATE 35: CPT | Mod: GC

## 2024-01-02 PROCEDURE — 88307 TISSUE EXAM BY PATHOLOGIST: CPT | Mod: 26

## 2024-01-02 PROCEDURE — 43235 EGD DIAGNOSTIC BRUSH WASH: CPT | Mod: GC

## 2024-01-02 PROCEDURE — 99233 SBSQ HOSP IP/OBS HIGH 50: CPT | Mod: GC

## 2024-01-02 PROCEDURE — 88311 DECALCIFY TISSUE: CPT | Mod: 26

## 2024-01-02 PROCEDURE — 88305 TISSUE EXAM BY PATHOLOGIST: CPT | Mod: 26

## 2024-01-02 PROCEDURE — 11044 DBRDMT BONE 1ST 20 SQ CM/<: CPT | Mod: 78

## 2024-01-02 RX ORDER — MUPIROCIN 20 MG/G
1 OINTMENT TOPICAL ONCE
Refills: 0 | Status: COMPLETED | OUTPATIENT
Start: 2024-01-02 | End: 2024-01-02

## 2024-01-02 RX ORDER — OXYCODONE HYDROCHLORIDE 5 MG/1
5 TABLET ORAL EVERY 4 HOURS
Refills: 0 | Status: DISCONTINUED | OUTPATIENT
Start: 2024-01-02 | End: 2024-01-09

## 2024-01-02 RX ORDER — LIDOCAINE AND PRILOCAINE CREAM 25; 25 MG/G; MG/G
1 CREAM TOPICAL ONCE
Refills: 0 | Status: COMPLETED | OUTPATIENT
Start: 2024-01-02 | End: 2024-01-02

## 2024-01-02 RX ORDER — SODIUM CHLORIDE 9 MG/ML
1000 INJECTION, SOLUTION INTRAVENOUS
Refills: 0 | Status: DISCONTINUED | OUTPATIENT
Start: 2024-01-02 | End: 2024-01-03

## 2024-01-02 RX ORDER — TETRACAINE/BENZOCAINE/BUTAMBEN 2%-14%-2%
1 OINTMENT (GRAM) TOPICAL ONCE
Refills: 0 | Status: COMPLETED | OUTPATIENT
Start: 2024-01-02 | End: 2024-01-02

## 2024-01-02 RX ORDER — HYDROMORPHONE HYDROCHLORIDE 2 MG/ML
1 INJECTION INTRAMUSCULAR; INTRAVENOUS; SUBCUTANEOUS EVERY 6 HOURS
Refills: 0 | Status: DISCONTINUED | OUTPATIENT
Start: 2024-01-02 | End: 2024-01-02

## 2024-01-02 RX ORDER — PANTOPRAZOLE SODIUM 20 MG/1
40 TABLET, DELAYED RELEASE ORAL
Refills: 0 | Status: DISCONTINUED | OUTPATIENT
Start: 2024-01-02 | End: 2024-01-02

## 2024-01-02 RX ORDER — SODIUM CHLORIDE 9 MG/ML
1000 INJECTION, SOLUTION INTRAVENOUS
Refills: 0 | Status: DISCONTINUED | OUTPATIENT
Start: 2024-01-02 | End: 2024-01-02

## 2024-01-02 RX ORDER — ONDANSETRON 8 MG/1
4 TABLET, FILM COATED ORAL EVERY 8 HOURS
Refills: 0 | Status: DISCONTINUED | OUTPATIENT
Start: 2024-01-02 | End: 2024-01-11

## 2024-01-02 RX ORDER — OXYMETAZOLINE HYDROCHLORIDE 0.5 MG/ML
2 SPRAY NASAL ONCE
Refills: 0 | Status: COMPLETED | OUTPATIENT
Start: 2024-01-02 | End: 2024-01-02

## 2024-01-02 RX ORDER — OXYCODONE HYDROCHLORIDE 5 MG/1
10 TABLET ORAL EVERY 6 HOURS
Refills: 0 | Status: DISCONTINUED | OUTPATIENT
Start: 2024-01-02 | End: 2024-01-09

## 2024-01-02 RX ORDER — ASPIRIN/CALCIUM CARB/MAGNESIUM 324 MG
81 TABLET ORAL DAILY
Refills: 0 | Status: DISCONTINUED | OUTPATIENT
Start: 2024-01-02 | End: 2024-01-11

## 2024-01-02 RX ORDER — HYDROMORPHONE HYDROCHLORIDE 2 MG/ML
1 INJECTION INTRAMUSCULAR; INTRAVENOUS; SUBCUTANEOUS ONCE
Refills: 0 | Status: DISCONTINUED | OUTPATIENT
Start: 2024-01-02 | End: 2024-01-02

## 2024-01-02 RX ORDER — OFLOXACIN OTIC SOLUTION 3 MG/ML
5 SOLUTION/ DROPS AURICULAR (OTIC) ONCE
Refills: 0 | Status: COMPLETED | OUTPATIENT
Start: 2024-01-02 | End: 2024-01-02

## 2024-01-02 RX ORDER — HYDROMORPHONE HYDROCHLORIDE 2 MG/ML
1 INJECTION INTRAMUSCULAR; INTRAVENOUS; SUBCUTANEOUS EVERY 6 HOURS
Refills: 0 | Status: DISCONTINUED | OUTPATIENT
Start: 2024-01-02 | End: 2024-01-09

## 2024-01-02 RX ORDER — LIDOCAINE HYDROCHLORIDE AND EPINEPHRINE 10; 10 MG/ML; UG/ML
30 INJECTION, SOLUTION INFILTRATION; PERINEURAL ONCE
Refills: 0 | Status: COMPLETED | OUTPATIENT
Start: 2024-01-02 | End: 2024-01-02

## 2024-01-02 RX ORDER — PANTOPRAZOLE SODIUM 20 MG/1
40 TABLET, DELAYED RELEASE ORAL EVERY 24 HOURS
Refills: 0 | Status: DISCONTINUED | OUTPATIENT
Start: 2024-01-02 | End: 2024-01-11

## 2024-01-02 RX ORDER — LIDOCAINE 4 G/100G
1 CREAM TOPICAL ONCE
Refills: 0 | Status: COMPLETED | OUTPATIENT
Start: 2024-01-02 | End: 2024-01-02

## 2024-01-02 RX ADMIN — IMIPENEM AND CILASTATIN 250 MILLIGRAM(S): 250; 250 INJECTION, POWDER, FOR SOLUTION INTRAVENOUS at 11:05

## 2024-01-02 RX ADMIN — HYDROMORPHONE HYDROCHLORIDE 1 MILLIGRAM(S): 2 INJECTION INTRAMUSCULAR; INTRAVENOUS; SUBCUTANEOUS at 06:54

## 2024-01-02 RX ADMIN — CHLORHEXIDINE GLUCONATE 15 MILLILITER(S): 213 SOLUTION TOPICAL at 05:39

## 2024-01-02 RX ADMIN — SODIUM CHLORIDE 75 MILLILITER(S): 9 INJECTION, SOLUTION INTRAVENOUS at 21:25

## 2024-01-02 RX ADMIN — Medication 1 SPRAY(S): at 17:06

## 2024-01-02 RX ADMIN — Medication 81 MILLIGRAM(S): at 11:05

## 2024-01-02 RX ADMIN — OXYCODONE HYDROCHLORIDE 10 MILLIGRAM(S): 5 TABLET ORAL at 07:24

## 2024-01-02 RX ADMIN — Medication 85 MILLIMOLE(S): at 11:04

## 2024-01-02 RX ADMIN — HYDROMORPHONE HYDROCHLORIDE 1 MILLIGRAM(S): 2 INJECTION INTRAMUSCULAR; INTRAVENOUS; SUBCUTANEOUS at 07:30

## 2024-01-02 RX ADMIN — Medication 1 APPLICATION(S): at 07:30

## 2024-01-02 RX ADMIN — LIDOCAINE HYDROCHLORIDE AND EPINEPHRINE 30 MILLILITER(S): 10; 10 INJECTION, SOLUTION INFILTRATION; PERINEURAL at 17:03

## 2024-01-02 RX ADMIN — LIDOCAINE 1 APPLICATION(S): 4 CREAM TOPICAL at 17:04

## 2024-01-02 RX ADMIN — Medication 1 MILLIGRAM(S): at 11:05

## 2024-01-02 RX ADMIN — IMIPENEM AND CILASTATIN 250 MILLIGRAM(S): 250; 250 INJECTION, POWDER, FOR SOLUTION INTRAVENOUS at 03:18

## 2024-01-02 RX ADMIN — ATORVASTATIN CALCIUM 40 MILLIGRAM(S): 80 TABLET, FILM COATED ORAL at 21:25

## 2024-01-02 RX ADMIN — OFLOXACIN OTIC SOLUTION 5 DROP(S): 3 SOLUTION/ DROPS AURICULAR (OTIC) at 17:05

## 2024-01-02 RX ADMIN — OXYCODONE HYDROCHLORIDE 10 MILLIGRAM(S): 5 TABLET ORAL at 07:30

## 2024-01-02 RX ADMIN — HYDROMORPHONE HYDROCHLORIDE 1 MILLIGRAM(S): 2 INJECTION INTRAMUSCULAR; INTRAVENOUS; SUBCUTANEOUS at 18:54

## 2024-01-02 RX ADMIN — OXYMETAZOLINE HYDROCHLORIDE 2 SPRAY(S): 0.5 SPRAY NASAL at 17:06

## 2024-01-02 RX ADMIN — MUPIROCIN 1 APPLICATION(S): 20 OINTMENT TOPICAL at 17:05

## 2024-01-02 RX ADMIN — LIDOCAINE AND PRILOCAINE CREAM 1 APPLICATION(S): 25; 25 CREAM TOPICAL at 17:04

## 2024-01-02 RX ADMIN — IMIPENEM AND CILASTATIN 250 MILLIGRAM(S): 250; 250 INJECTION, POWDER, FOR SOLUTION INTRAVENOUS at 18:33

## 2024-01-02 RX ADMIN — HYDROMORPHONE HYDROCHLORIDE 1 MILLIGRAM(S): 2 INJECTION INTRAMUSCULAR; INTRAVENOUS; SUBCUTANEOUS at 19:09

## 2024-01-02 RX ADMIN — PANTOPRAZOLE SODIUM 40 MILLIGRAM(S): 20 TABLET, DELAYED RELEASE ORAL at 11:05

## 2024-01-02 RX ADMIN — OXYCODONE HYDROCHLORIDE 10 MILLIGRAM(S): 5 TABLET ORAL at 22:13

## 2024-01-02 RX ADMIN — Medication 1: at 07:03

## 2024-01-02 RX ADMIN — OXYCODONE HYDROCHLORIDE 10 MILLIGRAM(S): 5 TABLET ORAL at 22:54

## 2024-01-02 NOTE — PROGRESS NOTE ADULT - ASSESSMENT
69 yo M w PMhx of CAD (x 2 stents Mar 2023), Type 2 DM, hx of kidney stones, psoriasis who presented with an oral mass 2/2 to [id number] SCC of L buccal mucosa during previous admission (12/3-21) and underwent L hemimandibulectomy, SND L level 1-3, recon with L FFF, STSG, and placement of dental implants on 12/7. Patient had a trach which was subsequently decannulated. Patient now returns with surgical site infection s/p wash-out.    #SSTI of surgical wound  - Patient w likely polymicrobial wound culture, w GPC in pairs, chain, GP rods, GN rods, concern of gram negative coverage also, speciation noted E.cloacae, S.mitis, E.coli, S.epidermis  - Noted plan for further washout, notified team to send Cx during washout  - C/w imipenem 1000mg q8  - Will f/u repeat Cx from team    ID will follow    RECS NOT FINAL UNTIL ATTENDING ATTESTATION 69 yo M w PMhx of CAD (x 2 stents Mar 2023), Type 2 DM, hx of kidney stones, psoriasis who presented with an oral mass 2/2 to [id number] SCC of L buccal mucosa during previous admission (12/3-21) and underwent L hemimandibulectomy, SND L level 1-3, recon with L FFF, STSG, and placement of dental implants on 12/7. Patient had a trach which was subsequently decannulated. Patient now returns with surgical site infection s/p wash-out.    #SSTI of surgical wound  - Patient w likely polymicrobial wound culture, w GPC in pairs, chain, GP rods, GN rods, concern of gram negative coverage also, speciation noted E.cloacae, S.mitis, E.coli, S.epidermis  - Noted plan for further washout, notified team to send Cx during washout  - C/w imipenem 1000mg q8  - Will f/u repeat Cx from team    ID will follow    RECS NOT FINAL UNTIL ATTENDING ATTESTATION

## 2024-01-02 NOTE — PROGRESS NOTE ADULT - ATTENDING COMMENTS
68M w/ CAD s/p PCI, DM2, renal calculi, psoriasis and SCC of L buccal mucosa s/p L hemimandibulectomy, reconstruction w/ fibular free flap and STSG from L thigh with dental implants. Admitted 12/27 with SSI, s/p DONNA 12/27 with finding of skin flap necrosis s/p debridement. OR cultures with E.coli, ECC, E.faecalis S.mitis/oralis, S.epi, S.constellatus, S.maltophilia, mixed anaerobes.  Was taken to OR today for washout. Trach noted with reddish brown discharge.   Monitor temps and O2 sats, if febrile or inc. O2 reqmt, check CXR.  Contnue IV imipenem 1g q8h  Will follow  ID Team 1

## 2024-01-02 NOTE — BRIEF OPERATIVE NOTE - SPECIMENS
oral cavity
tissue culture - mandibular tissue. Medial mandibular margin - permanent pathology analysis. supplemental medial mandibular margin- permanent pathology analysis. Wound culture.

## 2024-01-02 NOTE — BRIEF OPERATIVE NOTE - OPERATION/FINDINGS
Multiple area of skin paddle flap necrosis, debrided  Flap bone appears healthy and vascularized  7.5 portex replaced in trach stoma, secured with 4 point sutures
infected, non-viable free fibula flap with viable skin paddle. Purulent drainage appreciated at margins of flap and in neck.

## 2024-01-02 NOTE — PROGRESS NOTE ADULT - SUBJECTIVE AND OBJECTIVE BOX
Consultation Requested by:    Patient is a 68y old  Male who presents with a chief complaint of Surgical site infection (28 Dec 2023 15:40)    HPI:  SAUNDRA HOLMAN is a 67yo M w PMH of CAD (x2 stents Mar 2023), HLD, T2DM, hx of kidney stones, psoriasis who presented with an oral mass and underwent L hemimandibulectomy, SND L level 1-3, recon with L FFF, STSG, and placement of dental implants on 12/7. He had a trach which was subsequently decannulated. He returns 12/27 with surgical site infection now s/p OR for debridement and exploration. Trach replaced through prior stoma for pulmonary toilet.   (27 Dec 2023 17:31)    Interval History:  Patient seen and examined at bedside with wife bedside. Plan for repeat wash-out today. Reports no dark BM last two days. No new complaints. Denies fever, chills, c/p, palpitations, cough, SOB, abdominal pain, nausea, vomiting, diarrhea, dysuria, hematuria, LE swelling, or rash.      Allergies    No Known Allergies    Intolerances      Antimicrobials:  ampicillin/sulbactam  IVPB 3 Gram(s) IV Intermittent every 6 hours  metroNIDAZOLE  IVPB 500 milliGRAM(s) IV Intermittent every 8 hours      Other Medications:  acetaminophen     Tablet .. 650 milliGRAM(s) Oral every 6 hours PRN  aspirin  chewable 81 milliGRAM(s) Enteral Tube daily  atorvastatin 40 milliGRAM(s) Oral at bedtime  chlorhexidine 0.12% Liquid 15 milliLiter(s) Oral Mucosa two times a day  dextrose 5% + sodium chloride 0.45%. 1000 milliLiter(s) IV Continuous <Continuous>  dextrose 5%. 1000 milliLiter(s) IV Continuous <Continuous>  dextrose 5%. 1000 milliLiter(s) IV Continuous <Continuous>  dextrose 50% Injectable 25 Gram(s) IV Push once  dextrose 50% Injectable 25 Gram(s) IV Push once  dextrose 50% Injectable 12.5 Gram(s) IV Push once  dextrose Oral Gel 15 Gram(s) Oral once PRN  glucagon  Injectable 1 milliGRAM(s) IntraMuscular once  HYDROmorphone  Injectable 1 milliGRAM(s) IV Push every 6 hours PRN  influenza  Vaccine (HIGH DOSE) 0.7 milliLiter(s) IntraMuscular once  insulin lispro (ADMELOG) corrective regimen sliding scale   SubCutaneous at bedtime  insulin lispro (ADMELOG) corrective regimen sliding scale   SubCutaneous three times a day before meals  lidocaine 2% Viscous 10 milliLiter(s) Swish and Spit every 4 hours PRN  ondansetron Injectable 4 milliGRAM(s) IV Push every 8 hours PRN  oxyCODONE    Solution 10 milliGRAM(s) Oral every 4 hours PRN  oxyCODONE    Solution 5 milliGRAM(s) Oral every 4 hours PRN  pantoprazole  Injectable 40 milliGRAM(s) IV Push two times a day      FAMILY HISTORY:    PAST MEDICAL & SURGICAL HISTORY:  Neoplasm of mandible        SOCIAL HISTORY:  [place], has livestock such as sheep in property but does not interact w them  restaurant and diner owner  Recent travel to [facility], no other  50+ pack year history as well as cigars  No EtOH use  Recreational CBD use  Sexually active w spouse    Vital Signs Last 24 Hrs  T(C): 36.4 (02 Jan 2024 17:45), Max: 37.1 (02 Jan 2024 09:00)  T(F): 97.6 (02 Jan 2024 17:45), Max: 98.8 (02 Jan 2024 09:00)  HR: 54 (02 Jan 2024 18:30) (49 - 74)  BP: 137/62 (02 Jan 2024 18:30) (105/56 - 137/62)  BP(mean): 89 (02 Jan 2024 18:30) (77 - 103)  RR: 25 (02 Jan 2024 18:30) (17 - 25)  SpO2: 100% (02 Jan 2024 18:30) (97% - 100%)    Parameters below as of 02 Jan 2024 18:30  Patient On (Oxygen Delivery Method): tracheostomy collar    O2 Concentration (%): 50    PHYSICAL EXAM  GENERAL: NAD, well-groomed, well-developed  HEENT: Remnant left FFF paddle oozing, packing in place in buccal cavity  NERVOUS SYSTEM: Alert & Oriented X3, Good concentration; Motor Strength 5/5 B/L upper and lower extremities  CHEST/LUNG: CTA B/L, No w/r/r  HEART: Regular rate and rhythm; No murmurs, rubs, or gallops  ABDOMEN: Soft, Nontender, Nondistended; Bowel sounds present, PEG tube c/d/i  EXTREMITIES: wwp. dressing over L thigh (prev graft site)        LABS:                         10.4   7.27  )-----------( 268      ( 02 Jan 2024 05:19 )             32.4     01-02    137  |  102  |  13  ----------------------------<  190<H>  4.2   |  27  |  0.78    Ca    8.4      02 Jan 2024 05:19  Phos  2.0     01-02  Mg     1.7     01-02      PT/INR - ( 02 Jan 2024 10:00 )   PT: 11.7 sec;   INR: 1.03          PTT - ( 02 Jan 2024 10:00 )  PTT:28.0 sec  Urinalysis Basic - ( 02 Jan 2024 05:19 )    Color: x / Appearance: x / SG: x / pH: x  Gluc: 190 mg/dL / Ketone: x  / Bili: x / Urobili: x   Blood: x / Protein: x / Nitrite: x   Leuk Esterase: x / RBC: x / WBC x   Sq Epi: x / Non Sq Epi: x / Bacteria: x                RADIOLOGY, EKG & ADDITIONAL TESTS:   Gluc: 133 mg/dL / Ketone: x  / Bili: x / Urobili: x   Blood: x / Protein: x / Nitrite: x   Leuk Esterase: x / RBC: x / WBC x   Sq Epi: x / Non Sq Epi: x / Bacteria: x                RADIOLOGY, EKG & ADDITIONAL TESTS Consultation Requested by:    Patient is a 68y old  Male who presents with a chief complaint of Surgical site infection (28 Dec 2023 15:40)    HPI:  SAUNDRA HOLMAN is a 67yo M w PMH of CAD (x2 stents Mar 2023), HLD, T2DM, hx of kidney stones, psoriasis who presented with an oral mass and underwent L hemimandibulectomy, SND L level 1-3, recon with L FFF, STSG, and placement of dental implants on 12/7. He had a trach which was subsequently decannulated. He returns 12/27 with surgical site infection now s/p OR for debridement and exploration. Trach replaced through prior stoma for pulmonary toilet.   (27 Dec 2023 17:31)    Interval History:  Patient seen and examined at bedside with wife bedside. Plan for repeat wash-out today. Reports no dark BM last two days. No new complaints. Denies fever, chills, c/p, palpitations, cough, SOB, abdominal pain, nausea, vomiting, diarrhea, dysuria, hematuria, LE swelling, or rash.      Allergies    No Known Allergies    Intolerances      Antimicrobials:  ampicillin/sulbactam  IVPB 3 Gram(s) IV Intermittent every 6 hours  metroNIDAZOLE  IVPB 500 milliGRAM(s) IV Intermittent every 8 hours      Other Medications:  acetaminophen     Tablet .. 650 milliGRAM(s) Oral every 6 hours PRN  aspirin  chewable 81 milliGRAM(s) Enteral Tube daily  atorvastatin 40 milliGRAM(s) Oral at bedtime  chlorhexidine 0.12% Liquid 15 milliLiter(s) Oral Mucosa two times a day  dextrose 5% + sodium chloride 0.45%. 1000 milliLiter(s) IV Continuous <Continuous>  dextrose 5%. 1000 milliLiter(s) IV Continuous <Continuous>  dextrose 5%. 1000 milliLiter(s) IV Continuous <Continuous>  dextrose 50% Injectable 25 Gram(s) IV Push once  dextrose 50% Injectable 25 Gram(s) IV Push once  dextrose 50% Injectable 12.5 Gram(s) IV Push once  dextrose Oral Gel 15 Gram(s) Oral once PRN  glucagon  Injectable 1 milliGRAM(s) IntraMuscular once  HYDROmorphone  Injectable 1 milliGRAM(s) IV Push every 6 hours PRN  influenza  Vaccine (HIGH DOSE) 0.7 milliLiter(s) IntraMuscular once  insulin lispro (ADMELOG) corrective regimen sliding scale   SubCutaneous at bedtime  insulin lispro (ADMELOG) corrective regimen sliding scale   SubCutaneous three times a day before meals  lidocaine 2% Viscous 10 milliLiter(s) Swish and Spit every 4 hours PRN  ondansetron Injectable 4 milliGRAM(s) IV Push every 8 hours PRN  oxyCODONE    Solution 10 milliGRAM(s) Oral every 4 hours PRN  oxyCODONE    Solution 5 milliGRAM(s) Oral every 4 hours PRN  pantoprazole  Injectable 40 milliGRAM(s) IV Push two times a day      FAMILY HISTORY:    PAST MEDICAL & SURGICAL HISTORY:  Neoplasm of mandible        SOCIAL HISTORY:  [place], has livestock such as sheep in property but does not interact w them  restaurant and diner owner  Recent travel to [facility], no other  50+ pack year history as well as cigars  No EtOH use  Recreational CBD use  Sexually active w spouse    Vital Signs Last 24 Hrs  T(C): 36.4 (02 Jan 2024 17:45), Max: 37.1 (02 Jan 2024 09:00)  T(F): 97.6 (02 Jan 2024 17:45), Max: 98.8 (02 Jan 2024 09:00)  HR: 54 (02 Jan 2024 18:30) (49 - 74)  BP: 137/62 (02 Jan 2024 18:30) (105/56 - 137/62)  BP(mean): 89 (02 Jan 2024 18:30) (77 - 103)  RR: 25 (02 Jan 2024 18:30) (17 - 25)  SpO2: 100% (02 Jan 2024 18:30) (97% - 100%)    Parameters below as of 02 Jan 2024 18:30  Patient On (Oxygen Delivery Method): tracheostomy collar    O2 Concentration (%): 50    PHYSICAL EXAM  GENERAL: NAD, well-groomed, well-developed  HEENT: Remnant left FFF paddle oozing, packing in place in buccal cavity  NERVOUS SYSTEM: Alert & Oriented X3, Good concentration; Motor Strength 5/5 B/L upper and lower extremities  CHEST/LUNG: CTA B/L, No w/r/r  HEART: Regular rate and rhythm; No murmurs, rubs, or gallops  ABDOMEN: Soft, Nontender, Nondistended; Bowel sounds present, PEG tube c/d/i  EXTREMITIES: wwp. dressing over L thigh (prev graft site)        LABS:                         10.4   7.27  )-----------( 268      ( 02 Jan 2024 05:19 )             32.4     01-02    137  |  102  |  13  ----------------------------<  190<H>  4.2   |  27  |  0.78    Ca    8.4      02 Jan 2024 05:19  Phos  2.0     01-02  Mg     1.7     01-02      PT/INR - ( 02 Jan 2024 10:00 )   PT: 11.7 sec;   INR: 1.03          PTT - ( 02 Jan 2024 10:00 )  PTT:28.0 sec  Urinalysis Basic - ( 02 Jan 2024 05:19 )    Color: x / Appearance: x / SG: x / pH: x  Gluc: 190 mg/dL / Ketone: x  / Bili: x / Urobili: x   Blood: x / Protein: x / Nitrite: x   Leuk Esterase: x / RBC: x / WBC x   Sq Epi: x / Non Sq Epi: x / Bacteria: x                RADIOLOGY, EKG & ADDITIONAL TESTS:   Gluc: 133 mg/dL / Ketone: x  / Bili: x / Urobili: x   Blood: x / Protein: x / Nitrite: x   Leuk Esterase: x / RBC: x / WBC x   Sq Epi: x / Non Sq Epi: x / Bacteria: x                RADIOLOGY, EKG & ADDITIONAL TESTS

## 2024-01-02 NOTE — REASON FOR VISIT
[de-identified] : left mandibulectomy, left fibula fracture [de-identified] : Patient here today for post operative appointment. Has some visible necrosis of skin paddle of flap around edges which was debrided today. Using PEG now for nutrition, has trouble even with softs and liquids. Neck incision clean dry and intact. Center of flap appears within normal limits with appropriate color and texture, bleeding normally. Stoma site within normal limits. Fibula donor site within normal limits, continues to change dressings daily. Will switch antibiotic from amoxicillin to augmentin for increased coverage. Has muffled hearing on left side, on exam there cerumen impaction cleaned away without issue. He reported immediate improvement. Will likely need serial debridements and continued monitoring for improvement. Followup next week.  Freddy Gandhi DDS MD FACS Professor and Chair Department of Otolaryngology Head and Neck Surgery [facility]

## 2024-01-02 NOTE — BRIEF OPERATIVE NOTE - NSICDXBRIEFPREOP_GEN_ALL_CORE_FT
PRE-OP DIAGNOSIS:  Surgical site infection 27-Dec-2023 20:07:21  Sea Denny  
PRE-OP DIAGNOSIS:  Surgical site infection 27-Dec-2023 20:07:21  Sea Denny

## 2024-01-02 NOTE — BRIEF OPERATIVE NOTE - NSICDXBRIEFPOSTOP_GEN_ALL_CORE_FT
POST-OP DIAGNOSIS:  Surgical site infection 27-Dec-2023 20:07:35  Sea Denny  
POST-OP DIAGNOSIS:  Surgical site infection 27-Dec-2023 20:07:35  Sea Denny

## 2024-01-02 NOTE — BRIEF OPERATIVE NOTE - NSICDXBRIEFPROCEDURE_GEN_ALL_CORE_FT
PROCEDURES:  Debridement, oral cavity 27-Dec-2023 20:07:12  Sea Denny  
PROCEDURES:  Debridement, oral cavity 27-Dec-2023 20:07:12  Sea Denny

## 2024-01-02 NOTE — PRE-ANESTHESIA EVALUATION ADULT - NSANTHPMHFT_GEN_ALL_CORE
67yo M w PMH of CAD (x2 stents Mar 2023), HLD, T2DM, hx of kidney stones, psoriasis who presented with an oral mass and underwent L hemimandibulectomy, SND L level 1-3, recon with L FFF, STSG, and placement of dental implants on 12/7. He had a trach which was subsequently decannulated. He returns 12/27 with surgical site infection now s/p OR for debridement and exploration. Trach replaced through prior stoma for pulmonary toilet

## 2024-01-02 NOTE — ADDENDUM
[FreeTextEntry1] : Speech Language Pathologist. Pt seen during clinic visit in conjunction with Dr. Gandhi. he was accompanied by his wife and son. At this time Pt is fully PEG dependent given necrotic flap edges. Pt with increased mucus production and VPI due to flap necrosis. Pt advised to maintain excellent oral care and take sips of water for comfort as tolerated. No other PO is recommended at this time to maintain flap health. This service will continue to be involved in this Pt dysphagia rehabilitation process. All questions were answered.  Ro Barriga M.S. CCC-SLP

## 2024-01-02 NOTE — PROGRESS NOTE ADULT - ASSESSMENT
Past medical Hx of CAD (x 2 stents Mar 2023), Type 2 DM, hx of kidney stones, psoriasis who presented with an oral mass during previous admission (12/3-21) and underwent L hemimandibulectomy, SND L level 1-3, recon with L FFF, STSG, and placement of dental implants on 12/7. Patient had a trach which was subsequently decannulated. Patient now returns with surgical site infection. Medicine consulted for co-management.    #Surgical site infection  Patient to return to OR today. Patient s/p OR for debridement and exploration (12/27). Trach replaced through prior stoma for pulmonary toilet. OR cultures with E. Coli, ECC, E. Faecalis S. Mitis S. Oralis, S. Epidermidis, S. Constellatus, S. Maltophilia, mixed anaerobes.  - c/w Imipenem 1 g IV q8  - Pain regimen: Tylenol 650 mg PO q6, Oxy IR 5 mg Q4 moderate, Oxy IR 10 mg Q4 severe, Dilaudid 1 mg IV Q6 for breakthrough   - f/u ID recommendations  - Restart DVT PPX, as soon as appropriate per primary team    #Anemia  Hb on admission 7.3. Now stable Hb 9.2 s/p karlo-operative transfusion. Patient with previous iron studies consistent with WADE. DD includes surgical site bleed VS GI bleed.   - maintain active T&S, transfusion goal to Hgb >8   - Recommend folic acid 1g PO QD for folate deficiency  - Protonix 40 mg IV BID    #CAD  Patient with hx of CAD s/p 2 stents in March 2023.   - c/w ASA 81 mg PO QD, Atorvastatin 40 mg PO QD  - Restart Plavix, as soon as appropriate per primary team    #DM type 2  Home medication: Metformin 1g PO QD and 500 mg PO QD and Jardiance 10 mg PO QD.  - c/w insulin sliding scale    Medicine will continue to follow. Patient seen, evaluated, and discussed with attending Dr. Montero

## 2024-01-02 NOTE — CHART NOTE - NSCHARTNOTEFT_GEN_A_CORE
Admitting Diagnosis:   Patient is a 68y old  Male who presents with a chief complaint of surgical site infection (02 Jan 2024 13:26)      PAST MEDICAL & SURGICAL HISTORY:  Neoplasm of mandible      CURRENT NUTRITION ORDER:   - NPO for Procedure/Test - NPO Start Date: 02-Jan-2024,   NPO Start Time: 07:00 - Except Medications (01-02-24 @ 07:51)  - 2880 mL total volume of Vital 1.5 @ 120 mL/hr x 24 hours (4320 kcal, 194 g protein, 2200 mL free fluid)    - Bolus 50 mL free water q12h  - NKFA       PO INTAKE:  % [  ]       50-75% [  ]       25-50% [  ]       <25% [  ]       N/A [ xx ]     GI: Denies any nausea/vomiting; + diarrhea; PEG placement on prior hospital admission; last bowel movement documented 1/1    PAIN:  Denies pain or discomfort     Skin: 1+ trace edema Lt foot; surgical incisions           LABS:  01-02    137  |  102  |  13  ----------------------------<  190<H>  4.2   |  27  |  0.78    Ca    8.4      02 Jan 2024 05:19  Phos  2.0     01-02  Mg     1.7     01-02      CAPILLARY BLOOD GLUCOSE  POCT Blood Glucose.: 114 mg/dL (02 Jan 2024 11:32)  POCT Blood Glucose.: 153 mg/dL (02 Jan 2024 06:59)  POCT Blood Glucose.: 184 mg/dL (01 Jan 2024 22:28)  POCT Blood Glucose.: 103 mg/dL (01 Jan 2024 16:42)        MEDICATIONS  (STANDING):  aspirin  chewable 81 milliGRAM(s) Enteral Tube daily  atorvastatin 40 milliGRAM(s) Oral at bedtime  chlorhexidine 0.12% Liquid 15 milliLiter(s) Oral Mucosa two times a day  Dakins Solution - 1/4 Strength 1 Application(s) Topical two times a day  Dakins Solution - Full Strength 1 Application(s) Topical two times a day  dexAMETHasone 0.1% Ophthalmic Solution for OTIC Use 5 Drop(s) Both Ears once  dextrose 5%. 1000 milliLiter(s) (50 mL/Hr) IV Continuous <Continuous>  dextrose 5%. 1000 milliLiter(s) (100 mL/Hr) IV Continuous <Continuous>  dextrose 50% Injectable 25 Gram(s) IV Push once  dextrose 50% Injectable 12.5 Gram(s) IV Push once  dextrose 50% Injectable 25 Gram(s) IV Push once  folic acid 1 milliGRAM(s) Enteral Tube daily  glucagon  Injectable 1 milliGRAM(s) IntraMuscular once  imipenem/cilastatin  IVPB 1000 milliGRAM(s) IV Intermittent every 8 hours  influenza  Vaccine (HIGH DOSE) 0.7 milliLiter(s) IntraMuscular once  insulin lispro (ADMELOG) corrective regimen sliding scale   SubCutaneous at bedtime  insulin lispro (ADMELOG) corrective regimen sliding scale   SubCutaneous three times a day before meals  lidocaine 1%/epinephrine 1:100,000 Inj 30 milliLiter(s) Local Injection once  lidocaine 4% Topical Solution 1 Application(s) Topical once  lidocaine/prilocaine Cream 1 Application(s) Topical once  mupirocin 2% Ointment 1 Application(s) Topical once  ofloxacin 0.3% Ophthalmic Solution for OTIC Use 5 Drop(s) Both Ears once  oxymetazoline 0.05% Nasal Spray 2 Spray(s) Alternating Nostrils once  pantoprazole  Injectable 40 milliGRAM(s) IV Push two times a day  tetracaine/benzocaine/butamben Spray 1 Spray(s) Topical once    MEDICATIONS  (PRN):  acetaminophen   Oral Liquid .. 1000 milliGRAM(s) Oral every 6 hours PRN Mild Pain (1 - 3)  dextrose Oral Gel 15 Gram(s) Oral once PRN Blood Glucose LESS THAN 70 milliGRAM(s)/deciliter  HYDROmorphone  Injectable 1 milliGRAM(s) IV Push every 6 hours PRN Severe Pain (7 - 10)  lidocaine 2% Viscous 10 milliLiter(s) Swish and Spit every 4 hours PRN Mouth Care  ondansetron Injectable 4 milliGRAM(s) IV Push every 8 hours PRN Nausea  oxyCODONE    Solution 5 milliGRAM(s) Oral every 4 hours PRN Moderate Pain (4 - 6)  oxyCODONE    Solution 10 milliGRAM(s) Oral every 4 hours PRN Severe Pain (7 - 10)        ANTHROPOMETRICS:   Height (inches):  70   Weight (pounds):  185.6 (1/2)   BMI (kg/m^2):  26.6   IBW (pounds):  166 +/- 10%   %IBW:  111.8 %     ESTIMATED NUTRIENT NEEDS:   Calories:  (22-28 kcal/kg) 6317-4992 kcal   Protein:  (1.1-1.4 g/kg)  g   Fluids:  1 mL/kcal or at team’s discretion   Current body weight used to calculate energy needs due to pt's current body weight within % ideal body weight per [facility] Standards of Nutrition Care. Needs adjusted for age and current clinical status.     SUBJECTIVE:   67yo M w PMH of CAD (x2 stents Mar 2023), HLD, T2DM, hx of kidney stones, psoriasis who presented with an oral mass and underwent L anabel-mandibulectomy, SND L level 1-3, recon with L FFF, STSG, and placement of dental implants on 12/7. He had a trach which was subsequently decannulated. He returns 12/27 with surgical site infection now s/p OR for debridement and exploration. Trach replaced through prior stoma for pulmonary toilet.     Patient discussed with nursing and team. Chart, meds, and labs reviewed. Tmax 37.1 C. MAPs 77-84 mm Hg. Meds significant for insulin sliding scale, folic acid, pantoprazole (protonix), ondansetron (Zofran). Labs significant for POCT 103-207H: glucose 190H; phosphorus 2.0L. Met with patient on 8 [facility]. Wife Dinah participated in the nutrition interview. Denies any nausea/vomiting; denies any GI pain; reports diarrhea and urgent need to move bowels during urination    PREVIOUS NUTRITION DIAGNOSIS: Inadequate Oral Intake related to decreased ability to consume sufficient energy as evidenced by reliance on enteral nutrition to meet 100% estimated nutrition needs    Active [ xx ]       Resolved [  ]     GOAL: Consistently meet >75% of nutrition needs via most appropriate route for nutrition      RECOMMENDATIONS:   - When medically feasible, recommend the following INTERMITTENT tube feed regimen via gastrostomy:     >> 1422 mL total volume (6 bottles daily) of Vital 1.5 from 2224-2338 (provides 2133 kcal, 96 g protein, 1086 mL free fluid)    >> Start at 10 mL/hr and increase by 10 mL/hr q4h to goal rate of 120 mL/hr; or as tolerated     >> Flush tube feeds with 50 mL water before and after administration (provides an additional 100 mL)     - When team and patient ready to transition to bolus, recommend the following:     >> Bolus schedule per patient/caregiver preference. Sample bolus schedule below     >> Bolus 1.5 can (~356 mL) of Vital 1.5 4x/day @ 0900, 1300, 1700, 2100     >> Flush with 50 mL water before and after each bolus (provides an additional 400 mL)     - Additional free water boluses as per team   - Hold feeds if increase in pressor requirements, MAPs consistently trending <60 mmHg, lactic acidosis presents, or GI intolerance is noted   - Monitor tube feed tolerance, GI distress, labs, weights   - Monitor electrolytes, adjust and replete PRN   - Pain and bowel regimen as per team   - Team notified of the above recommendations    EDUCATION:  Pt and family educated on medical nutrition therapy specific to the patient’s diagnosis; they expressed understanding; all questions and concerns addressed to their satisfaction       RISK LEVEL:  High [ xx ]       Moderate [  ]       Low [  ]     INOCENCIA Kong, MS, RD, CDN [id number] Admitting Diagnosis:   Patient is a 68y old  Male who presents with a chief complaint of surgical site infection (02 Jan 2024 13:26)      PAST MEDICAL & SURGICAL HISTORY:  Neoplasm of mandible      CURRENT NUTRITION ORDER:   - NPO for Procedure/Test - NPO Start Date: 02-Jan-2024,   NPO Start Time: 07:00 - Except Medications (01-02-24 @ 07:51)  - 2880 mL total volume of Vital 1.5 @ 120 mL/hr x 24 hours (4320 kcal, 194 g protein, 2200 mL free fluid)    - Bolus 50 mL free water q12h  - NKFA       PO INTAKE:  % [  ]       50-75% [  ]       25-50% [  ]       <25% [  ]       N/A [ xx ]     GI: Denies any nausea/vomiting; + diarrhea; PEG placement on prior hospital admission; last bowel movement documented 1/1    PAIN:  Denies pain or discomfort     Skin: 1+ trace edema Lt foot; surgical incisions           LABS:  01-02    137  |  102  |  13  ----------------------------<  190<H>  4.2   |  27  |  0.78    Ca    8.4      02 Jan 2024 05:19  Phos  2.0     01-02  Mg     1.7     01-02      CAPILLARY BLOOD GLUCOSE  POCT Blood Glucose.: 114 mg/dL (02 Jan 2024 11:32)  POCT Blood Glucose.: 153 mg/dL (02 Jan 2024 06:59)  POCT Blood Glucose.: 184 mg/dL (01 Jan 2024 22:28)  POCT Blood Glucose.: 103 mg/dL (01 Jan 2024 16:42)        MEDICATIONS  (STANDING):  aspirin  chewable 81 milliGRAM(s) Enteral Tube daily  atorvastatin 40 milliGRAM(s) Oral at bedtime  chlorhexidine 0.12% Liquid 15 milliLiter(s) Oral Mucosa two times a day  Dakins Solution - 1/4 Strength 1 Application(s) Topical two times a day  Dakins Solution - Full Strength 1 Application(s) Topical two times a day  dexAMETHasone 0.1% Ophthalmic Solution for OTIC Use 5 Drop(s) Both Ears once  dextrose 5%. 1000 milliLiter(s) (50 mL/Hr) IV Continuous <Continuous>  dextrose 5%. 1000 milliLiter(s) (100 mL/Hr) IV Continuous <Continuous>  dextrose 50% Injectable 25 Gram(s) IV Push once  dextrose 50% Injectable 12.5 Gram(s) IV Push once  dextrose 50% Injectable 25 Gram(s) IV Push once  folic acid 1 milliGRAM(s) Enteral Tube daily  glucagon  Injectable 1 milliGRAM(s) IntraMuscular once  imipenem/cilastatin  IVPB 1000 milliGRAM(s) IV Intermittent every 8 hours  influenza  Vaccine (HIGH DOSE) 0.7 milliLiter(s) IntraMuscular once  insulin lispro (ADMELOG) corrective regimen sliding scale   SubCutaneous at bedtime  insulin lispro (ADMELOG) corrective regimen sliding scale   SubCutaneous three times a day before meals  lidocaine 1%/epinephrine 1:100,000 Inj 30 milliLiter(s) Local Injection once  lidocaine 4% Topical Solution 1 Application(s) Topical once  lidocaine/prilocaine Cream 1 Application(s) Topical once  mupirocin 2% Ointment 1 Application(s) Topical once  ofloxacin 0.3% Ophthalmic Solution for OTIC Use 5 Drop(s) Both Ears once  oxymetazoline 0.05% Nasal Spray 2 Spray(s) Alternating Nostrils once  pantoprazole  Injectable 40 milliGRAM(s) IV Push two times a day  tetracaine/benzocaine/butamben Spray 1 Spray(s) Topical once    MEDICATIONS  (PRN):  acetaminophen   Oral Liquid .. 1000 milliGRAM(s) Oral every 6 hours PRN Mild Pain (1 - 3)  dextrose Oral Gel 15 Gram(s) Oral once PRN Blood Glucose LESS THAN 70 milliGRAM(s)/deciliter  HYDROmorphone  Injectable 1 milliGRAM(s) IV Push every 6 hours PRN Severe Pain (7 - 10)  lidocaine 2% Viscous 10 milliLiter(s) Swish and Spit every 4 hours PRN Mouth Care  ondansetron Injectable 4 milliGRAM(s) IV Push every 8 hours PRN Nausea  oxyCODONE    Solution 5 milliGRAM(s) Oral every 4 hours PRN Moderate Pain (4 - 6)  oxyCODONE    Solution 10 milliGRAM(s) Oral every 4 hours PRN Severe Pain (7 - 10)        ANTHROPOMETRICS:   Height (inches):  70   Weight (pounds):  185.6 (1/2)   BMI (kg/m^2):  26.6   IBW (pounds):  166 +/- 10%   %IBW:  111.8 %     ESTIMATED NUTRIENT NEEDS:   Calories:  (22-28 kcal/kg) 0709-7331 kcal   Protein:  (1.1-1.4 g/kg)  g   Fluids:  1 mL/kcal or at team’s discretion   Current body weight used to calculate energy needs due to pt's current body weight within % ideal body weight per [facility] Standards of Nutrition Care. Needs adjusted for age and current clinical status.     SUBJECTIVE:   69yo M w PMH of CAD (x2 stents Mar 2023), HLD, T2DM, hx of kidney stones, psoriasis who presented with an oral mass and underwent L anabel-mandibulectomy, SND L level 1-3, recon with L FFF, STSG, and placement of dental implants on 12/7. He had a trach which was subsequently decannulated. He returns 12/27 with surgical site infection now s/p OR for debridement and exploration. Trach replaced through prior stoma for pulmonary toilet.     Patient discussed with nursing and team. Chart, meds, and labs reviewed. Tmax 37.1 C. MAPs 77-84 mm Hg. Meds significant for insulin sliding scale, folic acid, pantoprazole (protonix), ondansetron (Zofran). Labs significant for POCT 103-207H: glucose 190H; phosphorus 2.0L. Met with patient on 8 [place]. Wife Dinah participated in the nutrition interview. Denies any nausea/vomiting; denies any GI pain; reports diarrhea and urgent need to move bowels during urination    PREVIOUS NUTRITION DIAGNOSIS: Inadequate Oral Intake related to decreased ability to consume sufficient energy as evidenced by reliance on enteral nutrition to meet 100% estimated nutrition needs    Active [ xx ]       Resolved [  ]     GOAL: Consistently meet >75% of nutrition needs via most appropriate route for nutrition      RECOMMENDATIONS:   - When medically feasible, recommend the following INTERMITTENT tube feed regimen via gastrostomy:     >> 1422 mL total volume (6 bottles daily) of Vital 1.5 from 3153-9852 (provides 2133 kcal, 96 g protein, 1086 mL free fluid)    >> Start at 10 mL/hr and increase by 10 mL/hr q4h to goal rate of 120 mL/hr; or as tolerated     >> Flush tube feeds with 50 mL water before and after administration (provides an additional 100 mL)     - When team and patient ready to transition to bolus, recommend the following:     >> Bolus schedule per patient/caregiver preference. Sample bolus schedule below     >> Bolus 1.5 can (~356 mL) of Vital 1.5 4x/day @ 0900, 1300, 1700, 2100     >> Flush with 50 mL water before and after each bolus (provides an additional 400 mL)     - Additional free water boluses as per team   - Hold feeds if increase in pressor requirements, MAPs consistently trending <60 mmHg, lactic acidosis presents, or GI intolerance is noted   - Monitor tube feed tolerance, GI distress, labs, weights   - Monitor electrolytes, adjust and replete PRN   - Pain and bowel regimen as per team   - Team notified of the above recommendations    EDUCATION:  Pt and family educated on medical nutrition therapy specific to the patient’s diagnosis; they expressed understanding; all questions and concerns addressed to their satisfaction       RISK LEVEL:  High [ xx ]       Moderate [  ]       Low [  ]     INOCENCIA Kong, MS, RD, CDN [id number]

## 2024-01-02 NOTE — PROGRESS NOTE ADULT - ATTENDING COMMENTS
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 Pt however returned to [facility] ( 12/27/23) with surgical site infection, s/p RTOR 12/27 with findings of skin flap necrosis and s/p debridement. Trach replaced through prior stoma for pulmonary toilet. Patient also noted to have dark stools and dropped in Hgb- wife reported black stool for the last 3 days and same thing coming out from peg tube 12/28 , now peg feeding held -. Medicine consulted for co-management.     Pt seen and examined with Dr. 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 Purulent drainage appreciated at margins of flap and in neck.  EGD( 1/2): ulcer found at the G-tube site    - Local wound care: OR findings reviewed, f/u OR mandibular culture per ENT   - ID f/u appreciated ( Team 1):  d/c's Vanco & Zosyn ( 1/1) and started on Imipenem 1gm iv q8h  (1/1-)   - f/u OR culture ( 1/2)   - OR cultures with E.coli, ECC, E.faecalis S.mitis/oralis, S.epi, S.constellatus, S.maltophilia, mixed anaerobes.   - f/u GNRs sensitivity   - WBC normal 7K   - BCx( 12/28): ngtd x 4 days   -R antecubital/forearm thrombophlebitis- warm compresses prn, monitor clinical improvement     # Acute blood loss anemia/melena   # hx WADE ( Ferritin 768 but Tsat 13% ( <20%) on 12/13/23)   - GI fu appreciated, EGD( 1/2) ulcer at G-tube site, rec; Protonix 40mg daily for 8 weeks and avoid bumper being too tight to cause pressure ulcer   - hgb dropped 7.6 ( 12/29) s/p 2u PRBC ( ~ 500mg elemental iron), trend Hgb 10.7  (1/1) ->10.4( 1/2)   - keep active T&S, keep Hgb>8    - c/w ASA given hx PCI x2 ( March 2023)  - hold off Plavix since adm ( 12/27) , awaiting ENT & GI clearance to resume Plavix  - c/w PPI daily can be via G-tube   - c/w Folic acid 1mg daily   - monitor clinically for any further melena     # CAD sp PCI/CUBA x2 to LAD and Ramus in March 2023 as per cards is in setting of NSTEMI - prefer DAPT, at least monotherapy with ASA if plavix need to be held for procedure, currently on ASA , to restart plavix when safe., c/w ASA 81mg and Atorvastatin 40mg qHS     #  DM - FS with ISS, would hold all ora-hypoglycemic agent, goal -180    # pain management - oxycodone 5mg/10mg prn , dilaudid prn, would need stool softener to ensure daily BM.     # Dysphagia- NPO, trach, on TF with Vital 1.5 via G-tube     # DVT ppx; SCDs , held chemoprophylaxis given recent GIB    Med consult team continues to follow pt with you. Thank you. 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 Pt however returned to [facility] ( 12/27/23) with surgical site infection, s/p RTOR 12/27 with findings of skin flap necrosis and s/p debridement. Trach replaced through prior stoma for pulmonary toilet. Patient also noted to have dark stools and dropped in Hgb- wife reported black stool for the last 3 days and same thing coming out from peg tube 12/28 , now peg feeding held -. Medicine consulted for co-management.     Pt seen and examined with Dr. 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 Purulent drainage appreciated at margins of flap and in neck.  EGD( 1/2): ulcer found at the G-tube site    - Local wound care: OR findings reviewed, f/u OR mandibular culture per ENT   - ID f/u appreciated ( Team 1):  d/c's Vanco & Zosyn ( 1/1) and started on Imipenem 1gm iv q8h  (1/1-)   - f/u OR culture ( 1/2)   - OR cultures with E.coli, ECC, E.faecalis S.mitis/oralis, S.epi, S.constellatus, S.maltophilia, mixed anaerobes.   - f/u GNRs sensitivity   - WBC normal 7K   - BCx( 12/28): ngtd x 4 days   -R antecubital/forearm thrombophlebitis- warm compresses prn, monitor clinical improvement     # Acute blood loss anemia/melena   # hx WADE ( Ferritin 768 but Tsat 13% ( <20%) on 12/13/23)   - GI fu appreciated, EGD( 1/2) ulcer at G-tube site, rec; Protonix 40mg daily for 8 weeks and avoid bumper being too tight to cause pressure ulcer   - hgb dropped 7.6 ( 12/29) s/p 2u PRBC ( ~ 500mg elemental iron), trend Hgb 10.7  (1/1) ->10.4( 1/2)   - keep active T&S, keep Hgb>8    - c/w ASA given hx PCI x2 ( March 2023)  - hold off Plavix since adm ( 12/27) , awaiting ENT & GI clearance to resume Plavix  - c/w PPI daily can be via G-tube   - c/w Folic acid 1mg daily   - monitor clinically for any further melena     # CAD sp PCI/CUBA x2 to LAD and Ramus in March 2023 as per cards is in setting of NSTEMI - prefer DAPT, at least monotherapy with ASA if plavix need to be held for procedure, currently on ASA , to restart plavix when safe., c/w ASA 81mg and Atorvastatin 40mg qHS     #  DM - FS with ISS, would hold all ora-hypoglycemic agent, goal -180    # pain management - oxycodone 5mg/10mg prn , dilaudid prn, would need stool softener to ensure daily BM.     # Dysphagia- NPO, trach, on TF with Vital 1.5 via G-tube     # DVT ppx; SCDs , held chemoprophylaxis given recent GIB    Med consult team continues to follow pt with you. Thank you.

## 2024-01-02 NOTE — PROGRESS NOTE ADULT - SUBJECTIVE AND OBJECTIVE BOX
SUBJECTIVE/OVERNIGHT EVENTS: No acute overnight events. Pt seen in AM at bedside, resting comfortably in bed, and does not appear to be in any acute distress. Patient unhappy about having to return to the OR.    VITAL SIGNS:  Vital Signs Last 24 Hrs  T(C): 36.9 (02 Jan 2024 13:36), Max: 37.1 (02 Jan 2024 09:00)  T(F): 98.4 (02 Jan 2024 13:36), Max: 98.8 (02 Jan 2024 09:00)  HR: 54 (02 Jan 2024 11:25) (54 - 74)  BP: 110/55 (02 Jan 2024 11:25) (105/56 - 120/58)  BP(mean): 79 (02 Jan 2024 11:25) (77 - 84)  RR: 18 (02 Jan 2024 11:25) (17 - 20)  SpO2: 100% (02 Jan 2024 11:25) (97% - 100%)    Parameters below as of 02 Jan 2024 11:25  Patient On (Oxygen Delivery Method): tracheostomy collar  O2 Flow (L/min): 10  O2 Concentration (%): 40    PHYSICAL EXAM:  Constitutional: sitting comfortably in bed, no acute distress   HEENT: sclera non-icteric, mouth with lesion on right side, packed with gauze, neck also wrapped with gauze, trach collar in place  Respiratory: Good air entry b/l, clear to auscultation bilaterally, without accessory muscle use and no intercostal retractions  Cardiovascular: RRR, normal S1S2, no M/R/G  Gastrointestinal: soft, NTND, no masses palpable, BS normal  Extremities: Warm, well perfused, no edema, no clubbing  Skin: Normal temperature, warm, dry    MEDICATIONS:  MEDICATIONS  (STANDING):  aspirin  chewable 81 milliGRAM(s) Enteral Tube daily  atorvastatin 40 milliGRAM(s) Oral at bedtime  chlorhexidine 0.12% Liquid 15 milliLiter(s) Oral Mucosa two times a day  Dakins Solution - 1/4 Strength 1 Application(s) Topical two times a day  Dakins Solution - Full Strength 1 Application(s) Topical two times a day  dextrose 5%. 1000 milliLiter(s) (100 mL/Hr) IV Continuous <Continuous>  dextrose 5%. 1000 milliLiter(s) (50 mL/Hr) IV Continuous <Continuous>  dextrose 50% Injectable 25 Gram(s) IV Push once  dextrose 50% Injectable 25 Gram(s) IV Push once  dextrose 50% Injectable 12.5 Gram(s) IV Push once  folic acid 1 milliGRAM(s) Enteral Tube daily  glucagon  Injectable 1 milliGRAM(s) IntraMuscular once  imipenem/cilastatin  IVPB 1000 milliGRAM(s) IV Intermittent every 8 hours  influenza  Vaccine (HIGH DOSE) 0.7 milliLiter(s) IntraMuscular once  insulin lispro (ADMELOG) corrective regimen sliding scale   SubCutaneous three times a day before meals  insulin lispro (ADMELOG) corrective regimen sliding scale   SubCutaneous at bedtime  pantoprazole  Injectable 40 milliGRAM(s) IV Push two times a day    MEDICATIONS  (PRN):  acetaminophen   Oral Liquid .. 1000 milliGRAM(s) Oral every 6 hours PRN Mild Pain (1 - 3)  dextrose Oral Gel 15 Gram(s) Oral once PRN Blood Glucose LESS THAN 70 milliGRAM(s)/deciliter  HYDROmorphone  Injectable 1 milliGRAM(s) IV Push every 6 hours PRN Severe Pain (7 - 10)  lidocaine 2% Viscous 10 milliLiter(s) Swish and Spit every 4 hours PRN Mouth Care  ondansetron Injectable 4 milliGRAM(s) IV Push every 8 hours PRN Nausea  oxyCODONE    Solution 10 milliGRAM(s) Oral every 4 hours PRN Severe Pain (7 - 10)  oxyCODONE    Solution 5 milliGRAM(s) Oral every 4 hours PRN Moderate Pain (4 - 6)      ALLERGIES:  Allergies    No Known Allergies    Intolerances        LABS:                        10.4   7.27  )-----------( 268      ( 02 Jan 2024 05:19 )             32.4     01-02    137  |  102  |  13  ----------------------------<  190<H>  4.2   |  27  |  0.78    Ca    8.4      02 Jan 2024 05:19  Phos  2.0     01-02  Mg     1.7     01-02      PT/INR - ( 02 Jan 2024 10:00 )   PT: 11.7 sec;   INR: 1.03          PTT - ( 02 Jan 2024 10:00 )  PTT:28.0 sec    RADIOLOGY & ADDITIONAL TESTS: Reviewed.

## 2024-01-02 NOTE — PROGRESS NOTE ADULT - ASSESSMENT
Assessment and Plan:  SAUNDRA HOLMAN is a 69yo M w PMH of CAD (x2 stents Mar 2023), HLD, T2DM, hx of kidney stones, psoriasis who presented with an oral mass and underwent L hemimandibulectomy, SND L level 1-3, recon with L FFF, STSG, and placement of dental implants on 12/7. He had a trach which was subsequently decannulated. Now s/p OR for debridement and exploration. Trach replaced through prior stoma for pulmonary toilet.    Plan:  - Plan for OR this after for further washout / debridement  - Hgb goal > 9.0  - Irrigate wound with 1/4 strength Dakins  - Pack wound with full strength Dakins soaked packing BID  - C/w Imipenem (1/1 - )  - Follow up reticulocyte count  - Appreciate GI recs, IM recs, and ID recs  - C/w TF  - Continue home asa  - Hold home plavix  - c/w HSQ  - Maintain 7.5 portex for pulm toilet  - ISS  - Pain control  - Home meds  - Bowel regimen   Assessment and Plan:  SAUNDRA HOLMAN is a 67yo M w PMH of CAD (x2 stents Mar 2023), HLD, T2DM, hx of kidney stones, psoriasis who presented with an oral mass and underwent L hemimandibulectomy, SND L level 1-3, recon with L FFF, STSG, and placement of dental implants on 12/7. He had a trach which was subsequently decannulated. Now s/p OR for debridement and exploration. Trach replaced through prior stoma for pulmonary toilet.    Plan:  - Plan for OR this after for further washout / debridement  - Hgb goal > 9.0  - Irrigate wound with 1/4 strength Dakins  - Pack wound with full strength Dakins soaked packing BID  - C/w Imipenem (1/1 - )  - Follow up reticulocyte count  - Appreciate GI recs, IM recs, and ID recs  - C/w TF  - Continue home asa  - Hold home plavix  - c/w HSQ  - Maintain 7.5 portex for pulm toilet  - ISS  - Pain control  - Home meds  - Bowel regimen

## 2024-01-02 NOTE — PROGRESS NOTE ADULT - SUBJECTIVE AND OBJECTIVE BOX
OTOLARYNGOLOGY (ENT) PROGRESS NOTE    PATIENT: SANUDRA HOLMAN  MRN: [id number]  : 57  [id number]  DATE OF SERVICE:  24  	  ID: SAUNDRA HOLMAN is a  69yo M w PMH of CAD (x2 stents Mar 2023), HLD, T2DM, hx of kidney stones, psoriasis who presented with an oral mass and underwent L hemimandibulectomy, SND L level 1-3, recon with L FFF, STSG, and placement of dental implants on . He had a trach which was subsequently decannulated. He returns  with surgical site infection now s/p OR for debridement and exploration. Trach replaced through prior stoma for pulmonary toilet.     Subjective/ Interval:   ; Patient seen this morning, oral pack to be changed, penrose dressing changed. Intraoral flap with partal skin necrosis, 7.5 portex in place with cuff deflated   : Patient seen and examined at bedside. NAEO. Patient remains afebrile and HD stable. HgB noted to drop to 7.6 from 9.2 but no additional episodes of melena. Penrose draining purulent material. Intra-oral infection/flap stable. Packing changed at bedside. No other complaints or changes at this time. ID recommended Vanc/zosyn and DC unasyn and flagyl, and IM recommended reticulocyte studies and adding folate supplements. No other changes at this time.   : AFVSS. No acute events overnight. Patient seen and examined at bedside. C/w Vanc/Zosyn per ID recs, pending sensitivities. Growing staph epi, E coli, enterobacter from wound cx on . S/p 2U PRBC yday w appropriate response in Hgb. Transfusion goal > 9.0.  : AFVSS. No acute events overnight. Patient seen and examined at bedside. C/w Vanc/Zosyn per ID recs, pending sens. Hgb stable this morning.  : AFVSS. No acute events overnight. Patient seen and examined at bedside. C/w Vanc/Zosyn, e faecalis sens to vanc/zosyn. Had 3 dark BM's yesterday that were stool guiac positive.  1/2: AFVSS. No acute events overnight. Patient seen and examined at bedside. C/w Imipenem (changed per ID, enterobacter resistant to Zosyn). Plan for OR today for further washout / debridement.    PHYSICAL EXAM:  GEN: appears stated age, NAD  NEURO: alert & oriented x   HEENT: Remnant left FFF paddle oozing, Dakins soaked packing in place in buccal cavity,   Neck: 7.5 Portex trach with cuff deflated, Penrose draining purulent fluid, skin loosely reapproximated with silk suture, Kerlix dressing in place minimally saturated  CVS: regular rate and rhythm  Pulm: normal respiratory excursions, not tachypneic, no labored breathing  Abd: non-distended  Ext: moving all four extremities, no peripheral edema noted       LABS                       10.4   7.27  )-----------( 268      ( 2024 05:19 )             32.4    -    137  |  102  |  13  ----------------------------<  190<H>  4.2   |  27  |  0.78    Ca    8.4      2024 05:19  Phos  2.0       Mg     1.7         Coagulation Studies-     Urinalysis Basic - ( 2024 05:19 )    Color: x / Appearance: x / SG: x / pH: x  Gluc: 190 mg/dL / Ketone: x  / Bili: x / Urobili: x   Blood: x / Protein: x / Nitrite: x   Leuk Esterase: x / RBC: x / WBC x   Sq Epi: x / Non Sq Epi: x / Bacteria: x    Endocrine Panel-  Calcium: 8.4 mg/dL ( @ 05:19)    MICROBIOLOGY:  Culture - Surgical Swab (collected 23 @ 20:46)  Source: .Surgical Swab 1. Left Oral Cavity  Gram Stain (23 @ 21:53):    Numerous Gram positive cocci in pairs, chains and clusters    Moderate Gram Positive Rods    Moderate Gram Negative Rods    Numerous White blood cells  Final Report (24 @ 14:56):    Moderate Escherichia coli Susceptibility to follow.    Moderate Enterobacter cloacae complex    Few Enterococcus faecalis    Few Streptococcus mitis/oralis group    Few Staphylococcus epidermidis    Few Streptococcus constellatus    Few Stenotrophomonas maltophilia    Mixed Anaerobic Joy including    Few Prevotella species (most closely resembles Prevotella barnaie)    Few Prevotella denticola    Culture grew 3 or more types of organisms which indicate collection    contamination; consider recollection only if clinically indicated.  Organism: Enterobacter cloacae complex  Enterococcus faecalis  Streptococcus constellatus  Streptococcus constellatus  Stenotrophomonas maltophilia  Stenotrophomonas maltophilia (24 @ 14:56)  Organism: Stenotrophomonas maltophilia (24 @ 14:56)      Method Type: KB      -  Minocycline: S  Organism: Stenotrophomonas maltophilia (24 @ 14:55)      Method Type: OTTO      -  Levofloxacin: S 1      -  Trimethoprim/Sulfamethoxazole: S <=0.5/9.5  Organism: Streptococcus constellatus (24 @ 14:55)      Method Type: ETEST      -  Ceftriaxone: S 0.094      -  Penicillin: S 0.047  Organism: Streptococcus constellatus (24 @ 14:55)      Method Type: KB  Organism: Enterobacter cloacae complex (24 @ 14:52)      Method Type: OTTO      -  Ampicillin: R >16 These ampicillin results predict results for amoxicillin      -  Ampicillin/Sulbactam: R >16/8      -  Cefazolin: R >16      -  Cefepime: SDD 8 The breakpoint for susceptible dose dependent may require the usage of a higher cefepime dose (equivalent to adult dose of 2g q8h for normal renal function) for successful treatment.      -  Ceftriaxone: R >32 Enterobacter cloacae, Klebsiella aerogenes, and Citrobacter freundii may develop resistance during prolonged therapy.      -  Ciprofloxacin: S <=0.25      -  Ertapenem: S <=0.5      -  Gentamicin: S <=2      -  Meropenem: S <=1      -  Piperacillin/Tazobactam: R 64      -  Tobramycin: S <=2      -  Trimethoprim/Sulfamethoxazole: S <=0.5/9.5      -  Cefoxitin: R >16  Organism: Enterococcus faecalis (23 @ 15:21)      Method Type: OTTO      -  Ampicillin: S <=2 Predicts results to ampicillin/sulbactam, amoxacillin-clavulanate and  piperacillin-tazobactam.      -  Vancomycin: S 4      Culture Results:   No growth at 4 days. (23 @ 10:08)  Culture Results:   Moderate Escherichia coli Susceptibility to follow.  Moderate Enterobacter cloacae complex  Few Enterococcus faecalis  Few Streptococcus mitis/oralis group  Few Staphylococcus epidermidis  Few Streptococcus constellatus  Few Stenotrophomonas maltophilia  Mixed Anaerobic Joy including  Few Prevotella species (most closely resembles Prevotella barnaie)  Few Prevotella denticola  Culture grew 3 or more types of organisms which indicate collection  contamination; consider recollection only if clinically indicated. (23 @ 20:46)      Culture - Blood (collected 23 @ 10:08)  Source: .Blood Blood-Peripheral  Preliminary Report (24 @ 11:00):    No growth at 4 days.    Culture - Surgical Swab (collected 23 @ 20:46)  Source: .Surgical Swab 1. Left Oral Cavity  Gram Stain (23 @ 21:53):    Numerous Gram positive cocci in pairs, chains and clusters    Moderate Gram Positive Rods    Moderate Gram Negative Rods    Numerous White blood cells  Final Report (24 @ 14:56):    Moderate Escherichia coli Susceptibility to follow.    Moderate Enterobacter cloacae complex    Few Enterococcus faecalis    Few Streptococcus mitis/oralis group    Few Staphylococcus epidermidis    Few Streptococcus constellatus    Few Stenotrophomonas maltophilia    Mixed Anaerobic Joy including    Few Prevotella species (most closely resembles Prevotella barnaie)    Few Prevotella denticola    Culture grew 3 or more types of organisms which indicate collection    contamination; consider recollection only if clinically indicated.  Organism: Enterobacter cloacae complex  Enterococcus faecalis  Streptococcus constellatus  Streptococcus constellatus  Stenotrophomonas maltophilia  Stenotrophomonas maltophilia (24 @ 14:56)  Organism: Stenotrophomonas maltophilia (24 @ 14:56)      Method Type: KB      -  Minocycline: S  Organism: Stenotrophomonas maltophilia (24 @ 14:55)      Method Type: OTTO      -  Levofloxacin: S 1      -  Trimethoprim/Sulfamethoxazole: S <=0.5/9.5  Organism: Streptococcus constellatus (24 @ 14:55)      Method Type: ETEST      -  Ceftriaxone: S 0.094      -  Penicillin: S 0.047  Organism: Streptococcus constellatus (24 @ 14:55)      Method Type: KB  Organism: Enterobacter cloacae complex (24 @ 14:52)      Method Type: OTTO      -  Ampicillin: R >16 These ampicillin results predict results for amoxicillin      -  Ampicillin/Sulbactam: R >16/8      -  Cefazolin: R >16      -  Cefepime: SDD 8 The breakpoint for susceptible dose dependent may require the usage of a higher cefepime dose (equivalent to adult dose of 2g q8h for normal renal function) for successful treatment.      -  Ceftriaxone: R >32 Enterobacter cloacae, Klebsiella aerogenes, and Citrobacter freundii may develop resistance during prolonged therapy.      -  Ciprofloxacin: S <=0.25      -  Ertapenem: S <=0.5      -  Gentamicin: S <=2      -  Meropenem: S <=1      -  Piperacillin/Tazobactam: R 64      -  Tobramycin: S <=2      -  Trimethoprim/Sulfamethoxazole: S <=0.5/9.5      -  Cefoxitin: R >16  Organism: Enterococcus faecalis (23 @ 15:21)      Method Type: OTTO      -  Ampicillin: S <=2 Predicts results to ampicillin/sulbactam, amoxacillin-clavulanate and  piperacillin-tazobactam.      -  Vancomycin: S 4             OTOLARYNGOLOGY (ENT) PROGRESS NOTE    PATIENT: SAUNDRA HOLMAN  MRN: [id number]  : 57  [id number]  DATE OF SERVICE:  24  	  ID: SAUNDRA HOLMAN is a  67yo M w PMH of CAD (x2 stents Mar 2023), HLD, T2DM, hx of kidney stones, psoriasis who presented with an oral mass and underwent L hemimandibulectomy, SND L level 1-3, recon with L FFF, STSG, and placement of dental implants on . He had a trach which was subsequently decannulated. He returns  with surgical site infection now s/p OR for debridement and exploration. Trach replaced through prior stoma for pulmonary toilet.     Subjective/ Interval:   ; Patient seen this morning, oral pack to be changed, penrose dressing changed. Intraoral flap with partal skin necrosis, 7.5 portex in place with cuff deflated   : Patient seen and examined at bedside. NAEO. Patient remains afebrile and HD stable. HgB noted to drop to 7.6 from 9.2 but no additional episodes of melena. Penrose draining purulent material. Intra-oral infection/flap stable. Packing changed at bedside. No other complaints or changes at this time. ID recommended Vanc/zosyn and DC unasyn and flagyl, and IM recommended reticulocyte studies and adding folate supplements. No other changes at this time.   : AFVSS. No acute events overnight. Patient seen and examined at bedside. C/w Vanc/Zosyn per ID recs, pending sensitivities. Growing staph epi, E coli, enterobacter from wound cx on . S/p 2U PRBC yday w appropriate response in Hgb. Transfusion goal > 9.0.  : AFVSS. No acute events overnight. Patient seen and examined at bedside. C/w Vanc/Zosyn per ID recs, pending sens. Hgb stable this morning.  : AFVSS. No acute events overnight. Patient seen and examined at bedside. C/w Vanc/Zosyn, e faecalis sens to vanc/zosyn. Had 3 dark BM's yesterday that were stool guiac positive.  1/2: AFVSS. No acute events overnight. Patient seen and examined at bedside. C/w Imipenem (changed per ID, enterobacter resistant to Zosyn). Plan for OR today for further washout / debridement.    PHYSICAL EXAM:  GEN: appears stated age, NAD  NEURO: alert & oriented x   HEENT: Remnant left FFF paddle oozing, Dakins soaked packing in place in buccal cavity,   Neck: 7.5 Portex trach with cuff deflated, Penrose draining purulent fluid, skin loosely reapproximated with silk suture, Kerlix dressing in place minimally saturated  CVS: regular rate and rhythm  Pulm: normal respiratory excursions, not tachypneic, no labored breathing  Abd: non-distended  Ext: moving all four extremities, no peripheral edema noted       LABS                       10.4   7.27  )-----------( 268      ( 2024 05:19 )             32.4    -    137  |  102  |  13  ----------------------------<  190<H>  4.2   |  27  |  0.78    Ca    8.4      2024 05:19  Phos  2.0       Mg     1.7         Coagulation Studies-     Urinalysis Basic - ( 2024 05:19 )    Color: x / Appearance: x / SG: x / pH: x  Gluc: 190 mg/dL / Ketone: x  / Bili: x / Urobili: x   Blood: x / Protein: x / Nitrite: x   Leuk Esterase: x / RBC: x / WBC x   Sq Epi: x / Non Sq Epi: x / Bacteria: x    Endocrine Panel-  Calcium: 8.4 mg/dL ( @ 05:19)    MICROBIOLOGY:  Culture - Surgical Swab (collected 23 @ 20:46)  Source: .Surgical Swab 1. Left Oral Cavity  Gram Stain (23 @ 21:53):    Numerous Gram positive cocci in pairs, chains and clusters    Moderate Gram Positive Rods    Moderate Gram Negative Rods    Numerous White blood cells  Final Report (24 @ 14:56):    Moderate Escherichia coli Susceptibility to follow.    Moderate Enterobacter cloacae complex    Few Enterococcus faecalis    Few Streptococcus mitis/oralis group    Few Staphylococcus epidermidis    Few Streptococcus constellatus    Few Stenotrophomonas maltophilia    Mixed Anaerobic Joy including    Few Prevotella species (most closely resembles Prevotella barnaie)    Few Prevotella denticola    Culture grew 3 or more types of organisms which indicate collection    contamination; consider recollection only if clinically indicated.  Organism: Enterobacter cloacae complex  Enterococcus faecalis  Streptococcus constellatus  Streptococcus constellatus  Stenotrophomonas maltophilia  Stenotrophomonas maltophilia (24 @ 14:56)  Organism: Stenotrophomonas maltophilia (24 @ 14:56)      Method Type: KB      -  Minocycline: S  Organism: Stenotrophomonas maltophilia (24 @ 14:55)      Method Type: OTTO      -  Levofloxacin: S 1      -  Trimethoprim/Sulfamethoxazole: S <=0.5/9.5  Organism: Streptococcus constellatus (24 @ 14:55)      Method Type: ETEST      -  Ceftriaxone: S 0.094      -  Penicillin: S 0.047  Organism: Streptococcus constellatus (24 @ 14:55)      Method Type: KB  Organism: Enterobacter cloacae complex (24 @ 14:52)      Method Type: OTTO      -  Ampicillin: R >16 These ampicillin results predict results for amoxicillin      -  Ampicillin/Sulbactam: R >16/8      -  Cefazolin: R >16      -  Cefepime: SDD 8 The breakpoint for susceptible dose dependent may require the usage of a higher cefepime dose (equivalent to adult dose of 2g q8h for normal renal function) for successful treatment.      -  Ceftriaxone: R >32 Enterobacter cloacae, Klebsiella aerogenes, and Citrobacter freundii may develop resistance during prolonged therapy.      -  Ciprofloxacin: S <=0.25      -  Ertapenem: S <=0.5      -  Gentamicin: S <=2      -  Meropenem: S <=1      -  Piperacillin/Tazobactam: R 64      -  Tobramycin: S <=2      -  Trimethoprim/Sulfamethoxazole: S <=0.5/9.5      -  Cefoxitin: R >16  Organism: Enterococcus faecalis (23 @ 15:21)      Method Type: OTTO      -  Ampicillin: S <=2 Predicts results to ampicillin/sulbactam, amoxacillin-clavulanate and  piperacillin-tazobactam.      -  Vancomycin: S 4      Culture Results:   No growth at 4 days. (23 @ 10:08)  Culture Results:   Moderate Escherichia coli Susceptibility to follow.  Moderate Enterobacter cloacae complex  Few Enterococcus faecalis  Few Streptococcus mitis/oralis group  Few Staphylococcus epidermidis  Few Streptococcus constellatus  Few Stenotrophomonas maltophilia  Mixed Anaerobic Joy including  Few Prevotella species (most closely resembles Prevotella barnaie)  Few Prevotella denticola  Culture grew 3 or more types of organisms which indicate collection  contamination; consider recollection only if clinically indicated. (23 @ 20:46)      Culture - Blood (collected 23 @ 10:08)  Source: .Blood Blood-Peripheral  Preliminary Report (24 @ 11:00):    No growth at 4 days.    Culture - Surgical Swab (collected 23 @ 20:46)  Source: .Surgical Swab 1. Left Oral Cavity  Gram Stain (23 @ 21:53):    Numerous Gram positive cocci in pairs, chains and clusters    Moderate Gram Positive Rods    Moderate Gram Negative Rods    Numerous White blood cells  Final Report (24 @ 14:56):    Moderate Escherichia coli Susceptibility to follow.    Moderate Enterobacter cloacae complex    Few Enterococcus faecalis    Few Streptococcus mitis/oralis group    Few Staphylococcus epidermidis    Few Streptococcus constellatus    Few Stenotrophomonas maltophilia    Mixed Anaerobic Joy including    Few Prevotella species (most closely resembles Prevotella barnaie)    Few Prevotella denticola    Culture grew 3 or more types of organisms which indicate collection    contamination; consider recollection only if clinically indicated.  Organism: Enterobacter cloacae complex  Enterococcus faecalis  Streptococcus constellatus  Streptococcus constellatus  Stenotrophomonas maltophilia  Stenotrophomonas maltophilia (24 @ 14:56)  Organism: Stenotrophomonas maltophilia (24 @ 14:56)      Method Type: KB      -  Minocycline: S  Organism: Stenotrophomonas maltophilia (24 @ 14:55)      Method Type: OTTO      -  Levofloxacin: S 1      -  Trimethoprim/Sulfamethoxazole: S <=0.5/9.5  Organism: Streptococcus constellatus (24 @ 14:55)      Method Type: ETEST      -  Ceftriaxone: S 0.094      -  Penicillin: S 0.047  Organism: Streptococcus constellatus (24 @ 14:55)      Method Type: KB  Organism: Enterobacter cloacae complex (24 @ 14:52)      Method Type: OTTO      -  Ampicillin: R >16 These ampicillin results predict results for amoxicillin      -  Ampicillin/Sulbactam: R >16/8      -  Cefazolin: R >16      -  Cefepime: SDD 8 The breakpoint for susceptible dose dependent may require the usage of a higher cefepime dose (equivalent to adult dose of 2g q8h for normal renal function) for successful treatment.      -  Ceftriaxone: R >32 Enterobacter cloacae, Klebsiella aerogenes, and Citrobacter freundii may develop resistance during prolonged therapy.      -  Ciprofloxacin: S <=0.25      -  Ertapenem: S <=0.5      -  Gentamicin: S <=2      -  Meropenem: S <=1      -  Piperacillin/Tazobactam: R 64      -  Tobramycin: S <=2      -  Trimethoprim/Sulfamethoxazole: S <=0.5/9.5      -  Cefoxitin: R >16  Organism: Enterococcus faecalis (23 @ 15:21)      Method Type: OTTO      -  Ampicillin: S <=2 Predicts results to ampicillin/sulbactam, amoxacillin-clavulanate and  piperacillin-tazobactam.      -  Vancomycin: S 4

## 2024-01-02 NOTE — CHART NOTE - NSCHARTNOTEFT_GEN_A_CORE
EGD performed in OR for melena.    Impressions:  	Normal esophagus.  	Normal duodenum.  	Ulcer in the area around the G-tube site.    Plan:	  - Protonix 40 mg po daily x 8 weeks  - Please ensure G-tube outer bumper is not too tight as this could otherwise lead to pressure ulcer  - Advance diet as tolerated  - Return to floor for further management    We will sign off, but please page if any further questions arise.    Ricki (Jonahalice) MD [facility]  Gastroenterology Fellow  Pager (M-F 7a-5p): [phone]  Pager (after hours): Please call  for on-call fellow EGD performed in OR for melena.    Impressions:  	Normal esophagus.  	Normal duodenum.  	Ulcer in the area around the G-tube site.    Plan:	  - Protonix 40 mg po daily x 8 weeks  - Please ensure G-tube outer bumper is not too tight as this could otherwise lead to pressure ulcer  - Advance diet as tolerated  - Return to floor for further management    We will sign off, but please page if any further questions arise.    Ricki (Jonahalice) MD [facility]  Gastroenterology Fellow  Pager (M-F 7a-5p): [phone]  Pager (after hours): Please call  for on-call fellow

## 2024-01-03 LAB
ANION GAP SERPL CALC-SCNC: 7 MMOL/L — SIGNIFICANT CHANGE UP (ref 5–17)
ANION GAP SERPL CALC-SCNC: 7 MMOL/L — SIGNIFICANT CHANGE UP (ref 5–17)
ANISOCYTOSIS BLD QL: SLIGHT — SIGNIFICANT CHANGE UP
ANISOCYTOSIS BLD QL: SLIGHT — SIGNIFICANT CHANGE UP
BASOPHILS # BLD AUTO: 0.08 K/UL — SIGNIFICANT CHANGE UP (ref 0–0.2)
BASOPHILS # BLD AUTO: 0.08 K/UL — SIGNIFICANT CHANGE UP (ref 0–0.2)
BASOPHILS NFR BLD AUTO: 0.8 % — SIGNIFICANT CHANGE UP (ref 0–2)
BASOPHILS NFR BLD AUTO: 0.8 % — SIGNIFICANT CHANGE UP (ref 0–2)
BUN SERPL-MCNC: 19 MG/DL — SIGNIFICANT CHANGE UP (ref 7–23)
BUN SERPL-MCNC: 19 MG/DL — SIGNIFICANT CHANGE UP (ref 7–23)
CALCIUM SERPL-MCNC: 9.4 MG/DL — SIGNIFICANT CHANGE UP (ref 8.4–10.5)
CALCIUM SERPL-MCNC: 9.4 MG/DL — SIGNIFICANT CHANGE UP (ref 8.4–10.5)
CHLORIDE SERPL-SCNC: 101 MMOL/L — SIGNIFICANT CHANGE UP (ref 96–108)
CHLORIDE SERPL-SCNC: 101 MMOL/L — SIGNIFICANT CHANGE UP (ref 96–108)
CO2 SERPL-SCNC: 28 MMOL/L — SIGNIFICANT CHANGE UP (ref 22–31)
CO2 SERPL-SCNC: 28 MMOL/L — SIGNIFICANT CHANGE UP (ref 22–31)
CREAT SERPL-MCNC: 0.8 MG/DL — SIGNIFICANT CHANGE UP (ref 0.5–1.3)
CREAT SERPL-MCNC: 0.8 MG/DL — SIGNIFICANT CHANGE UP (ref 0.5–1.3)
DACRYOCYTES BLD QL SMEAR: SLIGHT — SIGNIFICANT CHANGE UP
DACRYOCYTES BLD QL SMEAR: SLIGHT — SIGNIFICANT CHANGE UP
EGFR: 97 ML/MIN/1.73M2 — SIGNIFICANT CHANGE UP
EGFR: 97 ML/MIN/1.73M2 — SIGNIFICANT CHANGE UP
EOSINOPHIL # BLD AUTO: 0 K/UL — SIGNIFICANT CHANGE UP (ref 0–0.5)
EOSINOPHIL # BLD AUTO: 0 K/UL — SIGNIFICANT CHANGE UP (ref 0–0.5)
EOSINOPHIL NFR BLD AUTO: 0 % — SIGNIFICANT CHANGE UP (ref 0–6)
EOSINOPHIL NFR BLD AUTO: 0 % — SIGNIFICANT CHANGE UP (ref 0–6)
GLUCOSE BLDC GLUCOMTR-MCNC: 132 MG/DL — HIGH (ref 70–99)
GLUCOSE BLDC GLUCOMTR-MCNC: 132 MG/DL — HIGH (ref 70–99)
GLUCOSE BLDC GLUCOMTR-MCNC: 133 MG/DL — HIGH (ref 70–99)
GLUCOSE BLDC GLUCOMTR-MCNC: 133 MG/DL — HIGH (ref 70–99)
GLUCOSE BLDC GLUCOMTR-MCNC: 137 MG/DL — HIGH (ref 70–99)
GLUCOSE BLDC GLUCOMTR-MCNC: 137 MG/DL — HIGH (ref 70–99)
GLUCOSE BLDC GLUCOMTR-MCNC: 146 MG/DL — HIGH (ref 70–99)
GLUCOSE BLDC GLUCOMTR-MCNC: 146 MG/DL — HIGH (ref 70–99)
GLUCOSE SERPL-MCNC: 148 MG/DL — HIGH (ref 70–99)
GLUCOSE SERPL-MCNC: 148 MG/DL — HIGH (ref 70–99)
HCT VFR BLD CALC: 31 % — LOW (ref 39–50)
HCT VFR BLD CALC: 31 % — LOW (ref 39–50)
HGB BLD-MCNC: 10 G/DL — LOW (ref 13–17)
HGB BLD-MCNC: 10 G/DL — LOW (ref 13–17)
HYPOCHROMIA BLD QL: SIGNIFICANT CHANGE UP
HYPOCHROMIA BLD QL: SIGNIFICANT CHANGE UP
LYMPHOCYTES # BLD AUTO: 0.85 K/UL — LOW (ref 1–3.3)
LYMPHOCYTES # BLD AUTO: 0.85 K/UL — LOW (ref 1–3.3)
LYMPHOCYTES # BLD AUTO: 8.7 % — LOW (ref 13–44)
LYMPHOCYTES # BLD AUTO: 8.7 % — LOW (ref 13–44)
MAGNESIUM SERPL-MCNC: 1.8 MG/DL — SIGNIFICANT CHANGE UP (ref 1.6–2.6)
MAGNESIUM SERPL-MCNC: 1.8 MG/DL — SIGNIFICANT CHANGE UP (ref 1.6–2.6)
MANUAL SMEAR VERIFICATION: SIGNIFICANT CHANGE UP
MANUAL SMEAR VERIFICATION: SIGNIFICANT CHANGE UP
MCHC RBC-ENTMCNC: 28.7 PG — SIGNIFICANT CHANGE UP (ref 27–34)
MCHC RBC-ENTMCNC: 28.7 PG — SIGNIFICANT CHANGE UP (ref 27–34)
MCHC RBC-ENTMCNC: 32.3 GM/DL — SIGNIFICANT CHANGE UP (ref 32–36)
MCHC RBC-ENTMCNC: 32.3 GM/DL — SIGNIFICANT CHANGE UP (ref 32–36)
MCV RBC AUTO: 88.8 FL — SIGNIFICANT CHANGE UP (ref 80–100)
MCV RBC AUTO: 88.8 FL — SIGNIFICANT CHANGE UP (ref 80–100)
METAMYELOCYTES # FLD: 0.9 % — HIGH (ref 0–0)
METAMYELOCYTES # FLD: 0.9 % — HIGH (ref 0–0)
MICROCYTES BLD QL: SLIGHT — SIGNIFICANT CHANGE UP
MICROCYTES BLD QL: SLIGHT — SIGNIFICANT CHANGE UP
MONOCYTES # BLD AUTO: 0.34 K/UL — SIGNIFICANT CHANGE UP (ref 0–0.9)
MONOCYTES # BLD AUTO: 0.34 K/UL — SIGNIFICANT CHANGE UP (ref 0–0.9)
MONOCYTES NFR BLD AUTO: 3.5 % — SIGNIFICANT CHANGE UP (ref 2–14)
MONOCYTES NFR BLD AUTO: 3.5 % — SIGNIFICANT CHANGE UP (ref 2–14)
NEUTROPHILS # BLD AUTO: 8.44 K/UL — HIGH (ref 1.8–7.4)
NEUTROPHILS # BLD AUTO: 8.44 K/UL — HIGH (ref 1.8–7.4)
NEUTROPHILS NFR BLD AUTO: 86.1 % — HIGH (ref 43–77)
NEUTROPHILS NFR BLD AUTO: 86.1 % — HIGH (ref 43–77)
OVALOCYTES BLD QL SMEAR: SLIGHT — SIGNIFICANT CHANGE UP
OVALOCYTES BLD QL SMEAR: SLIGHT — SIGNIFICANT CHANGE UP
PHOSPHATE SERPL-MCNC: 3.5 MG/DL — SIGNIFICANT CHANGE UP (ref 2.5–4.5)
PHOSPHATE SERPL-MCNC: 3.5 MG/DL — SIGNIFICANT CHANGE UP (ref 2.5–4.5)
PLAT MORPH BLD: NORMAL — SIGNIFICANT CHANGE UP
PLAT MORPH BLD: NORMAL — SIGNIFICANT CHANGE UP
PLATELET # BLD AUTO: 300 K/UL — SIGNIFICANT CHANGE UP (ref 150–400)
PLATELET # BLD AUTO: 300 K/UL — SIGNIFICANT CHANGE UP (ref 150–400)
POIKILOCYTOSIS BLD QL AUTO: SLIGHT — SIGNIFICANT CHANGE UP
POIKILOCYTOSIS BLD QL AUTO: SLIGHT — SIGNIFICANT CHANGE UP
POLYCHROMASIA BLD QL SMEAR: SLIGHT — SIGNIFICANT CHANGE UP
POLYCHROMASIA BLD QL SMEAR: SLIGHT — SIGNIFICANT CHANGE UP
POTASSIUM SERPL-MCNC: 4.9 MMOL/L — SIGNIFICANT CHANGE UP (ref 3.5–5.3)
POTASSIUM SERPL-MCNC: 4.9 MMOL/L — SIGNIFICANT CHANGE UP (ref 3.5–5.3)
POTASSIUM SERPL-SCNC: 4.9 MMOL/L — SIGNIFICANT CHANGE UP (ref 3.5–5.3)
POTASSIUM SERPL-SCNC: 4.9 MMOL/L — SIGNIFICANT CHANGE UP (ref 3.5–5.3)
RBC # BLD: 3.49 M/UL — LOW (ref 4.2–5.8)
RBC # BLD: 3.49 M/UL — LOW (ref 4.2–5.8)
RBC # FLD: 14.5 % — SIGNIFICANT CHANGE UP (ref 10.3–14.5)
RBC # FLD: 14.5 % — SIGNIFICANT CHANGE UP (ref 10.3–14.5)
RBC BLD AUTO: ABNORMAL
RBC BLD AUTO: ABNORMAL
SCHISTOCYTES BLD QL AUTO: SLIGHT — SIGNIFICANT CHANGE UP
SCHISTOCYTES BLD QL AUTO: SLIGHT — SIGNIFICANT CHANGE UP
SODIUM SERPL-SCNC: 136 MMOL/L — SIGNIFICANT CHANGE UP (ref 135–145)
SODIUM SERPL-SCNC: 136 MMOL/L — SIGNIFICANT CHANGE UP (ref 135–145)
WBC # BLD: 9.8 K/UL — SIGNIFICANT CHANGE UP (ref 3.8–10.5)
WBC # BLD: 9.8 K/UL — SIGNIFICANT CHANGE UP (ref 3.8–10.5)
WBC # FLD AUTO: 9.8 K/UL — SIGNIFICANT CHANGE UP (ref 3.8–10.5)
WBC # FLD AUTO: 9.8 K/UL — SIGNIFICANT CHANGE UP (ref 3.8–10.5)

## 2024-01-03 PROCEDURE — 99233 SBSQ HOSP IP/OBS HIGH 50: CPT | Mod: GC

## 2024-01-03 PROCEDURE — 99232 SBSQ HOSP IP/OBS MODERATE 35: CPT | Mod: GC

## 2024-01-03 RX ORDER — ENOXAPARIN SODIUM 100 MG/ML
40 INJECTION SUBCUTANEOUS EVERY 24 HOURS
Refills: 0 | Status: DISCONTINUED | OUTPATIENT
Start: 2024-01-03 | End: 2024-01-10

## 2024-01-03 RX ADMIN — Medication 1 MILLIGRAM(S): at 11:16

## 2024-01-03 RX ADMIN — OXYCODONE HYDROCHLORIDE 10 MILLIGRAM(S): 5 TABLET ORAL at 22:59

## 2024-01-03 RX ADMIN — OXYCODONE HYDROCHLORIDE 10 MILLIGRAM(S): 5 TABLET ORAL at 22:26

## 2024-01-03 RX ADMIN — SODIUM CHLORIDE 75 MILLILITER(S): 9 INJECTION, SOLUTION INTRAVENOUS at 12:27

## 2024-01-03 RX ADMIN — IMIPENEM AND CILASTATIN 250 MILLIGRAM(S): 250; 250 INJECTION, POWDER, FOR SOLUTION INTRAVENOUS at 11:15

## 2024-01-03 RX ADMIN — PANTOPRAZOLE SODIUM 40 MILLIGRAM(S): 20 TABLET, DELAYED RELEASE ORAL at 11:16

## 2024-01-03 RX ADMIN — OXYCODONE HYDROCHLORIDE 10 MILLIGRAM(S): 5 TABLET ORAL at 11:22

## 2024-01-03 RX ADMIN — ENOXAPARIN SODIUM 40 MILLIGRAM(S): 100 INJECTION SUBCUTANEOUS at 16:13

## 2024-01-03 RX ADMIN — ATORVASTATIN CALCIUM 40 MILLIGRAM(S): 80 TABLET, FILM COATED ORAL at 21:00

## 2024-01-03 RX ADMIN — Medication 81 MILLIGRAM(S): at 11:16

## 2024-01-03 RX ADMIN — IMIPENEM AND CILASTATIN 250 MILLIGRAM(S): 250; 250 INJECTION, POWDER, FOR SOLUTION INTRAVENOUS at 02:12

## 2024-01-03 RX ADMIN — OXYCODONE HYDROCHLORIDE 10 MILLIGRAM(S): 5 TABLET ORAL at 12:00

## 2024-01-03 RX ADMIN — IMIPENEM AND CILASTATIN 250 MILLIGRAM(S): 250; 250 INJECTION, POWDER, FOR SOLUTION INTRAVENOUS at 17:04

## 2024-01-03 NOTE — PROGRESS NOTE ADULT - ATTENDING COMMENTS
68M w/ CAD s/p PCI, DM2, renal calculi, psoriasis and SCC of L buccal mucosa s/p L hemimandibulectomy, reconstruction w/ fibular free flap and STSG from L thigh with dental implants. Admitted 12/27 with SSI, s/p DONNA 12/27 with finding of skin flap necrosis s/p debridement. OR cultures with E.coli, ECC, E.faecalis S.mitis/oralis, S.epi, S.constellatus, S.maltophilia, mixed anaerobes.  Was taken to OR yest for oral cavity debridement. Per op report, infected, non-viable free fibula flap with viable skin paddle. Purulent drainage appreciated at margins of flap and in neck.  D/w Dr. Gandhi; nonviable bone graft and hardware removed, only native mandible left, no c/f OM.   OR cx w/ E. coli, E. fecalis and E. cloacae  F/up OR cx amd sensis  Possible surgery next week for ?mandibular reconstruction  Continue IV imipenem 1g q8h

## 2024-01-03 NOTE — PROGRESS NOTE ADULT - ASSESSMENT
SAUNDRA HOLMAN is a 69yo M w PMH of CAD (x2 stents Mar 2023), HLD, T2DM, hx of kidney stones, psoriasis who presented with an oral mass and underwent L hemimandibulectomy, SND L level 1-3, recon with L FFF, STSG, and placement of dental implants on 12/7. He had a trach which was subsequently decannulated. Now s/p OR for debridement and exploration. Trach replaced through prior stoma for pulmonary toilet.    Plan:  - Plan for OR this after for further washout / debridement  - Hgb goal > 9.0  - Irrigate wound with 1/4 strength Dakins  - Pack wound with full strength Dakins soaked packing BID  - C/w Imipenem (1/1 - )  - Follow up reticulocyte count  - Appreciate GI recs, IM recs, and ID recs  - C/w TF  - Continue home asa  - Hold home plavix  - c/w HSQ  - Maintain 7.5 portex for pulm toilet  - ISS  - Pain control  - Home meds  - Bowel regimen

## 2024-01-03 NOTE — PROGRESS NOTE ADULT - SUBJECTIVE AND OBJECTIVE BOX
Consultation Requested by:    Patient is a 68y old  Male who presents with a chief complaint of Surgical site infection (28 Dec 2023 15:40)    HPI:  SAUNDRA HOLMAN is a 67yo M w PMH of CAD (x2 stents Mar 2023), HLD, T2DM, hx of kidney stones, psoriasis who presented with an oral mass and underwent L hemimandibulectomy, SND L level 1-3, recon with L FFF, STSG, and placement of dental implants on 12/7. He had a trach which was subsequently decannulated. He returns 12/27 with surgical site infection now s/p OR for debridement and exploration. Trach replaced through prior stoma for pulmonary toilet.   (27 Dec 2023 17:31)    Interval History:  Patient seen and examined at bedside with wife bedside. Pt had wash-out yday, had bone debrided as well as soft tissue. Also had EGD w ulcer by G-Tube. Family has been updated. Patient reports some pain surrounding site. Minimal discharge or tracheal secretions. Reports no dark BM last two days. No new complaints. Denies fever, chills, c/p, palpitations, cough, SOB, abdominal pain, nausea, vomiting, diarrhea, dysuria, hematuria, LE swelling, or rash.      Allergies    No Known Allergies    Intolerances      Antimicrobials:  ampicillin/sulbactam  IVPB 3 Gram(s) IV Intermittent every 6 hours  metroNIDAZOLE  IVPB 500 milliGRAM(s) IV Intermittent every 8 hours      Other Medications:  acetaminophen     Tablet .. 650 milliGRAM(s) Oral every 6 hours PRN  aspirin  chewable 81 milliGRAM(s) Enteral Tube daily  atorvastatin 40 milliGRAM(s) Oral at bedtime  chlorhexidine 0.12% Liquid 15 milliLiter(s) Oral Mucosa two times a day  dextrose 5% + sodium chloride 0.45%. 1000 milliLiter(s) IV Continuous <Continuous>  dextrose 5%. 1000 milliLiter(s) IV Continuous <Continuous>  dextrose 5%. 1000 milliLiter(s) IV Continuous <Continuous>  dextrose 50% Injectable 25 Gram(s) IV Push once  dextrose 50% Injectable 25 Gram(s) IV Push once  dextrose 50% Injectable 12.5 Gram(s) IV Push once  dextrose Oral Gel 15 Gram(s) Oral once PRN  glucagon  Injectable 1 milliGRAM(s) IntraMuscular once  HYDROmorphone  Injectable 1 milliGRAM(s) IV Push every 6 hours PRN  influenza  Vaccine (HIGH DOSE) 0.7 milliLiter(s) IntraMuscular once  insulin lispro (ADMELOG) corrective regimen sliding scale   SubCutaneous at bedtime  insulin lispro (ADMELOG) corrective regimen sliding scale   SubCutaneous three times a day before meals  lidocaine 2% Viscous 10 milliLiter(s) Swish and Spit every 4 hours PRN  ondansetron Injectable 4 milliGRAM(s) IV Push every 8 hours PRN  oxyCODONE    Solution 10 milliGRAM(s) Oral every 4 hours PRN  oxyCODONE    Solution 5 milliGRAM(s) Oral every 4 hours PRN  pantoprazole  Injectable 40 milliGRAM(s) IV Push two times a day      FAMILY HISTORY:    PAST MEDICAL & SURGICAL HISTORY:  Neoplasm of mandible        SOCIAL HISTORY:  [place], has livestock such as sheep in property but does not interact w them  restaurant and diner owner  Recent travel to [facility], no other  50+ pack year history as well as cigars  No EtOH use  Recreational CBD use  Sexually active w spouse    Vital Signs Last 24 Hrs  T(C): 36.5 (03 Jan 2024 13:46), Max: 37.1 (02 Jan 2024 19:00)  T(F): 97.7 (03 Jan 2024 13:46), Max: 98.7 (02 Jan 2024 19:00)  HR: 52 (03 Jan 2024 12:12) (49 - 102)  BP: 119/60 (03 Jan 2024 12:12) (98/55 - 150/65)  BP(mean): 86 (03 Jan 2024 12:12) (73 - 103)  RR: 17 (03 Jan 2024 12:12) (14 - 25)  SpO2: 98% (03 Jan 2024 12:12) (98% - 100%)    Parameters below as of 03 Jan 2024 12:12  Patient On (Oxygen Delivery Method): tracheostomy collar  O2 Flow (L/min): 10  O2 Concentration (%): 40    PHYSICAL EXAM  GENERAL: NAD, well-groomed, well-developed  HEENT: Remnant left FFF paddle oozing, packing in place in buccal cavity.   NERVOUS SYSTEM: Alert & Oriented X3, Good concentration; Motor Strength 5/5 B/L upper and lower extremities  CHEST/LUNG: CTA B/L, No w/r/r  HEART: Regular rate and rhythm; No murmurs, rubs, or gallops  ABDOMEN: Soft, Nontender, Nondistended; Bowel sounds present, PEG tube c/d/i  EXTREMITIES: wwp. dressing over L thigh (prev graft site)      LABS:                         10.0   9.80  )-----------( 300      ( 03 Jan 2024 05:30 )             31.0     01-03    136  |  101  |  19  ----------------------------<  148<H>  4.9   |  28  |  0.80    Ca    9.4      03 Jan 2024 05:30  Phos  3.5     01-03  Mg     1.8     01-03      PT/INR - ( 02 Jan 2024 10:00 )   PT: 11.7 sec;   INR: 1.03          PTT - ( 02 Jan 2024 10:00 )  PTT:28.0 sec  Urinalysis Basic - ( 03 Jan 2024 05:30 )    Color: x / Appearance: x / SG: x / pH: x  Gluc: 148 mg/dL / Ketone: x  / Bili: x / Urobili: x   Blood: x / Protein: x / Nitrite: x   Leuk Esterase: x / RBC: x / WBC x   Sq Epi: x / Non Sq Epi: x / Bacteria: x                RADIOLOGY, EKG & ADDITIONAL TESTS:   Gluc: 133 mg/dL / Ketone: x  / Bili: x / Urobili: x   Blood: x / Protein: x / Nitrite: x   Leuk Esterase: x / RBC: x / WBC x   Sq Epi: x / Non Sq Epi: x / Bacteria: x                RADIOLOGY, EKG & ADDITIONAL TESTS Consultation Requested by:    Patient is a 68y old  Male who presents with a chief complaint of Surgical site infection (28 Dec 2023 15:40)    HPI:  SAUNDRA HOLMAN is a 69yo M w PMH of CAD (x2 stents Mar 2023), HLD, T2DM, hx of kidney stones, psoriasis who presented with an oral mass and underwent L hemimandibulectomy, SND L level 1-3, recon with L FFF, STSG, and placement of dental implants on 12/7. He had a trach which was subsequently decannulated. He returns 12/27 with surgical site infection now s/p OR for debridement and exploration. Trach replaced through prior stoma for pulmonary toilet.   (27 Dec 2023 17:31)    Interval History:  Patient seen and examined at bedside with wife bedside. Pt had wash-out yday, had bone debrided as well as soft tissue. Also had EGD w ulcer by G-Tube. Family has been updated. Patient reports some pain surrounding site. Minimal discharge or tracheal secretions. Reports no dark BM last two days. No new complaints. Denies fever, chills, c/p, palpitations, cough, SOB, abdominal pain, nausea, vomiting, diarrhea, dysuria, hematuria, LE swelling, or rash.      Allergies    No Known Allergies    Intolerances      Antimicrobials:  ampicillin/sulbactam  IVPB 3 Gram(s) IV Intermittent every 6 hours  metroNIDAZOLE  IVPB 500 milliGRAM(s) IV Intermittent every 8 hours      Other Medications:  acetaminophen     Tablet .. 650 milliGRAM(s) Oral every 6 hours PRN  aspirin  chewable 81 milliGRAM(s) Enteral Tube daily  atorvastatin 40 milliGRAM(s) Oral at bedtime  chlorhexidine 0.12% Liquid 15 milliLiter(s) Oral Mucosa two times a day  dextrose 5% + sodium chloride 0.45%. 1000 milliLiter(s) IV Continuous <Continuous>  dextrose 5%. 1000 milliLiter(s) IV Continuous <Continuous>  dextrose 5%. 1000 milliLiter(s) IV Continuous <Continuous>  dextrose 50% Injectable 25 Gram(s) IV Push once  dextrose 50% Injectable 25 Gram(s) IV Push once  dextrose 50% Injectable 12.5 Gram(s) IV Push once  dextrose Oral Gel 15 Gram(s) Oral once PRN  glucagon  Injectable 1 milliGRAM(s) IntraMuscular once  HYDROmorphone  Injectable 1 milliGRAM(s) IV Push every 6 hours PRN  influenza  Vaccine (HIGH DOSE) 0.7 milliLiter(s) IntraMuscular once  insulin lispro (ADMELOG) corrective regimen sliding scale   SubCutaneous at bedtime  insulin lispro (ADMELOG) corrective regimen sliding scale   SubCutaneous three times a day before meals  lidocaine 2% Viscous 10 milliLiter(s) Swish and Spit every 4 hours PRN  ondansetron Injectable 4 milliGRAM(s) IV Push every 8 hours PRN  oxyCODONE    Solution 10 milliGRAM(s) Oral every 4 hours PRN  oxyCODONE    Solution 5 milliGRAM(s) Oral every 4 hours PRN  pantoprazole  Injectable 40 milliGRAM(s) IV Push two times a day      FAMILY HISTORY:    PAST MEDICAL & SURGICAL HISTORY:  Neoplasm of mandible        SOCIAL HISTORY:  [place], has livestock such as sheep in property but does not interact w them  restaurant and diner owner  Recent travel to [facility], no other  50+ pack year history as well as cigars  No EtOH use  Recreational CBD use  Sexually active w spouse    Vital Signs Last 24 Hrs  T(C): 36.5 (03 Jan 2024 13:46), Max: 37.1 (02 Jan 2024 19:00)  T(F): 97.7 (03 Jan 2024 13:46), Max: 98.7 (02 Jan 2024 19:00)  HR: 52 (03 Jan 2024 12:12) (49 - 102)  BP: 119/60 (03 Jan 2024 12:12) (98/55 - 150/65)  BP(mean): 86 (03 Jan 2024 12:12) (73 - 103)  RR: 17 (03 Jan 2024 12:12) (14 - 25)  SpO2: 98% (03 Jan 2024 12:12) (98% - 100%)    Parameters below as of 03 Jan 2024 12:12  Patient On (Oxygen Delivery Method): tracheostomy collar  O2 Flow (L/min): 10  O2 Concentration (%): 40    PHYSICAL EXAM  GENERAL: NAD, well-groomed, well-developed  HEENT: Remnant left FFF paddle oozing, packing in place in buccal cavity.   NERVOUS SYSTEM: Alert & Oriented X3, Good concentration; Motor Strength 5/5 B/L upper and lower extremities  CHEST/LUNG: CTA B/L, No w/r/r  HEART: Regular rate and rhythm; No murmurs, rubs, or gallops  ABDOMEN: Soft, Nontender, Nondistended; Bowel sounds present, PEG tube c/d/i  EXTREMITIES: wwp. dressing over L thigh (prev graft site)      LABS:                         10.0   9.80  )-----------( 300      ( 03 Jan 2024 05:30 )             31.0     01-03    136  |  101  |  19  ----------------------------<  148<H>  4.9   |  28  |  0.80    Ca    9.4      03 Jan 2024 05:30  Phos  3.5     01-03  Mg     1.8     01-03      PT/INR - ( 02 Jan 2024 10:00 )   PT: 11.7 sec;   INR: 1.03          PTT - ( 02 Jan 2024 10:00 )  PTT:28.0 sec  Urinalysis Basic - ( 03 Jan 2024 05:30 )    Color: x / Appearance: x / SG: x / pH: x  Gluc: 148 mg/dL / Ketone: x  / Bili: x / Urobili: x   Blood: x / Protein: x / Nitrite: x   Leuk Esterase: x / RBC: x / WBC x   Sq Epi: x / Non Sq Epi: x / Bacteria: x                RADIOLOGY, EKG & ADDITIONAL TESTS:   Gluc: 133 mg/dL / Ketone: x  / Bili: x / Urobili: x   Blood: x / Protein: x / Nitrite: x   Leuk Esterase: x / RBC: x / WBC x   Sq Epi: x / Non Sq Epi: x / Bacteria: x                RADIOLOGY, EKG & ADDITIONAL TESTS

## 2024-01-03 NOTE — PROGRESS NOTE ADULT - ASSESSMENT
67 yo M w PMhx of CAD (x 2 stents Mar 2023), Type 2 DM, hx of kidney stones, psoriasis who presented with an oral mass 2/2 to [id number] SCC of L buccal mucosa during previous admission (12/3-21) and underwent L hemimandibulectomy, SND L level 1-3, recon with L FFF, STSG, and placement of dental implants on 12/7. Patient had a trach which was subsequently decannulated. Patient now returns with surgical site infection s/p wash-out.    #SSTI of surgical wound  - Patient w likely polymicrobial wound culture, w GPC in pairs, chain, GP rods, GN rods, concern of gram negative coverage also, speciation noted E.cloacae, S.mitis, E.coli, S.epidermis  - Had repeat wash-out yday w bone and soft tissue debridement, debrided down to bleeding bone. Only hardware is anchors for dentition, no concern for infxn from hardware as per discussion w ENT team.  - C/w imipenem 1000mg q8  - Will f/u repeat Cx from team which were sent from OR y/day    ID will follow    RECS NOT FINAL UNTIL ATTENDING ATTESTATION 69 yo M w PMhx of CAD (x 2 stents Mar 2023), Type 2 DM, hx of kidney stones, psoriasis who presented with an oral mass 2/2 to [id number] SCC of L buccal mucosa during previous admission (12/3-21) and underwent L hemimandibulectomy, SND L level 1-3, recon with L FFF, STSG, and placement of dental implants on 12/7. Patient had a trach which was subsequently decannulated. Patient now returns with surgical site infection s/p wash-out.    #SSTI of surgical wound  - Patient w likely polymicrobial wound culture, w GPC in pairs, chain, GP rods, GN rods, concern of gram negative coverage also, speciation noted E.cloacae, S.mitis, E.coli, S.epidermis  - Had repeat wash-out yday w bone and soft tissue debridement, debrided down to bleeding bone. Only hardware is anchors for dentition, no concern for infxn from hardware as per discussion w ENT team.  - C/w imipenem 1000mg q8  - Will f/u repeat Cx from team which were sent from OR y/day    ID will follow    RECS NOT FINAL UNTIL ATTENDING ATTESTATION

## 2024-01-03 NOTE — PROGRESS NOTE ADULT - SUBJECTIVE AND OBJECTIVE BOX
OTOLARYNGOLOGY (ENT) PROGRESS NOTE    PATIENT: SAUNDRA HOLMAN  MRN: [id number]  : 57  [id number]  DATE OF SERVICE:  24  	  Subjective/ Interval:   ID: SAUNDRA HOLMAN is a  69yo M w PMH of CAD (x2 stents Mar 2023), HLD, T2DM, hx of kidney stones, psoriasis who presented with an oral mass and underwent L hemimandibulectomy, SND L level 1-3, recon with L FFF, STSG, and placement of dental implants on . He had a trach which was subsequently decannulated. He returns  with surgical site infection now s/p OR for debridement and exploration. Trach replaced through prior stoma for pulmonary toilet.     Subjective/ Interval:   ; Patient seen this morning, oral pack to be changed, penrose dressing changed. Intraoral flap with partal skin necrosis, 7.5 portex in place with cuff deflated   : Patient seen and examined at bedside. NAEO. Patient remains afebrile and HD stable. HgB noted to drop to 7.6 from 9.2 but no additional episodes of melena. Penrose draining purulent material. Intra-oral infection/flap stable. Packing changed at bedside. No other complaints or changes at this time. ID recommended Vanc/zosyn and DC unasyn and flagyl, and IM recommended reticulocyte studies and adding folate supplements. No other changes at this time.   : AFVSS. No acute events overnight. Patient seen and examined at bedside. C/w Vanc/Zosyn per ID recs, pending sensitivities. Growing staph epi, E coli, enterobacter from wound cx on . S/p 2U PRBC yday w appropriate response in Hgb. Transfusion goal > 9.0.  : AFVSS. No acute events overnight. Patient seen and examined at bedside. C/w Vanc/Zosyn per ID recs, pending sens. Hgb stable this morning.  : AFVSS. No acute events overnight. Patient seen and examined at bedside. C/w Vanc/Zosyn, e faecalis sens to vanc/zosyn. Had 3 dark BM's yesterday that were stool guiac positive.  : AFVSS. No acute events overnight. Patient seen and examined at bedside. C/w Imipenem (changed per ID, enterobacter resistant to Zosyn). Plan for OR today for further washout / debridement.  1/3: AFVSS. No acute events overnight. Patient seen and examined at bedside. C/w imipenim. OR cx growing numerous gram negative rods and few gram positive cocci in pairs.    PHYSICAL EXAM:  GEN: appears stated age, NAD  NEURO: alert & oriented x   HEENT: Remnant left FFF paddle oozing, Dakins soaked packing in place in buccal cavity,   Neck: 7.5 Portex trach with cuff deflated, Penrose draining purulent fluid, skin loosely reapproximated with silk suture, Kerlix dressing in place minimally saturated  CVS: regular rate and rhythm  Pulm: normal respiratory excursions, not tachypneic, no labored breathing  Abd: non-distended  Ext: moving all four extremities, no peripheral edema noted         LABS                       10.4   7.27  )-----------( 268      ( 2024 05:19 )             32.4    -    137  |  102  |  13  ----------------------------<  190<H>  4.2   |  27  |  0.78    Ca    8.4      2024 05:19  Phos  2.0       Mg     1.7     -           Coagulation Studies-   PT/INR - ( 2024 10:00 )   PT: 11.7 sec;   INR: 1.03          PTT - ( 2024 10:00 )  PTT:28.0 sec  Urinalysis Basic - ( 2024 05:19 )    Color: x / Appearance: x / SG: x / pH: x  Gluc: 190 mg/dL / Ketone: x  / Bili: x / Urobili: x   Blood: x / Protein: x / Nitrite: x   Leuk Esterase: x / RBC: x / WBC x   Sq Epi: x / Non Sq Epi: x / Bacteria: x      Endocrine Panel-                MICROBIOLOGY:  Culture - Tissue with Gram Stain (collected 24 @ 18:03)  Source: .Tissue left mandible tissue or spec  Gram Stain (24 @ 21:59):    Numerous Gram Negative Rods    Few Gram positive cocci in pairs    Few WBC's    Culture - Surgical Swab (collected 24 @ 18:03)  Source: .Surgical Swab left neck or spec  Gram Stain (24 @ 22:00):    Rare Gram Negative Rods    Rare Gram positive cocci in pairs    Moderate WBC's    Culture - Surgical Swab (collected 23 @ 20:46)  Source: .Surgical Swab 1. Left Oral Cavity  Gram Stain (23 @ 21:53):    Numerous Gram positive cocci in pairs, chains and clusters    Moderate Gram Positive Rods    Moderate Gram Negative Rods    Numerous White blood cells  Final Report (24 @ 10:25):    Moderate Escherichia coli    Moderate Enterobacter cloacae complex    Few Enterococcus faecalis    Few Streptococcus mitis/oralis group    Few Staphylococcus epidermidis    Few Streptococcus constellatus    Few Stenotrophomonas maltophilia    Mixed Anaerobic Joy including    Few Prevotella species (most closely resembles Prevotella barnaie)    Few Prevotella denticola    Culture grew 3 or more types of organisms which indicate collection    contamination; consider recollection only if clinically indicated.  Organism: Escherichia coli  Enterobacter cloacae complex  Enterococcus faecalis  Streptococcus constellatus  Streptococcus constellatus  Stenotrophomonas maltophilia  Stenotrophomonas maltophilia (24 @ 10:24)  Organism: Escherichia coli (24 @ 10:24)      Method Type: OTTO      -  Ampicillin: S <=8 These ampicillin results predict results for amoxicillin      -  Ampicillin/Sulbactam: S <=4/2      -  Cefazolin: S <=2      -  Ceftriaxone: S <=1      -  Ciprofloxacin: S <=0.25      -  Ertapenem: S <=0.5      -  Gentamicin: S <=2      -  Piperacillin/Tazobactam: S <=8      -  Tobramycin: S <=2      -  Trimethoprim/Sulfamethoxazole: S   Organism: Stenotrophomonas maltophilia (24 @ 14:56)      Method Type: KB      -  Minocycline: S  Organism: Stenotrophomonas maltophilia (24 @ 14:55)      Method Type: OTTO      -  Levofloxacin: S 1      -  Trimethoprim/Sulfamethoxazole: S <=0.5/9.5  Organism: Streptococcus constellatus (24 @ 14:55)      Method Type: ETEST      -  Ceftriaxone: S 0.094      -  Penicillin: S 0.047  Organism: Streptococcus constellatus (24 @ 14:55)      Method Type: KB  Organism: Enterobacter cloacae complex (24 @ 14:52)      Method Type: OTTO      -  Ampicillin: R >16 These ampicillin results predict results for amoxicillin      -  Ampicillin/Sulbactam: R >16/8      -  Cefazolin: R >16      -  Cefepime: SDD 8 The breakpoint for susceptible dose dependent may require the usage of a higher cefepime dose (equivalent to adult dose of 2g q8h for normal renal function) for successful treatment.      -  Ceftriaxone: R >32 Enterobacter cloacae, Klebsiella aerogenes, and Citrobacter freundii may develop resistance during prolonged therapy.      -  Ciprofloxacin: S <=0.25      -  Ertapenem: S <=0.5      -  Gentamicin: S <=2      -  Meropenem: S <=1      -  Piperacillin/Tazobactam: R 64      -  Tobramycin: S <=2      -  Trimethoprim/Sulfamethoxazole: S <=0.5/9.5      -  Cefoxitin: R >16  Organism: Enterococcus faecalis (23 @ 15:21)      Method Type: OTTO      -  Ampicillin: S <=2 Predicts results to ampicillin/sulbactam, amoxacillin-clavulanate and  piperacillin-tazobactam.      -  Vancomycin: S 4      Culture Results:   No growth at 5 days. (23 @ 10:08)  Culture Results:   Moderate Escherichia coli  Moderate Enterobacter cloacae complex  Few Enterococcus faecalis  Few Streptococcus mitis/oralis group  Few Staphylococcus epidermidis  Few Streptococcus constellatus  Few Stenotrophomonas maltophilia  Mixed Anaerobic Joy including  Few Prevotella species (most closely resembles Prevotella barnaie)  Few Prevotella denticola  Culture grew 3 or more types of organisms which indicate collection  contamination; consider recollection only if clinically indicated. (23 @ 20:46)      Culture - Tissue with Gram Stain (collected 24 @ 18:03)  Source: .Tissue left mandible tissue or spec  Gram Stain (24 @ 21:59):    Numerous Gram Negative Rods    Few Gram positive cocci in pairs    Few WBC's    Culture - Surgical Swab (collected 24 @ 18:03)  Source: .Surgical Swab left neck or spec  Gram Stain (24 @ 22:00):    Rare Gram Negative Rods    Rare Gram positive cocci in pairs    Moderate WBC's    Culture - Blood (collected 23 @ 10:08)  Source: .Blood Blood-Peripheral  Final Report (24 @ 11:01):    No growth at 5 days.    Culture - Surgical Swab (collected 23 @ 20:46)  Source: .Surgical Swab 1. Left Oral Cavity  Gram Stain (23 @ 21:53):    Numerous Gram positive cocci in pairs, chains and clusters    Moderate Gram Positive Rods    Moderate Gram Negative Rods    Numerous White blood cells  Final Report (24 @ 10:25):    Moderate Escherichia coli    Moderate Enterobacter cloacae complex    Few Enterococcus faecalis    Few Streptococcus mitis/oralis group    Few Staphylococcus epidermidis    Few Streptococcus constellatus    Few Stenotrophomonas maltophilia    Mixed Anaerobic Joy including    Few Prevotella species (most closely resembles Prevotella barnaie)    Few Prevotella denticola    Culture grew 3 or more types of organisms which indicate collection    contamination; consider recollection only if clinically indicated.  Organism: Escherichia coli  Enterobacter cloacae complex  Enterococcus faecalis  Streptococcus constellatus  Streptococcus constellatus  Stenotrophomonas maltophilia  Stenotrophomonas maltophilia (24 @ 10:24)  Organism: Escherichia coli (24 @ 10:24)      Method Type: OTTO      -  Ampicillin: S <=8 These ampicillin results predict results for amoxicillin      -  Ampicillin/Sulbactam: S <=4/2      -  Cefazolin: S <=2      -  Ceftriaxone: S <=1      -  Ciprofloxacin: S <=0.25      -  Ertapenem: S <=0.5      -  Gentamicin: S <=2      -  Piperacillin/Tazobactam: S <=8      -  Tobramycin: S <=2      -  Trimethoprim/Sulfamethoxazole: S 1/19  Organism: Stenotrophomonas maltophilia (24 @ 14:56)      Method Type: KB      -  Minocycline: S  Organism: Stenotrophomonas maltophilia (24 @ 14:55)      Method Type: OTTO      -  Levofloxacin: S 1      -  Trimethoprim/Sulfamethoxazole: S <=0.5/9.5  Organism: Streptococcus constellatus (24 @ 14:55)      Method Type: ETEST      -  Ceftriaxone: S 0.094      -  Penicillin: S 0.047  Organism: Streptococcus constellatus (24 @ 14:55)      Method Type: KB  Organism: Enterobacter cloacae complex (24 @ 14:52)      Method Type: OTTO      -  Ampicillin: R >16 These ampicillin results predict results for amoxicillin      -  Ampicillin/Sulbactam: R >16/8      -  Cefazolin: R >16      -  Cefepime: SDD 8 The breakpoint for susceptible dose dependent may require the usage of a higher cefepime dose (equivalent to adult dose of 2g q8h for normal renal function) for successful treatment.      -  Ceftriaxone: R >32 Enterobacter cloacae, Klebsiella aerogenes, and Citrobacter freundii may develop resistance during prolonged therapy.      -  Ciprofloxacin: S <=0.25      -  Ertapenem: S <=0.5      -  Gentamicin: S <=2      -  Meropenem: S <=1      -  Piperacillin/Tazobactam: R 64      -  Tobramycin: S <=2      -  Trimethoprim/Sulfamethoxazole: S <=0.5/9.5      -  Cefoxitin: R >16  Organism: Enterococcus faecalis (23 @ 15:21)      Method Type: OTTO      -  Ampicillin: S <=2 Predicts results to ampicillin/sulbactam, amoxacillin-clavulanate and  piperacillin-tazobactam.      -  Vancomycin: S 4             OTOLARYNGOLOGY (ENT) PROGRESS NOTE    PATIENT: SAUNDRA HOLMAN  MRN: [id number]  : 57  [id number]  DATE OF SERVICE:  24  	  Subjective/ Interval:   ID: SAUNDRA HOLMAN is a  67yo M w PMH of CAD (x2 stents Mar 2023), HLD, T2DM, hx of kidney stones, psoriasis who presented with an oral mass and underwent L hemimandibulectomy, SND L level 1-3, recon with L FFF, STSG, and placement of dental implants on . He had a trach which was subsequently decannulated. He returns  with surgical site infection now s/p OR for debridement and exploration. Trach replaced through prior stoma for pulmonary toilet.     Subjective/ Interval:   ; Patient seen this morning, oral pack to be changed, penrose dressing changed. Intraoral flap with partal skin necrosis, 7.5 portex in place with cuff deflated   : Patient seen and examined at bedside. NAEO. Patient remains afebrile and HD stable. HgB noted to drop to 7.6 from 9.2 but no additional episodes of melena. Penrose draining purulent material. Intra-oral infection/flap stable. Packing changed at bedside. No other complaints or changes at this time. ID recommended Vanc/zosyn and DC unasyn and flagyl, and IM recommended reticulocyte studies and adding folate supplements. No other changes at this time.   : AFVSS. No acute events overnight. Patient seen and examined at bedside. C/w Vanc/Zosyn per ID recs, pending sensitivities. Growing staph epi, E coli, enterobacter from wound cx on . S/p 2U PRBC yday w appropriate response in Hgb. Transfusion goal > 9.0.  : AFVSS. No acute events overnight. Patient seen and examined at bedside. C/w Vanc/Zosyn per ID recs, pending sens. Hgb stable this morning.  : AFVSS. No acute events overnight. Patient seen and examined at bedside. C/w Vanc/Zosyn, e faecalis sens to vanc/zosyn. Had 3 dark BM's yesterday that were stool guiac positive.  : AFVSS. No acute events overnight. Patient seen and examined at bedside. C/w Imipenem (changed per ID, enterobacter resistant to Zosyn). Plan for OR today for further washout / debridement.  1/3: AFVSS. No acute events overnight. Patient seen and examined at bedside. C/w imipenim. OR cx growing numerous gram negative rods and few gram positive cocci in pairs.    PHYSICAL EXAM:  GEN: appears stated age, NAD  NEURO: alert & oriented x   HEENT: Remnant left FFF paddle oozing, Dakins soaked packing in place in buccal cavity,   Neck: 7.5 Portex trach with cuff deflated, Penrose draining purulent fluid, skin loosely reapproximated with silk suture, Kerlix dressing in place minimally saturated  CVS: regular rate and rhythm  Pulm: normal respiratory excursions, not tachypneic, no labored breathing  Abd: non-distended  Ext: moving all four extremities, no peripheral edema noted         LABS                       10.4   7.27  )-----------( 268      ( 2024 05:19 )             32.4    -    137  |  102  |  13  ----------------------------<  190<H>  4.2   |  27  |  0.78    Ca    8.4      2024 05:19  Phos  2.0       Mg     1.7     -           Coagulation Studies-   PT/INR - ( 2024 10:00 )   PT: 11.7 sec;   INR: 1.03          PTT - ( 2024 10:00 )  PTT:28.0 sec  Urinalysis Basic - ( 2024 05:19 )    Color: x / Appearance: x / SG: x / pH: x  Gluc: 190 mg/dL / Ketone: x  / Bili: x / Urobili: x   Blood: x / Protein: x / Nitrite: x   Leuk Esterase: x / RBC: x / WBC x   Sq Epi: x / Non Sq Epi: x / Bacteria: x      Endocrine Panel-                MICROBIOLOGY:  Culture - Tissue with Gram Stain (collected 24 @ 18:03)  Source: .Tissue left mandible tissue or spec  Gram Stain (24 @ 21:59):    Numerous Gram Negative Rods    Few Gram positive cocci in pairs    Few WBC's    Culture - Surgical Swab (collected 24 @ 18:03)  Source: .Surgical Swab left neck or spec  Gram Stain (24 @ 22:00):    Rare Gram Negative Rods    Rare Gram positive cocci in pairs    Moderate WBC's    Culture - Surgical Swab (collected 23 @ 20:46)  Source: .Surgical Swab 1. Left Oral Cavity  Gram Stain (23 @ 21:53):    Numerous Gram positive cocci in pairs, chains and clusters    Moderate Gram Positive Rods    Moderate Gram Negative Rods    Numerous White blood cells  Final Report (24 @ 10:25):    Moderate Escherichia coli    Moderate Enterobacter cloacae complex    Few Enterococcus faecalis    Few Streptococcus mitis/oralis group    Few Staphylococcus epidermidis    Few Streptococcus constellatus    Few Stenotrophomonas maltophilia    Mixed Anaerobic Joy including    Few Prevotella species (most closely resembles Prevotella barnaie)    Few Prevotella denticola    Culture grew 3 or more types of organisms which indicate collection    contamination; consider recollection only if clinically indicated.  Organism: Escherichia coli  Enterobacter cloacae complex  Enterococcus faecalis  Streptococcus constellatus  Streptococcus constellatus  Stenotrophomonas maltophilia  Stenotrophomonas maltophilia (24 @ 10:24)  Organism: Escherichia coli (24 @ 10:24)      Method Type: OTTO      -  Ampicillin: S <=8 These ampicillin results predict results for amoxicillin      -  Ampicillin/Sulbactam: S <=4/2      -  Cefazolin: S <=2      -  Ceftriaxone: S <=1      -  Ciprofloxacin: S <=0.25      -  Ertapenem: S <=0.5      -  Gentamicin: S <=2      -  Piperacillin/Tazobactam: S <=8      -  Tobramycin: S <=2      -  Trimethoprim/Sulfamethoxazole: S   Organism: Stenotrophomonas maltophilia (24 @ 14:56)      Method Type: KB      -  Minocycline: S  Organism: Stenotrophomonas maltophilia (24 @ 14:55)      Method Type: OTTO      -  Levofloxacin: S 1      -  Trimethoprim/Sulfamethoxazole: S <=0.5/9.5  Organism: Streptococcus constellatus (24 @ 14:55)      Method Type: ETEST      -  Ceftriaxone: S 0.094      -  Penicillin: S 0.047  Organism: Streptococcus constellatus (24 @ 14:55)      Method Type: KB  Organism: Enterobacter cloacae complex (24 @ 14:52)      Method Type: OTTO      -  Ampicillin: R >16 These ampicillin results predict results for amoxicillin      -  Ampicillin/Sulbactam: R >16/8      -  Cefazolin: R >16      -  Cefepime: SDD 8 The breakpoint for susceptible dose dependent may require the usage of a higher cefepime dose (equivalent to adult dose of 2g q8h for normal renal function) for successful treatment.      -  Ceftriaxone: R >32 Enterobacter cloacae, Klebsiella aerogenes, and Citrobacter freundii may develop resistance during prolonged therapy.      -  Ciprofloxacin: S <=0.25      -  Ertapenem: S <=0.5      -  Gentamicin: S <=2      -  Meropenem: S <=1      -  Piperacillin/Tazobactam: R 64      -  Tobramycin: S <=2      -  Trimethoprim/Sulfamethoxazole: S <=0.5/9.5      -  Cefoxitin: R >16  Organism: Enterococcus faecalis (23 @ 15:21)      Method Type: OTTO      -  Ampicillin: S <=2 Predicts results to ampicillin/sulbactam, amoxacillin-clavulanate and  piperacillin-tazobactam.      -  Vancomycin: S 4      Culture Results:   No growth at 5 days. (23 @ 10:08)  Culture Results:   Moderate Escherichia coli  Moderate Enterobacter cloacae complex  Few Enterococcus faecalis  Few Streptococcus mitis/oralis group  Few Staphylococcus epidermidis  Few Streptococcus constellatus  Few Stenotrophomonas maltophilia  Mixed Anaerobic Joy including  Few Prevotella species (most closely resembles Prevotella barnaie)  Few Prevotella denticola  Culture grew 3 or more types of organisms which indicate collection  contamination; consider recollection only if clinically indicated. (23 @ 20:46)      Culture - Tissue with Gram Stain (collected 24 @ 18:03)  Source: .Tissue left mandible tissue or spec  Gram Stain (24 @ 21:59):    Numerous Gram Negative Rods    Few Gram positive cocci in pairs    Few WBC's    Culture - Surgical Swab (collected 24 @ 18:03)  Source: .Surgical Swab left neck or spec  Gram Stain (24 @ 22:00):    Rare Gram Negative Rods    Rare Gram positive cocci in pairs    Moderate WBC's    Culture - Blood (collected 23 @ 10:08)  Source: .Blood Blood-Peripheral  Final Report (24 @ 11:01):    No growth at 5 days.    Culture - Surgical Swab (collected 23 @ 20:46)  Source: .Surgical Swab 1. Left Oral Cavity  Gram Stain (23 @ 21:53):    Numerous Gram positive cocci in pairs, chains and clusters    Moderate Gram Positive Rods    Moderate Gram Negative Rods    Numerous White blood cells  Final Report (24 @ 10:25):    Moderate Escherichia coli    Moderate Enterobacter cloacae complex    Few Enterococcus faecalis    Few Streptococcus mitis/oralis group    Few Staphylococcus epidermidis    Few Streptococcus constellatus    Few Stenotrophomonas maltophilia    Mixed Anaerobic Joy including    Few Prevotella species (most closely resembles Prevotella barnaie)    Few Prevotella denticola    Culture grew 3 or more types of organisms which indicate collection    contamination; consider recollection only if clinically indicated.  Organism: Escherichia coli  Enterobacter cloacae complex  Enterococcus faecalis  Streptococcus constellatus  Streptococcus constellatus  Stenotrophomonas maltophilia  Stenotrophomonas maltophilia (24 @ 10:24)  Organism: Escherichia coli (24 @ 10:24)      Method Type: OTTO      -  Ampicillin: S <=8 These ampicillin results predict results for amoxicillin      -  Ampicillin/Sulbactam: S <=4/2      -  Cefazolin: S <=2      -  Ceftriaxone: S <=1      -  Ciprofloxacin: S <=0.25      -  Ertapenem: S <=0.5      -  Gentamicin: S <=2      -  Piperacillin/Tazobactam: S <=8      -  Tobramycin: S <=2      -  Trimethoprim/Sulfamethoxazole: S 1/19  Organism: Stenotrophomonas maltophilia (24 @ 14:56)      Method Type: KB      -  Minocycline: S  Organism: Stenotrophomonas maltophilia (24 @ 14:55)      Method Type: OTTO      -  Levofloxacin: S 1      -  Trimethoprim/Sulfamethoxazole: S <=0.5/9.5  Organism: Streptococcus constellatus (24 @ 14:55)      Method Type: ETEST      -  Ceftriaxone: S 0.094      -  Penicillin: S 0.047  Organism: Streptococcus constellatus (24 @ 14:55)      Method Type: KB  Organism: Enterobacter cloacae complex (24 @ 14:52)      Method Type: OTTO      -  Ampicillin: R >16 These ampicillin results predict results for amoxicillin      -  Ampicillin/Sulbactam: R >16/8      -  Cefazolin: R >16      -  Cefepime: SDD 8 The breakpoint for susceptible dose dependent may require the usage of a higher cefepime dose (equivalent to adult dose of 2g q8h for normal renal function) for successful treatment.      -  Ceftriaxone: R >32 Enterobacter cloacae, Klebsiella aerogenes, and Citrobacter freundii may develop resistance during prolonged therapy.      -  Ciprofloxacin: S <=0.25      -  Ertapenem: S <=0.5      -  Gentamicin: S <=2      -  Meropenem: S <=1      -  Piperacillin/Tazobactam: R 64      -  Tobramycin: S <=2      -  Trimethoprim/Sulfamethoxazole: S <=0.5/9.5      -  Cefoxitin: R >16  Organism: Enterococcus faecalis (23 @ 15:21)      Method Type: OTTO      -  Ampicillin: S <=2 Predicts results to ampicillin/sulbactam, amoxacillin-clavulanate and  piperacillin-tazobactam.      -  Vancomycin: S 4

## 2024-01-03 NOTE — PROGRESS NOTE ADULT - ASSESSMENT
Past medical Hx of CAD (x 2 stents Mar 2023), Type 2 DM, hx of kidney stones, psoriasis who presented with an oral mass during previous admission (12/3-21) and underwent L hemimandibulectomy, SND L level 1-3, recon with L FFF, STSG, and placement of dental implants on 12/7. Patient had a trach which was subsequently decannulated. Patient now returns with surgical site infection. Medicine consulted for co-management.    #Surgical site infection  Patient s/p OR for debridement and exploration (12/27 and 1/2) and Trach replaced through prior stoma for pulmonary toilet. OR cultures with E. Coli, ECC, E. Faecalis S. Mitis S. Oralis, S. Epidermidis, S. Constellatus, S. Maltophilia, mixed anaerobes.  - c/w Imipenem 1 g IV q8  - Pain regimen: Tylenol 650 mg PO q6, Oxy IR 5 mg Q4 moderate, Oxy IR 10 mg Q4 severe, Dilaudid 1 mg IV Q6 for breakthrough   - f/u ID recommendations  - Restart DVT PPX, as soon as appropriate per primary team    #Anemia  Hb on admission 7.3. Now stable Hb 9.2 s/p karlo-operative transfusion. Patient with previous iron studies consistent with WADE. DD includes surgical site bleed VS less likely GI bleed. Patient now s/p EGD in OR (1/3).   - maintain active T&S, transfusion goal to Hgb >8   - c/w Folic acid 1g PO QD for folate deficiency  - c/w Protonix 40 mg IV QD    #CAD  Patient with hx of CAD s/p 2 stents in March 2023.   - c/w ASA 81 mg PO QD, Atorvastatin 40 mg PO QD  - Restart Plavix, as soon as appropriate per primary team    #DM type 2  Home medication: Metformin 1g PO QD and 500 mg PO QD and Jardiance 10 mg PO QD.  - c/w insulin sliding scale    Medicine will continue to follow. Patient seen, evaluated, and discussed with attending Dr. Montero

## 2024-01-03 NOTE — PROGRESS NOTE ADULT - ATTENDING COMMENTS
67 y/o M PMHx of CAD s/p PCI/CUBA x2 to LAD and Ramus (Mar 2023), T2DM, psoriasis, hx Renal calculi, w/ [id number] SCC of L buccal mucosa s/p hemimandibulectomy, left level 1, 2a, 3 neck neck dissection, L FFF, tracheostomy w/ 7.5 cuffed portex, dental implants, STSG (12/7/23), s/p G tube placement and subsequent trach decannulation and discharged home on ( 12/22/23). Pt however returned to [facility] ( 12/27/23) with surgical site infection, s/p RTOR 12/27 with findings of skin flap necrosis and s/p debridement. Trach replaced through prior stoma for pulmonary toilet. Patient also noted to have dark stools and dropped in Hgb- wife reported black stool for the last 3 days and same thing coming out from peg tube 12/28 , now peg feeding held -. Medicine consulted for co-management.     Pt seen and examined with Dr. Mauor. Pt OOBTC in positive spirits. denies pain, SOB,etc. tolerating TF. Wife at bedside.     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 Purulent drainage appreciated at margins of flap and in neck.  EGD( 1/2): ulcer found at the G-tube site    - Local wound care: OR findings reviewed, f/u OR mandibular culture per ENT   - ID f/u appreciated ( Team 1):  d/c's Vanco & Zosyn ( 1/1) and started on Imipenem 1gm iv q8h  (1/1-)   - f/u OR culture ( 1/2): E.coli, ECC & E.faecalis   - OR cultures with E.coli, ECC, E.faecalis S.mitis/oralis, S.epi, S.constellatus, S.maltophilia, mixed anaerobes.   - f/u GNRs sensitivity   - WBC normal 7K -> 10K   - BCx( 12/28): ngtd x 5 days   -R antecubital/forearm thrombophlebitis- warm compresses prn, monitor clinical improvement     # Acute blood loss anemia/melena   # hx WADE ( Ferritin 768 but Tsat 13% ( <20%) on 12/13/23)   - GI fu appreciated, EGD( 1/2) ulcer at G-tube site, rec; Protonix 40mg daily for 8 weeks and avoid bumper being too tight to cause pressure ulcer   - hgb dropped 7.6 ( 12/29) s/p 2u PRBC ( ~ 500mg elemental iron), trend Hgb 10.7  (1/1) ->10.4( 1/2)-> 10( 1/3)    - keep active T&S, keep Hgb>8    - c/w ASA given hx PCI x2 ( March 2023)  - hold off Plavix since adm ( 12/27) , awaiting ENT & GI clearance to resume Plavix  - c/w PPI daily can be via G-tube   - c/w Folic acid 1mg daily   - monitor clinically for any further melena     # CAD sp PCI/CUBA x2 to LAD and Ramus in March 2023 as per cards is in setting of NSTEMI - prefer DAPT, at least monotherapy with ASA if plavix need to be held for procedure, currently on ASA , to restart plavix when safe., c/w ASA 81mg and Atorvastatin 40mg qHS     #  DM - FS with ISS, would hold all ora-hypoglycemic agent, goal -180    # pain management - oxycodone 5mg/10mg prn , dilaudid prn, would need stool softener to ensure daily BM.     # Dysphagia- NPO, trach, on TF with Vital 1.5 via G-tube     # DVT ppx; SCDs , held chemoprophylaxis given recent GIB    Med consult team continues to follow pt with you. Thank you. 69 y/o M PMHx of CAD s/p PCI/CUBA x2 to LAD and Ramus (Mar 2023), T2DM, psoriasis, hx Renal calculi, w/ [id number] SCC of L buccal mucosa s/p hemimandibulectomy, left level 1, 2a, 3 neck neck dissection, L FFF, tracheostomy w/ 7.5 cuffed portex, dental implants, STSG (12/7/23), s/p G tube placement and subsequent trach decannulation and discharged home on ( 12/22/23). Pt however returned to [facility] ( 12/27/23) with surgical site infection, s/p RTOR 12/27 with findings of skin flap necrosis and s/p debridement. Trach replaced through prior stoma for pulmonary toilet. Patient also noted to have dark stools and dropped in Hgb- wife reported black stool for the last 3 days and same thing coming out from peg tube 12/28 , now peg feeding held -. Medicine consulted for co-management.     Pt seen and examined with Dr. Mauro. Pt OOBTC in positive spirits. denies pain, SOB,etc. tolerating TF. Wife at bedside.     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 Purulent drainage appreciated at margins of flap and in neck.  EGD( 1/2): ulcer found at the G-tube site    - Local wound care: OR findings reviewed, f/u OR mandibular culture per ENT   - ID f/u appreciated ( Team 1):  d/c's Vanco & Zosyn ( 1/1) and started on Imipenem 1gm iv q8h  (1/1-)   - f/u OR culture ( 1/2): E.coli, ECC & E.faecalis   - OR cultures with E.coli, ECC, E.faecalis S.mitis/oralis, S.epi, S.constellatus, S.maltophilia, mixed anaerobes.   - f/u GNRs sensitivity   - WBC normal 7K -> 10K   - BCx( 12/28): ngtd x 5 days   -R antecubital/forearm thrombophlebitis- warm compresses prn, monitor clinical improvement     # Acute blood loss anemia/melena   # hx WADE ( Ferritin 768 but Tsat 13% ( <20%) on 12/13/23)   - GI fu appreciated, EGD( 1/2) ulcer at G-tube site, rec; Protonix 40mg daily for 8 weeks and avoid bumper being too tight to cause pressure ulcer   - hgb dropped 7.6 ( 12/29) s/p 2u PRBC ( ~ 500mg elemental iron), trend Hgb 10.7  (1/1) ->10.4( 1/2)-> 10( 1/3)    - keep active T&S, keep Hgb>8    - c/w ASA given hx PCI x2 ( March 2023)  - hold off Plavix since adm ( 12/27) , awaiting ENT & GI clearance to resume Plavix  - c/w PPI daily can be via G-tube   - c/w Folic acid 1mg daily   - monitor clinically for any further melena     # CAD sp PCI/CUBA x2 to LAD and Ramus in March 2023 as per cards is in setting of NSTEMI - prefer DAPT, at least monotherapy with ASA if plavix need to be held for procedure, currently on ASA , to restart plavix when safe., c/w ASA 81mg and Atorvastatin 40mg qHS     #  DM - FS with ISS, would hold all ora-hypoglycemic agent, goal -180    # pain management - oxycodone 5mg/10mg prn , dilaudid prn, would need stool softener to ensure daily BM.     # Dysphagia- NPO, trach, on TF with Vital 1.5 via G-tube     # DVT ppx; SCDs , held chemoprophylaxis given recent GIB    Med consult team continues to follow pt with you. Thank you.

## 2024-01-03 NOTE — PROGRESS NOTE ADULT - SUBJECTIVE AND OBJECTIVE BOX
SUBJECTIVE/OVERNIGHT EVENTS: No acute overnight events. Pt seen in AM at bedside, resting comfortably in bed, and does not appear to be in any acute distress.     VITAL SIGNS:  Vital Signs Last 24 Hrs  T(C): 36.5 (03 Jan 2024 13:46), Max: 37.1 (02 Jan 2024 19:00)  T(F): 97.7 (03 Jan 2024 13:46), Max: 98.7 (02 Jan 2024 19:00)  HR: 52 (03 Jan 2024 12:12) (49 - 102)  BP: 119/60 (03 Jan 2024 12:12) (98/55 - 150/65)  BP(mean): 86 (03 Jan 2024 12:12) (73 - 103)  RR: 17 (03 Jan 2024 12:12) (14 - 25)  SpO2: 98% (03 Jan 2024 12:12) (98% - 100%)    Parameters below as of 03 Jan 2024 12:12  Patient On (Oxygen Delivery Method): tracheostomy collar  O2 Flow (L/min): 10  O2 Concentration (%): 40    PHYSICAL EXAM:  Constitutional: sitting comfortably in bed, no acute distress   HEENT: sclera non-icteric, mouth with lesion on right side, packed with gauze, neck also wrapped with gauze, trach collar in place  Respiratory: Good air entry b/l, clear to auscultation bilaterally, without accessory muscle use and no intercostal retractions  Cardiovascular: RRR, normal S1S2, no M/R/G  Gastrointestinal: soft, NTND, no masses palpable, BS normal  Extremities: Warm, well perfused, no edema, no clubbing  Skin: Normal temperature, warm, dry    MEDICATIONS:  MEDICATIONS  (STANDING):  aspirin  chewable 81 milliGRAM(s) Enteral Tube daily  atorvastatin 40 milliGRAM(s) Oral at bedtime  dextrose 5%. 1000 milliLiter(s) (50 mL/Hr) IV Continuous <Continuous>  dextrose 5%. 1000 milliLiter(s) (100 mL/Hr) IV Continuous <Continuous>  dextrose 50% Injectable 25 Gram(s) IV Push once  dextrose 50% Injectable 25 Gram(s) IV Push once  dextrose 50% Injectable 12.5 Gram(s) IV Push once  folic acid 1 milliGRAM(s) Enteral Tube daily  glucagon  Injectable 1 milliGRAM(s) IntraMuscular once  imipenem/cilastatin  IVPB 1000 milliGRAM(s) IV Intermittent every 8 hours  influenza  Vaccine (HIGH DOSE) 0.7 milliLiter(s) IntraMuscular once  insulin lispro (ADMELOG) corrective regimen sliding scale   SubCutaneous at bedtime  insulin lispro (ADMELOG) corrective regimen sliding scale   SubCutaneous three times a day before meals  lactated ringers. 1000 milliLiter(s) (75 mL/Hr) IV Continuous <Continuous>  pantoprazole  Injectable 40 milliGRAM(s) IV Push every 24 hours    MEDICATIONS  (PRN):  dextrose Oral Gel 15 Gram(s) Oral once PRN Blood Glucose LESS THAN 70 milliGRAM(s)/deciliter  HYDROmorphone  Injectable 1 milliGRAM(s) IV Push every 6 hours PRN breakthrough pain  ondansetron Injectable 4 milliGRAM(s) IV Push every 8 hours PRN Nausea  oxyCODONE    IR 10 milliGRAM(s) Oral every 6 hours PRN Severe Pain (7 - 10)  oxyCODONE    Solution 5 milliGRAM(s) Oral every 4 hours PRN Moderate Pain (4 - 6)      ALLERGIES:  Allergies    No Known Allergies    Intolerances        LABS:                        10.0   9.80  )-----------( 300      ( 03 Jan 2024 05:30 )             31.0     01-03    136  |  101  |  19  ----------------------------<  148<H>  4.9   |  28  |  0.80    Ca    9.4      03 Jan 2024 05:30  Phos  3.5     01-03  Mg     1.8     01-03      PT/INR - ( 02 Jan 2024 10:00 )   PT: 11.7 sec;   INR: 1.03          PTT - ( 02 Jan 2024 10:00 )  PTT:28.0 sec    RADIOLOGY & ADDITIONAL TESTS: Reviewed.

## 2024-01-04 LAB
-  AMPICILLIN/SULBACTAM: SIGNIFICANT CHANGE UP
-  AMPICILLIN: SIGNIFICANT CHANGE UP
-  CEFAZOLIN: SIGNIFICANT CHANGE UP
-  CEFTRIAXONE: SIGNIFICANT CHANGE UP
-  CIPROFLOXACIN: SIGNIFICANT CHANGE UP
-  ERTAPENEM: SIGNIFICANT CHANGE UP
-  GENTAMICIN: SIGNIFICANT CHANGE UP
-  PIPERACILLIN/TAZOBACTAM: SIGNIFICANT CHANGE UP
-  TOBRAMYCIN: SIGNIFICANT CHANGE UP
-  TRIMETHOPRIM/SULFAMETHOXAZOLE: SIGNIFICANT CHANGE UP
ANION GAP SERPL CALC-SCNC: 8 MMOL/L — SIGNIFICANT CHANGE UP (ref 5–17)
BUN SERPL-MCNC: 20 MG/DL — SIGNIFICANT CHANGE UP (ref 7–23)
BUN SERPL-MCNC: 20 MG/DL — SIGNIFICANT CHANGE UP (ref 7–23)
BUN SERPL-MCNC: 22 MG/DL — SIGNIFICANT CHANGE UP (ref 7–23)
BUN SERPL-MCNC: 22 MG/DL — SIGNIFICANT CHANGE UP (ref 7–23)
CALCIUM SERPL-MCNC: 8.6 MG/DL — SIGNIFICANT CHANGE UP (ref 8.4–10.5)
CALCIUM SERPL-MCNC: 8.6 MG/DL — SIGNIFICANT CHANGE UP (ref 8.4–10.5)
CALCIUM SERPL-MCNC: 8.7 MG/DL — SIGNIFICANT CHANGE UP (ref 8.4–10.5)
CALCIUM SERPL-MCNC: 8.7 MG/DL — SIGNIFICANT CHANGE UP (ref 8.4–10.5)
CHLORIDE SERPL-SCNC: 103 MMOL/L — SIGNIFICANT CHANGE UP (ref 96–108)
CHLORIDE SERPL-SCNC: 103 MMOL/L — SIGNIFICANT CHANGE UP (ref 96–108)
CHLORIDE SERPL-SCNC: 106 MMOL/L — SIGNIFICANT CHANGE UP (ref 96–108)
CHLORIDE SERPL-SCNC: 106 MMOL/L — SIGNIFICANT CHANGE UP (ref 96–108)
CK MB CFR SERPL CALC: <1 NG/ML — SIGNIFICANT CHANGE UP (ref 0–6.7)
CK MB CFR SERPL CALC: <1 NG/ML — SIGNIFICANT CHANGE UP (ref 0–6.7)
CK SERPL-CCNC: 16 U/L — LOW (ref 30–200)
CK SERPL-CCNC: 16 U/L — LOW (ref 30–200)
CO2 SERPL-SCNC: 28 MMOL/L — SIGNIFICANT CHANGE UP (ref 22–31)
CO2 SERPL-SCNC: 28 MMOL/L — SIGNIFICANT CHANGE UP (ref 22–31)
CO2 SERPL-SCNC: 29 MMOL/L — SIGNIFICANT CHANGE UP (ref 22–31)
CO2 SERPL-SCNC: 29 MMOL/L — SIGNIFICANT CHANGE UP (ref 22–31)
CREAT SERPL-MCNC: 0.75 MG/DL — SIGNIFICANT CHANGE UP (ref 0.5–1.3)
CREAT SERPL-MCNC: 0.75 MG/DL — SIGNIFICANT CHANGE UP (ref 0.5–1.3)
CREAT SERPL-MCNC: 0.81 MG/DL — SIGNIFICANT CHANGE UP (ref 0.5–1.3)
CREAT SERPL-MCNC: 0.81 MG/DL — SIGNIFICANT CHANGE UP (ref 0.5–1.3)
EGFR: 97 ML/MIN/1.73M2 — SIGNIFICANT CHANGE UP
EGFR: 97 ML/MIN/1.73M2 — SIGNIFICANT CHANGE UP
EGFR: 99 ML/MIN/1.73M2 — SIGNIFICANT CHANGE UP
EGFR: 99 ML/MIN/1.73M2 — SIGNIFICANT CHANGE UP
GLUCOSE BLDC GLUCOMTR-MCNC: 159 MG/DL — HIGH (ref 70–99)
GLUCOSE BLDC GLUCOMTR-MCNC: 159 MG/DL — HIGH (ref 70–99)
GLUCOSE BLDC GLUCOMTR-MCNC: 160 MG/DL — HIGH (ref 70–99)
GLUCOSE BLDC GLUCOMTR-MCNC: 160 MG/DL — HIGH (ref 70–99)
GLUCOSE BLDC GLUCOMTR-MCNC: 171 MG/DL — HIGH (ref 70–99)
GLUCOSE SERPL-MCNC: 108 MG/DL — HIGH (ref 70–99)
GLUCOSE SERPL-MCNC: 108 MG/DL — HIGH (ref 70–99)
GLUCOSE SERPL-MCNC: 146 MG/DL — HIGH (ref 70–99)
GLUCOSE SERPL-MCNC: 146 MG/DL — HIGH (ref 70–99)
HCT VFR BLD CALC: 28.7 % — LOW (ref 39–50)
HCT VFR BLD CALC: 28.7 % — LOW (ref 39–50)
HCT VFR BLD CALC: 30.1 % — LOW (ref 39–50)
HCT VFR BLD CALC: 30.1 % — LOW (ref 39–50)
HGB BLD-MCNC: 9.2 G/DL — LOW (ref 13–17)
HGB BLD-MCNC: 9.2 G/DL — LOW (ref 13–17)
HGB BLD-MCNC: 9.8 G/DL — LOW (ref 13–17)
HGB BLD-MCNC: 9.8 G/DL — LOW (ref 13–17)
MAGNESIUM SERPL-MCNC: 1.8 MG/DL — SIGNIFICANT CHANGE UP (ref 1.6–2.6)
MAGNESIUM SERPL-MCNC: 1.8 MG/DL — SIGNIFICANT CHANGE UP (ref 1.6–2.6)
MCHC RBC-ENTMCNC: 28.7 PG — SIGNIFICANT CHANGE UP (ref 27–34)
MCHC RBC-ENTMCNC: 28.7 PG — SIGNIFICANT CHANGE UP (ref 27–34)
MCHC RBC-ENTMCNC: 28.9 PG — SIGNIFICANT CHANGE UP (ref 27–34)
MCHC RBC-ENTMCNC: 28.9 PG — SIGNIFICANT CHANGE UP (ref 27–34)
MCHC RBC-ENTMCNC: 32.1 GM/DL — SIGNIFICANT CHANGE UP (ref 32–36)
MCHC RBC-ENTMCNC: 32.1 GM/DL — SIGNIFICANT CHANGE UP (ref 32–36)
MCHC RBC-ENTMCNC: 32.6 GM/DL — SIGNIFICANT CHANGE UP (ref 32–36)
MCHC RBC-ENTMCNC: 32.6 GM/DL — SIGNIFICANT CHANGE UP (ref 32–36)
MCV RBC AUTO: 88.8 FL — SIGNIFICANT CHANGE UP (ref 80–100)
MCV RBC AUTO: 88.8 FL — SIGNIFICANT CHANGE UP (ref 80–100)
MCV RBC AUTO: 89.4 FL — SIGNIFICANT CHANGE UP (ref 80–100)
MCV RBC AUTO: 89.4 FL — SIGNIFICANT CHANGE UP (ref 80–100)
METHOD TYPE: SIGNIFICANT CHANGE UP
NRBC # BLD: 0 /100 WBCS — SIGNIFICANT CHANGE UP (ref 0–0)
PHOSPHATE SERPL-MCNC: 3.5 MG/DL — SIGNIFICANT CHANGE UP (ref 2.5–4.5)
PHOSPHATE SERPL-MCNC: 3.5 MG/DL — SIGNIFICANT CHANGE UP (ref 2.5–4.5)
PLATELET # BLD AUTO: 269 K/UL — SIGNIFICANT CHANGE UP (ref 150–400)
PLATELET # BLD AUTO: 269 K/UL — SIGNIFICANT CHANGE UP (ref 150–400)
PLATELET # BLD AUTO: 292 K/UL — SIGNIFICANT CHANGE UP (ref 150–400)
PLATELET # BLD AUTO: 292 K/UL — SIGNIFICANT CHANGE UP (ref 150–400)
POTASSIUM SERPL-MCNC: 3.7 MMOL/L — SIGNIFICANT CHANGE UP (ref 3.5–5.3)
POTASSIUM SERPL-MCNC: 3.7 MMOL/L — SIGNIFICANT CHANGE UP (ref 3.5–5.3)
POTASSIUM SERPL-MCNC: 4.2 MMOL/L — SIGNIFICANT CHANGE UP (ref 3.5–5.3)
POTASSIUM SERPL-MCNC: 4.2 MMOL/L — SIGNIFICANT CHANGE UP (ref 3.5–5.3)
POTASSIUM SERPL-SCNC: 3.7 MMOL/L — SIGNIFICANT CHANGE UP (ref 3.5–5.3)
POTASSIUM SERPL-SCNC: 3.7 MMOL/L — SIGNIFICANT CHANGE UP (ref 3.5–5.3)
POTASSIUM SERPL-SCNC: 4.2 MMOL/L — SIGNIFICANT CHANGE UP (ref 3.5–5.3)
POTASSIUM SERPL-SCNC: 4.2 MMOL/L — SIGNIFICANT CHANGE UP (ref 3.5–5.3)
RBC # BLD: 3.21 M/UL — LOW (ref 4.2–5.8)
RBC # BLD: 3.21 M/UL — LOW (ref 4.2–5.8)
RBC # BLD: 3.39 M/UL — LOW (ref 4.2–5.8)
RBC # BLD: 3.39 M/UL — LOW (ref 4.2–5.8)
RBC # FLD: 14.2 % — SIGNIFICANT CHANGE UP (ref 10.3–14.5)
RBC # FLD: 14.2 % — SIGNIFICANT CHANGE UP (ref 10.3–14.5)
RBC # FLD: 14.4 % — SIGNIFICANT CHANGE UP (ref 10.3–14.5)
RBC # FLD: 14.4 % — SIGNIFICANT CHANGE UP (ref 10.3–14.5)
SODIUM SERPL-SCNC: 139 MMOL/L — SIGNIFICANT CHANGE UP (ref 135–145)
SODIUM SERPL-SCNC: 139 MMOL/L — SIGNIFICANT CHANGE UP (ref 135–145)
SODIUM SERPL-SCNC: 143 MMOL/L — SIGNIFICANT CHANGE UP (ref 135–145)
SODIUM SERPL-SCNC: 143 MMOL/L — SIGNIFICANT CHANGE UP (ref 135–145)
TROPONIN T, HIGH SENSITIVITY RESULT: 22 NG/L — SIGNIFICANT CHANGE UP (ref 0–51)
TROPONIN T, HIGH SENSITIVITY RESULT: 22 NG/L — SIGNIFICANT CHANGE UP (ref 0–51)
WBC # BLD: 6.48 K/UL — SIGNIFICANT CHANGE UP (ref 3.8–10.5)
WBC # BLD: 6.48 K/UL — SIGNIFICANT CHANGE UP (ref 3.8–10.5)
WBC # BLD: 6.7 K/UL — SIGNIFICANT CHANGE UP (ref 3.8–10.5)
WBC # BLD: 6.7 K/UL — SIGNIFICANT CHANGE UP (ref 3.8–10.5)
WBC # FLD AUTO: 6.48 K/UL — SIGNIFICANT CHANGE UP (ref 3.8–10.5)
WBC # FLD AUTO: 6.48 K/UL — SIGNIFICANT CHANGE UP (ref 3.8–10.5)
WBC # FLD AUTO: 6.7 K/UL — SIGNIFICANT CHANGE UP (ref 3.8–10.5)
WBC # FLD AUTO: 6.7 K/UL — SIGNIFICANT CHANGE UP (ref 3.8–10.5)

## 2024-01-04 PROCEDURE — 99233 SBSQ HOSP IP/OBS HIGH 50: CPT | Mod: GC

## 2024-01-04 PROCEDURE — 99232 SBSQ HOSP IP/OBS MODERATE 35: CPT

## 2024-01-04 RX ADMIN — Medication 1: at 07:06

## 2024-01-04 RX ADMIN — ENOXAPARIN SODIUM 40 MILLIGRAM(S): 100 INJECTION SUBCUTANEOUS at 15:58

## 2024-01-04 RX ADMIN — PANTOPRAZOLE SODIUM 40 MILLIGRAM(S): 20 TABLET, DELAYED RELEASE ORAL at 11:40

## 2024-01-04 RX ADMIN — Medication 81 MILLIGRAM(S): at 11:40

## 2024-01-04 RX ADMIN — Medication 1 MILLIGRAM(S): at 11:40

## 2024-01-04 RX ADMIN — OXYCODONE HYDROCHLORIDE 10 MILLIGRAM(S): 5 TABLET ORAL at 06:22

## 2024-01-04 RX ADMIN — IMIPENEM AND CILASTATIN 250 MILLIGRAM(S): 250; 250 INJECTION, POWDER, FOR SOLUTION INTRAVENOUS at 11:39

## 2024-01-04 RX ADMIN — ATORVASTATIN CALCIUM 40 MILLIGRAM(S): 80 TABLET, FILM COATED ORAL at 21:19

## 2024-01-04 RX ADMIN — IMIPENEM AND CILASTATIN 250 MILLIGRAM(S): 250; 250 INJECTION, POWDER, FOR SOLUTION INTRAVENOUS at 01:42

## 2024-01-04 RX ADMIN — OXYCODONE HYDROCHLORIDE 10 MILLIGRAM(S): 5 TABLET ORAL at 05:22

## 2024-01-04 RX ADMIN — HYDROMORPHONE HYDROCHLORIDE 1 MILLIGRAM(S): 2 INJECTION INTRAMUSCULAR; INTRAVENOUS; SUBCUTANEOUS at 02:37

## 2024-01-04 RX ADMIN — IMIPENEM AND CILASTATIN 250 MILLIGRAM(S): 250; 250 INJECTION, POWDER, FOR SOLUTION INTRAVENOUS at 18:30

## 2024-01-04 RX ADMIN — HYDROMORPHONE HYDROCHLORIDE 1 MILLIGRAM(S): 2 INJECTION INTRAMUSCULAR; INTRAVENOUS; SUBCUTANEOUS at 01:37

## 2024-01-04 RX ADMIN — Medication 1: at 17:42

## 2024-01-04 RX ADMIN — Medication 1: at 11:51

## 2024-01-04 NOTE — PROVIDER CONTACT NOTE (OTHER) - ASSESSMENT
Patient has no signs of shortness of breath, /55, HR 64,
Team at bedside to irrigate neck abscess. Pt requested oxycodone prior to irrigation. When RN went to administer oxycodone, previously used piston syringe missing from tray. New piston syringe used to administer med. Unclear if team used syringe during irrigation procedure. MD Victor made aware of possible incident.
Patients right arms looks more swollen than the left arm, slight redness, no pain, no numbness and tingling, capillary refill normal
Pt has pasty dark brown/black bowel movement
Pt resting comfortably in bed, no distress noted. See. VS flowsheet for details.

## 2024-01-04 NOTE — PROGRESS NOTE ADULT - SUBJECTIVE AND OBJECTIVE BOX
OTOLARYNGOLOGY (ENT) PROGRESS NOTE    PATIENT: SAUNDRA HOLMAN  MRN: [id number]  : [birth year]  [id number]  DATE OF SERVICE:  24  			         ID: SAUNDRA HOLMAN is a  67yo M w PMH of CAD (x2 stents Mar 2023), HLD, T2DM, hx of kidney stones, psoriasis who presented with an oral mass and underwent L hemimandibulectomy, SND L level 1-3, recon with L FFF, STSG, and placement of dental implants on . He had a trach which was subsequently decannulated. He returns  with surgical site infection now s/p OR for debridement and exploration. Trach replaced through prior stoma for pulmonary toilet.     Subjective/ Interval:   ; Patient seen this morning, oral pack to be changed, penrose dressing changed. Intraoral flap with partal skin necrosis, 7.5 portex in place with cuff deflated   : Patient seen and examined at bedside. NAEO. Patient remains afebrile and HD stable. HgB noted to drop to 7.6 from 9.2 but no additional episodes of melena. Penrose draining purulent material. Intra-oral infection/flap stable. Packing changed at bedside. No other complaints or changes at this time. ID recommended Vanc/zosyn and DC unasyn and flagyl, and IM recommended reticulocyte studies and adding folate supplements. No other changes at this time.   : AFVSS. No acute events overnight. Patient seen and examined at bedside. C/w Vanc/Zosyn per ID recs, pending sensitivities. Growing staph epi, E coli, enterobacter from wound cx on . S/p 2U PRBC yday w appropriate response in Hgb. Transfusion goal > 9.0.  : AFVSS. No acute events overnight. Patient seen and examined at bedside. C/w Vanc/Zosyn per ID recs, pending sens. Hgb stable this morning.  : AFVSS. No acute events overnight. Patient seen and examined at bedside. C/w Vanc/Zosyn, e faecalis sens to vanc/zosyn. Had 3 dark BM's yesterday that were stool guiac positive.  : AFVSS. No acute events overnight. Patient seen and examined at bedside. C/w Imipenem (changed per ID, enterobacter resistant to Zosyn). Plan for OR today for further washout / debridement.  1/3: AFVSS. No acute events overnight. Patient seen and examined at bedside. C/w imipenim. OR cx growing numerous gram negative rods and few gram positive cocci in pairs.  : Patient seen bedside, changed intraoral and neck packing,  Continue to follow cultures, pain controlled     ALLERGIES:  No Known Allergies      MEDICATIONS:  Antiinfectives:   imipenem/cilastatin  IVPB 1000 milliGRAM(s) IV Intermittent every 8 hours    IV fluids:  dextrose 5%. 1000 milliLiter(s) IV Continuous <Continuous>  dextrose 5%. 1000 milliLiter(s) IV Continuous <Continuous>  folic acid 1 milliGRAM(s) Enteral Tube daily    Hematologic/Anticoagulation:  aspirin  chewable 81 milliGRAM(s) Enteral Tube daily  enoxaparin Injectable 40 milliGRAM(s) SubCutaneous every 24 hours    Pain medications/Neuro:  HYDROmorphone  Injectable 1 milliGRAM(s) IV Push every 6 hours PRN  ondansetron Injectable 4 milliGRAM(s) IV Push every 8 hours PRN  oxyCODONE    IR 10 milliGRAM(s) Oral every 6 hours PRN  oxyCODONE    Solution 5 milliGRAM(s) Oral every 4 hours PRN    Endocrine Medications:   atorvastatin 40 milliGRAM(s) Oral at bedtime  dextrose 50% Injectable 25 Gram(s) IV Push once  dextrose 50% Injectable 12.5 Gram(s) IV Push once  dextrose 50% Injectable 25 Gram(s) IV Push once  dextrose Oral Gel 15 Gram(s) Oral once PRN  glucagon  Injectable 1 milliGRAM(s) IntraMuscular once  insulin lispro (ADMELOG) corrective regimen sliding scale   SubCutaneous at bedtime  insulin lispro (ADMELOG) corrective regimen sliding scale   SubCutaneous three times a day before meals    All other standing medications:   influenza  Vaccine (HIGH DOSE) 0.7 milliLiter(s) IntraMuscular once  pantoprazole  Injectable 40 milliGRAM(s) IV Push every 24 hours    All other PRN medications:    Vital Signs Last 24 Hrs  T(C): 36.6 (2024 04:29), Max: 36.7 (2024 08:55)  T(F): 97.9 (2024 04:29), Max: 98 (2024 08:55)  HR: 49 (2024 03:45) (49 - 102)  BP: 100/48 (2024 03:45) (100/48 - 121/62)  BP(mean): 69 (2024 03:45) (69 - 86)  RR: 17 (2024 03:45) (17 - 18)  SpO2: 98% (2024 03:45) (97% - 99%)    Parameters below as of 2024 03:45  Patient On (Oxygen Delivery Method): tracheostomy collar  O2 Flow (L/min): 10  O2 Concentration (%): 40       @ 07:01  -   @ 07:00  --------------------------------------------------------  IN:    Enteral Tube Flush: 100 mL    Lactated Ringers: 300 mL    Vital1.5: 840 mL  Total IN: 1240 mL    OUT:    Voided (mL): 1400 mL  Total OUT: 1400 mL    Total NET: -160 mL          PHYSICAL EXAM:  GEN: appears stated age, NAD  NEURO: alert & oriented x 4/  HEENT: Remnant left FFF paddle oozing, right half of mandibular dentition present with elastics and screws, Dakins soaked packing in place in buccal cavity,   Neck: 7.5 Portex trach with cuff deflated, Penrose draining purulent fluid, skin loosely reapproximated with silk suture, Kerlix dressing in place minimally saturated  CVS: regular rate and rhythm  Pulm: normal respiratory excursions, not tachypneic, no labored breathing  Abd: non-distended  Ext: moving all four extremities, no peripheral edema noted      LABS                       10.0   9.80  )-----------( 300      ( 2024 05:30 )             31.0        136  |  101  |  19  ----------------------------<  148<H>  4.9   |  28  |  0.80    Ca    9.4      2024 05:30  Phos  3.5       Mg     1.8                Coagulation Studies-   PT/INR - ( 2024 10:00 )   PT: 11.7 sec;   INR: 1.03          PTT - ( 2024 10:00 )  PTT:28.0 sec  Urinalysis Basic - ( 2024 05:30 )    Color: x / Appearance: x / SG: x / pH: x  Gluc: 148 mg/dL / Ketone: x  / Bili: x / Urobili: x   Blood: x / Protein: x / Nitrite: x   Leuk Esterase: x / RBC: x / WBC x   Sq Epi: x / Non Sq Epi: x / Bacteria: x              MICROBIOLOGY:  Culture - Tissue with Gram Stain (collected 24 @ 18:03)  Source: .Tissue left mandible tissue or spec  Gram Stain (24 @ 21:59):    Numerous Gram Negative Rods    Few Gram positive cocci in pairs    Few WBC's  Preliminary Report (24 @ 14:50):    Numerous Escherichia coli    Numerous Enterobacter cloacae complex    Few Enterococcus faecalis    Culture in progress    Culture - Surgical Swab (collected 24 @ 18:03)  Source: .Surgical Swab left neck or spec  Gram Stain (24 @ 22:00):    Rare Gram Negative Rods    Rare Gram positive cocci in pairs    Moderate WBC's  Preliminary Report (24 @ 14:15):    Moderate Enterobacter cloacae complex    Moderate Escherichia coli    Culture in progress      Culture Results:   Moderate Enterobacter cloacae complex  Moderate Escherichia coli  Culture in progress (24 @ 18:03)  Culture Results:   Numerous Escherichia coli  Numerous Enterobacter cloacae complex  Few Enterococcus faecalis  Culture in progress (24 @ 18:03)  Culture Results:   No growth at 5 days. (23 @ 10:08)  Culture Results:   Moderate Escherichia coli  Moderate Enterobacter cloacae complex  Few Enterococcus faecalis  Few Streptococcus mitis/oralis group  Few Staphylococcus epidermidis  Few Streptococcus constellatus  Few Stenotrophomonas maltophilia  Mixed Anaerobic Joy including  Few Prevotella species (most closely resembles Prevotella barnaie)  Few Prevotella denticola  Culture grew 3 or more types of organisms which indicate collection  contamination; consider recollection only if clinically indicated. (23 @ 20:46)      Culture - Tissue with Gram Stain (collected 24 @ 18:03)  Source: .Tissue left mandible tissue or spec  Gram Stain (24 @ 21:59):    Numerous Gram Negative Rods    Few Gram positive cocci in pairs    Few WBC's  Preliminary Report (24 @ 14:50):    Numerous Escherichia coli    Numerous Enterobacter cloacae complex    Few Enterococcus faecalis    Culture in progress    Culture - Surgical Swab (collected 24 @ 18:03)  Source: .Surgical Swab left neck or spec  Gram Stain (24 @ 22:00):    Rare Gram Negative Rods    Rare Gram positive cocci in pairs    Moderate WBC's  Preliminary Report (24 @ 14:15):    Moderate Enterobacter cloacae complex    Moderate Escherichia coli    Culture in progress    Culture - Blood (collected 23 @ 10:08)  Source: .Blood Blood-Peripheral  Final Report (24 @ 11:01):    No growth at 5 days.     OTOLARYNGOLOGY (ENT) PROGRESS NOTE    PATIENT: SAUNDRA HOLMAN  MRN: [id number]  : 57  [id number]  DATE OF SERVICE:  24  			         ID: SAUNDRA HOLMAN is a  69yo M w PMH of CAD (x2 stents Mar 2023), HLD, T2DM, hx of kidney stones, psoriasis who presented with an oral mass and underwent L hemimandibulectomy, SND L level 1-3, recon with L FFF, STSG, and placement of dental implants on . He had a trach which was subsequently decannulated. He returns  with surgical site infection now s/p OR for debridement and exploration. Trach replaced through prior stoma for pulmonary toilet.     Subjective/ Interval:   ; Patient seen this morning, oral pack to be changed, penrose dressing changed. Intraoral flap with partal skin necrosis, 7.5 portex in place with cuff deflated   : Patient seen and examined at bedside. NAEO. Patient remains afebrile and HD stable. HgB noted to drop to 7.6 from 9.2 but no additional episodes of melena. Penrose draining purulent material. Intra-oral infection/flap stable. Packing changed at bedside. No other complaints or changes at this time. ID recommended Vanc/zosyn and DC unasyn and flagyl, and IM recommended reticulocyte studies and adding folate supplements. No other changes at this time.   : AFVSS. No acute events overnight. Patient seen and examined at bedside. C/w Vanc/Zosyn per ID recs, pending sensitivities. Growing staph epi, E coli, enterobacter from wound cx on . S/p 2U PRBC yday w appropriate response in Hgb. Transfusion goal > 9.0.  : AFVSS. No acute events overnight. Patient seen and examined at bedside. C/w Vanc/Zosyn per ID recs, pending sens. Hgb stable this morning.  : AFVSS. No acute events overnight. Patient seen and examined at bedside. C/w Vanc/Zosyn, e faecalis sens to vanc/zosyn. Had 3 dark BM's yesterday that were stool guiac positive.  : AFVSS. No acute events overnight. Patient seen and examined at bedside. C/w Imipenem (changed per ID, enterobacter resistant to Zosyn). Plan for OR today for further washout / debridement.  1/3: AFVSS. No acute events overnight. Patient seen and examined at bedside. C/w imipenim. OR cx growing numerous gram negative rods and few gram positive cocci in pairs.  : Patient seen bedside, changed intraoral and neck packing,  Continue to follow cultures, pain controlled     ALLERGIES:  No Known Allergies      MEDICATIONS:  Antiinfectives:   imipenem/cilastatin  IVPB 1000 milliGRAM(s) IV Intermittent every 8 hours    IV fluids:  dextrose 5%. 1000 milliLiter(s) IV Continuous <Continuous>  dextrose 5%. 1000 milliLiter(s) IV Continuous <Continuous>  folic acid 1 milliGRAM(s) Enteral Tube daily    Hematologic/Anticoagulation:  aspirin  chewable 81 milliGRAM(s) Enteral Tube daily  enoxaparin Injectable 40 milliGRAM(s) SubCutaneous every 24 hours    Pain medications/Neuro:  HYDROmorphone  Injectable 1 milliGRAM(s) IV Push every 6 hours PRN  ondansetron Injectable 4 milliGRAM(s) IV Push every 8 hours PRN  oxyCODONE    IR 10 milliGRAM(s) Oral every 6 hours PRN  oxyCODONE    Solution 5 milliGRAM(s) Oral every 4 hours PRN    Endocrine Medications:   atorvastatin 40 milliGRAM(s) Oral at bedtime  dextrose 50% Injectable 25 Gram(s) IV Push once  dextrose 50% Injectable 12.5 Gram(s) IV Push once  dextrose 50% Injectable 25 Gram(s) IV Push once  dextrose Oral Gel 15 Gram(s) Oral once PRN  glucagon  Injectable 1 milliGRAM(s) IntraMuscular once  insulin lispro (ADMELOG) corrective regimen sliding scale   SubCutaneous at bedtime  insulin lispro (ADMELOG) corrective regimen sliding scale   SubCutaneous three times a day before meals    All other standing medications:   influenza  Vaccine (HIGH DOSE) 0.7 milliLiter(s) IntraMuscular once  pantoprazole  Injectable 40 milliGRAM(s) IV Push every 24 hours    All other PRN medications:    Vital Signs Last 24 Hrs  T(C): 36.6 (2024 04:29), Max: 36.7 (2024 08:55)  T(F): 97.9 (2024 04:29), Max: 98 (2024 08:55)  HR: 49 (2024 03:45) (49 - 102)  BP: 100/48 (2024 03:45) (100/48 - 121/62)  BP(mean): 69 (2024 03:45) (69 - 86)  RR: 17 (2024 03:45) (17 - 18)  SpO2: 98% (2024 03:45) (97% - 99%)    Parameters below as of 2024 03:45  Patient On (Oxygen Delivery Method): tracheostomy collar  O2 Flow (L/min): 10  O2 Concentration (%): 40       @ 07:01  -   @ 07:00  --------------------------------------------------------  IN:    Enteral Tube Flush: 100 mL    Lactated Ringers: 300 mL    Vital1.5: 840 mL  Total IN: 1240 mL    OUT:    Voided (mL): 1400 mL  Total OUT: 1400 mL    Total NET: -160 mL          PHYSICAL EXAM:  GEN: appears stated age, NAD  NEURO: alert & oriented x 4/  HEENT: Remnant left FFF paddle oozing, right half of mandibular dentition present with elastics and screws, Dakins soaked packing in place in buccal cavity,   Neck: 7.5 Portex trach with cuff deflated, Penrose draining purulent fluid, skin loosely reapproximated with silk suture, Kerlix dressing in place minimally saturated  CVS: regular rate and rhythm  Pulm: normal respiratory excursions, not tachypneic, no labored breathing  Abd: non-distended  Ext: moving all four extremities, no peripheral edema noted      LABS                       10.0   9.80  )-----------( 300      ( 2024 05:30 )             31.0        136  |  101  |  19  ----------------------------<  148<H>  4.9   |  28  |  0.80    Ca    9.4      2024 05:30  Phos  3.5       Mg     1.8                Coagulation Studies-   PT/INR - ( 2024 10:00 )   PT: 11.7 sec;   INR: 1.03          PTT - ( 2024 10:00 )  PTT:28.0 sec  Urinalysis Basic - ( 2024 05:30 )    Color: x / Appearance: x / SG: x / pH: x  Gluc: 148 mg/dL / Ketone: x  / Bili: x / Urobili: x   Blood: x / Protein: x / Nitrite: x   Leuk Esterase: x / RBC: x / WBC x   Sq Epi: x / Non Sq Epi: x / Bacteria: x              MICROBIOLOGY:  Culture - Tissue with Gram Stain (collected 24 @ 18:03)  Source: .Tissue left mandible tissue or spec  Gram Stain (24 @ 21:59):    Numerous Gram Negative Rods    Few Gram positive cocci in pairs    Few WBC's  Preliminary Report (24 @ 14:50):    Numerous Escherichia coli    Numerous Enterobacter cloacae complex    Few Enterococcus faecalis    Culture in progress    Culture - Surgical Swab (collected 24 @ 18:03)  Source: .Surgical Swab left neck or spec  Gram Stain (24 @ 22:00):    Rare Gram Negative Rods    Rare Gram positive cocci in pairs    Moderate WBC's  Preliminary Report (24 @ 14:15):    Moderate Enterobacter cloacae complex    Moderate Escherichia coli    Culture in progress      Culture Results:   Moderate Enterobacter cloacae complex  Moderate Escherichia coli  Culture in progress (24 @ 18:03)  Culture Results:   Numerous Escherichia coli  Numerous Enterobacter cloacae complex  Few Enterococcus faecalis  Culture in progress (24 @ 18:03)  Culture Results:   No growth at 5 days. (23 @ 10:08)  Culture Results:   Moderate Escherichia coli  Moderate Enterobacter cloacae complex  Few Enterococcus faecalis  Few Streptococcus mitis/oralis group  Few Staphylococcus epidermidis  Few Streptococcus constellatus  Few Stenotrophomonas maltophilia  Mixed Anaerobic Joy including  Few Prevotella species (most closely resembles Prevotella barnaie)  Few Prevotella denticola  Culture grew 3 or more types of organisms which indicate collection  contamination; consider recollection only if clinically indicated. (23 @ 20:46)      Culture - Tissue with Gram Stain (collected 24 @ 18:03)  Source: .Tissue left mandible tissue or spec  Gram Stain (24 @ 21:59):    Numerous Gram Negative Rods    Few Gram positive cocci in pairs    Few WBC's  Preliminary Report (24 @ 14:50):    Numerous Escherichia coli    Numerous Enterobacter cloacae complex    Few Enterococcus faecalis    Culture in progress    Culture - Surgical Swab (collected 24 @ 18:03)  Source: .Surgical Swab left neck or spec  Gram Stain (24 @ 22:00):    Rare Gram Negative Rods    Rare Gram positive cocci in pairs    Moderate WBC's  Preliminary Report (24 @ 14:15):    Moderate Enterobacter cloacae complex    Moderate Escherichia coli    Culture in progress    Culture - Blood (collected 23 @ 10:08)  Source: .Blood Blood-Peripheral  Final Report (24 @ 11:01):    No growth at 5 days.

## 2024-01-04 NOTE — PROGRESS NOTE ADULT - SUBJECTIVE AND OBJECTIVE BOX
INTERVAL HPI/OVERNIGHT EVENTS:    CONSTITUTIONAL:  Negative fever or chills, feels well, good appetite  EYES:  Negative  blurry vision or double vision  CARDIOVASCULAR:  Negative for chest pain or palpitations  RESPIRATORY:  Negative for cough, wheezing, or SOB   GASTROINTESTINAL:  Negative for nausea, vomiting, diarrhea, constipation, or abdominal pain  GENITOURINARY:  Negative frequency, urgency or dysuria  NEUROLOGIC:  No headache, confusion, dizziness, lightheadedness      ANTIBIOTICS/RELEVANT:    MEDICATIONS  (STANDING):  aspirin  chewable 81 milliGRAM(s) Enteral Tube daily  atorvastatin 40 milliGRAM(s) Oral at bedtime  dextrose 5%. 1000 milliLiter(s) (100 mL/Hr) IV Continuous <Continuous>  dextrose 5%. 1000 milliLiter(s) (50 mL/Hr) IV Continuous <Continuous>  dextrose 50% Injectable 25 Gram(s) IV Push once  dextrose 50% Injectable 12.5 Gram(s) IV Push once  dextrose 50% Injectable 25 Gram(s) IV Push once  enoxaparin Injectable 40 milliGRAM(s) SubCutaneous every 24 hours  folic acid 1 milliGRAM(s) Enteral Tube daily  glucagon  Injectable 1 milliGRAM(s) IntraMuscular once  imipenem/cilastatin  IVPB 1000 milliGRAM(s) IV Intermittent every 8 hours  influenza  Vaccine (HIGH DOSE) 0.7 milliLiter(s) IntraMuscular once  insulin lispro (ADMELOG) corrective regimen sliding scale   SubCutaneous at bedtime  insulin lispro (ADMELOG) corrective regimen sliding scale   SubCutaneous three times a day before meals  pantoprazole  Injectable 40 milliGRAM(s) IV Push every 24 hours    MEDICATIONS  (PRN):  dextrose Oral Gel 15 Gram(s) Oral once PRN Blood Glucose LESS THAN 70 milliGRAM(s)/deciliter  HYDROmorphone  Injectable 1 milliGRAM(s) IV Push every 6 hours PRN breakthrough pain  ondansetron Injectable 4 milliGRAM(s) IV Push every 8 hours PRN Nausea  oxyCODONE    IR 10 milliGRAM(s) Oral every 6 hours PRN Severe Pain (7 - 10)  oxyCODONE    Solution 5 milliGRAM(s) Oral every 4 hours PRN Moderate Pain (4 - 6)        Vital Signs Last 24 Hrs  T(C): 36.6 (04 Jan 2024 09:11), Max: 36.7 (03 Jan 2024 22:01)  T(F): 97.9 (04 Jan 2024 09:11), Max: 98 (03 Jan 2024 22:01)  HR: 56 (04 Jan 2024 11:42) (49 - 78)  BP: 100/52 (04 Jan 2024 11:42) (100/48 - 115/57)  BP(mean): 73 (04 Jan 2024 11:42) (69 - 81)  RR: 16 (04 Jan 2024 11:42) (16 - 18)  SpO2: 99% (04 Jan 2024 11:42) (97% - 99%)    Parameters below as of 04 Jan 2024 11:42  Patient On (Oxygen Delivery Method): tracheostomy collar  O2 Flow (L/min): 10  O2 Concentration (%): 40    PHYSICAL EXAM:  Constitutional: non-toxic, no distress  Eyes:LONA, EOMI  Ear/Nose/Throat: no oral lesion, no sinus tenderness on percussion	  Neck:  supple  Respiratory: CTA caity  Cardiovascular: S1S2 RRR, no murmurs  Gastrointestinal:soft, (+) BS, no HSM  Extremities:no e/e/c  Vascular: DP Pulse:	right normal; left normal      LABS:                        9.2    6.70  )-----------( 269      ( 04 Jan 2024 10:00 )             28.7     01-04    139  |  103  |  22  ----------------------------<  108<H>  3.7   |  28  |  0.81    Ca    8.6      04 Jan 2024 10:00  Phos  3.5     01-04  Mg     1.8     01-04        Urinalysis Basic - ( 04 Jan 2024 10:00 )    Color: x / Appearance: x / SG: x / pH: x  Gluc: 108 mg/dL / Ketone: x  / Bili: x / Urobili: x   Blood: x / Protein: x / Nitrite: x   Leuk Esterase: x / RBC: x / WBC x   Sq Epi: x / Non Sq Epi: x / Bacteria: x        MICROBIOLOGY:    Culture - Tissue with Gram Stain (01.02.24 @ 18:03)    Gram Stain:   Numerous Gram Negative Rods  Few Gram positive cocci in pairs  Few WBC's   -  Ampicillin: S <=8 These ampicillin results predict results for amoxicillin   -  Ampicillin/Sulbactam: S <=4/2   -  Cefazolin: S <=2   -  Ceftriaxone: S <=1   -  Ciprofloxacin: S <=0.25   -  Ertapenem: S <=0.5   -  Gentamicin: S <=2   -  Piperacillin/Tazobactam: S <=8   -  Tobramycin: S <=2   -  Trimethoprim/Sulfamethoxazole: S 1/19   Specimen Source: .Tissue left mandible tissue or spec   Culture Results:   Numerous Escherichia coli  Numerous Enterobacter cloacae complex  Few Enterococcus faecalis  Culture in progress   Organism Identification: Escherichia coli   Organism: Escherichia coli   Method Type: OTTO        RADIOLOGY & ADDITIONAL STUDIES:

## 2024-01-04 NOTE — PROGRESS NOTE ADULT - SUBJECTIVE AND OBJECTIVE BOX
SUBJECTIVE/OVERNIGHT EVENTS: No acute overnight events. Pt seen in AM at bedside, resting comfortably in bed, and does not appear to be in any acute distress.     VITAL SIGNS:  Vital Signs Last 24 Hrs  T(C): 36.6 (04 Jan 2024 09:11), Max: 36.7 (03 Jan 2024 22:01)  T(F): 97.9 (04 Jan 2024 09:11), Max: 98 (03 Jan 2024 22:01)  HR: 56 (04 Jan 2024 11:42) (49 - 78)  BP: 100/52 (04 Jan 2024 11:42) (100/48 - 115/57)  BP(mean): 73 (04 Jan 2024 11:42) (69 - 81)  RR: 16 (04 Jan 2024 11:42) (16 - 18)  SpO2: 99% (04 Jan 2024 11:42) (97% - 99%)    Parameters below as of 04 Jan 2024 11:42  Patient On (Oxygen Delivery Method): tracheostomy collar  O2 Flow (L/min): 10  O2 Concentration (%): 40    PHYSICAL EXAM:  Constitutional: sitting comfortably in bed, no acute distress   HEENT: sclera non-icteric, mouth with lesion on right side, packed with gauze, neck also wrapped with gauze, trach collar in place  Respiratory: Good air entry b/l, clear to auscultation bilaterally, without accessory muscle use and no intercostal retractions  Cardiovascular: RRR, normal S1S2, no M/R/G  Gastrointestinal: soft, NTND, no masses palpable, BS normal  Extremities: Warm, well perfused, mild swelling of the L forearm  Skin: Normal temperature, warm, dry    MEDICATIONS:  MEDICATIONS  (STANDING):  aspirin  chewable 81 milliGRAM(s) Enteral Tube daily  atorvastatin 40 milliGRAM(s) Oral at bedtime  dextrose 5%. 1000 milliLiter(s) (100 mL/Hr) IV Continuous <Continuous>  dextrose 5%. 1000 milliLiter(s) (50 mL/Hr) IV Continuous <Continuous>  dextrose 50% Injectable 25 Gram(s) IV Push once  dextrose 50% Injectable 12.5 Gram(s) IV Push once  dextrose 50% Injectable 25 Gram(s) IV Push once  enoxaparin Injectable 40 milliGRAM(s) SubCutaneous every 24 hours  folic acid 1 milliGRAM(s) Enteral Tube daily  glucagon  Injectable 1 milliGRAM(s) IntraMuscular once  imipenem/cilastatin  IVPB 1000 milliGRAM(s) IV Intermittent every 8 hours  influenza  Vaccine (HIGH DOSE) 0.7 milliLiter(s) IntraMuscular once  insulin lispro (ADMELOG) corrective regimen sliding scale   SubCutaneous at bedtime  insulin lispro (ADMELOG) corrective regimen sliding scale   SubCutaneous three times a day before meals  pantoprazole  Injectable 40 milliGRAM(s) IV Push every 24 hours    MEDICATIONS  (PRN):  dextrose Oral Gel 15 Gram(s) Oral once PRN Blood Glucose LESS THAN 70 milliGRAM(s)/deciliter  HYDROmorphone  Injectable 1 milliGRAM(s) IV Push every 6 hours PRN breakthrough pain  ondansetron Injectable 4 milliGRAM(s) IV Push every 8 hours PRN Nausea  oxyCODONE    IR 10 milliGRAM(s) Oral every 6 hours PRN Severe Pain (7 - 10)  oxyCODONE    Solution 5 milliGRAM(s) Oral every 4 hours PRN Moderate Pain (4 - 6)      ALLERGIES:  Allergies    No Known Allergies    Intolerances        LABS:                        9.2    6.70  )-----------( 269      ( 04 Jan 2024 10:00 )             28.7     01-04    139  |  103  |  22  ----------------------------<  108<H>  3.7   |  28  |  0.81    Ca    8.6      04 Jan 2024 10:00  Phos  3.5     01-04  Mg     1.8     01-04          RADIOLOGY & ADDITIONAL TESTS: Reviewed.

## 2024-01-04 NOTE — PROGRESS NOTE ADULT - ASSESSMENT
Assessment and Plan:  SAUNDRA HOLMAN is a 67yo M w PMH of CAD (x2 stents Mar 2023), HLD, T2DM, hx of kidney stones, psoriasis who presented with an oral mass and underwent L hemimandibulectomy, SND L level 1-3, recon with L FFF, STSG, and placement of dental implants on 12/7. He had a trach which was subsequently decannulated. Now s/p OR for debridement and exploration. Trach replaced through prior stoma for pulmonary toilet.    Plan:  - Potential plan for additional flap  next week   - Hgb goal > 9.0  - Pack wound with iodoform BID  - C/w Imipenem (1/1 - )  - Appreciate GI recs, IM recs, and ID recs  - C/w TF  - Continue home asa  - Hold home plavix  - c/w HSQ  - Maintain 7.5 portex for pulm toilet  - ISS  - Pain control  - Home meds  - Bowel regimen    [facility] at [phone] with any questions/concerns.    Ada Quinones PA-C  01-04-24 @ 07:33         Assessment and Plan:  SAUNDRA HOLMAN is a 67yo M w PMH of CAD (x2 stents Mar 2023), HLD, T2DM, hx of kidney stones, psoriasis who presented with an oral mass and underwent L hemimandibulectomy, SND L level 1-3, recon with L FFF, STSG, and placement of dental implants on 12/7. He had a trach which was subsequently decannulated. Now s/p OR for debridement and exploration. Trach replaced through prior stoma for pulmonary toilet.    Plan:  - Potential plan for additional flap  next week   - Hgb goal > 9.0  - Pack wound with iodoform BID  - C/w Imipenem (1/1 - )  - Appreciate GI recs, IM recs, and ID recs  - C/w TF  - Continue home asa  - Hold home plavix  - c/w HSQ  - Maintain 7.5 portex for pulm toilet  - ISS  - Pain control  - Home meds  - Bowel regimen    [facility] at [phone] with any questions/concerns.    Ada Quinones PA-C  01-04-24 @ 07:33

## 2024-01-04 NOTE — PROGRESS NOTE ADULT - ASSESSMENT
Past medical Hx of CAD (x 2 stents Mar 2023), Type 2 DM, hx of kidney stones, psoriasis who presented with an oral mass during previous admission (12/3-21) and underwent L hemimandibulectomy, SND L level 1-3, recon with L FFF, STSG, and placement of dental implants on 12/7. Patient had a trach which was subsequently decannulated. Patient now returns with surgical site infection. Medicine consulted for co-management.    #Surgical site infection  Patient s/p OR for debridement and exploration (12/27 and 1/2) and Trach replaced through prior stoma for pulmonary toilet. OR cultures with E. Coli, ECC, E. Faecalis S. Mitis S. Oralis, S. Epidermidis, S. Constellatus, S. Maltophilia, mixed anaerobes.  - c/w Imipenem 1 g IV q8  - Pain regimen: Tylenol 650 mg PO q6, Oxy IR 5 mg Q4 moderate, Oxy IR 10 mg Q4 severe, Dilaudid 1 mg IV Q6 for breakthrough   - f/u ID recommendations    #Anemia  Hb on admission 7.3. Now stable Hb 9.2 s/p karlo-operative transfusion. Patient with previous iron studies consistent with WADE. DD includes surgical site bleed VS less likely GI bleed. Patient now s/p EGD in OR (1/3).   - maintain active T&S, transfusion goal to Hgb >8   - c/w Folic acid 1g PO QD  - c/w Protonix 40 mg IV QD    #CAD  Patient with hx of CAD s/p 2 stents in March 2023.   - c/w ASA 81 mg PO QD, Atorvastatin 40 mg PO QD  - Restart Plavix, as soon as appropriate per primary team    #DM type 2  Home medication: Metformin 1g PO QD and 500 mg PO QD and Jardiance 10 mg PO QD.  - c/w insulin sliding scale    Medicine will continue to follow. Patient seen, evaluated, and discussed with attending Dr. Montero

## 2024-01-04 NOTE — PROVIDER CONTACT NOTE (OTHER) - BACKGROUND
Abscess of mouth and neck
Abscess in the mouth and neck
As of 0700, new plan for pt is to go to OR for washout of neck/mouth abscess.
Spouse stated that the bowel movement looked similar to the ones he had last Thursday at the hospital in [place] prior to being transferred here

## 2024-01-04 NOTE — PROGRESS NOTE ADULT - ATTENDING COMMENTS
69 y/o M PMHx of CAD s/p PCI/CUBA x2 to LAD and Ramus (Mar 2023), T2DM, psoriasis, hx Renal calculi, w/ [id number] SCC of L buccal mucosa s/p hemimandibulectomy, left level 1, 2a, 3 neck neck dissection, L FFF, tracheostomy w/ 7.5 cuffed portex, dental implants, STSG (12/7/23), s/p G tube placement and subsequent trach decannulation and discharged home on ( 12/22/23). Pt however returned to [facility] ( 12/27/23) with surgical site infection, s/p RTOR 12/27 with findings of skin flap necrosis and s/p debridement. Trach replaced through prior stoma for pulmonary toilet. Patient also noted to have dark stools and dropped in Hgb- wife reported black stool for the last 3 days and same thing coming out from peg tube 12/28 , now peg feeding held -. Medicine consulted for co-management.     Pt seen and examined with Dr. Mauro. Wife at bedside.     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 Purulent drainage appreciated at margins of flap and in neck.  EGD( 1/2): ulcer found at the G-tube site    - Local wound care: OR findings reviewed, f/u OR mandibular culture per ENT   - ID f/u appreciated ( Team 1) ,  d/c's Vanco & Zosyn ( 1/1) and started on Imipenem 1gm iv q8h  (1/1-) , will need 2 weeks of iv abx from the last washout ( 1/2)   - f/u OR culture ( 1/2): reviewed   - OR cultures with E.coli, ECC, E.faecalis S.mitis/oralis, S.epi, S.constellatus, S.maltophilia, mixed anaerobes.   - f/u GNRs sensitivity   - WBC normal 7K -> 10K   - BCx( 12/28): ngtd x 5 days   -R antecubital/forearm thrombophlebitis- warm compresses prn, monitor clinical improvement     # Acute blood loss anemia/melena   # hx WADE ( Ferritin 768 but Tsat 13% ( <20%) on 12/13/23)   - GI fu appreciated, EGD( 1/2) ulcer at G-tube site, rec; Protonix 40mg daily for 8 weeks and avoid bumper being too tight to cause pressure ulcer   - hgb dropped 7.6 ( 12/29) s/p 2u PRBC ( ~ 500mg elemental iron), trend Hgb 10.7  (1/1) ->10.4( 1/2)-> 10( 1/3) -> 9.2( 1/4)   - keep active T&S, keep Hgb>8    - c/w ASA given hx PCI x2 ( March 2023)  - hold off Plavix since adm ( 12/27) , awaiting ENT & GI clearance to resume Plavix  - c/w PPI daily can be via G-tube   - c/w Folic acid 1mg daily   - monitor clinically for any further melena     # CAD sp PCI/CUBA x2 to LAD and Ramus in March 2023 as per cards is in setting of NSTEMI - prefer DAPT, at least monotherapy with ASA if plavix need to be held for procedure, currently on ASA , to restart plavix when safe., c/w ASA 81mg and Atorvastatin 40mg qHS     #  DM - FS with ISS, would hold all ora-hypoglycemic agent, goal -180    # pain management - oxycodone 5mg/10mg prn , dilaudid prn, would need stool softener to ensure daily BM.     # Dysphagia- NPO, trach, on TF with Vital 1.5 via G-tube     # DVT ppx: resumed on Lovenox     Med consult team continues to follow pt with you. Thank you. 67 y/o M PMHx of CAD s/p PCI/CUBA x2 to LAD and Ramus (Mar 2023), T2DM, psoriasis, hx Renal calculi, w/ [id number] SCC of L buccal mucosa s/p hemimandibulectomy, left level 1, 2a, 3 neck neck dissection, L FFF, tracheostomy w/ 7.5 cuffed portex, dental implants, STSG (12/7/23), s/p G tube placement and subsequent trach decannulation and discharged home on ( 12/22/23). Pt however returned to [facility] ( 12/27/23) with surgical site infection, s/p RTOR 12/27 with findings of skin flap necrosis and s/p debridement. Trach replaced through prior stoma for pulmonary toilet. Patient also noted to have dark stools and dropped in Hgb- wife reported black stool for the last 3 days and same thing coming out from peg tube 12/28 , now peg feeding held -. Medicine consulted for co-management.     Pt seen and examined with Dr. Mauro. Wife at bedside.     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 Purulent drainage appreciated at margins of flap and in neck.  EGD( 1/2): ulcer found at the G-tube site    - Local wound care: OR findings reviewed, f/u OR mandibular culture per ENT   - ID f/u appreciated ( Team 1) ,  d/c's Vanco & Zosyn ( 1/1) and started on Imipenem 1gm iv q8h  (1/1-) , will need 2 weeks of iv abx from the last washout ( 1/2)   - f/u OR culture ( 1/2): reviewed   - OR cultures with E.coli, ECC, E.faecalis S.mitis/oralis, S.epi, S.constellatus, S.maltophilia, mixed anaerobes.   - f/u GNRs sensitivity   - WBC normal 7K -> 10K   - BCx( 12/28): ngtd x 5 days   -R antecubital/forearm thrombophlebitis- warm compresses prn, monitor clinical improvement     # Acute blood loss anemia/melena   # hx WADE ( Ferritin 768 but Tsat 13% ( <20%) on 12/13/23)   - GI fu appreciated, EGD( 1/2) ulcer at G-tube site, rec; Protonix 40mg daily for 8 weeks and avoid bumper being too tight to cause pressure ulcer   - hgb dropped 7.6 ( 12/29) s/p 2u PRBC ( ~ 500mg elemental iron), trend Hgb 10.7  (1/1) ->10.4( 1/2)-> 10( 1/3) -> 9.2( 1/4)   - keep active T&S, keep Hgb>8    - c/w ASA given hx PCI x2 ( March 2023)  - hold off Plavix since adm ( 12/27) , awaiting ENT & GI clearance to resume Plavix  - c/w PPI daily can be via G-tube   - c/w Folic acid 1mg daily   - monitor clinically for any further melena     # CAD sp PCI/CUBA x2 to LAD and Ramus in March 2023 as per cards is in setting of NSTEMI - prefer DAPT, at least monotherapy with ASA if plavix need to be held for procedure, currently on ASA , to restart plavix when safe., c/w ASA 81mg and Atorvastatin 40mg qHS     #  DM - FS with ISS, would hold all ora-hypoglycemic agent, goal -180    # pain management - oxycodone 5mg/10mg prn , dilaudid prn, would need stool softener to ensure daily BM.     # Dysphagia- NPO, trach, on TF with Vital 1.5 via G-tube     # DVT ppx: resumed on Lovenox     Med consult team continues to follow pt with you. Thank you.

## 2024-01-04 NOTE — PROVIDER CONTACT NOTE (OTHER) - RECOMMENDATIONS
Warm packs for the patient
Contact provider. Perform fecal occult test.
EKG, Trop, CKMB, CBC
Dressing change

## 2024-01-04 NOTE — PROGRESS NOTE ADULT - ASSESSMENT
IMPRESSION:  68 year old male with PMhx of CAD (x 2 stents Mar 2023), Type 2 DM, hx of kidney stones, psoriasis who presented with an oral mass 2/2 to [id number] SCC of L buccal mucosa during previous admission (12/3-21) and underwent L hemimandibulectomy, SND L level 1-3, recon with L FFF, STSG, and placement of dental implants on 12/7. Patient had a trach which was subsequently decannulated. Patient now returns with surgical site infection s/p wash-out.      Recommend:  1.  Continue imipenem 1000mg q8  2.  Follow up cultures from washout from 1/2/24. As of now imipenem is covering bacteria that are growing  3.  Will need at least 2 weeks of IV antibiotics from last washout    ID Team 1 will follow IMPRESSION:  68 year old male with PMhx of CAD (x 2 stents Mar 2023), Type 2 DM, hx of kidney stones, psoriasis who presented with an oral mass 2/2 to [id number] SCC of L buccal mucosa during previous admission (12/3-21) and underwent L hemimandibulectomy, SND L level 1-3, recon with L FFF, STSG, and placement of dental implants on 12/7. Patient had a trach which was subsequently decannulated. Patient now returns with surgical site infection s/p wash-out.      Recommend:  1.  Continue imipenem 1000mg q8  2.  Follow up cultures from washout from 1/2/24. As of now imipenem is covering bacteria that are growing  3.  Will need at least 2 weeks of IV antibiotics from last washout    ID Team 1 will follow

## 2024-01-05 LAB
-  AMPICILLIN/SULBACTAM: SIGNIFICANT CHANGE UP
-  AMPICILLIN: SIGNIFICANT CHANGE UP
-  CEFAZOLIN: SIGNIFICANT CHANGE UP
-  CEFEPIME: SIGNIFICANT CHANGE UP
-  CEFTRIAXONE: SIGNIFICANT CHANGE UP
-  CIPROFLOXACIN: SIGNIFICANT CHANGE UP
-  ERTAPENEM: SIGNIFICANT CHANGE UP
-  GENTAMICIN: SIGNIFICANT CHANGE UP
-  MEROPENEM/VABORBACTAM: SIGNIFICANT CHANGE UP
-  MEROPENEM: SIGNIFICANT CHANGE UP
-  PIPERACILLIN/TAZOBACTAM: SIGNIFICANT CHANGE UP
-  TOBRAMYCIN: SIGNIFICANT CHANGE UP
-  TRIMETHOPRIM/SULFAMETHOXAZOLE: SIGNIFICANT CHANGE UP
-  VANCOMYCIN: SIGNIFICANT CHANGE UP
-  VANCOMYCIN: SIGNIFICANT CHANGE UP
ANION GAP SERPL CALC-SCNC: 9 MMOL/L — SIGNIFICANT CHANGE UP (ref 5–17)
ANION GAP SERPL CALC-SCNC: 9 MMOL/L — SIGNIFICANT CHANGE UP (ref 5–17)
BUN SERPL-MCNC: 19 MG/DL — SIGNIFICANT CHANGE UP (ref 7–23)
BUN SERPL-MCNC: 19 MG/DL — SIGNIFICANT CHANGE UP (ref 7–23)
CALCIUM SERPL-MCNC: 9.1 MG/DL — SIGNIFICANT CHANGE UP (ref 8.4–10.5)
CALCIUM SERPL-MCNC: 9.1 MG/DL — SIGNIFICANT CHANGE UP (ref 8.4–10.5)
CHLORIDE SERPL-SCNC: 104 MMOL/L — SIGNIFICANT CHANGE UP (ref 96–108)
CHLORIDE SERPL-SCNC: 104 MMOL/L — SIGNIFICANT CHANGE UP (ref 96–108)
CO2 SERPL-SCNC: 27 MMOL/L — SIGNIFICANT CHANGE UP (ref 22–31)
CO2 SERPL-SCNC: 27 MMOL/L — SIGNIFICANT CHANGE UP (ref 22–31)
CREAT SERPL-MCNC: 0.69 MG/DL — SIGNIFICANT CHANGE UP (ref 0.5–1.3)
CREAT SERPL-MCNC: 0.69 MG/DL — SIGNIFICANT CHANGE UP (ref 0.5–1.3)
CULTURE RESULTS: ABNORMAL
EGFR: 101 ML/MIN/1.73M2 — SIGNIFICANT CHANGE UP
EGFR: 101 ML/MIN/1.73M2 — SIGNIFICANT CHANGE UP
GLUCOSE BLDC GLUCOMTR-MCNC: 116 MG/DL — HIGH (ref 70–99)
GLUCOSE BLDC GLUCOMTR-MCNC: 116 MG/DL — HIGH (ref 70–99)
GLUCOSE BLDC GLUCOMTR-MCNC: 148 MG/DL — HIGH (ref 70–99)
GLUCOSE BLDC GLUCOMTR-MCNC: 148 MG/DL — HIGH (ref 70–99)
GLUCOSE BLDC GLUCOMTR-MCNC: 187 MG/DL — HIGH (ref 70–99)
GLUCOSE BLDC GLUCOMTR-MCNC: 187 MG/DL — HIGH (ref 70–99)
GLUCOSE SERPL-MCNC: 186 MG/DL — HIGH (ref 70–99)
GLUCOSE SERPL-MCNC: 186 MG/DL — HIGH (ref 70–99)
HCT VFR BLD CALC: 30.4 % — LOW (ref 39–50)
HCT VFR BLD CALC: 30.4 % — LOW (ref 39–50)
HGB BLD-MCNC: 9.5 G/DL — LOW (ref 13–17)
HGB BLD-MCNC: 9.5 G/DL — LOW (ref 13–17)
MAGNESIUM SERPL-MCNC: 1.8 MG/DL — SIGNIFICANT CHANGE UP (ref 1.6–2.6)
MAGNESIUM SERPL-MCNC: 1.8 MG/DL — SIGNIFICANT CHANGE UP (ref 1.6–2.6)
MCHC RBC-ENTMCNC: 28.3 PG — SIGNIFICANT CHANGE UP (ref 27–34)
MCHC RBC-ENTMCNC: 28.3 PG — SIGNIFICANT CHANGE UP (ref 27–34)
MCHC RBC-ENTMCNC: 31.3 GM/DL — LOW (ref 32–36)
MCHC RBC-ENTMCNC: 31.3 GM/DL — LOW (ref 32–36)
MCV RBC AUTO: 90.5 FL — SIGNIFICANT CHANGE UP (ref 80–100)
MCV RBC AUTO: 90.5 FL — SIGNIFICANT CHANGE UP (ref 80–100)
METHOD TYPE: SIGNIFICANT CHANGE UP
NRBC # BLD: 0 /100 WBCS — SIGNIFICANT CHANGE UP (ref 0–0)
NRBC # BLD: 0 /100 WBCS — SIGNIFICANT CHANGE UP (ref 0–0)
ORGANISM # SPEC MICROSCOPIC CNT: ABNORMAL
ORGANISM # SPEC MICROSCOPIC CNT: SIGNIFICANT CHANGE UP
PHOSPHATE SERPL-MCNC: 2.7 MG/DL — SIGNIFICANT CHANGE UP (ref 2.5–4.5)
PHOSPHATE SERPL-MCNC: 2.7 MG/DL — SIGNIFICANT CHANGE UP (ref 2.5–4.5)
PLATELET # BLD AUTO: 266 K/UL — SIGNIFICANT CHANGE UP (ref 150–400)
PLATELET # BLD AUTO: 266 K/UL — SIGNIFICANT CHANGE UP (ref 150–400)
POTASSIUM SERPL-MCNC: 4.3 MMOL/L — SIGNIFICANT CHANGE UP (ref 3.5–5.3)
POTASSIUM SERPL-MCNC: 4.3 MMOL/L — SIGNIFICANT CHANGE UP (ref 3.5–5.3)
POTASSIUM SERPL-SCNC: 4.3 MMOL/L — SIGNIFICANT CHANGE UP (ref 3.5–5.3)
POTASSIUM SERPL-SCNC: 4.3 MMOL/L — SIGNIFICANT CHANGE UP (ref 3.5–5.3)
RBC # BLD: 3.36 M/UL — LOW (ref 4.2–5.8)
RBC # BLD: 3.36 M/UL — LOW (ref 4.2–5.8)
RBC # FLD: 14.2 % — SIGNIFICANT CHANGE UP (ref 10.3–14.5)
RBC # FLD: 14.2 % — SIGNIFICANT CHANGE UP (ref 10.3–14.5)
SODIUM SERPL-SCNC: 140 MMOL/L — SIGNIFICANT CHANGE UP (ref 135–145)
SODIUM SERPL-SCNC: 140 MMOL/L — SIGNIFICANT CHANGE UP (ref 135–145)
SPECIMEN SOURCE: SIGNIFICANT CHANGE UP
WBC # BLD: 7.08 K/UL — SIGNIFICANT CHANGE UP (ref 3.8–10.5)
WBC # BLD: 7.08 K/UL — SIGNIFICANT CHANGE UP (ref 3.8–10.5)
WBC # FLD AUTO: 7.08 K/UL — SIGNIFICANT CHANGE UP (ref 3.8–10.5)
WBC # FLD AUTO: 7.08 K/UL — SIGNIFICANT CHANGE UP (ref 3.8–10.5)

## 2024-01-05 PROCEDURE — 99232 SBSQ HOSP IP/OBS MODERATE 35: CPT

## 2024-01-05 PROCEDURE — 99233 SBSQ HOSP IP/OBS HIGH 50: CPT | Mod: GC

## 2024-01-05 RX ADMIN — IMIPENEM AND CILASTATIN 250 MILLIGRAM(S): 250; 250 INJECTION, POWDER, FOR SOLUTION INTRAVENOUS at 10:15

## 2024-01-05 RX ADMIN — PANTOPRAZOLE SODIUM 40 MILLIGRAM(S): 20 TABLET, DELAYED RELEASE ORAL at 11:04

## 2024-01-05 RX ADMIN — IMIPENEM AND CILASTATIN 250 MILLIGRAM(S): 250; 250 INJECTION, POWDER, FOR SOLUTION INTRAVENOUS at 02:22

## 2024-01-05 RX ADMIN — OXYCODONE HYDROCHLORIDE 10 MILLIGRAM(S): 5 TABLET ORAL at 00:28

## 2024-01-05 RX ADMIN — OXYCODONE HYDROCHLORIDE 5 MILLIGRAM(S): 5 TABLET ORAL at 21:26

## 2024-01-05 RX ADMIN — ENOXAPARIN SODIUM 40 MILLIGRAM(S): 100 INJECTION SUBCUTANEOUS at 15:52

## 2024-01-05 RX ADMIN — ATORVASTATIN CALCIUM 40 MILLIGRAM(S): 80 TABLET, FILM COATED ORAL at 21:26

## 2024-01-05 RX ADMIN — OXYCODONE HYDROCHLORIDE 10 MILLIGRAM(S): 5 TABLET ORAL at 06:38

## 2024-01-05 RX ADMIN — Medication 81 MILLIGRAM(S): at 11:04

## 2024-01-05 RX ADMIN — Medication 1: at 06:38

## 2024-01-05 RX ADMIN — OXYCODONE HYDROCHLORIDE 10 MILLIGRAM(S): 5 TABLET ORAL at 01:30

## 2024-01-05 RX ADMIN — OXYCODONE HYDROCHLORIDE 5 MILLIGRAM(S): 5 TABLET ORAL at 10:15

## 2024-01-05 RX ADMIN — Medication 1 MILLIGRAM(S): at 11:04

## 2024-01-05 RX ADMIN — IMIPENEM AND CILASTATIN 250 MILLIGRAM(S): 250; 250 INJECTION, POWDER, FOR SOLUTION INTRAVENOUS at 18:52

## 2024-01-05 RX ADMIN — OXYCODONE HYDROCHLORIDE 10 MILLIGRAM(S): 5 TABLET ORAL at 07:30

## 2024-01-05 NOTE — PROGRESS NOTE ADULT - ATTENDING COMMENTS
67 y/o M PMHx of CAD s/p PCI/CUBA x2 to LAD and Ramus (Mar 2023), T2DM, psoriasis, hx Renal calculi, w/ [id number] SCC of L buccal mucosa s/p hemimandibulectomy, left level 1, 2a, 3 neck neck dissection, L FFF, tracheostomy w/ 7.5 cuffed portex, dental implants, STSG (12/7/23), s/p G tube placement and subsequent trach decannulation and discharged home on ( 12/22/23). Pt however returned to [facility] ( 12/27/23) with surgical site infection, s/p RTOR 12/27 with findings of skin flap necrosis and s/p debridement. Trach replaced through prior stoma for pulmonary toilet. Patient also noted to have dark stools and dropped in Hgb- wife reported black stool for the last 3 days and same thing coming out from peg tube 12/28 , now peg feeding held -. Medicine consulted for co-management.     Pt seen and examined with Dr. Mauro.    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 Purulent drainage appreciated at margins of flap and in neck.  EGD( 1/2): ulcer found at the G-tube site    - Local wound care: OR findings reviewed, f/u OR mandibular culture per ENT   - ID f/u appreciated ( Team 1) ,  d/c's Vanco & Zosyn ( 1/1) and started on Imipenem 1gm iv q8h  (1/1-) , will need 2 weeks of iv abx from the last washout ( 1/2)   - f/u OR culture ( 1/2): reviewed   - OR cultures with E.coli, ECC, E.faecalis S.mitis/oralis, S.epi, S.constellatus, S.maltophilia, mixed anaerobes.   - f/u GNRs sensitivity   - WBC normal 7K   - BCx( 12/28): ngtd x 5 days   -R antecubital/forearm thrombophlebitis ( resolved) - warm compresses prn, monitor clinical improvement   - L forearm edema 2/2 L iv infiltrated ( removed L antecubital heplock), warm compresses prn     # Acute blood loss anemia/melena   # hx WADE ( Ferritin 768 but Tsat 13% ( <20%) on 12/13/23)   - GI fu appreciated, EGD( 1/2) ulcer at G-tube site, rec; Protonix 40mg daily for 8 weeks and avoid bumper being too tight to cause pressure ulcer   - hgb dropped 7.6 ( 12/29) s/p 2u PRBC ( ~ 500mg elemental iron), trend Hgb 10.7  (1/1) ->10.4( 1/2)-> 10( 1/3) -> 9.2( 1/4) -> 9.5(1/5)  - keep active T&S, keep Hgb>8    - c/w ASA given hx PCI x2 ( March 2023)  - hold off Plavix since adm ( 12/27) , awaiting ENT & GI clearance to resume Plavix  - c/w PPI daily can be via G-tube   - c/w Folic acid 1mg daily   - monitor clinically for any further melena     # CAD sp PCI/CUBA x2 to LAD and Ramus in March 2023 as per cards is in setting of NSTEMI - prefer DAPT, at least monotherapy with ASA if plavix need to be held for procedure, currently on ASA , to restart plavix when safe., c/w ASA 81mg and Atorvastatin 40mg qHS     #  DM - FS with ISS, would hold all ora-hypoglycemic agent, goal -180    # pain management - oxycodone 5mg/10mg prn , dilaudid prn, would need stool softener to ensure daily BM.     # Dysphagia- NPO, trach, on TF with Vital 1.5 via G-tube     # DVT ppx: resumed on Lovenox     Med consult team continues to follow pt with you. Thank you. 69 y/o M PMHx of CAD s/p PCI/CUBA x2 to LAD and Ramus (Mar 2023), T2DM, psoriasis, hx Renal calculi, w/ [id number] SCC of L buccal mucosa s/p hemimandibulectomy, left level 1, 2a, 3 neck neck dissection, L FFF, tracheostomy w/ 7.5 cuffed portex, dental implants, STSG (12/7/23), s/p G tube placement and subsequent trach decannulation and discharged home on ( 12/22/23). Pt however returned to [facility] ( 12/27/23) with surgical site infection, s/p RTOR 12/27 with findings of skin flap necrosis and s/p debridement. Trach replaced through prior stoma for pulmonary toilet. Patient also noted to have dark stools and dropped in Hgb- wife reported black stool for the last 3 days and same thing coming out from peg tube 12/28 , now peg feeding held -. Medicine consulted for co-management.     Pt seen and examined with Dr. Mauro.    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 Purulent drainage appreciated at margins of flap and in neck.  EGD( 1/2): ulcer found at the G-tube site    - Local wound care: OR findings reviewed, f/u OR mandibular culture per ENT   - ID f/u appreciated ( Team 1) ,  d/c's Vanco & Zosyn ( 1/1) and started on Imipenem 1gm iv q8h  (1/1-) , will need 2 weeks of iv abx from the last washout ( 1/2)   - f/u OR culture ( 1/2): reviewed   - OR cultures with E.coli, ECC, E.faecalis S.mitis/oralis, S.epi, S.constellatus, S.maltophilia, mixed anaerobes.   - f/u GNRs sensitivity   - WBC normal 7K   - BCx( 12/28): ngtd x 5 days   -R antecubital/forearm thrombophlebitis ( resolved) - warm compresses prn, monitor clinical improvement   - L forearm edema 2/2 L iv infiltrated ( removed L antecubital heplock), warm compresses prn     # Acute blood loss anemia/melena   # hx WADE ( Ferritin 768 but Tsat 13% ( <20%) on 12/13/23)   - GI fu appreciated, EGD( 1/2) ulcer at G-tube site, rec; Protonix 40mg daily for 8 weeks and avoid bumper being too tight to cause pressure ulcer   - hgb dropped 7.6 ( 12/29) s/p 2u PRBC ( ~ 500mg elemental iron), trend Hgb 10.7  (1/1) ->10.4( 1/2)-> 10( 1/3) -> 9.2( 1/4) -> 9.5(1/5)  - keep active T&S, keep Hgb>8    - c/w ASA given hx PCI x2 ( March 2023)  - hold off Plavix since adm ( 12/27) , awaiting ENT & GI clearance to resume Plavix  - c/w PPI daily can be via G-tube   - c/w Folic acid 1mg daily   - monitor clinically for any further melena     # CAD sp PCI/CUBA x2 to LAD and Ramus in March 2023 as per cards is in setting of NSTEMI - prefer DAPT, at least monotherapy with ASA if plavix need to be held for procedure, currently on ASA , to restart plavix when safe., c/w ASA 81mg and Atorvastatin 40mg qHS     #  DM - FS with ISS, would hold all ora-hypoglycemic agent, goal -180    # pain management - oxycodone 5mg/10mg prn , dilaudid prn, would need stool softener to ensure daily BM.     # Dysphagia- NPO, trach, on TF with Vital 1.5 via G-tube     # DVT ppx: resumed on Lovenox     Med consult team continues to follow pt with you. Thank you.

## 2024-01-05 NOTE — PHYSICAL THERAPY INITIAL EVALUATION ADULT - PERTINENT HX OF CURRENT PROBLEM, REHAB EVAL
SAUNDRA HOLMAN is a 69yo M w PMH of CAD (x2 stents Mar 2023), HLD, T2DM, hx of kidney stones, psoriasis who presented with an oral mass and underwent L hemimandibulectomy, SND L level 1-3, recon with L FFF, STSG, and placement of dental implants on 12/7. He had a trach which was subsequently decannulated. Now s/p OR for debridement and exploration. Trach replaced through prior stoma for pulmonary toilet. SAUNDRA HOLMAN is a 67yo M w PMH of CAD (x2 stents Mar 2023), HLD, T2DM, hx of kidney stones, psoriasis who presented with an oral mass and underwent L hemimandibulectomy, SND L level 1-3, recon with L FFF, STSG, and placement of dental implants on 12/7. He had a trach which was subsequently decannulated. Now s/p OR for debridement and exploration. Trach replaced through prior stoma for pulmonary toilet.

## 2024-01-05 NOTE — PHYSICAL THERAPY INITIAL EVALUATION ADULT - GENERAL OBSERVATIONS, REHAB EVAL
pt received semi-supine in bed +heplock, +tele, +NG tube, +young, +ostomy and abdominal incision C/D/I, c/o incisional pain pt received semi-supine in bed +heplock, +tele, +cervical dressing and trach C/D/I, c/o general malaise

## 2024-01-05 NOTE — PHYSICAL THERAPY INITIAL EVALUATION ADULT - ADDITIONAL COMMENTS
pt lives w/ his family in a private home w/ 3 low steps to enter. Has been ambulating w/ CAM boot and RW since last admission. Denies hx of recent falls.

## 2024-01-05 NOTE — PROGRESS NOTE ADULT - SUBJECTIVE AND OBJECTIVE BOX
SUBJECTIVE/OVERNIGHT EVENTS: No acute overnight events. Patient appears comfortable. Pain is manageable.     VITAL SIGNS:  Vital Signs Last 24 Hrs  T(C): 36.9 (05 Jan 2024 09:02), Max: 36.9 (05 Jan 2024 09:02)  T(F): 98.4 (05 Jan 2024 09:02), Max: 98.4 (05 Jan 2024 09:02)  HR: 82 (05 Jan 2024 11:28) (54 - 82)  BP: 114/58 (05 Jan 2024 11:28) (100/55 - 132/63)  BP(mean): 83 (05 Jan 2024 11:28) (71 - 91)  RR: 20 (05 Jan 2024 11:28) (17 - 20)  SpO2: 98% (05 Jan 2024 11:28) (97% - 100%)    Parameters below as of 05 Jan 2024 11:28  Patient On (Oxygen Delivery Method): tracheostomy collar  O2 Flow (L/min): 10  O2 Concentration (%): 40    PHYSICAL EXAM:  Constitutional: sitting comfortably in bed, no acute distress   HEENT: sclera non-icteric, mouth packed with gauze, neck also wrapped with gauze, trach collar in place  Respiratory: Good air entry b/l, clear to auscultation bilaterally, without accessory muscle use and no intercostal retractions  Cardiovascular: RRR, normal S1S2, no M/R/G  Gastrointestinal: soft, NTND, no masses palpable, BS normal  Extremities: Warm, well perfused, mild swelling of the L forearm  Skin: Normal temperature, warm, dry    MEDICATIONS:  MEDICATIONS  (STANDING):  aspirin  chewable 81 milliGRAM(s) Enteral Tube daily  atorvastatin 40 milliGRAM(s) Oral at bedtime  dextrose 5%. 1000 milliLiter(s) (50 mL/Hr) IV Continuous <Continuous>  dextrose 5%. 1000 milliLiter(s) (100 mL/Hr) IV Continuous <Continuous>  dextrose 50% Injectable 25 Gram(s) IV Push once  dextrose 50% Injectable 12.5 Gram(s) IV Push once  dextrose 50% Injectable 25 Gram(s) IV Push once  enoxaparin Injectable 40 milliGRAM(s) SubCutaneous every 24 hours  folic acid 1 milliGRAM(s) Enteral Tube daily  glucagon  Injectable 1 milliGRAM(s) IntraMuscular once  imipenem/cilastatin  IVPB 1000 milliGRAM(s) IV Intermittent every 8 hours  influenza  Vaccine (HIGH DOSE) 0.7 milliLiter(s) IntraMuscular once  insulin lispro (ADMELOG) corrective regimen sliding scale   SubCutaneous at bedtime  insulin lispro (ADMELOG) corrective regimen sliding scale   SubCutaneous three times a day before meals  pantoprazole  Injectable 40 milliGRAM(s) IV Push every 24 hours    MEDICATIONS  (PRN):  dextrose Oral Gel 15 Gram(s) Oral once PRN Blood Glucose LESS THAN 70 milliGRAM(s)/deciliter  HYDROmorphone  Injectable 1 milliGRAM(s) IV Push every 6 hours PRN breakthrough pain  ondansetron Injectable 4 milliGRAM(s) IV Push every 8 hours PRN Nausea  oxyCODONE    IR 10 milliGRAM(s) Oral every 6 hours PRN Severe Pain (7 - 10)  oxyCODONE    Solution 5 milliGRAM(s) Oral every 4 hours PRN Moderate Pain (4 - 6)      ALLERGIES:  Allergies    No Known Allergies    Intolerances        LABS:                        9.5    7.08  )-----------( 266      ( 05 Jan 2024 12:00 )             30.4     01-05    140  |  104  |  19  ----------------------------<  186<H>  4.3   |  27  |  0.69    Ca    9.1      05 Jan 2024 07:38  Phos  2.7     01-05  Mg     1.8     01-05          RADIOLOGY & ADDITIONAL TESTS: Reviewed.

## 2024-01-05 NOTE — CHART NOTE - NSCHARTNOTEFT_GEN_A_CORE
Admitting Diagnosis:   Patient is a 68y old  Male who presents with a chief complaint of surgical site infection (05 Jan 2024 13:37)      PAST MEDICAL & SURGICAL HISTORY:  Neoplasm of mandible      CURRENT NUTRITION ORDER:   - NPO with Tube Feeds via PEG   - 1422 mL total volume (6 bottles daily) of Vital 1.5 from 6998-2459 (provides 2133 kcal, 96 g protein, 1086 mL free fluid)  - Flush with 50 mL water before and after tube feeds administration (provides an additional 100 mL)  - Banatrol Plus (40 kcals each) BID   - NKFA     PO INTAKE:  % [  ]       50-75% [  ]       25-50% [  ]       <25% [  ]       N/A [ xx ]     GI: Denies any nausea/vomiting; last bowel movement documented 1/4; gastrostomy tube LUQ    PAIN:  Denies pain or discomfort     Skin: 1+ trace edema Lt hand, Rt hand; surgical incisions; no pressure injuries documented       LABS:  01-05    140  |  104  |  19  ----------------------------<  186<H>  4.3   |  27  |  0.69    Ca    9.1      05 Jan 2024 07:38  Phos  2.7     01-05  Mg     1.8     01-05      CAPILLARY BLOOD GLUCOSE  POCT Blood Glucose.: 116 mg/dL (05 Jan 2024 11:14)  POCT Blood Glucose.: 187 mg/dL (05 Jan 2024 06:33)  POCT Blood Glucose.: 171 mg/dL (04 Jan 2024 22:11)  POCT Blood Glucose.: 159 mg/dL (04 Jan 2024 17:32)      MEDICATIONS  (STANDING):  aspirin  chewable 81 milliGRAM(s) Enteral Tube daily  atorvastatin 40 milliGRAM(s) Oral at bedtime  dextrose 5%. 1000 milliLiter(s) (100 mL/Hr) IV Continuous <Continuous>  dextrose 5%. 1000 milliLiter(s) (50 mL/Hr) IV Continuous <Continuous>  dextrose 50% Injectable 25 Gram(s) IV Push once  dextrose 50% Injectable 25 Gram(s) IV Push once  dextrose 50% Injectable 12.5 Gram(s) IV Push once  enoxaparin Injectable 40 milliGRAM(s) SubCutaneous every 24 hours  folic acid 1 milliGRAM(s) Enteral Tube daily  glucagon  Injectable 1 milliGRAM(s) IntraMuscular once  imipenem/cilastatin  IVPB 1000 milliGRAM(s) IV Intermittent every 8 hours  influenza  Vaccine (HIGH DOSE) 0.7 milliLiter(s) IntraMuscular once  insulin lispro (ADMELOG) corrective regimen sliding scale   SubCutaneous at bedtime  insulin lispro (ADMELOG) corrective regimen sliding scale   SubCutaneous three times a day before meals  pantoprazole  Injectable 40 milliGRAM(s) IV Push every 24 hours    MEDICATIONS  (PRN):  dextrose Oral Gel 15 Gram(s) Oral once PRN Blood Glucose LESS THAN 70 milliGRAM(s)/deciliter  HYDROmorphone  Injectable 1 milliGRAM(s) IV Push every 6 hours PRN breakthrough pain  ondansetron Injectable 4 milliGRAM(s) IV Push every 8 hours PRN Nausea  oxyCODONE    IR 10 milliGRAM(s) Oral every 6 hours PRN Severe Pain (7 - 10)  oxyCODONE    Solution 5 milliGRAM(s) Oral every 4 hours PRN Moderate Pain (4 - 6)      ANTHROPOMETRICS:   Height (inches):  70   Weight (pounds):  185.6 (1/2)   BMI (kg/m^2):  26.6   IBW (pounds):  166 +/- 10%   %IBW:  111.8 %     WEIGHT CHANGE: no new weights     ESTIMATED NUTRIENT NEEDS:   Calories:  (22-28 kcal/kg) 0563-4227 kcal   Protein:  (1.1-1.4 g/kg)  g   Fluids:  1 mL/kcal or at team’s discretion   Current body weight used to calculate energy needs due to pt's current body weight within % ideal body weight per [facility] Standards of Nutrition Care. Needs adjusted for age and current clinical status.    SUBJECTIVE:   67yo M w PMH of CAD (x2 stents Mar 2023), HLD, T2DM, hx of kidney stones, psoriasis who presented with an oral mass and underwent L anabel-mandibulectomy, SND L level 1-3, recon with L FFF, STSG, and placement of dental implants on 12/7. He had a trach which was subsequently decannulated. He returns 12/27 with surgical site infection now s/p OR for debridement and exploration. Trach replaced through prior stoma for pulmonary toilet.     Patient discussed with nursing and team. Chart, meds, and labs reviewed. Tmax 36.9 C. MAPs 71-91 mm Hg. Meds significant for insulin sliding scale, ondansetron (Zofran), pantoprazole (protonix), folic acid. Labs significant for POCT 114-187H; glucose 186H. Met with patient on 8 Lachman. Patient was ambulating in his room; his wife Dinah was present throughout the nutrition interview. Discussed timing of tube feed administration; patient with preference to continue tube feed administration during the day as he wishes not to be hooked up to the feeding pump during the night. Discussed starting the feeds at different times and at different rates to allow for more freedom off the pump. Patient amenable to trying a faster rate and starting feeds late morning. Patient reports feeling thirsty; will increase flush. Patient concerned about elevated blood glucose levels; patient educated on factors that may increase levels. Banatrol Plus (40 kcals each) observed in tube feed bag.     PREVIOUS NUTRITION DIAGNOSIS: Inadequate Oral Intake related to decreased ability to consume sufficient energy as evidenced by reliance on enteral nutrition to meet 100% estimated nutrition needs    Active [ xx ]       Resolved [  ]     GOAL: Consistently meet >75% of nutrition needs via most appropriate route for nutrition    IMPRESSION: Current diet order meets nutrition needs; will update rate and timing. Current blood glucose readings only mildly elevated; changing to a diabetic tube feed formula likely not necessary at this time. See recommendations below    RECOMMENDATIONS:   - Recommend the following INTERMITTENT tube feed regimen:     >> 1422 mL total volume of Vital 1.5 via PEG (2133 kcal, 96 g protein, 1086 mL free fluid)    >> Start at 10 mL/hr and increase by 20 mL/hr q6h to goal rate 150 mL/hr; 9.5 hours on; 14.5 hours off; start time 1100    >> Flush with 50 mL water before and after tube feed administration (provides an additional 100 mL)    >> Recommend 200 mL free water flush q6hr; or at team’s discretion    >> The above provides 2133 kcal, 96 g protein, 1986 mL free fluid and meets all macro- and micronutrient needs     >> Recommend Banatrol Plus (40 kcals each) 2x/day or at team’s discretion    >> Hold feeds if increase in pressor requirements, MAPs consistently trending <60 mmHg, lactic acidosis presents, or GI intolerance is noted   - Monitor tube feed tolerance, GI distress, labs, weights   - Monitor electrolytes, adjust and replete PRN   - Pain and bowel regimen as per team   - The above recommendations were communicated with the team     EDUCATION:  Pt and wife educated on medical nutrition therapy specific to the patient’s diagnosis; they expressed understanding; all questions and concerns addressed to their satisfaction     RISK LEVEL:  High [ XX ]       Moderate [  ]       Low [  ]     INOCENCIA Kong, MS, RD, CDN [id number] Admitting Diagnosis:   Patient is a 68y old  Male who presents with a chief complaint of surgical site infection (05 Jan 2024 13:37)      PAST MEDICAL & SURGICAL HISTORY:  Neoplasm of mandible      CURRENT NUTRITION ORDER:   - NPO with Tube Feeds via PEG   - 1422 mL total volume (6 bottles daily) of Vital 1.5 from 3381-2572 (provides 2133 kcal, 96 g protein, 1086 mL free fluid)  - Flush with 50 mL water before and after tube feeds administration (provides an additional 100 mL)  - Banatrol Plus (40 kcals each) BID   - NKFA     PO INTAKE:  % [  ]       50-75% [  ]       25-50% [  ]       <25% [  ]       N/A [ xx ]     GI: Denies any nausea/vomiting; last bowel movement documented 1/4; gastrostomy tube LUQ    PAIN:  Denies pain or discomfort     Skin: 1+ trace edema Lt hand, Rt hand; surgical incisions; no pressure injuries documented       LABS:  01-05    140  |  104  |  19  ----------------------------<  186<H>  4.3   |  27  |  0.69    Ca    9.1      05 Jan 2024 07:38  Phos  2.7     01-05  Mg     1.8     01-05      CAPILLARY BLOOD GLUCOSE  POCT Blood Glucose.: 116 mg/dL (05 Jan 2024 11:14)  POCT Blood Glucose.: 187 mg/dL (05 Jan 2024 06:33)  POCT Blood Glucose.: 171 mg/dL (04 Jan 2024 22:11)  POCT Blood Glucose.: 159 mg/dL (04 Jan 2024 17:32)      MEDICATIONS  (STANDING):  aspirin  chewable 81 milliGRAM(s) Enteral Tube daily  atorvastatin 40 milliGRAM(s) Oral at bedtime  dextrose 5%. 1000 milliLiter(s) (100 mL/Hr) IV Continuous <Continuous>  dextrose 5%. 1000 milliLiter(s) (50 mL/Hr) IV Continuous <Continuous>  dextrose 50% Injectable 25 Gram(s) IV Push once  dextrose 50% Injectable 25 Gram(s) IV Push once  dextrose 50% Injectable 12.5 Gram(s) IV Push once  enoxaparin Injectable 40 milliGRAM(s) SubCutaneous every 24 hours  folic acid 1 milliGRAM(s) Enteral Tube daily  glucagon  Injectable 1 milliGRAM(s) IntraMuscular once  imipenem/cilastatin  IVPB 1000 milliGRAM(s) IV Intermittent every 8 hours  influenza  Vaccine (HIGH DOSE) 0.7 milliLiter(s) IntraMuscular once  insulin lispro (ADMELOG) corrective regimen sliding scale   SubCutaneous at bedtime  insulin lispro (ADMELOG) corrective regimen sliding scale   SubCutaneous three times a day before meals  pantoprazole  Injectable 40 milliGRAM(s) IV Push every 24 hours    MEDICATIONS  (PRN):  dextrose Oral Gel 15 Gram(s) Oral once PRN Blood Glucose LESS THAN 70 milliGRAM(s)/deciliter  HYDROmorphone  Injectable 1 milliGRAM(s) IV Push every 6 hours PRN breakthrough pain  ondansetron Injectable 4 milliGRAM(s) IV Push every 8 hours PRN Nausea  oxyCODONE    IR 10 milliGRAM(s) Oral every 6 hours PRN Severe Pain (7 - 10)  oxyCODONE    Solution 5 milliGRAM(s) Oral every 4 hours PRN Moderate Pain (4 - 6)      ANTHROPOMETRICS:   Height (inches):  70   Weight (pounds):  185.6 (1/2)   BMI (kg/m^2):  26.6   IBW (pounds):  166 +/- 10%   %IBW:  111.8 %     WEIGHT CHANGE: no new weights     ESTIMATED NUTRIENT NEEDS:   Calories:  (22-28 kcal/kg) 7287-5841 kcal   Protein:  (1.1-1.4 g/kg)  g   Fluids:  1 mL/kcal or at team’s discretion   Current body weight used to calculate energy needs due to pt's current body weight within % ideal body weight per [facility] Standards of Nutrition Care. Needs adjusted for age and current clinical status.    SUBJECTIVE:   69yo M w PMH of CAD (x2 stents Mar 2023), HLD, T2DM, hx of kidney stones, psoriasis who presented with an oral mass and underwent L aanbel-mandibulectomy, SND L level 1-3, recon with L FFF, STSG, and placement of dental implants on 12/7. He had a trach which was subsequently decannulated. He returns 12/27 with surgical site infection now s/p OR for debridement and exploration. Trach replaced through prior stoma for pulmonary toilet.     Patient discussed with nursing and team. Chart, meds, and labs reviewed. Tmax 36.9 C. MAPs 71-91 mm Hg. Meds significant for insulin sliding scale, ondansetron (Zofran), pantoprazole (protonix), folic acid. Labs significant for POCT 114-187H; glucose 186H. Met with patient on 8 Lachman. Patient was ambulating in his room; his wife Dinah was present throughout the nutrition interview. Discussed timing of tube feed administration; patient with preference to continue tube feed administration during the day as he wishes not to be hooked up to the feeding pump during the night. Discussed starting the feeds at different times and at different rates to allow for more freedom off the pump. Patient amenable to trying a faster rate and starting feeds late morning. Patient reports feeling thirsty; will increase flush. Patient concerned about elevated blood glucose levels; patient educated on factors that may increase levels. Banatrol Plus (40 kcals each) observed in tube feed bag.     PREVIOUS NUTRITION DIAGNOSIS: Inadequate Oral Intake related to decreased ability to consume sufficient energy as evidenced by reliance on enteral nutrition to meet 100% estimated nutrition needs    Active [ xx ]       Resolved [  ]     GOAL: Consistently meet >75% of nutrition needs via most appropriate route for nutrition    IMPRESSION: Current diet order meets nutrition needs; will update rate and timing. Current blood glucose readings only mildly elevated; changing to a diabetic tube feed formula likely not necessary at this time. See recommendations below    RECOMMENDATIONS:   - Recommend the following INTERMITTENT tube feed regimen:     >> 1422 mL total volume of Vital 1.5 via PEG (2133 kcal, 96 g protein, 1086 mL free fluid)    >> Start at 10 mL/hr and increase by 20 mL/hr q6h to goal rate 150 mL/hr; 9.5 hours on; 14.5 hours off; start time 1100    >> Flush with 50 mL water before and after tube feed administration (provides an additional 100 mL)    >> Recommend 200 mL free water flush q6hr; or at team’s discretion    >> The above provides 2133 kcal, 96 g protein, 1986 mL free fluid and meets all macro- and micronutrient needs     >> Recommend Banatrol Plus (40 kcals each) 2x/day or at team’s discretion    >> Hold feeds if increase in pressor requirements, MAPs consistently trending <60 mmHg, lactic acidosis presents, or GI intolerance is noted   - Monitor tube feed tolerance, GI distress, labs, weights   - Monitor electrolytes, adjust and replete PRN   - Pain and bowel regimen as per team   - The above recommendations were communicated with the team     EDUCATION:  Pt and wife educated on medical nutrition therapy specific to the patient’s diagnosis; they expressed understanding; all questions and concerns addressed to their satisfaction     RISK LEVEL:  High [ XX ]       Moderate [  ]       Low [  ]     INOCENCIA Kong, MS, RD, CDN [id number] Admitting Diagnosis:   Patient is a 68y old  Male who presents with a chief complaint of surgical site infection (05 Jan 2024 13:37)      PAST MEDICAL & SURGICAL HISTORY:  Neoplasm of mandible      CURRENT NUTRITION ORDER:   - NPO with Tube Feeds via PEG   - 1422 mL total volume (6 bottles daily) of Vital 1.5 from 5350-0152 (provides 2133 kcal, 96 g protein, 1086 mL free fluid)  - Flush with 50 mL water before and after tube feeds administration (provides an additional 100 mL)  - Banatrol Plus (40 kcals each) BID   - NKFA     PO INTAKE:  % [  ]       50-75% [  ]       25-50% [  ]       <25% [  ]       N/A [ xx ]     GI: Denies any nausea/vomiting; last bowel movement documented 1/4; gastrostomy tube LUQ    PAIN:  Denies pain or discomfort     Skin: 1+ trace edema Lt hand, Rt hand; surgical incisions; no pressure injuries documented       LABS:  01-05    140  |  104  |  19  ----------------------------<  186<H>  4.3   |  27  |  0.69    Ca    9.1      05 Jan 2024 07:38  Phos  2.7     01-05  Mg     1.8     01-05      CAPILLARY BLOOD GLUCOSE  POCT Blood Glucose.: 116 mg/dL (05 Jan 2024 11:14)  POCT Blood Glucose.: 187 mg/dL (05 Jan 2024 06:33)  POCT Blood Glucose.: 171 mg/dL (04 Jan 2024 22:11)  POCT Blood Glucose.: 159 mg/dL (04 Jan 2024 17:32)      MEDICATIONS  (STANDING):  aspirin  chewable 81 milliGRAM(s) Enteral Tube daily  atorvastatin 40 milliGRAM(s) Oral at bedtime  dextrose 5%. 1000 milliLiter(s) (100 mL/Hr) IV Continuous <Continuous>  dextrose 5%. 1000 milliLiter(s) (50 mL/Hr) IV Continuous <Continuous>  dextrose 50% Injectable 25 Gram(s) IV Push once  dextrose 50% Injectable 25 Gram(s) IV Push once  dextrose 50% Injectable 12.5 Gram(s) IV Push once  enoxaparin Injectable 40 milliGRAM(s) SubCutaneous every 24 hours  folic acid 1 milliGRAM(s) Enteral Tube daily  glucagon  Injectable 1 milliGRAM(s) IntraMuscular once  imipenem/cilastatin  IVPB 1000 milliGRAM(s) IV Intermittent every 8 hours  influenza  Vaccine (HIGH DOSE) 0.7 milliLiter(s) IntraMuscular once  insulin lispro (ADMELOG) corrective regimen sliding scale   SubCutaneous at bedtime  insulin lispro (ADMELOG) corrective regimen sliding scale   SubCutaneous three times a day before meals  pantoprazole  Injectable 40 milliGRAM(s) IV Push every 24 hours    MEDICATIONS  (PRN):  dextrose Oral Gel 15 Gram(s) Oral once PRN Blood Glucose LESS THAN 70 milliGRAM(s)/deciliter  HYDROmorphone  Injectable 1 milliGRAM(s) IV Push every 6 hours PRN breakthrough pain  ondansetron Injectable 4 milliGRAM(s) IV Push every 8 hours PRN Nausea  oxyCODONE    IR 10 milliGRAM(s) Oral every 6 hours PRN Severe Pain (7 - 10)  oxyCODONE    Solution 5 milliGRAM(s) Oral every 4 hours PRN Moderate Pain (4 - 6)      ANTHROPOMETRICS:   Height (inches):  70   Weight (pounds):  185.6 (1/2)   BMI (kg/m^2):  26.6   IBW (pounds):  166 +/- 10%   %IBW:  111.8 %     WEIGHT CHANGE: no new weights     ESTIMATED NUTRIENT NEEDS:   Calories:  (22-28 kcal/kg) 2703-7519 kcal   Protein:  (1.1-1.4 g/kg)  g   Fluids:  1 mL/kcal or at team’s discretion   Current body weight used to calculate energy needs due to pt's current body weight within % ideal body weight per [facility] Standards of Nutrition Care. Needs adjusted for age and current clinical status.    SUBJECTIVE:   69yo M w PMH of CAD (x2 stents Mar 2023), HLD, T2DM, hx of kidney stones, psoriasis who presented with an oral mass and underwent L anabel-mandibulectomy, SND L level 1-3, recon with L FFF, STSG, and placement of dental implants on 12/7. He had a trach which was subsequently decannulated. He returns 12/27 with surgical site infection now s/p OR for debridement and exploration. Trach replaced through prior stoma for pulmonary toilet.     Patient discussed with nursing and team. Per team, Pt still unable to tolerate bolus feedings at this time. Chart, meds, and labs reviewed. Tmax 36.9 C. MAPs 71-91 mm Hg. Meds significant for insulin sliding scale, ondansetron (Zofran), pantoprazole (protonix), folic acid. Labs significant for POCT 114-187H; glucose 186H. Met with patient on 8 Lachman. Patient was ambulating in his room; his wife Dinah was present throughout the nutrition interview. Discussed timing of tube feed administration; patient with preference to continue tube feed administration during the day as he wishes not to be hooked up to the feeding pump during the night. Discussed starting the feeds at different times and at different rates to allow for more freedom off the pump. Patient amenable to trying a faster rate and starting feeds late morning. Patient reports feeling thirsty; will increase flush. Patient concerned about elevated blood glucose levels; patient educated on factors that may increase levels. Banatrol Plus (40 kcals each) observed in tube feed bag.     PREVIOUS NUTRITION DIAGNOSIS: Inadequate Oral Intake related to decreased ability to consume sufficient energy as evidenced by reliance on enteral nutrition to meet 100% estimated nutrition needs    Active [ xx ]       Resolved [  ]     GOAL: Consistently meet >75% of nutrition needs via most appropriate route for nutrition    IMPRESSION: Current diet order meets nutrition needs; will update rate and timing. Current blood glucose readings only mildly elevated; changing to a diabetic tube feed formula likely not necessary at this time. See recommendations below    RECOMMENDATIONS:   - Recommend the following INTERMITTENT tube feed regimen:     >> 1422 mL total volume of Vital 1.5 via PEG (2133 kcal, 96 g protein, 1086 mL free fluid)    >> Start at 10 mL/hr and increase by 20 mL/hr q6h to goal rate 150 mL/hr; 9.5 hours on; 14.5 hours off; start time 1100    >> Flush with 50 mL water before and after tube feed administration (provides an additional 100 mL)    >> Recommend 200 mL free water flush q6hr; or at team’s discretion    >> The above provides 2133 kcal, 96 g protein, 1986 mL free fluid and meets all macro- and micronutrient needs     >> Recommend Banatrol Plus (40 kcals each) 2x/day or at team’s discretion    >> Hold feeds if increase in pressor requirements, MAPs consistently trending <60 mmHg, lactic acidosis presents, or GI intolerance is noted   - Monitor tube feed tolerance, GI distress, labs, weights   - Monitor electrolytes, adjust and replete PRN   - Pain and bowel regimen as per team   - The above recommendations were communicated with the team     EDUCATION:  Pt and wife educated on medical nutrition therapy specific to the patient’s diagnosis; they expressed understanding; all questions and concerns addressed to their satisfaction     RISK LEVEL:  High [ XX ]       Moderate [  ]       Low [  ]     INOCENCIA Kong, MS, RD, CDN [id number] Admitting Diagnosis:   Patient is a 68y old  Male who presents with a chief complaint of surgical site infection (05 Jan 2024 13:37)      PAST MEDICAL & SURGICAL HISTORY:  Neoplasm of mandible      CURRENT NUTRITION ORDER:   - NPO with Tube Feeds via PEG   - 1422 mL total volume (6 bottles daily) of Vital 1.5 from 5811-3575 (provides 2133 kcal, 96 g protein, 1086 mL free fluid)  - Flush with 50 mL water before and after tube feeds administration (provides an additional 100 mL)  - Banatrol Plus (40 kcals each) BID   - NKFA     PO INTAKE:  % [  ]       50-75% [  ]       25-50% [  ]       <25% [  ]       N/A [ xx ]     GI: Denies any nausea/vomiting; last bowel movement documented 1/4; gastrostomy tube LUQ    PAIN:  Denies pain or discomfort     Skin: 1+ trace edema Lt hand, Rt hand; surgical incisions; no pressure injuries documented       LABS:  01-05    140  |  104  |  19  ----------------------------<  186<H>  4.3   |  27  |  0.69    Ca    9.1      05 Jan 2024 07:38  Phos  2.7     01-05  Mg     1.8     01-05      CAPILLARY BLOOD GLUCOSE  POCT Blood Glucose.: 116 mg/dL (05 Jan 2024 11:14)  POCT Blood Glucose.: 187 mg/dL (05 Jan 2024 06:33)  POCT Blood Glucose.: 171 mg/dL (04 Jan 2024 22:11)  POCT Blood Glucose.: 159 mg/dL (04 Jan 2024 17:32)      MEDICATIONS  (STANDING):  aspirin  chewable 81 milliGRAM(s) Enteral Tube daily  atorvastatin 40 milliGRAM(s) Oral at bedtime  dextrose 5%. 1000 milliLiter(s) (100 mL/Hr) IV Continuous <Continuous>  dextrose 5%. 1000 milliLiter(s) (50 mL/Hr) IV Continuous <Continuous>  dextrose 50% Injectable 25 Gram(s) IV Push once  dextrose 50% Injectable 25 Gram(s) IV Push once  dextrose 50% Injectable 12.5 Gram(s) IV Push once  enoxaparin Injectable 40 milliGRAM(s) SubCutaneous every 24 hours  folic acid 1 milliGRAM(s) Enteral Tube daily  glucagon  Injectable 1 milliGRAM(s) IntraMuscular once  imipenem/cilastatin  IVPB 1000 milliGRAM(s) IV Intermittent every 8 hours  influenza  Vaccine (HIGH DOSE) 0.7 milliLiter(s) IntraMuscular once  insulin lispro (ADMELOG) corrective regimen sliding scale   SubCutaneous at bedtime  insulin lispro (ADMELOG) corrective regimen sliding scale   SubCutaneous three times a day before meals  pantoprazole  Injectable 40 milliGRAM(s) IV Push every 24 hours    MEDICATIONS  (PRN):  dextrose Oral Gel 15 Gram(s) Oral once PRN Blood Glucose LESS THAN 70 milliGRAM(s)/deciliter  HYDROmorphone  Injectable 1 milliGRAM(s) IV Push every 6 hours PRN breakthrough pain  ondansetron Injectable 4 milliGRAM(s) IV Push every 8 hours PRN Nausea  oxyCODONE    IR 10 milliGRAM(s) Oral every 6 hours PRN Severe Pain (7 - 10)  oxyCODONE    Solution 5 milliGRAM(s) Oral every 4 hours PRN Moderate Pain (4 - 6)      ANTHROPOMETRICS:   Height (inches):  70   Weight (pounds):  185.6 (1/2)   BMI (kg/m^2):  26.6   IBW (pounds):  166 +/- 10%   %IBW:  111.8 %     WEIGHT CHANGE: no new weights     ESTIMATED NUTRIENT NEEDS:   Calories:  (22-28 kcal/kg) 0254-5452 kcal   Protein:  (1.1-1.4 g/kg)  g   Fluids:  1 mL/kcal or at team’s discretion   Current body weight used to calculate energy needs due to pt's current body weight within % ideal body weight per [facility] Standards of Nutrition Care. Needs adjusted for age and current clinical status.    SUBJECTIVE:   69yo M w PMH of CAD (x2 stents Mar 2023), HLD, T2DM, hx of kidney stones, psoriasis who presented with an oral mass and underwent L anabel-mandibulectomy, SND L level 1-3, recon with L FFF, STSG, and placement of dental implants on 12/7. He had a trach which was subsequently decannulated. He returns 12/27 with surgical site infection now s/p OR for debridement and exploration. Trach replaced through prior stoma for pulmonary toilet.     Patient discussed with nursing and team. Per team, Pt still unable to tolerate bolus feedings at this time. Chart, meds, and labs reviewed. Tmax 36.9 C. MAPs 71-91 mm Hg. Meds significant for insulin sliding scale, ondansetron (Zofran), pantoprazole (protonix), folic acid. Labs significant for POCT 114-187H; glucose 186H. Met with patient on 8 Lachman. Patient was ambulating in his room; his wife Dinah was present throughout the nutrition interview. Discussed timing of tube feed administration; patient with preference to continue tube feed administration during the day as he wishes not to be hooked up to the feeding pump during the night. Discussed starting the feeds at different times and at different rates to allow for more freedom off the pump. Patient amenable to trying a faster rate and starting feeds late morning. Patient reports feeling thirsty; will increase flush. Patient concerned about elevated blood glucose levels; patient educated on factors that may increase levels. Banatrol Plus (40 kcals each) observed in tube feed bag.     PREVIOUS NUTRITION DIAGNOSIS: Inadequate Oral Intake related to decreased ability to consume sufficient energy as evidenced by reliance on enteral nutrition to meet 100% estimated nutrition needs    Active [ xx ]       Resolved [  ]     GOAL: Consistently meet >75% of nutrition needs via most appropriate route for nutrition    IMPRESSION: Current diet order meets nutrition needs; will update rate and timing. Current blood glucose readings only mildly elevated; changing to a diabetic tube feed formula likely not necessary at this time. See recommendations below    RECOMMENDATIONS:   - Recommend the following INTERMITTENT tube feed regimen:     >> 1422 mL total volume of Vital 1.5 via PEG (2133 kcal, 96 g protein, 1086 mL free fluid)    >> Start at 10 mL/hr and increase by 20 mL/hr q6h to goal rate 150 mL/hr; 9.5 hours on; 14.5 hours off; start time 1100    >> Flush with 50 mL water before and after tube feed administration (provides an additional 100 mL)    >> Recommend 200 mL free water flush q6hr; or at team’s discretion    >> The above provides 2133 kcal, 96 g protein, 1986 mL free fluid and meets all macro- and micronutrient needs     >> Recommend Banatrol Plus (40 kcals each) 2x/day or at team’s discretion    >> Hold feeds if increase in pressor requirements, MAPs consistently trending <60 mmHg, lactic acidosis presents, or GI intolerance is noted   - Monitor tube feed tolerance, GI distress, labs, weights   - Monitor electrolytes, adjust and replete PRN   - Pain and bowel regimen as per team   - The above recommendations were communicated with the team     EDUCATION:  Pt and wife educated on medical nutrition therapy specific to the patient’s diagnosis; they expressed understanding; all questions and concerns addressed to their satisfaction     RISK LEVEL:  High [ XX ]       Moderate [  ]       Low [  ]     INOCENCIA Kong, MS, RD, CDN [id number]

## 2024-01-05 NOTE — PROGRESS NOTE ADULT - ASSESSMENT
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 Patient had a trach which was subsequently decannulated. Patient now returns with surgical site infection s/p wash-out.    #SSTI of surgical wound  - Patient w likely polymicrobial wound culture, w GPC in pairs, chain, GP rods, GN rods, concern of gram negative coverage also, speciation noted E.cloacae, S.mitis, E.coli, S.epidermis  - Had repeat wash-out  w bone and soft tissue debridement, debrided down to bleeding bone. Only hardware is anchors for dentition, no concern for infxn from hardware as per discussion w ENT team.  - C/w imipenem 1000mg q8  - We will continue to f/u Cx    ID will follow    RECS NOT FINAL UNTIL ATTENDING ATTESTATION 69 yo M w PMhx of CAD (x 2 stents Mar 2023), Type 2 DM, hx of kidney stones, psoriasis who presented with an oral mass 2/2 to [id number] SCC of L buccal mucosa during previous admission (12/3-21) and underwent L hemimandibulectomy, SND L level 1-3, recon with L FFF, STSG, and placement of dental implants on 12/7. Patient had a trach which was subsequently decannulated. Patient now returns with surgical site infection s/p wash-out.    #SSTI of surgical wound  - Patient w likely polymicrobial wound culture, w GPC in pairs, chain, GP rods, GN rods, concern of gram negative coverage also, speciation noted E.cloacae, S.mitis, E.coli, S.epidermis  - Had repeat wash-out  w bone and soft tissue debridement, debrided down to bleeding bone. Only hardware is anchors for dentition, no concern for infxn from hardware as per discussion w ENT team.  - C/w imipenem 1000mg q8  - We will continue to f/u Cx    ID will follow    RECS NOT FINAL UNTIL ATTENDING ATTESTATION

## 2024-01-05 NOTE — PROGRESS NOTE ADULT - SUBJECTIVE AND OBJECTIVE BOX
Consultation Requested by:    Patient is a 68y old  Male who presents with a chief complaint of Surgical site infection (28 Dec 2023 15:40)    HPI:  SAUNDRA HOLMAN is a 67yo M w PMH of CAD (x2 stents Mar 2023), HLD, T2DM, hx of kidney stones, psoriasis who presented with an oral mass and underwent L hemimandibulectomy, SND L level 1-3, recon with L FFF, STSG, and placement of dental implants on 12/7. He had a trach which was subsequently decannulated. He returns 12/27 with surgical site infection now s/p OR for debridement and exploration. Trach replaced through prior stoma for pulmonary toilet.   (27 Dec 2023 17:31)    Interval History:  Patient seen and examined at bedside with wife bedside. Reports feeling about the same.No new complaints. Denies fever, chills, c/p, palpitations, cough, SOB, abdominal pain, nausea, vomiting, diarrhea, dysuria, hematuria, LE swelling, or rash.      Allergies    No Known Allergies    Intolerances      Antimicrobials:  ampicillin/sulbactam  IVPB 3 Gram(s) IV Intermittent every 6 hours  metroNIDAZOLE  IVPB 500 milliGRAM(s) IV Intermittent every 8 hours      Other Medications:  acetaminophen     Tablet .. 650 milliGRAM(s) Oral every 6 hours PRN  aspirin  chewable 81 milliGRAM(s) Enteral Tube daily  atorvastatin 40 milliGRAM(s) Oral at bedtime  chlorhexidine 0.12% Liquid 15 milliLiter(s) Oral Mucosa two times a day  dextrose 5% + sodium chloride 0.45%. 1000 milliLiter(s) IV Continuous <Continuous>  dextrose 5%. 1000 milliLiter(s) IV Continuous <Continuous>  dextrose 5%. 1000 milliLiter(s) IV Continuous <Continuous>  dextrose 50% Injectable 25 Gram(s) IV Push once  dextrose 50% Injectable 25 Gram(s) IV Push once  dextrose 50% Injectable 12.5 Gram(s) IV Push once  dextrose Oral Gel 15 Gram(s) Oral once PRN  glucagon  Injectable 1 milliGRAM(s) IntraMuscular once  HYDROmorphone  Injectable 1 milliGRAM(s) IV Push every 6 hours PRN  influenza  Vaccine (HIGH DOSE) 0.7 milliLiter(s) IntraMuscular once  insulin lispro (ADMELOG) corrective regimen sliding scale   SubCutaneous at bedtime  insulin lispro (ADMELOG) corrective regimen sliding scale   SubCutaneous three times a day before meals  lidocaine 2% Viscous 10 milliLiter(s) Swish and Spit every 4 hours PRN  ondansetron Injectable 4 milliGRAM(s) IV Push every 8 hours PRN  oxyCODONE    Solution 10 milliGRAM(s) Oral every 4 hours PRN  oxyCODONE    Solution 5 milliGRAM(s) Oral every 4 hours PRN  pantoprazole  Injectable 40 milliGRAM(s) IV Push two times a day      FAMILY HISTORY:    PAST MEDICAL & SURGICAL HISTORY:  Neoplasm of mandible        SOCIAL HISTORY:  [place], has livestock such as sheep in property but does not interact w them  restaurant and diner owner  Recent travel to [facility], no other  50+ pack year history as well as cigars  No EtOH use  Recreational CBD use  Sexually active w spouse    Vital Signs Last 24 Hrs  T(C): 36.9 (05 Jan 2024 09:02), Max: 36.9 (05 Jan 2024 09:02)  T(F): 98.4 (05 Jan 2024 09:02), Max: 98.4 (05 Jan 2024 09:02)  HR: 82 (05 Jan 2024 11:28) (54 - 82)  BP: 114/58 (05 Jan 2024 11:28) (100/55 - 132/63)  BP(mean): 83 (05 Jan 2024 11:28) (71 - 91)  RR: 20 (05 Jan 2024 11:28) (17 - 20)  SpO2: 98% (05 Jan 2024 11:28) (97% - 100%)    Parameters below as of 05 Jan 2024 11:28  Patient On (Oxygen Delivery Method): tracheostomy collar  O2 Flow (L/min): 10  O2 Concentration (%): 40    PHYSICAL EXAM  GENERAL: NAD, well-groomed, well-developed  HEENT: Remnant left FFF paddle oozing, packing in place in buccal cavity.   NERVOUS SYSTEM: Alert & Oriented X3, Good concentration; Motor Strength 5/5 B/L upper and lower extremities  CHEST/LUNG: CTA B/L, No w/r/r  HEART: Regular rate and rhythm; No murmurs, rubs, or gallops  ABDOMEN: Soft, Nontender, Nondistended; Bowel sounds present, PEG tube c/d/i  EXTREMITIES: wwp. dressing over L thigh (prev graft site)      LABS:                         9.5    7.08  )-----------( 266      ( 05 Jan 2024 12:00 )             30.4     01-05    140  |  104  |  19  ----------------------------<  186<H>  4.3   |  27  |  0.69    Ca    9.1      05 Jan 2024 07:38  Phos  2.7     01-05  Mg     1.8     01-05        Urinalysis Basic - ( 05 Jan 2024 07:38 )    Color: x / Appearance: x / SG: x / pH: x  Gluc: 186 mg/dL / Ketone: x  / Bili: x / Urobili: x   Blood: x / Protein: x / Nitrite: x   Leuk Esterase: x / RBC: x / WBC x   Sq Epi: x / Non Sq Epi: x / Bacteria: x      CARDIAC MARKERS ( 04 Jan 2024 17:29 )  x     / x     / 16 U/L / x     / <1.0 ng/mL            RADIOLOGY, EKG & ADDITIONAL TESTS:                 RADIOLOGY, EKG & ADDITIONAL TESTS Consultation Requested by:    Patient is a 68y old  Male who presents with a chief complaint of Surgical site infection (28 Dec 2023 15:40)    HPI:  SAUNDRA HOLMAN is a 69yo M w PMH of CAD (x2 stents Mar 2023), HLD, T2DM, hx of kidney stones, psoriasis who presented with an oral mass and underwent L hemimandibulectomy, SND L level 1-3, recon with L FFF, STSG, and placement of dental implants on 12/7. He had a trach which was subsequently decannulated. He returns 12/27 with surgical site infection now s/p OR for debridement and exploration. Trach replaced through prior stoma for pulmonary toilet.   (27 Dec 2023 17:31)    Interval History:  Patient seen and examined at bedside with wife bedside. Reports feeling about the same.No new complaints. Denies fever, chills, c/p, palpitations, cough, SOB, abdominal pain, nausea, vomiting, diarrhea, dysuria, hematuria, LE swelling, or rash.      Allergies    No Known Allergies    Intolerances      Antimicrobials:  ampicillin/sulbactam  IVPB 3 Gram(s) IV Intermittent every 6 hours  metroNIDAZOLE  IVPB 500 milliGRAM(s) IV Intermittent every 8 hours      Other Medications:  acetaminophen     Tablet .. 650 milliGRAM(s) Oral every 6 hours PRN  aspirin  chewable 81 milliGRAM(s) Enteral Tube daily  atorvastatin 40 milliGRAM(s) Oral at bedtime  chlorhexidine 0.12% Liquid 15 milliLiter(s) Oral Mucosa two times a day  dextrose 5% + sodium chloride 0.45%. 1000 milliLiter(s) IV Continuous <Continuous>  dextrose 5%. 1000 milliLiter(s) IV Continuous <Continuous>  dextrose 5%. 1000 milliLiter(s) IV Continuous <Continuous>  dextrose 50% Injectable 25 Gram(s) IV Push once  dextrose 50% Injectable 25 Gram(s) IV Push once  dextrose 50% Injectable 12.5 Gram(s) IV Push once  dextrose Oral Gel 15 Gram(s) Oral once PRN  glucagon  Injectable 1 milliGRAM(s) IntraMuscular once  HYDROmorphone  Injectable 1 milliGRAM(s) IV Push every 6 hours PRN  influenza  Vaccine (HIGH DOSE) 0.7 milliLiter(s) IntraMuscular once  insulin lispro (ADMELOG) corrective regimen sliding scale   SubCutaneous at bedtime  insulin lispro (ADMELOG) corrective regimen sliding scale   SubCutaneous three times a day before meals  lidocaine 2% Viscous 10 milliLiter(s) Swish and Spit every 4 hours PRN  ondansetron Injectable 4 milliGRAM(s) IV Push every 8 hours PRN  oxyCODONE    Solution 10 milliGRAM(s) Oral every 4 hours PRN  oxyCODONE    Solution 5 milliGRAM(s) Oral every 4 hours PRN  pantoprazole  Injectable 40 milliGRAM(s) IV Push two times a day      FAMILY HISTORY:    PAST MEDICAL & SURGICAL HISTORY:  Neoplasm of mandible        SOCIAL HISTORY:  [place], has livestock such as sheep in property but does not interact w them  restaurant and diner owner  Recent travel to [facility], no other  50+ pack year history as well as cigars  No EtOH use  Recreational CBD use  Sexually active w spouse    Vital Signs Last 24 Hrs  T(C): 36.9 (05 Jan 2024 09:02), Max: 36.9 (05 Jan 2024 09:02)  T(F): 98.4 (05 Jan 2024 09:02), Max: 98.4 (05 Jan 2024 09:02)  HR: 82 (05 Jan 2024 11:28) (54 - 82)  BP: 114/58 (05 Jan 2024 11:28) (100/55 - 132/63)  BP(mean): 83 (05 Jan 2024 11:28) (71 - 91)  RR: 20 (05 Jan 2024 11:28) (17 - 20)  SpO2: 98% (05 Jan 2024 11:28) (97% - 100%)    Parameters below as of 05 Jan 2024 11:28  Patient On (Oxygen Delivery Method): tracheostomy collar  O2 Flow (L/min): 10  O2 Concentration (%): 40    PHYSICAL EXAM  GENERAL: NAD, well-groomed, well-developed  HEENT: Remnant left FFF paddle oozing, packing in place in buccal cavity.   NERVOUS SYSTEM: Alert & Oriented X3, Good concentration; Motor Strength 5/5 B/L upper and lower extremities  CHEST/LUNG: CTA B/L, No w/r/r  HEART: Regular rate and rhythm; No murmurs, rubs, or gallops  ABDOMEN: Soft, Nontender, Nondistended; Bowel sounds present, PEG tube c/d/i  EXTREMITIES: wwp. dressing over L thigh (prev graft site)      LABS:                         9.5    7.08  )-----------( 266      ( 05 Jan 2024 12:00 )             30.4     01-05    140  |  104  |  19  ----------------------------<  186<H>  4.3   |  27  |  0.69    Ca    9.1      05 Jan 2024 07:38  Phos  2.7     01-05  Mg     1.8     01-05        Urinalysis Basic - ( 05 Jan 2024 07:38 )    Color: x / Appearance: x / SG: x / pH: x  Gluc: 186 mg/dL / Ketone: x  / Bili: x / Urobili: x   Blood: x / Protein: x / Nitrite: x   Leuk Esterase: x / RBC: x / WBC x   Sq Epi: x / Non Sq Epi: x / Bacteria: x      CARDIAC MARKERS ( 04 Jan 2024 17:29 )  x     / x     / 16 U/L / x     / <1.0 ng/mL            RADIOLOGY, EKG & ADDITIONAL TESTS:                 RADIOLOGY, EKG & ADDITIONAL TESTS

## 2024-01-05 NOTE — PROGRESS NOTE ADULT - ATTENDING COMMENTS
Agree with above.  Most recent culture noted.  Has rare Candida parapsilosis.  This was not in prior cultures.  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  No evidence of active infection.  No need to treat at this time.  Continue Imipenem 1 gram IV q8hrs.  Will likely need 14 day course from first most recent drainage (1/2/24)

## 2024-01-05 NOTE — PROGRESS NOTE ADULT - ASSESSMENT
Assessment and Plan:  SAUNDRA HOLMAN is a 67yo M w PMH of CAD (x2 stents Mar 2023), HLD, T2DM, hx of kidney stones, psoriasis who presented with an oral mass and underwent L hemimandibulectomy, SND L level 1-3, recon with L FFF, STSG, and placement of dental implants on 12/7. He had a trach which was subsequently decannulated. Now s/p OR for debridement and exploration. Trach replaced through prior stoma for pulmonary toilet.    Plan:  - Potential plan for additional flap  next week   - Hgb goal > 9.0  - Pack wound with iodoform BID  - C/w Imipenem (1/1 - 1/15)   - Appreciate  IM recs, and ID recs  - C/w TF  - Continue home asa  - Hold home plavix  - c/w HSQ  - Maintain 7.5 portex for pulm toilet  - ISS  - Pain control  - Home meds  - Bowel regimen    [facility] at [phone] with any questions/concerns.    Ada Quinones PA-C  01-05-24 @ 07:46     Assessment and Plan:  SAUNDRA HOLMAN is a 69yo M w PMH of CAD (x2 stents Mar 2023), HLD, T2DM, hx of kidney stones, psoriasis who presented with an oral mass and underwent L hemimandibulectomy, SND L level 1-3, recon with L FFF, STSG, and placement of dental implants on 12/7. He had a trach which was subsequently decannulated. Now s/p OR for debridement and exploration. Trach replaced through prior stoma for pulmonary toilet.    Plan:  - Potential plan for additional flap  next week   - Hgb goal > 9.0  - Pack wound with iodoform BID  - C/w Imipenem (1/1 - 1/15)   - Appreciate  IM recs, and ID recs  - C/w TF  - Continue home asa  - Hold home plavix  - c/w HSQ  - Maintain 7.5 portex for pulm toilet  - ISS  - Pain control  - Home meds  - Bowel regimen    [facility] at [phone] with any questions/concerns.    Ada Quinones PA-C  01-05-24 @ 07:46

## 2024-01-05 NOTE — PROGRESS NOTE ADULT - SUBJECTIVE AND OBJECTIVE BOX
OTOLARYNGOLOGY (ENT) PROGRESS NOTE    PATIENT: SAUNDRA HOLMAN  MRN: [id number]  : 57  [id number]  DATE OF SERVICE:  24  			           ID: SAUNDRA HOLMAN is a  67yo M w PMH of CAD (x2 stents Mar 2023), HLD, T2DM, hx of kidney stones, psoriasis who presented with an oral mass and underwent L hemimandibulectomy, SND L level 1-3, recon with L FFF, STSG, and placement of dental implants on . He had a trach which was subsequently decannulated. He returns  with surgical site infection now s/p OR for debridement and exploration. Trach replaced through prior stoma for pulmonary toilet.     Subjective/ Interval:   ; Patient seen this morning, oral pack to be changed, penrose dressing changed. Intraoral flap with partal skin necrosis, 7.5 portex in place with cuff deflated   : Patient seen and examined at bedside. NAEO. Patient remains afebrile and HD stable. HgB noted to drop to 7.6 from 9.2 but no additional episodes of melena. Penrose draining purulent material. Intra-oral infection/flap stable. Packing changed at bedside. No other complaints or changes at this time. ID recommended Vanc/zosyn and DC unasyn and flagyl, and IM recommended reticulocyte studies and adding folate supplements. No other changes at this time.   : AFVSS. No acute events overnight. Patient seen and examined at bedside. C/w Vanc/Zosyn per ID recs, pending sensitivities. Growing staph epi, E coli, enterobacter from wound cx on . S/p 2U PRBC yday w appropriate response in Hgb. Transfusion goal > 9.0.  : AFVSS. No acute events overnight. Patient seen and examined at bedside. C/w Vanc/Zosyn per ID recs, pending sens. Hgb stable this morning.  : AFVSS. No acute events overnight. Patient seen and examined at bedside. C/w Vanc/Zosyn, e faecalis sens to vanc/zosyn. Had 3 dark BM's yesterday that were stool guiac positive.  : AFVSS. No acute events overnight. Patient seen and examined at bedside. C/w Imipenem (changed per ID, enterobacter resistant to Zosyn). Plan for OR today for further washout / debridement.  1/3: AFVSS. No acute events overnight. Patient seen and examined at bedside. C/w imipenim. OR cx growing numerous gram negative rods and few gram positive cocci in pairs.  : Patient seen bedside, changed intraoral and neck packing,  Continue to follow cultures, pain controlled   : patient seen this morning, complains of right arm pain wit minimal swelling,  IV in the left arm,  Continue abx for 2 weeks. neck and oral packing changed, purulent drainage from neck noted.      ALLERGIES:  No Known Allergies      MEDICATIONS:  Antiinfectives:   imipenem/cilastatin  IVPB 1000 milliGRAM(s) IV Intermittent every 8 hours    IV fluids:  dextrose 5%. 1000 milliLiter(s) IV Continuous <Continuous>  dextrose 5%. 1000 milliLiter(s) IV Continuous <Continuous>  folic acid 1 milliGRAM(s) Enteral Tube daily    Hematologic/Anticoagulation:  aspirin  chewable 81 milliGRAM(s) Enteral Tube daily  enoxaparin Injectable 40 milliGRAM(s) SubCutaneous every 24 hours    Pain medications/Neuro:  HYDROmorphone  Injectable 1 milliGRAM(s) IV Push every 6 hours PRN  ondansetron Injectable 4 milliGRAM(s) IV Push every 8 hours PRN  oxyCODONE    IR 10 milliGRAM(s) Oral every 6 hours PRN  oxyCODONE    Solution 5 milliGRAM(s) Oral every 4 hours PRN    Endocrine Medications:   atorvastatin 40 milliGRAM(s) Oral at bedtime  dextrose 50% Injectable 25 Gram(s) IV Push once  dextrose 50% Injectable 25 Gram(s) IV Push once  dextrose 50% Injectable 12.5 Gram(s) IV Push once  dextrose Oral Gel 15 Gram(s) Oral once PRN  glucagon  Injectable 1 milliGRAM(s) IntraMuscular once  insulin lispro (ADMELOG) corrective regimen sliding scale   SubCutaneous at bedtime  insulin lispro (ADMELOG) corrective regimen sliding scale   SubCutaneous three times a day before meals    All other standing medications:   influenza  Vaccine (HIGH DOSE) 0.7 milliLiter(s) IntraMuscular once  pantoprazole  Injectable 40 milliGRAM(s) IV Push every 24 hours    All other PRN medications:    Vital Signs Last 24 Hrs  T(C): 36.4 (2024 04:58), Max: 36.8 (2024 22:01)  T(F): 97.6 (2024 04:58), Max: 98.2 (2024 22:01)  HR: 78 (2024 04:10) (50 - 78)  BP: 129/60 (2024 04:10) (100/52 - 129/60)  BP(mean): 86 (2024 04:10) (71 - 86)  RR: 17 (2024 04:10) (16 - 18)  SpO2: 100% (2024 04:10) (97% - 100%)    Parameters below as of 2024 04:10  Patient On (Oxygen Delivery Method): tracheostomy collar  O2 Flow (L/min): 10  O2 Concentration (%): 40      01-04 @ 07:01  -  01-05 @ 07:00  --------------------------------------------------------  IN:    Enteral Tube Flush: 100 mL    Vital1.5: 2280 mL  Total IN: 2380 mL    OUT:    Voided (mL): 1800 mL  Total OUT: 1800 mL    Total NET: 580 mL        PHYSICAL EXAM:  GEN: appears stated age, NAD  NEURO: alert & oriented x 4/4  HEENT: Remnant left FFF paddle with dried blood, right half of mandibular dentition present with elastics and screws, iodoform packing replaced  in oral cavity   Neck: 7.5 Portex trach with cuff deflated, changed iodoform packing in the neck, noted to be draining purulent fluid, skin loosely reapproximated with silk suture, Kerlix dressing in place minimally saturated,  CVS: regular rate and rhythm  Pulm: normal respiratory excursions, not tachypneic, no labored breathing on trach collar   Abd: non-distended  Ext: moving all four extremities, mild right arm edema with no overlying erythema     LABS                       9.8    6.48  )-----------( 292      ( 2024 17:29 )             30.1    01-04    143  |  106  |  20  ----------------------------<  146<H>  4.2   |  29  |  0.75    Ca    8.7      2024 17:29  Phos  3.5       Mg     1.8                Coagulation Studies-     Urinalysis Basic - ( 2024 17:29 )    Color: x / Appearance: x / SG: x / pH: x  Gluc: 146 mg/dL / Ketone: x  / Bili: x / Urobili: x   Blood: x / Protein: x / Nitrite: x   Leuk Esterase: x / RBC: x / WBC x   Sq Epi: x / Non Sq Epi: x / Bacteria: x      Endocrine Panel-  Calcium: 8.7 mg/dL ( @ 17:29)  Calcium: 8.6 mg/dL ( @ 10:00)                MICROBIOLOGY:  Culture - Tissue with Gram Stain (collected 24 @ 18:03)  Source: .Tissue left mandible tissue or spec  Gram Stain (24 @ 21:59):    Numerous Gram Negative Rods    Few Gram positive cocci in pairs    Few WBC's  Preliminary Report (24 @ 13:21):    Numerous Escherichia coli    Numerous Enterobacter cloacae complex Susceptibility to follow.    Few Enterococcus faecalis Susceptibility to follow.    Rare Staphylococcus epidermidis    Culture in progress  Organism: Escherichia coli (24 @ 12:04)  Organism: Escherichia coli (24 @ 12:04)      Method Type: OTTO      -  Ampicillin: S <=8 These ampicillin results predict results for amoxicillin      -  Ampicillin/Sulbactam: S <=4/2      -  Cefazolin: S <=2      -  Ceftriaxone: S <=1      -  Ciprofloxacin: S <=0.25      -  Ertapenem: S <=0.5      -  Gentamicin: S <=2      -  Piperacillin/Tazobactam: S <=8      -  Tobramycin: S <=2      -  Trimethoprim/Sulfamethoxazole: S     Culture - Surgical Swab (collected 24 @ 18:03)  Source: .Surgical Swab left neck or spec  Gram Stain (24 @ 22:00):    Rare Gram Negative Rods    Rare Gram positive cocci in pairs    Moderate WBC's  Preliminary Report (24 @ 13:40):    Moderate Escherichia coli    Moderate Enterobacter cloacae complex Susceptibility to follow.    Rare Candida parapsilosis    Culture in progress  Organism: Escherichia coli (24 @ 12:10)  Organism: Escherichia coli (24 @ 12:10)      Method Type: OTTO      -  Ampicillin: S <=8 These ampicillin results predict results for amoxicillin      -  Ampicillin/Sulbactam: S <=4/2      -  Cefazolin: S <=2      -  Ceftriaxone: S <=1      -  Ciprofloxacin: S <=0.25      -  Ertapenem: S <=0.5      -  Gentamicin: S <=2      -  Piperacillin/Tazobactam: S <=8      -  Tobramycin: S <=2      -  Trimethoprim/Sulfamethoxazole: S       Culture Results:   Moderate Escherichia coli  Moderate Enterobacter cloacae complex Susceptibility to follow.  Rare Candida parapsilosis  Culture in progress (24 @ 18:03)  Culture Results:   Numerous Escherichia coli  Numerous Enterobacter cloacae complex Susceptibility to follow.  Few Enterococcus faecalis Susceptibility to follow.  Rare Staphylococcus epidermidis  Culture in progress (24 @ 18:03)  Culture Results:   No growth at 5 days. (23 @ 10:08)  Culture Results:   Moderate Escherichia coli  Moderate Enterobacter cloacae complex  Few Enterococcus faecalis  Few Streptococcus mitis/oralis group  Few Staphylococcus epidermidis  Few Streptococcus constellatus  Few Stenotrophomonas maltophilia  Mixed Anaerobic Joy including  Few Prevotella species (most closely resembles Prevotella barnaie)  Few Prevotella denticola  Culture grew 3 or more types of organisms which indicate collection  contamination; consider recollection only if clinically indicated. (23 @ 20:46)      Culture - Tissue with Gram Stain (collected 24 @ 18:03)  Source: .Tissue left mandible tissue or spec  Gram Stain (24 @ 21:59):    Numerous Gram Negative Rods    Few Gram positive cocci in pairs    Few WBC's  Preliminary Report (24 @ 13:21):    Numerous Escherichia coli    Numerous Enterobacter cloacae complex Susceptibility to follow.    Few Enterococcus faecalis Susceptibility to follow.    Rare Staphylococcus epidermidis    Culture in progress  Organism: Escherichia coli (24 @ 12:04)  Organism: Escherichia coli (24 @ 12:04)      Method Type: OTTO      -  Ampicillin: S <=8 These ampicillin results predict results for amoxicillin      -  Ampicillin/Sulbactam: S <=4/2      -  Cefazolin: S <=2      -  Ceftriaxone: S <=1      -  Ciprofloxacin: S <=0.25      -  Ertapenem: S <=0.5      -  Gentamicin: S <=2      -  Piperacillin/Tazobactam: S <=8      -  Tobramycin: S <=2      -  Trimethoprim/Sulfamethoxazole: S     Culture - Surgical Swab (collected 24 @ 18:03)  Source: .Surgical Swab left neck or spec  Gram Stain (24 @ 22:00):    Rare Gram Negative Rods    Rare Gram positive cocci in pairs    Moderate WBC's  Preliminary Report (24 @ 13:40):    Moderate Escherichia coli    Moderate Enterobacter cloacae complex Susceptibility to follow.    Rare Candida parapsilosis    Culture in progress  Organism: Escherichia coli (24 @ 12:10)  Organism: Escherichia coli (24 @ 12:10)      Method Type: OTTO      -  Ampicillin: S <=8 These ampicillin results predict results for amoxicillin      -  Ampicillin/Sulbactam: S <=4/2      -  Cefazolin: S <=2      -  Ceftriaxone: S <=1      -  Ciprofloxacin: S <=0.25      -  Ertapenem: S <=0.5      -  Gentamicin: S <=2      -  Piperacillin/Tazobactam: S <=8      -  Tobramycin: S <=2      -  Trimethoprim/Sulfamethoxazole: S      OTOLARYNGOLOGY (ENT) PROGRESS NOTE    PATIENT: SAUNDRA HOLMAN  MRN: [id number]  : 57  [id number]  DATE OF SERVICE:  24  			           ID: SAUNDRA HOLMAN is a  69yo M w PMH of CAD (x2 stents Mar 2023), HLD, T2DM, hx of kidney stones, psoriasis who presented with an oral mass and underwent L hemimandibulectomy, SND L level 1-3, recon with L FFF, STSG, and placement of dental implants on . He had a trach which was subsequently decannulated. He returns  with surgical site infection now s/p OR for debridement and exploration. Trach replaced through prior stoma for pulmonary toilet.     Subjective/ Interval:   ; Patient seen this morning, oral pack to be changed, penrose dressing changed. Intraoral flap with partal skin necrosis, 7.5 portex in place with cuff deflated   : Patient seen and examined at bedside. NAEO. Patient remains afebrile and HD stable. HgB noted to drop to 7.6 from 9.2 but no additional episodes of melena. Penrose draining purulent material. Intra-oral infection/flap stable. Packing changed at bedside. No other complaints or changes at this time. ID recommended Vanc/zosyn and DC unasyn and flagyl, and IM recommended reticulocyte studies and adding folate supplements. No other changes at this time.   : AFVSS. No acute events overnight. Patient seen and examined at bedside. C/w Vanc/Zosyn per ID recs, pending sensitivities. Growing staph epi, E coli, enterobacter from wound cx on . S/p 2U PRBC yday w appropriate response in Hgb. Transfusion goal > 9.0.  : AFVSS. No acute events overnight. Patient seen and examined at bedside. C/w Vanc/Zosyn per ID recs, pending sens. Hgb stable this morning.  : AFVSS. No acute events overnight. Patient seen and examined at bedside. C/w Vanc/Zosyn, e faecalis sens to vanc/zosyn. Had 3 dark BM's yesterday that were stool guiac positive.  : AFVSS. No acute events overnight. Patient seen and examined at bedside. C/w Imipenem (changed per ID, enterobacter resistant to Zosyn). Plan for OR today for further washout / debridement.  1/3: AFVSS. No acute events overnight. Patient seen and examined at bedside. C/w imipenim. OR cx growing numerous gram negative rods and few gram positive cocci in pairs.  : Patient seen bedside, changed intraoral and neck packing,  Continue to follow cultures, pain controlled   : patient seen this morning, complains of right arm pain wit minimal swelling,  IV in the left arm,  Continue abx for 2 weeks. neck and oral packing changed, purulent drainage from neck noted.      ALLERGIES:  No Known Allergies      MEDICATIONS:  Antiinfectives:   imipenem/cilastatin  IVPB 1000 milliGRAM(s) IV Intermittent every 8 hours    IV fluids:  dextrose 5%. 1000 milliLiter(s) IV Continuous <Continuous>  dextrose 5%. 1000 milliLiter(s) IV Continuous <Continuous>  folic acid 1 milliGRAM(s) Enteral Tube daily    Hematologic/Anticoagulation:  aspirin  chewable 81 milliGRAM(s) Enteral Tube daily  enoxaparin Injectable 40 milliGRAM(s) SubCutaneous every 24 hours    Pain medications/Neuro:  HYDROmorphone  Injectable 1 milliGRAM(s) IV Push every 6 hours PRN  ondansetron Injectable 4 milliGRAM(s) IV Push every 8 hours PRN  oxyCODONE    IR 10 milliGRAM(s) Oral every 6 hours PRN  oxyCODONE    Solution 5 milliGRAM(s) Oral every 4 hours PRN    Endocrine Medications:   atorvastatin 40 milliGRAM(s) Oral at bedtime  dextrose 50% Injectable 25 Gram(s) IV Push once  dextrose 50% Injectable 25 Gram(s) IV Push once  dextrose 50% Injectable 12.5 Gram(s) IV Push once  dextrose Oral Gel 15 Gram(s) Oral once PRN  glucagon  Injectable 1 milliGRAM(s) IntraMuscular once  insulin lispro (ADMELOG) corrective regimen sliding scale   SubCutaneous at bedtime  insulin lispro (ADMELOG) corrective regimen sliding scale   SubCutaneous three times a day before meals    All other standing medications:   influenza  Vaccine (HIGH DOSE) 0.7 milliLiter(s) IntraMuscular once  pantoprazole  Injectable 40 milliGRAM(s) IV Push every 24 hours    All other PRN medications:    Vital Signs Last 24 Hrs  T(C): 36.4 (2024 04:58), Max: 36.8 (2024 22:01)  T(F): 97.6 (2024 04:58), Max: 98.2 (2024 22:01)  HR: 78 (2024 04:10) (50 - 78)  BP: 129/60 (2024 04:10) (100/52 - 129/60)  BP(mean): 86 (2024 04:10) (71 - 86)  RR: 17 (2024 04:10) (16 - 18)  SpO2: 100% (2024 04:10) (97% - 100%)    Parameters below as of 2024 04:10  Patient On (Oxygen Delivery Method): tracheostomy collar  O2 Flow (L/min): 10  O2 Concentration (%): 40      01-04 @ 07:01  -  01-05 @ 07:00  --------------------------------------------------------  IN:    Enteral Tube Flush: 100 mL    Vital1.5: 2280 mL  Total IN: 2380 mL    OUT:    Voided (mL): 1800 mL  Total OUT: 1800 mL    Total NET: 580 mL        PHYSICAL EXAM:  GEN: appears stated age, NAD  NEURO: alert & oriented x 4/4  HEENT: Remnant left FFF paddle with dried blood, right half of mandibular dentition present with elastics and screws, iodoform packing replaced  in oral cavity   Neck: 7.5 Portex trach with cuff deflated, changed iodoform packing in the neck, noted to be draining purulent fluid, skin loosely reapproximated with silk suture, Kerlix dressing in place minimally saturated,  CVS: regular rate and rhythm  Pulm: normal respiratory excursions, not tachypneic, no labored breathing on trach collar   Abd: non-distended  Ext: moving all four extremities, mild right arm edema with no overlying erythema     LABS                       9.8    6.48  )-----------( 292      ( 2024 17:29 )             30.1    01-04    143  |  106  |  20  ----------------------------<  146<H>  4.2   |  29  |  0.75    Ca    8.7      2024 17:29  Phos  3.5       Mg     1.8                Coagulation Studies-     Urinalysis Basic - ( 2024 17:29 )    Color: x / Appearance: x / SG: x / pH: x  Gluc: 146 mg/dL / Ketone: x  / Bili: x / Urobili: x   Blood: x / Protein: x / Nitrite: x   Leuk Esterase: x / RBC: x / WBC x   Sq Epi: x / Non Sq Epi: x / Bacteria: x      Endocrine Panel-  Calcium: 8.7 mg/dL ( @ 17:29)  Calcium: 8.6 mg/dL ( @ 10:00)                MICROBIOLOGY:  Culture - Tissue with Gram Stain (collected 24 @ 18:03)  Source: .Tissue left mandible tissue or spec  Gram Stain (24 @ 21:59):    Numerous Gram Negative Rods    Few Gram positive cocci in pairs    Few WBC's  Preliminary Report (24 @ 13:21):    Numerous Escherichia coli    Numerous Enterobacter cloacae complex Susceptibility to follow.    Few Enterococcus faecalis Susceptibility to follow.    Rare Staphylococcus epidermidis    Culture in progress  Organism: Escherichia coli (24 @ 12:04)  Organism: Escherichia coli (24 @ 12:04)      Method Type: OTTO      -  Ampicillin: S <=8 These ampicillin results predict results for amoxicillin      -  Ampicillin/Sulbactam: S <=4/2      -  Cefazolin: S <=2      -  Ceftriaxone: S <=1      -  Ciprofloxacin: S <=0.25      -  Ertapenem: S <=0.5      -  Gentamicin: S <=2      -  Piperacillin/Tazobactam: S <=8      -  Tobramycin: S <=2      -  Trimethoprim/Sulfamethoxazole: S     Culture - Surgical Swab (collected 24 @ 18:03)  Source: .Surgical Swab left neck or spec  Gram Stain (24 @ 22:00):    Rare Gram Negative Rods    Rare Gram positive cocci in pairs    Moderate WBC's  Preliminary Report (24 @ 13:40):    Moderate Escherichia coli    Moderate Enterobacter cloacae complex Susceptibility to follow.    Rare Candida parapsilosis    Culture in progress  Organism: Escherichia coli (24 @ 12:10)  Organism: Escherichia coli (24 @ 12:10)      Method Type: OTTO      -  Ampicillin: S <=8 These ampicillin results predict results for amoxicillin      -  Ampicillin/Sulbactam: S <=4/2      -  Cefazolin: S <=2      -  Ceftriaxone: S <=1      -  Ciprofloxacin: S <=0.25      -  Ertapenem: S <=0.5      -  Gentamicin: S <=2      -  Piperacillin/Tazobactam: S <=8      -  Tobramycin: S <=2      -  Trimethoprim/Sulfamethoxazole: S       Culture Results:   Moderate Escherichia coli  Moderate Enterobacter cloacae complex Susceptibility to follow.  Rare Candida parapsilosis  Culture in progress (24 @ 18:03)  Culture Results:   Numerous Escherichia coli  Numerous Enterobacter cloacae complex Susceptibility to follow.  Few Enterococcus faecalis Susceptibility to follow.  Rare Staphylococcus epidermidis  Culture in progress (24 @ 18:03)  Culture Results:   No growth at 5 days. (23 @ 10:08)  Culture Results:   Moderate Escherichia coli  Moderate Enterobacter cloacae complex  Few Enterococcus faecalis  Few Streptococcus mitis/oralis group  Few Staphylococcus epidermidis  Few Streptococcus constellatus  Few Stenotrophomonas maltophilia  Mixed Anaerobic Joy including  Few Prevotella species (most closely resembles Prevotella barnaie)  Few Prevotella denticola  Culture grew 3 or more types of organisms which indicate collection  contamination; consider recollection only if clinically indicated. (23 @ 20:46)      Culture - Tissue with Gram Stain (collected 24 @ 18:03)  Source: .Tissue left mandible tissue or spec  Gram Stain (24 @ 21:59):    Numerous Gram Negative Rods    Few Gram positive cocci in pairs    Few WBC's  Preliminary Report (24 @ 13:21):    Numerous Escherichia coli    Numerous Enterobacter cloacae complex Susceptibility to follow.    Few Enterococcus faecalis Susceptibility to follow.    Rare Staphylococcus epidermidis    Culture in progress  Organism: Escherichia coli (24 @ 12:04)  Organism: Escherichia coli (24 @ 12:04)      Method Type: OTTO      -  Ampicillin: S <=8 These ampicillin results predict results for amoxicillin      -  Ampicillin/Sulbactam: S <=4/2      -  Cefazolin: S <=2      -  Ceftriaxone: S <=1      -  Ciprofloxacin: S <=0.25      -  Ertapenem: S <=0.5      -  Gentamicin: S <=2      -  Piperacillin/Tazobactam: S <=8      -  Tobramycin: S <=2      -  Trimethoprim/Sulfamethoxazole: S     Culture - Surgical Swab (collected 24 @ 18:03)  Source: .Surgical Swab left neck or spec  Gram Stain (24 @ 22:00):    Rare Gram Negative Rods    Rare Gram positive cocci in pairs    Moderate WBC's  Preliminary Report (24 @ 13:40):    Moderate Escherichia coli    Moderate Enterobacter cloacae complex Susceptibility to follow.    Rare Candida parapsilosis    Culture in progress  Organism: Escherichia coli (24 @ 12:10)  Organism: Escherichia coli (24 @ 12:10)      Method Type: OTTO      -  Ampicillin: S <=8 These ampicillin results predict results for amoxicillin      -  Ampicillin/Sulbactam: S <=4/2      -  Cefazolin: S <=2      -  Ceftriaxone: S <=1      -  Ciprofloxacin: S <=0.25      -  Ertapenem: S <=0.5      -  Gentamicin: S <=2      -  Piperacillin/Tazobactam: S <=8      -  Tobramycin: S <=2      -  Trimethoprim/Sulfamethoxazole: S

## 2024-01-06 LAB
ANION GAP SERPL CALC-SCNC: 6 MMOL/L — SIGNIFICANT CHANGE UP (ref 5–17)
ANION GAP SERPL CALC-SCNC: 6 MMOL/L — SIGNIFICANT CHANGE UP (ref 5–17)
BUN SERPL-MCNC: 20 MG/DL — SIGNIFICANT CHANGE UP (ref 7–23)
BUN SERPL-MCNC: 20 MG/DL — SIGNIFICANT CHANGE UP (ref 7–23)
CALCIUM SERPL-MCNC: 9.1 MG/DL — SIGNIFICANT CHANGE UP (ref 8.4–10.5)
CALCIUM SERPL-MCNC: 9.1 MG/DL — SIGNIFICANT CHANGE UP (ref 8.4–10.5)
CHLORIDE SERPL-SCNC: 104 MMOL/L — SIGNIFICANT CHANGE UP (ref 96–108)
CHLORIDE SERPL-SCNC: 104 MMOL/L — SIGNIFICANT CHANGE UP (ref 96–108)
CO2 SERPL-SCNC: 31 MMOL/L — SIGNIFICANT CHANGE UP (ref 22–31)
CO2 SERPL-SCNC: 31 MMOL/L — SIGNIFICANT CHANGE UP (ref 22–31)
CREAT SERPL-MCNC: 0.78 MG/DL — SIGNIFICANT CHANGE UP (ref 0.5–1.3)
CREAT SERPL-MCNC: 0.78 MG/DL — SIGNIFICANT CHANGE UP (ref 0.5–1.3)
EGFR: 98 ML/MIN/1.73M2 — SIGNIFICANT CHANGE UP
EGFR: 98 ML/MIN/1.73M2 — SIGNIFICANT CHANGE UP
GLUCOSE BLDC GLUCOMTR-MCNC: 105 MG/DL — HIGH (ref 70–99)
GLUCOSE BLDC GLUCOMTR-MCNC: 105 MG/DL — HIGH (ref 70–99)
GLUCOSE BLDC GLUCOMTR-MCNC: 124 MG/DL — HIGH (ref 70–99)
GLUCOSE BLDC GLUCOMTR-MCNC: 124 MG/DL — HIGH (ref 70–99)
GLUCOSE BLDC GLUCOMTR-MCNC: 125 MG/DL — HIGH (ref 70–99)
GLUCOSE BLDC GLUCOMTR-MCNC: 125 MG/DL — HIGH (ref 70–99)
GLUCOSE BLDC GLUCOMTR-MCNC: 211 MG/DL — HIGH (ref 70–99)
GLUCOSE BLDC GLUCOMTR-MCNC: 211 MG/DL — HIGH (ref 70–99)
GLUCOSE BLDC GLUCOMTR-MCNC: 82 MG/DL — SIGNIFICANT CHANGE UP (ref 70–99)
GLUCOSE BLDC GLUCOMTR-MCNC: 82 MG/DL — SIGNIFICANT CHANGE UP (ref 70–99)
GLUCOSE SERPL-MCNC: 128 MG/DL — HIGH (ref 70–99)
GLUCOSE SERPL-MCNC: 128 MG/DL — HIGH (ref 70–99)
HCT VFR BLD CALC: 30.2 % — LOW (ref 39–50)
HCT VFR BLD CALC: 30.2 % — LOW (ref 39–50)
HGB BLD-MCNC: 9.5 G/DL — LOW (ref 13–17)
HGB BLD-MCNC: 9.5 G/DL — LOW (ref 13–17)
MAGNESIUM SERPL-MCNC: 1.8 MG/DL — SIGNIFICANT CHANGE UP (ref 1.6–2.6)
MAGNESIUM SERPL-MCNC: 1.8 MG/DL — SIGNIFICANT CHANGE UP (ref 1.6–2.6)
MCHC RBC-ENTMCNC: 28.8 PG — SIGNIFICANT CHANGE UP (ref 27–34)
MCHC RBC-ENTMCNC: 28.8 PG — SIGNIFICANT CHANGE UP (ref 27–34)
MCHC RBC-ENTMCNC: 31.5 GM/DL — LOW (ref 32–36)
MCHC RBC-ENTMCNC: 31.5 GM/DL — LOW (ref 32–36)
MCV RBC AUTO: 91.5 FL — SIGNIFICANT CHANGE UP (ref 80–100)
MCV RBC AUTO: 91.5 FL — SIGNIFICANT CHANGE UP (ref 80–100)
NRBC # BLD: 0 /100 WBCS — SIGNIFICANT CHANGE UP (ref 0–0)
NRBC # BLD: 0 /100 WBCS — SIGNIFICANT CHANGE UP (ref 0–0)
PHOSPHATE SERPL-MCNC: 2.6 MG/DL — SIGNIFICANT CHANGE UP (ref 2.5–4.5)
PHOSPHATE SERPL-MCNC: 2.6 MG/DL — SIGNIFICANT CHANGE UP (ref 2.5–4.5)
PLATELET # BLD AUTO: 292 K/UL — SIGNIFICANT CHANGE UP (ref 150–400)
PLATELET # BLD AUTO: 292 K/UL — SIGNIFICANT CHANGE UP (ref 150–400)
POTASSIUM SERPL-MCNC: 4.7 MMOL/L — SIGNIFICANT CHANGE UP (ref 3.5–5.3)
POTASSIUM SERPL-MCNC: 4.7 MMOL/L — SIGNIFICANT CHANGE UP (ref 3.5–5.3)
POTASSIUM SERPL-SCNC: 4.7 MMOL/L — SIGNIFICANT CHANGE UP (ref 3.5–5.3)
POTASSIUM SERPL-SCNC: 4.7 MMOL/L — SIGNIFICANT CHANGE UP (ref 3.5–5.3)
RBC # BLD: 3.3 M/UL — LOW (ref 4.2–5.8)
RBC # BLD: 3.3 M/UL — LOW (ref 4.2–5.8)
RBC # FLD: 14.3 % — SIGNIFICANT CHANGE UP (ref 10.3–14.5)
RBC # FLD: 14.3 % — SIGNIFICANT CHANGE UP (ref 10.3–14.5)
SODIUM SERPL-SCNC: 141 MMOL/L — SIGNIFICANT CHANGE UP (ref 135–145)
SODIUM SERPL-SCNC: 141 MMOL/L — SIGNIFICANT CHANGE UP (ref 135–145)
WBC # BLD: 8.3 K/UL — SIGNIFICANT CHANGE UP (ref 3.8–10.5)
WBC # BLD: 8.3 K/UL — SIGNIFICANT CHANGE UP (ref 3.8–10.5)
WBC # FLD AUTO: 8.3 K/UL — SIGNIFICANT CHANGE UP (ref 3.8–10.5)
WBC # FLD AUTO: 8.3 K/UL — SIGNIFICANT CHANGE UP (ref 3.8–10.5)

## 2024-01-06 PROCEDURE — 99233 SBSQ HOSP IP/OBS HIGH 50: CPT | Mod: GC

## 2024-01-06 RX ORDER — ACETYLCYSTEINE 200 MG/ML
4 VIAL (ML) MISCELLANEOUS
Refills: 0 | Status: DISCONTINUED | OUTPATIENT
Start: 2024-01-06 | End: 2024-01-11

## 2024-01-06 RX ADMIN — IMIPENEM AND CILASTATIN 250 MILLIGRAM(S): 250; 250 INJECTION, POWDER, FOR SOLUTION INTRAVENOUS at 09:33

## 2024-01-06 RX ADMIN — HYDROMORPHONE HYDROCHLORIDE 1 MILLIGRAM(S): 2 INJECTION INTRAMUSCULAR; INTRAVENOUS; SUBCUTANEOUS at 08:45

## 2024-01-06 RX ADMIN — PANTOPRAZOLE SODIUM 40 MILLIGRAM(S): 20 TABLET, DELAYED RELEASE ORAL at 11:05

## 2024-01-06 RX ADMIN — ENOXAPARIN SODIUM 40 MILLIGRAM(S): 100 INJECTION SUBCUTANEOUS at 14:46

## 2024-01-06 RX ADMIN — OXYCODONE HYDROCHLORIDE 5 MILLIGRAM(S): 5 TABLET ORAL at 21:50

## 2024-01-06 RX ADMIN — HYDROMORPHONE HYDROCHLORIDE 1 MILLIGRAM(S): 2 INJECTION INTRAMUSCULAR; INTRAVENOUS; SUBCUTANEOUS at 08:35

## 2024-01-06 RX ADMIN — ATORVASTATIN CALCIUM 40 MILLIGRAM(S): 80 TABLET, FILM COATED ORAL at 21:19

## 2024-01-06 RX ADMIN — IMIPENEM AND CILASTATIN 250 MILLIGRAM(S): 250; 250 INJECTION, POWDER, FOR SOLUTION INTRAVENOUS at 03:01

## 2024-01-06 RX ADMIN — Medication 81 MILLIGRAM(S): at 11:05

## 2024-01-06 RX ADMIN — HYDROMORPHONE HYDROCHLORIDE 1 MILLIGRAM(S): 2 INJECTION INTRAMUSCULAR; INTRAVENOUS; SUBCUTANEOUS at 01:15

## 2024-01-06 RX ADMIN — Medication 1 MILLIGRAM(S): at 11:05

## 2024-01-06 RX ADMIN — IMIPENEM AND CILASTATIN 250 MILLIGRAM(S): 250; 250 INJECTION, POWDER, FOR SOLUTION INTRAVENOUS at 18:25

## 2024-01-06 RX ADMIN — HYDROMORPHONE HYDROCHLORIDE 1 MILLIGRAM(S): 2 INJECTION INTRAMUSCULAR; INTRAVENOUS; SUBCUTANEOUS at 00:56

## 2024-01-06 RX ADMIN — Medication 2: at 11:49

## 2024-01-06 RX ADMIN — OXYCODONE HYDROCHLORIDE 5 MILLIGRAM(S): 5 TABLET ORAL at 21:18

## 2024-01-06 RX ADMIN — HYDROMORPHONE HYDROCHLORIDE 1 MILLIGRAM(S): 2 INJECTION INTRAMUSCULAR; INTRAVENOUS; SUBCUTANEOUS at 22:53

## 2024-01-06 RX ADMIN — HYDROMORPHONE HYDROCHLORIDE 1 MILLIGRAM(S): 2 INJECTION INTRAMUSCULAR; INTRAVENOUS; SUBCUTANEOUS at 23:10

## 2024-01-06 NOTE — PROGRESS NOTE ADULT - ATTENDING COMMENTS
67 y/o M PMHx of CAD s/p PCI/CUBA x2 to LAD and Ramus (Mar 2023), T2DM, psoriasis, hx Renal calculi, w/ [id number] SCC of L buccal mucosa s/p hemimandibulectomy, left level 1, 2a, 3 neck neck dissection, L FFF, tracheostomy w/ 7.5 cuffed portex, dental implants, STSG (12/7/23), s/p G tube placement and subsequent trach decannulation and discharged home on ( 12/22/23). Pt however returned to [facility] ( 12/27/23) with surgical site infection, s/p RTOR 12/27 with findings of skin flap necrosis and s/p debridement. Trach replaced through prior stoma for pulmonary toilet. Patient also noted to have dark stools and dropped in Hgb- wife reported black stool for the last 3 days and same thing coming out from peg tube 12/28 , now peg feeding held -. Medicine consulted for co-management.     Pt seen and examined with Dr. 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 Purulent drainage appreciated at margins of flap and in neck.  EGD( 1/2): ulcer found at the G-tube site    - Local wound care: OR findings reviewed, f/u OR mandibular culture per ENT   - ID f/u appreciated ( Team 1) ,  d/c's Vanco & Zosyn ( 1/1) and started on Imipenem 1gm iv q8h  (1/1-) , will need 2 weeks of iv abx from the last washout ( 1/2)   - f/u OR culture ( 1/2): reviewed   - OR cultures with E.coli, ECC, E.faecalis S.mitis/oralis, S.epi, S.constellatus, S.maltophilia, mixed anaerobes.   - f/u GNRs sensitivity   - WBC normal 7K   - BCx( 12/28): ngtd x 5 days   -R antecubital/forearm thrombophlebitis ( resolved) - warm compresses prn, monitor clinical improvement   - L forearm edema 2/2 L iv infiltrated ( removed L antecubital heplock), warm compresses prn     # Acute blood loss anemia/melena   # hx WADE ( Ferritin 768 but Tsat 13% ( <20%) on 12/13/23)   - GI fu appreciated, EGD( 1/2) ulcer at G-tube site, rec; Protonix 40mg daily for 8 weeks and avoid bumper being too tight to cause pressure ulcer   - hgb dropped 7.6 ( 12/29) s/p 2u PRBC ( ~ 500mg elemental iron), trend Hgb 10.7  (1/1) ->10.4( 1/2)-> 10( 1/3) -> 9.2( 1/4) -> 9.5(1/5)  - keep active T&S, keep Hgb>8    - c/w ASA given hx PCI x2 ( March 2023)  - hold off Plavix since adm ( 12/27) , awaiting ENT & GI clearance to resume Plavix  - c/w PPI daily can be via G-tube   - c/w Folic acid 1mg daily   - monitor clinically for any further melena     # CAD sp PCI/CUBA x2 to LAD and Ramus in March 2023 as per cards is in setting of NSTEMI - prefer DAPT, at least monotherapy with ASA if plavix need to be held for procedure, currently on ASA , to restart plavix when safe., c/w ASA 81mg and Atorvastatin 40mg qHS     #  DM - FS with ISS, would hold all ora-hypoglycemic agent, goal -180    # pain management - oxycodone 5mg/10mg prn , dilaudid prn, would need stool softener to ensure daily BM.     # Dysphagia- NPO, trach, on TF with Vital 1.5 via G-tube     # DVT ppx: resumed on Lovenox     Med consult team continues to follow pt with you. Thank you. 67 y/o M PMHx of CAD s/p PCI/CUBA x2 to LAD and Ramus (Mar 2023), T2DM, psoriasis, hx Renal calculi, w/ [id number] SCC of L buccal mucosa s/p hemimandibulectomy, left level 1, 2a, 3 neck neck dissection, L FFF, tracheostomy w/ 7.5 cuffed portex, dental implants, STSG (12/7/23), s/p G tube placement and subsequent trach decannulation and discharged home on ( 12/22/23). Pt however returned to [facility] ( 12/27/23) with surgical site infection, s/p RTOR 12/27 with findings of skin flap necrosis and s/p debridement. Trach replaced through prior stoma for pulmonary toilet. Patient also noted to have dark stools and dropped in Hgb- wife reported black stool for the last 3 days and same thing coming out from peg tube 12/28 , now peg feeding held -. Medicine consulted for co-management.     Pt seen and examined with Dr. 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 Purulent drainage appreciated at margins of flap and in neck.  EGD( 1/2): ulcer found at the G-tube site    - Local wound care: OR findings reviewed, f/u OR mandibular culture per ENT   - ID f/u appreciated ( Team 1) ,  d/c's Vanco & Zosyn ( 1/1) and started on Imipenem 1gm iv q8h  (1/1-) , will need 2 weeks of iv abx from the last washout ( 1/2)   - f/u OR culture ( 1/2): reviewed   - OR cultures with E.coli, ECC, E.faecalis S.mitis/oralis, S.epi, S.constellatus, S.maltophilia, mixed anaerobes.   - f/u GNRs sensitivity   - WBC normal 7K   - BCx( 12/28): ngtd x 5 days   -R antecubital/forearm thrombophlebitis ( resolved) - warm compresses prn, monitor clinical improvement   - L forearm edema 2/2 L iv infiltrated ( removed L antecubital heplock), warm compresses prn     # Acute blood loss anemia/melena   # hx WADE ( Ferritin 768 but Tsat 13% ( <20%) on 12/13/23)   - GI fu appreciated, EGD( 1/2) ulcer at G-tube site, rec; Protonix 40mg daily for 8 weeks and avoid bumper being too tight to cause pressure ulcer   - hgb dropped 7.6 ( 12/29) s/p 2u PRBC ( ~ 500mg elemental iron), trend Hgb 10.7  (1/1) ->10.4( 1/2)-> 10( 1/3) -> 9.2( 1/4) -> 9.5(1/5)  - keep active T&S, keep Hgb>8    - c/w ASA given hx PCI x2 ( March 2023)  - hold off Plavix since adm ( 12/27) , awaiting ENT & GI clearance to resume Plavix  - c/w PPI daily can be via G-tube   - c/w Folic acid 1mg daily   - monitor clinically for any further melena     # CAD sp PCI/CUBA x2 to LAD and Ramus in March 2023 as per cards is in setting of NSTEMI - prefer DAPT, at least monotherapy with ASA if plavix need to be held for procedure, currently on ASA , to restart plavix when safe., c/w ASA 81mg and Atorvastatin 40mg qHS     #  DM - FS with ISS, would hold all ora-hypoglycemic agent, goal -180    # pain management - oxycodone 5mg/10mg prn , dilaudid prn, would need stool softener to ensure daily BM.     # Dysphagia- NPO, trach, on TF with Vital 1.5 via G-tube     # DVT ppx: resumed on Lovenox     Med consult team continues to follow pt with you. Thank you.

## 2024-01-06 NOTE — PROGRESS NOTE ADULT - ASSESSMENT
OTOLARYNGOLOGY (ENT) PROGRESS NOTE    PATIENT: SAUNDRA HOLMAN     MRN: [id number]       : 57  ADMISSION:23  DATE OF SERVICE:  24 @ 10:27  			         ID: SAUNDRA HOLMAN is a  67yo M w PMH of CAD (x2 stents Mar 2023), HLD, T2DM, hx of kidney stones, psoriasis who presented with an oral mass and underwent L hemimandibulectomy, SND L level 1-3, recon with L FFF, STSG, and placement of dental implants on . He had a trach which was subsequently decannulated. He returns  with surgical site infection now s/p OR for debridement and exploration. Trach replaced through prior stoma for pulmonary toilet.     Subjective/ Interval:   ; Patient seen this morning, oral pack to be changed, penrose dressing changed. Intraoral flap with partal skin necrosis, 7.5 portex in place with cuff deflated   : Patient seen and examined at bedside. NAEO. Patient remains afebrile and HD stable. HgB noted to drop to 7.6 from 9.2 but no additional episodes of melena. Penrose draining purulent material. Intra-oral infection/flap stable. Packing changed at bedside. No other complaints or changes at this time. ID recommended Vanc/zosyn and DC unasyn and flagyl, and IM recommended reticulocyte studies and adding folate supplements. No other changes at this time.   : AFVSS. No acute events overnight. Patient seen and examined at bedside. C/w Vanc/Zosyn per ID recs, pending sensitivities. Growing staph epi, E coli, enterobacter from wound cx on . S/p 2U PRBC yday w appropriate response in Hgb. Transfusion goal > 9.0.  : AFVSS. No acute events overnight. Patient seen and examined at bedside. C/w Vanc/Zosyn per ID recs, pending sens. Hgb stable this morning.  : AFVSS. No acute events overnight. Patient seen and examined at bedside. C/w Vanc/Zosyn, e faecalis sens to vanc/zosyn. Had 3 dark BM's yesterday that were stool guiac positive.  : AFVSS. No acute events overnight. Patient seen and examined at bedside. C/w Imipenem (changed per ID, enterobacter resistant to Zosyn). Plan for OR today for further washout / debridement.  1/3: AFVSS. No acute events overnight. Patient seen and examined at bedside. C/w imipenim. OR cx growing numerous gram negative rods and few gram positive cocci in pairs.  : Patient seen bedside, changed intraoral and neck packing,  Continue to follow cultures, pain controlled   : patient seen this morning, complains of right arm pain wit minimal swelling,  IV in the left arm,  Continue abx for 2 weeks. neck and oral packing changed, purulent drainage from neck noted.   : Patient seen at bedside during morning rounds. Reports right arm pain and swelling somewhat improved although still present. IV replaced in right arm yesterday. REmains on IV abx, tentative end date 1/15/24. Neck and oral packing changed at bedside; purulent drainage noted from neck, decreased in volume compared to prior exams.                        Objective:    Vital Signs:  T(C): 36.8 (24 @ 09:13), Max: 36.9 (24 @ 21:54)  HR: 55 (24 @ 09:25) (52 - 82)  BP: 130/60 (24 @ 07:59) (114/55 - 134/63)  RR: 18 (24 @ 09:25) (17 - 20)  SpO2: 99% (24 @ 09:25) (97% - 99%)    PHYSICAL EXAM:  GEN: appears stated age, NAD  NEURO: alert & oriented x   HEENT: Remnant left FFF paddle with dried blood, right half of mandibular dentition present with elastics and screws, iodoform packing replaced  in oral cavity   Neck: 7.5 Portex trach with cuff deflated, changed iodoform packing in the neck, noted to be draining purulent fluid, skin loosely reapproximated with silk suture, Kerlix dressing in place minimally saturated,  CVS: regular rate and rhythm  Pulm: normal respiratory excursions, not tachypneic, no labored breathing on trach collar   Abd: non-distended  Ext: moving all four extremities, mild right arm edema with no overlying erythema      LINES/DRAINS/AIRWAY:  PIV bilateral AC fossa  Trach 7.5 Portex    LABORATORY:                        9.5    8.30  )-----------( 292      ( 2024 05:30 )             30.2        141  |  104  |  20  ----------------------------<  128<H>  4.7   |  31  |  0.78    Ca    9.1      2024 05:30  Phos  2.6       Mg     1.8          [id number]    MICROBIOLOGY:  Culture - Tissue with Gram Stain (collected 24 @ 18:03)  Source: .Tissue left mandible tissue or spec  Gram Stain (24 @ 21:59):    Numerous Gram Negative Rods    Few Gram positive cocci in pairs    Few WBC's  Preliminary Report (24 @ 13:21):    Numerous Escherichia coli    Numerous Enterobacter cloacae complex Susceptibility to follow.    Few Enterococcus faecalis Susceptibility to follow.    Rare Staphylococcus epidermidis    Culture in progress  Organism: Escherichia coli (24 @ 12:04)  Organism: Escherichia coli (24 @ 12:04)      Method Type: OTTO      -  Ampicillin: S <=8 These ampicillin results predict results for amoxicillin      -  Ampicillin/Sulbactam: S <=4/2      -  Cefazolin: S <=2      -  Ceftriaxone: S <=1      -  Ciprofloxacin: S <=0.25      -  Ertapenem: S <=0.5      -  Gentamicin: S <=2      -  Piperacillin/Tazobactam: S <=8      -  Tobramycin: S <=2      -  Trimethoprim/Sulfamethoxazole: S     Culture - Surgical Swab (collected 24 @ 18:03)  Source: .Surgical Swab left neck or spec  Gram Stain (24 @ 22:00):    Rare Gram Negative Rods    Rare Gram positive cocci in pairs    Moderate WBC's  Preliminary Report (24 @ 13:40):    Moderate Escherichia coli    Moderate Enterobacter cloacae complex Susceptibility to follow.    Rare Candida parapsilosis    Culture in progress  Organism: Escherichia coli (24 @ 12:10)  Organism: Escherichia coli (24 @ 12:10)      Method Type: OTTO      -  Ampicillin: S <=8 These ampicillin results predict results for amoxicillin      -  Ampicillin/Sulbactam: S <=4/2      -  Cefazolin: S <=2      -  Ceftriaxone: S <=1      -  Ciprofloxacin: S <=0.25      -  Ertapenem: S <=0.5      -  Gentamicin: S <=2      -  Piperacillin/Tazobactam: S <=8      -  Tobramycin: S <=2      -  Trimethoprim/Sulfamethoxazole: S       Culture Results:   Moderate Escherichia coli  Moderate Enterobacter cloacae complex Susceptibility to follow.  Rare Candida parapsilosis  Culture in progress (24 @ 18:03)  Culture Results:   Numerous Escherichia coli  Numerous Enterobacter cloacae complex Susceptibility to follow.  Few Enterococcus faecalis Susceptibility to follow.  Rare Staphylococcus epidermidis  Culture in progress (24 @ 18:03)  Culture Results:   No growth at 5 days. (23 @ 10:08)  Culture Results:   Moderate Escherichia coli  Moderate Enterobacter cloacae complex  Few Enterococcus faecalis  Few Streptococcus mitis/oralis group  Few Staphylococcus epidermidis  Few Streptococcus constellatus  Few Stenotrophomonas maltophilia  Mixed Anaerobic Joy including  Few Prevotella species (most closely resembles Prevotella barnaie)  Few Prevotella denticola  Culture grew 3 or more types of organisms which indicate collection  contamination; consider recollection only if clinically indicated. (23 @ 20:46)      Culture - Tissue with Gram Stain (collected 24 @ 18:03)  Source: .Tissue left mandible tissue or spec  Gram Stain (24 @ 21:59):    Numerous Gram Negative Rods    Few Gram positive cocci in pairs    Few WBC's  Preliminary Report (24 @ 13:21):    Numerous Escherichia coli    Numerous Enterobacter cloacae complex Susceptibility to follow.    Few Enterococcus faecalis Susceptibility to follow.    Rare Staphylococcus epidermidis    Culture in progress  Organism: Escherichia coli (24 @ 12:04)  Organism: Escherichia coli (24 @ 12:04)      Method Type: OTTO      -  Ampicillin: S <=8 These ampicillin results predict results for amoxicillin      -  Ampicillin/Sulbactam: S <=4/2      -  Cefazolin: S <=2      -  Ceftriaxone: S <=1      -  Ciprofloxacin: S <=0.25      -  Ertapenem: S <=0.5      -  Gentamicin: S <=2      -  Piperacillin/Tazobactam: S <=8      -  Tobramycin: S <=2      -  Trimethoprim/Sulfamethoxazole: S     Culture - Surgical Swab (collected 24 @ 18:03)  Source: .Surgical Swab left neck or spec  Gram Stain (24 @ 22:00):    Rare Gram Negative Rods    Rare Gram positive cocci in pairs    Moderate WBC's  Preliminary Report (24 @ 13:40):    Moderate Escherichia coli    Moderate Enterobacter cloacae complex Susceptibility to follow.    Rare Candida parapsilosis    Culture in progress  Organism: Escherichia coli (24 @ 12:10)  Organism: Escherichia coli (24 @ 12:10)      Method Type: OTTO      -  Ampicillin: S <=8 These ampicillin results predict results for amoxicillin      -  Ampicillin/Sulbactam: S <=4/2      -  Cefazolin: S <=2      -  Ceftriaxone: S <=1      -  Ciprofloxacin: S <=0.25      -  Ertapenem: S <=0.5      -  Gentamicin: S <=2      -  Piperacillin/Tazobactam: S <=8      -  Tobramycin: S <=2      -  Trimethoprim/Sulfamethoxazole: S     I&O:    24 @ 07:  -  24 @ 07:00  --------------------------------------------------------  IN: 1830 mL / OUT: 900 mL / NET: 930 mL    24 @ 07:24 @ 10:27  --------------------------------------------------------  IN: 360 mL / OUT: 0 mL / NET: 360 mL         IMAGING:     MEDICATIONS:  aspirin  chewable 81 milliGRAM(s) Enteral Tube daily  atorvastatin 40 milliGRAM(s) Oral at bedtime  dextrose 5%. 1000 milliLiter(s) IV Continuous <Continuous>  dextrose 5%. 1000 milliLiter(s) IV Continuous <Continuous>  dextrose 50% Injectable 25 Gram(s) IV Push once  dextrose 50% Injectable 12.5 Gram(s) IV Push once  dextrose 50% Injectable 25 Gram(s) IV Push once  dextrose Oral Gel 15 Gram(s) Oral once PRN  enoxaparin Injectable 40 milliGRAM(s) SubCutaneous every 24 hours  folic acid 1 milliGRAM(s) Enteral Tube daily  glucagon  Injectable 1 milliGRAM(s) IntraMuscular once  HYDROmorphone  Injectable 1 milliGRAM(s) IV Push every 6 hours PRN  imipenem/cilastatin  IVPB 1000 milliGRAM(s) IV Intermittent every 8 hours  influenza  Vaccine (HIGH DOSE) 0.7 milliLiter(s) IntraMuscular once  insulin lispro (ADMELOG) corrective regimen sliding scale   SubCutaneous three times a day before meals  insulin lispro (ADMELOG) corrective regimen sliding scale   SubCutaneous at bedtime  ondansetron Injectable 4 milliGRAM(s) IV Push every 8 hours PRN  oxyCODONE    IR 10 milliGRAM(s) Oral every 6 hours PRN  oxyCODONE    Solution 5 milliGRAM(s) Oral every 4 hours PRN  pantoprazole  Injectable 40 milliGRAM(s) IV Push every 24 hours      Assessment and Plan:  SAUNDRA HOLMAN is a 67yo M w PMH of CAD (x2 stents Mar 2023), HLD, T2DM, hx of kidney stones, psoriasis who presented with an oral mass and underwent L hemimandibulectomy, SND L level 1-3, recon with L FFF, STSG, and placement of dental implants on . He had a trach which was subsequently decannulated. Now s/p OR for debridement and exploration. Trach replaced through prior stoma for pulmonary toilet.    Plan:  - Plan for latissimus dorsi flap next week tentatively 1/10.  - Hgb goal > 9.0 (9.5>9.5)  - Pack wound with iodoform BID  - C/w Imipenem ( - 1/15)   - Appreciate  IM recs, and ID recs  - Warm compresses to bilateral arms as needed  - C/w TF  - Continue home asa  - Hold home plavix  - c/w HSQ  - Maintain 7.5 portex for pulm toilet  - ISS  - Pain control  - Home meds  - Bowel regimen    Disposition: PT recommend Home with no needs; has pending free flap surgery next week which may alter disposition. Plan to reassess post repeat surgery    [facility] at [phone] with any questions/concerns.    Av Victor MD   24 @ 10:27       OTOLARYNGOLOGY (ENT) PROGRESS NOTE    PATIENT: SAUNDRA HOLMAN     MRN: [id number]       : 57  ADMISSION:23  DATE OF SERVICE:  24 @ 10:27  			         ID: SAUNDRA HOLMAN is a  69yo M w PMH of CAD (x2 stents Mar 2023), HLD, T2DM, hx of kidney stones, psoriasis who presented with an oral mass and underwent L hemimandibulectomy, SND L level 1-3, recon with L FFF, STSG, and placement of dental implants on . He had a trach which was subsequently decannulated. He returns  with surgical site infection now s/p OR for debridement and exploration. Trach replaced through prior stoma for pulmonary toilet.     Subjective/ Interval:   ; Patient seen this morning, oral pack to be changed, penrose dressing changed. Intraoral flap with partal skin necrosis, 7.5 portex in place with cuff deflated   : Patient seen and examined at bedside. NAEO. Patient remains afebrile and HD stable. HgB noted to drop to 7.6 from 9.2 but no additional episodes of melena. Penrose draining purulent material. Intra-oral infection/flap stable. Packing changed at bedside. No other complaints or changes at this time. ID recommended Vanc/zosyn and DC unasyn and flagyl, and IM recommended reticulocyte studies and adding folate supplements. No other changes at this time.   : AFVSS. No acute events overnight. Patient seen and examined at bedside. C/w Vanc/Zosyn per ID recs, pending sensitivities. Growing staph epi, E coli, enterobacter from wound cx on . S/p 2U PRBC yday w appropriate response in Hgb. Transfusion goal > 9.0.  : AFVSS. No acute events overnight. Patient seen and examined at bedside. C/w Vanc/Zosyn per ID recs, pending sens. Hgb stable this morning.  : AFVSS. No acute events overnight. Patient seen and examined at bedside. C/w Vanc/Zosyn, e faecalis sens to vanc/zosyn. Had 3 dark BM's yesterday that were stool guiac positive.  : AFVSS. No acute events overnight. Patient seen and examined at bedside. C/w Imipenem (changed per ID, enterobacter resistant to Zosyn). Plan for OR today for further washout / debridement.  1/3: AFVSS. No acute events overnight. Patient seen and examined at bedside. C/w imipenim. OR cx growing numerous gram negative rods and few gram positive cocci in pairs.  : Patient seen bedside, changed intraoral and neck packing,  Continue to follow cultures, pain controlled   : patient seen this morning, complains of right arm pain wit minimal swelling,  IV in the left arm,  Continue abx for 2 weeks. neck and oral packing changed, purulent drainage from neck noted.   : Patient seen at bedside during morning rounds. Reports right arm pain and swelling somewhat improved although still present. IV replaced in right arm yesterday. REmains on IV abx, tentative end date 1/15/24. Neck and oral packing changed at bedside; purulent drainage noted from neck, decreased in volume compared to prior exams.                        Objective:    Vital Signs:  T(C): 36.8 (24 @ 09:13), Max: 36.9 (24 @ 21:54)  HR: 55 (24 @ 09:25) (52 - 82)  BP: 130/60 (24 @ 07:59) (114/55 - 134/63)  RR: 18 (24 @ 09:25) (17 - 20)  SpO2: 99% (24 @ 09:25) (97% - 99%)    PHYSICAL EXAM:  GEN: appears stated age, NAD  NEURO: alert & oriented x   HEENT: Remnant left FFF paddle with dried blood, right half of mandibular dentition present with elastics and screws, iodoform packing replaced  in oral cavity   Neck: 7.5 Portex trach with cuff deflated, changed iodoform packing in the neck, noted to be draining purulent fluid, skin loosely reapproximated with silk suture, Kerlix dressing in place minimally saturated,  CVS: regular rate and rhythm  Pulm: normal respiratory excursions, not tachypneic, no labored breathing on trach collar   Abd: non-distended  Ext: moving all four extremities, mild right arm edema with no overlying erythema      LINES/DRAINS/AIRWAY:  PIV bilateral AC fossa  Trach 7.5 Portex    LABORATORY:                        9.5    8.30  )-----------( 292      ( 2024 05:30 )             30.2        141  |  104  |  20  ----------------------------<  128<H>  4.7   |  31  |  0.78    Ca    9.1      2024 05:30  Phos  2.6       Mg     1.8          [id number]    MICROBIOLOGY:  Culture - Tissue with Gram Stain (collected 24 @ 18:03)  Source: .Tissue left mandible tissue or spec  Gram Stain (24 @ 21:59):    Numerous Gram Negative Rods    Few Gram positive cocci in pairs    Few WBC's  Preliminary Report (24 @ 13:21):    Numerous Escherichia coli    Numerous Enterobacter cloacae complex Susceptibility to follow.    Few Enterococcus faecalis Susceptibility to follow.    Rare Staphylococcus epidermidis    Culture in progress  Organism: Escherichia coli (24 @ 12:04)  Organism: Escherichia coli (24 @ 12:04)      Method Type: OTTO      -  Ampicillin: S <=8 These ampicillin results predict results for amoxicillin      -  Ampicillin/Sulbactam: S <=4/2      -  Cefazolin: S <=2      -  Ceftriaxone: S <=1      -  Ciprofloxacin: S <=0.25      -  Ertapenem: S <=0.5      -  Gentamicin: S <=2      -  Piperacillin/Tazobactam: S <=8      -  Tobramycin: S <=2      -  Trimethoprim/Sulfamethoxazole: S     Culture - Surgical Swab (collected 24 @ 18:03)  Source: .Surgical Swab left neck or spec  Gram Stain (24 @ 22:00):    Rare Gram Negative Rods    Rare Gram positive cocci in pairs    Moderate WBC's  Preliminary Report (24 @ 13:40):    Moderate Escherichia coli    Moderate Enterobacter cloacae complex Susceptibility to follow.    Rare Candida parapsilosis    Culture in progress  Organism: Escherichia coli (24 @ 12:10)  Organism: Escherichia coli (24 @ 12:10)      Method Type: OTTO      -  Ampicillin: S <=8 These ampicillin results predict results for amoxicillin      -  Ampicillin/Sulbactam: S <=4/2      -  Cefazolin: S <=2      -  Ceftriaxone: S <=1      -  Ciprofloxacin: S <=0.25      -  Ertapenem: S <=0.5      -  Gentamicin: S <=2      -  Piperacillin/Tazobactam: S <=8      -  Tobramycin: S <=2      -  Trimethoprim/Sulfamethoxazole: S       Culture Results:   Moderate Escherichia coli  Moderate Enterobacter cloacae complex Susceptibility to follow.  Rare Candida parapsilosis  Culture in progress (24 @ 18:03)  Culture Results:   Numerous Escherichia coli  Numerous Enterobacter cloacae complex Susceptibility to follow.  Few Enterococcus faecalis Susceptibility to follow.  Rare Staphylococcus epidermidis  Culture in progress (24 @ 18:03)  Culture Results:   No growth at 5 days. (23 @ 10:08)  Culture Results:   Moderate Escherichia coli  Moderate Enterobacter cloacae complex  Few Enterococcus faecalis  Few Streptococcus mitis/oralis group  Few Staphylococcus epidermidis  Few Streptococcus constellatus  Few Stenotrophomonas maltophilia  Mixed Anaerobic Joy including  Few Prevotella species (most closely resembles Prevotella barnaie)  Few Prevotella denticola  Culture grew 3 or more types of organisms which indicate collection  contamination; consider recollection only if clinically indicated. (23 @ 20:46)      Culture - Tissue with Gram Stain (collected 24 @ 18:03)  Source: .Tissue left mandible tissue or spec  Gram Stain (24 @ 21:59):    Numerous Gram Negative Rods    Few Gram positive cocci in pairs    Few WBC's  Preliminary Report (24 @ 13:21):    Numerous Escherichia coli    Numerous Enterobacter cloacae complex Susceptibility to follow.    Few Enterococcus faecalis Susceptibility to follow.    Rare Staphylococcus epidermidis    Culture in progress  Organism: Escherichia coli (24 @ 12:04)  Organism: Escherichia coli (24 @ 12:04)      Method Type: OTTO      -  Ampicillin: S <=8 These ampicillin results predict results for amoxicillin      -  Ampicillin/Sulbactam: S <=4/2      -  Cefazolin: S <=2      -  Ceftriaxone: S <=1      -  Ciprofloxacin: S <=0.25      -  Ertapenem: S <=0.5      -  Gentamicin: S <=2      -  Piperacillin/Tazobactam: S <=8      -  Tobramycin: S <=2      -  Trimethoprim/Sulfamethoxazole: S     Culture - Surgical Swab (collected 24 @ 18:03)  Source: .Surgical Swab left neck or spec  Gram Stain (24 @ 22:00):    Rare Gram Negative Rods    Rare Gram positive cocci in pairs    Moderate WBC's  Preliminary Report (24 @ 13:40):    Moderate Escherichia coli    Moderate Enterobacter cloacae complex Susceptibility to follow.    Rare Candida parapsilosis    Culture in progress  Organism: Escherichia coli (24 @ 12:10)  Organism: Escherichia coli (24 @ 12:10)      Method Type: OTTO      -  Ampicillin: S <=8 These ampicillin results predict results for amoxicillin      -  Ampicillin/Sulbactam: S <=4/2      -  Cefazolin: S <=2      -  Ceftriaxone: S <=1      -  Ciprofloxacin: S <=0.25      -  Ertapenem: S <=0.5      -  Gentamicin: S <=2      -  Piperacillin/Tazobactam: S <=8      -  Tobramycin: S <=2      -  Trimethoprim/Sulfamethoxazole: S     I&O:    24 @ 07:  -  24 @ 07:00  --------------------------------------------------------  IN: 1830 mL / OUT: 900 mL / NET: 930 mL    24 @ 07:24 @ 10:27  --------------------------------------------------------  IN: 360 mL / OUT: 0 mL / NET: 360 mL         IMAGING:     MEDICATIONS:  aspirin  chewable 81 milliGRAM(s) Enteral Tube daily  atorvastatin 40 milliGRAM(s) Oral at bedtime  dextrose 5%. 1000 milliLiter(s) IV Continuous <Continuous>  dextrose 5%. 1000 milliLiter(s) IV Continuous <Continuous>  dextrose 50% Injectable 25 Gram(s) IV Push once  dextrose 50% Injectable 12.5 Gram(s) IV Push once  dextrose 50% Injectable 25 Gram(s) IV Push once  dextrose Oral Gel 15 Gram(s) Oral once PRN  enoxaparin Injectable 40 milliGRAM(s) SubCutaneous every 24 hours  folic acid 1 milliGRAM(s) Enteral Tube daily  glucagon  Injectable 1 milliGRAM(s) IntraMuscular once  HYDROmorphone  Injectable 1 milliGRAM(s) IV Push every 6 hours PRN  imipenem/cilastatin  IVPB 1000 milliGRAM(s) IV Intermittent every 8 hours  influenza  Vaccine (HIGH DOSE) 0.7 milliLiter(s) IntraMuscular once  insulin lispro (ADMELOG) corrective regimen sliding scale   SubCutaneous three times a day before meals  insulin lispro (ADMELOG) corrective regimen sliding scale   SubCutaneous at bedtime  ondansetron Injectable 4 milliGRAM(s) IV Push every 8 hours PRN  oxyCODONE    IR 10 milliGRAM(s) Oral every 6 hours PRN  oxyCODONE    Solution 5 milliGRAM(s) Oral every 4 hours PRN  pantoprazole  Injectable 40 milliGRAM(s) IV Push every 24 hours      Assessment and Plan:  SAUNDRA HOLMAN is a 69yo M w PMH of CAD (x2 stents Mar 2023), HLD, T2DM, hx of kidney stones, psoriasis who presented with an oral mass and underwent L hemimandibulectomy, SND L level 1-3, recon with L FFF, STSG, and placement of dental implants on . He had a trach which was subsequently decannulated. Now s/p OR for debridement and exploration. Trach replaced through prior stoma for pulmonary toilet.    Plan:  - Plan for latissimus dorsi flap next week tentatively 1/10.  - Hgb goal > 9.0 (9.5>9.5)  - Pack wound with iodoform BID  - C/w Imipenem ( - 1/15)   - Appreciate  IM recs, and ID recs  - Warm compresses to bilateral arms as needed  - C/w TF  - Continue home asa  - Hold home plavix  - c/w HSQ  - Maintain 7.5 portex for pulm toilet  - ISS  - Pain control  - Home meds  - Bowel regimen    Disposition: PT recommend Home with no needs; has pending free flap surgery next week which may alter disposition. Plan to reassess post repeat surgery    [facility] at [phone] with any questions/concerns.    Av Victor MD   24 @ 10:27

## 2024-01-06 NOTE — PROGRESS NOTE ADULT - SUBJECTIVE AND OBJECTIVE BOX
OVERNIGHT EVENTS: No acute overnight events. Patient appears comfortable. Pain is manageable.     SUBJECTIVE / INTERVAL HPI: Patient seen and examined at bedside.     VITAL SIGNS:  Vital Signs Last 24 Hrs  T(C): 36.2 (06 Jan 2024 05:21), Max: 36.9 (05 Jan 2024 09:02)  T(F): 97.2 (06 Jan 2024 05:21), Max: 98.4 (05 Jan 2024 09:02)  HR: 52 (06 Jan 2024 07:59) (52 - 82)  BP: 130/60 (06 Jan 2024 07:59) (114/55 - 134/63)  BP(mean): 87 (06 Jan 2024 07:59) (79 - 91)  RR: 20 (06 Jan 2024 07:59) (17 - 20)  SpO2: 98% (06 Jan 2024 07:59) (97% - 99%)    Parameters below as of 06 Jan 2024 07:59  Patient On (Oxygen Delivery Method): tracheostomy collar  O2 Flow (L/min): 10  O2 Concentration (%): 40    PHYSICAL EXAM:    General: NAD  HEENT: NC/AT; PERRL, anicteric sclera; mouth packed with gauze, neck also wrapped with gauze  Neck: supple w/o palpable nodularity  Cardiovascular: +S1/S2; RRR  Respiratory: CTA B/L; no W/R/R  Gastrointestinal: soft, NT/ND; +BSx4  Extremities: WWP; no edema, clubbing or cyanosis  Vascular: 2+ radial, DP/PT pulses B/L  Neurological: AAOx3; no focal deficits    MEDICATIONS:  MEDICATIONS  (STANDING):  aspirin  chewable 81 milliGRAM(s) Enteral Tube daily  atorvastatin 40 milliGRAM(s) Oral at bedtime  dextrose 5%. 1000 milliLiter(s) (50 mL/Hr) IV Continuous <Continuous>  dextrose 5%. 1000 milliLiter(s) (100 mL/Hr) IV Continuous <Continuous>  dextrose 50% Injectable 25 Gram(s) IV Push once  dextrose 50% Injectable 12.5 Gram(s) IV Push once  dextrose 50% Injectable 25 Gram(s) IV Push once  enoxaparin Injectable 40 milliGRAM(s) SubCutaneous every 24 hours  folic acid 1 milliGRAM(s) Enteral Tube daily  glucagon  Injectable 1 milliGRAM(s) IntraMuscular once  imipenem/cilastatin  IVPB 1000 milliGRAM(s) IV Intermittent every 8 hours  influenza  Vaccine (HIGH DOSE) 0.7 milliLiter(s) IntraMuscular once  insulin lispro (ADMELOG) corrective regimen sliding scale   SubCutaneous at bedtime  insulin lispro (ADMELOG) corrective regimen sliding scale   SubCutaneous three times a day before meals  pantoprazole  Injectable 40 milliGRAM(s) IV Push every 24 hours    MEDICATIONS  (PRN):  dextrose Oral Gel 15 Gram(s) Oral once PRN Blood Glucose LESS THAN 70 milliGRAM(s)/deciliter  HYDROmorphone  Injectable 1 milliGRAM(s) IV Push every 6 hours PRN breakthrough pain  ondansetron Injectable 4 milliGRAM(s) IV Push every 8 hours PRN Nausea  oxyCODONE    IR 10 milliGRAM(s) Oral every 6 hours PRN Severe Pain (7 - 10)  oxyCODONE    Solution 5 milliGRAM(s) Oral every 4 hours PRN Moderate Pain (4 - 6)      ALLERGIES:  Allergies    No Known Allergies    Intolerances        LABS:                        9.5    8.30  )-----------( 292      ( 06 Jan 2024 05:30 )             30.2     01-05    140  |  104  |  19  ----------------------------<  186<H>  4.3   |  27  |  0.69    Ca    9.1      05 Jan 2024 07:38  Phos  2.7     01-05  Mg     1.8     01-05        Urinalysis Basic - ( 05 Jan 2024 07:38 )    Color: x / Appearance: x / SG: x / pH: x  Gluc: 186 mg/dL / Ketone: x  / Bili: x / Urobili: x   Blood: x / Protein: x / Nitrite: x   Leuk Esterase: x / RBC: x / WBC x   Sq Epi: x / Non Sq Epi: x / Bacteria: x      CAPILLARY BLOOD GLUCOSE      POCT Blood Glucose.: 124 mg/dL (06 Jan 2024 05:50)      RADIOLOGY & ADDITIONAL TESTS: Reviewed.

## 2024-01-07 LAB
ANION GAP SERPL CALC-SCNC: 8 MMOL/L — SIGNIFICANT CHANGE UP (ref 5–17)
ANION GAP SERPL CALC-SCNC: 8 MMOL/L — SIGNIFICANT CHANGE UP (ref 5–17)
BUN SERPL-MCNC: 18 MG/DL — SIGNIFICANT CHANGE UP (ref 7–23)
BUN SERPL-MCNC: 18 MG/DL — SIGNIFICANT CHANGE UP (ref 7–23)
CALCIUM SERPL-MCNC: 8.9 MG/DL — SIGNIFICANT CHANGE UP (ref 8.4–10.5)
CALCIUM SERPL-MCNC: 8.9 MG/DL — SIGNIFICANT CHANGE UP (ref 8.4–10.5)
CHLORIDE SERPL-SCNC: 102 MMOL/L — SIGNIFICANT CHANGE UP (ref 96–108)
CHLORIDE SERPL-SCNC: 102 MMOL/L — SIGNIFICANT CHANGE UP (ref 96–108)
CO2 SERPL-SCNC: 28 MMOL/L — SIGNIFICANT CHANGE UP (ref 22–31)
CO2 SERPL-SCNC: 28 MMOL/L — SIGNIFICANT CHANGE UP (ref 22–31)
CREAT SERPL-MCNC: 0.68 MG/DL — SIGNIFICANT CHANGE UP (ref 0.5–1.3)
CREAT SERPL-MCNC: 0.68 MG/DL — SIGNIFICANT CHANGE UP (ref 0.5–1.3)
EGFR: 102 ML/MIN/1.73M2 — SIGNIFICANT CHANGE UP
EGFR: 102 ML/MIN/1.73M2 — SIGNIFICANT CHANGE UP
GLUCOSE BLDC GLUCOMTR-MCNC: 124 MG/DL — HIGH (ref 70–99)
GLUCOSE BLDC GLUCOMTR-MCNC: 124 MG/DL — HIGH (ref 70–99)
GLUCOSE BLDC GLUCOMTR-MCNC: 149 MG/DL — HIGH (ref 70–99)
GLUCOSE BLDC GLUCOMTR-MCNC: 149 MG/DL — HIGH (ref 70–99)
GLUCOSE BLDC GLUCOMTR-MCNC: 196 MG/DL — HIGH (ref 70–99)
GLUCOSE BLDC GLUCOMTR-MCNC: 196 MG/DL — HIGH (ref 70–99)
GLUCOSE BLDC GLUCOMTR-MCNC: 222 MG/DL — HIGH (ref 70–99)
GLUCOSE BLDC GLUCOMTR-MCNC: 222 MG/DL — HIGH (ref 70–99)
GLUCOSE SERPL-MCNC: 108 MG/DL — HIGH (ref 70–99)
GLUCOSE SERPL-MCNC: 108 MG/DL — HIGH (ref 70–99)
HCT VFR BLD CALC: 29.8 % — LOW (ref 39–50)
HCT VFR BLD CALC: 29.8 % — LOW (ref 39–50)
HGB BLD-MCNC: 9.4 G/DL — LOW (ref 13–17)
HGB BLD-MCNC: 9.4 G/DL — LOW (ref 13–17)
MAGNESIUM SERPL-MCNC: 1.8 MG/DL — SIGNIFICANT CHANGE UP (ref 1.6–2.6)
MAGNESIUM SERPL-MCNC: 1.8 MG/DL — SIGNIFICANT CHANGE UP (ref 1.6–2.6)
MCHC RBC-ENTMCNC: 28.5 PG — SIGNIFICANT CHANGE UP (ref 27–34)
MCHC RBC-ENTMCNC: 28.5 PG — SIGNIFICANT CHANGE UP (ref 27–34)
MCHC RBC-ENTMCNC: 31.5 GM/DL — LOW (ref 32–36)
MCHC RBC-ENTMCNC: 31.5 GM/DL — LOW (ref 32–36)
MCV RBC AUTO: 90.3 FL — SIGNIFICANT CHANGE UP (ref 80–100)
MCV RBC AUTO: 90.3 FL — SIGNIFICANT CHANGE UP (ref 80–100)
NRBC # BLD: 0 /100 WBCS — SIGNIFICANT CHANGE UP (ref 0–0)
NRBC # BLD: 0 /100 WBCS — SIGNIFICANT CHANGE UP (ref 0–0)
PHOSPHATE SERPL-MCNC: 2.5 MG/DL — SIGNIFICANT CHANGE UP (ref 2.5–4.5)
PHOSPHATE SERPL-MCNC: 2.5 MG/DL — SIGNIFICANT CHANGE UP (ref 2.5–4.5)
PLATELET # BLD AUTO: 270 K/UL — SIGNIFICANT CHANGE UP (ref 150–400)
PLATELET # BLD AUTO: 270 K/UL — SIGNIFICANT CHANGE UP (ref 150–400)
POTASSIUM SERPL-MCNC: 4.4 MMOL/L — SIGNIFICANT CHANGE UP (ref 3.5–5.3)
POTASSIUM SERPL-MCNC: 4.4 MMOL/L — SIGNIFICANT CHANGE UP (ref 3.5–5.3)
POTASSIUM SERPL-SCNC: 4.4 MMOL/L — SIGNIFICANT CHANGE UP (ref 3.5–5.3)
POTASSIUM SERPL-SCNC: 4.4 MMOL/L — SIGNIFICANT CHANGE UP (ref 3.5–5.3)
RBC # BLD: 3.3 M/UL — LOW (ref 4.2–5.8)
RBC # BLD: 3.3 M/UL — LOW (ref 4.2–5.8)
RBC # FLD: 14.2 % — SIGNIFICANT CHANGE UP (ref 10.3–14.5)
RBC # FLD: 14.2 % — SIGNIFICANT CHANGE UP (ref 10.3–14.5)
SODIUM SERPL-SCNC: 138 MMOL/L — SIGNIFICANT CHANGE UP (ref 135–145)
SODIUM SERPL-SCNC: 138 MMOL/L — SIGNIFICANT CHANGE UP (ref 135–145)
WBC # BLD: 6.75 K/UL — SIGNIFICANT CHANGE UP (ref 3.8–10.5)
WBC # BLD: 6.75 K/UL — SIGNIFICANT CHANGE UP (ref 3.8–10.5)
WBC # FLD AUTO: 6.75 K/UL — SIGNIFICANT CHANGE UP (ref 3.8–10.5)
WBC # FLD AUTO: 6.75 K/UL — SIGNIFICANT CHANGE UP (ref 3.8–10.5)

## 2024-01-07 PROCEDURE — 99232 SBSQ HOSP IP/OBS MODERATE 35: CPT

## 2024-01-07 PROCEDURE — 99233 SBSQ HOSP IP/OBS HIGH 50: CPT

## 2024-01-07 RX ORDER — CHLORHEXIDINE GLUCONATE 213 G/1000ML
15 SOLUTION TOPICAL
Refills: 0 | Status: DISCONTINUED | OUTPATIENT
Start: 2024-01-07 | End: 2024-01-11

## 2024-01-07 RX ORDER — MAGNESIUM SULFATE 500 MG/ML
2 VIAL (ML) INJECTION ONCE
Refills: 0 | Status: COMPLETED | OUTPATIENT
Start: 2024-01-07 | End: 2024-01-07

## 2024-01-07 RX ORDER — ACETAMINOPHEN 500 MG
650 TABLET ORAL EVERY 6 HOURS
Refills: 0 | Status: DISCONTINUED | OUTPATIENT
Start: 2024-01-07 | End: 2024-01-11

## 2024-01-07 RX ADMIN — Medication 1 MILLIGRAM(S): at 11:04

## 2024-01-07 RX ADMIN — Medication 81 MILLIGRAM(S): at 11:04

## 2024-01-07 RX ADMIN — Medication 2: at 12:02

## 2024-01-07 RX ADMIN — ATORVASTATIN CALCIUM 40 MILLIGRAM(S): 80 TABLET, FILM COATED ORAL at 21:06

## 2024-01-07 RX ADMIN — Medication 25 GRAM(S): at 16:10

## 2024-01-07 RX ADMIN — Medication 4 MILLILITER(S): at 07:09

## 2024-01-07 RX ADMIN — Medication 4 MILLILITER(S): at 20:59

## 2024-01-07 RX ADMIN — Medication 4 MILLILITER(S): at 16:11

## 2024-01-07 RX ADMIN — IMIPENEM AND CILASTATIN 250 MILLIGRAM(S): 250; 250 INJECTION, POWDER, FOR SOLUTION INTRAVENOUS at 10:58

## 2024-01-07 RX ADMIN — PANTOPRAZOLE SODIUM 40 MILLIGRAM(S): 20 TABLET, DELAYED RELEASE ORAL at 11:04

## 2024-01-07 RX ADMIN — Medication 4 MILLILITER(S): at 11:04

## 2024-01-07 RX ADMIN — HYDROMORPHONE HYDROCHLORIDE 1 MILLIGRAM(S): 2 INJECTION INTRAMUSCULAR; INTRAVENOUS; SUBCUTANEOUS at 08:39

## 2024-01-07 RX ADMIN — IMIPENEM AND CILASTATIN 250 MILLIGRAM(S): 250; 250 INJECTION, POWDER, FOR SOLUTION INTRAVENOUS at 02:54

## 2024-01-07 RX ADMIN — Medication 650 MILLIGRAM(S): at 23:52

## 2024-01-07 RX ADMIN — Medication 650 MILLIGRAM(S): at 22:44

## 2024-01-07 RX ADMIN — Medication 1: at 17:45

## 2024-01-07 RX ADMIN — IMIPENEM AND CILASTATIN 250 MILLIGRAM(S): 250; 250 INJECTION, POWDER, FOR SOLUTION INTRAVENOUS at 18:10

## 2024-01-07 RX ADMIN — Medication 4 MILLILITER(S): at 00:38

## 2024-01-07 RX ADMIN — CHLORHEXIDINE GLUCONATE 15 MILLILITER(S): 213 SOLUTION TOPICAL at 18:11

## 2024-01-07 RX ADMIN — ENOXAPARIN SODIUM 40 MILLIGRAM(S): 100 INJECTION SUBCUTANEOUS at 16:10

## 2024-01-07 RX ADMIN — HYDROMORPHONE HYDROCHLORIDE 1 MILLIGRAM(S): 2 INJECTION INTRAMUSCULAR; INTRAVENOUS; SUBCUTANEOUS at 08:55

## 2024-01-07 NOTE — PROGRESS NOTE ADULT - SUBJECTIVE AND OBJECTIVE BOX
Patient is a 68y old  Male who presents with a chief complaint of Wound infection (07 Jan 2024 11:18)    INTERVAL EVENTS: NAEON     SUBJECTIVE:  Patient was seen and examined at bedside. resting in bed, no complaints. wife at bedside.     Review of systems: No fever, chills, dizziness, HA, Changes in vision, CP, dyspnea, nausea or vomiting, dysuria, changes in bowel movements, LE edema. Rest of 12 point Review of systems negative unless otherwise documented elsewhere in note.     Diet, NPO with Tube Feed:   Tube Feeding Modality: Gastrostomy  Vital 1.5 Marcus (VITAL1.5)  Total Volume for 24 Hours (mL): 1350  Total Number of Cans: 6  Intermittent  Starting Tube Feed Rate mL per Hour: 120  Increase Tube Feed Rate by (mL): 10    Every hour  Until Goal Tube Feed Rate (mL per Hour): 150  Tube Feeding Hours ON: 9  Tube Feeding OFF (Hours): 15  Tube Feed Start Time: 11:00  Free Water Flush   Total Volume per Flush (mL): 200   Frequency: Every 6 Hours   Total Daily Volume of Flush (mL): 200    Start Time: 11:00  Banatrol TF     Qty per Day:  2 (01-07-24 @ 08:05) [Active]  Diet, NPO with Tube Feed:   Tube Feeding Modality: Gastrostomy  Vital 1.5 Marcus (VITAL1.5)  Total Volume for 24 Hours (mL): 1350  Total Number of Cans: 6  Intermittent  Starting Tube Feed Rate mL per Hour: 10  Increase Tube Feed Rate by (mL): 20    Every 6 hours  Until Goal Tube Feed Rate (mL per Hour): 150  Tube Feeding Hours ON: 9  Tube Feeding OFF (Hours): 15  Tube Feed Start Time: 11:00  Free Water Flush  Bolus   Total Volume per Flush (mL): 200   Frequency: Every 6 Hours   Total Daily Volume of Flush (mL): 800  Free Water Flush Instructions:  Flush with 50 mL water before and after tube feed administration  Banatrol TF     Qty per Day:  2 or as per team (01-05-24 @ 16:21) [Pending Verification By Attending]      MEDICATIONS:  MEDICATIONS  (STANDING):  aspirin  chewable 81 milliGRAM(s) Enteral Tube daily  atorvastatin 40 milliGRAM(s) Oral at bedtime  chlorhexidine 0.12% Liquid 15 milliLiter(s) Oral Mucosa two times a day  dextrose 5%. 1000 milliLiter(s) (50 mL/Hr) IV Continuous <Continuous>  dextrose 5%. 1000 milliLiter(s) (100 mL/Hr) IV Continuous <Continuous>  dextrose 50% Injectable 12.5 Gram(s) IV Push once  dextrose 50% Injectable 25 Gram(s) IV Push once  dextrose 50% Injectable 25 Gram(s) IV Push once  enoxaparin Injectable 40 milliGRAM(s) SubCutaneous every 24 hours  folic acid 1 milliGRAM(s) Enteral Tube daily  glucagon  Injectable 1 milliGRAM(s) IntraMuscular once  imipenem/cilastatin  IVPB 1000 milliGRAM(s) IV Intermittent every 8 hours  influenza  Vaccine (HIGH DOSE) 0.7 milliLiter(s) IntraMuscular once  insulin lispro (ADMELOG) corrective regimen sliding scale   SubCutaneous at bedtime  insulin lispro (ADMELOG) corrective regimen sliding scale   SubCutaneous three times a day before meals  pantoprazole  Injectable 40 milliGRAM(s) IV Push every 24 hours    MEDICATIONS  (PRN):  acetylcysteine 10%  Inhalation 4 milliLiter(s) Inhalation every 2 hours PRN secretions  dextrose Oral Gel 15 Gram(s) Oral once PRN Blood Glucose LESS THAN 70 milliGRAM(s)/deciliter  HYDROmorphone  Injectable 1 milliGRAM(s) IV Push every 6 hours PRN breakthrough pain  ondansetron Injectable 4 milliGRAM(s) IV Push every 8 hours PRN Nausea  oxyCODONE    IR 10 milliGRAM(s) Oral every 6 hours PRN Severe Pain (7 - 10)  oxyCODONE    Solution 5 milliGRAM(s) Oral every 4 hours PRN Moderate Pain (4 - 6)      Allergies    No Known Allergies    Intolerances        OBJECTIVE:  Vital Signs Last 24 Hrs  T(C): 36.6 (07 Jan 2024 09:14), Max: 36.8 (06 Jan 2024 17:47)  T(F): 97.9 (07 Jan 2024 09:14), Max: 98.2 (06 Jan 2024 17:47)  HR: 62 (07 Jan 2024 09:15) (57 - 78)  BP: 108/55 (07 Jan 2024 08:39) (97/52 - 130/60)  BP(mean): 79 (07 Jan 2024 08:39) (71 - 87)  RR: 18 (07 Jan 2024 09:15) (16 - 18)  SpO2: 98% (07 Jan 2024 09:15) (96% - 99%)    Parameters below as of 07 Jan 2024 09:15  Patient On (Oxygen Delivery Method): tracheostomy collar  O2 Flow (L/min): 10  O2 Concentration (%): 40  I&O's Summary    06 Jan 2024 07:01  -  07 Jan 2024 07:00  --------------------------------------------------------  IN: 2100 mL / OUT: 1325 mL / NET: 775 mL    07 Jan 2024 07:01  -  07 Jan 2024 11:56  --------------------------------------------------------  IN: 360 mL / OUT: 250 mL / NET: 110 mL        PHYSICAL EXAM:  Gen: Reclining in bed at time of exam, appears stated age  HEENT: NCAT, MMM, clear OP, trach/dressing in place  Neck: supple, trachea at midline  CV: RRR, +S1/S2  Pulm: adequate respiratory effort, no increase in work of breathing  Abd: soft, NTND  Skin: warm and dry,   Ext: WWP, no LE edema, IV over R antecubital fossa looking ok. L forearm site ok ( previous iv infiltrated)   Neuro: AOx3, no gross focal neurological deficits  Psych: affect and behavior appropriate, pleasant at time of interview  :     LABS:                        9.4    6.75  )-----------( 270      ( 07 Jan 2024 06:18 )             29.8     01-07    138  |  102  |  18  ----------------------------<  108<H>  4.4   |  28  |  0.68    Ca    8.9      07 Jan 2024 06:18  Phos  2.5     01-07  Mg     1.8     01-07          CAPILLARY BLOOD GLUCOSE      POCT Blood Glucose.: 222 mg/dL (07 Jan 2024 11:20)  POCT Blood Glucose.: 124 mg/dL (07 Jan 2024 06:47)  POCT Blood Glucose.: 125 mg/dL (06 Jan 2024 21:48)  POCT Blood Glucose.: 82 mg/dL (06 Jan 2024 16:05)    Urinalysis Basic - ( 07 Jan 2024 06:18 )    Color: x / Appearance: x / SG: x / pH: x  Gluc: 108 mg/dL / Ketone: x  / Bili: x / Urobili: x   Blood: x / Protein: x / Nitrite: x   Leuk Esterase: x / RBC: x / WBC x   Sq Epi: x / Non Sq Epi: x / Bacteria: x        MICRODATA:      RADIOLOGY/OTHER STUDIES:

## 2024-01-07 NOTE — PROGRESS NOTE ADULT - ASSESSMENT
69 y/o M PMHx of CAD s/p PCI/CUBA x2 to LAD and Ramus (Mar 2023), T2DM, psoriasis, hx Renal calculi, w/ [id number] SCC of L buccal mucosa s/p hemimandibulectomy, left level 1, 2a, 3 neck neck dissection, L FFF, tracheostomy w/ 7.5 cuffed portex, dental implants, STSG (12/7/23), s/p G tube placement and subsequent trach decannulation and discharged home on ( 12/22/23). Pt however returned to [facility] ( 12/27/23) with surgical site infection, s/p RTOR 12/27 with findings of skin flap necrosis and s/p debridement. Trach replaced through prior stoma for pulmonary toilet. Patient also noted to have dark stools and dropped in Hgb- wife reported black stool for the last 3 days and same thing coming out from peg tube 12/28 , now peg feeding held -. Medicine consulted for co-management.     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 Purulent drainage appreciated at margins of flap and in neck.  EGD( 1/2): ulcer found at the G-tube site    - Local wound care: OR findings reviewed, f/u OR mandibular culture per ENT   - ID f/u appreciated ( Team 1) ,  d/c's Vanco & Zosyn ( 1/1) and started on Imipenem 1gm iv q8h  (1/1-) , will need 2 weeks of iv abx from the last washout ( 1/2)   - f/u OR culture ( 1/2): reviewed   - OR cultures with E.coli, ECC, E.faecalis S.mitis/oralis, S.epi, S.constellatus, S.maltophilia, mixed anaerobes.   - f/u GNRs sensitivity   - WBC normal 6.74K (1/7)   - BCx( 12/28): ngtd x 5 days   - IV site looks ok ( R antecubital fossa)   # Acute blood loss anemia/melena   # hx WADE ( Ferritin 768 but Tsat 13% ( <20%) on 12/13/23)   - GI fu appreciated, EGD( 1/2) ulcer at G-tube site, rec; Protonix 40mg daily for 8 weeks and avoid bumper being too tight to cause pressure ulcer   - hgb dropped 7.6 ( 12/29) s/p 2u PRBC ( ~ 500mg elemental iron), trend Hgb 10.7  (1/1) ->10.4( 1/2)-> 10( 1/3) -> 9.2( 1/4) -> 9.5(1/5)-> 9.4( 1/7)   - keep active T&S, keep Hgb>8    - c/w ASA given hx PCI x2 ( March 2023)  - hold off Plavix since adm ( 12/27) , awaiting ENT & GI clearance to resume Plavix  - c/w PPI daily can be via G-tube   - c/w Folic acid 1mg daily   - monitor clinically for any further melena     # CAD sp PCI/CUBA x2 to LAD and Ramus in March 2023 as per cards is in setting of NSTEMI - prefer DAPT, at least monotherapy with ASA if plavix need to be held for procedure, currently on ASA , to restart plavix when safe., c/w ASA 81mg and Atorvastatin 40mg qHS     #  DM - FS with ISS, would hold all ora-hypoglycemic agent, goal -180,  thisam, ranged  yesterday     # pain management - oxycodone 5mg/10mg prn , dilaudid prn, would need stool softener to ensure daily BM.     # Dysphagia- NPO, trach, on TF with Vital 1.5 via G-tube     # DVT ppx: resumed on Lovenox     Med consult team continues to follow pt with you. Thank you.      69 y/o M PMHx of CAD s/p PCI/CUBA x2 to LAD and Ramus (Mar 2023), T2DM, psoriasis, hx Renal calculi, w/ [id number] SCC of L buccal mucosa s/p hemimandibulectomy, left level 1, 2a, 3 neck neck dissection, L FFF, tracheostomy w/ 7.5 cuffed portex, dental implants, STSG (12/7/23), s/p G tube placement and subsequent trach decannulation and discharged home on ( 12/22/23). Pt however returned to [facility] ( 12/27/23) with surgical site infection, s/p RTOR 12/27 with findings of skin flap necrosis and s/p debridement. Trach replaced through prior stoma for pulmonary toilet. Patient also noted to have dark stools and dropped in Hgb- wife reported black stool for the last 3 days and same thing coming out from peg tube 12/28 , now peg feeding held -. Medicine consulted for co-management.     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 Purulent drainage appreciated at margins of flap and in neck.  EGD( 1/2): ulcer found at the G-tube site    - Local wound care: OR findings reviewed, f/u OR mandibular culture per ENT   - ID f/u appreciated ( Team 1) ,  d/c's Vanco & Zosyn ( 1/1) and started on Imipenem 1gm iv q8h  (1/1-) , will need 2 weeks of iv abx from the last washout ( 1/2)   - f/u OR culture ( 1/2): reviewed   - OR cultures with E.coli, ECC, E.faecalis S.mitis/oralis, S.epi, S.constellatus, S.maltophilia, mixed anaerobes.   - f/u GNRs sensitivity   - WBC normal 6.74K (1/7)   - BCx( 12/28): ngtd x 5 days   - IV site looks ok ( R antecubital fossa)   # Acute blood loss anemia/melena   # hx WADE ( Ferritin 768 but Tsat 13% ( <20%) on 12/13/23)   - GI fu appreciated, EGD( 1/2) ulcer at G-tube site, rec; Protonix 40mg daily for 8 weeks and avoid bumper being too tight to cause pressure ulcer   - hgb dropped 7.6 ( 12/29) s/p 2u PRBC ( ~ 500mg elemental iron), trend Hgb 10.7  (1/1) ->10.4( 1/2)-> 10( 1/3) -> 9.2( 1/4) -> 9.5(1/5)-> 9.4( 1/7)   - keep active T&S, keep Hgb>8    - c/w ASA given hx PCI x2 ( March 2023)  - hold off Plavix since adm ( 12/27) , awaiting ENT & GI clearance to resume Plavix  - c/w PPI daily can be via G-tube   - c/w Folic acid 1mg daily   - monitor clinically for any further melena     # CAD sp PCI/CUBA x2 to LAD and Ramus in March 2023 as per cards is in setting of NSTEMI - prefer DAPT, at least monotherapy with ASA if plavix need to be held for procedure, currently on ASA , to restart plavix when safe., c/w ASA 81mg and Atorvastatin 40mg qHS     #  DM - FS with ISS, would hold all ora-hypoglycemic agent, goal -180,  thisam, ranged  yesterday     # pain management - oxycodone 5mg/10mg prn , dilaudid prn, would need stool softener to ensure daily BM.     # Dysphagia- NPO, trach, on TF with Vital 1.5 via G-tube     # DVT ppx: resumed on Lovenox     Med consult team continues to follow pt with you. Thank you.

## 2024-01-07 NOTE — PROGRESS NOTE ADULT - ASSESSMENT
OTOLARYNGOLOGY (ENT) PROGRESS NOTE    PATIENT: SAUNDRA HOLMAN     MRN: [id number]       : 57  ADMISSION:23  DATE OF SERVICE:  24 @ 10:12  			         ID: SAUNDRA HOLMAN is a 69yo M w PMH of CAD (x2 stents Mar 2023), HLD, T2DM, hx of kidney stones, psoriasis who presented with an oral mass and underwent L hemimandibulectomy, SND L level 1-3, recon with L FFF, STSG, and placement of dental implants on . He had a trach which was subsequently decannulated. He returns  with surgical site infection now s/p OR for debridement and exploration. Trach replaced through prior stoma for pulmonary toilet.     Subjective/ Interval:   ; Patient seen this morning, oral pack to be changed, penrose dressing changed. Intraoral flap with partal skin necrosis, 7.5 portex in place with cuff deflated   : Patient seen and examined at bedside. NAEO. Patient remains afebrile and HD stable. HgB noted to drop to 7.6 from 9.2 but no additional episodes of melena. Penrose draining purulent material. Intra-oral infection/flap stable. Packing changed at bedside. No other complaints or changes at this time. ID recommended Vanc/zosyn and DC unasyn and flagyl, and IM recommended reticulocyte studies and adding folate supplements. No other changes at this time.   : AFVSS. No acute events overnight. Patient seen and examined at bedside. C/w Vanc/Zosyn per ID recs, pending sensitivities. Growing staph epi, E coli, enterobacter from wound cx on . S/p 2U PRBC yday w appropriate response in Hgb. Transfusion goal > 9.0.  : AFVSS. No acute events overnight. Patient seen and examined at bedside. C/w Vanc/Zosyn per ID recs, pending sens. Hgb stable this morning.  : AFVSS. No acute events overnight. Patient seen and examined at bedside. C/w Vanc/Zosyn, e faecalis sens to vanc/zosyn. Had 3 dark BM's yesterday that were stool guiac positive.  : AFVSS. No acute events overnight. Patient seen and examined at bedside. C/w Imipenem (changed per ID, enterobacter resistant to Zosyn). Plan for OR today for further washout / debridement.  1/3: AFVSS. No acute events overnight. Patient seen and examined at bedside. C/w imipenim. OR cx growing numerous gram negative rods and few gram positive cocci in pairs.  : Patient seen bedside, changed intraoral and neck packing,  Continue to follow cultures, pain controlled   : patient seen this morning, complains of right arm pain wit minimal swelling,  IV in the left arm,  Continue abx for 2 weeks. neck and oral packing changed, purulent drainage from neck noted.   : Patient seen at bedside during morning rounds. Reports right arm pain and swelling somewhat improved although still present. IV replaced in right arm yesterday. Remains on IV abx, tentative end date 1/15/24. Neck and oral packing changed at bedside; purulent drainage noted from neck, decreased in volume compared to prior exams.  : Patient seen at bedside during morning rounds. Overnight, patient complained of coughing, throat discomfort, and nursing reported some increased secretions via trach. Started on mucomyst with improvement. Noted 6 beats of PSVT at ~1900, no reported chest pain or other symptoms. No further episodes. Packing replaced at bedside                     Objective:    Vital Signs:  T(C): 36.6 (24 @ 09:14), Max: 36.8 (24 @ 17:47)  HR: 66 (24 @ 08:39) (57 - 78)  BP: 108/55 (24 @ 08:39) (97/52 - 130/60)  RR: 17 (24 @ 08:39) (16 - 20)  SpO2: 97% (24 @ 08:39) (96% - 99%)    PHYSICAL EXAM:  GEN: appears stated age, NAD  NEURO: alert & oriented x   HEENT: Remnant left FFF paddle with dried blood, right half of mandibular dentition present with elastics and screws, iodoform packing replaced  in oral cavity   Neck: 7.5 Portex trach with cuff deflated, changed iodoform packing in the neck, noted to be draining purulent fluid, skin loosely reapproximated with silk suture, Kerlix dressing in place minimally saturated.  CVS: regular rate and rhythm  Pulm: normal respiratory excursions, not tachypneic, no labored breathing on trach collar   Abd: non-distended  Ext: moving all four extremities, mild right arm edema with no overlying erythema       LABORATORY:                        9.4    6.75  )-----------( 270      ( 2024 06:18 )             29.8        138  |  102  |  18  ----------------------------<  108<H>  4.4   |  28  |  0.68    Ca    8.9      2024 06:18  Phos  2.5       Mg     1.8          [id number]    MICROBIOLOGY:   Surgical swab: E. coli, enterobacter sensitive to ertapenem.     I&O:    24 @ 07:24 @ 07:00  --------------------------------------------------------  IN: 2100 mL / OUT: 1325 mL / NET: 775 mL    24 @ 07:24 @ 10:12  --------------------------------------------------------  IN: 360 mL / OUT: 250 mL / NET: 110 mL         IMAGING:     MEDICATIONS:  acetylcysteine 10%  Inhalation 4 milliLiter(s) Inhalation every 2 hours PRN  aspirin  chewable 81 milliGRAM(s) Enteral Tube daily  atorvastatin 40 milliGRAM(s) Oral at bedtime  dextrose 5%. 1000 milliLiter(s) IV Continuous <Continuous>  dextrose 5%. 1000 milliLiter(s) IV Continuous <Continuous>  dextrose 50% Injectable 25 Gram(s) IV Push once  dextrose 50% Injectable 25 Gram(s) IV Push once  dextrose 50% Injectable 12.5 Gram(s) IV Push once  dextrose Oral Gel 15 Gram(s) Oral once PRN  enoxaparin Injectable 40 milliGRAM(s) SubCutaneous every 24 hours  folic acid 1 milliGRAM(s) Enteral Tube daily  glucagon  Injectable 1 milliGRAM(s) IntraMuscular once  HYDROmorphone  Injectable 1 milliGRAM(s) IV Push every 6 hours PRN  imipenem/cilastatin  IVPB 1000 milliGRAM(s) IV Intermittent every 8 hours  influenza  Vaccine (HIGH DOSE) 0.7 milliLiter(s) IntraMuscular once  insulin lispro (ADMELOG) corrective regimen sliding scale   SubCutaneous three times a day before meals  insulin lispro (ADMELOG) corrective regimen sliding scale   SubCutaneous at bedtime  ondansetron Injectable 4 milliGRAM(s) IV Push every 8 hours PRN  oxyCODONE    IR 10 milliGRAM(s) Oral every 6 hours PRN  oxyCODONE    Solution 5 milliGRAM(s) Oral every 4 hours PRN  pantoprazole  Injectable 40 milliGRAM(s) IV Push every 24 hours      Assessment and Plan:  SAUNDRA HOLMAN is a 69yo M w PMH of CAD (x2 stents Mar 2023), HLD, T2DM, hx of kidney stones, psoriasis who presented with an oral mass and underwent L hemimandibulectomy, SND L level 1-3, recon with L FFF, STSG, and placement of dental implants on . He had a trach which was subsequently decannulated. Now s/p OR for debridement and exploration. Trach replaced through prior stoma for pulmonary toilet. Pt with reported intermittent tremors , electrolytes normal     Plan:  - F/up IM for recs regarding patient reported tremors  - Plan for latissimus dorsi flap next week tentatively 1/10.  - Hgb goal > 9.0   - Electrolytes WNL  - Pack wound with iodoform BID  - C/w Imipenem ( - 1/15)   - Appreciate  IM recs, and ID recs  - Warm compresses to bilateral arms as needed  - C/w TF  - Continue home asa  - Hold home plavix  - c/w HSQ  - Maintain 7.5 portex for pulm toilet  - ISS  - Pain control  - Home meds  - Bowel regimen    Disposition: PT recommend Home with no needs; has pending free flap surgery next week which may alter disposition. Plan to reassess post repeat surgery    [facility] at [phone] with any questions/concerns.    Av Victor MD PGY2  24 @ 10:12     OTOLARYNGOLOGY (ENT) PROGRESS NOTE    PATIENT: SAUNDRA HOLMAN     MRN: [id number]       : 57  ADMISSION:23  DATE OF SERVICE:  24 @ 10:12  			         ID: SAUNDRA HOLMAN is a 67yo M w PMH of CAD (x2 stents Mar 2023), HLD, T2DM, hx of kidney stones, psoriasis who presented with an oral mass and underwent L hemimandibulectomy, SND L level 1-3, recon with L FFF, STSG, and placement of dental implants on . He had a trach which was subsequently decannulated. He returns  with surgical site infection now s/p OR for debridement and exploration. Trach replaced through prior stoma for pulmonary toilet.     Subjective/ Interval:   ; Patient seen this morning, oral pack to be changed, penrose dressing changed. Intraoral flap with partal skin necrosis, 7.5 portex in place with cuff deflated   : Patient seen and examined at bedside. NAEO. Patient remains afebrile and HD stable. HgB noted to drop to 7.6 from 9.2 but no additional episodes of melena. Penrose draining purulent material. Intra-oral infection/flap stable. Packing changed at bedside. No other complaints or changes at this time. ID recommended Vanc/zosyn and DC unasyn and flagyl, and IM recommended reticulocyte studies and adding folate supplements. No other changes at this time.   : AFVSS. No acute events overnight. Patient seen and examined at bedside. C/w Vanc/Zosyn per ID recs, pending sensitivities. Growing staph epi, E coli, enterobacter from wound cx on . S/p 2U PRBC yday w appropriate response in Hgb. Transfusion goal > 9.0.  : AFVSS. No acute events overnight. Patient seen and examined at bedside. C/w Vanc/Zosyn per ID recs, pending sens. Hgb stable this morning.  : AFVSS. No acute events overnight. Patient seen and examined at bedside. C/w Vanc/Zosyn, e faecalis sens to vanc/zosyn. Had 3 dark BM's yesterday that were stool guiac positive.  : AFVSS. No acute events overnight. Patient seen and examined at bedside. C/w Imipenem (changed per ID, enterobacter resistant to Zosyn). Plan for OR today for further washout / debridement.  1/3: AFVSS. No acute events overnight. Patient seen and examined at bedside. C/w imipenim. OR cx growing numerous gram negative rods and few gram positive cocci in pairs.  : Patient seen bedside, changed intraoral and neck packing,  Continue to follow cultures, pain controlled   : patient seen this morning, complains of right arm pain wit minimal swelling,  IV in the left arm,  Continue abx for 2 weeks. neck and oral packing changed, purulent drainage from neck noted.   : Patient seen at bedside during morning rounds. Reports right arm pain and swelling somewhat improved although still present. IV replaced in right arm yesterday. Remains on IV abx, tentative end date 1/15/24. Neck and oral packing changed at bedside; purulent drainage noted from neck, decreased in volume compared to prior exams.  : Patient seen at bedside during morning rounds. Overnight, patient complained of coughing, throat discomfort, and nursing reported some increased secretions via trach. Started on mucomyst with improvement. Noted 6 beats of PSVT at ~1900, no reported chest pain or other symptoms. No further episodes. Packing replaced at bedside                     Objective:    Vital Signs:  T(C): 36.6 (24 @ 09:14), Max: 36.8 (24 @ 17:47)  HR: 66 (24 @ 08:39) (57 - 78)  BP: 108/55 (24 @ 08:39) (97/52 - 130/60)  RR: 17 (24 @ 08:39) (16 - 20)  SpO2: 97% (24 @ 08:39) (96% - 99%)    PHYSICAL EXAM:  GEN: appears stated age, NAD  NEURO: alert & oriented x   HEENT: Remnant left FFF paddle with dried blood, right half of mandibular dentition present with elastics and screws, iodoform packing replaced  in oral cavity   Neck: 7.5 Portex trach with cuff deflated, changed iodoform packing in the neck, noted to be draining purulent fluid, skin loosely reapproximated with silk suture, Kerlix dressing in place minimally saturated.  CVS: regular rate and rhythm  Pulm: normal respiratory excursions, not tachypneic, no labored breathing on trach collar   Abd: non-distended  Ext: moving all four extremities, mild right arm edema with no overlying erythema       LABORATORY:                        9.4    6.75  )-----------( 270      ( 2024 06:18 )             29.8        138  |  102  |  18  ----------------------------<  108<H>  4.4   |  28  |  0.68    Ca    8.9      2024 06:18  Phos  2.5       Mg     1.8          [id number]    MICROBIOLOGY:   Surgical swab: E. coli, enterobacter sensitive to ertapenem.     I&O:    24 @ 07:24 @ 07:00  --------------------------------------------------------  IN: 2100 mL / OUT: 1325 mL / NET: 775 mL    24 @ 07:24 @ 10:12  --------------------------------------------------------  IN: 360 mL / OUT: 250 mL / NET: 110 mL         IMAGING:     MEDICATIONS:  acetylcysteine 10%  Inhalation 4 milliLiter(s) Inhalation every 2 hours PRN  aspirin  chewable 81 milliGRAM(s) Enteral Tube daily  atorvastatin 40 milliGRAM(s) Oral at bedtime  dextrose 5%. 1000 milliLiter(s) IV Continuous <Continuous>  dextrose 5%. 1000 milliLiter(s) IV Continuous <Continuous>  dextrose 50% Injectable 25 Gram(s) IV Push once  dextrose 50% Injectable 25 Gram(s) IV Push once  dextrose 50% Injectable 12.5 Gram(s) IV Push once  dextrose Oral Gel 15 Gram(s) Oral once PRN  enoxaparin Injectable 40 milliGRAM(s) SubCutaneous every 24 hours  folic acid 1 milliGRAM(s) Enteral Tube daily  glucagon  Injectable 1 milliGRAM(s) IntraMuscular once  HYDROmorphone  Injectable 1 milliGRAM(s) IV Push every 6 hours PRN  imipenem/cilastatin  IVPB 1000 milliGRAM(s) IV Intermittent every 8 hours  influenza  Vaccine (HIGH DOSE) 0.7 milliLiter(s) IntraMuscular once  insulin lispro (ADMELOG) corrective regimen sliding scale   SubCutaneous three times a day before meals  insulin lispro (ADMELOG) corrective regimen sliding scale   SubCutaneous at bedtime  ondansetron Injectable 4 milliGRAM(s) IV Push every 8 hours PRN  oxyCODONE    IR 10 milliGRAM(s) Oral every 6 hours PRN  oxyCODONE    Solution 5 milliGRAM(s) Oral every 4 hours PRN  pantoprazole  Injectable 40 milliGRAM(s) IV Push every 24 hours      Assessment and Plan:  SAUNDRA HOLMAN is a 67yo M w PMH of CAD (x2 stents Mar 2023), HLD, T2DM, hx of kidney stones, psoriasis who presented with an oral mass and underwent L hemimandibulectomy, SND L level 1-3, recon with L FFF, STSG, and placement of dental implants on . He had a trach which was subsequently decannulated. Now s/p OR for debridement and exploration. Trach replaced through prior stoma for pulmonary toilet. Pt with reported intermittent tremors , electrolytes normal     Plan:  - F/up IM for recs regarding patient reported tremors  - Plan for latissimus dorsi flap next week tentatively 1/10.  - Hgb goal > 9.0   - Electrolytes WNL  - Pack wound with iodoform BID  - C/w Imipenem ( - 1/15)   - Appreciate  IM recs, and ID recs  - Warm compresses to bilateral arms as needed  - C/w TF  - Continue home asa  - Hold home plavix  - c/w HSQ  - Maintain 7.5 portex for pulm toilet  - ISS  - Pain control  - Home meds  - Bowel regimen    Disposition: PT recommend Home with no needs; has pending free flap surgery next week which may alter disposition. Plan to reassess post repeat surgery    [facility] at [phone] with any questions/concerns.    Av Victor MD PGY2  24 @ 10:12

## 2024-01-07 NOTE — PROGRESS NOTE ADULT - SUBJECTIVE AND OBJECTIVE BOX
INTERVAL HPI/OVERNIGHT EVENTS:    Patient was seen and examined at bedside.  No events    CONSTITUTIONAL:  Negative fever or chills, feels well, good appetite  EYES:  Negative  blurry vision or double vision  CARDIOVASCULAR:  Negative for chest pain or palpitations  RESPIRATORY:  Negative for cough, wheezing, or SOB   GASTROINTESTINAL:  Negative for nausea, vomiting, diarrhea, constipation, or abdominal pain  GENITOURINARY:  Negative frequency, urgency or dysuria  NEUROLOGIC:  No headache, confusion, dizziness, lightheadedness      ANTIBIOTICS/RELEVANT:    MEDICATIONS  (STANDING):  aspirin  chewable 81 milliGRAM(s) Enteral Tube daily  atorvastatin 40 milliGRAM(s) Oral at bedtime  chlorhexidine 0.12% Liquid 15 milliLiter(s) Oral Mucosa two times a day  dextrose 5%. 1000 milliLiter(s) (100 mL/Hr) IV Continuous <Continuous>  dextrose 5%. 1000 milliLiter(s) (50 mL/Hr) IV Continuous <Continuous>  dextrose 50% Injectable 25 Gram(s) IV Push once  dextrose 50% Injectable 25 Gram(s) IV Push once  dextrose 50% Injectable 12.5 Gram(s) IV Push once  enoxaparin Injectable 40 milliGRAM(s) SubCutaneous every 24 hours  folic acid 1 milliGRAM(s) Enteral Tube daily  glucagon  Injectable 1 milliGRAM(s) IntraMuscular once  imipenem/cilastatin  IVPB 1000 milliGRAM(s) IV Intermittent every 8 hours  influenza  Vaccine (HIGH DOSE) 0.7 milliLiter(s) IntraMuscular once  insulin lispro (ADMELOG) corrective regimen sliding scale   SubCutaneous at bedtime  insulin lispro (ADMELOG) corrective regimen sliding scale   SubCutaneous three times a day before meals  pantoprazole  Injectable 40 milliGRAM(s) IV Push every 24 hours    MEDICATIONS  (PRN):  acetylcysteine 10%  Inhalation 4 milliLiter(s) Inhalation every 2 hours PRN secretions  dextrose Oral Gel 15 Gram(s) Oral once PRN Blood Glucose LESS THAN 70 milliGRAM(s)/deciliter  HYDROmorphone  Injectable 1 milliGRAM(s) IV Push every 6 hours PRN breakthrough pain  ondansetron Injectable 4 milliGRAM(s) IV Push every 8 hours PRN Nausea  oxyCODONE    IR 10 milliGRAM(s) Oral every 6 hours PRN Severe Pain (7 - 10)  oxyCODONE    Solution 5 milliGRAM(s) Oral every 4 hours PRN Moderate Pain (4 - 6)        Vital Signs Last 24 Hrs  T(C): 36.6 (07 Jan 2024 09:14), Max: 36.8 (06 Jan 2024 17:47)  T(F): 97.9 (07 Jan 2024 09:14), Max: 98.2 (06 Jan 2024 17:47)  HR: 62 (07 Jan 2024 09:15) (57 - 78)  BP: 108/55 (07 Jan 2024 08:39) (97/52 - 130/60)  BP(mean): 79 (07 Jan 2024 08:39) (71 - 87)  RR: 18 (07 Jan 2024 09:15) (16 - 18)  SpO2: 98% (07 Jan 2024 09:15) (96% - 99%)    Parameters below as of 07 Jan 2024 09:15  Patient On (Oxygen Delivery Method): tracheostomy collar  O2 Flow (L/min): 10  O2 Concentration (%): 40    PHYSICAL EXAM:  Constitutional: non-toxic, no distress  Eyes:LONA, EOMI  Ear/Nose/Throat: no oral lesion, no sinus tenderness on percussion	  Neck:  supple  Respiratory: CTA caity  Cardiovascular: S1S2 RRR, no murmurs  Gastrointestinal:soft, (+) BS, no HSM  Extremities:no e/e/c  Vascular: DP Pulse:	right normal; left normal      LABS:                        9.4    6.75  )-----------( 270      ( 07 Jan 2024 06:18 )             29.8     01-07    138  |  102  |  18  ----------------------------<  108<H>  4.4   |  28  |  0.68    Ca    8.9      07 Jan 2024 06:18  Phos  2.5     01-07  Mg     1.8     01-07        Urinalysis Basic - ( 07 Jan 2024 06:18 )    Color: x / Appearance: x / SG: x / pH: x  Gluc: 108 mg/dL / Ketone: x  / Bili: x / Urobili: x   Blood: x / Protein: x / Nitrite: x   Leuk Esterase: x / RBC: x / WBC x   Sq Epi: x / Non Sq Epi: x / Bacteria: x        MICROBIOLOGY:    Culture - Tissue with Gram Stain (01.02.24 @ 18:03)    -  Trimethoprim/Sulfamethoxazole: S <=0.5/9.5   -  Trimethoprim/Sulfamethoxazole: S 1/19   -  Trimethoprim/Sulfamethoxazole: S 1/19   -  Vancomycin: S 2   -  Meropenem/Vaborbactam: S <=2   Gram Stain:   Numerous Gram Negative Rods  Few Gram positive cocci in pairs  Few WBC's   -  Ampicillin: S <=8 These ampicillin results predict results for amoxicillin   -  Ampicillin: R >16 These ampicillin results predict results for amoxicillin   -  Ampicillin: S <=8 These ampicillin results predict results for amoxicillin   -  Ampicillin: S <=2 Predicts results to ampicillin/sulbactam, amoxacillin-clavulanate and  piperacillin-tazobactam.   -  Ampicillin/Sulbactam: S <=4/2   -  Ampicillin/Sulbactam: R >16/8   -  Ampicillin/Sulbactam: S <=4/2   -  Cefazolin: S <=2   -  Cefazolin: R >16   -  Cefazolin: S <=2   -  Cefepime: R 16   -  Ceftriaxone: S <=1   -  Ceftriaxone: R >32 Enterobacter cloacae, Klebsiella aerogenes, and Citrobacter freundii may develop resistance during prolonged therapy.   -  Ceftriaxone: S <=1   -  Ciprofloxacin: S <=0.25   -  Ciprofloxacin: S <=0.25   -  Ciprofloxacin: S <=0.25   -  Ertapenem: S 0.38   -  Ertapenem: S <=0.5   -  Ertapenem: S <=0.5   -  Gentamicin: S <=2   -  Gentamicin: S <=2   -  Gentamicin: S <=2   -  Meropenem: S <=1   -  Meropenem: S 0.094   -  Piperacillin/Tazobactam: R >64   -  Piperacillin/Tazobactam: S <=8   -  Piperacillin/Tazobactam: S <=8   -  Tobramycin: S <=2   -  Tobramycin: S <=2   -  Tobramycin: S <=2   Specimen Source: .Tissue left mandible tissue or spec   Culture Results:   Numerous Escherichia coli  Numerous Enterobacter cloacae complex  Few Enterococcus faecalis  Rare Staphylococcus epidermidis   Organism Identification: Escherichia coli  Enterobacter cloacae complex  Enterobacter cloacae complex  Enterococcus faecalis  Escherichia coli   Organism: Escherichia coli   Organism: Enterobacter cloacae complex   Organism: Enterobacter cloacae complex   Organism: Enterococcus faecalis   Organism: Escherichia coli   Method Type: OTTO   Method Type: OTTO   Method Type: OTTO   Method Type: OTTO   Method Type: ETEST        RADIOLOGY & ADDITIONAL STUDIES:

## 2024-01-07 NOTE — PROGRESS NOTE ADULT - ASSESSMENT
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 Patient had a trach which was subsequently decannulated. Patient now returns with surgical site infection s/p wash-out.    Candida and coagulase negative staph likely colonizers    Recommend:  1.  Continue Imipenem 1 gram IV q8hrs x 14 days with day 1 being date of last washout 1/2/24  2.  Will need weekly CBC, CMP as outpatient.  Can fax to:  [phone]  3.  Can follow up with Dr. Rohini Guadarrama (ID) at [address] [place] [address], [address] [place], [place].  [phone], option 2    ID team 1 will sign off.  Reconsult as needed  IMPRESSION:   69 yo M w PMhx of CAD (x 2 stents Mar 2023), Type 2 DM, hx of kidney stones, psoriasis who presented with an oral mass 2/2 to [id number] SCC of L buccal mucosa during previous admission (12/3-21) and underwent L hemimandibulectomy, SND L level 1-3, recon with L FFF, STSG, and placement of dental implants on 12/7. Patient had a trach which was subsequently decannulated. Patient now returns with surgical site infection s/p wash-out.    Candida and coagulase negative staph likely colonizers    Recommend:  1.  Continue Imipenem 1 gram IV q8hrs x 14 days with day 1 being date of last washout 1/2/24  2.  Will need weekly CBC, CMP as outpatient.  Can fax to:  [phone]  3.  Can follow up with Dr. Rohini Guadarrama (ID) at [address] [place] [address], [address] [place], [place].  [phone], option 2    ID team 1 will sign off.  Reconsult as needed

## 2024-01-08 LAB
ANION GAP SERPL CALC-SCNC: 9 MMOL/L — SIGNIFICANT CHANGE UP (ref 5–17)
ANION GAP SERPL CALC-SCNC: 9 MMOL/L — SIGNIFICANT CHANGE UP (ref 5–17)
BUN SERPL-MCNC: 17 MG/DL — SIGNIFICANT CHANGE UP (ref 7–23)
BUN SERPL-MCNC: 17 MG/DL — SIGNIFICANT CHANGE UP (ref 7–23)
CALCIUM SERPL-MCNC: 9.2 MG/DL — SIGNIFICANT CHANGE UP (ref 8.4–10.5)
CALCIUM SERPL-MCNC: 9.2 MG/DL — SIGNIFICANT CHANGE UP (ref 8.4–10.5)
CHLORIDE SERPL-SCNC: 104 MMOL/L — SIGNIFICANT CHANGE UP (ref 96–108)
CHLORIDE SERPL-SCNC: 104 MMOL/L — SIGNIFICANT CHANGE UP (ref 96–108)
CO2 SERPL-SCNC: 26 MMOL/L — SIGNIFICANT CHANGE UP (ref 22–31)
CO2 SERPL-SCNC: 26 MMOL/L — SIGNIFICANT CHANGE UP (ref 22–31)
CREAT SERPL-MCNC: 0.66 MG/DL — SIGNIFICANT CHANGE UP (ref 0.5–1.3)
CREAT SERPL-MCNC: 0.66 MG/DL — SIGNIFICANT CHANGE UP (ref 0.5–1.3)
EGFR: 103 ML/MIN/1.73M2 — SIGNIFICANT CHANGE UP
EGFR: 103 ML/MIN/1.73M2 — SIGNIFICANT CHANGE UP
GLUCOSE BLDC GLUCOMTR-MCNC: 116 MG/DL — HIGH (ref 70–99)
GLUCOSE BLDC GLUCOMTR-MCNC: 116 MG/DL — HIGH (ref 70–99)
GLUCOSE BLDC GLUCOMTR-MCNC: 128 MG/DL — HIGH (ref 70–99)
GLUCOSE BLDC GLUCOMTR-MCNC: 128 MG/DL — HIGH (ref 70–99)
GLUCOSE BLDC GLUCOMTR-MCNC: 158 MG/DL — HIGH (ref 70–99)
GLUCOSE BLDC GLUCOMTR-MCNC: 158 MG/DL — HIGH (ref 70–99)
GLUCOSE BLDC GLUCOMTR-MCNC: 199 MG/DL — HIGH (ref 70–99)
GLUCOSE BLDC GLUCOMTR-MCNC: 199 MG/DL — HIGH (ref 70–99)
GLUCOSE SERPL-MCNC: 123 MG/DL — HIGH (ref 70–99)
GLUCOSE SERPL-MCNC: 123 MG/DL — HIGH (ref 70–99)
HCT VFR BLD CALC: 31.5 % — LOW (ref 39–50)
HCT VFR BLD CALC: 31.5 % — LOW (ref 39–50)
HGB BLD-MCNC: 10 G/DL — LOW (ref 13–17)
HGB BLD-MCNC: 10 G/DL — LOW (ref 13–17)
MAGNESIUM SERPL-MCNC: 2.2 MG/DL — SIGNIFICANT CHANGE UP (ref 1.6–2.6)
MAGNESIUM SERPL-MCNC: 2.2 MG/DL — SIGNIFICANT CHANGE UP (ref 1.6–2.6)
MCHC RBC-ENTMCNC: 28.4 PG — SIGNIFICANT CHANGE UP (ref 27–34)
MCHC RBC-ENTMCNC: 28.4 PG — SIGNIFICANT CHANGE UP (ref 27–34)
MCHC RBC-ENTMCNC: 31.7 GM/DL — LOW (ref 32–36)
MCHC RBC-ENTMCNC: 31.7 GM/DL — LOW (ref 32–36)
MCV RBC AUTO: 89.5 FL — SIGNIFICANT CHANGE UP (ref 80–100)
MCV RBC AUTO: 89.5 FL — SIGNIFICANT CHANGE UP (ref 80–100)
NRBC # BLD: 0 /100 WBCS — SIGNIFICANT CHANGE UP (ref 0–0)
NRBC # BLD: 0 /100 WBCS — SIGNIFICANT CHANGE UP (ref 0–0)
PHOSPHATE SERPL-MCNC: 2.4 MG/DL — LOW (ref 2.5–4.5)
PHOSPHATE SERPL-MCNC: 2.4 MG/DL — LOW (ref 2.5–4.5)
PLATELET # BLD AUTO: 249 K/UL — SIGNIFICANT CHANGE UP (ref 150–400)
PLATELET # BLD AUTO: 249 K/UL — SIGNIFICANT CHANGE UP (ref 150–400)
POTASSIUM SERPL-MCNC: 4.2 MMOL/L — SIGNIFICANT CHANGE UP (ref 3.5–5.3)
POTASSIUM SERPL-MCNC: 4.2 MMOL/L — SIGNIFICANT CHANGE UP (ref 3.5–5.3)
POTASSIUM SERPL-SCNC: 4.2 MMOL/L — SIGNIFICANT CHANGE UP (ref 3.5–5.3)
POTASSIUM SERPL-SCNC: 4.2 MMOL/L — SIGNIFICANT CHANGE UP (ref 3.5–5.3)
RBC # BLD: 3.52 M/UL — LOW (ref 4.2–5.8)
RBC # BLD: 3.52 M/UL — LOW (ref 4.2–5.8)
RBC # FLD: 14.2 % — SIGNIFICANT CHANGE UP (ref 10.3–14.5)
RBC # FLD: 14.2 % — SIGNIFICANT CHANGE UP (ref 10.3–14.5)
SODIUM SERPL-SCNC: 139 MMOL/L — SIGNIFICANT CHANGE UP (ref 135–145)
SODIUM SERPL-SCNC: 139 MMOL/L — SIGNIFICANT CHANGE UP (ref 135–145)
WBC # BLD: 5.6 K/UL — SIGNIFICANT CHANGE UP (ref 3.8–10.5)
WBC # BLD: 5.6 K/UL — SIGNIFICANT CHANGE UP (ref 3.8–10.5)
WBC # FLD AUTO: 5.6 K/UL — SIGNIFICANT CHANGE UP (ref 3.8–10.5)
WBC # FLD AUTO: 5.6 K/UL — SIGNIFICANT CHANGE UP (ref 3.8–10.5)

## 2024-01-08 PROCEDURE — 99233 SBSQ HOSP IP/OBS HIGH 50: CPT | Mod: GC

## 2024-01-08 RX ADMIN — CHLORHEXIDINE GLUCONATE 15 MILLILITER(S): 213 SOLUTION TOPICAL at 17:54

## 2024-01-08 RX ADMIN — Medication 1: at 17:54

## 2024-01-08 RX ADMIN — Medication 1 MILLIGRAM(S): at 11:29

## 2024-01-08 RX ADMIN — IMIPENEM AND CILASTATIN 250 MILLIGRAM(S): 250; 250 INJECTION, POWDER, FOR SOLUTION INTRAVENOUS at 11:27

## 2024-01-08 RX ADMIN — Medication 81 MILLIGRAM(S): at 11:29

## 2024-01-08 RX ADMIN — ATORVASTATIN CALCIUM 40 MILLIGRAM(S): 80 TABLET, FILM COATED ORAL at 21:04

## 2024-01-08 RX ADMIN — PANTOPRAZOLE SODIUM 40 MILLIGRAM(S): 20 TABLET, DELAYED RELEASE ORAL at 11:28

## 2024-01-08 RX ADMIN — CHLORHEXIDINE GLUCONATE 15 MILLILITER(S): 213 SOLUTION TOPICAL at 06:18

## 2024-01-08 RX ADMIN — Medication 650 MILLIGRAM(S): at 21:16

## 2024-01-08 RX ADMIN — Medication 650 MILLIGRAM(S): at 22:17

## 2024-01-08 RX ADMIN — ENOXAPARIN SODIUM 40 MILLIGRAM(S): 100 INJECTION SUBCUTANEOUS at 15:50

## 2024-01-08 RX ADMIN — IMIPENEM AND CILASTATIN 250 MILLIGRAM(S): 250; 250 INJECTION, POWDER, FOR SOLUTION INTRAVENOUS at 01:17

## 2024-01-08 RX ADMIN — IMIPENEM AND CILASTATIN 250 MILLIGRAM(S): 250; 250 INJECTION, POWDER, FOR SOLUTION INTRAVENOUS at 17:54

## 2024-01-08 NOTE — PROGRESS NOTE ADULT - ASSESSMENT
Assessment and Plan:    SAUNDRA HOLMAN is a 69yo M w PMH of CAD (x2 stents Mar 2023), HLD, T2DM, hx of kidney stones, psoriasis who presented with an oral mass and underwent L hemimandibulectomy, SND L level 1-3, recon with L FFF, STSG, and placement of dental implants on 12/7. He had a trach which was subsequently decannulated. Now s/p OR for debridement and exploration. Trach replaced through prior stoma for pulmonary toilet. Pt with reported intermittent tremors 1/7, electrolytes normal.     Plan:  - F/up IM for recs regarding patient reported tremors  - Plan for latissimus dorsi flap next week tentatively 1/10.  - Hgb goal > 9.0   - Electrolytes WNL  - Pack wound with iodoform BID  - C/w Imipenem (1/1 - 1/15)   - Appreciate  IM recs, and ID recs  - Warm compresses to bilateral arms as needed  - C/w TF  - Continue home asa  - Hold home plavix  - c/w HSQ  - Maintain 7.5 portex for pulm toilet  - ISS  - Pain control  - Home meds  - Bowel regimen    [facility] at [phone] with any questions/concerns.    Ada Quinones PA-C  01-08-24 @ 07:24     Assessment and Plan:    SAUNDRA HOLMAN is a 69yo M w PMH of CAD (x2 stents Mar 2023), HLD, T2DM, hx of kidney stones, psoriasis who presented with an oral mass and underwent L hemimandibulectomy, SND L level 1-3, recon with L FFF, STSG, and placement of dental implants on 12/7. He had a trach which was subsequently decannulated. Now s/p OR for debridement and exploration. Trach replaced through prior stoma for pulmonary toilet. Pt with reported intermittent tremors 1/7, electrolytes normal.     Plan:  - F/up IM for recs regarding patient reported tremors  - Plan for latissimus dorsi flap next week tentatively 1/10.  - Hgb goal > 9.0   - Electrolytes WNL  - Pack wound with iodoform BID  - C/w Imipenem (1/1 - 1/15)   - Appreciate  IM recs, and ID recs  - Warm compresses to bilateral arms as needed  - C/w TF  - Continue home asa  - Hold home plavix  - c/w HSQ  - Maintain 7.5 portex for pulm toilet  - ISS  - Pain control  - Home meds  - Bowel regimen    [facility] at [phone] with any questions/concerns.    Ada Quinones PA-C  01-08-24 @ 07:24

## 2024-01-08 NOTE — PROGRESS NOTE ADULT - SUBJECTIVE AND OBJECTIVE BOX
SUBJECTIVE/OVERNIGHT EVENTS: No acute overnight events. Pt seen in AM at bedside, resting comfortably in bed, and does not appear to be in any acute distress.     VITAL SIGNS:  Vital Signs Last 24 Hrs  T(C): 36.3 (08 Jan 2024 18:01), Max: 37.1 (07 Jan 2024 21:52)  T(F): 97.4 (08 Jan 2024 18:01), Max: 98.7 (07 Jan 2024 21:52)  HR: 74 (08 Jan 2024 17:20) (56 - 74)  BP: 101/55 (08 Jan 2024 17:20) (101/55 - 124/81)  BP(mean): 74 (08 Jan 2024 17:20) (74 - 95)  RR: 17 (08 Jan 2024 17:20) (16 - 18)  SpO2: 100% (08 Jan 2024 17:20) (98% - 100%)    Parameters below as of 08 Jan 2024 17:20  Patient On (Oxygen Delivery Method): tracheostomy collar  O2 Flow (L/min): 10  O2 Concentration (%): 40    General: NAD  HEENT: NC/AT; PERRL, anicteric sclera; mouth packed with gauze, neck also wrapped with gauze  Neck: supple w/o palpable nodularity  Cardiovascular: +S1/S2; RRR  Respiratory: CTA B/L; no W/R/R  Gastrointestinal: soft, NT/ND; +BSx4  Extremities: WWP; no edema, clubbing or cyanosis  Vascular: 2+ radial, DP/PT pulses B/L  Neurological: AAOx3; no focal deficits    MEDICATIONS:  MEDICATIONS  (STANDING):  aspirin  chewable 81 milliGRAM(s) Enteral Tube daily  atorvastatin 40 milliGRAM(s) Oral at bedtime  chlorhexidine 0.12% Liquid 15 milliLiter(s) Oral Mucosa two times a day  dextrose 5%. 1000 milliLiter(s) (50 mL/Hr) IV Continuous <Continuous>  dextrose 5%. 1000 milliLiter(s) (100 mL/Hr) IV Continuous <Continuous>  dextrose 50% Injectable 25 Gram(s) IV Push once  dextrose 50% Injectable 12.5 Gram(s) IV Push once  dextrose 50% Injectable 25 Gram(s) IV Push once  enoxaparin Injectable 40 milliGRAM(s) SubCutaneous every 24 hours  folic acid 1 milliGRAM(s) Enteral Tube daily  glucagon  Injectable 1 milliGRAM(s) IntraMuscular once  imipenem/cilastatin  IVPB 1000 milliGRAM(s) IV Intermittent every 8 hours  influenza  Vaccine (HIGH DOSE) 0.7 milliLiter(s) IntraMuscular once  insulin lispro (ADMELOG) corrective regimen sliding scale   SubCutaneous three times a day before meals  insulin lispro (ADMELOG) corrective regimen sliding scale   SubCutaneous at bedtime  pantoprazole  Injectable 40 milliGRAM(s) IV Push every 24 hours    MEDICATIONS  (PRN):  acetaminophen     Tablet .. 650 milliGRAM(s) Oral every 6 hours PRN Mild Pain (1 - 3)  acetylcysteine 10%  Inhalation 4 milliLiter(s) Inhalation every 2 hours PRN secretions  dextrose Oral Gel 15 Gram(s) Oral once PRN Blood Glucose LESS THAN 70 milliGRAM(s)/deciliter  HYDROmorphone  Injectable 1 milliGRAM(s) IV Push every 6 hours PRN breakthrough pain  ondansetron Injectable 4 milliGRAM(s) IV Push every 8 hours PRN Nausea  oxyCODONE    IR 10 milliGRAM(s) Oral every 6 hours PRN Severe Pain (7 - 10)  oxyCODONE    Solution 5 milliGRAM(s) Oral every 4 hours PRN Moderate Pain (4 - 6)      ALLERGIES:  Allergies    No Known Allergies    Intolerances        LABS:                        10.0   5.60  )-----------( 249      ( 08 Jan 2024 05:30 )             31.5     01-08    139  |  104  |  17  ----------------------------<  123<H>  4.2   |  26  |  0.66    Ca    9.2      08 Jan 2024 05:30  Phos  2.4     01-08  Mg     2.2     01-08          RADIOLOGY & ADDITIONAL TESTS: Reviewed.

## 2024-01-08 NOTE — PROGRESS NOTE ADULT - ASSESSMENT
Past medical Hx of CAD (x 2 stents Mar 2023), Type 2 DM, hx of kidney stones, psoriasis who presented with an oral mass during previous admission (12/3-21) and underwent L hemimandibulectomy, SND L level 1-3, recon with L FFF, STSG, and placement of dental implants on 12/7. Patient had a trach which was subsequently decannulated. Patient now returns with surgical site infection. Medicine consulted for co-management.    #Surgical site infection  Patient s/p OR for debridement and exploration (12/27 and 1/2) and Trach replaced through prior stoma for pulmonary toilet. OR cultures with E. Coli, ECC, E. Faecalis S. Mitis S. Oralis, S. Epidermidis, S. Constellatus, S. Maltophilia, mixed anaerobes.  - c/w Imipenem 1 gram IV q8hrs x 14 days with day 1 being date of last washout 1/2/24  - Pain regimen: Tylenol 650 mg PO q6, Oxy IR 5 mg Q4 moderate, Oxy IR 10 mg Q4 severe, Dilaudid 1 mg IV Q6 for breakthrough   - f/u ID recommendations    #Anemia  Hb on admission 7.3. Now stable Hb 9.2 s/p karlo-operative transfusion. Patient with previous iron studies consistent with WADE. DD includes surgical site bleed VS less likely GI bleed. Patient now s/p EGD in OR (1/3).   - maintain active T&S, transfusion goal to Hgb >8   - c/w Folic acid 1g PO QD  - c/w Protonix 40 mg IV QD    #CAD  Patient with hx of CAD s/p 2 stents in March 2023.   - c/w ASA 81 mg PO QD, Atorvastatin 40 mg PO QD  - Restart Plavix, as soon as appropriate per primary team    #DM type 2  Home medication: Metformin 1g PO QD and 500 mg PO QD and Jardiance 10 mg PO QD.  - c/w insulin sliding scale    Medicine will continue to follow. Patient seen, evaluated, and discussed with attending Dr. Montero

## 2024-01-08 NOTE — PROGRESS NOTE ADULT - ATTENDING COMMENTS
67 y/o M PMHx of CAD s/p PCI/CUBA x2 to LAD and Ramus (Mar 2023), T2DM, psoriasis, hx Renal calculi, w/ [id number] SCC of L buccal mucosa s/p hemimandibulectomy, left level 1, 2a, 3 neck neck dissection, L FFF, tracheostomy w/ 7.5 cuffed portex, dental implants, STSG (12/7/23), s/p G tube placement and subsequent trach decannulation and discharged home on ( 12/22/23). Pt however returned to [facility] ( 12/27/23) with surgical site infection, s/p RTOR 12/27 with findings of skin flap necrosis and s/p debridement. Trach replaced through prior stoma for pulmonary toilet. Patient also noted to have dark stools and dropped in Hgb- wife reported black stool for the last 3 days and same thing coming out from peg tube 12/28 , now peg feeding held -. Medicine consulted for co-management.     # Skin flap necrosis s/p exploration and debridement ( 12/27) POD# 11  # RTOR for wound debridement and EGD( 1/2) POD# 5  OR findings  (1/2): infected, non-viable free fibula flap with viable skin paddle. Purulent drainage appreciated at margins of flap and in neck.  EGD( 1/2): ulcer found at the G-tube site    - Local wound care: OR findings reviewed, f/u OR mandibular culture per ENT   - ID f/u appreciated ( Team 1) ,  d/c's Vanco & Zosyn ( 1/1) and started on Imipenem 1gm iv q8h  (1/1-) , will need 2 weeks of iv abx from the last washout ( 1/2)   - f/u OR culture ( 1/2): reviewed   - OR cultures with E.coli, ECC, E.faecalis S.mitis/oralis, S.epi, S.constellatus, S.maltophilia, mixed anaerobes.   - f/u GNRs sensitivity   - WBC normal 6.74K (1/7)-> 5.6K(1/8)   - BCx( 12/28): ngtd x 5 days   - IV site looks ok ( R antecubital fossa)   # Acute blood loss anemia/melena   # hx WADE ( Ferritin 768 but Tsat 13% ( <20%) on 12/13/23)   - GI fu appreciated, EGD( 1/2) ulcer at G-tube site, rec; Protonix 40mg daily for 8 weeks and avoid bumper being too tight to cause pressure ulcer   - hgb dropped 7.6 ( 12/29) s/p 2u PRBC ( ~ 500mg elemental iron), trend Hgb 10.7  (1/1) ->10.4( 1/2)-> 10( 1/3) -> 9.2( 1/4) -> 9.5(1/5)-> 9.4( 1/7) -> 10(1/8)   - keep active T&S, keep Hgb>8    - c/w ASA given hx PCI x2 ( March 2023)  - hold off Plavix since adm ( 12/27) , awaiting ENT & GI clearance to resume Plavix  - c/w PPI daily can be via G-tube   - c/w Folic acid 1mg daily   - monitor clinically for any further melena     # CAD sp PCI/CUBA x2 to LAD and Ramus in March 2023 as per cards is in setting of NSTEMI - prefer DAPT, at least monotherapy with ASA if plavix need to be held for procedure, currently on ASA , to restart plavix when safe., c/w ASA 81mg and Atorvastatin 40mg qHS     #  DM - FS with ISS, would hold all ora-hypoglycemic agent, goal -180,  this am, ranged 124-222 yesterday     # pain management - oxycodone 5mg/10mg prn , dilaudid prn, would need stool softener to ensure daily BM.     # Dysphagia- NPO, trach, on TF with Vital 1.5 via G-tube     # DVT ppx: Lovenox     Med consult team continues to follow pt with you. Thank you. 67 y/o M PMHx of CAD s/p PCI/CUBA x2 to LAD and Ramus (Mar 2023), T2DM, psoriasis, hx Renal calculi, w/ [id number] SCC of L buccal mucosa s/p hemimandibulectomy, left level 1, 2a, 3 neck neck dissection, L FFF, tracheostomy w/ 7.5 cuffed portex, dental implants, STSG (12/7/23), s/p G tube placement and subsequent trach decannulation and discharged home on ( 12/22/23). Pt however returned to [facility] ( 12/27/23) with surgical site infection, s/p RTOR 12/27 with findings of skin flap necrosis and s/p debridement. Trach replaced through prior stoma for pulmonary toilet. Patient also noted to have dark stools and dropped in Hgb- wife reported black stool for the last 3 days and same thing coming out from peg tube 12/28 , now peg feeding held -. Medicine consulted for co-management.     # Skin flap necrosis s/p exploration and debridement ( 12/27) POD# 11  # RTOR for wound debridement and EGD( 1/2) POD# 5  OR findings  (1/2): infected, non-viable free fibula flap with viable skin paddle. Purulent drainage appreciated at margins of flap and in neck.  EGD( 1/2): ulcer found at the G-tube site    - Local wound care: OR findings reviewed, f/u OR mandibular culture per ENT   - ID f/u appreciated ( Team 1) ,  d/c's Vanco & Zosyn ( 1/1) and started on Imipenem 1gm iv q8h  (1/1-) , will need 2 weeks of iv abx from the last washout ( 1/2)   - f/u OR culture ( 1/2): reviewed   - OR cultures with E.coli, ECC, E.faecalis S.mitis/oralis, S.epi, S.constellatus, S.maltophilia, mixed anaerobes.   - f/u GNRs sensitivity   - WBC normal 6.74K (1/7)-> 5.6K(1/8)   - BCx( 12/28): ngtd x 5 days   - IV site looks ok ( R antecubital fossa)   # Acute blood loss anemia/melena   # hx WADE ( Ferritin 768 but Tsat 13% ( <20%) on 12/13/23)   - GI fu appreciated, EGD( 1/2) ulcer at G-tube site, rec; Protonix 40mg daily for 8 weeks and avoid bumper being too tight to cause pressure ulcer   - hgb dropped 7.6 ( 12/29) s/p 2u PRBC ( ~ 500mg elemental iron), trend Hgb 10.7  (1/1) ->10.4( 1/2)-> 10( 1/3) -> 9.2( 1/4) -> 9.5(1/5)-> 9.4( 1/7) -> 10(1/8)   - keep active T&S, keep Hgb>8    - c/w ASA given hx PCI x2 ( March 2023)  - hold off Plavix since adm ( 12/27) , awaiting ENT & GI clearance to resume Plavix  - c/w PPI daily can be via G-tube   - c/w Folic acid 1mg daily   - monitor clinically for any further melena     # CAD sp PCI/CUBA x2 to LAD and Ramus in March 2023 as per cards is in setting of NSTEMI - prefer DAPT, at least monotherapy with ASA if plavix need to be held for procedure, currently on ASA , to restart plavix when safe., c/w ASA 81mg and Atorvastatin 40mg qHS     #  DM - FS with ISS, would hold all ora-hypoglycemic agent, goal -180,  this am, ranged 124-222 yesterday     # pain management - oxycodone 5mg/10mg prn , dilaudid prn, would need stool softener to ensure daily BM.     # Dysphagia- NPO, trach, on TF with Vital 1.5 via G-tube     # DVT ppx: Lovenox     Med consult team continues to follow pt with you. Thank you.

## 2024-01-08 NOTE — PROGRESS NOTE ADULT - SUBJECTIVE AND OBJECTIVE BOX
OTOLARYNGOLOGY (ENT) PROGRESS NOTE    PATIENT: SAUNDRA HOLMAN  MRN: [id number]  : 57  [id number]  DATE OF SERVICE:  24  			                ID: SAUNDRA HOLMAN is a 69yo M w PMH of CAD (x2 stents Mar 2023), HLD, T2DM, hx of kidney stones, psoriasis who presented with an oral mass and underwent L hemimandibulectomy, SND L level 1-3, recon with L FFF, STSG, and placement of dental implants on . He had a trach which was subsequently decannulated. He returns  with surgical site infection now s/p OR for debridement and exploration. Trach replaced through prior stoma for pulmonary toilet.     Subjective/ Interval:   ; Patient seen this morning, oral pack to be changed, penrose dressing changed. Intraoral flap with partal skin necrosis, 7.5 portex in place with cuff deflated   : Patient seen and examined at bedside. NAEO. Patient remains afebrile and HD stable. HgB noted to drop to 7.6 from 9.2 but no additional episodes of melena. Penrose draining purulent material. Intra-oral infection/flap stable. Packing changed at bedside. No other complaints or changes at this time. ID recommended Vanc/zosyn and DC unasyn and flagyl, and IM recommended reticulocyte studies and adding folate supplements. No other changes at this time.   : AFVSS. No acute events overnight. Patient seen and examined at bedside. C/w Vanc/Zosyn per ID recs, pending sensitivities. Growing staph epi, E coli, enterobacter from wound cx on . S/p 2U PRBC yday w appropriate response in Hgb. Transfusion goal > 9.0.  : AFVSS. No acute events overnight. Patient seen and examined at bedside. C/w Vanc/Zosyn per ID recs, pending sens. Hgb stable this morning.  : AFVSS. No acute events overnight. Patient seen and examined at bedside. C/w Vanc/Zosyn, e faecalis sens to vanc/zosyn. Had 3 dark BM's yesterday that were stool guiac positive.  : AFVSS. No acute events overnight. Patient seen and examined at bedside. C/w Imipenem (changed per ID, enterobacter resistant to Zosyn). Plan for OR today for further washout / debridement.  1/3: AFVSS. No acute events overnight. Patient seen and examined at bedside. C/w imipenim. OR cx growing numerous gram negative rods and few gram positive cocci in pairs.  : Patient seen bedside, changed intraoral and neck packing,  Continue to follow cultures, pain controlled   : patient seen this morning, complains of right arm pain wit minimal swelling,  IV in the left arm,  Continue abx for 2 weeks. neck and oral packing changed, purulent drainage from neck noted.   : Patient seen at bedside during morning rounds. Reports right arm pain and swelling somewhat improved although still present. IV replaced in right arm yesterday. Remains on IV abx, tentative end date 1/15/24. Neck and oral packing changed at bedside; purulent drainage noted from neck, decreased in volume compared to prior exams.  : Patient seen at bedside during morning rounds. Overnight, patient complained of coughing, throat discomfort, and nursing reported some increased secretions via trach. Started on mucomyst with improvement. Noted 6 beats of PSVT at ~1900, no reported chest pain or other symptoms. No further episodes. Packing replaced at bedside   : patient seen this morning, neck and oral packing changed. Continues to have purulent drainage form neck.  No chest pain or SOB,                   ALLERGIES:  No Known Allergies      MEDICATIONS:  Antiinfectives:   imipenem/cilastatin  IVPB 1000 milliGRAM(s) IV Intermittent every 8 hours    IV fluids:  dextrose 5%. 1000 milliLiter(s) IV Continuous <Continuous>  dextrose 5%. 1000 milliLiter(s) IV Continuous <Continuous>  folic acid 1 milliGRAM(s) Enteral Tube daily    Hematologic/Anticoagulation:  aspirin  chewable 81 milliGRAM(s) Enteral Tube daily  enoxaparin Injectable 40 milliGRAM(s) SubCutaneous every 24 hours    Pain medications/Neuro:  acetaminophen     Tablet .. 650 milliGRAM(s) Oral every 6 hours PRN  HYDROmorphone  Injectable 1 milliGRAM(s) IV Push every 6 hours PRN  ondansetron Injectable 4 milliGRAM(s) IV Push every 8 hours PRN  oxyCODONE    IR 10 milliGRAM(s) Oral every 6 hours PRN  oxyCODONE    Solution 5 milliGRAM(s) Oral every 4 hours PRN    Endocrine Medications:   atorvastatin 40 milliGRAM(s) Oral at bedtime  dextrose 50% Injectable 25 Gram(s) IV Push once  dextrose 50% Injectable 12.5 Gram(s) IV Push once  dextrose 50% Injectable 25 Gram(s) IV Push once  dextrose Oral Gel 15 Gram(s) Oral once PRN  glucagon  Injectable 1 milliGRAM(s) IntraMuscular once  insulin lispro (ADMELOG) corrective regimen sliding scale   SubCutaneous three times a day before meals  insulin lispro (ADMELOG) corrective regimen sliding scale   SubCutaneous at bedtime    All other standing medications:   chlorhexidine 0.12% Liquid 15 milliLiter(s) Oral Mucosa two times a day  influenza  Vaccine (HIGH DOSE) 0.7 milliLiter(s) IntraMuscular once  pantoprazole  Injectable 40 milliGRAM(s) IV Push every 24 hours    All other PRN medications:  acetylcysteine 10%  Inhalation 4 milliLiter(s) Inhalation every 2 hours PRN    Vital Signs Last 24 Hrs  T(C): 36.8 (2024 05:00), Max: 37.1 (2024 21:52)  T(F): 98.3 (2024 05:00), Max: 98.7 (2024 21:52)  HR: 58 (2024 04:00) (56 - 76)  BP: 124/58 (2024 04:00) (102/52 - 124/58)  BP(mean): 84 (2024 04:00) (72 - 84)  RR: 16 (2024 04:00) (16 - 18)  SpO2: 99% (2024 04:00) (97% - 100%)    Parameters below as of 2024 04:00  Patient On (Oxygen Delivery Method): tracheostomy collar  O2 Flow (L/min): 10  O2 Concentration (%): 40       @ 07:01  -   @ 07:00  --------------------------------------------------------  IN:    Vital1.5: 1380 mL  Total IN: 1380 mL    OUT:    Oral Fluid: 0 mL    Voided (mL): 1150 mL  Total OUT: 1150 mL    Total NET: 230 mL        PHYSICAL EXAM:  GEN: appears stated age, NAD  NEURO: alert & oriented x   HEENT: Remnant left FFF paddle with dried blood, right half of mandibular dentition present with elastics and screws, iodoform packing replaced  in oral cavity   Neck: 7.5 Portex trach with cuff deflated, changed iodoform packing in the neck, noted to be draining purulent fluid, skin loosely reapproximated with silk suture, Kerlix dressing in place minimally saturated.  CVS: regular rate and rhythm  Pulm: normal respiratory excursions, not tachypneic, no labored breathing on trach collar   Abd: non-distended  Ext: moving all four extremities, right arm edema improving     LABS                       10.0   5.60  )-----------( 249      ( 2024 05:30 )             31.5        139  |  104  |  17  ----------------------------<  123<H>  4.2   |  26  |  0.66    Ca    9.2      2024 05:30  Phos  2.4       Mg     2.2                Coagulation Studies-     Urinalysis Basic - ( 2024 05:30 )    Color: x / Appearance: x / SG: x / pH: x  Gluc: 123 mg/dL / Ketone: x  / Bili: x / Urobili: x   Blood: x / Protein: x / Nitrite: x   Leuk Esterase: x / RBC: x / WBC x   Sq Epi: x / Non Sq Epi: x / Bacteria: x      Endocrine Panel-  Calcium: 9.2 mg/dL ( @ 05:30)                MICROBIOLOGY:  Culture - Tissue with Gram Stain (collected 24 @ 18:03)  Source: .Tissue left mandible tissue or spec  Gram Stain (24 @ 21:59):    Numerous Gram Negative Rods    Few Gram positive cocci in pairs    Few WBC's  Final Report (24 @ 12:29):    Numerous Escherichia coli    Numerous Enterobacter cloacae complex    Few Enterococcus faecalis    Rare Staphylococcus epidermidis  Organism: Escherichia coli  Enterobacter cloacae complex  Enterobacter cloacae complex  Enterococcus faecalis  Escherichia coli (24 @ 12:29)  Organism: Escherichia coli (24 @ 12:29)      Method Type: OTTO      -  Ampicillin: S <=8 These ampicillin results predict results for amoxicillin      -  Ampicillin/Sulbactam: S <=4/2      -  Cefazolin: S <=2      -  Ceftriaxone: S <=1      -  Ciprofloxacin: S <=0.25      -  Ertapenem: S <=0.5      -  Gentamicin: S <=2      -  Piperacillin/Tazobactam: S <=8      -  Tobramycin: S <=2      -  Trimethoprim/Sulfamethoxazole: S   Organism: Enterococcus faecalis (24 @ 12:29)      Method Type: OTTO      -  Ampicillin: S <=2 Predicts results to ampicillin/sulbactam, amoxacillin-clavulanate and  piperacillin-tazobactam.      -  Vancomycin: S 2  Organism: Enterobacter cloacae complex (24 @ 12:29)      Method Type: ETEST      -  Meropenem: S 0.094      -  Ertapenem: S 0.38  Organism: Enterobacter cloacae complex (24 @ 12:29)      Method Type: OTTO      -  Ampicillin: R >16 These ampicillin results predict results for amoxicillin      -  Ampicillin/Sulbactam: R >16/8      -  Cefazolin: R >16      -  Cefepime: R 16      -  Ceftriaxone: R >32 Enterobacter cloacae, Klebsiella aerogenes, and Citrobacter freundii may develop resistance during prolonged therapy.      -  Ciprofloxacin: S <=0.25      -  Gentamicin: S <=2      -  Meropenem: S <=1      -  Meropenem/Vaborbactam: S <=2      -  Piperacillin/Tazobactam: R >64      -  Tobramycin: S <=2      -  Trimethoprim/Sulfamethoxazole: S <=0.5/9.5  Organism: Escherichia coli (24 @ 12:29)      Method Type: OTTO      -  Ampicillin: S <=8 These ampicillin results predict results for amoxicillin      -  Ampicillin/Sulbactam: S <=4/2      -  Cefazolin: S <=2      -  Ceftriaxone: S <=1      -  Ciprofloxacin: S <=0.25      -  Ertapenem: S <=0.5      -  Gentamicin: S <=2      -  Piperacillin/Tazobactam: S <=8      -  Tobramycin: S <=2      -  Trimethoprim/Sulfamethoxazole: S     Culture - Surgical Swab (collected 24 @ 18:03)  Source: .Surgical Swab left neck or spec  Gram Stain (24 @ 22:00):    Rare Gram Negative Rods    Rare Gram positive cocci in pairs    Moderate WBC's  Final Report (24 @ 12:42):    Moderate Escherichia coli    Moderate Enterobacter cloacae complex    Rare Candida parapsilosis    Rare Staphylococcus capitis  Organism: Enterobacter cloacae complex  Enterobacter cloacae complex  Escherichia coli  Escherichia coli (24 @ 12:16)  Organism: Escherichia coli (24 @ 12:16)      Method Type: OTTO      -  Ampicillin: S <=8 These ampicillin results predict results for amoxicillin      -  Ampicillin/Sulbactam: S <=4/2      -  Cefazolin: S <=2      -  Ceftriaxone: S <=1      -  Ciprofloxacin: S <=0.25      -  Ertapenem: S <=0.5      -  Gentamicin: S <=2      -  Piperacillin/Tazobactam: S <=8      -  Tobramycin: S <=2      -  Trimethoprim/Sulfamethoxazole: S   Organism: Escherichia coli (24 @ 12:16)      Method Type: OTTO      -  Ampicillin: S <=8 These ampicillin results predict results for amoxicillin      -  Ampicillin/Sulbactam: S <=4/2      -  Cefazolin: S <=2      -  Ceftriaxone: S <=1      -  Ciprofloxacin: S <=0.25      -  Ertapenem: S <=0.5      -  Gentamicin: S <=2      -  Piperacillin/Tazobactam: S <=8      -  Tobramycin: S <=2      -  Trimethoprim/Sulfamethoxazole: S   Organism: Enterobacter cloacae complex (24 @ 12:16)      Method Type: ETEST      -  Ertapenem: S 0.38      -  Meropenem: S 0.094  Organism: Enterobacter cloacae complex (24 @ 12:16)      Method Type: OTTO      -  Ampicillin: R >16 These ampicillin results predict results for amoxicillin      -  Ampicillin/Sulbactam: R >16/8      -  Cefazolin: R >16      -  Cefepime: R 16      -  Ceftriaxone: R >32 Enterobacter cloacae, Klebsiella aerogenes, and Citrobacter freundii may develop resistance during prolonged therapy.      -  Ciprofloxacin: S <=0.25      -  Gentamicin: S <=2      -  Meropenem: S <=1      -  Meropenem/Vaborbactam: S <=2      -  Piperacillin/Tazobactam: R >64      -  Tobramycin: S <=2      -  Trimethoprim/Sulfamethoxazole: S <=0.5/9.5      Culture Results:   Moderate Escherichia coli  Moderate Enterobacter cloacae complex  Rare Candida parapsilosis  Rare Staphylococcus capitis (24 @ 18:03)  Culture Results:   Numerous Escherichia coli  Numerous Enterobacter cloacae complex  Few Enterococcus faecalis  Rare Staphylococcus epidermidis (24 @ 18:03)  Culture Results:   No growth at 5 days. (23 @ 10:08)  Culture Results:   Moderate Escherichia coli  Moderate Enterobacter cloacae complex  Few Enterococcus faecalis  Few Streptococcus mitis/oralis group  Few Staphylococcus epidermidis  Few Streptococcus constellatus  Few Stenotrophomonas maltophilia  Mixed Anaerobic Joy including  Few Prevotella species (most closely resembles Prevotella barnaie)  Few Prevotella denticola  Culture grew 3 or more types of organisms which indicate collection  contamination; consider recollection only if clinically indicated. (23 @ 20:46)      Culture - Tissue with Gram Stain (collected 24 @ 18:03)  Source: .Tissue left mandible tissue or spec  Gram Stain (24 @ 21:59):    Numerous Gram Negative Rods    Few Gram positive cocci in pairs    Few WBC's  Final Report (24 @ 12:29):    Numerous Escherichia coli    Numerous Enterobacter cloacae complex    Few Enterococcus faecalis    Rare Staphylococcus epidermidis  Organism: Escherichia coli  Enterobacter cloacae complex  Enterobacter cloacae complex  Enterococcus faecalis  Escherichia coli (24 @ 12:29)  Organism: Escherichia coli (24 @ 12:29)      Method Type: OTTO      -  Ampicillin: S <=8 These ampicillin results predict results for amoxicillin      -  Ampicillin/Sulbactam: S <=4/2      -  Cefazolin: S <=2      -  Ceftriaxone: S <=1      -  Ciprofloxacin: S <=0.25      -  Ertapenem: S <=0.5      -  Gentamicin: S <=2      -  Piperacillin/Tazobactam: S <=8      -  Tobramycin: S <=2      -  Trimethoprim/Sulfamethoxazole: S   Organism: Enterococcus faecalis (24 @ 12:29)      Method Type: OTTO      -  Ampicillin: S <=2 Predicts results to ampicillin/sulbactam, amoxacillin-clavulanate and  piperacillin-tazobactam.      -  Vancomycin: S 2  Organism: Enterobacter cloacae complex (24 @ 12:29)      Method Type: ETEST      -  Meropenem: S 0.094      -  Ertapenem: S 0.38  Organism: Enterobacter cloacae complex (24 @ 12:29)      Method Type: OTTO      -  Ampicillin: R >16 These ampicillin results predict results for amoxicillin      -  Ampicillin/Sulbactam: R >16/8      -  Cefazolin: R >16      -  Cefepime: R 16      -  Ceftriaxone: R >32 Enterobacter cloacae, Klebsiella aerogenes, and Citrobacter freundii may develop resistance during prolonged therapy.      -  Ciprofloxacin: S <=0.25      -  Gentamicin: S <=2      -  Meropenem: S <=1      -  Meropenem/Vaborbactam: S <=2      -  Piperacillin/Tazobactam: R >64      -  Tobramycin: S <=2      -  Trimethoprim/Sulfamethoxazole: S <=0.5/9.5  Organism: Escherichia coli (24 @ 12:29)      Method Type: OTTO      -  Ampicillin: S <=8 These ampicillin results predict results for amoxicillin      -  Ampicillin/Sulbactam: S <=4/2      -  Cefazolin: S <=2      -  Ceftriaxone: S <=1      -  Ciprofloxacin: S <=0.25      -  Ertapenem: S <=0.5      -  Gentamicin: S <=2      -  Piperacillin/Tazobactam: S <=8      -  Tobramycin: S <=2      -  Trimethoprim/Sulfamethoxazole: S     Culture - Surgical Swab (collected 24 @ 18:03)  Source: .Surgical Swab left neck or spec  Gram Stain (24 @ 22:00):    Rare Gram Negative Rods    Rare Gram positive cocci in pairs    Moderate WBC's  Final Report (24 @ 12:42):    Moderate Escherichia coli    Moderate Enterobacter cloacae complex    Rare Candida parapsilosis    Rare Staphylococcus capitis  Organism: Enterobacter cloacae complex  Enterobacter cloacae complex  Escherichia coli  Escherichia coli (24 @ 12:16)  Organism: Escherichia coli (24 @ 12:16)      Method Type: OTTO      -  Ampicillin: S <=8 These ampicillin results predict results for amoxicillin      -  Ampicillin/Sulbactam: S <=4/2      -  Cefazolin: S <=2      -  Ceftriaxone: S <=1      -  Ciprofloxacin: S <=0.25      -  Ertapenem: S <=0.5      -  Gentamicin: S <=2      -  Piperacillin/Tazobactam: S <=8      -  Tobramycin: S <=2      -  Trimethoprim/Sulfamethoxazole: S   Organism: Escherichia coli (24 @ 12:16)      Method Type: OTTO      -  Ampicillin: S <=8 These ampicillin results predict results for amoxicillin      -  Ampicillin/Sulbactam: S <=4/2      -  Cefazolin: S <=2      -  Ceftriaxone: S <=1      -  Ciprofloxacin: S <=0.25      -  Ertapenem: S <=0.5      -  Gentamicin: S <=2      -  Piperacillin/Tazobactam: S <=8      -  Tobramycin: S <=2      -  Trimethoprim/Sulfamethoxazole: S   Organism: Enterobacter cloacae complex (24 @ 12:16)      Method Type: ETEST      -  Ertapenem: S 0.38      -  Meropenem: S 0.094  Organism: Enterobacter cloacae complex (24 @ 12:16)      Method Type: OTTO      -  Ampicillin: R >16 These ampicillin results predict results for amoxicillin      -  Ampicillin/Sulbactam: R >16/8      -  Cefazolin: R >16      -  Cefepime: R 16      -  Ceftriaxone: R >32 Enterobacter cloacae, Klebsiella aerogenes, and Citrobacter freundii may develop resistance during prolonged therapy.      -  Ciprofloxacin: S <=0.25      -  Gentamicin: S <=2      -  Meropenem: S <=1      -  Meropenem/Vaborbactam: S <=2      -  Piperacillin/Tazobactam: R >64      -  Tobramycin: S <=2      -  Trimethoprim/Sulfamethoxazole: S <=0.5/9.5       OTOLARYNGOLOGY (ENT) PROGRESS NOTE    PATIENT: SAUNDRA HOLMAN  MRN: [id number]  : 57  [id number]  DATE OF SERVICE:  24  			                ID: SAUNDRA HOLMAN is a 67yo M w PMH of CAD (x2 stents Mar 2023), HLD, T2DM, hx of kidney stones, psoriasis who presented with an oral mass and underwent L hemimandibulectomy, SND L level 1-3, recon with L FFF, STSG, and placement of dental implants on . He had a trach which was subsequently decannulated. He returns  with surgical site infection now s/p OR for debridement and exploration. Trach replaced through prior stoma for pulmonary toilet.     Subjective/ Interval:   ; Patient seen this morning, oral pack to be changed, penrose dressing changed. Intraoral flap with partal skin necrosis, 7.5 portex in place with cuff deflated   : Patient seen and examined at bedside. NAEO. Patient remains afebrile and HD stable. HgB noted to drop to 7.6 from 9.2 but no additional episodes of melena. Penrose draining purulent material. Intra-oral infection/flap stable. Packing changed at bedside. No other complaints or changes at this time. ID recommended Vanc/zosyn and DC unasyn and flagyl, and IM recommended reticulocyte studies and adding folate supplements. No other changes at this time.   : AFVSS. No acute events overnight. Patient seen and examined at bedside. C/w Vanc/Zosyn per ID recs, pending sensitivities. Growing staph epi, E coli, enterobacter from wound cx on . S/p 2U PRBC yday w appropriate response in Hgb. Transfusion goal > 9.0.  : AFVSS. No acute events overnight. Patient seen and examined at bedside. C/w Vanc/Zosyn per ID recs, pending sens. Hgb stable this morning.  : AFVSS. No acute events overnight. Patient seen and examined at bedside. C/w Vanc/Zosyn, e faecalis sens to vanc/zosyn. Had 3 dark BM's yesterday that were stool guiac positive.  : AFVSS. No acute events overnight. Patient seen and examined at bedside. C/w Imipenem (changed per ID, enterobacter resistant to Zosyn). Plan for OR today for further washout / debridement.  1/3: AFVSS. No acute events overnight. Patient seen and examined at bedside. C/w imipenim. OR cx growing numerous gram negative rods and few gram positive cocci in pairs.  : Patient seen bedside, changed intraoral and neck packing,  Continue to follow cultures, pain controlled   : patient seen this morning, complains of right arm pain wit minimal swelling,  IV in the left arm,  Continue abx for 2 weeks. neck and oral packing changed, purulent drainage from neck noted.   : Patient seen at bedside during morning rounds. Reports right arm pain and swelling somewhat improved although still present. IV replaced in right arm yesterday. Remains on IV abx, tentative end date 1/15/24. Neck and oral packing changed at bedside; purulent drainage noted from neck, decreased in volume compared to prior exams.  : Patient seen at bedside during morning rounds. Overnight, patient complained of coughing, throat discomfort, and nursing reported some increased secretions via trach. Started on mucomyst with improvement. Noted 6 beats of PSVT at ~1900, no reported chest pain or other symptoms. No further episodes. Packing replaced at bedside   : patient seen this morning, neck and oral packing changed. Continues to have purulent drainage form neck.  No chest pain or SOB,                   ALLERGIES:  No Known Allergies      MEDICATIONS:  Antiinfectives:   imipenem/cilastatin  IVPB 1000 milliGRAM(s) IV Intermittent every 8 hours    IV fluids:  dextrose 5%. 1000 milliLiter(s) IV Continuous <Continuous>  dextrose 5%. 1000 milliLiter(s) IV Continuous <Continuous>  folic acid 1 milliGRAM(s) Enteral Tube daily    Hematologic/Anticoagulation:  aspirin  chewable 81 milliGRAM(s) Enteral Tube daily  enoxaparin Injectable 40 milliGRAM(s) SubCutaneous every 24 hours    Pain medications/Neuro:  acetaminophen     Tablet .. 650 milliGRAM(s) Oral every 6 hours PRN  HYDROmorphone  Injectable 1 milliGRAM(s) IV Push every 6 hours PRN  ondansetron Injectable 4 milliGRAM(s) IV Push every 8 hours PRN  oxyCODONE    IR 10 milliGRAM(s) Oral every 6 hours PRN  oxyCODONE    Solution 5 milliGRAM(s) Oral every 4 hours PRN    Endocrine Medications:   atorvastatin 40 milliGRAM(s) Oral at bedtime  dextrose 50% Injectable 25 Gram(s) IV Push once  dextrose 50% Injectable 12.5 Gram(s) IV Push once  dextrose 50% Injectable 25 Gram(s) IV Push once  dextrose Oral Gel 15 Gram(s) Oral once PRN  glucagon  Injectable 1 milliGRAM(s) IntraMuscular once  insulin lispro (ADMELOG) corrective regimen sliding scale   SubCutaneous three times a day before meals  insulin lispro (ADMELOG) corrective regimen sliding scale   SubCutaneous at bedtime    All other standing medications:   chlorhexidine 0.12% Liquid 15 milliLiter(s) Oral Mucosa two times a day  influenza  Vaccine (HIGH DOSE) 0.7 milliLiter(s) IntraMuscular once  pantoprazole  Injectable 40 milliGRAM(s) IV Push every 24 hours    All other PRN medications:  acetylcysteine 10%  Inhalation 4 milliLiter(s) Inhalation every 2 hours PRN    Vital Signs Last 24 Hrs  T(C): 36.8 (2024 05:00), Max: 37.1 (2024 21:52)  T(F): 98.3 (2024 05:00), Max: 98.7 (2024 21:52)  HR: 58 (2024 04:00) (56 - 76)  BP: 124/58 (2024 04:00) (102/52 - 124/58)  BP(mean): 84 (2024 04:00) (72 - 84)  RR: 16 (2024 04:00) (16 - 18)  SpO2: 99% (2024 04:00) (97% - 100%)    Parameters below as of 2024 04:00  Patient On (Oxygen Delivery Method): tracheostomy collar  O2 Flow (L/min): 10  O2 Concentration (%): 40       @ 07:01  -   @ 07:00  --------------------------------------------------------  IN:    Vital1.5: 1380 mL  Total IN: 1380 mL    OUT:    Oral Fluid: 0 mL    Voided (mL): 1150 mL  Total OUT: 1150 mL    Total NET: 230 mL        PHYSICAL EXAM:  GEN: appears stated age, NAD  NEURO: alert & oriented x   HEENT: Remnant left FFF paddle with dried blood, right half of mandibular dentition present with elastics and screws, iodoform packing replaced  in oral cavity   Neck: 7.5 Portex trach with cuff deflated, changed iodoform packing in the neck, noted to be draining purulent fluid, skin loosely reapproximated with silk suture, Kerlix dressing in place minimally saturated.  CVS: regular rate and rhythm  Pulm: normal respiratory excursions, not tachypneic, no labored breathing on trach collar   Abd: non-distended  Ext: moving all four extremities, right arm edema improving     LABS                       10.0   5.60  )-----------( 249      ( 2024 05:30 )             31.5        139  |  104  |  17  ----------------------------<  123<H>  4.2   |  26  |  0.66    Ca    9.2      2024 05:30  Phos  2.4       Mg     2.2                Coagulation Studies-     Urinalysis Basic - ( 2024 05:30 )    Color: x / Appearance: x / SG: x / pH: x  Gluc: 123 mg/dL / Ketone: x  / Bili: x / Urobili: x   Blood: x / Protein: x / Nitrite: x   Leuk Esterase: x / RBC: x / WBC x   Sq Epi: x / Non Sq Epi: x / Bacteria: x      Endocrine Panel-  Calcium: 9.2 mg/dL ( @ 05:30)                MICROBIOLOGY:  Culture - Tissue with Gram Stain (collected 24 @ 18:03)  Source: .Tissue left mandible tissue or spec  Gram Stain (24 @ 21:59):    Numerous Gram Negative Rods    Few Gram positive cocci in pairs    Few WBC's  Final Report (24 @ 12:29):    Numerous Escherichia coli    Numerous Enterobacter cloacae complex    Few Enterococcus faecalis    Rare Staphylococcus epidermidis  Organism: Escherichia coli  Enterobacter cloacae complex  Enterobacter cloacae complex  Enterococcus faecalis  Escherichia coli (24 @ 12:29)  Organism: Escherichia coli (24 @ 12:29)      Method Type: OTTO      -  Ampicillin: S <=8 These ampicillin results predict results for amoxicillin      -  Ampicillin/Sulbactam: S <=4/2      -  Cefazolin: S <=2      -  Ceftriaxone: S <=1      -  Ciprofloxacin: S <=0.25      -  Ertapenem: S <=0.5      -  Gentamicin: S <=2      -  Piperacillin/Tazobactam: S <=8      -  Tobramycin: S <=2      -  Trimethoprim/Sulfamethoxazole: S   Organism: Enterococcus faecalis (24 @ 12:29)      Method Type: OTTO      -  Ampicillin: S <=2 Predicts results to ampicillin/sulbactam, amoxacillin-clavulanate and  piperacillin-tazobactam.      -  Vancomycin: S 2  Organism: Enterobacter cloacae complex (24 @ 12:29)      Method Type: ETEST      -  Meropenem: S 0.094      -  Ertapenem: S 0.38  Organism: Enterobacter cloacae complex (24 @ 12:29)      Method Type: OTTO      -  Ampicillin: R >16 These ampicillin results predict results for amoxicillin      -  Ampicillin/Sulbactam: R >16/8      -  Cefazolin: R >16      -  Cefepime: R 16      -  Ceftriaxone: R >32 Enterobacter cloacae, Klebsiella aerogenes, and Citrobacter freundii may develop resistance during prolonged therapy.      -  Ciprofloxacin: S <=0.25      -  Gentamicin: S <=2      -  Meropenem: S <=1      -  Meropenem/Vaborbactam: S <=2      -  Piperacillin/Tazobactam: R >64      -  Tobramycin: S <=2      -  Trimethoprim/Sulfamethoxazole: S <=0.5/9.5  Organism: Escherichia coli (24 @ 12:29)      Method Type: OTTO      -  Ampicillin: S <=8 These ampicillin results predict results for amoxicillin      -  Ampicillin/Sulbactam: S <=4/2      -  Cefazolin: S <=2      -  Ceftriaxone: S <=1      -  Ciprofloxacin: S <=0.25      -  Ertapenem: S <=0.5      -  Gentamicin: S <=2      -  Piperacillin/Tazobactam: S <=8      -  Tobramycin: S <=2      -  Trimethoprim/Sulfamethoxazole: S     Culture - Surgical Swab (collected 24 @ 18:03)  Source: .Surgical Swab left neck or spec  Gram Stain (24 @ 22:00):    Rare Gram Negative Rods    Rare Gram positive cocci in pairs    Moderate WBC's  Final Report (24 @ 12:42):    Moderate Escherichia coli    Moderate Enterobacter cloacae complex    Rare Candida parapsilosis    Rare Staphylococcus capitis  Organism: Enterobacter cloacae complex  Enterobacter cloacae complex  Escherichia coli  Escherichia coli (24 @ 12:16)  Organism: Escherichia coli (24 @ 12:16)      Method Type: OTTO      -  Ampicillin: S <=8 These ampicillin results predict results for amoxicillin      -  Ampicillin/Sulbactam: S <=4/2      -  Cefazolin: S <=2      -  Ceftriaxone: S <=1      -  Ciprofloxacin: S <=0.25      -  Ertapenem: S <=0.5      -  Gentamicin: S <=2      -  Piperacillin/Tazobactam: S <=8      -  Tobramycin: S <=2      -  Trimethoprim/Sulfamethoxazole: S   Organism: Escherichia coli (24 @ 12:16)      Method Type: OTTO      -  Ampicillin: S <=8 These ampicillin results predict results for amoxicillin      -  Ampicillin/Sulbactam: S <=4/2      -  Cefazolin: S <=2      -  Ceftriaxone: S <=1      -  Ciprofloxacin: S <=0.25      -  Ertapenem: S <=0.5      -  Gentamicin: S <=2      -  Piperacillin/Tazobactam: S <=8      -  Tobramycin: S <=2      -  Trimethoprim/Sulfamethoxazole: S   Organism: Enterobacter cloacae complex (24 @ 12:16)      Method Type: ETEST      -  Ertapenem: S 0.38      -  Meropenem: S 0.094  Organism: Enterobacter cloacae complex (24 @ 12:16)      Method Type: OTTO      -  Ampicillin: R >16 These ampicillin results predict results for amoxicillin      -  Ampicillin/Sulbactam: R >16/8      -  Cefazolin: R >16      -  Cefepime: R 16      -  Ceftriaxone: R >32 Enterobacter cloacae, Klebsiella aerogenes, and Citrobacter freundii may develop resistance during prolonged therapy.      -  Ciprofloxacin: S <=0.25      -  Gentamicin: S <=2      -  Meropenem: S <=1      -  Meropenem/Vaborbactam: S <=2      -  Piperacillin/Tazobactam: R >64      -  Tobramycin: S <=2      -  Trimethoprim/Sulfamethoxazole: S <=0.5/9.5      Culture Results:   Moderate Escherichia coli  Moderate Enterobacter cloacae complex  Rare Candida parapsilosis  Rare Staphylococcus capitis (24 @ 18:03)  Culture Results:   Numerous Escherichia coli  Numerous Enterobacter cloacae complex  Few Enterococcus faecalis  Rare Staphylococcus epidermidis (24 @ 18:03)  Culture Results:   No growth at 5 days. (23 @ 10:08)  Culture Results:   Moderate Escherichia coli  Moderate Enterobacter cloacae complex  Few Enterococcus faecalis  Few Streptococcus mitis/oralis group  Few Staphylococcus epidermidis  Few Streptococcus constellatus  Few Stenotrophomonas maltophilia  Mixed Anaerobic Joy including  Few Prevotella species (most closely resembles Prevotella barnaie)  Few Prevotella denticola  Culture grew 3 or more types of organisms which indicate collection  contamination; consider recollection only if clinically indicated. (23 @ 20:46)      Culture - Tissue with Gram Stain (collected 24 @ 18:03)  Source: .Tissue left mandible tissue or spec  Gram Stain (24 @ 21:59):    Numerous Gram Negative Rods    Few Gram positive cocci in pairs    Few WBC's  Final Report (24 @ 12:29):    Numerous Escherichia coli    Numerous Enterobacter cloacae complex    Few Enterococcus faecalis    Rare Staphylococcus epidermidis  Organism: Escherichia coli  Enterobacter cloacae complex  Enterobacter cloacae complex  Enterococcus faecalis  Escherichia coli (24 @ 12:29)  Organism: Escherichia coli (24 @ 12:29)      Method Type: OTTO      -  Ampicillin: S <=8 These ampicillin results predict results for amoxicillin      -  Ampicillin/Sulbactam: S <=4/2      -  Cefazolin: S <=2      -  Ceftriaxone: S <=1      -  Ciprofloxacin: S <=0.25      -  Ertapenem: S <=0.5      -  Gentamicin: S <=2      -  Piperacillin/Tazobactam: S <=8      -  Tobramycin: S <=2      -  Trimethoprim/Sulfamethoxazole: S   Organism: Enterococcus faecalis (24 @ 12:29)      Method Type: OTTO      -  Ampicillin: S <=2 Predicts results to ampicillin/sulbactam, amoxacillin-clavulanate and  piperacillin-tazobactam.      -  Vancomycin: S 2  Organism: Enterobacter cloacae complex (24 @ 12:29)      Method Type: ETEST      -  Meropenem: S 0.094      -  Ertapenem: S 0.38  Organism: Enterobacter cloacae complex (24 @ 12:29)      Method Type: OTTO      -  Ampicillin: R >16 These ampicillin results predict results for amoxicillin      -  Ampicillin/Sulbactam: R >16/8      -  Cefazolin: R >16      -  Cefepime: R 16      -  Ceftriaxone: R >32 Enterobacter cloacae, Klebsiella aerogenes, and Citrobacter freundii may develop resistance during prolonged therapy.      -  Ciprofloxacin: S <=0.25      -  Gentamicin: S <=2      -  Meropenem: S <=1      -  Meropenem/Vaborbactam: S <=2      -  Piperacillin/Tazobactam: R >64      -  Tobramycin: S <=2      -  Trimethoprim/Sulfamethoxazole: S <=0.5/9.5  Organism: Escherichia coli (24 @ 12:29)      Method Type: OTTO      -  Ampicillin: S <=8 These ampicillin results predict results for amoxicillin      -  Ampicillin/Sulbactam: S <=4/2      -  Cefazolin: S <=2      -  Ceftriaxone: S <=1      -  Ciprofloxacin: S <=0.25      -  Ertapenem: S <=0.5      -  Gentamicin: S <=2      -  Piperacillin/Tazobactam: S <=8      -  Tobramycin: S <=2      -  Trimethoprim/Sulfamethoxazole: S     Culture - Surgical Swab (collected 24 @ 18:03)  Source: .Surgical Swab left neck or spec  Gram Stain (24 @ 22:00):    Rare Gram Negative Rods    Rare Gram positive cocci in pairs    Moderate WBC's  Final Report (24 @ 12:42):    Moderate Escherichia coli    Moderate Enterobacter cloacae complex    Rare Candida parapsilosis    Rare Staphylococcus capitis  Organism: Enterobacter cloacae complex  Enterobacter cloacae complex  Escherichia coli  Escherichia coli (24 @ 12:16)  Organism: Escherichia coli (24 @ 12:16)      Method Type: OTTO      -  Ampicillin: S <=8 These ampicillin results predict results for amoxicillin      -  Ampicillin/Sulbactam: S <=4/2      -  Cefazolin: S <=2      -  Ceftriaxone: S <=1      -  Ciprofloxacin: S <=0.25      -  Ertapenem: S <=0.5      -  Gentamicin: S <=2      -  Piperacillin/Tazobactam: S <=8      -  Tobramycin: S <=2      -  Trimethoprim/Sulfamethoxazole: S   Organism: Escherichia coli (24 @ 12:16)      Method Type: OTTO      -  Ampicillin: S <=8 These ampicillin results predict results for amoxicillin      -  Ampicillin/Sulbactam: S <=4/2      -  Cefazolin: S <=2      -  Ceftriaxone: S <=1      -  Ciprofloxacin: S <=0.25      -  Ertapenem: S <=0.5      -  Gentamicin: S <=2      -  Piperacillin/Tazobactam: S <=8      -  Tobramycin: S <=2      -  Trimethoprim/Sulfamethoxazole: S   Organism: Enterobacter cloacae complex (24 @ 12:16)      Method Type: ETEST      -  Ertapenem: S 0.38      -  Meropenem: S 0.094  Organism: Enterobacter cloacae complex (24 @ 12:16)      Method Type: OTTO      -  Ampicillin: R >16 These ampicillin results predict results for amoxicillin      -  Ampicillin/Sulbactam: R >16/8      -  Cefazolin: R >16      -  Cefepime: R 16      -  Ceftriaxone: R >32 Enterobacter cloacae, Klebsiella aerogenes, and Citrobacter freundii may develop resistance during prolonged therapy.      -  Ciprofloxacin: S <=0.25      -  Gentamicin: S <=2      -  Meropenem: S <=1      -  Meropenem/Vaborbactam: S <=2      -  Piperacillin/Tazobactam: R >64      -  Tobramycin: S <=2      -  Trimethoprim/Sulfamethoxazole: S <=0.5/9.5

## 2024-01-09 LAB
ANION GAP SERPL CALC-SCNC: 8 MMOL/L — SIGNIFICANT CHANGE UP (ref 5–17)
ANION GAP SERPL CALC-SCNC: 8 MMOL/L — SIGNIFICANT CHANGE UP (ref 5–17)
BUN SERPL-MCNC: 19 MG/DL — SIGNIFICANT CHANGE UP (ref 7–23)
BUN SERPL-MCNC: 19 MG/DL — SIGNIFICANT CHANGE UP (ref 7–23)
CALCIUM SERPL-MCNC: 9.3 MG/DL — SIGNIFICANT CHANGE UP (ref 8.4–10.5)
CALCIUM SERPL-MCNC: 9.3 MG/DL — SIGNIFICANT CHANGE UP (ref 8.4–10.5)
CHLORIDE SERPL-SCNC: 102 MMOL/L — SIGNIFICANT CHANGE UP (ref 96–108)
CHLORIDE SERPL-SCNC: 102 MMOL/L — SIGNIFICANT CHANGE UP (ref 96–108)
CO2 SERPL-SCNC: 28 MMOL/L — SIGNIFICANT CHANGE UP (ref 22–31)
CO2 SERPL-SCNC: 28 MMOL/L — SIGNIFICANT CHANGE UP (ref 22–31)
CREAT SERPL-MCNC: 0.75 MG/DL — SIGNIFICANT CHANGE UP (ref 0.5–1.3)
CREAT SERPL-MCNC: 0.75 MG/DL — SIGNIFICANT CHANGE UP (ref 0.5–1.3)
EGFR: 99 ML/MIN/1.73M2 — SIGNIFICANT CHANGE UP
EGFR: 99 ML/MIN/1.73M2 — SIGNIFICANT CHANGE UP
GLUCOSE BLDC GLUCOMTR-MCNC: 114 MG/DL — HIGH (ref 70–99)
GLUCOSE BLDC GLUCOMTR-MCNC: 114 MG/DL — HIGH (ref 70–99)
GLUCOSE BLDC GLUCOMTR-MCNC: 142 MG/DL — HIGH (ref 70–99)
GLUCOSE BLDC GLUCOMTR-MCNC: 142 MG/DL — HIGH (ref 70–99)
GLUCOSE BLDC GLUCOMTR-MCNC: 174 MG/DL — HIGH (ref 70–99)
GLUCOSE BLDC GLUCOMTR-MCNC: 174 MG/DL — HIGH (ref 70–99)
GLUCOSE BLDC GLUCOMTR-MCNC: 182 MG/DL — HIGH (ref 70–99)
GLUCOSE BLDC GLUCOMTR-MCNC: 182 MG/DL — HIGH (ref 70–99)
GLUCOSE SERPL-MCNC: 121 MG/DL — HIGH (ref 70–99)
GLUCOSE SERPL-MCNC: 121 MG/DL — HIGH (ref 70–99)
HCT VFR BLD CALC: 30.6 % — LOW (ref 39–50)
HCT VFR BLD CALC: 30.6 % — LOW (ref 39–50)
HGB BLD-MCNC: 9.7 G/DL — LOW (ref 13–17)
HGB BLD-MCNC: 9.7 G/DL — LOW (ref 13–17)
MAGNESIUM SERPL-MCNC: 1.9 MG/DL — SIGNIFICANT CHANGE UP (ref 1.6–2.6)
MAGNESIUM SERPL-MCNC: 1.9 MG/DL — SIGNIFICANT CHANGE UP (ref 1.6–2.6)
MCHC RBC-ENTMCNC: 28.4 PG — SIGNIFICANT CHANGE UP (ref 27–34)
MCHC RBC-ENTMCNC: 28.4 PG — SIGNIFICANT CHANGE UP (ref 27–34)
MCHC RBC-ENTMCNC: 31.7 GM/DL — LOW (ref 32–36)
MCHC RBC-ENTMCNC: 31.7 GM/DL — LOW (ref 32–36)
MCV RBC AUTO: 89.7 FL — SIGNIFICANT CHANGE UP (ref 80–100)
MCV RBC AUTO: 89.7 FL — SIGNIFICANT CHANGE UP (ref 80–100)
NRBC # BLD: 0 /100 WBCS — SIGNIFICANT CHANGE UP (ref 0–0)
NRBC # BLD: 0 /100 WBCS — SIGNIFICANT CHANGE UP (ref 0–0)
PHOSPHATE SERPL-MCNC: 2.6 MG/DL — SIGNIFICANT CHANGE UP (ref 2.5–4.5)
PHOSPHATE SERPL-MCNC: 2.6 MG/DL — SIGNIFICANT CHANGE UP (ref 2.5–4.5)
PLATELET # BLD AUTO: 244 K/UL — SIGNIFICANT CHANGE UP (ref 150–400)
PLATELET # BLD AUTO: 244 K/UL — SIGNIFICANT CHANGE UP (ref 150–400)
POTASSIUM SERPL-MCNC: 4.5 MMOL/L — SIGNIFICANT CHANGE UP (ref 3.5–5.3)
POTASSIUM SERPL-MCNC: 4.5 MMOL/L — SIGNIFICANT CHANGE UP (ref 3.5–5.3)
POTASSIUM SERPL-SCNC: 4.5 MMOL/L — SIGNIFICANT CHANGE UP (ref 3.5–5.3)
POTASSIUM SERPL-SCNC: 4.5 MMOL/L — SIGNIFICANT CHANGE UP (ref 3.5–5.3)
PREALB SERPL-MCNC: 18 MG/DL — LOW (ref 20–40)
PREALB SERPL-MCNC: 18 MG/DL — LOW (ref 20–40)
RBC # BLD: 3.41 M/UL — LOW (ref 4.2–5.8)
RBC # BLD: 3.41 M/UL — LOW (ref 4.2–5.8)
RBC # FLD: 14.3 % — SIGNIFICANT CHANGE UP (ref 10.3–14.5)
RBC # FLD: 14.3 % — SIGNIFICANT CHANGE UP (ref 10.3–14.5)
SARS-COV-2 RNA SPEC QL NAA+PROBE: SIGNIFICANT CHANGE UP
SARS-COV-2 RNA SPEC QL NAA+PROBE: SIGNIFICANT CHANGE UP
SODIUM SERPL-SCNC: 138 MMOL/L — SIGNIFICANT CHANGE UP (ref 135–145)
SODIUM SERPL-SCNC: 138 MMOL/L — SIGNIFICANT CHANGE UP (ref 135–145)
TSH SERPL-MCNC: 3.15 UIU/ML — SIGNIFICANT CHANGE UP (ref 0.27–4.2)
TSH SERPL-MCNC: 3.15 UIU/ML — SIGNIFICANT CHANGE UP (ref 0.27–4.2)
WBC # BLD: 5.62 K/UL — SIGNIFICANT CHANGE UP (ref 3.8–10.5)
WBC # BLD: 5.62 K/UL — SIGNIFICANT CHANGE UP (ref 3.8–10.5)
WBC # FLD AUTO: 5.62 K/UL — SIGNIFICANT CHANGE UP (ref 3.8–10.5)
WBC # FLD AUTO: 5.62 K/UL — SIGNIFICANT CHANGE UP (ref 3.8–10.5)

## 2024-01-09 PROCEDURE — 99233 SBSQ HOSP IP/OBS HIGH 50: CPT | Mod: GC

## 2024-01-09 RX ORDER — LANOLIN ALCOHOL/MO/W.PET/CERES
1 CREAM (GRAM) TOPICAL AT BEDTIME
Refills: 0 | Status: DISCONTINUED | OUTPATIENT
Start: 2024-01-09 | End: 2024-01-11

## 2024-01-09 RX ORDER — OXYCODONE HYDROCHLORIDE 5 MG/1
10 TABLET ORAL EVERY 6 HOURS
Refills: 0 | Status: DISCONTINUED | OUTPATIENT
Start: 2024-01-09 | End: 2024-01-11

## 2024-01-09 RX ORDER — OXYCODONE HYDROCHLORIDE 5 MG/1
5 TABLET ORAL EVERY 4 HOURS
Refills: 0 | Status: DISCONTINUED | OUTPATIENT
Start: 2024-01-09 | End: 2024-01-11

## 2024-01-09 RX ORDER — HYDROMORPHONE HYDROCHLORIDE 2 MG/ML
1 INJECTION INTRAMUSCULAR; INTRAVENOUS; SUBCUTANEOUS EVERY 6 HOURS
Refills: 0 | Status: DISCONTINUED | OUTPATIENT
Start: 2024-01-09 | End: 2024-01-11

## 2024-01-09 RX ORDER — IMIPENEM AND CILASTATIN 250; 250 MG/100ML; MG/100ML
1000 INJECTION, POWDER, FOR SOLUTION INTRAVENOUS EVERY 8 HOURS
Refills: 0 | Status: DISCONTINUED | OUTPATIENT
Start: 2024-01-09 | End: 2024-01-11

## 2024-01-09 RX ADMIN — Medication 4 MILLILITER(S): at 23:48

## 2024-01-09 RX ADMIN — HYDROMORPHONE HYDROCHLORIDE 1 MILLIGRAM(S): 2 INJECTION INTRAMUSCULAR; INTRAVENOUS; SUBCUTANEOUS at 06:23

## 2024-01-09 RX ADMIN — CHLORHEXIDINE GLUCONATE 15 MILLILITER(S): 213 SOLUTION TOPICAL at 05:43

## 2024-01-09 RX ADMIN — Medication 650 MILLIGRAM(S): at 23:30

## 2024-01-09 RX ADMIN — ENOXAPARIN SODIUM 40 MILLIGRAM(S): 100 INJECTION SUBCUTANEOUS at 14:27

## 2024-01-09 RX ADMIN — Medication 1: at 17:37

## 2024-01-09 RX ADMIN — PANTOPRAZOLE SODIUM 40 MILLIGRAM(S): 20 TABLET, DELAYED RELEASE ORAL at 10:19

## 2024-01-09 RX ADMIN — IMIPENEM AND CILASTATIN 250 MILLIGRAM(S): 250; 250 INJECTION, POWDER, FOR SOLUTION INTRAVENOUS at 10:17

## 2024-01-09 RX ADMIN — Medication 81 MILLIGRAM(S): at 11:38

## 2024-01-09 RX ADMIN — Medication 4 MILLILITER(S): at 11:40

## 2024-01-09 RX ADMIN — IMIPENEM AND CILASTATIN 250 MILLIGRAM(S): 250; 250 INJECTION, POWDER, FOR SOLUTION INTRAVENOUS at 18:59

## 2024-01-09 RX ADMIN — Medication 650 MILLIGRAM(S): at 22:47

## 2024-01-09 RX ADMIN — ATORVASTATIN CALCIUM 40 MILLIGRAM(S): 80 TABLET, FILM COATED ORAL at 21:00

## 2024-01-09 RX ADMIN — CHLORHEXIDINE GLUCONATE 15 MILLILITER(S): 213 SOLUTION TOPICAL at 17:06

## 2024-01-09 RX ADMIN — HYDROMORPHONE HYDROCHLORIDE 1 MILLIGRAM(S): 2 INJECTION INTRAMUSCULAR; INTRAVENOUS; SUBCUTANEOUS at 06:53

## 2024-01-09 RX ADMIN — IMIPENEM AND CILASTATIN 250 MILLIGRAM(S): 250; 250 INJECTION, POWDER, FOR SOLUTION INTRAVENOUS at 01:27

## 2024-01-09 RX ADMIN — Medication 1 MILLIGRAM(S): at 11:38

## 2024-01-09 NOTE — PROGRESS NOTE ADULT - ATTENDING COMMENTS
69 y/o M PMHx of CAD s/p PCI/CUBA x2 to LAD and Ramus (Mar 2023), T2DM, psoriasis, hx Renal calculi, w/ [id number] SCC of L buccal mucosa s/p hemimandibulectomy, left level 1, 2a, 3 neck neck dissection, L FFF, tracheostomy w/ 7.5 cuffed portex, dental implants, STSG (12/7/23), s/p G tube placement and subsequent trach decannulation and discharged home on ( 12/22/23). Pt however returned to [facility] ( 12/27/23) with surgical site infection, s/p RTOR 12/27 with findings of skin flap necrosis and s/p debridement. Trach replaced through prior stoma for pulmonary toilet. Patient also noted to have dark stools and dropped in Hgb- wife reported black stool for the last 3 days and same thing coming out from peg tube 12/28 , now peg feeding held -. Medicine consulted for co-management.     CC: Pt seen w. .Pt in good spirits, no complaints. 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 Purulent drainage appreciated at margins of flap and in neck.  EGD( 1/2): ulcer found at the G-tube site    - Local wound care: OR findings reviewed, f/u OR mandibular culture per ENT   - ID f/u appreciated ( Team 1) ,  d/c's Vanco & Zosyn ( 1/1) and started on Imipenem 1gm iv q8h  (1/1-) , will need 2 weeks of iv abx from the last washout ( 1/2) day 1 , EOT 1/15  - f/u OR culture ( 1/2): reviewed   - OR cultures with E.coli, ECC, E.faecalis S.mitis/oralis, S.epi, S.constellatus, S.maltophilia, mixed anaerobes.   - f/u GNRs sensitivity   - WBC normal 6.74K (1/7)-> 5.6K(1/8)   - BCx( 12/28): ngtd x 5 days   - IV site looks ok ( R antecubital fossa)   # Acute blood loss anemia/melena   # hx WADE ( Ferritin 768 but Tsat 13% ( <20%) on 12/13/23)   - GI fu appreciated, EGD( 1/2) ulcer at G-tube site, rec; Protonix 40mg daily for 8 weeks and avoid bumper being too tight to cause pressure ulcer   - hgb dropped 7.6 ( 12/29) s/p 2u PRBC ( ~ 500mg elemental iron), trend Hgb 10.7  (1/1) ->10.4( 1/2)-> 10( 1/3) -> 9.2( 1/4) -> 9.5(1/5)-> 9.4( 1/7) -> 10(1/8) -> 9.7(1/9)  - keep active T&S, keep Hgb>8    - c/w ASA given hx PCI x2 ( March 2023)  - hold off Plavix since adm ( 12/27) , awaiting ENT & GI clearance to resume Plavix  - c/w PPI daily can be via G-tube   - c/w Folic acid 1mg daily   - monitor clinically for any further melena     # CAD sp PCI/CUBA x2 to LAD and Ramus in March 2023 as per cards is in setting of NSTEMI - prefer DAPT, at least monotherapy with ASA if plavix need to be held for procedure, currently on ASA , to restart plavix when safe., c/w ASA 81mg and Atorvastatin 40mg qHS     #  DM - FS with ISS, would hold all ora-hypoglycemic agent, goal -180,  this am, ranged 116-199 yesterday     # pain management - oxycodone 5mg/10mg prn , dilaudid prn, would need stool softener to ensure daily BM.     # Dysphagia- NPO, trach, on TF with Vital 1.5 via G-tube     # DVT ppx: Lovenox     Med consult team continues to follow pt with you. Thank you.    Dr. Conchita Vogel covering 1/10-1/17/24 67 y/o M PMHx of CAD s/p PCI/CUBA x2 to LAD and Ramus (Mar 2023), T2DM, psoriasis, hx Renal calculi, w/ [id number] SCC of L buccal mucosa s/p hemimandibulectomy, left level 1, 2a, 3 neck neck dissection, L FFF, tracheostomy w/ 7.5 cuffed portex, dental implants, STSG (12/7/23), s/p G tube placement and subsequent trach decannulation and discharged home on ( 12/22/23). Pt however returned to [facility] ( 12/27/23) with surgical site infection, s/p RTOR 12/27 with findings of skin flap necrosis and s/p debridement. Trach replaced through prior stoma for pulmonary toilet. Patient also noted to have dark stools and dropped in Hgb- wife reported black stool for the last 3 days and same thing coming out from peg tube 12/28 , now peg feeding held -. Medicine consulted for co-management.     CC: Pt seen w. .Pt in good spirits, no complaints. 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 Purulent drainage appreciated at margins of flap and in neck.  EGD( 1/2): ulcer found at the G-tube site    - Local wound care: OR findings reviewed, f/u OR mandibular culture per ENT   - ID f/u appreciated ( Team 1) ,  d/c's Vanco & Zosyn ( 1/1) and started on Imipenem 1gm iv q8h  (1/1-) , will need 2 weeks of iv abx from the last washout ( 1/2) day 1 , EOT 1/15  - f/u OR culture ( 1/2): reviewed   - OR cultures with E.coli, ECC, E.faecalis S.mitis/oralis, S.epi, S.constellatus, S.maltophilia, mixed anaerobes.   - f/u GNRs sensitivity   - WBC normal 6.74K (1/7)-> 5.6K(1/8)   - BCx( 12/28): ngtd x 5 days   - IV site looks ok ( R antecubital fossa)   # Acute blood loss anemia/melena   # hx WADE ( Ferritin 768 but Tsat 13% ( <20%) on 12/13/23)   - GI fu appreciated, EGD( 1/2) ulcer at G-tube site, rec; Protonix 40mg daily for 8 weeks and avoid bumper being too tight to cause pressure ulcer   - hgb dropped 7.6 ( 12/29) s/p 2u PRBC ( ~ 500mg elemental iron), trend Hgb 10.7  (1/1) ->10.4( 1/2)-> 10( 1/3) -> 9.2( 1/4) -> 9.5(1/5)-> 9.4( 1/7) -> 10(1/8) -> 9.7(1/9)  - keep active T&S, keep Hgb>8    - c/w ASA given hx PCI x2 ( March 2023)  - hold off Plavix since adm ( 12/27) , awaiting ENT & GI clearance to resume Plavix  - c/w PPI daily can be via G-tube   - c/w Folic acid 1mg daily   - monitor clinically for any further melena     # CAD sp PCI/CUBA x2 to LAD and Ramus in March 2023 as per cards is in setting of NSTEMI - prefer DAPT, at least monotherapy with ASA if plavix need to be held for procedure, currently on ASA , to restart plavix when safe., c/w ASA 81mg and Atorvastatin 40mg qHS     #  DM - FS with ISS, would hold all ora-hypoglycemic agent, goal -180,  this am, ranged 116-199 yesterday     # pain management - oxycodone 5mg/10mg prn , dilaudid prn, would need stool softener to ensure daily BM.     # Dysphagia- NPO, trach, on TF with Vital 1.5 via G-tube     # DVT ppx: Lovenox     Med consult team continues to follow pt with you. Thank you.    Dr. Conchita Vogel covering 1/10-1/17/24

## 2024-01-09 NOTE — PROGRESS NOTE ADULT - ASSESSMENT
Assessment and Plan:    SAUNDRA HOLMAN is a 69yo M w PMH of CAD (x2 stents Mar 2023), HLD, T2DM, hx of kidney stones, psoriasis who presented with an oral mass and underwent L hemimandibulectomy, SND L level 1-3, recon with L FFF, STSG, and placement of dental implants on 12/7. He had a trach which was subsequently decannulated. Now s/p OR for debridement and exploration. Trach replaced through prior stoma for pulmonary toilet. Pt with reported intermittent tremors 1/7, electrolytes normal.     Plan:  - continue ambulation 3-4x a day   - Plan for latissimus dorsi flap next week tentatively 1/10.  - Hgb goal > 9.0   - Electrolytes WNL  - Pack wound with iodoform BID  - C/w Imipenem (1/1 - 1/15)   - Appreciate  IM recs, and ID recs  - Warm compresses to bilateral arms as needed  - C/w TF  - Continue home asa  - Hold home plavix  - c/w HSQ  - Maintain 7.5 portex for pulm toilet  - ISS  - Pain control  - Home meds  - Bowel regimen    [facility] at [phone] with any questions/concerns.    Ada Quinones PA-C  01-09-24 @ 07:45   Assessment and Plan:    SAUNDRA HOLMAN is a 69yo M w PMH of CAD (x2 stents Mar 2023), HLD, T2DM, hx of kidney stones, psoriasis who presented with an oral mass and underwent L hemimandibulectomy, SND L level 1-3, recon with L FFF, STSG, and placement of dental implants on 12/7. He had a trach which was subsequently decannulated. Now s/p OR for debridement and exploration. Trach replaced through prior stoma for pulmonary toilet. Pt with reported intermittent tremors 1/7, electrolytes normal.     Plan:  - continue ambulation 3-4x a day   - Plan for latissimus dorsi flap next week tentatively 1/10.  - Hgb goal > 9.0   - Electrolytes WNL  - Pack wound with iodoform BID  - C/w Imipenem (1/1 - 1/15)   - Appreciate  IM recs, and ID recs  - Warm compresses to bilateral arms as needed  - C/w TF  - Continue home asa  - Hold home plavix  - c/w HSQ  - Maintain 7.5 portex for pulm toilet  - ISS  - Pain control  - Home meds  - Bowel regimen    [facility] at [phone] with any questions/concerns.    Ada Quinones PA-C  01-09-24 @ 07:45

## 2024-01-09 NOTE — PROGRESS NOTE ADULT - SUBJECTIVE AND OBJECTIVE BOX
OTOLARYNGOLOGY (ENT) PROGRESS NOTE    PATIENT: SAUNDRA HOLMAN  MRN: [id number]  : 57  [id number]  DATE OF SERVICE:  24  			           ID: SAUNDRA HOLMAN is a 69yo M w PMH of CAD (x2 stents Mar 2023), HLD, T2DM, hx of kidney stones, psoriasis who presented with an oral mass and underwent L hemimandibulectomy, SND L level 1-3, recon with L FFF, STSG, and placement of dental implants on . He had a trach which was subsequently decannulated. He returns  with surgical site infection now s/p OR for debridement and exploration. Trach replaced through prior stoma for pulmonary toilet.     Subjective/ Interval:   ; Patient seen this morning, oral pack to be changed, penrose dressing changed. Intraoral flap with partal skin necrosis, 7.5 portex in place with cuff deflated   : Patient seen and examined at bedside. NAEO. Patient remains afebrile and HD stable. HgB noted to drop to 7.6 from 9.2 but no additional episodes of melena. Penrose draining purulent material. Intra-oral infection/flap stable. Packing changed at bedside. No other complaints or changes at this time. ID recommended Vanc/zosyn and DC unasyn and flagyl, and IM recommended reticulocyte studies and adding folate supplements. No other changes at this time.   : AFVSS. No acute events overnight. Patient seen and examined at bedside. C/w Vanc/Zosyn per ID recs, pending sensitivities. Growing staph epi, E coli, enterobacter from wound cx on . S/p 2U PRBC yday w appropriate response in Hgb. Transfusion goal > 9.0.  : AFVSS. No acute events overnight. Patient seen and examined at bedside. C/w Vanc/Zosyn per ID recs, pending sens. Hgb stable this morning.  : AFVSS. No acute events overnight. Patient seen and examined at bedside. C/w Vanc/Zosyn, e faecalis sens to vanc/zosyn. Had 3 dark BM's yesterday that were stool guiac positive.  : AFVSS. No acute events overnight. Patient seen and examined at bedside. C/w Imipenem (changed per ID, enterobacter resistant to Zosyn). Plan for OR today for further washout / debridement.  1/3: AFVSS. No acute events overnight. Patient seen and examined at bedside. C/w imipenim. OR cx growing numerous gram negative rods and few gram positive cocci in pairs.  : Patient seen bedside, changed intraoral and neck packing,  Continue to follow cultures, pain controlled   : patient seen this morning, complains of right arm pain wit minimal swelling,  IV in the left arm,  Continue abx for 2 weeks. neck and oral packing changed, purulent drainage from neck noted.   : Patient seen at bedside during morning rounds. Reports right arm pain and swelling somewhat improved although still present. IV replaced in right arm yesterday. Remains on IV abx, tentative end date 1/15/24. Neck and oral packing changed at bedside; purulent drainage noted from neck, decreased in volume compared to prior exams.  : Patient seen at bedside during morning rounds. Overnight, patient complained of coughing, throat discomfort, and nursing reported some increased secretions via trach. Started on mucomyst with improvement. Noted 6 beats of PSVT at ~1900, no reported chest pain or other symptoms. No further episodes. Packing replaced at bedside   : patient seen this morning, neck and oral packing changed. Continues to have purulent drainage form neck.  No chest pain or SOB,               : patient seen this morning, changed neck and oral pack, patient tolerated packing change better with adjusted premedication,  purulence continues from neck , trach in place, fibula donor site with granulation tissue ( changed dressing)   ALLERGIES:  No Known Allergies      MEDICATIONS:  Antiinfectives:   imipenem/cilastatin  IVPB 1000 milliGRAM(s) IV Intermittent every 8 hours    IV fluids:  dextrose 5%. 1000 milliLiter(s) IV Continuous <Continuous>  dextrose 5%. 1000 milliLiter(s) IV Continuous <Continuous>  folic acid 1 milliGRAM(s) Enteral Tube daily    Hematologic/Anticoagulation:  aspirin  chewable 81 milliGRAM(s) Enteral Tube daily  enoxaparin Injectable 40 milliGRAM(s) SubCutaneous every 24 hours    Pain medications/Neuro:  acetaminophen     Tablet .. 650 milliGRAM(s) Oral every 6 hours PRN  HYDROmorphone  Injectable 1 milliGRAM(s) IV Push every 6 hours PRN  ondansetron Injectable 4 milliGRAM(s) IV Push every 8 hours PRN  oxyCODONE    IR 10 milliGRAM(s) Oral every 6 hours PRN  oxyCODONE    Solution 5 milliGRAM(s) Oral every 4 hours PRN    Endocrine Medications:   atorvastatin 40 milliGRAM(s) Oral at bedtime  dextrose 50% Injectable 25 Gram(s) IV Push once  dextrose 50% Injectable 25 Gram(s) IV Push once  dextrose 50% Injectable 12.5 Gram(s) IV Push once  dextrose Oral Gel 15 Gram(s) Oral once PRN  glucagon  Injectable 1 milliGRAM(s) IntraMuscular once  insulin lispro (ADMELOG) corrective regimen sliding scale   SubCutaneous at bedtime  insulin lispro (ADMELOG) corrective regimen sliding scale   SubCutaneous three times a day before meals    All other standing medications:   chlorhexidine 0.12% Liquid 15 milliLiter(s) Oral Mucosa two times a day  influenza  Vaccine (HIGH DOSE) 0.7 milliLiter(s) IntraMuscular once  pantoprazole  Injectable 40 milliGRAM(s) IV Push every 24 hours    All other PRN medications:  acetylcysteine 10%  Inhalation 4 milliLiter(s) Inhalation every 2 hours PRN    Vital Signs Last 24 Hrs  T(C): 36.7 (2024 05:12), Max: 36.9 (2024 22:06)  T(F): 98 (2024 05:12), Max: 98.4 (2024 22:06)  HR: 56 (2024 04:43) (56 - 74)  BP: 122/60 (2024 04:43) (101/55 - 124/81)  BP(mean): 86 (2024 04:43) (74 - 95)  RR: 17 (2024 04:43) (16 - 18)  SpO2: 96% (2024 04:43) (94% - 100%)    Parameters below as of 2024 04:43  Patient On (Oxygen Delivery Method): tracheostomy collar  O2 Flow (L/min): 10  O2 Concentration (%): 40      -08 @ 07:01  -  -09 @ 07:00  --------------------------------------------------------  IN:    Enteral Tube Flush: 160 mL    IV PiggyBack: 500 mL    Vital1.5: 1500 mL  Total IN: 2160 mL    OUT:    Voided (mL): 1450 mL  Total OUT: 1450 mL    Total NET: 710 mL          PHYSICAL EXAM:  GEN: appears stated age, NAD  NEURO: alert & oriented x   HEENT: Remnant left FFF paddle with dried blood, right half of mandibular dentition present with elastics and screws, iodoform packing replaced  in oral cavity   Neck: 7.5 Portex trach with cuff deflated, changed iodoform packing in the neck, noted to be draining purulent fluid - stable, skin loosely reapproximated with silk suture, Kerlix dressing in place minimally saturated.  CVS: regular rate and rhythm  Pulm: normal respiratory excursions, not tachypneic, no labored breathing on trach collar   Abd: non-distended  Ext: moving all four extremities, fibular donor site with granulation tissue, dressing changed          LABS                       9.7    5.62  )-----------( 244      ( 2024 05:28 )             30.6        138  |  102  |  19  ----------------------------<  121<H>  4.5   |  28  |  0.75    Ca    9.3      2024 05:28  Phos  2.6       Mg     1.9                Coagulation Studies-     Urinalysis Basic - ( 2024 05:28 )    Color: x / Appearance: x / SG: x / pH: x  Gluc: 121 mg/dL / Ketone: x  / Bili: x / Urobili: x   Blood: x / Protein: x / Nitrite: x   Leuk Esterase: x / RBC: x / WBC x   Sq Epi: x / Non Sq Epi: x / Bacteria: x      Endocrine Panel-  Calcium: 9.3 mg/dL ( @ 05:28)                MICROBIOLOGY:  Culture - Tissue with Gram Stain (collected 24 @ 18:03)  Source: .Tissue left mandible tissue or spec  Gram Stain (24 @ 21:59):    Numerous Gram Negative Rods    Few Gram positive cocci in pairs    Few WBC's  Final Report (24 @ 12:29):    Numerous Escherichia coli    Numerous Enterobacter cloacae complex    Few Enterococcus faecalis    Rare Staphylococcus epidermidis  Organism: Escherichia coli  Enterobacter cloacae complex  Enterobacter cloacae complex  Enterococcus faecalis  Escherichia coli (24 @ 12:29)  Organism: Escherichia coli (24 @ 12:29)      Method Type: OTTO      -  Ampicillin: S <=8 These ampicillin results predict results for amoxicillin      -  Ampicillin/Sulbactam: S <=4/2      -  Cefazolin: S <=2      -  Ceftriaxone: S <=1      -  Ciprofloxacin: S <=0.25      -  Ertapenem: S <=0.5      -  Gentamicin: S <=2      -  Piperacillin/Tazobactam: S <=8      -  Tobramycin: S <=2      -  Trimethoprim/Sulfamethoxazole: S   Organism: Enterococcus faecalis (24 @ 12:29)      Method Type: OTTO      -  Ampicillin: S <=2 Predicts results to ampicillin/sulbactam, amoxacillin-clavulanate and  piperacillin-tazobactam.      -  Vancomycin: S 2  Organism: Enterobacter cloacae complex (24 @ 12:29)      Method Type: ETEST      -  Meropenem: S 0.094      -  Ertapenem: S 0.38  Organism: Enterobacter cloacae complex (24 @ 12:29)      Method Type: OTTO      -  Ampicillin: R >16 These ampicillin results predict results for amoxicillin      -  Ampicillin/Sulbactam: R >16/8      -  Cefazolin: R >16      -  Cefepime: R 16      -  Ceftriaxone: R >32 Enterobacter cloacae, Klebsiella aerogenes, and Citrobacter freundii may develop resistance during prolonged therapy.      -  Ciprofloxacin: S <=0.25      -  Gentamicin: S <=2      -  Meropenem: S <=1      -  Meropenem/Vaborbactam: S <=2      -  Piperacillin/Tazobactam: R >64      -  Tobramycin: S <=2      -  Trimethoprim/Sulfamethoxazole: S <=0.5/9.5  Organism: Escherichia coli (24 @ 12:29)      Method Type: OTTO      -  Ampicillin: S <=8 These ampicillin results predict results for amoxicillin      -  Ampicillin/Sulbactam: S <=4/2      -  Cefazolin: S <=2      -  Ceftriaxone: S <=1      -  Ciprofloxacin: S <=0.25      -  Ertapenem: S <=0.5      -  Gentamicin: S <=2      -  Piperacillin/Tazobactam: S <=8      -  Tobramycin: S <=2      -  Trimethoprim/Sulfamethoxazole: S     Culture - Surgical Swab (collected 24 @ 18:03)  Source: .Surgical Swab left neck or spec  Gram Stain (24 @ 22:00):    Rare Gram Negative Rods    Rare Gram positive cocci in pairs    Moderate WBC's  Final Report (24 @ 12:42):    Moderate Escherichia coli    Moderate Enterobacter cloacae complex    Rare Candida parapsilosis    Rare Staphylococcus capitis  Organism: Enterobacter cloacae complex  Enterobacter cloacae complex  Escherichia coli  Escherichia coli (24 @ 12:16)  Organism: Escherichia coli (24 @ 12:16)      Method Type: OTTO      -  Ampicillin: S <=8 These ampicillin results predict results for amoxicillin      -  Ampicillin/Sulbactam: S <=4/2      -  Cefazolin: S <=2      -  Ceftriaxone: S <=1      -  Ciprofloxacin: S <=0.25      -  Ertapenem: S <=0.5      -  Gentamicin: S <=2      -  Piperacillin/Tazobactam: S <=8      -  Tobramycin: S <=2      -  Trimethoprim/Sulfamethoxazole: S   Organism: Escherichia coli (24 @ 12:16)      Method Type: OTTO      -  Ampicillin: S <=8 These ampicillin results predict results for amoxicillin      -  Ampicillin/Sulbactam: S <=4/2      -  Cefazolin: S <=2      -  Ceftriaxone: S <=1      -  Ciprofloxacin: S <=0.25      -  Ertapenem: S <=0.5      -  Gentamicin: S <=2      -  Piperacillin/Tazobactam: S <=8      -  Tobramycin: S <=2      -  Trimethoprim/Sulfamethoxazole: S   Organism: Enterobacter cloacae complex (24 @ 12:16)      Method Type: ETEST      -  Ertapenem: S 0.38      -  Meropenem: S 0.094  Organism: Enterobacter cloacae complex (24 @ 12:16)      Method Type: OTTO      -  Ampicillin: R >16 These ampicillin results predict results for amoxicillin      -  Ampicillin/Sulbactam: R >16/8      -  Cefazolin: R >16      -  Cefepime: R 16      -  Ceftriaxone: R >32 Enterobacter cloacae, Klebsiella aerogenes, and Citrobacter freundii may develop resistance during prolonged therapy.      -  Ciprofloxacin: S <=0.25      -  Gentamicin: S <=2      -  Meropenem: S <=1      -  Meropenem/Vaborbactam: S <=2      -  Piperacillin/Tazobactam: R >64      -  Tobramycin: S <=2      -  Trimethoprim/Sulfamethoxazole: S <=0.5/9.5      Culture Results:   Moderate Escherichia coli  Moderate Enterobacter cloacae complex  Rare Candida parapsilosis  Rare Staphylococcus capitis (24 @ 18:03)  Culture Results:   Numerous Escherichia coli  Numerous Enterobacter cloacae complex  Few Enterococcus faecalis  Rare Staphylococcus epidermidis (24 @ 18:03)  Culture Results:   No growth at 5 days. (23 @ 10:08)  Culture Results:   Moderate Escherichia coli  Moderate Enterobacter cloacae complex  Few Enterococcus faecalis  Few Streptococcus mitis/oralis group  Few Staphylococcus epidermidis  Few Streptococcus constellatus  Few Stenotrophomonas maltophilia  Mixed Anaerobic Joy including  Few Prevotella species (most closely resembles Prevotella barnaie)  Few Prevotella denticola  Culture grew 3 or more types of organisms which indicate collection  contamination; consider recollection only if clinically indicated. (23 @ 20:46)      Culture - Tissue with Gram Stain (collected 24 @ 18:03)  Source: .Tissue left mandible tissue or spec  Gram Stain (24 @ 21:59):    Numerous Gram Negative Rods    Few Gram positive cocci in pairs    Few WBC's  Final Report (24 @ 12:29):    Numerous Escherichia coli    Numerous Enterobacter cloacae complex    Few Enterococcus faecalis    Rare Staphylococcus epidermidis  Organism: Escherichia coli  Enterobacter cloacae complex  Enterobacter cloacae complex  Enterococcus faecalis  Escherichia coli (24 @ 12:29)  Organism: Escherichia coli (24 @ 12:29)      Method Type: OTTO      -  Ampicillin: S <=8 These ampicillin results predict results for amoxicillin      -  Ampicillin/Sulbactam: S <=4/2      -  Cefazolin: S <=2      -  Ceftriaxone: S <=1      -  Ciprofloxacin: S <=0.25      -  Ertapenem: S <=0.5      -  Gentamicin: S <=2      -  Piperacillin/Tazobactam: S <=8      -  Tobramycin: S <=2      -  Trimethoprim/Sulfamethoxazole: S   Organism: Enterococcus faecalis (24 @ 12:29)      Method Type: OTTO      -  Ampicillin: S <=2 Predicts results to ampicillin/sulbactam, amoxacillin-clavulanate and  piperacillin-tazobactam.      -  Vancomycin: S 2  Organism: Enterobacter cloacae complex (24 @ 12:29)      Method Type: ETEST      -  Meropenem: S 0.094      -  Ertapenem: S 0.38  Organism: Enterobacter cloacae complex (24 @ 12:29)      Method Type: OTTO      -  Ampicillin: R >16 These ampicillin results predict results for amoxicillin      -  Ampicillin/Sulbactam: R >16/8      -  Cefazolin: R >16      -  Cefepime: R 16      -  Ceftriaxone: R >32 Enterobacter cloacae, Klebsiella aerogenes, and Citrobacter freundii may develop resistance during prolonged therapy.      -  Ciprofloxacin: S <=0.25      -  Gentamicin: S <=2      -  Meropenem: S <=1      -  Meropenem/Vaborbactam: S <=2      -  Piperacillin/Tazobactam: R >64      -  Tobramycin: S <=2      -  Trimethoprim/Sulfamethoxazole: S <=0.5/9.5  Organism: Escherichia coli (24 @ 12:29)      Method Type: OTTO      -  Ampicillin: S <=8 These ampicillin results predict results for amoxicillin      -  Ampicillin/Sulbactam: S <=4/2      -  Cefazolin: S <=2      -  Ceftriaxone: S <=1      -  Ciprofloxacin: S <=0.25      -  Ertapenem: S <=0.5      -  Gentamicin: S <=2      -  Piperacillin/Tazobactam: S <=8      -  Tobramycin: S <=2      -  Trimethoprim/Sulfamethoxazole: S     Culture - Surgical Swab (collected 24 @ 18:03)  Source: .Surgical Swab left neck or spec  Gram Stain (24 @ 22:00):    Rare Gram Negative Rods    Rare Gram positive cocci in pairs    Moderate WBC's  Final Report (24 @ 12:42):    Moderate Escherichia coli    Moderate Enterobacter cloacae complex    Rare Candida parapsilosis    Rare Staphylococcus capitis  Organism: Enterobacter cloacae complex  Enterobacter cloacae complex  Escherichia coli  Escherichia coli (24 @ 12:16)  Organism: Escherichia coli (24 @ 12:16)      Method Type: OTTO      -  Ampicillin: S <=8 These ampicillin results predict results for amoxicillin      -  Ampicillin/Sulbactam: S <=4/2      -  Cefazolin: S <=2      -  Ceftriaxone: S <=1      -  Ciprofloxacin: S <=0.25      -  Ertapenem: S <=0.5      -  Gentamicin: S <=2      -  Piperacillin/Tazobactam: S <=8      -  Tobramycin: S <=2      -  Trimethoprim/Sulfamethoxazole: S   Organism: Escherichia coli (24 @ 12:16)      Method Type: OTTO      -  Ampicillin: S <=8 These ampicillin results predict results for amoxicillin      -  Ampicillin/Sulbactam: S <=4/2      -  Cefazolin: S <=2      -  Ceftriaxone: S <=1      -  Ciprofloxacin: S <=0.25      -  Ertapenem: S <=0.5      -  Gentamicin: S <=2      -  Piperacillin/Tazobactam: S <=8      -  Tobramycin: S <=2      -  Trimethoprim/Sulfamethoxazole: S   Organism: Enterobacter cloacae complex (24 @ 12:16)      Method Type: ETEST      -  Ertapenem: S 0.38      -  Meropenem: S 0.094  Organism: Enterobacter cloacae complex (24 @ 12:16)      Method Type: OTTO      -  Ampicillin: R >16 These ampicillin results predict results for amoxicillin      -  Ampicillin/Sulbactam: R >16/8      -  Cefazolin: R >16      -  Cefepime: R 16      -  Ceftriaxone: R >32 Enterobacter cloacae, Klebsiella aerogenes, and Citrobacter freundii may develop resistance during prolonged therapy.      -  Ciprofloxacin: S <=0.25      -  Gentamicin: S <=2      -  Meropenem: S <=1      -  Meropenem/Vaborbactam: S <=2      -  Piperacillin/Tazobactam: R >64      -  Tobramycin: S <=2      -  Trimethoprim/Sulfamethoxazole: S <=0.5/9.5           OTOLARYNGOLOGY (ENT) PROGRESS NOTE    PATIENT: SAUNDRA HOLMAN  MRN: [id number]  : 57  [id number]  DATE OF SERVICE:  24  			           ID: SAUNDRA HOLMAN is a 67yo M w PMH of CAD (x2 stents Mar 2023), HLD, T2DM, hx of kidney stones, psoriasis who presented with an oral mass and underwent L hemimandibulectomy, SND L level 1-3, recon with L FFF, STSG, and placement of dental implants on . He had a trach which was subsequently decannulated. He returns  with surgical site infection now s/p OR for debridement and exploration. Trach replaced through prior stoma for pulmonary toilet.     Subjective/ Interval:   ; Patient seen this morning, oral pack to be changed, penrose dressing changed. Intraoral flap with partal skin necrosis, 7.5 portex in place with cuff deflated   : Patient seen and examined at bedside. NAEO. Patient remains afebrile and HD stable. HgB noted to drop to 7.6 from 9.2 but no additional episodes of melena. Penrose draining purulent material. Intra-oral infection/flap stable. Packing changed at bedside. No other complaints or changes at this time. ID recommended Vanc/zosyn and DC unasyn and flagyl, and IM recommended reticulocyte studies and adding folate supplements. No other changes at this time.   : AFVSS. No acute events overnight. Patient seen and examined at bedside. C/w Vanc/Zosyn per ID recs, pending sensitivities. Growing staph epi, E coli, enterobacter from wound cx on . S/p 2U PRBC yday w appropriate response in Hgb. Transfusion goal > 9.0.  : AFVSS. No acute events overnight. Patient seen and examined at bedside. C/w Vanc/Zosyn per ID recs, pending sens. Hgb stable this morning.  : AFVSS. No acute events overnight. Patient seen and examined at bedside. C/w Vanc/Zosyn, e faecalis sens to vanc/zosyn. Had 3 dark BM's yesterday that were stool guiac positive.  : AFVSS. No acute events overnight. Patient seen and examined at bedside. C/w Imipenem (changed per ID, enterobacter resistant to Zosyn). Plan for OR today for further washout / debridement.  1/3: AFVSS. No acute events overnight. Patient seen and examined at bedside. C/w imipenim. OR cx growing numerous gram negative rods and few gram positive cocci in pairs.  : Patient seen bedside, changed intraoral and neck packing,  Continue to follow cultures, pain controlled   : patient seen this morning, complains of right arm pain wit minimal swelling,  IV in the left arm,  Continue abx for 2 weeks. neck and oral packing changed, purulent drainage from neck noted.   : Patient seen at bedside during morning rounds. Reports right arm pain and swelling somewhat improved although still present. IV replaced in right arm yesterday. Remains on IV abx, tentative end date 1/15/24. Neck and oral packing changed at bedside; purulent drainage noted from neck, decreased in volume compared to prior exams.  : Patient seen at bedside during morning rounds. Overnight, patient complained of coughing, throat discomfort, and nursing reported some increased secretions via trach. Started on mucomyst with improvement. Noted 6 beats of PSVT at ~1900, no reported chest pain or other symptoms. No further episodes. Packing replaced at bedside   : patient seen this morning, neck and oral packing changed. Continues to have purulent drainage form neck.  No chest pain or SOB,               : patient seen this morning, changed neck and oral pack, patient tolerated packing change better with adjusted premedication,  purulence continues from neck , trach in place, fibula donor site with granulation tissue ( changed dressing)   ALLERGIES:  No Known Allergies      MEDICATIONS:  Antiinfectives:   imipenem/cilastatin  IVPB 1000 milliGRAM(s) IV Intermittent every 8 hours    IV fluids:  dextrose 5%. 1000 milliLiter(s) IV Continuous <Continuous>  dextrose 5%. 1000 milliLiter(s) IV Continuous <Continuous>  folic acid 1 milliGRAM(s) Enteral Tube daily    Hematologic/Anticoagulation:  aspirin  chewable 81 milliGRAM(s) Enteral Tube daily  enoxaparin Injectable 40 milliGRAM(s) SubCutaneous every 24 hours    Pain medications/Neuro:  acetaminophen     Tablet .. 650 milliGRAM(s) Oral every 6 hours PRN  HYDROmorphone  Injectable 1 milliGRAM(s) IV Push every 6 hours PRN  ondansetron Injectable 4 milliGRAM(s) IV Push every 8 hours PRN  oxyCODONE    IR 10 milliGRAM(s) Oral every 6 hours PRN  oxyCODONE    Solution 5 milliGRAM(s) Oral every 4 hours PRN    Endocrine Medications:   atorvastatin 40 milliGRAM(s) Oral at bedtime  dextrose 50% Injectable 25 Gram(s) IV Push once  dextrose 50% Injectable 25 Gram(s) IV Push once  dextrose 50% Injectable 12.5 Gram(s) IV Push once  dextrose Oral Gel 15 Gram(s) Oral once PRN  glucagon  Injectable 1 milliGRAM(s) IntraMuscular once  insulin lispro (ADMELOG) corrective regimen sliding scale   SubCutaneous at bedtime  insulin lispro (ADMELOG) corrective regimen sliding scale   SubCutaneous three times a day before meals    All other standing medications:   chlorhexidine 0.12% Liquid 15 milliLiter(s) Oral Mucosa two times a day  influenza  Vaccine (HIGH DOSE) 0.7 milliLiter(s) IntraMuscular once  pantoprazole  Injectable 40 milliGRAM(s) IV Push every 24 hours    All other PRN medications:  acetylcysteine 10%  Inhalation 4 milliLiter(s) Inhalation every 2 hours PRN    Vital Signs Last 24 Hrs  T(C): 36.7 (2024 05:12), Max: 36.9 (2024 22:06)  T(F): 98 (2024 05:12), Max: 98.4 (2024 22:06)  HR: 56 (2024 04:43) (56 - 74)  BP: 122/60 (2024 04:43) (101/55 - 124/81)  BP(mean): 86 (2024 04:43) (74 - 95)  RR: 17 (2024 04:43) (16 - 18)  SpO2: 96% (2024 04:43) (94% - 100%)    Parameters below as of 2024 04:43  Patient On (Oxygen Delivery Method): tracheostomy collar  O2 Flow (L/min): 10  O2 Concentration (%): 40      -08 @ 07:01  -  -09 @ 07:00  --------------------------------------------------------  IN:    Enteral Tube Flush: 160 mL    IV PiggyBack: 500 mL    Vital1.5: 1500 mL  Total IN: 2160 mL    OUT:    Voided (mL): 1450 mL  Total OUT: 1450 mL    Total NET: 710 mL          PHYSICAL EXAM:  GEN: appears stated age, NAD  NEURO: alert & oriented x   HEENT: Remnant left FFF paddle with dried blood, right half of mandibular dentition present with elastics and screws, iodoform packing replaced  in oral cavity   Neck: 7.5 Portex trach with cuff deflated, changed iodoform packing in the neck, noted to be draining purulent fluid - stable, skin loosely reapproximated with silk suture, Kerlix dressing in place minimally saturated.  CVS: regular rate and rhythm  Pulm: normal respiratory excursions, not tachypneic, no labored breathing on trach collar   Abd: non-distended  Ext: moving all four extremities, fibular donor site with granulation tissue, dressing changed          LABS                       9.7    5.62  )-----------( 244      ( 2024 05:28 )             30.6        138  |  102  |  19  ----------------------------<  121<H>  4.5   |  28  |  0.75    Ca    9.3      2024 05:28  Phos  2.6       Mg     1.9                Coagulation Studies-     Urinalysis Basic - ( 2024 05:28 )    Color: x / Appearance: x / SG: x / pH: x  Gluc: 121 mg/dL / Ketone: x  / Bili: x / Urobili: x   Blood: x / Protein: x / Nitrite: x   Leuk Esterase: x / RBC: x / WBC x   Sq Epi: x / Non Sq Epi: x / Bacteria: x      Endocrine Panel-  Calcium: 9.3 mg/dL ( @ 05:28)                MICROBIOLOGY:  Culture - Tissue with Gram Stain (collected 24 @ 18:03)  Source: .Tissue left mandible tissue or spec  Gram Stain (24 @ 21:59):    Numerous Gram Negative Rods    Few Gram positive cocci in pairs    Few WBC's  Final Report (24 @ 12:29):    Numerous Escherichia coli    Numerous Enterobacter cloacae complex    Few Enterococcus faecalis    Rare Staphylococcus epidermidis  Organism: Escherichia coli  Enterobacter cloacae complex  Enterobacter cloacae complex  Enterococcus faecalis  Escherichia coli (24 @ 12:29)  Organism: Escherichia coli (24 @ 12:29)      Method Type: OTTO      -  Ampicillin: S <=8 These ampicillin results predict results for amoxicillin      -  Ampicillin/Sulbactam: S <=4/2      -  Cefazolin: S <=2      -  Ceftriaxone: S <=1      -  Ciprofloxacin: S <=0.25      -  Ertapenem: S <=0.5      -  Gentamicin: S <=2      -  Piperacillin/Tazobactam: S <=8      -  Tobramycin: S <=2      -  Trimethoprim/Sulfamethoxazole: S   Organism: Enterococcus faecalis (24 @ 12:29)      Method Type: OTTO      -  Ampicillin: S <=2 Predicts results to ampicillin/sulbactam, amoxacillin-clavulanate and  piperacillin-tazobactam.      -  Vancomycin: S 2  Organism: Enterobacter cloacae complex (24 @ 12:29)      Method Type: ETEST      -  Meropenem: S 0.094      -  Ertapenem: S 0.38  Organism: Enterobacter cloacae complex (24 @ 12:29)      Method Type: OTTO      -  Ampicillin: R >16 These ampicillin results predict results for amoxicillin      -  Ampicillin/Sulbactam: R >16/8      -  Cefazolin: R >16      -  Cefepime: R 16      -  Ceftriaxone: R >32 Enterobacter cloacae, Klebsiella aerogenes, and Citrobacter freundii may develop resistance during prolonged therapy.      -  Ciprofloxacin: S <=0.25      -  Gentamicin: S <=2      -  Meropenem: S <=1      -  Meropenem/Vaborbactam: S <=2      -  Piperacillin/Tazobactam: R >64      -  Tobramycin: S <=2      -  Trimethoprim/Sulfamethoxazole: S <=0.5/9.5  Organism: Escherichia coli (24 @ 12:29)      Method Type: OTTO      -  Ampicillin: S <=8 These ampicillin results predict results for amoxicillin      -  Ampicillin/Sulbactam: S <=4/2      -  Cefazolin: S <=2      -  Ceftriaxone: S <=1      -  Ciprofloxacin: S <=0.25      -  Ertapenem: S <=0.5      -  Gentamicin: S <=2      -  Piperacillin/Tazobactam: S <=8      -  Tobramycin: S <=2      -  Trimethoprim/Sulfamethoxazole: S     Culture - Surgical Swab (collected 24 @ 18:03)  Source: .Surgical Swab left neck or spec  Gram Stain (24 @ 22:00):    Rare Gram Negative Rods    Rare Gram positive cocci in pairs    Moderate WBC's  Final Report (24 @ 12:42):    Moderate Escherichia coli    Moderate Enterobacter cloacae complex    Rare Candida parapsilosis    Rare Staphylococcus capitis  Organism: Enterobacter cloacae complex  Enterobacter cloacae complex  Escherichia coli  Escherichia coli (24 @ 12:16)  Organism: Escherichia coli (24 @ 12:16)      Method Type: OTTO      -  Ampicillin: S <=8 These ampicillin results predict results for amoxicillin      -  Ampicillin/Sulbactam: S <=4/2      -  Cefazolin: S <=2      -  Ceftriaxone: S <=1      -  Ciprofloxacin: S <=0.25      -  Ertapenem: S <=0.5      -  Gentamicin: S <=2      -  Piperacillin/Tazobactam: S <=8      -  Tobramycin: S <=2      -  Trimethoprim/Sulfamethoxazole: S   Organism: Escherichia coli (24 @ 12:16)      Method Type: OTTO      -  Ampicillin: S <=8 These ampicillin results predict results for amoxicillin      -  Ampicillin/Sulbactam: S <=4/2      -  Cefazolin: S <=2      -  Ceftriaxone: S <=1      -  Ciprofloxacin: S <=0.25      -  Ertapenem: S <=0.5      -  Gentamicin: S <=2      -  Piperacillin/Tazobactam: S <=8      -  Tobramycin: S <=2      -  Trimethoprim/Sulfamethoxazole: S   Organism: Enterobacter cloacae complex (24 @ 12:16)      Method Type: ETEST      -  Ertapenem: S 0.38      -  Meropenem: S 0.094  Organism: Enterobacter cloacae complex (24 @ 12:16)      Method Type: OTTO      -  Ampicillin: R >16 These ampicillin results predict results for amoxicillin      -  Ampicillin/Sulbactam: R >16/8      -  Cefazolin: R >16      -  Cefepime: R 16      -  Ceftriaxone: R >32 Enterobacter cloacae, Klebsiella aerogenes, and Citrobacter freundii may develop resistance during prolonged therapy.      -  Ciprofloxacin: S <=0.25      -  Gentamicin: S <=2      -  Meropenem: S <=1      -  Meropenem/Vaborbactam: S <=2      -  Piperacillin/Tazobactam: R >64      -  Tobramycin: S <=2      -  Trimethoprim/Sulfamethoxazole: S <=0.5/9.5      Culture Results:   Moderate Escherichia coli  Moderate Enterobacter cloacae complex  Rare Candida parapsilosis  Rare Staphylococcus capitis (24 @ 18:03)  Culture Results:   Numerous Escherichia coli  Numerous Enterobacter cloacae complex  Few Enterococcus faecalis  Rare Staphylococcus epidermidis (24 @ 18:03)  Culture Results:   No growth at 5 days. (23 @ 10:08)  Culture Results:   Moderate Escherichia coli  Moderate Enterobacter cloacae complex  Few Enterococcus faecalis  Few Streptococcus mitis/oralis group  Few Staphylococcus epidermidis  Few Streptococcus constellatus  Few Stenotrophomonas maltophilia  Mixed Anaerobic Joy including  Few Prevotella species (most closely resembles Prevotella barnaie)  Few Prevotella denticola  Culture grew 3 or more types of organisms which indicate collection  contamination; consider recollection only if clinically indicated. (23 @ 20:46)      Culture - Tissue with Gram Stain (collected 24 @ 18:03)  Source: .Tissue left mandible tissue or spec  Gram Stain (24 @ 21:59):    Numerous Gram Negative Rods    Few Gram positive cocci in pairs    Few WBC's  Final Report (24 @ 12:29):    Numerous Escherichia coli    Numerous Enterobacter cloacae complex    Few Enterococcus faecalis    Rare Staphylococcus epidermidis  Organism: Escherichia coli  Enterobacter cloacae complex  Enterobacter cloacae complex  Enterococcus faecalis  Escherichia coli (24 @ 12:29)  Organism: Escherichia coli (24 @ 12:29)      Method Type: OTTO      -  Ampicillin: S <=8 These ampicillin results predict results for amoxicillin      -  Ampicillin/Sulbactam: S <=4/2      -  Cefazolin: S <=2      -  Ceftriaxone: S <=1      -  Ciprofloxacin: S <=0.25      -  Ertapenem: S <=0.5      -  Gentamicin: S <=2      -  Piperacillin/Tazobactam: S <=8      -  Tobramycin: S <=2      -  Trimethoprim/Sulfamethoxazole: S   Organism: Enterococcus faecalis (24 @ 12:29)      Method Type: OTTO      -  Ampicillin: S <=2 Predicts results to ampicillin/sulbactam, amoxacillin-clavulanate and  piperacillin-tazobactam.      -  Vancomycin: S 2  Organism: Enterobacter cloacae complex (24 @ 12:29)      Method Type: ETEST      -  Meropenem: S 0.094      -  Ertapenem: S 0.38  Organism: Enterobacter cloacae complex (24 @ 12:29)      Method Type: OTTO      -  Ampicillin: R >16 These ampicillin results predict results for amoxicillin      -  Ampicillin/Sulbactam: R >16/8      -  Cefazolin: R >16      -  Cefepime: R 16      -  Ceftriaxone: R >32 Enterobacter cloacae, Klebsiella aerogenes, and Citrobacter freundii may develop resistance during prolonged therapy.      -  Ciprofloxacin: S <=0.25      -  Gentamicin: S <=2      -  Meropenem: S <=1      -  Meropenem/Vaborbactam: S <=2      -  Piperacillin/Tazobactam: R >64      -  Tobramycin: S <=2      -  Trimethoprim/Sulfamethoxazole: S <=0.5/9.5  Organism: Escherichia coli (24 @ 12:29)      Method Type: OTTO      -  Ampicillin: S <=8 These ampicillin results predict results for amoxicillin      -  Ampicillin/Sulbactam: S <=4/2      -  Cefazolin: S <=2      -  Ceftriaxone: S <=1      -  Ciprofloxacin: S <=0.25      -  Ertapenem: S <=0.5      -  Gentamicin: S <=2      -  Piperacillin/Tazobactam: S <=8      -  Tobramycin: S <=2      -  Trimethoprim/Sulfamethoxazole: S     Culture - Surgical Swab (collected 24 @ 18:03)  Source: .Surgical Swab left neck or spec  Gram Stain (24 @ 22:00):    Rare Gram Negative Rods    Rare Gram positive cocci in pairs    Moderate WBC's  Final Report (24 @ 12:42):    Moderate Escherichia coli    Moderate Enterobacter cloacae complex    Rare Candida parapsilosis    Rare Staphylococcus capitis  Organism: Enterobacter cloacae complex  Enterobacter cloacae complex  Escherichia coli  Escherichia coli (24 @ 12:16)  Organism: Escherichia coli (24 @ 12:16)      Method Type: OTTO      -  Ampicillin: S <=8 These ampicillin results predict results for amoxicillin      -  Ampicillin/Sulbactam: S <=4/2      -  Cefazolin: S <=2      -  Ceftriaxone: S <=1      -  Ciprofloxacin: S <=0.25      -  Ertapenem: S <=0.5      -  Gentamicin: S <=2      -  Piperacillin/Tazobactam: S <=8      -  Tobramycin: S <=2      -  Trimethoprim/Sulfamethoxazole: S   Organism: Escherichia coli (24 @ 12:16)      Method Type: OTTO      -  Ampicillin: S <=8 These ampicillin results predict results for amoxicillin      -  Ampicillin/Sulbactam: S <=4/2      -  Cefazolin: S <=2      -  Ceftriaxone: S <=1      -  Ciprofloxacin: S <=0.25      -  Ertapenem: S <=0.5      -  Gentamicin: S <=2      -  Piperacillin/Tazobactam: S <=8      -  Tobramycin: S <=2      -  Trimethoprim/Sulfamethoxazole: S   Organism: Enterobacter cloacae complex (24 @ 12:16)      Method Type: ETEST      -  Ertapenem: S 0.38      -  Meropenem: S 0.094  Organism: Enterobacter cloacae complex (24 @ 12:16)      Method Type: OTTO      -  Ampicillin: R >16 These ampicillin results predict results for amoxicillin      -  Ampicillin/Sulbactam: R >16/8      -  Cefazolin: R >16      -  Cefepime: R 16      -  Ceftriaxone: R >32 Enterobacter cloacae, Klebsiella aerogenes, and Citrobacter freundii may develop resistance during prolonged therapy.      -  Ciprofloxacin: S <=0.25      -  Gentamicin: S <=2      -  Meropenem: S <=1      -  Meropenem/Vaborbactam: S <=2      -  Piperacillin/Tazobactam: R >64      -  Tobramycin: S <=2      -  Trimethoprim/Sulfamethoxazole: S <=0.5/9.5

## 2024-01-09 NOTE — PROGRESS NOTE ADULT - SUBJECTIVE AND OBJECTIVE BOX
REASON FOR CONSULT:     INTERVAL/OVERNIGHT EVENTS: there were no acute events overnight    SUBJECTIVE HPI: Patient seen and examined at bedside. Patient resting comfortably, no complaints at this time. ROS otherwise negative.      MEDICATIONS  (STANDING):  aspirin  chewable 81 milliGRAM(s) Enteral Tube daily  atorvastatin 40 milliGRAM(s) Oral at bedtime  chlorhexidine 0.12% Liquid 15 milliLiter(s) Oral Mucosa two times a day  dextrose 5%. 1000 milliLiter(s) (100 mL/Hr) IV Continuous <Continuous>  dextrose 5%. 1000 milliLiter(s) (50 mL/Hr) IV Continuous <Continuous>  dextrose 50% Injectable 25 Gram(s) IV Push once  dextrose 50% Injectable 12.5 Gram(s) IV Push once  dextrose 50% Injectable 25 Gram(s) IV Push once  enoxaparin Injectable 40 milliGRAM(s) SubCutaneous every 24 hours  folic acid 1 milliGRAM(s) Enteral Tube daily  glucagon  Injectable 1 milliGRAM(s) IntraMuscular once  imipenem/cilastatin  IVPB 1000 milliGRAM(s) IV Intermittent every 8 hours  influenza  Vaccine (HIGH DOSE) 0.7 milliLiter(s) IntraMuscular once  insulin lispro (ADMELOG) corrective regimen sliding scale   SubCutaneous at bedtime  insulin lispro (ADMELOG) corrective regimen sliding scale   SubCutaneous three times a day before meals  pantoprazole  Injectable 40 milliGRAM(s) IV Push every 24 hours    MEDICATIONS  (PRN):  acetaminophen     Tablet .. 650 milliGRAM(s) Oral every 6 hours PRN Mild Pain (1 - 3)  acetylcysteine 10%  Inhalation 4 milliLiter(s) Inhalation every 2 hours PRN secretions  dextrose Oral Gel 15 Gram(s) Oral once PRN Blood Glucose LESS THAN 70 milliGRAM(s)/deciliter  HYDROmorphone  Injectable 1 milliGRAM(s) IV Push every 6 hours PRN breakthrough pain  ondansetron Injectable 4 milliGRAM(s) IV Push every 8 hours PRN Nausea  oxyCODONE    IR 10 milliGRAM(s) Oral every 6 hours PRN Severe Pain (7 - 10)  oxyCODONE    Solution 5 milliGRAM(s) Oral every 4 hours PRN Moderate Pain (4 - 6)        VITAL SIGNS:  Vital Signs Last 24 Hrs  T(C): 36.7 (09 Jan 2024 09:37), Max: 36.9 (08 Jan 2024 22:06)  T(F): 98 (09 Jan 2024 09:37), Max: 98.4 (08 Jan 2024 22:06)  HR: 54 (09 Jan 2024 08:29) (54 - 74)  BP: 136/62 (09 Jan 2024 08:29) (101/55 - 136/62)  BP(mean): 89 (09 Jan 2024 08:29) (74 - 89)  RR: 18 (09 Jan 2024 08:29) (16 - 18)  SpO2: 95% (09 Jan 2024 08:29) (94% - 100%)    Parameters below as of 09 Jan 2024 08:29  Patient On (Oxygen Delivery Method): tracheostomy collar  O2 Flow (L/min): 10  O2 Concentration (%): 40    I&O's Detail    08 Jan 2024 07:01  -  09 Jan 2024 07:00  --------------------------------------------------------  IN:    Enteral Tube Flush: 160 mL    IV PiggyBack: 500 mL    Vital1.5: 1500 mL  Total IN: 2160 mL    OUT:    Voided (mL): 1450 mL  Total OUT: 1450 mL    Total NET: 710 mL          PHYSICAL EXAM:  General: Comfortable, NAD  Neurological: AAOx3, no focal deficits  HEENT: dressing is clean and dry around neck   Neck: supple, no cervical or post-auricular lymphadenopathy  Cardiovascular: +S1/S2, no murmurs/rubs/gallops, RRR  Respiratory: CTA B/L, no diminished breath sounds, no wheezes/rales/rhonchi, no increased work of breathing or accessory muscle use  Gastrointestinal: soft, NT/ND; active BSx4 quadrants  Extremities: L thigh skin graft site is without purulence, dressing being reapplied  Lines/Drains: RUE IV line site appears clean, no swelling or drainging    LABS:                        9.7    5.62  )-----------( 244      ( 09 Jan 2024 05:28 )             30.6     01-09    138  |  102  |  19  ----------------------------<  121<H>  4.5   |  28  |  0.75    Ca    9.3      09 Jan 2024 05:28  Phos  2.6     01-09  Mg     1.9     01-09              BNP    Urinalysis Basic - ( 09 Jan 2024 05:28 )    Color: x / Appearance: x / SG: x / pH: x  Gluc: 121 mg/dL / Ketone: x  / Bili: x / Urobili: x   Blood: x / Protein: x / Nitrite: x   Leuk Esterase: x / RBC: x / WBC x   Sq Epi: x / Non Sq Epi: x / Bacteria: x            Microbiology:    Culture - Tissue with Gram Stain (collected 01-02-24 @ 18:03)  Source: .Tissue left mandible tissue or spec  Gram Stain (01-02-24 @ 21:59):    Numerous Gram Negative Rods    Few Gram positive cocci in pairs    Few WBC's  Final Report (01-05-24 @ 12:29):    Numerous Escherichia coli    Numerous Enterobacter cloacae complex    Few Enterococcus faecalis    Rare Staphylococcus epidermidis  Organism: Escherichia coli  Enterobacter cloacae complex  Enterobacter cloacae complex  Enterococcus faecalis  Escherichia coli (01-05-24 @ 12:29)  Organism: Escherichia coli (01-05-24 @ 12:29)      Method Type: OTTO      -  Ampicillin: S <=8 These ampicillin results predict results for amoxicillin      -  Ampicillin/Sulbactam: S <=4/2      -  Cefazolin: S <=2      -  Ceftriaxone: S <=1      -  Ciprofloxacin: S <=0.25      -  Ertapenem: S <=0.5      -  Gentamicin: S <=2      -  Piperacillin/Tazobactam: S <=8      -  Tobramycin: S <=2      -  Trimethoprim/Sulfamethoxazole: S 1/19  Organism: Enterococcus faecalis (01-05-24 @ 12:29)      Method Type: OTTO      -  Ampicillin: S <=2 Predicts results to ampicillin/sulbactam, amoxacillin-clavulanate and  piperacillin-tazobactam.      -  Vancomycin: S 2  Organism: Enterobacter cloacae complex (01-05-24 @ 12:29)      Method Type: ETEST      -  Meropenem: S 0.094      -  Ertapenem: S 0.38  Organism: Enterobacter cloacae complex (01-05-24 @ 12:29)      Method Type: OTTO      -  Ampicillin: R >16 These ampicillin results predict results for amoxicillin      -  Ampicillin/Sulbactam: R >16/8      -  Cefazolin: R >16      -  Cefepime: R 16      -  Ceftriaxone: R >32 Enterobacter cloacae, Klebsiella aerogenes, and Citrobacter freundii may develop resistance during prolonged therapy.      -  Ciprofloxacin: S <=0.25      -  Gentamicin: S <=2      -  Meropenem: S <=1      -  Meropenem/Vaborbactam: S <=2      -  Piperacillin/Tazobactam: R >64      -  Tobramycin: S <=2      -  Trimethoprim/Sulfamethoxazole: S <=0.5/9.5  Organism: Escherichia coli (01-05-24 @ 12:29)      Method Type: OTTO      -  Ampicillin: S <=8 These ampicillin results predict results for amoxicillin      -  Ampicillin/Sulbactam: S <=4/2      -  Cefazolin: S <=2      -  Ceftriaxone: S <=1      -  Ciprofloxacin: S <=0.25      -  Ertapenem: S <=0.5      -  Gentamicin: S <=2      -  Piperacillin/Tazobactam: S <=8      -  Tobramycin: S <=2      -  Trimethoprim/Sulfamethoxazole: S 1/19    Culture - Surgical Swab (collected 01-02-24 @ 18:03)  Source: .Surgical Swab left neck or spec  Gram Stain (01-02-24 @ 22:00):    Rare Gram Negative Rods    Rare Gram positive cocci in pairs    Moderate WBC's  Final Report (01-05-24 @ 12:42):    Moderate Escherichia coli    Moderate Enterobacter cloacae complex    Rare Candida parapsilosis    Rare Staphylococcus capitis  Organism: Enterobacter cloacae complex  Enterobacter cloacae complex  Escherichia coli  Escherichia coli (01-05-24 @ 12:16)  Organism: Escherichia coli (01-05-24 @ 12:16)      Method Type: OTTO      -  Ampicillin: S <=8 These ampicillin results predict results for amoxicillin      -  Ampicillin/Sulbactam: S <=4/2      -  Cefazolin: S <=2      -  Ceftriaxone: S <=1      -  Ciprofloxacin: S <=0.25      -  Ertapenem: S <=0.5      -  Gentamicin: S <=2      -  Piperacillin/Tazobactam: S <=8      -  Tobramycin: S <=2      -  Trimethoprim/Sulfamethoxazole: S 1/19  Organism: Escherichia coli (01-05-24 @ 12:16)      Method Type: OTTO      -  Ampicillin: S <=8 These ampicillin results predict results for amoxicillin      -  Ampicillin/Sulbactam: S <=4/2      -  Cefazolin: S <=2      -  Ceftriaxone: S <=1      -  Ciprofloxacin: S <=0.25      -  Ertapenem: S <=0.5      -  Gentamicin: S <=2      -  Piperacillin/Tazobactam: S <=8      -  Tobramycin: S <=2      -  Trimethoprim/Sulfamethoxazole: S 1/19  Organism: Enterobacter cloacae complex (01-05-24 @ 12:16)      Method Type: ETEST      -  Ertapenem: S 0.38      -  Meropenem: S 0.094  Organism: Enterobacter cloacae complex (01-05-24 @ 12:16)      Method Type: OTTO      -  Ampicillin: R >16 These ampicillin results predict results for amoxicillin      -  Ampicillin/Sulbactam: R >16/8      -  Cefazolin: R >16      -  Cefepime: R 16      -  Ceftriaxone: R >32 Enterobacter cloacae, Klebsiella aerogenes, and Citrobacter freundii may develop resistance during prolonged therapy.      -  Ciprofloxacin: S <=0.25      -  Gentamicin: S <=2      -  Meropenem: S <=1      -  Meropenem/Vaborbactam: S <=2      -  Piperacillin/Tazobactam: R >64      -  Tobramycin: S <=2      -  Trimethoprim/Sulfamethoxazole: S <=0.5/9.5    Culture - Blood (collected 12-28-23 @ 10:08)  Source: .Blood Blood-Peripheral  Final Report (01-02-24 @ 11:01):    No growth at 5 days.    Culture - Surgical Swab (collected 12-27-23 @ 20:46)  Source: .Surgical Swab 1. Left Oral Cavity  Gram Stain (12-27-23 @ 21:53):    Numerous Gram positive cocci in pairs, chains and clusters    Moderate Gram Positive Rods    Moderate Gram Negative Rods    Numerous White blood cells  Final Report (01-02-24 @ 10:25):    Moderate Escherichia coli    Moderate Enterobacter cloacae complex    Few Enterococcus faecalis    Few Streptococcus mitis/oralis group    Few Staphylococcus epidermidis    Few Streptococcus constellatus    Few Stenotrophomonas maltophilia    Mixed Anaerobic Brenden including    Few Prevotella species (most closely resembles Prevotella barnaie)    Few Prevotella denticola    Culture grew 3 or more types of organisms which indicate collection    contamination; consider recollection only if clinically indicated.  Organism: Escherichia coli  Enterobacter cloacae complex  Enterococcus faecalis  Streptococcus constellatus  Streptococcus constellatus  Stenotrophomonas maltophilia  Stenotrophomonas maltophilia (01-02-24 @ 10:24)  Organism: Escherichia coli (01-02-24 @ 10:24)      Method Type: OTTO      -  Ampicillin: S <=8 These ampicillin results predict results for amoxicillin      -  Ampicillin/Sulbactam: S <=4/2      -  Cefazolin: S <=2      -  Ceftriaxone: S <=1      -  Ciprofloxacin: S <=0.25      -  Ertapenem: S <=0.5      -  Gentamicin: S <=2      -  Piperacillin/Tazobactam: S <=8      -  Tobramycin: S <=2      -  Trimethoprim/Sulfamethoxazole: S 1/19  Organism: Stenotrophomonas maltophilia (01-01-24 @ 14:56)      Method Type: KB      -  Minocycline: S  Organism: Stenotrophomonas maltophilia (01-01-24 @ 14:55)      Method Type: OTTO      -  Levofloxacin: S 1      -  Trimethoprim/Sulfamethoxazole: S <=0.5/9.5  Organism: Streptococcus constellatus (01-01-24 @ 14:55)      Method Type: ETEST      -  Ceftriaxone: S 0.094      -  Penicillin: S 0.047  Organism: Streptococcus constellatus (01-01-24 @ 14:55)      Method Type: KB  Organism: Enterobacter cloacae complex (01-01-24 @ 14:52)      Method Type: OTTO      -  Ampicillin: R >16 These ampicillin results predict results for amoxicillin      -  Ampicillin/Sulbactam: R >16/8      -  Cefazolin: R >16      -  Cefepime: SDD 8 The breakpoint for susceptible dose dependent may require the usage of a higher cefepime dose (equivalent to adult dose of 2g q8h for normal renal function) for successful treatment.      -  Ceftriaxone: R >32 Enterobacter cloacae, Klebsiella aerogenes, and Citrobacter freundii may develop resistance during prolonged therapy.      -  Ciprofloxacin: S <=0.25      -  Ertapenem: S <=0.5      -  Gentamicin: S <=2      -  Meropenem: S <=1      -  Piperacillin/Tazobactam: R 64      -  Tobramycin: S <=2      -  Trimethoprim/Sulfamethoxazole: S <=0.5/9.5      -  Cefoxitin: R >16  Organism: Enterococcus faecalis (12-30-23 @ 15:21)      Method Type: OTTO      -  Ampicillin: S <=2 Predicts results to ampicillin/sulbactam, amoxacillin-clavulanate and  piperacillin-tazobactam.      -  Vancomycin: S 4    Culture - Sputum (collected 12-13-23 @ 07:27)  Source: Trach Asp Tracheal Aspirate  Gram Stain (12-13-23 @ 21:31):    Rare epithelial cells    Moderate WBC's    Rare Gram Positive Rods    Few Gram Negative Rods    Moderate Gram positive cocci in pairs, chains and clusters  Final Report (12-15-23 @ 09:27):    Moderate Escherichia coli    Moderate Enterobacter cloacae complex    Accompanied by normal respiratory brenden  Organism: Escherichia coli  Enterobacter cloacae complex (12-15-23 @ 09:27)  Organism: Enterobacter cloacae complex (12-15-23 @ 09:27)      Method Type: OTTO      -  Ampicillin: R <=8 These ampicillin results predict results for amoxicillin      -  Ampicillin/Sulbactam: R >16/8      -  Cefazolin: R >16      -  Cefepime: S <=2      -  Ceftriaxone: S <=1 Enterobacter cloacae, Klebsiella aerogenes, and Citrobacter freundii may develop resistance during prolonged therapy.      -  Ciprofloxacin: S <=0.25      -  Ertapenem: S <=0.5      -  Gentamicin: S <=2      -  Piperacillin/Tazobactam: S <=8      -  Tobramycin: S <=2      -  Trimethoprim/Sulfamethoxazole: S 1/19  Organism: Escherichia coli (12-15-23 @ 09:27)      Method Type: OTTO      -  Ampicillin: S <=8 These ampicillin results predict results for amoxicillin      -  Ampicillin/Sulbactam: S <=4/2      -  Cefazolin: S <=2      -  Ceftriaxone: S <=1      -  Ciprofloxacin: S <=0.25      -  Ertapenem: S <=0.5      -  Gentamicin: S <=2      -  Piperacillin/Tazobactam: S <=8      -  Tobramycin: I 4      -  Trimethoprim/Sulfamethoxazole: S 1/19    Culture - Sputum (collected 12-12-23 @ 20:48)  Source: .Sputum  Gram Stain (12-12-23 @ 22:45):    Moderate epithelial cells    Moderate WBC's    Rare Gram Positive Rods    Few Gram Negative Rods    Few Gram Positive Cocci in Pairs and Chains  Final Report (12-12-23 @ 22:45):    Sputum specimen rejected.  Microscopic examination indicates    oropharyngeal contamination.  Please repeat.    Culture - Blood (collected 12-12-23 @ 20:10)  Source: .Blood Blood  Final Report (12-17-23 @ 22:00):    No growth at 5 days.    Culture - Blood (collected 12-12-23 @ 20:05)  Source: .Blood Blood  Final Report (12-17-23 @ 22:00):    No growth at 5 days.        RADIOLOGY & ADDITIONAL STUDIES: Reviewed.    ECG:    ECHO:

## 2024-01-10 ENCOUNTER — TRANSCRIPTION ENCOUNTER (OUTPATIENT)
Age: 69
End: 2024-01-10

## 2024-01-10 LAB
ANION GAP SERPL CALC-SCNC: 10 MMOL/L — SIGNIFICANT CHANGE UP (ref 5–17)
ANION GAP SERPL CALC-SCNC: 10 MMOL/L — SIGNIFICANT CHANGE UP (ref 5–17)
APTT BLD: 32.7 SEC — SIGNIFICANT CHANGE UP (ref 24.5–35.6)
APTT BLD: 32.7 SEC — SIGNIFICANT CHANGE UP (ref 24.5–35.6)
BLD GP AB SCN SERPL QL: NEGATIVE — SIGNIFICANT CHANGE UP
BLD GP AB SCN SERPL QL: NEGATIVE — SIGNIFICANT CHANGE UP
BUN SERPL-MCNC: 20 MG/DL — SIGNIFICANT CHANGE UP (ref 7–23)
BUN SERPL-MCNC: 20 MG/DL — SIGNIFICANT CHANGE UP (ref 7–23)
CALCIUM SERPL-MCNC: 9.2 MG/DL — SIGNIFICANT CHANGE UP (ref 8.4–10.5)
CALCIUM SERPL-MCNC: 9.2 MG/DL — SIGNIFICANT CHANGE UP (ref 8.4–10.5)
CHLORIDE SERPL-SCNC: 104 MMOL/L — SIGNIFICANT CHANGE UP (ref 96–108)
CHLORIDE SERPL-SCNC: 104 MMOL/L — SIGNIFICANT CHANGE UP (ref 96–108)
CO2 SERPL-SCNC: 25 MMOL/L — SIGNIFICANT CHANGE UP (ref 22–31)
CO2 SERPL-SCNC: 25 MMOL/L — SIGNIFICANT CHANGE UP (ref 22–31)
CREAT SERPL-MCNC: 0.63 MG/DL — SIGNIFICANT CHANGE UP (ref 0.5–1.3)
CREAT SERPL-MCNC: 0.63 MG/DL — SIGNIFICANT CHANGE UP (ref 0.5–1.3)
EGFR: 104 ML/MIN/1.73M2 — SIGNIFICANT CHANGE UP
EGFR: 104 ML/MIN/1.73M2 — SIGNIFICANT CHANGE UP
GLUCOSE BLDC GLUCOMTR-MCNC: 115 MG/DL — HIGH (ref 70–99)
GLUCOSE BLDC GLUCOMTR-MCNC: 115 MG/DL — HIGH (ref 70–99)
GLUCOSE BLDC GLUCOMTR-MCNC: 138 MG/DL — HIGH (ref 70–99)
GLUCOSE BLDC GLUCOMTR-MCNC: 195 MG/DL — HIGH (ref 70–99)
GLUCOSE BLDC GLUCOMTR-MCNC: 195 MG/DL — HIGH (ref 70–99)
GLUCOSE SERPL-MCNC: 116 MG/DL — HIGH (ref 70–99)
GLUCOSE SERPL-MCNC: 116 MG/DL — HIGH (ref 70–99)
HCT VFR BLD CALC: 30.6 % — LOW (ref 39–50)
HCT VFR BLD CALC: 30.6 % — LOW (ref 39–50)
HGB BLD-MCNC: 10 G/DL — LOW (ref 13–17)
HGB BLD-MCNC: 10 G/DL — LOW (ref 13–17)
INR BLD: 1.13 — SIGNIFICANT CHANGE UP (ref 0.85–1.18)
INR BLD: 1.13 — SIGNIFICANT CHANGE UP (ref 0.85–1.18)
MAGNESIUM SERPL-MCNC: 1.8 MG/DL — SIGNIFICANT CHANGE UP (ref 1.6–2.6)
MAGNESIUM SERPL-MCNC: 1.8 MG/DL — SIGNIFICANT CHANGE UP (ref 1.6–2.6)
MCHC RBC-ENTMCNC: 29.1 PG — SIGNIFICANT CHANGE UP (ref 27–34)
MCHC RBC-ENTMCNC: 29.1 PG — SIGNIFICANT CHANGE UP (ref 27–34)
MCHC RBC-ENTMCNC: 32.7 GM/DL — SIGNIFICANT CHANGE UP (ref 32–36)
MCHC RBC-ENTMCNC: 32.7 GM/DL — SIGNIFICANT CHANGE UP (ref 32–36)
MCV RBC AUTO: 89 FL — SIGNIFICANT CHANGE UP (ref 80–100)
MCV RBC AUTO: 89 FL — SIGNIFICANT CHANGE UP (ref 80–100)
NRBC # BLD: 0 /100 WBCS — SIGNIFICANT CHANGE UP (ref 0–0)
NRBC # BLD: 0 /100 WBCS — SIGNIFICANT CHANGE UP (ref 0–0)
PHOSPHATE SERPL-MCNC: 2.8 MG/DL — SIGNIFICANT CHANGE UP (ref 2.5–4.5)
PHOSPHATE SERPL-MCNC: 2.8 MG/DL — SIGNIFICANT CHANGE UP (ref 2.5–4.5)
PLATELET # BLD AUTO: 246 K/UL — SIGNIFICANT CHANGE UP (ref 150–400)
PLATELET # BLD AUTO: 246 K/UL — SIGNIFICANT CHANGE UP (ref 150–400)
POTASSIUM SERPL-MCNC: 4.7 MMOL/L — SIGNIFICANT CHANGE UP (ref 3.5–5.3)
POTASSIUM SERPL-MCNC: 4.7 MMOL/L — SIGNIFICANT CHANGE UP (ref 3.5–5.3)
POTASSIUM SERPL-SCNC: 4.7 MMOL/L — SIGNIFICANT CHANGE UP (ref 3.5–5.3)
POTASSIUM SERPL-SCNC: 4.7 MMOL/L — SIGNIFICANT CHANGE UP (ref 3.5–5.3)
PROTHROM AB SERPL-ACNC: 12.8 SEC — SIGNIFICANT CHANGE UP (ref 9.5–13)
PROTHROM AB SERPL-ACNC: 12.8 SEC — SIGNIFICANT CHANGE UP (ref 9.5–13)
RBC # BLD: 3.44 M/UL — LOW (ref 4.2–5.8)
RBC # BLD: 3.44 M/UL — LOW (ref 4.2–5.8)
RBC # FLD: 14.6 % — HIGH (ref 10.3–14.5)
RBC # FLD: 14.6 % — HIGH (ref 10.3–14.5)
RH IG SCN BLD-IMP: POSITIVE — SIGNIFICANT CHANGE UP
RH IG SCN BLD-IMP: POSITIVE — SIGNIFICANT CHANGE UP
SODIUM SERPL-SCNC: 139 MMOL/L — SIGNIFICANT CHANGE UP (ref 135–145)
SODIUM SERPL-SCNC: 139 MMOL/L — SIGNIFICANT CHANGE UP (ref 135–145)
WBC # BLD: 5.23 K/UL — SIGNIFICANT CHANGE UP (ref 3.8–10.5)
WBC # BLD: 5.23 K/UL — SIGNIFICANT CHANGE UP (ref 3.8–10.5)
WBC # FLD AUTO: 5.23 K/UL — SIGNIFICANT CHANGE UP (ref 3.8–10.5)
WBC # FLD AUTO: 5.23 K/UL — SIGNIFICANT CHANGE UP (ref 3.8–10.5)

## 2024-01-10 PROCEDURE — 99232 SBSQ HOSP IP/OBS MODERATE 35: CPT

## 2024-01-10 PROCEDURE — 71045 X-RAY EXAM CHEST 1 VIEW: CPT | Mod: 26

## 2024-01-10 RX ADMIN — IMIPENEM AND CILASTATIN 250 MILLIGRAM(S): 250; 250 INJECTION, POWDER, FOR SOLUTION INTRAVENOUS at 09:44

## 2024-01-10 RX ADMIN — ENOXAPARIN SODIUM 40 MILLIGRAM(S): 100 INJECTION SUBCUTANEOUS at 14:26

## 2024-01-10 RX ADMIN — Medication 650 MILLIGRAM(S): at 23:00

## 2024-01-10 RX ADMIN — Medication 650 MILLIGRAM(S): at 22:14

## 2024-01-10 RX ADMIN — Medication 81 MILLIGRAM(S): at 11:01

## 2024-01-10 RX ADMIN — Medication 1: at 17:22

## 2024-01-10 RX ADMIN — HYDROMORPHONE HYDROCHLORIDE 1 MILLIGRAM(S): 2 INJECTION INTRAMUSCULAR; INTRAVENOUS; SUBCUTANEOUS at 07:00

## 2024-01-10 RX ADMIN — CHLORHEXIDINE GLUCONATE 15 MILLILITER(S): 213 SOLUTION TOPICAL at 17:22

## 2024-01-10 RX ADMIN — Medication 1 MILLIGRAM(S): at 21:05

## 2024-01-10 RX ADMIN — PANTOPRAZOLE SODIUM 40 MILLIGRAM(S): 20 TABLET, DELAYED RELEASE ORAL at 11:01

## 2024-01-10 RX ADMIN — Medication 1 MILLIGRAM(S): at 11:01

## 2024-01-10 RX ADMIN — CHLORHEXIDINE GLUCONATE 15 MILLILITER(S): 213 SOLUTION TOPICAL at 06:38

## 2024-01-10 RX ADMIN — ATORVASTATIN CALCIUM 40 MILLIGRAM(S): 80 TABLET, FILM COATED ORAL at 21:05

## 2024-01-10 RX ADMIN — HYDROMORPHONE HYDROCHLORIDE 1 MILLIGRAM(S): 2 INJECTION INTRAMUSCULAR; INTRAVENOUS; SUBCUTANEOUS at 06:40

## 2024-01-10 RX ADMIN — Medication 4 MILLILITER(S): at 14:26

## 2024-01-10 RX ADMIN — IMIPENEM AND CILASTATIN 250 MILLIGRAM(S): 250; 250 INJECTION, POWDER, FOR SOLUTION INTRAVENOUS at 01:12

## 2024-01-10 RX ADMIN — IMIPENEM AND CILASTATIN 250 MILLIGRAM(S): 250; 250 INJECTION, POWDER, FOR SOLUTION INTRAVENOUS at 17:47

## 2024-01-10 NOTE — PROGRESS NOTE ADULT - ASSESSMENT
SAUNDRA HOLMAN is a 69yo M w PMH of CAD (x2 stents Mar 2023), HLD, T2DM, hx of kidney stones, psoriasis who presented with an oral mass and underwent L hemimandibulectomy, SND L level 1-3, recon with L FFF, STSG, and placement of dental implants on 12/7. He had a trach which was subsequently decannulated. Now s/p OR for debridement and exploration. Trach replaced through prior stoma for pulmonary toilet. Pt with reported intermittent tremors 1/7, electrolytes normal.     Plan:  - continue ambulation 3-4x a day   - Plan for latissimus dorsi flap tomorrow  - Hgb goal > 9.0   - Electrolytes WNL  - Pack wound with iodoform QD  - C/w Imipenem (1/1 - 1/15)   - Appreciate  IM recs, and ID recs  - Warm compresses to bilateral arms as needed  - C/w TF  - Continue home asa  - Hold home plavix  - c/w HSQ  - Maintain 7.5 portex for pulm toilet  - ISS  - Pain control  - Home meds  - Bowel regimen   SAUNDRA HOLMAN is a 67yo M w PMH of CAD (x2 stents Mar 2023), HLD, T2DM, hx of kidney stones, psoriasis who presented with an oral mass and underwent L hemimandibulectomy, SND L level 1-3, recon with L FFF, STSG, and placement of dental implants on 12/7. He had a trach which was subsequently decannulated. Now s/p OR for debridement and exploration. Trach replaced through prior stoma for pulmonary toilet. Pt with reported intermittent tremors 1/7, electrolytes normal.     Plan:  - continue ambulation 3-4x a day   - Plan for latissimus dorsi flap tomorrow  - Hgb goal > 9.0   - Electrolytes WNL  - Pack wound with iodoform QD  - C/w Imipenem (1/1 - 1/15)   - Appreciate  IM recs, and ID recs  - Warm compresses to bilateral arms as needed  - C/w TF  - Continue home asa  - Hold home plavix  - c/w HSQ  - Maintain 7.5 portex for pulm toilet  - ISS  - Pain control  - Home meds  - Bowel regimen

## 2024-01-10 NOTE — PROGRESS NOTE ADULT - SUBJECTIVE AND OBJECTIVE BOX
OTOLARYNGOLOGY (ENT) PROGRESS NOTE    PATIENT: SAUNDRA HOLMAN  MRN: [id number]  : 57  [id number]  DATE OF SERVICE:  01-10-24  	  Subjective/ Interval:   SAUNDRA HOLMAN is a 67yo M w PMH of CAD (x2 stents Mar 2023), HLD, T2DM, hx of kidney stones, psoriasis who presented with an oral mass and underwent L hemimandibulectomy, SND L level 1-3, recon with L FFF, STSG, and placement of dental implants on . He had a trach which was subsequently decannulated. He returns  with surgical site infection now s/p OR for debridement and exploration. Trach replaced through prior stoma for pulmonary toilet.     Subjective/ Interval:   ; Patient seen this morning, oral pack to be changed, penrose dressing changed. Intraoral flap with partal skin necrosis, 7.5 portex in place with cuff deflated   : Patient seen and examined at bedside. NAEO. Patient remains afebrile and HD stable. HgB noted to drop to 7.6 from 9.2 but no additional episodes of melena. Penrose draining purulent material. Intra-oral infection/flap stable. Packing changed at bedside. No other complaints or changes at this time. ID recommended Vanc/zosyn and DC unasyn and flagyl, and IM recommended reticulocyte studies and adding folate supplements. No other changes at this time.   : AFVSS. No acute events overnight. Patient seen and examined at bedside. C/w Vanc/Zosyn per ID recs, pending sensitivities. Growing staph epi, E coli, enterobacter from wound cx on . S/p 2U PRBC yday w appropriate response in Hgb. Transfusion goal > 9.0.  : AFVSS. No acute events overnight. Patient seen and examined at bedside. C/w Vanc/Zosyn per ID recs, pending sens. Hgb stable this morning.  : AFVSS. No acute events overnight. Patient seen and examined at bedside. C/w Vanc/Zosyn, e faecalis sens to vanc/zosyn. Had 3 dark BM's yesterday that were stool guiac positive.  : AFVSS. No acute events overnight. Patient seen and examined at bedside. C/w Imipenem (changed per ID, enterobacter resistant to Zosyn). Plan for OR today for further washout / debridement.  1/3: AFVSS. No acute events overnight. Patient seen and examined at bedside. C/w imipenim. OR cx growing numerous gram negative rods and few gram positive cocci in pairs.  : Patient seen bedside, changed intraoral and neck packing,  Continue to follow cultures, pain controlled   : patient seen this morning, complains of right arm pain wit minimal swelling,  IV in the left arm,  Continue abx for 2 weeks. neck and oral packing changed, purulent drainage from neck noted.   : Patient seen at bedside during morning rounds. Reports right arm pain and swelling somewhat improved although still present. IV replaced in right arm yesterday. Remains on IV abx, tentative end date 1/15/24. Neck and oral packing changed at bedside; purulent drainage noted from neck, decreased in volume compared to prior exams.  : Patient seen at bedside during morning rounds. Overnight, patient complained of coughing, throat discomfort, and nursing reported some increased secretions via trach. Started on mucomyst with improvement. Noted 6 beats of PSVT at ~1900, no reported chest pain or other symptoms. No further episodes. Packing replaced at bedside   : patient seen this morning, neck and oral packing changed. Continues to have purulent drainage form neck.  No chest pain or SOB,               : patient seen this morning, changed neck and oral pack, patient tolerated packing change better with adjusted premedication,  purulence continues from neck , trach in place, fibula donor site with granulation tissue ( changed dressing)   1/10: AFVSS. Patient seen and evaluated this am. Neck and oral packing changed. Some purulence from neck. Trach in place.       LABS                       9.7    5.62  )-----------( 244      ( 2024 05:28 )             30.6    01-09    138  |  102  |  19  ----------------------------<  121<H>  4.5   |  28  |  0.75    Ca    9.3      2024 05:28  Phos  2.6     01-09  Mg     1.9                Coagulation Studies-     Urinalysis Basic - ( 2024 05:28 )    Color: x / Appearance: x / SG: x / pH: x  Gluc: 121 mg/dL / Ketone: x  / Bili: x / Urobili: x   Blood: x / Protein: x / Nitrite: x   Leuk Esterase: x / RBC: x / WBC x   Sq Epi: x / Non Sq Epi: x / Bacteria: x      Endocrine Panel-              COVID-19 PCR: NotDetec (24 @ 17:56)    MICROBIOLOGY:  Culture Results:   Moderate Escherichia coli  Moderate Enterobacter cloacae complex  Rare Candida parapsilosis  Rare Staphylococcus capitis (24 @ 18:03)  Culture Results:   Numerous Escherichia coli  Numerous Enterobacter cloacae complex  Few Enterococcus faecalis  Rare Staphylococcus epidermidis (24 @ 18:03)  Culture Results:   No growth at 5 days. (23 @ 10:08)  Culture Results:   Moderate Escherichia coli  Moderate Enterobacter cloacae complex  Few Enterococcus faecalis  Few Streptococcus mitis/oralis group  Few Staphylococcus epidermidis  Few Streptococcus constellatus  Few Stenotrophomonas maltophilia  Mixed Anaerobic Joy including  Few Prevotella species (most closely resembles Prevotella barnaie)  Few Prevotella denticola  Culture grew 3 or more types of organisms which indicate collection  contamination; consider recollection only if clinically indicated. (23 @ 20:46)             OTOLARYNGOLOGY (ENT) PROGRESS NOTE    PATIENT: SAUNDRA HOLMAN  MRN: [id number]  : 57  [id number]  DATE OF SERVICE:  01-10-24  	  Subjective/ Interval:   SAUNDRA HOLMAN is a 67yo M w PMH of CAD (x2 stents Mar 2023), HLD, T2DM, hx of kidney stones, psoriasis who presented with an oral mass and underwent L hemimandibulectomy, SND L level 1-3, recon with L FFF, STSG, and placement of dental implants on . He had a trach which was subsequently decannulated. He returns  with surgical site infection now s/p OR for debridement and exploration. Trach replaced through prior stoma for pulmonary toilet.     Subjective/ Interval:   ; Patient seen this morning, oral pack to be changed, penrose dressing changed. Intraoral flap with partal skin necrosis, 7.5 portex in place with cuff deflated   : Patient seen and examined at bedside. NAEO. Patient remains afebrile and HD stable. HgB noted to drop to 7.6 from 9.2 but no additional episodes of melena. Penrose draining purulent material. Intra-oral infection/flap stable. Packing changed at bedside. No other complaints or changes at this time. ID recommended Vanc/zosyn and DC unasyn and flagyl, and IM recommended reticulocyte studies and adding folate supplements. No other changes at this time.   : AFVSS. No acute events overnight. Patient seen and examined at bedside. C/w Vanc/Zosyn per ID recs, pending sensitivities. Growing staph epi, E coli, enterobacter from wound cx on . S/p 2U PRBC yday w appropriate response in Hgb. Transfusion goal > 9.0.  : AFVSS. No acute events overnight. Patient seen and examined at bedside. C/w Vanc/Zosyn per ID recs, pending sens. Hgb stable this morning.  : AFVSS. No acute events overnight. Patient seen and examined at bedside. C/w Vanc/Zosyn, e faecalis sens to vanc/zosyn. Had 3 dark BM's yesterday that were stool guiac positive.  : AFVSS. No acute events overnight. Patient seen and examined at bedside. C/w Imipenem (changed per ID, enterobacter resistant to Zosyn). Plan for OR today for further washout / debridement.  1/3: AFVSS. No acute events overnight. Patient seen and examined at bedside. C/w imipenim. OR cx growing numerous gram negative rods and few gram positive cocci in pairs.  : Patient seen bedside, changed intraoral and neck packing,  Continue to follow cultures, pain controlled   : patient seen this morning, complains of right arm pain wit minimal swelling,  IV in the left arm,  Continue abx for 2 weeks. neck and oral packing changed, purulent drainage from neck noted.   : Patient seen at bedside during morning rounds. Reports right arm pain and swelling somewhat improved although still present. IV replaced in right arm yesterday. Remains on IV abx, tentative end date 1/15/24. Neck and oral packing changed at bedside; purulent drainage noted from neck, decreased in volume compared to prior exams.  : Patient seen at bedside during morning rounds. Overnight, patient complained of coughing, throat discomfort, and nursing reported some increased secretions via trach. Started on mucomyst with improvement. Noted 6 beats of PSVT at ~1900, no reported chest pain or other symptoms. No further episodes. Packing replaced at bedside   : patient seen this morning, neck and oral packing changed. Continues to have purulent drainage form neck.  No chest pain or SOB,               : patient seen this morning, changed neck and oral pack, patient tolerated packing change better with adjusted premedication,  purulence continues from neck , trach in place, fibula donor site with granulation tissue ( changed dressing)   1/10: AFVSS. Patient seen and evaluated this am. Neck and oral packing changed. Some purulence from neck. Trach in place.       LABS                       9.7    5.62  )-----------( 244      ( 2024 05:28 )             30.6    01-09    138  |  102  |  19  ----------------------------<  121<H>  4.5   |  28  |  0.75    Ca    9.3      2024 05:28  Phos  2.6     01-09  Mg     1.9                Coagulation Studies-     Urinalysis Basic - ( 2024 05:28 )    Color: x / Appearance: x / SG: x / pH: x  Gluc: 121 mg/dL / Ketone: x  / Bili: x / Urobili: x   Blood: x / Protein: x / Nitrite: x   Leuk Esterase: x / RBC: x / WBC x   Sq Epi: x / Non Sq Epi: x / Bacteria: x      Endocrine Panel-              COVID-19 PCR: NotDetec (24 @ 17:56)    MICROBIOLOGY:  Culture Results:   Moderate Escherichia coli  Moderate Enterobacter cloacae complex  Rare Candida parapsilosis  Rare Staphylococcus capitis (24 @ 18:03)  Culture Results:   Numerous Escherichia coli  Numerous Enterobacter cloacae complex  Few Enterococcus faecalis  Rare Staphylococcus epidermidis (24 @ 18:03)  Culture Results:   No growth at 5 days. (23 @ 10:08)  Culture Results:   Moderate Escherichia coli  Moderate Enterobacter cloacae complex  Few Enterococcus faecalis  Few Streptococcus mitis/oralis group  Few Staphylococcus epidermidis  Few Streptococcus constellatus  Few Stenotrophomonas maltophilia  Mixed Anaerobic Joy including  Few Prevotella species (most closely resembles Prevotella barnaie)  Few Prevotella denticola  Culture grew 3 or more types of organisms which indicate collection  contamination; consider recollection only if clinically indicated. (23 @ 20:46)

## 2024-01-10 NOTE — PROGRESS NOTE ADULT - ASSESSMENT
Past medical Hx of CAD (x 2 stents Mar 2023), Type 2 DM, hx of kidney stones, psoriasis who presented with an oral mass during previous admission (12/3-21) and underwent L hemimandibulectomy, SND L level 1-3, recon with L FFF, STSG, and placement of dental implants on 12/7. Patient had a trach which was subsequently decannulated. Patient now returns with surgical site infection. Medicine consulted for co-management.    #Preoperative clearance  RCRI: __ points class ___ risk of ___% for mortality, MI, cardiac arrest in 30 days  CORONADO: ___% risk for mortality, MI, cardiac arrest in 30 days  METs: ___    Patient is ____ risk for _____ risk procedure. No further cardiac work up recommended    #Surgical site infection  Patient s/p OR for debridement and exploration (12/27 and 1/2) and Trach replaced through prior stoma for pulmonary toilet. OR cultures with E. Coli, ECC, E. Faecalis S. Mitis S. Oralis, S. Epidermidis, S. Constellatus, S. Maltophilia, mixed anaerobes.  - c/w Imipenem 1 gram IV q8hrs x 14 days with day 1 being date of last washout 1/2/24  - Pain regimen: Tylenol 650 mg PO q6, Oxy IR 5 mg Q4 moderate, Oxy IR 10 mg Q4 severe, Dilaudid 1 mg IV Q6 for breakthrough   - f/u ID recommendations    #Anemia  Hb on admission 7.3. Now stable Hb 9.2 s/p karlo-operative transfusion. Patient with previous iron studies consistent with WADE. DD includes surgical site bleed VS less likely GI bleed. Patient now s/p EGD in OR (1/3).   - maintain active T&S, transfusion goal to Hgb >8   - c/w Folic acid 1g PO QD  - c/w Protonix 40 mg IV QD    #CAD  Patient with hx of CAD s/p 2 stents in March 2023.   - c/w ASA 81 mg PO QD, Atorvastatin 40 mg PO QD  - Restart Plavix, as soon as appropriate per primary team    #DM type 2  Home medication: Metformin 1g PO QD and 500 mg PO QD and Jardiance 10 mg PO QD.  - c/w insulin sliding scale    Medicine will continue to follow. Patient seen, evaluated, and discussed with attending Dr. Montero Past medical Hx of CAD (x 2 stents Mar 2023), Type 2 DM, hx of kidney stones, psoriasis who presented with an oral mass during previous admission (12/3-21) and underwent L hemimandibulectomy, SND L level 1-3, recon with L FFF, STSG, and placement of dental implants on 12/7. Patient had a trach which was subsequently decannulated. Patient now returns with surgical site infection. Medicine consulted for co-management.    #Preoperative clearance  Pt has hx of CAD with stents, DM2 (not on insulin), returning following L hemimandibulectomy with surgical site infection. Pt pending latissimus dorsi flap on 1/11  EKG: NSR   RCRI: 1 points class II risk of 6% for mortality, MI, cardiac arrest in 30 days  CORONADO: 0.6% risk for mortality, MI, cardiac arrest in 30 days  METs: >4, pt ambulates in hospital 1 hour a day     Patient is intermediate risk for intermediate risk procedure. Pending CXR, pt medically optimized for procedure    #Surgical site infection  Patient s/p OR for debridement and exploration (12/27 and 1/2) and Trach replaced through prior stoma for pulmonary toilet. OR cultures with E. Coli, ECC, E. Faecalis S. Mitis S. Oralis, S. Epidermidis, S. Constellatus, S. Maltophilia, mixed anaerobes.  - c/w Imipenem 1 gram IV q8hrs x 14 days with day 1 being date of last washout 1/2/24  - Pain regimen: Tylenol 650 mg PO q6, Oxy IR 5 mg Q4 moderate, Oxy IR 10 mg Q4 severe, Dilaudid 1 mg IV Q6 for breakthrough   - f/u ID recommendations    #Anemia  Hb on admission 7.3. Now stable Hb 9.2 s/p karlo-operative transfusion. Patient with previous iron studies consistent with WADE. DD includes surgical site bleed VS less likely GI bleed. Patient now s/p EGD in OR (1/3).   - maintain active T&S, transfusion goal to Hgb >8   - c/w Folic acid 1g PO QD  - c/w Protonix 40 mg IV QD    #CAD  Patient with hx of CAD s/p 2 stents in March 2023.   - c/w ASA 81 mg PO QD, Atorvastatin 40 mg PO QD  - Restart Plavix, as soon as appropriate per primary team    #DM type 2  Home medication: Metformin 1g PO QD and 500 mg PO QD and Jardiance 10 mg PO QD.  - c/w insulin sliding scale    Medicine will continue to follow. Patient seen, evaluated, and discussed with attending Dr. Montero Past medical Hx of CAD (x 2 stents Mar 2023), Type 2 DM, hx of kidney stones, psoriasis who presented with an oral mass during previous admission (12/3-21) and underwent L hemimandibulectomy, SND L level 1-3, recon with L FFF, STSG, and placement of dental implants on 12/7. Patient had a trach which was subsequently decannulated. Patient now returns with surgical site infection. Medicine consulted for co-management.    #Preoperative clearance  Pt has hx of CAD with stents, DM2 (not on insulin), returning following L hemimandibulectomy with surgical site infection. Pt pending latissimus dorsi flap on 1/11  EKG: NSR   CXR: no acute cardiopulmonary findings  RCRI: 1 points class II risk of 6% for mortality, MI, cardiac arrest in 30 days  CORONADO: 0.6% risk for mortality, MI, cardiac arrest in 30 days  METs: >4, pt ambulates in hospital 1 hour a day     Patient is intermediate risk for intermediate risk procedure. pt medically optimized for procedure    #Surgical site infection  Patient s/p OR for debridement and exploration (12/27 and 1/2) and Trach replaced through prior stoma for pulmonary toilet. OR cultures with E. Coli, ECC, E. Faecalis S. Mitis S. Oralis, S. Epidermidis, S. Constellatus, S. Maltophilia, mixed anaerobes.  - c/w Imipenem 1 gram IV q8hrs x 14 days with day 1 being date of last washout 1/2/24  - Pain regimen: Tylenol 650 mg PO q6, Oxy IR 5 mg Q4 moderate, Oxy IR 10 mg Q4 severe, Dilaudid 1 mg IV Q6 for breakthrough   - f/u ID recommendations    #Anemia  Hb on admission 7.3. Now stable Hb 9.2 s/p karlo-operative transfusion. Patient with previous iron studies consistent with WADE. DD includes surgical site bleed VS less likely GI bleed. Patient now s/p EGD in OR (1/3).   - maintain active T&S, transfusion goal to Hgb >8   - c/w Folic acid 1g PO QD  - c/w Protonix 40 mg IV QD    #CAD  Patient with hx of CAD s/p 2 stents in March 2023.   - c/w ASA 81 mg PO QD, Atorvastatin 40 mg PO QD  - Restart Plavix, as soon as appropriate per primary team    #DM type 2  Home medication: Metformin 1g PO QD and 500 mg PO QD and Jardiance 10 mg PO QD.  - c/w insulin sliding scale    Medicine will continue to follow. Patient seen, evaluated, and discussed with attending Dr. Motnero Past medical Hx of CAD (x 2 stents Mar 2023), Type 2 DM, hx of kidney stones, psoriasis who presented with an oral mass during previous admission (12/3-21) and underwent L hemimandibulectomy, SND L level 1-3, recon with L FFF, STSG, and placement of dental implants on 12/7. Patient had a trach which was subsequently decannulated. Patient now returns with surgical site infection. Medicine consulted for co-management.    #Preoperative clearance  Pt has hx of CAD with stents, DM2 (not on insulin), returning following L hemimandibulectomy with surgical site infection. Pt pending latissimus dorsi flap on 1/11  EKG: NSR   CXR: no acute cardiopulmonary findings  RCRI: 1 points class II risk of 6% for mortality, MI, cardiac arrest in 30 days  CORONADO: 0.6% risk for mortality, MI, cardiac arrest in 30 days  METs: >4, pt ambulates in hospital 1 hour a day     Patient is intermediate risk for intermediate risk procedure. pt medically optimized for procedure    #Surgical site infection  Patient s/p OR for debridement and exploration (12/27 and 1/2) and Trach replaced through prior stoma for pulmonary toilet. OR cultures with E. Coli, ECC, E. Faecalis S. Mitis S. Oralis, S. Epidermidis, S. Constellatus, S. Maltophilia, mixed anaerobes.  - c/w Imipenem 1 gram IV q8hrs x 14 days with day 1 being date of last washout 1/2/24  - Pain regimen: Tylenol 650 mg PO q6, Oxy IR 5 mg Q4 moderate, Oxy IR 10 mg Q4 severe, Dilaudid 1 mg IV Q6 for breakthrough   - f/u ID recommendations    #Anemia  Hb on admission 7.3. Now stable Hb 9.2 s/p karlo-operative transfusion. Patient with previous iron studies consistent with WADE. DD includes surgical site bleed VS less likely GI bleed. Patient now s/p EGD in OR (1/3).   - maintain active T&S, transfusion goal to Hgb >8   - c/w Folic acid 1g PO QD  - c/w Protonix 40 mg IV QD    #CAD  Patient with hx of CAD s/p 2 stents in March 2023.   - c/w ASA 81 mg PO QD, Atorvastatin 40 mg PO QD  - Restart Plavix, as soon as appropriate per primary team    #DM type 2  Home medication: Metformin 1g PO QD and 500 mg PO QD and Jardiance 10 mg PO QD.  - c/w insulin sliding scale    Medicine will continue to follow. Patient seen, evaluated, and discussed with attending Dr. Montero

## 2024-01-10 NOTE — PROGRESS NOTE ADULT - ATTENDING COMMENTS
67 y/o M PMHx of CAD s/p PCI/CUBA x2 to LAD and Ramus (Mar 2023), T2DM, psoriasis, hx Renal calculi, w/ [id number] SCC of L buccal mucosa s/p hemimandibulectomy, left level 1, 2a, 3 neck neck dissection, L FFF, tracheostomy w/ 7.5 cuffed portex, dental implants, STSG (12/7/23), s/p G tube placement and subsequent trach decannulation and discharged home on ( 12/22/23). Pt however returned to [facility] ( 12/27/23) with surgical site infection, s/p RTOR 12/27 with findings of skin flap necrosis and s/p debridement. Trach replaced through prior stoma for pulmonary toilet. Patient also noted to have dark stools and dropped in Hgb- wife reported black stool for the last 3 days and same thing coming out from peg tube 12/28- anemia required blood transfusion -    CC: Pt seen w.  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 Purulent drainage appreciated at margins of flap and in neck.  EGD( 1/2): ulcer found at the G-tube site    - Local wound care: OR findings reviewed, f/u OR mandibular culture per ENT   - ID f/u appreciated ( Team 1) ,  d/c's Vanco & Zosyn ( 1/1) and started on Imipenem 1gm iv q8h  (1/1-) , will need 2 weeks of iv abx from the last washout ( 1/2) day 1 , EOT 1/15  - f/u OR culture ( 1/2): reviewed   - OR cultures with E.coli, ECC, E.faecalis S.mitis/oralis, S.epi, S.constellatus, S.maltophilia, mixed anaerobes.   - culture result reviewed.  - BCx( 12/28): ngtd x 5 days     # Acute blood loss anemia/melena   # hx WADE ( Ferritin 768 but Tsat 13% ( <20%) on 12/13/23)   - GI fu appreciated, EGD( 1/2) ulcer at G-tube site, rec; Protonix 40mg daily for 8 weeks and avoid bumper being too tight to cause pressure ulcer   - keep active T&S, keep Hgb>8    - c/w ASA given hx PCI x2 ( March 2023)  - hold off Plavix since adm ( 12/27) requiring procedure and due to bleeding  - c/w PPI daily can be via G-tube   - c/w Folic acid 1mg daily   - monitor clinically for any further melena     # CAD sp PCI/CUBA x2 to LAD and Ramus in March 2023 as per cards is in setting of NSTEMI - prefer DAPT, at least monotherapy with ASA if plavix need to be held for procedure, currently on ASA , to restart plavix when safe., c/w ASA 81mg and Atorvastatin 40mg qHS     #  DM - FS with ISS, would hold all ora-hypoglycemic agent, goal -180    # pain management - oxycodone 5mg/10mg prn , dilaudid prn, would need stool softener to ensure daily BM.     # Dysphagia- NPO, trach, on TF with Vital 1.5 via G-tube     # DVT ppx: Lovenox     # chest x ray 1/10- reviewed, no acute changes  Ekg reviewed- nsr.  pt is medically optimized for procedure planned, no further cardiac testing.   Med consult team continues to follow pt with you, dee Montes. Thank you. 67 y/o M PMHx of CAD s/p PCI/CUBA x2 to LAD and Ramus (Mar 2023), T2DM, psoriasis, hx Renal calculi, w/ [id number] SCC of L buccal mucosa s/p hemimandibulectomy, left level 1, 2a, 3 neck neck dissection, L FFF, tracheostomy w/ 7.5 cuffed portex, dental implants, STSG (12/7/23), s/p G tube placement and subsequent trach decannulation and discharged home on ( 12/22/23). Pt however returned to [facility] ( 12/27/23) with surgical site infection, s/p RTOR 12/27 with findings of skin flap necrosis and s/p debridement. Trach replaced through prior stoma for pulmonary toilet. Patient also noted to have dark stools and dropped in Hgb- wife reported black stool for the last 3 days and same thing coming out from peg tube 12/28- anemia required blood transfusion -    CC: Pt seen w.  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 Purulent drainage appreciated at margins of flap and in neck.  EGD( 1/2): ulcer found at the G-tube site    - Local wound care: OR findings reviewed, f/u OR mandibular culture per ENT   - ID f/u appreciated ( Team 1) ,  d/c's Vanco & Zosyn ( 1/1) and started on Imipenem 1gm iv q8h  (1/1-) , will need 2 weeks of iv abx from the last washout ( 1/2) day 1 , EOT 1/15  - f/u OR culture ( 1/2): reviewed   - OR cultures with E.coli, ECC, E.faecalis S.mitis/oralis, S.epi, S.constellatus, S.maltophilia, mixed anaerobes.   - culture result reviewed.  - BCx( 12/28): ngtd x 5 days     # Acute blood loss anemia/melena   # hx WADE ( Ferritin 768 but Tsat 13% ( <20%) on 12/13/23)   - GI fu appreciated, EGD( 1/2) ulcer at G-tube site, rec; Protonix 40mg daily for 8 weeks and avoid bumper being too tight to cause pressure ulcer   - keep active T&S, keep Hgb>8    - c/w ASA given hx PCI x2 ( March 2023)  - hold off Plavix since adm ( 12/27) requiring procedure and due to bleeding  - c/w PPI daily can be via G-tube   - c/w Folic acid 1mg daily   - monitor clinically for any further melena     # CAD sp PCI/CUBA x2 to LAD and Ramus in March 2023 as per cards is in setting of NSTEMI - prefer DAPT, at least monotherapy with ASA if plavix need to be held for procedure, currently on ASA , to restart plavix when safe., c/w ASA 81mg and Atorvastatin 40mg qHS     #  DM - FS with ISS, would hold all ora-hypoglycemic agent, goal -180    # pain management - oxycodone 5mg/10mg prn , dilaudid prn, would need stool softener to ensure daily BM.     # Dysphagia- NPO, trach, on TF with Vital 1.5 via G-tube     # DVT ppx: Lovenox     # chest x ray 1/10- reviewed, no acute changes  Ekg reviewed- nsr.  pt is medically optimized for procedure planned, no further cardiac testing.   Med consult team continues to follow pt with you, dee Montes. Thank you.

## 2024-01-10 NOTE — PROGRESS NOTE ADULT - SUBJECTIVE AND OBJECTIVE BOX
*** INCOMPLETE ***    REASON FOR CONSULT:     INTERVAL/OVERNIGHT EVENTS: As per overnight staff, there were no acute events overnight    SUBJECTIVE HPI: Patient seen and examined at bedside. Patient resting comfortably, no complaints at this time. Patient denies: fever, chills, weakness, dizziness, headaches, changes in vision, chest pain, palpitations, shortness of breath, cough, N/V, diarrhea or constipation, dysuria, LE edema. ROS otherwise negative.      MEDICATIONS  (STANDING):  aspirin  chewable 81 milliGRAM(s) Enteral Tube daily  atorvastatin 40 milliGRAM(s) Oral at bedtime  chlorhexidine 0.12% Liquid 15 milliLiter(s) Oral Mucosa two times a day  dextrose 5%. 1000 milliLiter(s) (100 mL/Hr) IV Continuous <Continuous>  dextrose 5%. 1000 milliLiter(s) (50 mL/Hr) IV Continuous <Continuous>  dextrose 50% Injectable 25 Gram(s) IV Push once  dextrose 50% Injectable 12.5 Gram(s) IV Push once  dextrose 50% Injectable 25 Gram(s) IV Push once  enoxaparin Injectable 40 milliGRAM(s) SubCutaneous every 24 hours  folic acid 1 milliGRAM(s) Enteral Tube daily  glucagon  Injectable 1 milliGRAM(s) IntraMuscular once  imipenem/cilastatin  IVPB 1000 milliGRAM(s) IV Intermittent every 8 hours  influenza  Vaccine (HIGH DOSE) 0.7 milliLiter(s) IntraMuscular once  insulin lispro (ADMELOG) corrective regimen sliding scale   SubCutaneous at bedtime  insulin lispro (ADMELOG) corrective regimen sliding scale   SubCutaneous three times a day before meals  pantoprazole  Injectable 40 milliGRAM(s) IV Push every 24 hours    MEDICATIONS  (PRN):  acetaminophen     Tablet .. 650 milliGRAM(s) Oral every 6 hours PRN Mild Pain (1 - 3)  acetylcysteine 10%  Inhalation 4 milliLiter(s) Inhalation every 2 hours PRN secretions  dextrose Oral Gel 15 Gram(s) Oral once PRN Blood Glucose LESS THAN 70 milliGRAM(s)/deciliter  HYDROmorphone  Injectable 1 milliGRAM(s) IV Push every 6 hours PRN breakthrough pain  melatonin 1 milliGRAM(s) Oral at bedtime PRN Insomnia  ondansetron Injectable 4 milliGRAM(s) IV Push every 8 hours PRN Nausea  oxyCODONE    IR 10 milliGRAM(s) Oral every 6 hours PRN Severe Pain (7 - 10)  oxyCODONE    Solution 5 milliGRAM(s) Oral every 4 hours PRN Moderate Pain (4 - 6)        VITAL SIGNS:  Vital Signs Last 24 Hrs  T(C): 36.8 (10 Carlos 2024 09:16), Max: 36.8 (09 Jan 2024 21:56)  T(F): 98.3 (10 Carlos 2024 09:16), Max: 98.3 (09 Jan 2024 21:56)  HR: 54 (10 Carlos 2024 08:31) (54 - 82)  BP: 137/64 (10 Carlos 2024 08:15) (106/54 - 151/64)  BP(mean): 92 (10 Carlos 2024 08:15) (76 - 94)  RR: 18 (10 Carlos 2024 08:31) (16 - 18)  SpO2: 97% (10 Carlos 2024 08:31) (96% - 100%)    Parameters below as of 10 Carlos 2024 08:31  Patient On (Oxygen Delivery Method): tracheostomy collar    O2 Concentration (%): 40    I&O's Detail    09 Jan 2024 07:01  -  10 Carlos 2024 07:00  --------------------------------------------------------  IN:    Enteral Tube Flush: 370 mL    IV PiggyBack: 250 mL    Vital1.5: 1500 mL  Total IN: 2120 mL    OUT:    Voided (mL): 950 mL  Total OUT: 950 mL    Total NET: 1170 mL      10 Carlos 2024 07:01  -  10 Carlos 2024 10:56  --------------------------------------------------------  IN:    IV PiggyBack: 250 mL  Total IN: 250 mL    OUT:  Total OUT: 0 mL    Total NET: 250 mL          PHYSICAL EXAM:  General: Comfortable, pleasant/anxious/agitated, Ill-appearing, well-nourished/frail/cachectic, comfortable / in distress  Neurological: AAOx3, no focal deficits  HEENT: NC/AT; EOMI, PERRL, clear conjunctiva, no nasal or oropharyngeal discharge or exudates, MMM  Neck: supple, no cervical or post-auricular lymphadenopathy  Cardiovascular: +S1/S2, no murmurs/rubs/gallops, RRR  Respiratory: CTA B/L, no diminished breath sounds, no wheezes/rales/rhonchi, no increased work of breathing or accessory muscle use  Gastrointestinal: soft, NT/ND; active BSx4 quadrants  Genitourinary: no suprapubic tenderness, no CVA tenderness  Extremities: WWP; no edema, clubbing or cyanosis  Vascular: 2+ radial, DP/PT pulses B/L  Skin: no rashes  Lines/Drains:     LABS:                        10.0   5.23  )-----------( 246      ( 10 Carlos 2024 05:30 )             30.6     01-10    139  |  104  |  20  ----------------------------<  116<H>  4.7   |  25  |  0.63    Ca    9.2      10 Carlos 2024 05:30  Phos  2.8     01-10  Mg     1.8     01-10      PT/INR - ( 10 Carlos 2024 05:30 )   PT: 12.8 sec;   INR: 1.13          PTT - ( 10 Carlos 2024 05:30 )  PTT:32.7 sec        BNP    Urinalysis Basic - ( 10 Carlos 2024 05:30 )    Color: x / Appearance: x / SG: x / pH: x  Gluc: 116 mg/dL / Ketone: x  / Bili: x / Urobili: x   Blood: x / Protein: x / Nitrite: x   Leuk Esterase: x / RBC: x / WBC x   Sq Epi: x / Non Sq Epi: x / Bacteria: x            Microbiology:    Culture - Tissue with Gram Stain (collected 01-02-24 @ 18:03)  Source: .Tissue left mandible tissue or spec  Gram Stain (01-02-24 @ 21:59):    Numerous Gram Negative Rods    Few Gram positive cocci in pairs    Few WBC's  Final Report (01-05-24 @ 12:29):    Numerous Escherichia coli    Numerous Enterobacter cloacae complex    Few Enterococcus faecalis    Rare Staphylococcus epidermidis  Organism: Escherichia coli  Enterobacter cloacae complex  Enterobacter cloacae complex  Enterococcus faecalis  Escherichia coli (01-05-24 @ 12:29)  Organism: Escherichia coli (01-05-24 @ 12:29)      Method Type: OTTO      -  Ampicillin: S <=8 These ampicillin results predict results for amoxicillin      -  Ampicillin/Sulbactam: S <=4/2      -  Cefazolin: S <=2      -  Ceftriaxone: S <=1      -  Ciprofloxacin: S <=0.25      -  Ertapenem: S <=0.5      -  Gentamicin: S <=2      -  Piperacillin/Tazobactam: S <=8      -  Tobramycin: S <=2      -  Trimethoprim/Sulfamethoxazole: S 1/19  Organism: Enterococcus faecalis (01-05-24 @ 12:29)      Method Type: OTTO      -  Ampicillin: S <=2 Predicts results to ampicillin/sulbactam, amoxacillin-clavulanate and  piperacillin-tazobactam.      -  Vancomycin: S 2  Organism: Enterobacter cloacae complex (01-05-24 @ 12:29)      Method Type: ETEST      -  Meropenem: S 0.094      -  Ertapenem: S 0.38  Organism: Enterobacter cloacae complex (01-05-24 @ 12:29)      Method Type: OTTO      -  Ampicillin: R >16 These ampicillin results predict results for amoxicillin      -  Ampicillin/Sulbactam: R >16/8      -  Cefazolin: R >16      -  Cefepime: R 16      -  Ceftriaxone: R >32 Enterobacter cloacae, Klebsiella aerogenes, and Citrobacter freundii may develop resistance during prolonged therapy.      -  Ciprofloxacin: S <=0.25      -  Gentamicin: S <=2      -  Meropenem: S <=1      -  Meropenem/Vaborbactam: S <=2      -  Piperacillin/Tazobactam: R >64      -  Tobramycin: S <=2      -  Trimethoprim/Sulfamethoxazole: S <=0.5/9.5  Organism: Escherichia coli (01-05-24 @ 12:29)      Method Type: OTTO      -  Ampicillin: S <=8 These ampicillin results predict results for amoxicillin      -  Ampicillin/Sulbactam: S <=4/2      -  Cefazolin: S <=2      -  Ceftriaxone: S <=1      -  Ciprofloxacin: S <=0.25      -  Ertapenem: S <=0.5      -  Gentamicin: S <=2      -  Piperacillin/Tazobactam: S <=8      -  Tobramycin: S <=2      -  Trimethoprim/Sulfamethoxazole: S 1/19    Culture - Surgical Swab (collected 01-02-24 @ 18:03)  Source: .Surgical Swab left neck or spec  Gram Stain (01-02-24 @ 22:00):    Rare Gram Negative Rods    Rare Gram positive cocci in pairs    Moderate WBC's  Final Report (01-05-24 @ 12:42):    Moderate Escherichia coli    Moderate Enterobacter cloacae complex    Rare Candida parapsilosis    Rare Staphylococcus capitis  Organism: Enterobacter cloacae complex  Enterobacter cloacae complex  Escherichia coli  Escherichia coli (01-05-24 @ 12:16)  Organism: Escherichia coli (01-05-24 @ 12:16)      Method Type: OTTO      -  Ampicillin: S <=8 These ampicillin results predict results for amoxicillin      -  Ampicillin/Sulbactam: S <=4/2      -  Cefazolin: S <=2      -  Ceftriaxone: S <=1      -  Ciprofloxacin: S <=0.25      -  Ertapenem: S <=0.5      -  Gentamicin: S <=2      -  Piperacillin/Tazobactam: S <=8      -  Tobramycin: S <=2      -  Trimethoprim/Sulfamethoxazole: S 1/19  Organism: Escherichia coli (01-05-24 @ 12:16)      Method Type: OTTO      -  Ampicillin: S <=8 These ampicillin results predict results for amoxicillin      -  Ampicillin/Sulbactam: S <=4/2      -  Cefazolin: S <=2      -  Ceftriaxone: S <=1      -  Ciprofloxacin: S <=0.25      -  Ertapenem: S <=0.5      -  Gentamicin: S <=2      -  Piperacillin/Tazobactam: S <=8      -  Tobramycin: S <=2      -  Trimethoprim/Sulfamethoxazole: S 1/19  Organism: Enterobacter cloacae complex (01-05-24 @ 12:16)      Method Type: ETEST      -  Ertapenem: S 0.38      -  Meropenem: S 0.094  Organism: Enterobacter cloacae complex (01-05-24 @ 12:16)      Method Type: OTTO      -  Ampicillin: R >16 These ampicillin results predict results for amoxicillin      -  Ampicillin/Sulbactam: R >16/8      -  Cefazolin: R >16      -  Cefepime: R 16      -  Ceftriaxone: R >32 Enterobacter cloacae, Klebsiella aerogenes, and Citrobacter freundii may develop resistance during prolonged therapy.      -  Ciprofloxacin: S <=0.25      -  Gentamicin: S <=2      -  Meropenem: S <=1      -  Meropenem/Vaborbactam: S <=2      -  Piperacillin/Tazobactam: R >64      -  Tobramycin: S <=2      -  Trimethoprim/Sulfamethoxazole: S <=0.5/9.5    Culture - Blood (collected 12-28-23 @ 10:08)  Source: .Blood Blood-Peripheral  Final Report (01-02-24 @ 11:01):    No growth at 5 days.    Culture - Surgical Swab (collected 12-27-23 @ 20:46)  Source: .Surgical Swab 1. Left Oral Cavity  Gram Stain (12-27-23 @ 21:53):    Numerous Gram positive cocci in pairs, chains and clusters    Moderate Gram Positive Rods    Moderate Gram Negative Rods    Numerous White blood cells  Final Report (01-02-24 @ 10:25):    Moderate Escherichia coli    Moderate Enterobacter cloacae complex    Few Enterococcus faecalis    Few Streptococcus mitis/oralis group    Few Staphylococcus epidermidis    Few Streptococcus constellatus    Few Stenotrophomonas maltophilia    Mixed Anaerobic Brenden including    Few Prevotella species (most closely resembles Prevotella barnaie)    Few Prevotella denticola    Culture grew 3 or more types of organisms which indicate collection    contamination; consider recollection only if clinically indicated.  Organism: Escherichia coli  Enterobacter cloacae complex  Enterococcus faecalis  Streptococcus constellatus  Streptococcus constellatus  Stenotrophomonas maltophilia  Stenotrophomonas maltophilia (01-02-24 @ 10:24)  Organism: Escherichia coli (01-02-24 @ 10:24)      Method Type: OTTO      -  Ampicillin: S <=8 These ampicillin results predict results for amoxicillin      -  Ampicillin/Sulbactam: S <=4/2      -  Cefazolin: S <=2      -  Ceftriaxone: S <=1      -  Ciprofloxacin: S <=0.25      -  Ertapenem: S <=0.5      -  Gentamicin: S <=2      -  Piperacillin/Tazobactam: S <=8      -  Tobramycin: S <=2      -  Trimethoprim/Sulfamethoxazole: S 1/19  Organism: Stenotrophomonas maltophilia (01-01-24 @ 14:56)      Method Type: KB      -  Minocycline: S  Organism: Stenotrophomonas maltophilia (01-01-24 @ 14:55)      Method Type: OTTO      -  Levofloxacin: S 1      -  Trimethoprim/Sulfamethoxazole: S <=0.5/9.5  Organism: Streptococcus constellatus (01-01-24 @ 14:55)      Method Type: ETEST      -  Ceftriaxone: S 0.094      -  Penicillin: S 0.047  Organism: Streptococcus constellatus (01-01-24 @ 14:55)      Method Type: KB  Organism: Enterobacter cloacae complex (01-01-24 @ 14:52)      Method Type: OTTO      -  Ampicillin: R >16 These ampicillin results predict results for amoxicillin      -  Ampicillin/Sulbactam: R >16/8      -  Cefazolin: R >16      -  Cefepime: SDD 8 The breakpoint for susceptible dose dependent may require the usage of a higher cefepime dose (equivalent to adult dose of 2g q8h for normal renal function) for successful treatment.      -  Ceftriaxone: R >32 Enterobacter cloacae, Klebsiella aerogenes, and Citrobacter freundii may develop resistance during prolonged therapy.      -  Ciprofloxacin: S <=0.25      -  Ertapenem: S <=0.5      -  Gentamicin: S <=2      -  Meropenem: S <=1      -  Piperacillin/Tazobactam: R 64      -  Tobramycin: S <=2      -  Trimethoprim/Sulfamethoxazole: S <=0.5/9.5      -  Cefoxitin: R >16  Organism: Enterococcus faecalis (12-30-23 @ 15:21)      Method Type: OTTO      -  Ampicillin: S <=2 Predicts results to ampicillin/sulbactam, amoxacillin-clavulanate and  piperacillin-tazobactam.      -  Vancomycin: S 4    Culture - Sputum (collected 12-13-23 @ 07:27)  Source: Trach Asp Tracheal Aspirate  Gram Stain (12-13-23 @ 21:31):    Rare epithelial cells    Moderate WBC's    Rare Gram Positive Rods    Few Gram Negative Rods    Moderate Gram positive cocci in pairs, chains and clusters  Final Report (12-15-23 @ 09:27):    Moderate Escherichia coli    Moderate Enterobacter cloacae complex    Accompanied by normal respiratory brenden  Organism: Escherichia coli  Enterobacter cloacae complex (12-15-23 @ 09:27)  Organism: Enterobacter cloacae complex (12-15-23 @ 09:27)      Method Type: OTTO      -  Ampicillin: R <=8 These ampicillin results predict results for amoxicillin      -  Ampicillin/Sulbactam: R >16/8      -  Cefazolin: R >16      -  Cefepime: S <=2      -  Ceftriaxone: S <=1 Enterobacter cloacae, Klebsiella aerogenes, and Citrobacter freundii may develop resistance during prolonged therapy.      -  Ciprofloxacin: S <=0.25      -  Ertapenem: S <=0.5      -  Gentamicin: S <=2      -  Piperacillin/Tazobactam: S <=8      -  Tobramycin: S <=2      -  Trimethoprim/Sulfamethoxazole: S 1/19  Organism: Escherichia coli (12-15-23 @ 09:27)      Method Type: OTTO      -  Ampicillin: S <=8 These ampicillin results predict results for amoxicillin      -  Ampicillin/Sulbactam: S <=4/2      -  Cefazolin: S <=2      -  Ceftriaxone: S <=1      -  Ciprofloxacin: S <=0.25      -  Ertapenem: S <=0.5      -  Gentamicin: S <=2      -  Piperacillin/Tazobactam: S <=8      -  Tobramycin: I 4      -  Trimethoprim/Sulfamethoxazole: S 1/19    Culture - Sputum (collected 12-12-23 @ 20:48)  Source: .Sputum  Gram Stain (12-12-23 @ 22:45):    Moderate epithelial cells    Moderate WBC's    Rare Gram Positive Rods    Few Gram Negative Rods    Few Gram Positive Cocci in Pairs and Chains  Final Report (12-12-23 @ 22:45):    Sputum specimen rejected.  Microscopic examination indicates    oropharyngeal contamination.  Please repeat.    Culture - Blood (collected 12-12-23 @ 20:10)  Source: .Blood Blood  Final Report (12-17-23 @ 22:00):    No growth at 5 days.    Culture - Blood (collected 12-12-23 @ 20:05)  Source: .Blood Blood  Final Report (12-17-23 @ 22:00):    No growth at 5 days.        RADIOLOGY & ADDITIONAL STUDIES: Reviewed.    ECG:    ECHO:    INTERVAL/OVERNIGHT EVENTS: As per overnight staff, there were no acute events overnight    SUBJECTIVE HPI: Patient seen and examined at bedside. Patient resting comfortably, no complaints at this time. Expresses concern about COVID exposure, however denies symptoms at this time. Pt reassured that he will be closely monitored and tested and treated accordingly if necessary. Pt reports that he ambulates freely, 1 hr a day while in the hospital. No known hx of reactions to anesthesia in the past. Patient denies: fever, chills, weakness, dizziness, headaches, changes in vision, chest pain, palpitations, shortness of breath, cough, N/V, diarrhea or constipation, dysuria, LE edema. ROS otherwise negative.      MEDICATIONS  (STANDING):  aspirin  chewable 81 milliGRAM(s) Enteral Tube daily  atorvastatin 40 milliGRAM(s) Oral at bedtime  chlorhexidine 0.12% Liquid 15 milliLiter(s) Oral Mucosa two times a day  dextrose 5%. 1000 milliLiter(s) (100 mL/Hr) IV Continuous <Continuous>  dextrose 5%. 1000 milliLiter(s) (50 mL/Hr) IV Continuous <Continuous>  dextrose 50% Injectable 25 Gram(s) IV Push once  dextrose 50% Injectable 12.5 Gram(s) IV Push once  dextrose 50% Injectable 25 Gram(s) IV Push once  enoxaparin Injectable 40 milliGRAM(s) SubCutaneous every 24 hours  folic acid 1 milliGRAM(s) Enteral Tube daily  glucagon  Injectable 1 milliGRAM(s) IntraMuscular once  imipenem/cilastatin  IVPB 1000 milliGRAM(s) IV Intermittent every 8 hours  influenza  Vaccine (HIGH DOSE) 0.7 milliLiter(s) IntraMuscular once  insulin lispro (ADMELOG) corrective regimen sliding scale   SubCutaneous at bedtime  insulin lispro (ADMELOG) corrective regimen sliding scale   SubCutaneous three times a day before meals  pantoprazole  Injectable 40 milliGRAM(s) IV Push every 24 hours    MEDICATIONS  (PRN):  acetaminophen     Tablet .. 650 milliGRAM(s) Oral every 6 hours PRN Mild Pain (1 - 3)  acetylcysteine 10%  Inhalation 4 milliLiter(s) Inhalation every 2 hours PRN secretions  dextrose Oral Gel 15 Gram(s) Oral once PRN Blood Glucose LESS THAN 70 milliGRAM(s)/deciliter  HYDROmorphone  Injectable 1 milliGRAM(s) IV Push every 6 hours PRN breakthrough pain  melatonin 1 milliGRAM(s) Oral at bedtime PRN Insomnia  ondansetron Injectable 4 milliGRAM(s) IV Push every 8 hours PRN Nausea  oxyCODONE    IR 10 milliGRAM(s) Oral every 6 hours PRN Severe Pain (7 - 10)  oxyCODONE    Solution 5 milliGRAM(s) Oral every 4 hours PRN Moderate Pain (4 - 6)        VITAL SIGNS:  Vital Signs Last 24 Hrs  T(C): 36.8 (10 Carlos 2024 09:16), Max: 36.8 (09 Jan 2024 21:56)  T(F): 98.3 (10 Carlos 2024 09:16), Max: 98.3 (09 Jan 2024 21:56)  HR: 54 (10 Carlos 2024 08:31) (54 - 82)  BP: 137/64 (10 Carlos 2024 08:15) (106/54 - 151/64)  BP(mean): 92 (10 Carlos 2024 08:15) (76 - 94)  RR: 18 (10 Carlos 2024 08:31) (16 - 18)  SpO2: 97% (10 Carlos 2024 08:31) (96% - 100%)    Parameters below as of 10 Carlos 2024 08:31  Patient On (Oxygen Delivery Method): tracheostomy collar    O2 Concentration (%): 40    I&O's Detail    09 Jan 2024 07:01  -  10 Carlos 2024 07:00  --------------------------------------------------------  IN:    Enteral Tube Flush: 370 mL    IV PiggyBack: 250 mL    Vital1.5: 1500 mL  Total IN: 2120 mL    OUT:    Voided (mL): 950 mL  Total OUT: 950 mL    Total NET: 1170 mL      10 Carlos 2024 07:01  -  10 Carlos 2024 10:56  --------------------------------------------------------  IN:    IV PiggyBack: 250 mL  Total IN: 250 mL    OUT:  Total OUT: 0 mL    Total NET: 250 mL      PHYSICAL EXAM:  General: Comfortable, NAD  Neurological: AAOx3, no focal deficits  HEENT: dressing is clean and dry around neck   Neck: supple, no cervical or post-auricular lymphadenopathy  Cardiovascular: +S1/S2, no murmurs/rubs/gallops, RRR  Respiratory: CTA B/L, no diminished breath sounds, no wheezes/rales/rhonchi, no increased work of breathing or accessory muscle use  Gastrointestinal: soft, NT/ND; active BSx4 quadrants  Lines/Drains: RUE IV line site appears clean, no swelling or drainging    LABS:                        10.0   5.23  )-----------( 246      ( 10 Carlos 2024 05:30 )             30.6     01-10    139  |  104  |  20  ----------------------------<  116<H>  4.7   |  25  |  0.63    Ca    9.2      10 Carlos 2024 05:30  Phos  2.8     01-10  Mg     1.8     01-10      PT/INR - ( 10 Carlos 2024 05:30 )   PT: 12.8 sec;   INR: 1.13          PTT - ( 10 Carlos 2024 05:30 )  PTT:32.7 sec        BNP    Urinalysis Basic - ( 10 Carlos 2024 05:30 )    Color: x / Appearance: x / SG: x / pH: x  Gluc: 116 mg/dL / Ketone: x  / Bili: x / Urobili: x   Blood: x / Protein: x / Nitrite: x   Leuk Esterase: x / RBC: x / WBC x   Sq Epi: x / Non Sq Epi: x / Bacteria: x            Microbiology:    Culture - Tissue with Gram Stain (collected 01-02-24 @ 18:03)  Source: .Tissue left mandible tissue or spec  Gram Stain (01-02-24 @ 21:59):    Numerous Gram Negative Rods    Few Gram positive cocci in pairs    Few WBC's  Final Report (01-05-24 @ 12:29):    Numerous Escherichia coli    Numerous Enterobacter cloacae complex    Few Enterococcus faecalis    Rare Staphylococcus epidermidis  Organism: Escherichia coli  Enterobacter cloacae complex  Enterobacter cloacae complex  Enterococcus faecalis  Escherichia coli (01-05-24 @ 12:29)  Organism: Escherichia coli (01-05-24 @ 12:29)      Method Type: OTTO      -  Ampicillin: S <=8 These ampicillin results predict results for amoxicillin      -  Ampicillin/Sulbactam: S <=4/2      -  Cefazolin: S <=2      -  Ceftriaxone: S <=1      -  Ciprofloxacin: S <=0.25      -  Ertapenem: S <=0.5      -  Gentamicin: S <=2      -  Piperacillin/Tazobactam: S <=8      -  Tobramycin: S <=2      -  Trimethoprim/Sulfamethoxazole: S 1/19  Organism: Enterococcus faecalis (01-05-24 @ 12:29)      Method Type: OTTO      -  Ampicillin: S <=2 Predicts results to ampicillin/sulbactam, amoxacillin-clavulanate and  piperacillin-tazobactam.      -  Vancomycin: S 2  Organism: Enterobacter cloacae complex (01-05-24 @ 12:29)      Method Type: ETEST      -  Meropenem: S 0.094      -  Ertapenem: S 0.38  Organism: Enterobacter cloacae complex (01-05-24 @ 12:29)      Method Type: OTTO      -  Ampicillin: R >16 These ampicillin results predict results for amoxicillin      -  Ampicillin/Sulbactam: R >16/8      -  Cefazolin: R >16      -  Cefepime: R 16      -  Ceftriaxone: R >32 Enterobacter cloacae, Klebsiella aerogenes, and Citrobacter freundii may develop resistance during prolonged therapy.      -  Ciprofloxacin: S <=0.25      -  Gentamicin: S <=2      -  Meropenem: S <=1      -  Meropenem/Vaborbactam: S <=2      -  Piperacillin/Tazobactam: R >64      -  Tobramycin: S <=2      -  Trimethoprim/Sulfamethoxazole: S <=0.5/9.5  Organism: Escherichia coli (01-05-24 @ 12:29)      Method Type: OTTO      -  Ampicillin: S <=8 These ampicillin results predict results for amoxicillin      -  Ampicillin/Sulbactam: S <=4/2      -  Cefazolin: S <=2      -  Ceftriaxone: S <=1      -  Ciprofloxacin: S <=0.25      -  Ertapenem: S <=0.5      -  Gentamicin: S <=2      -  Piperacillin/Tazobactam: S <=8      -  Tobramycin: S <=2      -  Trimethoprim/Sulfamethoxazole: S 1/19    Culture - Surgical Swab (collected 01-02-24 @ 18:03)  Source: .Surgical Swab left neck or spec  Gram Stain (01-02-24 @ 22:00):    Rare Gram Negative Rods    Rare Gram positive cocci in pairs    Moderate WBC's  Final Report (01-05-24 @ 12:42):    Moderate Escherichia coli    Moderate Enterobacter cloacae complex    Rare Candida parapsilosis    Rare Staphylococcus capitis  Organism: Enterobacter cloacae complex  Enterobacter cloacae complex  Escherichia coli  Escherichia coli (01-05-24 @ 12:16)  Organism: Escherichia coli (01-05-24 @ 12:16)      Method Type: OTTO      -  Ampicillin: S <=8 These ampicillin results predict results for amoxicillin      -  Ampicillin/Sulbactam: S <=4/2      -  Cefazolin: S <=2      -  Ceftriaxone: S <=1      -  Ciprofloxacin: S <=0.25      -  Ertapenem: S <=0.5      -  Gentamicin: S <=2      -  Piperacillin/Tazobactam: S <=8      -  Tobramycin: S <=2      -  Trimethoprim/Sulfamethoxazole: S 1/19  Organism: Escherichia coli (01-05-24 @ 12:16)      Method Type: OTTO      -  Ampicillin: S <=8 These ampicillin results predict results for amoxicillin      -  Ampicillin/Sulbactam: S <=4/2      -  Cefazolin: S <=2      -  Ceftriaxone: S <=1      -  Ciprofloxacin: S <=0.25      -  Ertapenem: S <=0.5      -  Gentamicin: S <=2      -  Piperacillin/Tazobactam: S <=8      -  Tobramycin: S <=2      -  Trimethoprim/Sulfamethoxazole: S 1/19  Organism: Enterobacter cloacae complex (01-05-24 @ 12:16)      Method Type: ETEST      -  Ertapenem: S 0.38      -  Meropenem: S 0.094  Organism: Enterobacter cloacae complex (01-05-24 @ 12:16)      Method Type: OTTO      -  Ampicillin: R >16 These ampicillin results predict results for amoxicillin      -  Ampicillin/Sulbactam: R >16/8      -  Cefazolin: R >16      -  Cefepime: R 16      -  Ceftriaxone: R >32 Enterobacter cloacae, Klebsiella aerogenes, and Citrobacter freundii may develop resistance during prolonged therapy.      -  Ciprofloxacin: S <=0.25      -  Gentamicin: S <=2      -  Meropenem: S <=1      -  Meropenem/Vaborbactam: S <=2      -  Piperacillin/Tazobactam: R >64      -  Tobramycin: S <=2      -  Trimethoprim/Sulfamethoxazole: S <=0.5/9.5    Culture - Blood (collected 12-28-23 @ 10:08)  Source: .Blood Blood-Peripheral  Final Report (01-02-24 @ 11:01):    No growth at 5 days.    Culture - Surgical Swab (collected 12-27-23 @ 20:46)  Source: .Surgical Swab 1. Left Oral Cavity  Gram Stain (12-27-23 @ 21:53):    Numerous Gram positive cocci in pairs, chains and clusters    Moderate Gram Positive Rods    Moderate Gram Negative Rods    Numerous White blood cells  Final Report (01-02-24 @ 10:25):    Moderate Escherichia coli    Moderate Enterobacter cloacae complex    Few Enterococcus faecalis    Few Streptococcus mitis/oralis group    Few Staphylococcus epidermidis    Few Streptococcus constellatus    Few Stenotrophomonas maltophilia    Mixed Anaerobic Brenden including    Few Prevotella species (most closely resembles Prevotella barnaie)    Few Prevotella denticola    Culture grew 3 or more types of organisms which indicate collection    contamination; consider recollection only if clinically indicated.  Organism: Escherichia coli  Enterobacter cloacae complex  Enterococcus faecalis  Streptococcus constellatus  Streptococcus constellatus  Stenotrophomonas maltophilia  Stenotrophomonas maltophilia (01-02-24 @ 10:24)  Organism: Escherichia coli (01-02-24 @ 10:24)      Method Type: OTTO      -  Ampicillin: S <=8 These ampicillin results predict results for amoxicillin      -  Ampicillin/Sulbactam: S <=4/2      -  Cefazolin: S <=2      -  Ceftriaxone: S <=1      -  Ciprofloxacin: S <=0.25      -  Ertapenem: S <=0.5      -  Gentamicin: S <=2      -  Piperacillin/Tazobactam: S <=8      -  Tobramycin: S <=2      -  Trimethoprim/Sulfamethoxazole: S 1/19  Organism: Stenotrophomonas maltophilia (01-01-24 @ 14:56)      Method Type: KB      -  Minocycline: S  Organism: Stenotrophomonas maltophilia (01-01-24 @ 14:55)      Method Type: OTTO      -  Levofloxacin: S 1      -  Trimethoprim/Sulfamethoxazole: S <=0.5/9.5  Organism: Streptococcus constellatus (01-01-24 @ 14:55)      Method Type: ETEST      -  Ceftriaxone: S 0.094      -  Penicillin: S 0.047  Organism: Streptococcus constellatus (01-01-24 @ 14:55)      Method Type: KB  Organism: Enterobacter cloacae complex (01-01-24 @ 14:52)      Method Type: OTTO      -  Ampicillin: R >16 These ampicillin results predict results for amoxicillin      -  Ampicillin/Sulbactam: R >16/8      -  Cefazolin: R >16      -  Cefepime: SDD 8 The breakpoint for susceptible dose dependent may require the usage of a higher cefepime dose (equivalent to adult dose of 2g q8h for normal renal function) for successful treatment.      -  Ceftriaxone: R >32 Enterobacter cloacae, Klebsiella aerogenes, and Citrobacter freundii may develop resistance during prolonged therapy.      -  Ciprofloxacin: S <=0.25      -  Ertapenem: S <=0.5      -  Gentamicin: S <=2      -  Meropenem: S <=1      -  Piperacillin/Tazobactam: R 64      -  Tobramycin: S <=2      -  Trimethoprim/Sulfamethoxazole: S <=0.5/9.5      -  Cefoxitin: R >16  Organism: Enterococcus faecalis (12-30-23 @ 15:21)      Method Type: OTTO      -  Ampicillin: S <=2 Predicts results to ampicillin/sulbactam, amoxacillin-clavulanate and  piperacillin-tazobactam.      -  Vancomycin: S 4    Culture - Sputum (collected 12-13-23 @ 07:27)  Source: Trach Asp Tracheal Aspirate  Gram Stain (12-13-23 @ 21:31):    Rare epithelial cells    Moderate WBC's    Rare Gram Positive Rods    Few Gram Negative Rods    Moderate Gram positive cocci in pairs, chains and clusters  Final Report (12-15-23 @ 09:27):    Moderate Escherichia coli    Moderate Enterobacter cloacae complex    Accompanied by normal respiratory brenden  Organism: Escherichia coli  Enterobacter cloacae complex (12-15-23 @ 09:27)  Organism: Enterobacter cloacae complex (12-15-23 @ 09:27)      Method Type: OTTO      -  Ampicillin: R <=8 These ampicillin results predict results for amoxicillin      -  Ampicillin/Sulbactam: R >16/8      -  Cefazolin: R >16      -  Cefepime: S <=2      -  Ceftriaxone: S <=1 Enterobacter cloacae, Klebsiella aerogenes, and Citrobacter freundii may develop resistance during prolonged therapy.      -  Ciprofloxacin: S <=0.25      -  Ertapenem: S <=0.5      -  Gentamicin: S <=2      -  Piperacillin/Tazobactam: S <=8      -  Tobramycin: S <=2      -  Trimethoprim/Sulfamethoxazole: S 1/19  Organism: Escherichia coli (12-15-23 @ 09:27)      Method Type: OTTO      -  Ampicillin: S <=8 These ampicillin results predict results for amoxicillin      -  Ampicillin/Sulbactam: S <=4/2      -  Cefazolin: S <=2      -  Ceftriaxone: S <=1      -  Ciprofloxacin: S <=0.25      -  Ertapenem: S <=0.5      -  Gentamicin: S <=2      -  Piperacillin/Tazobactam: S <=8      -  Tobramycin: I 4      -  Trimethoprim/Sulfamethoxazole: S 1/19    Culture - Sputum (collected 12-12-23 @ 20:48)  Source: .Sputum  Gram Stain (12-12-23 @ 22:45):    Moderate epithelial cells    Moderate WBC's    Rare Gram Positive Rods    Few Gram Negative Rods    Few Gram Positive Cocci in Pairs and Chains  Final Report (12-12-23 @ 22:45):    Sputum specimen rejected.  Microscopic examination indicates    oropharyngeal contamination.  Please repeat.    Culture - Blood (collected 12-12-23 @ 20:10)  Source: .Blood Blood  Final Report (12-17-23 @ 22:00):    No growth at 5 days.    Culture - Blood (collected 12-12-23 @ 20:05)  Source: .Blood Blood  Final Report (12-17-23 @ 22:00):    No growth at 5 days.        RADIOLOGY & ADDITIONAL STUDIES: Reviewed.    ECG:    ECHO:

## 2024-01-11 ENCOUNTER — APPOINTMENT (OUTPATIENT)
Dept: OTOLARYNGOLOGY | Facility: HOSPITAL | Age: 69
End: 2024-01-11

## 2024-01-11 LAB
ALBUMIN SERPL ELPH-MCNC: 3 G/DL — LOW (ref 3.3–5)
ALBUMIN SERPL ELPH-MCNC: 3 G/DL — LOW (ref 3.3–5)
ALP SERPL-CCNC: 55 U/L — SIGNIFICANT CHANGE UP (ref 40–120)
ALP SERPL-CCNC: 55 U/L — SIGNIFICANT CHANGE UP (ref 40–120)
ALT FLD-CCNC: 13 U/L — SIGNIFICANT CHANGE UP (ref 10–45)
ALT FLD-CCNC: 13 U/L — SIGNIFICANT CHANGE UP (ref 10–45)
ANION GAP SERPL CALC-SCNC: 10 MMOL/L — SIGNIFICANT CHANGE UP (ref 5–17)
ANION GAP SERPL CALC-SCNC: 10 MMOL/L — SIGNIFICANT CHANGE UP (ref 5–17)
ANION GAP SERPL CALC-SCNC: 11 MMOL/L — SIGNIFICANT CHANGE UP (ref 5–17)
ANION GAP SERPL CALC-SCNC: 11 MMOL/L — SIGNIFICANT CHANGE UP (ref 5–17)
APTT BLD: 30.4 SEC — SIGNIFICANT CHANGE UP (ref 24.5–35.6)
APTT BLD: 30.4 SEC — SIGNIFICANT CHANGE UP (ref 24.5–35.6)
AST SERPL-CCNC: 16 U/L — SIGNIFICANT CHANGE UP (ref 10–40)
AST SERPL-CCNC: 16 U/L — SIGNIFICANT CHANGE UP (ref 10–40)
BASE EXCESS BLDA CALC-SCNC: 0.6 MMOL/L — SIGNIFICANT CHANGE UP (ref -2–3)
BASE EXCESS BLDA CALC-SCNC: 0.6 MMOL/L — SIGNIFICANT CHANGE UP (ref -2–3)
BASE EXCESS BLDA CALC-SCNC: 1.2 MMOL/L — SIGNIFICANT CHANGE UP (ref -2–3)
BASE EXCESS BLDA CALC-SCNC: 1.2 MMOL/L — SIGNIFICANT CHANGE UP (ref -2–3)
BASE EXCESS BLDA CALC-SCNC: 2.3 MMOL/L — SIGNIFICANT CHANGE UP (ref -2–3)
BASE EXCESS BLDA CALC-SCNC: 2.3 MMOL/L — SIGNIFICANT CHANGE UP (ref -2–3)
BASE EXCESS BLDA CALC-SCNC: 3.2 MMOL/L — HIGH (ref -2–3)
BASE EXCESS BLDA CALC-SCNC: 3.2 MMOL/L — HIGH (ref -2–3)
BILIRUB DIRECT SERPL-MCNC: 0.4 MG/DL — HIGH (ref 0–0.3)
BILIRUB DIRECT SERPL-MCNC: 0.4 MG/DL — HIGH (ref 0–0.3)
BILIRUB INDIRECT FLD-MCNC: 1.5 MG/DL — HIGH (ref 0.2–1)
BILIRUB INDIRECT FLD-MCNC: 1.5 MG/DL — HIGH (ref 0.2–1)
BILIRUB SERPL-MCNC: 1.9 MG/DL — HIGH (ref 0.2–1.2)
BILIRUB SERPL-MCNC: 1.9 MG/DL — HIGH (ref 0.2–1.2)
BLD GP AB SCN SERPL QL: NEGATIVE — SIGNIFICANT CHANGE UP
BLD GP AB SCN SERPL QL: NEGATIVE — SIGNIFICANT CHANGE UP
BUN SERPL-MCNC: 16 MG/DL — SIGNIFICANT CHANGE UP (ref 7–23)
BUN SERPL-MCNC: 16 MG/DL — SIGNIFICANT CHANGE UP (ref 7–23)
BUN SERPL-MCNC: 21 MG/DL — SIGNIFICANT CHANGE UP (ref 7–23)
BUN SERPL-MCNC: 21 MG/DL — SIGNIFICANT CHANGE UP (ref 7–23)
CA-I BLDA-SCNC: 1.22 MMOL/L — SIGNIFICANT CHANGE UP (ref 1.15–1.33)
CA-I BLDA-SCNC: 1.22 MMOL/L — SIGNIFICANT CHANGE UP (ref 1.15–1.33)
CA-I BLDA-SCNC: 1.23 MMOL/L — SIGNIFICANT CHANGE UP (ref 1.15–1.33)
CA-I BLDA-SCNC: 1.23 MMOL/L — SIGNIFICANT CHANGE UP (ref 1.15–1.33)
CA-I BLDA-SCNC: 1.27 MMOL/L — SIGNIFICANT CHANGE UP (ref 1.15–1.33)
CA-I BLDA-SCNC: 1.27 MMOL/L — SIGNIFICANT CHANGE UP (ref 1.15–1.33)
CALCIUM SERPL-MCNC: 8.8 MG/DL — SIGNIFICANT CHANGE UP (ref 8.4–10.5)
CALCIUM SERPL-MCNC: 8.8 MG/DL — SIGNIFICANT CHANGE UP (ref 8.4–10.5)
CALCIUM SERPL-MCNC: 9.4 MG/DL — SIGNIFICANT CHANGE UP (ref 8.4–10.5)
CALCIUM SERPL-MCNC: 9.4 MG/DL — SIGNIFICANT CHANGE UP (ref 8.4–10.5)
CHLORIDE SERPL-SCNC: 105 MMOL/L — SIGNIFICANT CHANGE UP (ref 96–108)
CHLORIDE SERPL-SCNC: 105 MMOL/L — SIGNIFICANT CHANGE UP (ref 96–108)
CHLORIDE SERPL-SCNC: 106 MMOL/L — SIGNIFICANT CHANGE UP (ref 96–108)
CHLORIDE SERPL-SCNC: 106 MMOL/L — SIGNIFICANT CHANGE UP (ref 96–108)
CO2 BLDA-SCNC: 24 MMOL/L — SIGNIFICANT CHANGE UP (ref 19–24)
CO2 BLDA-SCNC: 24 MMOL/L — SIGNIFICANT CHANGE UP (ref 19–24)
CO2 BLDA-SCNC: 25 MMOL/L — HIGH (ref 19–24)
CO2 BLDA-SCNC: 25 MMOL/L — HIGH (ref 19–24)
CO2 BLDA-SCNC: 26 MMOL/L — HIGH (ref 19–24)
CO2 BLDA-SCNC: 26 MMOL/L — HIGH (ref 19–24)
CO2 BLDA-SCNC: 28 MMOL/L — HIGH (ref 19–24)
CO2 BLDA-SCNC: 28 MMOL/L — HIGH (ref 19–24)
CO2 SERPL-SCNC: 24 MMOL/L — SIGNIFICANT CHANGE UP (ref 22–31)
CO2 SERPL-SCNC: 24 MMOL/L — SIGNIFICANT CHANGE UP (ref 22–31)
CO2 SERPL-SCNC: 26 MMOL/L — SIGNIFICANT CHANGE UP (ref 22–31)
CO2 SERPL-SCNC: 26 MMOL/L — SIGNIFICANT CHANGE UP (ref 22–31)
COHGB MFR BLDA: 1.4 % — SIGNIFICANT CHANGE UP
COHGB MFR BLDA: 1.4 % — SIGNIFICANT CHANGE UP
COHGB MFR BLDA: 1.8 % — SIGNIFICANT CHANGE UP
CREAT SERPL-MCNC: 0.66 MG/DL — SIGNIFICANT CHANGE UP (ref 0.5–1.3)
CREAT SERPL-MCNC: 0.66 MG/DL — SIGNIFICANT CHANGE UP (ref 0.5–1.3)
CREAT SERPL-MCNC: 0.68 MG/DL — SIGNIFICANT CHANGE UP (ref 0.5–1.3)
CREAT SERPL-MCNC: 0.68 MG/DL — SIGNIFICANT CHANGE UP (ref 0.5–1.3)
EGFR: 102 ML/MIN/1.73M2 — SIGNIFICANT CHANGE UP
EGFR: 102 ML/MIN/1.73M2 — SIGNIFICANT CHANGE UP
EGFR: 103 ML/MIN/1.73M2 — SIGNIFICANT CHANGE UP
EGFR: 103 ML/MIN/1.73M2 — SIGNIFICANT CHANGE UP
GLUCOSE BLDA-MCNC: 110 MG/DL — HIGH (ref 70–99)
GLUCOSE BLDA-MCNC: 110 MG/DL — HIGH (ref 70–99)
GLUCOSE BLDA-MCNC: 127 MG/DL — HIGH (ref 70–99)
GLUCOSE BLDA-MCNC: 127 MG/DL — HIGH (ref 70–99)
GLUCOSE BLDA-MCNC: 137 MG/DL — HIGH (ref 70–99)
GLUCOSE BLDA-MCNC: 137 MG/DL — HIGH (ref 70–99)
GLUCOSE BLDC GLUCOMTR-MCNC: 114 MG/DL — HIGH (ref 70–99)
GLUCOSE BLDC GLUCOMTR-MCNC: 114 MG/DL — HIGH (ref 70–99)
GLUCOSE BLDC GLUCOMTR-MCNC: 123 MG/DL — HIGH (ref 70–99)
GLUCOSE BLDC GLUCOMTR-MCNC: 123 MG/DL — HIGH (ref 70–99)
GLUCOSE BLDC GLUCOMTR-MCNC: 174 MG/DL — HIGH (ref 70–99)
GLUCOSE BLDC GLUCOMTR-MCNC: 174 MG/DL — HIGH (ref 70–99)
GLUCOSE SERPL-MCNC: 113 MG/DL — HIGH (ref 70–99)
GLUCOSE SERPL-MCNC: 113 MG/DL — HIGH (ref 70–99)
GLUCOSE SERPL-MCNC: 126 MG/DL — HIGH (ref 70–99)
GLUCOSE SERPL-MCNC: 126 MG/DL — HIGH (ref 70–99)
HCO3 BLDA-SCNC: 23 MMOL/L — SIGNIFICANT CHANGE UP (ref 21–28)
HCO3 BLDA-SCNC: 23 MMOL/L — SIGNIFICANT CHANGE UP (ref 21–28)
HCO3 BLDA-SCNC: 24 MMOL/L — SIGNIFICANT CHANGE UP (ref 21–28)
HCO3 BLDA-SCNC: 24 MMOL/L — SIGNIFICANT CHANGE UP (ref 21–28)
HCO3 BLDA-SCNC: 25 MMOL/L — SIGNIFICANT CHANGE UP (ref 21–28)
HCO3 BLDA-SCNC: 25 MMOL/L — SIGNIFICANT CHANGE UP (ref 21–28)
HCO3 BLDA-SCNC: 26 MMOL/L — SIGNIFICANT CHANGE UP (ref 21–28)
HCO3 BLDA-SCNC: 26 MMOL/L — SIGNIFICANT CHANGE UP (ref 21–28)
HCT VFR BLD CALC: 30.2 % — LOW (ref 39–50)
HCT VFR BLD CALC: 30.2 % — LOW (ref 39–50)
HCT VFR BLD CALC: 30.8 % — LOW (ref 39–50)
HCT VFR BLD CALC: 30.8 % — LOW (ref 39–50)
HGB BLD-MCNC: 10.6 G/DL — LOW (ref 13–17)
HGB BLD-MCNC: 10.6 G/DL — LOW (ref 13–17)
HGB BLD-MCNC: 9.7 G/DL — LOW (ref 13–17)
HGB BLD-MCNC: 9.7 G/DL — LOW (ref 13–17)
HGB BLDA-MCNC: 11.5 G/DL — LOW (ref 12.6–17.4)
HGB BLDA-MCNC: 11.5 G/DL — LOW (ref 12.6–17.4)
HGB BLDA-MCNC: 7.9 G/DL — LOW (ref 12.6–17.4)
HGB BLDA-MCNC: 7.9 G/DL — LOW (ref 12.6–17.4)
HGB BLDA-MCNC: 9.9 G/DL — LOW (ref 12.6–17.4)
HGB BLDA-MCNC: 9.9 G/DL — LOW (ref 12.6–17.4)
INR BLD: 1 — SIGNIFICANT CHANGE UP (ref 0.85–1.18)
INR BLD: 1 — SIGNIFICANT CHANGE UP (ref 0.85–1.18)
LACTATE SERPL-SCNC: 1.8 MMOL/L — SIGNIFICANT CHANGE UP (ref 0.5–2)
LACTATE SERPL-SCNC: 1.8 MMOL/L — SIGNIFICANT CHANGE UP (ref 0.5–2)
MAGNESIUM SERPL-MCNC: 1.4 MG/DL — LOW (ref 1.6–2.6)
MAGNESIUM SERPL-MCNC: 1.4 MG/DL — LOW (ref 1.6–2.6)
MAGNESIUM SERPL-MCNC: 1.8 MG/DL — SIGNIFICANT CHANGE UP (ref 1.6–2.6)
MAGNESIUM SERPL-MCNC: 1.8 MG/DL — SIGNIFICANT CHANGE UP (ref 1.6–2.6)
MCHC RBC-ENTMCNC: 28.5 PG — SIGNIFICANT CHANGE UP (ref 27–34)
MCHC RBC-ENTMCNC: 28.5 PG — SIGNIFICANT CHANGE UP (ref 27–34)
MCHC RBC-ENTMCNC: 28.6 PG — SIGNIFICANT CHANGE UP (ref 27–34)
MCHC RBC-ENTMCNC: 28.6 PG — SIGNIFICANT CHANGE UP (ref 27–34)
MCHC RBC-ENTMCNC: 32.1 GM/DL — SIGNIFICANT CHANGE UP (ref 32–36)
MCHC RBC-ENTMCNC: 32.1 GM/DL — SIGNIFICANT CHANGE UP (ref 32–36)
MCHC RBC-ENTMCNC: 34.4 GM/DL — SIGNIFICANT CHANGE UP (ref 32–36)
MCHC RBC-ENTMCNC: 34.4 GM/DL — SIGNIFICANT CHANGE UP (ref 32–36)
MCV RBC AUTO: 83 FL — SIGNIFICANT CHANGE UP (ref 80–100)
MCV RBC AUTO: 83 FL — SIGNIFICANT CHANGE UP (ref 80–100)
MCV RBC AUTO: 88.8 FL — SIGNIFICANT CHANGE UP (ref 80–100)
MCV RBC AUTO: 88.8 FL — SIGNIFICANT CHANGE UP (ref 80–100)
METHGB MFR BLDA: 0 % — SIGNIFICANT CHANGE UP
METHGB MFR BLDA: 0 % — SIGNIFICANT CHANGE UP
METHGB MFR BLDA: 0.5 % — SIGNIFICANT CHANGE UP
METHGB MFR BLDA: 0.5 % — SIGNIFICANT CHANGE UP
METHGB MFR BLDA: 0.8 % — SIGNIFICANT CHANGE UP
METHGB MFR BLDA: 0.8 % — SIGNIFICANT CHANGE UP
NRBC # BLD: 0 /100 WBCS — SIGNIFICANT CHANGE UP (ref 0–0)
OXYHGB MFR BLDA: 96.6 % — HIGH (ref 90–95)
OXYHGB MFR BLDA: 96.6 % — HIGH (ref 90–95)
OXYHGB MFR BLDA: 97 % — HIGH (ref 90–95)
OXYHGB MFR BLDA: 97 % — HIGH (ref 90–95)
OXYHGB MFR BLDA: 97.1 % — HIGH (ref 90–95)
OXYHGB MFR BLDA: 97.1 % — HIGH (ref 90–95)
PCO2 BLDA: 26 MMHG — LOW (ref 35–48)
PCO2 BLDA: 26 MMHG — LOW (ref 35–48)
PCO2 BLDA: 34 MMHG — LOW (ref 35–48)
PCO2 BLDA: 34 MMHG — LOW (ref 35–48)
PCO2 BLDA: 35 MMHG — SIGNIFICANT CHANGE UP (ref 35–48)
PH BLDA: 7.45 — SIGNIFICANT CHANGE UP (ref 7.35–7.45)
PH BLDA: 7.45 — SIGNIFICANT CHANGE UP (ref 7.35–7.45)
PH BLDA: 7.46 — HIGH (ref 7.35–7.45)
PH BLDA: 7.46 — HIGH (ref 7.35–7.45)
PH BLDA: 7.5 — HIGH (ref 7.35–7.45)
PH BLDA: 7.5 — HIGH (ref 7.35–7.45)
PH BLDA: 7.56 — HIGH (ref 7.35–7.45)
PH BLDA: 7.56 — HIGH (ref 7.35–7.45)
PHOSPHATE SERPL-MCNC: 3.1 MG/DL — SIGNIFICANT CHANGE UP (ref 2.5–4.5)
PHOSPHATE SERPL-MCNC: 3.1 MG/DL — SIGNIFICANT CHANGE UP (ref 2.5–4.5)
PHOSPHATE SERPL-MCNC: 3.4 MG/DL — SIGNIFICANT CHANGE UP (ref 2.5–4.5)
PHOSPHATE SERPL-MCNC: 3.4 MG/DL — SIGNIFICANT CHANGE UP (ref 2.5–4.5)
PLATELET # BLD AUTO: 184 K/UL — SIGNIFICANT CHANGE UP (ref 150–400)
PLATELET # BLD AUTO: 184 K/UL — SIGNIFICANT CHANGE UP (ref 150–400)
PLATELET # BLD AUTO: 230 K/UL — SIGNIFICANT CHANGE UP (ref 150–400)
PLATELET # BLD AUTO: 230 K/UL — SIGNIFICANT CHANGE UP (ref 150–400)
PO2 BLDA: 164 MMHG — HIGH (ref 83–108)
PO2 BLDA: 164 MMHG — HIGH (ref 83–108)
PO2 BLDA: 186 MMHG — HIGH (ref 83–108)
PO2 BLDA: 186 MMHG — HIGH (ref 83–108)
PO2 BLDA: 202 MMHG — HIGH (ref 83–108)
PO2 BLDA: 202 MMHG — HIGH (ref 83–108)
PO2 BLDA: 220 MMHG — HIGH (ref 83–108)
PO2 BLDA: 220 MMHG — HIGH (ref 83–108)
POTASSIUM BLDA-SCNC: 3.8 MMOL/L — SIGNIFICANT CHANGE UP (ref 3.5–5.1)
POTASSIUM BLDA-SCNC: 3.8 MMOL/L — SIGNIFICANT CHANGE UP (ref 3.5–5.1)
POTASSIUM BLDA-SCNC: 4 MMOL/L — SIGNIFICANT CHANGE UP (ref 3.5–5.1)
POTASSIUM BLDA-SCNC: 4 MMOL/L — SIGNIFICANT CHANGE UP (ref 3.5–5.1)
POTASSIUM BLDA-SCNC: 4.1 MMOL/L — SIGNIFICANT CHANGE UP (ref 3.5–5.1)
POTASSIUM BLDA-SCNC: 4.1 MMOL/L — SIGNIFICANT CHANGE UP (ref 3.5–5.1)
POTASSIUM SERPL-MCNC: 4.1 MMOL/L — SIGNIFICANT CHANGE UP (ref 3.5–5.3)
POTASSIUM SERPL-SCNC: 4.1 MMOL/L — SIGNIFICANT CHANGE UP (ref 3.5–5.3)
PROT SERPL-MCNC: 5.4 G/DL — LOW (ref 6–8.3)
PROT SERPL-MCNC: 5.4 G/DL — LOW (ref 6–8.3)
PROTHROM AB SERPL-ACNC: 11.4 SEC — SIGNIFICANT CHANGE UP (ref 9.5–13)
PROTHROM AB SERPL-ACNC: 11.4 SEC — SIGNIFICANT CHANGE UP (ref 9.5–13)
RBC # BLD: 3.4 M/UL — LOW (ref 4.2–5.8)
RBC # BLD: 3.4 M/UL — LOW (ref 4.2–5.8)
RBC # BLD: 3.71 M/UL — LOW (ref 4.2–5.8)
RBC # BLD: 3.71 M/UL — LOW (ref 4.2–5.8)
RBC # FLD: 14.6 % — HIGH (ref 10.3–14.5)
RBC # FLD: 14.6 % — HIGH (ref 10.3–14.5)
RBC # FLD: 15.1 % — HIGH (ref 10.3–14.5)
RBC # FLD: 15.1 % — HIGH (ref 10.3–14.5)
RH IG SCN BLD-IMP: POSITIVE — SIGNIFICANT CHANGE UP
RH IG SCN BLD-IMP: POSITIVE — SIGNIFICANT CHANGE UP
SAO2 % BLDA: 98 % — SIGNIFICANT CHANGE UP (ref 94–98)
SAO2 % BLDA: 98 % — SIGNIFICANT CHANGE UP (ref 94–98)
SAO2 % BLDA: 99.3 % — HIGH (ref 94–98)
SAO2 % BLDA: 99.7 % — HIGH (ref 94–98)
SAO2 % BLDA: 99.7 % — HIGH (ref 94–98)
SODIUM BLDA-SCNC: 136 MMOL/L — SIGNIFICANT CHANGE UP (ref 136–145)
SODIUM BLDA-SCNC: 137 MMOL/L — SIGNIFICANT CHANGE UP (ref 136–145)
SODIUM BLDA-SCNC: 137 MMOL/L — SIGNIFICANT CHANGE UP (ref 136–145)
SODIUM SERPL-SCNC: 139 MMOL/L — SIGNIFICANT CHANGE UP (ref 135–145)
SODIUM SERPL-SCNC: 139 MMOL/L — SIGNIFICANT CHANGE UP (ref 135–145)
SODIUM SERPL-SCNC: 143 MMOL/L — SIGNIFICANT CHANGE UP (ref 135–145)
SODIUM SERPL-SCNC: 143 MMOL/L — SIGNIFICANT CHANGE UP (ref 135–145)
WBC # BLD: 5.72 K/UL — SIGNIFICANT CHANGE UP (ref 3.8–10.5)
WBC # BLD: 5.72 K/UL — SIGNIFICANT CHANGE UP (ref 3.8–10.5)
WBC # BLD: 7.78 K/UL — SIGNIFICANT CHANGE UP (ref 3.8–10.5)
WBC # BLD: 7.78 K/UL — SIGNIFICANT CHANGE UP (ref 3.8–10.5)
WBC # FLD AUTO: 5.72 K/UL — SIGNIFICANT CHANGE UP (ref 3.8–10.5)
WBC # FLD AUTO: 5.72 K/UL — SIGNIFICANT CHANGE UP (ref 3.8–10.5)
WBC # FLD AUTO: 7.78 K/UL — SIGNIFICANT CHANGE UP (ref 3.8–10.5)
WBC # FLD AUTO: 7.78 K/UL — SIGNIFICANT CHANGE UP (ref 3.8–10.5)

## 2024-01-11 PROCEDURE — 71045 X-RAY EXAM CHEST 1 VIEW: CPT | Mod: 26

## 2024-01-11 PROCEDURE — 42950 RECONSTRUCTION OF THROAT: CPT | Mod: 79

## 2024-01-11 PROCEDURE — 15757 FREE SKIN FLAP MICROVASC: CPT | Mod: 78

## 2024-01-11 PROCEDURE — 88305 TISSUE EXAM BY PATHOLOGIST: CPT | Mod: 26

## 2024-01-11 PROCEDURE — 99291 CRITICAL CARE FIRST HOUR: CPT

## 2024-01-11 DEVICE — LIGATING CLIPS SYNOVIS SUPERFINE MICROCLIP 6: Type: IMPLANTABLE DEVICE | Site: LEFT | Status: FUNCTIONAL

## 2024-01-11 DEVICE — CLIP APPLIER ETHICON LIGACLIP 9 3/8" MEDIUM: Type: IMPLANTABLE DEVICE | Site: LEFT | Status: FUNCTIONAL

## 2024-01-11 DEVICE — SURGIFOAM PAD 8CM X 12.5CM X 10MM (100): Type: IMPLANTABLE DEVICE | Site: LEFT | Status: FUNCTIONAL

## 2024-01-11 DEVICE — CLIP APPLIER ETHICON LIGACLIP 9 3/8" SMALL: Type: IMPLANTABLE DEVICE | Site: LEFT | Status: FUNCTIONAL

## 2024-01-11 DEVICE — CLIP APPLIER ETHICON LIGACLIP 11.5" MEDIUM: Type: IMPLANTABLE DEVICE | Site: LEFT | Status: FUNCTIONAL

## 2024-01-11 DEVICE — LUKENS BONE WAX 2.5G: Type: IMPLANTABLE DEVICE | Site: LEFT | Status: FUNCTIONAL

## 2024-01-11 DEVICE — DOPPLER PROBE DISPOSABLE: Type: IMPLANTABLE DEVICE | Site: LEFT | Status: FUNCTIONAL

## 2024-01-11 DEVICE — CARTRIDGE MICROCLIP 30: Type: IMPLANTABLE DEVICE | Site: LEFT | Status: FUNCTIONAL

## 2024-01-11 DEVICE — SURGCEL 4 X 8": Type: IMPLANTABLE DEVICE | Site: LEFT | Status: FUNCTIONAL

## 2024-01-11 DEVICE — COUPLER VESSEL ANASTOMOTIC 3.5MM: Type: IMPLANTABLE DEVICE | Site: LEFT | Status: FUNCTIONAL

## 2024-01-11 RX ORDER — ACETAMINOPHEN 500 MG
1000 TABLET ORAL EVERY 8 HOURS
Refills: 0 | Status: COMPLETED | OUTPATIENT
Start: 2024-01-11 | End: 2024-01-12

## 2024-01-11 RX ORDER — DEXAMETHASONE 0.5 MG/5ML
8 ELIXIR ORAL EVERY 8 HOURS
Refills: 0 | Status: DISCONTINUED | OUTPATIENT
Start: 2024-01-11 | End: 2024-01-11

## 2024-01-11 RX ORDER — DEXTROSE 50 % IN WATER 50 %
12.5 SYRINGE (ML) INTRAVENOUS ONCE
Refills: 0 | Status: DISCONTINUED | OUTPATIENT
Start: 2024-01-11 | End: 2024-01-20

## 2024-01-11 RX ORDER — HYDROMORPHONE HYDROCHLORIDE 2 MG/ML
0.5 INJECTION INTRAMUSCULAR; INTRAVENOUS; SUBCUTANEOUS EVERY 4 HOURS
Refills: 0 | Status: DISCONTINUED | OUTPATIENT
Start: 2024-01-11 | End: 2024-01-12

## 2024-01-11 RX ORDER — GABAPENTIN 400 MG/1
300 CAPSULE ORAL EVERY 12 HOURS
Refills: 0 | Status: DISCONTINUED | OUTPATIENT
Start: 2024-01-11 | End: 2024-01-20

## 2024-01-11 RX ORDER — SODIUM CHLORIDE 9 MG/ML
1000 INJECTION, SOLUTION INTRAVENOUS
Refills: 0 | Status: DISCONTINUED | OUTPATIENT
Start: 2024-01-11 | End: 2024-01-20

## 2024-01-11 RX ORDER — IMIPENEM AND CILASTATIN 250; 250 MG/100ML; MG/100ML
1000 INJECTION, POWDER, FOR SOLUTION INTRAVENOUS EVERY 8 HOURS
Refills: 0 | Status: DISCONTINUED | OUTPATIENT
Start: 2024-01-11 | End: 2024-01-17

## 2024-01-11 RX ORDER — CHLORHEXIDINE GLUCONATE 213 G/1000ML
15 SOLUTION TOPICAL EVERY 12 HOURS
Refills: 0 | Status: DISCONTINUED | OUTPATIENT
Start: 2024-01-11 | End: 2024-01-12

## 2024-01-11 RX ORDER — MAGNESIUM SULFATE 500 MG/ML
2 VIAL (ML) INJECTION ONCE
Refills: 0 | Status: COMPLETED | OUTPATIENT
Start: 2024-01-11 | End: 2024-01-11

## 2024-01-11 RX ORDER — IMIPENEM AND CILASTATIN 250; 250 MG/100ML; MG/100ML
1000 INJECTION, POWDER, FOR SOLUTION INTRAVENOUS ONCE
Refills: 0 | Status: DISCONTINUED | OUTPATIENT
Start: 2024-01-11 | End: 2024-01-11

## 2024-01-11 RX ORDER — GLUCAGON INJECTION, SOLUTION 0.5 MG/.1ML
1 INJECTION, SOLUTION SUBCUTANEOUS ONCE
Refills: 0 | Status: DISCONTINUED | OUTPATIENT
Start: 2024-01-11 | End: 2024-01-20

## 2024-01-11 RX ORDER — SODIUM CHLORIDE 9 MG/ML
1000 INJECTION, SOLUTION INTRAVENOUS
Refills: 0 | Status: DISCONTINUED | OUTPATIENT
Start: 2024-01-11 | End: 2024-01-12

## 2024-01-11 RX ORDER — GABAPENTIN 400 MG/1
300 CAPSULE ORAL
Refills: 0 | Status: DISCONTINUED | OUTPATIENT
Start: 2024-01-11 | End: 2024-01-11

## 2024-01-11 RX ORDER — DEXTROSE 50 % IN WATER 50 %
15 SYRINGE (ML) INTRAVENOUS ONCE
Refills: 0 | Status: DISCONTINUED | OUTPATIENT
Start: 2024-01-11 | End: 2024-01-20

## 2024-01-11 RX ORDER — DEXTROSE 50 % IN WATER 50 %
25 SYRINGE (ML) INTRAVENOUS ONCE
Refills: 0 | Status: DISCONTINUED | OUTPATIENT
Start: 2024-01-11 | End: 2024-01-20

## 2024-01-11 RX ORDER — HYDROMORPHONE HYDROCHLORIDE 2 MG/ML
0.2 INJECTION INTRAMUSCULAR; INTRAVENOUS; SUBCUTANEOUS EVERY 4 HOURS
Refills: 0 | Status: DISCONTINUED | OUTPATIENT
Start: 2024-01-11 | End: 2024-01-12

## 2024-01-11 RX ORDER — DEXAMETHASONE 0.5 MG/5ML
8 ELIXIR ORAL EVERY 8 HOURS
Refills: 0 | Status: COMPLETED | OUTPATIENT
Start: 2024-01-11 | End: 2024-01-12

## 2024-01-11 RX ORDER — POLYETHYLENE GLYCOL 3350 17 G/17G
17 POWDER, FOR SOLUTION ORAL DAILY
Refills: 0 | Status: DISCONTINUED | OUTPATIENT
Start: 2024-01-11 | End: 2024-01-18

## 2024-01-11 RX ORDER — CHLORHEXIDINE GLUCONATE 213 G/1000ML
1 SOLUTION TOPICAL DAILY
Refills: 0 | Status: DISCONTINUED | OUTPATIENT
Start: 2024-01-11 | End: 2024-01-18

## 2024-01-11 RX ORDER — PANTOPRAZOLE SODIUM 20 MG/1
40 TABLET, DELAYED RELEASE ORAL DAILY
Refills: 0 | Status: DISCONTINUED | OUTPATIENT
Start: 2024-01-11 | End: 2024-01-12

## 2024-01-11 RX ORDER — PANTOPRAZOLE SODIUM 20 MG/1
40 TABLET, DELAYED RELEASE ORAL DAILY
Refills: 0 | Status: DISCONTINUED | OUTPATIENT
Start: 2024-01-11 | End: 2024-01-11

## 2024-01-11 RX ORDER — CHLORHEXIDINE GLUCONATE 213 G/1000ML
15 SOLUTION TOPICAL
Refills: 0 | Status: DISCONTINUED | OUTPATIENT
Start: 2024-01-11 | End: 2024-01-11

## 2024-01-11 RX ORDER — INSULIN LISPRO 100/ML
VIAL (ML) SUBCUTANEOUS EVERY 6 HOURS
Refills: 0 | Status: DISCONTINUED | OUTPATIENT
Start: 2024-01-11 | End: 2024-01-20

## 2024-01-11 RX ORDER — SENNA PLUS 8.6 MG/1
1 TABLET ORAL DAILY
Refills: 0 | Status: DISCONTINUED | OUTPATIENT
Start: 2024-01-11 | End: 2024-01-11

## 2024-01-11 RX ORDER — ONDANSETRON 8 MG/1
4 TABLET, FILM COATED ORAL EVERY 8 HOURS
Refills: 0 | Status: DISCONTINUED | OUTPATIENT
Start: 2024-01-11 | End: 2024-01-20

## 2024-01-11 RX ORDER — DEXMEDETOMIDINE HYDROCHLORIDE IN 0.9% SODIUM CHLORIDE 4 UG/ML
1 INJECTION INTRAVENOUS
Qty: 400 | Refills: 0 | Status: DISCONTINUED | OUTPATIENT
Start: 2024-01-11 | End: 2024-01-12

## 2024-01-11 RX ORDER — SENNA PLUS 8.6 MG/1
1 TABLET ORAL DAILY
Refills: 0 | Status: DISCONTINUED | OUTPATIENT
Start: 2024-01-11 | End: 2024-01-18

## 2024-01-11 RX ADMIN — HYDROMORPHONE HYDROCHLORIDE 1 MILLIGRAM(S): 2 INJECTION INTRAMUSCULAR; INTRAVENOUS; SUBCUTANEOUS at 03:17

## 2024-01-11 RX ADMIN — HYDROMORPHONE HYDROCHLORIDE 1 MILLIGRAM(S): 2 INJECTION INTRAMUSCULAR; INTRAVENOUS; SUBCUTANEOUS at 04:00

## 2024-01-11 RX ADMIN — CHLORHEXIDINE GLUCONATE 15 MILLILITER(S): 213 SOLUTION TOPICAL at 17:46

## 2024-01-11 RX ADMIN — SENNA PLUS 1 TABLET(S): 8.6 TABLET ORAL at 17:46

## 2024-01-11 RX ADMIN — Medication 1000 MILLIGRAM(S): at 23:53

## 2024-01-11 RX ADMIN — IMIPENEM AND CILASTATIN 250 MILLIGRAM(S): 250; 250 INJECTION, POWDER, FOR SOLUTION INTRAVENOUS at 21:09

## 2024-01-11 RX ADMIN — Medication 101.6 MILLIGRAM(S): at 17:46

## 2024-01-11 RX ADMIN — Medication 101.6 MILLIGRAM(S): at 21:17

## 2024-01-11 RX ADMIN — SODIUM CHLORIDE 110 MILLILITER(S): 9 INJECTION, SOLUTION INTRAVENOUS at 17:57

## 2024-01-11 RX ADMIN — IMIPENEM AND CILASTATIN 250 MILLIGRAM(S): 250; 250 INJECTION, POWDER, FOR SOLUTION INTRAVENOUS at 02:59

## 2024-01-11 RX ADMIN — DEXMEDETOMIDINE HYDROCHLORIDE IN 0.9% SODIUM CHLORIDE 21.1 MICROGRAM(S)/KG/HR: 4 INJECTION INTRAVENOUS at 17:57

## 2024-01-11 RX ADMIN — Medication 4 MILLILITER(S): at 03:00

## 2024-01-11 RX ADMIN — Medication 25 GRAM(S): at 17:46

## 2024-01-11 RX ADMIN — Medication 2: at 23:38

## 2024-01-11 RX ADMIN — GABAPENTIN 300 MILLIGRAM(S): 400 CAPSULE ORAL at 19:05

## 2024-01-11 RX ADMIN — Medication 400 MILLIGRAM(S): at 23:38

## 2024-01-11 RX ADMIN — PANTOPRAZOLE SODIUM 40 MILLIGRAM(S): 20 TABLET, DELAYED RELEASE ORAL at 17:45

## 2024-01-11 RX ADMIN — ONDANSETRON 4 MILLIGRAM(S): 8 TABLET, FILM COATED ORAL at 03:06

## 2024-01-11 RX ADMIN — HYDROMORPHONE HYDROCHLORIDE 0.5 MILLIGRAM(S): 2 INJECTION INTRAMUSCULAR; INTRAVENOUS; SUBCUTANEOUS at 23:54

## 2024-01-11 RX ADMIN — CHLORHEXIDINE GLUCONATE 15 MILLILITER(S): 213 SOLUTION TOPICAL at 06:06

## 2024-01-11 NOTE — PRE-ANESTHESIA EVALUATION ADULT - NSANTHAPLANRD_GEN_ALL_CORE
Bleeding that does not stop/Swelling that gets worse/Pain not relieved by Medications/Fever greater than (need to indicate Fahrenheit or Celsius)/Wound/Surgical Site with redness, or foul smelling discharge or pus/Numbness, tingling, color or temperature change to extremity/Nausea and vomiting that does not stop
general
general

## 2024-01-11 NOTE — PROVIDER CONTACT NOTE (OTHER) - ACTION/TREATMENT ORDERED:
EKG, Trop, CKMB, CBC
MD Victor states he does not believe that particular syringe was used but that he would confer with team. Plan for pt to go to OR at 1500. Care ongoing. Report given to [facility] dayshift; made aware
Notified MD Chang and performed fecal occult test which was positive. Will continue to monitor.
Dr. Mendelshon stated the ENT team will come by later this afternoon to round on pt and assess dressing
no other interventions, will continue monitor.
Warm packs

## 2024-01-11 NOTE — CONSULT NOTE ADULT - SUBJECTIVE AND OBJECTIVE BOX
HPI:  69yo M w PMH of CAD (x2 stents Mar 2023), HLD, T2DM, hx of kidney stones, psoriasis who presented with an oral mass and underwent L hemimandibulectomy, SND L level 1-3, recon with L FFF, STSG, and placement of dental implants on 12/7. He had a trach which was subsequently decannulated. He returns 12/27 and 1/2/24 with surgical site infection now s/p OR for debridement and exploration. Trach replaced through prior stoma for pulmonary toilet. Patient subsequently went to OR on 1/11 for L latissimus dorsi free flap and tracheostomy exchange (7.5 portex). Procedure overall uncomplicated with no acute intraoperative events. SICU consulted for flap management and hemodynamic monitoring post procedure. Patient arrived to the unit in stable condition, nontachycardic, normotensive afebrile, however; tachypneic requiring full ventilation support.       2500 crystalloid, 3prbc, 500 albumin, uop 500, no pressors in case    Home Medications:  acetaminophen 650 mg q6, aspirin 81 mg qd, Colace 100 mg qd  ibuprofen 400 mg q6, Ilumya 100 mg/mL q3 months, Jardiance 10 mg qd, lidocaine 2% mucous membrane, Lipitor 40 mg qhs, metFORMIN 1000 mg qd metFORMIN 500 mg qd  nitroglycerin 0.4 mg q5mins  Plavix 75 mg qd potassium citrate 10 tid    PAST MEDICAL & SURGICAL HISTORY:  Neoplasm of mandible    ROS: See HPI    MEDICATIONS  (STANDING):  chlorhexidine 0.12% Liquid 15 milliLiter(s) Oral Mucosa every 12 hours  chlorhexidine 2% Cloths 1 Application(s) Topical daily  dexAMETHasone  IVPB 8 milliGRAM(s) IV Intermittent every 8 hours  dexMEDEtomidine Infusion 1 MICROgram(s)/kG/Hr (21.1 mL/Hr) IV Continuous <Continuous>  dextrose 5%. 1000 milliLiter(s) (50 mL/Hr) IV Continuous <Continuous>  dextrose 5%. 1000 milliLiter(s) (100 mL/Hr) IV Continuous <Continuous>  dextrose 50% Injectable 25 Gram(s) IV Push once  dextrose 50% Injectable 25 Gram(s) IV Push once  dextrose 50% Injectable 12.5 Gram(s) IV Push once  gabapentin 300 milliGRAM(s) Oral two times a day  glucagon  Injectable 1 milliGRAM(s) IntraMuscular once  imipenem/cilastatin  IVPB 1000 milliGRAM(s) IV Intermittent once  influenza  Vaccine (HIGH DOSE) 0.7 milliLiter(s) IntraMuscular once  insulin lispro (ADMELOG) corrective regimen sliding scale   SubCutaneous every 6 hours  lactated ringers. 1000 milliLiter(s) (110 mL/Hr) IV Continuous <Continuous>  pantoprazole   Suspension 40 milliGRAM(s) Oral daily  polyethylene glycol 3350 17 Gram(s) Oral daily    MEDICATIONS  (PRN):  dextrose Oral Gel 15 Gram(s) Oral once PRN Blood Glucose LESS THAN 70 milliGRAM(s)/deciliter  HYDROmorphone  Injectable 0.2 milliGRAM(s) IV Push every 4 hours PRN Moderate Pain (4 - 6)  HYDROmorphone  Injectable 0.5 milliGRAM(s) IV Push every 4 hours PRN Severe Pain (7 - 10)  ondansetron Injectable 4 milliGRAM(s) IV Push every 8 hours PRN Nausea and/or Vomiting    Allergies    No Known Allergies    Intolerances    SOCIAL HISTORY:  Smoke: Never Smoker  EtOH: occasional    FAMILY HISTORY:    Vital Signs Last 24 Hrs  T(C): 36.2 (11 Jan 2024 07:47), Max: 36.6 (10 Carlos 2024 22:09)  T(F): 97.1 (11 Jan 2024 05:40), Max: 97.8 (10 Carlos 2024 22:09)  HR: 58 (11 Jan 2024 07:47) (58 - 74)  BP: 155/79 (11 Jan 2024 07:47) (116/55 - 155/79)  BP(mean): 109 (11 Jan 2024 07:47) (79 - 109)  RR: 17 (11 Jan 2024 07:47) (16 - 17)  SpO2: 97% (11 Jan 2024 07:47) (97% - 99%)    Parameters below as of 11 Jan 2024 07:47    O2 Flow (L/min): 10  O2 Concentration (%): 40    PHYSICAL EXAM    Gen: NAD   Neuro: A&oX3 no neuro deficits-   HEENT: midline incision with stables from the mentonian process to thyroid cartilage. Open wound on left side of the neck covered in xeroform, no signs of active drainage- Tracheostomy in place, sutured with minimal amount of suctioning required - blood surrounding stoma- on AC mode- ventilated/ 12/510/5/40%  CV: RRR reg s1s2/ Bradycardia noted   Pulm: CTA B/L no w/w/r  Abd: Soft, NT/ND/ Gtube in place, bumper at 4cm, with no signs of infection or active bleeding/ Left flank flap donor site with staples, skin edges well approximated, with KRISTEN x2 (1. Superior portion of the wound, 2. inferior portion of the wound) with serosanguineous drainage present.   Ext: No C/C/E/ Extremities well perfused, warm - SCDs in place  Skin: No rashes erythema or ecchymosis  MSK: No joint swelling noted  Psych: Normal affect     LABS:                        10.6   7.78  )-----------( 184      ( 11 Jan 2024 16:24 )             30.8     01-11    143  |  106  |  21  ----------------------------<  113<H>  4.1   |  26  |  0.66    Ca    9.4      11 Jan 2024 05:30  Phos  3.1     01-11  Mg     1.8     01-11      PT/INR - ( 11 Jan 2024 05:30 )   PT: 11.4 sec;   INR: 1.00          PTT - ( 11 Jan 2024 05:30 )  PTT:30.4 sec  Urinalysis Basic - ( 11 Jan 2024 05:30 )    Color: x / Appearance: x / SG: x / pH: x  Gluc: 113 mg/dL / Ketone: x  / Bili: x / Urobili: x   Blood: x / Protein: x / Nitrite: x   Leuk Esterase: x / RBC: x / WBC x   Sq Epi: x / Non Sq Epi: x / Bacteria: x        RADIOLOGY & ADDITIONAL STUDIES:    69yo M w PMH of CAD (x2 stents Mar 2023), HLD, T2DM, hx of kidney stones, psoriasis who presented with an oral mass and underwent L hemimandibulectomy, SND L level 1-3, recon with L FFF, STSG, and placement of dental implants on 12/7. He had a trach which was subsequently decannulated. He returns 12/27/23 and 1/2/24 with surgical site infection now s/p OR for debridement and exploration. Trach replaced through prior stoma for pulmonary toilet. Now s/p L latissmus free flap to left transferred to SICU for hemodynamic and flap monitoring post procedure.     Neuro: Tylenol ATC, Gabapentin 300mg BID, PRN Oxycodone, decadron 8mg q8 x3 doses/ Precedex gtt started for sedation 1mcg/kg/ Hr to titrate as needed   HEENT: Flap checks q1h, No circumferential ties or collars, Head neutral, gentle suction in mouth, Peridex PO BID, No asa needed.   CV: HD stable, Maintain MAP > 60, Avoid pressors but will add Low dose Levo gtt if needed  Pulm: On vent (AC mode)- 12/510/5/40%  GI: Gtube in place with skin bumper at 4cm; can start tube feeds 24hr post procedure /  PPI, Senna, Miralax, MVI, PRN Zofran Protonix QD( will dc once TF started)   : Lugo remove post op. TOV @[place]  Holding home Vit D3  ID: 1g imipenem q8 (1/2-until 1/16), last 0900 Hx of exposure to flu: Cont droplet precautions until 1/13.   Endo: mISS   Heme: Active T&S, Hgb >8   ppx: SCDs, SQL on POD#1  Lines: PIV [place]( /-)   Wounds: R Radial a-line (1/11-) -JPx2 in left flank-  x Bacitracin to wounds   PT/OT: Not Ordered   HPI:  69yo M w PMH of CAD (x2 stents Mar 2023), HLD, T2DM, hx of kidney stones, psoriasis who presented with an oral mass and underwent L hemimandibulectomy, SND L level 1-3, recon with L FFF, STSG, and placement of dental implants on 12/7. He had a trach which was subsequently decannulated. He returns 12/27 and 1/2/24 with surgical site infection now s/p OR for debridement and exploration. Trach replaced through prior stoma for pulmonary toilet. Patient subsequently went to OR on 1/11 for L latissimus dorsi free flap and tracheostomy exchange (7.5 portex). Procedure overall uncomplicated with no acute intraoperative events. SICU consulted for flap management and hemodynamic monitoring post procedure. Patient arrived to the unit in stable condition, nontachycardic, normotensive afebrile, however; tachypneic requiring full ventilation support.       2500 crystalloid, 3prbc, 500 albumin, uop 500, no pressors in case    Home Medications:  acetaminophen 650 mg q6, aspirin 81 mg qd, Colace 100 mg qd  ibuprofen 400 mg q6, Ilumya 100 mg/mL q3 months, Jardiance 10 mg qd, lidocaine 2% mucous membrane, Lipitor 40 mg qhs, metFORMIN 1000 mg qd metFORMIN 500 mg qd  nitroglycerin 0.4 mg q5mins  Plavix 75 mg qd potassium citrate 10 tid    PAST MEDICAL & SURGICAL HISTORY:  Neoplasm of mandible    ROS: See HPI    MEDICATIONS  (STANDING):  chlorhexidine 0.12% Liquid 15 milliLiter(s) Oral Mucosa every 12 hours  chlorhexidine 2% Cloths 1 Application(s) Topical daily  dexAMETHasone  IVPB 8 milliGRAM(s) IV Intermittent every 8 hours  dexMEDEtomidine Infusion 1 MICROgram(s)/kG/Hr (21.1 mL/Hr) IV Continuous <Continuous>  dextrose 5%. 1000 milliLiter(s) (50 mL/Hr) IV Continuous <Continuous>  dextrose 5%. 1000 milliLiter(s) (100 mL/Hr) IV Continuous <Continuous>  dextrose 50% Injectable 25 Gram(s) IV Push once  dextrose 50% Injectable 25 Gram(s) IV Push once  dextrose 50% Injectable 12.5 Gram(s) IV Push once  gabapentin 300 milliGRAM(s) Oral two times a day  glucagon  Injectable 1 milliGRAM(s) IntraMuscular once  imipenem/cilastatin  IVPB 1000 milliGRAM(s) IV Intermittent once  influenza  Vaccine (HIGH DOSE) 0.7 milliLiter(s) IntraMuscular once  insulin lispro (ADMELOG) corrective regimen sliding scale   SubCutaneous every 6 hours  lactated ringers. 1000 milliLiter(s) (110 mL/Hr) IV Continuous <Continuous>  pantoprazole   Suspension 40 milliGRAM(s) Oral daily  polyethylene glycol 3350 17 Gram(s) Oral daily    MEDICATIONS  (PRN):  dextrose Oral Gel 15 Gram(s) Oral once PRN Blood Glucose LESS THAN 70 milliGRAM(s)/deciliter  HYDROmorphone  Injectable 0.2 milliGRAM(s) IV Push every 4 hours PRN Moderate Pain (4 - 6)  HYDROmorphone  Injectable 0.5 milliGRAM(s) IV Push every 4 hours PRN Severe Pain (7 - 10)  ondansetron Injectable 4 milliGRAM(s) IV Push every 8 hours PRN Nausea and/or Vomiting    Allergies    No Known Allergies    Intolerances    SOCIAL HISTORY:  Smoke: Never Smoker  EtOH: occasional    FAMILY HISTORY:    Vital Signs Last 24 Hrs  T(C): 36.2 (11 Jan 2024 07:47), Max: 36.6 (10 Carlos 2024 22:09)  T(F): 97.1 (11 Jan 2024 05:40), Max: 97.8 (10 Carlos 2024 22:09)  HR: 58 (11 Jan 2024 07:47) (58 - 74)  BP: 155/79 (11 Jan 2024 07:47) (116/55 - 155/79)  BP(mean): 109 (11 Jan 2024 07:47) (79 - 109)  RR: 17 (11 Jan 2024 07:47) (16 - 17)  SpO2: 97% (11 Jan 2024 07:47) (97% - 99%)    Parameters below as of 11 Jan 2024 07:47    O2 Flow (L/min): 10  O2 Concentration (%): 40    PHYSICAL EXAM    Gen: NAD   Neuro: A&oX3 no neuro deficits-   HEENT: midline incision with stables from the mentonian process to thyroid cartilage. Open wound on left side of the neck covered in xeroform, no signs of active drainage- Tracheostomy in place, sutured with minimal amount of suctioning required - blood surrounding stoma- on AC mode- ventilated/ 12/510/5/40%  CV: RRR reg s1s2/ Bradycardia noted   Pulm: CTA B/L no w/w/r  Abd: Soft, NT/ND/ Gtube in place, bumper at 4cm, with no signs of infection or active bleeding/ Left flank flap donor site with staples, skin edges well approximated, with KRISTEN x2 (1. Superior portion of the wound, 2. inferior portion of the wound) with serosanguineous drainage present.   Ext: No C/C/E/ Extremities well perfused, warm - SCDs in place  Skin: No rashes erythema or ecchymosis  MSK: No joint swelling noted  Psych: Normal affect     LABS:                        10.6   7.78  )-----------( 184      ( 11 Jan 2024 16:24 )             30.8     01-11    143  |  106  |  21  ----------------------------<  113<H>  4.1   |  26  |  0.66    Ca    9.4      11 Jan 2024 05:30  Phos  3.1     01-11  Mg     1.8     01-11      PT/INR - ( 11 Jan 2024 05:30 )   PT: 11.4 sec;   INR: 1.00          PTT - ( 11 Jan 2024 05:30 )  PTT:30.4 sec  Urinalysis Basic - ( 11 Jan 2024 05:30 )    Color: x / Appearance: x / SG: x / pH: x  Gluc: 113 mg/dL / Ketone: x  / Bili: x / Urobili: x   Blood: x / Protein: x / Nitrite: x   Leuk Esterase: x / RBC: x / WBC x   Sq Epi: x / Non Sq Epi: x / Bacteria: x        RADIOLOGY & ADDITIONAL STUDIES:    69yo M w PMH of CAD (x2 stents Mar 2023), HLD, T2DM, hx of kidney stones, psoriasis who presented with an oral mass and underwent L hemimandibulectomy, SND L level 1-3, recon with L FFF, STSG, and placement of dental implants on 12/7. He had a trach which was subsequently decannulated. He returns 12/27/23 and 1/2/24 with surgical site infection now s/p OR for debridement and exploration. Trach replaced through prior stoma for pulmonary toilet. Now s/p L latissmus free flap to left transferred to SICU for hemodynamic and flap monitoring post procedure.     Neuro: Tylenol ATC, Gabapentin 300mg BID, PRN Oxycodone, decadron 8mg q8 x3 doses/ Precedex gtt started for sedation 1mcg/kg/ Hr to titrate as needed   HEENT: Flap checks q1h, No circumferential ties or collars, Head neutral, gentle suction in mouth, Peridex PO BID, No asa needed.   CV: HD stable, Maintain MAP > 60, Avoid pressors but will add Low dose Levo gtt if needed  Pulm: On vent (AC mode)- 12/510/5/40%  GI: Gtube in place with skin bumper at 4cm; can start tube feeds 24hr post procedure /  PPI, Senna, Miralax, MVI, PRN Zofran Protonix QD( will dc once TF started)   : Lugo remove post op. TOV @[place]  Holding home Vit D3  ID: 1g imipenem q8 (1/2-until 1/16), last 0900 Hx of exposure to flu: Cont droplet precautions until 1/13.   Endo: mISS   Heme: Active T&S, Hgb >8   ppx: SCDs, SQL on POD#1  Lines: PIV [place]( /-)   Wounds: R Radial a-line (1/11-) -JPx2 in left flank-  x Bacitracin to wounds   PT/OT: Not Ordered

## 2024-01-11 NOTE — PRE-ANESTHESIA EVALUATION ADULT - NSPROPOSEDPROCEDFT_GEN_ALL_CORE
neck exploration and wound debridement
left latissimus flap/oropharyngeal reconstruction/neck exploration/

## 2024-01-11 NOTE — PRE-OP CHECKLIST - SIDE RAILS UP
Name: Ana Pantoja  YOB: 1956  Gender: female  MRN: [id number]  Date of Encounter:  10/10/2023       CHIEF COMPLAINT:     Chief Complaint   Patient presents with    Follow-up     Left Shoulder        SUBJECTIVE/OBJECTIVE:      HPI:    Patient is a 78 y.o. pleasant female who presents today for an injection of the left shoulder. Recall hx:   She has had months of left shoulder pain. Pain she localizes to the anterior shoulder. If she tries to carry anything, including her purse. She has pain in her posterior shoulder, the bicep. She does not recall an injury, but she did start lifting wood with her  months ago. She denies neck pain or numbness. We performed a L[facility]J injection, which gave her good pain relief for around a month, then pain returned. She has pain with sleep the most, and lifting objects. There was no new injury. She cannot take oral NSAIDs and tylenol isnt helpful. She has tried diclofenac. She has no interest in surgery, however. History reviewed, unchanged from previous. PHYSICAL EXAMINATION:     Gen: Well-developed, no acute distress   HEENT: NC/AT, EOMI   Neck: Trachea midline, normal ROM   CV: Regular rhythm by palpation of distal pulse, normal capillary refill   Pulm: No respiratory distress, no stridor   Psychiatric: Well oriented, normal mood and affect. Skin: No rashes, lesions or ulcers, normal temperature, turgor, and texture on uninvolved extremity. Left Shoulder:      Inspection: No deformity or effusion  ROM: Active forward flexion 160, abduction 160, Internal rotation painful and limited to back pocket, External rotation symmetric 45  Tenderness: tenderness to the bicipital groove and lesser tuberosity, posterior cuff footprint  Strength: Abduction 5/5, External rotation 5/5, Internal rotation 4/5  Provocative tests: Negative Jobes, Coe, Neer.  Mild pain with belly-lift off, positive speeds, obriens painful  Normal capillary refill /
done
done

## 2024-01-11 NOTE — PRE-ANESTHESIA EVALUATION ADULT - NSANTHOSAYNRD_GEN_A_CORE
No. DIRK screening performed.  STOP BANG Legend: 0-2 = LOW Risk; 3-4 = INTERMEDIATE Risk; 5-8 = HIGH Risk
No. DIRK screening performed.  STOP BANG Legend: 0-2 = LOW Risk; 3-4 = INTERMEDIATE Risk; 5-8 = HIGH Risk

## 2024-01-11 NOTE — PROVIDER CONTACT NOTE (OTHER) - REASON
Dressing to neck soiled
Pt complaining of the packing make him nausea, coughing and can not breath
Used piston syringe possibly used by team for bedside irrigation
Pain Right arm around bicep area
Pt had a dark black bowel movement
Chest Discomfort

## 2024-01-11 NOTE — PROVIDER CONTACT NOTE (OTHER) - SITUATION
Dressing to neck soiled at pen oren drain site
Patient states he has a new onset of pain in the chest pain, denies shortness of breath, /55, HR 64,
Patient states their arm is swollen
Pt complaining of the packing make him nausea, coughing and can not breath, VSS, ENT team called, as per ENT to pull out ot the whole packing from his mouth, and he will go to OR in the morning.
Pt had a dark black bowel movement

## 2024-01-11 NOTE — CONSULT NOTE ADULT - ATTENDING COMMENTS
CAD, DM2, L hemimandibulectomy, SND L level 1-3, recon with L FFF, STSG, and placement of dental implants c/b wound infection now s/p tracheostomy reinsertion, neck exploration, debridement, latissimus dorsi free flap with postoperative respiratory failure  physical as above  failing CPAP, will continue AC for now  continue imipenem for Enterococcus, Enterobacter, E. coli  PEG feeds  flap checks hourly  trial of void

## 2024-01-11 NOTE — PRE-ANESTHESIA EVALUATION ADULT - LAST ECHOCARDIOGRAM
12/04/2023 normal LV size and function EF55%, mild RV dilation with normal systolic function, no significant valvular diseases
12/04/2023 normal LV size and function EF55%, mild RV dilation with normal systolic function, no significant valvular diseases

## 2024-01-11 NOTE — PROVIDER CONTACT NOTE (OTHER) - DATE AND TIME:
11-Jan-2024 03:21
28-Dec-2023 12:37
31-Dec-2023 11:50
02-Jan-2024 08:00
04-Jan-2024 16:12
04-Jan-2024 19:00

## 2024-01-12 LAB
ANION GAP SERPL CALC-SCNC: 9 MMOL/L — SIGNIFICANT CHANGE UP (ref 5–17)
ANION GAP SERPL CALC-SCNC: 9 MMOL/L — SIGNIFICANT CHANGE UP (ref 5–17)
BUN SERPL-MCNC: 15 MG/DL — SIGNIFICANT CHANGE UP (ref 7–23)
BUN SERPL-MCNC: 15 MG/DL — SIGNIFICANT CHANGE UP (ref 7–23)
CALCIUM SERPL-MCNC: 8.9 MG/DL — SIGNIFICANT CHANGE UP (ref 8.4–10.5)
CALCIUM SERPL-MCNC: 8.9 MG/DL — SIGNIFICANT CHANGE UP (ref 8.4–10.5)
CHLORIDE SERPL-SCNC: 104 MMOL/L — SIGNIFICANT CHANGE UP (ref 96–108)
CHLORIDE SERPL-SCNC: 104 MMOL/L — SIGNIFICANT CHANGE UP (ref 96–108)
CO2 SERPL-SCNC: 22 MMOL/L — SIGNIFICANT CHANGE UP (ref 22–31)
CO2 SERPL-SCNC: 22 MMOL/L — SIGNIFICANT CHANGE UP (ref 22–31)
CREAT SERPL-MCNC: 0.63 MG/DL — SIGNIFICANT CHANGE UP (ref 0.5–1.3)
CREAT SERPL-MCNC: 0.63 MG/DL — SIGNIFICANT CHANGE UP (ref 0.5–1.3)
EGFR: 104 ML/MIN/1.73M2 — SIGNIFICANT CHANGE UP
EGFR: 104 ML/MIN/1.73M2 — SIGNIFICANT CHANGE UP
GLUCOSE BLDC GLUCOMTR-MCNC: 110 MG/DL — HIGH (ref 70–99)
GLUCOSE BLDC GLUCOMTR-MCNC: 110 MG/DL — HIGH (ref 70–99)
GLUCOSE BLDC GLUCOMTR-MCNC: 151 MG/DL — HIGH (ref 70–99)
GLUCOSE BLDC GLUCOMTR-MCNC: 151 MG/DL — HIGH (ref 70–99)
GLUCOSE BLDC GLUCOMTR-MCNC: 163 MG/DL — HIGH (ref 70–99)
GLUCOSE BLDC GLUCOMTR-MCNC: 163 MG/DL — HIGH (ref 70–99)
GLUCOSE BLDC GLUCOMTR-MCNC: 176 MG/DL — HIGH (ref 70–99)
GLUCOSE BLDC GLUCOMTR-MCNC: 176 MG/DL — HIGH (ref 70–99)
GLUCOSE SERPL-MCNC: 188 MG/DL — HIGH (ref 70–99)
GLUCOSE SERPL-MCNC: 188 MG/DL — HIGH (ref 70–99)
HCT VFR BLD CALC: 32 % — LOW (ref 39–50)
HCT VFR BLD CALC: 32 % — LOW (ref 39–50)
HGB BLD-MCNC: 10.8 G/DL — LOW (ref 13–17)
HGB BLD-MCNC: 10.8 G/DL — LOW (ref 13–17)
MAGNESIUM SERPL-MCNC: 1.8 MG/DL — SIGNIFICANT CHANGE UP (ref 1.6–2.6)
MAGNESIUM SERPL-MCNC: 1.8 MG/DL — SIGNIFICANT CHANGE UP (ref 1.6–2.6)
MCHC RBC-ENTMCNC: 28.6 PG — SIGNIFICANT CHANGE UP (ref 27–34)
MCHC RBC-ENTMCNC: 28.6 PG — SIGNIFICANT CHANGE UP (ref 27–34)
MCHC RBC-ENTMCNC: 33.8 GM/DL — SIGNIFICANT CHANGE UP (ref 32–36)
MCHC RBC-ENTMCNC: 33.8 GM/DL — SIGNIFICANT CHANGE UP (ref 32–36)
MCV RBC AUTO: 84.7 FL — SIGNIFICANT CHANGE UP (ref 80–100)
MCV RBC AUTO: 84.7 FL — SIGNIFICANT CHANGE UP (ref 80–100)
NRBC # BLD: 0 /100 WBCS — SIGNIFICANT CHANGE UP (ref 0–0)
NRBC # BLD: 0 /100 WBCS — SIGNIFICANT CHANGE UP (ref 0–0)
PHOSPHATE SERPL-MCNC: 3.6 MG/DL — SIGNIFICANT CHANGE UP (ref 2.5–4.5)
PHOSPHATE SERPL-MCNC: 3.6 MG/DL — SIGNIFICANT CHANGE UP (ref 2.5–4.5)
PLATELET # BLD AUTO: 178 K/UL — SIGNIFICANT CHANGE UP (ref 150–400)
PLATELET # BLD AUTO: 178 K/UL — SIGNIFICANT CHANGE UP (ref 150–400)
POTASSIUM SERPL-MCNC: 4.5 MMOL/L — SIGNIFICANT CHANGE UP (ref 3.5–5.3)
POTASSIUM SERPL-MCNC: 4.5 MMOL/L — SIGNIFICANT CHANGE UP (ref 3.5–5.3)
POTASSIUM SERPL-SCNC: 4.5 MMOL/L — SIGNIFICANT CHANGE UP (ref 3.5–5.3)
POTASSIUM SERPL-SCNC: 4.5 MMOL/L — SIGNIFICANT CHANGE UP (ref 3.5–5.3)
RBC # BLD: 3.78 M/UL — LOW (ref 4.2–5.8)
RBC # BLD: 3.78 M/UL — LOW (ref 4.2–5.8)
RBC # FLD: 15.4 % — HIGH (ref 10.3–14.5)
RBC # FLD: 15.4 % — HIGH (ref 10.3–14.5)
SODIUM SERPL-SCNC: 135 MMOL/L — SIGNIFICANT CHANGE UP (ref 135–145)
SODIUM SERPL-SCNC: 135 MMOL/L — SIGNIFICANT CHANGE UP (ref 135–145)
SURGICAL PATHOLOGY STUDY: SIGNIFICANT CHANGE UP
SURGICAL PATHOLOGY STUDY: SIGNIFICANT CHANGE UP
WBC # BLD: 7.05 K/UL — SIGNIFICANT CHANGE UP (ref 3.8–10.5)
WBC # BLD: 7.05 K/UL — SIGNIFICANT CHANGE UP (ref 3.8–10.5)
WBC # FLD AUTO: 7.05 K/UL — SIGNIFICANT CHANGE UP (ref 3.8–10.5)
WBC # FLD AUTO: 7.05 K/UL — SIGNIFICANT CHANGE UP (ref 3.8–10.5)

## 2024-01-12 PROCEDURE — 99232 SBSQ HOSP IP/OBS MODERATE 35: CPT | Mod: GC

## 2024-01-12 RX ORDER — ENOXAPARIN SODIUM 100 MG/ML
40 INJECTION SUBCUTANEOUS EVERY 24 HOURS
Refills: 0 | Status: DISCONTINUED | OUTPATIENT
Start: 2024-01-12 | End: 2024-01-20

## 2024-01-12 RX ORDER — HYDROMORPHONE HYDROCHLORIDE 2 MG/ML
0.5 INJECTION INTRAMUSCULAR; INTRAVENOUS; SUBCUTANEOUS
Refills: 0 | Status: DISCONTINUED | OUTPATIENT
Start: 2024-01-12 | End: 2024-01-16

## 2024-01-12 RX ORDER — MAGNESIUM SULFATE 500 MG/ML
2 VIAL (ML) INJECTION ONCE
Refills: 0 | Status: COMPLETED | OUTPATIENT
Start: 2024-01-12 | End: 2024-01-12

## 2024-01-12 RX ORDER — MULTIVIT-MIN/FERROUS GLUCONATE 9 MG/15 ML
15 LIQUID (ML) ORAL DAILY
Refills: 0 | Status: DISCONTINUED | OUTPATIENT
Start: 2024-01-12 | End: 2024-01-20

## 2024-01-12 RX ORDER — CHLORHEXIDINE GLUCONATE 213 G/1000ML
15 SOLUTION TOPICAL
Refills: 0 | Status: DISCONTINUED | OUTPATIENT
Start: 2024-01-12 | End: 2024-01-20

## 2024-01-12 RX ORDER — DOXAZOSIN MESYLATE 4 MG
1 TABLET ORAL AT BEDTIME
Refills: 0 | Status: DISCONTINUED | OUTPATIENT
Start: 2024-01-12 | End: 2024-01-20

## 2024-01-12 RX ORDER — MAGNESIUM SULFATE 500 MG/ML
1 VIAL (ML) INJECTION ONCE
Refills: 0 | Status: DISCONTINUED | OUTPATIENT
Start: 2024-01-12 | End: 2024-01-12

## 2024-01-12 RX ORDER — DOXAZOSIN MESYLATE 4 MG
1 TABLET ORAL ONCE
Refills: 0 | Status: COMPLETED | OUTPATIENT
Start: 2024-01-12 | End: 2024-01-12

## 2024-01-12 RX ORDER — OXYCODONE HYDROCHLORIDE 5 MG/1
5 TABLET ORAL EVERY 6 HOURS
Refills: 0 | Status: DISCONTINUED | OUTPATIENT
Start: 2024-01-12 | End: 2024-01-19

## 2024-01-12 RX ORDER — BACITRACIN ZINC 500 UNIT/G
1 OINTMENT IN PACKET (EA) TOPICAL EVERY 12 HOURS
Refills: 0 | Status: DISCONTINUED | OUTPATIENT
Start: 2024-01-12 | End: 2024-01-20

## 2024-01-12 RX ORDER — OXYCODONE HYDROCHLORIDE 5 MG/1
10 TABLET ORAL EVERY 6 HOURS
Refills: 0 | Status: DISCONTINUED | OUTPATIENT
Start: 2024-01-12 | End: 2024-01-13

## 2024-01-12 RX ORDER — OXYCODONE HYDROCHLORIDE 5 MG/1
10 TABLET ORAL EVERY 6 HOURS
Refills: 0 | Status: DISCONTINUED | OUTPATIENT
Start: 2024-01-12 | End: 2024-01-12

## 2024-01-12 RX ADMIN — Medication 400 MILLIGRAM(S): at 07:15

## 2024-01-12 RX ADMIN — HYDROMORPHONE HYDROCHLORIDE 0.5 MILLIGRAM(S): 2 INJECTION INTRAMUSCULAR; INTRAVENOUS; SUBCUTANEOUS at 00:09

## 2024-01-12 RX ADMIN — IMIPENEM AND CILASTATIN 250 MILLIGRAM(S): 250; 250 INJECTION, POWDER, FOR SOLUTION INTRAVENOUS at 05:21

## 2024-01-12 RX ADMIN — Medication 1000 MILLIGRAM(S): at 14:30

## 2024-01-12 RX ADMIN — Medication 2: at 18:09

## 2024-01-12 RX ADMIN — POLYETHYLENE GLYCOL 3350 17 GRAM(S): 17 POWDER, FOR SOLUTION ORAL at 11:06

## 2024-01-12 RX ADMIN — Medication 400 MILLIGRAM(S): at 21:48

## 2024-01-12 RX ADMIN — Medication 1000 MILLIGRAM(S): at 22:03

## 2024-01-12 RX ADMIN — OXYCODONE HYDROCHLORIDE 5 MILLIGRAM(S): 5 TABLET ORAL at 22:43

## 2024-01-12 RX ADMIN — SENNA PLUS 1 TABLET(S): 8.6 TABLET ORAL at 11:06

## 2024-01-12 RX ADMIN — ENOXAPARIN SODIUM 40 MILLIGRAM(S): 100 INJECTION SUBCUTANEOUS at 11:06

## 2024-01-12 RX ADMIN — Medication 400 MILLIGRAM(S): at 14:12

## 2024-01-12 RX ADMIN — Medication 1 MILLIGRAM(S): at 21:47

## 2024-01-12 RX ADMIN — GABAPENTIN 300 MILLIGRAM(S): 400 CAPSULE ORAL at 05:22

## 2024-01-12 RX ADMIN — OXYCODONE HYDROCHLORIDE 5 MILLIGRAM(S): 5 TABLET ORAL at 23:13

## 2024-01-12 RX ADMIN — Medication 1 MILLIGRAM(S): at 08:53

## 2024-01-12 RX ADMIN — OXYCODONE HYDROCHLORIDE 10 MILLIGRAM(S): 5 TABLET ORAL at 07:15

## 2024-01-12 RX ADMIN — Medication 1 APPLICATION(S): at 17:45

## 2024-01-12 RX ADMIN — Medication 101.6 MILLIGRAM(S): at 05:10

## 2024-01-12 RX ADMIN — CHLORHEXIDINE GLUCONATE 1 APPLICATION(S): 213 SOLUTION TOPICAL at 11:07

## 2024-01-12 RX ADMIN — CHLORHEXIDINE GLUCONATE 15 MILLILITER(S): 213 SOLUTION TOPICAL at 17:45

## 2024-01-12 RX ADMIN — IMIPENEM AND CILASTATIN 250 MILLIGRAM(S): 250; 250 INJECTION, POWDER, FOR SOLUTION INTRAVENOUS at 21:32

## 2024-01-12 RX ADMIN — Medication 1000 MILLIGRAM(S): at 07:30

## 2024-01-12 RX ADMIN — Medication 25 GRAM(S): at 05:25

## 2024-01-12 RX ADMIN — Medication 15 MILLILITER(S): at 11:08

## 2024-01-12 RX ADMIN — IMIPENEM AND CILASTATIN 250 MILLIGRAM(S): 250; 250 INJECTION, POWDER, FOR SOLUTION INTRAVENOUS at 13:26

## 2024-01-12 RX ADMIN — GABAPENTIN 300 MILLIGRAM(S): 400 CAPSULE ORAL at 17:45

## 2024-01-12 RX ADMIN — OXYCODONE HYDROCHLORIDE 10 MILLIGRAM(S): 5 TABLET ORAL at 08:15

## 2024-01-12 RX ADMIN — Medication 2: at 05:36

## 2024-01-12 RX ADMIN — Medication 2: at 12:43

## 2024-01-12 NOTE — OCCUPATIONAL THERAPY INITIAL EVALUATION ADULT - MODALITIES TREATMENT COMMENTS
Pt able to perform sit<->stand and ambulation in room with CGAx1 using RW, initially demo unsteadiness, requiring Min-Mod A to correct. After initial unsteadiness, pt able to ambulate with CGAx1 t/o.

## 2024-01-12 NOTE — OCCUPATIONAL THERAPY INITIAL EVALUATION ADULT - MANUAL MUSCLE TESTING RESULTS, REHAB EVAL
Grossly assessed during functional mobility against gravity with pt presenting with 3+/5 or greater BUE/BLE strength (conservative resistance given 2/2 surgical precautions)

## 2024-01-12 NOTE — CHART NOTE - NSCHARTNOTEFT_GEN_A_CORE
Admitting Diagnosis:   Patient is a 68y old  Male who presents with a chief complaint of Surgical Site Infection (12 Jan 2024 07:57)      PAST MEDICAL & SURGICAL HISTORY:  Neoplasm of mandible          Current Nutrition Order: Vital 1.5 @150ml/hr x 9hrs [11am-8pm] via PEG; provides 1350ml total volume, 2025 kcals, 91.1g protein, 1031.4ml free water daily  + Banatrol BID [40 kcals each]  + 200ml FWF Q6hrs [800ml daily]    PO Intake: Good (%) [   ]  Fair (50-75%) [   ] Poor (<25%) [   ]- N/A    GI Issues: noted with diarrhea previously, noted some bloating/fullness    Pain: no pain/discomfort noted    Skin Integrity: trach collar, neck incision open, KRISTEN x 2 L axilla, cook doppler in place    Labs:   01-12    135  |  104  |  15  ----------------------------<  188<H>  4.5   |  22  |  0.63    Ca    8.9      12 Jan 2024 03:25  Phos  3.6     01-12  Mg     1.8     01-12    TPro  5.4<L>  /  Alb  3.0<L>  /  TBili  1.9<H>  /  DBili  0.4<H>  /  AST  16  /  ALT  13  /  AlkPhos  55  01-11    CAPILLARY BLOOD GLUCOSE      POCT Blood Glucose.: 176 mg/dL (12 Jan 2024 05:28)  POCT Blood Glucose.: 174 mg/dL (11 Jan 2024 23:25)  POCT Blood Glucose.: 123 mg/dL (11 Jan 2024 17:56)      Medications:  MEDICATIONS  (STANDING):  acetaminophen   IVPB .. 1000 milliGRAM(s) IV Intermittent every 8 hours  bacitracin   Ointment 1 Application(s) Topical every 12 hours  chlorhexidine 0.12% Liquid 15 milliLiter(s) Oral Mucosa two times a day  chlorhexidine 2% Cloths 1 Application(s) Topical daily  dextrose 5%. 1000 milliLiter(s) (100 mL/Hr) IV Continuous <Continuous>  dextrose 5%. 1000 milliLiter(s) (50 mL/Hr) IV Continuous <Continuous>  dextrose 50% Injectable 12.5 Gram(s) IV Push once  dextrose 50% Injectable 25 Gram(s) IV Push once  dextrose 50% Injectable 25 Gram(s) IV Push once  doxazosin 1 milliGRAM(s) Oral at bedtime  enoxaparin Injectable 40 milliGRAM(s) SubCutaneous every 24 hours  gabapentin Solution 300 milliGRAM(s) Oral every 12 hours  glucagon  Injectable 1 milliGRAM(s) IntraMuscular once  imipenem/cilastatin  IVPB 1000 milliGRAM(s) IV Intermittent every 8 hours  influenza  Vaccine (HIGH DOSE) 0.7 milliLiter(s) IntraMuscular once  insulin lispro (ADMELOG) corrective regimen sliding scale   SubCutaneous every 6 hours  multivitamin/minerals/iron Oral Solution (CENTRUM) 15 milliLiter(s) Oral daily  polyethylene glycol 3350 17 Gram(s) Oral daily  senna 1 Tablet(s) Oral daily    MEDICATIONS  (PRN):  dextrose Oral Gel 15 Gram(s) Oral once PRN Blood Glucose LESS THAN 70 milliGRAM(s)/deciliter  HYDROmorphone  Injectable 0.5 milliGRAM(s) IV Push every 3 hours PRN breakthrough pain  ondansetron Injectable 4 milliGRAM(s) IV Push every 8 hours PRN Nausea and/or Vomiting  oxyCODONE    Solution 10 milliGRAM(s) Enteral Tube every 6 hours PRN Severe Pain (7 - 10)  oxyCODONE    Solution 5 milliGRAM(s) Enteral Tube every 6 hours PRN Moderate Pain (4 - 6)      Weight: 84.2kg [11 Jan 2024]  Daily         Weight Change: No other weights obtained, recommend daily or weekly weights for trending & ensuring adequacy of nutrition provision.     IBW: 75.3kg  % IBW: 111.8%    Estimated energy needs:   Ideal body weight (75.3kg) used for calculations as pt >100% IBW and BMI <30 per [facility] Standards of Care. Needs estimated for age and adjusted for current clinical status, increased needs for post-op healing. Fluid needs per team*    Calories: 1882.5-2259 kcals based on 25-30 kcals/kg  Protein: 105.4-120.5g protein based on 1.4-1.6g protein/kg  *Fluid needs per team*    Subjective:   68M w PMH of CAD (x2 stents Mar 2023), HLD, T2DM, hx of kidney stones, psoriasis who presented with an oral mass and underwent L hemimandibulectomy, SND L level 1-3, recon with L FFF, STSG, and placement of dental implants on 12/7. He had a trach which was subsequently decannulated. He returns 12/27 with surgical site infection now s/p OR for debridement and exploration, trach replaced through prior stoma (1/2). Now s/p L latissimus dorsi free flap and tracheostomy exchange (7.5 portex) (1/11). Pt transferred to SICU for hemodynamic and flap monitoring post procedure.    Chart reviewed, discussed with team. Pt seen with wife at bedside on 5 EA s/p trach exchange 1/11. Afebrile. HR trending low, BP WNL. MAPs trending >65 mmHg, not requiring pressors.     Previous Nutrition Diagnosis:    Active [   ]  Resolved [   ]    If resolved, new PES:     Goal:    Recommendations:    Education:     Risk Level: High [   ] Moderate [   ] Low [   ] Admitting Diagnosis:   Patient is a 68y old  Male who presents with a chief complaint of Surgical Site Infection (12 Jan 2024 07:57)      PAST MEDICAL & SURGICAL HISTORY:  Neoplasm of mandible          Current Nutrition Order: Vital 1.5 @150ml/hr x 9hrs [11am-8pm] via PEG; provides 1350ml total volume, 2025 kcals, 91.1g protein, 1031.4ml free water daily  + Banatrol BID [40 kcals each]  + 200ml FWF Q6hrs [800ml daily]    PO Intake: Good (%) [   ]  Fair (50-75%) [   ] Poor (<25%) [   ]- N/A    GI Issues: noted with diarrhea previously, noted some bloating/fullness    Pain: no pain/discomfort noted    Skin Integrity: trach collar, neck incision open, KRISTEN x 2 L axilla, cook doppler in place    Labs:   01-12    135  |  104  |  15  ----------------------------<  188<H>  4.5   |  22  |  0.63    Ca    8.9      12 Jan 2024 03:25  Phos  3.6     01-12  Mg     1.8     01-12    TPro  5.4<L>  /  Alb  3.0<L>  /  TBili  1.9<H>  /  DBili  0.4<H>  /  AST  16  /  ALT  13  /  AlkPhos  55  01-11    CAPILLARY BLOOD GLUCOSE      POCT Blood Glucose.: 176 mg/dL (12 Jan 2024 05:28)  POCT Blood Glucose.: 174 mg/dL (11 Jan 2024 23:25)  POCT Blood Glucose.: 123 mg/dL (11 Jan 2024 17:56)      Medications:  MEDICATIONS  (STANDING):  acetaminophen   IVPB .. 1000 milliGRAM(s) IV Intermittent every 8 hours  bacitracin   Ointment 1 Application(s) Topical every 12 hours  chlorhexidine 0.12% Liquid 15 milliLiter(s) Oral Mucosa two times a day  chlorhexidine 2% Cloths 1 Application(s) Topical daily  dextrose 5%. 1000 milliLiter(s) (100 mL/Hr) IV Continuous <Continuous>  dextrose 5%. 1000 milliLiter(s) (50 mL/Hr) IV Continuous <Continuous>  dextrose 50% Injectable 12.5 Gram(s) IV Push once  dextrose 50% Injectable 25 Gram(s) IV Push once  dextrose 50% Injectable 25 Gram(s) IV Push once  doxazosin 1 milliGRAM(s) Oral at bedtime  enoxaparin Injectable 40 milliGRAM(s) SubCutaneous every 24 hours  gabapentin Solution 300 milliGRAM(s) Oral every 12 hours  glucagon  Injectable 1 milliGRAM(s) IntraMuscular once  imipenem/cilastatin  IVPB 1000 milliGRAM(s) IV Intermittent every 8 hours  influenza  Vaccine (HIGH DOSE) 0.7 milliLiter(s) IntraMuscular once  insulin lispro (ADMELOG) corrective regimen sliding scale   SubCutaneous every 6 hours  multivitamin/minerals/iron Oral Solution (CENTRUM) 15 milliLiter(s) Oral daily  polyethylene glycol 3350 17 Gram(s) Oral daily  senna 1 Tablet(s) Oral daily    MEDICATIONS  (PRN):  dextrose Oral Gel 15 Gram(s) Oral once PRN Blood Glucose LESS THAN 70 milliGRAM(s)/deciliter  HYDROmorphone  Injectable 0.5 milliGRAM(s) IV Push every 3 hours PRN breakthrough pain  ondansetron Injectable 4 milliGRAM(s) IV Push every 8 hours PRN Nausea and/or Vomiting  oxyCODONE    Solution 10 milliGRAM(s) Enteral Tube every 6 hours PRN Severe Pain (7 - 10)  oxyCODONE    Solution 5 milliGRAM(s) Enteral Tube every 6 hours PRN Moderate Pain (4 - 6)      Weight: 84.2kg [11 Jan 2024]  Daily         Weight Change: No other weights obtained, recommend daily or weekly weights for trending & ensuring adequacy of nutrition provision.     IBW: 75.3kg  % IBW: 111.8%    Estimated energy needs:   Ideal body weight (75.3kg) used for calculations as pt >100% IBW and BMI <30 per [facility] Standards of Care. Needs estimated for age and adjusted for current clinical status, increased needs for post-op healing. Fluid needs per team*    Calories: 1882.5-2259 kcals based on 25-30 kcals/kg  Protein: 105.4-120.5g protein based on 1.4-1.6g protein/kg  *Fluid needs per team*    Subjective:   68M w PMH of CAD (x2 stents Mar 2023), HLD, T2DM, hx of kidney stones, psoriasis who presented with an oral mass and underwent L hemimandibulectomy, SND L level 1-3, recon with L FFF, STSG, and placement of dental implants on 12/7. He had a trach which was subsequently decannulated. He returns 12/27 with surgical site infection now s/p OR for debridement and exploration, trach replaced through prior stoma (1/2). Now s/p L latissimus dorsi free flap and tracheostomy exchange (7.5 portex) (1/11). Pt transferred to SICU for hemodynamic and flap monitoring post procedure.    Chart reviewed, discussed with team. Pt seen with wife at bedside on 5 EA s/p trach exchange 1/11. Afebrile. HR trending low, BP WNL. MAPs trending >65 mmHg, not requiring pressors.     Previous Nutrition Diagnosis:    Active [   ]  Resolved [   ]    If resolved, new PES:     Goal:    Recommendations:    Education:     Risk Level: High [   ] Moderate [   ] Low [   ] Admitting Diagnosis:   Patient is a 68y old  Male who presents with a chief complaint of Surgical Site Infection (12 Jan 2024 07:57)      PAST MEDICAL & SURGICAL HISTORY:  Neoplasm of mandible          Current Nutrition Order: Vital 1.5 @150ml/hr x 9hrs [11am-8pm] via PEG; provides 1350ml total volume, 2025 kcals, 91.1g protein, 1031.4ml free water daily  + Banatrol BID [40 kcals each]  + 200ml FWF Q6hrs [800ml daily]    PO Intake: Good (%) [   ]  Fair (50-75%) [   ] Poor (<25%) [   ]- N/A    GI Issues: noted with diarrhea previously, noted some bloating/fullness    Pain: no pain/discomfort noted    Skin Integrity: trach collar, neck incision open, KRISTEN x 2 L axilla, cook doppler in place    Labs:   01-12    135  |  104  |  15  ----------------------------<  188<H>  4.5   |  22  |  0.63    Ca    8.9      12 Jan 2024 03:25  Phos  3.6     01-12  Mg     1.8     01-12    TPro  5.4<L>  /  Alb  3.0<L>  /  TBili  1.9<H>  /  DBili  0.4<H>  /  AST  16  /  ALT  13  /  AlkPhos  55  01-11    CAPILLARY BLOOD GLUCOSE      POCT Blood Glucose.: 176 mg/dL (12 Jan 2024 05:28)  POCT Blood Glucose.: 174 mg/dL (11 Jan 2024 23:25)  POCT Blood Glucose.: 123 mg/dL (11 Jan 2024 17:56)      Medications:  MEDICATIONS  (STANDING):  acetaminophen   IVPB .. 1000 milliGRAM(s) IV Intermittent every 8 hours  bacitracin   Ointment 1 Application(s) Topical every 12 hours  chlorhexidine 0.12% Liquid 15 milliLiter(s) Oral Mucosa two times a day  chlorhexidine 2% Cloths 1 Application(s) Topical daily  dextrose 5%. 1000 milliLiter(s) (100 mL/Hr) IV Continuous <Continuous>  dextrose 5%. 1000 milliLiter(s) (50 mL/Hr) IV Continuous <Continuous>  dextrose 50% Injectable 12.5 Gram(s) IV Push once  dextrose 50% Injectable 25 Gram(s) IV Push once  dextrose 50% Injectable 25 Gram(s) IV Push once  doxazosin 1 milliGRAM(s) Oral at bedtime  enoxaparin Injectable 40 milliGRAM(s) SubCutaneous every 24 hours  gabapentin Solution 300 milliGRAM(s) Oral every 12 hours  glucagon  Injectable 1 milliGRAM(s) IntraMuscular once  imipenem/cilastatin  IVPB 1000 milliGRAM(s) IV Intermittent every 8 hours  influenza  Vaccine (HIGH DOSE) 0.7 milliLiter(s) IntraMuscular once  insulin lispro (ADMELOG) corrective regimen sliding scale   SubCutaneous every 6 hours  multivitamin/minerals/iron Oral Solution (CENTRUM) 15 milliLiter(s) Oral daily  polyethylene glycol 3350 17 Gram(s) Oral daily  senna 1 Tablet(s) Oral daily    MEDICATIONS  (PRN):  dextrose Oral Gel 15 Gram(s) Oral once PRN Blood Glucose LESS THAN 70 milliGRAM(s)/deciliter  HYDROmorphone  Injectable 0.5 milliGRAM(s) IV Push every 3 hours PRN breakthrough pain  ondansetron Injectable 4 milliGRAM(s) IV Push every 8 hours PRN Nausea and/or Vomiting  oxyCODONE    Solution 10 milliGRAM(s) Enteral Tube every 6 hours PRN Severe Pain (7 - 10)  oxyCODONE    Solution 5 milliGRAM(s) Enteral Tube every 6 hours PRN Moderate Pain (4 - 6)      Weight: 84.2kg [11 Jan 2024]  Daily       Weight Change: No other weights obtained, recommend daily or weekly weights for trending & ensuring adequacy of nutrition provision.     IBW: 75.3kg  % IBW: 111.8%    Estimated energy needs:   Ideal body weight (75.3kg) used for calculations as pt >100% IBW and BMI <30 per [facility] Standards of Care. Needs estimated for age and adjusted for current clinical status, increased needs for post-op healing. Fluid needs per team*    Calories: 1882.5-2259 kcals based on 25-30 kcals/kg  Protein: 105.4-120.5g protein based on 1.4-1.6g protein/kg  *Fluid needs per team*    Subjective:   68M w PMH of CAD (x2 stents Mar 2023), HLD, T2DM, hx of kidney stones, psoriasis who presented with an oral mass and underwent L hemimandibulectomy, SND L level 1-3, recon with L FFF, STSG, and placement of dental implants on 12/7. He had a trach which was subsequently decannulated. He returns 12/27 with surgical site infection now s/p OR for debridement and exploration, trach replaced through prior stoma (1/2). Now s/p L latissimus dorsi free flap and tracheostomy exchange (7.5 portex) (1/11). Pt transferred to SICU for hemodynamic and flap monitoring post procedure.    Chart reviewed, discussed with team. Pt seen with wife at bedside on 5 EA s/p trach exchange 1/11. Afebrile. HR trending low, BP WNL. MAPs trending >65 mmHg, not requiring pressors. Pt communicating with white board and marker. Currently receiving Vital 1.5 @150ml/hr x 9hrs, reported diarrhea now improved. Occasional feelings of fullness reported, pt amenable to decrease rate of TF and provide over longer period of time. Still prefers TF provision given during the day only so as not to be attached to feeding overnight. Pt expressing concern over current blood sugars [fingersticks trending 114-174 mg/dL x 24hrs] & sugar content of TF formula. Education provided on glucose management & potential contributors of elevated glucose levels. Labs reviewed- Na 135 [monitor fluid/volume status], total bilirubin 1.9 <H>. Meds- on MVI, zofran prn, miralax, senna standing [d/c iso loose BM?]. RDN will continue to monitor, reassess, and intervene as appropriate.     Previous Nutrition Diagnosis: Inadequate Oral Intake related to decreased ability to consume sufficient energy as evidenced by reliance on enteral nutrition to meet 100% estimated nutrition needs    Active [ X  ]  Resolved [   ]    If resolved, new PES:     Goal:  Pt will meet at least 75% of protein & energy needs via most appropriate route for nutrition     Recommendations:  1. Recommend change to Vital 1.5 @105ml/hr x 12hrs [8am-8pm]; provides 1260ml total volume, 1890 kcals, 85.1g protein, 962.6ml free water daily  - recommend additional 1 LPS BID; provides 200 kcals, 30g protein  - total:     Education: carbs/blood sugars, EN provision, monitoring for s/s GI distress    Risk Level: High [ X  ] Moderate [   ] Low [   ] Admitting Diagnosis:   Patient is a 68y old  Male who presents with a chief complaint of Surgical Site Infection (12 Jan 2024 07:57)      PAST MEDICAL & SURGICAL HISTORY:  Neoplasm of mandible          Current Nutrition Order: Vital 1.5 @150ml/hr x 9hrs [11am-8pm] via PEG; provides 1350ml total volume, 2025 kcals, 91.1g protein, 1031.4ml free water daily  + Banatrol BID [40 kcals each]  + 200ml FWF Q6hrs [800ml daily]    PO Intake: Good (%) [   ]  Fair (50-75%) [   ] Poor (<25%) [   ]- N/A    GI Issues: noted with diarrhea previously, noted some bloating/fullness    Pain: no pain/discomfort noted    Skin Integrity: trach collar, neck incision open, KRISTEN x 2 L axilla, cook doppler in place    Labs:   01-12    135  |  104  |  15  ----------------------------<  188<H>  4.5   |  22  |  0.63    Ca    8.9      12 Jan 2024 03:25  Phos  3.6     01-12  Mg     1.8     01-12    TPro  5.4<L>  /  Alb  3.0<L>  /  TBili  1.9<H>  /  DBili  0.4<H>  /  AST  16  /  ALT  13  /  AlkPhos  55  01-11    CAPILLARY BLOOD GLUCOSE      POCT Blood Glucose.: 176 mg/dL (12 Jan 2024 05:28)  POCT Blood Glucose.: 174 mg/dL (11 Jan 2024 23:25)  POCT Blood Glucose.: 123 mg/dL (11 Jan 2024 17:56)      Medications:  MEDICATIONS  (STANDING):  acetaminophen   IVPB .. 1000 milliGRAM(s) IV Intermittent every 8 hours  bacitracin   Ointment 1 Application(s) Topical every 12 hours  chlorhexidine 0.12% Liquid 15 milliLiter(s) Oral Mucosa two times a day  chlorhexidine 2% Cloths 1 Application(s) Topical daily  dextrose 5%. 1000 milliLiter(s) (100 mL/Hr) IV Continuous <Continuous>  dextrose 5%. 1000 milliLiter(s) (50 mL/Hr) IV Continuous <Continuous>  dextrose 50% Injectable 12.5 Gram(s) IV Push once  dextrose 50% Injectable 25 Gram(s) IV Push once  dextrose 50% Injectable 25 Gram(s) IV Push once  doxazosin 1 milliGRAM(s) Oral at bedtime  enoxaparin Injectable 40 milliGRAM(s) SubCutaneous every 24 hours  gabapentin Solution 300 milliGRAM(s) Oral every 12 hours  glucagon  Injectable 1 milliGRAM(s) IntraMuscular once  imipenem/cilastatin  IVPB 1000 milliGRAM(s) IV Intermittent every 8 hours  influenza  Vaccine (HIGH DOSE) 0.7 milliLiter(s) IntraMuscular once  insulin lispro (ADMELOG) corrective regimen sliding scale   SubCutaneous every 6 hours  multivitamin/minerals/iron Oral Solution (CENTRUM) 15 milliLiter(s) Oral daily  polyethylene glycol 3350 17 Gram(s) Oral daily  senna 1 Tablet(s) Oral daily    MEDICATIONS  (PRN):  dextrose Oral Gel 15 Gram(s) Oral once PRN Blood Glucose LESS THAN 70 milliGRAM(s)/deciliter  HYDROmorphone  Injectable 0.5 milliGRAM(s) IV Push every 3 hours PRN breakthrough pain  ondansetron Injectable 4 milliGRAM(s) IV Push every 8 hours PRN Nausea and/or Vomiting  oxyCODONE    Solution 10 milliGRAM(s) Enteral Tube every 6 hours PRN Severe Pain (7 - 10)  oxyCODONE    Solution 5 milliGRAM(s) Enteral Tube every 6 hours PRN Moderate Pain (4 - 6)      Weight: 84.2kg [11 Jan 2024]  Daily       Weight Change: No other weights obtained, recommend daily or weekly weights for trending & ensuring adequacy of nutrition provision.     IBW: 75.3kg  % IBW: 111.8%    Estimated energy needs:   Ideal body weight (75.3kg) used for calculations as pt >100% IBW and BMI <30 per [facility] Standards of Care. Needs estimated for age and adjusted for current clinical status, increased needs for post-op healing. Fluid needs per team*    Calories: 1882.5-2259 kcals based on 25-30 kcals/kg  Protein: 105.4-120.5g protein based on 1.4-1.6g protein/kg  *Fluid needs per team*    Subjective:   68M w PMH of CAD (x2 stents Mar 2023), HLD, T2DM, hx of kidney stones, psoriasis who presented with an oral mass and underwent L hemimandibulectomy, SND L level 1-3, recon with L FFF, STSG, and placement of dental implants on 12/7. He had a trach which was subsequently decannulated. He returns 12/27 with surgical site infection now s/p OR for debridement and exploration, trach replaced through prior stoma (1/2). Now s/p L latissimus dorsi free flap and tracheostomy exchange (7.5 portex) (1/11). Pt transferred to SICU for hemodynamic and flap monitoring post procedure.    Chart reviewed, discussed with team. Pt seen with wife at bedside on 5 EA s/p trach exchange 1/11. Afebrile. HR trending low, BP WNL. MAPs trending >65 mmHg, not requiring pressors. Pt communicating with white board and marker. Currently receiving Vital 1.5 @150ml/hr x 9hrs, reported diarrhea now improved. Occasional feelings of fullness reported, pt amenable to decrease rate of TF and provide over longer period of time. Still prefers TF provision given during the day only so as not to be attached to feeding overnight. Pt expressing concern over current blood sugars [fingersticks trending 114-174 mg/dL x 24hrs] & sugar content of TF formula. Education provided on glucose management & potential contributors of elevated glucose levels. Labs reviewed- Na 135 [monitor fluid/volume status], total bilirubin 1.9 <H>. Meds- on MVI, zofran prn, miralax, senna standing [d/c iso loose BM?]. RDN will continue to monitor, reassess, and intervene as appropriate.     Previous Nutrition Diagnosis: Inadequate Oral Intake related to decreased ability to consume sufficient energy as evidenced by reliance on enteral nutrition to meet 100% estimated nutrition needs    Active [ X  ]  Resolved [   ]    If resolved, new PES:     Goal:  Pt will meet at least 75% of protein & energy needs via most appropriate route for nutrition     Recommendations:  1. Recommend change to Vital 1.5 @105ml/hr x 12hrs [8am-8pm]; provides 1260ml total volume, 1890 kcals, 85.1g protein, 962.6ml free water daily  - recommend additional 1 LPS BID; provides 200 kcals, 30g protein  - total:     Education: carbs/blood sugars, EN provision, monitoring for s/s GI distress    Risk Level: High [ X  ] Moderate [   ] Low [   ] Admitting Diagnosis:   Patient is a 68y old  Male who presents with a chief complaint of Surgical Site Infection (12 Jan 2024 07:57)      PAST MEDICAL & SURGICAL HISTORY:  Neoplasm of mandible          Current Nutrition Order: Vital 1.5 @150ml/hr x 9hrs [11am-8pm] via PEG; provides 1350ml total volume, 2025 kcals, 91.1g protein, 1031.4ml free water daily  + Banatrol BID [40 kcals each]  + 200ml FWF Q6hrs [800ml daily]    PO Intake: Good (%) [   ]  Fair (50-75%) [   ] Poor (<25%) [   ]- N/A    GI Issues: noted with diarrhea previously, noted some bloating/fullness    Pain: no pain/discomfort noted    Skin Integrity: trach collar, neck incision open, KRISTEN x 2 L axilla, cook doppler in place    Labs:   01-12    135  |  104  |  15  ----------------------------<  188<H>  4.5   |  22  |  0.63    Ca    8.9      12 Jan 2024 03:25  Phos  3.6     01-12  Mg     1.8     01-12    TPro  5.4<L>  /  Alb  3.0<L>  /  TBili  1.9<H>  /  DBili  0.4<H>  /  AST  16  /  ALT  13  /  AlkPhos  55  01-11    CAPILLARY BLOOD GLUCOSE      POCT Blood Glucose.: 176 mg/dL (12 Jan 2024 05:28)  POCT Blood Glucose.: 174 mg/dL (11 Jan 2024 23:25)  POCT Blood Glucose.: 123 mg/dL (11 Jan 2024 17:56)      Medications:  MEDICATIONS  (STANDING):  acetaminophen   IVPB .. 1000 milliGRAM(s) IV Intermittent every 8 hours  bacitracin   Ointment 1 Application(s) Topical every 12 hours  chlorhexidine 0.12% Liquid 15 milliLiter(s) Oral Mucosa two times a day  chlorhexidine 2% Cloths 1 Application(s) Topical daily  dextrose 5%. 1000 milliLiter(s) (100 mL/Hr) IV Continuous <Continuous>  dextrose 5%. 1000 milliLiter(s) (50 mL/Hr) IV Continuous <Continuous>  dextrose 50% Injectable 12.5 Gram(s) IV Push once  dextrose 50% Injectable 25 Gram(s) IV Push once  dextrose 50% Injectable 25 Gram(s) IV Push once  doxazosin 1 milliGRAM(s) Oral at bedtime  enoxaparin Injectable 40 milliGRAM(s) SubCutaneous every 24 hours  gabapentin Solution 300 milliGRAM(s) Oral every 12 hours  glucagon  Injectable 1 milliGRAM(s) IntraMuscular once  imipenem/cilastatin  IVPB 1000 milliGRAM(s) IV Intermittent every 8 hours  influenza  Vaccine (HIGH DOSE) 0.7 milliLiter(s) IntraMuscular once  insulin lispro (ADMELOG) corrective regimen sliding scale   SubCutaneous every 6 hours  multivitamin/minerals/iron Oral Solution (CENTRUM) 15 milliLiter(s) Oral daily  polyethylene glycol 3350 17 Gram(s) Oral daily  senna 1 Tablet(s) Oral daily    MEDICATIONS  (PRN):  dextrose Oral Gel 15 Gram(s) Oral once PRN Blood Glucose LESS THAN 70 milliGRAM(s)/deciliter  HYDROmorphone  Injectable 0.5 milliGRAM(s) IV Push every 3 hours PRN breakthrough pain  ondansetron Injectable 4 milliGRAM(s) IV Push every 8 hours PRN Nausea and/or Vomiting  oxyCODONE    Solution 10 milliGRAM(s) Enteral Tube every 6 hours PRN Severe Pain (7 - 10)  oxyCODONE    Solution 5 milliGRAM(s) Enteral Tube every 6 hours PRN Moderate Pain (4 - 6)      Weight: 84.2kg [11 Jan 2024]  Daily       Weight Change: No other weights obtained, recommend daily or weekly weights for trending & ensuring adequacy of nutrition provision.     IBW: 75.3kg  % IBW: 111.8%    Estimated energy needs:   Ideal body weight (75.3kg) used for calculations as pt >100% IBW and BMI <30 per [facility] Standards of Care. Needs estimated for age and adjusted for current clinical status, increased needs for post-op healing. Fluid needs per team*    Calories: 1882.5-2259 kcals based on 25-30 kcals/kg  Protein: 105.4-120.5g protein based on 1.4-1.6g protein/kg  *Fluid needs per team*    Subjective:   68M w PMH of CAD (x2 stents Mar 2023), HLD, T2DM, hx of kidney stones, psoriasis who presented with an oral mass and underwent L hemimandibulectomy, SND L level 1-3, recon with L FFF, STSG, and placement of dental implants on 12/7. He had a trach which was subsequently decannulated. He returns 12/27 with surgical site infection now s/p OR for debridement and exploration, trach replaced through prior stoma (1/2). Now s/p L latissimus dorsi free flap and tracheostomy exchange (7.5 portex) (1/11). Pt transferred to SICU for hemodynamic and flap monitoring post procedure.    Chart reviewed, discussed with team. Pt seen with wife at bedside on 5 EA s/p trach exchange 1/11. Afebrile. HR trending low, BP WNL. MAPs trending >65 mmHg, not requiring pressors. Pt communicating with white board and marker. Currently receiving Vital 1.5 @150ml/hr x 9hrs, reported diarrhea now improved. Occasional feelings of fullness reported, pt amenable to decrease rate of TF and provide over longer period of time. Still prefers TF provision given during the day only so as not to be attached to feeding overnight. Pt expressing concern over current blood sugars [fingersticks trending 114-174 mg/dL x 24hrs] & sugar content of TF formula. Education provided on glucose management & potential contributors of elevated glucose levels. Labs reviewed- Na 135 [monitor fluid/volume status], total bilirubin 1.9 <H>. Meds- on MVI, zofran prn, miralax, senna standing [d/c iso loose BM?]. RDN will continue to monitor, reassess, and intervene as appropriate.     Previous Nutrition Diagnosis: Inadequate Oral Intake related to decreased ability to consume sufficient energy as evidenced by reliance on enteral nutrition to meet 100% estimated nutrition needs    Active [ X  ]  Resolved [   ]    If resolved, new PES:     Goal:  Pt will meet at least 75% of protein & energy needs via most appropriate route for nutrition     Recommendations:  1. Recommend change to Vital 1.5 @105ml/hr x 12hrs [8am-8pm]; provides 1260ml total volume, 1890 kcals, 85.1g protein, 962.6ml free water daily  - recommend additional 1 LPS BID; provides 200 kcals, 30g protein  - total: 2090 kcals, 115.1g protein [27.8 kcals/kg, 21.6 non-protein kcals/kg, 1.53g protein/kg IBW]  - close monitoring of tolerance, monitor for s/s GI intolerance  2. Free water flushes per team  3. GI  - consider bowel regimen prn from standing order due to hx diarrhea  - c/w Banatrol BID  4. Monitor chemistry, GI function, skin integrity  5. Pain regimen per team  6. Ongoing education as indicated  7. RDN to continue to follow clinical course and adjust recommendations prn    Education: carbs/blood sugars, EN provision, monitoring for s/s GI distress    Risk Level: High [ X  ] Moderate [   ] Low [   ] Admitting Diagnosis:   Patient is a 68y old  Male who presents with a chief complaint of Surgical Site Infection (12 Jan 2024 07:57)      PAST MEDICAL & SURGICAL HISTORY:  Neoplasm of mandible          Current Nutrition Order: Vital 1.5 @150ml/hr x 9hrs [11am-8pm] via PEG; provides 1350ml total volume, 2025 kcals, 91.1g protein, 1031.4ml free water daily  + Banatrol BID [40 kcals each]  + 200ml FWF Q6hrs [800ml daily]    PO Intake: Good (%) [   ]  Fair (50-75%) [   ] Poor (<25%) [   ]- N/A    GI Issues: noted with diarrhea previously, noted some bloating/fullness    Pain: no pain/discomfort noted    Skin Integrity: trach collar, neck incision open, KRISTEN x 2 L axilla, cook doppler in place    Labs:   01-12    135  |  104  |  15  ----------------------------<  188<H>  4.5   |  22  |  0.63    Ca    8.9      12 Jan 2024 03:25  Phos  3.6     01-12  Mg     1.8     01-12    TPro  5.4<L>  /  Alb  3.0<L>  /  TBili  1.9<H>  /  DBili  0.4<H>  /  AST  16  /  ALT  13  /  AlkPhos  55  01-11    CAPILLARY BLOOD GLUCOSE      POCT Blood Glucose.: 176 mg/dL (12 Jan 2024 05:28)  POCT Blood Glucose.: 174 mg/dL (11 Jan 2024 23:25)  POCT Blood Glucose.: 123 mg/dL (11 Jan 2024 17:56)      Medications:  MEDICATIONS  (STANDING):  acetaminophen   IVPB .. 1000 milliGRAM(s) IV Intermittent every 8 hours  bacitracin   Ointment 1 Application(s) Topical every 12 hours  chlorhexidine 0.12% Liquid 15 milliLiter(s) Oral Mucosa two times a day  chlorhexidine 2% Cloths 1 Application(s) Topical daily  dextrose 5%. 1000 milliLiter(s) (100 mL/Hr) IV Continuous <Continuous>  dextrose 5%. 1000 milliLiter(s) (50 mL/Hr) IV Continuous <Continuous>  dextrose 50% Injectable 12.5 Gram(s) IV Push once  dextrose 50% Injectable 25 Gram(s) IV Push once  dextrose 50% Injectable 25 Gram(s) IV Push once  doxazosin 1 milliGRAM(s) Oral at bedtime  enoxaparin Injectable 40 milliGRAM(s) SubCutaneous every 24 hours  gabapentin Solution 300 milliGRAM(s) Oral every 12 hours  glucagon  Injectable 1 milliGRAM(s) IntraMuscular once  imipenem/cilastatin  IVPB 1000 milliGRAM(s) IV Intermittent every 8 hours  influenza  Vaccine (HIGH DOSE) 0.7 milliLiter(s) IntraMuscular once  insulin lispro (ADMELOG) corrective regimen sliding scale   SubCutaneous every 6 hours  multivitamin/minerals/iron Oral Solution (CENTRUM) 15 milliLiter(s) Oral daily  polyethylene glycol 3350 17 Gram(s) Oral daily  senna 1 Tablet(s) Oral daily    MEDICATIONS  (PRN):  dextrose Oral Gel 15 Gram(s) Oral once PRN Blood Glucose LESS THAN 70 milliGRAM(s)/deciliter  HYDROmorphone  Injectable 0.5 milliGRAM(s) IV Push every 3 hours PRN breakthrough pain  ondansetron Injectable 4 milliGRAM(s) IV Push every 8 hours PRN Nausea and/or Vomiting  oxyCODONE    Solution 10 milliGRAM(s) Enteral Tube every 6 hours PRN Severe Pain (7 - 10)  oxyCODONE    Solution 5 milliGRAM(s) Enteral Tube every 6 hours PRN Moderate Pain (4 - 6)      Weight: 84.2kg [11 Jan 2024]  Daily       Weight Change: No other weights obtained, recommend daily or weekly weights for trending & ensuring adequacy of nutrition provision.     IBW: 75.3kg  % IBW: 111.8%    Estimated energy needs:   Ideal body weight (75.3kg) used for calculations as pt >100% IBW and BMI <30 per [facility] Standards of Care. Needs estimated for age and adjusted for current clinical status, increased needs for post-op healing. Fluid needs per team*    Calories: 1882.5-2259 kcals based on 25-30 kcals/kg  Protein: 105.4-120.5g protein based on 1.4-1.6g protein/kg  *Fluid needs per team*    Subjective:   68M w PMH of CAD (x2 stents Mar 2023), HLD, T2DM, hx of kidney stones, psoriasis who presented with an oral mass and underwent L hemimandibulectomy, SND L level 1-3, recon with L FFF, STSG, and placement of dental implants on 12/7. He had a trach which was subsequently decannulated. He returns 12/27 with surgical site infection now s/p OR for debridement and exploration, trach replaced through prior stoma (1/2). Now s/p L latissimus dorsi free flap and tracheostomy exchange (7.5 portex) (1/11). Pt transferred to SICU for hemodynamic and flap monitoring post procedure.    Chart reviewed, discussed with team. Pt seen with wife at bedside on 5 EA s/p trach exchange 1/11. Afebrile. HR trending low, BP WNL. MAPs trending >65 mmHg, not requiring pressors. Pt communicating with white board and marker. Currently receiving Vital 1.5 @150ml/hr x 9hrs, reported diarrhea now improved. Occasional feelings of fullness reported, pt amenable to decrease rate of TF and provide over longer period of time. Still prefers TF provision given during the day only so as not to be attached to feeding overnight. Pt expressing concern over current blood sugars [fingersticks trending 114-174 mg/dL x 24hrs] & sugar content of TF formula. Education provided on glucose management & potential contributors of elevated glucose levels. Labs reviewed- Na 135 [monitor fluid/volume status], total bilirubin 1.9 <H>. Meds- on MVI, zofran prn, miralax, senna standing [d/c iso loose BM?]. RDN will continue to monitor, reassess, and intervene as appropriate.     Previous Nutrition Diagnosis: Inadequate Oral Intake related to decreased ability to consume sufficient energy as evidenced by reliance on enteral nutrition to meet 100% estimated nutrition needs    Active [ X  ]  Resolved [   ]    If resolved, new PES:     Goal:  Pt will meet at least 75% of protein & energy needs via most appropriate route for nutrition     Recommendations:  1. Recommend change to Vital 1.5 @105ml/hr x 12hrs [8am-8pm]; provides 1260ml total volume, 1890 kcals, 85.1g protein, 962.6ml free water daily  - recommend additional 1 LPS BID; provides 200 kcals, 30g protein  - total: 2090 kcals, 115.1g protein [27.8 kcals/kg, 21.6 non-protein kcals/kg, 1.53g protein/kg IBW]  - close monitoring of tolerance, monitor for s/s GI intolerance  2. Free water flushes per team  3. GI  - consider bowel regimen prn from standing order due to hx diarrhea  - c/w Banatrol BID  4. Monitor chemistry, GI function, skin integrity  5. Pain regimen per team  6. Ongoing education as indicated  7. RDN to continue to follow clinical course and adjust recommendations prn    Education: carbs/blood sugars, EN provision, monitoring for s/s GI distress    Risk Level: High [ X  ] Moderate [   ] Low [   ]

## 2024-01-12 NOTE — OCCUPATIONAL THERAPY INITIAL EVALUATION ADULT - GENERAL OBSERVATIONS, REHAB EVAL
OT IE completed. Orders received, chart reviewed, pt cleared for OT by KALI Quiroz. Pt received semi sitting in chair, +Lats flap incision staples C/D/I, +L thigh bandage C/D/I, +L neck bandage C/D/I, +internal doppler, +PEG (feeds held,) +trach collar (FiO2 40%,) +telemetry, +pulse ox, +young, +heplock, NAD. Pt A&Ox4, agreeable to OT, and tolerated session well.

## 2024-01-12 NOTE — PROGRESS NOTE ADULT - NS ATTEND OPT1A GEN_ALL_CORE
Physical Therapy Visit    Visit Type: Daily Treatment Note  Visit: 5  Referring Provider: BRADLEY Everett  Medical Diagnosis (from order): Diagnosis Information    Diagnosis  R06.02 (ICD-10-CM) - SOB (shortness of breath)  S22.41XA (ICD-10-CM) - Closed fracture of four ribs of right side, initial encounter  Z74.09, Z78.9 (ICD-10-CM) - Impaired mobility and ADLs  Z74.09 (ICD-10-CM) - Impaired functional mobility, balance, gait, and endurance  R53.1 (ICD-10-CM) - Generalized weakness         SUBJECTIVE                                                                                                               He wants to work on his leg strength. He can do 2-3 steps now.  Functional Change: Less pain in the morning, increased strength    Pain / Symptoms  - Pain rating (out of 10): Current: 2       OBJECTIVE                                                                                                                                       Treatment     Therapeutic Exercise  NuStep x5 minutes, 40 seconds (seat: 13, resistance: 3) -  Legs and arms, 1 lap around track  Squats with upper extremity support x10 reps  Marching standing x20 reps  Standing heel raises  Side stepping in parallel bars with yellow band around ankles - cues to keep toes forward  Forward, backward stepping with yellow band around ankles  3 way hip exercise, yellow band around ankles  Step ups with 2 rails x10 reps each - increased difficulty on left   Leg press (seat: 2, resistance: 3)  Standing calf stretch on step  Seated hamstring stretch    Neuromuscular Re-Education  Balance board A/P, M/L  Tandem stance x60 seconds bilaterally  Step taps alternating in parallel bars against block - dynamic balance  Side stepping with green band + pallof press    Skilled input: verbal instruction/cues and as detailed above    Writer verbally educated and received verbal consent for hand placement, positioning of patient, and techniques to be performed  today from patient for clothing adjustments for techniques, hand placement and palpation for techniques, therapist position for techniques and modality application as described above and how they are pertinent to the patient's plan of care.  Home Exercise Program  Access Code: [email or web address]  URL: [email or web address]  Date: 09/27/2023  Prepared by: Priscilla Mata    Exercises  - Seated Hamstring Stretch  - 2 x daily - 7 x weekly - 1 sets - 5 reps - 15 hold  - Sit to Stand with Armchair  - 2 x daily - 7 x weekly - 1 sets - 10 reps - 1 hold  - Mini Squat with Counter Support  - 2 x daily - 7 x weekly - 1 sets - 10 reps - 1 hold  - Standing March with Counter Support  - 2 x daily - 7 x weekly - 10 reps  Forward step ups      ASSESSMENT                                                                                                            Patient performed additional hip/core strengthening exercises and dynamic balance activities today. Patient exhibited difficulty on the dynamic balance exercise as noted by him losing his balance and knocking the block over two times. Will continue to advance his exercises as tolerated.   Pain/symptoms after session (out of 10): 2  Education:   - Results of above outlined education: Verbalizes understanding, Demonstrates understanding and Needs reinforcement    PLAN                                                                                                                           Suggestions for next session as indicated: Progress per plan of care, lower extremity strengthening, functional tolerance        Therapy procedure time and total treatment time can be found documented on the Time Entry flowsheet     History/Exam/Medical decision making

## 2024-01-12 NOTE — OCCUPATIONAL THERAPY INITIAL EVALUATION ADULT - ADDITIONAL COMMENTS
PTA, pt performing all ADLs/mobility independently, with use of RW for ambulation, shower chair inside walk-in shower, and grab bars near elevated toilet seat.

## 2024-01-12 NOTE — PROGRESS NOTE ADULT - NS ATTEND AMEND GEN_ALL_CORE FT
CAD, DM2, L hemimandibulectomy, SND L level 1-3, recon with L FFF, STSG, and placement of dental implants on 12/7, c/b wound infection requiring neck exploration and debridement, tracheostomy and latissimus dorsi free flap  physical as above  tolerating trach collar  continue flap checks  continue imipenem  start cardura and retry TOV tomorrow  mobilize  rest as above

## 2024-01-12 NOTE — PROGRESS NOTE ADULT - ASSESSMENT
SAUNDRA HOLMAN is a 67yo M w PMH of CAD (x2 stents Mar 2023), HLD, T2DM, hx of kidney stones, psoriasis who presented with an oral mass and underwent L hemimandibulectomy, SND L level 1-3, recon with L FFF, STSG, and placement of dental implants on 12/7. He had a trach which was subsequently decannulated. Now s/p OR for debridement and exploration. Trach replaced through prior stoma for pulmonary toilet. Pt with reported intermittent tremors 1/7, electrolytes normal.     Plan:  - ERAS protocol POD 1  - Restart lovenox  - DC young today  - DC a line  - OOBTC  - Hgb goal > 9.0   - C/w Imipenem (1/1 - 1/16)   - Appreciate  IM recs, and ID recs  - C/w TF  - Hold home plavix  - Maintain 7.5 portex for pulm toilet  - ISS  - Pain control  - Home meds  - Bowel regimen  - Remainder per SICU team   SAUNDRA HOLMAN is a 69yo M w PMH of CAD (x2 stents Mar 2023), HLD, T2DM, hx of kidney stones, psoriasis who presented with an oral mass and underwent L hemimandibulectomy, SND L level 1-3, recon with L FFF, STSG, and placement of dental implants on 12/7. He had a trach which was subsequently decannulated. Now s/p OR for debridement and exploration. Trach replaced through prior stoma for pulmonary toilet. Pt with reported intermittent tremors 1/7, electrolytes normal.     Plan:  - ERAS protocol POD 1  - Restart lovenox  - DC young today  - DC a line  - OOBTC  - Hgb goal > 9.0   - C/w Imipenem (1/1 - 1/16)   - Appreciate  IM recs, and ID recs  - C/w TF  - Hold home plavix  - Maintain 7.5 portex for pulm toilet  - ISS  - Pain control  - Home meds  - Bowel regimen  - Remainder per SICU team

## 2024-01-12 NOTE — OCCUPATIONAL THERAPY INITIAL EVALUATION ADULT - PERTINENT HX OF CURRENT PROBLEM, REHAB EVAL
68M w PMH of CAD (x2 stents Mar 2023), HLD, T2DM, hx of kidney stones, psoriasis who presented with an oral mass and underwent L hemimandibulectomy, SND L level 1-3, recon with L FFF, STSG, and placement of dental implants on 12/7. He had a trach which was subsequently decannulated. He returns 12/27 with surgical site infection now s/p OR for debridement and exploration, trach replaced through prior stoma (1/2). Now s/p L latissimus dorsi free flap and tracheostomy exchange (7.5 portex) (1/11). Pt transferred to SICU for hemodynamic and flap monitoring post procedure.

## 2024-01-12 NOTE — OCCUPATIONAL THERAPY INITIAL EVALUATION ADULT - LEVEL OF INDEPENDENCE: DRESS UPPER BODY, OT EVAL
don/doff back gown, requiring CGAx1 2/2 surgical precautions impacting functional reach./contact guard

## 2024-01-12 NOTE — PROGRESS NOTE ADULT - ASSESSMENT
68M w PMH of CAD (x2 stents Mar 2023), HLD, T2DM, hx of kidney stones, psoriasis who presented with an oral mass and underwent L hemimandibulectomy, SND L level 1-3, recon with L FFF, STSG, and placement of dental implants on 12/7. He had a trach which was subsequently decannulated. He returns 12/27 with surgical site infection now s/p OR for debridement and exploration, trach replaced through prior stoma (1/2). Now s/p L latissimus dorsi free flap and tracheostomy exchange (7.5 portex) (1/11). Pt transferred to SICU for hemodynamic and flap monitoring post procedure.    Neuro: Tylenol ATC, Gabapentin 300mg BID, PRN Oxycodone. Nausea: Zofran PRN. Decadron 8mg q8 x3 doses.  HEENT: Flap checks q1h until 5pm, No circumferential ties or collars, Head neutral, gentle suction in mouth, Peridex PO BID, No asa needed. Neck incision open   CV: HD stable, Maintain MAP > 65, TTE (12/4) EF 55%. Known asymptomatic bradycardia.  Pulm: Trach exchange on 1/11: cuffed Portex 7.5, now on trach collar.    GI: G-tube, restart Vital 1.5 intermittent feeds goal 150cc for 9hrs, Banatrol. Senna, Miralax, MVI.  : Holding home Vit D3. Urinary retention: failed TOV, young replaced 1/12, added Cardura, (prior admissions required flomax), will ambulate today.  ID: 1g imipenem q8 until 1/16. Hx of exposure to flu: Cont droplet precautions until 1/13.   Endo: mISS   Heme: Active T&S, Hgb >8   ppx: SCDs, SQL   Lines: PIV [place] (1/11--)   Wounds: KRISTEN x2 in left chest wall, Bacitracin to wounds, Neck incision open - dressing changes daily   PT/OT: Ordered  DISPO: SICU 68M w PMH of CAD (x2 stents Mar 2023), HLD, T2DM, hx of kidney stones, psoriasis who presented with an oral mass and underwent L hemimandibulectomy, SND L level 1-3, recon with L FFF, STSG, and placement of dental implants on 12/7. He had a trach which was subsequently decannulated. He returns 12/27 with surgical site infection now s/p OR for debridement and exploration, trach replaced through prior stoma (1/2). Now s/p L latissimus dorsi free flap and tracheostomy exchange (7.5 portex) (1/11). Pt transferred to SICU for hemodynamic and flap monitoring post procedure.    Neuro: Tylenol ATC, Gabapentin 300mg BID, PRN Oxycodone. Nausea: Zofran PRN. Decadron 8mg q8 x3 doses.  HEENT: Flap checks q1h until 5pm, No circumferential ties or collars, Head neutral, gentle suction in mouth, Peridex PO BID, No asa needed. Neck incision open   CV: HD stable, Maintain MAP > 65, TTE (12/4) EF 55%. Known asymptomatic bradycardia.  Pulm: Trach exchange on 1/11: cuffed Portex 7.5, now on trach collar.    GI: G-tube, restart Vital 1.5 intermittent feeds goal 150cc for 9hrs, Banatrol. Senna, Miralax, MVI.  : Holding home Vit D3. Urinary retention: failed TOV, young replaced 1/12, added Cardura, (prior admissions required flomax), will ambulate today.  ID: 1g imipenem q8 until 1/16. Hx of exposure to flu: Cont droplet precautions until 1/13.   Endo: mISS   Heme: Active T&S, Hgb >8   ppx: SCDs, SQL   Lines: PIV [place] (1/11--)   Wounds: KRISTEN x2 in left chest wall, Bacitracin to wounds, Neck incision open - dressing changes daily   PT/OT: Ordered  DISPO: SICU

## 2024-01-12 NOTE — PROGRESS NOTE ADULT - SUBJECTIVE AND OBJECTIVE BOX
OTOLARYNGOLOGY (ENT) PROGRESS NOTE    PATIENT: SAUNDRA HOLMAN  MRN: [id number]  : 57  [id number]  DATE OF SERVICE:  24  			         ID:SAUNDRA HOLMAN is a 69yo M w PMH of CAD (x2 stents Mar 2023), HLD, T2DM, hx of kidney stones, psoriasis who presented with an oral mass and underwent L hemimandibulectomy, SND L level 1-3, recon with L FFF, STSG, and placement of dental implants on . He had a trach which was subsequently decannulated. He returns  with surgical site infection now s/p OR for debridement and exploration. Trach replaced through prior stoma for pulmonary toilet.     Subjective/ Interval:   ; Patient seen this morning, oral pack to be changed, penrose dressing changed. Intraoral flap with partal skin necrosis, 7.5 portex in place with cuff deflated   : Patient seen and examined at bedside. NAEO. Patient remains afebrile and HD stable. HgB noted to drop to 7.6 from 9.2 but no additional episodes of melena. Penrose draining purulent material. Intra-oral infection/flap stable. Packing changed at bedside. No other complaints or changes at this time. ID recommended Vanc/zosyn and DC unasyn and flagyl, and IM recommended reticulocyte studies and adding folate supplements. No other changes at this time.   : AFVSS. No acute events overnight. Patient seen and examined at bedside. C/w Vanc/Zosyn per ID recs, pending sensitivities. Growing staph epi, E coli, enterobacter from wound cx on . S/p 2U PRBC yday w appropriate response in Hgb. Transfusion goal > 9.0.  : AFVSS. No acute events overnight. Patient seen and examined at bedside. C/w Vanc/Zosyn per ID recs, pending sens. Hgb stable this morning.  : AFVSS. No acute events overnight. Patient seen and examined at bedside. C/w Vanc/Zosyn, e faecalis sens to vanc/zosyn. Had 3 dark BM's yesterday that were stool guiac positive.  : AFVSS. No acute events overnight. Patient seen and examined at bedside. C/w Imipenem (changed per ID, enterobacter resistant to Zosyn). Plan for OR today for further washout / debridement.  1/3: AFVSS. No acute events overnight. Patient seen and examined at bedside. C/w imipenim. OR cx growing numerous gram negative rods and few gram positive cocci in pairs.  : Patient seen bedside, changed intraoral and neck packing,  Continue to follow cultures, pain controlled   : patient seen this morning, complains of right arm pain wit minimal swelling,  IV in the left arm,  Continue abx for 2 weeks. neck and oral packing changed, purulent drainage from neck noted.   : Patient seen at bedside during morning rounds. Reports right arm pain and swelling somewhat improved although still present. IV replaced in right arm yesterday. Remains on IV abx, tentative end date 1/15/24. Neck and oral packing changed at bedside; purulent drainage noted from neck, decreased in volume compared to prior exams.  : Patient seen at bedside during morning rounds. Overnight, patient complained of coughing, throat discomfort, and nursing reported some increased secretions via trach. Started on mucomyst with improvement. Noted 6 beats of PSVT at ~1900, no reported chest pain or other symptoms. No further episodes. Packing replaced at bedside   : patient seen this morning, neck and oral packing changed. Continues to have purulent drainage form neck.  No chest pain or SOB,               : patient seen this morning, changed neck and oral pack, patient tolerated packing change better with adjusted premedication,  purulence continues from neck , trach in place, fibula donor site with granulation tissue ( changed dressing)   1/10: AFVSS. Patient seen and evaluated this am. Neck and oral packing changed. Some purulence from neck. Trach in place.  : AFVSS. Patient taken to OR for washout, L latissmus dorsi flap to L oral cavity/oropharynx.  : AFVSS. Patient seen and evaluated this am. Pt doing well, minimal output from neck opening. KRISTEN drains in place. Flap healthy appearing. Failed TOV yesterday.    ALLERGIES:  No Known Allergies      MEDICATIONS:  Antiinfectives:   imipenem/cilastatin  IVPB 1000 milliGRAM(s) IV Intermittent every 8 hours    IV fluids:  dextrose 5%. 1000 milliLiter(s) IV Continuous <Continuous>  dextrose 5%. 1000 milliLiter(s) IV Continuous <Continuous>  lactated ringers. 1000 milliLiter(s) IV Continuous <Continuous>  multivitamin/minerals/iron Oral Solution (CENTRUM) 15 milliLiter(s) Oral daily    Hematologic/Anticoagulation:  enoxaparin Injectable 40 milliGRAM(s) SubCutaneous every 24 hours    Pain medications/Neuro:  acetaminophen   IVPB .. 1000 milliGRAM(s) IV Intermittent every 8 hours  gabapentin Solution 300 milliGRAM(s) Oral every 12 hours  HYDROmorphone  Injectable 0.5 milliGRAM(s) IV Push every 3 hours PRN  ondansetron Injectable 4 milliGRAM(s) IV Push every 8 hours PRN  oxyCODONE    Solution 5 milliGRAM(s) Enteral Tube every 6 hours PRN  oxyCODONE    Solution 10 milliGRAM(s) Enteral Tube every 6 hours PRN    Endocrine Medications:   dextrose 50% Injectable 25 Gram(s) IV Push once  dextrose 50% Injectable 25 Gram(s) IV Push once  dextrose 50% Injectable 12.5 Gram(s) IV Push once  dextrose Oral Gel 15 Gram(s) Oral once PRN  glucagon  Injectable 1 milliGRAM(s) IntraMuscular once  insulin lispro (ADMELOG) corrective regimen sliding scale   SubCutaneous every 6 hours    All other standing medications:   bacitracin   Ointment 1 Application(s) Topical every 12 hours  chlorhexidine 0.12% Liquid 15 milliLiter(s) Oral Mucosa two times a day  chlorhexidine 2% Cloths 1 Application(s) Topical daily  doxazosin 1 milliGRAM(s) Oral once  doxazosin 1 milliGRAM(s) Oral at bedtime  influenza  Vaccine (HIGH DOSE) 0.7 milliLiter(s) IntraMuscular once  polyethylene glycol 3350 17 Gram(s) Oral daily  senna 1 Tablet(s) Oral daily    All other PRN medications:    Vital Signs Last 24 Hrs  T(C): 36.9 (2024 01:15), Max: 37.5 (2024 22:02)  T(F): 98.4 (2024 01:15), Max: 99.5 (2024 22:02)  HR: 51 (2024 07:00) (44 - 62)  BP: --  BP(mean): --  RR: 20 (2024 07:00) (12 - 32)  SpO2: 99% (2024 07:00) (99% - 100%)    Parameters below as of 2024 07:00  Patient On (Oxygen Delivery Method): tracheostomy collar    O2 Concentration (%): 40       @ 07:  -   @ 07:00  --------------------------------------------------------  IN:    Dexmedetomidine: 54.4 mL    Enteral Tube Flush: 100 mL    IV PiggyBack: 250 mL    IV PiggyBack: 250 mL    Lactated Ringers: 1580 mL  Total IN: 2234.4 mL    OUT:    Bulb (mL): 15 mL    Bulb (mL): 20 mL    Indwelling Catheter - Urethral (mL): 550 mL    Intermittent Catheterization - Urethral (mL): 1800 mL  Total OUT: 2385 mL    Total NET: -150.6 mL       @ 07:  -   @ 07:53  --------------------------------------------------------  IN:    Lactated Ringers: 40 mL  Total IN: 40 mL    OUT:  Total OUT: 0 mL    Total NET: 40 mL          24 @ 07:01  -  24 @ 07:00  --------------------------------------------------------  IN:  Total IN: 0 mL    OUT:    Bulb (mL): 15 mL    Bulb (mL): 20 mL  Total OUT: 35 mL    Total NET: -35 mL          Mode: standby    PHYSICAL EXAM:  GEN: appears stated age  NEURO: alert & oriented x 4/  HEENT: flap seen in oral cavity, healthy appearing, serosanguinous drainage from neck opening.  CVS: regular rate and rhythm  Pulm: normal respiratory excursions, not tachypneic, no labored breathing  Abd: non-distended  Ext: moving all four extremities, no peripheral edema noted. Latissmus dorsi donor site incisions clean, dry and intact. KRISTEN drains with serosanguinous output.       LABS                       10.8   7.05  )-----------( 178      ( 2024 03:25 )             32.0        135  |  104  |  15  ----------------------------<  188<H>  4.5   |  22  |  0.63    Ca    8.9      2024 03:25  Phos  3.6       Mg     1.8         TPro  5.4<L>  /  Alb  3.0<L>  /  TBili  1.9<H>  /  DBili  0.4<H>  /  AST  16  /  ALT  13  /  AlkPhos  55  11         Coagulation Studies-   PT/INR - ( 2024 05:30 )   PT: 11.4 sec;   INR: 1.00          PTT - ( 2024 05:30 )  PTT:30.4 sec  Urinalysis Basic - ( 2024 03:25 )    Color: x / Appearance: x / SG: x / pH: x  Gluc: 188 mg/dL / Ketone: x  / Bili: x / Urobili: x   Blood: x / Protein: x / Nitrite: x   Leuk Esterase: x / RBC: x / WBC x   Sq Epi: x / Non Sq Epi: x / Bacteria: x      Endocrine Panel-  Calcium: 8.9 mg/dL ( @ 03:25)  Calcium: 8.8 mg/dL ( @ 16:24)                MICROBIOLOGY:  Culture Results:   Moderate Escherichia coli  Moderate Enterobacter cloacae complex  Rare Candida parapsilosis  Rare Staphylococcus capitis (24 @ 18:03)  Culture Results:   Numerous Escherichia coli  Numerous Enterobacter cloacae complex  Few Enterococcus faecalis  Rare Staphylococcus epidermidis (24 @ 18:03)       OTOLARYNGOLOGY (ENT) PROGRESS NOTE    PATIENT: SAUNDRA HOLMAN  MRN: [id number]  : 57  [id number]  DATE OF SERVICE:  24  			         ID:SAUNDRA HOLMAN is a 67yo M w PMH of CAD (x2 stents Mar 2023), HLD, T2DM, hx of kidney stones, psoriasis who presented with an oral mass and underwent L hemimandibulectomy, SND L level 1-3, recon with L FFF, STSG, and placement of dental implants on . He had a trach which was subsequently decannulated. He returns  with surgical site infection now s/p OR for debridement and exploration. Trach replaced through prior stoma for pulmonary toilet.     Subjective/ Interval:   ; Patient seen this morning, oral pack to be changed, penrose dressing changed. Intraoral flap with partal skin necrosis, 7.5 portex in place with cuff deflated   : Patient seen and examined at bedside. NAEO. Patient remains afebrile and HD stable. HgB noted to drop to 7.6 from 9.2 but no additional episodes of melena. Penrose draining purulent material. Intra-oral infection/flap stable. Packing changed at bedside. No other complaints or changes at this time. ID recommended Vanc/zosyn and DC unasyn and flagyl, and IM recommended reticulocyte studies and adding folate supplements. No other changes at this time.   : AFVSS. No acute events overnight. Patient seen and examined at bedside. C/w Vanc/Zosyn per ID recs, pending sensitivities. Growing staph epi, E coli, enterobacter from wound cx on . S/p 2U PRBC yday w appropriate response in Hgb. Transfusion goal > 9.0.  : AFVSS. No acute events overnight. Patient seen and examined at bedside. C/w Vanc/Zosyn per ID recs, pending sens. Hgb stable this morning.  : AFVSS. No acute events overnight. Patient seen and examined at bedside. C/w Vanc/Zosyn, e faecalis sens to vanc/zosyn. Had 3 dark BM's yesterday that were stool guiac positive.  : AFVSS. No acute events overnight. Patient seen and examined at bedside. C/w Imipenem (changed per ID, enterobacter resistant to Zosyn). Plan for OR today for further washout / debridement.  1/3: AFVSS. No acute events overnight. Patient seen and examined at bedside. C/w imipenim. OR cx growing numerous gram negative rods and few gram positive cocci in pairs.  : Patient seen bedside, changed intraoral and neck packing,  Continue to follow cultures, pain controlled   : patient seen this morning, complains of right arm pain wit minimal swelling,  IV in the left arm,  Continue abx for 2 weeks. neck and oral packing changed, purulent drainage from neck noted.   : Patient seen at bedside during morning rounds. Reports right arm pain and swelling somewhat improved although still present. IV replaced in right arm yesterday. Remains on IV abx, tentative end date 1/15/24. Neck and oral packing changed at bedside; purulent drainage noted from neck, decreased in volume compared to prior exams.  : Patient seen at bedside during morning rounds. Overnight, patient complained of coughing, throat discomfort, and nursing reported some increased secretions via trach. Started on mucomyst with improvement. Noted 6 beats of PSVT at ~1900, no reported chest pain or other symptoms. No further episodes. Packing replaced at bedside   : patient seen this morning, neck and oral packing changed. Continues to have purulent drainage form neck.  No chest pain or SOB,               : patient seen this morning, changed neck and oral pack, patient tolerated packing change better with adjusted premedication,  purulence continues from neck , trach in place, fibula donor site with granulation tissue ( changed dressing)   1/10: AFVSS. Patient seen and evaluated this am. Neck and oral packing changed. Some purulence from neck. Trach in place.  : AFVSS. Patient taken to OR for washout, L latissmus dorsi flap to L oral cavity/oropharynx.  : AFVSS. Patient seen and evaluated this am. Pt doing well, minimal output from neck opening. KRISTEN drains in place. Flap healthy appearing. Failed TOV yesterday.    ALLERGIES:  No Known Allergies      MEDICATIONS:  Antiinfectives:   imipenem/cilastatin  IVPB 1000 milliGRAM(s) IV Intermittent every 8 hours    IV fluids:  dextrose 5%. 1000 milliLiter(s) IV Continuous <Continuous>  dextrose 5%. 1000 milliLiter(s) IV Continuous <Continuous>  lactated ringers. 1000 milliLiter(s) IV Continuous <Continuous>  multivitamin/minerals/iron Oral Solution (CENTRUM) 15 milliLiter(s) Oral daily    Hematologic/Anticoagulation:  enoxaparin Injectable 40 milliGRAM(s) SubCutaneous every 24 hours    Pain medications/Neuro:  acetaminophen   IVPB .. 1000 milliGRAM(s) IV Intermittent every 8 hours  gabapentin Solution 300 milliGRAM(s) Oral every 12 hours  HYDROmorphone  Injectable 0.5 milliGRAM(s) IV Push every 3 hours PRN  ondansetron Injectable 4 milliGRAM(s) IV Push every 8 hours PRN  oxyCODONE    Solution 5 milliGRAM(s) Enteral Tube every 6 hours PRN  oxyCODONE    Solution 10 milliGRAM(s) Enteral Tube every 6 hours PRN    Endocrine Medications:   dextrose 50% Injectable 25 Gram(s) IV Push once  dextrose 50% Injectable 25 Gram(s) IV Push once  dextrose 50% Injectable 12.5 Gram(s) IV Push once  dextrose Oral Gel 15 Gram(s) Oral once PRN  glucagon  Injectable 1 milliGRAM(s) IntraMuscular once  insulin lispro (ADMELOG) corrective regimen sliding scale   SubCutaneous every 6 hours    All other standing medications:   bacitracin   Ointment 1 Application(s) Topical every 12 hours  chlorhexidine 0.12% Liquid 15 milliLiter(s) Oral Mucosa two times a day  chlorhexidine 2% Cloths 1 Application(s) Topical daily  doxazosin 1 milliGRAM(s) Oral once  doxazosin 1 milliGRAM(s) Oral at bedtime  influenza  Vaccine (HIGH DOSE) 0.7 milliLiter(s) IntraMuscular once  polyethylene glycol 3350 17 Gram(s) Oral daily  senna 1 Tablet(s) Oral daily    All other PRN medications:    Vital Signs Last 24 Hrs  T(C): 36.9 (2024 01:15), Max: 37.5 (2024 22:02)  T(F): 98.4 (2024 01:15), Max: 99.5 (2024 22:02)  HR: 51 (2024 07:00) (44 - 62)  BP: --  BP(mean): --  RR: 20 (2024 07:00) (12 - 32)  SpO2: 99% (2024 07:00) (99% - 100%)    Parameters below as of 2024 07:00  Patient On (Oxygen Delivery Method): tracheostomy collar    O2 Concentration (%): 40       @ 07:  -   @ 07:00  --------------------------------------------------------  IN:    Dexmedetomidine: 54.4 mL    Enteral Tube Flush: 100 mL    IV PiggyBack: 250 mL    IV PiggyBack: 250 mL    Lactated Ringers: 1580 mL  Total IN: 2234.4 mL    OUT:    Bulb (mL): 15 mL    Bulb (mL): 20 mL    Indwelling Catheter - Urethral (mL): 550 mL    Intermittent Catheterization - Urethral (mL): 1800 mL  Total OUT: 2385 mL    Total NET: -150.6 mL       @ 07:  -   @ 07:53  --------------------------------------------------------  IN:    Lactated Ringers: 40 mL  Total IN: 40 mL    OUT:  Total OUT: 0 mL    Total NET: 40 mL          24 @ 07:01  -  24 @ 07:00  --------------------------------------------------------  IN:  Total IN: 0 mL    OUT:    Bulb (mL): 15 mL    Bulb (mL): 20 mL  Total OUT: 35 mL    Total NET: -35 mL          Mode: standby    PHYSICAL EXAM:  GEN: appears stated age  NEURO: alert & oriented x 4/  HEENT: flap seen in oral cavity, healthy appearing, serosanguinous drainage from neck opening.  CVS: regular rate and rhythm  Pulm: normal respiratory excursions, not tachypneic, no labored breathing  Abd: non-distended  Ext: moving all four extremities, no peripheral edema noted. Latissmus dorsi donor site incisions clean, dry and intact. KRISTEN drains with serosanguinous output.       LABS                       10.8   7.05  )-----------( 178      ( 2024 03:25 )             32.0        135  |  104  |  15  ----------------------------<  188<H>  4.5   |  22  |  0.63    Ca    8.9      2024 03:25  Phos  3.6       Mg     1.8         TPro  5.4<L>  /  Alb  3.0<L>  /  TBili  1.9<H>  /  DBili  0.4<H>  /  AST  16  /  ALT  13  /  AlkPhos  55  11         Coagulation Studies-   PT/INR - ( 2024 05:30 )   PT: 11.4 sec;   INR: 1.00          PTT - ( 2024 05:30 )  PTT:30.4 sec  Urinalysis Basic - ( 2024 03:25 )    Color: x / Appearance: x / SG: x / pH: x  Gluc: 188 mg/dL / Ketone: x  / Bili: x / Urobili: x   Blood: x / Protein: x / Nitrite: x   Leuk Esterase: x / RBC: x / WBC x   Sq Epi: x / Non Sq Epi: x / Bacteria: x      Endocrine Panel-  Calcium: 8.9 mg/dL ( @ 03:25)  Calcium: 8.8 mg/dL ( @ 16:24)                MICROBIOLOGY:  Culture Results:   Moderate Escherichia coli  Moderate Enterobacter cloacae complex  Rare Candida parapsilosis  Rare Staphylococcus capitis (24 @ 18:03)  Culture Results:   Numerous Escherichia coli  Numerous Enterobacter cloacae complex  Few Enterococcus faecalis  Rare Staphylococcus epidermidis (24 @ 18:03)

## 2024-01-12 NOTE — OCCUPATIONAL THERAPY INITIAL EVALUATION ADULT - PRECAUTIONS/LIMITATIONS, REHAB EVAL
No heavy lifting or LUE shoulder flex/abd >90 degrees (Per ENT team). Pt educated and demo understanding./fall precautions

## 2024-01-12 NOTE — PROGRESS NOTE ADULT - SUBJECTIVE AND OBJECTIVE BOX
INTERVAL/OVERNIGHT EVENTS: Failed TOV, straight cath x3. Precedex stopped given sinus bradycardia and pt no longer feeling anxious. Started trach collar early AM.     SUBJECTIVE: This AM, doing well without complaints. No N/V or abd pain. + cough, but no SOB/CP on trach collar. Passing flatus. Lugo was replaced due to failing TOV x3 overnight. States this happened last time. Will ambulate today.     Neurologic Medications  acetaminophen   IVPB .. 1000 milliGRAM(s) IV Intermittent every 8 hours  gabapentin Solution 300 milliGRAM(s) Oral every 12 hours  HYDROmorphone  Injectable 0.5 milliGRAM(s) IV Push every 3 hours PRN breakthrough pain  ondansetron Injectable 4 milliGRAM(s) IV Push every 8 hours PRN Nausea and/or Vomiting  oxyCODONE    Solution 5 milliGRAM(s) Enteral Tube every 6 hours PRN Moderate Pain (4 - 6)  oxyCODONE    Solution 10 milliGRAM(s) Enteral Tube every 6 hours PRN Severe Pain (7 - 10)    Cardiovascular Medications  doxazosin 1 milliGRAM(s) Oral at bedtime    Gastrointestinal Medications  lactated ringers. 1000 milliLiter(s) IV Continuous <Continuous>  multivitamin/minerals/iron Oral Solution (CENTRUM) 15 milliLiter(s) Oral daily  polyethylene glycol 3350 17 Gram(s) Oral daily  senna 1 Tablet(s) Oral daily    Hematologic/Oncologic Medications  enoxaparin Injectable 40 milliGRAM(s) SubCutaneous every 24 hours  influenza  Vaccine (HIGH DOSE) 0.7 milliLiter(s) IntraMuscular once    Antimicrobial/Immunologic Medications  imipenem/cilastatin  IVPB 1000 milliGRAM(s) IV Intermittent every 8 hours    Endocrine/Metabolic Medications  insulin lispro (ADMELOG) corrective regimen sliding scale   SubCutaneous every 6 hours    Topical/Other Medications  bacitracin   Ointment 1 Application(s) Topical every 12 hours  chlorhexidine 0.12% Liquid 15 milliLiter(s) Oral Mucosa two times a day  chlorhexidine 2% Cloths 1 Application(s) Topical daily    MEDICATIONS  (PRN):  HYDROmorphone  Injectable 0.5 milliGRAM(s) IV Push every 3 hours PRN breakthrough pain  ondansetron Injectable 4 milliGRAM(s) IV Push every 8 hours PRN Nausea and/or Vomiting  oxyCODONE    Solution 10 milliGRAM(s) Enteral Tube every 6 hours PRN Severe Pain (7 - 10)  oxyCODONE    Solution 5 milliGRAM(s) Enteral Tube every 6 hours PRN Moderate Pain (4 - 6)    I&O's Detail    11 Jan 2024 07:01  -  12 Jan 2024 07:00  --------------------------------------------------------  IN:    Dexmedetomidine: 54.4 mL    Enteral Tube Flush: 100 mL    IV PiggyBack: 250 mL    IV PiggyBack: 250 mL    Lactated Ringers: 1580 mL  Total IN: 2234.4 mL    OUT:    Bulb (mL): 15 mL    Bulb (mL): 20 mL    Indwelling Catheter - Urethral (mL): 550 mL    Intermittent Catheterization - Urethral (mL): 1800 mL  Total OUT: 2385 mL    Total NET: -150.6 mL    12 Jan 2024 07:01  -  12 Jan 2024 07:58  --------------------------------------------------------  IN:    Lactated Ringers: 40 mL  Total IN: 40 mL    OUT:  Total OUT: 0 mL    Total NET: 40 mL    Vital Signs Last 24 Hrs  T(C): 36.9 (12 Jan 2024 01:15), Max: 37.5 (11 Jan 2024 22:02)  T(F): 98.4 (12 Jan 2024 01:15), Max: 99.5 (11 Jan 2024 22:02)  HR: 51 (12 Jan 2024 07:00) (44 - 62)  BP: --  BP(mean): --  RR: 20 (12 Jan 2024 07:00) (12 - 32)  SpO2: 99% (12 Jan 2024 07:00) (99% - 100%)    Parameters below as of 12 Jan 2024 07:00  Patient On (Oxygen Delivery Method): tracheostomy collar    O2 Concentration (%): 40    GENERAL: NAD, resting comfortably in bed  HEENT: NCAT, MMM, incisions CDI, wound open WTD in place, KRISTEN x2 L axilla SS, Cook doppler in place.  C/V: Normal rate, normal peripheral perfusion, no murmurs  PULM: Nonlabored breathing, no respiratory distress, on trach collar, rhonchi on L side (clears w cough), CTA on R side.    ABD: Soft, ND, NT, no rebound tenderness, no guarding  EXTREM: WWP, no edema, SCDs in place  NEURO: No focal deficits    LABS:                        10.8   7.05  )-----------( 178      ( 12 Jan 2024 03:25 )             32.0     01-12    135  |  104  |  15  ----------------------------<  188<H>  4.5   |  22  |  0.63    Ca    8.9      12 Jan 2024 03:25  Phos  3.6     01-12  Mg     1.8     01-12    TPro  5.4<L>  /  Alb  3.0<L>  /  TBili  1.9<H>  /  DBili  0.4<H>  /  AST  16  /  ALT  13  /  AlkPhos  55  01-11    PT/INR - ( 11 Jan 2024 05:30 )   PT: 11.4 sec;   INR: 1.00        PTT - ( 11 Jan 2024 05:30 )  PTT:30.4 sec  Urinalysis Basic - ( 12 Jan 2024 03:25 )    Color: x / Appearance: x / SG: x / pH: x  Gluc: 188 mg/dL / Ketone: x  / Bili: x / Urobili: x   Blood: x / Protein: x / Nitrite: x   Leuk Esterase: x / RBC: x / WBC x   Sq Epi: x / Non Sq Epi: x / Bacteria: x

## 2024-01-13 LAB
ANION GAP SERPL CALC-SCNC: 6 MMOL/L — SIGNIFICANT CHANGE UP (ref 5–17)
ANION GAP SERPL CALC-SCNC: 6 MMOL/L — SIGNIFICANT CHANGE UP (ref 5–17)
BUN SERPL-MCNC: 22 MG/DL — SIGNIFICANT CHANGE UP (ref 7–23)
BUN SERPL-MCNC: 22 MG/DL — SIGNIFICANT CHANGE UP (ref 7–23)
CALCIUM SERPL-MCNC: 8.5 MG/DL — SIGNIFICANT CHANGE UP (ref 8.4–10.5)
CALCIUM SERPL-MCNC: 8.5 MG/DL — SIGNIFICANT CHANGE UP (ref 8.4–10.5)
CHLORIDE SERPL-SCNC: 107 MMOL/L — SIGNIFICANT CHANGE UP (ref 96–108)
CHLORIDE SERPL-SCNC: 107 MMOL/L — SIGNIFICANT CHANGE UP (ref 96–108)
CO2 SERPL-SCNC: 27 MMOL/L — SIGNIFICANT CHANGE UP (ref 22–31)
CO2 SERPL-SCNC: 27 MMOL/L — SIGNIFICANT CHANGE UP (ref 22–31)
CREAT SERPL-MCNC: 0.63 MG/DL — SIGNIFICANT CHANGE UP (ref 0.5–1.3)
CREAT SERPL-MCNC: 0.63 MG/DL — SIGNIFICANT CHANGE UP (ref 0.5–1.3)
EGFR: 104 ML/MIN/1.73M2 — SIGNIFICANT CHANGE UP
EGFR: 104 ML/MIN/1.73M2 — SIGNIFICANT CHANGE UP
GLUCOSE BLDC GLUCOMTR-MCNC: 148 MG/DL — HIGH (ref 70–99)
GLUCOSE BLDC GLUCOMTR-MCNC: 148 MG/DL — HIGH (ref 70–99)
GLUCOSE BLDC GLUCOMTR-MCNC: 175 MG/DL — HIGH (ref 70–99)
GLUCOSE BLDC GLUCOMTR-MCNC: 175 MG/DL — HIGH (ref 70–99)
GLUCOSE SERPL-MCNC: 126 MG/DL — HIGH (ref 70–99)
GLUCOSE SERPL-MCNC: 126 MG/DL — HIGH (ref 70–99)
HCT VFR BLD CALC: 29.9 % — LOW (ref 39–50)
HCT VFR BLD CALC: 29.9 % — LOW (ref 39–50)
HGB BLD-MCNC: 9.6 G/DL — LOW (ref 13–17)
HGB BLD-MCNC: 9.6 G/DL — LOW (ref 13–17)
MAGNESIUM SERPL-MCNC: 2 MG/DL — SIGNIFICANT CHANGE UP (ref 1.6–2.6)
MAGNESIUM SERPL-MCNC: 2 MG/DL — SIGNIFICANT CHANGE UP (ref 1.6–2.6)
MCHC RBC-ENTMCNC: 28.6 PG — SIGNIFICANT CHANGE UP (ref 27–34)
MCHC RBC-ENTMCNC: 28.6 PG — SIGNIFICANT CHANGE UP (ref 27–34)
MCHC RBC-ENTMCNC: 32.1 GM/DL — SIGNIFICANT CHANGE UP (ref 32–36)
MCHC RBC-ENTMCNC: 32.1 GM/DL — SIGNIFICANT CHANGE UP (ref 32–36)
MCV RBC AUTO: 89 FL — SIGNIFICANT CHANGE UP (ref 80–100)
MCV RBC AUTO: 89 FL — SIGNIFICANT CHANGE UP (ref 80–100)
NRBC # BLD: 0 /100 WBCS — SIGNIFICANT CHANGE UP (ref 0–0)
NRBC # BLD: 0 /100 WBCS — SIGNIFICANT CHANGE UP (ref 0–0)
PHOSPHATE SERPL-MCNC: 2.5 MG/DL — SIGNIFICANT CHANGE UP (ref 2.5–4.5)
PHOSPHATE SERPL-MCNC: 2.5 MG/DL — SIGNIFICANT CHANGE UP (ref 2.5–4.5)
PLATELET # BLD AUTO: 158 K/UL — SIGNIFICANT CHANGE UP (ref 150–400)
PLATELET # BLD AUTO: 158 K/UL — SIGNIFICANT CHANGE UP (ref 150–400)
POTASSIUM SERPL-MCNC: 3.8 MMOL/L — SIGNIFICANT CHANGE UP (ref 3.5–5.3)
POTASSIUM SERPL-MCNC: 3.8 MMOL/L — SIGNIFICANT CHANGE UP (ref 3.5–5.3)
POTASSIUM SERPL-SCNC: 3.8 MMOL/L — SIGNIFICANT CHANGE UP (ref 3.5–5.3)
POTASSIUM SERPL-SCNC: 3.8 MMOL/L — SIGNIFICANT CHANGE UP (ref 3.5–5.3)
RBC # BLD: 3.36 M/UL — LOW (ref 4.2–5.8)
RBC # BLD: 3.36 M/UL — LOW (ref 4.2–5.8)
RBC # FLD: 15.5 % — HIGH (ref 10.3–14.5)
RBC # FLD: 15.5 % — HIGH (ref 10.3–14.5)
SARS-COV-2 RNA SPEC QL NAA+PROBE: SIGNIFICANT CHANGE UP
SARS-COV-2 RNA SPEC QL NAA+PROBE: SIGNIFICANT CHANGE UP
SODIUM SERPL-SCNC: 140 MMOL/L — SIGNIFICANT CHANGE UP (ref 135–145)
SODIUM SERPL-SCNC: 140 MMOL/L — SIGNIFICANT CHANGE UP (ref 135–145)
WBC # BLD: 8.7 K/UL — SIGNIFICANT CHANGE UP (ref 3.8–10.5)
WBC # BLD: 8.7 K/UL — SIGNIFICANT CHANGE UP (ref 3.8–10.5)
WBC # FLD AUTO: 8.7 K/UL — SIGNIFICANT CHANGE UP (ref 3.8–10.5)
WBC # FLD AUTO: 8.7 K/UL — SIGNIFICANT CHANGE UP (ref 3.8–10.5)

## 2024-01-13 PROCEDURE — 71045 X-RAY EXAM CHEST 1 VIEW: CPT | Mod: 26

## 2024-01-13 PROCEDURE — 36000 PLACE NEEDLE IN VEIN: CPT

## 2024-01-13 PROCEDURE — 99232 SBSQ HOSP IP/OBS MODERATE 35: CPT | Mod: GC

## 2024-01-13 RX ORDER — POTASSIUM PHOSPHATE, MONOBASIC POTASSIUM PHOSPHATE, DIBASIC 236; 224 MG/ML; MG/ML
15 INJECTION, SOLUTION INTRAVENOUS ONCE
Refills: 0 | Status: COMPLETED | OUTPATIENT
Start: 2024-01-13 | End: 2024-01-13

## 2024-01-13 RX ORDER — IPRATROPIUM/ALBUTEROL SULFATE 18-103MCG
3 AEROSOL WITH ADAPTER (GRAM) INHALATION EVERY 6 HOURS
Refills: 0 | Status: DISCONTINUED | OUTPATIENT
Start: 2024-01-13 | End: 2024-01-20

## 2024-01-13 RX ORDER — ASPIRIN/CALCIUM CARB/MAGNESIUM 324 MG
81 TABLET ORAL DAILY
Refills: 0 | Status: DISCONTINUED | OUTPATIENT
Start: 2024-01-13 | End: 2024-01-20

## 2024-01-13 RX ORDER — LIDOCAINE 4 G/100G
1 CREAM TOPICAL DAILY
Refills: 0 | Status: DISCONTINUED | OUTPATIENT
Start: 2024-01-13 | End: 2024-01-20

## 2024-01-13 RX ADMIN — OXYCODONE HYDROCHLORIDE 10 MILLIGRAM(S): 5 TABLET ORAL at 14:00

## 2024-01-13 RX ADMIN — GABAPENTIN 300 MILLIGRAM(S): 400 CAPSULE ORAL at 18:50

## 2024-01-13 RX ADMIN — IMIPENEM AND CILASTATIN 250 MILLIGRAM(S): 250; 250 INJECTION, POWDER, FOR SOLUTION INTRAVENOUS at 05:42

## 2024-01-13 RX ADMIN — IMIPENEM AND CILASTATIN 250 MILLIGRAM(S): 250; 250 INJECTION, POWDER, FOR SOLUTION INTRAVENOUS at 21:14

## 2024-01-13 RX ADMIN — OXYCODONE HYDROCHLORIDE 10 MILLIGRAM(S): 5 TABLET ORAL at 02:38

## 2024-01-13 RX ADMIN — OXYCODONE HYDROCHLORIDE 10 MILLIGRAM(S): 5 TABLET ORAL at 07:49

## 2024-01-13 RX ADMIN — CHLORHEXIDINE GLUCONATE 15 MILLILITER(S): 213 SOLUTION TOPICAL at 18:50

## 2024-01-13 RX ADMIN — SENNA PLUS 1 TABLET(S): 8.6 TABLET ORAL at 11:05

## 2024-01-13 RX ADMIN — Medication 2: at 11:08

## 2024-01-13 RX ADMIN — Medication 1 APPLICATION(S): at 05:42

## 2024-01-13 RX ADMIN — CHLORHEXIDINE GLUCONATE 15 MILLILITER(S): 213 SOLUTION TOPICAL at 05:42

## 2024-01-13 RX ADMIN — Medication 1 MILLIGRAM(S): at 21:14

## 2024-01-13 RX ADMIN — LIDOCAINE 1 PATCH: 4 CREAM TOPICAL at 15:32

## 2024-01-13 RX ADMIN — Medication 81 MILLIGRAM(S): at 11:05

## 2024-01-13 RX ADMIN — Medication 1 APPLICATION(S): at 18:50

## 2024-01-13 RX ADMIN — OXYCODONE HYDROCHLORIDE 10 MILLIGRAM(S): 5 TABLET ORAL at 08:04

## 2024-01-13 RX ADMIN — LIDOCAINE 1 PATCH: 4 CREAM TOPICAL at 18:51

## 2024-01-13 RX ADMIN — ENOXAPARIN SODIUM 40 MILLIGRAM(S): 100 INJECTION SUBCUTANEOUS at 11:05

## 2024-01-13 RX ADMIN — Medication 15 MILLILITER(S): at 11:38

## 2024-01-13 RX ADMIN — Medication 3 MILLILITER(S): at 21:14

## 2024-01-13 RX ADMIN — IMIPENEM AND CILASTATIN 250 MILLIGRAM(S): 250; 250 INJECTION, POWDER, FOR SOLUTION INTRAVENOUS at 14:54

## 2024-01-13 RX ADMIN — CHLORHEXIDINE GLUCONATE 1 APPLICATION(S): 213 SOLUTION TOPICAL at 11:06

## 2024-01-13 RX ADMIN — OXYCODONE HYDROCHLORIDE 10 MILLIGRAM(S): 5 TABLET ORAL at 03:08

## 2024-01-13 RX ADMIN — OXYCODONE HYDROCHLORIDE 10 MILLIGRAM(S): 5 TABLET ORAL at 14:30

## 2024-01-13 RX ADMIN — POTASSIUM PHOSPHATE, MONOBASIC POTASSIUM PHOSPHATE, DIBASIC 62.5 MILLIMOLE(S): 236; 224 INJECTION, SOLUTION INTRAVENOUS at 09:40

## 2024-01-13 RX ADMIN — HYDROMORPHONE HYDROCHLORIDE 0.5 MILLIGRAM(S): 2 INJECTION INTRAMUSCULAR; INTRAVENOUS; SUBCUTANEOUS at 06:50

## 2024-01-13 RX ADMIN — HYDROMORPHONE HYDROCHLORIDE 0.5 MILLIGRAM(S): 2 INJECTION INTRAMUSCULAR; INTRAVENOUS; SUBCUTANEOUS at 06:35

## 2024-01-13 RX ADMIN — GABAPENTIN 300 MILLIGRAM(S): 400 CAPSULE ORAL at 05:42

## 2024-01-13 NOTE — PROGRESS NOTE ADULT - TIME BILLING
treatment of wound infection
Managing SSI
treatment of surgical wound infection
Wound infection
treatment of surgical wound infection
Managing SSI
Wound infection
Wound infxn polymicrobial
med rec performed  labs reviewed  2 sets of ICU rounds  patient counseled

## 2024-01-13 NOTE — PROGRESS NOTE ADULT - ASSESSMENT
69yo M w PMH of CAD (x2 stents Mar 2023), HLD, T2DM, hx of kidney stones, psoriasis who presented with an oral mass and underwent L hemimandibulectomy, SND L level 1-3, recon with L FFF, STSG, and placement of dental implants on 12/7. He had a trach which was subsequently decannulated. He returns 12/27 with surgical site infection now s/p OR for debridement and exploration. Trach replaced through prior stoma for pulmonary toilet. Now s/p L latissimus dorsi free flap and tracheostomy exchange (7.5 portex), transferred to SICU for hemodynamic and flap monitoring post procedure.    Neuro: Tylenol RTC, Gabapentin 300mg BID, PRN Oxycodone. Nausea: Zofran PRN. s/p Decadron 8mg q8 x3 doses.  HEENT: Flap checks q2h until 5pm, No circumferential ties or collars, Head neutral, gentle suction in mouth, Peridex PO BID, No asa needed. Neck incision open   CV: HD stable, Maintain MAP > 60, 12/4 Echo EF 55%. Known asymptomatic bradycardia.  Pulm: Trach exchange on 1/11: cuffed Portex 7.5, now on trach collar.  GI: G-tube, Vital 1.5 intermittent feeds goal 105cc for 12hrs, Banatrol. Senna, Miralax, MVI.  : dc young today 1/13, with TOV pending. Previously failed TOV, young replaced 1/12, added Cardura (prior admissions required flomax). Holding home Vit D3.   ID: 1g imipenem q8 until 1/16. Hx of exposure to flu, s/p droplet precautions (until 1/13)  Endo: mISS   Heme: Active T&S, Hgb >8   PPx: SCDs on right leg only, SQL  Lines: PIVs // DC: Lucia (1/11-12)   Wounds: KRISTEN x 2 in left chest wall, Bacitracin to wounds Neck incision open - dressing changes daily.  PT/OT: Ordered  DISPO: SICU 69yo M w PMH of CAD (x2 stents Mar 2023), HLD, T2DM, hx of kidney stones, psoriasis who presented with an oral mass and underwent L hemimandibulectomy, SND L level 1-3, recon with L FFF, STSG, and placement of dental implants on 12/7. He had a trach which was subsequently decannulated. He returns 12/27 with surgical site infection now s/p OR for debridement and exploration. Trach replaced through prior stoma for pulmonary toilet. Now s/p L latissimus dorsi free flap and tracheostomy exchange (7.5 portex), transferred to SICU for hemodynamic and flap monitoring post procedure.    Neuro: Tylenol RTC, Gabapentin 300mg BID, PRN Oxycodone. Nausea: Zofran PRN. s/p Decadron 8mg q8 x3 doses.  HEENT: Flap checks q2h until 5pm, No circumferential ties or collars, Head neutral, gentle suction in mouth, Peridex PO BID, No asa needed. Neck incision open   CV: HD stable, Maintain MAP > 60, 12/4 Echo EF 55%. Known asymptomatic bradycardia.  Pulm: Trach exchange on 1/11: cuffed Portex 7.5, now on trach collar.  GI: G-tube, Vital 1.5 intermittent feeds goal 105cc for 12hrs, Banatrol. Senna, Miralax, MVI.  : dc young today 1/13, with TOV pending. Previously failed TOV, young replaced 1/12, added Cardura (prior admissions required flomax). Holding home Vit D3.   ID: 1g imipenem q8 until 1/16. Hx of exposure to flu, s/p droplet precautions (until 1/13)  Endo: mISS   Heme: Active T&S, Hgb >8   PPx: SCDs on right leg only, SQL  Lines: PIVs // DC: Lucia (1/11-12)   Wounds: RKISTEN x 2 in left chest wall, Bacitracin to wounds Neck incision open - dressing changes daily.  PT/OT: Ordered  DISPO: SICU

## 2024-01-13 NOTE — PROGRESS NOTE ADULT - SUBJECTIVE AND OBJECTIVE BOX
OTOLARYNGOLOGY (ENT) PROGRESS NOTE    PATIENT: SAUNDRA HOLMAN  MRN: [id number]  : 57  [id number]  DATE OF SERVICE:  24  			         ID:SAUNDRA HOLMAN is a 69yo M w PMH of CAD (x2 stents Mar 2023), HLD, T2DM, hx of kidney stones, psoriasis who presented with an oral mass and underwent L hemimandibulectomy, SND L level 1-3, recon with L FFF, STSG, and placement of dental implants on . He had a trach which was subsequently decannulated. He returns  with surgical site infection now s/p OR for debridement and exploration. Trach replaced through prior stoma for pulmonary toilet.     Subjective/ Interval:   ; Patient seen this morning, oral pack to be changed, penrose dressing changed. Intraoral flap with partal skin necrosis, 7.5 portex in place with cuff deflated   : Patient seen and examined at bedside. NAEO. Patient remains afebrile and HD stable. HgB noted to drop to 7.6 from 9.2 but no additional episodes of melena. Penrose draining purulent material. Intra-oral infection/flap stable. Packing changed at bedside. No other complaints or changes at this time. ID recommended Vanc/zosyn and DC unasyn and flagyl, and IM recommended reticulocyte studies and adding folate supplements. No other changes at this time.   : AFVSS. No acute events overnight. Patient seen and examined at bedside. C/w Vanc/Zosyn per ID recs, pending sensitivities. Growing staph epi, E coli, enterobacter from wound cx on . S/p 2U PRBC yday w appropriate response in Hgb. Transfusion goal > 9.0.  : AFVSS. No acute events overnight. Patient seen and examined at bedside. C/w Vanc/Zosyn per ID recs, pending sens. Hgb stable this morning.  : AFVSS. No acute events overnight. Patient seen and examined at bedside. C/w Vanc/Zosyn, e faecalis sens to vanc/zosyn. Had 3 dark BM's yesterday that were stool guiac positive.  : AFVSS. No acute events overnight. Patient seen and examined at bedside. C/w Imipenem (changed per ID, enterobacter resistant to Zosyn). Plan for OR today for further washout / debridement.  1/3: AFVSS. No acute events overnight. Patient seen and examined at bedside. C/w imipenim. OR cx growing numerous gram negative rods and few gram positive cocci in pairs.  : Patient seen bedside, changed intraoral and neck packing,  Continue to follow cultures, pain controlled   : patient seen this morning, complains of right arm pain wit minimal swelling,  IV in the left arm,  Continue abx for 2 weeks. neck and oral packing changed, purulent drainage from neck noted.   : Patient seen at bedside during morning rounds. Reports right arm pain and swelling somewhat improved although still present. IV replaced in right arm yesterday. Remains on IV abx, tentative end date 1/15/24. Neck and oral packing changed at bedside; purulent drainage noted from neck, decreased in volume compared to prior exams.  : Patient seen at bedside during morning rounds. Overnight, patient complained of coughing, throat discomfort, and nursing reported some increased secretions via trach. Started on mucomyst with improvement. Noted 6 beats of PSVT at ~1900, no reported chest pain or other symptoms. No further episodes. Packing replaced at bedside   : patient seen this morning, neck and oral packing changed. Continues to have purulent drainage form neck.  No chest pain or SOB,               : patient seen this morning, changed neck and oral pack, patient tolerated packing change better with adjusted premedication,  purulence continues from neck , trach in place, fibula donor site with granulation tissue ( changed dressing)   1/10: AFVSS. Patient seen and evaluated this am. Neck and oral packing changed. Some purulence from neck. Trach in place.  : AFVSS. Patient taken to OR for washout, L latissmus dorsi flap to L oral cavity/oropharynx.  : AFVSS. Patient seen and evaluated this am. Pt doing well, minimal output from neck opening. KRISTEN drains in place. Flap healthy appearing. Failed TOV yesterday.  : AFVSS. Patient seen and evaluated this am. Pt in chair, flap looks healthy. Minimal output from neck opening. Donor site healing appropriately.       ALLERGIES:  No Known Allergies      MEDICATIONS:  Antiinfectives:   imipenem/cilastatin  IVPB 1000 milliGRAM(s) IV Intermittent every 8 hours    IV fluids:  dextrose 5%. 1000 milliLiter(s) IV Continuous <Continuous>  dextrose 5%. 1000 milliLiter(s) IV Continuous <Continuous>  multivitamin/minerals/iron Oral Solution (CENTRUM) 15 milliLiter(s) Oral daily  potassium phosphate IVPB 15 milliMole(s) IV Intermittent once    Hematologic/Anticoagulation:  enoxaparin Injectable 40 milliGRAM(s) SubCutaneous every 24 hours    Pain medications/Neuro:  gabapentin Solution 300 milliGRAM(s) Oral every 12 hours  HYDROmorphone  Injectable 0.5 milliGRAM(s) IV Push every 3 hours PRN  ondansetron Injectable 4 milliGRAM(s) IV Push every 8 hours PRN  oxyCODONE    Solution 10 milliGRAM(s) Enteral Tube every 6 hours PRN  oxyCODONE    Solution 5 milliGRAM(s) Enteral Tube every 6 hours PRN    Endocrine Medications:   dextrose 50% Injectable 12.5 Gram(s) IV Push once  dextrose 50% Injectable 25 Gram(s) IV Push once  dextrose 50% Injectable 25 Gram(s) IV Push once  dextrose Oral Gel 15 Gram(s) Oral once PRN  glucagon  Injectable 1 milliGRAM(s) IntraMuscular once  insulin lispro (ADMELOG) corrective regimen sliding scale   SubCutaneous every 6 hours    All other standing medications:   bacitracin   Ointment 1 Application(s) Topical every 12 hours  chlorhexidine 0.12% Liquid 15 milliLiter(s) Oral Mucosa two times a day  chlorhexidine 2% Cloths 1 Application(s) Topical daily  doxazosin 1 milliGRAM(s) Oral at bedtime  influenza  Vaccine (HIGH DOSE) 0.7 milliLiter(s) IntraMuscular once  polyethylene glycol 3350 17 Gram(s) Oral daily  senna 1 Tablet(s) Oral daily    All other PRN medications:    Vital Signs Last 24 Hrs  T(C): 36.5 (2024 08:45), Max: 36.7 (2024 17:08)  T(F): 97.7 (2024 08:45), Max: 98 (2024 17:08)  HR: 73 (2024 09:12) (43 - 73)  BP: 97/50 (2024 09:00) (89/54 - 135/64)  BP(mean): 71 (2024 09:00) (67 - 92)  RR: 22 (2024 09:12) (20 - 36)  SpO2: 96% (2024 09:12) (95% - 100%)    Parameters below as of 2024 09:12  Patient On (Oxygen Delivery Method): tracheostomy collar    O2 Concentration (%): 40       @ 07: @ 07:00  --------------------------------------------------------  IN:    Enteral Tube Flush: 200 mL    IV PiggyBack: 200 mL    IV PiggyBack: 750 mL    Lactated Ringers: 40 mL    Vital1.5: 795 mL  Total IN: 1985 mL    OUT:    Bulb (mL): 5 mL    Bulb (mL): 27.5 mL    Indwelling Catheter - Urethral (mL): 1970 mL  Total OUT: 2002.5 mL    Total NET: -17.5 mL       @ 07: @ 09:36  --------------------------------------------------------  IN:    Enteral Tube Flush: 105 mL  Total IN: 105 mL    OUT:    Indwelling Catheter - Urethral (mL): 100 mL  Total OUT: 100 mL    Total NET: 5 mL          24 @ 07:01  -  24 @ 07:00  --------------------------------------------------------  IN:  Total IN: 0 mL    OUT:    Bulb (mL): 5 mL    Bulb (mL): 27.5 mL  Total OUT: 32.5 mL    Total NET: -32.5 mL      PHYSICAL EXAM:  GEN: appears stated age, NAD  NEURO: alert & oriented x 4/4  HEENT: flap visualized and healthy appearing, strong doppler signal, neck partially open and with some minimal output  CVS: regular rate and rhythm  Pulm: normal respiratory excursions, not tachypneic, no labored breathing  Abd: non-distended  Ext: moving all four extremities, no peripheral edema noted       LABS                       9.6    8.70  )-----------( 158      ( 2024 05:30 )             29.9        140  |  107  |  22  ----------------------------<  126<H>  3.8   |  27  |  0.63    Ca    8.5      2024 05:30  Phos  2.5       Mg     2.0         TPro  5.4<L>  /  Alb  3.0<L>  /  TBili  1.9<H>  /  DBili  0.4<H>  /  AST  16  /  ALT  13  /  AlkPhos  55           Coagulation Studies-     Urinalysis Basic - ( 2024 05:30 )    Color: x / Appearance: x / SG: x / pH: x  Gluc: 126 mg/dL / Ketone: x  / Bili: x / Urobili: x   Blood: x / Protein: x / Nitrite: x   Leuk Esterase: x / RBC: x / WBC x   Sq Epi: x / Non Sq Epi: x / Bacteria: x      Endocrine Panel-  Calcium: 8.5 mg/dL ( @ 05:30)      MICROBIOLOGY:  Culture Results:   Moderate Escherichia coli  Moderate Enterobacter cloacae complex  Rare Candida parapsilosis  Rare Staphylococcus capitis (24 @ 18:03)  Culture Results:   Numerous Escherichia coli  Numerous Enterobacter cloacae complex  Few Enterococcus faecalis  Rare Staphylococcus epidermidis (24 @ 18:03)     OTOLARYNGOLOGY (ENT) PROGRESS NOTE    PATIENT: SAUNDRA HOLMAN  MRN: [id number]  : 57  [id number]  DATE OF SERVICE:  24  			         ID:SAUNDRA HOLMAN is a 67yo M w PMH of CAD (x2 stents Mar 2023), HLD, T2DM, hx of kidney stones, psoriasis who presented with an oral mass and underwent L hemimandibulectomy, SND L level 1-3, recon with L FFF, STSG, and placement of dental implants on . He had a trach which was subsequently decannulated. He returns  with surgical site infection now s/p OR for debridement and exploration. Trach replaced through prior stoma for pulmonary toilet.     Subjective/ Interval:   ; Patient seen this morning, oral pack to be changed, penrose dressing changed. Intraoral flap with partal skin necrosis, 7.5 portex in place with cuff deflated   : Patient seen and examined at bedside. NAEO. Patient remains afebrile and HD stable. HgB noted to drop to 7.6 from 9.2 but no additional episodes of melena. Penrose draining purulent material. Intra-oral infection/flap stable. Packing changed at bedside. No other complaints or changes at this time. ID recommended Vanc/zosyn and DC unasyn and flagyl, and IM recommended reticulocyte studies and adding folate supplements. No other changes at this time.   : AFVSS. No acute events overnight. Patient seen and examined at bedside. C/w Vanc/Zosyn per ID recs, pending sensitivities. Growing staph epi, E coli, enterobacter from wound cx on . S/p 2U PRBC yday w appropriate response in Hgb. Transfusion goal > 9.0.  : AFVSS. No acute events overnight. Patient seen and examined at bedside. C/w Vanc/Zosyn per ID recs, pending sens. Hgb stable this morning.  : AFVSS. No acute events overnight. Patient seen and examined at bedside. C/w Vanc/Zosyn, e faecalis sens to vanc/zosyn. Had 3 dark BM's yesterday that were stool guiac positive.  : AFVSS. No acute events overnight. Patient seen and examined at bedside. C/w Imipenem (changed per ID, enterobacter resistant to Zosyn). Plan for OR today for further washout / debridement.  1/3: AFVSS. No acute events overnight. Patient seen and examined at bedside. C/w imipenim. OR cx growing numerous gram negative rods and few gram positive cocci in pairs.  : Patient seen bedside, changed intraoral and neck packing,  Continue to follow cultures, pain controlled   : patient seen this morning, complains of right arm pain wit minimal swelling,  IV in the left arm,  Continue abx for 2 weeks. neck and oral packing changed, purulent drainage from neck noted.   : Patient seen at bedside during morning rounds. Reports right arm pain and swelling somewhat improved although still present. IV replaced in right arm yesterday. Remains on IV abx, tentative end date 1/15/24. Neck and oral packing changed at bedside; purulent drainage noted from neck, decreased in volume compared to prior exams.  : Patient seen at bedside during morning rounds. Overnight, patient complained of coughing, throat discomfort, and nursing reported some increased secretions via trach. Started on mucomyst with improvement. Noted 6 beats of PSVT at ~1900, no reported chest pain or other symptoms. No further episodes. Packing replaced at bedside   : patient seen this morning, neck and oral packing changed. Continues to have purulent drainage form neck.  No chest pain or SOB,               : patient seen this morning, changed neck and oral pack, patient tolerated packing change better with adjusted premedication,  purulence continues from neck , trach in place, fibula donor site with granulation tissue ( changed dressing)   1/10: AFVSS. Patient seen and evaluated this am. Neck and oral packing changed. Some purulence from neck. Trach in place.  : AFVSS. Patient taken to OR for washout, L latissmus dorsi flap to L oral cavity/oropharynx.  : AFVSS. Patient seen and evaluated this am. Pt doing well, minimal output from neck opening. KRISTEN drains in place. Flap healthy appearing. Failed TOV yesterday.  : AFVSS. Patient seen and evaluated this am. Pt in chair, flap looks healthy. Minimal output from neck opening. Donor site healing appropriately.       ALLERGIES:  No Known Allergies      MEDICATIONS:  Antiinfectives:   imipenem/cilastatin  IVPB 1000 milliGRAM(s) IV Intermittent every 8 hours    IV fluids:  dextrose 5%. 1000 milliLiter(s) IV Continuous <Continuous>  dextrose 5%. 1000 milliLiter(s) IV Continuous <Continuous>  multivitamin/minerals/iron Oral Solution (CENTRUM) 15 milliLiter(s) Oral daily  potassium phosphate IVPB 15 milliMole(s) IV Intermittent once    Hematologic/Anticoagulation:  enoxaparin Injectable 40 milliGRAM(s) SubCutaneous every 24 hours    Pain medications/Neuro:  gabapentin Solution 300 milliGRAM(s) Oral every 12 hours  HYDROmorphone  Injectable 0.5 milliGRAM(s) IV Push every 3 hours PRN  ondansetron Injectable 4 milliGRAM(s) IV Push every 8 hours PRN  oxyCODONE    Solution 10 milliGRAM(s) Enteral Tube every 6 hours PRN  oxyCODONE    Solution 5 milliGRAM(s) Enteral Tube every 6 hours PRN    Endocrine Medications:   dextrose 50% Injectable 12.5 Gram(s) IV Push once  dextrose 50% Injectable 25 Gram(s) IV Push once  dextrose 50% Injectable 25 Gram(s) IV Push once  dextrose Oral Gel 15 Gram(s) Oral once PRN  glucagon  Injectable 1 milliGRAM(s) IntraMuscular once  insulin lispro (ADMELOG) corrective regimen sliding scale   SubCutaneous every 6 hours    All other standing medications:   bacitracin   Ointment 1 Application(s) Topical every 12 hours  chlorhexidine 0.12% Liquid 15 milliLiter(s) Oral Mucosa two times a day  chlorhexidine 2% Cloths 1 Application(s) Topical daily  doxazosin 1 milliGRAM(s) Oral at bedtime  influenza  Vaccine (HIGH DOSE) 0.7 milliLiter(s) IntraMuscular once  polyethylene glycol 3350 17 Gram(s) Oral daily  senna 1 Tablet(s) Oral daily    All other PRN medications:    Vital Signs Last 24 Hrs  T(C): 36.5 (2024 08:45), Max: 36.7 (2024 17:08)  T(F): 97.7 (2024 08:45), Max: 98 (2024 17:08)  HR: 73 (2024 09:12) (43 - 73)  BP: 97/50 (2024 09:00) (89/54 - 135/64)  BP(mean): 71 (2024 09:00) (67 - 92)  RR: 22 (2024 09:12) (20 - 36)  SpO2: 96% (2024 09:12) (95% - 100%)    Parameters below as of 2024 09:12  Patient On (Oxygen Delivery Method): tracheostomy collar    O2 Concentration (%): 40       @ 07: @ 07:00  --------------------------------------------------------  IN:    Enteral Tube Flush: 200 mL    IV PiggyBack: 200 mL    IV PiggyBack: 750 mL    Lactated Ringers: 40 mL    Vital1.5: 795 mL  Total IN: 1985 mL    OUT:    Bulb (mL): 5 mL    Bulb (mL): 27.5 mL    Indwelling Catheter - Urethral (mL): 1970 mL  Total OUT: 2002.5 mL    Total NET: -17.5 mL       @ 07: @ 09:36  --------------------------------------------------------  IN:    Enteral Tube Flush: 105 mL  Total IN: 105 mL    OUT:    Indwelling Catheter - Urethral (mL): 100 mL  Total OUT: 100 mL    Total NET: 5 mL          24 @ 07:01  -  24 @ 07:00  --------------------------------------------------------  IN:  Total IN: 0 mL    OUT:    Bulb (mL): 5 mL    Bulb (mL): 27.5 mL  Total OUT: 32.5 mL    Total NET: -32.5 mL      PHYSICAL EXAM:  GEN: appears stated age, NAD  NEURO: alert & oriented x 4/4  HEENT: flap visualized and healthy appearing, strong doppler signal, neck partially open and with some minimal output  CVS: regular rate and rhythm  Pulm: normal respiratory excursions, not tachypneic, no labored breathing  Abd: non-distended  Ext: moving all four extremities, no peripheral edema noted       LABS                       9.6    8.70  )-----------( 158      ( 2024 05:30 )             29.9        140  |  107  |  22  ----------------------------<  126<H>  3.8   |  27  |  0.63    Ca    8.5      2024 05:30  Phos  2.5       Mg     2.0         TPro  5.4<L>  /  Alb  3.0<L>  /  TBili  1.9<H>  /  DBili  0.4<H>  /  AST  16  /  ALT  13  /  AlkPhos  55           Coagulation Studies-     Urinalysis Basic - ( 2024 05:30 )    Color: x / Appearance: x / SG: x / pH: x  Gluc: 126 mg/dL / Ketone: x  / Bili: x / Urobili: x   Blood: x / Protein: x / Nitrite: x   Leuk Esterase: x / RBC: x / WBC x   Sq Epi: x / Non Sq Epi: x / Bacteria: x      Endocrine Panel-  Calcium: 8.5 mg/dL ( @ 05:30)      MICROBIOLOGY:  Culture Results:   Moderate Escherichia coli  Moderate Enterobacter cloacae complex  Rare Candida parapsilosis  Rare Staphylococcus capitis (24 @ 18:03)  Culture Results:   Numerous Escherichia coli  Numerous Enterobacter cloacae complex  Few Enterococcus faecalis  Rare Staphylococcus epidermidis (24 @ 18:03)

## 2024-01-13 NOTE — PROGRESS NOTE ADULT - ASSESSMENT
SAUNDRA HOLMAN is a 69yo M w PMH of CAD (x2 stents Mar 2023), HLD, T2DM, hx of kidney stones, psoriasis who presented with an oral mass and underwent L hemimandibulectomy, SND L level 1-3, recon with L FFF, STSG, and placement of dental implants on 12/7. He had a trach which was subsequently decannulated. Now s/p OR for debridement and exploration. Trach replaced through prior stoma for pulmonary toilet. Pt with reported intermittent tremors 1/7, electrolytes normal.     Plan:  - ERAS protocol POD 2  - Q2 flap checks  - DC young today, TOV  - Hgb goal > 9.0   - C/w Imipenem (1/1 - 1/16)   - C/w TF  - Hold home plavix  - Maintain 7.5 portex for pulm toilet  - ISS  - Pain control  - Home meds  - Bowel regimen  - Step down today  - Remainder per SICU team

## 2024-01-13 NOTE — PROGRESS NOTE ADULT - ATTENDING COMMENTS
CAD, DM2 s/p L latissimus dorsi free flap and tracheostomy exchange   physical as above  continue imipenem  tolerating TC  pain control  TOV on cardura  start vital feeds

## 2024-01-14 LAB
ANION GAP SERPL CALC-SCNC: 11 MMOL/L — SIGNIFICANT CHANGE UP (ref 5–17)
ANION GAP SERPL CALC-SCNC: 11 MMOL/L — SIGNIFICANT CHANGE UP (ref 5–17)
BUN SERPL-MCNC: 17 MG/DL — SIGNIFICANT CHANGE UP (ref 7–23)
BUN SERPL-MCNC: 17 MG/DL — SIGNIFICANT CHANGE UP (ref 7–23)
CALCIUM SERPL-MCNC: 8.7 MG/DL — SIGNIFICANT CHANGE UP (ref 8.4–10.5)
CALCIUM SERPL-MCNC: 8.7 MG/DL — SIGNIFICANT CHANGE UP (ref 8.4–10.5)
CHLORIDE SERPL-SCNC: 105 MMOL/L — SIGNIFICANT CHANGE UP (ref 96–108)
CHLORIDE SERPL-SCNC: 105 MMOL/L — SIGNIFICANT CHANGE UP (ref 96–108)
CO2 SERPL-SCNC: 24 MMOL/L — SIGNIFICANT CHANGE UP (ref 22–31)
CO2 SERPL-SCNC: 24 MMOL/L — SIGNIFICANT CHANGE UP (ref 22–31)
CREAT SERPL-MCNC: 0.56 MG/DL — SIGNIFICANT CHANGE UP (ref 0.5–1.3)
CREAT SERPL-MCNC: 0.56 MG/DL — SIGNIFICANT CHANGE UP (ref 0.5–1.3)
EGFR: 108 ML/MIN/1.73M2 — SIGNIFICANT CHANGE UP
EGFR: 108 ML/MIN/1.73M2 — SIGNIFICANT CHANGE UP
GLUCOSE BLDC GLUCOMTR-MCNC: 112 MG/DL — HIGH (ref 70–99)
GLUCOSE BLDC GLUCOMTR-MCNC: 112 MG/DL — HIGH (ref 70–99)
GLUCOSE BLDC GLUCOMTR-MCNC: 123 MG/DL — HIGH (ref 70–99)
GLUCOSE BLDC GLUCOMTR-MCNC: 123 MG/DL — HIGH (ref 70–99)
GLUCOSE BLDC GLUCOMTR-MCNC: 124 MG/DL — HIGH (ref 70–99)
GLUCOSE BLDC GLUCOMTR-MCNC: 124 MG/DL — HIGH (ref 70–99)
GLUCOSE BLDC GLUCOMTR-MCNC: 127 MG/DL — HIGH (ref 70–99)
GLUCOSE SERPL-MCNC: 131 MG/DL — HIGH (ref 70–99)
GLUCOSE SERPL-MCNC: 131 MG/DL — HIGH (ref 70–99)
HCT VFR BLD CALC: 33.5 % — LOW (ref 39–50)
HCT VFR BLD CALC: 33.5 % — LOW (ref 39–50)
HGB BLD-MCNC: 10.9 G/DL — LOW (ref 13–17)
HGB BLD-MCNC: 10.9 G/DL — LOW (ref 13–17)
MAGNESIUM SERPL-MCNC: 1.5 MG/DL — LOW (ref 1.6–2.6)
MAGNESIUM SERPL-MCNC: 1.5 MG/DL — LOW (ref 1.6–2.6)
MCHC RBC-ENTMCNC: 28.8 PG — SIGNIFICANT CHANGE UP (ref 27–34)
MCHC RBC-ENTMCNC: 28.8 PG — SIGNIFICANT CHANGE UP (ref 27–34)
MCHC RBC-ENTMCNC: 32.5 GM/DL — SIGNIFICANT CHANGE UP (ref 32–36)
MCHC RBC-ENTMCNC: 32.5 GM/DL — SIGNIFICANT CHANGE UP (ref 32–36)
MCV RBC AUTO: 88.6 FL — SIGNIFICANT CHANGE UP (ref 80–100)
MCV RBC AUTO: 88.6 FL — SIGNIFICANT CHANGE UP (ref 80–100)
NRBC # BLD: 0 /100 WBCS — SIGNIFICANT CHANGE UP (ref 0–0)
NRBC # BLD: 0 /100 WBCS — SIGNIFICANT CHANGE UP (ref 0–0)
PHOSPHATE SERPL-MCNC: 3.1 MG/DL — SIGNIFICANT CHANGE UP (ref 2.5–4.5)
PHOSPHATE SERPL-MCNC: 3.1 MG/DL — SIGNIFICANT CHANGE UP (ref 2.5–4.5)
PLATELET # BLD AUTO: 170 K/UL — SIGNIFICANT CHANGE UP (ref 150–400)
PLATELET # BLD AUTO: 170 K/UL — SIGNIFICANT CHANGE UP (ref 150–400)
POTASSIUM SERPL-MCNC: 4.2 MMOL/L — SIGNIFICANT CHANGE UP (ref 3.5–5.3)
POTASSIUM SERPL-MCNC: 4.2 MMOL/L — SIGNIFICANT CHANGE UP (ref 3.5–5.3)
POTASSIUM SERPL-SCNC: 4.2 MMOL/L — SIGNIFICANT CHANGE UP (ref 3.5–5.3)
POTASSIUM SERPL-SCNC: 4.2 MMOL/L — SIGNIFICANT CHANGE UP (ref 3.5–5.3)
RBC # BLD: 3.78 M/UL — LOW (ref 4.2–5.8)
RBC # BLD: 3.78 M/UL — LOW (ref 4.2–5.8)
RBC # FLD: 14.9 % — HIGH (ref 10.3–14.5)
RBC # FLD: 14.9 % — HIGH (ref 10.3–14.5)
SODIUM SERPL-SCNC: 140 MMOL/L — SIGNIFICANT CHANGE UP (ref 135–145)
SODIUM SERPL-SCNC: 140 MMOL/L — SIGNIFICANT CHANGE UP (ref 135–145)
WBC # BLD: 12.51 K/UL — HIGH (ref 3.8–10.5)
WBC # BLD: 12.51 K/UL — HIGH (ref 3.8–10.5)
WBC # FLD AUTO: 12.51 K/UL — HIGH (ref 3.8–10.5)
WBC # FLD AUTO: 12.51 K/UL — HIGH (ref 3.8–10.5)

## 2024-01-14 PROCEDURE — 99232 SBSQ HOSP IP/OBS MODERATE 35: CPT

## 2024-01-14 RX ORDER — MAGNESIUM SULFATE 500 MG/ML
2 VIAL (ML) INJECTION ONCE
Refills: 0 | Status: COMPLETED | OUTPATIENT
Start: 2024-01-14 | End: 2024-01-14

## 2024-01-14 RX ADMIN — SENNA PLUS 1 TABLET(S): 8.6 TABLET ORAL at 11:03

## 2024-01-14 RX ADMIN — Medication 1 APPLICATION(S): at 17:18

## 2024-01-14 RX ADMIN — Medication 15 MILLILITER(S): at 11:36

## 2024-01-14 RX ADMIN — Medication 1 MILLIGRAM(S): at 21:49

## 2024-01-14 RX ADMIN — IMIPENEM AND CILASTATIN 250 MILLIGRAM(S): 250; 250 INJECTION, POWDER, FOR SOLUTION INTRAVENOUS at 21:49

## 2024-01-14 RX ADMIN — Medication 3 MILLILITER(S): at 16:20

## 2024-01-14 RX ADMIN — CHLORHEXIDINE GLUCONATE 15 MILLILITER(S): 213 SOLUTION TOPICAL at 17:17

## 2024-01-14 RX ADMIN — ENOXAPARIN SODIUM 40 MILLIGRAM(S): 100 INJECTION SUBCUTANEOUS at 11:03

## 2024-01-14 RX ADMIN — POLYETHYLENE GLYCOL 3350 17 GRAM(S): 17 POWDER, FOR SOLUTION ORAL at 11:03

## 2024-01-14 RX ADMIN — CHLORHEXIDINE GLUCONATE 15 MILLILITER(S): 213 SOLUTION TOPICAL at 05:52

## 2024-01-14 RX ADMIN — Medication 1 APPLICATION(S): at 05:52

## 2024-01-14 RX ADMIN — Medication 25 GRAM(S): at 11:04

## 2024-01-14 RX ADMIN — Medication 3 MILLILITER(S): at 05:04

## 2024-01-14 RX ADMIN — IMIPENEM AND CILASTATIN 250 MILLIGRAM(S): 250; 250 INJECTION, POWDER, FOR SOLUTION INTRAVENOUS at 05:52

## 2024-01-14 RX ADMIN — Medication 81 MILLIGRAM(S): at 11:03

## 2024-01-14 RX ADMIN — IMIPENEM AND CILASTATIN 250 MILLIGRAM(S): 250; 250 INJECTION, POWDER, FOR SOLUTION INTRAVENOUS at 13:40

## 2024-01-14 RX ADMIN — GABAPENTIN 300 MILLIGRAM(S): 400 CAPSULE ORAL at 07:40

## 2024-01-14 RX ADMIN — Medication 3 MILLILITER(S): at 21:49

## 2024-01-14 RX ADMIN — Medication 3 MILLILITER(S): at 11:02

## 2024-01-14 RX ADMIN — GABAPENTIN 300 MILLIGRAM(S): 400 CAPSULE ORAL at 17:18

## 2024-01-14 NOTE — PROGRESS NOTE ADULT - SUBJECTIVE AND OBJECTIVE BOX
Vital Signs Last 24 Hrs  T(C): 36.9 (14 Jan 2024 09:28), Max: 37.1 (14 Jan 2024 05:00)  T(F): 98.5 (14 Jan 2024 09:28), Max: 98.7 (14 Jan 2024 05:00)  HR: 80 (14 Jan 2024 13:17) (59 - 92)  BP: 135/63 (14 Jan 2024 12:19) (94/53 - 135/63)  BP(mean): 91 (14 Jan 2024 12:19) (69 - 92)  ABP: --  ABP(mean): --  RR: 18 (14 Jan 2024 13:17) (18 - 38)  SpO2: 99% (14 Jan 2024 13:17) (86% - 100%)    O2 Parameters below as of 14 Jan 2024 13:17  Patient On (Oxygen Delivery Method): tracheostomy collar  O2 Flow (L/min): 7  O2 Concentration (%): 40    seen sitting on bedside chair, son present. very tired.   would nod or shake head  trach in place   RRR, moving good air on bilateral lung exam.  dressing on left lower leg.      MEDICATIONS  (STANDING):  albuterol/ipratropium for Nebulization 3 milliLiter(s) Nebulizer every 6 hours  aspirin  chewable 81 milliGRAM(s) Oral daily  bacitracin   Ointment 1 Application(s) Topical every 12 hours  chlorhexidine 0.12% Liquid 15 milliLiter(s) Oral Mucosa two times a day  chlorhexidine 2% Cloths 1 Application(s) Topical daily  dextrose 5%. 1000 milliLiter(s) (50 mL/Hr) IV Continuous <Continuous>  dextrose 5%. 1000 milliLiter(s) (100 mL/Hr) IV Continuous <Continuous>  dextrose 50% Injectable 25 Gram(s) IV Push once  dextrose 50% Injectable 25 Gram(s) IV Push once  dextrose 50% Injectable 12.5 Gram(s) IV Push once  doxazosin 1 milliGRAM(s) Oral at bedtime  enoxaparin Injectable 40 milliGRAM(s) SubCutaneous every 24 hours  gabapentin Solution 300 milliGRAM(s) Oral every 12 hours  glucagon  Injectable 1 milliGRAM(s) IntraMuscular once  imipenem/cilastatin  IVPB 1000 milliGRAM(s) IV Intermittent every 8 hours  influenza  Vaccine (HIGH DOSE) 0.7 milliLiter(s) IntraMuscular once  insulin lispro (ADMELOG) corrective regimen sliding scale   SubCutaneous every 6 hours  multivitamin/minerals/iron Oral Solution (CENTRUM) 15 milliLiter(s) Oral daily  polyethylene glycol 3350 17 Gram(s) Oral daily  senna 1 Tablet(s) Oral daily    MEDICATIONS  (PRN):  dextrose Oral Gel 15 Gram(s) Oral once PRN Blood Glucose LESS THAN 70 milliGRAM(s)/deciliter  HYDROmorphone  Injectable 0.5 milliGRAM(s) IV Push every 3 hours PRN breakthrough pain  lidocaine   4% Patch 1 Patch Transdermal daily PRN back pain  ondansetron Injectable 4 milliGRAM(s) IV Push every 8 hours PRN Nausea and/or Vomiting  oxyCODONE    Solution 10 milliGRAM(s) Enteral Tube every 6 hours PRN Severe Pain (7 - 10)  oxyCODONE    Solution 5 milliGRAM(s) Enteral Tube every 6 hours PRN Moderate Pain (4 - 6)

## 2024-01-14 NOTE — PROGRESS NOTE ADULT - SUBJECTIVE AND OBJECTIVE BOX
OTOLARYNGOLOGY (ENT) PROGRESS NOTE    PATIENT: SAUNDRA HOLMAN  MRN: [id number]  : 57  [id number]  DATE OF SERVICE:  24  			         ID: SAUNDRA HOLMAN is a 67yo M w PMH of CAD (x2 stents Mar 2023), HLD, T2DM, hx of kidney stones, psoriasis who presented with an oral mass and underwent L hemimandibulectomy, SND L level 1-3, recon with L FFF, STSG, and placement of dental implants on . He had a trach which was subsequently decannulated. He returns  with surgical site infection now s/p OR for debridement and exploration. Trach replaced through prior stoma for pulmonary toilet.     Subjective/ Interval:   ; Patient seen this morning, oral pack to be changed, penrose dressing changed. Intraoral flap with partal skin necrosis, 7.5 portex in place with cuff deflated   : Patient seen and examined at bedside. NAEO. Patient remains afebrile and HD stable. HgB noted to drop to 7.6 from 9.2 but no additional episodes of melena. Penrose draining purulent material. Intra-oral infection/flap stable. Packing changed at bedside. No other complaints or changes at this time. ID recommended Vanc/zosyn and DC unasyn and flagyl, and IM recommended reticulocyte studies and adding folate supplements. No other changes at this time.   : AFVSS. No acute events overnight. Patient seen and examined at bedside. C/w Vanc/Zosyn per ID recs, pending sensitivities. Growing staph epi, E coli, enterobacter from wound cx on . S/p 2U PRBC yday w appropriate response in Hgb. Transfusion goal > 9.0.  : AFVSS. No acute events overnight. Patient seen and examined at bedside. C/w Vanc/Zosyn per ID recs, pending sens. Hgb stable this morning.  : AFVSS. No acute events overnight. Patient seen and examined at bedside. C/w Vanc/Zosyn, e faecalis sens to vanc/zosyn. Had 3 dark BM's yesterday that were stool guiac positive.  : AFVSS. No acute events overnight. Patient seen and examined at bedside. C/w Imipenem (changed per ID, enterobacter resistant to Zosyn). Plan for OR today for further washout / debridement.  1/3: AFVSS. No acute events overnight. Patient seen and examined at bedside. C/w imipenim. OR cx growing numerous gram negative rods and few gram positive cocci in pairs.  : Patient seen bedside, changed intraoral and neck packing,  Continue to follow cultures, pain controlled   : patient seen this morning, complains of right arm pain wit minimal swelling,  IV in the left arm,  Continue abx for 2 weeks. neck and oral packing changed, purulent drainage from neck noted.   : Patient seen at bedside during morning rounds. Reports right arm pain and swelling somewhat improved although still present. IV replaced in right arm yesterday. Remains on IV abx, tentative end date 1/15/24. Neck and oral packing changed at bedside; purulent drainage noted from neck, decreased in volume compared to prior exams.  : Patient seen at bedside during morning rounds. Overnight, patient complained of coughing, throat discomfort, and nursing reported some increased secretions via trach. Started on mucomyst with improvement. Noted 6 beats of PSVT at ~1900, no reported chest pain or other symptoms. No further episodes. Packing replaced at bedside   : patient seen this morning, neck and oral packing changed. Continues to have purulent drainage form neck.  No chest pain or SOB,               : patient seen this morning, changed neck and oral pack, patient tolerated packing change better with adjusted premedication,  purulence continues from neck , trach in place, fibula donor site with granulation tissue ( changed dressing)   1/10: AFVSS. Patient seen and evaluated this am. Neck and oral packing changed. Some purulence from neck. Trach in place.  : AFVSS. Patient taken to OR for washout, L latissmus dorsi flap to L oral cavity/oropharynx.  : AFVSS. Patient seen and evaluated this am. Pt doing well, minimal output from neck opening. KRISTEN drains in place. Flap healthy appearing. Failed TOV yesterday.  : AFVSS. Patient seen and evaluated this am. Pt in chair, flap looks healthy. Minimal output from neck opening. Donor site healing appropriately.   : AFVSS. Patient seen and evaluated this am. Flap appears healthy. Trach in place. Neck dressing changed, no purulence draining from opening. Donor site healing appropriately.      ALLERGIES:  No Known Allergies      MEDICATIONS:  Antiinfectives:   imipenem/cilastatin  IVPB 1000 milliGRAM(s) IV Intermittent every 8 hours    IV fluids:  dextrose 5%. 1000 milliLiter(s) IV Continuous <Continuous>  dextrose 5%. 1000 milliLiter(s) IV Continuous <Continuous>  multivitamin/minerals/iron Oral Solution (CENTRUM) 15 milliLiter(s) Oral daily    Hematologic/Anticoagulation:  aspirin  chewable 81 milliGRAM(s) Oral daily  enoxaparin Injectable 40 milliGRAM(s) SubCutaneous every 24 hours    Pain medications/Neuro:  gabapentin Solution 300 milliGRAM(s) Oral every 12 hours  HYDROmorphone  Injectable 0.5 milliGRAM(s) IV Push every 3 hours PRN  ondansetron Injectable 4 milliGRAM(s) IV Push every 8 hours PRN  oxyCODONE    Solution 5 milliGRAM(s) Enteral Tube every 6 hours PRN  oxyCODONE    Solution 10 milliGRAM(s) Enteral Tube every 6 hours PRN    Endocrine Medications:   dextrose 50% Injectable 12.5 Gram(s) IV Push once  dextrose 50% Injectable 25 Gram(s) IV Push once  dextrose 50% Injectable 25 Gram(s) IV Push once  dextrose Oral Gel 15 Gram(s) Oral once PRN  glucagon  Injectable 1 milliGRAM(s) IntraMuscular once  insulin lispro (ADMELOG) corrective regimen sliding scale   SubCutaneous every 6 hours    All other standing medications:   albuterol/ipratropium for Nebulization 3 milliLiter(s) Nebulizer every 6 hours  bacitracin   Ointment 1 Application(s) Topical every 12 hours  chlorhexidine 0.12% Liquid 15 milliLiter(s) Oral Mucosa two times a day  chlorhexidine 2% Cloths 1 Application(s) Topical daily  doxazosin 1 milliGRAM(s) Oral at bedtime  influenza  Vaccine (HIGH DOSE) 0.7 milliLiter(s) IntraMuscular once  polyethylene glycol 3350 17 Gram(s) Oral daily  senna 1 Tablet(s) Oral daily    All other PRN medications:  lidocaine   4% Patch 1 Patch Transdermal daily PRN    Vital Signs Last 24 Hrs  T(C): 37.1 (2024 05:00), Max: 37.1 (2024 05:00)  T(F): 98.7 (2024 05:00), Max: 98.7 (2024 05:00)  HR: 90 (2024 08:18) (58 - 92)  BP: 102/51 (2024 08:18) (90/51 - 123/58)  BP(mean): 73 (2024 08:18) (66 - 92)  RR: 20 (2024 08:18) (18 - 38)  SpO2: 96% (2024 08:18) (86% - 100%)    Parameters below as of 2024 08:18  Patient On (Oxygen Delivery Method): tracheostomy collar  O2 Flow (L/min): 7  O2 Concentration (%): 40       @ 07:01  -   @ 07:00  --------------------------------------------------------  IN:    Enteral Tube Flush: 180 mL    IV PiggyBack: 250 mL    IV PiggyBack: 750 mL    Vital1.5: 1365 mL  Total IN: 2545 mL    OUT:    Bulb (mL): 5 mL    Bulb (mL): 20 mL    Indwelling Catheter - Urethral (mL): 225 mL    Oral Fluid: 0 mL    Voided (mL): 1250 mL  Total OUT: 1500 mL    Total NET: 1045 mL          24 @ 07:01  -  24 @ 07:00  --------------------------------------------------------  IN:  Total IN: 0 mL    OUT:    Bulb (mL): 5 mL    Bulb (mL): 20 mL  Total OUT: 25 mL    Total NET: -25 mL        PHYSICAL EXAM:  GEN: appears stated age, NAD  NEURO: alert & oriented x 4/4  HEENT: flap in place, with good color, strong doppler. Neck dressing changed, no purulence draining from neck. Trach in place, uncuffed.  CVS: regular rate and rhythm  Pulm: normal respiratory excursions, not tachypneic, no labored breathing  Abd: non-distended  Ext: moving all four extremities, no peripheral edema noted. Latissmus donor site well-healing.       LABS                       10.9   12.51 )-----------( 170      ( 2024 05:30 )             33.5        140  |  105  |  17  ----------------------------<  131<H>  4.2   |  24  |  0.56    Ca    8.7      2024 05:30  Phos  3.1       Mg     1.5                Coagulation Studies-     Urinalysis Basic - ( 2024 05:30 )    Color: x / Appearance: x / SG: x / pH: x  Gluc: 131 mg/dL / Ketone: x  / Bili: x / Urobili: x   Blood: x / Protein: x / Nitrite: x   Leuk Esterase: x / RBC: x / WBC x   Sq Epi: x / Non Sq Epi: x / Bacteria: x      Endocrine Panel-  Calcium: 8.7 mg/dL ( @ 05:30)              COVID-19 PCR: NotDetec (24 @ 14:17)    MICROBIOLOGY:  Culture Results:   Moderate Escherichia coli  Moderate Enterobacter cloacae complex  Rare Candida parapsilosis  Rare Staphylococcus capitis (24 @ 18:03)  Culture Results:   Numerous Escherichia coli  Numerous Enterobacter cloacae complex  Few Enterococcus faecalis  Rare Staphylococcus epidermidis (24 @ 18:03)     OTOLARYNGOLOGY (ENT) PROGRESS NOTE    PATIENT: SAUNDRA HOLMAN  MRN: [id number]  : 57  [id number]  DATE OF SERVICE:  24  			         ID: SAUNDRA HOLMAN is a 67yo M w PMH of CAD (x2 stents Mar 2023), HLD, T2DM, hx of kidney stones, psoriasis who presented with an oral mass and underwent L hemimandibulectomy, SND L level 1-3, recon with L FFF, STSG, and placement of dental implants on . He had a trach which was subsequently decannulated. He returns  with surgical site infection now s/p OR for debridement and exploration. Trach replaced through prior stoma for pulmonary toilet.     Subjective/ Interval:   ; Patient seen this morning, oral pack to be changed, penrose dressing changed. Intraoral flap with partal skin necrosis, 7.5 portex in place with cuff deflated   : Patient seen and examined at bedside. NAEO. Patient remains afebrile and HD stable. HgB noted to drop to 7.6 from 9.2 but no additional episodes of melena. Penrose draining purulent material. Intra-oral infection/flap stable. Packing changed at bedside. No other complaints or changes at this time. ID recommended Vanc/zosyn and DC unasyn and flagyl, and IM recommended reticulocyte studies and adding folate supplements. No other changes at this time.   : AFVSS. No acute events overnight. Patient seen and examined at bedside. C/w Vanc/Zosyn per ID recs, pending sensitivities. Growing staph epi, E coli, enterobacter from wound cx on . S/p 2U PRBC yday w appropriate response in Hgb. Transfusion goal > 9.0.  : AFVSS. No acute events overnight. Patient seen and examined at bedside. C/w Vanc/Zosyn per ID recs, pending sens. Hgb stable this morning.  : AFVSS. No acute events overnight. Patient seen and examined at bedside. C/w Vanc/Zosyn, e faecalis sens to vanc/zosyn. Had 3 dark BM's yesterday that were stool guiac positive.  : AFVSS. No acute events overnight. Patient seen and examined at bedside. C/w Imipenem (changed per ID, enterobacter resistant to Zosyn). Plan for OR today for further washout / debridement.  1/3: AFVSS. No acute events overnight. Patient seen and examined at bedside. C/w imipenim. OR cx growing numerous gram negative rods and few gram positive cocci in pairs.  : Patient seen bedside, changed intraoral and neck packing,  Continue to follow cultures, pain controlled   : patient seen this morning, complains of right arm pain wit minimal swelling,  IV in the left arm,  Continue abx for 2 weeks. neck and oral packing changed, purulent drainage from neck noted.   : Patient seen at bedside during morning rounds. Reports right arm pain and swelling somewhat improved although still present. IV replaced in right arm yesterday. Remains on IV abx, tentative end date 1/15/24. Neck and oral packing changed at bedside; purulent drainage noted from neck, decreased in volume compared to prior exams.  : Patient seen at bedside during morning rounds. Overnight, patient complained of coughing, throat discomfort, and nursing reported some increased secretions via trach. Started on mucomyst with improvement. Noted 6 beats of PSVT at ~1900, no reported chest pain or other symptoms. No further episodes. Packing replaced at bedside   : patient seen this morning, neck and oral packing changed. Continues to have purulent drainage form neck.  No chest pain or SOB,               : patient seen this morning, changed neck and oral pack, patient tolerated packing change better with adjusted premedication,  purulence continues from neck , trach in place, fibula donor site with granulation tissue ( changed dressing)   1/10: AFVSS. Patient seen and evaluated this am. Neck and oral packing changed. Some purulence from neck. Trach in place.  : AFVSS. Patient taken to OR for washout, L latissmus dorsi flap to L oral cavity/oropharynx.  : AFVSS. Patient seen and evaluated this am. Pt doing well, minimal output from neck opening. KRISTEN drains in place. Flap healthy appearing. Failed TOV yesterday.  : AFVSS. Patient seen and evaluated this am. Pt in chair, flap looks healthy. Minimal output from neck opening. Donor site healing appropriately.   : AFVSS. Patient seen and evaluated this am. Flap appears healthy. Trach in place. Neck dressing changed, no purulence draining from opening. Donor site healing appropriately.      ALLERGIES:  No Known Allergies      MEDICATIONS:  Antiinfectives:   imipenem/cilastatin  IVPB 1000 milliGRAM(s) IV Intermittent every 8 hours    IV fluids:  dextrose 5%. 1000 milliLiter(s) IV Continuous <Continuous>  dextrose 5%. 1000 milliLiter(s) IV Continuous <Continuous>  multivitamin/minerals/iron Oral Solution (CENTRUM) 15 milliLiter(s) Oral daily    Hematologic/Anticoagulation:  aspirin  chewable 81 milliGRAM(s) Oral daily  enoxaparin Injectable 40 milliGRAM(s) SubCutaneous every 24 hours    Pain medications/Neuro:  gabapentin Solution 300 milliGRAM(s) Oral every 12 hours  HYDROmorphone  Injectable 0.5 milliGRAM(s) IV Push every 3 hours PRN  ondansetron Injectable 4 milliGRAM(s) IV Push every 8 hours PRN  oxyCODONE    Solution 5 milliGRAM(s) Enteral Tube every 6 hours PRN  oxyCODONE    Solution 10 milliGRAM(s) Enteral Tube every 6 hours PRN    Endocrine Medications:   dextrose 50% Injectable 12.5 Gram(s) IV Push once  dextrose 50% Injectable 25 Gram(s) IV Push once  dextrose 50% Injectable 25 Gram(s) IV Push once  dextrose Oral Gel 15 Gram(s) Oral once PRN  glucagon  Injectable 1 milliGRAM(s) IntraMuscular once  insulin lispro (ADMELOG) corrective regimen sliding scale   SubCutaneous every 6 hours    All other standing medications:   albuterol/ipratropium for Nebulization 3 milliLiter(s) Nebulizer every 6 hours  bacitracin   Ointment 1 Application(s) Topical every 12 hours  chlorhexidine 0.12% Liquid 15 milliLiter(s) Oral Mucosa two times a day  chlorhexidine 2% Cloths 1 Application(s) Topical daily  doxazosin 1 milliGRAM(s) Oral at bedtime  influenza  Vaccine (HIGH DOSE) 0.7 milliLiter(s) IntraMuscular once  polyethylene glycol 3350 17 Gram(s) Oral daily  senna 1 Tablet(s) Oral daily    All other PRN medications:  lidocaine   4% Patch 1 Patch Transdermal daily PRN    Vital Signs Last 24 Hrs  T(C): 37.1 (2024 05:00), Max: 37.1 (2024 05:00)  T(F): 98.7 (2024 05:00), Max: 98.7 (2024 05:00)  HR: 90 (2024 08:18) (58 - 92)  BP: 102/51 (2024 08:18) (90/51 - 123/58)  BP(mean): 73 (2024 08:18) (66 - 92)  RR: 20 (2024 08:18) (18 - 38)  SpO2: 96% (2024 08:18) (86% - 100%)    Parameters below as of 2024 08:18  Patient On (Oxygen Delivery Method): tracheostomy collar  O2 Flow (L/min): 7  O2 Concentration (%): 40       @ 07:01  -   @ 07:00  --------------------------------------------------------  IN:    Enteral Tube Flush: 180 mL    IV PiggyBack: 250 mL    IV PiggyBack: 750 mL    Vital1.5: 1365 mL  Total IN: 2545 mL    OUT:    Bulb (mL): 5 mL    Bulb (mL): 20 mL    Indwelling Catheter - Urethral (mL): 225 mL    Oral Fluid: 0 mL    Voided (mL): 1250 mL  Total OUT: 1500 mL    Total NET: 1045 mL          24 @ 07:01  -  24 @ 07:00  --------------------------------------------------------  IN:  Total IN: 0 mL    OUT:    Bulb (mL): 5 mL    Bulb (mL): 20 mL  Total OUT: 25 mL    Total NET: -25 mL        PHYSICAL EXAM:  GEN: appears stated age, NAD  NEURO: alert & oriented x 4/4  HEENT: flap in place, with good color, strong doppler. Neck dressing changed, no purulence draining from neck. Trach in place, uncuffed.  CVS: regular rate and rhythm  Pulm: normal respiratory excursions, not tachypneic, no labored breathing  Abd: non-distended  Ext: moving all four extremities, no peripheral edema noted. Latissmus donor site well-healing.       LABS                       10.9   12.51 )-----------( 170      ( 2024 05:30 )             33.5        140  |  105  |  17  ----------------------------<  131<H>  4.2   |  24  |  0.56    Ca    8.7      2024 05:30  Phos  3.1       Mg     1.5                Coagulation Studies-     Urinalysis Basic - ( 2024 05:30 )    Color: x / Appearance: x / SG: x / pH: x  Gluc: 131 mg/dL / Ketone: x  / Bili: x / Urobili: x   Blood: x / Protein: x / Nitrite: x   Leuk Esterase: x / RBC: x / WBC x   Sq Epi: x / Non Sq Epi: x / Bacteria: x      Endocrine Panel-  Calcium: 8.7 mg/dL ( @ 05:30)              COVID-19 PCR: NotDetec (24 @ 14:17)    MICROBIOLOGY:  Culture Results:   Moderate Escherichia coli  Moderate Enterobacter cloacae complex  Rare Candida parapsilosis  Rare Staphylococcus capitis (24 @ 18:03)  Culture Results:   Numerous Escherichia coli  Numerous Enterobacter cloacae complex  Few Enterococcus faecalis  Rare Staphylococcus epidermidis (24 @ 18:03)

## 2024-01-14 NOTE — PROGRESS NOTE ADULT - ATTENDING COMMENTS
69 y/o M PMHx of CAD s/p PCI/CUBA x2 to LAD and Ramus (Mar 2023), T2DM, psoriasis, hx Renal calculi, w/ [id number] SCC of L buccal mucosa s/p hemimandibulectomy, left level 1, 2a, 3 neck neck dissection, L FFF, tracheostomy w/ 7.5 cuffed portex, dental implants, STSG (12/7/23), s/p G tube placement and subsequent trach decannulation and discharged home on ( 12/22/23). Pt however returned to [facility] ( 12/27/23) with surgical site infection, s/p RTOR 12/27 with findings of skin flap necrosis and s/p debridement. Trach replaced through prior stoma for pulmonary toilet. 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 Purulent drainage appreciated at margins of flap and in neck.  EGD( 1/2): ulcer found at the G-tube site  # RTOR- flat change with Latissmus dorsi- close monitoring in sicu, now on step down  wound care as per ENT.   - ID f/u appreciated ( Team 1) ,  d/c's Vanco & Zosyn ( 1/1) and started on Imipenem 1gm iv q8h  (1/1-) , will need 2 weeks of iv abx from the last washout ( 1/2) to continue till 1/16-   - f/u OR culture ( 1/2): reviewed   - OR cultures with E.coli, ECC, E.faecalis S.mitis/oralis, S.epi, S.constellatus, S.maltophilia, mixed anaerobes.   - culture result reviewed.  - BCx( 12/28): ngtd     # Acute blood loss anemia/melena -Hb stable now at 10.9  # hx WADE ( Ferritin 768 but Tsat 13% ( <20%) on 12/13/23)   - GI fu appreciated, EGD( 1/2) ulcer at G-tube site, rec; Protonix 40mg daily for 8 weeks and avoid bumper being too tight to cause pressure ulcer   - keep active T&S, keep Hgb>8    - c/w ASA given hx PCI x2 ( March 2023)  - hold off Plavix since adm ( 12/27) requiring procedure and due to bleeding  - c/w PPI daily can be via G-tube   - c/w Folic acid 1mg daily   - monitor clinically for any further melena     # CAD sp PCI/CUBA x2 to LAD and Ramus in March 2023 as per cards is in setting of NSTEMI - prefer DAPT, at least monotherapy with ASA if plavix need to be held for procedure, currently on ASA , to restart plavix when safe., c/w ASA 81mg and Atorvastatin 40mg qHS     #  DM - FS with ISS, would hold all ora-hypoglycemic agent, goal -180    # pain management - oxycodone 5mg/10mg prn , dilaudid prn, would need stool softener to ensure daily BM.     # Dysphagia- NPO, trach, on TF with Vital 1.5 via G-tube     # DVT ppx: Lovenox    # monitor electrolyte and replace, Hypomag.     will follow. 67 y/o M PMHx of CAD s/p PCI/CUBA x2 to LAD and Ramus (Mar 2023), T2DM, psoriasis, hx Renal calculi, w/ [id number] SCC of L buccal mucosa s/p hemimandibulectomy, left level 1, 2a, 3 neck neck dissection, L FFF, tracheostomy w/ 7.5 cuffed portex, dental implants, STSG (12/7/23), s/p G tube placement and subsequent trach decannulation and discharged home on ( 12/22/23). Pt however returned to [facility] ( 12/27/23) with surgical site infection, s/p RTOR 12/27 with findings of skin flap necrosis and s/p debridement. Trach replaced through prior stoma for pulmonary toilet. 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 Purulent drainage appreciated at margins of flap and in neck.  EGD( 1/2): ulcer found at the G-tube site  # RTOR- flat change with Latissmus dorsi- close monitoring in sicu, now on step down  wound care as per ENT.   - ID f/u appreciated ( Team 1) ,  d/c's Vanco & Zosyn ( 1/1) and started on Imipenem 1gm iv q8h  (1/1-) , will need 2 weeks of iv abx from the last washout ( 1/2) to continue till 1/16-   - f/u OR culture ( 1/2): reviewed   - OR cultures with E.coli, ECC, E.faecalis S.mitis/oralis, S.epi, S.constellatus, S.maltophilia, mixed anaerobes.   - culture result reviewed.  - BCx( 12/28): ngtd     # Acute blood loss anemia/melena -Hb stable now at 10.9  # hx WADE ( Ferritin 768 but Tsat 13% ( <20%) on 12/13/23)   - GI fu appreciated, EGD( 1/2) ulcer at G-tube site, rec; Protonix 40mg daily for 8 weeks and avoid bumper being too tight to cause pressure ulcer   - keep active T&S, keep Hgb>8    - c/w ASA given hx PCI x2 ( March 2023)  - hold off Plavix since adm ( 12/27) requiring procedure and due to bleeding  - c/w PPI daily can be via G-tube   - c/w Folic acid 1mg daily   - monitor clinically for any further melena     # CAD sp PCI/CUBA x2 to LAD and Ramus in March 2023 as per cards is in setting of NSTEMI - prefer DAPT, at least monotherapy with ASA if plavix need to be held for procedure, currently on ASA , to restart plavix when safe., c/w ASA 81mg and Atorvastatin 40mg qHS     #  DM - FS with ISS, would hold all ora-hypoglycemic agent, goal -180    # pain management - oxycodone 5mg/10mg prn , dilaudid prn, would need stool softener to ensure daily BM.     # Dysphagia- NPO, trach, on TF with Vital 1.5 via G-tube     # DVT ppx: Lovenox    # monitor electrolyte and replace, Hypomag.     will follow.

## 2024-01-14 NOTE — PROGRESS NOTE ADULT - ASSESSMENT
SAUNDRA HOLMAN is a 67yo M w PMH of CAD (x2 stents Mar 2023), HLD, T2DM, hx of kidney stones, psoriasis who presented with an oral mass and underwent L hemimandibulectomy, SND L level 1-3, recon with L FFF, STSG, and placement of dental implants on 12/7. He had a trach which was subsequently decannulated. Now s/p OR for debridement and exploration. Trach replaced through prior stoma for pulmonary toilet. Pt with reported intermittent tremors 1/7, electrolytes normal.     Plan:  - ERAS protocol POD 3  - Q4 flap checks  - Hgb goal > 9.0   - C/w Imipenem (1/1 - 1/16)   - C/w TF  - Hold home plavix  - Maintain 7.5 portex for pulm toilet  - ISS  - Pain control  - Home meds  - Bowel regimen   SAUNDRA HOLMAN is a 69yo M w PMH of CAD (x2 stents Mar 2023), HLD, T2DM, hx of kidney stones, psoriasis who presented with an oral mass and underwent L hemimandibulectomy, SND L level 1-3, recon with L FFF, STSG, and placement of dental implants on 12/7. He had a trach which was subsequently decannulated. Now s/p OR for debridement and exploration. Trach replaced through prior stoma for pulmonary toilet. Pt with reported intermittent tremors 1/7, electrolytes normal.     Plan:  - ERAS protocol POD 3  - Q4 flap checks  - Hgb goal > 9.0   - C/w Imipenem (1/1 - 1/16)   - C/w TF  - Hold home plavix  - Maintain 7.5 portex for pulm toilet  - ISS  - Pain control  - Home meds  - Bowel regimen

## 2024-01-15 LAB
ANION GAP SERPL CALC-SCNC: 7 MMOL/L — SIGNIFICANT CHANGE UP (ref 5–17)
ANION GAP SERPL CALC-SCNC: 7 MMOL/L — SIGNIFICANT CHANGE UP (ref 5–17)
BUN SERPL-MCNC: 15 MG/DL — SIGNIFICANT CHANGE UP (ref 7–23)
BUN SERPL-MCNC: 15 MG/DL — SIGNIFICANT CHANGE UP (ref 7–23)
CALCIUM SERPL-MCNC: 8.9 MG/DL — SIGNIFICANT CHANGE UP (ref 8.4–10.5)
CALCIUM SERPL-MCNC: 8.9 MG/DL — SIGNIFICANT CHANGE UP (ref 8.4–10.5)
CHLORIDE SERPL-SCNC: 102 MMOL/L — SIGNIFICANT CHANGE UP (ref 96–108)
CHLORIDE SERPL-SCNC: 102 MMOL/L — SIGNIFICANT CHANGE UP (ref 96–108)
CO2 SERPL-SCNC: 27 MMOL/L — SIGNIFICANT CHANGE UP (ref 22–31)
CO2 SERPL-SCNC: 27 MMOL/L — SIGNIFICANT CHANGE UP (ref 22–31)
CREAT SERPL-MCNC: 0.54 MG/DL — SIGNIFICANT CHANGE UP (ref 0.5–1.3)
CREAT SERPL-MCNC: 0.54 MG/DL — SIGNIFICANT CHANGE UP (ref 0.5–1.3)
EGFR: 109 ML/MIN/1.73M2 — SIGNIFICANT CHANGE UP
EGFR: 109 ML/MIN/1.73M2 — SIGNIFICANT CHANGE UP
GLUCOSE BLDC GLUCOMTR-MCNC: 100 MG/DL — HIGH (ref 70–99)
GLUCOSE BLDC GLUCOMTR-MCNC: 124 MG/DL — HIGH (ref 70–99)
GLUCOSE BLDC GLUCOMTR-MCNC: 124 MG/DL — HIGH (ref 70–99)
GLUCOSE BLDC GLUCOMTR-MCNC: 97 MG/DL — SIGNIFICANT CHANGE UP (ref 70–99)
GLUCOSE BLDC GLUCOMTR-MCNC: 97 MG/DL — SIGNIFICANT CHANGE UP (ref 70–99)
GLUCOSE SERPL-MCNC: 127 MG/DL — HIGH (ref 70–99)
GLUCOSE SERPL-MCNC: 127 MG/DL — HIGH (ref 70–99)
HCT VFR BLD CALC: 29.7 % — LOW (ref 39–50)
HCT VFR BLD CALC: 29.7 % — LOW (ref 39–50)
HGB BLD-MCNC: 9.7 G/DL — LOW (ref 13–17)
HGB BLD-MCNC: 9.7 G/DL — LOW (ref 13–17)
MAGNESIUM SERPL-MCNC: 1.9 MG/DL — SIGNIFICANT CHANGE UP (ref 1.6–2.6)
MAGNESIUM SERPL-MCNC: 1.9 MG/DL — SIGNIFICANT CHANGE UP (ref 1.6–2.6)
MCHC RBC-ENTMCNC: 29.1 PG — SIGNIFICANT CHANGE UP (ref 27–34)
MCHC RBC-ENTMCNC: 29.1 PG — SIGNIFICANT CHANGE UP (ref 27–34)
MCHC RBC-ENTMCNC: 32.7 GM/DL — SIGNIFICANT CHANGE UP (ref 32–36)
MCHC RBC-ENTMCNC: 32.7 GM/DL — SIGNIFICANT CHANGE UP (ref 32–36)
MCV RBC AUTO: 89.2 FL — SIGNIFICANT CHANGE UP (ref 80–100)
MCV RBC AUTO: 89.2 FL — SIGNIFICANT CHANGE UP (ref 80–100)
NRBC # BLD: 0 /100 WBCS — SIGNIFICANT CHANGE UP (ref 0–0)
NRBC # BLD: 0 /100 WBCS — SIGNIFICANT CHANGE UP (ref 0–0)
PHOSPHATE SERPL-MCNC: 2.7 MG/DL — SIGNIFICANT CHANGE UP (ref 2.5–4.5)
PHOSPHATE SERPL-MCNC: 2.7 MG/DL — SIGNIFICANT CHANGE UP (ref 2.5–4.5)
PLATELET # BLD AUTO: 138 K/UL — LOW (ref 150–400)
PLATELET # BLD AUTO: 138 K/UL — LOW (ref 150–400)
POTASSIUM SERPL-MCNC: 3.8 MMOL/L — SIGNIFICANT CHANGE UP (ref 3.5–5.3)
POTASSIUM SERPL-MCNC: 3.8 MMOL/L — SIGNIFICANT CHANGE UP (ref 3.5–5.3)
POTASSIUM SERPL-SCNC: 3.8 MMOL/L — SIGNIFICANT CHANGE UP (ref 3.5–5.3)
POTASSIUM SERPL-SCNC: 3.8 MMOL/L — SIGNIFICANT CHANGE UP (ref 3.5–5.3)
RBC # BLD: 3.33 M/UL — LOW (ref 4.2–5.8)
RBC # BLD: 3.33 M/UL — LOW (ref 4.2–5.8)
RBC # FLD: 14.6 % — HIGH (ref 10.3–14.5)
RBC # FLD: 14.6 % — HIGH (ref 10.3–14.5)
SODIUM SERPL-SCNC: 136 MMOL/L — SIGNIFICANT CHANGE UP (ref 135–145)
SODIUM SERPL-SCNC: 136 MMOL/L — SIGNIFICANT CHANGE UP (ref 135–145)
WBC # BLD: 10.38 K/UL — SIGNIFICANT CHANGE UP (ref 3.8–10.5)
WBC # BLD: 10.38 K/UL — SIGNIFICANT CHANGE UP (ref 3.8–10.5)
WBC # FLD AUTO: 10.38 K/UL — SIGNIFICANT CHANGE UP (ref 3.8–10.5)
WBC # FLD AUTO: 10.38 K/UL — SIGNIFICANT CHANGE UP (ref 3.8–10.5)

## 2024-01-15 PROCEDURE — 99232 SBSQ HOSP IP/OBS MODERATE 35: CPT

## 2024-01-15 RX ADMIN — OXYCODONE HYDROCHLORIDE 5 MILLIGRAM(S): 5 TABLET ORAL at 23:00

## 2024-01-15 RX ADMIN — OXYCODONE HYDROCHLORIDE 5 MILLIGRAM(S): 5 TABLET ORAL at 21:51

## 2024-01-15 RX ADMIN — Medication 1 MILLIGRAM(S): at 21:44

## 2024-01-15 RX ADMIN — Medication 15 MILLILITER(S): at 12:37

## 2024-01-15 RX ADMIN — Medication 3 MILLILITER(S): at 10:24

## 2024-01-15 RX ADMIN — Medication 81 MILLIGRAM(S): at 12:36

## 2024-01-15 RX ADMIN — ENOXAPARIN SODIUM 40 MILLIGRAM(S): 100 INJECTION SUBCUTANEOUS at 10:24

## 2024-01-15 RX ADMIN — CHLORHEXIDINE GLUCONATE 15 MILLILITER(S): 213 SOLUTION TOPICAL at 17:24

## 2024-01-15 RX ADMIN — HYDROMORPHONE HYDROCHLORIDE 0.5 MILLIGRAM(S): 2 INJECTION INTRAMUSCULAR; INTRAVENOUS; SUBCUTANEOUS at 17:25

## 2024-01-15 RX ADMIN — IMIPENEM AND CILASTATIN 250 MILLIGRAM(S): 250; 250 INJECTION, POWDER, FOR SOLUTION INTRAVENOUS at 05:58

## 2024-01-15 RX ADMIN — IMIPENEM AND CILASTATIN 250 MILLIGRAM(S): 250; 250 INJECTION, POWDER, FOR SOLUTION INTRAVENOUS at 21:15

## 2024-01-15 RX ADMIN — GABAPENTIN 300 MILLIGRAM(S): 400 CAPSULE ORAL at 05:59

## 2024-01-15 RX ADMIN — HYDROMORPHONE HYDROCHLORIDE 0.5 MILLIGRAM(S): 2 INJECTION INTRAMUSCULAR; INTRAVENOUS; SUBCUTANEOUS at 05:42

## 2024-01-15 RX ADMIN — Medication 3 MILLILITER(S): at 03:51

## 2024-01-15 RX ADMIN — Medication 3 MILLILITER(S): at 21:44

## 2024-01-15 RX ADMIN — GABAPENTIN 300 MILLIGRAM(S): 400 CAPSULE ORAL at 17:26

## 2024-01-15 RX ADMIN — Medication 1 APPLICATION(S): at 17:24

## 2024-01-15 RX ADMIN — CHLORHEXIDINE GLUCONATE 15 MILLILITER(S): 213 SOLUTION TOPICAL at 05:59

## 2024-01-15 RX ADMIN — HYDROMORPHONE HYDROCHLORIDE 0.5 MILLIGRAM(S): 2 INJECTION INTRAMUSCULAR; INTRAVENOUS; SUBCUTANEOUS at 18:02

## 2024-01-15 RX ADMIN — IMIPENEM AND CILASTATIN 250 MILLIGRAM(S): 250; 250 INJECTION, POWDER, FOR SOLUTION INTRAVENOUS at 14:46

## 2024-01-15 RX ADMIN — Medication 1 APPLICATION(S): at 05:59

## 2024-01-15 RX ADMIN — Medication 3 MILLILITER(S): at 17:23

## 2024-01-15 RX ADMIN — HYDROMORPHONE HYDROCHLORIDE 0.5 MILLIGRAM(S): 2 INJECTION INTRAMUSCULAR; INTRAVENOUS; SUBCUTANEOUS at 04:32

## 2024-01-15 NOTE — PROGRESS NOTE ADULT - SUBJECTIVE AND OBJECTIVE BOX
OTOLARYNGOLOGY (ENT) PROGRESS NOTE    PATIENT: SAUNDRA HOLMAN  MRN: [id number]  : 57  [id number]  DATE OF SERVICE:  01-15-24  			         ID:SAUNDRA HOLMAN is a 67yo M w PMH of CAD (x2 stents Mar 2023), HLD, T2DM, hx of kidney stones, psoriasis who presented with an oral mass and underwent L hemimandibulectomy, SND L level 1-3, recon with L FFF, STSG, and placement of dental implants on . He had a trach which was subsequently decannulated. He returns  with surgical site infection now s/p OR for debridement and exploration. Trach replaced through prior stoma for pulmonary toilet.     Subjective/ Interval:   ; Patient seen this morning, oral pack to be changed, penrose dressing changed. Intraoral flap with partal skin necrosis, 7.5 portex in place with cuff deflated   : Patient seen and examined at bedside. NAEO. Patient remains afebrile and HD stable. HgB noted to drop to 7.6 from 9.2 but no additional episodes of melena. Penrose draining purulent material. Intra-oral infection/flap stable. Packing changed at bedside. No other complaints or changes at this time. ID recommended Vanc/zosyn and DC unasyn and flagyl, and IM recommended reticulocyte studies and adding folate supplements. No other changes at this time.   : AFVSS. No acute events overnight. Patient seen and examined at bedside. C/w Vanc/Zosyn per ID recs, pending sensitivities. Growing staph epi, E coli, enterobacter from wound cx on . S/p 2U PRBC yday w appropriate response in Hgb. Transfusion goal > 9.0.  : AFVSS. No acute events overnight. Patient seen and examined at bedside. C/w Vanc/Zosyn per ID recs, pending sens. Hgb stable this morning.  : AFVSS. No acute events overnight. Patient seen and examined at bedside. C/w Vanc/Zosyn, e faecalis sens to vanc/zosyn. Had 3 dark BM's yesterday that were stool guiac positive.  : AFVSS. No acute events overnight. Patient seen and examined at bedside. C/w Imipenem (changed per ID, enterobacter resistant to Zosyn). Plan for OR today for further washout / debridement.  1/3: AFVSS. No acute events overnight. Patient seen and examined at bedside. C/w imipenim. OR cx growing numerous gram negative rods and few gram positive cocci in pairs.  : Patient seen bedside, changed intraoral and neck packing,  Continue to follow cultures, pain controlled   : patient seen this morning, complains of right arm pain wit minimal swelling,  IV in the left arm,  Continue abx for 2 weeks. neck and oral packing changed, purulent drainage from neck noted.   : Patient seen at bedside during morning rounds. Reports right arm pain and swelling somewhat improved although still present. IV replaced in right arm yesterday. Remains on IV abx, tentative end date 1/15/24. Neck and oral packing changed at bedside; purulent drainage noted from neck, decreased in volume compared to prior exams.  : Patient seen at bedside during morning rounds. Overnight, patient complained of coughing, throat discomfort, and nursing reported some increased secretions via trach. Started on mucomyst with improvement. Noted 6 beats of PSVT at ~1900, no reported chest pain or other symptoms. No further episodes. Packing replaced at bedside   : patient seen this morning, neck and oral packing changed. Continues to have purulent drainage form neck.  No chest pain or SOB,               : patient seen this morning, changed neck and oral pack, patient tolerated packing change better with adjusted premedication,  purulence continues from neck , trach in place, fibula donor site with granulation tissue ( changed dressing)   1/10: AFVSS. Patient seen and evaluated this am. Neck and oral packing changed. Some purulence from neck. Trach in place.  : AFVSS. Patient taken to OR for washout, L latissmus dorsi flap to L oral cavity/oropharynx.  : AFVSS. Patient seen and evaluated this am. Pt doing well, minimal output from neck opening. KRISTEN drains in place. Flap healthy appearing. Failed TOV yesterday.  : AFVSS. Patient seen and evaluated this am. Pt in chair, flap looks healthy. Minimal output from neck opening. Donor site healing appropriately.   : AFVSS. Patient seen and evaluated this am. Flap appears healthy. Trach in place. Neck dressing changed, no purulence draining from opening. Donor site healing appropriately.  1/15: AFVSS. Patient seen and evaluated this am. Flap is healthy appearing. Trach in place. Neck dressing changed. Donor site healing appropriately.      ALLERGIES:  No Known Allergies      MEDICATIONS:  Antiinfectives:   imipenem/cilastatin  IVPB 1000 milliGRAM(s) IV Intermittent every 8 hours    IV fluids:  dextrose 5%. 1000 milliLiter(s) IV Continuous <Continuous>  dextrose 5%. 1000 milliLiter(s) IV Continuous <Continuous>  multivitamin/minerals/iron Oral Solution (CENTRUM) 15 milliLiter(s) Oral daily    Hematologic/Anticoagulation:  aspirin  chewable 81 milliGRAM(s) Oral daily  enoxaparin Injectable 40 milliGRAM(s) SubCutaneous every 24 hours    Pain medications/Neuro:  gabapentin Solution 300 milliGRAM(s) Oral every 12 hours  HYDROmorphone  Injectable 0.5 milliGRAM(s) IV Push every 3 hours PRN  ondansetron Injectable 4 milliGRAM(s) IV Push every 8 hours PRN  oxyCODONE    Solution 5 milliGRAM(s) Enteral Tube every 6 hours PRN  oxyCODONE    Solution 10 milliGRAM(s) Enteral Tube every 6 hours PRN    Endocrine Medications:   dextrose 50% Injectable 12.5 Gram(s) IV Push once  dextrose 50% Injectable 25 Gram(s) IV Push once  dextrose 50% Injectable 25 Gram(s) IV Push once  dextrose Oral Gel 15 Gram(s) Oral once PRN  glucagon  Injectable 1 milliGRAM(s) IntraMuscular once  insulin lispro (ADMELOG) corrective regimen sliding scale   SubCutaneous every 6 hours    All other standing medications:   albuterol/ipratropium for Nebulization 3 milliLiter(s) Nebulizer every 6 hours  bacitracin   Ointment 1 Application(s) Topical every 12 hours  chlorhexidine 0.12% Liquid 15 milliLiter(s) Oral Mucosa two times a day  chlorhexidine 2% Cloths 1 Application(s) Topical daily  doxazosin 1 milliGRAM(s) Oral at bedtime  influenza  Vaccine (HIGH DOSE) 0.7 milliLiter(s) IntraMuscular once  polyethylene glycol 3350 17 Gram(s) Oral daily  senna 1 Tablet(s) Oral daily    All other PRN medications:  lidocaine   4% Patch 1 Patch Transdermal daily PRN    Vital Signs Last 24 Hrs  T(C): 36.9 (15 Carlos 2024 10:00), Max: 36.9 (15 Carlos 2024 10:00)  T(F): 98.4 (15 Carlos 2024 10:00), Max: 98.4 (15 Carlos 2024 10:00)  HR: 70 (15 Carlos 2024 08:36) (62 - 84)  BP: 109/53 (15 Carlos 2024 08:07) (108/54 - 135/63)  BP(mean): 77 (15 Carlos 2024 08:07) (77 - 91)  RR: 10 (15 Carlos 2024 08:36) (10 - 24)  SpO2: 96% (15 Carlos 2024 08:36) (95% - 100%)    Parameters below as of 15 Carlos 2024 08:36  Patient On (Oxygen Delivery Method): tracheostomy collar  O2 Flow (L/min): 10  O2 Concentration (%): 40       @ 07:  -  01-15 @ 07:00  --------------------------------------------------------  IN:    Enteral Tube Flush: 800 mL    IV PiggyBack: 250 mL    IV PiggyBack: 50 mL    Vital1.5: 1011 mL  Total IN: 2111 mL    OUT:    Bulb (mL): 25 mL    Bulb (mL): 10 mL    Voided (mL): 1150 mL  Total OUT: 1185 mL    Total NET: 926 mL      01-15 @ 07:01  -  01-15 @ 12:05  --------------------------------------------------------  IN:  Total IN: 0 mL    OUT:    Voided (mL): 300 mL  Total OUT: 300 mL    Total NET: -300 mL          24 @ 07:01  -  01-15-24 @ 07:00  --------------------------------------------------------  IN:  Total IN: 0 mL    OUT:    Bulb (mL): 25 mL    Bulb (mL): 10 mL  Total OUT: 35 mL    Total NET: -35 mL              PHYSICAL EXAM:  GEN: appears stated age  NEURO: alert & oriented x   HEENT: flap with good color and doppler signal, neck with no purulence noted, trach in place  CVS: regular rate and rhythm  Pulm: normal respiratory excursions, not tachypneic, no labored breathing  Abd: non-distended  Ext: moving all four extremities, no peripheral edema noted. Latissmus donor site well healing.       LABS                       9.7    10.38 )-----------( 138      ( 15 Carlos 2024 05:30 )             29.7    01-15    136  |  102  |  15  ----------------------------<  127<H>  3.8   |  27  |  0.54    Ca    8.9      15 Carlos 2024 05:30  Phos  2.7     01-15  Mg     1.9     01-15           Coagulation Studies-     Urinalysis Basic - ( 15 Carlos 2024 05:30 )    Color: x / Appearance: x / SG: x / pH: x  Gluc: 127 mg/dL / Ketone: x  / Bili: x / Urobili: x   Blood: x / Protein: x / Nitrite: x   Leuk Esterase: x / RBC: x / WBC x   Sq Epi: x / Non Sq Epi: x / Bacteria: x      Endocrine Panel-  Calcium: 8.9 mg/dL (01-15 @ 05:30)    MICROBIOLOGY:  Culture Results:   Moderate Escherichia coli  Moderate Enterobacter cloacae complex  Rare Candida parapsilosis  Rare Staphylococcus capitis (24 @ 18:03)  Culture Results:   Numerous Escherichia coli  Numerous Enterobacter cloacae complex  Few Enterococcus faecalis  Rare Staphylococcus epidermidis (24 @ 18:03)        RADIOLOGY & ADDITIONAL STUDIES:      Assessment and Plan:  SAUNDRA HOLMAN is a  68yMale   .. S/P ...    PLAN:    Disposition:   [facility] at [phone] with any questions/concerns.    01-15-24 @ 12:05 OTOLARYNGOLOGY (ENT) PROGRESS NOTE    PATIENT: SAUNDRA HOLMAN  MRN: [id number]  : 57  [id number]  DATE OF SERVICE:  01-15-24  			         ID:SAUNDRA HOLMAN is a 69yo M w PMH of CAD (x2 stents Mar 2023), HLD, T2DM, hx of kidney stones, psoriasis who presented with an oral mass and underwent L hemimandibulectomy, SND L level 1-3, recon with L FFF, STSG, and placement of dental implants on . He had a trach which was subsequently decannulated. He returns  with surgical site infection now s/p OR for debridement and exploration. Trach replaced through prior stoma for pulmonary toilet.     Subjective/ Interval:   ; Patient seen this morning, oral pack to be changed, penrose dressing changed. Intraoral flap with partal skin necrosis, 7.5 portex in place with cuff deflated   : Patient seen and examined at bedside. NAEO. Patient remains afebrile and HD stable. HgB noted to drop to 7.6 from 9.2 but no additional episodes of melena. Penrose draining purulent material. Intra-oral infection/flap stable. Packing changed at bedside. No other complaints or changes at this time. ID recommended Vanc/zosyn and DC unasyn and flagyl, and IM recommended reticulocyte studies and adding folate supplements. No other changes at this time.   : AFVSS. No acute events overnight. Patient seen and examined at bedside. C/w Vanc/Zosyn per ID recs, pending sensitivities. Growing staph epi, E coli, enterobacter from wound cx on . S/p 2U PRBC yday w appropriate response in Hgb. Transfusion goal > 9.0.  : AFVSS. No acute events overnight. Patient seen and examined at bedside. C/w Vanc/Zosyn per ID recs, pending sens. Hgb stable this morning.  : AFVSS. No acute events overnight. Patient seen and examined at bedside. C/w Vanc/Zosyn, e faecalis sens to vanc/zosyn. Had 3 dark BM's yesterday that were stool guiac positive.  : AFVSS. No acute events overnight. Patient seen and examined at bedside. C/w Imipenem (changed per ID, enterobacter resistant to Zosyn). Plan for OR today for further washout / debridement.  1/3: AFVSS. No acute events overnight. Patient seen and examined at bedside. C/w imipenim. OR cx growing numerous gram negative rods and few gram positive cocci in pairs.  : Patient seen bedside, changed intraoral and neck packing,  Continue to follow cultures, pain controlled   : patient seen this morning, complains of right arm pain wit minimal swelling,  IV in the left arm,  Continue abx for 2 weeks. neck and oral packing changed, purulent drainage from neck noted.   : Patient seen at bedside during morning rounds. Reports right arm pain and swelling somewhat improved although still present. IV replaced in right arm yesterday. Remains on IV abx, tentative end date 1/15/24. Neck and oral packing changed at bedside; purulent drainage noted from neck, decreased in volume compared to prior exams.  : Patient seen at bedside during morning rounds. Overnight, patient complained of coughing, throat discomfort, and nursing reported some increased secretions via trach. Started on mucomyst with improvement. Noted 6 beats of PSVT at ~1900, no reported chest pain or other symptoms. No further episodes. Packing replaced at bedside   : patient seen this morning, neck and oral packing changed. Continues to have purulent drainage form neck.  No chest pain or SOB,               : patient seen this morning, changed neck and oral pack, patient tolerated packing change better with adjusted premedication,  purulence continues from neck , trach in place, fibula donor site with granulation tissue ( changed dressing)   1/10: AFVSS. Patient seen and evaluated this am. Neck and oral packing changed. Some purulence from neck. Trach in place.  : AFVSS. Patient taken to OR for washout, L latissmus dorsi flap to L oral cavity/oropharynx.  : AFVSS. Patient seen and evaluated this am. Pt doing well, minimal output from neck opening. KRISTEN drains in place. Flap healthy appearing. Failed TOV yesterday.  : AFVSS. Patient seen and evaluated this am. Pt in chair, flap looks healthy. Minimal output from neck opening. Donor site healing appropriately.   : AFVSS. Patient seen and evaluated this am. Flap appears healthy. Trach in place. Neck dressing changed, no purulence draining from opening. Donor site healing appropriately.  1/15: AFVSS. Patient seen and evaluated this am. Flap is healthy appearing. Trach in place. Neck dressing changed. Donor site healing appropriately.      ALLERGIES:  No Known Allergies      MEDICATIONS:  Antiinfectives:   imipenem/cilastatin  IVPB 1000 milliGRAM(s) IV Intermittent every 8 hours    IV fluids:  dextrose 5%. 1000 milliLiter(s) IV Continuous <Continuous>  dextrose 5%. 1000 milliLiter(s) IV Continuous <Continuous>  multivitamin/minerals/iron Oral Solution (CENTRUM) 15 milliLiter(s) Oral daily    Hematologic/Anticoagulation:  aspirin  chewable 81 milliGRAM(s) Oral daily  enoxaparin Injectable 40 milliGRAM(s) SubCutaneous every 24 hours    Pain medications/Neuro:  gabapentin Solution 300 milliGRAM(s) Oral every 12 hours  HYDROmorphone  Injectable 0.5 milliGRAM(s) IV Push every 3 hours PRN  ondansetron Injectable 4 milliGRAM(s) IV Push every 8 hours PRN  oxyCODONE    Solution 5 milliGRAM(s) Enteral Tube every 6 hours PRN  oxyCODONE    Solution 10 milliGRAM(s) Enteral Tube every 6 hours PRN    Endocrine Medications:   dextrose 50% Injectable 12.5 Gram(s) IV Push once  dextrose 50% Injectable 25 Gram(s) IV Push once  dextrose 50% Injectable 25 Gram(s) IV Push once  dextrose Oral Gel 15 Gram(s) Oral once PRN  glucagon  Injectable 1 milliGRAM(s) IntraMuscular once  insulin lispro (ADMELOG) corrective regimen sliding scale   SubCutaneous every 6 hours    All other standing medications:   albuterol/ipratropium for Nebulization 3 milliLiter(s) Nebulizer every 6 hours  bacitracin   Ointment 1 Application(s) Topical every 12 hours  chlorhexidine 0.12% Liquid 15 milliLiter(s) Oral Mucosa two times a day  chlorhexidine 2% Cloths 1 Application(s) Topical daily  doxazosin 1 milliGRAM(s) Oral at bedtime  influenza  Vaccine (HIGH DOSE) 0.7 milliLiter(s) IntraMuscular once  polyethylene glycol 3350 17 Gram(s) Oral daily  senna 1 Tablet(s) Oral daily    All other PRN medications:  lidocaine   4% Patch 1 Patch Transdermal daily PRN    Vital Signs Last 24 Hrs  T(C): 36.9 (15 Carlos 2024 10:00), Max: 36.9 (15 Carlos 2024 10:00)  T(F): 98.4 (15 Carlos 2024 10:00), Max: 98.4 (15 Carlos 2024 10:00)  HR: 70 (15 Carlos 2024 08:36) (62 - 84)  BP: 109/53 (15 Carlos 2024 08:07) (108/54 - 135/63)  BP(mean): 77 (15 Carlos 2024 08:07) (77 - 91)  RR: 10 (15 Carlos 2024 08:36) (10 - 24)  SpO2: 96% (15 Carlos 2024 08:36) (95% - 100%)    Parameters below as of 15 Carlos 2024 08:36  Patient On (Oxygen Delivery Method): tracheostomy collar  O2 Flow (L/min): 10  O2 Concentration (%): 40       @ 07:  -  01-15 @ 07:00  --------------------------------------------------------  IN:    Enteral Tube Flush: 800 mL    IV PiggyBack: 250 mL    IV PiggyBack: 50 mL    Vital1.5: 1011 mL  Total IN: 2111 mL    OUT:    Bulb (mL): 25 mL    Bulb (mL): 10 mL    Voided (mL): 1150 mL  Total OUT: 1185 mL    Total NET: 926 mL      01-15 @ 07:01  -  01-15 @ 12:05  --------------------------------------------------------  IN:  Total IN: 0 mL    OUT:    Voided (mL): 300 mL  Total OUT: 300 mL    Total NET: -300 mL          24 @ 07:01  -  01-15-24 @ 07:00  --------------------------------------------------------  IN:  Total IN: 0 mL    OUT:    Bulb (mL): 25 mL    Bulb (mL): 10 mL  Total OUT: 35 mL    Total NET: -35 mL              PHYSICAL EXAM:  GEN: appears stated age  NEURO: alert & oriented x   HEENT: flap with good color and doppler signal, neck with no purulence noted, trach in place  CVS: regular rate and rhythm  Pulm: normal respiratory excursions, not tachypneic, no labored breathing  Abd: non-distended  Ext: moving all four extremities, no peripheral edema noted. Latissmus donor site well healing.       LABS                       9.7    10.38 )-----------( 138      ( 15 Carlos 2024 05:30 )             29.7    01-15    136  |  102  |  15  ----------------------------<  127<H>  3.8   |  27  |  0.54    Ca    8.9      15 Carlos 2024 05:30  Phos  2.7     01-15  Mg     1.9     01-15           Coagulation Studies-     Urinalysis Basic - ( 15 Carlos 2024 05:30 )    Color: x / Appearance: x / SG: x / pH: x  Gluc: 127 mg/dL / Ketone: x  / Bili: x / Urobili: x   Blood: x / Protein: x / Nitrite: x   Leuk Esterase: x / RBC: x / WBC x   Sq Epi: x / Non Sq Epi: x / Bacteria: x      Endocrine Panel-  Calcium: 8.9 mg/dL (01-15 @ 05:30)    MICROBIOLOGY:  Culture Results:   Moderate Escherichia coli  Moderate Enterobacter cloacae complex  Rare Candida parapsilosis  Rare Staphylococcus capitis (24 @ 18:03)  Culture Results:   Numerous Escherichia coli  Numerous Enterobacter cloacae complex  Few Enterococcus faecalis  Rare Staphylococcus epidermidis (24 @ 18:03)        RADIOLOGY & ADDITIONAL STUDIES:      Assessment and Plan:  SAUNDRA HOLMAN is a  68yMale   .. S/P ...    PLAN:    Disposition:   [facility] at [phone] with any questions/concerns.    01-15-24 @ 12:05 OTOLARYNGOLOGY (ENT) PROGRESS NOTE    PATIENT: SAUNDRA HOLMAN  MRN: [id number]  : 57  [id number]  DATE OF SERVICE:  01-15-24  			         ID:SAUNDRA HOLMAN is a 69yo M w PMH of CAD (x2 stents Mar 2023), HLD, T2DM, hx of kidney stones, psoriasis who presented with an oral mass and underwent L hemimandibulectomy, SND L level 1-3, recon with L FFF, STSG, and placement of dental implants on . He had a trach which was subsequently decannulated. He returns  with surgical site infection now s/p OR for debridement and exploration. Trach replaced through prior stoma for pulmonary toilet.     Subjective/ Interval:   ; Patient seen this morning, oral pack to be changed, penrose dressing changed. Intraoral flap with partal skin necrosis, 7.5 portex in place with cuff deflated   : Patient seen and examined at bedside. NAEO. Patient remains afebrile and HD stable. HgB noted to drop to 7.6 from 9.2 but no additional episodes of melena. Penrose draining purulent material. Intra-oral infection/flap stable. Packing changed at bedside. No other complaints or changes at this time. ID recommended Vanc/zosyn and DC unasyn and flagyl, and IM recommended reticulocyte studies and adding folate supplements. No other changes at this time.   : AFVSS. No acute events overnight. Patient seen and examined at bedside. C/w Vanc/Zosyn per ID recs, pending sensitivities. Growing staph epi, E coli, enterobacter from wound cx on . S/p 2U PRBC yday w appropriate response in Hgb. Transfusion goal > 9.0.  : AFVSS. No acute events overnight. Patient seen and examined at bedside. C/w Vanc/Zosyn per ID recs, pending sens. Hgb stable this morning.  : AFVSS. No acute events overnight. Patient seen and examined at bedside. C/w Vanc/Zosyn, e faecalis sens to vanc/zosyn. Had 3 dark BM's yesterday that were stool guiac positive.  : AFVSS. No acute events overnight. Patient seen and examined at bedside. C/w Imipenem (changed per ID, enterobacter resistant to Zosyn). Plan for OR today for further washout / debridement.  1/3: AFVSS. No acute events overnight. Patient seen and examined at bedside. C/w imipenim. OR cx growing numerous gram negative rods and few gram positive cocci in pairs.  : Patient seen bedside, changed intraoral and neck packing,  Continue to follow cultures, pain controlled   : patient seen this morning, complains of right arm pain wit minimal swelling,  IV in the left arm,  Continue abx for 2 weeks. neck and oral packing changed, purulent drainage from neck noted.   : Patient seen at bedside during morning rounds. Reports right arm pain and swelling somewhat improved although still present. IV replaced in right arm yesterday. Remains on IV abx, tentative end date 1/15/24. Neck and oral packing changed at bedside; purulent drainage noted from neck, decreased in volume compared to prior exams.  : Patient seen at bedside during morning rounds. Overnight, patient complained of coughing, throat discomfort, and nursing reported some increased secretions via trach. Started on mucomyst with improvement. Noted 6 beats of PSVT at ~1900, no reported chest pain or other symptoms. No further episodes. Packing replaced at bedside   : patient seen this morning, neck and oral packing changed. Continues to have purulent drainage form neck.  No chest pain or SOB,               : patient seen this morning, changed neck and oral pack, patient tolerated packing change better with adjusted premedication,  purulence continues from neck , trach in place, fibula donor site with granulation tissue ( changed dressing)   1/10: AFVSS. Patient seen and evaluated this am. Neck and oral packing changed. Some purulence from neck. Trach in place.  : AFVSS. Patient taken to OR for washout, L latissmus dorsi flap to L oral cavity/oropharynx.  : AFVSS. Patient seen and evaluated this am. Pt doing well, minimal output from neck opening. KRISTEN drains in place. Flap healthy appearing. Failed TOV yesterday.  : AFVSS. Patient seen and evaluated this am. Pt in chair, flap looks healthy. Minimal output from neck opening. Donor site healing appropriately.   : AFVSS. Patient seen and evaluated this am. Flap appears healthy. Trach in place. Neck dressing changed, no purulence draining from opening. Donor site healing appropriately.  1/15: AFVSS. Patient seen and evaluated this am. Flap is healthy appearing. Trach in place. Neck dressing changed. Donor site healing appropriately.      ALLERGIES:  No Known Allergies      MEDICATIONS:  Antiinfectives:   imipenem/cilastatin  IVPB 1000 milliGRAM(s) IV Intermittent every 8 hours    IV fluids:  dextrose 5%. 1000 milliLiter(s) IV Continuous <Continuous>  dextrose 5%. 1000 milliLiter(s) IV Continuous <Continuous>  multivitamin/minerals/iron Oral Solution (CENTRUM) 15 milliLiter(s) Oral daily    Hematologic/Anticoagulation:  aspirin  chewable 81 milliGRAM(s) Oral daily  enoxaparin Injectable 40 milliGRAM(s) SubCutaneous every 24 hours    Pain medications/Neuro:  gabapentin Solution 300 milliGRAM(s) Oral every 12 hours  HYDROmorphone  Injectable 0.5 milliGRAM(s) IV Push every 3 hours PRN  ondansetron Injectable 4 milliGRAM(s) IV Push every 8 hours PRN  oxyCODONE    Solution 5 milliGRAM(s) Enteral Tube every 6 hours PRN  oxyCODONE    Solution 10 milliGRAM(s) Enteral Tube every 6 hours PRN    Endocrine Medications:   dextrose 50% Injectable 12.5 Gram(s) IV Push once  dextrose 50% Injectable 25 Gram(s) IV Push once  dextrose 50% Injectable 25 Gram(s) IV Push once  dextrose Oral Gel 15 Gram(s) Oral once PRN  glucagon  Injectable 1 milliGRAM(s) IntraMuscular once  insulin lispro (ADMELOG) corrective regimen sliding scale   SubCutaneous every 6 hours    All other standing medications:   albuterol/ipratropium for Nebulization 3 milliLiter(s) Nebulizer every 6 hours  bacitracin   Ointment 1 Application(s) Topical every 12 hours  chlorhexidine 0.12% Liquid 15 milliLiter(s) Oral Mucosa two times a day  chlorhexidine 2% Cloths 1 Application(s) Topical daily  doxazosin 1 milliGRAM(s) Oral at bedtime  influenza  Vaccine (HIGH DOSE) 0.7 milliLiter(s) IntraMuscular once  polyethylene glycol 3350 17 Gram(s) Oral daily  senna 1 Tablet(s) Oral daily    All other PRN medications:  lidocaine   4% Patch 1 Patch Transdermal daily PRN    Vital Signs Last 24 Hrs  T(C): 36.9 (15 Carlos 2024 10:00), Max: 36.9 (15 Carlos 2024 10:00)  T(F): 98.4 (15 Carlos 2024 10:00), Max: 98.4 (15 Carlos 2024 10:00)  HR: 70 (15 Carlos 2024 08:36) (62 - 84)  BP: 109/53 (15 Carlos 2024 08:07) (108/54 - 135/63)  BP(mean): 77 (15 Carlos 2024 08:07) (77 - 91)  RR: 10 (15 Carlos 2024 08:36) (10 - 24)  SpO2: 96% (15 Carlos 2024 08:36) (95% - 100%)    Parameters below as of 15 Carlos 2024 08:36  Patient On (Oxygen Delivery Method): tracheostomy collar  O2 Flow (L/min): 10  O2 Concentration (%): 40       @ 07:  -  01-15 @ 07:00  --------------------------------------------------------  IN:    Enteral Tube Flush: 800 mL    IV PiggyBack: 250 mL    IV PiggyBack: 50 mL    Vital1.5: 1011 mL  Total IN: 2111 mL    OUT:    Bulb (mL): 25 mL    Bulb (mL): 10 mL    Voided (mL): 1150 mL  Total OUT: 1185 mL    Total NET: 926 mL      01-15 @ 07:01  -  01-15 @ 12:05  --------------------------------------------------------  IN:  Total IN: 0 mL    OUT:    Voided (mL): 300 mL  Total OUT: 300 mL    Total NET: -300 mL          24 @ 07:01  -  01-15-24 @ 07:00  --------------------------------------------------------  IN:  Total IN: 0 mL    OUT:    Bulb (mL): 25 mL    Bulb (mL): 10 mL  Total OUT: 35 mL    Total NET: -35 mL              PHYSICAL EXAM:  GEN: appears stated age  NEURO: alert & oriented x   HEENT: flap with good color and doppler signal, neck with no purulence noted, trach in place  CVS: regular rate and rhythm  Pulm: normal respiratory excursions, not tachypneic, no labored breathing  Abd: non-distended  Ext: moving all four extremities, no peripheral edema noted. Latissmus donor site well healing.       LABS                       9.7    10.38 )-----------( 138      ( 15 Carlos 2024 05:30 )             29.7    01-15    136  |  102  |  15  ----------------------------<  127<H>  3.8   |  27  |  0.54    Ca    8.9      15 Carlos 2024 05:30  Phos  2.7     01-15  Mg     1.9     01-15           Coagulation Studies-     Urinalysis Basic - ( 15 Carlos 2024 05:30 )    Color: x / Appearance: x / SG: x / pH: x  Gluc: 127 mg/dL / Ketone: x  / Bili: x / Urobili: x   Blood: x / Protein: x / Nitrite: x   Leuk Esterase: x / RBC: x / WBC x   Sq Epi: x / Non Sq Epi: x / Bacteria: x      Endocrine Panel-  Calcium: 8.9 mg/dL (01-15 @ 05:30)    MICROBIOLOGY:  Culture Results:   Moderate Escherichia coli  Moderate Enterobacter cloacae complex  Rare Candida parapsilosis  Rare Staphylococcus capitis (24 @ 18:03)  Culture Results:   Numerous Escherichia coli  Numerous Enterobacter cloacae complex  Few Enterococcus faecalis  Rare Staphylococcus epidermidis (24 @ 18:03)        RADIOLOGY & ADDITIONAL STUDIES:      Assessment and Plan:  SAUNDRA HOLMAN is a  68yMale   .. S/P ...    PLAN:    Disposition:   [facility] at [phone] with any questions/concerns.    01-15-24 @ 12:05 OTOLARYNGOLOGY (ENT) PROGRESS NOTE    PATIENT: SAUNDRA HOLMAN  MRN: [id number]  : 57  [id number]  DATE OF SERVICE:  01-15-24  			         ID:SAUNDRA HOLMAN is a 67yo M w PMH of CAD (x2 stents Mar 2023), HLD, T2DM, hx of kidney stones, psoriasis who presented with an oral mass and underwent L hemimandibulectomy, SND L level 1-3, recon with L FFF, STSG, and placement of dental implants on . He had a trach which was subsequently decannulated. He returns  with surgical site infection now s/p OR for debridement and exploration. Trach replaced through prior stoma for pulmonary toilet.     Subjective/ Interval:   ; Patient seen this morning, oral pack to be changed, penrose dressing changed. Intraoral flap with partal skin necrosis, 7.5 portex in place with cuff deflated   : Patient seen and examined at bedside. NAEO. Patient remains afebrile and HD stable. HgB noted to drop to 7.6 from 9.2 but no additional episodes of melena. Penrose draining purulent material. Intra-oral infection/flap stable. Packing changed at bedside. No other complaints or changes at this time. ID recommended Vanc/zosyn and DC unasyn and flagyl, and IM recommended reticulocyte studies and adding folate supplements. No other changes at this time.   : AFVSS. No acute events overnight. Patient seen and examined at bedside. C/w Vanc/Zosyn per ID recs, pending sensitivities. Growing staph epi, E coli, enterobacter from wound cx on . S/p 2U PRBC yday w appropriate response in Hgb. Transfusion goal > 9.0.  : AFVSS. No acute events overnight. Patient seen and examined at bedside. C/w Vanc/Zosyn per ID recs, pending sens. Hgb stable this morning.  : AFVSS. No acute events overnight. Patient seen and examined at bedside. C/w Vanc/Zosyn, e faecalis sens to vanc/zosyn. Had 3 dark BM's yesterday that were stool guiac positive.  : AFVSS. No acute events overnight. Patient seen and examined at bedside. C/w Imipenem (changed per ID, enterobacter resistant to Zosyn). Plan for OR today for further washout / debridement.  1/3: AFVSS. No acute events overnight. Patient seen and examined at bedside. C/w imipenim. OR cx growing numerous gram negative rods and few gram positive cocci in pairs.  : Patient seen bedside, changed intraoral and neck packing,  Continue to follow cultures, pain controlled   : patient seen this morning, complains of right arm pain wit minimal swelling,  IV in the left arm,  Continue abx for 2 weeks. neck and oral packing changed, purulent drainage from neck noted.   : Patient seen at bedside during morning rounds. Reports right arm pain and swelling somewhat improved although still present. IV replaced in right arm yesterday. Remains on IV abx, tentative end date 1/15/24. Neck and oral packing changed at bedside; purulent drainage noted from neck, decreased in volume compared to prior exams.  : Patient seen at bedside during morning rounds. Overnight, patient complained of coughing, throat discomfort, and nursing reported some increased secretions via trach. Started on mucomyst with improvement. Noted 6 beats of PSVT at ~1900, no reported chest pain or other symptoms. No further episodes. Packing replaced at bedside   : patient seen this morning, neck and oral packing changed. Continues to have purulent drainage form neck.  No chest pain or SOB,               : patient seen this morning, changed neck and oral pack, patient tolerated packing change better with adjusted premedication,  purulence continues from neck , trach in place, fibula donor site with granulation tissue ( changed dressing)   1/10: AFVSS. Patient seen and evaluated this am. Neck and oral packing changed. Some purulence from neck. Trach in place.  : AFVSS. Patient taken to OR for washout, L latissmus dorsi flap to L oral cavity/oropharynx.  : AFVSS. Patient seen and evaluated this am. Pt doing well, minimal output from neck opening. KRISTEN drains in place. Flap healthy appearing. Failed TOV yesterday.  : AFVSS. Patient seen and evaluated this am. Pt in chair, flap looks healthy. Minimal output from neck opening. Donor site healing appropriately.   : AFVSS. Patient seen and evaluated this am. Flap appears healthy. Trach in place. Neck dressing changed, no purulence draining from opening. Donor site healing appropriately.  1/15: AFVSS. Patient seen and evaluated this am. Flap is healthy appearing. Trach in place. Neck dressing changed. Donor site healing appropriately.      ALLERGIES:  No Known Allergies      MEDICATIONS:  Antiinfectives:   imipenem/cilastatin  IVPB 1000 milliGRAM(s) IV Intermittent every 8 hours    IV fluids:  dextrose 5%. 1000 milliLiter(s) IV Continuous <Continuous>  dextrose 5%. 1000 milliLiter(s) IV Continuous <Continuous>  multivitamin/minerals/iron Oral Solution (CENTRUM) 15 milliLiter(s) Oral daily    Hematologic/Anticoagulation:  aspirin  chewable 81 milliGRAM(s) Oral daily  enoxaparin Injectable 40 milliGRAM(s) SubCutaneous every 24 hours    Pain medications/Neuro:  gabapentin Solution 300 milliGRAM(s) Oral every 12 hours  HYDROmorphone  Injectable 0.5 milliGRAM(s) IV Push every 3 hours PRN  ondansetron Injectable 4 milliGRAM(s) IV Push every 8 hours PRN  oxyCODONE    Solution 5 milliGRAM(s) Enteral Tube every 6 hours PRN  oxyCODONE    Solution 10 milliGRAM(s) Enteral Tube every 6 hours PRN    Endocrine Medications:   dextrose 50% Injectable 12.5 Gram(s) IV Push once  dextrose 50% Injectable 25 Gram(s) IV Push once  dextrose 50% Injectable 25 Gram(s) IV Push once  dextrose Oral Gel 15 Gram(s) Oral once PRN  glucagon  Injectable 1 milliGRAM(s) IntraMuscular once  insulin lispro (ADMELOG) corrective regimen sliding scale   SubCutaneous every 6 hours    All other standing medications:   albuterol/ipratropium for Nebulization 3 milliLiter(s) Nebulizer every 6 hours  bacitracin   Ointment 1 Application(s) Topical every 12 hours  chlorhexidine 0.12% Liquid 15 milliLiter(s) Oral Mucosa two times a day  chlorhexidine 2% Cloths 1 Application(s) Topical daily  doxazosin 1 milliGRAM(s) Oral at bedtime  influenza  Vaccine (HIGH DOSE) 0.7 milliLiter(s) IntraMuscular once  polyethylene glycol 3350 17 Gram(s) Oral daily  senna 1 Tablet(s) Oral daily    All other PRN medications:  lidocaine   4% Patch 1 Patch Transdermal daily PRN    Vital Signs Last 24 Hrs  T(C): 36.9 (15 Carlos 2024 10:00), Max: 36.9 (15 Carlos 2024 10:00)  T(F): 98.4 (15 Carlos 2024 10:00), Max: 98.4 (15 Carlos 2024 10:00)  HR: 70 (15 Carlos 2024 08:36) (62 - 84)  BP: 109/53 (15 Carlos 2024 08:07) (108/54 - 135/63)  BP(mean): 77 (15 Carlos 2024 08:07) (77 - 91)  RR: 10 (15 Carlos 2024 08:36) (10 - 24)  SpO2: 96% (15 Carlos 2024 08:36) (95% - 100%)    Parameters below as of 15 Carlos 2024 08:36  Patient On (Oxygen Delivery Method): tracheostomy collar  O2 Flow (L/min): 10  O2 Concentration (%): 40       @ 07:  -  01-15 @ 07:00  --------------------------------------------------------  IN:    Enteral Tube Flush: 800 mL    IV PiggyBack: 250 mL    IV PiggyBack: 50 mL    Vital1.5: 1011 mL  Total IN: 2111 mL    OUT:    Bulb (mL): 25 mL    Bulb (mL): 10 mL    Voided (mL): 1150 mL  Total OUT: 1185 mL    Total NET: 926 mL      01-15 @ 07:01  -  01-15 @ 12:05  --------------------------------------------------------  IN:  Total IN: 0 mL    OUT:    Voided (mL): 300 mL  Total OUT: 300 mL    Total NET: -300 mL          24 @ 07:01  -  01-15-24 @ 07:00  --------------------------------------------------------  IN:  Total IN: 0 mL    OUT:    Bulb (mL): 25 mL    Bulb (mL): 10 mL  Total OUT: 35 mL    Total NET: -35 mL              PHYSICAL EXAM:  GEN: appears stated age  NEURO: alert & oriented x   HEENT: flap with good color and doppler signal, neck with no purulence noted, trach in place  CVS: regular rate and rhythm  Pulm: normal respiratory excursions, not tachypneic, no labored breathing  Abd: non-distended  Ext: moving all four extremities, no peripheral edema noted. Latissmus donor site well healing.       LABS                       9.7    10.38 )-----------( 138      ( 15 Carlos 2024 05:30 )             29.7    01-15    136  |  102  |  15  ----------------------------<  127<H>  3.8   |  27  |  0.54    Ca    8.9      15 Carlos 2024 05:30  Phos  2.7     01-15  Mg     1.9     01-15           Coagulation Studies-     Urinalysis Basic - ( 15 Carlos 2024 05:30 )    Color: x / Appearance: x / SG: x / pH: x  Gluc: 127 mg/dL / Ketone: x  / Bili: x / Urobili: x   Blood: x / Protein: x / Nitrite: x   Leuk Esterase: x / RBC: x / WBC x   Sq Epi: x / Non Sq Epi: x / Bacteria: x      Endocrine Panel-  Calcium: 8.9 mg/dL (01-15 @ 05:30)    MICROBIOLOGY:  Culture Results:   Moderate Escherichia coli  Moderate Enterobacter cloacae complex  Rare Candida parapsilosis  Rare Staphylococcus capitis (24 @ 18:03)  Culture Results:   Numerous Escherichia coli  Numerous Enterobacter cloacae complex  Few Enterococcus faecalis  Rare Staphylococcus epidermidis (24 @ 18:03)   OTOLARYNGOLOGY (ENT) PROGRESS NOTE    PATIENT: SAUNDRA HOLMAN  MRN: [id number]  : 57  [id number]  DATE OF SERVICE:  01-15-24  			         ID:SAUNDRA HOLMAN is a 69yo M w PMH of CAD (x2 stents Mar 2023), HLD, T2DM, hx of kidney stones, psoriasis who presented with an oral mass and underwent L hemimandibulectomy, SND L level 1-3, recon with L FFF, STSG, and placement of dental implants on . He had a trach which was subsequently decannulated. He returns  with surgical site infection now s/p OR for debridement and exploration. Trach replaced through prior stoma for pulmonary toilet.     Subjective/ Interval:   ; Patient seen this morning, oral pack to be changed, penrose dressing changed. Intraoral flap with partal skin necrosis, 7.5 portex in place with cuff deflated   : Patient seen and examined at bedside. NAEO. Patient remains afebrile and HD stable. HgB noted to drop to 7.6 from 9.2 but no additional episodes of melena. Penrose draining purulent material. Intra-oral infection/flap stable. Packing changed at bedside. No other complaints or changes at this time. ID recommended Vanc/zosyn and DC unasyn and flagyl, and IM recommended reticulocyte studies and adding folate supplements. No other changes at this time.   : AFVSS. No acute events overnight. Patient seen and examined at bedside. C/w Vanc/Zosyn per ID recs, pending sensitivities. Growing staph epi, E coli, enterobacter from wound cx on . S/p 2U PRBC yday w appropriate response in Hgb. Transfusion goal > 9.0.  : AFVSS. No acute events overnight. Patient seen and examined at bedside. C/w Vanc/Zosyn per ID recs, pending sens. Hgb stable this morning.  : AFVSS. No acute events overnight. Patient seen and examined at bedside. C/w Vanc/Zosyn, e faecalis sens to vanc/zosyn. Had 3 dark BM's yesterday that were stool guiac positive.  : AFVSS. No acute events overnight. Patient seen and examined at bedside. C/w Imipenem (changed per ID, enterobacter resistant to Zosyn). Plan for OR today for further washout / debridement.  1/3: AFVSS. No acute events overnight. Patient seen and examined at bedside. C/w imipenim. OR cx growing numerous gram negative rods and few gram positive cocci in pairs.  : Patient seen bedside, changed intraoral and neck packing,  Continue to follow cultures, pain controlled   : patient seen this morning, complains of right arm pain wit minimal swelling,  IV in the left arm,  Continue abx for 2 weeks. neck and oral packing changed, purulent drainage from neck noted.   : Patient seen at bedside during morning rounds. Reports right arm pain and swelling somewhat improved although still present. IV replaced in right arm yesterday. Remains on IV abx, tentative end date 1/15/24. Neck and oral packing changed at bedside; purulent drainage noted from neck, decreased in volume compared to prior exams.  : Patient seen at bedside during morning rounds. Overnight, patient complained of coughing, throat discomfort, and nursing reported some increased secretions via trach. Started on mucomyst with improvement. Noted 6 beats of PSVT at ~1900, no reported chest pain or other symptoms. No further episodes. Packing replaced at bedside   : patient seen this morning, neck and oral packing changed. Continues to have purulent drainage form neck.  No chest pain or SOB,               : patient seen this morning, changed neck and oral pack, patient tolerated packing change better with adjusted premedication,  purulence continues from neck , trach in place, fibula donor site with granulation tissue ( changed dressing)   1/10: AFVSS. Patient seen and evaluated this am. Neck and oral packing changed. Some purulence from neck. Trach in place.  : AFVSS. Patient taken to OR for washout, L latissmus dorsi flap to L oral cavity/oropharynx.  : AFVSS. Patient seen and evaluated this am. Pt doing well, minimal output from neck opening. KRISTEN drains in place. Flap healthy appearing. Failed TOV yesterday.  : AFVSS. Patient seen and evaluated this am. Pt in chair, flap looks healthy. Minimal output from neck opening. Donor site healing appropriately.   : AFVSS. Patient seen and evaluated this am. Flap appears healthy. Trach in place. Neck dressing changed, no purulence draining from opening. Donor site healing appropriately.  1/15: AFVSS. Patient seen and evaluated this am. Flap is healthy appearing. Trach in place. Neck dressing changed. Donor site healing appropriately.      ALLERGIES:  No Known Allergies      MEDICATIONS:  Antiinfectives:   imipenem/cilastatin  IVPB 1000 milliGRAM(s) IV Intermittent every 8 hours    IV fluids:  dextrose 5%. 1000 milliLiter(s) IV Continuous <Continuous>  dextrose 5%. 1000 milliLiter(s) IV Continuous <Continuous>  multivitamin/minerals/iron Oral Solution (CENTRUM) 15 milliLiter(s) Oral daily    Hematologic/Anticoagulation:  aspirin  chewable 81 milliGRAM(s) Oral daily  enoxaparin Injectable 40 milliGRAM(s) SubCutaneous every 24 hours    Pain medications/Neuro:  gabapentin Solution 300 milliGRAM(s) Oral every 12 hours  HYDROmorphone  Injectable 0.5 milliGRAM(s) IV Push every 3 hours PRN  ondansetron Injectable 4 milliGRAM(s) IV Push every 8 hours PRN  oxyCODONE    Solution 5 milliGRAM(s) Enteral Tube every 6 hours PRN  oxyCODONE    Solution 10 milliGRAM(s) Enteral Tube every 6 hours PRN    Endocrine Medications:   dextrose 50% Injectable 12.5 Gram(s) IV Push once  dextrose 50% Injectable 25 Gram(s) IV Push once  dextrose 50% Injectable 25 Gram(s) IV Push once  dextrose Oral Gel 15 Gram(s) Oral once PRN  glucagon  Injectable 1 milliGRAM(s) IntraMuscular once  insulin lispro (ADMELOG) corrective regimen sliding scale   SubCutaneous every 6 hours    All other standing medications:   albuterol/ipratropium for Nebulization 3 milliLiter(s) Nebulizer every 6 hours  bacitracin   Ointment 1 Application(s) Topical every 12 hours  chlorhexidine 0.12% Liquid 15 milliLiter(s) Oral Mucosa two times a day  chlorhexidine 2% Cloths 1 Application(s) Topical daily  doxazosin 1 milliGRAM(s) Oral at bedtime  influenza  Vaccine (HIGH DOSE) 0.7 milliLiter(s) IntraMuscular once  polyethylene glycol 3350 17 Gram(s) Oral daily  senna 1 Tablet(s) Oral daily    All other PRN medications:  lidocaine   4% Patch 1 Patch Transdermal daily PRN    Vital Signs Last 24 Hrs  T(C): 36.9 (15 Carlos 2024 10:00), Max: 36.9 (15 Carlos 2024 10:00)  T(F): 98.4 (15 Carlos 2024 10:00), Max: 98.4 (15 Carlos 2024 10:00)  HR: 70 (15 Carlos 2024 08:36) (62 - 84)  BP: 109/53 (15 Carlos 2024 08:07) (108/54 - 135/63)  BP(mean): 77 (15 Carlos 2024 08:07) (77 - 91)  RR: 10 (15 Carlos 2024 08:36) (10 - 24)  SpO2: 96% (15 Carlos 2024 08:36) (95% - 100%)    Parameters below as of 15 Carlos 2024 08:36  Patient On (Oxygen Delivery Method): tracheostomy collar  O2 Flow (L/min): 10  O2 Concentration (%): 40       @ 07:  -  01-15 @ 07:00  --------------------------------------------------------  IN:    Enteral Tube Flush: 800 mL    IV PiggyBack: 250 mL    IV PiggyBack: 50 mL    Vital1.5: 1011 mL  Total IN: 2111 mL    OUT:    Bulb (mL): 25 mL    Bulb (mL): 10 mL    Voided (mL): 1150 mL  Total OUT: 1185 mL    Total NET: 926 mL      01-15 @ 07:01  -  01-15 @ 12:05  --------------------------------------------------------  IN:  Total IN: 0 mL    OUT:    Voided (mL): 300 mL  Total OUT: 300 mL    Total NET: -300 mL          24 @ 07:01  -  01-15-24 @ 07:00  --------------------------------------------------------  IN:  Total IN: 0 mL    OUT:    Bulb (mL): 25 mL    Bulb (mL): 10 mL  Total OUT: 35 mL    Total NET: -35 mL              PHYSICAL EXAM:  GEN: appears stated age  NEURO: alert & oriented x   HEENT: flap with good color and doppler signal, neck with no purulence noted, trach in place  CVS: regular rate and rhythm  Pulm: normal respiratory excursions, not tachypneic, no labored breathing  Abd: non-distended  Ext: moving all four extremities, no peripheral edema noted. Latissmus donor site well healing.       LABS                       9.7    10.38 )-----------( 138      ( 15 Carlos 2024 05:30 )             29.7    01-15    136  |  102  |  15  ----------------------------<  127<H>  3.8   |  27  |  0.54    Ca    8.9      15 Carlos 2024 05:30  Phos  2.7     01-15  Mg     1.9     01-15           Coagulation Studies-     Urinalysis Basic - ( 15 Carlos 2024 05:30 )    Color: x / Appearance: x / SG: x / pH: x  Gluc: 127 mg/dL / Ketone: x  / Bili: x / Urobili: x   Blood: x / Protein: x / Nitrite: x   Leuk Esterase: x / RBC: x / WBC x   Sq Epi: x / Non Sq Epi: x / Bacteria: x      Endocrine Panel-  Calcium: 8.9 mg/dL (01-15 @ 05:30)    MICROBIOLOGY:  Culture Results:   Moderate Escherichia coli  Moderate Enterobacter cloacae complex  Rare Candida parapsilosis  Rare Staphylococcus capitis (24 @ 18:03)  Culture Results:   Numerous Escherichia coli  Numerous Enterobacter cloacae complex  Few Enterococcus faecalis  Rare Staphylococcus epidermidis (24 @ 18:03)   OTOLARYNGOLOGY (ENT) PROGRESS NOTE    PATIENT: SAUNDRA HOLMAN  MRN: [id number]  : 57  [id number]  DATE OF SERVICE:  01-15-24  			         ID:SAUNDRA HOLMAN is a 67yo M w PMH of CAD (x2 stents Mar 2023), HLD, T2DM, hx of kidney stones, psoriasis who presented with an oral mass and underwent L hemimandibulectomy, SND L level 1-3, recon with L FFF, STSG, and placement of dental implants on . He had a trach which was subsequently decannulated. He returns  with surgical site infection now s/p OR for debridement and exploration. Trach replaced through prior stoma for pulmonary toilet.     Subjective/ Interval:   ; Patient seen this morning, oral pack to be changed, penrose dressing changed. Intraoral flap with partal skin necrosis, 7.5 portex in place with cuff deflated   : Patient seen and examined at bedside. NAEO. Patient remains afebrile and HD stable. HgB noted to drop to 7.6 from 9.2 but no additional episodes of melena. Penrose draining purulent material. Intra-oral infection/flap stable. Packing changed at bedside. No other complaints or changes at this time. ID recommended Vanc/zosyn and DC unasyn and flagyl, and IM recommended reticulocyte studies and adding folate supplements. No other changes at this time.   : AFVSS. No acute events overnight. Patient seen and examined at bedside. C/w Vanc/Zosyn per ID recs, pending sensitivities. Growing staph epi, E coli, enterobacter from wound cx on . S/p 2U PRBC yday w appropriate response in Hgb. Transfusion goal > 9.0.  : AFVSS. No acute events overnight. Patient seen and examined at bedside. C/w Vanc/Zosyn per ID recs, pending sens. Hgb stable this morning.  : AFVSS. No acute events overnight. Patient seen and examined at bedside. C/w Vanc/Zosyn, e faecalis sens to vanc/zosyn. Had 3 dark BM's yesterday that were stool guiac positive.  : AFVSS. No acute events overnight. Patient seen and examined at bedside. C/w Imipenem (changed per ID, enterobacter resistant to Zosyn). Plan for OR today for further washout / debridement.  1/3: AFVSS. No acute events overnight. Patient seen and examined at bedside. C/w imipenim. OR cx growing numerous gram negative rods and few gram positive cocci in pairs.  : Patient seen bedside, changed intraoral and neck packing,  Continue to follow cultures, pain controlled   : patient seen this morning, complains of right arm pain wit minimal swelling,  IV in the left arm,  Continue abx for 2 weeks. neck and oral packing changed, purulent drainage from neck noted.   : Patient seen at bedside during morning rounds. Reports right arm pain and swelling somewhat improved although still present. IV replaced in right arm yesterday. Remains on IV abx, tentative end date 1/15/24. Neck and oral packing changed at bedside; purulent drainage noted from neck, decreased in volume compared to prior exams.  : Patient seen at bedside during morning rounds. Overnight, patient complained of coughing, throat discomfort, and nursing reported some increased secretions via trach. Started on mucomyst with improvement. Noted 6 beats of PSVT at ~1900, no reported chest pain or other symptoms. No further episodes. Packing replaced at bedside   : patient seen this morning, neck and oral packing changed. Continues to have purulent drainage form neck.  No chest pain or SOB,               : patient seen this morning, changed neck and oral pack, patient tolerated packing change better with adjusted premedication,  purulence continues from neck , trach in place, fibula donor site with granulation tissue ( changed dressing)   1/10: AFVSS. Patient seen and evaluated this am. Neck and oral packing changed. Some purulence from neck. Trach in place.  : AFVSS. Patient taken to OR for washout, L latissmus dorsi flap to L oral cavity/oropharynx.  : AFVSS. Patient seen and evaluated this am. Pt doing well, minimal output from neck opening. KRISTEN drains in place. Flap healthy appearing. Failed TOV yesterday.  : AFVSS. Patient seen and evaluated this am. Pt in chair, flap looks healthy. Minimal output from neck opening. Donor site healing appropriately.   : AFVSS. Patient seen and evaluated this am. Flap appears healthy. Trach in place. Neck dressing changed, no purulence draining from opening. Donor site healing appropriately.  1/15: AFVSS. Patient seen and evaluated this am. Flap is healthy appearing. Trach in place. Neck dressing changed. Donor site healing appropriately.      ALLERGIES:  No Known Allergies      MEDICATIONS:  Antiinfectives:   imipenem/cilastatin  IVPB 1000 milliGRAM(s) IV Intermittent every 8 hours    IV fluids:  dextrose 5%. 1000 milliLiter(s) IV Continuous <Continuous>  dextrose 5%. 1000 milliLiter(s) IV Continuous <Continuous>  multivitamin/minerals/iron Oral Solution (CENTRUM) 15 milliLiter(s) Oral daily    Hematologic/Anticoagulation:  aspirin  chewable 81 milliGRAM(s) Oral daily  enoxaparin Injectable 40 milliGRAM(s) SubCutaneous every 24 hours    Pain medications/Neuro:  gabapentin Solution 300 milliGRAM(s) Oral every 12 hours  HYDROmorphone  Injectable 0.5 milliGRAM(s) IV Push every 3 hours PRN  ondansetron Injectable 4 milliGRAM(s) IV Push every 8 hours PRN  oxyCODONE    Solution 5 milliGRAM(s) Enteral Tube every 6 hours PRN  oxyCODONE    Solution 10 milliGRAM(s) Enteral Tube every 6 hours PRN    Endocrine Medications:   dextrose 50% Injectable 12.5 Gram(s) IV Push once  dextrose 50% Injectable 25 Gram(s) IV Push once  dextrose 50% Injectable 25 Gram(s) IV Push once  dextrose Oral Gel 15 Gram(s) Oral once PRN  glucagon  Injectable 1 milliGRAM(s) IntraMuscular once  insulin lispro (ADMELOG) corrective regimen sliding scale   SubCutaneous every 6 hours    All other standing medications:   albuterol/ipratropium for Nebulization 3 milliLiter(s) Nebulizer every 6 hours  bacitracin   Ointment 1 Application(s) Topical every 12 hours  chlorhexidine 0.12% Liquid 15 milliLiter(s) Oral Mucosa two times a day  chlorhexidine 2% Cloths 1 Application(s) Topical daily  doxazosin 1 milliGRAM(s) Oral at bedtime  influenza  Vaccine (HIGH DOSE) 0.7 milliLiter(s) IntraMuscular once  polyethylene glycol 3350 17 Gram(s) Oral daily  senna 1 Tablet(s) Oral daily    All other PRN medications:  lidocaine   4% Patch 1 Patch Transdermal daily PRN    Vital Signs Last 24 Hrs  T(C): 36.9 (15 Carlos 2024 10:00), Max: 36.9 (15 Carlos 2024 10:00)  T(F): 98.4 (15 Carlos 2024 10:00), Max: 98.4 (15 Carlos 2024 10:00)  HR: 70 (15 Carlos 2024 08:36) (62 - 84)  BP: 109/53 (15 Carlos 2024 08:07) (108/54 - 135/63)  BP(mean): 77 (15 Carlos 2024 08:07) (77 - 91)  RR: 10 (15 Carlos 2024 08:36) (10 - 24)  SpO2: 96% (15 Carlos 2024 08:36) (95% - 100%)    Parameters below as of 15 Carlos 2024 08:36  Patient On (Oxygen Delivery Method): tracheostomy collar  O2 Flow (L/min): 10  O2 Concentration (%): 40       @ 07:  -  01-15 @ 07:00  --------------------------------------------------------  IN:    Enteral Tube Flush: 800 mL    IV PiggyBack: 250 mL    IV PiggyBack: 50 mL    Vital1.5: 1011 mL  Total IN: 2111 mL    OUT:    Bulb (mL): 25 mL    Bulb (mL): 10 mL    Voided (mL): 1150 mL  Total OUT: 1185 mL    Total NET: 926 mL      01-15 @ 07:01  -  01-15 @ 12:05  --------------------------------------------------------  IN:  Total IN: 0 mL    OUT:    Voided (mL): 300 mL  Total OUT: 300 mL    Total NET: -300 mL          24 @ 07:01  -  01-15-24 @ 07:00  --------------------------------------------------------  IN:  Total IN: 0 mL    OUT:    Bulb (mL): 25 mL    Bulb (mL): 10 mL  Total OUT: 35 mL    Total NET: -35 mL              PHYSICAL EXAM:  GEN: appears stated age  NEURO: alert & oriented x   HEENT: flap with good color and doppler signal, neck with no purulence noted, trach in place  CVS: regular rate and rhythm  Pulm: normal respiratory excursions, not tachypneic, no labored breathing  Abd: non-distended  Ext: moving all four extremities, no peripheral edema noted. Latissmus donor site well healing.       LABS                       9.7    10.38 )-----------( 138      ( 15 Carlos 2024 05:30 )             29.7    01-15    136  |  102  |  15  ----------------------------<  127<H>  3.8   |  27  |  0.54    Ca    8.9      15 Carlos 2024 05:30  Phos  2.7     01-15  Mg     1.9     01-15           Coagulation Studies-     Urinalysis Basic - ( 15 Carlos 2024 05:30 )    Color: x / Appearance: x / SG: x / pH: x  Gluc: 127 mg/dL / Ketone: x  / Bili: x / Urobili: x   Blood: x / Protein: x / Nitrite: x   Leuk Esterase: x / RBC: x / WBC x   Sq Epi: x / Non Sq Epi: x / Bacteria: x      Endocrine Panel-  Calcium: 8.9 mg/dL (01-15 @ 05:30)    MICROBIOLOGY:  Culture Results:   Moderate Escherichia coli  Moderate Enterobacter cloacae complex  Rare Candida parapsilosis  Rare Staphylococcus capitis (24 @ 18:03)  Culture Results:   Numerous Escherichia coli  Numerous Enterobacter cloacae complex  Few Enterococcus faecalis  Rare Staphylococcus epidermidis (24 @ 18:03)

## 2024-01-15 NOTE — PROGRESS NOTE ADULT - ATTENDING COMMENTS
67 y/o M PMHx of CAD s/p PCI/CUBA x2 to LAD and Ramus (Mar 2023), T2DM, psoriasis, hx Renal calculi, w/ [id number] SCC of L buccal mucosa s/p hemimandibulectomy, left level 1, 2a, 3 neck neck dissection, L FFF, tracheostomy w/ 7.5 cuffed portex, dental implants, STSG (12/7/23), s/p G tube placement and subsequent trach decannulation and discharged home on ( 12/22/23). Pt however returned to [facility] ( 12/27/23) with surgical site infection, s/p RTOR 12/27 with findings of skin flap necrosis and s/p debridement. Trach replaced through prior stoma for pulmonary toilet. 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 Purulent drainage appreciated at margins of flap and in neck.  EGD( 1/2): ulcer found at the G-tube site  # RTOR- flat change with Latissmus dorsi- close monitoring in sicu, now on step down  wound care as per ENT.   - ID f/u appreciated ( Team 1) ,  d/c's Vanco & Zosyn ( 1/1) and started on Imipenem 1gm iv q8h  (1/1-) , will need 2 weeks of iv abx from the last washout ( 1/2) to continue till 1/16-   - f/u OR culture ( 1/2): reviewed   - OR cultures with E.coli, ECC, E.faecalis S.mitis/oralis, S.epi, S.constellatus, S.maltophilia, mixed anaerobes.   - culture result reviewed.  - BCx( 12/28): ngtd     # Acute blood loss anemia/melena -Hb stable-  # hx WADE ( Ferritin 768 but Tsat 13% ( <20%) on 12/13/23)   - GI fu appreciated, EGD( 1/2) ulcer at G-tube site, rec; Protonix 40mg daily for 8 weeks and avoid bumper being too tight to cause pressure ulcer   - keep active T&S, keep Hgb>8    - c/w ASA given hx PCI x2 ( March 2023)  - hold off Plavix since adm ( 12/27) requiring procedure and due to bleeding  - c/w PPI daily can be via G-tube   - c/w Folic acid 1mg daily   - monitor clinically for any further melena     # CAD sp PCI/CUBA x2 to LAD and Ramus in March 2023 as per cards is in setting of NSTEMI - prefer DAPT, at least monotherapy with ASA if plavix need to be held for procedure, currently on ASA , to restart plavix when safe., c/w ASA 81mg and Atorvastatin 40mg qHS     #  DM - FS with ISS, would hold all ora-hypoglycemic agent, goal -180    # pain management - oxycodone 5mg/10mg prn , dilaudid prn, would need stool softener to ensure daily BM.     # Dysphagia- NPO, trach, on TF with Vital 1.5 via G-tube     # DVT ppx: Lovenox    # monitor electrolyte and replace, Hypomag.     will follow. recovering well 67 y/o M PMHx of CAD s/p PCI/CUBA x2 to LAD and Ramus (Mar 2023), T2DM, psoriasis, hx Renal calculi, w/ [id number] SCC of L buccal mucosa s/p hemimandibulectomy, left level 1, 2a, 3 neck neck dissection, L FFF, tracheostomy w/ 7.5 cuffed portex, dental implants, STSG (12/7/23), s/p G tube placement and subsequent trach decannulation and discharged home on ( 12/22/23). Pt however returned to [facility] ( 12/27/23) with surgical site infection, s/p RTOR 12/27 with findings of skin flap necrosis and s/p debridement. Trach replaced through prior stoma for pulmonary toilet. 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 Purulent drainage appreciated at margins of flap and in neck.  EGD( 1/2): ulcer found at the G-tube site  # RTOR- flat change with Latissmus dorsi- close monitoring in sicu, now on step down  wound care as per ENT.   - ID f/u appreciated ( Team 1) ,  d/c's Vanco & Zosyn ( 1/1) and started on Imipenem 1gm iv q8h  (1/1-) , will need 2 weeks of iv abx from the last washout ( 1/2) to continue till 1/16-   - f/u OR culture ( 1/2): reviewed   - OR cultures with E.coli, ECC, E.faecalis S.mitis/oralis, S.epi, S.constellatus, S.maltophilia, mixed anaerobes.   - culture result reviewed.  - BCx( 12/28): ngtd     # Acute blood loss anemia/melena -Hb stable-  # hx WADE ( Ferritin 768 but Tsat 13% ( <20%) on 12/13/23)   - GI fu appreciated, EGD( 1/2) ulcer at G-tube site, rec; Protonix 40mg daily for 8 weeks and avoid bumper being too tight to cause pressure ulcer   - keep active T&S, keep Hgb>8    - c/w ASA given hx PCI x2 ( March 2023)  - hold off Plavix since adm ( 12/27) requiring procedure and due to bleeding  - c/w PPI daily can be via G-tube   - c/w Folic acid 1mg daily   - monitor clinically for any further melena     # CAD sp PCI/CUBA x2 to LAD and Ramus in March 2023 as per cards is in setting of NSTEMI - prefer DAPT, at least monotherapy with ASA if plavix need to be held for procedure, currently on ASA , to restart plavix when safe., c/w ASA 81mg and Atorvastatin 40mg qHS     #  DM - FS with ISS, would hold all ora-hypoglycemic agent, goal -180    # pain management - oxycodone 5mg/10mg prn , dilaudid prn, would need stool softener to ensure daily BM.     # Dysphagia- NPO, trach, on TF with Vital 1.5 via G-tube     # DVT ppx: Lovenox    # monitor electrolyte and replace, Hypomag.     will follow. recovering well 69 y/o M PMHx of CAD s/p PCI/CUBA x2 to LAD and Ramus (Mar 2023), T2DM, psoriasis, hx Renal calculi, w/ [id number] SCC of L buccal mucosa s/p hemimandibulectomy, left level 1, 2a, 3 neck neck dissection, L FFF, tracheostomy w/ 7.5 cuffed portex, dental implants, STSG (12/7/23), s/p G tube placement and subsequent trach decannulation and discharged home on ( 12/22/23). Pt however returned to [facility] ( 12/27/23) with surgical site infection, s/p RTOR 12/27 with findings of skin flap necrosis and s/p debridement. Trach replaced through prior stoma for pulmonary toilet. 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 Purulent drainage appreciated at margins of flap and in neck.  EGD( 1/2): ulcer found at the G-tube site  # RTOR- flat change with Latissmus dorsi- close monitoring in sicu, now on step down  wound care as per ENT.   - ID f/u appreciated ( Team 1) ,  d/c's Vanco & Zosyn ( 1/1) and started on Imipenem 1gm iv q8h  (1/1-) , will need 2 weeks of iv abx from the last washout ( 1/2) to continue till 1/16-   - f/u OR culture ( 1/2): reviewed   - OR cultures with E.coli, ECC, E.faecalis S.mitis/oralis, S.epi, S.constellatus, S.maltophilia, mixed anaerobes.   - culture result reviewed.  - BCx( 12/28): ngtd     # Acute blood loss anemia/melena -Hb stable-  # hx WADE ( Ferritin 768 but Tsat 13% ( <20%) on 12/13/23)   - GI fu appreciated, EGD( 1/2) ulcer at G-tube site, rec; Protonix 40mg daily for 8 weeks and avoid bumper being too tight to cause pressure ulcer   - keep active T&S, keep Hgb>8    - c/w ASA given hx PCI x2 ( March 2023)  - hold off Plavix since adm ( 12/27) requiring procedure and due to bleeding  - c/w PPI daily can be via G-tube   - c/w Folic acid 1mg daily   - monitor clinically for any further melena     # CAD sp PCI/CUBA x2 to LAD and Ramus in March 2023 as per cards is in setting of NSTEMI - prefer DAPT, at least monotherapy with ASA if plavix need to be held for procedure, currently on ASA , to restart plavix when safe., c/w ASA 81mg and Atorvastatin 40mg qHS     #  DM - FS with ISS, would hold all ora-hypoglycemic agent, goal -180    # pain management - oxycodone 5mg/10mg prn , dilaudid prn, would need stool softener to ensure daily BM.     # Dysphagia- NPO, trach, on TF with Vital 1.5 via G-tube     # DVT ppx: Lovenox    # monitor electrolyte and replace, Hypomag.     will follow. recovering well 67 y/o M PMHx of CAD s/p PCI/CUBA x2 to LAD and Ramus (Mar 2023), T2DM, psoriasis, hx Renal calculi, w/ [id number] SCC of L buccal mucosa s/p hemimandibulectomy, left level 1, 2a, 3 neck neck dissection, L FFF, tracheostomy w/ 7.5 cuffed portex, dental implants, STSG (12/7/23), s/p G tube placement and subsequent trach decannulation and discharged home on ( 12/22/23). Pt however returned to [facility] ( 12/27/23) with surgical site infection, s/p RTOR 12/27 with findings of skin flap necrosis and s/p debridement. Trach replaced through prior stoma for pulmonary toilet. 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 Purulent drainage appreciated at margins of flap and in neck.  EGD( 1/2): ulcer found at the G-tube site  # RTOR- flat change with Latissmus dorsi- close monitoring in sicu, now on step down  wound care as per ENT.   - ID f/u appreciated ( Team 1) ,  d/c's Vanco & Zosyn ( 1/1) and started on Imipenem 1gm iv q8h  (1/1-) , will need 2 weeks of iv abx from the last washout ( 1/2) to continue till 1/16-   - f/u OR culture ( 1/2): reviewed   - OR cultures with E.coli, ECC, E.faecalis S.mitis/oralis, S.epi, S.constellatus, S.maltophilia, mixed anaerobes.   - culture result reviewed.  - BCx( 12/28): ngtd     # Acute blood loss anemia/melena -Hb stable-  # hx WADE ( Ferritin 768 but Tsat 13% ( <20%) on 12/13/23)   - GI fu appreciated, EGD( 1/2) ulcer at G-tube site, rec; Protonix 40mg daily for 8 weeks and avoid bumper being too tight to cause pressure ulcer   - keep active T&S, keep Hgb>8    - c/w ASA given hx PCI x2 ( March 2023)  - hold off Plavix since adm ( 12/27) requiring procedure and due to bleeding  - c/w PPI daily can be via G-tube   - c/w Folic acid 1mg daily   - monitor clinically for any further melena     # CAD sp PCI/CUBA x2 to LAD and Ramus in March 2023 as per cards is in setting of NSTEMI - prefer DAPT, at least monotherapy with ASA if plavix need to be held for procedure, currently on ASA , to restart plavix when safe., c/w ASA 81mg and Atorvastatin 40mg qHS     #  DM - FS with ISS, would hold all ora-hypoglycemic agent, goal -180    # pain management - oxycodone 5mg/10mg prn , dilaudid prn, would need stool softener to ensure daily BM.     # Dysphagia- NPO, trach, on TF with Vital 1.5 via G-tube     # DVT ppx: Lovenox    # monitor electrolyte and replace, Hypomag.     will follow. recovering well, dw primary team. 67 y/o M PMHx of CAD s/p PCI/CUBA x2 to LAD and Ramus (Mar 2023), T2DM, psoriasis, hx Renal calculi, w/ [id number] SCC of L buccal mucosa s/p hemimandibulectomy, left level 1, 2a, 3 neck neck dissection, L FFF, tracheostomy w/ 7.5 cuffed portex, dental implants, STSG (12/7/23), s/p G tube placement and subsequent trach decannulation and discharged home on ( 12/22/23). Pt however returned to [facility] ( 12/27/23) with surgical site infection, s/p RTOR 12/27 with findings of skin flap necrosis and s/p debridement. Trach replaced through prior stoma for pulmonary toilet. 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 Purulent drainage appreciated at margins of flap and in neck.  EGD( 1/2): ulcer found at the G-tube site  # RTOR- flat change with Latissmus dorsi- close monitoring in sicu, now on step down  wound care as per ENT.   - ID f/u appreciated ( Team 1) ,  d/c's Vanco & Zosyn ( 1/1) and started on Imipenem 1gm iv q8h  (1/1-) , will need 2 weeks of iv abx from the last washout ( 1/2) to continue till 1/16-   - f/u OR culture ( 1/2): reviewed   - OR cultures with E.coli, ECC, E.faecalis S.mitis/oralis, S.epi, S.constellatus, S.maltophilia, mixed anaerobes.   - culture result reviewed.  - BCx( 12/28): ngtd     # Acute blood loss anemia/melena -Hb stable-  # hx WADE ( Ferritin 768 but Tsat 13% ( <20%) on 12/13/23)   - GI fu appreciated, EGD( 1/2) ulcer at G-tube site, rec; Protonix 40mg daily for 8 weeks and avoid bumper being too tight to cause pressure ulcer   - keep active T&S, keep Hgb>8    - c/w ASA given hx PCI x2 ( March 2023)  - hold off Plavix since adm ( 12/27) requiring procedure and due to bleeding  - c/w PPI daily can be via G-tube   - c/w Folic acid 1mg daily   - monitor clinically for any further melena     # CAD sp PCI/CUBA x2 to LAD and Ramus in March 2023 as per cards is in setting of NSTEMI - prefer DAPT, at least monotherapy with ASA if plavix need to be held for procedure, currently on ASA , to restart plavix when safe., c/w ASA 81mg and Atorvastatin 40mg qHS     #  DM - FS with ISS, would hold all ora-hypoglycemic agent, goal -180    # pain management - oxycodone 5mg/10mg prn , dilaudid prn, would need stool softener to ensure daily BM.     # Dysphagia- NPO, trach, on TF with Vital 1.5 via G-tube     # DVT ppx: Lovenox    # monitor electrolyte and replace, Hypomag.     will follow. recovering well, dw primary team. 67 y/o M PMHx of CAD s/p PCI/CUBA x2 to LAD and Ramus (Mar 2023), T2DM, psoriasis, hx Renal calculi, w/ [id number] SCC of L buccal mucosa s/p hemimandibulectomy, left level 1, 2a, 3 neck neck dissection, L FFF, tracheostomy w/ 7.5 cuffed portex, dental implants, STSG (12/7/23), s/p G tube placement and subsequent trach decannulation and discharged home on ( 12/22/23). Pt however returned to [facility] ( 12/27/23) with surgical site infection, s/p RTOR 12/27 with findings of skin flap necrosis and s/p debridement. Trach replaced through prior stoma for pulmonary toilet. 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 Purulent drainage appreciated at margins of flap and in neck.  EGD( 1/2): ulcer found at the G-tube site  # RTOR- flat change with Latissmus dorsi- close monitoring in sicu, now on step down  wound care as per ENT.   - ID f/u appreciated ( Team 1) ,  d/c's Vanco & Zosyn ( 1/1) and started on Imipenem 1gm iv q8h  (1/1-) , will need 2 weeks of iv abx from the last washout ( 1/2) to continue till 1/16-   - f/u OR culture ( 1/2): reviewed   - OR cultures with E.coli, ECC, E.faecalis S.mitis/oralis, S.epi, S.constellatus, S.maltophilia, mixed anaerobes.   - culture result reviewed.  - BCx( 12/28): ngtd     # Acute blood loss anemia/melena -Hb stable-  # hx WADE ( Ferritin 768 but Tsat 13% ( <20%) on 12/13/23)   - GI fu appreciated, EGD( 1/2) ulcer at G-tube site, rec; Protonix 40mg daily for 8 weeks and avoid bumper being too tight to cause pressure ulcer   - keep active T&S, keep Hgb>8    - c/w ASA given hx PCI x2 ( March 2023)  - hold off Plavix since adm ( 12/27) requiring procedure and due to bleeding  - c/w PPI daily can be via G-tube   - c/w Folic acid 1mg daily   - monitor clinically for any further melena     # CAD sp PCI/CUBA x2 to LAD and Ramus in March 2023 as per cards is in setting of NSTEMI - prefer DAPT, at least monotherapy with ASA if plavix need to be held for procedure, currently on ASA , to restart plavix when safe., c/w ASA 81mg and Atorvastatin 40mg qHS     #  DM - FS with ISS, would hold all ora-hypoglycemic agent, goal -180    # pain management - oxycodone 5mg/10mg prn , dilaudid prn, would need stool softener to ensure daily BM.     # Dysphagia- NPO, trach, on TF with Vital 1.5 via G-tube     # DVT ppx: Lovenox    # monitor electrolyte and replace, Hypomag.     will follow. recovering well, dw primary team.

## 2024-01-15 NOTE — PROGRESS NOTE ADULT - SUBJECTIVE AND OBJECTIVE BOX
PANAYIOTIS TSIRIGOTIS , [id number],  [facility] [address]LA [id number] [id number]    Time of encounter : 11:25 am  sitting on bedside chair, awake, alert  on trach -coughing up clear phlem.  in great spirit.  glucose trend reviewed  tolerating tube feeding.      T(C): 36.9 (01-15-24 @ 10:00), Max: 36.9 (01-15-24 @ 10:00)  HR: 70 (01-15-24 @ 08:36) (62 - 84)  BP: 109/53 (01-15-24 @ 08:07) (108/54 - 135/63)  RR: 10 (01-15-24 @ 08:36) (10 - 24)  SpO2: 96% (01-15-24 @ 08:36) (95% - 100%)    albuterol/ipratropium for Nebulization 3 milliLiter(s) Nebulizer every 6 hours  aspirin  chewable 81 milliGRAM(s) Oral daily  bacitracin   Ointment 1 Application(s) Topical every 12 hours  chlorhexidine 0.12% Liquid 15 milliLiter(s) Oral Mucosa two times a day  chlorhexidine 2% Cloths 1 Application(s) Topical daily  dextrose 5%. 1000 milliLiter(s) IV Continuous <Continuous>  dextrose 5%. 1000 milliLiter(s) IV Continuous <Continuous>  dextrose 50% Injectable 12.5 Gram(s) IV Push once  dextrose 50% Injectable 25 Gram(s) IV Push once  dextrose 50% Injectable 25 Gram(s) IV Push once  dextrose Oral Gel 15 Gram(s) Oral once PRN  doxazosin 1 milliGRAM(s) Oral at bedtime  enoxaparin Injectable 40 milliGRAM(s) SubCutaneous every 24 hours  gabapentin Solution 300 milliGRAM(s) Oral every 12 hours  glucagon  Injectable 1 milliGRAM(s) IntraMuscular once  HYDROmorphone  Injectable 0.5 milliGRAM(s) IV Push every 3 hours PRN  imipenem/cilastatin  IVPB 1000 milliGRAM(s) IV Intermittent every 8 hours  influenza  Vaccine (HIGH DOSE) 0.7 milliLiter(s) IntraMuscular once  insulin lispro (ADMELOG) corrective regimen sliding scale   SubCutaneous every 6 hours  lidocaine   4% Patch 1 Patch Transdermal daily PRN  multivitamin/minerals/iron Oral Solution (CENTRUM) 15 milliLiter(s) Oral daily  ondansetron Injectable 4 milliGRAM(s) IV Push every 8 hours PRN  oxyCODONE    Solution 10 milliGRAM(s) Enteral Tube every 6 hours PRN  oxyCODONE    Solution 5 milliGRAM(s) Enteral Tube every 6 hours PRN  polyethylene glycol 3350 17 Gram(s) Oral daily  senna 1 Tablet(s) Oral daily      Physical Exam :    General exam :  awake, alert   trach -collar   RRR  good air entry   dressing left upper back- KRISTEN drain  no edema noted on bilateral lower ext  dressing in place on left leg  neuro -non focal.     CBC Full  -  ( 15 Carlos 2024 05:30 )  WBC Count : 10.38 K/uL  RBC Count : 3.33 M/uL  Hemoglobin : 9.7 g/dL  Hematocrit : 29.7 %  Platelet Count - Automated : 138 K/uL  Mean Cell Volume : 89.2 fl  Mean Cell Hemoglobin : 29.1 pg  Mean Cell Hemoglobin Concentration : 32.7 gm/dL  Auto Neutrophil # : x  Auto Lymphocyte # : x  Auto Monocyte # : x  Auto Eosinophil # : x  Auto Basophil # : x  Auto Neutrophil % : x  Auto Lymphocyte % : x  Auto Monocyte % : x  Auto Eosinophil % : x  Auto Basophil % : x        01-15    136  |  102  |  15  ----------------------------<  127<H>  3.8   |  27  |  0.54    Ca    8.9      15 Carlos 2024 05:30  Phos  2.7     01-15  Mg     1.9     01-15      Daily     Daily   CAPILLARY BLOOD GLUCOSE      POCT Blood Glucose.: 124 mg/dL (15 Carlos 2024 06:18)      Urinalysis Basic - ( 15 Carlos 2024 05:30 )    Color: x / Appearance: x / SG: x / pH: x  Gluc: 127 mg/dL / Ketone: x  / Bili: x / Urobili: x   Blood: x / Protein: x / Nitrite: x   Leuk Esterase: x / RBC: x / WBC x   Sq Epi: x / Non Sq Epi: x / Bacteria: x                   PANAYIOTIS TSIRIGOTIS , [id number],  [facility] [address]LA [address] 01    Time of encounter : 11:25 am  sitting on bedside chair, awake, alert  on trach -coughing up clear phlem.  in great spirit.  glucose trend reviewed  tolerating tube feeding.      T(C): 36.9 (01-15-24 @ 10:00), Max: 36.9 (01-15-24 @ 10:00)  HR: 70 (01-15-24 @ 08:36) (62 - 84)  BP: 109/53 (01-15-24 @ 08:07) (108/54 - 135/63)  RR: 10 (01-15-24 @ 08:36) (10 - 24)  SpO2: 96% (01-15-24 @ 08:36) (95% - 100%)    albuterol/ipratropium for Nebulization 3 milliLiter(s) Nebulizer every 6 hours  aspirin  chewable 81 milliGRAM(s) Oral daily  bacitracin   Ointment 1 Application(s) Topical every 12 hours  chlorhexidine 0.12% Liquid 15 milliLiter(s) Oral Mucosa two times a day  chlorhexidine 2% Cloths 1 Application(s) Topical daily  dextrose 5%. 1000 milliLiter(s) IV Continuous <Continuous>  dextrose 5%. 1000 milliLiter(s) IV Continuous <Continuous>  dextrose 50% Injectable 12.5 Gram(s) IV Push once  dextrose 50% Injectable 25 Gram(s) IV Push once  dextrose 50% Injectable 25 Gram(s) IV Push once  dextrose Oral Gel 15 Gram(s) Oral once PRN  doxazosin 1 milliGRAM(s) Oral at bedtime  enoxaparin Injectable 40 milliGRAM(s) SubCutaneous every 24 hours  gabapentin Solution 300 milliGRAM(s) Oral every 12 hours  glucagon  Injectable 1 milliGRAM(s) IntraMuscular once  HYDROmorphone  Injectable 0.5 milliGRAM(s) IV Push every 3 hours PRN  imipenem/cilastatin  IVPB 1000 milliGRAM(s) IV Intermittent every 8 hours  influenza  Vaccine (HIGH DOSE) 0.7 milliLiter(s) IntraMuscular once  insulin lispro (ADMELOG) corrective regimen sliding scale   SubCutaneous every 6 hours  lidocaine   4% Patch 1 Patch Transdermal daily PRN  multivitamin/minerals/iron Oral Solution (CENTRUM) 15 milliLiter(s) Oral daily  ondansetron Injectable 4 milliGRAM(s) IV Push every 8 hours PRN  oxyCODONE    Solution 10 milliGRAM(s) Enteral Tube every 6 hours PRN  oxyCODONE    Solution 5 milliGRAM(s) Enteral Tube every 6 hours PRN  polyethylene glycol 3350 17 Gram(s) Oral daily  senna 1 Tablet(s) Oral daily      Physical Exam :    General exam :  awake, alert   trach -collar   RRR  good air entry   dressing left upper back- KRISTEN drain  no edema noted on bilateral lower ext  dressing in place on left leg  neuro -non focal.     CBC Full  -  ( 15 Carlos 2024 05:30 )  WBC Count : 10.38 K/uL  RBC Count : 3.33 M/uL  Hemoglobin : 9.7 g/dL  Hematocrit : 29.7 %  Platelet Count - Automated : 138 K/uL  Mean Cell Volume : 89.2 fl  Mean Cell Hemoglobin : 29.1 pg  Mean Cell Hemoglobin Concentration : 32.7 gm/dL  Auto Neutrophil # : x  Auto Lymphocyte # : x  Auto Monocyte # : x  Auto Eosinophil # : x  Auto Basophil # : x  Auto Neutrophil % : x  Auto Lymphocyte % : x  Auto Monocyte % : x  Auto Eosinophil % : x  Auto Basophil % : x        01-15    136  |  102  |  15  ----------------------------<  127<H>  3.8   |  27  |  0.54    Ca    8.9      15 Carlos 2024 05:30  Phos  2.7     01-15  Mg     1.9     01-15      Daily     Daily   CAPILLARY BLOOD GLUCOSE      POCT Blood Glucose.: 124 mg/dL (15 Carlos 2024 06:18)      Urinalysis Basic - ( 15 Carlos 2024 05:30 )    Color: x / Appearance: x / SG: x / pH: x  Gluc: 127 mg/dL / Ketone: x  / Bili: x / Urobili: x   Blood: x / Protein: x / Nitrite: x   Leuk Esterase: x / RBC: x / WBC x   Sq Epi: x / Non Sq Epi: x / Bacteria: x                   PANAYIOTIS TSIRIGOTIS , [id number],  [facility] [address]LA [address] 01    Time of encounter : 11:25 am  sitting on bedside chair, awake, alert  on trach -coughing up clear phlem.  in great spirit.  glucose trend reviewed  tolerating tube feeding.      T(C): 36.9 (01-15-24 @ 10:00), Max: 36.9 (01-15-24 @ 10:00)  HR: 70 (01-15-24 @ 08:36) (62 - 84)  BP: 109/53 (01-15-24 @ 08:07) (108/54 - 135/63)  RR: 10 (01-15-24 @ 08:36) (10 - 24)  SpO2: 96% (01-15-24 @ 08:36) (95% - 100%)    albuterol/ipratropium for Nebulization 3 milliLiter(s) Nebulizer every 6 hours  aspirin  chewable 81 milliGRAM(s) Oral daily  bacitracin   Ointment 1 Application(s) Topical every 12 hours  chlorhexidine 0.12% Liquid 15 milliLiter(s) Oral Mucosa two times a day  chlorhexidine 2% Cloths 1 Application(s) Topical daily  dextrose 5%. 1000 milliLiter(s) IV Continuous <Continuous>  dextrose 5%. 1000 milliLiter(s) IV Continuous <Continuous>  dextrose 50% Injectable 12.5 Gram(s) IV Push once  dextrose 50% Injectable 25 Gram(s) IV Push once  dextrose 50% Injectable 25 Gram(s) IV Push once  dextrose Oral Gel 15 Gram(s) Oral once PRN  doxazosin 1 milliGRAM(s) Oral at bedtime  enoxaparin Injectable 40 milliGRAM(s) SubCutaneous every 24 hours  gabapentin Solution 300 milliGRAM(s) Oral every 12 hours  glucagon  Injectable 1 milliGRAM(s) IntraMuscular once  HYDROmorphone  Injectable 0.5 milliGRAM(s) IV Push every 3 hours PRN  imipenem/cilastatin  IVPB 1000 milliGRAM(s) IV Intermittent every 8 hours  influenza  Vaccine (HIGH DOSE) 0.7 milliLiter(s) IntraMuscular once  insulin lispro (ADMELOG) corrective regimen sliding scale   SubCutaneous every 6 hours  lidocaine   4% Patch 1 Patch Transdermal daily PRN  multivitamin/minerals/iron Oral Solution (CENTRUM) 15 milliLiter(s) Oral daily  ondansetron Injectable 4 milliGRAM(s) IV Push every 8 hours PRN  oxyCODONE    Solution 10 milliGRAM(s) Enteral Tube every 6 hours PRN  oxyCODONE    Solution 5 milliGRAM(s) Enteral Tube every 6 hours PRN  polyethylene glycol 3350 17 Gram(s) Oral daily  senna 1 Tablet(s) Oral daily      Physical Exam :    General exam :  awake, alert   trach -collar   RRR  good air entry   dressing left upper back- KRISTEN drain  no edema noted on bilateral lower ext  dressing in place on left leg  neuro -non focal.     CBC Full  -  ( 15 Carlos 2024 05:30 )  WBC Count : 10.38 K/uL  RBC Count : 3.33 M/uL  Hemoglobin : 9.7 g/dL  Hematocrit : 29.7 %  Platelet Count - Automated : 138 K/uL  Mean Cell Volume : 89.2 fl  Mean Cell Hemoglobin : 29.1 pg  Mean Cell Hemoglobin Concentration : 32.7 gm/dL  Auto Neutrophil # : x  Auto Lymphocyte # : x  Auto Monocyte # : x  Auto Eosinophil # : x  Auto Basophil # : x  Auto Neutrophil % : x  Auto Lymphocyte % : x  Auto Monocyte % : x  Auto Eosinophil % : x  Auto Basophil % : x        01-15    136  |  102  |  15  ----------------------------<  127<H>  3.8   |  27  |  0.54    Ca    8.9      15 Carlos 2024 05:30  Phos  2.7     01-15  Mg     1.9     01-15      Daily     Daily   CAPILLARY BLOOD GLUCOSE      POCT Blood Glucose.: 124 mg/dL (15 Carlos 2024 06:18)      Urinalysis Basic - ( 15 Carlos 2024 05:30 )    Color: x / Appearance: x / SG: x / pH: x  Gluc: 127 mg/dL / Ketone: x  / Bili: x / Urobili: x   Blood: x / Protein: x / Nitrite: x   Leuk Esterase: x / RBC: x / WBC x   Sq Epi: x / Non Sq Epi: x / Bacteria: x

## 2024-01-15 NOTE — PROGRESS NOTE ADULT - ASSESSMENT
SAUNDRA HOLMAN is a 69yo M w PMH of CAD (x2 stents Mar 2023), HLD, T2DM, hx of kidney stones, psoriasis who presented with an oral mass and underwent L hemimandibulectomy, SND L level 1-3, recon with L FFF, STSG, and placement of dental implants on 12/7. He had a trach which was subsequently decannulated. Now s/p OR for debridement and exploration. Trach replaced through prior stoma for pulmonary toilet. Pt with reported intermittent tremors 1/7, electrolytes normal.     Plan:  - ERAS protocol POD 4  - Change to cuffless trach tomorrow  - Q4 flap checks  - Hgb goal > 9.0   - C/w Imipenem (1/1 - 1/16)   - C/w TF  - Hold home plavix  - Maintain 7.5 portex for pulm toilet  - ISS  - Pain control  - Home meds  - Bowel regimen SAUNDRA HOLMAN is a 67yo M w PMH of CAD (x2 stents Mar 2023), HLD, T2DM, hx of kidney stones, psoriasis who presented with an oral mass and underwent L hemimandibulectomy, SND L level 1-3, recon with L FFF, STSG, and placement of dental implants on 12/7. He had a trach which was subsequently decannulated. Now s/p OR for debridement and exploration. Trach replaced through prior stoma for pulmonary toilet. Pt with reported intermittent tremors 1/7, electrolytes normal.     Plan:  - ERAS protocol POD 4  - Change to cuffless trach tomorrow  - Q4 flap checks  - Hgb goal > 9.0   - C/w Imipenem (1/1 - 1/16)   - C/w TF  - Hold home plavix  - Maintain 7.5 portex for pulm toilet  - ISS  - Pain control  - Home meds  - Bowel regimen

## 2024-01-16 LAB
ANION GAP SERPL CALC-SCNC: 10 MMOL/L — SIGNIFICANT CHANGE UP (ref 5–17)
BUN SERPL-MCNC: 15 MG/DL — SIGNIFICANT CHANGE UP (ref 7–23)
CALCIUM SERPL-MCNC: 8.6 MG/DL — SIGNIFICANT CHANGE UP (ref 8.4–10.5)
CHLORIDE SERPL-SCNC: 102 MMOL/L — SIGNIFICANT CHANGE UP (ref 96–108)
CO2 SERPL-SCNC: 25 MMOL/L — SIGNIFICANT CHANGE UP (ref 22–31)
CREAT SERPL-MCNC: 0.55 MG/DL — SIGNIFICANT CHANGE UP (ref 0.5–1.3)
EGFR: 109 ML/MIN/1.73M2 — SIGNIFICANT CHANGE UP
GLUCOSE BLDC GLUCOMTR-MCNC: 122 MG/DL — HIGH (ref 70–99)
GLUCOSE BLDC GLUCOMTR-MCNC: 142 MG/DL — HIGH (ref 70–99)
GLUCOSE BLDC GLUCOMTR-MCNC: 94 MG/DL — SIGNIFICANT CHANGE UP (ref 70–99)
GLUCOSE SERPL-MCNC: 139 MG/DL — HIGH (ref 70–99)
HCT VFR BLD CALC: 31.7 % — LOW (ref 39–50)
HGB BLD-MCNC: 10.1 G/DL — LOW (ref 13–17)
MCHC RBC-ENTMCNC: 28.5 PG — SIGNIFICANT CHANGE UP (ref 27–34)
MCHC RBC-ENTMCNC: 31.9 GM/DL — LOW (ref 32–36)
MCV RBC AUTO: 89.3 FL — SIGNIFICANT CHANGE UP (ref 80–100)
NRBC # BLD: 0 /100 WBCS — SIGNIFICANT CHANGE UP (ref 0–0)
PLATELET # BLD AUTO: 155 K/UL — SIGNIFICANT CHANGE UP (ref 150–400)
POTASSIUM SERPL-MCNC: 4.2 MMOL/L — SIGNIFICANT CHANGE UP (ref 3.5–5.3)
POTASSIUM SERPL-SCNC: 4.2 MMOL/L — SIGNIFICANT CHANGE UP (ref 3.5–5.3)
RBC # BLD: 3.55 M/UL — LOW (ref 4.2–5.8)
RBC # FLD: 14.4 % — SIGNIFICANT CHANGE UP (ref 10.3–14.5)
SODIUM SERPL-SCNC: 137 MMOL/L — SIGNIFICANT CHANGE UP (ref 135–145)
WBC # BLD: 7.39 K/UL — SIGNIFICANT CHANGE UP (ref 3.8–10.5)
WBC # FLD AUTO: 7.39 K/UL — SIGNIFICANT CHANGE UP (ref 3.8–10.5)

## 2024-01-16 PROCEDURE — 99232 SBSQ HOSP IP/OBS MODERATE 35: CPT

## 2024-01-16 PROCEDURE — 99221 1ST HOSP IP/OBS SF/LOW 40: CPT

## 2024-01-16 PROCEDURE — 31502 CHANGE OF WINDPIPE AIRWAY: CPT

## 2024-01-16 RX ORDER — TRAZODONE HCL 50 MG
50 TABLET ORAL AT BEDTIME
Refills: 0 | Status: DISCONTINUED | OUTPATIENT
Start: 2024-01-16 | End: 2024-01-17

## 2024-01-16 RX ADMIN — CHLORHEXIDINE GLUCONATE 15 MILLILITER(S): 213 SOLUTION TOPICAL at 06:19

## 2024-01-16 RX ADMIN — Medication 81 MILLIGRAM(S): at 11:43

## 2024-01-16 RX ADMIN — ENOXAPARIN SODIUM 40 MILLIGRAM(S): 100 INJECTION SUBCUTANEOUS at 11:43

## 2024-01-16 RX ADMIN — Medication 1 APPLICATION(S): at 18:46

## 2024-01-16 RX ADMIN — IMIPENEM AND CILASTATIN 250 MILLIGRAM(S): 250; 250 INJECTION, POWDER, FOR SOLUTION INTRAVENOUS at 06:19

## 2024-01-16 RX ADMIN — Medication 15 MILLILITER(S): at 11:57

## 2024-01-16 RX ADMIN — GABAPENTIN 300 MILLIGRAM(S): 400 CAPSULE ORAL at 06:21

## 2024-01-16 RX ADMIN — Medication 50 MILLIGRAM(S): at 22:06

## 2024-01-16 RX ADMIN — IMIPENEM AND CILASTATIN 250 MILLIGRAM(S): 250; 250 INJECTION, POWDER, FOR SOLUTION INTRAVENOUS at 14:24

## 2024-01-16 RX ADMIN — CHLORHEXIDINE GLUCONATE 15 MILLILITER(S): 213 SOLUTION TOPICAL at 18:46

## 2024-01-16 RX ADMIN — Medication 1 APPLICATION(S): at 06:19

## 2024-01-16 RX ADMIN — Medication 3 MILLILITER(S): at 11:43

## 2024-01-16 RX ADMIN — HYDROMORPHONE HYDROCHLORIDE 0.5 MILLIGRAM(S): 2 INJECTION INTRAMUSCULAR; INTRAVENOUS; SUBCUTANEOUS at 12:03

## 2024-01-16 RX ADMIN — Medication 3 MILLILITER(S): at 22:09

## 2024-01-16 RX ADMIN — Medication 3 MILLILITER(S): at 17:46

## 2024-01-16 RX ADMIN — Medication 1 MILLIGRAM(S): at 22:06

## 2024-01-16 RX ADMIN — HYDROMORPHONE HYDROCHLORIDE 0.5 MILLIGRAM(S): 2 INJECTION INTRAMUSCULAR; INTRAVENOUS; SUBCUTANEOUS at 11:48

## 2024-01-16 RX ADMIN — IMIPENEM AND CILASTATIN 250 MILLIGRAM(S): 250; 250 INJECTION, POWDER, FOR SOLUTION INTRAVENOUS at 20:39

## 2024-01-16 RX ADMIN — Medication 3 MILLILITER(S): at 03:41

## 2024-01-16 RX ADMIN — GABAPENTIN 300 MILLIGRAM(S): 400 CAPSULE ORAL at 18:46

## 2024-01-16 NOTE — PROGRESS NOTE ADULT - ATTENDING COMMENTS
69 y/o M PMHx of CAD s/p PCI/CUBA x2 to LAD and Ramus (Mar 2023), T2DM, psoriasis, hx Renal calculi, w/ [id number] SCC of L buccal mucosa s/p hemimandibulectomy, left level 1, 2a, 3 neck neck dissection, L FFF, tracheostomy w/ 7.5 cuffed portex, dental implants, STSG (12/7/23), s/p G tube placement and subsequent trach decannulation and discharged home on ( 12/22/23). Pt however returned to [facility] ( 12/27/23) with surgical site infection, s/p RTOR 12/27 with findings of skin flap necrosis and s/p debridement. Trach replaced through prior stoma for pulmonary toilet. Patient also noted to have dark stools and dropped in Hgb- wife reported black stool for the last 3 days and same thing coming out from peg tube 12/28- anemia required blood transfusion -    ambulating multiple rounds   secretion from trach is clear.  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 Purulent drainage appreciated at margins of flap and in neck.  EGD( 1/2): ulcer found at the G-tube site  # RTOR- flat change with Latissmus dorsi- close monitoring in sicu, now on step down  wound care as per ENT.   - ID f/u appreciated ( Team 1) ,  d/c's Vanco & Zosyn ( 1/1) and started on Imipenem 1gm iv q8h  (1/1-) , will need 2 weeks of iv abx from the last washout ( 1/2) to continue till 1/16-   - f/u OR culture ( 1/2): reviewed   - OR cultures with E.coli, ECC, E.faecalis S.mitis/oralis, S.epi, S.constellatus, S.maltophilia, mixed anaerobes.   - culture result reviewed.  - BCx( 12/28): ngtd     # Acute blood loss anemia/melena -Hb stable-  # hx WADE ( Ferritin 768 but Tsat 13% ( <20%) on 12/13/23)   - GI fu appreciated, EGD( 1/2) ulcer at G-tube site, rec; Protonix 40mg daily for 8 weeks and avoid bumper being too tight to cause pressure ulcer   - keep active T&S, keep Hgb>8    - c/w ASA given hx PCI x2 ( March 2023)  - hold off Plavix since adm ( 12/27) requiring procedure and due to bleeding  - c/w PPI daily can be via G-tube   - c/w Folic acid 1mg daily   - monitor clinically for any further melena     # CAD sp PCI/CUBA x2 to LAD and Ramus in March 2023 as per cards is in setting of NSTEMI - prefer DAPT, at least monotherapy with ASA if plavix need to be held for procedure, currently on ASA , to restart plavix when safe., c/w ASA 81mg and Atorvastatin 40mg qHS     #  DM - FS with ISS, would hold all ora-hypoglycemic agent, goal -180 -within goal.     # pain management - oxycodone 5mg/10mg prn , dilaudid prn, would need stool softener to ensure daily BM.     # Dysphagia- NPO, trach, on TF with Vital 1.5 via G-tube     # DVT ppx: Lovenox    will follow. recovering well, dw Dr. Montes, wife. 67 y/o M PMHx of CAD s/p PCI/CUBA x2 to LAD and Ramus (Mar 2023), T2DM, psoriasis, hx Renal calculi, w/ [id number] SCC of L buccal mucosa s/p hemimandibulectomy, left level 1, 2a, 3 neck neck dissection, L FFF, tracheostomy w/ 7.5 cuffed portex, dental implants, STSG (12/7/23), s/p G tube placement and subsequent trach decannulation and discharged home on ( 12/22/23). Pt however returned to [facility] ( 12/27/23) with surgical site infection, s/p RTOR 12/27 with findings of skin flap necrosis and s/p debridement. Trach replaced through prior stoma for pulmonary toilet. Patient also noted to have dark stools and dropped in Hgb- wife reported black stool for the last 3 days and same thing coming out from peg tube 12/28- anemia required blood transfusion -    ambulating multiple rounds   secretion from trach is clear.  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 Purulent drainage appreciated at margins of flap and in neck.  EGD( 1/2): ulcer found at the G-tube site  # RTOR- flat change with Latissmus dorsi- close monitoring in sicu, now on step down  wound care as per ENT.   - ID f/u appreciated ( Team 1) ,  d/c's Vanco & Zosyn ( 1/1) and started on Imipenem 1gm iv q8h  (1/1-) , will need 2 weeks of iv abx from the last washout ( 1/2) to continue till 1/16-   - f/u OR culture ( 1/2): reviewed   - OR cultures with E.coli, ECC, E.faecalis S.mitis/oralis, S.epi, S.constellatus, S.maltophilia, mixed anaerobes.   - culture result reviewed.  - BCx( 12/28): ngtd     # Acute blood loss anemia/melena -Hb stable-  # hx WADE ( Ferritin 768 but Tsat 13% ( <20%) on 12/13/23)   - GI fu appreciated, EGD( 1/2) ulcer at G-tube site, rec; Protonix 40mg daily for 8 weeks and avoid bumper being too tight to cause pressure ulcer   - keep active T&S, keep Hgb>8    - c/w ASA given hx PCI x2 ( March 2023)  - hold off Plavix since adm ( 12/27) requiring procedure and due to bleeding  - c/w PPI daily can be via G-tube   - c/w Folic acid 1mg daily   - monitor clinically for any further melena     # CAD sp PCI/UCBA x2 to LAD and Ramus in March 2023 as per cards is in setting of NSTEMI - prefer DAPT, at least monotherapy with ASA if plavix need to be held for procedure, currently on ASA , to restart plavix when safe., c/w ASA 81mg and Atorvastatin 40mg qHS     #  DM - FS with ISS, would hold all ora-hypoglycemic agent, goal -180 -within goal.     # pain management - oxycodone 5mg/10mg prn , dilaudid prn, would need stool softener to ensure daily BM.     # Dysphagia- NPO, trach, on TF with Vital 1.5 via G-tube     # DVT ppx: Lovenox    will follow. recovering well, dw Dr. Montes, wife.

## 2024-01-16 NOTE — PROGRESS NOTE ADULT - SUBJECTIVE AND OBJECTIVE BOX
*** INCOMPLETE ***    REASON FOR CONSULT:     INTERVAL/OVERNIGHT EVENTS: As per overnight staff, there were no acute events overnight    SUBJECTIVE HPI: Patient seen and examined at bedside. Patient resting comfortably, no complaints at this time. Patient denies: fever, chills, weakness, dizziness, headaches, changes in vision, chest pain, palpitations, shortness of breath, cough, N/V, diarrhea or constipation, dysuria, LE edema. ROS otherwise negative.      MEDICATIONS  (STANDING):  albuterol/ipratropium for Nebulization 3 milliLiter(s) Nebulizer every 6 hours  aspirin  chewable 81 milliGRAM(s) Oral daily  bacitracin   Ointment 1 Application(s) Topical every 12 hours  chlorhexidine 0.12% Liquid 15 milliLiter(s) Oral Mucosa two times a day  chlorhexidine 2% Cloths 1 Application(s) Topical daily  dextrose 5%. 1000 milliLiter(s) (100 mL/Hr) IV Continuous <Continuous>  dextrose 5%. 1000 milliLiter(s) (50 mL/Hr) IV Continuous <Continuous>  dextrose 50% Injectable 12.5 Gram(s) IV Push once  dextrose 50% Injectable 25 Gram(s) IV Push once  dextrose 50% Injectable 25 Gram(s) IV Push once  doxazosin 1 milliGRAM(s) Oral at bedtime  enoxaparin Injectable 40 milliGRAM(s) SubCutaneous every 24 hours  gabapentin Solution 300 milliGRAM(s) Oral every 12 hours  glucagon  Injectable 1 milliGRAM(s) IntraMuscular once  imipenem/cilastatin  IVPB 1000 milliGRAM(s) IV Intermittent every 8 hours  influenza  Vaccine (HIGH DOSE) 0.7 milliLiter(s) IntraMuscular once  insulin lispro (ADMELOG) corrective regimen sliding scale   SubCutaneous every 6 hours  multivitamin/minerals/iron Oral Solution (CENTRUM) 15 milliLiter(s) Oral daily  polyethylene glycol 3350 17 Gram(s) Oral daily  senna 1 Tablet(s) Oral daily    MEDICATIONS  (PRN):  dextrose Oral Gel 15 Gram(s) Oral once PRN Blood Glucose LESS THAN 70 milliGRAM(s)/deciliter  HYDROmorphone  Injectable 0.5 milliGRAM(s) IV Push every 3 hours PRN breakthrough pain  lidocaine   4% Patch 1 Patch Transdermal daily PRN back pain  ondansetron Injectable 4 milliGRAM(s) IV Push every 8 hours PRN Nausea and/or Vomiting  oxyCODONE    Solution 5 milliGRAM(s) Enteral Tube every 6 hours PRN Moderate Pain (4 - 6)  oxyCODONE    Solution 10 milliGRAM(s) Enteral Tube every 6 hours PRN Severe Pain (7 - 10)        VITAL SIGNS:  Vital Signs Last 24 Hrs  T(C): 36.6 (16 Jan 2024 09:00), Max: 36.9 (15 Carlos 2024 10:00)  T(F): 97.8 (16 Jan 2024 09:00), Max: 98.4 (15 Carlos 2024 10:00)  HR: 72 (16 Jan 2024 09:00) (62 - 78)  BP: 103/54 (16 Jan 2024 09:00) (98/57 - 126/56)  BP(mean): 72 (16 Jan 2024 09:00) (72 - 85)  RR: 18 (16 Jan 2024 09:00) (16 - 19)  SpO2: 100% (16 Jan 2024 09:00) (98% - 100%)    Parameters below as of 16 Jan 2024 09:00  Patient On (Oxygen Delivery Method): tracheostomy collar  O2 Flow (L/min): 10  O2 Concentration (%): 40    I&O's Detail    15 Carlos 2024 07:01  -  16 Jan 2024 07:00  --------------------------------------------------------  IN:    Enteral Tube Flush: 800 mL    IV PiggyBack: 250 mL    Vital1.5: 1011 mL  Total IN: 2061 mL    OUT:    Bulb (mL): 20 mL    Bulb (mL): 0 mL    Voided (mL): 1450 mL  Total OUT: 1470 mL    Total NET: 591 mL          PHYSICAL EXAM:  General: Comfortable, pleasant/anxious/agitated, Ill-appearing, well-nourished/frail/cachectic, comfortable / in distress  Neurological: AAOx3, no focal deficits  HEENT: NC/AT; EOMI, PERRL, clear conjunctiva, no nasal or oropharyngeal discharge or exudates, MMM  Neck: supple, no cervical or post-auricular lymphadenopathy  Cardiovascular: +S1/S2, no murmurs/rubs/gallops, RRR  Respiratory: CTA B/L, no diminished breath sounds, no wheezes/rales/rhonchi, no increased work of breathing or accessory muscle use  Gastrointestinal: soft, NT/ND; active BSx4 quadrants  Genitourinary: no suprapubic tenderness, no CVA tenderness  Extremities: WWP; no edema, clubbing or cyanosis  Vascular: 2+ radial, DP/PT pulses B/L  Skin: no rashes  Lines/Drains:     LABS:                        9.7    10.38 )-----------( 138      ( 15 Carlos 2024 05:30 )             29.7     01-15    136  |  102  |  15  ----------------------------<  127<H>  3.8   |  27  |  0.54    Ca    8.9      15 Carlos 2024 05:30  Phos  2.7     01-15  Mg     1.9     01-15              BNP    Urinalysis Basic - ( 15 Carlos 2024 05:30 )    Color: x / Appearance: x / SG: x / pH: x  Gluc: 127 mg/dL / Ketone: x  / Bili: x / Urobili: x   Blood: x / Protein: x / Nitrite: x   Leuk Esterase: x / RBC: x / WBC x   Sq Epi: x / Non Sq Epi: x / Bacteria: x            Microbiology:    Culture - Tissue with Gram Stain (collected 01-02-24 @ 18:03)  Source: .Tissue left mandible tissue or spec  Gram Stain (01-02-24 @ 21:59):    Numerous Gram Negative Rods    Few Gram positive cocci in pairs    Few WBC's  Final Report (01-05-24 @ 12:29):    Numerous Escherichia coli    Numerous Enterobacter cloacae complex    Few Enterococcus faecalis    Rare Staphylococcus epidermidis  Organism: Escherichia coli  Enterobacter cloacae complex  Enterobacter cloacae complex  Enterococcus faecalis  Escherichia coli (01-05-24 @ 12:29)  Organism: Escherichia coli (01-05-24 @ 12:29)      Method Type: OTTO      -  Ampicillin: S <=8 These ampicillin results predict results for amoxicillin      -  Ampicillin/Sulbactam: S <=4/2      -  Cefazolin: S <=2      -  Ceftriaxone: S <=1      -  Ciprofloxacin: S <=0.25      -  Ertapenem: S <=0.5      -  Gentamicin: S <=2      -  Piperacillin/Tazobactam: S <=8      -  Tobramycin: S <=2      -  Trimethoprim/Sulfamethoxazole: S 1/19  Organism: Enterococcus faecalis (01-05-24 @ 12:29)      Method Type: OTTO      -  Ampicillin: S <=2 Predicts results to ampicillin/sulbactam, amoxacillin-clavulanate and  piperacillin-tazobactam.      -  Vancomycin: S 2  Organism: Enterobacter cloacae complex (01-05-24 @ 12:29)      Method Type: ETEST      -  Meropenem: S 0.094      -  Ertapenem: S 0.38  Organism: Enterobacter cloacae complex (01-05-24 @ 12:29)      Method Type: OTTO      -  Ampicillin: R >16 These ampicillin results predict results for amoxicillin      -  Ampicillin/Sulbactam: R >16/8      -  Cefazolin: R >16      -  Cefepime: R 16      -  Ceftriaxone: R >32 Enterobacter cloacae, Klebsiella aerogenes, and Citrobacter freundii may develop resistance during prolonged therapy.      -  Ciprofloxacin: S <=0.25      -  Gentamicin: S <=2      -  Meropenem: S <=1      -  Meropenem/Vaborbactam: S <=2      -  Piperacillin/Tazobactam: R >64      -  Tobramycin: S <=2      -  Trimethoprim/Sulfamethoxazole: S <=0.5/9.5  Organism: Escherichia coli (01-05-24 @ 12:29)      Method Type: OTTO      -  Ampicillin: S <=8 These ampicillin results predict results for amoxicillin      -  Ampicillin/Sulbactam: S <=4/2      -  Cefazolin: S <=2      -  Ceftriaxone: S <=1      -  Ciprofloxacin: S <=0.25      -  Ertapenem: S <=0.5      -  Gentamicin: S <=2      -  Piperacillin/Tazobactam: S <=8      -  Tobramycin: S <=2      -  Trimethoprim/Sulfamethoxazole: S 1/19    Culture - Surgical Swab (collected 01-02-24 @ 18:03)  Source: .Surgical Swab left neck or spec  Gram Stain (01-02-24 @ 22:00):    Rare Gram Negative Rods    Rare Gram positive cocci in pairs    Moderate WBC's  Final Report (01-05-24 @ 12:42):    Moderate Escherichia coli    Moderate Enterobacter cloacae complex    Rare Candida parapsilosis    Rare Staphylococcus capitis  Organism: Enterobacter cloacae complex  Enterobacter cloacae complex  Escherichia coli  Escherichia coli (01-05-24 @ 12:16)  Organism: Escherichia coli (01-05-24 @ 12:16)      Method Type: OTTO      -  Ampicillin: S <=8 These ampicillin results predict results for amoxicillin      -  Ampicillin/Sulbactam: S <=4/2      -  Cefazolin: S <=2      -  Ceftriaxone: S <=1      -  Ciprofloxacin: S <=0.25      -  Ertapenem: S <=0.5      -  Gentamicin: S <=2      -  Piperacillin/Tazobactam: S <=8      -  Tobramycin: S <=2      -  Trimethoprim/Sulfamethoxazole: S 1/19  Organism: Escherichia coli (01-05-24 @ 12:16)      Method Type: OTTO      -  Ampicillin: S <=8 These ampicillin results predict results for amoxicillin      -  Ampicillin/Sulbactam: S <=4/2      -  Cefazolin: S <=2      -  Ceftriaxone: S <=1      -  Ciprofloxacin: S <=0.25      -  Ertapenem: S <=0.5      -  Gentamicin: S <=2      -  Piperacillin/Tazobactam: S <=8      -  Tobramycin: S <=2      -  Trimethoprim/Sulfamethoxazole: S 1/19  Organism: Enterobacter cloacae complex (01-05-24 @ 12:16)      Method Type: ETEST      -  Ertapenem: S 0.38      -  Meropenem: S 0.094  Organism: Enterobacter cloacae complex (01-05-24 @ 12:16)      Method Type: OTTO      -  Ampicillin: R >16 These ampicillin results predict results for amoxicillin      -  Ampicillin/Sulbactam: R >16/8      -  Cefazolin: R >16      -  Cefepime: R 16      -  Ceftriaxone: R >32 Enterobacter cloacae, Klebsiella aerogenes, and Citrobacter freundii may develop resistance during prolonged therapy.      -  Ciprofloxacin: S <=0.25      -  Gentamicin: S <=2      -  Meropenem: S <=1      -  Meropenem/Vaborbactam: S <=2      -  Piperacillin/Tazobactam: R >64      -  Tobramycin: S <=2      -  Trimethoprim/Sulfamethoxazole: S <=0.5/9.5    Culture - Blood (collected 12-28-23 @ 10:08)  Source: .Blood Blood-Peripheral  Final Report (01-02-24 @ 11:01):    No growth at 5 days.    Culture - Surgical Swab (collected 12-27-23 @ 20:46)  Source: .Surgical Swab 1. Left Oral Cavity  Gram Stain (12-27-23 @ 21:53):    Numerous Gram positive cocci in pairs, chains and clusters    Moderate Gram Positive Rods    Moderate Gram Negative Rods    Numerous White blood cells  Final Report (01-02-24 @ 10:25):    Moderate Escherichia coli    Moderate Enterobacter cloacae complex    Few Enterococcus faecalis    Few Streptococcus mitis/oralis group    Few Staphylococcus epidermidis    Few Streptococcus constellatus    Few Stenotrophomonas maltophilia    Mixed Anaerobic Joy including    Few Prevotella species (most closely resembles Prevotella barnaie)    Few Prevotella denticola    Culture grew 3 or more types of organisms which indicate collection    contamination; consider recollection only if clinically indicated.  Organism: Escherichia coli  Enterobacter cloacae complex  Enterococcus faecalis  Streptococcus constellatus  Streptococcus constellatus  Stenotrophomonas maltophilia  Stenotrophomonas maltophilia (01-02-24 @ 10:24)  Organism: Escherichia coli (01-02-24 @ 10:24)      Method Type: OTTO      -  Ampicillin: S <=8 These ampicillin results predict results for amoxicillin      -  Ampicillin/Sulbactam: S <=4/2      -  Cefazolin: S <=2      -  Ceftriaxone: S <=1      -  Ciprofloxacin: S <=0.25      -  Ertapenem: S <=0.5      -  Gentamicin: S <=2      -  Piperacillin/Tazobactam: S <=8      -  Tobramycin: S <=2      -  Trimethoprim/Sulfamethoxazole: S 1/19  Organism: Stenotrophomonas maltophilia (01-01-24 @ 14:56)      Method Type: KB      -  Minocycline: S  Organism: Stenotrophomonas maltophilia (01-01-24 @ 14:55)      Method Type: OTTO      -  Levofloxacin: S 1      -  Trimethoprim/Sulfamethoxazole: S <=0.5/9.5  Organism: Streptococcus constellatus (01-01-24 @ 14:55)      Method Type: ETEST      -  Ceftriaxone: S 0.094      -  Penicillin: S 0.047  Organism: Streptococcus constellatus (01-01-24 @ 14:55)      Method Type: KB  Organism: Enterobacter cloacae complex (01-01-24 @ 14:52)      Method Type: OTTO      -  Ampicillin: R >16 These ampicillin results predict results for amoxicillin      -  Ampicillin/Sulbactam: R >16/8      -  Cefazolin: R >16      -  Cefepime: SDD 8 The breakpoint for susceptible dose dependent may require the usage of a higher cefepime dose (equivalent to adult dose of 2g q8h for normal renal function) for successful treatment.      -  Ceftriaxone: R >32 Enterobacter cloacae, Klebsiella aerogenes, and Citrobacter freundii may develop resistance during prolonged therapy.      -  Ciprofloxacin: S <=0.25      -  Ertapenem: S <=0.5      -  Gentamicin: S <=2      -  Meropenem: S <=1      -  Piperacillin/Tazobactam: R 64      -  Tobramycin: S <=2      -  Trimethoprim/Sulfamethoxazole: S <=0.5/9.5      -  Cefoxitin: R >16  Organism: Enterococcus faecalis (12-30-23 @ 15:21)      Method Type: OTTO      -  Ampicillin: S <=2 Predicts results to ampicillin/sulbactam, amoxacillin-clavulanate and  piperacillin-tazobactam.      -  Vancomycin: S 4        RADIOLOGY & ADDITIONAL STUDIES: Reviewed.    ECG:    ECHO:    REASON FOR CONSULT: Co-management    INTERVAL/OVERNIGHT EVENTS: As per overnight staff, there were no acute events overnight    SUBJECTIVE HPI: Pt seen initially ambulating with walker down hallway. Pt has no acute complaints at current. ROS otherwise negative.      MEDICATIONS  (STANDING):  albuterol/ipratropium for Nebulization 3 milliLiter(s) Nebulizer every 6 hours  aspirin  chewable 81 milliGRAM(s) Oral daily  bacitracin   Ointment 1 Application(s) Topical every 12 hours  chlorhexidine 0.12% Liquid 15 milliLiter(s) Oral Mucosa two times a day  chlorhexidine 2% Cloths 1 Application(s) Topical daily  dextrose 5%. 1000 milliLiter(s) (100 mL/Hr) IV Continuous <Continuous>  dextrose 5%. 1000 milliLiter(s) (50 mL/Hr) IV Continuous <Continuous>  dextrose 50% Injectable 12.5 Gram(s) IV Push once  dextrose 50% Injectable 25 Gram(s) IV Push once  dextrose 50% Injectable 25 Gram(s) IV Push once  doxazosin 1 milliGRAM(s) Oral at bedtime  enoxaparin Injectable 40 milliGRAM(s) SubCutaneous every 24 hours  gabapentin Solution 300 milliGRAM(s) Oral every 12 hours  glucagon  Injectable 1 milliGRAM(s) IntraMuscular once  imipenem/cilastatin  IVPB 1000 milliGRAM(s) IV Intermittent every 8 hours  influenza  Vaccine (HIGH DOSE) 0.7 milliLiter(s) IntraMuscular once  insulin lispro (ADMELOG) corrective regimen sliding scale   SubCutaneous every 6 hours  multivitamin/minerals/iron Oral Solution (CENTRUM) 15 milliLiter(s) Oral daily  polyethylene glycol 3350 17 Gram(s) Oral daily  senna 1 Tablet(s) Oral daily    MEDICATIONS  (PRN):  dextrose Oral Gel 15 Gram(s) Oral once PRN Blood Glucose LESS THAN 70 milliGRAM(s)/deciliter  HYDROmorphone  Injectable 0.5 milliGRAM(s) IV Push every 3 hours PRN breakthrough pain  lidocaine   4% Patch 1 Patch Transdermal daily PRN back pain  ondansetron Injectable 4 milliGRAM(s) IV Push every 8 hours PRN Nausea and/or Vomiting  oxyCODONE    Solution 5 milliGRAM(s) Enteral Tube every 6 hours PRN Moderate Pain (4 - 6)  oxyCODONE    Solution 10 milliGRAM(s) Enteral Tube every 6 hours PRN Severe Pain (7 - 10)        VITAL SIGNS:  Vital Signs Last 24 Hrs  T(C): 36.6 (16 Jan 2024 09:00), Max: 36.9 (15 Carlos 2024 10:00)  T(F): 97.8 (16 Jan 2024 09:00), Max: 98.4 (15 Carlos 2024 10:00)  HR: 72 (16 Jan 2024 09:00) (62 - 78)  BP: 103/54 (16 Jan 2024 09:00) (98/57 - 126/56)  BP(mean): 72 (16 Jan 2024 09:00) (72 - 85)  RR: 18 (16 Jan 2024 09:00) (16 - 19)  SpO2: 100% (16 Jan 2024 09:00) (98% - 100%)    Parameters below as of 16 Jan 2024 09:00  Patient On (Oxygen Delivery Method): tracheostomy collar  O2 Flow (L/min): 10  O2 Concentration (%): 40    I&O's Detail    15 Carlos 2024 07:01  -  16 Jan 2024 07:00  --------------------------------------------------------  IN:    Enteral Tube Flush: 800 mL    IV PiggyBack: 250 mL    Vital1.5: 1011 mL  Total IN: 2061 mL    OUT:    Bulb (mL): 20 mL    Bulb (mL): 0 mL    Voided (mL): 1450 mL  Total OUT: 1470 mL    Total NET: 591 mL          PHYSICAL EXAM:  General: Comfortable, NAD  Neurological: AAOx3, no focal deficits  HEENT: trach in place, w/o evidence of drainage   Cardiovascular: +S1/S2, no murmurs/rubs/gallops, RRR  Respiratory: CTA B/L, no diminished breath sounds, no wheezes/rales/rhonchi, no increased work of breathing or accessory muscle use  Gastrointestinal: soft, NT/ND; active BSx4 quadrants      LABS:                        9.7    10.38 )-----------( 138      ( 15 Carlos 2024 05:30 )             29.7     01-15    136  |  102  |  15  ----------------------------<  127<H>  3.8   |  27  |  0.54    Ca    8.9      15 Carlos 2024 05:30  Phos  2.7     01-15  Mg     1.9     01-15              BNP    Urinalysis Basic - ( 15 Carlos 2024 05:30 )    Color: x / Appearance: x / SG: x / pH: x  Gluc: 127 mg/dL / Ketone: x  / Bili: x / Urobili: x   Blood: x / Protein: x / Nitrite: x   Leuk Esterase: x / RBC: x / WBC x   Sq Epi: x / Non Sq Epi: x / Bacteria: x            Microbiology:    Culture - Tissue with Gram Stain (collected 01-02-24 @ 18:03)  Source: .Tissue left mandible tissue or spec  Gram Stain (01-02-24 @ 21:59):    Numerous Gram Negative Rods    Few Gram positive cocci in pairs    Few WBC's  Final Report (01-05-24 @ 12:29):    Numerous Escherichia coli    Numerous Enterobacter cloacae complex    Few Enterococcus faecalis    Rare Staphylococcus epidermidis  Organism: Escherichia coli  Enterobacter cloacae complex  Enterobacter cloacae complex  Enterococcus faecalis  Escherichia coli (01-05-24 @ 12:29)  Organism: Escherichia coli (01-05-24 @ 12:29)      Method Type: OTTO      -  Ampicillin: S <=8 These ampicillin results predict results for amoxicillin      -  Ampicillin/Sulbactam: S <=4/2      -  Cefazolin: S <=2      -  Ceftriaxone: S <=1      -  Ciprofloxacin: S <=0.25      -  Ertapenem: S <=0.5      -  Gentamicin: S <=2      -  Piperacillin/Tazobactam: S <=8      -  Tobramycin: S <=2      -  Trimethoprim/Sulfamethoxazole: S 1/19  Organism: Enterococcus faecalis (01-05-24 @ 12:29)      Method Type: OTTO      -  Ampicillin: S <=2 Predicts results to ampicillin/sulbactam, amoxacillin-clavulanate and  piperacillin-tazobactam.      -  Vancomycin: S 2  Organism: Enterobacter cloacae complex (01-05-24 @ 12:29)      Method Type: ETEST      -  Meropenem: S 0.094      -  Ertapenem: S 0.38  Organism: Enterobacter cloacae complex (01-05-24 @ 12:29)      Method Type: OTTO      -  Ampicillin: R >16 These ampicillin results predict results for amoxicillin      -  Ampicillin/Sulbactam: R >16/8      -  Cefazolin: R >16      -  Cefepime: R 16      -  Ceftriaxone: R >32 Enterobacter cloacae, Klebsiella aerogenes, and Citrobacter freundii may develop resistance during prolonged therapy.      -  Ciprofloxacin: S <=0.25      -  Gentamicin: S <=2      -  Meropenem: S <=1      -  Meropenem/Vaborbactam: S <=2      -  Piperacillin/Tazobactam: R >64      -  Tobramycin: S <=2      -  Trimethoprim/Sulfamethoxazole: S <=0.5/9.5  Organism: Escherichia coli (01-05-24 @ 12:29)      Method Type: OTTO      -  Ampicillin: S <=8 These ampicillin results predict results for amoxicillin      -  Ampicillin/Sulbactam: S <=4/2      -  Cefazolin: S <=2      -  Ceftriaxone: S <=1      -  Ciprofloxacin: S <=0.25      -  Ertapenem: S <=0.5      -  Gentamicin: S <=2      -  Piperacillin/Tazobactam: S <=8      -  Tobramycin: S <=2      -  Trimethoprim/Sulfamethoxazole: S 1/19    Culture - Surgical Swab (collected 01-02-24 @ 18:03)  Source: .Surgical Swab left neck or spec  Gram Stain (01-02-24 @ 22:00):    Rare Gram Negative Rods    Rare Gram positive cocci in pairs    Moderate WBC's  Final Report (01-05-24 @ 12:42):    Moderate Escherichia coli    Moderate Enterobacter cloacae complex    Rare Candida parapsilosis    Rare Staphylococcus capitis  Organism: Enterobacter cloacae complex  Enterobacter cloacae complex  Escherichia coli  Escherichia coli (01-05-24 @ 12:16)  Organism: Escherichia coli (01-05-24 @ 12:16)      Method Type: OTTO      -  Ampicillin: S <=8 These ampicillin results predict results for amoxicillin      -  Ampicillin/Sulbactam: S <=4/2      -  Cefazolin: S <=2      -  Ceftriaxone: S <=1      -  Ciprofloxacin: S <=0.25      -  Ertapenem: S <=0.5      -  Gentamicin: S <=2      -  Piperacillin/Tazobactam: S <=8      -  Tobramycin: S <=2      -  Trimethoprim/Sulfamethoxazole: S 1/19  Organism: Escherichia coli (01-05-24 @ 12:16)      Method Type: OTTO      -  Ampicillin: S <=8 These ampicillin results predict results for amoxicillin      -  Ampicillin/Sulbactam: S <=4/2      -  Cefazolin: S <=2      -  Ceftriaxone: S <=1      -  Ciprofloxacin: S <=0.25      -  Ertapenem: S <=0.5      -  Gentamicin: S <=2      -  Piperacillin/Tazobactam: S <=8      -  Tobramycin: S <=2      -  Trimethoprim/Sulfamethoxazole: S 1/19  Organism: Enterobacter cloacae complex (01-05-24 @ 12:16)      Method Type: ETEST      -  Ertapenem: S 0.38      -  Meropenem: S 0.094  Organism: Enterobacter cloacae complex (01-05-24 @ 12:16)      Method Type: OTTO      -  Ampicillin: R >16 These ampicillin results predict results for amoxicillin      -  Ampicillin/Sulbactam: R >16/8      -  Cefazolin: R >16      -  Cefepime: R 16      -  Ceftriaxone: R >32 Enterobacter cloacae, Klebsiella aerogenes, and Citrobacter freundii may develop resistance during prolonged therapy.      -  Ciprofloxacin: S <=0.25      -  Gentamicin: S <=2      -  Meropenem: S <=1      -  Meropenem/Vaborbactam: S <=2      -  Piperacillin/Tazobactam: R >64      -  Tobramycin: S <=2      -  Trimethoprim/Sulfamethoxazole: S <=0.5/9.5    Culture - Blood (collected 12-28-23 @ 10:08)  Source: .Blood Blood-Peripheral  Final Report (01-02-24 @ 11:01):    No growth at 5 days.    Culture - Surgical Swab (collected 12-27-23 @ 20:46)  Source: .Surgical Swab 1. Left Oral Cavity  Gram Stain (12-27-23 @ 21:53):    Numerous Gram positive cocci in pairs, chains and clusters    Moderate Gram Positive Rods    Moderate Gram Negative Rods    Numerous White blood cells  Final Report (01-02-24 @ 10:25):    Moderate Escherichia coli    Moderate Enterobacter cloacae complex    Few Enterococcus faecalis    Few Streptococcus mitis/oralis group    Few Staphylococcus epidermidis    Few Streptococcus constellatus    Few Stenotrophomonas maltophilia    Mixed Anaerobic Joy including    Few Prevotella species (most closely resembles Prevotella barnaie)    Few Prevotella denticola    Culture grew 3 or more types of organisms which indicate collection    contamination; consider recollection only if clinically indicated.  Organism: Escherichia coli  Enterobacter cloacae complex  Enterococcus faecalis  Streptococcus constellatus  Streptococcus constellatus  Stenotrophomonas maltophilia  Stenotrophomonas maltophilia (01-02-24 @ 10:24)  Organism: Escherichia coli (01-02-24 @ 10:24)      Method Type: OTTO      -  Ampicillin: S <=8 These ampicillin results predict results for amoxicillin      -  Ampicillin/Sulbactam: S <=4/2      -  Cefazolin: S <=2      -  Ceftriaxone: S <=1      -  Ciprofloxacin: S <=0.25      -  Ertapenem: S <=0.5      -  Gentamicin: S <=2      -  Piperacillin/Tazobactam: S <=8      -  Tobramycin: S <=2      -  Trimethoprim/Sulfamethoxazole: S 1/19  Organism: Stenotrophomonas maltophilia (01-01-24 @ 14:56)      Method Type: KB      -  Minocycline: S  Organism: Stenotrophomonas maltophilia (01-01-24 @ 14:55)      Method Type: OTTO      -  Levofloxacin: S 1      -  Trimethoprim/Sulfamethoxazole: S <=0.5/9.5  Organism: Streptococcus constellatus (01-01-24 @ 14:55)      Method Type: ETEST      -  Ceftriaxone: S 0.094      -  Penicillin: S 0.047  Organism: Streptococcus constellatus (01-01-24 @ 14:55)      Method Type: KB  Organism: Enterobacter cloacae complex (01-01-24 @ 14:52)      Method Type: OTTO      -  Ampicillin: R >16 These ampicillin results predict results for amoxicillin      -  Ampicillin/Sulbactam: R >16/8      -  Cefazolin: R >16      -  Cefepime: SDD 8 The breakpoint for susceptible dose dependent may require the usage of a higher cefepime dose (equivalent to adult dose of 2g q8h for normal renal function) for successful treatment.      -  Ceftriaxone: R >32 Enterobacter cloacae, Klebsiella aerogenes, and Citrobacter freundii may develop resistance during prolonged therapy.      -  Ciprofloxacin: S <=0.25      -  Ertapenem: S <=0.5      -  Gentamicin: S <=2      -  Meropenem: S <=1      -  Piperacillin/Tazobactam: R 64      -  Tobramycin: S <=2      -  Trimethoprim/Sulfamethoxazole: S <=0.5/9.5      -  Cefoxitin: R >16  Organism: Enterococcus faecalis (12-30-23 @ 15:21)      Method Type: OTTO      -  Ampicillin: S <=2 Predicts results to ampicillin/sulbactam, amoxacillin-clavulanate and  piperacillin-tazobactam.      -  Vancomycin: S 4        RADIOLOGY & ADDITIONAL STUDIES: Reviewed.    ECG:    ECHO:
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
## 2024-01-16 NOTE — BH CONSULTATION LIAISON ASSESSMENT NOTE - NSBHATTESTCOMMENTATTENDFT_PSY_A_CORE
Patient is a 67 y/o man with no PPH not on any psychotropic medications and PMHx of CAD s/p PCI/CUBA x2 to LAD and Ramus (Mar 2023), T2DM, psoriasis, renal calculi, most recently admitted to [facility] (December 2023) for [id number] SCC of L buccal mucosa s/p hemimandibulectomy, left level 1, 2a, 3 neck neck dissection, L FFF, tracheostomy w/ 7.5 cuffed portex, dental implants, STSG (12/7/23), s/p G tube placement and subsequent trach decannulation and discharged home on ( 12/22/23). Patient with current complicated and prolonged re-admission. On assessment patient denies any acute symptoms of anxiety or depression. He reports insomnia. He appears reluctant to engage in mental health treatment, inpatient or outpatient. The CL team provide education on available resources both inpatient and outpatient. Patient would benefit from start trazodone 50mg prn insomnia. CL to follow as needed, please call with questions/concerns. Patient is a 67 y/o man with no PPH not on any psychotropic medications and PMHx of CAD s/p PCI/CUBA x2 to LAD and Ramus (Mar 2023), T2DM, psoriasis, renal calculi, most recently admitted to [facility] (December 2023) for [id number] SCC of L buccal mucosa s/p hemimandibulectomy, left level 1, 2a, 3 neck neck dissection, L FFF, tracheostomy w/ 7.5 cuffed portex, dental implants, STSG (12/7/23), s/p G tube placement and subsequent trach decannulation and discharged home on ( 12/22/23). Patient with current complicated and prolonged re-admission. On assessment patient denies any acute symptoms of anxiety or depression. He reports insomnia. He appears reluctant to engage in mental health treatment, inpatient or outpatient. The CL team provide education on available resources both inpatient and outpatient. Patient would benefit from start trazodone 50mg prn insomnia. CL to follow as needed, please call with questions/concerns. Patient is a 67 y/o man with no PPH not on any psychotropic medications and PMHx of CAD s/p PCI/CUBA x2 to LAD and Ramus (Mar 2023), T2DM, psoriasis, renal calculi, most recently admitted to [facility] (December 2023) for [id number] SCC of L buccal mucosa s/p hemimandibulectomy, left level 1, 2a, 3 neck neck dissection, L FFF, tracheostomy w/ 7.5 cuffed portex, dental implants, STSG (12/7/23), s/p G tube placement and subsequent trach decannulation and discharged home on ( 12/22/23). Patient with current complicated and prolonged re-admission. On assessment patient denies any acute symptoms of anxiety or depression. He reports insomnia. He appears reluctant to engage in mental health treatment, inpatient or outpatient. The CL team provide education on available resources both inpatient and outpatient. Patient would benefit from starting trazodone 50mg prn insomnia. CL to follow as needed, please call with questions/concerns. Patient is a 69 y/o man with no PPH not on any psychotropic medications and PMHx of CAD s/p PCI/CUBA x2 to LAD and Ramus (Mar 2023), T2DM, psoriasis, renal calculi, most recently admitted to [facility] (December 2023) for [id number] SCC of L buccal mucosa s/p hemimandibulectomy, left level 1, 2a, 3 neck neck dissection, L FFF, tracheostomy w/ 7.5 cuffed portex, dental implants, STSG (12/7/23), s/p G tube placement and subsequent trach decannulation and discharged home on ( 12/22/23). Patient with current complicated and prolonged re-admission. On assessment patient denies any acute symptoms of anxiety or depression. He reports insomnia. He appears reluctant to engage in mental health treatment, inpatient or outpatient. The CL team provide education on available resources both inpatient and outpatient. Patient would benefit from starting trazodone 50mg prn insomnia. CL to follow as needed, please call with questions/concerns.

## 2024-01-16 NOTE — BH CONSULTATION LIAISON ASSESSMENT NOTE - RISK ASSESSMENT
low acute suicide risk; risk factors include recent changes of medical illness; protective factors include no past SA/SI, social support from family, identifies reasons to live

## 2024-01-16 NOTE — BH CONSULTATION LIAISON ASSESSMENT NOTE - CURRENT MEDICATION
MEDICATIONS  (STANDING):  albuterol/ipratropium for Nebulization 3 milliLiter(s) Nebulizer every 6 hours  aspirin  chewable 81 milliGRAM(s) Oral daily  bacitracin   Ointment 1 Application(s) Topical every 12 hours  chlorhexidine 0.12% Liquid 15 milliLiter(s) Oral Mucosa two times a day  chlorhexidine 2% Cloths 1 Application(s) Topical daily  dextrose 5%. 1000 milliLiter(s) (50 mL/Hr) IV Continuous <Continuous>  dextrose 5%. 1000 milliLiter(s) (100 mL/Hr) IV Continuous <Continuous>  dextrose 50% Injectable 25 Gram(s) IV Push once  dextrose 50% Injectable 25 Gram(s) IV Push once  dextrose 50% Injectable 12.5 Gram(s) IV Push once  doxazosin 1 milliGRAM(s) Oral at bedtime  enoxaparin Injectable 40 milliGRAM(s) SubCutaneous every 24 hours  gabapentin Solution 300 milliGRAM(s) Oral every 12 hours  glucagon  Injectable 1 milliGRAM(s) IntraMuscular once  imipenem/cilastatin  IVPB 1000 milliGRAM(s) IV Intermittent every 8 hours  influenza  Vaccine (HIGH DOSE) 0.7 milliLiter(s) IntraMuscular once  insulin lispro (ADMELOG) corrective regimen sliding scale   SubCutaneous every 6 hours  multivitamin/minerals/iron Oral Solution (CENTRUM) 15 milliLiter(s) Oral daily  polyethylene glycol 3350 17 Gram(s) Oral daily  senna 1 Tablet(s) Oral daily    MEDICATIONS  (PRN):  dextrose Oral Gel 15 Gram(s) Oral once PRN Blood Glucose LESS THAN 70 milliGRAM(s)/deciliter  HYDROmorphone  Injectable 0.5 milliGRAM(s) IV Push every 3 hours PRN breakthrough pain  lidocaine   4% Patch 1 Patch Transdermal daily PRN back pain  ondansetron Injectable 4 milliGRAM(s) IV Push every 8 hours PRN Nausea and/or Vomiting  oxyCODONE    Solution 10 milliGRAM(s) Enteral Tube every 6 hours PRN Severe Pain (7 - 10)  oxyCODONE    Solution 5 milliGRAM(s) Enteral Tube every 6 hours PRN Moderate Pain (4 - 6)

## 2024-01-16 NOTE — BH CONSULTATION LIAISON ASSESSMENT NOTE - NSBHCHARTREVIEWVS_PSY_A_CORE FT
Vital Signs Last 24 Hrs  T(C): 36.6 (16 Jan 2024 09:00), Max: 36.7 (15 Carlos 2024 14:00)  T(F): 97.8 (16 Jan 2024 09:00), Max: 98.1 (15 Carlos 2024 14:00)  HR: 75 (16 Jan 2024 09:32) (62 - 78)  BP: 103/54 (16 Jan 2024 09:00) (98/57 - 126/56)  BP(mean): 72 (16 Jan 2024 09:00) (72 - 85)  RR: 18 (16 Jan 2024 09:32) (16 - 19)  SpO2: 100% (16 Jan 2024 09:32) (98% - 100%)    Parameters below as of 16 Jan 2024 09:32  Patient On (Oxygen Delivery Method): tracheostomy collar    O2 Concentration (%): 40

## 2024-01-16 NOTE — BH CONSULTATION LIAISON ASSESSMENT NOTE - SUMMARY
69 y/o  M, domiciled home with wife, no past psychiatric history, not on any psychotropic medications, PMHx of CAD s/p PCI/CUBA x2 to LAD and Ramus (Mar 2023), T2DM, psoriasis, renal calculi, most recently admitted to [facility] (December 2023) for [id number] SCC of L buccal mucosa s/p hemimandibulectomy, left level 1, 2a, 3 neck neck dissection, L FFF, tracheostomy w/ 7.5 cuffed portex, dental implants, STSG (12/7/23), s/p G tube placement and subsequent trach decannulation and discharged home on ( 12/22/23). Pt however returned to [facility] ( 12/27/23) with surgical site infection, s/p RTOR 12/27 with findings of skin flap necrosis and s/p debridement, and a new flap was placed on 1/11. Trach replaced through prior stoma for pulmonary toilet. Has been on IV meropenem 1/2-1/16. Psychiatry consulted for concern of depression, as primary team notices that patient has been passive and reluctant to communicate.    On approach, patient is calmly lying in bed, trach in place, aaox4. Patient is non-verbal due to trach, communicates with writing on a piece of paper. He denies depressive mood, denies hopelessness or worthlessness, denies any suicidal thoughts. He endorses frustration with the prolonged hospitalization and the uncertainty of treatment outcomes. Collateral from wife confirms patient's ongoing anxiety. Patient's presentation is consistent with Adjustment d/o with anxiety, in the setting of medical illness. He will benefit from psychotherapy treatment, but given his current status of non-verbal, therapy will be more fruitful if postponed until he is more communicable. Given his frequent awakening at night and his report of tiredness during the day, he will benefit from starting low-dose sleeping aid in the meantime.    Recommendations:  - no indication for IPP admission at this time  - can start Trazodone 50mg qhs for sleep  - referral to outpatient psychologist before patient is discharged  - Psych CL will re-assess later this week   69 y/o  M, domiciled home with wife, no past psychiatric history, not on any psychotropic medications, PMHx of CAD s/p PCI/CUBA x2 to LAD and Ramus (Mar 2023), T2DM, psoriasis, renal calculi, most recently admitted to [facility] (December 2023) for [id number] SCC of L buccal mucosa s/p hemimandibulectomy, left level 1, 2a, 3 neck neck dissection, L FFF, tracheostomy w/ 7.5 cuffed portex, dental implants, STSG (12/7/23), s/p G tube placement and subsequent trach decannulation and discharged home on ( 12/22/23). Pt however returned to [facility] ( 12/27/23) with surgical site infection, s/p RTOR 12/27 with findings of skin flap necrosis and s/p debridement, and a new flap was placed on 1/11. Trach replaced through prior stoma for pulmonary toilet. Has been on IV meropenem 1/2-1/16. Psychiatry consulted for concern of depression, as primary team notices that patient has been passive and reluctant to communicate.    On approach, patient is calmly lying in bed, trach in place, aaox4. Patient is non-verbal due to trach, communicates with writing on a piece of paper. He denies depressive mood, denies hopelessness or worthlessness, denies any suicidal thoughts. He endorses frustration with the prolonged hospitalization and the uncertainty of treatment outcomes. Collateral from wife confirms patient's ongoing anxiety. Patient's presentation is consistent with Adjustment d/o with anxiety, in the setting of medical illness. He will benefit from psychotherapy treatment, but given his current status of non-verbal, therapy will be more fruitful if postponed until he is more communicable. Given his frequent awakening at night and his report of tiredness during the day, he will benefit from starting low-dose sleeping aid in the meantime.    Recommendations:  - no indication for IPP admission at this time  - can start Trazodone 50mg qhs for sleep  - referral to outpatient psychologist before patient is discharged  - Psych CL will re-assess later this week   69 y/o  M, domiciled home with wife, no past psychiatric history, not on any psychotropic medications, PMHx of CAD s/p PCI/CUBA x2 to LAD and Ramus (Mar 2023), T2DM, psoriasis, renal calculi, most recently admitted to [facility] (December 2023) for [id number] SCC of L buccal mucosa s/p hemimandibulectomy, left level 1, 2a, 3 neck neck dissection, L FFF, tracheostomy w/ 7.5 cuffed portex, dental implants, STSG (12/7/23), s/p G tube placement and subsequent trach decannulation and discharged home on ( 12/22/23). Pt however returned to [facility] ( 12/27/23) with surgical site infection, s/p RTOR 12/27 with findings of skin flap necrosis and s/p debridement, and a new flap was placed on 1/11. Trach replaced through prior stoma for pulmonary toilet. Has been on IV meropenem 1/2-1/16. Psychiatry consulted for concern of depression, as primary team notices that patient has been passive and reluctant to communicate.    On approach, patient is calmly lying in bed, trach in place, aaox4. Patient is non-verbal due to trach, communicates with writing on a piece of paper. He denies depressive mood, denies hopelessness or worthlessness, denies any suicidal thoughts. He endorses frustration with the prolonged hospitalization and the uncertainty of treatment outcomes. Collateral from wife confirms patient's ongoing anxiety. Patient's presentation is consistent with Adjustment d/o with anxiety, in the setting of medical illness. He will benefit from psychotherapy treatment, but given his current non-verbal status, therapy will be more fruitful if postponed until he is more communicable and willing. Given his frequent awakening at night and his report of tiredness during the day, he will benefit from low-dose trazodone to improve sleep quality.    Recommendations:  - no indication for IPP admission at this time  - can start Trazodone 50mg qhs for sleep  - patient is psychiatrically cleared at this time    Psych CL will sign off, please re-consult if any questions arise     67 y/o  M, domiciled home with wife, no past psychiatric history, not on any psychotropic medications, PMHx of CAD s/p PCI/CUBA x2 to LAD and Ramus (Mar 2023), T2DM, psoriasis, renal calculi, most recently admitted to [facility] (December 2023) for [id number] SCC of L buccal mucosa s/p hemimandibulectomy, left level 1, 2a, 3 neck neck dissection, L FFF, tracheostomy w/ 7.5 cuffed portex, dental implants, STSG (12/7/23), s/p G tube placement and subsequent trach decannulation and discharged home on ( 12/22/23). Pt however returned to [facility] ( 12/27/23) with surgical site infection, s/p RTOR 12/27 with findings of skin flap necrosis and s/p debridement, and a new flap was placed on 1/11. Trach replaced through prior stoma for pulmonary toilet. Has been on IV meropenem 1/2-1/16. Psychiatry consulted for concern of depression, as primary team notices that patient has been passive and reluctant to communicate.    On approach, patient is calmly lying in bed, trach in place, aaox4. Patient is non-verbal due to trach, communicates with writing on a piece of paper. He denies depressive mood, denies hopelessness or worthlessness, denies any suicidal thoughts. He endorses frustration with the prolonged hospitalization and the uncertainty of treatment outcomes. Collateral from wife confirms patient's ongoing anxiety. Patient's presentation is consistent with Adjustment d/o with anxiety, in the setting of medical illness. He will benefit from psychotherapy treatment, but given his current non-verbal status, therapy will be more fruitful if postponed until he is more communicable and willing. Given his frequent awakening at night and his report of tiredness during the day, he will benefit from low-dose trazodone to improve sleep quality.    Recommendations:  - no indication for IPP admission at this time  - can start Trazodone 50mg qhs for sleep  - patient is psychiatrically cleared at this time    Psych CL will sign off, please re-consult if any questions arise

## 2024-01-16 NOTE — BH CONSULTATION LIAISON ASSESSMENT NOTE - HPI (INCLUDE ILLNESS QUALITY, SEVERITY, DURATION, TIMING, CONTEXT, MODIFYING FACTORS, ASSOCIATED SIGNS AND SYMPTOMS)
67 y/o  M, domiciled home with wife, no past psychiatric history, not on any psychotropic medications, PMHx of CAD s/p PCI/CUBA x2 to LAD and Ramus (Mar 2023), T2DM, psoriasis, renal calculi, most recently admitted to [facility] (December 2023) for [id number] SCC of L buccal mucosa s/p hemimandibulectomy, left level 1, 2a, 3 neck neck dissection, L FFF, tracheostomy w/ 7.5 cuffed portex, dental implants, STSG (12/7/23), s/p G tube placement and subsequent trach decannulation and discharged home on ( 12/22/23). Pt however returned to [facility] ( 12/27/23) with surgical site infection, s/p RTOR 12/27 with findings of skin flap necrosis and s/p debridement, and a new flap was placed on 1/11. Trach replaced through prior stoma for pulmonary toilet. Has been on IV meropenem 1/2-1/16. Psychiatry consulted for possible depression.    On approach, patient is calmly lying in bed, trach in place, aaox4. Patient is non-verbal due to trach, communicates with writing on a piece of paper. He denies feeling depressed, reports just annoyed that "it takes forever to go home." When mentioned that his primary team is worried about him because he seems passive and reluctant to communicate recently, he writes "I'm just tired. They tell me you look good and I make faces. How can I look better every day and I am still here, that's why I make faces." He expresses that he looks forward to "finish with this and go to my family." He acknowledges that the medical team is trying to help him, although it is not easy. He maintains hopeful for his recovery. Patient endorses sleeping a lot recently due to "tired." He denies SI/HI/AVH.    Collateral obtained from wife who is at bedside: wife thinks patient is more anxious than depressed, because no one is sure when this long journey will be over. She says that patient does not like the concept of depression and would never agree on taking antidepressants. She does not have acute safety concerns and does not think patient will ever hurt himself. She mentions that patient would wake up 4-5 times at night, although he did not complain of sleep. 69 y/o  M, domiciled home with wife, no past psychiatric history, not on any psychotropic medications, PMHx of CAD s/p PCI/CUBA x2 to LAD and Ramus (Mar 2023), T2DM, psoriasis, renal calculi, most recently admitted to [facility] (December 2023) for [id number] SCC of L buccal mucosa s/p hemimandibulectomy, left level 1, 2a, 3 neck neck dissection, L FFF, tracheostomy w/ 7.5 cuffed portex, dental implants, STSG (12/7/23), s/p G tube placement and subsequent trach decannulation and discharged home on ( 12/22/23). Pt however returned to [facility] ( 12/27/23) with surgical site infection, s/p RTOR 12/27 with findings of skin flap necrosis and s/p debridement, and a new flap was placed on 1/11. Trach replaced through prior stoma for pulmonary toilet. Has been on IV meropenem 1/2-1/16. Psychiatry consulted for possible depression.    On approach, patient is calmly lying in bed, trach in place, aaox4. Patient is non-verbal due to trach, communicates with writing on a piece of paper. He denies feeling depressed, reports just annoyed that "it takes forever to go home." When mentioned that his primary team is worried about him because he seems passive and reluctant to communicate recently, he writes "I'm just tired. They tell me you look good and I make faces. How can I look better every day and I am still here, that's why I make faces." He expresses that he looks forward to "finish with this and go to my family." He acknowledges that the medical team is trying to help him, although it is not easy. He maintains hopeful for his recovery. Patient endorses sleeping a lot recently due to "tired." He denies SI/HI/AVH.    Collateral obtained from wife who is at bedside: wife thinks patient is more anxious than depressed, because no one is sure when this long journey will be over. She says that patient does not like the concept of depression and would never agree on taking antidepressants. She does not have acute safety concerns and does not think patient will ever hurt himself. She mentions that patient would wake up 4-5 times at night, although he did not complain of sleep. 67 y/o  M, domiciled home with wife, no past psychiatric history, not on any psychotropic medications, PMHx of CAD s/p PCI/CUBA x2 to LAD and Ramus (Mar 2023), T2DM, psoriasis, renal calculi, most recently admitted to [facility] (December 2023) for [id number] SCC of L buccal mucosa s/p hemimandibulectomy, left level 1, 2a, 3 neck neck dissection, L FFF, tracheostomy w/ 7.5 cuffed portex, dental implants, STSG (12/7/23), s/p G tube placement and subsequent trach decannulation and discharged home on ( 12/22/23). Pt however returned to [facility] ( 12/27/23) with surgical site infection, s/p RTOR 12/27 with findings of skin flap necrosis and s/p debridement, and a new flap was placed on 1/11. Trach replaced through prior stoma for pulmonary toilet. Has been on IV meropenem 1/2-1/16. Psychiatry consulted for concern of depression, as primary team notices that patient has been passive and reluctant to communicate.    On approach, patient is calmly lying in bed, trach in place, aaox4. Patient is non-verbal due to trach, communicates with writing on a piece of paper. He denies feeling depressed, reports just annoyed that "it takes forever to go home." When mentioned that his primary team is worried about him because he seems passive and reluctant to communicate recently, he writes "I'm just tired. They tell me you look good and I make faces. How can I look better every day and I am still here, that's why I make faces." He expresses that he looks forward to "finish with this and go to my family." He acknowledges that the medical team is trying to help him, although it is not easy. He maintains hopeful for his recovery. Patient endorses sleeping a lot recently due to "tired." He denies SI/HI/AVH.    Collateral obtained from wife at bedside: wife thinks patient is more anxious than depressed, because no one is sure when this long journey will be over. She says that patient does not like the concept of depression and would never agree on taking antidepressants. She does not have acute safety concerns and does not think patient will ever hurt himself. She mentions that patient would wake up 4-5 times at night, although he did not complain of sleep disturbance. Wife reports that patient has been suffering from pain in the past year due to the tumor.   69 y/o  M, domiciled home with wife, no past psychiatric history, not on any psychotropic medications, PMHx of CAD s/p PCI/CUBA x2 to LAD and Ramus (Mar 2023), T2DM, psoriasis, renal calculi, most recently admitted to [facility] (December 2023) for [id number] SCC of L buccal mucosa s/p hemimandibulectomy, left level 1, 2a, 3 neck neck dissection, L FFF, tracheostomy w/ 7.5 cuffed portex, dental implants, STSG (12/7/23), s/p G tube placement and subsequent trach decannulation and discharged home on ( 12/22/23). Pt however returned to [facility] ( 12/27/23) with surgical site infection, s/p RTOR 12/27 with findings of skin flap necrosis and s/p debridement, and a new flap was placed on 1/11. Trach replaced through prior stoma for pulmonary toilet. Has been on IV meropenem 1/2-1/16. Psychiatry consulted for concern of depression, as primary team notices that patient has been passive and reluctant to communicate.    On approach, patient is calmly lying in bed, trach in place, aaox4. Patient is non-verbal due to trach, communicates with writing on a piece of paper. He denies feeling depressed, reports just annoyed that "it takes forever to go home." When mentioned that his primary team is worried about him because he seems passive and reluctant to communicate recently, he writes "I'm just tired. They tell me you look good and I make faces. How can I look better every day and I am still here, that's why I make faces." He expresses that he looks forward to "finish with this and go to my family." He acknowledges that the medical team is trying to help him, although it is not easy. He maintains hopeful for his recovery. Patient endorses sleeping a lot recently due to "tired." He denies SI/HI/AVH.    Collateral obtained from wife at bedside: wife thinks patient is more anxious than depressed, because no one is sure when this long journey will be over. She says that patient does not like the concept of depression and would never agree on taking antidepressants. She does not have acute safety concerns and does not think patient will ever hurt himself. She mentions that patient would wake up 4-5 times at night, although he did not complain of sleep disturbance. Wife reports that patient has been suffering from pain in the past year due to the tumor.

## 2024-01-16 NOTE — PROGRESS NOTE ADULT - ASSESSMENT
Past medical Hx of CAD (x 2 stents Mar 2023), Type 2 DM, hx of kidney stones, psoriasis who presented with an oral mass during previous admission (12/3-21) and underwent L hemimandibulectomy, SND L level 1-3, recon with L FFF, STSG, and placement of dental implants on 12/7. Patient had a trach which was subsequently decannulated. Patient now returns with surgical site infection, now s/p OR for washout, L latissmus dorsi flap to L oral cavity/oropharynx. Medicine consulted for co-management.    Recommendations:    #Surgical site infection  Patient s/p OR for debridement and exploration (12/27 and 1/2) and Trach replaced through prior stoma for pulmonary toilet. OR cultures with E. Coli, ECC, E. Faecalis S. Mitis S. Oralis, S. Epidermidis, S. Constellatus, S. Maltophilia, mixed anaerobes. Now s/p OR for washout, L latissmus dorsi flap to L oral cavity/oropharynx 1/11  - c/w Imipenem 1 gram IV q8hrs x 14 days with day 1 being date of last washout 1/2/24  - Pain regimen: Tylenol 650 mg PO q6, Oxy IR 5 mg Q4 moderate, Oxy IR 10 mg Q4 severe, Dilaudid 1 mg IV Q6 for breakthrough   - f/u ID recommendations    #Anemia  Hb on admission 7.3. Now stable Hb 9.2 s/p karlo-operative transfusion. Patient with previous iron studies consistent with WADE. DD includes surgical site bleed VS less likely GI bleed. Patient now s/p EGD in OR (1/3).   - maintain active T&S, transfusion goal to Hgb >8   - c/w Folic acid 1g PO QD  - c/w Protonix 40 mg PO via G-tube  - Holding plavix  - keep active T&S, keep Hgb>8      #CAD  Patient with hx of CAD s/p 2 stents in March 2023.   - c/w ASA 81 mg PO QD, Atorvastatin 40 mg PO QD  - Restart Plavix, as soon as appropriate per primary team    #DM type 2  Home medication: Metformin 1g PO QD and 500 mg PO QD and Jardiance 10 mg PO QD.  - c/w insulin sliding scale    Plan discussed with Dr. Conchita Vogel, note not complete until attested by attending

## 2024-01-16 NOTE — BH CONSULTATION LIAISON ASSESSMENT NOTE - PERSONAL COLLATERAL RELATIONSHIP
----- Message from Kamari Campbell DO sent at 8/11/2022  5:02 AM CDT -----  Notify Karolyn that her CT scans actually look quite good.  The chest looks excellent.  Similar to times in the past, they state that some of the lymph nodes in the neck have increased in size but others have gotten better.  The ones that have increased have increased minimally and I am not concerned about them at this time.  Overall I think things seem very stable and we can continue our current plan.   wife

## 2024-01-16 NOTE — BH CONSULTATION LIAISON ASSESSMENT NOTE - DESCRIPTION
[place] farm [place] with wife, father of 3 children, used to work in restaurants and building homes, now retired

## 2024-01-16 NOTE — PROGRESS NOTE ADULT - SUBJECTIVE AND OBJECTIVE BOX
OTOLARYNGOLOGY (ENT) PROGRESS NOTE    ID: SAUNDRA HOLMAN is a 67yo M w PMH of CAD (x2 stents Mar 2023), HLD, T2DM, hx of kidney stones, psoriasis who presented with an oral mass and underwent L hemimandibulectomy, SND L level 1-3, recon with L FFF, STSG, and placement of dental implants on 12/7. He had a trach which was subsequently decannulated. He returns 12/27 with surgical site infection now s/p OR for debridement and exploration. Trach replaced through prior stoma for pulmonary toilet.     Subjective/ Interval:   12/28; Patient seen this morning, oral pack to be changed, penrose dressing changed. Intraoral flap with partal skin necrosis, 7.5 portex in place with cuff deflated   12/29: Patient seen and examined at bedside. NAEO. Patient remains afebrile and HD stable. HgB noted to drop to 7.6 from 9.2 but no additional episodes of melena. Penrose draining purulent material. Intra-oral infection/flap stable. Packing changed at bedside. No other complaints or changes at this time. ID recommended Vanc/zosyn and DC unasyn and flagyl, and IM recommended reticulocyte studies and adding folate supplements. No other changes at this time.   12/30: AFVSS. No acute events overnight. Patient seen and examined at bedside. C/w Vanc/Zosyn per ID recs, pending sensitivities. Growing staph epi, E coli, enterobacter from wound cx on 12/27. S/p 2U PRBC yday w appropriate response in Hgb. Transfusion goal > 9.0.  12/31: AFVSS. No acute events overnight. Patient seen and examined at bedside. C/w Vanc/Zosyn per ID recs, pending sens. Hgb stable this morning.  1/1: AFVSS. No acute events overnight. Patient seen and examined at bedside. C/w Vanc/Zosyn, e faecalis sens to vanc/zosyn. Had 3 dark BM's yesterday that were stool guiac positive.  1/2: AFVSS. No acute events overnight. Patient seen and examined at bedside. C/w Imipenem (changed per ID, enterobacter resistant to Zosyn). Plan for OR today for further washout / debridement.  1/3: AFVSS. No acute events overnight. Patient seen and examined at bedside. C/w imipenim. OR cx growing numerous gram negative rods and few gram positive cocci in pairs.  1/4: Patient seen bedside, changed intraoral and neck packing,  Continue to follow cultures, pain controlled   1/5: patient seen this morning, complains of right arm pain wit minimal swelling,  IV in the left arm,  Continue abx for 2 weeks. neck and oral packing changed, purulent drainage from neck noted.   1/6: Patient seen at bedside during morning rounds. Reports right arm pain and swelling somewhat improved although still present. IV replaced in right arm yesterday. Remains on IV abx, tentative end date 1/15/24. Neck and oral packing changed at bedside; purulent drainage noted from neck, decreased in volume compared to prior exams.  1/7: Patient seen at bedside during morning rounds. Overnight, patient complained of coughing, throat discomfort, and nursing reported some increased secretions via trach. Started on mucomyst with improvement. Noted 6 beats of PSVT at ~1900, no reported chest pain or other symptoms. No further episodes. Packing replaced at bedside   1/8: patient seen this morning, neck and oral packing changed. Continues to have purulent drainage form neck.  No chest pain or SOB,               1/9: patient seen this morning, changed neck and oral pack, patient tolerated packing change better with adjusted premedication,  purulence continues from neck , trach in place, fibula donor site with granulation tissue ( changed dressing)   1/10: AFVSS. Patient seen and evaluated this am. Neck and oral packing changed. Some purulence from neck. Trach in place.  1/11: AFVSS. Patient taken to OR for washout, L latissmus dorsi flap to L oral cavity/oropharynx.  1/12: AFVSS. Patient seen and evaluated this am. Pt doing well, minimal output from neck opening. KRISTEN drains in place. Flap healthy appearing. Failed TOV yesterday.  1/13: AFVSS. Patient seen and evaluated this am. Pt in chair, flap looks healthy. Minimal output from neck opening. Donor site healing appropriately.   1/14: AFVSS. Patient seen and evaluated this am. Flap appears healthy. Trach in place. Neck dressing changed, no purulence draining from opening. Donor site healing appropriately.  1/15: AFVSS. Patient seen and evaluated this am. Flap is healthy appearing. Trach in place. Neck dressing changed. Donor site healing appropriately.  1/16: AFVSS. No acute events overnight. Patient seen and examined at bedside. Flap healthy appearing, neck soft and flat. Strong doppler signal.    PHYSICAL EXAM:  GEN: appears stated age  NEURO: alert & oriented x 4/4  HEENT: flap with good color and doppler signal, neck with no purulence noted, trach in place  CVS: regular rate and rhythm  Pulm: normal respiratory excursions, not tachypneic, no labored breathing  Abd: non-distended  Ext: moving all four extremities, no peripheral edema noted. Latissmus donor site well healing.     LABS                       9.7    10.38 )-----------( 138      ( 15 Carlos 2024 05:30 )             29.7    01-15    136  |  102  |  15  ----------------------------<  127<H>  3.8   |  27  |  0.54    Ca    8.9      15 Carlos 2024 05:30  Phos  2.7     01-15  Mg     1.9     01-15    Coagulation Studies-     Urinalysis Basic - ( 15 Carlos 2024 05:30 )    Color: x / Appearance: x / SG: x / pH: x  Gluc: 127 mg/dL / Ketone: x  / Bili: x / Urobili: x   Blood: x / Protein: x / Nitrite: x   Leuk Esterase: x / RBC: x / WBC x   Sq Epi: x / Non Sq Epi: x / Bacteria: x    Endocrine Panel-    MICROBIOLOGY:  Culture Results:   Moderate Escherichia coli  Moderate Enterobacter cloacae complex  Rare Candida parapsilosis  Rare Staphylococcus capitis (01-02-24 @ 18:03)  Culture Results:   Numerous Escherichia coli  Numerous Enterobacter cloacae complex  Few Enterococcus faecalis  Rare Staphylococcus epidermidis (01-02-24 @ 18:03)             OTOLARYNGOLOGY (ENT) PROGRESS NOTE    ID: SAUNDRA HOLMAN is a 69yo M w PMH of CAD (x2 stents Mar 2023), HLD, T2DM, hx of kidney stones, psoriasis who presented with an oral mass and underwent L hemimandibulectomy, SND L level 1-3, recon with L FFF, STSG, and placement of dental implants on 12/7. He had a trach which was subsequently decannulated. He returns 12/27 with surgical site infection now s/p OR for debridement and exploration. Trach replaced through prior stoma for pulmonary toilet.     Subjective/ Interval:   12/28; Patient seen this morning, oral pack to be changed, penrose dressing changed. Intraoral flap with partal skin necrosis, 7.5 portex in place with cuff deflated   12/29: Patient seen and examined at bedside. NAEO. Patient remains afebrile and HD stable. HgB noted to drop to 7.6 from 9.2 but no additional episodes of melena. Penrose draining purulent material. Intra-oral infection/flap stable. Packing changed at bedside. No other complaints or changes at this time. ID recommended Vanc/zosyn and DC unasyn and flagyl, and IM recommended reticulocyte studies and adding folate supplements. No other changes at this time.   12/30: AFVSS. No acute events overnight. Patient seen and examined at bedside. C/w Vanc/Zosyn per ID recs, pending sensitivities. Growing staph epi, E coli, enterobacter from wound cx on 12/27. S/p 2U PRBC yday w appropriate response in Hgb. Transfusion goal > 9.0.  12/31: AFVSS. No acute events overnight. Patient seen and examined at bedside. C/w Vanc/Zosyn per ID recs, pending sens. Hgb stable this morning.  1/1: AFVSS. No acute events overnight. Patient seen and examined at bedside. C/w Vanc/Zosyn, e faecalis sens to vanc/zosyn. Had 3 dark BM's yesterday that were stool guiac positive.  1/2: AFVSS. No acute events overnight. Patient seen and examined at bedside. C/w Imipenem (changed per ID, enterobacter resistant to Zosyn). Plan for OR today for further washout / debridement.  1/3: AFVSS. No acute events overnight. Patient seen and examined at bedside. C/w imipenim. OR cx growing numerous gram negative rods and few gram positive cocci in pairs.  1/4: Patient seen bedside, changed intraoral and neck packing,  Continue to follow cultures, pain controlled   1/5: patient seen this morning, complains of right arm pain wit minimal swelling,  IV in the left arm,  Continue abx for 2 weeks. neck and oral packing changed, purulent drainage from neck noted.   1/6: Patient seen at bedside during morning rounds. Reports right arm pain and swelling somewhat improved although still present. IV replaced in right arm yesterday. Remains on IV abx, tentative end date 1/15/24. Neck and oral packing changed at bedside; purulent drainage noted from neck, decreased in volume compared to prior exams.  1/7: Patient seen at bedside during morning rounds. Overnight, patient complained of coughing, throat discomfort, and nursing reported some increased secretions via trach. Started on mucomyst with improvement. Noted 6 beats of PSVT at ~1900, no reported chest pain or other symptoms. No further episodes. Packing replaced at bedside   1/8: patient seen this morning, neck and oral packing changed. Continues to have purulent drainage form neck.  No chest pain or SOB,               1/9: patient seen this morning, changed neck and oral pack, patient tolerated packing change better with adjusted premedication,  purulence continues from neck , trach in place, fibula donor site with granulation tissue ( changed dressing)   1/10: AFVSS. Patient seen and evaluated this am. Neck and oral packing changed. Some purulence from neck. Trach in place.  1/11: AFVSS. Patient taken to OR for washout, L latissmus dorsi flap to L oral cavity/oropharynx.  1/12: AFVSS. Patient seen and evaluated this am. Pt doing well, minimal output from neck opening. KRISTEN drains in place. Flap healthy appearing. Failed TOV yesterday.  1/13: AFVSS. Patient seen and evaluated this am. Pt in chair, flap looks healthy. Minimal output from neck opening. Donor site healing appropriately.   1/14: AFVSS. Patient seen and evaluated this am. Flap appears healthy. Trach in place. Neck dressing changed, no purulence draining from opening. Donor site healing appropriately.  1/15: AFVSS. Patient seen and evaluated this am. Flap is healthy appearing. Trach in place. Neck dressing changed. Donor site healing appropriately.  1/16: AFVSS. No acute events overnight. Patient seen and examined at bedside. Flap healthy appearing, neck soft and flat. Strong doppler signal.    PHYSICAL EXAM:  GEN: appears stated age  NEURO: alert & oriented x 4/4  HEENT: flap with good color and doppler signal, neck with no purulence noted, trach in place  CVS: regular rate and rhythm  Pulm: normal respiratory excursions, not tachypneic, no labored breathing  Abd: non-distended  Ext: moving all four extremities, no peripheral edema noted. Latissmus donor site well healing.     LABS                       9.7    10.38 )-----------( 138      ( 15 Carlos 2024 05:30 )             29.7    01-15    136  |  102  |  15  ----------------------------<  127<H>  3.8   |  27  |  0.54    Ca    8.9      15 Carlos 2024 05:30  Phos  2.7     01-15  Mg     1.9     01-15    Coagulation Studies-     Urinalysis Basic - ( 15 Carlos 2024 05:30 )    Color: x / Appearance: x / SG: x / pH: x  Gluc: 127 mg/dL / Ketone: x  / Bili: x / Urobili: x   Blood: x / Protein: x / Nitrite: x   Leuk Esterase: x / RBC: x / WBC x   Sq Epi: x / Non Sq Epi: x / Bacteria: x    Endocrine Panel-    MICROBIOLOGY:  Culture Results:   Moderate Escherichia coli  Moderate Enterobacter cloacae complex  Rare Candida parapsilosis  Rare Staphylococcus capitis (01-02-24 @ 18:03)  Culture Results:   Numerous Escherichia coli  Numerous Enterobacter cloacae complex  Few Enterococcus faecalis  Rare Staphylococcus epidermidis (01-02-24 @ 18:03)

## 2024-01-16 NOTE — PROCEDURE NOTE - ADDITIONAL PROCEDURE DETAILS
PROCEDURE NOTE:     Procedure: Tracheostomy exchange prior to maturation of tract (CPT 84894)      Pre-Procedure Diagnosis: Laryngeal cancer, tracheostomy dependence  Post-Procedure Diagnosis: Laryngeal cancer, tracheostomy dependence     Indications for Procedure:  is a PANAYIOTIS TSIRIGOTIS 68yMalewho presented with flp failure status post tracheostomy on 1/11 . See above full HPI for further details. A tracheostomy exchange was required to ensure the stoma and tract were healing appropriately and to change patient to cuffless tracheostomy.     Description of Procedure: After obtaining verbal consent, the patient was positioned with shoulder roll supine. Flexible suctioning was performed to clear the secretions. The existing 7.5 cuffed portex tracheostomy was checked and ready with obturator. I did remove the 4 silk sutures. Next, the velcroe tie was removed and the tracheostomy tube was removed. The stoma was well healed. The tract appeared well formed without granulation or collapse. I did clear the skin with saline, and place an Allevyn dressing inferior to the stoma. I did replace the new  7.5 cuffless portex  tracheostomy without difficulty and secured with velcroe trach tie. The patient tolerated the procedure well without complications.     Findings:  - Well formed stoma and tract  - Easy, uncomplicated placement of  7.5 cuffless portex  (removed  7.5 cuffed portex ) Consent: Written consent was obtained and risks were reviewed including but not limited to scarring, infection, bleeding, scabbing, incomplete removal, nerve damage and allergy to anesthesia.

## 2024-01-16 NOTE — PROGRESS NOTE ADULT - ASSESSMENT
SAUNDRA HOLMAN is a 69yo M w PMH of CAD (x2 stents Mar 2023), HLD, T2DM, hx of kidney stones, psoriasis who presented with an oral mass and underwent L hemimandibulectomy, SND L level 1-3, recon with L FFF, STSG, and placement of dental implants on 12/7. He had a trach which was subsequently decannulated. Now s/p OR for debridement and exploration. Trach replaced through prior stoma for pulmonary toilet. Pt with reported intermittent tremors 1/7, electrolytes normal.     Plan:  - ERAS protocol POD 5  - Change to cuffless trach today  - Q4 flap checks  - Hgb goal > 9.0   - C/w Imipenem (1/1 - 1/16)   - C/w TF  - Hold home plavix  - Maintain 7.5 portex for pulm toilet  - ISS  - Pain control  - Home meds  - Bowel regimen

## 2024-01-17 LAB
ANION GAP SERPL CALC-SCNC: 9 MMOL/L — SIGNIFICANT CHANGE UP (ref 5–17)
BUN SERPL-MCNC: 16 MG/DL — SIGNIFICANT CHANGE UP (ref 7–23)
CALCIUM SERPL-MCNC: 8.8 MG/DL — SIGNIFICANT CHANGE UP (ref 8.4–10.5)
CHLORIDE SERPL-SCNC: 105 MMOL/L — SIGNIFICANT CHANGE UP (ref 96–108)
CO2 SERPL-SCNC: 26 MMOL/L — SIGNIFICANT CHANGE UP (ref 22–31)
CREAT SERPL-MCNC: 0.53 MG/DL — SIGNIFICANT CHANGE UP (ref 0.5–1.3)
EGFR: 110 ML/MIN/1.73M2 — SIGNIFICANT CHANGE UP
GLUCOSE BLDC GLUCOMTR-MCNC: 128 MG/DL — HIGH (ref 70–99)
GLUCOSE BLDC GLUCOMTR-MCNC: 96 MG/DL — SIGNIFICANT CHANGE UP (ref 70–99)
GLUCOSE SERPL-MCNC: 116 MG/DL — HIGH (ref 70–99)
HCT VFR BLD CALC: 32.8 % — LOW (ref 39–50)
HGB BLD-MCNC: 10.7 G/DL — LOW (ref 13–17)
MAGNESIUM SERPL-MCNC: 1.9 MG/DL — SIGNIFICANT CHANGE UP (ref 1.6–2.6)
MCHC RBC-ENTMCNC: 29.2 PG — SIGNIFICANT CHANGE UP (ref 27–34)
MCHC RBC-ENTMCNC: 32.6 GM/DL — SIGNIFICANT CHANGE UP (ref 32–36)
MCV RBC AUTO: 89.4 FL — SIGNIFICANT CHANGE UP (ref 80–100)
NRBC # BLD: 0 /100 WBCS — SIGNIFICANT CHANGE UP (ref 0–0)
PHOSPHATE SERPL-MCNC: 2.7 MG/DL — SIGNIFICANT CHANGE UP (ref 2.5–4.5)
PLATELET # BLD AUTO: 180 K/UL — SIGNIFICANT CHANGE UP (ref 150–400)
POTASSIUM SERPL-MCNC: 4.3 MMOL/L — SIGNIFICANT CHANGE UP (ref 3.5–5.3)
POTASSIUM SERPL-SCNC: 4.3 MMOL/L — SIGNIFICANT CHANGE UP (ref 3.5–5.3)
RBC # BLD: 3.67 M/UL — LOW (ref 4.2–5.8)
RBC # FLD: 14.3 % — SIGNIFICANT CHANGE UP (ref 10.3–14.5)
SODIUM SERPL-SCNC: 140 MMOL/L — SIGNIFICANT CHANGE UP (ref 135–145)
WBC # BLD: 6.27 K/UL — SIGNIFICANT CHANGE UP (ref 3.8–10.5)
WBC # FLD AUTO: 6.27 K/UL — SIGNIFICANT CHANGE UP (ref 3.8–10.5)

## 2024-01-17 PROCEDURE — 99232 SBSQ HOSP IP/OBS MODERATE 35: CPT

## 2024-01-17 RX ORDER — CLOPIDOGREL BISULFATE 75 MG/1
75 TABLET, FILM COATED ORAL DAILY
Refills: 0 | Status: DISCONTINUED | OUTPATIENT
Start: 2024-01-17 | End: 2024-01-20

## 2024-01-17 RX ORDER — TRAZODONE HCL 50 MG
50 TABLET ORAL AT BEDTIME
Refills: 0 | Status: DISCONTINUED | OUTPATIENT
Start: 2024-01-17 | End: 2024-01-20

## 2024-01-17 RX ADMIN — Medication 1 APPLICATION(S): at 05:49

## 2024-01-17 RX ADMIN — SENNA PLUS 1 TABLET(S): 8.6 TABLET ORAL at 13:34

## 2024-01-17 RX ADMIN — Medication 15 MILLILITER(S): at 13:34

## 2024-01-17 RX ADMIN — Medication 50 MILLIGRAM(S): at 23:18

## 2024-01-17 RX ADMIN — Medication 3 MILLILITER(S): at 05:23

## 2024-01-17 RX ADMIN — Medication 3 MILLILITER(S): at 10:19

## 2024-01-17 RX ADMIN — CHLORHEXIDINE GLUCONATE 1 APPLICATION(S): 213 SOLUTION TOPICAL at 13:23

## 2024-01-17 RX ADMIN — Medication 3 MILLILITER(S): at 23:17

## 2024-01-17 RX ADMIN — Medication 1 MILLIGRAM(S): at 23:18

## 2024-01-17 RX ADMIN — Medication 3 MILLILITER(S): at 16:59

## 2024-01-17 RX ADMIN — CHLORHEXIDINE GLUCONATE 15 MILLILITER(S): 213 SOLUTION TOPICAL at 18:45

## 2024-01-17 RX ADMIN — IMIPENEM AND CILASTATIN 250 MILLIGRAM(S): 250; 250 INJECTION, POWDER, FOR SOLUTION INTRAVENOUS at 13:35

## 2024-01-17 RX ADMIN — Medication 1 APPLICATION(S): at 18:45

## 2024-01-17 RX ADMIN — IMIPENEM AND CILASTATIN 250 MILLIGRAM(S): 250; 250 INJECTION, POWDER, FOR SOLUTION INTRAVENOUS at 05:43

## 2024-01-17 RX ADMIN — GABAPENTIN 300 MILLIGRAM(S): 400 CAPSULE ORAL at 18:45

## 2024-01-17 RX ADMIN — Medication 81 MILLIGRAM(S): at 13:34

## 2024-01-17 RX ADMIN — POLYETHYLENE GLYCOL 3350 17 GRAM(S): 17 POWDER, FOR SOLUTION ORAL at 13:34

## 2024-01-17 RX ADMIN — CHLORHEXIDINE GLUCONATE 15 MILLILITER(S): 213 SOLUTION TOPICAL at 05:49

## 2024-01-17 RX ADMIN — GABAPENTIN 300 MILLIGRAM(S): 400 CAPSULE ORAL at 05:49

## 2024-01-17 RX ADMIN — CLOPIDOGREL BISULFATE 75 MILLIGRAM(S): 75 TABLET, FILM COATED ORAL at 13:34

## 2024-01-17 RX ADMIN — ENOXAPARIN SODIUM 40 MILLIGRAM(S): 100 INJECTION SUBCUTANEOUS at 13:34

## 2024-01-17 NOTE — SWALLOW BEDSIDE ASSESSMENT ADULT - MUCOSAL QUALITY
Patient Id: [id number] Consent: Written consent obtained, risks reviewed including but not limited to crusting, scabbing, blistering, scarring, darker or lighter pigmentary change, incidental hair removal, bruising, and/or incomplete removal. Location #3: left leg Total Energy In Joules: 22.40 Location #2: face Dose Setting #3 (Mj/Cm2): 800 Total Square Area In Cm2 (Required For Proper Billing): 48 Location #1: neck Detail Level: Detailed Total Pulses (Will Not Render If 0): 0 Treatment Number: 16 Dose Setting #1 (Mj/Cm2): 550 Post-Care Instructions: I reviewed with the patient in detail post-care instructions. Patient should stay away from the sun and wear sun protection until treated areas are fully healed. Dose Setting #2 (Mj/Cm2): 300 intraoral flap appreciated

## 2024-01-17 NOTE — PROGRESS NOTE ADULT - ASSESSMENT
Assessment and Plan:  SAUNDRA HOLMAN is a 67yo M w PMH of CAD (x2 stents Mar 2023), HLD, T2DM, hx of kidney stones, psoriasis who presented with an oral mass and underwent L hemimandibulectomy, SND L level 1-3, recon with L FFF, STSG, and placement of dental implants on 12/7. He had a trach which was subsequently decannulated. Now s/p OR for debridement and exploration. Trach replaced through prior stoma for pulmonary toilet. Pt with reported intermittent tremors 1/7, electrolytes normal.     Plan:  - SLP today for sips of water   - ERAS protocol POD 5  - Q4 flap checks  - Hgb goal > 9.0   - C/w Imipenem (1/1 - 1/16)   - C/w TF  - Hold home plavix  - Maintain 7.5 portex for pulm toilet  - ISS  - Pain control  - Home meds  - Bowel regimen        [facility] at [phone] with any questions/concerns.    Ada Quinones PA-C  01-17-24 @ 07:51

## 2024-01-17 NOTE — SWALLOW BEDSIDE ASSESSMENT ADULT - SLP PERTINENT HISTORY OF CURRENT PROBLEM
[id number] SCCA of L buccal mucosa, s/p hemimandibulectomy SND level 1-3, trach and L FFF reconstruction on 12/7. Pt s/p PEG on 12/16 and decannulation on 12/18. Pt presents on 12/27 with surgical site infection, s/p OR x2 for debridement, trach replaced through prior stoma now capped, reconstruction with scapula flap [id number] SCCA of L buccal mucosa, s/p hemimandibulectomy SND level 1-3, trach and L FFF reconstruction on 12/7. Pt s/p PEG on 12/16 and decannulation on 12/18. Pt presents on 12/27 with surgical site infection, s/p OR x2 for debridement, trach replaced through prior stoma now capped, reconstruction with latissimus dorsi flap (1/11)

## 2024-01-17 NOTE — SWALLOW BEDSIDE ASSESSMENT ADULT - SLP GENERAL OBSERVATIONS
7.5 cuffless Portex trach in place that was capped today, however, will remain in place until after RT treatment is completed

## 2024-01-17 NOTE — PROGRESS NOTE ADULT - SUBJECTIVE AND OBJECTIVE BOX
OTOLARYNGOLOGY (ENT) PROGRESS NOTE    PATIENT: SAUNDRA HOLMAN  MRN: [id number]  : 57  [id number]  DATE OF SERVICE:  24  			           ID: SAUNDRA HOLMAN is a 69yo M w PMH of CAD (x2 stents Mar 2023), HLD, T2DM, hx of kidney stones, psoriasis who presented with an oral mass and underwent L hemimandibulectomy, SND L level 1-3, recon with L FFF, STSG, and placement of dental implants on . He had a trach which was subsequently decannulated. He returns  with surgical site infection now s/p OR for debridement and exploration. Trach replaced through prior stoma for pulmonary toilet.     Subjective/ Interval:   ; Patient seen this morning, oral pack to be changed, penrose dressing changed. Intraoral flap with partal skin necrosis, 7.5 portex in place with cuff deflated   : Patient seen and examined at bedside. NAEO. Patient remains afebrile and HD stable. HgB noted to drop to 7.6 from 9.2 but no additional episodes of melena. Penrose draining purulent material. Intra-oral infection/flap stable. Packing changed at bedside. No other complaints or changes at this time. ID recommended Vanc/zosyn and DC unasyn and flagyl, and IM recommended reticulocyte studies and adding folate supplements. No other changes at this time.   : AFVSS. No acute events overnight. Patient seen and examined at bedside. C/w Vanc/Zosyn per ID recs, pending sensitivities. Growing staph epi, E coli, enterobacter from wound cx on . S/p 2U PRBC yday w appropriate response in Hgb. Transfusion goal > 9.0.  : AFVSS. No acute events overnight. Patient seen and examined at bedside. C/w Vanc/Zosyn per ID recs, pending sens. Hgb stable this morning.  : AFVSS. No acute events overnight. Patient seen and examined at bedside. C/w Vanc/Zosyn, e faecalis sens to vanc/zosyn. Had 3 dark BM's yesterday that were stool guiac positive.  : AFVSS. No acute events overnight. Patient seen and examined at bedside. C/w Imipenem (changed per ID, enterobacter resistant to Zosyn). Plan for OR today for further washout / debridement.  1/3: AFVSS. No acute events overnight. Patient seen and examined at bedside. C/w imipenim. OR cx growing numerous gram negative rods and few gram positive cocci in pairs.  : Patient seen bedside, changed intraoral and neck packing,  Continue to follow cultures, pain controlled   : patient seen this morning, complains of right arm pain wit minimal swelling,  IV in the left arm,  Continue abx for 2 weeks. neck and oral packing changed, purulent drainage from neck noted.   : Patient seen at bedside during morning rounds. Reports right arm pain and swelling somewhat improved although still present. IV replaced in right arm yesterday. Remains on IV abx, tentative end date 1/15/24. Neck and oral packing changed at bedside; purulent drainage noted from neck, decreased in volume compared to prior exams.  : Patient seen at bedside during morning rounds. Overnight, patient complained of coughing, throat discomfort, and nursing reported some increased secretions via trach. Started on mucomyst with improvement. Noted 6 beats of PSVT at ~1900, no reported chest pain or other symptoms. No further episodes. Packing replaced at bedside   : patient seen this morning, neck and oral packing changed. Continues to have purulent drainage form neck.  No chest pain or SOB,               : patient seen this morning, changed neck and oral pack, patient tolerated packing change better with adjusted premedication,  purulence continues from neck , trach in place, fibula donor site with granulation tissue ( changed dressing)   1/10: AFVSS. Patient seen and evaluated this am. Neck and oral packing changed. Some purulence from neck. Trach in place.  : AFVSS. Patient taken to OR for washout, L latissmus dorsi flap to L oral cavity/oropharynx.  : AFVSS. Patient seen and evaluated this am. Pt doing well, minimal output from neck opening. KRISTEN drains in place. Flap healthy appearing. Failed TOV yesterday.  : AFVSS. Patient seen and evaluated this am. Pt in chair, flap looks healthy. Minimal output from neck opening. Donor site healing appropriately.   : AFVSS. Patient seen and evaluated this am. Flap appears healthy. Trach in place. Neck dressing changed, no purulence draining from opening. Donor site healing appropriately.  1/15: AFVSS. Patient seen and evaluated this am. Flap is healthy appearing. Trach in place. Neck dressing changed. Donor site healing appropriately.  : AFVSS. No acute events overnight. Patient seen and examined at bedside. Flap healthy appearing, neck soft and flat. Strong doppler signal.  : Patient seen bedside,  doppler signal strong, neck soft and flat, pain controlled, intraoral flap intact,  plan to cap trach today     ALLERGIES:  No Known Allergies      MEDICATIONS:  Antiinfectives:   imipenem/cilastatin  IVPB 1000 milliGRAM(s) IV Intermittent every 8 hours    IV fluids:  dextrose 5%. 1000 milliLiter(s) IV Continuous <Continuous>  dextrose 5%. 1000 milliLiter(s) IV Continuous <Continuous>  multivitamin/minerals/iron Oral Solution (CENTRUM) 15 milliLiter(s) Oral daily    Hematologic/Anticoagulation:  aspirin  chewable 81 milliGRAM(s) Oral daily  clopidogrel Tablet 75 milliGRAM(s) Oral daily  enoxaparin Injectable 40 milliGRAM(s) SubCutaneous every 24 hours    Pain medications/Neuro:  gabapentin Solution 300 milliGRAM(s) Oral every 12 hours  HYDROmorphone  Injectable 0.5 milliGRAM(s) IV Push every 3 hours PRN  ondansetron Injectable 4 milliGRAM(s) IV Push every 8 hours PRN  oxyCODONE    Solution 5 milliGRAM(s) Enteral Tube every 6 hours PRN  oxyCODONE    Solution 10 milliGRAM(s) Enteral Tube every 6 hours PRN  traZODone 50 milliGRAM(s) Oral at bedtime    Endocrine Medications:   dextrose 50% Injectable 12.5 Gram(s) IV Push once  dextrose 50% Injectable 25 Gram(s) IV Push once  dextrose 50% Injectable 25 Gram(s) IV Push once  dextrose Oral Gel 15 Gram(s) Oral once PRN  glucagon  Injectable 1 milliGRAM(s) IntraMuscular once  insulin lispro (ADMELOG) corrective regimen sliding scale   SubCutaneous every 6 hours    All other standing medications:   albuterol/ipratropium for Nebulization 3 milliLiter(s) Nebulizer every 6 hours  bacitracin   Ointment 1 Application(s) Topical every 12 hours  chlorhexidine 0.12% Liquid 15 milliLiter(s) Oral Mucosa two times a day  chlorhexidine 2% Cloths 1 Application(s) Topical daily  doxazosin 1 milliGRAM(s) Oral at bedtime  influenza  Vaccine (HIGH DOSE) 0.7 milliLiter(s) IntraMuscular once  polyethylene glycol 3350 17 Gram(s) Oral daily  senna 1 Tablet(s) Oral daily    All other PRN medications:  lidocaine   4% Patch 1 Patch Transdermal daily PRN    Vital Signs Last 24 Hrs  T(C): 36.3 (2024 04:25), Max: 36.9 (2024 22:03)  T(F): 97.3 (2024 04:25), Max: 98.5 (2024 22:03)  HR: 60 (2024 04:25) (60 - 93)  BP: 113/56 (2024 04:25) (101/58 - 140/60)  BP(mean): 81 (:25) (72 - 86)  RR: 17 (2024 04:25) (17 - 18)  SpO2: 96% (2024 04:25) (95% - 100%)    Parameters below as of 2024 04:25  Patient On (Oxygen Delivery Method): tracheostomy collar  O2 Flow (L/min): 10  O2 Concentration (%): 40       @ 07:  -  - @ 07:00  --------------------------------------------------------  IN:    Enteral Tube Flush: 700 mL    IV PiggyBack: 250 mL    Vital1.5: 1011 mL  Total IN: 1961 mL    OUT:    Bulb (mL): 4 mL    Bulb (mL): 4 mL    Voided (mL): 1275 mL  Total OUT: 1283 mL    Total NET: 678 mL          24 @ 07:01  -  24 @ 07:00  --------------------------------------------------------  IN:  Total IN: 0 mL    OUT:    Bulb (mL): 4 mL    Bulb (mL): 4 mL  Total OUT: 8 mL    Total NET: -8 mL        PHYSICAL EXAM:  GEN: appears stated age  NEURO: alert & oriented x   HEENT: flap with good color and doppler signal, neck with no purulence noted, trach in place  CVS: regular rate and rhythm  Pulm: normal respiratory excursions, not tachypneic, no labored breathing  Abd: non-distended  Ext: moving all four extremities, no peripheral edema noted. Latissmus donor site well healing.         LABS                       10.1   7.39  )-----------( 155      ( 2024 12:27 )             31.7        137  |  102  |  15  ----------------------------<  139<H>  4.2   |  25  |  0.55    Ca    8.6      2024 12:27           Coagulation Studies-     Urinalysis Basic - ( 2024 12:27 )    Color: x / Appearance: x / SG: x / pH: x  Gluc: 139 mg/dL / Ketone: x  / Bili: x / Urobili: x   Blood: x / Protein: x / Nitrite: x   Leuk Esterase: x / RBC: x / WBC x   Sq Epi: x / Non Sq Epi: x / Bacteria: x      Endocrine Panel-  Calcium: 8.6 mg/dL ( @ 12:27)

## 2024-01-17 NOTE — SWALLOW BEDSIDE ASSESSMENT ADULT - DIET PRIOR TO ADMI
NPO with PEG. Last MBS completed on 12/19/23 with moderate kinjal-pharyngeal dysphagia with recommendation for puree and thin liquids as tolerated

## 2024-01-17 NOTE — SWALLOW BEDSIDE ASSESSMENT ADULT - NS SPL SWALLOW CLINIC TRIAL FT
Oral stage is significant for poor bolus containment with anterior spillage in setting of reduced labial seal, reduced bolus cohesion and prolonged manipulation and transport with suspected loss to the hypopharynx. Intentional bolus hold appeared to be beneficial in facilitating oral efficiency. Pharyngeal stage is significant for signs of inefficiency with multiple swallows per bolus. Pt with occasional throat clear which can indicate airway protection deficits, however, not consistent. It is recommended that Pt start sips of water via cup sip using bolus hold prior to swallow for therapeutic swallows. Pt will require close outpatient SLP follow up.

## 2024-01-17 NOTE — PROGRESS NOTE ADULT - SUBJECTIVE AND OBJECTIVE BOX
REASON FOR CONSULT: Co-management    INTERVAL/OVERNIGHT EVENTS: As per overnight staff, there were no acute events overnight    SUBJECTIVE HPI: Pt seen initially ambulating with walker down hallway. Pt has no acute complaints at current. ROS otherwise negative.      MEDICATIONS  (STANDING):  albuterol/ipratropium for Nebulization 3 milliLiter(s) Nebulizer every 6 hours  aspirin  chewable 81 milliGRAM(s) Oral daily  bacitracin   Ointment 1 Application(s) Topical every 12 hours  chlorhexidine 0.12% Liquid 15 milliLiter(s) Oral Mucosa two times a day  chlorhexidine 2% Cloths 1 Application(s) Topical daily  dextrose 5%. 1000 milliLiter(s) (100 mL/Hr) IV Continuous <Continuous>  dextrose 5%. 1000 milliLiter(s) (50 mL/Hr) IV Continuous <Continuous>  dextrose 50% Injectable 12.5 Gram(s) IV Push once  dextrose 50% Injectable 25 Gram(s) IV Push once  dextrose 50% Injectable 25 Gram(s) IV Push once  doxazosin 1 milliGRAM(s) Oral at bedtime  enoxaparin Injectable 40 milliGRAM(s) SubCutaneous every 24 hours  gabapentin Solution 300 milliGRAM(s) Oral every 12 hours  glucagon  Injectable 1 milliGRAM(s) IntraMuscular once  imipenem/cilastatin  IVPB 1000 milliGRAM(s) IV Intermittent every 8 hours  influenza  Vaccine (HIGH DOSE) 0.7 milliLiter(s) IntraMuscular once  insulin lispro (ADMELOG) corrective regimen sliding scale   SubCutaneous every 6 hours  multivitamin/minerals/iron Oral Solution (CENTRUM) 15 milliLiter(s) Oral daily  polyethylene glycol 3350 17 Gram(s) Oral daily  senna 1 Tablet(s) Oral daily    MEDICATIONS  (PRN):  dextrose Oral Gel 15 Gram(s) Oral once PRN Blood Glucose LESS THAN 70 milliGRAM(s)/deciliter  HYDROmorphone  Injectable 0.5 milliGRAM(s) IV Push every 3 hours PRN breakthrough pain  lidocaine   4% Patch 1 Patch Transdermal daily PRN back pain  ondansetron Injectable 4 milliGRAM(s) IV Push every 8 hours PRN Nausea and/or Vomiting  oxyCODONE    Solution 5 milliGRAM(s) Enteral Tube every 6 hours PRN Moderate Pain (4 - 6)  oxyCODONE    Solution 10 milliGRAM(s) Enteral Tube every 6 hours PRN Severe Pain (7 - 10)        VITAL SIGNS:  Vital Signs Last 24 Hrs  T(C): 36.6 (16 Jan 2024 09:00), Max: 36.9 (15 Carlos 2024 10:00)  T(F): 97.8 (16 Jan 2024 09:00), Max: 98.4 (15 Carlos 2024 10:00)  HR: 72 (16 Jan 2024 09:00) (62 - 78)  BP: 103/54 (16 Jan 2024 09:00) (98/57 - 126/56)  BP(mean): 72 (16 Jan 2024 09:00) (72 - 85)  RR: 18 (16 Jan 2024 09:00) (16 - 19)  SpO2: 100% (16 Jan 2024 09:00) (98% - 100%)    Parameters below as of 16 Jan 2024 09:00  Patient On (Oxygen Delivery Method): tracheostomy collar  O2 Flow (L/min): 10  O2 Concentration (%): 40    I&O's Detail    15 Carlos 2024 07:01  -  16 Jan 2024 07:00  --------------------------------------------------------  IN:    Enteral Tube Flush: 800 mL    IV PiggyBack: 250 mL    Vital1.5: 1011 mL  Total IN: 2061 mL    OUT:    Bulb (mL): 20 mL    Bulb (mL): 0 mL    Voided (mL): 1450 mL  Total OUT: 1470 mL    Total NET: 591 mL          PHYSICAL EXAM:  General: Comfortable, NAD  Neurological: AAOx3, no focal deficits  HEENT: trach in place, w/o evidence of drainage   Cardiovascular: +S1/S2, no murmurs/rubs/gallops, RRR  Respiratory: CTA B/L, no diminished breath sounds, no wheezes/rales/rhonchi, no increased work of breathing or accessory muscle use  Gastrointestinal: soft, NT/ND; active BSx4 quadrants      LABS:                        9.7    10.38 )-----------( 138      ( 15 Carlos 2024 05:30 )             29.7     01-15    136  |  102  |  15  ----------------------------<  127<H>  3.8   |  27  |  0.54    Ca    8.9      15 Carlos 2024 05:30  Phos  2.7     01-15  Mg     1.9     01-15              BNP    Urinalysis Basic - ( 15 Carlos 2024 05:30 )    Color: x / Appearance: x / SG: x / pH: x  Gluc: 127 mg/dL / Ketone: x  / Bili: x / Urobili: x   Blood: x / Protein: x / Nitrite: x   Leuk Esterase: x / RBC: x / WBC x   Sq Epi: x / Non Sq Epi: x / Bacteria: x            Microbiology:    Culture - Tissue with Gram Stain (collected 01-02-24 @ 18:03)  Source: .Tissue left mandible tissue or spec  Gram Stain (01-02-24 @ 21:59):    Numerous Gram Negative Rods    Few Gram positive cocci in pairs    Few WBC's  Final Report (01-05-24 @ 12:29):    Numerous Escherichia coli    Numerous Enterobacter cloacae complex    Few Enterococcus faecalis    Rare Staphylococcus epidermidis  Organism: Escherichia coli  Enterobacter cloacae complex  Enterobacter cloacae complex  Enterococcus faecalis  Escherichia coli (01-05-24 @ 12:29)  Organism: Escherichia coli (01-05-24 @ 12:29)      Method Type: OTTO      -  Ampicillin: S <=8 These ampicillin results predict results for amoxicillin      -  Ampicillin/Sulbactam: S <=4/2      -  Cefazolin: S <=2      -  Ceftriaxone: S <=1      -  Ciprofloxacin: S <=0.25      -  Ertapenem: S <=0.5      -  Gentamicin: S <=2      -  Piperacillin/Tazobactam: S <=8      -  Tobramycin: S <=2      -  Trimethoprim/Sulfamethoxazole: S 1/19  Organism: Enterococcus faecalis (01-05-24 @ 12:29)      Method Type: OTTO      -  Ampicillin: S <=2 Predicts results to ampicillin/sulbactam, amoxacillin-clavulanate and  piperacillin-tazobactam.      -  Vancomycin: S 2  Organism: Enterobacter cloacae complex (01-05-24 @ 12:29)      Method Type: ETEST      -  Meropenem: S 0.094      -  Ertapenem: S 0.38  Organism: Enterobacter cloacae complex (01-05-24 @ 12:29)      Method Type: OTTO      -  Ampicillin: R >16 These ampicillin results predict results for amoxicillin      -  Ampicillin/Sulbactam: R >16/8      -  Cefazolin: R >16      -  Cefepime: R 16      -  Ceftriaxone: R >32 Enterobacter cloacae, Klebsiella aerogenes, and Citrobacter freundii may develop resistance during prolonged therapy.      -  Ciprofloxacin: S <=0.25      -  Gentamicin: S <=2      -  Meropenem: S <=1      -  Meropenem/Vaborbactam: S <=2      -  Piperacillin/Tazobactam: R >64      -  Tobramycin: S <=2      -  Trimethoprim/Sulfamethoxazole: S <=0.5/9.5  Organism: Escherichia coli (01-05-24 @ 12:29)      Method Type: OTTO      -  Ampicillin: S <=8 These ampicillin results predict results for amoxicillin      -  Ampicillin/Sulbactam: S <=4/2      -  Cefazolin: S <=2      -  Ceftriaxone: S <=1      -  Ciprofloxacin: S <=0.25      -  Ertapenem: S <=0.5      -  Gentamicin: S <=2      -  Piperacillin/Tazobactam: S <=8      -  Tobramycin: S <=2      -  Trimethoprim/Sulfamethoxazole: S 1/19    Culture - Surgical Swab (collected 01-02-24 @ 18:03)  Source: .Surgical Swab left neck or spec  Gram Stain (01-02-24 @ 22:00):    Rare Gram Negative Rods    Rare Gram positive cocci in pairs    Moderate WBC's  Final Report (01-05-24 @ 12:42):    Moderate Escherichia coli    Moderate Enterobacter cloacae complex    Rare Candida parapsilosis    Rare Staphylococcus capitis  Organism: Enterobacter cloacae complex  Enterobacter cloacae complex  Escherichia coli  Escherichia coli (01-05-24 @ 12:16)  Organism: Escherichia coli (01-05-24 @ 12:16)      Method Type: OTTO      -  Ampicillin: S <=8 These ampicillin results predict results for amoxicillin      -  Ampicillin/Sulbactam: S <=4/2      -  Cefazolin: S <=2      -  Ceftriaxone: S <=1      -  Ciprofloxacin: S <=0.25      -  Ertapenem: S <=0.5      -  Gentamicin: S <=2      -  Piperacillin/Tazobactam: S <=8      -  Tobramycin: S <=2      -  Trimethoprim/Sulfamethoxazole: S 1/19  Organism: Escherichia coli (01-05-24 @ 12:16)      Method Type: OTTO      -  Ampicillin: S <=8 These ampicillin results predict results for amoxicillin      -  Ampicillin/Sulbactam: S <=4/2      -  Cefazolin: S <=2      -  Ceftriaxone: S <=1      -  Ciprofloxacin: S <=0.25      -  Ertapenem: S <=0.5      -  Gentamicin: S <=2      -  Piperacillin/Tazobactam: S <=8      -  Tobramycin: S <=2      -  Trimethoprim/Sulfamethoxazole: S 1/19  Organism: Enterobacter cloacae complex (01-05-24 @ 12:16)      Method Type: ETEST      -  Ertapenem: S 0.38      -  Meropenem: S 0.094  Organism: Enterobacter cloacae complex (01-05-24 @ 12:16)      Method Type: OTTO      -  Ampicillin: R >16 These ampicillin results predict results for amoxicillin      -  Ampicillin/Sulbactam: R >16/8      -  Cefazolin: R >16      -  Cefepime: R 16      -  Ceftriaxone: R >32 Enterobacter cloacae, Klebsiella aerogenes, and Citrobacter freundii may develop resistance during prolonged therapy.      -  Ciprofloxacin: S <=0.25      -  Gentamicin: S <=2      -  Meropenem: S <=1      -  Meropenem/Vaborbactam: S <=2      -  Piperacillin/Tazobactam: R >64      -  Tobramycin: S <=2      -  Trimethoprim/Sulfamethoxazole: S <=0.5/9.5    Culture - Blood (collected 12-28-23 @ 10:08)  Source: .Blood Blood-Peripheral  Final Report (01-02-24 @ 11:01):    No growth at 5 days.    Culture - Surgical Swab (collected 12-27-23 @ 20:46)  Source: .Surgical Swab 1. Left Oral Cavity  Gram Stain (12-27-23 @ 21:53):    Numerous Gram positive cocci in pairs, chains and clusters    Moderate Gram Positive Rods    Moderate Gram Negative Rods    Numerous White blood cells  Final Report (01-02-24 @ 10:25):    Moderate Escherichia coli    Moderate Enterobacter cloacae complex    Few Enterococcus faecalis    Few Streptococcus mitis/oralis group    Few Staphylococcus epidermidis    Few Streptococcus constellatus    Few Stenotrophomonas maltophilia    Mixed Anaerobic Joy including    Few Prevotella species (most closely resembles Prevotella barnaie)    Few Prevotella denticola    Culture grew 3 or more types of organisms which indicate collection    contamination; consider recollection only if clinically indicated.  Organism: Escherichia coli  Enterobacter cloacae complex  Enterococcus faecalis  Streptococcus constellatus  Streptococcus constellatus  Stenotrophomonas maltophilia  Stenotrophomonas maltophilia (01-02-24 @ 10:24)  Organism: Escherichia coli (01-02-24 @ 10:24)      Method Type: OTTO      -  Ampicillin: S <=8 These ampicillin results predict results for amoxicillin      -  Ampicillin/Sulbactam: S <=4/2      -  Cefazolin: S <=2      -  Ceftriaxone: S <=1      -  Ciprofloxacin: S <=0.25      -  Ertapenem: S <=0.5      -  Gentamicin: S <=2      -  Piperacillin/Tazobactam: S <=8      -  Tobramycin: S <=2      -  Trimethoprim/Sulfamethoxazole: S 1/19  Organism: Stenotrophomonas maltophilia (01-01-24 @ 14:56)      Method Type: KB      -  Minocycline: S  Organism: Stenotrophomonas maltophilia (01-01-24 @ 14:55)      Method Type: OTTO      -  Levofloxacin: S 1      -  Trimethoprim/Sulfamethoxazole: S <=0.5/9.5  Organism: Streptococcus constellatus (01-01-24 @ 14:55)      Method Type: ETEST      -  Ceftriaxone: S 0.094      -  Penicillin: S 0.047  Organism: Streptococcus constellatus (01-01-24 @ 14:55)      Method Type: KB  Organism: Enterobacter cloacae complex (01-01-24 @ 14:52)      Method Type: OTTO      -  Ampicillin: R >16 These ampicillin results predict results for amoxicillin      -  Ampicillin/Sulbactam: R >16/8      -  Cefazolin: R >16      -  Cefepime: SDD 8 The breakpoint for susceptible dose dependent may require the usage of a higher cefepime dose (equivalent to adult dose of 2g q8h for normal renal function) for successful treatment.      -  Ceftriaxone: R >32 Enterobacter cloacae, Klebsiella aerogenes, and Citrobacter freundii may develop resistance during prolonged therapy.      -  Ciprofloxacin: S <=0.25      -  Ertapenem: S <=0.5      -  Gentamicin: S <=2      -  Meropenem: S <=1      -  Piperacillin/Tazobactam: R 64      -  Tobramycin: S <=2      -  Trimethoprim/Sulfamethoxazole: S <=0.5/9.5      -  Cefoxitin: R >16  Organism: Enterococcus faecalis (12-30-23 @ 15:21)      Method Type: OTTO      -  Ampicillin: S <=2 Predicts results to ampicillin/sulbactam, amoxacillin-clavulanate and  piperacillin-tazobactam.      -  Vancomycin: S 4        RADIOLOGY & ADDITIONAL STUDIES: Reviewed.    ECG:    ECHO:    REASON FOR CONSULT: Co-management    INTERVAL/OVERNIGHT EVENTS: As per overnight staff, there were no acute events overnight    SUBJECTIVE HPI: Pt seen at bedside, trach capped with passy elio valve. No complaints, ROS otherwise negative      MEDICATIONS  (STANDING):  albuterol/ipratropium for Nebulization 3 milliLiter(s) Nebulizer every 6 hours  aspirin  chewable 81 milliGRAM(s) Oral daily  bacitracin   Ointment 1 Application(s) Topical every 12 hours  chlorhexidine 0.12% Liquid 15 milliLiter(s) Oral Mucosa two times a day  chlorhexidine 2% Cloths 1 Application(s) Topical daily  dextrose 5%. 1000 milliLiter(s) (100 mL/Hr) IV Continuous <Continuous>  dextrose 5%. 1000 milliLiter(s) (50 mL/Hr) IV Continuous <Continuous>  dextrose 50% Injectable 12.5 Gram(s) IV Push once  dextrose 50% Injectable 25 Gram(s) IV Push once  dextrose 50% Injectable 25 Gram(s) IV Push once  doxazosin 1 milliGRAM(s) Oral at bedtime  enoxaparin Injectable 40 milliGRAM(s) SubCutaneous every 24 hours  gabapentin Solution 300 milliGRAM(s) Oral every 12 hours  glucagon  Injectable 1 milliGRAM(s) IntraMuscular once  imipenem/cilastatin  IVPB 1000 milliGRAM(s) IV Intermittent every 8 hours  influenza  Vaccine (HIGH DOSE) 0.7 milliLiter(s) IntraMuscular once  insulin lispro (ADMELOG) corrective regimen sliding scale   SubCutaneous every 6 hours  multivitamin/minerals/iron Oral Solution (CENTRUM) 15 milliLiter(s) Oral daily  polyethylene glycol 3350 17 Gram(s) Oral daily  senna 1 Tablet(s) Oral daily    MEDICATIONS  (PRN):  dextrose Oral Gel 15 Gram(s) Oral once PRN Blood Glucose LESS THAN 70 milliGRAM(s)/deciliter  HYDROmorphone  Injectable 0.5 milliGRAM(s) IV Push every 3 hours PRN breakthrough pain  lidocaine   4% Patch 1 Patch Transdermal daily PRN back pain  ondansetron Injectable 4 milliGRAM(s) IV Push every 8 hours PRN Nausea and/or Vomiting  oxyCODONE    Solution 5 milliGRAM(s) Enteral Tube every 6 hours PRN Moderate Pain (4 - 6)  oxyCODONE    Solution 10 milliGRAM(s) Enteral Tube every 6 hours PRN Severe Pain (7 - 10)        VITAL SIGNS:  Vital Signs Last 24 Hrs  T(C): 36.6 (16 Jan 2024 09:00), Max: 36.9 (15 Carlos 2024 10:00)  T(F): 97.8 (16 Jan 2024 09:00), Max: 98.4 (15 Carlos 2024 10:00)  HR: 72 (16 Jan 2024 09:00) (62 - 78)  BP: 103/54 (16 Jan 2024 09:00) (98/57 - 126/56)  BP(mean): 72 (16 Jan 2024 09:00) (72 - 85)  RR: 18 (16 Jan 2024 09:00) (16 - 19)  SpO2: 100% (16 Jan 2024 09:00) (98% - 100%)    Parameters below as of 16 Jan 2024 09:00  Patient On (Oxygen Delivery Method): tracheostomy collar  O2 Flow (L/min): 10  O2 Concentration (%): 40    I&O's Detail    15 Carlos 2024 07:01  -  16 Jan 2024 07:00  --------------------------------------------------------  IN:    Enteral Tube Flush: 800 mL    IV PiggyBack: 250 mL    Vital1.5: 1011 mL  Total IN: 2061 mL    OUT:    Bulb (mL): 20 mL    Bulb (mL): 0 mL    Voided (mL): 1450 mL  Total OUT: 1470 mL    Total NET: 591 mL          PHYSICAL EXAM:  General: Comfortable, NAD  Neurological: AAOx3, no focal deficits  HEENT: trach in place, perfuse drooling, passy elio valve in place  Cardiovascular: +S1/S2, no murmurs/rubs/gallops, RRR  Respiratory: CTA B/L, no diminished breath sounds, no wheezes/rales/rhonchi, no increased work of breathing or accessory muscle use  Gastrointestinal: soft, NT/ND; active BSx4 quadrants      LABS:                        9.7    10.38 )-----------( 138      ( 15 Carlos 2024 05:30 )             29.7     01-15    136  |  102  |  15  ----------------------------<  127<H>  3.8   |  27  |  0.54    Ca    8.9      15 Carlos 2024 05:30  Phos  2.7     01-15  Mg     1.9     01-15              BNP    Urinalysis Basic - ( 15 Carlos 2024 05:30 )    Color: x / Appearance: x / SG: x / pH: x  Gluc: 127 mg/dL / Ketone: x  / Bili: x / Urobili: x   Blood: x / Protein: x / Nitrite: x   Leuk Esterase: x / RBC: x / WBC x   Sq Epi: x / Non Sq Epi: x / Bacteria: x            Microbiology:    Culture - Tissue with Gram Stain (collected 01-02-24 @ 18:03)  Source: .Tissue left mandible tissue or spec  Gram Stain (01-02-24 @ 21:59):    Numerous Gram Negative Rods    Few Gram positive cocci in pairs    Few WBC's  Final Report (01-05-24 @ 12:29):    Numerous Escherichia coli    Numerous Enterobacter cloacae complex    Few Enterococcus faecalis    Rare Staphylococcus epidermidis  Organism: Escherichia coli  Enterobacter cloacae complex  Enterobacter cloacae complex  Enterococcus faecalis  Escherichia coli (01-05-24 @ 12:29)  Organism: Escherichia coli (01-05-24 @ 12:29)      Method Type: OTTO      -  Ampicillin: S <=8 These ampicillin results predict results for amoxicillin      -  Ampicillin/Sulbactam: S <=4/2      -  Cefazolin: S <=2      -  Ceftriaxone: S <=1      -  Ciprofloxacin: S <=0.25      -  Ertapenem: S <=0.5      -  Gentamicin: S <=2      -  Piperacillin/Tazobactam: S <=8      -  Tobramycin: S <=2      -  Trimethoprim/Sulfamethoxazole: S 1/19  Organism: Enterococcus faecalis (01-05-24 @ 12:29)      Method Type: OTTO      -  Ampicillin: S <=2 Predicts results to ampicillin/sulbactam, amoxacillin-clavulanate and  piperacillin-tazobactam.      -  Vancomycin: S 2  Organism: Enterobacter cloacae complex (01-05-24 @ 12:29)      Method Type: ETEST      -  Meropenem: S 0.094      -  Ertapenem: S 0.38  Organism: Enterobacter cloacae complex (01-05-24 @ 12:29)      Method Type: OTTO      -  Ampicillin: R >16 These ampicillin results predict results for amoxicillin      -  Ampicillin/Sulbactam: R >16/8      -  Cefazolin: R >16      -  Cefepime: R 16      -  Ceftriaxone: R >32 Enterobacter cloacae, Klebsiella aerogenes, and Citrobacter freundii may develop resistance during prolonged therapy.      -  Ciprofloxacin: S <=0.25      -  Gentamicin: S <=2      -  Meropenem: S <=1      -  Meropenem/Vaborbactam: S <=2      -  Piperacillin/Tazobactam: R >64      -  Tobramycin: S <=2      -  Trimethoprim/Sulfamethoxazole: S <=0.5/9.5  Organism: Escherichia coli (01-05-24 @ 12:29)      Method Type: OTTO      -  Ampicillin: S <=8 These ampicillin results predict results for amoxicillin      -  Ampicillin/Sulbactam: S <=4/2      -  Cefazolin: S <=2      -  Ceftriaxone: S <=1      -  Ciprofloxacin: S <=0.25      -  Ertapenem: S <=0.5      -  Gentamicin: S <=2      -  Piperacillin/Tazobactam: S <=8      -  Tobramycin: S <=2      -  Trimethoprim/Sulfamethoxazole: S 1/19    Culture - Surgical Swab (collected 01-02-24 @ 18:03)  Source: .Surgical Swab left neck or spec  Gram Stain (01-02-24 @ 22:00):    Rare Gram Negative Rods    Rare Gram positive cocci in pairs    Moderate WBC's  Final Report (01-05-24 @ 12:42):    Moderate Escherichia coli    Moderate Enterobacter cloacae complex    Rare Candida parapsilosis    Rare Staphylococcus capitis  Organism: Enterobacter cloacae complex  Enterobacter cloacae complex  Escherichia coli  Escherichia coli (01-05-24 @ 12:16)  Organism: Escherichia coli (01-05-24 @ 12:16)      Method Type: OTTO      -  Ampicillin: S <=8 These ampicillin results predict results for amoxicillin      -  Ampicillin/Sulbactam: S <=4/2      -  Cefazolin: S <=2      -  Ceftriaxone: S <=1      -  Ciprofloxacin: S <=0.25      -  Ertapenem: S <=0.5      -  Gentamicin: S <=2      -  Piperacillin/Tazobactam: S <=8      -  Tobramycin: S <=2      -  Trimethoprim/Sulfamethoxazole: S 1/19  Organism: Escherichia coli (01-05-24 @ 12:16)      Method Type: OTTO      -  Ampicillin: S <=8 These ampicillin results predict results for amoxicillin      -  Ampicillin/Sulbactam: S <=4/2      -  Cefazolin: S <=2      -  Ceftriaxone: S <=1      -  Ciprofloxacin: S <=0.25      -  Ertapenem: S <=0.5      -  Gentamicin: S <=2      -  Piperacillin/Tazobactam: S <=8      -  Tobramycin: S <=2      -  Trimethoprim/Sulfamethoxazole: S 1/19  Organism: Enterobacter cloacae complex (01-05-24 @ 12:16)      Method Type: ETEST      -  Ertapenem: S 0.38      -  Meropenem: S 0.094  Organism: Enterobacter cloacae complex (01-05-24 @ 12:16)      Method Type: OTTO      -  Ampicillin: R >16 These ampicillin results predict results for amoxicillin      -  Ampicillin/Sulbactam: R >16/8      -  Cefazolin: R >16      -  Cefepime: R 16      -  Ceftriaxone: R >32 Enterobacter cloacae, Klebsiella aerogenes, and Citrobacter freundii may develop resistance during prolonged therapy.      -  Ciprofloxacin: S <=0.25      -  Gentamicin: S <=2      -  Meropenem: S <=1      -  Meropenem/Vaborbactam: S <=2      -  Piperacillin/Tazobactam: R >64      -  Tobramycin: S <=2      -  Trimethoprim/Sulfamethoxazole: S <=0.5/9.5    Culture - Blood (collected 12-28-23 @ 10:08)  Source: .Blood Blood-Peripheral  Final Report (01-02-24 @ 11:01):    No growth at 5 days.    Culture - Surgical Swab (collected 12-27-23 @ 20:46)  Source: .Surgical Swab 1. Left Oral Cavity  Gram Stain (12-27-23 @ 21:53):    Numerous Gram positive cocci in pairs, chains and clusters    Moderate Gram Positive Rods    Moderate Gram Negative Rods    Numerous White blood cells  Final Report (01-02-24 @ 10:25):    Moderate Escherichia coli    Moderate Enterobacter cloacae complex    Few Enterococcus faecalis    Few Streptococcus mitis/oralis group    Few Staphylococcus epidermidis    Few Streptococcus constellatus    Few Stenotrophomonas maltophilia    Mixed Anaerobic Joy including    Few Prevotella species (most closely resembles Prevotella barnaie)    Few Prevotella denticola    Culture grew 3 or more types of organisms which indicate collection    contamination; consider recollection only if clinically indicated.  Organism: Escherichia coli  Enterobacter cloacae complex  Enterococcus faecalis  Streptococcus constellatus  Streptococcus constellatus  Stenotrophomonas maltophilia  Stenotrophomonas maltophilia (01-02-24 @ 10:24)  Organism: Escherichia coli (01-02-24 @ 10:24)      Method Type: OTTO      -  Ampicillin: S <=8 These ampicillin results predict results for amoxicillin      -  Ampicillin/Sulbactam: S <=4/2      -  Cefazolin: S <=2      -  Ceftriaxone: S <=1      -  Ciprofloxacin: S <=0.25      -  Ertapenem: S <=0.5      -  Gentamicin: S <=2      -  Piperacillin/Tazobactam: S <=8      -  Tobramycin: S <=2      -  Trimethoprim/Sulfamethoxazole: S 1/19  Organism: Stenotrophomonas maltophilia (01-01-24 @ 14:56)      Method Type: KB      -  Minocycline: S  Organism: Stenotrophomonas maltophilia (01-01-24 @ 14:55)      Method Type: OTTO      -  Levofloxacin: S 1      -  Trimethoprim/Sulfamethoxazole: S <=0.5/9.5  Organism: Streptococcus constellatus (01-01-24 @ 14:55)      Method Type: ETEST      -  Ceftriaxone: S 0.094      -  Penicillin: S 0.047  Organism: Streptococcus constellatus (01-01-24 @ 14:55)      Method Type: KB  Organism: Enterobacter cloacae complex (01-01-24 @ 14:52)      Method Type: OTTO      -  Ampicillin: R >16 These ampicillin results predict results for amoxicillin      -  Ampicillin/Sulbactam: R >16/8      -  Cefazolin: R >16      -  Cefepime: SDD 8 The breakpoint for susceptible dose dependent may require the usage of a higher cefepime dose (equivalent to adult dose of 2g q8h for normal renal function) for successful treatment.      -  Ceftriaxone: R >32 Enterobacter cloacae, Klebsiella aerogenes, and Citrobacter freundii may develop resistance during prolonged therapy.      -  Ciprofloxacin: S <=0.25      -  Ertapenem: S <=0.5      -  Gentamicin: S <=2      -  Meropenem: S <=1      -  Piperacillin/Tazobactam: R 64      -  Tobramycin: S <=2      -  Trimethoprim/Sulfamethoxazole: S <=0.5/9.5      -  Cefoxitin: R >16  Organism: Enterococcus faecalis (12-30-23 @ 15:21)      Method Type: OTTO      -  Ampicillin: S <=2 Predicts results to ampicillin/sulbactam, amoxacillin-clavulanate and  piperacillin-tazobactam.      -  Vancomycin: S 4        RADIOLOGY & ADDITIONAL STUDIES: Reviewed.    ECG:    ECHO:

## 2024-01-17 NOTE — CHART NOTE - NSCHARTNOTEFT_GEN_A_CORE
Admitting Diagnosis:   Patient is a 68y old  Male who presents with a chief complaint of medicine (14 Jan 2024 13:36)      PAST MEDICAL & SURGICAL HISTORY:  Neoplasm of mandible      CURRENT NUTRITION ORDER:   - NPO with Tube Feeds via Gastrostomy   - 1348 mL total volume of Vital 1.5; bolus 337 mL q6h @ 337 mL/hr (2022 kcal, 91 g protein, 1030 mL free fluid)  - Free water flush 200 mL q6r (800 ml)  - Banatrol Plus (40 kcals each) BID   - The above provides 2102 kcal, 91 g protein, 1830 mL free fluid  - NKFA       PO INTAKE:  % [  ]       50-75% [  ]       25-50% [  ]       <25% [  ]       N/A [ xx ]     GI: Denies any nausea/vomiting; PEG placement; last bowel movement documented 1/16    PAIN:  Denies pain or discomfort     SKIN: 1+ trace edema bilateral hands; no pressure injuries documented; +surgical incisions       LABS:  01-17    140  |  105  |  16  ----------------------------<  116<H>  4.3   |  26  |  0.53    Ca    8.8      17 Jan 2024 10:19  Phos  2.7     01-17  Mg     1.9     01-17      CAPILLARY BLOOD GLUCOSE  POCT Blood Glucose.: 96 mg/dL (17 Jan 2024 11:34)  POCT Blood Glucose.: 128 mg/dL (17 Jan 2024 06:23)      MEDICATIONS  (STANDING):  albuterol/ipratropium for Nebulization 3 milliLiter(s) Nebulizer every 6 hours  aspirin  chewable 81 milliGRAM(s) Oral daily  bacitracin   Ointment 1 Application(s) Topical every 12 hours  chlorhexidine 0.12% Liquid 15 milliLiter(s) Oral Mucosa two times a day  chlorhexidine 2% Cloths 1 Application(s) Topical daily  clopidogrel Tablet 75 milliGRAM(s) Oral daily  dextrose 5%. 1000 milliLiter(s) (100 mL/Hr) IV Continuous <Continuous>  dextrose 5%. 1000 milliLiter(s) (50 mL/Hr) IV Continuous <Continuous>  dextrose 50% Injectable 12.5 Gram(s) IV Push once  dextrose 50% Injectable 25 Gram(s) IV Push once  dextrose 50% Injectable 25 Gram(s) IV Push once  doxazosin 1 milliGRAM(s) Oral at bedtime  enoxaparin Injectable 40 milliGRAM(s) SubCutaneous every 24 hours  gabapentin Solution 300 milliGRAM(s) Oral every 12 hours  glucagon  Injectable 1 milliGRAM(s) IntraMuscular once  influenza  Vaccine (HIGH DOSE) 0.7 milliLiter(s) IntraMuscular once  insulin lispro (ADMELOG) corrective regimen sliding scale   SubCutaneous every 6 hours  multivitamin/minerals/iron Oral Solution (CENTRUM) 15 milliLiter(s) Oral daily  polyethylene glycol 3350 17 Gram(s) Oral daily  senna 1 Tablet(s) Oral daily  traZODone 50 milliGRAM(s) Oral at bedtime      MEDICATIONS  (PRN):  dextrose Oral Gel 15 Gram(s) Oral once PRN Blood Glucose LESS THAN 70 milliGRAM(s)/deciliter  HYDROmorphone  Injectable 0.5 milliGRAM(s) IV Push every 3 hours PRN breakthrough pain  lidocaine   4% Patch 1 Patch Transdermal daily PRN back pain  ondansetron Injectable 4 milliGRAM(s) IV Push every 8 hours PRN Nausea and/or Vomiting  oxyCODONE    Solution 5 milliGRAM(s) Enteral Tube every 6 hours PRN Moderate Pain (4 - 6)  oxyCODONE    Solution 10 milliGRAM(s) Enteral Tube every 6 hours PRN Severe Pain (7 - 10)      ANTHROPOMETRICS:   Height (inches):  70   Weight (pounds):  185.6 (1/11)   BMI (kg/m^2):  26.6   IBW (pounds):  166 +/- 10%   %IBW:  111.8 %     WEIGHT CHANGE:   185.6 (1/11)  185.6 (1/02)  Patient weight stable throughout admission    ESTIMATED NUTRIENT NEEDS:   Calories:  (22-28 kcal/kg) 3558-5251 kcal   Protein:  (1.1-1.4 g/kg)  g   Fluids:  1 mL/kcal or at team’s discretion   Current body weight used to calculate energy needs due to pt's current body weight within % ideal body weight per [facility] Standards of Nutrition Care. Needs adjusted for age and current clinical status.      SUBJECTIVE:   67yo M w PMH of CAD (x2 stents Mar 2023), HLD, T2DM, hx of kidney stones, psoriasis who presented with an oral mass and underwent L hemimandibulectomy, SND L level 1-3, recon with L FFF, STSG, and placement of dental implants on 12/7. He had a trach which was subsequently decannulated. He returns 12/27 with surgical site infection now s/p OR for debridement and exploration. Trach replaced through prior stoma for pulmonary toilet. Now s/p L latissimus dorsi free flap and tracheostomy exchange (7.5 portex), transferred to SICU for hemodynamic and flap monitoring post procedure.    Chart, meds, and labs reviewed. Tmax 36.9 C. MAPs 74-90 mm Hg. Meds significant for insulin sliding scale (lispro), multivitamin, ondansetron (Zofran), polyethylene glycol (MiraLAX), senna. Labs significant for POCT ; glucose 116H. Met with patient on 8 Lachman. His wife Dinah was present throughout the nutrition interview. Denies nausea/vomiting, denies GI pain. Patient recently switched from cyclic feeds to bolus feeds and tolerating them well; interested in keeping the current tube feed regimen. Seen by S+S on today with recommendations for “Sips of water only. No other liquids per ENT. All nutrition and hydration via PEG.”    PREVIOUS NUTRITION DIAGNOSIS: Inadequate Oral Intake related to decreased ability to consume sufficient energy as evidenced by reliance on enteral nutrition to meet 100% estimated nutrition needs    Active [ xx ]       Resolved [  ]     GOAL: Pt will meet at least 75% of protein & energy needs via most appropriate route for nutrition    IMPRESSION: Current diet order meets nutrition needs; see recommendations below    RECOMMENDATIONS:   - Patient on Banatrol secondary to history of diarrhea but with a standing order polyethylene glycol (MiraLAX) and senna; consider changing bowel regimen to PRN   - Recommend continue the current tube feed regimen via PEG:     >> 1348 mL total volume of Vital 1.5; bolus 337 mL q6h @ 337 mL/hr (2022 kcal, 91 g protein, 1030 mL free fluid)    >> Free water flush 200 mL q6r (800 ml)    >> Banatrol Plus (40 kcals each) BID; do not mix with tube feed formula; please document on I&Os flowsheets under “Modular Components” section  - The above provides 2102 kcal, 91 g protein, 1830 mL free fluid  - Recommend add 50 mL water flush before and after tube feed administration (provides an additional 400 mL)  - Hold feeds if increase in pressor requirements, MAPs consistently trending <60 mmHg, lactic acidosis presents, or GI intolerance is noted   - Monitor tube feed tolerance, GI distress, labs, weights   - Monitor electrolytes, adjust and replete PRN   - Monitor ability to initiate oral diet  - Pain and bowel regimen as per team   - The above recommendations were communicated with the team     EDUCATION:  Pt and wife educated on medical nutrition therapy specific to the patient’s diagnosis/condition; they expressed understanding; all questions and concerns addressed to their satisfaction     RISK LEVEL:  High [ xx ]       Moderate [  ]       Low [  ]     INOCENCIA Kong, MS, RD, CDN [id number]

## 2024-01-17 NOTE — PROGRESS NOTE ADULT - ATTENDING COMMENTS
69 y/o M PMHx of CAD s/p PCI/CUBA x2 to LAD and Ramus (Mar 2023), T2DM, psoriasis, hx Renal calculi, w/ [id number] SCC of L buccal mucosa s/p hemimandibulectomy, left level 1, 2a, 3 neck neck dissection, L FFF, tracheostomy w/ 7.5 cuffed portex, dental implants, STSG (12/7/23), s/p G tube placement and subsequent trach decannulation and discharged home on ( 12/22/23). Pt however returned to [facility] ( 12/27/23) with surgical site infection, s/p RTOR 12/27 with findings of skin flap necrosis and s/p debridement. Trach replaced through prior stoma for pulmonary toilet. Patient also noted to have dark stools and dropped in Hgb- wife reported black stool for the last 3 days and same thing coming out from peg tube 12/28- anemia required blood transfusion -    trach capped, vocalizing.  feeling well  RRR, moving good air.  mild edema on left arm.  no edema noted on lower ext.  neuro -non focal.      # Skin flap necrosis s/p exploration and debridement ( 12/27)   # RTOR for wound debridement and EGD( 1/2)   OR findings  (1/2): infected, non-viable free fibula flap with viable skin paddle. Purulent drainage appreciated at margins of flap and in neck.  EGD( 1/2): ulcer found at the G-tube site  # RTOR- flat change with Latissmus dorsi- close monitoring in sicu, now on step down  wound care as per ENT.   - ID f/u appreciated ( Team 1) ,  d/c's Vanco & Zosyn ( 1/1) and started on Imipenem 1gm iv q8h  (1/1-) , will need 2 weeks of iv abx from the last washout ( 1/2) to continue till 1/16- completed.  - f/u OR culture ( 1/2): reviewed   - OR cultures with E.coli, ECC, E.faecalis S.mitis/oralis, S.epi, S.constellatus, S.maltophilia, mixed anaerobes.   - culture result reviewed.  - BCx( 12/28): ngtd     # Acute blood loss anemia/melena -Hb stable-  # hx WADE ( Ferritin 768 but Tsat 13% ( <20%) on 12/13/23)   - GI fu appreciated, EGD( 1/2) ulcer at G-tube site, rec; Protonix 40mg daily for 8 weeks and avoid bumper being too tight to cause pressure ulcer   - keep active T&S, keep Hgb>8  -stable above 10 (1/17)  - c/w ASA given hx PCI x2 ( March 2023)  - hold off Plavix since adm ( 12/27) requiring procedure and due to bleeding  - c/w PPI daily can be via G-tube   - c/w Folic acid 1mg daily   - monitor clinically for any further melena     # SLP evaluation appreciated. diet per ENT.    # CAD sp PCI/CUBA x2 to LAD and Ramus in March 2023 as per cards is in setting of NSTEMI - prefer DAPT, at least monotherapy with ASA if plavix need to be held for procedure, currently on ASA , to restart plavix when safe., c/w ASA 81mg and Atorvastatin 40mg qHS     #  DM - FS with ISS, would hold all ora-hypoglycemic agent, goal -180 -within goal.     # pain management - oxycodone 5mg/10mg prn , dilaudid prn, would need stool softener to ensure daily BM.     # Dysphagia- NPO, trach, on TF with Vital 1.5 via G-tube     # DVT ppx: Lovenox    will follow. recovering well, dw Dr. Montes, wife.   Dr. Mitchell Montero starts care 1/18-

## 2024-01-18 LAB
ANION GAP SERPL CALC-SCNC: 8 MMOL/L — SIGNIFICANT CHANGE UP (ref 5–17)
BUN SERPL-MCNC: 14 MG/DL — SIGNIFICANT CHANGE UP (ref 7–23)
CALCIUM SERPL-MCNC: 9 MG/DL — SIGNIFICANT CHANGE UP (ref 8.4–10.5)
CHLORIDE SERPL-SCNC: 103 MMOL/L — SIGNIFICANT CHANGE UP (ref 96–108)
CO2 SERPL-SCNC: 29 MMOL/L — SIGNIFICANT CHANGE UP (ref 22–31)
CREAT SERPL-MCNC: 0.59 MG/DL — SIGNIFICANT CHANGE UP (ref 0.5–1.3)
EGFR: 106 ML/MIN/1.73M2 — SIGNIFICANT CHANGE UP
GLUCOSE BLDC GLUCOMTR-MCNC: 140 MG/DL — HIGH (ref 70–99)
GLUCOSE SERPL-MCNC: 111 MG/DL — HIGH (ref 70–99)
HCT VFR BLD CALC: 30.8 % — LOW (ref 39–50)
HGB BLD-MCNC: 10 G/DL — LOW (ref 13–17)
MAGNESIUM SERPL-MCNC: 1.9 MG/DL — SIGNIFICANT CHANGE UP (ref 1.6–2.6)
MCHC RBC-ENTMCNC: 28.9 PG — SIGNIFICANT CHANGE UP (ref 27–34)
MCHC RBC-ENTMCNC: 32.5 GM/DL — SIGNIFICANT CHANGE UP (ref 32–36)
MCV RBC AUTO: 89 FL — SIGNIFICANT CHANGE UP (ref 80–100)
NRBC # BLD: 0 /100 WBCS — SIGNIFICANT CHANGE UP (ref 0–0)
PHOSPHATE SERPL-MCNC: 3.4 MG/DL — SIGNIFICANT CHANGE UP (ref 2.5–4.5)
PLATELET # BLD AUTO: 188 K/UL — SIGNIFICANT CHANGE UP (ref 150–400)
POTASSIUM SERPL-MCNC: 3.8 MMOL/L — SIGNIFICANT CHANGE UP (ref 3.5–5.3)
POTASSIUM SERPL-SCNC: 3.8 MMOL/L — SIGNIFICANT CHANGE UP (ref 3.5–5.3)
RBC # BLD: 3.46 M/UL — LOW (ref 4.2–5.8)
RBC # FLD: 14.1 % — SIGNIFICANT CHANGE UP (ref 10.3–14.5)
SODIUM SERPL-SCNC: 140 MMOL/L — SIGNIFICANT CHANGE UP (ref 135–145)
SURGICAL PATHOLOGY STUDY: SIGNIFICANT CHANGE UP
WBC # BLD: 6.24 K/UL — SIGNIFICANT CHANGE UP (ref 3.8–10.5)
WBC # FLD AUTO: 6.24 K/UL — SIGNIFICANT CHANGE UP (ref 3.8–10.5)

## 2024-01-18 PROCEDURE — 99233 SBSQ HOSP IP/OBS HIGH 50: CPT | Mod: GC

## 2024-01-18 RX ORDER — HYDROMORPHONE HYDROCHLORIDE 2 MG/ML
0.5 INJECTION INTRAMUSCULAR; INTRAVENOUS; SUBCUTANEOUS ONCE
Refills: 0 | Status: DISCONTINUED | OUTPATIENT
Start: 2024-01-18 | End: 2024-01-18

## 2024-01-18 RX ADMIN — HYDROMORPHONE HYDROCHLORIDE 0.5 MILLIGRAM(S): 2 INJECTION INTRAMUSCULAR; INTRAVENOUS; SUBCUTANEOUS at 09:00

## 2024-01-18 RX ADMIN — ENOXAPARIN SODIUM 40 MILLIGRAM(S): 100 INJECTION SUBCUTANEOUS at 10:42

## 2024-01-18 RX ADMIN — GABAPENTIN 300 MILLIGRAM(S): 400 CAPSULE ORAL at 07:37

## 2024-01-18 RX ADMIN — CHLORHEXIDINE GLUCONATE 15 MILLILITER(S): 213 SOLUTION TOPICAL at 07:36

## 2024-01-18 RX ADMIN — Medication 1 APPLICATION(S): at 17:25

## 2024-01-18 RX ADMIN — GABAPENTIN 300 MILLIGRAM(S): 400 CAPSULE ORAL at 17:25

## 2024-01-18 RX ADMIN — Medication 15 MILLILITER(S): at 11:51

## 2024-01-18 RX ADMIN — Medication 1 MILLIGRAM(S): at 22:15

## 2024-01-18 RX ADMIN — Medication 3 MILLILITER(S): at 10:42

## 2024-01-18 RX ADMIN — Medication 1 APPLICATION(S): at 07:36

## 2024-01-18 RX ADMIN — Medication 50 MILLIGRAM(S): at 22:15

## 2024-01-18 RX ADMIN — Medication 3 MILLILITER(S): at 15:08

## 2024-01-18 RX ADMIN — Medication 3 MILLILITER(S): at 04:21

## 2024-01-18 RX ADMIN — HYDROMORPHONE HYDROCHLORIDE 0.5 MILLIGRAM(S): 2 INJECTION INTRAMUSCULAR; INTRAVENOUS; SUBCUTANEOUS at 09:30

## 2024-01-18 RX ADMIN — CHLORHEXIDINE GLUCONATE 15 MILLILITER(S): 213 SOLUTION TOPICAL at 17:25

## 2024-01-18 RX ADMIN — CHLORHEXIDINE GLUCONATE 1 APPLICATION(S): 213 SOLUTION TOPICAL at 11:02

## 2024-01-18 RX ADMIN — Medication 81 MILLIGRAM(S): at 11:02

## 2024-01-18 RX ADMIN — CLOPIDOGREL BISULFATE 75 MILLIGRAM(S): 75 TABLET, FILM COATED ORAL at 11:02

## 2024-01-18 NOTE — PROGRESS NOTE ADULT - ASSESSMENT
Past medical Hx of CAD (x 2 stents Mar 2023), Type 2 DM, hx of kidney stones, psoriasis who presented with an oral mass during previous admission (12/3-21) and underwent L hemimandibulectomy, SND L level 1-3, recon with L FFF, STSG, and placement of dental implants on 12/7. Patient had a trach which was subsequently decannulated. Patient now returns with surgical site infection, now s/p OR for washout, L latissmus dorsi flap to L oral cavity/oropharynx. Medicine consulted for co-management.    Recommendations:    #Surgical site infection  Patient s/p OR for debridement and exploration (12/27 and 1/2) and Trach replaced through prior stoma for pulmonary toilet. OR cultures with E. Coli, ECC, E. Faecalis S. Mitis S. Oralis, S. Epidermidis, S. Constellatus, S. Maltophilia, mixed anaerobes. Now s/p OR for washout, L latissmus dorsi flap to L oral cavity/oropharynx 1/11  - c/w Imipenem 1 gram IV q8hrs x 14 days with day 1 being date of last washout 1/2/24  - Pain regimen: Tylenol 650 mg PO q6, Oxy IR 5 mg Q4 moderate, Oxy IR 10 mg Q4 severe, Dilaudid 1 mg IV Q6 for breakthrough   - f/u ID recommendations    #Anemia  Hb on admission 7.3. Now stable Hb 9.2 s/p karlo-operative transfusion. Patient with previous iron studies consistent with WADE. DD includes surgical site bleed VS less likely GI bleed. Patient now s/p EGD in OR (1/3).   - maintain active T&S, transfusion goal to Hgb >8   - c/w Folic acid 1g PO QD  - c/w Protonix 40 mg PO via G-tube  - Holding plavix  - keep active T&S, keep Hgb>8      #CAD  Patient with hx of CAD s/p 2 stents in March 2023.   - c/w ASA 81 mg PO QD, Atorvastatin 40 mg PO QD  - Restart Plavix, as soon as appropriate per primary team    #DM type 2  Home medication: Metformin 1g PO QD and 500 mg PO QD and Jardiance 10 mg PO QD.  - c/w insulin sliding scale    Plan discussed with Dr. Conchita Vogel, note not complete until attested by attending           Past medical Hx of CAD (x 2 stents Mar 2023), Type 2 DM, hx of kidney stones, psoriasis who presented with an oral mass during previous admission (12/3-21) and underwent L hemimandibulectomy, SND L level 1-3, recon with L FFF, STSG, and placement of dental implants on 12/7. Patient had a trach which was subsequently decannulated. Patient now returns with surgical site infection, now s/p OR for washout, L latissmus dorsi flap to L oral cavity/oropharynx. Medicine consulted for co-management.    Recommendations:    #Surgical site infection  Patient s/p OR for debridement and exploration (12/27 and 1/2) and Trach replaced through prior stoma for pulmonary toilet. OR cultures with E. Coli, ECC, E. Faecalis S. Mitis S. Oralis, S. Epidermidis, S. Constellatus, S. Maltophilia, mixed anaerobes. Now s/p OR for washout, L latissmus dorsi flap to L oral cavity/oropharynx 1/11  - c/w Imipenem 1 gram IV q8hrs x 14 day course completed  - Pain regimen: Tylenol 650 mg PO q6, Oxy IR 5 mg Q4 moderate, Oxy IR 10 mg Q4 severe, Dilaudid 1 mg IV Q6 for breakthrough     #Anemia  Hb on admission 7.3. Now stable Hb 9.2 s/p karlo-operative transfusion. Patient with previous iron studies consistent with WADE. DD includes surgical site bleed VS less likely GI bleed. Patient now s/p EGD in OR (1/3).   - maintain active T&S, transfusion goal to Hgb >8   - c/w Folic acid 1g PO QD  - c/w Protonix 40 mg PO via G-tube  - keep active T&S, keep Hgb>8      #CAD  Patient with hx of CAD s/p 2 stents in March 2023.   - c/w ASA 81 mg PO QD, Atorvastatin 40 mg PO QD  - Cont Plavix 75mg PO qd    #DM type 2  Home medication: Metformin 1g PO QD and 500 mg PO QD and Jardiance 10 mg PO QD.  - c/w insulin sliding scale    Plan discussed with Dr. Conchita Vogel, note not complete until attested by attending           Past medical Hx of CAD (x 2 stents Mar 2023), Type 2 DM, hx of kidney stones, psoriasis who presented with an oral mass during previous admission (12/3-21) and underwent L hemimandibulectomy, SND L level 1-3, recon with L FFF, STSG, and placement of dental implants on 12/7. Patient had a trach which was subsequently decannulated. Patient now returns with surgical site infection, now s/p OR for washout, L latissmus dorsi flap to L oral cavity/oropharynx. Medicine consulted for co-management.    Recommendations:    #Surgical site infection  Patient s/p OR for debridement and exploration (12/27 and 1/2) and Trach replaced through prior stoma for pulmonary toilet. OR cultures with E. Coli, ECC, E. Faecalis S. Mitis S. Oralis, S. Epidermidis, S. Constellatus, S. Maltophilia, mixed anaerobes. Now s/p OR for washout, L latissmus dorsi flap to L oral cavity/oropharynx 1/11  - c/w Imipenem 1 gram IV q8hrs x 14 day course completed  - Pain regimen: Tylenol 650 mg PO q6, Oxy IR 5 mg Q4 moderate, Oxy IR 10 mg Q4 severe, Dilaudid 1 mg IV Q6 for breakthrough     #Anemia  Hb on admission 7.3. Now stable Hb 9.2 s/p karlo-operative transfusion. Patient with previous iron studies consistent with WADE. DD includes surgical site bleed VS less likely GI bleed. Patient now s/p EGD in OR (1/3).   - maintain active T&S, transfusion goal to Hgb >8   - c/w Folic acid 1g PO QD  - c/w Protonix 40 mg PO via G-tube  - keep active T&S, keep Hgb>8      #CAD  Patient with hx of CAD s/p 2 stents in March 2023.   - c/w ASA 81 mg PO QD, Atorvastatin 40 mg PO QD  - Cont Plavix 75mg PO qd    #DM type 2  Home medication: Metformin 1g PO QD and 500 mg PO QD and Jardiance 10 mg PO QD.  - c/w insulin sliding scale    Plan discussed with Dr. Mitchell Montero, note not complete until attested by attending

## 2024-01-18 NOTE — PROGRESS NOTE ADULT - SUBJECTIVE AND OBJECTIVE BOX
REASON FOR CONSULT: Co-management    INTERVAL/OVERNIGHT EVENTS: As per overnight staff, there were no acute events overnight    SUBJECTIVE HPI: Pt seen at bedside, trach capped with passy elio valve. No complaints, ROS otherwise negative      MEDICATIONS  (STANDING):  albuterol/ipratropium for Nebulization 3 milliLiter(s) Nebulizer every 6 hours  aspirin  chewable 81 milliGRAM(s) Oral daily  bacitracin   Ointment 1 Application(s) Topical every 12 hours  chlorhexidine 0.12% Liquid 15 milliLiter(s) Oral Mucosa two times a day  chlorhexidine 2% Cloths 1 Application(s) Topical daily  dextrose 5%. 1000 milliLiter(s) (100 mL/Hr) IV Continuous <Continuous>  dextrose 5%. 1000 milliLiter(s) (50 mL/Hr) IV Continuous <Continuous>  dextrose 50% Injectable 12.5 Gram(s) IV Push once  dextrose 50% Injectable 25 Gram(s) IV Push once  dextrose 50% Injectable 25 Gram(s) IV Push once  doxazosin 1 milliGRAM(s) Oral at bedtime  enoxaparin Injectable 40 milliGRAM(s) SubCutaneous every 24 hours  gabapentin Solution 300 milliGRAM(s) Oral every 12 hours  glucagon  Injectable 1 milliGRAM(s) IntraMuscular once  imipenem/cilastatin  IVPB 1000 milliGRAM(s) IV Intermittent every 8 hours  influenza  Vaccine (HIGH DOSE) 0.7 milliLiter(s) IntraMuscular once  insulin lispro (ADMELOG) corrective regimen sliding scale   SubCutaneous every 6 hours  multivitamin/minerals/iron Oral Solution (CENTRUM) 15 milliLiter(s) Oral daily  polyethylene glycol 3350 17 Gram(s) Oral daily  senna 1 Tablet(s) Oral daily    MEDICATIONS  (PRN):  dextrose Oral Gel 15 Gram(s) Oral once PRN Blood Glucose LESS THAN 70 milliGRAM(s)/deciliter  HYDROmorphone  Injectable 0.5 milliGRAM(s) IV Push every 3 hours PRN breakthrough pain  lidocaine   4% Patch 1 Patch Transdermal daily PRN back pain  ondansetron Injectable 4 milliGRAM(s) IV Push every 8 hours PRN Nausea and/or Vomiting  oxyCODONE    Solution 5 milliGRAM(s) Enteral Tube every 6 hours PRN Moderate Pain (4 - 6)  oxyCODONE    Solution 10 milliGRAM(s) Enteral Tube every 6 hours PRN Severe Pain (7 - 10)        VITAL SIGNS:  Vital Signs Last 24 Hrs  T(C): 36.6 (16 Jan 2024 09:00), Max: 36.9 (15 Carlos 2024 10:00)  T(F): 97.8 (16 Jan 2024 09:00), Max: 98.4 (15 Carlos 2024 10:00)  HR: 72 (16 Jan 2024 09:00) (62 - 78)  BP: 103/54 (16 Jan 2024 09:00) (98/57 - 126/56)  BP(mean): 72 (16 Jan 2024 09:00) (72 - 85)  RR: 18 (16 Jan 2024 09:00) (16 - 19)  SpO2: 100% (16 Jan 2024 09:00) (98% - 100%)    Parameters below as of 16 Jan 2024 09:00  Patient On (Oxygen Delivery Method): tracheostomy collar  O2 Flow (L/min): 10  O2 Concentration (%): 40    I&O's Detail    15 Carlos 2024 07:01  -  16 Jan 2024 07:00  --------------------------------------------------------  IN:    Enteral Tube Flush: 800 mL    IV PiggyBack: 250 mL    Vital1.5: 1011 mL  Total IN: 2061 mL    OUT:    Bulb (mL): 20 mL    Bulb (mL): 0 mL    Voided (mL): 1450 mL  Total OUT: 1470 mL    Total NET: 591 mL          PHYSICAL EXAM:  General: Comfortable, NAD  Neurological: AAOx3, no focal deficits  HEENT: trach in place, perfuse drooling, passy elio valve in place  Cardiovascular: +S1/S2, no murmurs/rubs/gallops, RRR  Respiratory: CTA B/L, no diminished breath sounds, no wheezes/rales/rhonchi, no increased work of breathing or accessory muscle use  Gastrointestinal: soft, NT/ND; active BSx4 quadrants      LABS:                        9.7    10.38 )-----------( 138      ( 15 Carlos 2024 05:30 )             29.7     01-15    136  |  102  |  15  ----------------------------<  127<H>  3.8   |  27  |  0.54    Ca    8.9      15 Carlos 2024 05:30  Phos  2.7     01-15  Mg     1.9     01-15              BNP    Urinalysis Basic - ( 15 Carlos 2024 05:30 )    Color: x / Appearance: x / SG: x / pH: x  Gluc: 127 mg/dL / Ketone: x  / Bili: x / Urobili: x   Blood: x / Protein: x / Nitrite: x   Leuk Esterase: x / RBC: x / WBC x   Sq Epi: x / Non Sq Epi: x / Bacteria: x            Microbiology:    Culture - Tissue with Gram Stain (collected 01-02-24 @ 18:03)  Source: .Tissue left mandible tissue or spec  Gram Stain (01-02-24 @ 21:59):    Numerous Gram Negative Rods    Few Gram positive cocci in pairs    Few WBC's  Final Report (01-05-24 @ 12:29):    Numerous Escherichia coli    Numerous Enterobacter cloacae complex    Few Enterococcus faecalis    Rare Staphylococcus epidermidis  Organism: Escherichia coli  Enterobacter cloacae complex  Enterobacter cloacae complex  Enterococcus faecalis  Escherichia coli (01-05-24 @ 12:29)  Organism: Escherichia coli (01-05-24 @ 12:29)      Method Type: OTTO      -  Ampicillin: S <=8 These ampicillin results predict results for amoxicillin      -  Ampicillin/Sulbactam: S <=4/2      -  Cefazolin: S <=2      -  Ceftriaxone: S <=1      -  Ciprofloxacin: S <=0.25      -  Ertapenem: S <=0.5      -  Gentamicin: S <=2      -  Piperacillin/Tazobactam: S <=8      -  Tobramycin: S <=2      -  Trimethoprim/Sulfamethoxazole: S 1/19  Organism: Enterococcus faecalis (01-05-24 @ 12:29)      Method Type: OTTO      -  Ampicillin: S <=2 Predicts results to ampicillin/sulbactam, amoxacillin-clavulanate and  piperacillin-tazobactam.      -  Vancomycin: S 2  Organism: Enterobacter cloacae complex (01-05-24 @ 12:29)      Method Type: ETEST      -  Meropenem: S 0.094      -  Ertapenem: S 0.38  Organism: Enterobacter cloacae complex (01-05-24 @ 12:29)      Method Type: OTTO      -  Ampicillin: R >16 These ampicillin results predict results for amoxicillin      -  Ampicillin/Sulbactam: R >16/8      -  Cefazolin: R >16      -  Cefepime: R 16      -  Ceftriaxone: R >32 Enterobacter cloacae, Klebsiella aerogenes, and Citrobacter freundii may develop resistance during prolonged therapy.      -  Ciprofloxacin: S <=0.25      -  Gentamicin: S <=2      -  Meropenem: S <=1      -  Meropenem/Vaborbactam: S <=2      -  Piperacillin/Tazobactam: R >64      -  Tobramycin: S <=2      -  Trimethoprim/Sulfamethoxazole: S <=0.5/9.5  Organism: Escherichia coli (01-05-24 @ 12:29)      Method Type: OTTO      -  Ampicillin: S <=8 These ampicillin results predict results for amoxicillin      -  Ampicillin/Sulbactam: S <=4/2      -  Cefazolin: S <=2      -  Ceftriaxone: S <=1      -  Ciprofloxacin: S <=0.25      -  Ertapenem: S <=0.5      -  Gentamicin: S <=2      -  Piperacillin/Tazobactam: S <=8      -  Tobramycin: S <=2      -  Trimethoprim/Sulfamethoxazole: S 1/19    Culture - Surgical Swab (collected 01-02-24 @ 18:03)  Source: .Surgical Swab left neck or spec  Gram Stain (01-02-24 @ 22:00):    Rare Gram Negative Rods    Rare Gram positive cocci in pairs    Moderate WBC's  Final Report (01-05-24 @ 12:42):    Moderate Escherichia coli    Moderate Enterobacter cloacae complex    Rare Candida parapsilosis    Rare Staphylococcus capitis  Organism: Enterobacter cloacae complex  Enterobacter cloacae complex  Escherichia coli  Escherichia coli (01-05-24 @ 12:16)  Organism: Escherichia coli (01-05-24 @ 12:16)      Method Type: OTTO      -  Ampicillin: S <=8 These ampicillin results predict results for amoxicillin      -  Ampicillin/Sulbactam: S <=4/2      -  Cefazolin: S <=2      -  Ceftriaxone: S <=1      -  Ciprofloxacin: S <=0.25      -  Ertapenem: S <=0.5      -  Gentamicin: S <=2      -  Piperacillin/Tazobactam: S <=8      -  Tobramycin: S <=2      -  Trimethoprim/Sulfamethoxazole: S 1/19  Organism: Escherichia coli (01-05-24 @ 12:16)      Method Type: OTTO      -  Ampicillin: S <=8 These ampicillin results predict results for amoxicillin      -  Ampicillin/Sulbactam: S <=4/2      -  Cefazolin: S <=2      -  Ceftriaxone: S <=1      -  Ciprofloxacin: S <=0.25      -  Ertapenem: S <=0.5      -  Gentamicin: S <=2      -  Piperacillin/Tazobactam: S <=8      -  Tobramycin: S <=2      -  Trimethoprim/Sulfamethoxazole: S 1/19  Organism: Enterobacter cloacae complex (01-05-24 @ 12:16)      Method Type: ETEST      -  Ertapenem: S 0.38      -  Meropenem: S 0.094  Organism: Enterobacter cloacae complex (01-05-24 @ 12:16)      Method Type: OTTO      -  Ampicillin: R >16 These ampicillin results predict results for amoxicillin      -  Ampicillin/Sulbactam: R >16/8      -  Cefazolin: R >16      -  Cefepime: R 16      -  Ceftriaxone: R >32 Enterobacter cloacae, Klebsiella aerogenes, and Citrobacter freundii may develop resistance during prolonged therapy.      -  Ciprofloxacin: S <=0.25      -  Gentamicin: S <=2      -  Meropenem: S <=1      -  Meropenem/Vaborbactam: S <=2      -  Piperacillin/Tazobactam: R >64      -  Tobramycin: S <=2      -  Trimethoprim/Sulfamethoxazole: S <=0.5/9.5    Culture - Blood (collected 12-28-23 @ 10:08)  Source: .Blood Blood-Peripheral  Final Report (01-02-24 @ 11:01):    No growth at 5 days.    Culture - Surgical Swab (collected 12-27-23 @ 20:46)  Source: .Surgical Swab 1. Left Oral Cavity  Gram Stain (12-27-23 @ 21:53):    Numerous Gram positive cocci in pairs, chains and clusters    Moderate Gram Positive Rods    Moderate Gram Negative Rods    Numerous White blood cells  Final Report (01-02-24 @ 10:25):    Moderate Escherichia coli    Moderate Enterobacter cloacae complex    Few Enterococcus faecalis    Few Streptococcus mitis/oralis group    Few Staphylococcus epidermidis    Few Streptococcus constellatus    Few Stenotrophomonas maltophilia    Mixed Anaerobic Joy including    Few Prevotella species (most closely resembles Prevotella barnaie)    Few Prevotella denticola    Culture grew 3 or more types of organisms which indicate collection    contamination; consider recollection only if clinically indicated.  Organism: Escherichia coli  Enterobacter cloacae complex  Enterococcus faecalis  Streptococcus constellatus  Streptococcus constellatus  Stenotrophomonas maltophilia  Stenotrophomonas maltophilia (01-02-24 @ 10:24)  Organism: Escherichia coli (01-02-24 @ 10:24)      Method Type: OTTO      -  Ampicillin: S <=8 These ampicillin results predict results for amoxicillin      -  Ampicillin/Sulbactam: S <=4/2      -  Cefazolin: S <=2      -  Ceftriaxone: S <=1      -  Ciprofloxacin: S <=0.25      -  Ertapenem: S <=0.5      -  Gentamicin: S <=2      -  Piperacillin/Tazobactam: S <=8      -  Tobramycin: S <=2      -  Trimethoprim/Sulfamethoxazole: S 1/19  Organism: Stenotrophomonas maltophilia (01-01-24 @ 14:56)      Method Type: KB      -  Minocycline: S  Organism: Stenotrophomonas maltophilia (01-01-24 @ 14:55)      Method Type: OTTO      -  Levofloxacin: S 1      -  Trimethoprim/Sulfamethoxazole: S <=0.5/9.5  Organism: Streptococcus constellatus (01-01-24 @ 14:55)      Method Type: ETEST      -  Ceftriaxone: S 0.094      -  Penicillin: S 0.047  Organism: Streptococcus constellatus (01-01-24 @ 14:55)      Method Type: KB  Organism: Enterobacter cloacae complex (01-01-24 @ 14:52)      Method Type: OTTO      -  Ampicillin: R >16 These ampicillin results predict results for amoxicillin      -  Ampicillin/Sulbactam: R >16/8      -  Cefazolin: R >16      -  Cefepime: SDD 8 The breakpoint for susceptible dose dependent may require the usage of a higher cefepime dose (equivalent to adult dose of 2g q8h for normal renal function) for successful treatment.      -  Ceftriaxone: R >32 Enterobacter cloacae, Klebsiella aerogenes, and Citrobacter freundii may develop resistance during prolonged therapy.      -  Ciprofloxacin: S <=0.25      -  Ertapenem: S <=0.5      -  Gentamicin: S <=2      -  Meropenem: S <=1      -  Piperacillin/Tazobactam: R 64      -  Tobramycin: S <=2      -  Trimethoprim/Sulfamethoxazole: S <=0.5/9.5      -  Cefoxitin: R >16  Organism: Enterococcus faecalis (12-30-23 @ 15:21)      Method Type: OTTO      -  Ampicillin: S <=2 Predicts results to ampicillin/sulbactam, amoxacillin-clavulanate and  piperacillin-tazobactam.      -  Vancomycin: S 4        RADIOLOGY & ADDITIONAL STUDIES: Reviewed.    ECG:    ECHO:    REASON FOR CONSULT: Co-management    INTERVAL/OVERNIGHT EVENTS: As per overnight staff, there were no acute events overnight    SUBJECTIVE HPI: Pt seen at bedside, trach capped with passy elio valve. No complaints, ROS otherwise negative      MEDICATIONS  (STANDING):  albuterol/ipratropium for Nebulization 3 milliLiter(s) Nebulizer every 6 hours  aspirin  chewable 81 milliGRAM(s) Oral daily  bacitracin   Ointment 1 Application(s) Topical every 12 hours  chlorhexidine 0.12% Liquid 15 milliLiter(s) Oral Mucosa two times a day  chlorhexidine 2% Cloths 1 Application(s) Topical daily  dextrose 5%. 1000 milliLiter(s) (100 mL/Hr) IV Continuous <Continuous>  dextrose 5%. 1000 milliLiter(s) (50 mL/Hr) IV Continuous <Continuous>  dextrose 50% Injectable 12.5 Gram(s) IV Push once  dextrose 50% Injectable 25 Gram(s) IV Push once  dextrose 50% Injectable 25 Gram(s) IV Push once  doxazosin 1 milliGRAM(s) Oral at bedtime  enoxaparin Injectable 40 milliGRAM(s) SubCutaneous every 24 hours  gabapentin Solution 300 milliGRAM(s) Oral every 12 hours  glucagon  Injectable 1 milliGRAM(s) IntraMuscular once  imipenem/cilastatin  IVPB 1000 milliGRAM(s) IV Intermittent every 8 hours  influenza  Vaccine (HIGH DOSE) 0.7 milliLiter(s) IntraMuscular once  insulin lispro (ADMELOG) corrective regimen sliding scale   SubCutaneous every 6 hours  multivitamin/minerals/iron Oral Solution (CENTRUM) 15 milliLiter(s) Oral daily  polyethylene glycol 3350 17 Gram(s) Oral daily  senna 1 Tablet(s) Oral daily    MEDICATIONS  (PRN):  dextrose Oral Gel 15 Gram(s) Oral once PRN Blood Glucose LESS THAN 70 milliGRAM(s)/deciliter  HYDROmorphone  Injectable 0.5 milliGRAM(s) IV Push every 3 hours PRN breakthrough pain  lidocaine   4% Patch 1 Patch Transdermal daily PRN back pain  ondansetron Injectable 4 milliGRAM(s) IV Push every 8 hours PRN Nausea and/or Vomiting  oxyCODONE    Solution 5 milliGRAM(s) Enteral Tube every 6 hours PRN Moderate Pain (4 - 6)  oxyCODONE    Solution 10 milliGRAM(s) Enteral Tube every 6 hours PRN Severe Pain (7 - 10)        VITAL SIGNS:  Vital Signs Last 24 Hrs  T(C): 36.6 (16 Jan 2024 09:00), Max: 36.9 (15 Carlos 2024 10:00)  T(F): 97.8 (16 Jan 2024 09:00), Max: 98.4 (15 Carlos 2024 10:00)  HR: 72 (16 Jan 2024 09:00) (62 - 78)  BP: 103/54 (16 Jan 2024 09:00) (98/57 - 126/56)  BP(mean): 72 (16 Jan 2024 09:00) (72 - 85)  RR: 18 (16 Jan 2024 09:00) (16 - 19)  SpO2: 100% (16 Jan 2024 09:00) (98% - 100%)    Parameters below as of 16 Jan 2024 09:00  Patient On (Oxygen Delivery Method): tracheostomy collar  O2 Flow (L/min): 10  O2 Concentration (%): 40    I&O's Detail    15 Carlos 2024 07:01  -  16 Jan 2024 07:00  --------------------------------------------------------  IN:    Enteral Tube Flush: 800 mL    IV PiggyBack: 250 mL    Vital1.5: 1011 mL  Total IN: 2061 mL    OUT:    Bulb (mL): 20 mL    Bulb (mL): 0 mL    Voided (mL): 1450 mL  Total OUT: 1470 mL    Total NET: 591 mL          PHYSICAL EXAM:  General: Comfortable, NAD  Neurological: AAOx3, no focal deficits  HEENT: trach in place, perfuse drooling, passy elio valve in place, L lateral aspect of neck with dressing in place, visible staples, no draining from incision  Cardiovascular: +S1/S2, no murmurs/rubs/gallops, RRR  Respiratory: CTA B/L, no diminished breath sounds, no wheezes/rales/rhonchi, no increased work of breathing or accessory muscle use  Gastrointestinal: soft, NT/ND; active BSx4 quadrants  Extremeties: RUE edema       LABS:                        9.7    10.38 )-----------( 138      ( 15 Carlos 2024 05:30 )             29.7     01-15    136  |  102  |  15  ----------------------------<  127<H>  3.8   |  27  |  0.54    Ca    8.9      15 Carlos 2024 05:30  Phos  2.7     01-15  Mg     1.9     01-15              BNP    Urinalysis Basic - ( 15 Carlos 2024 05:30 )    Color: x / Appearance: x / SG: x / pH: x  Gluc: 127 mg/dL / Ketone: x  / Bili: x / Urobili: x   Blood: x / Protein: x / Nitrite: x   Leuk Esterase: x / RBC: x / WBC x   Sq Epi: x / Non Sq Epi: x / Bacteria: x            Microbiology:    Culture - Tissue with Gram Stain (collected 01-02-24 @ 18:03)  Source: .Tissue left mandible tissue or spec  Gram Stain (01-02-24 @ 21:59):    Numerous Gram Negative Rods    Few Gram positive cocci in pairs    Few WBC's  Final Report (01-05-24 @ 12:29):    Numerous Escherichia coli    Numerous Enterobacter cloacae complex    Few Enterococcus faecalis    Rare Staphylococcus epidermidis  Organism: Escherichia coli  Enterobacter cloacae complex  Enterobacter cloacae complex  Enterococcus faecalis  Escherichia coli (01-05-24 @ 12:29)  Organism: Escherichia coli (01-05-24 @ 12:29)      Method Type: OTTO      -  Ampicillin: S <=8 These ampicillin results predict results for amoxicillin      -  Ampicillin/Sulbactam: S <=4/2      -  Cefazolin: S <=2      -  Ceftriaxone: S <=1      -  Ciprofloxacin: S <=0.25      -  Ertapenem: S <=0.5      -  Gentamicin: S <=2      -  Piperacillin/Tazobactam: S <=8      -  Tobramycin: S <=2      -  Trimethoprim/Sulfamethoxazole: S 1/19  Organism: Enterococcus faecalis (01-05-24 @ 12:29)      Method Type: OTTO      -  Ampicillin: S <=2 Predicts results to ampicillin/sulbactam, amoxacillin-clavulanate and  piperacillin-tazobactam.      -  Vancomycin: S 2  Organism: Enterobacter cloacae complex (01-05-24 @ 12:29)      Method Type: ETEST      -  Meropenem: S 0.094      -  Ertapenem: S 0.38  Organism: Enterobacter cloacae complex (01-05-24 @ 12:29)      Method Type: OTTO      -  Ampicillin: R >16 These ampicillin results predict results for amoxicillin      -  Ampicillin/Sulbactam: R >16/8      -  Cefazolin: R >16      -  Cefepime: R 16      -  Ceftriaxone: R >32 Enterobacter cloacae, Klebsiella aerogenes, and Citrobacter freundii may develop resistance during prolonged therapy.      -  Ciprofloxacin: S <=0.25      -  Gentamicin: S <=2      -  Meropenem: S <=1      -  Meropenem/Vaborbactam: S <=2      -  Piperacillin/Tazobactam: R >64      -  Tobramycin: S <=2      -  Trimethoprim/Sulfamethoxazole: S <=0.5/9.5  Organism: Escherichia coli (01-05-24 @ 12:29)      Method Type: OTTO      -  Ampicillin: S <=8 These ampicillin results predict results for amoxicillin      -  Ampicillin/Sulbactam: S <=4/2      -  Cefazolin: S <=2      -  Ceftriaxone: S <=1      -  Ciprofloxacin: S <=0.25      -  Ertapenem: S <=0.5      -  Gentamicin: S <=2      -  Piperacillin/Tazobactam: S <=8      -  Tobramycin: S <=2      -  Trimethoprim/Sulfamethoxazole: S 1/19    Culture - Surgical Swab (collected 01-02-24 @ 18:03)  Source: .Surgical Swab left neck or spec  Gram Stain (01-02-24 @ 22:00):    Rare Gram Negative Rods    Rare Gram positive cocci in pairs    Moderate WBC's  Final Report (01-05-24 @ 12:42):    Moderate Escherichia coli    Moderate Enterobacter cloacae complex    Rare Candida parapsilosis    Rare Staphylococcus capitis  Organism: Enterobacter cloacae complex  Enterobacter cloacae complex  Escherichia coli  Escherichia coli (01-05-24 @ 12:16)  Organism: Escherichia coli (01-05-24 @ 12:16)      Method Type: OTTO      -  Ampicillin: S <=8 These ampicillin results predict results for amoxicillin      -  Ampicillin/Sulbactam: S <=4/2      -  Cefazolin: S <=2      -  Ceftriaxone: S <=1      -  Ciprofloxacin: S <=0.25      -  Ertapenem: S <=0.5      -  Gentamicin: S <=2      -  Piperacillin/Tazobactam: S <=8      -  Tobramycin: S <=2      -  Trimethoprim/Sulfamethoxazole: S 1/19  Organism: Escherichia coli (01-05-24 @ 12:16)      Method Type: OTTO      -  Ampicillin: S <=8 These ampicillin results predict results for amoxicillin      -  Ampicillin/Sulbactam: S <=4/2      -  Cefazolin: S <=2      -  Ceftriaxone: S <=1      -  Ciprofloxacin: S <=0.25      -  Ertapenem: S <=0.5      -  Gentamicin: S <=2      -  Piperacillin/Tazobactam: S <=8      -  Tobramycin: S <=2      -  Trimethoprim/Sulfamethoxazole: S 1/19  Organism: Enterobacter cloacae complex (01-05-24 @ 12:16)      Method Type: ETEST      -  Ertapenem: S 0.38      -  Meropenem: S 0.094  Organism: Enterobacter cloacae complex (01-05-24 @ 12:16)      Method Type: OTTO      -  Ampicillin: R >16 These ampicillin results predict results for amoxicillin      -  Ampicillin/Sulbactam: R >16/8      -  Cefazolin: R >16      -  Cefepime: R 16      -  Ceftriaxone: R >32 Enterobacter cloacae, Klebsiella aerogenes, and Citrobacter freundii may develop resistance during prolonged therapy.      -  Ciprofloxacin: S <=0.25      -  Gentamicin: S <=2      -  Meropenem: S <=1      -  Meropenem/Vaborbactam: S <=2      -  Piperacillin/Tazobactam: R >64      -  Tobramycin: S <=2      -  Trimethoprim/Sulfamethoxazole: S <=0.5/9.5    Culture - Blood (collected 12-28-23 @ 10:08)  Source: .Blood Blood-Peripheral  Final Report (01-02-24 @ 11:01):    No growth at 5 days.    Culture - Surgical Swab (collected 12-27-23 @ 20:46)  Source: .Surgical Swab 1. Left Oral Cavity  Gram Stain (12-27-23 @ 21:53):    Numerous Gram positive cocci in pairs, chains and clusters    Moderate Gram Positive Rods    Moderate Gram Negative Rods    Numerous White blood cells  Final Report (01-02-24 @ 10:25):    Moderate Escherichia coli    Moderate Enterobacter cloacae complex    Few Enterococcus faecalis    Few Streptococcus mitis/oralis group    Few Staphylococcus epidermidis    Few Streptococcus constellatus    Few Stenotrophomonas maltophilia    Mixed Anaerobic Joy including    Few Prevotella species (most closely resembles Prevotella barnaie)    Few Prevotella denticola    Culture grew 3 or more types of organisms which indicate collection    contamination; consider recollection only if clinically indicated.  Organism: Escherichia coli  Enterobacter cloacae complex  Enterococcus faecalis  Streptococcus constellatus  Streptococcus constellatus  Stenotrophomonas maltophilia  Stenotrophomonas maltophilia (01-02-24 @ 10:24)  Organism: Escherichia coli (01-02-24 @ 10:24)      Method Type: OTTO      -  Ampicillin: S <=8 These ampicillin results predict results for amoxicillin      -  Ampicillin/Sulbactam: S <=4/2      -  Cefazolin: S <=2      -  Ceftriaxone: S <=1      -  Ciprofloxacin: S <=0.25      -  Ertapenem: S <=0.5      -  Gentamicin: S <=2      -  Piperacillin/Tazobactam: S <=8      -  Tobramycin: S <=2      -  Trimethoprim/Sulfamethoxazole: S 1/19  Organism: Stenotrophomonas maltophilia (01-01-24 @ 14:56)      Method Type: KB      -  Minocycline: S  Organism: Stenotrophomonas maltophilia (01-01-24 @ 14:55)      Method Type: OTTO      -  Levofloxacin: S 1      -  Trimethoprim/Sulfamethoxazole: S <=0.5/9.5  Organism: Streptococcus constellatus (01-01-24 @ 14:55)      Method Type: ETEST      -  Ceftriaxone: S 0.094      -  Penicillin: S 0.047  Organism: Streptococcus constellatus (01-01-24 @ 14:55)      Method Type: KB  Organism: Enterobacter cloacae complex (01-01-24 @ 14:52)      Method Type: OTTO      -  Ampicillin: R >16 These ampicillin results predict results for amoxicillin      -  Ampicillin/Sulbactam: R >16/8      -  Cefazolin: R >16      -  Cefepime: SDD 8 The breakpoint for susceptible dose dependent may require the usage of a higher cefepime dose (equivalent to adult dose of 2g q8h for normal renal function) for successful treatment.      -  Ceftriaxone: R >32 Enterobacter cloacae, Klebsiella aerogenes, and Citrobacter freundii may develop resistance during prolonged therapy.      -  Ciprofloxacin: S <=0.25      -  Ertapenem: S <=0.5      -  Gentamicin: S <=2      -  Meropenem: S <=1      -  Piperacillin/Tazobactam: R 64      -  Tobramycin: S <=2      -  Trimethoprim/Sulfamethoxazole: S <=0.5/9.5      -  Cefoxitin: R >16  Organism: Enterococcus faecalis (12-30-23 @ 15:21)      Method Type: OTTO      -  Ampicillin: S <=2 Predicts results to ampicillin/sulbactam, amoxacillin-clavulanate and  piperacillin-tazobactam.      -  Vancomycin: S 4        RADIOLOGY & ADDITIONAL STUDIES: Reviewed.    ECG:    ECHO:

## 2024-01-18 NOTE — PROGRESS NOTE ADULT - SUBJECTIVE AND OBJECTIVE BOX
OTOLARYNGOLOGY (ENT) PROGRESS NOTE    PATIENT: SAUNDRA HOLMAN  MRN: [id number]  : 57  [id number]  DATE OF SERVICE:  24  			                    ID: SAUNDRA HOLMAN is a 69yo M w PMH of CAD (x2 stents Mar 2023), HLD, T2DM, hx of kidney stones, psoriasis who presented with an oral mass and underwent L hemimandibulectomy, SND L level 1-3, recon with L FFF, STSG, and placement of dental implants on . He had a trach which was subsequently decannulated. He returns  with surgical site infection now s/p OR for debridement and exploration. Trach replaced through prior stoma for pulmonary toilet.     Subjective/ Interval:   ; Patient seen this morning, oral pack to be changed, penrose dressing changed. Intraoral flap with partal skin necrosis, 7.5 portex in place with cuff deflated   : Patient seen and examined at bedside. NAEO. Patient remains afebrile and HD stable. HgB noted to drop to 7.6 from 9.2 but no additional episodes of melena. Penrose draining purulent material. Intra-oral infection/flap stable. Packing changed at bedside. No other complaints or changes at this time. ID recommended Vanc/zosyn and DC unasyn and flagyl, and IM recommended reticulocyte studies and adding folate supplements. No other changes at this time.   : AFVSS. No acute events overnight. Patient seen and examined at bedside. C/w Vanc/Zosyn per ID recs, pending sensitivities. Growing staph epi, E coli, enterobacter from wound cx on . S/p 2U PRBC yday w appropriate response in Hgb. Transfusion goal > 9.0.  : AFVSS. No acute events overnight. Patient seen and examined at bedside. C/w Vanc/Zosyn per ID recs, pending sens. Hgb stable this morning.  : AFVSS. No acute events overnight. Patient seen and examined at bedside. C/w Vanc/Zosyn, e faecalis sens to vanc/zosyn. Had 3 dark BM's yesterday that were stool guiac positive.  : AFVSS. No acute events overnight. Patient seen and examined at bedside. C/w Imipenem (changed per ID, enterobacter resistant to Zosyn). Plan for OR today for further washout / debridement.  1/3: AFVSS. No acute events overnight. Patient seen and examined at bedside. C/w imipenim. OR cx growing numerous gram negative rods and few gram positive cocci in pairs.  : Patient seen bedside, changed intraoral and neck packing,  Continue to follow cultures, pain controlled   : patient seen this morning, complains of right arm pain wit minimal swelling,  IV in the left arm,  Continue abx for 2 weeks. neck and oral packing changed, purulent drainage from neck noted.   : Patient seen at bedside during morning rounds. Reports right arm pain and swelling somewhat improved although still present. IV replaced in right arm yesterday. Remains on IV abx, tentative end date 1/15/24. Neck and oral packing changed at bedside; purulent drainage noted from neck, decreased in volume compared to prior exams.  : Patient seen at bedside during morning rounds. Overnight, patient complained of coughing, throat discomfort, and nursing reported some increased secretions via trach. Started on mucomyst with improvement. Noted 6 beats of PSVT at ~1900, no reported chest pain or other symptoms. No further episodes. Packing replaced at bedside   : patient seen this morning, neck and oral packing changed. Continues to have purulent drainage form neck.  No chest pain or SOB,               : patient seen this morning, changed neck and oral pack, patient tolerated packing change better with adjusted premedication,  purulence continues from neck , trach in place, fibula donor site with granulation tissue ( changed dressing)   1/10: AFVSS. Patient seen and evaluated this am. Neck and oral packing changed. Some purulence from neck. Trach in place.  : AFVSS. Patient taken to OR for washout, L latissmus dorsi flap to L oral cavity/oropharynx.  : AFVSS. Patient seen and evaluated this am. Pt doing well, minimal output from neck opening. KRISTEN drains in place. Flap healthy appearing. Failed TOV yesterday.  : AFVSS. Patient seen and evaluated this am. Pt in chair, flap looks healthy. Minimal output from neck opening. Donor site healing appropriately.   : AFVSS. Patient seen and evaluated this am. Flap appears healthy. Trach in place. Neck dressing changed, no purulence draining from opening. Donor site healing appropriately.  1/15: AFVSS. Patient seen and evaluated this am. Flap is healthy appearing. Trach in place. Neck dressing changed. Donor site healing appropriately.  : AFVSS. No acute events overnight. Patient seen and examined at bedside. Flap healthy appearing, neck soft and flat. Strong doppler signal.  : Patient seen bedside,  doppler signal strong, neck soft and flat, pain controlled, intraoral flap intact,  plan to cap trach today   : Patient seen bedside, will remove doppler today, upper lat kristen with 3 cc overnight - will remove,  pain controlled, will remove staples today       ALLERGIES:  No Known Allergies      MEDICATIONS:  Antiinfectives:     IV fluids:  dextrose 5%. 1000 milliLiter(s) IV Continuous <Continuous>  dextrose 5%. 1000 milliLiter(s) IV Continuous <Continuous>  multivitamin/minerals/iron Oral Solution (CENTRUM) 15 milliLiter(s) Oral daily    Hematologic/Anticoagulation:  aspirin  chewable 81 milliGRAM(s) Oral daily  clopidogrel Tablet 75 milliGRAM(s) Oral daily  enoxaparin Injectable 40 milliGRAM(s) SubCutaneous every 24 hours    Pain medications/Neuro:  gabapentin Solution 300 milliGRAM(s) Oral every 12 hours  HYDROmorphone  Injectable 0.5 milliGRAM(s) IV Push every 3 hours PRN  ondansetron Injectable 4 milliGRAM(s) IV Push every 8 hours PRN  oxyCODONE    Solution 10 milliGRAM(s) Enteral Tube every 6 hours PRN  oxyCODONE    Solution 5 milliGRAM(s) Enteral Tube every 6 hours PRN  traZODone 50 milliGRAM(s) Oral at bedtime    Endocrine Medications:   dextrose 50% Injectable 25 Gram(s) IV Push once  dextrose 50% Injectable 25 Gram(s) IV Push once  dextrose 50% Injectable 12.5 Gram(s) IV Push once  dextrose Oral Gel 15 Gram(s) Oral once PRN  glucagon  Injectable 1 milliGRAM(s) IntraMuscular once  insulin lispro (ADMELOG) corrective regimen sliding scale   SubCutaneous every 6 hours    All other standing medications:   albuterol/ipratropium for Nebulization 3 milliLiter(s) Nebulizer every 6 hours  bacitracin   Ointment 1 Application(s) Topical every 12 hours  chlorhexidine 0.12% Liquid 15 milliLiter(s) Oral Mucosa two times a day  chlorhexidine 2% Cloths 1 Application(s) Topical daily  doxazosin 1 milliGRAM(s) Oral at bedtime  influenza  Vaccine (HIGH DOSE) 0.7 milliLiter(s) IntraMuscular once  polyethylene glycol 3350 17 Gram(s) Oral daily  senna 1 Tablet(s) Oral daily    All other PRN medications:  lidocaine   4% Patch 1 Patch Transdermal daily PRN    Vital Signs Last 24 Hrs  T(C): 36.3 (2024 05:45), Max: 37.2 (2024 21:54)  T(F): 97.4 (2024 05:45), Max: 98.9 (2024 21:54)  HR: 66 (2024 04:18) (66 - 100)  BP: 109/54 (2024 04:18) (103/58 - 128/63)  BP(mean): 77 (2024 04:18) (74 - 90)  RR: 16 (2024 04:18) (16 - 18)  SpO2: 99% (2024 04:18) (96% - 99%)    Parameters below as of 2024 04:18  Patient On (Oxygen Delivery Method): tracheostomy collar  O2 Flow (L/min): 10  O2 Concentration (%): 40       @ 07:01  -   @ 07:00  --------------------------------------------------------  IN:    Enteral Tube Flush: 500 mL    Vital1.5: 337 mL  Total IN: 837 mL    OUT:    Bulb (mL): 10 mL    Bulb (mL): 17 mL    Oral Fluid: 0 mL    Voided (mL): 1275 mL  Total OUT: 1302 mL    Total NET: -465 mL       @ 07:01  -   @ 07:42  --------------------------------------------------------  IN:  Total IN: 0 mL    OUT:    Voided (mL): 250 mL  Total OUT: 250 mL    Total NET: -250 mL          24 @ 07:01  -  24 @ 07:00  --------------------------------------------------------  IN:  Total IN: 0 mL    OUT:    Bulb (mL): 10 mL    Bulb (mL): 17 mL  Total OUT: 27 mL    Total NET: -27 mL      PHYSICAL EXAM:  GEN: appears stated age  NEURO: alert & oriented x   HEENT: flap with good color and doppler signal, neck with no purulence noted, trach in place, both KRISTEN drains in place with ss fluid - will remove upper KRISTEN drain   CVS: regular rate and rhythm  Pulm: normal respiratory excursions, not tachypneic, no labored breathing  Abd: non-distended  Ext: moving all four extremities, no peripheral edema noted. Latissmus donor site well healing- will remove today          LABS                       10.7   6.27  )-----------( 180      ( 2024 10:19 )             32.8        140  |  105  |  16  ----------------------------<  116<H>  4.3   |  26  |  0.53    Ca    8.8      2024 10:19  Phos  2.7       Mg     1.9              Urinalysis Basic - ( 2024 10:19 )    Color: x / Appearance: x / SG: x / pH: x  Gluc: 116 mg/dL / Ketone: x  / Bili: x / Urobili: x   Blood: x / Protein: x / Nitrite: x   Leuk Esterase: x / RBC: x / WBC x   Sq Epi: x / Non Sq Epi: x / Bacteria: x      Endocrine Panel-  Calcium: 8.8 mg/dL ( @ 10:19)

## 2024-01-18 NOTE — PROGRESS NOTE ADULT - ATTENDING COMMENTS
67 y/o M PMHx of CAD s/p PCI/CUBA x2 to LAD and Ramus (Mar 2023), T2DM, psoriasis, hx Renal calculi, w/ [id number] SCC of L buccal mucosa s/p hemimandibulectomy, left level 1, 2a, 3 neck neck dissection, L FFF, tracheostomy w/ 7.5 cuffed portex, dental implants, STSG (12/7/23), s/p G tube placement and subsequent trach decannulation and discharged home on ( 12/22/23). Pt however returned to [facility] ( 12/27/23) with surgical site infection, s/p RTOR 12/27 with findings of skin flap necrosis and s/p debridement. Trach replaced through prior stoma for pulmonary toilet. Patient also noted to have dark stools and dropped in Hgb- wife reported black stool for the last 3 days and same thing coming out from peg tube 12/28- anemia required blood transfusion -    CC: Pt seen w. Dr. Montes, Wife at bedside. Pt sitting at the edge of bed, in good spirits. no complaints. Trach capped, vocalizing.  feeling well  RRR, moving good air.  mild edema on left arm.  no edema noted on lower ext.  neuro -non focal.    # Skin flap necrosis s/p exploration and debridement ( 12/27)   # RTOR for wound debridement and EGD( 1/2)   OR findings  (1/2): infected, non-viable free fibula flap with viable skin paddle. Purulent drainage appreciated at margins of flap and in neck.  EGD( 1/2): ulcer found at the G-tube site  # RTOR- flat change with Latissmus dorsi- close monitoring in sicu, now on step down  wound care as per ENT.   - ID f/u appreciated ( Team 1) ,  d/c's Vanco & Zosyn ( 1/1) and completed Imipenem 1gm iv q8h  x 2 wks since washout 1/2 (1/1-1/16)  - f/u OR culture ( 1/2): reviewed   - OR cultures with E.coli, ECC, E.faecalis S.mitis/oralis, S.epi, S.constellatus, S.maltophilia, mixed anaerobes.   - culture result reviewed.  - BCx( 12/28): ngtd     # Acute blood loss anemia/melena -Hb stable-  # hx WADE ( Ferritin 768 but Tsat 13% ( <20%) on 12/13/23)   - GI fu appreciated, EGD( 1/2) ulcer at G-tube site, rec; Protonix 40mg daily for 8 weeks and avoid bumper being too tight to cause pressure ulcer   - keep active T&S, keep Hgb>8  -stable above 10 (1/17)  - c/w ASA given hx PCI x2 ( March 2023)  - resumed Plavix 75mg po daily ( 1/17-)   - c/w PPI daily can be via G-tube   - c/w Folic acid 1mg daily   - monitor clinically for any further melena     # SLP evaluation appreciated. diet per ENT.    # CAD sp PCI/CUBA x2 to LAD and Ramus in March 2023 as per cards is in setting of NSTEMI - prefer DAPT, at least monotherapy with ASA if plavix need to be held for procedure, currently on ASA , and restarted plavix 75mg daily and Atorvastatin 40mg qHS     #  DM - FS with ISS, would hold all ora-hypoglycemic agent, goal -180 -within goal.     # pain management - oxycodone 5mg/10mg prn , dilaudid prn, would need stool softener to ensure daily BM.     # Dysphagia- NPO, trach, on TF with Vital 1.5 via G-tube     # DVT ppx: Lovenox 40mg SQ daily     Med consult team continues to follow. Thanks

## 2024-01-19 ENCOUNTER — TRANSCRIPTION ENCOUNTER (OUTPATIENT)
Age: 69
End: 2024-01-19

## 2024-01-19 ENCOUNTER — TELEPHONE (OUTPATIENT)
Dept: HEMATOLOGY ONCOLOGY | Facility: CLINIC | Age: 69
End: 2024-01-19

## 2024-01-19 LAB
GLUCOSE BLDC GLUCOMTR-MCNC: 100 MG/DL — HIGH (ref 70–99)
GLUCOSE BLDC GLUCOMTR-MCNC: 107 MG/DL — HIGH (ref 70–99)
GLUCOSE BLDC GLUCOMTR-MCNC: 114 MG/DL — HIGH (ref 70–99)

## 2024-01-19 PROCEDURE — 99233 SBSQ HOSP IP/OBS HIGH 50: CPT | Mod: GC

## 2024-01-19 RX ORDER — BACITRACIN ZINC 500 UNIT/G
1 OINTMENT IN PACKET (EA) TOPICAL
Qty: 1 | Refills: 0
Start: 2024-01-19 | End: 2024-01-25

## 2024-01-19 RX ORDER — TRAZODONE HCL 50 MG
1 TABLET ORAL
Qty: 30 | Refills: 0
Start: 2024-01-19 | End: 2024-02-17

## 2024-01-19 RX ORDER — OXYCODONE HYDROCHLORIDE 5 MG/1
5 TABLET ORAL
Qty: 60 | Refills: 0
Start: 2024-01-19 | End: 2024-01-21

## 2024-01-19 RX ORDER — CHLORHEXIDINE GLUCONATE 213 G/1000ML
15 SOLUTION TOPICAL
Qty: 420 | Refills: 0
Start: 2024-01-19 | End: 2024-02-01

## 2024-01-19 RX ORDER — MULTIVIT-MIN/FERROUS GLUCONATE 9 MG/15 ML
1 LIQUID (ML) ORAL
Qty: 0 | Refills: 0 | DISCHARGE
Start: 2024-01-19

## 2024-01-19 RX ADMIN — CHLORHEXIDINE GLUCONATE 15 MILLILITER(S): 213 SOLUTION TOPICAL at 06:22

## 2024-01-19 RX ADMIN — OXYCODONE HYDROCHLORIDE 5 MILLIGRAM(S): 5 TABLET ORAL at 07:24

## 2024-01-19 RX ADMIN — Medication 81 MILLIGRAM(S): at 11:41

## 2024-01-19 RX ADMIN — Medication 3 MILLILITER(S): at 22:01

## 2024-01-19 RX ADMIN — Medication 50 MILLIGRAM(S): at 22:01

## 2024-01-19 RX ADMIN — GABAPENTIN 300 MILLIGRAM(S): 400 CAPSULE ORAL at 17:23

## 2024-01-19 RX ADMIN — Medication 3 MILLILITER(S): at 15:27

## 2024-01-19 RX ADMIN — Medication 3 MILLILITER(S): at 09:41

## 2024-01-19 RX ADMIN — ENOXAPARIN SODIUM 40 MILLIGRAM(S): 100 INJECTION SUBCUTANEOUS at 10:23

## 2024-01-19 RX ADMIN — CHLORHEXIDINE GLUCONATE 15 MILLILITER(S): 213 SOLUTION TOPICAL at 17:23

## 2024-01-19 RX ADMIN — Medication 15 MILLILITER(S): at 12:09

## 2024-01-19 RX ADMIN — CLOPIDOGREL BISULFATE 75 MILLIGRAM(S): 75 TABLET, FILM COATED ORAL at 11:41

## 2024-01-19 RX ADMIN — GABAPENTIN 300 MILLIGRAM(S): 400 CAPSULE ORAL at 06:22

## 2024-01-19 RX ADMIN — Medication 3 MILLILITER(S): at 06:59

## 2024-01-19 RX ADMIN — Medication 1 MILLIGRAM(S): at 22:01

## 2024-01-19 RX ADMIN — Medication 1 APPLICATION(S): at 06:21

## 2024-01-19 RX ADMIN — OXYCODONE HYDROCHLORIDE 5 MILLIGRAM(S): 5 TABLET ORAL at 07:38

## 2024-01-19 RX ADMIN — Medication 3 MILLILITER(S): at 10:23

## 2024-01-19 RX ADMIN — Medication 1 APPLICATION(S): at 17:24

## 2024-01-19 NOTE — DISCHARGE NOTE PROVIDER - NSDCMRMEDTOKEN_GEN_ALL_CORE_FT
acetaminophen 650 mg oral tablet: 1 tab(s) orally every 6 hours as needed for  pain  amoxicillin 400 mg/5 mL oral liquid: 6 milliliter(s) orally 2 times a day  aspirin 81 mg oral tablet: 1 tab(s) orally once a day (in the morning)  carbamide peroxide 6.5% otic solution: 1 drop(s) to each affected ear 2 times a day  chlorhexidine 0.12% mucous membrane liquid: 15 milliliter(s) mucous membrane 2 times a day  Colace 100 mg oral capsule: 1 cap(s) orally once a day as needed for  constipation  ibuprofen 400 mg oral tablet: 1 tab(s) orally every 6 hours as needed for  pain  Ilumya 100 mg/mL subcutaneous solution: 100 milligram(s) subcutaneously every 3 months  Jardiance 10 mg oral tablet: 1 tab(s) orally once a day (in the morning)  lidocaine 2% mucous membrane solution: Apply topically to affected area every 2 hours as needed for  pain  Lipitor 40 mg oral tablet: 1 tab(s) orally once a day (in the evening)  metFORMIN 1000 mg oral tablet: 1 tab(s) orally once a day (in the morning)  metFORMIN 500 mg oral tablet: 1 tab(s) orally once a day (in the evening)  nitroglycerin 0.4 mg sublingual tablet: 1 tab(s) sublingually every 5 minutes as needed for  chest pain  oxyCODONE 5 mg/5 mL oral solution: 5 milliliter(s) orally every 6 hours as needed for  severe pain MDD: 20  Plavix 75 mg oral tablet: 1 tab(s) orally once a day (at bedtime)  potassium citrate compounding powder: 10 milliequivalent(s) compounding 3 times a day   acetaminophen 650 mg oral tablet: 1 tab(s) orally every 6 hours as needed for  pain  aspirin 81 mg oral tablet: 1 tab(s) orally once a day (in the morning)  bacitracin 500 units/g topical ointment: Apply topically to affected area once a day 1 Apply topically to affected area every 12 hours  carbamide peroxide 6.5% otic solution: 1 drop(s) to each affected ear 2 times a day  chlorhexidine 0.12% mucous membrane liquid: 15 milliliter(s) mucous membrane 2 times a day  Colace 100 mg oral capsule: 1 cap(s) orally once a day as needed for  constipation  ibuprofen 400 mg oral tablet: 1 tab(s) orally every 6 hours as needed for  pain  Ilumya 100 mg/mL subcutaneous solution: 100 milligram(s) subcutaneously every 3 months  Jardiance 10 mg oral tablet: 1 tab(s) orally once a day (in the morning)  lidocaine 2% mucous membrane solution: Apply topically to affected area every 2 hours as needed for  pain  Lipitor 40 mg oral tablet: 1 tab(s) orally once a day (in the evening)  metFORMIN 1000 mg oral tablet: 1 tab(s) orally once a day (in the morning)  metFORMIN 500 mg oral tablet: 1 tab(s) orally once a day (in the evening)  Multiple Vitamins with Minerals oral liquid: 1 application orally once a day  nitroglycerin 0.4 mg sublingual tablet: 1 tab(s) sublingually every 5 minutes as needed for  chest pain  oxyCODONE 5 mg/5 mL oral solution: 5 milliliter(s) orally every 6 hours as needed for  severe pain MDD: 20  pantoprazole 4 mg/mL oral suspension: 10 milliliter(s) orally once a day Via PEG tube  Plavix 75 mg oral tablet: 1 tab(s) orally once a day (at bedtime)  potassium citrate compounding powder: 10 milliequivalent(s) compounding 3 times a day  traZODone 50 mg oral tablet: 1 tab(s) orally once a day (at bedtime) PLease follow up with PCP for refill   acetaminophen 650 mg oral tablet: 1 tab(s) orally every 6 hours as needed for  pain  aspirin 81 mg oral tablet: 1 tab(s) orally once a day (in the morning)  bacitracin 500 units/g topical ointment: Apply topically to affected area once a day 1 Apply topically to affected area every 12 hours  carbamide peroxide 6.5% otic solution: 1 drop(s) to each affected ear 2 times a day  chlorhexidine 0.12% mucous membrane liquid: 15 milliliter(s) mucous membrane 2 times a day  Colace 100 mg oral capsule: 1 cap(s) orally once a day as needed for  constipation  DEKAs Essential oral solution: 1 milliliter(s) by gastrostomy tube once a day  ibuprofen 400 mg oral tablet: 1 tab(s) orally every 6 hours as needed for  pain  Ilumya 100 mg/mL subcutaneous solution: 100 milligram(s) subcutaneously every 3 months  Jardiance 10 mg oral tablet: 1 tab(s) orally once a day (in the morning)  lidocaine 2% mucous membrane solution: Apply topically to affected area every 2 hours as needed for  pain  Lipitor 40 mg oral tablet: 1 tab(s) orally once a day (in the evening)  metFORMIN 1000 mg oral tablet: 1 tab(s) orally once a day (in the morning)  metFORMIN 500 mg oral tablet: 1 tab(s) orally once a day (in the evening)  Multiple Vitamins with Minerals oral liquid: 1 application orally once a day  nitroglycerin 0.4 mg sublingual tablet: 1 tab(s) sublingually every 5 minutes as needed for  chest pain  oxyCODONE 5 mg/5 mL oral solution: 5 milliliter(s) orally every 6 hours as needed for  severe pain MDD: 20  pantoprazole 4 mg/mL oral suspension: 10 milliliter(s) orally once a day Via PEG tube  Plavix 75 mg oral tablet: 1 tab(s) orally once a day (at bedtime)  potassium citrate compounding powder: 10 milliequivalent(s) compounding 3 times a day  traZODone 50 mg oral tablet: 1 tab(s) orally once a day (at bedtime) PLease follow up with PCP for refill

## 2024-01-19 NOTE — DISCHARGE NOTE PROVIDER - INSTRUCTIONS
PLease continue 6 cans of vital 1.5 a day - bolus as tolerated  Please continue 6 cans of vital 1.5 a day - bolus as tolerated

## 2024-01-19 NOTE — PROGRESS NOTE ADULT - ASSESSMENT
Past medical Hx of CAD (x 2 stents Mar 2023), Type 2 DM, hx of kidney stones, psoriasis who presented with an oral mass during previous admission (12/3-21) and underwent L hemimandibulectomy, SND L level 1-3, recon with L FFF, STSG, and placement of dental implants on 12/7. Patient had a trach which was subsequently decannulated. Patient now returns with surgical site infection, now s/p OR for washout, L latissmus dorsi flap to L oral cavity/oropharynx. Medicine consulted for co-management.    Recommendations:    #Surgical site infection  Patient s/p OR for debridement and exploration (12/27 and 1/2) and Trach replaced through prior stoma for pulmonary toilet. OR cultures with E. Coli, ECC, E. Faecalis S. Mitis S. Oralis, S. Epidermidis, S. Constellatus, S. Maltophilia, mixed anaerobes. Now s/p OR for washout, L latissmus dorsi flap to L oral cavity/oropharynx 1/11  - c/w Imipenem 1 gram IV q8hrs x 14 day course completed  - Pain regimen: Tylenol 650 mg PO q6, Oxy IR 5 mg Q4 moderate, Oxy IR 10 mg Q4 severe, Dilaudid 1 mg IV Q6 for breakthrough     #Anemia  Hb on admission 7.3. Now stable Hb 9.2 s/p karlo-operative transfusion. Patient with previous iron studies consistent with WADE. DD includes surgical site bleed VS less likely GI bleed. Patient now s/p EGD in OR (1/3).   - maintain active T&S, transfusion goal to Hgb >8   - c/w Folic acid 1g PO QD  - c/w Protonix 40 mg PO via G-tube  - keep active T&S, keep Hgb>8      #CAD  Patient with hx of CAD s/p 2 stents in March 2023.   - c/w ASA 81 mg PO QD, Atorvastatin 40 mg PO QD  - Cont Plavix 75mg PO qd    #DM type 2  Home medication: Metformin 1g PO QD and 500 mg PO QD and Jardiance 10 mg PO QD.  - c/w insulin sliding scale    Plan discussed with Dr. Mitchell Montero, note not complete until attested by attending           Past medical Hx of CAD (x 2 stents Mar 2023), Type 2 DM, hx of kidney stones, psoriasis who presented with an oral mass during previous admission (12/3-21) and underwent L hemimandibulectomy, SND L level 1-3, recon with L FFF, STSG, and placement of dental implants on 12/7. Patient had a trach which was subsequently decannulated. Patient now returns with surgical site infection, now s/p OR for washout, L latissmus dorsi flap to L oral cavity/oropharynx. Medicine consulted for co-management.    Recommendations:    #Surgical site infection  #L latissmus dorsi flap  Patient s/p OR for debridement and exploration (12/27 and 1/2) and Trach replaced through prior stoma for pulmonary toilet. OR cultures with E. Coli, ECC, E. Faecalis S. Mitis S. Oralis, S. Epidermidis, S. Constellatus, S. Maltophilia, mixed anaerobes. Now s/p OR for washout, L latissmus dorsi flap to L oral cavity/oropharynx 1/11  - c/w Imipenem 1 gram IV q8hrs x 14 day course completed  - Pain regimen: Tylenol 650 mg PO q6, Oxy IR 5 mg Q4 moderate, Oxy IR 10 mg Q4 severe    #Anemia    Hb on admission 7.3. Now stable Hb 9.2 s/p karlo-operative transfusion. Patient with previous iron studies consistent with WADE. DD includes surgical site bleed VS less likely GI bleed. Patient now s/p EGD in OR (1/3).   - maintain active T&S, transfusion goal to Hgb >8   - c/w Folic acid 1g PO QD  - c/w Protonix 40 mg PO via G-tube  - keep active T&S, keep Hgb>8      #CAD  Patient with hx of CAD s/p 2 stents in March 2023.   - c/w ASA 81 mg PO QD, Atorvastatin 40 mg PO QD  - Cont Plavix 75mg PO qd    #DM type 2  Home medication: Metformin 1g PO QD and 500 mg PO QD and Jardiance 10 mg PO QD.  - c/w insulin sliding scale    Plan discussed with Dr. Mitchell Montero, note not complete until attested by attending

## 2024-01-19 NOTE — DISCHARGE NOTE PROVIDER - CARE PROVIDER_API CALL
Freddy Gandhi.  Otolaryngology  [address] [place] [address] - Dept [place] [address]  Phone: [phone]  Fax: [phone]  Follow Up Time:

## 2024-01-19 NOTE — PROGRESS NOTE ADULT - ASSESSMENT
Assessment and Plan:    SAUNDRA HOLMAN is a 67yo M w PMH of CAD (x2 stents Mar 2023), HLD, T2DM, hx of kidney stones, psoriasis who presented with an oral mass and underwent L hemimandibulectomy, SND L level 1-3, recon with L FFF, STSG, and placement of dental implants on 12/7. He had a trach which was subsequently decannulated. Now s/p OR for debridement and exploration. Trach replaced through prior stoma for pulmonary toilet. Pt with reported intermittent tremors 1/7, electrolytes normal.  Plan to DC home tomorrow     Plan:  - SLP continue sips of water   - Hgb goal > 9.0   - C/w TF  - Maintain 7.5 portex for pulm toilet  - ISS  - Pain control  - Home meds  - Bowel regimen    [facility] at [phone] with any questions/concerns.    Ada Quinones PA-C  01-19-24 @ 08:28

## 2024-01-19 NOTE — DISCHARGE NOTE PROVIDER - NSDCFUADDINST_GEN_ALL_CORE_FT
Wound care:   Please put xeroform over left neck daily   Please put a thin layer of baci over latissimus incisions for 1 week   - Please continue trach care-  and change gauze/foam under trach daily     ENT Discharge Instructions    ENT follow up appointment:  - please call the office to confirm appointment: [phone]  to get remaining staples removed     *Please call your doctor or nurse practitioner if you have increased pain, swelling, redness, or drainage from the incision site.  *You may shower, and wash surgical incisions with a mild soap and warm water. Gently pat the area dry.  *If you have steri-strips, they will fall off on their own. Please remove any remaining strips 7-10 days after surgery.    Activity:  - fatigue is common after surgery, rest if you feel tired   -Please get plenty of rest, continue to ambulate several times per day, and drink adequate amounts of fluids.   -Avoid lifting weights greater than 5-10 lbs until you follow-up with your surgeon, who will instruct you further regarding activity restrictions.  -Avoid driving or operating heavy machinery while taking pain medications.  - Walking is recommended, ambulate as tolerated      Pain Expectations:  - pain after surgery is expected  - please take pain meds as prescribed     Medications: Please resume all regular home medications unless specifically advised not to take a particular medication. Also, please take any new medications as prescribed.   - pain medications can cause constipation, you should eat a high fiber diet and may take a stool softener while on pain meds       Warning Signs:  Please call your doctor or nurse practitioner if you experience the following:  *You experience new chest pain, pressure, squeezing or tightness.  *New or worsening cough, shortness of breath, or wheeze.  *If you are vomiting and cannot keep down fluids or your medications.  *You are getting dehydrated due to continued vomiting, diarrhea, or other reasons. Signs of dehydration include dry mouth, rapid heartbeat, or feeling dizzy or faint when standing.  *Your pain is not improving within 8-12 hours or is not gone within 24 hours.  *You have shaking chills, or fever greater than 101.5 degrees Fahrenheit or 38 degrees Celsius.  *Any change in your symptoms, or any new symptoms that concern you.     PLEASE CALL THE OFFICE WITH ANY QUESTIONS OR CONCERNS:   Wound care:   Please put xeroform over left neck daily   Please put a thin layer of baci over latissimus incisions for 1 week   - Please continue trach care-  and change gauze/foam under trach daily   [place] wound care and skin graft donor site  :    Showering: you can take wrapping off for showers, let the water go over the affected area on the leg, do not submerge in bath. Do not scrub the area. Pat dry with a clean towel before re-wrapping.      Skin graft donor site on thigh:     Please apply Vaseline or Aquaphor on area and cover with telfa        Leg Wrapping:      Xeroform : This is typically yellow material that can be found in a silver packaging. Please place over the affected area on the leg. Try not to let it sit on the healthy skin ( area that was not operated on) for a long period of time because it can irritate the skin.      Telfa:  this is the non-adherent pad. Please place over the xeroform      Ace wrap or Kerlix wrap : Please wrap the leg in top of the area where you applied the xeroform/telfa dressing      Please ambulate with cam boot as directed      Please continue the dressing changes daily until Dr. Gandhi tells you to stop          ENT Discharge Instructions    ENT follow up appointment:  - please call the office to confirm appointment: [phone]  to get remaining staples removed     *Please call your doctor or nurse practitioner if you have increased pain, swelling, redness, or drainage from the incision site.  *You may shower, and wash surgical incisions with a mild soap and warm water. Gently pat the area dry.  *If you have steri-strips, they will fall off on their own. Please remove any remaining strips 7-10 days after surgery.    Activity:  - fatigue is common after surgery, rest if you feel tired   -Please get plenty of rest, continue to ambulate several times per day, and drink adequate amounts of fluids.   -Avoid lifting weights greater than 5-10 lbs until you follow-up with your surgeon, who will instruct you further regarding activity restrictions.  -Avoid driving or operating heavy machinery while taking pain medications.  - Walking is recommended, ambulate as tolerated      Pain Expectations:  - pain after surgery is expected  - please take pain meds as prescribed     Medications: Please resume all regular home medications unless specifically advised not to take a particular medication. Also, please take any new medications as prescribed.   - pain medications can cause constipation, you should eat a high fiber diet and may take a stool softener while on pain meds       Warning Signs:  Please call your doctor or nurse practitioner if you experience the following:  *You experience new chest pain, pressure, squeezing or tightness.  *New or worsening cough, shortness of breath, or wheeze.  *If you are vomiting and cannot keep down fluids or your medications.  *You are getting dehydrated due to continued vomiting, diarrhea, or other reasons. Signs of dehydration include dry mouth, rapid heartbeat, or feeling dizzy or faint when standing.  *Your pain is not improving within 8-12 hours or is not gone within 24 hours.  *You have shaking chills, or fever greater than 101.5 degrees Fahrenheit or 38 degrees Celsius.  *Any change in your symptoms, or any new symptoms that concern you.     PLEASE CALL THE OFFICE WITH ANY QUESTIONS OR CONCERNS:   Wound care:   PEG care: if leaking noted from PEG, wrap thin layer of tegaderm over the soft part of the tube.   Please put xeroform over left neck daily   Please put a thin layer of baci over latissimus incisions for 1 week   - Please continue trach care-  and change gauze/foam under trach daily   [place] wound care and skin graft donor site  :    Showering: you can take wrapping off for showers, let the water go over the affected area on the leg, do not submerge in bath. Do not scrub the area. Pat dry with a clean towel before re-wrapping.      Skin graft donor site on thigh:     Please apply Vaseline or Aquaphor on area and cover with telfa        Leg Wrapping:      Xeroform : This is typically yellow material that can be found in a silver packaging. Please place over the affected area on the leg. Try not to let it sit on the healthy skin ( area that was not operated on) for a long period of time because it can irritate the skin.      Telfa:  this is the non-adherent pad. Please place over the xeroform      Ace wrap or Kerlix wrap : Please wrap the leg in top of the area where you applied the xeroform/telfa dressing      Please ambulate with cam boot as directed      Please continue the dressing changes daily until Dr. Gandhi tells you to stop          ENT Discharge Instructions    ENT follow up appointment:  - please call the office to confirm appointment: [phone]  to get remaining staples removed     *Please call your doctor or nurse practitioner if you have increased pain, swelling, redness, or drainage from the incision site.  *You may shower, and wash surgical incisions with a mild soap and warm water. Gently pat the area dry.  *If you have steri-strips, they will fall off on their own. Please remove any remaining strips 7-10 days after surgery.    Activity:  - fatigue is common after surgery, rest if you feel tired   -Please get plenty of rest, continue to ambulate several times per day, and drink adequate amounts of fluids.   -Avoid lifting weights greater than 5-10 lbs until you follow-up with your surgeon, who will instruct you further regarding activity restrictions.  -Avoid driving or operating heavy machinery while taking pain medications.  - Walking is recommended, ambulate as tolerated      Pain Expectations:  - pain after surgery is expected  - please take pain meds as prescribed     Medications: Please resume all regular home medications unless specifically advised not to take a particular medication. Also, please take any new medications as prescribed.   - pain medications can cause constipation, you should eat a high fiber diet and may take a stool softener while on pain meds       Warning Signs:  Please call your doctor or nurse practitioner if you experience the following:  *You experience new chest pain, pressure, squeezing or tightness.  *New or worsening cough, shortness of breath, or wheeze.  *If you are vomiting and cannot keep down fluids or your medications.  *You are getting dehydrated due to continued vomiting, diarrhea, or other reasons. Signs of dehydration include dry mouth, rapid heartbeat, or feeling dizzy or faint when standing.  *Your pain is not improving within 8-12 hours or is not gone within 24 hours.  *You have shaking chills, or fever greater than 101.5 degrees Fahrenheit or 38 degrees Celsius.  *Any change in your symptoms, or any new symptoms that concern you.     PLEASE CALL THE OFFICE WITH ANY QUESTIONS OR CONCERNS:

## 2024-01-19 NOTE — TELEPHONE ENCOUNTER
Patient Call    Who are you speaking with? [facility]     If it is not the patient, are they listed on an active communication consent form? N/A   What is the reason for this call? They asked for all notes from Multidisciplinary Head and Neck Tumor Conference faxed [phone]   Does this require a call back? N/A   If a call back is required, please list best call back number N/a   If a call back is required, advise that a message will be forwarded to their care team and someone will return their call as soon as possible.   Did you relay this information to the patient? N/A

## 2024-01-19 NOTE — PROGRESS NOTE ADULT - SUBJECTIVE AND OBJECTIVE BOX
OTOLARYNGOLOGY (ENT) PROGRESS NOTE    PATIENT: SAUNDRA HOLMAN  MRN: [id number]  : 57  [id number]  DATE OF SERVICE:  24  			           ID: SAUNDRA HOLMAN is a 67yo M w PMH of CAD (x2 stents Mar 2023), HLD, T2DM, hx of kidney stones, psoriasis who presented with an oral mass and underwent L hemimandibulectomy, SND L level 1-3, recon with L FFF, STSG, and placement of dental implants on . He had a trach which was subsequently decannulated. He returns  with surgical site infection now s/p OR for debridement and exploration. Trach replaced through prior stoma for pulmonary toilet.     Subjective/ Interval:   ; Patient seen this morning, oral pack to be changed, penrose dressing changed. Intraoral flap with partal skin necrosis, 7.5 portex in place with cuff deflated   : Patient seen and examined at bedside. NAEO. Patient remains afebrile and HD stable. HgB noted to drop to 7.6 from 9.2 but no additional episodes of melena. Penrose draining purulent material. Intra-oral infection/flap stable. Packing changed at bedside. No other complaints or changes at this time. ID recommended Vanc/zosyn and DC unasyn and flagyl, and IM recommended reticulocyte studies and adding folate supplements. No other changes at this time.   : AFVSS. No acute events overnight. Patient seen and examined at bedside. C/w Vanc/Zosyn per ID recs, pending sensitivities. Growing staph epi, E coli, enterobacter from wound cx on . S/p 2U PRBC yday w appropriate response in Hgb. Transfusion goal > 9.0.  : AFVSS. No acute events overnight. Patient seen and examined at bedside. C/w Vanc/Zosyn per ID recs, pending sens. Hgb stable this morning.  : AFVSS. No acute events overnight. Patient seen and examined at bedside. C/w Vanc/Zosyn, e faecalis sens to vanc/zosyn. Had 3 dark BM's yesterday that were stool guiac positive.  : AFVSS. No acute events overnight. Patient seen and examined at bedside. C/w Imipenem (changed per ID, enterobacter resistant to Zosyn). Plan for OR today for further washout / debridement.  1/3: AFVSS. No acute events overnight. Patient seen and examined at bedside. C/w imipenim. OR cx growing numerous gram negative rods and few gram positive cocci in pairs.  : Patient seen bedside, changed intraoral and neck packing,  Continue to follow cultures, pain controlled   : patient seen this morning, complains of right arm pain wit minimal swelling,  IV in the left arm,  Continue abx for 2 weeks. neck and oral packing changed, purulent drainage from neck noted.   : Patient seen at bedside during morning rounds. Reports right arm pain and swelling somewhat improved although still present. IV replaced in right arm yesterday. Remains on IV abx, tentative end date 1/15/24. Neck and oral packing changed at bedside; purulent drainage noted from neck, decreased in volume compared to prior exams.  : Patient seen at bedside during morning rounds. Overnight, patient complained of coughing, throat discomfort, and nursing reported some increased secretions via trach. Started on mucomyst with improvement. Noted 6 beats of PSVT at ~1900, no reported chest pain or other symptoms. No further episodes. Packing replaced at bedside   : patient seen this morning, neck and oral packing changed. Continues to have purulent drainage form neck.  No chest pain or SOB,               : patient seen this morning, changed neck and oral pack, patient tolerated packing change better with adjusted premedication,  purulence continues from neck , trach in place, fibula donor site with granulation tissue ( changed dressing)   1/10: AFVSS. Patient seen and evaluated this am. Neck and oral packing changed. Some purulence from neck. Trach in place.  : AFVSS. Patient taken to OR for washout, L latissmus dorsi flap to L oral cavity/oropharynx.  : AFVSS. Patient seen and evaluated this am. Pt doing well, minimal output from neck opening. KRISTEN drains in place. Flap healthy appearing. Failed TOV yesterday.  : AFVSS. Patient seen and evaluated this am. Pt in chair, flap looks healthy. Minimal output from neck opening. Donor site healing appropriately.   : AFVSS. Patient seen and evaluated this am. Flap appears healthy. Trach in place. Neck dressing changed, no purulence draining from opening. Donor site healing appropriately.  1/15: AFVSS. Patient seen and evaluated this am. Flap is healthy appearing. Trach in place. Neck dressing changed. Donor site healing appropriately.  : AFVSS. No acute events overnight. Patient seen and examined at bedside. Flap healthy appearing, neck soft and flat. Strong doppler signal.  : Patient seen bedside,  doppler signal strong, neck soft and flat, pain controlled, intraoral flap intact,  plan to cap trach today   : Patient seen bedside, will remove doppler today, upper lat kristen with 3 cc overnight - will remove,  pain controlled, will remove staples today   : plan to dc tomorrow morning, will confirm trach supplies were delivers. KRISTEN drain with minimal SS fluid output - plan to remove today       ALLERGIES:  No Known Allergies      MEDICATIONS:  Antiinfectives:     IV fluids:  dextrose 5%. 1000 milliLiter(s) IV Continuous <Continuous>  dextrose 5%. 1000 milliLiter(s) IV Continuous <Continuous>  multivitamin/minerals/iron Oral Solution (CENTRUM) 15 milliLiter(s) Oral daily    Hematologic/Anticoagulation:  aspirin  chewable 81 milliGRAM(s) Oral daily  clopidogrel Tablet 75 milliGRAM(s) Oral daily  enoxaparin Injectable 40 milliGRAM(s) SubCutaneous every 24 hours    Pain medications/Neuro:  gabapentin Solution 300 milliGRAM(s) Oral every 12 hours  HYDROmorphone  Injectable 0.5 milliGRAM(s) IV Push every 3 hours PRN  ondansetron Injectable 4 milliGRAM(s) IV Push every 8 hours PRN  oxyCODONE    Solution 10 milliGRAM(s) Enteral Tube every 6 hours PRN  oxyCODONE    Solution 5 milliGRAM(s) Enteral Tube every 6 hours PRN  traZODone 50 milliGRAM(s) Oral at bedtime    Endocrine Medications:   dextrose 50% Injectable 25 Gram(s) IV Push once  dextrose 50% Injectable 25 Gram(s) IV Push once  dextrose 50% Injectable 12.5 Gram(s) IV Push once  dextrose Oral Gel 15 Gram(s) Oral once PRN  glucagon  Injectable 1 milliGRAM(s) IntraMuscular once  insulin lispro (ADMELOG) corrective regimen sliding scale   SubCutaneous every 6 hours    All other standing medications:   albuterol/ipratropium for Nebulization 3 milliLiter(s) Nebulizer every 6 hours  bacitracin   Ointment 1 Application(s) Topical every 12 hours  chlorhexidine 0.12% Liquid 15 milliLiter(s) Oral Mucosa two times a day  doxazosin 1 milliGRAM(s) Oral at bedtime  influenza  Vaccine (HIGH DOSE) 0.7 milliLiter(s) IntraMuscular once    All other PRN medications:  lidocaine   4% Patch 1 Patch Transdermal daily PRN    Vital Signs Last 24 Hrs  T(C): 36.5 (2024 05:13), Max: 37 (2024 21:56)  T(F): 97.7 (2024 05:13), Max: 98.6 (2024 21:56)  HR: 70 (2024 05:00) (70 - 84)  BP: 110/58 (2024 05:00) (104/59 - 124/60)  BP(mean): 80 (2024 05:00) (77 - 86)  RR: 16 (2024 05:00) (16 - 24)  SpO2: 98% (2024 05:00) (96% - 100%)    Parameters below as of 2024 05:00  Patient On (Oxygen Delivery Method): tracheostomy collar    O2 Concentration (%): 40       @ 07:01  -   @ 07:00  --------------------------------------------------------  IN:    Enteral Tube Flush: 600 mL    Vital1.5: 1011 mL  Total IN: 1611 mL    OUT:    Bulb (mL): 5 mL    Bulb (mL): 16 mL    Voided (mL): 625 mL  Total OUT: 646 mL    Total NET: 965 mL          24 @ 07:01  -  24 @ 07:00  --------------------------------------------------------  IN:  Total IN: 0 mL    OUT:    Bulb (mL): 5 mL    Bulb (mL): 16 mL  Total OUT: 21 mL    Total NET: -21 mL              PHYSICAL EXAM:  GEN: appears stated age  NEURO: alert & oriented x   HEENT:  4 staples  remained around the ear ( to be removed in the office) l, neck with no purulence noted, trach in place, KRISTEN drain in place with SS fluid - will remove today   CVS: regular rate and rhythm  Pulm: normal respiratory excursions, not tachypneic, no labored breathing  Abd: non-distended  Ext: moving all four extremities, no peripheral edema noted. Latissmus donor site well healing well           LABS                       10.0   6.24  )-----------( 188      ( 2024 05:30 )             30.8    01-18    140  |  103  |  14  ----------------------------<  111<H>  3.8   |  29  |  0.59    Ca    9.0      2024 05:30  Phos  3.4       Mg     1.9     -18           Coagulation Studies-     Urinalysis Basic - ( 2024 05:30 )    Color: x / Appearance: x / SG: x / pH: x  Gluc: 111 mg/dL / Ketone: x  / Bili: x / Urobili: x   Blood: x / Protein: x / Nitrite: x   Leuk Esterase: x / RBC: x / WBC x   Sq Epi: x / Non Sq Epi: x / Bacteria: x

## 2024-01-19 NOTE — PROGRESS NOTE ADULT - ATTENDING COMMENTS
69 y/o M PMHx of CAD s/p PCI/CUBA x2 to LAD and Ramus (Mar 2023), T2DM, psoriasis, hx Renal calculi, w/ [id number] SCC of L buccal mucosa s/p hemimandibulectomy, left level 1, 2a, 3 neck neck dissection, L FFF, tracheostomy w/ 7.5 cuffed portex, dental implants, STSG (12/7/23), s/p G tube placement and subsequent trach decannulation and discharged home on ( 12/22/23). Pt however returned to [facility] ( 12/27/23) with surgical site infection, s/p RTOR 12/27 with findings of skin flap necrosis and s/p debridement. Trach replaced through prior stoma for pulmonary toilet. Patient also noted to have dark stools and dropped in Hgb- wife reported black stool for the last 3 days and same thing coming out from peg tube 12/28- anemia required blood transfusion -    CC: Pt seen w. Dr. Montes, Wife at bedside. Ada STARKS PA at bedside. Pt resting in bed, in good spirits. Trach capped, vocalizing.  feeling well. concerns mild resting tremors and reports uncle has Parkinson's disease. advise pt to seen Movement disorder specialist at his area and in network with his insurance    RRR, moving good air.  mild resting tremors and mild cog-wheeling upon passive ROM    # Skin flap necrosis s/p exploration and debridement ( 12/27)   # RTOR for wound debridement and EGD( 1/2)   OR findings  (1/2): infected, non-viable free fibula flap with viable skin paddle. Purulent drainage appreciated at margins of flap and in neck.  EGD( 1/2): ulcer found at the G-tube site  # RTOR- flat change with Latissmus dorsi- close monitoring in sicu, now on step down  wound care as per ENT.   - ID f/u appreciated ( Team 1) ,  d/c's Vanco & Zosyn ( 1/1) and completed Imipenem 1gm iv q8h  x 2 wks since washout 1/2 (1/1-1/16)  - f/u OR culture ( 1/2): reviewed   - OR cultures with E.coli, ECC, E.faecalis S.mitis/oralis, S.epi, S.constellatus, S.maltophilia, mixed anaerobes.   - culture result reviewed.  - BCx( 12/28): ngtd     # Acute blood loss anemia/melena -Hb stable-  # hx WADE ( Ferritin 768 but Tsat 13% ( <20%) on 12/13/23)   - GI fu appreciated, EGD( 1/2) ulcer at G-tube site, rec; Protonix 40mg daily for 8 weeks and avoid bumper being too tight to cause pressure ulcer   - keep active T&S, keep Hgb>8  -stable above 10 (1/17)  - c/w ASA given hx PCI x2 ( March 2023)  - resumed Plavix 75mg po daily ( 1/17-)   - c/w PPI daily can be via G-tube   - c/w Folic acid 1mg daily   - monitor clinically for any further melena     # SLP evaluation appreciated. diet per ENT.    # CAD sp PCI/CUBA x2 to LAD and Ramus in March 2023 as per cards is in setting of NSTEMI - now back on DAPT,  ASA 81mg daily , and restarted plavix 75mg daily and Atorvastatin 40mg qHS     #  DM - FS with ISS, would hold all ora-hypoglycemic agent, goal -180 -within goal.     # pain management - oxycodone 5mg/10mg prn , dilaudid prn, would need stool softener to ensure daily BM.     # Dysphagia- NPO, trach, on TF with Vital 1.5 via G-tube     # DVT ppx: Lovenox 40mg SQ daily     # Disposition: d/c plan noted for tomorrow on Saturday 1/20/24.     Med consult team continues to follow. Thank you.

## 2024-01-19 NOTE — DISCHARGE NOTE PROVIDER - NSDCCPCAREPLAN_GEN_ALL_CORE_FT
PRINCIPAL DISCHARGE DIAGNOSIS  Diagnosis: Malignant neoplasm of mandible  Assessment and Plan of Treatment: Community Status: Active

## 2024-01-19 NOTE — DISCHARGE NOTE PROVIDER - HOSPITAL COURSE
OTOLARYNGOLOGY (ENT) DISCHARGE SUMMARY    PATIENT: SAUNDRA HOLMAN               : 57  MRN: [id number]  ADMISSION DATE: 23  Discharge Date: 24 @ 09:30  Attending Physician: Freddy Gandhi    Admission Diagnosis:  POST OP COMPLICATIONS        Status post:Debridement, oral cavity         Chronic Conditions:  Neoplasm of mandible        HPI: SAUNDRA HOLMAN is a 67yo M w PMH of CAD (x2 stents Mar 2023), HLD, T2DM, hx of kidney stones, psoriasis who presented with an oral mass and underwent L hemimandibulectomy, SND L level 1-3, recon with L FFF, STSG, and placement of dental implants on . He had a trach which was subsequently decannulated. He returns  with surgical site infection now s/p OR for debridement and exploration. Trach replaced through prior stoma for pulmonary toilet.     Subjective/ Interval:   ; Patient seen this morning, oral pack to be changed, penrose dressing changed. Intraoral flap with partal skin necrosis, 7.5 portex in place with cuff deflated   : Patient seen and examined at bedside. NAEO. Patient remains afebrile and HD stable. HgB noted to drop to 7.6 from 9.2 but no additional episodes of melena. Penrose draining purulent material. Intra-oral infection/flap stable. Packing changed at bedside. No other complaints or changes at this time. ID recommended Vanc/zosyn and DC unasyn and flagyl, and IM recommended reticulocyte studies and adding folate supplements. No other changes at this time.   : AFVSS. No acute events overnight. Patient seen and examined at bedside. C/w Vanc/Zosyn per ID recs, pending sensitivities. Growing staph epi, E coli, enterobacter from wound cx on . S/p 2U PRBC yday w appropriate response in Hgb. Transfusion goal > 9.0.  : AFVSS. No acute events overnight. Patient seen and examined at bedside. C/w Vanc/Zosyn per ID recs, pending sens. Hgb stable this morning.  : AFVSS. No acute events overnight. Patient seen and examined at bedside. C/w Vanc/Zosyn, e faecalis sens to vanc/zosyn. Had 3 dark BM's yesterday that were stool guiac positive.  : AFVSS. No acute events overnight. Patient seen and examined at bedside. C/w Imipenem (changed per ID, enterobacter resistant to Zosyn). Plan for OR today for further washout / debridement.  1/3: AFVSS. No acute events overnight. Patient seen and examined at bedside. C/w imipenim. OR cx growing numerous gram negative rods and few gram positive cocci in pairs.  : Patient seen bedside, changed intraoral and neck packing,  Continue to follow cultures, pain controlled   : patient seen this morning, complains of right arm pain wit minimal swelling,  IV in the left arm,  Continue abx for 2 weeks. neck and oral packing changed, purulent drainage from neck noted.   : Patient seen at bedside during morning rounds. Reports right arm pain and swelling somewhat improved although still present. IV replaced in right arm yesterday. Remains on IV abx, tentative end date 1/15/24. Neck and oral packing changed at bedside; purulent drainage noted from neck, decreased in volume compared to prior exams.  : Patient seen at bedside during morning rounds. Overnight, patient complained of coughing, throat discomfort, and nursing reported some increased secretions via trach. Started on mucomyst with improvement. Noted 6 beats of PSVT at ~1900, no reported chest pain or other symptoms. No further episodes. Packing replaced at bedside   : patient seen this morning, neck and oral packing changed. Continues to have purulent drainage form neck.  No chest pain or SOB,               : patient seen this morning, changed neck and oral pack, patient tolerated packing change better with adjusted premedication,  purulence continues from neck , trach in place, fibula donor site with granulation tissue ( changed dressing)   1/10: AFVSS. Patient seen and evaluated this am. Neck and oral packing changed. Some purulence from neck. Trach in place.  : AFVSS. Patient taken to OR for washout, L latissmus dorsi flap to L oral cavity/oropharynx.  : AFVSS. Patient seen and evaluated this am. Pt doing well, minimal output from neck opening. LUZ drains in place. Flap healthy appearing. Failed TOV yesterday.  : AFVSS. Patient seen and evaluated this am. Pt in chair, flap looks healthy. Minimal output from neck opening. Donor site healing appropriately.   : AFVSS. Patient seen and evaluated this am. Flap appears healthy. Trach in place. Neck dressing changed, no purulence draining from opening. Donor site healing appropriately.  1/15: AFVSS. Patient seen and evaluated this am. Flap is healthy appearing. Trach in place. Neck dressing changed. Donor site healing appropriately.  : AFVSS. No acute events overnight. Patient seen and examined at bedside. Flap healthy appearing, neck soft and flat. Strong doppler signal.  : Patient seen bedside,  doppler signal strong, neck soft and flat, pain controlled, intraoral flap intact,  plan to cap trach today   : Patient seen bedside, will remove doppler today, upper lat luz with 3 cc overnight - will remove,  pain controlled, will remove staples today   : plan to dc tomorrow morning, will confirm trach supplies were delivers. LUZ drain with minimal SS fluid output - plan to remove today       Disposition: Home with family.    Discharge Condition: Stable  OTOLARYNGOLOGY (ENT) DISCHARGE SUMMARY    PATIENT: SAUNDRA HOLMAN               : 57  MRN: [id number]  ADMISSION DATE: 23  Discharge Date: 24 @ 09:30  Attending Physician: Freddy Gandhi    Admission Diagnosis:  POST OP COMPLICATIONS        Status post: Debridement, oral cavity         Chronic Conditions:  Neoplasm of mandible        HPI: SAUNDRA HOLMAN is a 67yo M w PMH of CAD (x2 stents Mar 2023), HLD, T2DM, hx of kidney stones, psoriasis who presented with an oral mass and underwent L hemimandibulectomy, SND L level 1-3, recon with L FFF, STSG, and placement of dental implants on . He had a trach which was subsequently decannulated. He returns  with surgical site infection now s/p OR for debridement and exploration. Trach replaced through prior stoma for pulmonary toilet.     Subjective/ Interval:   ; Patient seen this morning, oral pack to be changed, penrose dressing changed. Intraoral flap with partal skin necrosis, 7.5 portex in place with cuff deflated   : Patient seen and examined at bedside. NAEO. Patient remains afebrile and HD stable. HgB noted to drop to 7.6 from 9.2 but no additional episodes of melena. Penrose draining purulent material. Intra-oral infection/flap stable. Packing changed at bedside. No other complaints or changes at this time. ID recommended Vanc/zosyn and DC unasyn and flagyl, and IM recommended reticulocyte studies and adding folate supplements. No other changes at this time.   : AFVSS. No acute events overnight. Patient seen and examined at bedside. C/w Vanc/Zosyn per ID recs, pending sensitivities. Growing staph epi, E coli, enterobacter from wound cx on . S/p 2U PRBC yday w appropriate response in Hgb. Transfusion goal > 9.0.  : AFVSS. No acute events overnight. Patient seen and examined at bedside. C/w Vanc/Zosyn per ID recs, pending sens. Hgb stable this morning.  : AFVSS. No acute events overnight. Patient seen and examined at bedside. C/w Vanc/Zosyn, e faecalis sens to vanc/zosyn. Had 3 dark BM's yesterday that were stool guiac positive.  : AFVSS. No acute events overnight. Patient seen and examined at bedside. C/w Imipenem (changed per ID, enterobacter resistant to Zosyn). Plan for OR today for further washout / debridement.  1/3: AFVSS. No acute events overnight. Patient seen and examined at bedside. C/w imipenim. OR cx growing numerous gram negative rods and few gram positive cocci in pairs.  : Patient seen bedside, changed intraoral and neck packing,  Continue to follow cultures, pain controlled   : patient seen this morning, complains of right arm pain wit minimal swelling,  IV in the left arm,  Continue abx for 2 weeks. neck and oral packing changed, purulent drainage from neck noted.   : Patient seen at bedside during morning rounds. Reports right arm pain and swelling somewhat improved although still present. IV replaced in right arm yesterday. Remains on IV abx, tentative end date 1/15/24. Neck and oral packing changed at bedside; purulent drainage noted from neck, decreased in volume compared to prior exams.  : Patient seen at bedside during morning rounds. Overnight, patient complained of coughing, throat discomfort, and nursing reported some increased secretions via trach. Started on mucomyst with improvement. Noted 6 beats of PSVT at ~1900, no reported chest pain or other symptoms. No further episodes. Packing replaced at bedside   : patient seen this morning, neck and oral packing changed. Continues to have purulent drainage form neck.  No chest pain or SOB,               : patient seen this morning, changed neck and oral pack, patient tolerated packing change better with adjusted premedication,  purulence continues from neck , trach in place, fibula donor site with granulation tissue ( changed dressing)   1/10: AFVSS. Patient seen and evaluated this am. Neck and oral packing changed. Some purulence from neck. Trach in place.  : AFVSS. Patient taken to OR for washout, L latissmus dorsi flap to L oral cavity/oropharynx.  : AFVSS. Patient seen and evaluated this am. Pt doing well, minimal output from neck opening. LUZ drains in place. Flap healthy appearing. Failed TOV yesterday.  : AFVSS. Patient seen and evaluated this am. Pt in chair, flap looks healthy. Minimal output from neck opening. Donor site healing appropriately.   : AFVSS. Patient seen and evaluated this am. Flap appears healthy. Trach in place. Neck dressing changed, no purulence draining from opening. Donor site healing appropriately.  1/15: AFVSS. Patient seen and evaluated this am. Flap is healthy appearing. Trach in place. Neck dressing changed. Donor site healing appropriately.  : AFVSS. No acute events overnight. Patient seen and examined at bedside. Flap healthy appearing, neck soft and flat. Strong doppler signal.  : Patient seen bedside,  doppler signal strong, neck soft and flat, pain controlled, intraoral flap intact,  plan to cap trach today   : Patient seen bedside, will remove doppler today, upper lat luz with 3 cc overnight - will remove,  pain controlled, will remove staples today   : plan to dc tomorrow morning, will confirm trach supplies were delivers. LUZ drain with minimal SS fluid output - plan to remove today   : plan to d/c home today. Trach supplies confirmed, All LUZ drains removed. 7.5 shiley uncuffed trach, capped in place. saturating well on room air, ambulating well. Still with staples to left neck incision - to be removed in office.     Disposition: Home with family.    Discharge Condition: Stable

## 2024-01-19 NOTE — PROGRESS NOTE ADULT - SUBJECTIVE AND OBJECTIVE BOX
*** INCOMPLETE ***    REASON FOR CONSULT:     INTERVAL/OVERNIGHT EVENTS: As per overnight staff, there were no acute events overnight    SUBJECTIVE HPI: Patient seen and examined at bedside. Patient resting comfortably, no complaints at this time. Patient denies: fever, chills, weakness, dizziness, headaches, changes in vision, chest pain, palpitations, shortness of breath, cough, N/V, diarrhea or constipation, dysuria, LE edema. ROS otherwise negative.      MEDICATIONS  (STANDING):  albuterol/ipratropium for Nebulization 3 milliLiter(s) Nebulizer every 6 hours  aspirin  chewable 81 milliGRAM(s) Oral daily  bacitracin   Ointment 1 Application(s) Topical every 12 hours  chlorhexidine 0.12% Liquid 15 milliLiter(s) Oral Mucosa two times a day  clopidogrel Tablet 75 milliGRAM(s) Oral daily  dextrose 5%. 1000 milliLiter(s) (100 mL/Hr) IV Continuous <Continuous>  dextrose 5%. 1000 milliLiter(s) (50 mL/Hr) IV Continuous <Continuous>  dextrose 50% Injectable 25 Gram(s) IV Push once  dextrose 50% Injectable 25 Gram(s) IV Push once  dextrose 50% Injectable 12.5 Gram(s) IV Push once  doxazosin 1 milliGRAM(s) Oral at bedtime  enoxaparin Injectable 40 milliGRAM(s) SubCutaneous every 24 hours  gabapentin Solution 300 milliGRAM(s) Oral every 12 hours  glucagon  Injectable 1 milliGRAM(s) IntraMuscular once  influenza  Vaccine (HIGH DOSE) 0.7 milliLiter(s) IntraMuscular once  insulin lispro (ADMELOG) corrective regimen sliding scale   SubCutaneous every 6 hours  multivitamin/minerals/iron Oral Solution (CENTRUM) 15 milliLiter(s) Oral daily  traZODone 50 milliGRAM(s) Oral at bedtime    MEDICATIONS  (PRN):  dextrose Oral Gel 15 Gram(s) Oral once PRN Blood Glucose LESS THAN 70 milliGRAM(s)/deciliter  HYDROmorphone  Injectable 0.5 milliGRAM(s) IV Push every 3 hours PRN breakthrough pain  lidocaine   4% Patch 1 Patch Transdermal daily PRN back pain  ondansetron Injectable 4 milliGRAM(s) IV Push every 8 hours PRN Nausea and/or Vomiting  oxyCODONE    Solution 5 milliGRAM(s) Enteral Tube every 6 hours PRN Moderate Pain (4 - 6)  oxyCODONE    Solution 10 milliGRAM(s) Enteral Tube every 6 hours PRN Severe Pain (7 - 10)        VITAL SIGNS:  Vital Signs Last 24 Hrs  T(C): 36.8 (19 Jan 2024 09:18), Max: 37 (18 Jan 2024 21:56)  T(F): 98.2 (19 Jan 2024 09:18), Max: 98.6 (18 Jan 2024 21:56)  HR: 80 (19 Jan 2024 11:49) (70 - 88)  BP: 100/55 (19 Jan 2024 11:49) (100/55 - 124/60)  BP(mean): 75 (19 Jan 2024 11:49) (75 - 86)  RR: 18 (19 Jan 2024 11:49) (16 - 24)  SpO2: 97% (19 Jan 2024 11:49) (96% - 100%)    Parameters below as of 19 Jan 2024 11:49  Patient On (Oxygen Delivery Method): room air        I&O's Detail    18 Jan 2024 07:01  -  19 Jan 2024 07:00  --------------------------------------------------------  IN:    Enteral Tube Flush: 800 mL    Vital1.5: 1348 mL  Total IN: 2148 mL    OUT:    Bulb (mL): 5 mL    Bulb (mL): 16 mL    Voided (mL): 625 mL  Total OUT: 646 mL    Total NET: 1502 mL      19 Jan 2024 07:01  -  19 Jan 2024 12:37  --------------------------------------------------------  IN:    Enteral Tube Flush: 200 mL    Vital1.5: 337 mL  Total IN: 537 mL    OUT:    Bulb (mL): 8 mL  Total OUT: 8 mL    Total NET: 529 mL          PHYSICAL EXAM:  General: Comfortable, pleasant/anxious/agitated, Ill-appearing, well-nourished/frail/cachectic, comfortable / in distress  Neurological: AAOx3, no focal deficits  HEENT: NC/AT; EOMI, PERRL, clear conjunctiva, no nasal or oropharyngeal discharge or exudates, MMM  Neck: supple, no cervical or post-auricular lymphadenopathy  Cardiovascular: +S1/S2, no murmurs/rubs/gallops, RRR  Respiratory: CTA B/L, no diminished breath sounds, no wheezes/rales/rhonchi, no increased work of breathing or accessory muscle use  Gastrointestinal: soft, NT/ND; active BSx4 quadrants  Genitourinary: no suprapubic tenderness, no CVA tenderness  Extremities: WWP; no edema, clubbing or cyanosis  Vascular: 2+ radial, DP/PT pulses B/L  Skin: no rashes  Lines/Drains:     LABS:                        10.0   6.24  )-----------( 188      ( 18 Jan 2024 05:30 )             30.8     01-18    140  |  103  |  14  ----------------------------<  111<H>  3.8   |  29  |  0.59    Ca    9.0      18 Jan 2024 05:30  Phos  3.4     01-18  Mg     1.9     01-18              BNP    Urinalysis Basic - ( 18 Jan 2024 05:30 )    Color: x / Appearance: x / SG: x / pH: x  Gluc: 111 mg/dL / Ketone: x  / Bili: x / Urobili: x   Blood: x / Protein: x / Nitrite: x   Leuk Esterase: x / RBC: x / WBC x   Sq Epi: x / Non Sq Epi: x / Bacteria: x            Microbiology:    Culture - Surgical Swab (collected 01-02-24 @ 18:03)  Source: .Surgical Swab left neck or spec  Gram Stain (01-02-24 @ 22:00):    Rare Gram Negative Rods    Rare Gram positive cocci in pairs    Moderate WBC's  Final Report (01-05-24 @ 12:42):    Moderate Escherichia coli    Moderate Enterobacter cloacae complex    Rare Candida parapsilosis    Rare Staphylococcus capitis  Organism: Enterobacter cloacae complex  Enterobacter cloacae complex  Escherichia coli  Escherichia coli (01-05-24 @ 12:16)  Organism: Escherichia coli (01-05-24 @ 12:16)      -  Tobramycin: S <=2      -  Gentamicin: S <=2      -  Cefazolin: S <=2      -  Piperacillin/Tazobactam: S <=8      -  Ciprofloxacin: S <=0.25      -  Ceftriaxone: S <=1      -  Ampicillin: S <=8 These ampicillin results predict results for amoxicillin      Method Type: OTTO      -  Ampicillin/Sulbactam: S <=4/2      -  Trimethoprim/Sulfamethoxazole: S 1/19      -  Ertapenem: S <=0.5  Organism: Escherichia coli (01-05-24 @ 12:16)      -  Tobramycin: S <=2      -  Gentamicin: S <=2      -  Cefazolin: S <=2      -  Piperacillin/Tazobactam: S <=8      -  Ciprofloxacin: S <=0.25      -  Ceftriaxone: S <=1      -  Ampicillin: S <=8 These ampicillin results predict results for amoxicillin      Method Type: OTTO      -  Ampicillin/Sulbactam: S <=4/2      -  Trimethoprim/Sulfamethoxazole: S 1/19      -  Ertapenem: S <=0.5  Organism: Enterobacter cloacae complex (01-05-24 @ 12:16)      Method Type: ETEST      -  Meropenem: S 0.094      -  Ertapenem: S 0.38  Organism: Enterobacter cloacae complex (01-05-24 @ 12:16)      -  Tobramycin: S <=2      -  Gentamicin: S <=2      -  Cefepime: R 16      -  Cefazolin: R >16      -  Piperacillin/Tazobactam: R >64      -  Ciprofloxacin: S <=0.25      -  Ceftriaxone: R >32 Enterobacter cloacae, Klebsiella aerogenes, and Citrobacter freundii may develop resistance during prolonged therapy.      -  Ampicillin: R >16 These ampicillin results predict results for amoxicillin      Method Type: OTTO      -  Meropenem: S <=1      -  Ampicillin/Sulbactam: R >16/8      -  Meropenem/Vaborbactam: S <=2      -  Trimethoprim/Sulfamethoxazole: S <=0.5/9.5    Culture - Tissue with Gram Stain (collected 01-02-24 @ 18:03)  Source: .Tissue left mandible tissue or spec  Gram Stain (01-02-24 @ 21:59):    Numerous Gram Negative Rods    Few Gram positive cocci in pairs    Few WBC's  Final Report (01-05-24 @ 12:29):    Numerous Escherichia coli    Numerous Enterobacter cloacae complex    Few Enterococcus faecalis    Rare Staphylococcus epidermidis  Organism: Escherichia coli  Enterobacter cloacae complex  Enterobacter cloacae complex  Enterococcus faecalis  Escherichia coli (01-05-24 @ 12:29)  Organism: Escherichia coli (01-05-24 @ 12:29)      -  Tobramycin: S <=2      -  Gentamicin: S <=2      -  Cefazolin: S <=2      -  Piperacillin/Tazobactam: S <=8      -  Ciprofloxacin: S <=0.25      -  Ceftriaxone: S <=1      -  Ampicillin: S <=8 These ampicillin results predict results for amoxicillin      Method Type: OTTO      -  Ampicillin/Sulbactam: S <=4/2      -  Trimethoprim/Sulfamethoxazole: S 1/19      -  Ertapenem: S <=0.5  Organism: Enterococcus faecalis (01-05-24 @ 12:29)      -  Vancomycin: S 2      -  Ampicillin: S <=2 Predicts results to ampicillin/sulbactam, amoxacillin-clavulanate and  piperacillin-tazobactam.      Method Type: OTTO  Organism: Enterobacter cloacae complex (01-05-24 @ 12:29)      -  Tobramycin: S <=2      -  Gentamicin: S <=2      -  Cefepime: R 16      -  Cefazolin: R >16      -  Piperacillin/Tazobactam: R >64      -  Ciprofloxacin: S <=0.25      -  Ceftriaxone: R >32 Enterobacter cloacae, Klebsiella aerogenes, and Citrobacter freundii may develop resistance during prolonged therapy.      -  Ampicillin: R >16 These ampicillin results predict results for amoxicillin      Method Type: OTTO      -  Meropenem: S <=1      -  Ampicillin/Sulbactam: R >16/8      -  Meropenem/Vaborbactam: S <=2      -  Trimethoprim/Sulfamethoxazole: S <=0.5/9.5  Organism: Enterobacter cloacae complex (01-05-24 @ 12:29)      Method Type: ETEST      -  Meropenem: S 0.094      -  Ertapenem: S 0.38  Organism: Escherichia coli (01-05-24 @ 12:29)      -  Tobramycin: S <=2      -  Gentamicin: S <=2      -  Cefazolin: S <=2      -  Piperacillin/Tazobactam: S <=8      -  Ciprofloxacin: S <=0.25      -  Ceftriaxone: S <=1      -  Ampicillin: S <=8 These ampicillin results predict results for amoxicillin      Method Type: OTTO      -  Ampicillin/Sulbactam: S <=4/2      -  Trimethoprim/Sulfamethoxazole: S 1/19      -  Ertapenem: S <=0.5    Culture - Blood (collected 12-28-23 @ 10:08)  Source: .Blood Blood-Peripheral  Final Report (01-02-24 @ 11:01):    No growth at 5 days.    Culture - Surgical Swab (collected 12-27-23 @ 20:46)  Source: .Surgical Swab 1. Left Oral Cavity  Gram Stain (12-27-23 @ 21:53):    Numerous Gram positive cocci in pairs, chains and clusters    Moderate Gram Positive Rods    Moderate Gram Negative Rods    Numerous White blood cells  Final Report (01-02-24 @ 10:25):    Moderate Escherichia coli    Moderate Enterobacter cloacae complex    Few Enterococcus faecalis    Few Streptococcus mitis/oralis group    Few Staphylococcus epidermidis    Few Streptococcus constellatus    Few Stenotrophomonas maltophilia    Mixed Anaerobic Joy including    Few Prevotella species (most closely resembles Prevotella barnaie)    Few Prevotella denticola    Culture grew 3 or more types of organisms which indicate collection    contamination; consider recollection only if clinically indicated.  Organism: Escherichia coli  Enterobacter cloacae complex  Enterococcus faecalis  Streptococcus constellatus  Streptococcus constellatus  Stenotrophomonas maltophilia  Stenotrophomonas maltophilia (01-02-24 @ 10:24)  Organism: Escherichia coli (01-02-24 @ 10:24)      -  Tobramycin: S <=2      -  Gentamicin: S <=2      -  Cefazolin: S <=2      -  Piperacillin/Tazobactam: S <=8      -  Ciprofloxacin: S <=0.25      -  Ceftriaxone: S <=1      -  Ampicillin: S <=8 These ampicillin results predict results for amoxicillin      Method Type: OTTO      -  Ampicillin/Sulbactam: S <=4/2      -  Trimethoprim/Sulfamethoxazole: S 1/19      -  Ertapenem: S <=0.5  Organism: Stenotrophomonas maltophilia (01-01-24 @ 14:56)      -  Minocycline: S      Method Type: KB  Organism: Stenotrophomonas maltophilia (01-01-24 @ 14:55)      -  Levofloxacin: S 1      Method Type: OTTO      -  Trimethoprim/Sulfamethoxazole: S <=0.5/9.5  Organism: Streptococcus constellatus (01-01-24 @ 14:55)      -  Ceftriaxone: S 0.094      Method Type: ETEST      -  Penicillin: S 0.047  Organism: Streptococcus constellatus (01-01-24 @ 14:55)      Method Type: KB  Organism: Enterobacter cloacae complex (01-01-24 @ 14:52)      -  Tobramycin: S <=2      -  Gentamicin: S <=2      -  Cefepime: SDD 8 The breakpoint for susceptible dose dependent may require the usage of a higher cefepime dose (equivalent to adult dose of 2g q8h for normal renal function) for successful treatment.      -  Cefazolin: R >16      -  Piperacillin/Tazobactam: R 64      -  Ciprofloxacin: S <=0.25      -  Ceftriaxone: R >32 Enterobacter cloacae, Klebsiella aerogenes, and Citrobacter freundii may develop resistance during prolonged therapy.      -  Ampicillin: R >16 These ampicillin results predict results for amoxicillin      Method Type: OTTO      -  Meropenem: S <=1      -  Ampicillin/Sulbactam: R >16/8      -  Cefoxitin: R >16      -  Trimethoprim/Sulfamethoxazole: S <=0.5/9.5      -  Ertapenem: S <=0.5  Organism: Enterococcus faecalis (12-30-23 @ 15:21)      -  Vancomycin: S 4      -  Ampicillin: S <=2 Predicts results to ampicillin/sulbactam, amoxacillin-clavulanate and  piperacillin-tazobactam.      Method Type: OTTO        RADIOLOGY & ADDITIONAL STUDIES: Reviewed.    ECG:    ECHO:    REASON FOR CONSULT: Comanagement    INTERVAL/OVERNIGHT EVENTS: As per overnight staff, there were no acute events overnight    SUBJECTIVE HPI: Patient seen and examined at bedside. Patient resting comfortably, no complaints at this time. Patient denies: fever, chills, weakness, dizziness, headaches, changes in vision, chest pain, palpitations, shortness of breath, cough, N/V, diarrhea or constipation, dysuria, LE edema. ROS otherwise negative.      MEDICATIONS  (STANDING):  albuterol/ipratropium for Nebulization 3 milliLiter(s) Nebulizer every 6 hours  aspirin  chewable 81 milliGRAM(s) Oral daily  bacitracin   Ointment 1 Application(s) Topical every 12 hours  chlorhexidine 0.12% Liquid 15 milliLiter(s) Oral Mucosa two times a day  clopidogrel Tablet 75 milliGRAM(s) Oral daily  dextrose 5%. 1000 milliLiter(s) (100 mL/Hr) IV Continuous <Continuous>  dextrose 5%. 1000 milliLiter(s) (50 mL/Hr) IV Continuous <Continuous>  dextrose 50% Injectable 25 Gram(s) IV Push once  dextrose 50% Injectable 25 Gram(s) IV Push once  dextrose 50% Injectable 12.5 Gram(s) IV Push once  doxazosin 1 milliGRAM(s) Oral at bedtime  enoxaparin Injectable 40 milliGRAM(s) SubCutaneous every 24 hours  gabapentin Solution 300 milliGRAM(s) Oral every 12 hours  glucagon  Injectable 1 milliGRAM(s) IntraMuscular once  influenza  Vaccine (HIGH DOSE) 0.7 milliLiter(s) IntraMuscular once  insulin lispro (ADMELOG) corrective regimen sliding scale   SubCutaneous every 6 hours  multivitamin/minerals/iron Oral Solution (CENTRUM) 15 milliLiter(s) Oral daily  traZODone 50 milliGRAM(s) Oral at bedtime    MEDICATIONS  (PRN):  dextrose Oral Gel 15 Gram(s) Oral once PRN Blood Glucose LESS THAN 70 milliGRAM(s)/deciliter  HYDROmorphone  Injectable 0.5 milliGRAM(s) IV Push every 3 hours PRN breakthrough pain  lidocaine   4% Patch 1 Patch Transdermal daily PRN back pain  ondansetron Injectable 4 milliGRAM(s) IV Push every 8 hours PRN Nausea and/or Vomiting  oxyCODONE    Solution 5 milliGRAM(s) Enteral Tube every 6 hours PRN Moderate Pain (4 - 6)  oxyCODONE    Solution 10 milliGRAM(s) Enteral Tube every 6 hours PRN Severe Pain (7 - 10)        VITAL SIGNS:  Vital Signs Last 24 Hrs  T(C): 36.8 (19 Jan 2024 09:18), Max: 37 (18 Jan 2024 21:56)  T(F): 98.2 (19 Jan 2024 09:18), Max: 98.6 (18 Jan 2024 21:56)  HR: 80 (19 Jan 2024 11:49) (70 - 88)  BP: 100/55 (19 Jan 2024 11:49) (100/55 - 124/60)  BP(mean): 75 (19 Jan 2024 11:49) (75 - 86)  RR: 18 (19 Jan 2024 11:49) (16 - 24)  SpO2: 97% (19 Jan 2024 11:49) (96% - 100%)    Parameters below as of 19 Jan 2024 11:49  Patient On (Oxygen Delivery Method): room air        I&O's Detail    18 Jan 2024 07:01  -  19 Jan 2024 07:00  --------------------------------------------------------  IN:    Enteral Tube Flush: 800 mL    Vital1.5: 1348 mL  Total IN: 2148 mL    OUT:    Bulb (mL): 5 mL    Bulb (mL): 16 mL    Voided (mL): 625 mL  Total OUT: 646 mL    Total NET: 1502 mL      19 Jan 2024 07:01  -  19 Jan 2024 12:37  --------------------------------------------------------  IN:    Enteral Tube Flush: 200 mL    Vital1.5: 337 mL  Total IN: 537 mL    OUT:    Bulb (mL): 8 mL  Total OUT: 8 mL    Total NET: 529 mL      PHYSICAL EXAM:  General: Comfortable, NAD  Neurological: AAOx3, no focal deficits  HEENT: trach in place, perfuse drooling, passy elio valve in place, L lateral aspect of neck with dressing in place, no draining from incision  Cardiovascular: +S1/S2, no murmurs/rubs/gallops, RRR  Respiratory: CTA B/L, no diminished breath sounds, no wheezes/rales/rhonchi, no increased work of breathing or accessory muscle use  Gastrointestinal: soft, NT/ND; active BSx4 quadrants  Extremeties: RUE edema , Cogwheeling of RUE, tremor of B/L UE and LLE    LABS:                        10.0   6.24  )-----------( 188      ( 18 Jan 2024 05:30 )             30.8     01-18    140  |  103  |  14  ----------------------------<  111<H>  3.8   |  29  |  0.59    Ca    9.0      18 Jan 2024 05:30  Phos  3.4     01-18  Mg     1.9     01-18              BNP    Urinalysis Basic - ( 18 Jan 2024 05:30 )    Color: x / Appearance: x / SG: x / pH: x  Gluc: 111 mg/dL / Ketone: x  / Bili: x / Urobili: x   Blood: x / Protein: x / Nitrite: x   Leuk Esterase: x / RBC: x / WBC x   Sq Epi: x / Non Sq Epi: x / Bacteria: x            Microbiology:    Culture - Surgical Swab (collected 01-02-24 @ 18:03)  Source: .Surgical Swab left neck or spec  Gram Stain (01-02-24 @ 22:00):    Rare Gram Negative Rods    Rare Gram positive cocci in pairs    Moderate WBC's  Final Report (01-05-24 @ 12:42):    Moderate Escherichia coli    Moderate Enterobacter cloacae complex    Rare Candida parapsilosis    Rare Staphylococcus capitis  Organism: Enterobacter cloacae complex  Enterobacter cloacae complex  Escherichia coli  Escherichia coli (01-05-24 @ 12:16)  Organism: Escherichia coli (01-05-24 @ 12:16)      -  Tobramycin: S <=2      -  Gentamicin: S <=2      -  Cefazolin: S <=2      -  Piperacillin/Tazobactam: S <=8      -  Ciprofloxacin: S <=0.25      -  Ceftriaxone: S <=1      -  Ampicillin: S <=8 These ampicillin results predict results for amoxicillin      Method Type: OTTO      -  Ampicillin/Sulbactam: S <=4/2      -  Trimethoprim/Sulfamethoxazole: S 1/19      -  Ertapenem: S <=0.5  Organism: Escherichia coli (01-05-24 @ 12:16)      -  Tobramycin: S <=2      -  Gentamicin: S <=2      -  Cefazolin: S <=2      -  Piperacillin/Tazobactam: S <=8      -  Ciprofloxacin: S <=0.25      -  Ceftriaxone: S <=1      -  Ampicillin: S <=8 These ampicillin results predict results for amoxicillin      Method Type: OTTO      -  Ampicillin/Sulbactam: S <=4/2      -  Trimethoprim/Sulfamethoxazole: S 1/19      -  Ertapenem: S <=0.5  Organism: Enterobacter cloacae complex (01-05-24 @ 12:16)      Method Type: ETEST      -  Meropenem: S 0.094      -  Ertapenem: S 0.38  Organism: Enterobacter cloacae complex (01-05-24 @ 12:16)      -  Tobramycin: S <=2      -  Gentamicin: S <=2      -  Cefepime: R 16      -  Cefazolin: R >16      -  Piperacillin/Tazobactam: R >64      -  Ciprofloxacin: S <=0.25      -  Ceftriaxone: R >32 Enterobacter cloacae, Klebsiella aerogenes, and Citrobacter freundii may develop resistance during prolonged therapy.      -  Ampicillin: R >16 These ampicillin results predict results for amoxicillin      Method Type: OTTO      -  Meropenem: S <=1      -  Ampicillin/Sulbactam: R >16/8      -  Meropenem/Vaborbactam: S <=2      -  Trimethoprim/Sulfamethoxazole: S <=0.5/9.5    Culture - Tissue with Gram Stain (collected 01-02-24 @ 18:03)  Source: .Tissue left mandible tissue or spec  Gram Stain (01-02-24 @ 21:59):    Numerous Gram Negative Rods    Few Gram positive cocci in pairs    Few WBC's  Final Report (01-05-24 @ 12:29):    Numerous Escherichia coli    Numerous Enterobacter cloacae complex    Few Enterococcus faecalis    Rare Staphylococcus epidermidis  Organism: Escherichia coli  Enterobacter cloacae complex  Enterobacter cloacae complex  Enterococcus faecalis  Escherichia coli (01-05-24 @ 12:29)  Organism: Escherichia coli (01-05-24 @ 12:29)      -  Tobramycin: S <=2      -  Gentamicin: S <=2      -  Cefazolin: S <=2      -  Piperacillin/Tazobactam: S <=8      -  Ciprofloxacin: S <=0.25      -  Ceftriaxone: S <=1      -  Ampicillin: S <=8 These ampicillin results predict results for amoxicillin      Method Type: OTTO      -  Ampicillin/Sulbactam: S <=4/2      -  Trimethoprim/Sulfamethoxazole: S 1/19      -  Ertapenem: S <=0.5  Organism: Enterococcus faecalis (01-05-24 @ 12:29)      -  Vancomycin: S 2      -  Ampicillin: S <=2 Predicts results to ampicillin/sulbactam, amoxacillin-clavulanate and  piperacillin-tazobactam.      Method Type: OTTO  Organism: Enterobacter cloacae complex (01-05-24 @ 12:29)      -  Tobramycin: S <=2      -  Gentamicin: S <=2      -  Cefepime: R 16      -  Cefazolin: R >16      -  Piperacillin/Tazobactam: R >64      -  Ciprofloxacin: S <=0.25      -  Ceftriaxone: R >32 Enterobacter cloacae, Klebsiella aerogenes, and Citrobacter freundii may develop resistance during prolonged therapy.      -  Ampicillin: R >16 These ampicillin results predict results for amoxicillin      Method Type: OTTO      -  Meropenem: S <=1      -  Ampicillin/Sulbactam: R >16/8      -  Meropenem/Vaborbactam: S <=2      -  Trimethoprim/Sulfamethoxazole: S <=0.5/9.5  Organism: Enterobacter cloacae complex (01-05-24 @ 12:29)      Method Type: ETEST      -  Meropenem: S 0.094      -  Ertapenem: S 0.38  Organism: Escherichia coli (01-05-24 @ 12:29)      -  Tobramycin: S <=2      -  Gentamicin: S <=2      -  Cefazolin: S <=2      -  Piperacillin/Tazobactam: S <=8      -  Ciprofloxacin: S <=0.25      -  Ceftriaxone: S <=1      -  Ampicillin: S <=8 These ampicillin results predict results for amoxicillin      Method Type: OTTO      -  Ampicillin/Sulbactam: S <=4/2      -  Trimethoprim/Sulfamethoxazole: S 1/19      -  Ertapenem: S <=0.5    Culture - Blood (collected 12-28-23 @ 10:08)  Source: .Blood Blood-Peripheral  Final Report (01-02-24 @ 11:01):    No growth at 5 days.    Culture - Surgical Swab (collected 12-27-23 @ 20:46)  Source: .Surgical Swab 1. Left Oral Cavity  Gram Stain (12-27-23 @ 21:53):    Numerous Gram positive cocci in pairs, chains and clusters    Moderate Gram Positive Rods    Moderate Gram Negative Rods    Numerous White blood cells  Final Report (01-02-24 @ 10:25):    Moderate Escherichia coli    Moderate Enterobacter cloacae complex    Few Enterococcus faecalis    Few Streptococcus mitis/oralis group    Few Staphylococcus epidermidis    Few Streptococcus constellatus    Few Stenotrophomonas maltophilia    Mixed Anaerobic Joy including    Few Prevotella species (most closely resembles Prevotella barnaie)    Few Prevotella denticola    Culture grew 3 or more types of organisms which indicate collection    contamination; consider recollection only if clinically indicated.  Organism: Escherichia coli  Enterobacter cloacae complex  Enterococcus faecalis  Streptococcus constellatus  Streptococcus constellatus  Stenotrophomonas maltophilia  Stenotrophomonas maltophilia (01-02-24 @ 10:24)  Organism: Escherichia coli (01-02-24 @ 10:24)      -  Tobramycin: S <=2      -  Gentamicin: S <=2      -  Cefazolin: S <=2      -  Piperacillin/Tazobactam: S <=8      -  Ciprofloxacin: S <=0.25      -  Ceftriaxone: S <=1      -  Ampicillin: S <=8 These ampicillin results predict results for amoxicillin      Method Type: OTTO      -  Ampicillin/Sulbactam: S <=4/2      -  Trimethoprim/Sulfamethoxazole: S 1/19      -  Ertapenem: S <=0.5  Organism: Stenotrophomonas maltophilia (01-01-24 @ 14:56)      -  Minocycline: S      Method Type: KB  Organism: Stenotrophomonas maltophilia (01-01-24 @ 14:55)      -  Levofloxacin: S 1      Method Type: OTTO      -  Trimethoprim/Sulfamethoxazole: S <=0.5/9.5  Organism: Streptococcus constellatus (01-01-24 @ 14:55)      -  Ceftriaxone: S 0.094      Method Type: ETEST      -  Penicillin: S 0.047  Organism: Streptococcus constellatus (01-01-24 @ 14:55)      Method Type: KB  Organism: Enterobacter cloacae complex (01-01-24 @ 14:52)      -  Tobramycin: S <=2      -  Gentamicin: S <=2      -  Cefepime: SDD 8 The breakpoint for susceptible dose dependent may require the usage of a higher cefepime dose (equivalent to adult dose of 2g q8h for normal renal function) for successful treatment.      -  Cefazolin: R >16      -  Piperacillin/Tazobactam: R 64      -  Ciprofloxacin: S <=0.25      -  Ceftriaxone: R >32 Enterobacter cloacae, Klebsiella aerogenes, and Citrobacter freundii may develop resistance during prolonged therapy.      -  Ampicillin: R >16 These ampicillin results predict results for amoxicillin      Method Type: OTTO      -  Meropenem: S <=1      -  Ampicillin/Sulbactam: R >16/8      -  Cefoxitin: R >16      -  Trimethoprim/Sulfamethoxazole: S <=0.5/9.5      -  Ertapenem: S <=0.5  Organism: Enterococcus faecalis (12-30-23 @ 15:21)      -  Vancomycin: S 4      -  Ampicillin: S <=2 Predicts results to ampicillin/sulbactam, amoxacillin-clavulanate and  piperacillin-tazobactam.      Method Type: OTTO        RADIOLOGY & ADDITIONAL STUDIES: Reviewed.    ECG:    ECHO:

## 2024-01-20 ENCOUNTER — TRANSCRIPTION ENCOUNTER (OUTPATIENT)
Age: 69
End: 2024-01-20

## 2024-01-20 VITALS
TEMPERATURE: 98 F | DIASTOLIC BLOOD PRESSURE: 83 MMHG | SYSTOLIC BLOOD PRESSURE: 120 MMHG | RESPIRATION RATE: 17 BRPM | HEART RATE: 72 BPM

## 2024-01-20 PROCEDURE — 84484 ASSAY OF TROPONIN QUANT: CPT

## 2024-01-20 PROCEDURE — 87075 CULTR BACTERIA EXCEPT BLOOD: CPT

## 2024-01-20 PROCEDURE — 84100 ASSAY OF PHOSPHORUS: CPT

## 2024-01-20 PROCEDURE — 87184 SC STD DISK METHOD PER PLATE: CPT

## 2024-01-20 PROCEDURE — 86901 BLOOD TYPING SEROLOGIC RH(D): CPT

## 2024-01-20 PROCEDURE — 82550 ASSAY OF CK (CPK): CPT

## 2024-01-20 PROCEDURE — 84295 ASSAY OF SERUM SODIUM: CPT

## 2024-01-20 PROCEDURE — 82553 CREATINE MB FRACTION: CPT

## 2024-01-20 PROCEDURE — 86850 RBC ANTIBODY SCREEN: CPT

## 2024-01-20 PROCEDURE — 86923 COMPATIBILITY TEST ELECTRIC: CPT

## 2024-01-20 PROCEDURE — 92610 EVALUATE SWALLOWING FUNCTION: CPT

## 2024-01-20 PROCEDURE — 87635 SARS-COV-2 COVID-19 AMP PRB: CPT

## 2024-01-20 PROCEDURE — L8699: CPT

## 2024-01-20 PROCEDURE — 87186 SC STD MICRODIL/AGAR DIL: CPT

## 2024-01-20 PROCEDURE — 88307 TISSUE EXAM BY PATHOLOGIST: CPT

## 2024-01-20 PROCEDURE — 85018 HEMOGLOBIN: CPT

## 2024-01-20 PROCEDURE — 85730 THROMBOPLASTIN TIME PARTIAL: CPT

## 2024-01-20 PROCEDURE — 87070 CULTURE OTHR SPECIMN AEROBIC: CPT

## 2024-01-20 PROCEDURE — 80048 BASIC METABOLIC PNL TOTAL CA: CPT

## 2024-01-20 PROCEDURE — 97162 PT EVAL MOD COMPLEX 30 MIN: CPT

## 2024-01-20 PROCEDURE — 94640 AIRWAY INHALATION TREATMENT: CPT

## 2024-01-20 PROCEDURE — 36430 TRANSFUSION BLD/BLD COMPNT: CPT

## 2024-01-20 PROCEDURE — 86900 BLOOD TYPING SEROLOGIC ABO: CPT

## 2024-01-20 PROCEDURE — 88341 IMHCHEM/IMCYTCHM EA ADD ANTB: CPT

## 2024-01-20 PROCEDURE — 94002 VENT MGMT INPAT INIT DAY: CPT

## 2024-01-20 PROCEDURE — 88311 DECALCIFY TISSUE: CPT

## 2024-01-20 PROCEDURE — 85025 COMPLETE CBC W/AUTO DIFF WBC: CPT

## 2024-01-20 PROCEDURE — 97116 GAIT TRAINING THERAPY: CPT

## 2024-01-20 PROCEDURE — 82803 BLOOD GASES ANY COMBINATION: CPT

## 2024-01-20 PROCEDURE — 97530 THERAPEUTIC ACTIVITIES: CPT

## 2024-01-20 PROCEDURE — 87181 SC STD AGAR DILUTION PER AGT: CPT

## 2024-01-20 PROCEDURE — 36415 COLL VENOUS BLD VENIPUNCTURE: CPT

## 2024-01-20 PROCEDURE — 84443 ASSAY THYROID STIM HORMONE: CPT

## 2024-01-20 PROCEDURE — 82272 OCCULT BLD FECES 1-3 TESTS: CPT

## 2024-01-20 PROCEDURE — C1889: CPT

## 2024-01-20 PROCEDURE — 82962 GLUCOSE BLOOD TEST: CPT

## 2024-01-20 PROCEDURE — 99233 SBSQ HOSP IP/OBS HIGH 50: CPT | Mod: GC

## 2024-01-20 PROCEDURE — 82330 ASSAY OF CALCIUM: CPT

## 2024-01-20 PROCEDURE — P9045: CPT

## 2024-01-20 PROCEDURE — 88305 TISSUE EXAM BY PATHOLOGIST: CPT

## 2024-01-20 PROCEDURE — 85027 COMPLETE CBC AUTOMATED: CPT

## 2024-01-20 PROCEDURE — 83605 ASSAY OF LACTIC ACID: CPT

## 2024-01-20 PROCEDURE — 85610 PROTHROMBIN TIME: CPT

## 2024-01-20 PROCEDURE — 87040 BLOOD CULTURE FOR BACTERIA: CPT

## 2024-01-20 PROCEDURE — 71045 X-RAY EXAM CHEST 1 VIEW: CPT

## 2024-01-20 PROCEDURE — 85045 AUTOMATED RETICULOCYTE COUNT: CPT

## 2024-01-20 PROCEDURE — 84134 ASSAY OF PREALBUMIN: CPT

## 2024-01-20 PROCEDURE — 97165 OT EVAL LOW COMPLEX 30 MIN: CPT

## 2024-01-20 PROCEDURE — 84132 ASSAY OF SERUM POTASSIUM: CPT

## 2024-01-20 PROCEDURE — 80076 HEPATIC FUNCTION PANEL: CPT

## 2024-01-20 PROCEDURE — 83735 ASSAY OF MAGNESIUM: CPT

## 2024-01-20 PROCEDURE — P9016: CPT

## 2024-01-20 PROCEDURE — 82947 ASSAY GLUCOSE BLOOD QUANT: CPT

## 2024-01-20 PROCEDURE — 80202 ASSAY OF VANCOMYCIN: CPT

## 2024-01-20 PROCEDURE — 80053 COMPREHEN METABOLIC PANEL: CPT

## 2024-01-20 PROCEDURE — 97164 PT RE-EVAL EST PLAN CARE: CPT

## 2024-01-20 RX ADMIN — Medication 3 MILLILITER(S): at 03:35

## 2024-01-20 RX ADMIN — GABAPENTIN 300 MILLIGRAM(S): 400 CAPSULE ORAL at 06:05

## 2024-01-20 RX ADMIN — CHLORHEXIDINE GLUCONATE 15 MILLILITER(S): 213 SOLUTION TOPICAL at 06:05

## 2024-01-20 RX ADMIN — Medication 1 APPLICATION(S): at 06:05

## 2024-01-20 NOTE — PROGRESS NOTE ADULT - SUBJECTIVE AND OBJECTIVE BOX
REASON FOR CONSULT: Comanagement    INTERVAL/OVERNIGHT EVENTS: As per overnight staff, there were no acute events overnight    SUBJECTIVE HPI: Patient seen and examined at bedside. Patient resting comfortably, no complaints at this time. Patient denies: fever, chills, weakness, dizziness, headaches, changes in vision, chest pain, palpitations, shortness of breath, cough, N/V, diarrhea or constipation, dysuria, LE edema. ROS otherwise negative.      MEDICATIONS  (STANDING):  albuterol/ipratropium for Nebulization 3 milliLiter(s) Nebulizer every 6 hours  aspirin  chewable 81 milliGRAM(s) Oral daily  bacitracin   Ointment 1 Application(s) Topical every 12 hours  chlorhexidine 0.12% Liquid 15 milliLiter(s) Oral Mucosa two times a day  clopidogrel Tablet 75 milliGRAM(s) Oral daily  dextrose 5%. 1000 milliLiter(s) (100 mL/Hr) IV Continuous <Continuous>  dextrose 5%. 1000 milliLiter(s) (50 mL/Hr) IV Continuous <Continuous>  dextrose 50% Injectable 25 Gram(s) IV Push once  dextrose 50% Injectable 25 Gram(s) IV Push once  dextrose 50% Injectable 12.5 Gram(s) IV Push once  doxazosin 1 milliGRAM(s) Oral at bedtime  enoxaparin Injectable 40 milliGRAM(s) SubCutaneous every 24 hours  gabapentin Solution 300 milliGRAM(s) Oral every 12 hours  glucagon  Injectable 1 milliGRAM(s) IntraMuscular once  influenza  Vaccine (HIGH DOSE) 0.7 milliLiter(s) IntraMuscular once  insulin lispro (ADMELOG) corrective regimen sliding scale   SubCutaneous every 6 hours  multivitamin/minerals/iron Oral Solution (CENTRUM) 15 milliLiter(s) Oral daily  traZODone 50 milliGRAM(s) Oral at bedtime    MEDICATIONS  (PRN):  dextrose Oral Gel 15 Gram(s) Oral once PRN Blood Glucose LESS THAN 70 milliGRAM(s)/deciliter  HYDROmorphone  Injectable 0.5 milliGRAM(s) IV Push every 3 hours PRN breakthrough pain  lidocaine   4% Patch 1 Patch Transdermal daily PRN back pain  ondansetron Injectable 4 milliGRAM(s) IV Push every 8 hours PRN Nausea and/or Vomiting  oxyCODONE    Solution 5 milliGRAM(s) Enteral Tube every 6 hours PRN Moderate Pain (4 - 6)  oxyCODONE    Solution 10 milliGRAM(s) Enteral Tube every 6 hours PRN Severe Pain (7 - 10)        VITAL SIGNS:  Vital Signs Last 24 Hrs  T(C): 36.8 (19 Jan 2024 09:18), Max: 37 (18 Jan 2024 21:56)  T(F): 98.2 (19 Jan 2024 09:18), Max: 98.6 (18 Jan 2024 21:56)  HR: 80 (19 Jan 2024 11:49) (70 - 88)  BP: 100/55 (19 Jan 2024 11:49) (100/55 - 124/60)  BP(mean): 75 (19 Jan 2024 11:49) (75 - 86)  RR: 18 (19 Jan 2024 11:49) (16 - 24)  SpO2: 97% (19 Jan 2024 11:49) (96% - 100%)    Parameters below as of 19 Jan 2024 11:49  Patient On (Oxygen Delivery Method): room air        I&O's Detail    18 Jan 2024 07:01  -  19 Jan 2024 07:00  --------------------------------------------------------  IN:    Enteral Tube Flush: 800 mL    Vital1.5: 1348 mL  Total IN: 2148 mL    OUT:    Bulb (mL): 5 mL    Bulb (mL): 16 mL    Voided (mL): 625 mL  Total OUT: 646 mL    Total NET: 1502 mL      19 Jan 2024 07:01  -  19 Jan 2024 12:37  --------------------------------------------------------  IN:    Enteral Tube Flush: 200 mL    Vital1.5: 337 mL  Total IN: 537 mL    OUT:    Bulb (mL): 8 mL  Total OUT: 8 mL    Total NET: 529 mL      PHYSICAL EXAM:  General: Comfortable, NAD  Neurological: AAOx3, no focal deficits  HEENT: trach in place, perfuse drooling, passy elio valve in place, L lateral aspect of neck with dressing in place, no draining from incision  Cardiovascular: +S1/S2, no murmurs/rubs/gallops, RRR  Respiratory: CTA B/L, no diminished breath sounds, no wheezes/rales/rhonchi, no increased work of breathing or accessory muscle use  Gastrointestinal: soft, NT/ND; active BSx4 quadrants  Extremeties: RUE edema , Cogwheeling of RUE, tremor of B/L UE and LLE    LABS:                        10.0   6.24  )-----------( 188      ( 18 Jan 2024 05:30 )             30.8     01-18    140  |  103  |  14  ----------------------------<  111<H>  3.8   |  29  |  0.59    Ca    9.0      18 Jan 2024 05:30  Phos  3.4     01-18  Mg     1.9     01-18              BNP    Urinalysis Basic - ( 18 Jan 2024 05:30 )    Color: x / Appearance: x / SG: x / pH: x  Gluc: 111 mg/dL / Ketone: x  / Bili: x / Urobili: x   Blood: x / Protein: x / Nitrite: x   Leuk Esterase: x / RBC: x / WBC x   Sq Epi: x / Non Sq Epi: x / Bacteria: x            Microbiology:    Culture - Surgical Swab (collected 01-02-24 @ 18:03)  Source: .Surgical Swab left neck or spec  Gram Stain (01-02-24 @ 22:00):    Rare Gram Negative Rods    Rare Gram positive cocci in pairs    Moderate WBC's  Final Report (01-05-24 @ 12:42):    Moderate Escherichia coli    Moderate Enterobacter cloacae complex    Rare Candida parapsilosis    Rare Staphylococcus capitis  Organism: Enterobacter cloacae complex  Enterobacter cloacae complex  Escherichia coli  Escherichia coli (01-05-24 @ 12:16)  Organism: Escherichia coli (01-05-24 @ 12:16)      -  Tobramycin: S <=2      -  Gentamicin: S <=2      -  Cefazolin: S <=2      -  Piperacillin/Tazobactam: S <=8      -  Ciprofloxacin: S <=0.25      -  Ceftriaxone: S <=1      -  Ampicillin: S <=8 These ampicillin results predict results for amoxicillin      Method Type: OTTO      -  Ampicillin/Sulbactam: S <=4/2      -  Trimethoprim/Sulfamethoxazole: S 1/19      -  Ertapenem: S <=0.5  Organism: Escherichia coli (01-05-24 @ 12:16)      -  Tobramycin: S <=2      -  Gentamicin: S <=2      -  Cefazolin: S <=2      -  Piperacillin/Tazobactam: S <=8      -  Ciprofloxacin: S <=0.25      -  Ceftriaxone: S <=1      -  Ampicillin: S <=8 These ampicillin results predict results for amoxicillin      Method Type: OTTO      -  Ampicillin/Sulbactam: S <=4/2      -  Trimethoprim/Sulfamethoxazole: S 1/19      -  Ertapenem: S <=0.5  Organism: Enterobacter cloacae complex (01-05-24 @ 12:16)      Method Type: ETEST      -  Meropenem: S 0.094      -  Ertapenem: S 0.38  Organism: Enterobacter cloacae complex (01-05-24 @ 12:16)      -  Tobramycin: S <=2      -  Gentamicin: S <=2      -  Cefepime: R 16      -  Cefazolin: R >16      -  Piperacillin/Tazobactam: R >64      -  Ciprofloxacin: S <=0.25      -  Ceftriaxone: R >32 Enterobacter cloacae, Klebsiella aerogenes, and Citrobacter freundii may develop resistance during prolonged therapy.      -  Ampicillin: R >16 These ampicillin results predict results for amoxicillin      Method Type: OTTO      -  Meropenem: S <=1      -  Ampicillin/Sulbactam: R >16/8      -  Meropenem/Vaborbactam: S <=2      -  Trimethoprim/Sulfamethoxazole: S <=0.5/9.5    Culture - Tissue with Gram Stain (collected 01-02-24 @ 18:03)  Source: .Tissue left mandible tissue or spec  Gram Stain (01-02-24 @ 21:59):    Numerous Gram Negative Rods    Few Gram positive cocci in pairs    Few WBC's  Final Report (01-05-24 @ 12:29):    Numerous Escherichia coli    Numerous Enterobacter cloacae complex    Few Enterococcus faecalis    Rare Staphylococcus epidermidis  Organism: Escherichia coli  Enterobacter cloacae complex  Enterobacter cloacae complex  Enterococcus faecalis  Escherichia coli (01-05-24 @ 12:29)  Organism: Escherichia coli (01-05-24 @ 12:29)      -  Tobramycin: S <=2      -  Gentamicin: S <=2      -  Cefazolin: S <=2      -  Piperacillin/Tazobactam: S <=8      -  Ciprofloxacin: S <=0.25      -  Ceftriaxone: S <=1      -  Ampicillin: S <=8 These ampicillin results predict results for amoxicillin      Method Type: OTTO      -  Ampicillin/Sulbactam: S <=4/2      -  Trimethoprim/Sulfamethoxazole: S 1/19      -  Ertapenem: S <=0.5  Organism: Enterococcus faecalis (01-05-24 @ 12:29)      -  Vancomycin: S 2      -  Ampicillin: S <=2 Predicts results to ampicillin/sulbactam, amoxacillin-clavulanate and  piperacillin-tazobactam.      Method Type: OTTO  Organism: Enterobacter cloacae complex (01-05-24 @ 12:29)      -  Tobramycin: S <=2      -  Gentamicin: S <=2      -  Cefepime: R 16      -  Cefazolin: R >16      -  Piperacillin/Tazobactam: R >64      -  Ciprofloxacin: S <=0.25      -  Ceftriaxone: R >32 Enterobacter cloacae, Klebsiella aerogenes, and Citrobacter freundii may develop resistance during prolonged therapy.      -  Ampicillin: R >16 These ampicillin results predict results for amoxicillin      Method Type: OTTO      -  Meropenem: S <=1      -  Ampicillin/Sulbactam: R >16/8      -  Meropenem/Vaborbactam: S <=2      -  Trimethoprim/Sulfamethoxazole: S <=0.5/9.5  Organism: Enterobacter cloacae complex (01-05-24 @ 12:29)      Method Type: ETEST      -  Meropenem: S 0.094      -  Ertapenem: S 0.38  Organism: Escherichia coli (01-05-24 @ 12:29)      -  Tobramycin: S <=2      -  Gentamicin: S <=2      -  Cefazolin: S <=2      -  Piperacillin/Tazobactam: S <=8      -  Ciprofloxacin: S <=0.25      -  Ceftriaxone: S <=1      -  Ampicillin: S <=8 These ampicillin results predict results for amoxicillin      Method Type: OTTO      -  Ampicillin/Sulbactam: S <=4/2      -  Trimethoprim/Sulfamethoxazole: S 1/19      -  Ertapenem: S <=0.5    Culture - Blood (collected 12-28-23 @ 10:08)  Source: .Blood Blood-Peripheral  Final Report (01-02-24 @ 11:01):    No growth at 5 days.    Culture - Surgical Swab (collected 12-27-23 @ 20:46)  Source: .Surgical Swab 1. Left Oral Cavity  Gram Stain (12-27-23 @ 21:53):    Numerous Gram positive cocci in pairs, chains and clusters    Moderate Gram Positive Rods    Moderate Gram Negative Rods    Numerous White blood cells  Final Report (01-02-24 @ 10:25):    Moderate Escherichia coli    Moderate Enterobacter cloacae complex    Few Enterococcus faecalis    Few Streptococcus mitis/oralis group    Few Staphylococcus epidermidis    Few Streptococcus constellatus    Few Stenotrophomonas maltophilia    Mixed Anaerobic Joy including    Few Prevotella species (most closely resembles Prevotella barnaie)    Few Prevotella denticola    Culture grew 3 or more types of organisms which indicate collection    contamination; consider recollection only if clinically indicated.  Organism: Escherichia coli  Enterobacter cloacae complex  Enterococcus faecalis  Streptococcus constellatus  Streptococcus constellatus  Stenotrophomonas maltophilia  Stenotrophomonas maltophilia (01-02-24 @ 10:24)  Organism: Escherichia coli (01-02-24 @ 10:24)      -  Tobramycin: S <=2      -  Gentamicin: S <=2      -  Cefazolin: S <=2      -  Piperacillin/Tazobactam: S <=8      -  Ciprofloxacin: S <=0.25      -  Ceftriaxone: S <=1      -  Ampicillin: S <=8 These ampicillin results predict results for amoxicillin      Method Type: OTTO      -  Ampicillin/Sulbactam: S <=4/2      -  Trimethoprim/Sulfamethoxazole: S 1/19      -  Ertapenem: S <=0.5  Organism: Stenotrophomonas maltophilia (01-01-24 @ 14:56)      -  Minocycline: S      Method Type: KB  Organism: Stenotrophomonas maltophilia (01-01-24 @ 14:55)      -  Levofloxacin: S 1      Method Type: OTTO      -  Trimethoprim/Sulfamethoxazole: S <=0.5/9.5  Organism: Streptococcus constellatus (01-01-24 @ 14:55)      -  Ceftriaxone: S 0.094      Method Type: ETEST      -  Penicillin: S 0.047  Organism: Streptococcus constellatus (01-01-24 @ 14:55)      Method Type: KB  Organism: Enterobacter cloacae complex (01-01-24 @ 14:52)      -  Tobramycin: S <=2      -  Gentamicin: S <=2      -  Cefepime: SDD 8 The breakpoint for susceptible dose dependent may require the usage of a higher cefepime dose (equivalent to adult dose of 2g q8h for normal renal function) for successful treatment.      -  Cefazolin: R >16      -  Piperacillin/Tazobactam: R 64      -  Ciprofloxacin: S <=0.25      -  Ceftriaxone: R >32 Enterobacter cloacae, Klebsiella aerogenes, and Citrobacter freundii may develop resistance during prolonged therapy.      -  Ampicillin: R >16 These ampicillin results predict results for amoxicillin      Method Type: OTTO      -  Meropenem: S <=1      -  Ampicillin/Sulbactam: R >16/8      -  Cefoxitin: R >16      -  Trimethoprim/Sulfamethoxazole: S <=0.5/9.5      -  Ertapenem: S <=0.5  Organism: Enterococcus faecalis (12-30-23 @ 15:21)      -  Vancomycin: S 4      -  Ampicillin: S <=2 Predicts results to ampicillin/sulbactam, amoxacillin-clavulanate and  piperacillin-tazobactam.      Method Type: OTTO        RADIOLOGY & ADDITIONAL STUDIES: Reviewed.    ECG:    ECHO:    REASON FOR CONSULT: Comanagement    INTERVAL/OVERNIGHT EVENTS: As per overnight staff, there were no acute events overnight    SUBJECTIVE HPI: Patient seen and examined at bedside. Patient resting comfortably, no complaints at this time. Patient denies: fever, chills, weakness, dizziness, headaches, changes in vision, chest pain, palpitations, shortness of breath, cough, N/V, diarrhea or constipation, dysuria, LE edema. ROS otherwise negative.      MEDICATIONS  (STANDING):  albuterol/ipratropium for Nebulization 3 milliLiter(s) Nebulizer every 6 hours  aspirin  chewable 81 milliGRAM(s) Oral daily  bacitracin   Ointment 1 Application(s) Topical every 12 hours  chlorhexidine 0.12% Liquid 15 milliLiter(s) Oral Mucosa two times a day  clopidogrel Tablet 75 milliGRAM(s) Oral daily  dextrose 5%. 1000 milliLiter(s) (100 mL/Hr) IV Continuous <Continuous>  dextrose 5%. 1000 milliLiter(s) (50 mL/Hr) IV Continuous <Continuous>  dextrose 50% Injectable 25 Gram(s) IV Push once  dextrose 50% Injectable 25 Gram(s) IV Push once  dextrose 50% Injectable 12.5 Gram(s) IV Push once  doxazosin 1 milliGRAM(s) Oral at bedtime  enoxaparin Injectable 40 milliGRAM(s) SubCutaneous every 24 hours  gabapentin Solution 300 milliGRAM(s) Oral every 12 hours  glucagon  Injectable 1 milliGRAM(s) IntraMuscular once  influenza  Vaccine (HIGH DOSE) 0.7 milliLiter(s) IntraMuscular once  insulin lispro (ADMELOG) corrective regimen sliding scale   SubCutaneous every 6 hours  multivitamin/minerals/iron Oral Solution (CENTRUM) 15 milliLiter(s) Oral daily  traZODone 50 milliGRAM(s) Oral at bedtime    MEDICATIONS  (PRN):  dextrose Oral Gel 15 Gram(s) Oral once PRN Blood Glucose LESS THAN 70 milliGRAM(s)/deciliter  HYDROmorphone  Injectable 0.5 milliGRAM(s) IV Push every 3 hours PRN breakthrough pain  lidocaine   4% Patch 1 Patch Transdermal daily PRN back pain  ondansetron Injectable 4 milliGRAM(s) IV Push every 8 hours PRN Nausea and/or Vomiting  oxyCODONE    Solution 5 milliGRAM(s) Enteral Tube every 6 hours PRN Moderate Pain (4 - 6)  oxyCODONE    Solution 10 milliGRAM(s) Enteral Tube every 6 hours PRN Severe Pain (7 - 10)        VITAL SIGNS:  Vital Signs Last 24 Hrs  T(C): 36.8 (19 Jan 2024 09:18), Max: 37 (18 Jan 2024 21:56)  T(F): 98.2 (19 Jan 2024 09:18), Max: 98.6 (18 Jan 2024 21:56)  HR: 80 (19 Jan 2024 11:49) (70 - 88)  BP: 100/55 (19 Jan 2024 11:49) (100/55 - 124/60)  BP(mean): 75 (19 Jan 2024 11:49) (75 - 86)  RR: 18 (19 Jan 2024 11:49) (16 - 24)  SpO2: 97% (19 Jan 2024 11:49) (96% - 100%)    Parameters below as of 19 Jan 2024 11:49  Patient On (Oxygen Delivery Method): room air        I&O's Detail    18 Jan 2024 07:01  -  19 Jan 2024 07:00  --------------------------------------------------------  IN:    Enteral Tube Flush: 800 mL    Vital1.5: 1348 mL  Total IN: 2148 mL    OUT:    Bulb (mL): 5 mL    Bulb (mL): 16 mL    Voided (mL): 625 mL  Total OUT: 646 mL    Total NET: 1502 mL      19 Jan 2024 07:01  -  19 Jan 2024 12:37  --------------------------------------------------------  IN:    Enteral Tube Flush: 200 mL    Vital1.5: 337 mL  Total IN: 537 mL    OUT:    Bulb (mL): 8 mL  Total OUT: 8 mL    Total NET: 529 mL      PHYSICAL EXAM:  General: Comfortable, NAD  Neurological: AAOx3, no focal deficits  HEENT: trach in place, perfuse drooling, passy elio valve in place, R lower chin swelling  Cardiovascular: +S1/S2, no murmurs/rubs/gallops, RRR  Respiratory: CTA B/L, no diminished breath sounds, no wheezes/rales/rhonchi, no increased work of breathing or accessory muscle use  Gastrointestinal: soft, NT/ND; active BSx4 quadrants  Extremities: RUE edema , Cogwheeling of RUE, tremor of B/L UE and LLE    LABS:                        10.0   6.24  )-----------( 188      ( 18 Jan 2024 05:30 )             30.8     01-18    140  |  103  |  14  ----------------------------<  111<H>  3.8   |  29  |  0.59    Ca    9.0      18 Jan 2024 05:30  Phos  3.4     01-18  Mg     1.9     01-18              BNP    Urinalysis Basic - ( 18 Jan 2024 05:30 )    Color: x / Appearance: x / SG: x / pH: x  Gluc: 111 mg/dL / Ketone: x  / Bili: x / Urobili: x   Blood: x / Protein: x / Nitrite: x   Leuk Esterase: x / RBC: x / WBC x   Sq Epi: x / Non Sq Epi: x / Bacteria: x            Microbiology:    Culture - Surgical Swab (collected 01-02-24 @ 18:03)  Source: .Surgical Swab left neck or spec  Gram Stain (01-02-24 @ 22:00):    Rare Gram Negative Rods    Rare Gram positive cocci in pairs    Moderate WBC's  Final Report (01-05-24 @ 12:42):    Moderate Escherichia coli    Moderate Enterobacter cloacae complex    Rare Candida parapsilosis    Rare Staphylococcus capitis  Organism: Enterobacter cloacae complex  Enterobacter cloacae complex  Escherichia coli  Escherichia coli (01-05-24 @ 12:16)  Organism: Escherichia coli (01-05-24 @ 12:16)      -  Tobramycin: S <=2      -  Gentamicin: S <=2      -  Cefazolin: S <=2      -  Piperacillin/Tazobactam: S <=8      -  Ciprofloxacin: S <=0.25      -  Ceftriaxone: S <=1      -  Ampicillin: S <=8 These ampicillin results predict results for amoxicillin      Method Type: OTTO      -  Ampicillin/Sulbactam: S <=4/2      -  Trimethoprim/Sulfamethoxazole: S 1/19      -  Ertapenem: S <=0.5  Organism: Escherichia coli (01-05-24 @ 12:16)      -  Tobramycin: S <=2      -  Gentamicin: S <=2      -  Cefazolin: S <=2      -  Piperacillin/Tazobactam: S <=8      -  Ciprofloxacin: S <=0.25      -  Ceftriaxone: S <=1      -  Ampicillin: S <=8 These ampicillin results predict results for amoxicillin      Method Type: OTTO      -  Ampicillin/Sulbactam: S <=4/2      -  Trimethoprim/Sulfamethoxazole: S 1/19      -  Ertapenem: S <=0.5  Organism: Enterobacter cloacae complex (01-05-24 @ 12:16)      Method Type: ETEST      -  Meropenem: S 0.094      -  Ertapenem: S 0.38  Organism: Enterobacter cloacae complex (01-05-24 @ 12:16)      -  Tobramycin: S <=2      -  Gentamicin: S <=2      -  Cefepime: R 16      -  Cefazolin: R >16      -  Piperacillin/Tazobactam: R >64      -  Ciprofloxacin: S <=0.25      -  Ceftriaxone: R >32 Enterobacter cloacae, Klebsiella aerogenes, and Citrobacter freundii may develop resistance during prolonged therapy.      -  Ampicillin: R >16 These ampicillin results predict results for amoxicillin      Method Type: OTTO      -  Meropenem: S <=1      -  Ampicillin/Sulbactam: R >16/8      -  Meropenem/Vaborbactam: S <=2      -  Trimethoprim/Sulfamethoxazole: S <=0.5/9.5    Culture - Tissue with Gram Stain (collected 01-02-24 @ 18:03)  Source: .Tissue left mandible tissue or spec  Gram Stain (01-02-24 @ 21:59):    Numerous Gram Negative Rods    Few Gram positive cocci in pairs    Few WBC's  Final Report (01-05-24 @ 12:29):    Numerous Escherichia coli    Numerous Enterobacter cloacae complex    Few Enterococcus faecalis    Rare Staphylococcus epidermidis  Organism: Escherichia coli  Enterobacter cloacae complex  Enterobacter cloacae complex  Enterococcus faecalis  Escherichia coli (01-05-24 @ 12:29)  Organism: Escherichia coli (01-05-24 @ 12:29)      -  Tobramycin: S <=2      -  Gentamicin: S <=2      -  Cefazolin: S <=2      -  Piperacillin/Tazobactam: S <=8      -  Ciprofloxacin: S <=0.25      -  Ceftriaxone: S <=1      -  Ampicillin: S <=8 These ampicillin results predict results for amoxicillin      Method Type: OTTO      -  Ampicillin/Sulbactam: S <=4/2      -  Trimethoprim/Sulfamethoxazole: S 1/19      -  Ertapenem: S <=0.5  Organism: Enterococcus faecalis (01-05-24 @ 12:29)      -  Vancomycin: S 2      -  Ampicillin: S <=2 Predicts results to ampicillin/sulbactam, amoxacillin-clavulanate and  piperacillin-tazobactam.      Method Type: OTTO  Organism: Enterobacter cloacae complex (01-05-24 @ 12:29)      -  Tobramycin: S <=2      -  Gentamicin: S <=2      -  Cefepime: R 16      -  Cefazolin: R >16      -  Piperacillin/Tazobactam: R >64      -  Ciprofloxacin: S <=0.25      -  Ceftriaxone: R >32 Enterobacter cloacae, Klebsiella aerogenes, and Citrobacter freundii may develop resistance during prolonged therapy.      -  Ampicillin: R >16 These ampicillin results predict results for amoxicillin      Method Type: OTTO      -  Meropenem: S <=1      -  Ampicillin/Sulbactam: R >16/8      -  Meropenem/Vaborbactam: S <=2      -  Trimethoprim/Sulfamethoxazole: S <=0.5/9.5  Organism: Enterobacter cloacae complex (01-05-24 @ 12:29)      Method Type: ETEST      -  Meropenem: S 0.094      -  Ertapenem: S 0.38  Organism: Escherichia coli (01-05-24 @ 12:29)      -  Tobramycin: S <=2      -  Gentamicin: S <=2      -  Cefazolin: S <=2      -  Piperacillin/Tazobactam: S <=8      -  Ciprofloxacin: S <=0.25      -  Ceftriaxone: S <=1      -  Ampicillin: S <=8 These ampicillin results predict results for amoxicillin      Method Type: OTTO      -  Ampicillin/Sulbactam: S <=4/2      -  Trimethoprim/Sulfamethoxazole: S 1/19      -  Ertapenem: S <=0.5    Culture - Blood (collected 12-28-23 @ 10:08)  Source: .Blood Blood-Peripheral  Final Report (01-02-24 @ 11:01):    No growth at 5 days.    Culture - Surgical Swab (collected 12-27-23 @ 20:46)  Source: .Surgical Swab 1. Left Oral Cavity  Gram Stain (12-27-23 @ 21:53):    Numerous Gram positive cocci in pairs, chains and clusters    Moderate Gram Positive Rods    Moderate Gram Negative Rods    Numerous White blood cells  Final Report (01-02-24 @ 10:25):    Moderate Escherichia coli    Moderate Enterobacter cloacae complex    Few Enterococcus faecalis    Few Streptococcus mitis/oralis group    Few Staphylococcus epidermidis    Few Streptococcus constellatus    Few Stenotrophomonas maltophilia    Mixed Anaerobic Joy including    Few Prevotella species (most closely resembles Prevotella barnaie)    Few Prevotella denticola    Culture grew 3 or more types of organisms which indicate collection    contamination; consider recollection only if clinically indicated.  Organism: Escherichia coli  Enterobacter cloacae complex  Enterococcus faecalis  Streptococcus constellatus  Streptococcus constellatus  Stenotrophomonas maltophilia  Stenotrophomonas maltophilia (01-02-24 @ 10:24)  Organism: Escherichia coli (01-02-24 @ 10:24)      -  Tobramycin: S <=2      -  Gentamicin: S <=2      -  Cefazolin: S <=2      -  Piperacillin/Tazobactam: S <=8      -  Ciprofloxacin: S <=0.25      -  Ceftriaxone: S <=1      -  Ampicillin: S <=8 These ampicillin results predict results for amoxicillin      Method Type: OTTO      -  Ampicillin/Sulbactam: S <=4/2      -  Trimethoprim/Sulfamethoxazole: S 1/19      -  Ertapenem: S <=0.5  Organism: Stenotrophomonas maltophilia (01-01-24 @ 14:56)      -  Minocycline: S      Method Type: KB  Organism: Stenotrophomonas maltophilia (01-01-24 @ 14:55)      -  Levofloxacin: S 1      Method Type: OTTO      -  Trimethoprim/Sulfamethoxazole: S <=0.5/9.5  Organism: Streptococcus constellatus (01-01-24 @ 14:55)      -  Ceftriaxone: S 0.094      Method Type: ETEST      -  Penicillin: S 0.047  Organism: Streptococcus constellatus (01-01-24 @ 14:55)      Method Type: KB  Organism: Enterobacter cloacae complex (01-01-24 @ 14:52)      -  Tobramycin: S <=2      -  Gentamicin: S <=2      -  Cefepime: SDD 8 The breakpoint for susceptible dose dependent may require the usage of a higher cefepime dose (equivalent to adult dose of 2g q8h for normal renal function) for successful treatment.      -  Cefazolin: R >16      -  Piperacillin/Tazobactam: R 64      -  Ciprofloxacin: S <=0.25      -  Ceftriaxone: R >32 Enterobacter cloacae, Klebsiella aerogenes, and Citrobacter freundii may develop resistance during prolonged therapy.      -  Ampicillin: R >16 These ampicillin results predict results for amoxicillin      Method Type: OTTO      -  Meropenem: S <=1      -  Ampicillin/Sulbactam: R >16/8      -  Cefoxitin: R >16      -  Trimethoprim/Sulfamethoxazole: S <=0.5/9.5      -  Ertapenem: S <=0.5  Organism: Enterococcus faecalis (12-30-23 @ 15:21)      -  Vancomycin: S 4      -  Ampicillin: S <=2 Predicts results to ampicillin/sulbactam, amoxacillin-clavulanate and  piperacillin-tazobactam.      Method Type: OTTO        RADIOLOGY & ADDITIONAL STUDIES: Reviewed.    ECG:    ECHO:

## 2024-01-20 NOTE — PROGRESS NOTE ADULT - ATTENDING COMMENTS
69 y/o M PMHx of CAD s/p PCI/CUBA x2 to LAD and Ramus (Mar 2023), T2DM, psoriasis, hx Renal calculi, w/ [id number] SCC of L buccal mucosa s/p hemimandibulectomy, left level 1, 2a, 3 neck neck dissection, L FFF, tracheostomy w/ 7.5 cuffed portex, dental implants, STSG (12/7/23), s/p G tube placement and subsequent trach decannulation and discharged home on ( 12/22/23). Pt however returned to [facility] ( 12/27/23) with surgical site infection, s/p RTOR 12/27 with findings of skin flap necrosis and s/p debridement. Trach replaced through prior stoma for pulmonary toilet. Patient also noted to have dark stools and dropped in Hgb- wife reported black stool for the last 3 days and same thing coming out from peg tube 12/28- anemia required blood transfusion -    CC: Pt seen w. Dr. Montes, Wife at bedside. Ada STARKS PA at bedside. Pt resting in bed, in good spirits. Trach capped, vocalizing.  feeling well. concerns mild resting tremors and reports uncle has Parkinson's disease. advise pt to seen Movement disorder specialist at his area and in network with his insurance    RRR, moving good air.  mild resting tremors and mild cog-wheeling upon passive ROM    # Skin flap necrosis s/p exploration and debridement ( 12/27)   # RTOR for wound debridement and EGD( 1/2)   OR findings  (1/2): infected, non-viable free fibula flap with viable skin paddle. Purulent drainage appreciated at margins of flap and in neck.  EGD( 1/2): ulcer found at the G-tube site  # RTOR- flat change with Latissmus dorsi- close monitoring in sicu, now on step down  wound care as per ENT.   - ID f/u appreciated ( Team 1) ,  d/c's Vanco & Zosyn ( 1/1) and completed Imipenem 1gm iv q8h  x 2 wks since washout 1/2 (1/1-1/16)  - f/u OR culture ( 1/2): reviewed   - OR cultures with E.coli, ECC, E.faecalis S.mitis/oralis, S.epi, S.constellatus, S.maltophilia, mixed anaerobes.   - culture result reviewed.  - BCx( 12/28): ngtd     # Acute blood loss anemia/melena -Hb stable-  # hx WADE ( Ferritin 768 but Tsat 13% ( <20%) on 12/13/23)   - GI fu appreciated, EGD( 1/2) ulcer at G-tube site, rec; Protonix 40mg daily for 8 weeks and avoid bumper being too tight to cause pressure ulcer   - keep active T&S, keep Hgb>8  -stable above 10 (1/17)  - c/w ASA given hx PCI x2 ( March 2023)  - resumed Plavix 75mg po daily ( 1/17-)   - c/w PPI daily can be via G-tube   - c/w Folic acid 1mg daily   - monitor clinically for any further melena     # SLP evaluation appreciated. diet per ENT.    # CAD sp PCI/CUBA x2 to LAD and Ramus in March 2023 as per cards is in setting of NSTEMI - now back on DAPT,  ASA 81mg daily , and restarted plavix 75mg daily and Atorvastatin 40mg qHS     #  DM - FS with ISS, would hold all ora-hypoglycemic agent, goal -180 -within goal.     # pain management - oxycodone 5mg/10mg prn , dilaudid prn, would need stool softener to ensure daily BM.     # Dysphagia- NPO, trach, on TF with Vital 1.5 via G-tube     # DVT ppx: Lovenox 40mg SQ daily     # Disposition: d/c home today on Saturday 1/20/24.     Med consult team to signoff. Thank you for the consult.

## 2024-01-20 NOTE — DISCHARGE NOTE NURSING/CASE MANAGEMENT/SOCIAL WORK - PATIENT PORTAL LINK FT
You can access the FollowMyHealth Patient Portal offered by [facility] by registering at the following website: [email or web address]. By joining [employer]’s FollowMyHealth portal, you will also be able to view your health information using other applications (apps) compatible with our system.

## 2024-01-20 NOTE — PROGRESS NOTE ADULT - PROVIDER SPECIALTY LIST ADULT
ENT
Hospitalist
Hospitalist
Infectious Disease
Internal Medicine
SICU
ENT
Internal Medicine
Internal Medicine
SICU
ENT
Infectious Disease
Internal Medicine
ENT
Internal Medicine
Infectious Disease
Hospitalist
Infectious Disease

## 2024-01-20 NOTE — PROGRESS NOTE ADULT - REASON FOR ADMISSION
post-free flap reconstruction
surgical site infection
surgical site infection
medicine
surgical site infection
Wound infection
Wound infection

## 2024-01-20 NOTE — PROGRESS NOTE ADULT - ASSESSMENT
OTOLARYNGOLOGY (ENT) PROGRESS NOTE    PATIENT: SAUNDRA HOLMAN     MRN: [id number]       : 57  ADMISSION:23  DATE OF SERVICE:  24 @ 09:25  			         ID: SAUNDRA HOLMAN is a 69yo M w PMH of CAD (x2 stents Mar 2023), HLD, T2DM, hx of kidney stones, psoriasis who presented with an oral mass and underwent L hemimandibulectomy, SND L level 1-3, recon with L FFF, STSG, and placement of dental implants on . He had a trach which was subsequently decannulated. He returns  with surgical site infection now s/p OR for debridement and exploration. Trach replaced through prior stoma for pulmonary toilet.     Subjective/ Interval:   ; Patient seen this morning, oral pack to be changed, penrose dressing changed. Intraoral flap with partal skin necrosis, 7.5 portex in place with cuff deflated   : Patient seen and examined at bedside. NAEO. Patient remains afebrile and HD stable. HgB noted to drop to 7.6 from 9.2 but no additional episodes of melena. Penrose draining purulent material. Intra-oral infection/flap stable. Packing changed at bedside. No other complaints or changes at this time. ID recommended Vanc/zosyn and DC unasyn and flagyl, and IM recommended reticulocyte studies and adding folate supplements. No other changes at this time.   : AFVSS. No acute events overnight. Patient seen and examined at bedside. C/w Vanc/Zosyn per ID recs, pending sensitivities. Growing staph epi, E coli, enterobacter from wound cx on . S/p 2U PRBC yday w appropriate response in Hgb. Transfusion goal > 9.0.  : AFVSS. No acute events overnight. Patient seen and examined at bedside. C/w Vanc/Zosyn per ID recs, pending sens. Hgb stable this morning.  : AFVSS. No acute events overnight. Patient seen and examined at bedside. C/w Vanc/Zosyn, e faecalis sens to vanc/zosyn. Had 3 dark BM's yesterday that were stool guiac positive.  : AFVSS. No acute events overnight. Patient seen and examined at bedside. C/w Imipenem (changed per ID, enterobacter resistant to Zosyn). Plan for OR today for further washout / debridement.  1/3: AFVSS. No acute events overnight. Patient seen and examined at bedside. C/w imipenim. OR cx growing numerous gram negative rods and few gram positive cocci in pairs.  : Patient seen bedside, changed intraoral and neck packing,  Continue to follow cultures, pain controlled   : patient seen this morning, complains of right arm pain wit minimal swelling,  IV in the left arm,  Continue abx for 2 weeks. neck and oral packing changed, purulent drainage from neck noted.   : Patient seen at bedside during morning rounds. Reports right arm pain and swelling somewhat improved although still present. IV replaced in right arm yesterday. Remains on IV abx, tentative end date 1/15/24. Neck and oral packing changed at bedside; purulent drainage noted from neck, decreased in volume compared to prior exams.  : Patient seen at bedside during morning rounds. Overnight, patient complained of coughing, throat discomfort, and nursing reported some increased secretions via trach. Started on mucomyst with improvement. Noted 6 beats of PSVT at ~1900, no reported chest pain or other symptoms. No further episodes. Packing replaced at bedside   : patient seen this morning, neck and oral packing changed. Continues to have purulent drainage form neck.  No chest pain or SOB,               : patient seen this morning, changed neck and oral pack, patient tolerated packing change better with adjusted premedication,  purulence continues from neck , trach in place, fibula donor site with granulation tissue ( changed dressing)   1/10: AFVSS. Patient seen and evaluated this am. Neck and oral packing changed. Some purulence from neck. Trach in place.  : AFVSS. Patient taken to OR for washout, L latissmus dorsi flap to L oral cavity/oropharynx.  : AFVSS. Patient seen and evaluated this am. Pt doing well, minimal output from neck opening. LUZ drains in place. Flap healthy appearing. Failed TOV yesterday.  : AFVSS. Patient seen and evaluated this am. Pt in chair, flap looks healthy. Minimal output from neck opening. Donor site healing appropriately.   : AFVSS. Patient seen and evaluated this am. Flap appears healthy. Trach in place. Neck dressing changed, no purulence draining from opening. Donor site healing appropriately.  1/15: AFVSS. Patient seen and evaluated this am. Flap is healthy appearing. Trach in place. Neck dressing changed. Donor site healing appropriately.  : AFVSS. No acute events overnight. Patient seen and examined at bedside. Flap healthy appearing, neck soft and flat. Strong doppler signal.  : Patient seen bedside,  doppler signal strong, neck soft and flat, pain controlled, intraoral flap intact,  plan to cap trach today   : Patient seen bedside, will remove doppler today, upper lat luz with 3 cc overnight - will remove,  pain controlled, will remove staples today   : plan to dc tomorrow morning, will confirm trach supplies were delivers. LUZ drain with minimal SS fluid output - plan to remove today   : trach supplies delivery confirmed. All LUZ drains removed. Packing to neck replaced. Overnight, noted minimal leaking from PEG tube; resolved with tegaderm placement. Patient ambulating well, saturating well on trach collar, pain well controlled.                      Objective:    Vital Signs:  T(C): 36.7 (24 @ 08:42), Max: 37.1 (24 @ 22:11)  HR: 76 (24 @ 08:40) (74 - 98)  BP: 110/55 (24 @ 08:40) (100/55 - 119/57)  RR: 18 (24 @ 08:40) (18 - 18)  SpO2: 96% (24 @ 08:40) (96% - 98%)    PHYSICAL EXAM:  GEN: appears stated age  NEURO: alert & oriented x /  HEENT:  4 staples remaining to neck incision, exposed muscular flap in neck incision covered with xeroform, granulating well. Neck with no purulence noted, trach in place, minimal thin respiratory secreitons.   CVS: regular rate and rhythm  Pulm: normal respiratory excursions, not tachypneic, no labored breathing  Abd: non-distended  Ext: moving all four extremities, no peripheral edema noted. Latissmus donor site well healing well; incision c/d/i.        LABORATORY:   No new labs since      [id number]    MICROBIOLOGY:      I&O:    24 @ 07:01  -  24 @ 07:00  --------------------------------------------------------  IN: 2148 mL / OUT: 1833 mL / NET: 315 mL    24 @ 07:24 @ 09:25  --------------------------------------------------------  IN: 0 mL / OUT: 300 mL / NET: -300 mL         IMAGING:     MEDICATIONS:  albuterol/ipratropium for Nebulization 3 milliLiter(s) Nebulizer every 6 hours  aspirin  chewable 81 milliGRAM(s) Oral daily  bacitracin   Ointment 1 Application(s) Topical every 12 hours  chlorhexidine 0.12% Liquid 15 milliLiter(s) Oral Mucosa two times a day  clopidogrel Tablet 75 milliGRAM(s) Oral daily  dextrose 5%. 1000 milliLiter(s) IV Continuous <Continuous>  dextrose 5%. 1000 milliLiter(s) IV Continuous <Continuous>  dextrose 50% Injectable 25 Gram(s) IV Push once  dextrose 50% Injectable 25 Gram(s) IV Push once  dextrose 50% Injectable 12.5 Gram(s) IV Push once  dextrose Oral Gel 15 Gram(s) Oral once PRN  doxazosin 1 milliGRAM(s) Oral at bedtime  enoxaparin Injectable 40 milliGRAM(s) SubCutaneous every 24 hours  gabapentin Solution 300 milliGRAM(s) Oral every 12 hours  glucagon  Injectable 1 milliGRAM(s) IntraMuscular once  influenza  Vaccine (HIGH DOSE) 0.7 milliLiter(s) IntraMuscular once  insulin lispro (ADMELOG) corrective regimen sliding scale   SubCutaneous every 6 hours  lidocaine   4% Patch 1 Patch Transdermal daily PRN  multivitamin/minerals/iron Oral Solution (CENTRUM) 15 milliLiter(s) Oral daily  ondansetron Injectable 4 milliGRAM(s) IV Push every 8 hours PRN  traZODone 50 milliGRAM(s) Oral at bedtime    Assessment and Plan:    SAUNDRA HOLMAN is a 69yo M w PMH of CAD (x2 stents Mar 2023), HLD, T2DM, hx of kidney stones, psoriasis who presented with an oral mass and underwent L hemimandibulectomy, SND L level 1-3, recon with L FFF, STSG, and placement of dental implants on . He had a trach which was subsequently decannulated. Now s/p OR for debridement and exploration. Trach replaced through prior stoma for pulmonary toilet. Pt with reported intermittent tremors , electrolytes normal.  Plan to DC home today; supplies delivered, cleared by PT for home.     Plan:  - D/c home today   - SLP continue sips of water   - Hgb goal > 9.0   - C/w TF  - Maintain 7.5 portex for pulm toilet - now with uncuffed tube, trach supplies at home   - ISS  - Pain control  - Home meds  - Bowel regimen    [facility] at [phone] with any questions/concerns.    Av Victor MD  24 @ 09:25

## 2024-01-20 NOTE — DISCHARGE NOTE NURSING/CASE MANAGEMENT/SOCIAL WORK - NSDCPEFALRISK_GEN_ALL_CORE
For information on Fall & Injury Prevention, visit: [email or web address] OR  [email or web address] OR  https://www.cdc.gov/steadi/patient.html

## 2024-01-20 NOTE — PROGRESS NOTE ADULT - ASSESSMENT
Past medical Hx of CAD (x 2 stents Mar 2023), Type 2 DM, hx of kidney stones, psoriasis who presented with an oral mass during previous admission (12/3-21) and underwent L hemimandibulectomy, SND L level 1-3, recon with L FFF, STSG, and placement of dental implants on 12/7. Patient had a trach which was subsequently decannulated. Patient now returns with surgical site infection, now s/p OR for washout, L latissmus dorsi flap to L oral cavity/oropharynx. Medicine consulted for co-management.    Recommendations:    #Surgical site infection  #L latissmus dorsi flap  Patient s/p OR for debridement and exploration (12/27 and 1/2) and Trach replaced through prior stoma for pulmonary toilet. OR cultures with E. Coli, ECC, E. Faecalis S. Mitis S. Oralis, S. Epidermidis, S. Constellatus, S. Maltophilia, mixed anaerobes. Now s/p OR for washout, L latissmus dorsi flap to L oral cavity/oropharynx 1/11  - c/w Imipenem 1 gram IV q8hrs x 14 day course completed  - Pain regimen: Tylenol 650 mg PO q6, Oxy IR 5 mg Q4 moderate, Oxy IR 10 mg Q4 severe    #Anemia    Hb on admission 7.3. Now stable Hb 9.2 s/p karlo-operative transfusion. Patient with previous iron studies consistent with WADE. DD includes surgical site bleed VS less likely GI bleed. Patient now s/p EGD in OR (1/3).   - maintain active T&S, transfusion goal to Hgb >8   - c/w Folic acid 1g PO QD  - c/w Protonix 40 mg PO via G-tube  - keep active T&S, keep Hgb>8      #CAD  Patient with hx of CAD s/p 2 stents in March 2023.   - c/w ASA 81 mg PO QD, Atorvastatin 40 mg PO QD  - Cont Plavix 75mg PO qd    #DM type 2  Home medication: Metformin 1g PO QD and 500 mg PO QD and Jardiance 10 mg PO QD.  - c/w insulin sliding scale    Plan discussed with Dr. Mitchell Montero, note not complete until attested by attending

## 2024-01-22 DIAGNOSIS — K21.9 GASTRO-ESOPHAGEAL REFLUX DISEASE W/OUT ESOPHAGITIS: ICD-10-CM

## 2024-01-22 DIAGNOSIS — Z93.1 GASTROSTOMY STATUS: ICD-10-CM

## 2024-01-22 RX ORDER — MULTIVITAMIN WITH FOLIC ACID 400 MCG
TABLET ORAL DAILY
Qty: 30 | Refills: 3 | Status: ACTIVE | COMMUNITY
Start: 2024-01-22 | End: 1900-01-01

## 2024-01-22 RX ORDER — PANTOPRAZOLE 40 MG/1
40 TABLET, DELAYED RELEASE ORAL DAILY
Qty: 30 | Refills: 3 | Status: ACTIVE | COMMUNITY
Start: 2024-01-22 | End: 1900-01-01

## 2024-01-29 DIAGNOSIS — E83.42 HYPOMAGNESEMIA: ICD-10-CM

## 2024-01-29 DIAGNOSIS — L40.9 PSORIASIS, UNSPECIFIED: ICD-10-CM

## 2024-01-29 DIAGNOSIS — T81.31XA DISRUPTION OF EXTERNAL OPERATION (SURGICAL) WOUND, NOT ELSEWHERE CLASSIFIED, INITIAL ENCOUNTER: ICD-10-CM
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
## 2024-01-29 DIAGNOSIS — Z97.2 PRESENCE OF DENTAL PROSTHETIC DEVICE (COMPLETE) (PARTIAL): ICD-10-CM

## 2024-01-29 DIAGNOSIS — Z93.0 TRACHEOSTOMY STATUS: ICD-10-CM

## 2024-01-29 DIAGNOSIS — Z85.828 PERSONAL HISTORY OF OTHER MALIGNANT NEOPLASM OF SKIN: ICD-10-CM

## 2024-01-29 DIAGNOSIS — Z72.0 TOBACCO USE: ICD-10-CM

## 2024-01-29 DIAGNOSIS — Z87.442 PERSONAL HISTORY OF URINARY CALCULI: ICD-10-CM

## 2024-01-29 DIAGNOSIS — D50.9 IRON DEFICIENCY ANEMIA, UNSPECIFIED: ICD-10-CM

## 2024-01-29 DIAGNOSIS — I25.10 ATHEROSCLEROTIC HEART DISEASE OF NATIVE CORONARY ARTERY WITHOUT ANGINA PECTORIS: ICD-10-CM

## 2024-01-29 DIAGNOSIS — D63.8 ANEMIA IN OTHER CHRONIC DISEASES CLASSIFIED ELSEWHERE: ICD-10-CM

## 2024-01-29 DIAGNOSIS — Z79.84 LONG TERM (CURRENT) USE OF ORAL HYPOGLYCEMIC DRUGS: ICD-10-CM

## 2024-01-29 DIAGNOSIS — D62 ACUTE POSTHEMORRHAGIC ANEMIA: ICD-10-CM

## 2024-01-29 DIAGNOSIS — A69.0: ICD-10-CM

## 2024-01-29 DIAGNOSIS — T81.41XA INFECTION FOLLOWING A PROCEDURE, SUPERFICIAL INCISIONAL SURGICAL SITE, INITIAL ENCOUNTER: ICD-10-CM
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
## 2024-01-29 DIAGNOSIS — K92.1 MELENA: ICD-10-CM

## 2024-01-29 DIAGNOSIS — C06.9 MALIGNANT NEOPLASM OF MOUTH, UNSPECIFIED: ICD-10-CM

## 2024-01-29 DIAGNOSIS — I80.8 PHLEBITIS AND THROMBOPHLEBITIS OF OTHER SITES: ICD-10-CM

## 2024-01-29 DIAGNOSIS — Z95.5 PRESENCE OF CORONARY ANGIOPLASTY IMPLANT AND GRAFT: ICD-10-CM

## 2024-01-29 DIAGNOSIS — E11.9 TYPE 2 DIABETES MELLITUS WITHOUT COMPLICATIONS: ICD-10-CM

## 2024-01-29 DIAGNOSIS — R13.10 DYSPHAGIA, UNSPECIFIED: ICD-10-CM

## 2024-01-29 DIAGNOSIS — Z93.1 GASTROSTOMY STATUS: ICD-10-CM

## 2024-02-05 PROBLEM — Z43.0 TRACHEOSTOMY CARE: Status: ACTIVE | Noted: 2024-01-22

## 2024-02-05 PROBLEM — C41.1: Status: ACTIVE | Noted: 2023-11-27

## 2024-02-06 ENCOUNTER — APPOINTMENT (OUTPATIENT)
Dept: OTOLARYNGOLOGY | Facility: CLINIC | Age: 69
End: 2024-02-06
Payer: MEDICARE

## 2024-02-06 VITALS
OXYGEN SATURATION: 98 % | WEIGHT: 171 LBS | DIASTOLIC BLOOD PRESSURE: 61 MMHG | BODY MASS INDEX: 24.48 KG/M2 | TEMPERATURE: 98.1 F | HEIGHT: 70 IN | SYSTOLIC BLOOD PRESSURE: 121 MMHG | HEART RATE: 90 BPM

## 2024-02-06 DIAGNOSIS — Z43.0 ENCOUNTER FOR ATTENTION TO TRACHEOSTOMY: ICD-10-CM

## 2024-02-06 DIAGNOSIS — C41.1 MALIGNANT NEOPLASM OF MANDIBLE: ICD-10-CM

## 2024-02-06 PROCEDURE — 99024 POSTOP FOLLOW-UP VISIT: CPT

## 2024-02-06 NOTE — PHYSICAL EXAM
[de-identified] : Post left neck resection and lattisimus reconstruction [de-identified] : Mandibular deviation [de-identified] : Post left mandibulectomy, resection, Lattisimus reconstruction [de-identified] : Left mandibular resection well healed [de-identified] : Tracheostomy site healed [Normal] : extraocular movements are normal

## 2024-02-06 NOTE — ADDENDUM
[FreeTextEntry1] : Speech Language Pathology. Pt seen during clinic visit in conjunction with Dr. Gandhi.  He was accompanied by his wife. Pt s/p trach removal on 2/2/24. Voice is strong and clear. Primary nutrition and hydration via PEG. Pt has been drinking water by mouth daily. Per Dr. Gandhi, Pt is cleared for PO trials. Pt with SLP f/u at [facility] ([facility] [phone]) where he will be receiving RT. Pt was given trials of thin liquids and puree. Pt presents with mild oral deficits in setting of incomplete labial seal characterized by occasional bolus loss. Pt utilizing [id number] to minimize bolus loss. Oral bolus clearance was efficient. Pharyngeal swallow without overt signs of airway protection deficits or inefficiency. Pt can initiate puree and thin liquid diet. Modified Barium Swallow study is scheduled for 2/13/24 at [facility]. This service will continue to be involved in the dysphagia rehabilitation of this Pt. All questions were answered.  Ro Barriga M.S. CCC-SLP

## 2024-02-06 NOTE — REVIEW OF SYSTEMS
[As Noted in HPI] : as noted in HPI [Feeling Tired] : feeling tired [Negative] : Heme/Lymph [FreeTextEntry6] : Some lung findings being workup up right upper lobe

## 2024-02-06 NOTE — REASON FOR VISIT
[Subsequent Evaluation] : a subsequent evaluation for [de-identified] : left mandibulectomy, left fibula fracture [de-identified] : Patient here today for post operative appointment. Has some visible necrosis of skin paddle of flap around edges which was debrided today. Using PEG now for nutrition, has trouble even with softs and liquids. Neck incision clean dry and intact. Center of flap appears within normal limits with appropriate color and texture, bleeding normally. Stoma site within normal limits. Fibula donor site within normal limits, continues to change dressings daily. Will switch antibiotic from amoxicillin to augmentin for increased coverage. Has muffled hearing on left side, on exam there cerumen impaction cleaned away without issue. He reported immediate improvement. Will likely need serial debridements and continued monitoring for improvement. Followup next week.  Freddy Gandhi DDS MD FACS Professor and Chair Department of Otolaryngology Head and Neck Surgery [facility]

## 2024-02-06 NOTE — HISTORY OF PRESENT ILLNESS
[de-identified] : 68 year old left buccal SCC, history of lichen planus, lesion present over one year. Smoking history for many years, quit one year ago, no active smoking. The patient was evaluated by Dr. Mendoza who felt he would require fibular reconstruction and requested a telehealth. Patient states the lesion has been present and irritated for some time, but recently started to hurt which prompted an oral surgery evaluation and biopsy.  [FreeTextEntry1] : 2/6/2024: Patient here today for follow up. Continues to heal well in terms of his wounds. Staples removed today in office from neck. At this point only oral intake is water. Feels ready to start advancing diet. Will be seeing Dr. Mendoza later today to address possibly removing the screws and doing rubber bands. Will start radiation soon with Dr Parker and ANGIE.

## 2024-02-13 LAB — GLUCOSE BLDC GLUCOMTR-MCNC: 125 MG/DL — HIGH (ref 70–99)
